# Patient Record
Sex: FEMALE | Race: WHITE | NOT HISPANIC OR LATINO | Employment: OTHER | URBAN - METROPOLITAN AREA
[De-identification: names, ages, dates, MRNs, and addresses within clinical notes are randomized per-mention and may not be internally consistent; named-entity substitution may affect disease eponyms.]

---

## 2018-01-03 ENCOUNTER — HOSPITAL ENCOUNTER (OUTPATIENT)
Facility: HOSPITAL | Age: 63
Setting detail: OBSERVATION
Discharge: HOME | End: 2018-01-05
Attending: EMERGENCY MEDICINE | Admitting: INTERNAL MEDICINE
Payer: COMMERCIAL

## 2018-01-03 ENCOUNTER — APPOINTMENT (EMERGENCY)
Dept: RADIOLOGY | Facility: HOSPITAL | Age: 63
End: 2018-01-03
Payer: COMMERCIAL

## 2018-01-03 DIAGNOSIS — R07.9 CHEST PAIN: Primary | ICD-10-CM

## 2018-01-03 LAB
ALBUMIN SERPL BCP-MCNC: 3.2 G/DL (ref 3.5–5)
ALP SERPL-CCNC: 139 U/L (ref 46–116)
ALT SERPL W P-5'-P-CCNC: 21 U/L (ref 12–78)
ANION GAP SERPL CALCULATED.3IONS-SCNC: 6 MMOL/L (ref 4–13)
AST SERPL W P-5'-P-CCNC: 18 U/L (ref 5–45)
BASOPHILS # BLD AUTO: 0.01 THOUSANDS/ΜL (ref 0–0.1)
BASOPHILS NFR BLD AUTO: 0 % (ref 0–1)
BILIRUB SERPL-MCNC: 0.28 MG/DL (ref 0.2–1)
BUN SERPL-MCNC: 13 MG/DL (ref 5–25)
CALCIUM SERPL-MCNC: 8.6 MG/DL (ref 8.3–10.1)
CHLORIDE SERPL-SCNC: 98 MMOL/L (ref 100–108)
CO2 SERPL-SCNC: 31 MMOL/L (ref 21–32)
CREAT SERPL-MCNC: 0.94 MG/DL (ref 0.6–1.3)
DEPRECATED D DIMER PPP: 248 NG/ML (FEU) (ref 0–424)
EOSINOPHIL # BLD AUTO: 0.1 THOUSAND/ΜL (ref 0–0.61)
EOSINOPHIL NFR BLD AUTO: 2 % (ref 0–6)
ERYTHROCYTE [DISTWIDTH] IN BLOOD BY AUTOMATED COUNT: 12.8 % (ref 11.6–15.1)
GFR SERPL CREATININE-BSD FRML MDRD: 65 ML/MIN/1.73SQ M
GLUCOSE SERPL-MCNC: 418 MG/DL (ref 65–140)
HCT VFR BLD AUTO: 41.4 % (ref 34.8–46.1)
HGB BLD-MCNC: 14 G/DL (ref 11.5–15.4)
LYMPHOCYTES # BLD AUTO: 1.42 THOUSANDS/ΜL (ref 0.6–4.47)
LYMPHOCYTES NFR BLD AUTO: 22 % (ref 14–44)
MCH RBC QN AUTO: 29.9 PG (ref 26.8–34.3)
MCHC RBC AUTO-ENTMCNC: 33.8 G/DL (ref 31.4–37.4)
MCV RBC AUTO: 88 FL (ref 82–98)
MONOCYTES # BLD AUTO: 0.37 THOUSAND/ΜL (ref 0.17–1.22)
MONOCYTES NFR BLD AUTO: 6 % (ref 4–12)
NEUTROPHILS # BLD AUTO: 4.63 THOUSANDS/ΜL (ref 1.85–7.62)
NEUTS SEG NFR BLD AUTO: 70 % (ref 43–75)
NRBC BLD AUTO-RTO: 0 /100 WBCS
PLATELET # BLD AUTO: 194 THOUSANDS/UL (ref 149–390)
PMV BLD AUTO: 11.4 FL (ref 8.9–12.7)
POTASSIUM SERPL-SCNC: 3.8 MMOL/L (ref 3.5–5.3)
PROT SERPL-MCNC: 7.4 G/DL (ref 6.4–8.2)
RBC # BLD AUTO: 4.69 MILLION/UL (ref 3.81–5.12)
SODIUM SERPL-SCNC: 135 MMOL/L (ref 136–145)
SPECIMEN SOURCE: NORMAL
SPECIMEN SOURCE: NORMAL
TROPONIN I BLD-MCNC: 0 NG/ML (ref 0–0.08)
TROPONIN I BLD-MCNC: 0 NG/ML (ref 0–0.08)
WBC # BLD AUTO: 6.56 THOUSAND/UL (ref 4.31–10.16)

## 2018-01-03 PROCEDURE — 80053 COMPREHEN METABOLIC PANEL: CPT | Performed by: EMERGENCY MEDICINE

## 2018-01-03 PROCEDURE — 36415 COLL VENOUS BLD VENIPUNCTURE: CPT | Performed by: EMERGENCY MEDICINE

## 2018-01-03 PROCEDURE — 93005 ELECTROCARDIOGRAM TRACING: CPT | Performed by: EMERGENCY MEDICINE

## 2018-01-03 PROCEDURE — 84484 ASSAY OF TROPONIN QUANT: CPT

## 2018-01-03 PROCEDURE — 85379 FIBRIN DEGRADATION QUANT: CPT | Performed by: EMERGENCY MEDICINE

## 2018-01-03 PROCEDURE — 93005 ELECTROCARDIOGRAM TRACING: CPT

## 2018-01-03 PROCEDURE — 71046 X-RAY EXAM CHEST 2 VIEWS: CPT

## 2018-01-03 PROCEDURE — 36415 COLL VENOUS BLD VENIPUNCTURE: CPT

## 2018-01-03 PROCEDURE — 85025 COMPLETE CBC W/AUTO DIFF WBC: CPT | Performed by: EMERGENCY MEDICINE

## 2018-01-03 RX ORDER — INSULIN ASPART 100 [IU]/ML
60 INJECTION, SUSPENSION SUBCUTANEOUS
COMMUNITY
End: 2020-12-04

## 2018-01-03 RX ORDER — ASPIRIN 81 MG/1
324 TABLET, CHEWABLE ORAL ONCE
Status: COMPLETED | OUTPATIENT
Start: 2018-01-03 | End: 2018-01-03

## 2018-01-03 RX ADMIN — ASPIRIN 81 MG 324 MG: 81 TABLET ORAL at 20:44

## 2018-01-03 RX ADMIN — NITROGLYCERIN 1 INCH: 20 OINTMENT TOPICAL at 20:45

## 2018-01-04 LAB
ANION GAP SERPL CALCULATED.3IONS-SCNC: 6 MMOL/L (ref 4–13)
ATRIAL RATE: 73 BPM
ATRIAL RATE: 82 BPM
ATRIAL RATE: 84 BPM
ATRIAL RATE: 87 BPM
ATRIAL RATE: 94 BPM
BUN SERPL-MCNC: 12 MG/DL (ref 5–25)
CALCIUM SERPL-MCNC: 8.4 MG/DL (ref 8.3–10.1)
CHLORIDE SERPL-SCNC: 104 MMOL/L (ref 100–108)
CO2 SERPL-SCNC: 29 MMOL/L (ref 21–32)
CREAT SERPL-MCNC: 0.56 MG/DL (ref 0.6–1.3)
GFR SERPL CREATININE-BSD FRML MDRD: 100 ML/MIN/1.73SQ M
GLUCOSE SERPL-MCNC: 109 MG/DL (ref 65–140)
GLUCOSE SERPL-MCNC: 141 MG/DL (ref 65–140)
GLUCOSE SERPL-MCNC: 189 MG/DL (ref 65–140)
GLUCOSE SERPL-MCNC: 231 MG/DL (ref 65–140)
GLUCOSE SERPL-MCNC: 256 MG/DL (ref 65–140)
GLUCOSE SERPL-MCNC: 263 MG/DL (ref 65–140)
GLUCOSE SERPL-MCNC: 297 MG/DL (ref 65–140)
GLUCOSE SERPL-MCNC: 92 MG/DL (ref 65–140)
P AXIS: 53 DEGREES
P AXIS: 56 DEGREES
P AXIS: 62 DEGREES
P AXIS: 62 DEGREES
P AXIS: 63 DEGREES
PLATELET # BLD AUTO: 155 THOUSANDS/UL (ref 149–390)
PMV BLD AUTO: 10.6 FL (ref 8.9–12.7)
POTASSIUM SERPL-SCNC: 3.5 MMOL/L (ref 3.5–5.3)
PR INTERVAL: 120 MS
PR INTERVAL: 120 MS
PR INTERVAL: 122 MS
PR INTERVAL: 130 MS
PR INTERVAL: 138 MS
QRS AXIS: -32 DEGREES
QRS AXIS: -34 DEGREES
QRS AXIS: -36 DEGREES
QRS AXIS: -38 DEGREES
QRS AXIS: -38 DEGREES
QRSD INTERVAL: 118 MS
QRSD INTERVAL: 120 MS
QRSD INTERVAL: 128 MS
QRSD INTERVAL: 132 MS
QRSD INTERVAL: 132 MS
QT INTERVAL: 364 MS
QT INTERVAL: 366 MS
QT INTERVAL: 382 MS
QT INTERVAL: 382 MS
QT INTERVAL: 392 MS
QTC INTERVAL: 420 MS
QTC INTERVAL: 440 MS
QTC INTERVAL: 451 MS
QTC INTERVAL: 455 MS
QTC INTERVAL: 457 MS
SODIUM SERPL-SCNC: 139 MMOL/L (ref 136–145)
T WAVE AXIS: 40 DEGREES
T WAVE AXIS: 44 DEGREES
T WAVE AXIS: 51 DEGREES
T WAVE AXIS: 55 DEGREES
T WAVE AXIS: 56 DEGREES
TROPONIN I SERPL-MCNC: <0.02 NG/ML
TROPONIN I SERPL-MCNC: <0.02 NG/ML
VENTRICULAR RATE: 73 BPM
VENTRICULAR RATE: 82 BPM
VENTRICULAR RATE: 84 BPM
VENTRICULAR RATE: 87 BPM
VENTRICULAR RATE: 94 BPM

## 2018-01-04 PROCEDURE — 93005 ELECTROCARDIOGRAM TRACING: CPT | Performed by: PHYSICIAN ASSISTANT

## 2018-01-04 PROCEDURE — 84484 ASSAY OF TROPONIN QUANT: CPT | Performed by: PHYSICIAN ASSISTANT

## 2018-01-04 PROCEDURE — 80048 BASIC METABOLIC PNL TOTAL CA: CPT | Performed by: PHYSICIAN ASSISTANT

## 2018-01-04 PROCEDURE — 99285 EMERGENCY DEPT VISIT HI MDM: CPT

## 2018-01-04 PROCEDURE — 82948 REAGENT STRIP/BLOOD GLUCOSE: CPT

## 2018-01-04 PROCEDURE — 93005 ELECTROCARDIOGRAM TRACING: CPT

## 2018-01-04 PROCEDURE — 36415 COLL VENOUS BLD VENIPUNCTURE: CPT | Performed by: PHYSICIAN ASSISTANT

## 2018-01-04 PROCEDURE — 85049 AUTOMATED PLATELET COUNT: CPT | Performed by: PHYSICIAN ASSISTANT

## 2018-01-04 RX ORDER — CALCIUM CARBONATE 200(500)MG
1000 TABLET,CHEWABLE ORAL DAILY PRN
Status: DISCONTINUED | OUTPATIENT
Start: 2018-01-04 | End: 2018-01-05 | Stop reason: HOSPADM

## 2018-01-04 RX ORDER — PANTOPRAZOLE SODIUM 40 MG/1
40 TABLET, DELAYED RELEASE ORAL
Status: DISCONTINUED | OUTPATIENT
Start: 2018-01-04 | End: 2018-01-05 | Stop reason: HOSPADM

## 2018-01-04 RX ORDER — ROPINIROLE 1 MG/1
1 TABLET, FILM COATED ORAL 3 TIMES DAILY
Status: DISCONTINUED | OUTPATIENT
Start: 2018-01-04 | End: 2018-01-05 | Stop reason: HOSPADM

## 2018-01-04 RX ORDER — TOPIRAMATE 100 MG/1
200 TABLET, FILM COATED ORAL
Status: DISCONTINUED | OUTPATIENT
Start: 2018-01-04 | End: 2018-01-05 | Stop reason: HOSPADM

## 2018-01-04 RX ORDER — PROMETHAZINE HYDROCHLORIDE 25 MG/ML
12.5 INJECTION, SOLUTION INTRAMUSCULAR; INTRAVENOUS EVERY 6 HOURS PRN
Status: DISCONTINUED | OUTPATIENT
Start: 2018-01-04 | End: 2018-01-05 | Stop reason: HOSPADM

## 2018-01-04 RX ORDER — ACETAMINOPHEN 325 MG/1
650 TABLET ORAL EVERY 6 HOURS PRN
Status: DISCONTINUED | OUTPATIENT
Start: 2018-01-04 | End: 2018-01-05 | Stop reason: HOSPADM

## 2018-01-04 RX ORDER — INSULIN ASPART 100 [IU]/ML
30 INJECTION, SUSPENSION SUBCUTANEOUS
Status: DISCONTINUED | OUTPATIENT
Start: 2018-01-04 | End: 2018-01-05 | Stop reason: HOSPADM

## 2018-01-04 RX ORDER — FLUOXETINE HYDROCHLORIDE 20 MG/1
20 CAPSULE ORAL DAILY
Status: DISCONTINUED | OUTPATIENT
Start: 2018-01-04 | End: 2018-01-05 | Stop reason: HOSPADM

## 2018-01-04 RX ORDER — INSULIN ASPART 100 [IU]/ML
60 INJECTION, SUSPENSION SUBCUTANEOUS
Status: DISCONTINUED | OUTPATIENT
Start: 2018-01-04 | End: 2018-01-04

## 2018-01-04 RX ORDER — ROPINIROLE 1 MG/1
1 TABLET, FILM COATED ORAL 3 TIMES DAILY
Status: DISCONTINUED | OUTPATIENT
Start: 2018-01-04 | End: 2018-01-04

## 2018-01-04 RX ORDER — TOPIRAMATE 100 MG/1
200 TABLET, FILM COATED ORAL EVERY MORNING
Status: DISCONTINUED | OUTPATIENT
Start: 2018-01-04 | End: 2018-01-05 | Stop reason: HOSPADM

## 2018-01-04 RX ORDER — MORPHINE SULFATE 2 MG/ML
2 INJECTION, SOLUTION INTRAMUSCULAR; INTRAVENOUS EVERY 4 HOURS PRN
Status: DISCONTINUED | OUTPATIENT
Start: 2018-01-04 | End: 2018-01-05 | Stop reason: HOSPADM

## 2018-01-04 RX ORDER — LEVOTHYROXINE SODIUM 0.05 MG/1
50 TABLET ORAL
Status: DISCONTINUED | OUTPATIENT
Start: 2018-01-04 | End: 2018-01-05 | Stop reason: HOSPADM

## 2018-01-04 RX ADMIN — TOPIRAMATE 200 MG: 100 TABLET, FILM COATED ORAL at 01:57

## 2018-01-04 RX ADMIN — INSULIN LISPRO 2 UNITS: 100 INJECTION, SOLUTION INTRAVENOUS; SUBCUTANEOUS at 01:57

## 2018-01-04 RX ADMIN — SODIUM CHLORIDE 500 ML: 0.9 INJECTION, SOLUTION INTRAVENOUS at 09:16

## 2018-01-04 RX ADMIN — ENOXAPARIN SODIUM 40 MG: 40 INJECTION SUBCUTANEOUS at 09:15

## 2018-01-04 RX ADMIN — PANTOPRAZOLE SODIUM 40 MG: 40 TABLET, DELAYED RELEASE ORAL at 01:56

## 2018-01-04 RX ADMIN — TOPIRAMATE 200 MG: 100 TABLET, FILM COATED ORAL at 21:06

## 2018-01-04 RX ADMIN — ROPINIROLE 1 MG: 1 TABLET, FILM COATED ORAL at 02:35

## 2018-01-04 RX ADMIN — LEVOTHYROXINE SODIUM 50 MCG: 50 TABLET ORAL at 05:33

## 2018-01-04 RX ADMIN — ROPINIROLE 1 MG: 1 TABLET, FILM COATED ORAL at 16:28

## 2018-01-04 RX ADMIN — INSULIN ASPART 30 UNITS: 100 INJECTION, SUSPENSION SUBCUTANEOUS at 16:31

## 2018-01-04 RX ADMIN — INSULIN LISPRO 6 UNITS: 100 INJECTION, SOLUTION INTRAVENOUS; SUBCUTANEOUS at 13:29

## 2018-01-04 RX ADMIN — INSULIN ASPART 30 UNITS: 100 INJECTION, SUSPENSION SUBCUTANEOUS at 07:17

## 2018-01-04 RX ADMIN — ROPINIROLE 1 MG: 1 TABLET, FILM COATED ORAL at 21:06

## 2018-01-04 RX ADMIN — INSULIN LISPRO 4 UNITS: 100 INJECTION, SOLUTION INTRAVENOUS; SUBCUTANEOUS at 07:18

## 2018-01-04 RX ADMIN — PROMETHAZINE HYDROCHLORIDE 12.5 MG: 25 INJECTION INTRAMUSCULAR; INTRAVENOUS at 08:32

## 2018-01-04 NOTE — H&P
H&P- Rakesh Ervin 1955, 60 y o  female MRN: [id number]    Unit/Bed#: ED 23 Encounter: [id number]    Primary Care Provider: Paul Candelario MD   Date and time admitted to hospital: 1/3/2018  7:57 PM    * Chest pain   Assessment & Plan    1  Pt with no cardiac hx, similar episodes of CP with negative cardiac quintero  First trop negative  EKG without ST changes, remarkable only for new RBBB  Will admit for obs, keep on tele, trend troponins  Pain control  2  No SOB, VSS  3  Suspect GI cause possibly GERD exacerbation as pt is noncompliant with PPI regimen and consumed large amounts of acidic foods as well as alcohol today  Initiate PPI therapy  GERD (gastroesophageal reflux disease)   Assessment & Plan    1  Epigastric ttp on exam, Pt prescribed PPI by PCP, she states she does no takes this  Only takes TUMS in response to GERD  Reports GERD flares 2x/month on average  2   Will treat pt with PPI and advise f/u with PCP to re-consider PPI therapy again as an outpatient  Diabetes mellitus ([facility] [address] )   Assessment & Plan    1  Pt on 60 U Novolog 70/30 BID AC, reports poor compliance and skipped her dinnertime dose  Continue this regimen and add SSI ac and hs  Monitor BS's  Hyperlipidemia   Assessment & Plan    1  Pt trialed multiple statins all of which had to be dc'ed 2/2 myalgias  Hypertension   Assessment & Plan    1  Appears that pt does not take any anti-hypertensives outpatient  Continue to monitor BP, encourage f/u with PCP  Restless leg syndrome   Assessment & Plan    1  Controlled on Requip three times daily  Seizures ([facility] [address] )   Assessment & Plan    1  Controlled on Topamax  VTE Prophylaxis: Enoxaparin (Lovenox)  / reason for no mechanical VTE prophylaxis not required   Code Status: DNR/DNI  POLST: POLST form is not discussed and not completed at this time    Discussion with family: nobody    Anticipated Length of Stay:  Patient will be admitted on an Observation basis with an anticipated length of stay of  < 2 midnights  Justification for Hospital Stay: ACS r/o    Total Time for Visit, including Counseling / Coordination of Care: 30 minutes  Greater than 50% of this total time spent on direct patient counseling and coordination of care  Chief Complaint:   Chest pain    History of Present Illness:    Angie Mack is a 60 y o  female who presents with a PMHx of HTN, HLD, DM, and GERD presenting to the ED with substernal chest pain for a duration of about 5 hours  She states that she [place] the [place] today and had three mudslides and  Then went to the buffet where she ate pork and sauerkraut, meatloaf, and multiple other foods  About an hour later she began to develop progressive "chest squeezing" that after ten minutes of pain she called EMS  She states that it radiated into her left arm and up into her left chin  She denies however any CAST, diaphoresis, paraesthesias, N/V although she does feel somewhat "queasy"  She states that she has a hx of GERD and that her doctor prescribed omeprazole but that she never took it  She admits to GERD episodes about twice a month  She states this feels different than usual GERD  She also admits to missing some doses of insulin every once in a while  She admits to a cough for three days that she got from 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, described as dry  Denies f/c  She states that her lower abdominal pain has been relieved since her most recent solid BM had in the hospital       Review of Systems:    Review of Systems   Constitutional: Negative  HENT: Negative  Eyes: Negative  Respiratory: Positive for cough, chest tightness and shortness of breath  Negative for apnea, choking, wheezing and stridor  Cardiovascular: Positive for chest pain  Negative for palpitations and leg swelling  Gastrointestinal: Positive for abdominal pain, constipation, diarrhea and nausea   Negative for abdominal distention, anal bleeding, blood in stool, rectal pain and vomiting  Endocrine: Negative  Genitourinary: Negative  Neurological: Positive for headaches (following administration of nitro)  Negative for dizziness, tremors, seizures, syncope, facial asymmetry, speech difficulty, light-headedness and numbness  Hematological: Negative  Psychiatric/Behavioral: Negative  Past Medical and Surgical History:     Past Medical History:   Diagnosis Date    Diabetes mellitus ([facility] [address] )     Disease of thyroid gland     Hyperlipidemia     Hypertension     Hypothyroidism (acquired)     Restless leg syndrome     Seizures (HCC)        Past Surgical History:   Procedure Laterality Date    APPENDECTOMY      CHOLECYSTECTOMY      HIP FRACTURE SURGERY Right     TONSILLECTOMY      TUBAL LIGATION         Meds/Allergies:    Prior to Admission medications    Medication Sig Start Date End Date Taking? Authorizing Provider   FLUoxetine (PROzac) 20 mg capsule Take 20 mg by mouth daily  Yes Historical Provider, MD   insulin aspart protamine-insulin aspart (NovoLOG 70/30) 100 units/mL injection Inject 60 Units under the skin 2 (two) times a day before meals   Yes Historical Provider, MD   levothyroxine 50 mcg tablet Take 50 mcg by mouth daily Indications: Pt unsure of the amount  Yes Historical Provider, MD   loperamide (IMODIUM) 1 mg/5 mL oral liquid Take by mouth 4 (four) times a day as needed for diarrhea  Yes Historical Provider, MD   rOPINIRole (REQUIP) 1 mg tablet Take 1 mg by mouth 3 (three) times a day  Yes Historical Provider, MD   topiramate (TOPAMAX) 100 mg tablet Take 200 mg by mouth every morning     Yes Historical Provider, MD   topiramate (TOPAMAX) 100 mg tablet Take 200 mg by mouth daily at bedtime  Yes Historical Provider, MD     I have reviewed home medications with patient personally  Allergies:    Allergies   Allergen Reactions    Pineapple Anaphylaxis    Marijuana [Dronabinol] GI Intolerance    Metformin Diarrhea     Other reaction(s): Unknown    Ondansetron      Other reaction(s): Unknown    Coconut Oil Rash    Liraglutide Rash and Other (See Comments)    Penicillins Rash       Social History:     Marital Status: Single   Occupation: not discussed  Patient Pre-hospital Living Situation: not discussed  Patient Pre-hospital Level of Mobility: not discussed  Patient Pre-hospital Diet Restrictions: not discussed  Substance Use History:   History   Alcohol Use    Yes     Comment: occasional     History   Smoking Status    Never Smoker   Smokeless Tobacco    Never Used     Comment: not a smoker     History   Drug Use No       Family History:    non-contributory    Physical Exam:     Vitals:   Blood Pressure: (!) 174/111 (01/03/18 2231)  Pulse: 85 (01/03/18 2231)  Temperature: 97 8 °F (36 6 °C) (01/03/18 2001)  Respirations: 18 (01/03/18 2231)  Weight - Scale: 127 kg (280 lb) (01/03/18 2001)  SpO2: 98 % (01/03/18 2231)    Physical Exam   Constitutional: She is oriented to person, place, and time  She appears well-developed and well-nourished  HENT:   Head: Normocephalic and atraumatic  Cardiovascular: Normal rate, regular rhythm, normal heart sounds and intact distal pulses  Exam reveals no gallop and no friction rub  No murmur heard  Pulmonary/Chest: Effort normal and breath sounds normal  No respiratory distress  She has no wheezes  She has no rales  She exhibits no tenderness  Abdominal: Soft  Bowel sounds are normal  She exhibits distension  She exhibits no mass  There is tenderness (epigastric)  There is guarding (with epigastric palpation)  There is no rebound  Neurological: She is alert and oriented to person, place, and time  Skin: Skin is warm and dry  Mild bilateral LE cellulitis, chronic    Psychiatric: She has a normal mood and affect  Her behavior is normal            Additional Data:     Lab Results: I have personally reviewed pertinent reports          Results from last 7 days  Lab Units 01/03/18 2004   WBC Thousand/uL 6 56   HEMOGLOBIN g/dL 14 0   HEMATOCRIT % 41 4   PLATELETS Thousands/uL 194   NEUTROS PCT % 70   LYMPHS PCT % 22   MONOS PCT % 6   EOS PCT % 2       Results from last 7 days  Lab Units 01/03/18 2004   SODIUM mmol/L 135*   POTASSIUM mmol/L 3 8   CHLORIDE mmol/L 98*   CO2 mmol/L 31   BUN mg/dL 13   CREATININE mg/dL 0 94   CALCIUM mg/dL 8 6   TOTAL PROTEIN g/dL 7 4   BILIRUBIN TOTAL mg/dL 0 28   ALK PHOS U/L 139*   ALT U/L 21   AST U/L 18   GLUCOSE RANDOM mg/dL 418*           Imaging: I have personally reviewed pertinent reports  X-ray chest 2 views   Final Result by Carmen Richey MD (01/03 2204)      No acute consolidation congestion         Workstation performed: [id number]             EKG, Pathology, and Other Studies Reviewed on Admission:   · EKG: NSR with RBBB    Allscripts / Epic Records Reviewed: No     ** Please Note: This note has been constructed using a voice recognition system   **

## 2018-01-04 NOTE — ASSESSMENT & PLAN NOTE
1   Epigastric ttp on exam, Pt prescribed PPI by PCP, she states she does no takes this  Only takes TUMS in response to GERD  Reports GERD flares 2x/month on average  2   Will treat pt with PPI and advise f/u with PCP to re-consider PPI therapy again as an outpatient  3   Can consider CT abdomen if worsening abdominal pain, GYN following for thickened endometrial lining per pt and pt is to have endometrial biopsy  Monitor abdominal pain

## 2018-01-04 NOTE — ASSESSMENT & PLAN NOTE
1  Pt on 60 U Novolog 70/30 BID AC, reports poor compliance and skipped her dinnertime dose  Continue this regimen and add SSI ac and hs  Monitor BS's

## 2018-01-04 NOTE — CASE MANAGEMENT
Thank you,  [address] [facility] in the [facility] by Mj Monae for 2017  Network Utilization Review Department  Phone: [phone]; Fax [phone]  ATTENTION: The Network Utilization Review Department is now centralized for our 7 Facilities  Make a note that we have a new phone and fax numbers for our Department  Please call with any questions or concerns to [phone] and carefully follow the prompts so that you are directed to the right person  All voicemails are confidential  Fax any determinations, approvals, denials, and requests for initial or continue stay review clinical to [phone]  Due to HIGH CALL volume, it would be easier if you could please send faxed requests to expedite your requests and in part, help us provide discharge notifications faster      ------------------------------------------------------------------------------------------------------------------------------------------------------------    Continued Stay Review    Date: 01/04/2018    Vital Signs: /60   Pulse 84   Temp 97 8 °F (36 6 °C)   Resp 18   Wt 127 kg (280 lb)   SpO2 93%   BMI 51 21 kg/m²     Medications:   Scheduled Meds:   enoxaparin 40 mg Subcutaneous Daily   FLUoxetine 20 mg Oral Daily   insulin aspart protamine-insulin aspart 30 Units Subcutaneous BID AC   insulin lispro 1-5 Units Subcutaneous HS   insulin lispro 2-12 Units Subcutaneous TID With Meals   levothyroxine 50 mcg Oral Early Morning   pantoprazole 40 mg Oral Early Morning   rOPINIRole 1 mg Oral TID   sodium chloride 500 mL Intravenous Once   topiramate 200 mg Oral QAM   topiramate 200 mg Oral HS     Continuous Infusions:    PRN Meds:   acetaminophen    calcium carbonate    loperamide    morphine injection    promethazine    Abnormal Labs/Diagnostic Results:     Age/Sex: 60 y o  female     Assessment/Plan:   Pt then had bm on arrival to the hospital with a relief in symptoms     bs mildly elevated 263 fasting this am   Troponins negative x 3   ekg with normal sinus with right bundle branch block   Vital remain stable   Now pt with some nausea received phenergan 12 5mg with allergy to zofran   h- pylori antigen     Pt continues, to feel nauseated and was dry heaving now      Phenergan, ordered  Will give 500 nss over next four hours     Discharge Plan: TBD

## 2018-01-04 NOTE — ED PROVIDER NOTES
History  Chief Complaint   Patient presents with    Chest Pain     Pt complains of cough for the last week today [place] she complins of a squeezing chest pain in the center to her chest       50-year-old female with history of hypertension, hyperlipidemia, diabetes who presents for evaluation of chest pain  [place] 3 alcoholic beverages but [place] 1 hour prior to evaluation she had gradually worsening left-sided squeezing chest discomfort that radiates towards her left arm and towards her jaw  She has had similar symptoms in the past   Symptoms are not worse with exertion or better with rest   She reports a intermittent cough for the past 3 days but no fevers chills or rigors  No nausea vomiting abdominal pain diaphoresis or diarrhea  She states she did not take her recommended aspirin today  She was given 2 sublingual nitro prior to arrival with no relief of symptoms  She denies any recent travel, history of DVT, PE, hemoptysis, unilateral leg swelling or tenderness or use of OCPs  She states she continues to have the discomfort and it is 10/10 in severity  Prior to Admission Medications   Prescriptions Last Dose Informant Patient Reported? Taking? FLUoxetine (PROzac) 20 mg capsule 1/3/2018 at Unknown time  Yes Yes   Sig: Take 20 mg by mouth daily  insulin aspart protamine-insulin aspart (NovoLOG 70/30) 100 units/mL injection 1/3/2018 at morning  Yes Yes   Sig: Inject 60 Units under the skin 2 (two) times a day before meals   levothyroxine 50 mcg tablet 1/3/2018 at Unknown time  Yes Yes   Sig: Take 50 mcg by mouth daily Indications: Pt unsure of the amount  loperamide (IMODIUM) 1 mg/5 mL oral liquid 1/3/2018 at morning  Yes Yes   Sig: Take by mouth 4 (four) times a day as needed for diarrhea     rOPINIRole (REQUIP) 1 mg tablet 1/3/2018 at 1200  Yes Yes   Sig: Take 1 mg by mouth 3 (three) times a day    topiramate (TOPAMAX) 100 mg tablet 1/3/2018 at morning  Yes Yes   Sig: Take 200 mg by mouth every morning     topiramate (TOPAMAX) 100 mg tablet 1/2/2018 at evening  Yes Yes   Sig: Take 200 mg by mouth daily at bedtime  Facility-Administered Medications: None       Past Medical History:   Diagnosis Date    Diabetes mellitus ([address] )     Disease of thyroid gland     Hyperlipidemia     Hypertension     Hypothyroidism (acquired)     Restless leg syndrome     Seizures (HCC)        Past Surgical History:   Procedure Laterality Date    APPENDECTOMY      CHOLECYSTECTOMY      HIP FRACTURE SURGERY Right     TONSILLECTOMY      TUBAL LIGATION         History reviewed  No pertinent family history  I have reviewed and agree with the history as documented  Social History   Substance Use Topics    Smoking status: Never Smoker    Smokeless tobacco: Never Used      Comment: not a smoker    Alcohol use Yes      Comment: occasional        Review of Systems   Constitutional: Negative for chills, fatigue and fever  HENT: Negative for congestion and sinus pressure  Eyes: Negative for visual disturbance  Respiratory: Positive for chest tightness  Negative for cough and shortness of breath  Cardiovascular: Positive for chest pain  Negative for palpitations and leg swelling  Gastrointestinal: Negative for abdominal pain, diarrhea, nausea and vomiting  Genitourinary: Negative for dysuria and frequency  Musculoskeletal: Negative for back pain and neck pain  Skin: Negative for rash  Neurological: Negative for dizziness, weakness, light-headedness, numbness and headaches  Hematological: Negative for adenopathy         Physical Exam  ED Triage Vitals   Temperature Pulse Respirations Blood Pressure SpO2   01/03/18 2001 01/03/18 2001 01/03/18 2001 01/03/18 2042 01/03/18 2001   97 8 °F (36 6 °C) 105 20 128/76 98 %      Temp src Heart Rate Source Patient Position - Orthostatic VS BP Location FiO2 (%)   -- 01/03/18 2042 01/03/18 2042 -- --    Monitor Lying        Pain Score       01/03/18 2001       Worst Possible Pain           Orthostatic Vital Signs  Vitals:    01/03/18 2001 01/03/18 2042 01/03/18 2130 01/03/18 2231   BP:  128/76 161/98 (!) 174/111   Pulse: 105 91 80 85   Patient Position - Orthostatic VS:  Lying Lying        Physical Exam   Constitutional: She is oriented to person, place, and time  Vital signs are normal  She appears well-developed and well-nourished  She does not appear ill  No distress  Morbid obesity  HENT:   Head: Normocephalic and atraumatic  Head is without abrasion and without contusion  Right Ear: Tympanic membrane normal    Left Ear: Tympanic membrane normal    Nose: Nose normal    Mouth/Throat: Uvula is midline, oropharynx is clear and moist and mucous membranes are normal    Eyes: Conjunctivae and EOM are normal  Pupils are equal, round, and reactive to light  Neck: Trachea normal, normal range of motion, full passive range of motion without pain and phonation normal  Neck supple  No JVD present  No spinous process tenderness and no muscular tenderness present  Carotid bruit is not present  Normal range of motion present  No thyromegaly present  Cardiovascular: Normal rate, regular rhythm and intact distal pulses  Exam reveals no friction rub  No murmur heard  Pulmonary/Chest: Effort normal and breath sounds normal  No accessory muscle usage or stridor  No tachypnea  No respiratory distress  She has no decreased breath sounds  She has no wheezes  She has no rhonchi  She has no rales  She exhibits no tenderness, no crepitus, no edema and no retraction  Abdominal: Soft  Normal appearance and bowel sounds are normal  She exhibits no distension  There is no tenderness  There is no rigidity, no rebound, no guarding and no CVA tenderness  Musculoskeletal: Normal range of motion  No unilateral calf swelling or tenderness     Lymphadenopathy:     She has no cervical adenopathy  Neurological: She is alert and oriented to person, place, and time  She has normal strength  No sensory deficit  GCS eye subscore is 4  GCS verbal subscore is 5  GCS motor subscore is 6  Skin: Skin is warm, dry and intact  No rash noted  She is not diaphoretic  Psychiatric: She has a normal mood and affect  Nursing note and vitals reviewed        ED Medications  Medications   FLUoxetine (PROzac) capsule 20 mg (not administered)   insulin aspart protamine-insulin aspart (NovoLOG 70/30) 100 units/mL subcutaneous injection 60 Units (not administered)   levothyroxine tablet 50 mcg (not administered)   loperamide (IMODIUM) oral liquid 2 mg (not administered)   rOPINIRole (REQUIP) tablet 1 mg (not administered)   topiramate (TOPAMAX) tablet 200 mg (not administered)   topiramate (TOPAMAX) tablet 200 mg (not administered)   insulin lispro (HumaLOG) 100 units/mL subcutaneous injection 2-12 Units (not administered)   insulin lispro (HumaLOG) 100 units/mL subcutaneous injection 1-5 Units (not administered)   calcium carbonate (TUMS) chewable tablet 1,000 mg (not administered)   enoxaparin (LOVENOX) subcutaneous injection 40 mg (not administered)   pantoprazole (PROTONIX) EC tablet 40 mg (not administered)   morphine injection 2 mg (not administered)   acetaminophen (TYLENOL) tablet 650 mg (not administered)   aspirin chewable tablet 324 mg (324 mg Oral Given 1/3/18 2044)   nitroglycerin (NITRO-BID) 2 % TD ointment 1 inch (1 inch Topical Given 1/3/18 2045)       Diagnostic Studies  Results Reviewed     Procedure Component Value Units Date/Time    Troponin I [[id number]]     Lab Status:  No result Specimen:  Blood     Platelet count [[id number]]     Lab Status:  No result Specimen:  Blood     POCT troponin [[id number]]  (Normal) Collected:  01/03/18 2311    Lab Status:  Final result Updated:  01/03/18 2324     POC Troponin I 0 00 ng/ml      Specimen Type VENOUS    Narrative:         [employer] i-Stat handheld analyzer 99% cutoff is > 0 08ng/mL in network Emergency Departments    o cTnI 99% cutoff is useful only when applied to patients in the clinical setting of myocardial ischemia  o cTnI 99% cutoff should be interpreted in the context of clinical history, ECG findings and possibly cardiac imaging to establish correct diagnosis  o cTnI 99% cutoff may be suggestive but clearly not indicative of a coronary event without the clinical setting of myocardial ischemia  D-dimer, quantitative [[id number]]  (Normal) Collected:  01/03/18 2158    Lab Status:  Final result Specimen:  Blood from Arm, Left Updated:  01/03/18 2218     D-Dimer, Quant 248 ng/ml (FEU)     Comprehensive metabolic panel [[id number]]  (Abnormal) Collected:  01/03/18 2004    Lab Status:  Final result Specimen:  Blood from Arm, Left Updated:  01/03/18 2052     Sodium 135 (L) mmol/L      Potassium 3 8 mmol/L      Chloride 98 (L) mmol/L      CO2 31 mmol/L      Anion Gap 6 mmol/L      BUN 13 mg/dL      Creatinine 0 94 mg/dL      Glucose 418 (H) mg/dL      Calcium 8 6 mg/dL      AST 18 U/L      ALT 21 U/L      Alkaline Phosphatase 139 (H) U/L      Total Protein 7 4 g/dL      Albumin 3 2 (L) g/dL      Total Bilirubin 0 28 mg/dL      eGFR 65 ml/min/1 73sq m     Narrative:         National Kidney Disease Education Program recommendations are as follows:  GFR calculation is accurate only with a steady state creatinine  Chronic Kidney disease less than 60 ml/min/1 73 sq  meters  Kidney failure less than 15 ml/min/1 73 sq  meters      POCT troponin [[id number]]  (Normal) Collected:  01/03/18 2002    Lab Status:  Final result Updated:  01/03/18 2015     POC Troponin I 0 00 ng/ml      Specimen Type VENOUS    Narrative:         [employer] i-Stat handheld analyzer 99% cutoff is > 0 08ng/mL in [facility] Emergency Departments    o cTnI 99% cutoff is useful only when applied to patients in the clinical setting of myocardial ischemia  o cTnI 99% cutoff should be interpreted in the context of clinical history, ECG findings and possibly cardiac imaging to establish correct diagnosis  o cTnI 99% cutoff may be suggestive but clearly not indicative of a coronary event without the clinical setting of myocardial ischemia  CBC and differential [[id number]]  (Normal) Collected:  01/03/18 2004    Lab Status:  Final result Specimen:  Blood from Arm, Left Updated:  01/03/18 2014     WBC 6 56 Thousand/uL      RBC 4 69 Million/uL      Hemoglobin 14 0 g/dL      Hematocrit 41 4 %      MCV 88 fL      MCH 29 9 pg      MCHC 33 8 g/dL      RDW 12 8 %      MPV 11 4 fL      Platelets 551 Thousands/uL      nRBC 0 /100 WBCs      Neutrophils Relative 70 %      Lymphocytes Relative 22 %      Monocytes Relative 6 %      Eosinophils Relative 2 %      Basophils Relative 0 %      Neutrophils Absolute 4 63 Thousands/µL      Lymphocytes Absolute 1 42 Thousands/µL      Monocytes Absolute 0 37 Thousand/µL      Eosinophils Absolute 0 10 Thousand/µL      Basophils Absolute 0 01 Thousands/µL                  X-ray chest 2 views   Final Result by Delroy Kevin MD (01/03 2204)      No acute consolidation congestion         Workstation performed: [id number]               Procedures  Procedures      Phone Consults  ED Phone Contact    ED Course  ED Course as of Jan 04 0122   Wed Jan 03, 2018 2019 POC Troponin I: 0 00   2058 Anion Gap: 6   2102 EKG:  Right bundle branch block  Sinus rhythm, rate 87  No ST or T-wave abnormalities  Right bundle is new from 7/17/16 2141 Reports 7/10 chest pain  Repeat EKG continues to show right bundle morphology  Sinus rhythm rate 73  Nonspecific ST-T changes      2230 D-DIMER QUANTITATIVE: 248         HEART Risk Score    Flowsheet Row Most Recent Value   History  1 Filed at: 01/03/2018 2233   ECG  1 Filed at: 01/03/2018 2233   Age  1 Filed at: 01/03/2018 2233   Risk Factors  2 Filed at: 01/03/2018 2233   Troponin  0 Filed at: 01/03/2018 2233   Heart Score Risk Calculator   History  1 Filed at: 01/03/2018 2233   ECG  1 Filed at: 01/03/2018 2233   Age  1 Filed at: 01/03/2018 2233   Risk Factors  2 Filed at: 01/03/2018 2233   Troponin  0 Filed at: 01/03/2018 2233   HEART Score  5 Filed at: 01/03/2018 2233   HEART Score  5 Filed at: 01/03/2018 2233                            MDM  CritCare Time    Disposition  Final diagnoses:   Chest pain     Time reflects when diagnosis was documented in both MDM as applicable and the Disposition within this note     Time User Action Codes Description Comment    1/3/2018 10:37 PM Meagan Lira Add [R07 9] Chest pain       ED Disposition     ED Disposition Condition Comment    Admit  Case was discussed with JOSH and the patient's admission status was agreed to be Admission Status: observation status to the service of Dr Regina Joshi   Follow-up Information    None       Patient's Medications   Discharge Prescriptions    No medications on file     No discharge procedures on file  ED Provider  Attending physically available and evaluated Yannick Muñoz I managed the patient along with the ED Attending      Electronically Signed by         Janina Cantor DO  Resident  01/04/18 2855

## 2018-01-04 NOTE — ASSESSMENT & PLAN NOTE
1   Pt with no cardiac hx, similar episodes of CP with negative cardiac quintero  First trop negative  EKG without ST changes, remarkable only for new RBBB  Will admit for obs, keep on tele, trend troponins  Pain control  2  No SOB, VSS  3  Suspect GI cause possibly GERD exacerbation as pt is noncompliant with PPI regimen and consumed large amounts of acidic foods as well as alcohol today  Initiate PPI therapy  4  Consider outpatient stress echo

## 2018-01-04 NOTE — PROGRESS NOTES
After midnight, follow up note:   Pt admitted with substernal chest pain for approximately 5 hours  [place] and then went to the buffet which she ate pork and sourkraut meat loaf and multiple other foods  Pt was then admitted with progressive"chest squeezing" , unrelieved  Pt does have a history of GERD, for which she has been prescribed omeprazole but has not been taking it  Pt also reports some noncompliance with insulin administration  Positive cough no fevers or chills  Pt then had bm on arrival to the hospital with a relief in symptoms    bs mildly elevated 263 fasting this am   Troponins negative x 3   ekg with normal sinus with right bundle branch block   Vital remain stable   Now pt with some nausea received phenergan 12 5mg with allergy to zofran   h- pylori antigen  Pt continues, to feel nauseated and was dry heaving now      Phenergan, ordered  Will give 500 nss over next four hours

## 2018-01-04 NOTE — ED ATTENDING ATTESTATION
I, Hung Wyatt DO, saw and evaluated the patient  I have discussed the patient with the resident/non-physician practitioner and agree with the resident's/non-physician practitioner's findings, Plan of Care, and MDM as documented in the resident's/non-physician practitioner's note, except where noted  All available labs and Radiology studies were reviewed  At this point I agree with the current assessment done in the Emergency Department  I have conducted an independent evaluation of this patient a history and physical is as follows:      Critical Care Time  CritCare Time    Procedures     60 yr old fem to the ED with SS chest pain while waiting for a bus at the [place]  Pain with rad to left arm and jaw with nausea and sob  Hx of similar in the past with neg eval  She did have three drinks at the [employer]  Hx of reflux but this was different  Occ exhertional chest pain with improvement with rest  No back or leg pain  Pain is better than when it started  Neg clot hx  Exm: uncomfortable wit cuff up and better after readj  Lung: cta   Heart: rrr wo tachy  EKG: new RBBB  Abd: soft and nontender  Pln:  ACS quintero and GI with trended EKG

## 2018-01-04 NOTE — ASSESSMENT & PLAN NOTE
1   Appears that pt does not take any anti-hypertensives outpatient  Continue to monitor BP, encourage f/u with PCP

## 2018-01-05 VITALS
RESPIRATION RATE: 18 BRPM | BODY MASS INDEX: 53.22 KG/M2 | OXYGEN SATURATION: 97 % | HEART RATE: 73 BPM | TEMPERATURE: 97.7 F | DIASTOLIC BLOOD PRESSURE: 63 MMHG | HEIGHT: 62 IN | SYSTOLIC BLOOD PRESSURE: 133 MMHG | WEIGHT: 289.2 LBS

## 2018-01-05 LAB
ANION GAP SERPL CALCULATED.3IONS-SCNC: 5 MMOL/L (ref 4–13)
BASOPHILS # BLD AUTO: 0.02 THOUSANDS/ΜL (ref 0–0.1)
BASOPHILS NFR BLD AUTO: 0 % (ref 0–1)
BUN SERPL-MCNC: 10 MG/DL (ref 5–25)
CALCIUM SERPL-MCNC: 8.7 MG/DL (ref 8.3–10.1)
CHLORIDE SERPL-SCNC: 108 MMOL/L (ref 100–108)
CO2 SERPL-SCNC: 28 MMOL/L (ref 21–32)
CREAT SERPL-MCNC: 0.59 MG/DL (ref 0.6–1.3)
EOSINOPHIL # BLD AUTO: 0.16 THOUSAND/ΜL (ref 0–0.61)
EOSINOPHIL NFR BLD AUTO: 3 % (ref 0–6)
ERYTHROCYTE [DISTWIDTH] IN BLOOD BY AUTOMATED COUNT: 12.9 % (ref 11.6–15.1)
GFR SERPL CREATININE-BSD FRML MDRD: 98 ML/MIN/1.73SQ M
GLUCOSE SERPL-MCNC: 100 MG/DL (ref 65–140)
GLUCOSE SERPL-MCNC: 100 MG/DL (ref 65–140)
GLUCOSE SERPL-MCNC: 114 MG/DL (ref 65–140)
GLUCOSE SERPL-MCNC: 115 MG/DL (ref 65–140)
GLUCOSE SERPL-MCNC: 158 MG/DL (ref 65–140)
HCT VFR BLD AUTO: 40.4 % (ref 34.8–46.1)
HGB BLD-MCNC: 13.2 G/DL (ref 11.5–15.4)
LYMPHOCYTES # BLD AUTO: 1.91 THOUSANDS/ΜL (ref 0.6–4.47)
LYMPHOCYTES NFR BLD AUTO: 30 % (ref 14–44)
MCH RBC QN AUTO: 29.4 PG (ref 26.8–34.3)
MCHC RBC AUTO-ENTMCNC: 32.7 G/DL (ref 31.4–37.4)
MCV RBC AUTO: 90 FL (ref 82–98)
MONOCYTES # BLD AUTO: 0.31 THOUSAND/ΜL (ref 0.17–1.22)
MONOCYTES NFR BLD AUTO: 5 % (ref 4–12)
NEUTROPHILS # BLD AUTO: 4.01 THOUSANDS/ΜL (ref 1.85–7.62)
NEUTS SEG NFR BLD AUTO: 62 % (ref 43–75)
NRBC BLD AUTO-RTO: 0 /100 WBCS
PLATELET # BLD AUTO: 180 THOUSANDS/UL (ref 149–390)
PMV BLD AUTO: 10.4 FL (ref 8.9–12.7)
POTASSIUM SERPL-SCNC: 4.2 MMOL/L (ref 3.5–5.3)
RBC # BLD AUTO: 4.49 MILLION/UL (ref 3.81–5.12)
SODIUM SERPL-SCNC: 141 MMOL/L (ref 136–145)
WBC # BLD AUTO: 6.45 THOUSAND/UL (ref 4.31–10.16)

## 2018-01-05 PROCEDURE — 85025 COMPLETE CBC W/AUTO DIFF WBC: CPT | Performed by: NURSE PRACTITIONER

## 2018-01-05 PROCEDURE — 80048 BASIC METABOLIC PNL TOTAL CA: CPT | Performed by: NURSE PRACTITIONER

## 2018-01-05 PROCEDURE — 82948 REAGENT STRIP/BLOOD GLUCOSE: CPT

## 2018-01-05 RX ORDER — PANTOPRAZOLE SODIUM 40 MG/1
40 TABLET, DELAYED RELEASE ORAL
Qty: 30 TABLET | Refills: 0 | Status: SHIPPED | OUTPATIENT
Start: 2018-01-06 | End: 2020-12-04

## 2018-01-05 RX ADMIN — PANTOPRAZOLE SODIUM 40 MG: 40 TABLET, DELAYED RELEASE ORAL at 06:10

## 2018-01-05 RX ADMIN — ROPINIROLE 1 MG: 1 TABLET, FILM COATED ORAL at 08:05

## 2018-01-05 RX ADMIN — LEVOTHYROXINE SODIUM 50 MCG: 50 TABLET ORAL at 06:10

## 2018-01-05 RX ADMIN — TOPIRAMATE 200 MG: 100 TABLET, FILM COATED ORAL at 08:07

## 2018-01-05 RX ADMIN — FLUOXETINE 20 MG: 20 CAPSULE ORAL at 08:07

## 2018-01-05 RX ADMIN — INSULIN ASPART 30 UNITS: 100 INJECTION, SUSPENSION SUBCUTANEOUS at 08:03

## 2018-01-05 RX ADMIN — ENOXAPARIN SODIUM 40 MG: 40 INJECTION SUBCUTANEOUS at 08:04

## 2018-01-05 NOTE — ASSESSMENT & PLAN NOTE
1   Epigastric ttp on exam, Pt prescribed PPI by PCP, she states she does no takes this  Only takes TUMS in response to GERD  Reports GERD flares 2x/month on average  2   Will treat pt with PPI and advise f/u with PCP / possible gi o/p appointment  3  Can consider CT abdomen if worsening abdominal pain, GYN following for thickened endometrial lining per pt and pt is to have endometrial biopsy     Monitor abdominal pain, which has resolved during this admission

## 2018-01-05 NOTE — ASSESSMENT & PLAN NOTE
1   Pt with no cardiac hx, similar episodes of CP with negative cardiac quintero    trop negative  EKG without ST changes, remarkable only for new RBBB  2  No SOB, VSS  3  Suspect GI cause probably  GERD exacerbation as pt is noncompliant with PPI regimen and consumed large amounts of acidic foods [place]  Initiate PPI therapy  Which pt states she was on but never took   4   Consider outpatient stress echo, if ongoing symptoms

## 2018-01-05 NOTE — ASSESSMENT & PLAN NOTE
Pt on 60 U Novolog 70/30 BID AC, reports poor compliance  Continue this regimen and add SSI ac and hs     Pt states that she sometimes misses her insulin doses  Extensive discussion had with pt in regards to compliance and follow up with her medications and pcp follow up
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
## 2018-01-05 NOTE — DISCHARGE SUMMARY
Discharge- Lucinda [facility] 1955, 60 y o  female MRN: [id number]    Unit/Bed#: [address] 218-01 Encounter: [id number]    Primary Care Provider: Mariel Otoole MD   Date and time admitted to hospital: 1/3/2018  7:57 PM        GERD (gastroesophageal reflux disease)   Assessment & Plan    1  Epigastric ttp on exam, Pt prescribed PPI by PCP, she states she does no takes this  Only takes TUMS in response to GERD  Reports GERD flares 2x/month on average  2   Will treat pt with PPI and advise f/u with PCP / possible gi o/p appointment  3  Can consider CT abdomen if worsening abdominal pain, GYN following for thickened endometrial lining per pt and pt is to have endometrial biopsy  Monitor abdominal pain, which has resolved during this admission         Diabetes mellitus ([address] )   Assessment & Plan    Pt on 60 U Novolog 70/30 BID AC, reports poor compliance  Continue this regimen and add SSI ac and hs  Pt states that she sometimes misses her insulin doses  Extensive discussion had with pt in regards to compliance and follow up with her medications and pcp follow up             Hyperlipidemia   Assessment & Plan    1  Pt trialed multiple statins all of which had to be dc'ed 2/2 myalgias  Hypertension   Assessment & Plan    1  Appears that pt does not take any anti-hypertensives outpatient  Continue to monitor BP, encourage f/u with PCP  Restless leg syndrome   Assessment & Plan    1  Controlled on Requip three times daily  Seizures ([address] )   Assessment & Plan    1  Controlled on Topamax  * Chest pain   Assessment & Plan    1  Pt with no cardiac hx, similar episodes of CP with negative cardiac quintero    trop negative  EKG without ST changes, remarkable only for new RBBB  2  No SOB, VSS  3  Suspect GI cause probably  GERD exacerbation as pt is noncompliant with PPI regimen and consumed large amounts of acidic foods [place] well [place] alcohol [place]  Initiate PPI therapy   Which pt states she was on but never took   4  Consider outpatient stress echo, if ongoing symptoms                 Consultations During Hospital Stay:  · None     Procedures Performed:     · Chest xray: with no acute congestion  · troponins normal   · ddimer  248   · Cbc/bmp normal= stable     Significant Findings / Test Results:     · See above     Incidental Findings:   · None      Test Results Pending at Discharge (will require follow up): · None      Outpatient Tests Requested:  · Follow up with pcp in one week     Complications:  None     Reason for Admission:     Hospital Course:     Rakesh Ervin is a 60 y o  female patient who originally presented to the hospital on 1/3/2018 due to substernal chest pain for a duration of about 5 hours  Pt stated per ed documentation that [place] 1[place]2018  and had three mudslide etoh beverages and  Then went to the buffet where she ate pork and sauerkraut, meatloaf, and multiple other foods  About an hour later she began to develop progressive "chest squeezing" that after ten minutes of pain she called EMS  She stated that it radiated into her left arm and up into her left chin  She denied however any CAST, diaphoresis, paraesthesias, N/V although she does feel somewhat "queasy"  She stated that she has a hx of GERD and that her doctor prescribed omeprazole but that she never took it  She admits to GERD episodes about twice a month  She stated this felt different than usual GERD  She also admits to missing some doses of insulin every once in a while  She admits to a cough for three days that she got from 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, described as dry  Denies f/c  She stated that her lower abdominal pain had been relieved since her most recent solid BM had in the hospital     Pt was admitted and cardiac workup was negative  Pts symptoms resolved although she has had on and off nausea   Pt is not very motivated to mobilize and needs encouragement to make some lifestyle changes along with diet changes in turn comply with her medication regimen  Pt seems to understand this  She is medically safe for discharge, to follow up with her pcp in one week         Please see above list of diagnoses and related plan for additional information  Condition at Discharge: good     Discharge Day Visit / Exam:     Subjective:  Pt is feeling a lot better no nausea ate and tolerated breakfast when oob she felt  A little nauseated but also was not very motivated to get oob  Pt has some discomfort in left ankle but denies any lightheadedness or dizziness  Vitals: Blood Pressure: 133/63 (01/05/18 1102)  Pulse: 73 (01/05/18 1102)  Temperature: 97 7 °F (36 5 °C) (01/05/18 1102)  Temp Source: Oral (01/05/18 1102)  Respirations: 18 (01/05/18 1102)  Height: 5' 2" (157 5 cm) (01/04/18 1257)  Weight - Scale: 131 kg (289 lb 3 2 oz) (01/04/18 1257)  SpO2: 97 % (01/05/18 1102)  Exam:   Physical Exam   Constitutional: She is oriented to person, place, and time  She appears well-developed  No distress  Obese    HENT:   Head: Normocephalic and atraumatic  Mouth/Throat: No oropharyngeal exudate  Eyes: EOM are normal  Pupils are equal, round, and reactive to light  Right eye exhibits no discharge  Left eye exhibits no discharge  No scleral icterus  Neck: No tracheal deviation present  No thyromegaly present  Cardiovascular: Normal rate  Exam reveals no gallop and no friction rub  No murmur heard  Pulmonary/Chest: No stridor  No respiratory distress  She has no wheezes  She has no rales  She exhibits no tenderness  Abdominal: She exhibits no distension and no mass  There is no tenderness  There is no rebound and no guarding  Musculoskeletal: She exhibits no edema or tenderness  Lymphadenopathy:     She has no cervical adenopathy  Neurological: She is oriented to person, place, and time  Skin: No rash noted  She is not diaphoretic  No erythema  No pallor     Psychiatric: She has a normal mood and affect  Discussion with Family: none at the bedside     Discharge instructions/Information to patient and family:   See after visit summary for information provided to patient and family  Provisions for Follow-Up Care:  See after visit summary for information related to follow-up care and any pertinent home health orders  Disposition:     Home    For Discharges to [facility] SNF:   · Not Applicable to this Patient - Not Applicable to this Patient    Planned Readmission: no plan for readmission      Discharge Statement:  I spent 45 minutes discharging the patient  This time was spent on the day of discharge  I had direct contact with the patient on the day of discharge  Greater than 50% of the total time was spent examining patient, answering all patient questions, arranging and discussing plan of care with patient as well as directly providing post-discharge instructions  Additional time then spent on discharge activities  Discharge Medications:  See after visit summary for reconciled discharge medications provided to patient and family        ** Please Note: This note has been constructed using a voice recognition system **

## 2018-01-05 NOTE — PLAN OF CARE
CARDIOVASCULAR - ADULT     Maintains optimal cardiac output and hemodynamic stability Progressing     Absence of cardiac dysrhythmias or at baseline rhythm Progressing        DISCHARGE PLANNING     Discharge to home or other facility with appropriate resources Progressing        Knowledge Deficit     Patient/family/caregiver demonstrates understanding of disease process, treatment plan, medications, and discharge instructions Progressing        METABOLIC, FLUID AND ELECTROLYTES - ADULT     Electrolytes maintained within normal limits Progressing     Fluid balance maintained Progressing     Glucose maintained within target range Progressing        PAIN - ADULT     Verbalizes/displays adequate comfort level or baseline comfort level Progressing        Potential for Falls     Patient will remain free of falls Progressing        Prexisting or High Potential for Compromised Skin Integrity     Skin integrity is maintained or improved Progressing
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
## 2018-01-05 NOTE — PLAN OF CARE
Problem: DISCHARGE PLANNING - CARE MANAGEMENT  Goal: Discharge to post-acute care or home with appropriate resources  INTERVENTIONS:  - Conduct assessment to determine patient/family and health care team treatment goals, and need for post-acute services based on payer coverage, community resources, and patient preferences, and barriers to discharge  - Address psychosocial, clinical, and financial barriers to discharge as identified in assessment in conjunction with the patient/family and health care team  - Arrange appropriate level of post-acute services according to patient's   needs and preference and payer coverage in collaboration with the physician and health care team  - Communicate with and update the patient/family, physician, and health care team regarding progress on the discharge plan  - Arrange appropriate transportation to post-acute venues  -Assist patient with arranging transportation, checking on cost of meds, and get financial assisttance    Outcome: Adequate for Discharge

## 2018-01-05 NOTE — DISCHARGE INSTRUCTIONS
Please call to schedule follow up appointment within the next week   Take your medications and do not miss your insulin   Acute Abdominal Pain   WHAT YOU NEED TO KNOW:   The cause of your abdominal pain may not be found  If a cause is found, treatment will depend on what the cause is  DISCHARGE INSTRUCTIONS:   Seek care immediately if:   · You vomit blood or cannot stop vomiting  · You have blood in your bowel movement or it looks like tar  · You have bleeding from your rectum  · Your abdomen is larger than usual, more painful, and hard  · You have severe pain in your abdomen  · You stop passing gas and having bowel movements  · You feel weak, dizzy, or faint  Contact your healthcare provider if:   · You have a fever  · You have new signs and symptoms  · Your symptoms do not get better with treatment  · You have questions or concerns about your condition or care  Medicines  may be given to decrease pain, treat an infection, and manage your symptoms  Take your medicine as directed  Call your healthcare provider if you think your medicine is not helping or if you have side effects  Tell him if you are allergic to any medicine  Keep a list of the medicines, vitamins, and herbs you take  Include the amounts, and when and why you take them  Bring the list or the pill bottles to follow-up visits  Carry your medicine list with you in case of an emergency  Manage your symptoms:   · Apply heat  on your abdomen for 20 to 30 minutes every 2 hours for as many days as directed  Heat helps decrease pain and muscle spasms  · Manage your stress  Stress may cause abdominal pain  Your healthcare provider may recommend relaxation techniques and deep breathing exercises to help decrease your stress  Your healthcare provider may recommend you talk to someone about your stress or anxiety, such as a counselor or a trusted friend  Get plenty of sleep and exercise regularly       · Limit or do not drink alcohol  Alcohol can make your abdominal pain worse  Ask your healthcare provider if it is safe for you to drink alcohol  Also ask how much is safe for you to drink  · Do not smoke  Nicotine and other chemicals in cigarettes can damage your esophagus and stomach  Ask your healthcare provider for information if you currently smoke and need help to quit  E-cigarettes or smokeless tobacco still contain nicotine  Talk to your healthcare provider before you use these products  Make changes to the food you eat as directed:  Do not eat foods that cause abdominal pain or other symptoms  Eat small meals more often  · Eat more high-fiber foods if you are constipated  High-fiber foods include fruits, vegetables, whole-grain foods, and legumes  · Do not eat foods that cause gas if you have bloating  Examples include broccoli, cabbage, and cauliflower  Do not drink soda or carbonated drinks, because these may also cause gas  · Do not eat foods or drinks that contain sorbitol or fructose if you have diarrhea and bloating  Some examples are fruit juices, candy, jelly, and sugar-free gum  · Do not eat high-fat foods, such as fried foods, cheeseburgers, hot dogs, and desserts  · Limit or do not drink caffeine  Caffeine may make symptoms, such as heart burn or nausea, worse  · Drink plenty of liquids to prevent dehydration from diarrhea or vomiting  Ask your healthcare provider how much liquid to drink each day and which liquids are best for you  Follow up with your healthcare provider as directed:  Write down your questions so you remember to ask them during your visits  © 2017 [address]  Information is for End User's use only and may not be sold, redistributed or otherwise used for commercial purposes  All illustrations and images included in CareNotes® are the copyrighted property of [employer]  or [employer]  The above information is an  only   It is not intended as medical advice for individual conditions or treatments  Talk to your doctor, nurse or pharmacist before following any medical regimen to see if it is safe and effective for you  Gastroesophageal Reflux Disease   WHAT YOU NEED TO KNOW:   Gastroesophageal reflux occurs when acid and food in the stomach back up into the esophagus  Gastroesophageal reflux disease (GERD) is reflux that occurs more than twice a week for a few weeks  It usually causes heartburn and other symptoms  GERD can cause other health problems over time if it is not treated  DISCHARGE INSTRUCTIONS:   Seek care immediately if:   · You feel full and cannot burp or vomit  · You have severe chest pain and sudden trouble breathing  · Your bowel movements are black, bloody, or tarry-looking  · Your vomit looks like coffee grounds or has blood in it  Contact your healthcare provider if:   · You vomit large amounts, or you vomit often  · You have trouble breathing after you vomit  · You have trouble swallowing, or pain with swallowing  · You are losing weight without trying  · Your symptoms get worse or do not improve with treatment  · You have questions or concerns about your condition or care  Medicines:   · Medicines  are used to decrease stomach acid  Medicine may also be used to help your lower esophageal sphincter and stomach contract (tighten) more  · Take your medicine as directed  Contact your healthcare provider if you think your medicine is not helping or if you have side effects  Tell him or her if you are allergic to any medicine  Keep a list of the medicines, vitamins, and herbs you take  Include the amounts, and when and why you take them  Bring the list or the pill bottles to follow-up visits  Carry your medicine list with you in case of an emergency  Manage GERD:   · Do not have foods or drinks that may increase heartburn    These include chocolate, peppermint, fried or fatty foods, drinks that contain caffeine, or carbonated drinks (soda)  Other foods include spicy foods, onions, tomatoes, and tomato-based foods  Do not have foods or drinks that can irritate your esophagus, such as citrus fruits, juices, and alcohol  · Do not eat large meals  When you eat a lot of food at one time, your stomach needs more acid to digest it  Eat 6 small meals each day instead of 3 large ones, and eat slowly  Do not eat meals 2 to 3 hours before bedtime  · Elevate the head of your bed  Place 6-inch blocks under the head of your bed frame  You may also use more than one pillow under your head and shoulders while you sleep  · Maintain a healthy weight  If you are overweight, weight loss may help relieve symptoms of GERD  · Do not smoke  Smoking weakens the lower esophageal sphincter and increases the risk of GERD  Ask your healthcare provider for information if you currently smoke and need help to quit  E-cigarettes or smokeless tobacco still contain nicotine  Talk to your healthcare provider before you use these products  · Do not wear clothing that is tight around your waist   Tight clothing can put pressure on your stomach and cause or worsen GERD symptoms  Follow up with your healthcare provider as directed:  Write down your questions so you remember to ask them during your visits  © 2017 [address] Information is for End User's use only and may not be sold, redistributed or otherwise used for commercial purposes  All illustrations and images included in CareNotes® are the copyrighted property of A D A M [employer] Inc  or [employer]  The above information is an  only  It is not intended as medical advice for individual conditions or treatments  Talk to your doctor, nurse or pharmacist before following any medical regimen to see if it is safe and effective for you

## 2018-07-03 ENCOUNTER — HOSPITAL ENCOUNTER (INPATIENT)
Facility: HOSPITAL | Age: 63
LOS: 7 days | Discharge: HOME | DRG: 885 | End: 2018-07-10
Attending: EMERGENCY MEDICINE | Admitting: PSYCHIATRY & NEUROLOGY
Payer: COMMERCIAL

## 2018-07-03 DIAGNOSIS — G25.81 RESTLESS LEG SYNDROME: ICD-10-CM

## 2018-07-03 DIAGNOSIS — F33.2 SEVERE EPISODE OF RECURRENT MAJOR DEPRESSIVE DISORDER, WITHOUT PSYCHOTIC FEATURES (HCC): Chronic | ICD-10-CM

## 2018-07-03 DIAGNOSIS — I10 ESSENTIAL HYPERTENSION: ICD-10-CM

## 2018-07-03 DIAGNOSIS — R56.9 SEIZURES (HCC): Primary | ICD-10-CM

## 2018-07-03 LAB
ALBUMIN SERPL BCP-MCNC: 3.1 G/DL (ref 3.5–5)
ALP SERPL-CCNC: 112 U/L (ref 46–116)
ALT SERPL W P-5'-P-CCNC: 23 U/L (ref 12–78)
AMPHETAMINES SERPL QL SCN: NEGATIVE
ANION GAP SERPL CALCULATED.3IONS-SCNC: 9 MMOL/L (ref 4–13)
AST SERPL W P-5'-P-CCNC: 15 U/L (ref 5–45)
BARBITURATES UR QL: NEGATIVE
BASOPHILS # BLD AUTO: 0.03 THOUSANDS/ΜL (ref 0–0.1)
BASOPHILS NFR BLD AUTO: 0 % (ref 0–1)
BENZODIAZ UR QL: NEGATIVE
BILIRUB SERPL-MCNC: 0.35 MG/DL (ref 0.2–1)
BUN SERPL-MCNC: 17 MG/DL (ref 5–25)
CALCIUM SERPL-MCNC: 8.9 MG/DL (ref 8.3–10.1)
CHLORIDE SERPL-SCNC: 99 MMOL/L (ref 100–108)
CO2 SERPL-SCNC: 26 MMOL/L (ref 21–32)
COCAINE UR QL: NEGATIVE
CREAT SERPL-MCNC: 0.72 MG/DL (ref 0.6–1.3)
EOSINOPHIL # BLD AUTO: 0.14 THOUSAND/ΜL (ref 0–0.61)
EOSINOPHIL NFR BLD AUTO: 2 % (ref 0–6)
ERYTHROCYTE [DISTWIDTH] IN BLOOD BY AUTOMATED COUNT: 12.2 % (ref 11.6–15.1)
ETHANOL EXG-MCNC: 0 MG/DL
GFR SERPL CREATININE-BSD FRML MDRD: 89 ML/MIN/1.73SQ M
GLUCOSE SERPL-MCNC: 330 MG/DL (ref 65–140)
GLUCOSE SERPL-MCNC: 348 MG/DL (ref 65–140)
HCT VFR BLD AUTO: 42 % (ref 34.8–46.1)
HGB BLD-MCNC: 13.7 G/DL (ref 11.5–15.4)
IMM GRANULOCYTES # BLD AUTO: 0.03 THOUSAND/UL (ref 0–0.2)
IMM GRANULOCYTES NFR BLD AUTO: 0 % (ref 0–2)
LYMPHOCYTES # BLD AUTO: 1.49 THOUSANDS/ΜL (ref 0.6–4.47)
LYMPHOCYTES NFR BLD AUTO: 21 % (ref 14–44)
MCH RBC QN AUTO: 28.7 PG (ref 26.8–34.3)
MCHC RBC AUTO-ENTMCNC: 32.6 G/DL (ref 31.4–37.4)
MCV RBC AUTO: 88 FL (ref 82–98)
METHADONE UR QL: NEGATIVE
MONOCYTES # BLD AUTO: 0.5 THOUSAND/ΜL (ref 0.17–1.22)
MONOCYTES NFR BLD AUTO: 7 % (ref 4–12)
NEUTROPHILS # BLD AUTO: 4.87 THOUSANDS/ΜL (ref 1.85–7.62)
NEUTS SEG NFR BLD AUTO: 70 % (ref 43–75)
NRBC BLD AUTO-RTO: 0 /100 WBCS
OPIATES UR QL SCN: NEGATIVE
PCP UR QL: NEGATIVE
PLATELET # BLD AUTO: 178 THOUSANDS/UL (ref 149–390)
PMV BLD AUTO: 11.1 FL (ref 8.9–12.7)
POTASSIUM SERPL-SCNC: 3.9 MMOL/L (ref 3.5–5.3)
PROT SERPL-MCNC: 7.1 G/DL (ref 6.4–8.2)
RBC # BLD AUTO: 4.77 MILLION/UL (ref 3.81–5.12)
SODIUM SERPL-SCNC: 134 MMOL/L (ref 136–145)
THC UR QL: NEGATIVE
TSH SERPL DL<=0.05 MIU/L-ACNC: 2.82 UIU/ML (ref 0.36–3.74)
WBC # BLD AUTO: 7.06 THOUSAND/UL (ref 4.31–10.16)

## 2018-07-03 PROCEDURE — 82075 ASSAY OF BREATH ETHANOL: CPT | Performed by: EMERGENCY MEDICINE

## 2018-07-03 PROCEDURE — 80307 DRUG TEST PRSMV CHEM ANLYZR: CPT | Performed by: EMERGENCY MEDICINE

## 2018-07-03 PROCEDURE — 84443 ASSAY THYROID STIM HORMONE: CPT | Performed by: EMERGENCY MEDICINE

## 2018-07-03 PROCEDURE — 82948 REAGENT STRIP/BLOOD GLUCOSE: CPT

## 2018-07-03 PROCEDURE — 80053 COMPREHEN METABOLIC PANEL: CPT | Performed by: EMERGENCY MEDICINE

## 2018-07-03 PROCEDURE — 99285 EMERGENCY DEPT VISIT HI MDM: CPT

## 2018-07-03 PROCEDURE — 36415 COLL VENOUS BLD VENIPUNCTURE: CPT | Performed by: EMERGENCY MEDICINE

## 2018-07-03 PROCEDURE — 85025 COMPLETE CBC W/AUTO DIFF WBC: CPT | Performed by: EMERGENCY MEDICINE

## 2018-07-03 PROCEDURE — 99252 IP/OBS CONSLTJ NEW/EST SF 35: CPT | Performed by: PHYSICIAN ASSISTANT

## 2018-07-03 PROCEDURE — 93005 ELECTROCARDIOGRAM TRACING: CPT

## 2018-07-03 PROCEDURE — 83540 ASSAY OF IRON: CPT | Performed by: PHYSICIAN ASSISTANT

## 2018-07-03 RX ORDER — HALOPERIDOL 5 MG
5 TABLET ORAL EVERY 8 HOURS PRN
Status: DISCONTINUED | OUTPATIENT
Start: 2018-07-03 | End: 2018-07-10 | Stop reason: HOSPADM

## 2018-07-03 RX ORDER — METOPROLOL SUCCINATE 25 MG/1
25 TABLET, EXTENDED RELEASE ORAL DAILY
Status: ON HOLD | COMMUNITY
End: 2020-12-04

## 2018-07-03 RX ORDER — PANTOPRAZOLE SODIUM 40 MG/1
40 TABLET, DELAYED RELEASE ORAL
Status: DISCONTINUED | OUTPATIENT
Start: 2018-07-04 | End: 2018-07-10 | Stop reason: HOSPADM

## 2018-07-03 RX ORDER — ROPINIROLE 1 MG/1
1 TABLET, FILM COATED ORAL 3 TIMES DAILY
Status: DISCONTINUED | OUTPATIENT
Start: 2018-07-03 | End: 2018-07-04

## 2018-07-03 RX ORDER — FLUOXETINE HYDROCHLORIDE 20 MG/1
20 CAPSULE ORAL DAILY
Status: DISCONTINUED | OUTPATIENT
Start: 2018-07-04 | End: 2018-07-05

## 2018-07-03 RX ORDER — RISPERIDONE 1 MG/1
1 TABLET, ORALLY DISINTEGRATING ORAL EVERY 8 HOURS PRN
Status: DISCONTINUED | OUTPATIENT
Start: 2018-07-03 | End: 2018-07-10 | Stop reason: HOSPADM

## 2018-07-03 RX ORDER — TOPIRAMATE 100 MG/1
200 TABLET, FILM COATED ORAL
Status: DISCONTINUED | OUTPATIENT
Start: 2018-07-03 | End: 2018-07-10 | Stop reason: HOSPADM

## 2018-07-03 RX ORDER — LISINOPRIL 10 MG/1
10 TABLET ORAL DAILY
Status: DISCONTINUED | OUTPATIENT
Start: 2018-07-04 | End: 2018-07-10 | Stop reason: HOSPADM

## 2018-07-03 RX ORDER — MAGNESIUM HYDROXIDE/ALUMINUM HYDROXICE/SIMETHICONE 120; 1200; 1200 MG/30ML; MG/30ML; MG/30ML
30 SUSPENSION ORAL EVERY 4 HOURS PRN
Status: DISCONTINUED | OUTPATIENT
Start: 2018-07-03 | End: 2018-07-10 | Stop reason: HOSPADM

## 2018-07-03 RX ORDER — METOPROLOL SUCCINATE 25 MG/1
25 TABLET, EXTENDED RELEASE ORAL DAILY
Status: DISCONTINUED | OUTPATIENT
Start: 2018-07-04 | End: 2018-07-10 | Stop reason: HOSPADM

## 2018-07-03 RX ORDER — ACETAMINOPHEN 325 MG/1
650 TABLET ORAL EVERY 4 HOURS PRN
Status: DISCONTINUED | OUTPATIENT
Start: 2018-07-03 | End: 2018-07-10 | Stop reason: HOSPADM

## 2018-07-03 RX ORDER — TRAZODONE HYDROCHLORIDE 50 MG/1
25 TABLET ORAL
Status: DISCONTINUED | OUTPATIENT
Start: 2018-07-03 | End: 2018-07-10 | Stop reason: HOSPADM

## 2018-07-03 RX ORDER — INSULIN ASPART 100 [IU]/ML
60 INJECTION, SUSPENSION SUBCUTANEOUS
Status: DISCONTINUED | OUTPATIENT
Start: 2018-07-04 | End: 2018-07-10 | Stop reason: HOSPADM

## 2018-07-03 RX ORDER — LORAZEPAM 1 MG/1
1 TABLET ORAL EVERY 4 HOURS PRN
Status: DISCONTINUED | OUTPATIENT
Start: 2018-07-03 | End: 2018-07-10 | Stop reason: HOSPADM

## 2018-07-03 RX ORDER — LISINOPRIL 10 MG/1
10 TABLET ORAL DAILY
Status: ON HOLD | COMMUNITY
End: 2020-12-04

## 2018-07-03 RX ORDER — LEVOTHYROXINE SODIUM 0.05 MG/1
50 TABLET ORAL DAILY
Status: DISCONTINUED | OUTPATIENT
Start: 2018-07-04 | End: 2018-07-10 | Stop reason: HOSPADM

## 2018-07-03 RX ORDER — BENZTROPINE MESYLATE 1 MG/1
1 TABLET ORAL EVERY 6 HOURS PRN
Status: DISCONTINUED | OUTPATIENT
Start: 2018-07-03 | End: 2018-07-10 | Stop reason: HOSPADM

## 2018-07-03 RX ORDER — BENZTROPINE MESYLATE 1 MG/ML
1 INJECTION INTRAMUSCULAR; INTRAVENOUS EVERY 6 HOURS PRN
Status: DISCONTINUED | OUTPATIENT
Start: 2018-07-03 | End: 2018-07-10 | Stop reason: HOSPADM

## 2018-07-03 RX ORDER — INSULIN ASPART 100 [IU]/ML
60 INJECTION, SUSPENSION SUBCUTANEOUS ONCE
Status: COMPLETED | OUTPATIENT
Start: 2018-07-03 | End: 2018-07-03

## 2018-07-03 RX ORDER — OLANZAPINE 10 MG/1
10 INJECTION, POWDER, LYOPHILIZED, FOR SOLUTION INTRAMUSCULAR 2 TIMES DAILY PRN
Status: DISCONTINUED | OUTPATIENT
Start: 2018-07-03 | End: 2018-07-10 | Stop reason: HOSPADM

## 2018-07-03 RX ORDER — TOPIRAMATE 100 MG/1
200 TABLET, FILM COATED ORAL EVERY MORNING
Status: DISCONTINUED | OUTPATIENT
Start: 2018-07-04 | End: 2018-07-10 | Stop reason: HOSPADM

## 2018-07-03 RX ADMIN — TOPIRAMATE 200 MG: 100 TABLET, FILM COATED ORAL at 21:22

## 2018-07-03 RX ADMIN — INSULIN ASPART 60 UNITS: 100 INJECTION, SUSPENSION SUBCUTANEOUS at 22:10

## 2018-07-03 RX ADMIN — ROPINIROLE 1 MG: 1 TABLET, FILM COATED ORAL at 21:22

## 2018-07-03 NOTE — ED NOTES
Insurance Authorization:   Phone call placed to [facility]  Phone number: [phone]    Spoke to [place]     3 days approved    Level of care: [facility]  Review on 7/5/2018 [facility] care manager Brianne Kelly  [phone] Ext: [phone]  Authorization # [id number]

## 2018-07-03 NOTE — ED NOTES
Patient is accepted at [facility] by Dr Fatmata Oliveira   Patient may go to the floor at 19:00      Nurse report is to be called to [phone] prior to patient transfer

## 2018-07-03 NOTE — ED PROVIDER NOTES
History  Chief Complaint   Patient presents with    Psychiatric Evaluation     Patient presents to the E R  being tearful and having S I  Patient states:  "I went to the casino and spent all of my money  I now don't have money for rent, food or anything "  Patient said that she was "going home to take all of my pills "     HPI  76-year-old woman woman comes in for evaluation of suicidal ideation  Patient was at the a female and she thought she also had 20 dollars on her, she actually had 400 dollars which was her only money for the month  She spent all her money on the slot some when she realized that she was broke, she became very distraught and thought about going home and taking all her pills harm herself  Patient states that she does not feel safe right now that if she was to go home she would take all of her pills  She wants to be signed into the hospital for suicidal ideation  Patient does have a history of suicidal ideation most recent was back in the early 2000s  Patient does follow with Psychiatry through the [place]  He at this time, patient feels very distraught but denies any headache neck pain chest pain shortness of breath abdominal pain nausea vomiting or diarrhea  Prior to Admission Medications   Prescriptions Last Dose Informant Patient Reported? Taking? FLUoxetine (PROzac) 20 mg capsule 7/2/2018 at Unknown time  Yes Yes   Sig: Take 20 mg by mouth daily  insulin aspart protamine-insulin aspart (NovoLOG 70/30) 100 units/mL injection 7/3/2018 at Unknown time  Yes Yes   Sig: Inject 60 Units under the skin 2 (two) times a day before meals   levothyroxine 50 mcg tablet 7/2/2018 at Unknown time  Yes Yes   Sig: Take 50 mcg by mouth daily Indications: Pt unsure of the amount     lisinopril (ZESTRIL) 10 mg tablet   Yes Yes   Sig: Take 10 mg by mouth daily   metoprolol succinate (TOPROL-XL) 25 mg 24 hr tablet   Yes Yes   Sig: Take 25 mg by mouth daily   pantoprazole (PROTONIX) 40 mg tablet   No Yes Sig: Take 1 tablet by mouth daily in the early morning   rOPINIRole (REQUIP) 1 mg tablet   Yes Yes   Sig: Take 1 mg by mouth 3 (three) times a day  topiramate (TOPAMAX) 100 mg tablet   Yes Yes   Sig: Take 200 mg by mouth every morning     topiramate (TOPAMAX) 100 mg tablet   Yes Yes   Sig: Take 200 mg by mouth daily at bedtime  Facility-Administered Medications: None       Past Medical History:   Diagnosis Date    Diabetes mellitus ([facility] [address] )     Disease of thyroid gland     Hyperlipidemia     Hypertension     Hypothyroidism (acquired)     Restless leg syndrome     Seizures (HCC)        Past Surgical History:   Procedure Laterality Date    APPENDECTOMY      CHOLECYSTECTOMY      HIP FRACTURE SURGERY Right     TONSILLECTOMY      TUBAL LIGATION         History reviewed  No pertinent family history  I have reviewed and agree with the history as documented  Social History   Substance Use Topics    Smoking status: Never Smoker    Smokeless tobacco: Never Used      Comment: not a smoker    Alcohol use Yes      Comment: occasional        Review of Systems   Constitutional: Negative  HENT: Negative  Eyes: Negative  Respiratory: Negative  Cardiovascular: Negative  Gastrointestinal: Negative  Endocrine: Negative  Genitourinary: Negative  Musculoskeletal: Negative  Skin: Negative  Allergic/Immunologic: Negative  Neurological: Negative  Hematological: Negative  Psychiatric/Behavioral: Positive for suicidal ideas  The patient is nervous/anxious          Physical Exam  ED Triage Vitals [07/03/18 1407]   Temperature Pulse Respirations Blood Pressure SpO2   99 °F (37 2 °C) 94 18 138/66 97 %      Temp Source Heart Rate Source Patient Position - Orthostatic VS BP Location FiO2 (%)   Oral Monitor Lying Left arm --      Pain Score       8           Orthostatic Vital Signs  Vitals:    07/03/18 1407 07/03/18 1738 07/03/18 2007   BP: 138/66 114/57 135/62   Pulse: 94 81 86 Patient Position - Orthostatic VS: Lying Lying Sitting       Physical Exam   Constitutional: She is oriented to person, place, and time  She appears well-developed and well-nourished  No distress  HENT:   Head: Normocephalic and atraumatic  Right Ear: External ear normal    Left Ear: External ear normal    Mouth/Throat: Oropharynx is clear and moist    Eyes: Conjunctivae and EOM are normal  Pupils are equal, round, and reactive to light  Right eye exhibits no discharge  Left eye exhibits no discharge  No scleral icterus  Neck: Normal range of motion  Neck supple  No tracheal deviation present  No thyromegaly present  Cardiovascular: Normal rate, regular rhythm and intact distal pulses  Exam reveals no gallop and no friction rub  No murmur heard  Pulmonary/Chest: Effort normal and breath sounds normal  No stridor  No respiratory distress  She has no wheezes  She has no rales  Abdominal: Soft  Bowel sounds are normal  She exhibits no distension  There is no tenderness  There is no rebound and no guarding  Musculoskeletal: Normal range of motion  She exhibits no edema or deformity  Neurological: She is alert and oriented to person, place, and time  No cranial nerve deficit  Skin: Skin is warm and dry  No rash noted  She is not diaphoretic  No erythema  Psychiatric:   Patient is suicidal, tearful on exam    Nursing note and vitals reviewed        ED Medications  Medications   FLUoxetine (PROzac) capsule 20 mg (not administered)   insulin aspart protamine-insulin aspart (NovoLOG 70/30) 100 units/mL subcutaneous injection 60 Units (not administered)   levothyroxine tablet 50 mcg (not administered)   lisinopril (ZESTRIL) tablet 10 mg (not administered)   metoprolol succinate (TOPROL-XL) 24 hr tablet 25 mg (not administered)   pantoprazole (PROTONIX) EC tablet 40 mg (not administered)   topiramate (TOPAMAX) tablet 200 mg (not administered)   topiramate (TOPAMAX) tablet 200 mg (not administered) LORazepam (ATIVAN) tablet 1 mg (not administered)   traZODone (DESYREL) tablet 25 mg (not administered)   nicotine polacrilex (NICORETTE) gum 2 mg (not administered)   risperiDONE (RisperDAL M-TABS) dispersible tablet 1 mg (not administered)   haloperidol (HALDOL) tablet 5 mg (not administered)   OLANZapine (ZyPREXA) IM injection 10 mg (not administered)   magnesium hydroxide (MILK OF MAGNESIA) 400 mg/5 mL oral suspension 30 mL (not administered)   aluminum-magnesium hydroxide-simethicone (MYLANTA) 200-200-20 mg/5 mL oral suspension 30 mL (not administered)   acetaminophen (TYLENOL) tablet 650 mg (not administered)   benztropine (COGENTIN) tablet 1 mg (not administered)   benztropine (COGENTIN) injection 1 mg (not administered)   rOPINIRole (REQUIP) tablet 1 mg (not administered)       Diagnostic Studies  Results Reviewed     Procedure Component Value Units Date/Time    Comprehensive metabolic panel [[id number]]  (Abnormal) Collected:  07/03/18 1656    Lab Status:  Final result Specimen:  Blood from Arm, Left Updated:  07/03/18 1736     Sodium 134 (L) mmol/L      Potassium 3 9 mmol/L      Chloride 99 (L) mmol/L      CO2 26 mmol/L      Anion Gap 9 mmol/L      BUN 17 mg/dL      Creatinine 0 72 mg/dL      Glucose 330 (H) mg/dL      Calcium 8 9 mg/dL      AST 15 U/L      ALT 23 U/L      Alkaline Phosphatase 112 U/L      Total Protein 7 1 g/dL      Albumin 3 1 (L) g/dL      Total Bilirubin 0 35 mg/dL      eGFR 89 ml/min/1 73sq m     Narrative:         National Kidney Disease Education Program recommendations are as follows:  GFR calculation is accurate only with a steady state creatinine  Chronic Kidney disease less than 60 ml/min/1 73 sq  meters  Kidney failure less than 15 ml/min/1 73 sq  meters      TSH [[id number]]  (Normal) Collected:  07/03/18 1656    Lab Status:  Final result Specimen:  Blood from Arm, Left Updated:  07/03/18 1736     TSH 3RD GENERATON 2 820 uIU/mL     Narrative:         Patients undergoing fluorescein dye angiography may retain small amounts of fluorescein in the body for 48-72 hours post procedure  Samples containing fluorescein can produce falsely depressed TSH values  If the patient had this procedure,a specimen should be resubmitted post fluorescein clearance            The recommended reference ranges for TSH during pregnancy are as follows:  First trimester 0 1 to 2 5 uIU/mL  Second trimester  0 2 to 3 0 uIU/mL  Third trimester 0 3 to 3 0 uIU/m      CBC and differential [[id number]] Collected:  07/03/18 1656    Lab Status:  Final result Specimen:  Blood from Arm, Left Updated:  07/03/18 1704     WBC 7 06 Thousand/uL      RBC 4 77 Million/uL      Hemoglobin 13 7 g/dL      Hematocrit 42 0 %      MCV 88 fL      MCH 28 7 pg      MCHC 32 6 g/dL      RDW 12 2 %      MPV 11 1 fL      Platelets 946 Thousands/uL      nRBC 0 /100 WBCs      Neutrophils Relative 70 %      Immat GRANS % 0 %      Lymphocytes Relative 21 %      Monocytes Relative 7 %      Eosinophils Relative 2 %      Basophils Relative 0 %      Neutrophils Absolute 4 87 Thousands/µL      Immature Grans Absolute 0 03 Thousand/uL      Lymphocytes Absolute 1 49 Thousands/µL      Monocytes Absolute 0 50 Thousand/µL      Eosinophils Absolute 0 14 Thousand/µL      Basophils Absolute 0 03 Thousands/µL     POCT urinalysis dipstick [[id number]]     Lab Status:  No result Specimen:  Urine     POCT alcohol breath test [[id number]]  (Normal) Resulted:  07/03/18 1526    Lab Status:  Final result Updated:  07/03/18 1526     EXTBreath Alcohol 0 000    Rapid drug screen, urine [[id number]]  (Normal) Collected:  07/03/18 1442    Lab Status:  Final result Specimen:  Urine from Urine, Clean Catch Updated:  07/03/18 1504     Amph/Meth UR Negative     Barbiturate Ur Negative     Benzodiazepine Urine Negative     Cocaine Urine Negative     Methadone Urine Negative     Opiate Urine Negative     PCP Ur Negative     THC Urine Negative    Narrative:         FOR MEDICAL PURPOSES ONLY  IF CONFIRMATION NEEDED PLEASE CONTACT THE LAB WITHIN 5 DAYS  Drug Screen Cutoff Levels:  AMPHETAMINE/METHAMPHETAMINES  1000 ng/mL  BARBITURATES     200 ng/mL  BENZODIAZEPINES     200 ng/mL  COCAINE      300 ng/mL  METHADONE      300 ng/mL  OPIATES      300 ng/mL  PHENCYCLIDINE     25 ng/mL  THC       50 ng/mL                 No orders to display         Procedures  Procedures      Phone Consults  ED Phone Contact    ED Course  ED Course as of Jul 03 2052   Tue Jul 03, 2018   151 40-year-old woman woman comes in for evaluation of suicidal ideation  Patient was at the a female and she thought she also had 20 dollars on her, she actually had 400 dollars which was her only money for the month  She spent all her money on the slot some when she realized that she was broke, she became very distraught and thought about going home and taking all her pills harm herself  Patient states that she does not feel safe right now that if she was to go home she would take all of her pills  She wants to be signed into the hospital for suicidal ideation  Patient does have a history of suicidal ideation most recent was back in the early 2000s  Patient does follow with Psychiatry through the [facility] at this time, patient feels very distraught but denies any headache neck pain chest pain shortness of breath abdominal pain nausea vomiting or diarrhea  MDM  Number of Diagnoses or Management Options  Diagnosis management comments:  40-year-old woman the with history of depression,  Remote history of suicide attempt who now comes in acutely depressed and  Suicidal with a plan to take pills    Will do a full Geriatric psych workup, have her be evaluated by crisis and likely admission to the hospital     CritCare Time    Disposition  Final diagnoses:   Seizures ([address] )   Restless leg syndrome   Essential hypertension     Time reflects when diagnosis was documented in both MDM as applicable and the Disposition within this note     Time User Action Codes Description Comment    7/3/2018  5:59 PM [place] Add [R56 9] Seizures ([address] )     7/3/2018  5:59 PM [place] Modify [R56 9] Seizures ([address] )     7/3/2018  5:59 PM [place] Modify [R56 9] Seizures ([address] )     7/3/2018  5:59 PM Ritesh Alyssa Denisse Freeman Remove [R56 9] Seizures ([address] )     7/3/2018  5:59 PM [place] Add [R56 9] Seizures ([address] )     7/3/2018  5:59 PM [place] Modify [R56 9] Seizures ([address] )     7/3/2018  5:59 PM [place] Modify [R56 9] Seizures ([address] )     7/3/2018  5:59 PM [place] Add [G25 81] Restless leg syndrome     7/3/2018  5:59 PM [place] Modify [G25 81] Restless leg syndrome     7/3/2018  5:59 PM [place] Add [I10] Essential hypertension     7/3/2018  5:59 PM [place] Modify [I10] Essential hypertension       ED Disposition     None      MD Documentation      Most Recent Value   Sending MD Dr Kendall Nieto    None         Current Discharge Medication List      CONTINUE these medications which have NOT CHANGED    Details   FLUoxetine (PROzac) 20 mg capsule Take 20 mg by mouth daily  insulin aspart protamine-insulin aspart (NovoLOG 70/30) 100 units/mL injection Inject 60 Units under the skin 2 (two) times a day before meals      levothyroxine 50 mcg tablet Take 50 mcg by mouth daily Indications: Pt unsure of the amount  lisinopril (ZESTRIL) 10 mg tablet Take 10 mg by mouth daily      metoprolol succinate (TOPROL-XL) 25 mg 24 hr tablet Take 25 mg by mouth daily      pantoprazole (PROTONIX) 40 mg tablet Take 1 tablet by mouth daily in the early morning  Qty: 30 tablet, Refills: 0      rOPINIRole (REQUIP) 1 mg tablet Take 1 mg by mouth 3 (three) times a day        !! topiramate (TOPAMAX) 100 mg tablet Take 200 mg by mouth every morning        !! topiramate (TOPAMAX) 100 mg tablet Take 200 mg by mouth daily at bedtime  !! - Potential duplicate medications found  Please discuss with provider  No discharge procedures on file  ED Provider  Attending physically available and evaluated Marleyus Ashforddaina JUNIOR managed the patient along with the ED Attending      Electronically Signed by         Nirali Trinidad MD  07/03/18 2052

## 2018-07-03 NOTE — ED NOTES
CW called [employer] [facility] and spoke to Govenlock Green and gave info for ALEJO Bowman, CM can be contacted at [phone]

## 2018-07-03 NOTE — ED NOTES
Pt requested that her son take her purse home with him while he was at bedside        Kelly Rich RN  07/03/18 1931

## 2018-07-03 NOTE — ED NOTES
Chief Complaint   Patient presents with    Psychiatric Evaluation     Patient presents to the E R  being tearful and having S I  Patient states:  "I went to the casino and spent all of my money  I now don't have money for rent, food or anything "  Patient said that she was "going home to take all of my pills "     CW completed  eval and RA for 61 y/o female who presents to ED with c/o depression and suicidal ideations with plan to OD on medication  Patient gambled all monthly funds earlier this afternoon at the [employer] and reports she is not able to pay her bills  She feels helpless and is afraid she will end up homeless  Patient resides by herself, reports dx of depression and six past suicide attempts and inpatient hospitalizations  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

## 2018-07-04 PROBLEM — F33.2 SEVERE EPISODE OF RECURRENT MAJOR DEPRESSIVE DISORDER, WITHOUT PSYCHOTIC FEATURES (HCC): Chronic | Status: ACTIVE | Noted: 2018-07-04

## 2018-07-04 LAB
GLUCOSE SERPL-MCNC: 137 MG/DL (ref 65–140)
GLUCOSE SERPL-MCNC: 158 MG/DL (ref 65–140)
GLUCOSE SERPL-MCNC: 231 MG/DL (ref 65–140)
GLUCOSE SERPL-MCNC: 76 MG/DL (ref 65–140)

## 2018-07-04 PROCEDURE — 82948 REAGENT STRIP/BLOOD GLUCOSE: CPT

## 2018-07-04 PROCEDURE — 99222 1ST HOSP IP/OBS MODERATE 55: CPT | Performed by: NURSE PRACTITIONER

## 2018-07-04 PROCEDURE — 99254 IP/OBS CNSLTJ NEW/EST MOD 60: CPT | Performed by: PSYCHIATRY & NEUROLOGY

## 2018-07-04 RX ORDER — GABAPENTIN 300 MG/1
300 CAPSULE ORAL 2 TIMES DAILY
Status: DISCONTINUED | OUTPATIENT
Start: 2018-07-04 | End: 2018-07-05

## 2018-07-04 RX ORDER — ROPINIROLE 1 MG/1
1 TABLET, FILM COATED ORAL DAILY
Status: COMPLETED | OUTPATIENT
Start: 2018-07-05 | End: 2018-07-05

## 2018-07-04 RX ADMIN — TOPIRAMATE 200 MG: 100 TABLET, FILM COATED ORAL at 22:01

## 2018-07-04 RX ADMIN — ROPINIROLE 1 MG: 1 TABLET, FILM COATED ORAL at 09:18

## 2018-07-04 RX ADMIN — TOPIRAMATE 200 MG: 100 TABLET, FILM COATED ORAL at 09:18

## 2018-07-04 RX ADMIN — PANTOPRAZOLE SODIUM 40 MG: 40 TABLET, DELAYED RELEASE ORAL at 06:08

## 2018-07-04 RX ADMIN — INSULIN ASPART 60 UNITS: 100 INJECTION, SUSPENSION SUBCUTANEOUS at 08:00

## 2018-07-04 RX ADMIN — METOPROLOL SUCCINATE 25 MG: 25 TABLET, EXTENDED RELEASE ORAL at 09:18

## 2018-07-04 RX ADMIN — FLUOXETINE 20 MG: 20 CAPSULE ORAL at 09:18

## 2018-07-04 RX ADMIN — LEVOTHYROXINE SODIUM 50 MCG: 50 TABLET ORAL at 06:08

## 2018-07-04 RX ADMIN — GABAPENTIN 300 MG: 300 CAPSULE ORAL at 17:17

## 2018-07-04 RX ADMIN — INSULIN ASPART 60 UNITS: 100 INJECTION, SUSPENSION SUBCUTANEOUS at 17:25

## 2018-07-04 RX ADMIN — Medication 400 MG: at 17:18

## 2018-07-04 RX ADMIN — LISINOPRIL 10 MG: 10 TABLET ORAL at 09:18

## 2018-07-04 NOTE — CONSULTS
Consultation - Medical Clearance     Kirby Hayes 60 y o  female MRN: [id number]  Unit/Bed#: MU8 576-01 Encounter: [id number]    History of Present Illness     HPI:  Kirby Hayes is a 60 y o  female who presents with depression and suicidal ideations  Patient gambled away her month's savings and fears she may end up homeless  She denies any new medical c/o  Soto Boyle Consults    Review of Systems   Constitutional: Negative  HENT: Negative  Eyes: Negative  Respiratory: Negative  Cardiovascular: Negative  Gastrointestinal: Negative  Endocrine: Negative  Genitourinary: Negative  Musculoskeletal: Negative  Skin: Negative  Allergic/Immunologic: Negative  Neurological: Negative  Hematological: Negative  Psychiatric/Behavioral: Positive for behavioral problems, confusion, decreased concentration, dysphoric mood and suicidal ideas  The patient is nervous/anxious  Historical Information   Past Medical History:   Diagnosis Date    Diabetes mellitus ([address] )     Disease of thyroid gland     Hyperlipidemia     Hypertension     Hypothyroidism (acquired)     Restless leg syndrome     Seizures (HCC)      Past Surgical History:   Procedure Laterality Date    APPENDECTOMY      CHOLECYSTECTOMY      HIP FRACTURE SURGERY Right     TONSILLECTOMY      TUBAL LIGATION       Social History   History   Alcohol Use    Yes     Comment: occasional     History   Drug Use No     History   Smoking Status    Never Smoker   Smokeless Tobacco    Never Used     Comment: not a smoker     Family History: History reviewed  No pertinent family history      Meds/Allergies   Current Facility-Administered Medications   Medication Dose Route Frequency    acetaminophen (TYLENOL) tablet 650 mg  650 mg Oral Q4H PRN    aluminum-magnesium hydroxide-simethicone (MYLANTA) 200-200-20 mg/5 mL oral suspension 30 mL  30 mL Oral Q4H PRN    benztropine (COGENTIN) injection 1 mg  1 mg Intramuscular Q6H PRN    benztropine (COGENTIN) tablet 1 mg  1 mg Oral Q6H PRN    [START ON 7/4/2018] FLUoxetine (PROzac) capsule 20 mg  20 mg Oral Daily    haloperidol (HALDOL) tablet 5 mg  5 mg Oral Q8H PRN    [START ON 7/4/2018] insulin aspart protamine-insulin aspart (NovoLOG 70/30) 100 units/mL subcutaneous injection 60 Units  60 Units Subcutaneous BID AC    [START ON 7/4/2018] levothyroxine tablet 50 mcg  50 mcg Oral Daily    [START ON 7/4/2018] lisinopril (ZESTRIL) tablet 10 mg  10 mg Oral Daily    LORazepam (ATIVAN) tablet 1 mg  1 mg Oral Q4H PRN    magnesium hydroxide (MILK OF MAGNESIA) 400 mg/5 mL oral suspension 30 mL  30 mL Oral Daily PRN    [START ON 7/4/2018] metoprolol succinate (TOPROL-XL) 24 hr tablet 25 mg  25 mg Oral Daily    nicotine polacrilex (NICORETTE) gum 2 mg  2 mg Oral Q2H PRN    OLANZapine (ZyPREXA) IM injection 10 mg  10 mg Intramuscular BID PRN    [START ON 7/4/2018] pantoprazole (PROTONIX) EC tablet 40 mg  40 mg Oral Early Morning    risperiDONE (RisperDAL M-TABS) dispersible tablet 1 mg  1 mg Oral Q8H PRN    rOPINIRole (REQUIP) tablet 1 mg  1 mg Oral TID    [START ON 7/4/2018] topiramate (TOPAMAX) tablet 200 mg  200 mg Oral QAM    topiramate (TOPAMAX) tablet 200 mg  200 mg Oral HS    traZODone (DESYREL) tablet 25 mg  25 mg Oral HS PRN     Prescriptions Prior to Admission   Medication    FLUoxetine (PROzac) 20 mg capsule    insulin aspart protamine-insulin aspart (NovoLOG 70/30) 100 units/mL injection    levothyroxine 50 mcg tablet    lisinopril (ZESTRIL) 10 mg tablet    metoprolol succinate (TOPROL-XL) 25 mg 24 hr tablet    pantoprazole (PROTONIX) 40 mg tablet    rOPINIRole (REQUIP) 1 mg tablet    topiramate (TOPAMAX) 100 mg tablet    topiramate (TOPAMAX) 100 mg tablet     Allergies   Allergen Reactions    Pineapple Anaphylaxis    Byetta 10 Mcg Pen [Exenatide]     Marijuana [Dronabinol] GI Intolerance    Metformin Diarrhea     Other reaction(s): Unknown    Ondansetron      Other reaction(s): Unknown    Coconut Oil Rash    Liraglutide Rash and Other (See Comments)    Penicillins Rash       Objective     /62 (BP Location: Right arm)   Pulse 86   Temp 98 2 °F (36 8 °C) (Oral)   Resp 18   Ht 5' 2" (1 575 m)   Wt 128 kg (282 lb 3 oz)   SpO2 96%   BMI 51 61 kg/m²     No intake or output data in the 24 hours ending 07/03/18 2026    Invasive Devices          No matching active lines, drains, or airways          Physical Exam   Constitutional: She is oriented to person, place, and time  She appears well-developed and well-nourished  No distress  HENT:   Head: Normocephalic and atraumatic  Right Ear: External ear normal    Left Ear: External ear normal    Nose: Nose normal    Mouth/Throat: Oropharynx is clear and moist    Eyes: Conjunctivae and EOM are normal  Pupils are equal, round, and reactive to light  Neck: Normal range of motion  Neck supple  No JVD present  No tracheal deviation present  No thyromegaly present  Cardiovascular: Normal rate, regular rhythm, normal heart sounds and intact distal pulses  Exam reveals no gallop and no friction rub  No murmur heard  Pulmonary/Chest: Effort normal and breath sounds normal  No stridor  No respiratory distress  She has no wheezes  She has no rales  She exhibits no tenderness  Abdominal: Soft  Bowel sounds are normal  She exhibits no distension and no mass  There is no tenderness  There is no rebound and no guarding  Genitourinary:   Genitourinary Comments: Deferred    Musculoskeletal: Normal range of motion  She exhibits no edema, tenderness or deformity  Lymphadenopathy:     She has no cervical adenopathy  Neurological: She is alert and oriented to person, place, and time  She has normal reflexes  She displays normal reflexes  No cranial nerve deficit  She exhibits normal muscle tone  Coordination normal    Skin: Skin is warm and dry  No rash noted  She is not diaphoretic  No erythema  No pallor     Psychiatric: Her mood appears anxious  Her affect is blunt  Her speech is delayed and slurred  She is slowed and withdrawn  Cognition and memory are impaired  She expresses impulsivity  She exhibits a depressed mood  She expresses suicidal ideation  She expresses suicidal plans  She exhibits abnormal recent memory           Lab Results:   Admission on 07/03/2018   Component Date Value    Amph/Meth UR 07/03/2018 Negative     Barbiturate Ur 07/03/2018 Negative     Benzodiazepine Urine 07/03/2018 Negative     Cocaine Urine 07/03/2018 Negative     Methadone Urine 07/03/2018 Negative     Opiate Urine 07/03/2018 Negative     PCP Ur 07/03/2018 Negative     THC Urine 07/03/2018 Negative     EXTBreath Alcohol 07/03/2018 0 000     WBC 07/03/2018 7 06     RBC 07/03/2018 4 77     Hemoglobin 07/03/2018 13 7     Hematocrit 07/03/2018 42 0     MCV 07/03/2018 88     MCH 07/03/2018 28 7     MCHC 07/03/2018 32 6     RDW 07/03/2018 12 2     MPV 07/03/2018 11 1     Platelets 22/42/2735 178     nRBC 07/03/2018 0     Neutrophils Relative 07/03/2018 70     Immat GRANS % 07/03/2018 0     Lymphocytes Relative 07/03/2018 21     Monocytes Relative 07/03/2018 7     Eosinophils Relative 07/03/2018 2     Basophils Relative 07/03/2018 0     Neutrophils Absolute 07/03/2018 4 87     Immature Grans Absolute 07/03/2018 0 03     Lymphocytes Absolute 07/03/2018 1 49     Monocytes Absolute 07/03/2018 0 50     Eosinophils Absolute 07/03/2018 0 14     Basophils Absolute 07/03/2018 0 03     Sodium 07/03/2018 134*    Potassium 07/03/2018 3 9     Chloride 07/03/2018 99*    CO2 07/03/2018 26     Anion Gap 07/03/2018 9     BUN 07/03/2018 17     Creatinine 07/03/2018 0 72     Glucose 07/03/2018 330*    Calcium 07/03/2018 8 9     AST 07/03/2018 15     ALT 07/03/2018 23     Alkaline Phosphatase 07/03/2018 112     Total Protein 07/03/2018 7 1     Albumin 07/03/2018 3 1*    Total Bilirubin 07/03/2018 0 35     eGFR 07/03/2018 89  TSH 3RD GENERATON 07/03/2018 2 820        EKG, Pathology, and Other Studies: I have personally reviewed pertinent reports  Assessment/Plan     Assessment:  Depression with suicidal ideations  DM-2  HTN  Hypothyroidism  Hyperlipidemia  Restless leg syndrome    Plan:  Admit for Psych eval/treatment  Cont insulin  Cont BP meds  Cont   Synthroid  Cont cholesterol meds  Requip for RLS      Counseling / Coordination of Care  Total floor / unit time spent today 1 hour  Greater than 50% of total time was spent with the patient and / or family counseling and / or coordination of care        Deon Hoyt PA-C  7/3/2018

## 2018-07-04 NOTE — PROGRESS NOTES
Pt was tearful and anxious this am  States wishes she was dead because life is so hard and she cant care for herself anymore and her family hates her  States has thoughts of SI and agrees to speak to staff  States if she was home she would take all her pills  Visible in milieu late afternoon  Seclusive to self  Med compliant  Will continue to monitor
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
## 2018-07-05 PROBLEM — F63.0: Status: ACTIVE | Noted: 2018-07-05

## 2018-07-05 LAB
GLUCOSE SERPL-MCNC: 146 MG/DL (ref 65–140)
GLUCOSE SERPL-MCNC: 72 MG/DL (ref 65–140)
GLUCOSE SERPL-MCNC: 74 MG/DL (ref 65–140)
GLUCOSE SERPL-MCNC: 91 MG/DL (ref 65–140)
IRON SERPL-MCNC: 65 UG/DL (ref 50–170)

## 2018-07-05 PROCEDURE — 82948 REAGENT STRIP/BLOOD GLUCOSE: CPT

## 2018-07-05 PROCEDURE — 99232 SBSQ HOSP IP/OBS MODERATE 35: CPT | Performed by: PHYSICIAN ASSISTANT

## 2018-07-05 RX ORDER — GABAPENTIN 100 MG/1
100 CAPSULE ORAL 3 TIMES DAILY
Status: DISCONTINUED | OUTPATIENT
Start: 2018-07-05 | End: 2018-07-06

## 2018-07-05 RX ADMIN — INSULIN ASPART 60 UNITS: 100 INJECTION, SUSPENSION SUBCUTANEOUS at 08:26

## 2018-07-05 RX ADMIN — Medication 400 MG: at 17:15

## 2018-07-05 RX ADMIN — INSULIN ASPART 60 UNITS: 100 INJECTION, SUSPENSION SUBCUTANEOUS at 18:17

## 2018-07-05 RX ADMIN — PANTOPRAZOLE SODIUM 40 MG: 40 TABLET, DELAYED RELEASE ORAL at 06:38

## 2018-07-05 RX ADMIN — TOPIRAMATE 200 MG: 100 TABLET, FILM COATED ORAL at 21:05

## 2018-07-05 RX ADMIN — GABAPENTIN 100 MG: 100 CAPSULE ORAL at 21:05

## 2018-07-05 RX ADMIN — LEVOTHYROXINE SODIUM 50 MCG: 50 TABLET ORAL at 06:38

## 2018-07-05 RX ADMIN — GABAPENTIN 300 MG: 300 CAPSULE ORAL at 08:26

## 2018-07-05 RX ADMIN — ROPINIROLE 1 MG: 1 TABLET, FILM COATED ORAL at 08:27

## 2018-07-05 RX ADMIN — FLUOXETINE 20 MG: 20 CAPSULE ORAL at 08:26

## 2018-07-05 RX ADMIN — TOPIRAMATE 200 MG: 100 TABLET, FILM COATED ORAL at 08:26

## 2018-07-05 RX ADMIN — Medication 400 MG: at 08:27

## 2018-07-05 RX ADMIN — GABAPENTIN 100 MG: 100 CAPSULE ORAL at 17:15

## 2018-07-05 NOTE — PLAN OF CARE
Problem: Ineffective Coping  Goal: Participates in unit activities  Interventions:  - Provide therapeutic environment   - Provide required programming   - Redirect inappropriate behaviors    Outcome: Progressing  Pt attended groups cooperatively yet expressed no current desire to live  She is blaming her past and current gambling addiction on the use of medications  Pt reports gladness that her son will be doing her finances from here on out but reports that she feels like a burden to her family

## 2018-07-05 NOTE — PROGRESS NOTES
Progress Note - Behavioral Health     Maxine Bailey 60 y o  female MRN: [id number]  Unit/Bed#: QO6 576-01 Encounter: [id number]    Maxine Bailey was seen for continuing care and reviewed with treatment team   Pt reported feeling depressed with SI and plan to OD on medications  She feels like she "Hit rock bottom yesterday "  She is anxious, and states she feels safe on the unit but would OD if she was at home  She contracts for safety on the unit  She reports being a  of the [employer], but no active combat  She has a psychiatrist through the [place] who most recently treated her with Fluoxetine 10mg qd (was reduced because she was doing better per Pt), but Pt admits she could use a higher dose again  Per Rx log, she was most recently on 20mg  She states she had been on 40mg at one point in the past   She has a gambling addiction which was most recently under control until she had her full paycheck in her hand and spent her rent money at the [employer] just before this admission  She knows she has a problem and is taking steps to improve her situation  She plans to sign voluntary exclusion papers so [email or web address] will not allow her to goyal and she is making her son her representative payee  Pt presently denies immediate suicidal intent, HI, or manic or psychotic Sxs  Floor team relayed she is hope, cooperative and medication compliant  She is not very social in the milieu  Neurologist has seen Pt for RLS and Requip was weaned off she ws started on Gabapentin instead but reported to unit nurses this morning that she  felt shaky since being given Gabapentin  Topamax is being continued for seizure d/o  Last seizure was 2 weeks ago        Sleep: Good  Appetite: Good  Medication side effects: as per HPI  ROS: as per HPI    Labs/EKG:  Reviewed    Mental Status Evaluation:  Appearance:  Dressed, good eye contact and adequate hygiene   Behavior:  Pleasant, Cooperative, but a bit withdrawn   Speech:  Clear, normal rate and volume Mood:  Anxious, Depressed   Affect:  Constricted   Thought Process:  Organized, Goal directed   Associations: Intact associations   Thought Content:  Self-defeating thoughts, obsession over gambling   Perceptual Disturbances: Pt denies any hallucinations or paranoia and does not appear to be responding to internal stimuli   Risk Potential: Pt having SI with plan but no intent and denies HI at present    She denies intent on the unit   Sensorium:  Self, birthday, Place, Day of the week, Month, Year   Memory:  short term memory grossly intact   Consciousness:  alert, awake   Attention: Good   Insight:  Fair   Judgment: Fair   Gait/Station: Normal gait/station   Motor Activity: No abnormal movements     Vitals:    07/04/18 0705 07/04/18 1543 07/04/18 2100 07/05/18 0652   BP: 127/60 96/53 147/71 91/51   BP Location: Right arm Right arm Right arm Right arm   Pulse: 90 71 69 69   Resp: 18 18 18 18   Temp: 98 1 °F (36 7 °C) 97 8 °F (36 6 °C) 98 °F (36 7 °C) 98 1 °F (36 7 °C)   TempSrc: Tympanic Tympanic Tympanic Tympanic   SpO2: 95% 97%  99%   Weight:       Height:           Admission on 07/03/2018   Component Date Value    Amph/Meth UR 07/03/2018 Negative     Barbiturate Ur 07/03/2018 Negative     Benzodiazepine Urine 07/03/2018 Negative     Cocaine Urine 07/03/2018 Negative     Methadone Urine 07/03/2018 Negative     Opiate Urine 07/03/2018 Negative     PCP Ur 07/03/2018 Negative     THC Urine 07/03/2018 Negative     EXTBreath Alcohol 07/03/2018 0 000     WBC 07/03/2018 7 06     RBC 07/03/2018 4 77     Hemoglobin 07/03/2018 13 7     Hematocrit 07/03/2018 42 0     MCV 07/03/2018 88     MCH 07/03/2018 28 7     MCHC 07/03/2018 32 6     RDW 07/03/2018 12 2     MPV 07/03/2018 11 1     Platelets 33/90/4338 178     nRBC 07/03/2018 0     Neutrophils Relative 07/03/2018 70     Immat GRANS % 07/03/2018 0     Lymphocytes Relative 07/03/2018 21     Monocytes Relative 07/03/2018 7     Eosinophils Relative 07/03/2018 2     Basophils Relative 07/03/2018 0     Neutrophils Absolute 07/03/2018 4 87     Immature Grans Absolute 07/03/2018 0 03     Lymphocytes Absolute 07/03/2018 1 49     Monocytes Absolute 07/03/2018 0 50     Eosinophils Absolute 07/03/2018 0 14     Basophils Absolute 07/03/2018 0 03     Sodium 07/03/2018 134*    Potassium 07/03/2018 3 9     Chloride 07/03/2018 99*    CO2 07/03/2018 26     Anion Gap 07/03/2018 9     BUN 07/03/2018 17     Creatinine 07/03/2018 0 72     Glucose 07/03/2018 330*    Calcium 07/03/2018 8 9     AST 07/03/2018 15     ALT 07/03/2018 23     Alkaline Phosphatase 07/03/2018 112     Total Protein 07/03/2018 7 1     Albumin 07/03/2018 3 1*    Total Bilirubin 07/03/2018 0 35     eGFR 07/03/2018 89     TSH 3RD GENERATON 07/03/2018 2 820     POC Glucose 07/03/2018 348*    POC Glucose 07/04/2018 231*    Iron 07/03/2018 65     POC Glucose 07/04/2018 158*    POC Glucose 07/04/2018 76     POC Glucose 07/04/2018 137     POC Glucose 07/05/2018 91     POC Glucose 07/05/2018 146*       Progress Toward Goals:  No significant change, but will increase Fluoxetine to 30mg qd for depression, anxiety and Obsessive gambling  Reduce Gabapentin to 100mg tid and titrate a bit more slowly  Advised Gamblers Anonymous at point of future discharge  Repeat BMP for Na check tomorrow    Assessment/Plan   Principal Problem:    Severe episode of recurrent major depressive disorder, without psychotic features ([address] )  Active Problems:    Gambling disorder, episodic, moderate      Recommended Treatment: Continue with pharmacotherapy, group therapy, milieu therapy and occupational therapy    The patient will be maintained on the following medications:    Current Facility-Administered Medications:  acetaminophen 650 mg Oral Q4H PRN Shantanu Gaytan MD   aluminum-magnesium hydroxide-simethicone 30 mL Oral Q4H PRN Shantanu Gaytan MD   benztropine 1 mg Intramuscular Q6H PRN Brian Rivas MD Willow   benztropine 1 mg Oral Q6H PRN Juan Pablo Thrasher MD   [START ON 7/6/2018] FLUoxetine 30 mg Oral Daily Yi Calhoun PA-C   gabapentin 100 mg Oral TID Yi Calhoun PA-C   haloperidol 5 mg Oral Q8H PRN Juan Pablo Thrasher MD   insulin aspart protamine-insulin aspart 60 Units Subcutaneous BID AC Juan Pablo Thrasher MD   levothyroxine 50 mcg Oral Daily Juan Pablo Thrasher MD   lisinopril 10 mg Oral Daily Juan Pablo Thrasher MD   LORazepam 1 mg Oral Q4H PRN Juan Pablo Thrasher MD   magnesium hydroxide 30 mL Oral Daily PRN Juan Pablo Thrasher MD   magnesium oxide 400 mg Oral BID Jennifer Altamirano DO   metoprolol succinate 25 mg Oral Daily Juan Pablo Thrasher MD   nicotine polacrilex 2 mg Oral Q2H PRN Juan Pablo Thrasher MD   OLANZapine 10 mg Intramuscular BID PRN Juan Pablo Thrasher MD   pantoprazole 40 mg Oral Early Morning Juan Pablo Thrasher MD   risperiDONE 1 mg Oral Q8H PRN Juan Pablo Thrasher MD   topiramate 200 mg Oral QAM Juan Pablo Thrasher MD   topiramate 200 mg Oral HS Juan Pablo Thrasher MD   traZODone 25 mg Oral HS PRN Juan Pablo Thrasher MD       Risks, benefits and possible side effects of Medications:   Risks, benefits, and possible side effects of medications explained to patient and patient verbalizes understanding

## 2018-07-05 NOTE — PROGRESS NOTES
Pt calm, pleasant on approach this morning, denies SI, HI, A/VH, reports "some" anxiety with depression, sadness and guilt  Pt tearful with this writer this morning when talking about her gambling addiction  Pt also verbalize to this writer she is happy her son is willing to be her rep payee and paid all her bills  She feels guilty about having to have her son help her, she feels "worthless"  She has been visible in Milieu, limited interaction with peers, attending and participating in all groups, medication compliant, will continue to monitor

## 2018-07-05 NOTE — PROGRESS NOTES
Pt cooperative with care and compliant with medications  Visible in dayroom on occasion with no interaction, and out for meal  Did not attend evening group  Pt has occasional thoughts of SI and is able to contract verbally for safety  Brightens upon approach  Will continue to monitor

## 2018-07-05 NOTE — PROGRESS NOTES
Pt reports feeling "shaky" since administration of gabapentin  VSS  Blood sugars WNL  Pt felt better after discussing medication with nurse and med education sheet provided at bedside  Will continue to monitor

## 2018-07-05 NOTE — CASE MANAGEMENT
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, who has stage 3 cancer, but then states he is getting ready to return to work next week  When I questioned how she is a caregiver, she states she reminds him to take his meds,a dn keep his doctor appointments  Pt lives alone in a [facility] apartment  She uses the public bus to navigate the community  She is , and has one son whom is planning to become her rep-payee since she has her own Gambling addiction and recently gambled away all of her monthly income  She also plays bingo with a friend and spends large amount of cash  She and her son plan a [facility] to sign an exclusion agreement so she will no longer be able to goyal at that site  Pt is an [employer] , from the [place] era, but did not see pagan  She denies legal issues, has no access to firearms, and does drink ETOH socially, but denies drug use

## 2018-07-05 NOTE — PLAN OF CARE
Anxiety     Anxiety is at manageable level Progressing        DEPRESSION     Will be euthymic at discharge Progressing        Depression     Treatment Goal: Demonstrate behavioral control of depressive symptoms, verbalize feelings of improved mood/affect, and adopt new coping skills prior to discharge Progressing        Risk for Self Injury/Neglect     Treatment Goal: Remain safe during length of stay, learn and adopt new coping skills, and be free of self-injurious ideation, impulses and acts at the time of discharge Progressing        SELF HARM/SUICIDALITY     Will have no self-injury during hospital stay Progressing

## 2018-07-06 LAB
ANION GAP SERPL CALCULATED.3IONS-SCNC: 6 MMOL/L (ref 4–13)
BUN SERPL-MCNC: 24 MG/DL (ref 5–25)
CALCIUM SERPL-MCNC: 8.6 MG/DL (ref 8.3–10.1)
CHLORIDE SERPL-SCNC: 108 MMOL/L (ref 100–108)
CO2 SERPL-SCNC: 25 MMOL/L (ref 21–32)
CREAT SERPL-MCNC: 0.75 MG/DL (ref 0.6–1.3)
GFR SERPL CREATININE-BSD FRML MDRD: 85 ML/MIN/1.73SQ M
GLUCOSE SERPL-MCNC: 102 MG/DL (ref 65–140)
GLUCOSE SERPL-MCNC: 128 MG/DL (ref 65–140)
GLUCOSE SERPL-MCNC: 183 MG/DL (ref 65–140)
GLUCOSE SERPL-MCNC: 82 MG/DL (ref 65–140)
GLUCOSE SERPL-MCNC: 84 MG/DL (ref 65–140)
POTASSIUM SERPL-SCNC: 4 MMOL/L (ref 3.5–5.3)
SODIUM SERPL-SCNC: 139 MMOL/L (ref 136–145)

## 2018-07-06 PROCEDURE — 82948 REAGENT STRIP/BLOOD GLUCOSE: CPT

## 2018-07-06 PROCEDURE — 99232 SBSQ HOSP IP/OBS MODERATE 35: CPT | Performed by: PHYSICIAN ASSISTANT

## 2018-07-06 PROCEDURE — 80048 BASIC METABOLIC PNL TOTAL CA: CPT | Performed by: PHYSICIAN ASSISTANT

## 2018-07-06 RX ORDER — GABAPENTIN 100 MG/1
100 CAPSULE ORAL EVERY MORNING
Status: DISCONTINUED | OUTPATIENT
Start: 2018-07-07 | End: 2018-07-09

## 2018-07-06 RX ORDER — GABAPENTIN 300 MG/1
300 CAPSULE ORAL
Status: DISCONTINUED | OUTPATIENT
Start: 2018-07-06 | End: 2018-07-10 | Stop reason: HOSPADM

## 2018-07-06 RX ADMIN — TOPIRAMATE 200 MG: 100 TABLET, FILM COATED ORAL at 08:20

## 2018-07-06 RX ADMIN — GABAPENTIN 300 MG: 300 CAPSULE ORAL at 21:35

## 2018-07-06 RX ADMIN — TOPIRAMATE 200 MG: 100 TABLET, FILM COATED ORAL at 21:35

## 2018-07-06 RX ADMIN — BENZTROPINE MESYLATE 1 MG: 1 TABLET ORAL at 12:26

## 2018-07-06 RX ADMIN — INSULIN ASPART 60 UNITS: 100 INJECTION, SUSPENSION SUBCUTANEOUS at 08:19

## 2018-07-06 RX ADMIN — Medication 400 MG: at 08:19

## 2018-07-06 RX ADMIN — METOPROLOL SUCCINATE 25 MG: 25 TABLET, EXTENDED RELEASE ORAL at 08:19

## 2018-07-06 RX ADMIN — FLUOXETINE 30 MG: 20 CAPSULE ORAL at 08:20

## 2018-07-06 RX ADMIN — INSULIN ASPART 60 UNITS: 100 INJECTION, SUSPENSION SUBCUTANEOUS at 18:06

## 2018-07-06 RX ADMIN — GABAPENTIN 100 MG: 100 CAPSULE ORAL at 08:19

## 2018-07-06 RX ADMIN — Medication 400 MG: at 17:40

## 2018-07-06 RX ADMIN — LEVOTHYROXINE SODIUM 50 MCG: 50 TABLET ORAL at 06:18

## 2018-07-06 RX ADMIN — LISINOPRIL 10 MG: 10 TABLET ORAL at 08:19

## 2018-07-06 RX ADMIN — PANTOPRAZOLE SODIUM 40 MG: 40 TABLET, DELAYED RELEASE ORAL at 06:19

## 2018-07-06 NOTE — PROGRESS NOTES
Progress Note - Behavioral Health     Dayaanra Stinson 60 y o  female MRN: [id number]  Unit/Bed#: DB3 576-01 Encounter: [id number]    Dayanara Stinson was seen for continuing care and reviewed with treatment team   Pt was tearful as she reported "Missing my son and my family "  "Nobody has come to visit her and she feels worthless, lonely and that nobody cares about her  However, she also verbalized recognition that her son does have work  She admits to comfort eating and still has SI with plan to OD if out of the hospital, but contracts for safety on the unit  She is anxious but denies active suicidal intent on the unit, HI or psychotic Sxs  Floor team relays Pt has been cooperative and medication compliant  She is attending all therapeutic groups but MARTEL noticed Pt appeared very sedated in groups  Per nursing, Pt refused a walker because she would rather have nothing if not her cane  Sleep: Impaired  Appetite: Good  Medication side effects: None per Pt  ROS: No complaints per Pt    Labs/Tests:  Reviewed, sodium normalized    Mental Status Evaluation:  Appearance:  Dressed, Fair eye contact, adequate hygiene   Behavior:  Calm, cooperative, still a bit withdrawn   Speech:  Clear, normal rate and volume   Mood:  Anxious, Depressed   Affect:  Constricted   Thought Process:  Organized, Goal directed, but also self deprecating   Associations: Intact associations   Thought Content:  Negative    No delusions   Perceptual Disturbances: Pt denies any hallucinations or paranoia and does not appear to be responding to internal stimuli   Risk Potential: Pt presently denies SI or HI    Sensorium:  Self, birthday, Place, Day of the week, Month, Year   Memory:  short term memory grossly intact   Consciousness:  alert, awake   Attention: Good   Insight:  Fair   Judgment: Fair   Gait/Station: Slow, fairly steady   Motor Activity: No abnormal movements     Vitals:    07/04/18 2100 07/05/18 0652 07/05/18 1450 07/06/18 0704   BP: 147/71 91/51 (!) 88/57 94/54   BP Location: Right arm Right arm Right arm Right arm   Pulse: 69 69 70 65   Resp: 18 18 18 18   Temp: 98 °F (36 7 °C) 98 1 °F (36 7 °C) 98 1 °F (36 7 °C) 98 2 °F (36 8 °C)   TempSrc: Tympanic Tympanic Tympanic Tympanic   SpO2:  99% 97% 95%   Weight:       Height:           Admission on 07/03/2018   Component Date Value    Amph/Meth UR 07/03/2018 Negative     Barbiturate Ur 07/03/2018 Negative     Benzodiazepine Urine 07/03/2018 Negative     Cocaine Urine 07/03/2018 Negative     Methadone Urine 07/03/2018 Negative     Opiate Urine 07/03/2018 Negative     PCP Ur 07/03/2018 Negative     THC Urine 07/03/2018 Negative     EXTBreath Alcohol 07/03/2018 0 000     WBC 07/03/2018 7 06     RBC 07/03/2018 4 77     Hemoglobin 07/03/2018 13 7     Hematocrit 07/03/2018 42 0     MCV 07/03/2018 88     MCH 07/03/2018 28 7     MCHC 07/03/2018 32 6     RDW 07/03/2018 12 2     MPV 07/03/2018 11 1     Platelets 31/27/6334 178     nRBC 07/03/2018 0     Neutrophils Relative 07/03/2018 70     Immat GRANS % 07/03/2018 0     Lymphocytes Relative 07/03/2018 21     Monocytes Relative 07/03/2018 7     Eosinophils Relative 07/03/2018 2     Basophils Relative 07/03/2018 0     Neutrophils Absolute 07/03/2018 4 87     Immature Grans Absolute 07/03/2018 0 03     Lymphocytes Absolute 07/03/2018 1 49     Monocytes Absolute 07/03/2018 0 50     Eosinophils Absolute 07/03/2018 0 14     Basophils Absolute 07/03/2018 0 03     Sodium 07/03/2018 134*    Potassium 07/03/2018 3 9     Chloride 07/03/2018 99*    CO2 07/03/2018 26     Anion Gap 07/03/2018 9     BUN 07/03/2018 17     Creatinine 07/03/2018 0 72     Glucose 07/03/2018 330*    Calcium 07/03/2018 8 9     AST 07/03/2018 15     ALT 07/03/2018 23     Alkaline Phosphatase 07/03/2018 112     Total Protein 07/03/2018 7 1     Albumin 07/03/2018 3 1*    Total Bilirubin 07/03/2018 0 35     eGFR 07/03/2018 89     TSH 3RD [place]ren [place] 07/03/2018 2 820     POC Glucose 07/03/2018 348*    POC Glucose 07/04/2018 231*    Iron 07/03/2018 65     POC Glucose 07/04/2018 158*    POC Glucose 07/04/2018 76     POC Glucose 07/04/2018 137     POC Glucose 07/05/2018 91     POC Glucose 07/05/2018 146*    POC Glucose 07/05/2018 74     POC Glucose 07/05/2018 72     Sodium 07/06/2018 139     Potassium 07/06/2018 4 0     Chloride 07/06/2018 108     CO2 07/06/2018 25     Anion Gap 07/06/2018 6     BUN 07/06/2018 24     Creatinine 07/06/2018 0 75     Glucose 07/06/2018 84     Calcium 07/06/2018 8 6     eGFR 07/06/2018 85     POC Glucose 07/06/2018 82        Progress Toward Goals:  No significant change, Fluoxetine was increased --reflected this morning and will observe and titrate further as warranted  Adjust timing of and increase Gabapentin to 100mg qAM and 300mg qHS for anxiety and to hopefully reduce daytime sedation  Recheck BP  Assessment/Plan   Principal Problem:    Severe episode of recurrent major depressive disorder, without psychotic features ([address] )  Active Problems:    Gambling disorder, episodic, moderate      Recommended Treatment: Continue with pharmacotherapy, group therapy, milieu therapy and occupational therapy    The patient will be maintained on the following medications:    Current Facility-Administered Medications:  acetaminophen 650 mg Oral Q4H PRN Jovanna Ayala MD   aluminum-magnesium hydroxide-simethicone 30 mL Oral Q4H PRN Jovanna Ayala MD   benztropine 1 mg Intramuscular Q6H PRN Jvoanna Ayala MD   benztropine 1 mg Oral Q6H PRN Jovanna Ayala MD   FLUoxetine 30 mg Oral Daily Yi Calhoun PA-C   [START ON 7/7/2018] gabapentin 100 mg Oral QAM Yi Calhoun PA-C   gabapentin 300 mg Oral HS Yi Calhoun PA-C   haloperidol 5 mg Oral Q8H PRN Jovanna Ayala MD   insulin aspart protamine-insulin aspart 60 Units Subcutaneous BID AC Jovanna Ayala MD   levothyroxine 50 mcg Oral Daily Alaina Gordon MD   lisinopril 10 mg Oral Daily Alaina Gordon MD   LORazepam 1 mg Oral Q4H PRN Alaina Gordon MD   magnesium hydroxide 30 mL Oral Daily PRN Alaina Gordon MD   magnesium oxide 400 mg Oral BID Jennifer Altamirano DO   metoprolol succinate 25 mg Oral Daily Alaina Gordon MD   nicotine polacrilex 2 mg Oral Q2H PRN Alaina Gordon MD   OLANZapine 10 mg Intramuscular BID PRN Alaina Gordon MD   pantoprazole 40 mg Oral Early Morning Alaina Gordon MD   risperiDONE 1 mg Oral Q8H PRN Alaina Gordon MD   topiramate 200 mg Oral QAM Alaina Gordon MD   topiramate 200 mg Oral HS Alaina Gordon MD   traZODone 25 mg Oral HS PRN Alaina Gordon MD       Risks, benefits and possible side effects of Medications:   Risks, benefits, and possible side effects of medications explained to patient and patient verbalizes understanding

## 2018-07-06 NOTE — PLAN OF CARE
Problem: Ineffective Coping  Goal: Participates in unit activities  Interventions:  - Provide therapeutic environment   - Provide required programming   - Redirect inappropriate behaviors    Outcome: Not Progressing  Pt with fluctuating attendance in groups  After lunch she became desperately tearful,stating that the meds  were not helping her restless leg issues  Pt needed much staff encouragement to engage in the diversional activity of [employer] game with another peer  Pt  appropriately interactive as she instructed that peer in the rules of the game  Tearfulness subsided

## 2018-07-06 NOTE — CASE MANAGEMENT
Spoke to Airam Webster, the RN Case manager  Dr Nikky Draper office at the [place] [id number]  She reports pt often is a no show for her scheduled appointments with Dr Susan Izquierdo   She is asking that we contact her prior to dc so we can get an appointment and direction regarding discharge planning

## 2018-07-06 NOTE — PROGRESS NOTES
Pt was tearful this am  States her restless legs are bothering her  C/o depression and anxiety  Pt visible in milieu  Went to groups  Social with select peers  Med compliant   Denies SI

## 2018-07-06 NOTE — PROGRESS NOTES
Pt has been calm and cooperative  Spends most of her time in the dayroom watching TV and attending group  Does interact with peers and staff  No tearful episodes  Denies SI/HI/AH/VH  Admits to mild anxiety and depression related to feeling worthless  Medication compliant

## 2018-07-06 NOTE — CASE MANAGEMENT
Returned a call to Georges Orozco at the [facility] [phone], but she was not able to talk and tell me her role in [address]  She will return my call later  I have also contacted her son, Franchesca Elliott, and he heydi;christopher the information that Mc Bolden has provided  He is actively pursuing Rep-payee and either he or a friend will be escorting Mc Bolden to the [employer] to sign the exclusionary forms  He is also working with his mother top have a POA drawn up  He had no questions of his own but kept my number in case anything comes up

## 2018-07-07 LAB
GLUCOSE SERPL-MCNC: 161 MG/DL (ref 65–140)
GLUCOSE SERPL-MCNC: 191 MG/DL (ref 65–140)
GLUCOSE SERPL-MCNC: 213 MG/DL (ref 65–140)
GLUCOSE SERPL-MCNC: 51 MG/DL (ref 65–140)
GLUCOSE SERPL-MCNC: 90 MG/DL (ref 65–140)
GLUCOSE SERPL-MCNC: 97 MG/DL (ref 65–140)

## 2018-07-07 PROCEDURE — 99231 SBSQ HOSP IP/OBS SF/LOW 25: CPT | Performed by: PHYSICIAN ASSISTANT

## 2018-07-07 PROCEDURE — 82948 REAGENT STRIP/BLOOD GLUCOSE: CPT

## 2018-07-07 RX ADMIN — GABAPENTIN 300 MG: 300 CAPSULE ORAL at 21:46

## 2018-07-07 RX ADMIN — METOPROLOL SUCCINATE 25 MG: 25 TABLET, EXTENDED RELEASE ORAL at 08:38

## 2018-07-07 RX ADMIN — TOPIRAMATE 200 MG: 100 TABLET, FILM COATED ORAL at 08:39

## 2018-07-07 RX ADMIN — LISINOPRIL 10 MG: 10 TABLET ORAL at 08:38

## 2018-07-07 RX ADMIN — INSULIN ASPART 60 UNITS: 100 INJECTION, SUSPENSION SUBCUTANEOUS at 08:39

## 2018-07-07 RX ADMIN — GABAPENTIN 100 MG: 100 CAPSULE ORAL at 08:39

## 2018-07-07 RX ADMIN — TOPIRAMATE 200 MG: 100 TABLET, FILM COATED ORAL at 21:46

## 2018-07-07 RX ADMIN — FLUOXETINE 30 MG: 20 CAPSULE ORAL at 08:39

## 2018-07-07 RX ADMIN — LEVOTHYROXINE SODIUM 50 MCG: 50 TABLET ORAL at 06:03

## 2018-07-07 RX ADMIN — Medication 400 MG: at 17:07

## 2018-07-07 RX ADMIN — INSULIN ASPART 60 UNITS: 100 INJECTION, SUSPENSION SUBCUTANEOUS at 17:49

## 2018-07-07 RX ADMIN — PANTOPRAZOLE SODIUM 40 MG: 40 TABLET, DELAYED RELEASE ORAL at 06:03

## 2018-07-07 RX ADMIN — Medication 400 MG: at 08:39

## 2018-07-07 NOTE — PROGRESS NOTES
Pt rang call bell and reported not feeling well  She stated "I think my blood sugar is low, I feel weird"  BS was 51  Orange juice and cookies given per pt's request   Re-checked and it increased to 90  Pt stated "I feel better except for my restless legs"  Encouraged pt to call again with any sx  Nursing will continue to monitor

## 2018-07-07 NOTE — PROGRESS NOTES
Pt OOB, visible on unit for meals and group, interacting with staff and peers  Compliant with medications  Pleasant and calm on approach  Pt reports anxiety and depression that is "better than yesterday" and denies HI and SI, stating that today is "the first day I don't feel like I want to go home and take my pills " Pt stated she spoke with several members of her family last night, which helped her  She stated that she feels safe here and feels like the medications are helping her  Will continue to monitor

## 2018-07-07 NOTE — PROGRESS NOTES
Progress Note - Behavioral Health   Alvino Dorsey 60 y o  female MRN: [id number]  Unit/Bed#: HD8 576-01 Encounter: [id number]    Assessment/Plan   Principal Problem:    Severe episode of recurrent major depressive disorder, without psychotic features ([facility] [address] )  Active Problems:    Gambling disorder, episodic, moderate      Subjective:  Patient not interested in starting Naltrexone for gambling addiction  Reports a family member will be rep-payee and she will not allowed to spend money like that anymore  Reports depression is less with reduction of passive death wishes  Contracts for safety  Seen coloring in dayroom keeping self occupied  Anxiety rated as decreased  Denied psychosis and shows no signs of edi  Cooperative and pleasant  Medication compliant  Appears to be tolerating medications well without serious side effects         Current Medications:  Current Facility-Administered Medications   Medication Dose Route Frequency    acetaminophen (TYLENOL) tablet 650 mg  650 mg Oral Q4H PRN    aluminum-magnesium hydroxide-simethicone (MYLANTA) 200-200-20 mg/5 mL oral suspension 30 mL  30 mL Oral Q4H PRN    benztropine (COGENTIN) injection 1 mg  1 mg Intramuscular Q6H PRN    benztropine (COGENTIN) tablet 1 mg  1 mg Oral Q6H PRN    FLUoxetine (PROzac) capsule 30 mg  30 mg Oral Daily    gabapentin (NEURONTIN) capsule 100 mg  100 mg Oral QAM    gabapentin (NEURONTIN) capsule 300 mg  300 mg Oral HS    haloperidol (HALDOL) tablet 5 mg  5 mg Oral Q8H PRN    insulin aspart protamine-insulin aspart (NovoLOG 70/30) 100 units/mL subcutaneous injection 60 Units  60 Units Subcutaneous BID AC    levothyroxine tablet 50 mcg  50 mcg Oral Daily    lisinopril (ZESTRIL) tablet 10 mg  10 mg Oral Daily    LORazepam (ATIVAN) tablet 1 mg  1 mg Oral Q4H PRN    magnesium hydroxide (MILK OF MAGNESIA) 400 mg/5 mL oral suspension 30 mL  30 mL Oral Daily PRN    magnesium oxide (MAG-OX) tablet 400 mg  400 mg Oral BID    metoprolol succinate (TOPROL-XL) 24 hr tablet 25 mg  25 mg Oral Daily    nicotine polacrilex (NICORETTE) gum 2 mg  2 mg Oral Q2H PRN    OLANZapine (ZyPREXA) IM injection 10 mg  10 mg Intramuscular BID PRN    pantoprazole (PROTONIX) EC tablet 40 mg  40 mg Oral Early Morning    risperiDONE (RisperDAL M-TABS) dispersible tablet 1 mg  1 mg Oral Q8H PRN    topiramate (TOPAMAX) tablet 200 mg  200 mg Oral QAM    topiramate (TOPAMAX) tablet 200 mg  200 mg Oral HS    traZODone (DESYREL) tablet 25 mg  25 mg Oral HS PRN       Behavioral Health Medications: all current active meds have been reviewed and continue current psychiatric medications  Vitals:  Vitals:    07/07/18 0750   BP: 115/56   Pulse: 56   Resp: 16   Temp: (!) 97 3 °F (36 3 °C)   SpO2: 98%       Laboratory results:    I have personally reviewed all pertinent laboratory/tests results    Most Recent Labs:   Lab Results   Component Value Date    WBC 7 06 07/03/2018    RBC 4 77 07/03/2018    HGB 13 7 07/03/2018    HCT 42 0 07/03/2018     07/03/2018    RDW 12 2 07/03/2018    NEUTROABS 4 87 07/03/2018     07/06/2018    K 4 0 07/06/2018     07/06/2018    CO2 25 07/06/2018    BUN 24 07/06/2018    CREATININE 0 75 07/06/2018    GLUCOSE 84 07/06/2018    CALCIUM 8 6 07/06/2018    AST 15 07/03/2018    ALT 23 07/03/2018    ALKPHOS 112 07/03/2018    PROT 7 1 07/03/2018    BILITOT 0 35 07/03/2018    CHOL 212 (H) 07/07/2016    HDL 47 07/07/2016    TRIG 173 (H) 07/07/2016    LDLCALC 130 (H) 07/07/2016    [id number] 2 820 07/03/2018    FREET4 0 85 07/08/2016       Psychiatric Review of Systems:  Behavior over the last 24 hours:  improved  Sleep: normal  Appetite: normal  Medication side effects: No  ROS: no complaints    Mental Status Evaluation:  Appearance:  casually dressed   Behavior:  guarded   Speech:  soft   Mood:  less depressed and anxious   Affect:  constricted   Language appropriate   Thought Process:  normal   Thought Content:  obsessions Perceptual Disturbances: None   Risk Potential: Reduction of passive death wishes  Denied HI  Potential for aggression: No   Sensorium:  person, place and time/date   Cognition:  grossly intact   Consciousness:  alert and awake    Recent and Remote Memory intact   Attention: attention span and concentration were age appropriate   Insight:  fair   Judgment: fair   Gait/Station: slow   Motor Activity: no abnormal movements     Progress Toward Goals: slight progression    Recommended Treatment: Continue with group therapy, milieu therapy and occupational therapy  1   Continue current medications  2  Disposition planning     Risks, benefits and possible side effects of Medications:   Risks, benefits, and possible side effects of medications explained to patient and patient verbalizes understanding        Jenae Cazares PA-C

## 2018-07-07 NOTE — PROGRESS NOTES
Pt was calm and cooperative with care this evening  Pt was medication compliant  Pt reported depression and anxiety, but denied remaining symptoms  Pt was observed out in the milieu, interacting with her peers  Pt was attended and participated in group  Pt complained of continued discomfort due to restless leg syndrome  Will continue to monitor

## 2018-07-08 LAB
GLUCOSE SERPL-MCNC: 109 MG/DL (ref 65–140)
GLUCOSE SERPL-MCNC: 184 MG/DL (ref 65–140)
GLUCOSE SERPL-MCNC: 234 MG/DL (ref 65–140)
GLUCOSE SERPL-MCNC: 82 MG/DL (ref 65–140)

## 2018-07-08 PROCEDURE — 82948 REAGENT STRIP/BLOOD GLUCOSE: CPT

## 2018-07-08 PROCEDURE — 99231 SBSQ HOSP IP/OBS SF/LOW 25: CPT | Performed by: PHYSICIAN ASSISTANT

## 2018-07-08 RX ADMIN — PANTOPRAZOLE SODIUM 40 MG: 40 TABLET, DELAYED RELEASE ORAL at 06:03

## 2018-07-08 RX ADMIN — TOPIRAMATE 200 MG: 100 TABLET, FILM COATED ORAL at 08:38

## 2018-07-08 RX ADMIN — INSULIN ASPART 60 UNITS: 100 INJECTION, SUSPENSION SUBCUTANEOUS at 08:40

## 2018-07-08 RX ADMIN — Medication 400 MG: at 08:39

## 2018-07-08 RX ADMIN — GABAPENTIN 100 MG: 100 CAPSULE ORAL at 08:39

## 2018-07-08 RX ADMIN — LEVOTHYROXINE SODIUM 50 MCG: 50 TABLET ORAL at 06:03

## 2018-07-08 RX ADMIN — Medication 400 MG: at 17:33

## 2018-07-08 RX ADMIN — TOPIRAMATE 200 MG: 100 TABLET, FILM COATED ORAL at 21:15

## 2018-07-08 RX ADMIN — INSULIN ASPART 60 UNITS: 100 INJECTION, SUSPENSION SUBCUTANEOUS at 17:34

## 2018-07-08 RX ADMIN — FLUOXETINE 30 MG: 20 CAPSULE ORAL at 08:39

## 2018-07-08 RX ADMIN — GABAPENTIN 300 MG: 300 CAPSULE ORAL at 21:18

## 2018-07-08 NOTE — PROGRESS NOTES
Progress Note - Behavioral Health   Anselmo Garcia 60 y o  female MRN: [id number]  Unit/Bed#: RW9 576-01 Encounter: [id number]    Assessment/Plan   Principal Problem:    Severe episode of recurrent major depressive disorder, without psychotic features ([address] )  Active Problems:    Gambling disorder, episodic, moderate      Subjective:  Patient reports feeling better psychiatrically  Reports depression is less and denied SI  Sleep, appetite, energy, and concentration reported as improved  Anxiety reported as minimal   Denied psychosis and has no signs of edi  Is future oriented and talking about discharge  Does report restlessness during the evening time  Otherwise no somatic complaints  Medication compliant        Current Medications:  Current Facility-Administered Medications   Medication Dose Route Frequency    acetaminophen (TYLENOL) tablet 650 mg  650 mg Oral Q4H PRN    aluminum-magnesium hydroxide-simethicone (MYLANTA) 200-200-20 mg/5 mL oral suspension 30 mL  30 mL Oral Q4H PRN    benztropine (COGENTIN) injection 1 mg  1 mg Intramuscular Q6H PRN    benztropine (COGENTIN) tablet 1 mg  1 mg Oral Q6H PRN    FLUoxetine (PROzac) capsule 30 mg  30 mg Oral Daily    gabapentin (NEURONTIN) capsule 100 mg  100 mg Oral QAM    gabapentin (NEURONTIN) capsule 300 mg  300 mg Oral HS    haloperidol (HALDOL) tablet 5 mg  5 mg Oral Q8H PRN    insulin aspart protamine-insulin aspart (NovoLOG 70/30) 100 units/mL subcutaneous injection 60 Units  60 Units Subcutaneous BID AC    levothyroxine tablet 50 mcg  50 mcg Oral Daily    lisinopril (ZESTRIL) tablet 10 mg  10 mg Oral Daily    LORazepam (ATIVAN) tablet 1 mg  1 mg Oral Q4H PRN    magnesium hydroxide (MILK OF MAGNESIA) 400 mg/5 mL oral suspension 30 mL  30 mL Oral Daily PRN    magnesium oxide (MAG-OX) tablet 400 mg  400 mg Oral BID    metoprolol succinate (TOPROL-XL) 24 hr tablet 25 mg  25 mg Oral Daily    nicotine polacrilex (NICORETTE) gum 2 mg  2 mg Oral Q2H PRN    OLANZapine (ZyPREXA) IM injection 10 mg  10 mg Intramuscular BID PRN    pantoprazole (PROTONIX) EC tablet 40 mg  40 mg Oral Early Morning    risperiDONE (RisperDAL M-TABS) dispersible tablet 1 mg  1 mg Oral Q8H PRN    topiramate (TOPAMAX) tablet 200 mg  200 mg Oral QAM    topiramate (TOPAMAX) tablet 200 mg  200 mg Oral HS    traZODone (DESYREL) tablet 25 mg  25 mg Oral HS PRN       Behavioral Health Medications: all current active meds have been reviewed and continue current psychiatric medications  Vitals:  Vitals:    07/08/18 0746   BP: 99/57   Pulse: 55   Resp: 18   Temp: 98 °F (36 7 °C)   SpO2: 95%       Laboratory results:    I have personally reviewed all pertinent laboratory/tests results  Most Recent Labs:   Lab Results   Component Value Date    WBC 7 06 07/03/2018    RBC 4 77 07/03/2018    HGB 13 7 07/03/2018    HCT 42 0 07/03/2018     07/03/2018    RDW 12 2 07/03/2018    NEUTROABS 4 87 07/03/2018     07/06/2018    K 4 0 07/06/2018     07/06/2018    CO2 25 07/06/2018    BUN 24 07/06/2018    CREATININE 0 75 07/06/2018    GLUCOSE 84 07/06/2018    CALCIUM 8 6 07/06/2018    AST 15 07/03/2018    ALT 23 07/03/2018    ALKPHOS 112 07/03/2018    PROT 7 1 07/03/2018    BILITOT 0 35 07/03/2018    CHOL 212 (H) 07/07/2016    HDL 47 07/07/2016    TRIG 173 (H) 07/07/2016    LDLCALC 130 (H) 07/07/2016    [id number] 2 820 07/03/2018    FREET4 0 85 07/08/2016       Psychiatric Review of Systems:  Behavior over the last 24 hours:  improved  Sleep: normal  Appetite: normal  Medication side effects: No  ROS: restless legs    Mental Status Evaluation:  Appearance:  casually dressed   Behavior:  cooperative   Speech:  normal pitch and normal volume   Mood:  euthymic   Affect:  mood-congruent   Language appropriate   Thought Process:  normal   Thought Content:  obsessions   Perceptual Disturbances: None   Risk Potential: Denied SI/HI   Potential for aggression: No   Sensorium:  person, place and time/date   Cognition:  grossly intact   Consciousness:  alert and awake    Recent and Remote Memory intact   Attention: attention span and concentration were age appropriate   Insight:  fair   Judgment: fair   Gait/Station: normal gait/station and normal balance   Motor Activity: no abnormal movements     Progress Toward Goals: progressing    Recommended Treatment: Continue with group therapy, milieu therapy and occupational therapy  1   Increase Gabapentin to 100mg BID + 300mg HS for restlessness  2  Continue other medications  3  Disposition planning     Risks, benefits and possible side effects of Medications:   Risks, benefits, and possible side effects of medications explained to patient and patient verbalizes understanding        Luisana Jurado PA-C

## 2018-07-08 NOTE — PROGRESS NOTES
Pt calm, pleasant and brightens on approach, remains medication complaint and denied all symptoms at this time  Pt reports feeling better and believes her current medication regimen is helping her  Visible in the milieu, social with peers/staff and attends group without prompting  Will continue to monitor

## 2018-07-08 NOTE — PROGRESS NOTES
Pt OOB this evening watching tv in dayroom area  Pleasant and cooperative overall  She was medication compliant  Offers no complaints or concerns at this time  She denies SI  Will monitor

## 2018-07-08 NOTE — PLAN OF CARE
Anxiety     Anxiety is at manageable level Progressing        DEPRESSION     Will be euthymic at discharge Progressing        Depression     Treatment Goal: Demonstrate behavioral control of depressive symptoms, verbalize feelings of improved mood/affect, and adopt new coping skills prior to discharge Adam Duque Discharge to home or other facility with appropriate resources Progressing        Ineffective Coping     Participates in unit activities Progressing        Risk for Self Injury/Neglect     Treatment Goal: Remain safe during length of stay, learn and adopt new coping skills, and be free of self-injurious ideation, impulses and acts at the time of discharge Progressing        SELF HARM/SUICIDALITY     Will have no self-injury during hospital stay Progressing

## 2018-07-09 LAB
GLUCOSE SERPL-MCNC: 150 MG/DL (ref 65–140)
GLUCOSE SERPL-MCNC: 80 MG/DL (ref 65–140)
GLUCOSE SERPL-MCNC: 86 MG/DL (ref 65–140)
GLUCOSE SERPL-MCNC: 89 MG/DL (ref 65–140)

## 2018-07-09 PROCEDURE — 99232 SBSQ HOSP IP/OBS MODERATE 35: CPT | Performed by: NURSE PRACTITIONER

## 2018-07-09 PROCEDURE — 82948 REAGENT STRIP/BLOOD GLUCOSE: CPT

## 2018-07-09 RX ORDER — GABAPENTIN 100 MG/1
100 CAPSULE ORAL 2 TIMES DAILY
Status: DISCONTINUED | OUTPATIENT
Start: 2018-07-09 | End: 2018-07-10 | Stop reason: HOSPADM

## 2018-07-09 RX ADMIN — PANTOPRAZOLE SODIUM 40 MG: 40 TABLET, DELAYED RELEASE ORAL at 06:08

## 2018-07-09 RX ADMIN — LISINOPRIL 10 MG: 10 TABLET ORAL at 08:21

## 2018-07-09 RX ADMIN — TOPIRAMATE 200 MG: 100 TABLET, FILM COATED ORAL at 21:33

## 2018-07-09 RX ADMIN — Medication 400 MG: at 17:34

## 2018-07-09 RX ADMIN — GABAPENTIN 100 MG: 100 CAPSULE ORAL at 14:18

## 2018-07-09 RX ADMIN — TOPIRAMATE 200 MG: 100 TABLET, FILM COATED ORAL at 08:21

## 2018-07-09 RX ADMIN — INSULIN ASPART 60 UNITS: 100 INJECTION, SUSPENSION SUBCUTANEOUS at 08:22

## 2018-07-09 RX ADMIN — METOPROLOL SUCCINATE 25 MG: 25 TABLET, EXTENDED RELEASE ORAL at 08:21

## 2018-07-09 RX ADMIN — LEVOTHYROXINE SODIUM 50 MCG: 50 TABLET ORAL at 06:08

## 2018-07-09 RX ADMIN — FLUOXETINE 30 MG: 20 CAPSULE ORAL at 08:20

## 2018-07-09 RX ADMIN — GABAPENTIN 100 MG: 100 CAPSULE ORAL at 08:20

## 2018-07-09 RX ADMIN — GABAPENTIN 300 MG: 300 CAPSULE ORAL at 21:34

## 2018-07-09 RX ADMIN — INSULIN ASPART 60 UNITS: 100 INJECTION, SUSPENSION SUBCUTANEOUS at 17:33

## 2018-07-09 RX ADMIN — Medication 400 MG: at 08:20

## 2018-07-09 NOTE — CASE MANAGEMENT
I have notified pt's son, Petros Borja of her pending discharge for 7/10 and he will pick her up at 11:00  He is aware of her upcoming appointment on 7/12  He is supportive of discharge  I [place] [facility] and [place] [facility]  Since Gambling is an issue they will see her weekly for the next month and the appointments are listed in the Follow-up provider section of EP[facility]

## 2018-07-09 NOTE — PROGRESS NOTES
Pt remains pleasant and cooperative  Feels her symptoms of depression continue to resolve and improve  She does however c/o her restless leg syndrome is more problematic now with a recent medication change from her Requip  She is otherwise medication compliant  Denies SI  Will monitor

## 2018-07-09 NOTE — PROGRESS NOTES
Progress Note - [address] Le[address] 60 y o  female MRN: [id number]  Unit/Bed#: UJ6 576-01 Encounter: [id number]    The patient was seen for continuing care and reviewed with treatment team   Staff reports that the patient has been reporting that her mood is better each day  She is denying SI  She remains pleasant and cooperative  She has been complaining of her restless leg syndrome but no other complaints  The patient tells me that her mood is much better and she is not depressed or anxious  She said she has had no SI since fleeting SI Saturday  She says the meds are working quite [address] [facility]  She says she had a boring weekend and there was not much to do and she is just ready to go home  She said she misses her electronics and her social [place]  She said she would like to go home tomorrow because her son has off of work and can take her to sign the [employer] paperwork at the [facility]  She says she is feeling really good about him taking over as her rep payee  She tells me I am safe to go home, I really miss Facebook and my games and playing cards with my friends  She said she has been sleeping well and that her appetite is a little too good  Her only complaint is that she says her legs start jumping around mid day, she feels the gabapentin is working but it wears off too soon      Mental Status Evaluation:  Appearance:  Adequate hygiene and grooming and Good eye contact   Behavior:  calm and cooperative   Fund of knowledge  Not assessed   Speech:   Language: Normal rate and Normal volume  No overt abnormality   Mood:  euthymic   Affect:   Associations: appropriate and mood-congruent  Intact   Thought Process:  Goal directed and coherent   Thought Content:  Does not verbalize delusional material   Perceptual Disturbances: Denies hallucinations and does not appear to be responding to internal stimuli   Risk Potential: No suicidal or homicidal ideation   Orientation  Oriented x 3 Memory grossly intact   Attention/Concentration Age appropriate   Insight:  Good insight   Judgment: Good judgment   Gait/Station: Not observed   Motor Activity: No abnormal movement noted     Progress Toward Goals:  Approaching baseline    Assessment/Plan    Principal Problem:    Severe episode of recurrent major depressive disorder, without psychotic features ([address] )  Active Problems:    Gambling disorder, episodic, moderate      Recommended Treatment: We will increase gabapentin to 100 mg b i d  instead of 100 mg q a m  with the 2nd dose being in the mid afternoon for restless leg syndrome  Continue gabapentin 300 mg HS, Continue Prozac 30 mg daily  The patient is stable to be discharged this week  Continue with pharmacotherapy, group therapy, milieu therapy and occupational therapy    The patient will be maintained on the following medications:    Current Facility-Administered Medications:  acetaminophen 650 mg Oral Q4H PRN Jovanna Ayala MD   aluminum-magnesium hydroxide-simethicone 30 mL Oral Q4H PRN Jovanna Ayala MD   benztropine 1 mg Intramuscular Q6H PRN Jovanna Ayala MD   benztropine 1 mg Oral Q6H PRN Jovanna Ayala MD   FLUoxetine 30 mg Oral Daily Yi Calhoun PA-C   gabapentin 100 mg Oral QAM Yi Calhoun PA-C   gabapentin 300 mg Oral HS Yi Calhoun PA-C   haloperidol 5 mg Oral Q8H PRN Jovanna Ayala MD   insulin aspart protamine-insulin aspart 60 Units Subcutaneous BID AC Jovanna Ayala MD   levothyroxine 50 mcg Oral Daily Jovanna Ayala MD   lisinopril 10 mg Oral Daily Jovanna Ayala MD   LORazepam 1 mg Oral Q4H PRN Jovanna Ayala MD   magnesium hydroxide 30 mL Oral Daily PRN Jovanna Ayala MD   magnesium oxide 400 mg Oral BID Jennifer Altamirano DO   metoprolol succinate 25 mg Oral Daily Jovanna Ayala MD   nicotine polacrilex 2 mg Oral Q2H PRN Jovanna Ayala MD   OLANZapine 10 mg Intramuscular BID PRN Chitra Hamilton Roland Singh MD   pantoprazole 40 mg Oral Early Morning Juan Pablo Thrasher MD   risperiDONE 1 mg Oral Q8H PRN Juan Pablo Thrasher MD   topiramate 200 mg Oral QAM Juan Pablo Thrasher MD   topiramate 200 mg Oral HS Juan Pablo Thrasher MD   traZODone 25 mg Oral HS PRN Juan Pablo Thrasher MD       Risks, benefits and possible side effects of Medications:   Risks, benefits, and possible side effects of medications explained to patient and patient verbalizes understanding

## 2018-07-09 NOTE — PROGRESS NOTES
Pt calm, pleasant and brightens on approach; remains medication compliant and visible in the milieu  Pt denies all symptoms at this time, states she feels better and ready for discharge  Will continue to monitor

## 2018-07-10 VITALS
DIASTOLIC BLOOD PRESSURE: 53 MMHG | TEMPERATURE: 97.7 F | OXYGEN SATURATION: 97 % | WEIGHT: 282.19 LBS | RESPIRATION RATE: 18 BRPM | HEART RATE: 62 BPM | SYSTOLIC BLOOD PRESSURE: 90 MMHG | HEIGHT: 62 IN | BODY MASS INDEX: 51.93 KG/M2

## 2018-07-10 LAB — GLUCOSE SERPL-MCNC: 67 MG/DL (ref 65–140)

## 2018-07-10 PROCEDURE — 99239 HOSP IP/OBS DSCHRG MGMT >30: CPT | Performed by: NURSE PRACTITIONER

## 2018-07-10 PROCEDURE — 82948 REAGENT STRIP/BLOOD GLUCOSE: CPT

## 2018-07-10 RX ORDER — GABAPENTIN 100 MG/1
100 CAPSULE ORAL 2 TIMES DAILY
Qty: 42 CAPSULE | Refills: 0 | Status: ON HOLD | OUTPATIENT
Start: 2018-07-10 | End: 2021-03-30 | Stop reason: SDUPTHER

## 2018-07-10 RX ORDER — FLUOXETINE 10 MG/1
30 CAPSULE ORAL DAILY
Qty: 42 CAPSULE | Refills: 0 | Status: ON HOLD | OUTPATIENT
Start: 2018-07-11 | End: 2020-12-04

## 2018-07-10 RX ORDER — GABAPENTIN 300 MG/1
300 CAPSULE ORAL
Qty: 21 CAPSULE | Refills: 0 | Status: SHIPPED | OUTPATIENT
Start: 2018-07-10

## 2018-07-10 RX ADMIN — Medication 400 MG: at 08:16

## 2018-07-10 RX ADMIN — FLUOXETINE 30 MG: 20 CAPSULE ORAL at 08:16

## 2018-07-10 RX ADMIN — PANTOPRAZOLE SODIUM 40 MG: 40 TABLET, DELAYED RELEASE ORAL at 06:45

## 2018-07-10 RX ADMIN — LEVOTHYROXINE SODIUM 50 MCG: 50 TABLET ORAL at 06:30

## 2018-07-10 RX ADMIN — INSULIN ASPART 60 UNITS: 100 INJECTION, SUSPENSION SUBCUTANEOUS at 08:15

## 2018-07-10 RX ADMIN — GABAPENTIN 100 MG: 100 CAPSULE ORAL at 08:16

## 2018-07-10 RX ADMIN — TOPIRAMATE 200 MG: 100 TABLET, FILM COATED ORAL at 08:16

## 2018-07-10 NOTE — PROGRESS NOTES
Pt cooperative with care and compliant with medications  Visible in dayroom socializing with select peers  Brightens upon approach  Attended evening group  Denies sx   Monitoring,

## 2018-07-10 NOTE — PROGRESS NOTES
Pt calm, pleasant and bright on approach, denies SI, HI, A/VH, pt reports "some" anxiety with depression, pt visible, social and interacting with peers, medication compliant, pt is optimistic about her discharge this morning, even though she is verbalizing readiness for discharge, eating and sleeping well, will continue to monitor

## 2018-07-10 NOTE — DISCHARGE SUMMARY
Discharge Summary - Pauly Pablo Leena 60 y o  female MRN: [id number]  Unit/Bed#: FJ0 576-01 Encounter: [id number]     Admission Date: 7/3/2018         Discharge Date: 07/10/2018    Attending Psychiatrist: Dunia Dobson MD    Reason for Admission/HPI:  The patient is a 76-year-old  female who was admitted to the Older Adult [address] [place] [address] unit on a voluntary 201 commitment basis after gambling away her bill money and presenting to the emergency room with thoughts to take an overdose of medications and kill herself  Since May the patient had been suffering from increased depression, anxiety, insomnia, overeating and weight gain, feelings of being overwhelmed, anhedonia, poor self care and spending a lot of time in her bed  She had stressors of caring for her boyfriend who is undergoing radiation and chemo for cancer treatment and is also verbally abusive, her special needs son, financial worries, and confrontations with neighbors        Meds/Allergies     current meds:   Current Facility-Administered Medications   Medication Dose Route Frequency    acetaminophen (TYLENOL) tablet 650 mg  650 mg Oral Q4H PRN    aluminum-magnesium hydroxide-simethicone (MYLANTA) 200-200-20 mg/5 mL oral suspension 30 mL  30 mL Oral Q4H PRN    benztropine (COGENTIN) injection 1 mg  1 mg Intramuscular Q6H PRN    benztropine (COGENTIN) tablet 1 mg  1 mg Oral Q6H PRN    FLUoxetine (PROzac) capsule 30 mg  30 mg Oral Daily    gabapentin (NEURONTIN) capsule 100 mg  100 mg Oral BID    gabapentin (NEURONTIN) capsule 300 mg  300 mg Oral HS    haloperidol (HALDOL) tablet 5 mg  5 mg Oral Q8H PRN    insulin aspart protamine-insulin aspart (NovoLOG 70/30) 100 units/mL subcutaneous injection 60 Units  60 Units Subcutaneous BID AC    levothyroxine tablet 50 mcg  50 mcg Oral Daily    lisinopril (ZESTRIL) tablet 10 mg  10 mg Oral Daily    LORazepam (ATIVAN) tablet 1 mg  1 mg Oral Q4H PRN    magnesium hydroxide (MILK OF MAGNESIA) 400 mg/5 mL oral suspension 30 mL  30 mL Oral Daily PRN    magnesium oxide (MAG-OX) tablet 400 mg  400 mg Oral BID    metoprolol succinate (TOPROL-XL) 24 hr tablet 25 mg  25 mg Oral Daily    nicotine polacrilex (NICORETTE) gum 2 mg  2 mg Oral Q2H PRN    OLANZapine (ZyPREXA) IM injection 10 mg  10 mg Intramuscular BID PRN    pantoprazole (PROTONIX) EC tablet 40 mg  40 mg Oral Early Morning    risperiDONE (RisperDAL M-TABS) dispersible tablet 1 mg  1 mg Oral Q8H PRN    topiramate (TOPAMAX) tablet 200 mg  200 mg Oral QAM    topiramate (TOPAMAX) tablet 200 mg  200 mg Oral HS    traZODone (DESYREL) tablet 25 mg  25 mg Oral HS PRN       Allergies   Allergen Reactions    Pineapple Anaphylaxis    Byetta 10 Mcg Pen [Exenatide]     Marijuana [Dronabinol] GI Intolerance    Metformin Diarrhea     Other reaction(s): Unknown    Ondansetron      Other reaction(s): Unknown    Coconut Oil Rash    Liraglutide Rash and Other (See Comments)    Penicillins Rash       Objective     Vital signs in last 24 hours:  Temp:  [97 7 °F (36 5 °C)-98 6 °F (37 °C)] 97 7 °F (36 5 °C)  HR:  [61-62] 62  Resp:  [16-18] 18  BP: (90-95)/(53-54) 90/53    No intake or output data in the 24 hours ending 07/10/18 0929    Hospital Course: The patient was admitted to the inpatient psychiatric unit and started on every 15 minutes precautions  During the hospitalization the patient was attending individual therapy, group therapy, milieu therapy and occupational therapy  The patient's home medication of Prozac was resumed and dose was titrated  We also discontinued her Requip for restless leg syndrome due to her history of gambling and impulsivity    Instead we trialed the patient on gabapentin 3 times per day which was effective for her restless leg syndrome  Prior to beginning of treatment medications risks and benefits and possible side effects including risk of suicidality related to treatment with antidepressants were reviewed with the patient  The patient verbalized understanding and agreement for treatment  Over the course of hospitalization the patient's mood improved  She had reduced anxiety and no depression  She denied SI  She said she was safe to go home  She said that she has coordinated with her son to sign exclusionairy paperwork at the [employer] and to take over as her rep payee and she is feeling good about these decisions  Mood was stable at the time of discharge  The patient denied suicidal ideation, intent or plan at the time of discharge and denied homicidal ideation, intent or plan at the time of discharge  There was no overt psychosis at the time of discharge  Sleep and appetite were normalized  The patient was tolerating medications and was not reporting any significant side effects at the time of discharge  Since the patient was doing well at the end of the hospitalization, treatment team felt that the patient could be safely discharged to outpatient care  [facility]  upon [facility]      Mental Status at Time of Discharge:   Appearance:  Adequate hygiene and grooming and Good eye contact   Behavior:  calm, cooperative and friendly   Speech:   Language: Normal rate and Normal volume  No overt abnormality   Mood:  euthymic   Affect:   Associations: appropriate and broad  Tightly connected   Thought Process:  Goal directed and coherent   Thought Content:  Does not verbalize delusional material   Perceptual Disturbances: Denies hallucinations and does not appear to be responding to internal stimuli     Risk Potential: No suicidal or homicidal ideation   Orientation   Language Oriented x 3  No overt abnormality   Memory  Fund of knowledge grossly intact  Not assessed   Attention/Concentration Age appropriate   Insight:  Good insight   Judgment: Good judgment   Gait/Station: slow and  needs assistive device   Motor Activity: No abnormal movement noted       Admission Diagnosis:  Principal Problem:    Severe episode of recurrent major depressive disorder, without psychotic features ([facility] )  Active Problems:    Gambling disorder, episodic, moderate      Discharge Diagnosis:     Principal Problem:    Severe episode of recurrent major depressive disorder, without psychotic features ([facility] )  Active Problems:    Gambling disorder, episodic, moderate  Resolved Problems:    * No resolved hospital problems   *      Lab results:    Admission on 07/03/2018   Component Date Value    Amph/Meth UR 07/03/2018 Negative     Barbiturate Ur 07/03/2018 Negative     Benzodiazepine Urine 07/03/2018 Negative     Cocaine Urine 07/03/2018 Negative     Methadone Urine 07/03/2018 Negative     Opiate Urine 07/03/2018 Negative     PCP Ur 07/03/2018 Negative     THC Urine 07/03/2018 Negative     EXTBreath Alcohol 07/03/2018 0 000     WBC 07/03/2018 7 06     RBC 07/03/2018 4 77     Hemoglobin 07/03/2018 13 7     Hematocrit 07/03/2018 42 0     MCV 07/03/2018 88     MCH 07/03/2018 28 7     MCHC 07/03/2018 32 6     RDW 07/03/2018 12 2     MPV 07/03/2018 11 1     Platelets 92/79/9337 178     nRBC 07/03/2018 0     Neutrophils Relative 07/03/2018 70     Immat GRANS % 07/03/2018 0     Lymphocytes Relative 07/03/2018 21     Monocytes Relative 07/03/2018 7     Eosinophils Relative 07/03/2018 2     Basophils Relative 07/03/2018 0     Neutrophils Absolute 07/03/2018 4 87     Immature Grans Absolute 07/03/2018 0 03     Lymphocytes Absolute 07/03/2018 1 49     Monocytes Absolute 07/03/2018 0 50     Eosinophils Absolute 07/03/2018 0 14     Basophils Absolute 07/03/2018 0 03     Sodium 07/03/2018 134*    Potassium 07/03/2018 3 9     Chloride 07/03/2018 99*    CO2 07/03/2018 26     Anion Gap 07/03/2018 9     BUN 07/03/2018 17     Creatinine 07/03/2018 0 72     Glucose 07/03/2018 330*    Calcium 07/03/2018 8 9     AST 07/03/2018 15     ALT 07/03/2018 23     Alkaline Phosphatase 07/03/2018 112     Total Protein 07/03/2018 7 1     Albumin 07/03/2018 3 1*    Total Bilirubin 07/03/2018 0 35     eGFR 07/03/2018 89     TSH 3RD GENERATON 07/03/2018 2 820     POC Glucose 07/03/2018 348*    POC Glucose 07/04/2018 231*    Iron 07/03/2018 65     POC Glucose 07/04/2018 158*    POC Glucose 07/04/2018 76     POC Glucose 07/04/2018 137     POC Glucose 07/05/2018 91     POC Glucose 07/05/2018 146*    POC Glucose 07/05/2018 74     POC Glucose 07/05/2018 72     Sodium 07/06/2018 139     Potassium 07/06/2018 4 0     Chloride 07/06/2018 108     CO2 07/06/2018 25     Anion Gap 07/06/2018 6     BUN 07/06/2018 24     Creatinine 07/06/2018 0 75     Glucose 07/06/2018 84     Calcium 07/06/2018 8 6     eGFR 07/06/2018 85     POC Glucose 07/06/2018 82     POC Glucose 07/06/2018 102     POC Glucose 07/06/2018 183*    POC Glucose 07/06/2018 128     POC Glucose 07/07/2018 51*    POC Glucose 07/07/2018 90     POC Glucose 07/07/2018 97     POC Glucose 07/07/2018 161*    POC Glucose 07/07/2018 213*    POC Glucose 07/07/2018 191*    POC Glucose 07/08/2018 82     POC Glucose 07/08/2018 184*    POC Glucose 07/08/2018 234*    POC Glucose 07/08/2018 109     POC Glucose 07/09/2018 89     POC Glucose 07/09/2018 86     POC Glucose 07/09/2018 150*    POC Glucose 07/09/2018 80     POC Glucose 07/10/2018 67        Discharge Medications:    See after visit summary for reconciled discharge medications provided to patient and family  Discharge instructions/Information to patient and family:     See after visit summary for information provided to patient and family  Provisions for Follow-Up Care:    See after visit summary for information related to follow-up care and any pertinent home health orders  Discharge Statement     I spent 20 minutes discharging the patient  This time was spent on the day of discharge   I had direct contact with the patient on the day of discharge

## 2018-07-10 NOTE — PLAN OF CARE
Anxiety     Anxiety is at manageable level Adequate for Discharge        DEPRESSION     Will be euthymic at discharge Adequate for Discharge        Depression     Treatment Goal: Demonstrate behavioral control of depressive symptoms, verbalize feelings of improved mood/affect, and adopt new coping skills prior to discharge Adequate for Discharge        DISCHARGE PLANNING     Discharge to home or other facility with appropriate resources Adequate for Discharge        Ineffective Coping     Participates in unit activities Adequate for Discharge        Risk for Self Injury/Neglect     Treatment Goal: Remain safe during length of stay, learn and adopt new coping skills, and be free of self-injurious ideation, impulses and acts at the time of discharge Adequate for Discharge        SELF HARM/SUICIDALITY     Will have no self-injury during hospital stay Adequate for Discharge

## 2018-07-10 NOTE — NURSING NOTE
Pt discharge instructions reviewed with patient and son, both verbalizes understanding, all questions answered, all belongings and prescriptions given to patient and son

## 2018-07-10 NOTE — DISCHARGE INSTR - LAB
Contact Information: If you have any questions, concerns, pended studies, tests and/or procedures, or emergencies regarding your inpatient behavioral health visit  Please contact [facility] [phone] and ask to speak to a , nurse or physician  A contact is available 24 hours/ 7 days a week at this number  Summary of Procedures Performed During your Stay:  Below is a list of major procedures performed during your hospital stay and a summary of results:  - No major procedures performed  Pending Studies     None        If studies are pending at discharge, follow up with your PCP and/or referring provider

## 2018-07-11 LAB
ATRIAL RATE: 81 BPM
P AXIS: 65 DEGREES
PR INTERVAL: 128 MS
QRS AXIS: -41 DEGREES
QRSD INTERVAL: 128 MS
QT INTERVAL: 400 MS
QTC INTERVAL: 464 MS
T WAVE AXIS: 55 DEGREES
VENTRICULAR RATE: 81 BPM

## 2018-07-11 PROCEDURE — 93010 ELECTROCARDIOGRAM REPORT: CPT | Performed by: INTERNAL MEDICINE

## 2018-07-25 NOTE — ED ATTENDING ATTESTATION
Terry Guzman MD, saw and evaluated the patient  I have discussed the patient with the resident/non-physician practitioner and agree with the resident's/non-physician practitioner's findings, Plan of Care, and MDM as documented in the resident's/non-physician practitioner's note, except where noted  All available labs and Radiology studies were reviewed  At this point I agree with the current assessment done in the Emergency Department  I have conducted an independent evaluation of this patient a history and physical is as follows:    Patient presents for evaluation of depression  Patient states that she was at the [employer] in used all of her money for the entire month on gambling  She states that she wants to go home and take all of her pills  No additional complaints  Exam: AAOx3, NAD, flat affect, poor eye contact  A/P: Depression  Will have crisis evaluate      Critical Care Time  CritCare Time    Procedures

## 2020-08-01 ENCOUNTER — HOSPITAL ENCOUNTER (EMERGENCY)
Facility: HOSPITAL | Age: 65
Discharge: HOME | End: 2020-08-01
Attending: EMERGENCY MEDICINE
Payer: COMMERCIAL

## 2020-08-01 ENCOUNTER — APPOINTMENT (EMERGENCY)
Dept: CT IMAGING | Facility: HOSPITAL | Age: 65
End: 2020-08-01
Payer: COMMERCIAL

## 2020-08-01 VITALS
BODY MASS INDEX: 53.92 KG/M2 | RESPIRATION RATE: 18 BRPM | WEIGHT: 293 LBS | SYSTOLIC BLOOD PRESSURE: 150 MMHG | HEART RATE: 80 BPM | DIASTOLIC BLOOD PRESSURE: 68 MMHG | OXYGEN SATURATION: 98 % | TEMPERATURE: 98.5 F

## 2020-08-01 DIAGNOSIS — K52.9 GASTROENTERITIS: ICD-10-CM

## 2020-08-01 DIAGNOSIS — G43.909 MIGRAINE: Primary | ICD-10-CM

## 2020-08-01 LAB
ALBUMIN SERPL BCP-MCNC: 3.6 G/DL (ref 3.4–4.8)
ALP SERPL-CCNC: 89.2 U/L (ref 35–140)
ALT SERPL W P-5'-P-CCNC: 10 U/L (ref 5–54)
ANION GAP SERPL CALCULATED.3IONS-SCNC: 8 MMOL/L (ref 4–13)
AST SERPL W P-5'-P-CCNC: 11 U/L (ref 15–41)
BASOPHILS # BLD AUTO: 0.03 THOUSANDS/ΜL (ref 0–0.1)
BASOPHILS NFR BLD AUTO: 0 % (ref 0–1)
BILIRUB SERPL-MCNC: 0.56 MG/DL (ref 0.3–1.2)
BUN SERPL-MCNC: 15 MG/DL (ref 6–20)
CALCIUM SERPL-MCNC: 9 MG/DL (ref 8.4–10.2)
CHLORIDE SERPL-SCNC: 99 MMOL/L (ref 96–108)
CO2 SERPL-SCNC: 29 MMOL/L (ref 22–33)
CREAT SERPL-MCNC: 0.55 MG/DL (ref 0.4–1.1)
EOSINOPHIL # BLD AUTO: 0.12 THOUSAND/ΜL (ref 0–0.61)
EOSINOPHIL NFR BLD AUTO: 2 % (ref 0–6)
ERYTHROCYTE [DISTWIDTH] IN BLOOD BY AUTOMATED COUNT: 12.6 % (ref 11.6–15.1)
GFR SERPL CREATININE-BSD FRML MDRD: 99 ML/MIN/1.73SQ M
GLUCOSE SERPL-MCNC: 261 MG/DL (ref 65–140)
HCT VFR BLD AUTO: 43.3 % (ref 34.8–46.1)
HGB BLD-MCNC: 14.5 G/DL (ref 11.5–15.4)
IMM GRANULOCYTES # BLD AUTO: 0.05 THOUSAND/UL (ref 0–0.2)
IMM GRANULOCYTES NFR BLD AUTO: 1 % (ref 0–2)
LACTATE SERPL-SCNC: 1.4 MMOL/L (ref 0–2)
LIPASE SERPL-CCNC: 10 U/L (ref 13–60)
LYMPHOCYTES # BLD AUTO: 1.22 THOUSANDS/ΜL (ref 0.6–4.47)
LYMPHOCYTES NFR BLD AUTO: 16 % (ref 14–44)
MAGNESIUM SERPL-MCNC: 1.6 MG/DL (ref 1.6–2.6)
MCH RBC QN AUTO: 28.8 PG (ref 26.8–34.3)
MCHC RBC AUTO-ENTMCNC: 33.5 G/DL (ref 31.4–37.4)
MCV RBC AUTO: 86 FL (ref 82–98)
MONOCYTES # BLD AUTO: 0.64 THOUSAND/ΜL (ref 0.17–1.22)
MONOCYTES NFR BLD AUTO: 8 % (ref 4–12)
NEUTROPHILS # BLD AUTO: 5.71 THOUSANDS/ΜL (ref 1.85–7.62)
NEUTS SEG NFR BLD AUTO: 73 % (ref 43–75)
PLATELET # BLD AUTO: 206 THOUSANDS/UL (ref 149–390)
PMV BLD AUTO: 10.9 FL (ref 8.9–12.7)
POTASSIUM SERPL-SCNC: 3.9 MMOL/L (ref 3.5–5)
PROT SERPL-MCNC: 6.9 G/DL (ref 6.4–8.3)
RBC # BLD AUTO: 5.04 MILLION/UL (ref 3.81–5.12)
SARS-COV-2 RNA RESP QL NAA+PROBE: NEGATIVE
SODIUM SERPL-SCNC: 136 MMOL/L (ref 133–145)
TROPONIN I SERPL-MCNC: <0.03 NG/ML (ref 0–0.07)
WBC # BLD AUTO: 7.77 THOUSAND/UL (ref 4.31–10.16)

## 2020-08-01 PROCEDURE — 83735 ASSAY OF MAGNESIUM: CPT | Performed by: PHYSICIAN ASSISTANT

## 2020-08-01 PROCEDURE — 84484 ASSAY OF TROPONIN QUANT: CPT | Performed by: PHYSICIAN ASSISTANT

## 2020-08-01 PROCEDURE — 83690 ASSAY OF LIPASE: CPT | Performed by: PHYSICIAN ASSISTANT

## 2020-08-01 PROCEDURE — 87635 SARS-COV-2 COVID-19 AMP PRB: CPT | Performed by: PHYSICIAN ASSISTANT

## 2020-08-01 PROCEDURE — 71260 CT THORAX DX C+: CPT

## 2020-08-01 PROCEDURE — 74177 CT ABD & PELVIS W/CONTRAST: CPT

## 2020-08-01 PROCEDURE — 70450 CT HEAD/BRAIN W/O DYE: CPT

## 2020-08-01 PROCEDURE — 96375 TX/PRO/DX INJ NEW DRUG ADDON: CPT

## 2020-08-01 PROCEDURE — 96374 THER/PROPH/DIAG INJ IV PUSH: CPT

## 2020-08-01 PROCEDURE — 80053 COMPREHEN METABOLIC PANEL: CPT | Performed by: PHYSICIAN ASSISTANT

## 2020-08-01 PROCEDURE — 85025 COMPLETE CBC W/AUTO DIFF WBC: CPT | Performed by: PHYSICIAN ASSISTANT

## 2020-08-01 PROCEDURE — 99284 EMERGENCY DEPT VISIT MOD MDM: CPT | Performed by: PHYSICIAN ASSISTANT

## 2020-08-01 PROCEDURE — 36415 COLL VENOUS BLD VENIPUNCTURE: CPT | Performed by: PHYSICIAN ASSISTANT

## 2020-08-01 PROCEDURE — 83605 ASSAY OF LACTIC ACID: CPT | Performed by: PHYSICIAN ASSISTANT

## 2020-08-01 PROCEDURE — 99284 EMERGENCY DEPT VISIT MOD MDM: CPT

## 2020-08-01 PROCEDURE — 96361 HYDRATE IV INFUSION ADD-ON: CPT

## 2020-08-01 RX ORDER — BUTALBITAL, ACETAMINOPHEN AND CAFFEINE 50; 325; 40 MG/1; MG/1; MG/1
1 TABLET ORAL ONCE
Status: COMPLETED | OUTPATIENT
Start: 2020-08-01 | End: 2020-08-01

## 2020-08-01 RX ORDER — DIPHENHYDRAMINE HYDROCHLORIDE 50 MG/ML
25 INJECTION INTRAMUSCULAR; INTRAVENOUS ONCE
Status: COMPLETED | OUTPATIENT
Start: 2020-08-01 | End: 2020-08-01

## 2020-08-01 RX ORDER — KETOROLAC TROMETHAMINE 30 MG/ML
15 INJECTION, SOLUTION INTRAMUSCULAR; INTRAVENOUS ONCE
Status: COMPLETED | OUTPATIENT
Start: 2020-08-01 | End: 2020-08-01

## 2020-08-01 RX ORDER — BUTALBITAL, ACETAMINOPHEN AND CAFFEINE 50; 325; 40 MG/1; MG/1; MG/1
1 TABLET ORAL EVERY 4 HOURS PRN
Qty: 30 TABLET | Refills: 0 | Status: SHIPPED | OUTPATIENT
Start: 2020-08-01 | End: 2020-12-04

## 2020-08-01 RX ORDER — MECLIZINE HCL 12.5 MG/1
25 TABLET ORAL ONCE
Status: COMPLETED | OUTPATIENT
Start: 2020-08-01 | End: 2020-08-01

## 2020-08-01 RX ADMIN — SODIUM CHLORIDE 1000 ML: 0.9 INJECTION, SOLUTION INTRAVENOUS at 17:11

## 2020-08-01 RX ADMIN — KETOROLAC TROMETHAMINE 15 MG: 30 INJECTION, SOLUTION INTRAMUSCULAR at 19:08

## 2020-08-01 RX ADMIN — DIPHENHYDRAMINE HYDROCHLORIDE 25 MG: 50 INJECTION, SOLUTION INTRAMUSCULAR; INTRAVENOUS at 20:15

## 2020-08-01 RX ADMIN — MECLIZINE HYDROCHLORIDE 25 MG: 12.5 TABLET, FILM COATED ORAL at 19:08

## 2020-08-01 RX ADMIN — IOHEXOL 100 ML: 350 INJECTION, SOLUTION INTRAVENOUS at 18:40

## 2020-08-01 RX ADMIN — BUTALBITAL, ACETAMINOPHEN, AND CAFFEINE 1 TABLET: 50; 325; 40 TABLET ORAL at 20:14

## 2020-08-01 NOTE — ED NOTES
Patient transported to [address] [place] [address], [address] [place] [address]  08/01/20 1480

## 2020-08-02 NOTE — DISCHARGE INSTRUCTIONS
Please make a follow-up appointment with a neurologist as soon as possible to discuss your headache and dizziness    Return to the emergency department for any new worsening or concerning symptoms

## 2020-08-02 NOTE — ED NOTES
Spoke with Gabi Osullivan, pts son [phone]- to be updated with pts status  Pt gave permission to speak with him       Kev Wei RN  08/01/20 2012

## 2020-08-02 NOTE — ED ATTENDING ATTESTATION
8/1/2020  Kayode Jean Baptiste DO, saw and evaluated the patient  I have discussed the patient with the resident/non-physician practitioner and agree with the resident's/non-physician practitioner's findings, Plan of Care, and MDM as documented in the resident's/non-physician practitioner's note, except where noted  All available labs and Radiology studies were reviewed  I was present for key portions of any procedure(s) performed by the resident/non-physician practitioner and I was immediately available to provide assistance  At this point I agree with the current assessment done in the Emergency Department  I have conducted an independent evaluation of this patient a history and physical is as follows: This is a 26-year-old female presented to the emergency department complaining of nausea, vomiting, diarrhea  She states after she went to the bathroom she then began having a headache  She states that it led to dizziness  The patient is well-appearing on exam   She has no fever or tachycardia  The patient will have lab work and symptomatic treatment along with CT scans    ED Course         Critical Care Time  Procedures

## 2020-12-04 ENCOUNTER — APPOINTMENT (EMERGENCY)
Dept: RADIOLOGY | Facility: HOSPITAL | Age: 65
End: 2020-12-04
Payer: COMMERCIAL

## 2020-12-04 ENCOUNTER — APPOINTMENT (EMERGENCY)
Dept: CT IMAGING | Facility: HOSPITAL | Age: 65
End: 2020-12-04
Payer: COMMERCIAL

## 2020-12-04 ENCOUNTER — HOSPITAL ENCOUNTER (OUTPATIENT)
Facility: HOSPITAL | Age: 65
Setting detail: OBSERVATION
Discharge: HOME | End: 2020-12-05
Attending: EMERGENCY MEDICINE | Admitting: EMERGENCY MEDICINE
Payer: COMMERCIAL

## 2020-12-04 VITALS
HEART RATE: 67 BPM | OXYGEN SATURATION: 98 % | TEMPERATURE: 97 F | HEIGHT: 62 IN | DIASTOLIC BLOOD PRESSURE: 84 MMHG | SYSTOLIC BLOOD PRESSURE: 156 MMHG | RESPIRATION RATE: 20 BRPM | BODY MASS INDEX: 53.92 KG/M2 | WEIGHT: 293 LBS

## 2020-12-04 DIAGNOSIS — J98.11 ATELECTASIS: ICD-10-CM

## 2020-12-04 DIAGNOSIS — R07.9 CHEST PAIN: Primary | ICD-10-CM

## 2020-12-04 DIAGNOSIS — R06.00 DYSPNEA: ICD-10-CM

## 2020-12-04 PROBLEM — F32.9 MAJOR DEPRESSIVE DISORDER: Status: ACTIVE | Noted: 2020-12-04

## 2020-12-04 PROBLEM — Z20.822 SUSPECTED COVID-19 VIRUS INFECTION: Status: ACTIVE | Noted: 2020-12-04

## 2020-12-04 LAB
ALBUMIN SERPL BCP-MCNC: 4 G/DL (ref 3.4–4.8)
ALP SERPL-CCNC: 106.6 U/L (ref 35–140)
ALT SERPL W P-5'-P-CCNC: 8 U/L (ref 5–54)
ANION GAP SERPL CALCULATED.3IONS-SCNC: 7 MMOL/L (ref 4–13)
AST SERPL W P-5'-P-CCNC: 10 U/L (ref 15–41)
BASE EXCESS BLDA CALC-SCNC: 3 MMOL/L (ref -2–3)
BASOPHILS # BLD AUTO: 0.02 THOUSANDS/ΜL (ref 0–0.1)
BASOPHILS NFR BLD AUTO: 0 % (ref 0–1)
BILIRUB SERPL-MCNC: 0.43 MG/DL (ref 0.3–1.2)
BNP SERPL-MCNC: 76.9 PG/ML (ref 1–100)
BUN SERPL-MCNC: 16 MG/DL (ref 6–20)
CA-I BLD-SCNC: 1.18 MMOL/L (ref 1.12–1.32)
CALCIUM SERPL-MCNC: 9.3 MG/DL (ref 8.4–10.2)
CHLORIDE SERPL-SCNC: 97 MMOL/L (ref 96–108)
CO2 SERPL-SCNC: 31 MMOL/L (ref 22–33)
CREAT SERPL-MCNC: 0.62 MG/DL (ref 0.4–1.1)
D DIMER PPP FEU-MCNC: 0.5 MG/L FEU (ref 0.19–0.49)
EOSINOPHIL # BLD AUTO: 0.11 THOUSAND/ΜL (ref 0–0.61)
EOSINOPHIL NFR BLD AUTO: 2 % (ref 0–6)
ERYTHROCYTE [DISTWIDTH] IN BLOOD BY AUTOMATED COUNT: 13 % (ref 11.6–15.1)
FLUAV RNA RESP QL NAA+PROBE: NEGATIVE
FLUBV RNA RESP QL NAA+PROBE: NEGATIVE
GFR SERPL CREATININE-BSD FRML MDRD: 94 ML/MIN/1.73SQ M
GLUCOSE SERPL-MCNC: 132 MG/DL (ref 65–140)
GLUCOSE SERPL-MCNC: 212 MG/DL (ref 65–140)
GLUCOSE SERPL-MCNC: 215 MG/DL (ref 65–140)
GLUCOSE SERPL-MCNC: 233 MG/DL (ref 65–140)
HCO3 BLDA-SCNC: 30.4 MMOL/L (ref 24–30)
HCT VFR BLD AUTO: 48.2 % (ref 34.8–46.1)
HCT VFR BLD CALC: 50 % (ref 34.8–46.1)
HGB BLD-MCNC: 16 G/DL (ref 11.5–15.4)
HGB BLDA-MCNC: 17 G/DL (ref 11.5–15.4)
IMM GRANULOCYTES # BLD AUTO: 0.02 THOUSAND/UL (ref 0–0.2)
IMM GRANULOCYTES NFR BLD AUTO: 0 % (ref 0–2)
LYMPHOCYTES # BLD AUTO: 1.15 THOUSANDS/ΜL (ref 0.6–4.47)
LYMPHOCYTES NFR BLD AUTO: 18 % (ref 14–44)
MCH RBC QN AUTO: 27.9 PG (ref 26.8–34.3)
MCHC RBC AUTO-ENTMCNC: 33.2 G/DL (ref 31.4–37.4)
MCV RBC AUTO: 84 FL (ref 82–98)
MONOCYTES # BLD AUTO: 0.38 THOUSAND/ΜL (ref 0.17–1.22)
MONOCYTES NFR BLD AUTO: 6 % (ref 4–12)
NEUTROPHILS # BLD AUTO: 4.57 THOUSANDS/ΜL (ref 1.85–7.62)
NEUTS SEG NFR BLD AUTO: 74 % (ref 43–75)
PCO2 BLD: 32 MMOL/L (ref 21–32)
PCO2 BLD: 54.3 MM HG (ref 42–50)
PH BLD: 7.36 [PH] (ref 7.3–7.4)
PLATELET # BLD AUTO: 191 THOUSANDS/UL (ref 149–390)
PMV BLD AUTO: 10.4 FL (ref 8.9–12.7)
PO2 BLD: 27 MM HG (ref 35–45)
POTASSIUM BLD-SCNC: 3.9 MMOL/L (ref 3.5–5.3)
POTASSIUM SERPL-SCNC: 3.9 MMOL/L (ref 3.5–5)
PROT SERPL-MCNC: 7.9 G/DL (ref 6.4–8.3)
RBC # BLD AUTO: 5.73 MILLION/UL (ref 3.81–5.12)
RSV RNA RESP QL NAA+PROBE: NEGATIVE
SAO2 % BLD FROM PO2: 46 % (ref 60–85)
SARS-COV-2 RNA RESP QL NAA+PROBE: NEGATIVE
SODIUM BLD-SCNC: 137 MMOL/L (ref 136–145)
SODIUM SERPL-SCNC: 135 MMOL/L (ref 133–145)
SPECIMEN SOURCE: ABNORMAL
TROPONIN I SERPL-MCNC: <0.03 NG/ML (ref 0–0.07)
TROPONIN I SERPL-MCNC: <0.03 NG/ML (ref 0–0.07)
WBC # BLD AUTO: 6.25 THOUSAND/UL (ref 4.31–10.16)

## 2020-12-04 PROCEDURE — 36415 COLL VENOUS BLD VENIPUNCTURE: CPT | Performed by: PHYSICIAN ASSISTANT

## 2020-12-04 PROCEDURE — 84295 ASSAY OF SERUM SODIUM: CPT

## 2020-12-04 PROCEDURE — 82947 ASSAY GLUCOSE BLOOD QUANT: CPT

## 2020-12-04 PROCEDURE — 85025 COMPLETE CBC W/AUTO DIFF WBC: CPT | Performed by: PHYSICIAN ASSISTANT

## 2020-12-04 PROCEDURE — 85014 HEMATOCRIT: CPT

## 2020-12-04 PROCEDURE — 82948 REAGENT STRIP/BLOOD GLUCOSE: CPT

## 2020-12-04 PROCEDURE — 83880 ASSAY OF NATRIURETIC PEPTIDE: CPT | Performed by: PHYSICIAN ASSISTANT

## 2020-12-04 PROCEDURE — 85379 FIBRIN DEGRADATION QUANT: CPT | Performed by: PHYSICIAN ASSISTANT

## 2020-12-04 PROCEDURE — 71045 X-RAY EXAM CHEST 1 VIEW: CPT

## 2020-12-04 PROCEDURE — 82330 ASSAY OF CALCIUM: CPT

## 2020-12-04 PROCEDURE — 80053 COMPREHEN METABOLIC PANEL: CPT | Performed by: PHYSICIAN ASSISTANT

## 2020-12-04 PROCEDURE — 0241U HB NFCT DS VIR RESP RNA 4 TRGT: CPT | Performed by: PHYSICIAN ASSISTANT

## 2020-12-04 PROCEDURE — 82803 BLOOD GASES ANY COMBINATION: CPT

## 2020-12-04 PROCEDURE — 96361 HYDRATE IV INFUSION ADD-ON: CPT

## 2020-12-04 PROCEDURE — 84132 ASSAY OF SERUM POTASSIUM: CPT

## 2020-12-04 PROCEDURE — 99285 EMERGENCY DEPT VISIT HI MDM: CPT

## 2020-12-04 PROCEDURE — 96374 THER/PROPH/DIAG INJ IV PUSH: CPT

## 2020-12-04 PROCEDURE — 71275 CT ANGIOGRAPHY CHEST: CPT

## 2020-12-04 PROCEDURE — 99285 EMERGENCY DEPT VISIT HI MDM: CPT | Performed by: PHYSICIAN ASSISTANT

## 2020-12-04 PROCEDURE — 84484 ASSAY OF TROPONIN QUANT: CPT | Performed by: PHYSICIAN ASSISTANT

## 2020-12-04 PROCEDURE — G1004 CDSM NDSC: HCPCS

## 2020-12-04 PROCEDURE — 93005 ELECTROCARDIOGRAM TRACING: CPT

## 2020-12-04 PROCEDURE — 83036 HEMOGLOBIN GLYCOSYLATED A1C: CPT | Performed by: INTERNAL MEDICINE

## 2020-12-04 RX ORDER — INSULIN GLARGINE 100 [IU]/ML
30 INJECTION, SOLUTION SUBCUTANEOUS
Status: DISCONTINUED | OUTPATIENT
Start: 2020-12-04 | End: 2020-12-05 | Stop reason: HOSPADM

## 2020-12-04 RX ORDER — ALOGLIPTIN 25 MG/1
25 TABLET, FILM COATED ORAL DAILY
COMMUNITY
End: 2022-02-19 | Stop reason: HOSPADM

## 2020-12-04 RX ORDER — MELATONIN
2000 DAILY
Status: DISCONTINUED | OUTPATIENT
Start: 2020-12-05 | End: 2020-12-05 | Stop reason: HOSPADM

## 2020-12-04 RX ORDER — ASCORBIC ACID 500 MG
1000 TABLET ORAL EVERY 12 HOURS SCHEDULED
Status: DISCONTINUED | OUTPATIENT
Start: 2020-12-04 | End: 2020-12-05 | Stop reason: HOSPADM

## 2020-12-04 RX ORDER — INSULIN GLARGINE 100 [IU]/ML
30 INJECTION, SOLUTION SUBCUTANEOUS
Status: ON HOLD | COMMUNITY
End: 2021-03-09 | Stop reason: SDUPTHER

## 2020-12-04 RX ORDER — TOPIRAMATE 100 MG/1
200 TABLET, FILM COATED ORAL 2 TIMES DAILY
Status: DISCONTINUED | OUTPATIENT
Start: 2020-12-04 | End: 2020-12-05 | Stop reason: HOSPADM

## 2020-12-04 RX ORDER — GABAPENTIN 300 MG/1
300 CAPSULE ORAL
Status: DISCONTINUED | OUTPATIENT
Start: 2020-12-04 | End: 2020-12-05 | Stop reason: HOSPADM

## 2020-12-04 RX ORDER — MAGNESIUM HYDROXIDE/ALUMINUM HYDROXICE/SIMETHICONE 120; 1200; 1200 MG/30ML; MG/30ML; MG/30ML
30 SUSPENSION ORAL EVERY 6 HOURS PRN
Status: DISCONTINUED | OUTPATIENT
Start: 2020-12-04 | End: 2020-12-05 | Stop reason: HOSPADM

## 2020-12-04 RX ORDER — CEFTRIAXONE 1 G/50ML
1000 INJECTION, SOLUTION INTRAVENOUS EVERY 24 HOURS
Status: DISCONTINUED | OUTPATIENT
Start: 2020-12-04 | End: 2020-12-05 | Stop reason: HOSPADM

## 2020-12-04 RX ORDER — ZINC SULFATE 50(220)MG
220 CAPSULE ORAL DAILY
Status: DISCONTINUED | OUTPATIENT
Start: 2020-12-05 | End: 2020-12-05 | Stop reason: HOSPADM

## 2020-12-04 RX ORDER — SIMETHICONE 80 MG
80 TABLET,CHEWABLE ORAL 4 TIMES DAILY PRN
Status: DISCONTINUED | OUTPATIENT
Start: 2020-12-04 | End: 2020-12-05 | Stop reason: HOSPADM

## 2020-12-04 RX ORDER — METOPROLOL TARTRATE 5 MG/5ML
5 INJECTION INTRAVENOUS ONCE
Status: COMPLETED | OUTPATIENT
Start: 2020-12-04 | End: 2020-12-04

## 2020-12-04 RX ORDER — DOXYCYCLINE HYCLATE 100 MG/1
100 CAPSULE ORAL EVERY 12 HOURS SCHEDULED
Status: DISCONTINUED | OUTPATIENT
Start: 2020-12-04 | End: 2020-12-05 | Stop reason: HOSPADM

## 2020-12-04 RX ORDER — LEVOTHYROXINE SODIUM 0.05 MG/1
50 TABLET ORAL
Status: DISCONTINUED | OUTPATIENT
Start: 2020-12-05 | End: 2020-12-05 | Stop reason: HOSPADM

## 2020-12-04 RX ORDER — DEXAMETHASONE SODIUM PHOSPHATE 4 MG/ML
6 INJECTION, SOLUTION INTRA-ARTICULAR; INTRALESIONAL; INTRAMUSCULAR; INTRAVENOUS; SOFT TISSUE EVERY 24 HOURS
Status: DISCONTINUED | OUTPATIENT
Start: 2020-12-04 | End: 2020-12-05 | Stop reason: HOSPADM

## 2020-12-04 RX ORDER — FAMOTIDINE 20 MG/1
20 TABLET, FILM COATED ORAL 2 TIMES DAILY
Status: DISCONTINUED | OUTPATIENT
Start: 2020-12-04 | End: 2020-12-05 | Stop reason: HOSPADM

## 2020-12-04 RX ORDER — SENNOSIDES 8.6 MG
1 TABLET ORAL DAILY
Status: DISCONTINUED | OUTPATIENT
Start: 2020-12-05 | End: 2020-12-05 | Stop reason: HOSPADM

## 2020-12-04 RX ORDER — METOPROLOL SUCCINATE 25 MG/1
25 TABLET, EXTENDED RELEASE ORAL DAILY
Status: DISCONTINUED | OUTPATIENT
Start: 2020-12-05 | End: 2020-12-05 | Stop reason: HOSPADM

## 2020-12-04 RX ORDER — LISINOPRIL 10 MG/1
10 TABLET ORAL DAILY
Status: DISCONTINUED | OUTPATIENT
Start: 2020-12-05 | End: 2020-12-05 | Stop reason: HOSPADM

## 2020-12-04 RX ORDER — ASPIRIN 81 MG/1
TABLET ORAL
COMMUNITY
End: 2022-02-01 | Stop reason: HOSPADM

## 2020-12-04 RX ORDER — GABAPENTIN 100 MG/1
100 CAPSULE ORAL 2 TIMES DAILY
Status: DISCONTINUED | OUTPATIENT
Start: 2020-12-04 | End: 2020-12-05 | Stop reason: HOSPADM

## 2020-12-04 RX ORDER — FLUOXETINE HYDROCHLORIDE 40 MG/1
CAPSULE ORAL
COMMUNITY
End: 2021-03-09 | Stop reason: HOSPADM

## 2020-12-04 RX ORDER — ACETAMINOPHEN 325 MG/1
650 TABLET ORAL EVERY 6 HOURS PRN
Status: DISCONTINUED | OUTPATIENT
Start: 2020-12-04 | End: 2020-12-05 | Stop reason: HOSPADM

## 2020-12-04 RX ORDER — MULTIVITAMIN/IRON/FOLIC ACID 18MG-0.4MG
1 TABLET ORAL DAILY
Status: DISCONTINUED | OUTPATIENT
Start: 2020-12-12 | End: 2020-12-05 | Stop reason: HOSPADM

## 2020-12-04 RX ORDER — DOCUSATE SODIUM 100 MG/1
100 CAPSULE, LIQUID FILLED ORAL 2 TIMES DAILY
Status: DISCONTINUED | OUTPATIENT
Start: 2020-12-04 | End: 2020-12-05 | Stop reason: HOSPADM

## 2020-12-04 RX ADMIN — IOHEXOL 86 ML: 350 INJECTION, SOLUTION INTRAVENOUS at 19:26

## 2020-12-04 RX ADMIN — SODIUM CHLORIDE 1000 ML: 0.9 INJECTION, SOLUTION INTRAVENOUS at 18:46

## 2020-12-04 RX ADMIN — METOROPROLOL TARTRATE 5 MG: 5 INJECTION, SOLUTION INTRAVENOUS at 18:51

## 2020-12-05 LAB
ALBUMIN SERPL BCP-MCNC: 3.8 G/DL (ref 3.4–4.8)
ALP SERPL-CCNC: 96.6 U/L (ref 35–140)
ALT SERPL W P-5'-P-CCNC: 10 U/L (ref 5–54)
ANION GAP SERPL CALCULATED.3IONS-SCNC: 9 MMOL/L (ref 4–13)
AST SERPL W P-5'-P-CCNC: 11 U/L (ref 15–41)
ATRIAL RATE: 63 BPM
ATRIAL RATE: 67 BPM
ATRIAL RATE: 77 BPM
BASOPHILS # BLD AUTO: 0.01 THOUSANDS/ΜL (ref 0–0.1)
BASOPHILS NFR BLD AUTO: 0 % (ref 0–1)
BILIRUB SERPL-MCNC: 0.28 MG/DL (ref 0.3–1.2)
BUN SERPL-MCNC: 13 MG/DL (ref 6–20)
CALCIUM SERPL-MCNC: 9 MG/DL (ref 8.4–10.2)
CHLORIDE SERPL-SCNC: 99 MMOL/L (ref 96–108)
CO2 SERPL-SCNC: 26 MMOL/L (ref 22–33)
CREAT SERPL-MCNC: 0.58 MG/DL (ref 0.4–1.1)
EOSINOPHIL # BLD AUTO: 0.01 THOUSAND/ΜL (ref 0–0.61)
EOSINOPHIL NFR BLD AUTO: 0 % (ref 0–6)
ERYTHROCYTE [DISTWIDTH] IN BLOOD BY AUTOMATED COUNT: 13.2 % (ref 11.6–15.1)
EST. AVERAGE GLUCOSE BLD GHB EST-MCNC: 255 MG/DL
GFR SERPL CREATININE-BSD FRML MDRD: 96 ML/MIN/1.73SQ M
GLUCOSE SERPL-MCNC: 314 MG/DL (ref 65–140)
GLUCOSE SERPL-MCNC: 320 MG/DL (ref 65–140)
GLUCOSE SERPL-MCNC: 335 MG/DL (ref 65–140)
HBA1C MFR BLD: 10.5 %
HCT VFR BLD AUTO: 47.6 % (ref 34.8–46.1)
HCV AB SER QL: NORMAL
HGB BLD-MCNC: 15.8 G/DL (ref 11.5–15.4)
IMM GRANULOCYTES # BLD AUTO: 0.01 THOUSAND/UL (ref 0–0.2)
IMM GRANULOCYTES NFR BLD AUTO: 0 % (ref 0–2)
LYMPHOCYTES # BLD AUTO: 0.51 THOUSANDS/ΜL (ref 0.6–4.47)
LYMPHOCYTES NFR BLD AUTO: 9 % (ref 14–44)
MCH RBC QN AUTO: 27.8 PG (ref 26.8–34.3)
MCHC RBC AUTO-ENTMCNC: 33.2 G/DL (ref 31.4–37.4)
MCV RBC AUTO: 84 FL (ref 82–98)
MONOCYTES # BLD AUTO: 0.08 THOUSAND/ΜL (ref 0.17–1.22)
MONOCYTES NFR BLD AUTO: 1 % (ref 4–12)
NEUTROPHILS # BLD AUTO: 5.05 THOUSANDS/ΜL (ref 1.85–7.62)
NEUTS SEG NFR BLD AUTO: 90 % (ref 43–75)
P AXIS: 51 DEGREES
P AXIS: 52 DEGREES
P AXIS: 55 DEGREES
PLATELET # BLD AUTO: 201 THOUSANDS/UL (ref 149–390)
PMV BLD AUTO: 11.1 FL (ref 8.9–12.7)
POTASSIUM SERPL-SCNC: 4.3 MMOL/L (ref 3.5–5)
PR INTERVAL: 135 MS
PR INTERVAL: 136 MS
PR INTERVAL: 143 MS
PROT SERPL-MCNC: 7.6 G/DL (ref 6.4–8.3)
QRS AXIS: -54 DEGREES
QRS AXIS: -54 DEGREES
QRS AXIS: -56 DEGREES
QRSD INTERVAL: 135 MS
QRSD INTERVAL: 135 MS
QRSD INTERVAL: 137 MS
QT INTERVAL: 405 MS
QT INTERVAL: 432 MS
QT INTERVAL: 435 MS
QTC INTERVAL: 446 MS
QTC INTERVAL: 456 MS
QTC INTERVAL: 459 MS
RBC # BLD AUTO: 5.68 MILLION/UL (ref 3.81–5.12)
SODIUM SERPL-SCNC: 134 MMOL/L (ref 133–145)
T WAVE AXIS: 16 DEGREES
T WAVE AXIS: 43 DEGREES
T WAVE AXIS: 43 DEGREES
TROPONIN I SERPL-MCNC: <0.03 NG/ML (ref 0–0.07)
VENTRICULAR RATE: 63 BPM
VENTRICULAR RATE: 67 BPM
VENTRICULAR RATE: 77 BPM
WBC # BLD AUTO: 5.67 THOUSAND/UL (ref 4.31–10.16)

## 2020-12-05 PROCEDURE — 85025 COMPLETE CBC W/AUTO DIFF WBC: CPT | Performed by: INTERNAL MEDICINE

## 2020-12-05 PROCEDURE — 93005 ELECTROCARDIOGRAM TRACING: CPT

## 2020-12-05 PROCEDURE — 90662 IIV NO PRSV INCREASED AG IM: CPT | Performed by: INTERNAL MEDICINE

## 2020-12-05 PROCEDURE — 84484 ASSAY OF TROPONIN QUANT: CPT | Performed by: INTERNAL MEDICINE

## 2020-12-05 PROCEDURE — 93010 ELECTROCARDIOGRAM REPORT: CPT | Performed by: INTERNAL MEDICINE

## 2020-12-05 PROCEDURE — 86803 HEPATITIS C AB TEST: CPT | Performed by: INTERNAL MEDICINE

## 2020-12-05 PROCEDURE — 82948 REAGENT STRIP/BLOOD GLUCOSE: CPT

## 2020-12-05 PROCEDURE — 80053 COMPREHEN METABOLIC PANEL: CPT | Performed by: INTERNAL MEDICINE

## 2020-12-05 PROCEDURE — G0008 ADMIN INFLUENZA VIRUS VAC: HCPCS | Performed by: INTERNAL MEDICINE

## 2020-12-05 PROCEDURE — 87522 HEPATITIS C REVRS TRNSCRPJ: CPT | Performed by: INTERNAL MEDICINE

## 2020-12-05 RX ORDER — OXYCODONE HYDROCHLORIDE 5 MG/1
5 TABLET ORAL ONCE
Status: COMPLETED | OUTPATIENT
Start: 2020-12-05 | End: 2020-12-05

## 2020-12-05 RX ADMIN — Medication 2000 UNITS: at 09:03

## 2020-12-05 RX ADMIN — OXYCODONE HYDROCHLORIDE AND ACETAMINOPHEN 1000 MG: 500 TABLET ORAL at 09:03

## 2020-12-05 RX ADMIN — SENNOSIDES 8.6 MG: 8.6 TABLET, FILM COATED ORAL at 09:03

## 2020-12-05 RX ADMIN — DOCUSATE SODIUM 100 MG: 100 CAPSULE ORAL at 09:03

## 2020-12-05 RX ADMIN — TOPIRAMATE 200 MG: 100 TABLET ORAL at 09:03

## 2020-12-05 RX ADMIN — LEVOTHYROXINE SODIUM 50 MCG: 50 TABLET ORAL at 05:56

## 2020-12-05 RX ADMIN — TOPIRAMATE 200 MG: 100 TABLET ORAL at 00:14

## 2020-12-05 RX ADMIN — OXYCODONE HYDROCHLORIDE 5 MG: 5 TABLET ORAL at 03:17

## 2020-12-05 RX ADMIN — DEXAMETHASONE SODIUM PHOSPHATE 6 MG: 4 INJECTION, SOLUTION INTRAMUSCULAR; INTRAVENOUS at 00:15

## 2020-12-05 RX ADMIN — INFLUENZA A VIRUS A/MICHIGAN/45/2015 X-275 (H1N1) ANTIGEN (FORMALDEHYDE INACTIVATED), INFLUENZA A VIRUS A/SINGAPORE/INFIMH-16-0019/2016 IVR-186 (H3N2) ANTIGEN (FORMALDEHYDE INACTIVATED), INFLUENZA B VIRUS B/PHUKET/3073/2013 ANTIGEN (FORMALDEHYDE INACTIVATED), AND INFLUENZA B VIRUS B/MARYLAND/15/2016 BX-69A ANTIGEN (FORMALDEHYDE INACTIVATED) 0.7 ML: 60; 60; 60; 60 INJECTION, SUSPENSION INTRAMUSCULAR at 00:25

## 2020-12-05 RX ADMIN — DOXYCYCLINE 100 MG: 100 CAPSULE ORAL at 00:22

## 2020-12-05 RX ADMIN — FAMOTIDINE 20 MG: 20 TABLET ORAL at 00:13

## 2020-12-05 RX ADMIN — GABAPENTIN 300 MG: 300 CAPSULE ORAL at 00:13

## 2020-12-05 RX ADMIN — OXYCODONE HYDROCHLORIDE AND ACETAMINOPHEN 1000 MG: 500 TABLET ORAL at 00:12

## 2020-12-05 RX ADMIN — ENOXAPARIN SODIUM 60 MG: 60 INJECTION SUBCUTANEOUS at 09:03

## 2020-12-05 RX ADMIN — INSULIN LISPRO 3 UNITS: 100 INJECTION, SOLUTION INTRAVENOUS; SUBCUTANEOUS at 09:05

## 2020-12-05 RX ADMIN — REMDESIVIR 200 MG: 5 INJECTION INTRAVENOUS at 02:00

## 2020-12-05 RX ADMIN — INSULIN GLARGINE 30 UNITS: 100 INJECTION, SOLUTION SUBCUTANEOUS at 00:23

## 2020-12-05 RX ADMIN — ZINC SULFATE 220 MG (50 MG) CAPSULE 220 MG: CAPSULE at 09:04

## 2020-12-05 RX ADMIN — LISINOPRIL 10 MG: 10 TABLET ORAL at 09:04

## 2020-12-05 RX ADMIN — INSULIN LISPRO 3 UNITS: 100 INJECTION, SOLUTION INTRAVENOUS; SUBCUTANEOUS at 13:14

## 2020-12-05 RX ADMIN — GABAPENTIN 100 MG: 100 CAPSULE ORAL at 09:03

## 2020-12-05 RX ADMIN — ENOXAPARIN SODIUM 60 MG: 60 INJECTION SUBCUTANEOUS at 00:14

## 2020-12-05 RX ADMIN — FAMOTIDINE 20 MG: 20 TABLET ORAL at 09:04

## 2020-12-05 RX ADMIN — METOPROLOL SUCCINATE 25 MG: 25 TABLET, EXTENDED RELEASE ORAL at 09:04

## 2020-12-05 RX ADMIN — CEFTRIAXONE 1000 MG: 1 INJECTION, SOLUTION INTRAVENOUS at 01:04

## 2020-12-05 RX ADMIN — DOXYCYCLINE 100 MG: 100 CAPSULE ORAL at 09:03

## 2020-12-08 LAB
HCV RNA SERPL NAA+PROBE-ACNC: NORMAL IU/ML
TEST INFORMATION: NORMAL

## 2021-02-26 ENCOUNTER — IMMUNIZATIONS (OUTPATIENT)
Dept: FAMILY MEDICINE CLINIC | Facility: HOSPITAL | Age: 66
End: 2021-02-26

## 2021-02-26 DIAGNOSIS — Z23 ENCOUNTER FOR IMMUNIZATION: Primary | ICD-10-CM

## 2021-02-26 PROCEDURE — 91300 SARS-COV-2 / COVID-19 MRNA VACCINE (PFIZER-BIONTECH) 30 MCG: CPT

## 2021-02-26 PROCEDURE — 0001A SARS-COV-2 / COVID-19 MRNA VACCINE (PFIZER-BIONTECH) 30 MCG: CPT

## 2021-03-03 ENCOUNTER — HOSPITAL ENCOUNTER (INPATIENT)
Facility: HOSPITAL | Age: 66
LOS: 6 days | Discharge: HOME HEALTH | DRG: 287 | End: 2021-03-09
Attending: INTERNAL MEDICINE | Admitting: INTERNAL MEDICINE
Payer: COMMERCIAL

## 2021-03-03 ENCOUNTER — APPOINTMENT (EMERGENCY)
Dept: RADIOLOGY | Facility: HOSPITAL | Age: 66
End: 2021-03-03
Payer: COMMERCIAL

## 2021-03-03 ENCOUNTER — HOSPITAL ENCOUNTER (OUTPATIENT)
Facility: HOSPITAL | Age: 66
Setting detail: OBSERVATION
End: 2021-03-03
Admitting: INTERNAL MEDICINE
Payer: COMMERCIAL

## 2021-03-03 VITALS
OXYGEN SATURATION: 96 % | DIASTOLIC BLOOD PRESSURE: 73 MMHG | WEIGHT: 293 LBS | SYSTOLIC BLOOD PRESSURE: 123 MMHG | HEIGHT: 62 IN | HEART RATE: 88 BPM | BODY MASS INDEX: 53.92 KG/M2 | RESPIRATION RATE: 20 BRPM | TEMPERATURE: 97.7 F

## 2021-03-03 DIAGNOSIS — R07.9 CHEST PAIN: ICD-10-CM

## 2021-03-03 DIAGNOSIS — I47.2 NSVT (NONSUSTAINED VENTRICULAR TACHYCARDIA) (HCC): ICD-10-CM

## 2021-03-03 DIAGNOSIS — F32.9 MAJOR DEPRESSIVE DISORDER: ICD-10-CM

## 2021-03-03 DIAGNOSIS — I10 ESSENTIAL HYPERTENSION: ICD-10-CM

## 2021-03-03 DIAGNOSIS — E78.5 HYPERLIPIDEMIA: ICD-10-CM

## 2021-03-03 DIAGNOSIS — E11.9 DIABETES MELLITUS (HCC): ICD-10-CM

## 2021-03-03 DIAGNOSIS — I10 HYPERTENSION: ICD-10-CM

## 2021-03-03 DIAGNOSIS — R56.9 SEIZURES (HCC): Primary | ICD-10-CM

## 2021-03-03 DIAGNOSIS — R07.9 CHEST PAIN, UNSPECIFIED TYPE: ICD-10-CM

## 2021-03-03 DIAGNOSIS — F32.5 MAJOR DEPRESSIVE DISORDER IN FULL REMISSION, UNSPECIFIED WHETHER RECURRENT (HCC): ICD-10-CM

## 2021-03-03 DIAGNOSIS — R07.9 CHEST PAIN, UNSPECIFIED TYPE: Primary | ICD-10-CM

## 2021-03-03 DIAGNOSIS — K21.9 GERD (GASTROESOPHAGEAL REFLUX DISEASE): ICD-10-CM

## 2021-03-03 PROBLEM — E03.9 ACQUIRED HYPOTHYROIDISM: Status: ACTIVE | Noted: 2021-03-03

## 2021-03-03 LAB
ALBUMIN SERPL BCP-MCNC: 3.5 G/DL (ref 3.4–4.8)
ALBUMIN SERPL BCP-MCNC: 3.5 G/DL (ref 3.4–4.8)
ALP SERPL-CCNC: 104.1 U/L (ref 35–140)
ALP SERPL-CCNC: 106 U/L (ref 35–140)
ALT SERPL W P-5'-P-CCNC: 10 U/L (ref 5–54)
ALT SERPL W P-5'-P-CCNC: 12 U/L (ref 5–54)
ANION GAP SERPL CALCULATED.3IONS-SCNC: 5 MMOL/L (ref 4–13)
ANION GAP SERPL CALCULATED.3IONS-SCNC: 7 MMOL/L (ref 4–13)
APTT PPP: 24 SECONDS (ref 23–31)
AST SERPL W P-5'-P-CCNC: 12 U/L (ref 15–41)
AST SERPL W P-5'-P-CCNC: 12 U/L (ref 15–41)
ATRIAL RATE: 83 BPM
BASOPHILS # BLD AUTO: 0.01 THOUSANDS/ΜL (ref 0–0.1)
BASOPHILS NFR BLD AUTO: 0 % (ref 0–1)
BILIRUB SERPL-MCNC: 0.36 MG/DL (ref 0.3–1.2)
BILIRUB SERPL-MCNC: 0.42 MG/DL (ref 0.3–1.2)
BNP SERPL-MCNC: 27.8 PG/ML (ref 1–100)
BUN SERPL-MCNC: 19 MG/DL (ref 6–20)
BUN SERPL-MCNC: 22 MG/DL (ref 6–20)
CALCIUM SERPL-MCNC: 8.7 MG/DL (ref 8.4–10.2)
CALCIUM SERPL-MCNC: 8.8 MG/DL (ref 8.4–10.2)
CHLORIDE SERPL-SCNC: 100 MMOL/L (ref 96–108)
CHLORIDE SERPL-SCNC: 98 MMOL/L (ref 96–108)
CHOLEST SERPL-MCNC: 202 MG/DL
CO2 SERPL-SCNC: 29 MMOL/L (ref 22–33)
CO2 SERPL-SCNC: 31 MMOL/L (ref 22–33)
CREAT SERPL-MCNC: 0.58 MG/DL (ref 0.4–1.1)
CREAT SERPL-MCNC: 0.67 MG/DL (ref 0.4–1.1)
D DIMER PPP FEU-MCNC: 0.49 MG/L FEU (ref 0.19–0.49)
EOSINOPHIL # BLD AUTO: 0.24 THOUSAND/ΜL (ref 0–0.61)
EOSINOPHIL NFR BLD AUTO: 4 % (ref 0–6)
ERYTHROCYTE [DISTWIDTH] IN BLOOD BY AUTOMATED COUNT: 13.3 % (ref 11.6–15.1)
EST. AVERAGE GLUCOSE BLD GHB EST-MCNC: 283 MG/DL
FLUAV RNA RESP QL NAA+PROBE: NEGATIVE
FLUBV RNA RESP QL NAA+PROBE: NEGATIVE
GFR SERPL CREATININE-BSD FRML MDRD: 92 ML/MIN/1.73SQ M
GFR SERPL CREATININE-BSD FRML MDRD: 96 ML/MIN/1.73SQ M
GLUCOSE SERPL-MCNC: 168 MG/DL (ref 65–140)
GLUCOSE SERPL-MCNC: 195 MG/DL (ref 65–140)
GLUCOSE SERPL-MCNC: 234 MG/DL (ref 65–140)
GLUCOSE SERPL-MCNC: 238 MG/DL (ref 65–140)
GLUCOSE SERPL-MCNC: 256 MG/DL (ref 65–140)
GLUCOSE SERPL-MCNC: 338 MG/DL (ref 65–140)
HBA1C MFR BLD: 11.5 %
HCT VFR BLD AUTO: 43.6 % (ref 34.8–46.1)
HDLC SERPL-MCNC: 43 MG/DL
HGB BLD-MCNC: 14.2 G/DL (ref 11.5–15.4)
IMM GRANULOCYTES # BLD AUTO: 0.01 THOUSAND/UL (ref 0–0.2)
IMM GRANULOCYTES NFR BLD AUTO: 0 % (ref 0–2)
INR PPP: 0.96 (ref 0.9–1.1)
LACTATE SERPL-SCNC: 1.5 MMOL/L (ref 0–2)
LDLC SERPL CALC-MCNC: 135 MG/DL (ref 0–100)
LYMPHOCYTES # BLD AUTO: 1.12 THOUSANDS/ΜL (ref 0.6–4.47)
LYMPHOCYTES NFR BLD AUTO: 18 % (ref 14–44)
MAGNESIUM SERPL-MCNC: 1.5 MG/DL (ref 1.6–2.6)
MAGNESIUM SERPL-MCNC: 1.7 MG/DL (ref 1.6–2.6)
MCH RBC QN AUTO: 28 PG (ref 26.8–34.3)
MCHC RBC AUTO-ENTMCNC: 32.6 G/DL (ref 31.4–37.4)
MCV RBC AUTO: 86 FL (ref 82–98)
MONOCYTES # BLD AUTO: 0.54 THOUSAND/ΜL (ref 0.17–1.22)
MONOCYTES NFR BLD AUTO: 8 % (ref 4–12)
NEUTROPHILS # BLD AUTO: 4.48 THOUSANDS/ΜL (ref 1.85–7.62)
NEUTS SEG NFR BLD AUTO: 70 % (ref 43–75)
P AXIS: 52 DEGREES
PLATELET # BLD AUTO: 188 THOUSANDS/UL (ref 149–390)
PMV BLD AUTO: 10.5 FL (ref 8.9–12.7)
POTASSIUM SERPL-SCNC: 4.1 MMOL/L (ref 3.5–5)
POTASSIUM SERPL-SCNC: 4.2 MMOL/L (ref 3.5–5)
PR INTERVAL: 132 MS
PROT SERPL-MCNC: 6.6 G/DL (ref 6.4–8.3)
PROT SERPL-MCNC: 6.8 G/DL (ref 6.4–8.3)
PROTHROMBIN TIME: 10.9 SECONDS (ref 9.5–12.1)
QRS AXIS: -46 DEGREES
QRSD INTERVAL: 135 MS
QT INTERVAL: 391 MS
QTC INTERVAL: 460 MS
RBC # BLD AUTO: 5.07 MILLION/UL (ref 3.81–5.12)
RSV RNA RESP QL NAA+PROBE: NEGATIVE
SARS-COV-2 RNA RESP QL NAA+PROBE: NEGATIVE
SODIUM SERPL-SCNC: 134 MMOL/L (ref 133–145)
SODIUM SERPL-SCNC: 136 MMOL/L (ref 133–145)
T WAVE AXIS: 63 DEGREES
TRIGL SERPL-MCNC: 121 MG/DL
TROPONIN I SERPL-MCNC: <0.02 NG/ML
TROPONIN I SERPL-MCNC: <0.03 NG/ML (ref 0–0.07)
TSH SERPL DL<=0.05 MIU/L-ACNC: 4.12 UIU/ML (ref 0.34–5.6)
VENTRICULAR RATE: 83 BPM
WBC # BLD AUTO: 6.4 THOUSAND/UL (ref 4.31–10.16)

## 2021-03-03 PROCEDURE — 85610 PROTHROMBIN TIME: CPT

## 2021-03-03 PROCEDURE — 93005 ELECTROCARDIOGRAM TRACING: CPT

## 2021-03-03 PROCEDURE — 83605 ASSAY OF LACTIC ACID: CPT | Performed by: INTERNAL MEDICINE

## 2021-03-03 PROCEDURE — 99219 PR INITIAL OBSERVATION CARE/DAY 50 MINUTES: CPT | Performed by: PHYSICIAN ASSISTANT

## 2021-03-03 PROCEDURE — 83036 HEMOGLOBIN GLYCOSYLATED A1C: CPT | Performed by: INTERNAL MEDICINE

## 2021-03-03 PROCEDURE — 93010 ELECTROCARDIOGRAM REPORT: CPT | Performed by: INTERNAL MEDICINE

## 2021-03-03 PROCEDURE — 36415 COLL VENOUS BLD VENIPUNCTURE: CPT

## 2021-03-03 PROCEDURE — 0241U HB NFCT DS VIR RESP RNA 4 TRGT: CPT

## 2021-03-03 PROCEDURE — 80053 COMPREHEN METABOLIC PANEL: CPT

## 2021-03-03 PROCEDURE — 84443 ASSAY THYROID STIM HORMONE: CPT | Performed by: INTERNAL MEDICINE

## 2021-03-03 PROCEDURE — 99220 PR INITIAL OBSERVATION CARE/DAY 70 MINUTES: CPT | Performed by: INTERNAL MEDICINE

## 2021-03-03 PROCEDURE — 84484 ASSAY OF TROPONIN QUANT: CPT | Performed by: INTERNAL MEDICINE

## 2021-03-03 PROCEDURE — 71045 X-RAY EXAM CHEST 1 VIEW: CPT

## 2021-03-03 PROCEDURE — 82948 REAGENT STRIP/BLOOD GLUCOSE: CPT

## 2021-03-03 PROCEDURE — 85025 COMPLETE CBC W/AUTO DIFF WBC: CPT

## 2021-03-03 PROCEDURE — 80053 COMPREHEN METABOLIC PANEL: CPT | Performed by: INTERNAL MEDICINE

## 2021-03-03 PROCEDURE — 84484 ASSAY OF TROPONIN QUANT: CPT

## 2021-03-03 PROCEDURE — 85730 THROMBOPLASTIN TIME PARTIAL: CPT

## 2021-03-03 PROCEDURE — 99285 EMERGENCY DEPT VISIT HI MDM: CPT

## 2021-03-03 PROCEDURE — 83735 ASSAY OF MAGNESIUM: CPT | Performed by: INTERNAL MEDICINE

## 2021-03-03 PROCEDURE — 80061 LIPID PANEL: CPT | Performed by: INTERNAL MEDICINE

## 2021-03-03 PROCEDURE — G0379 DIRECT REFER HOSPITAL OBSERV: HCPCS

## 2021-03-03 PROCEDURE — 83880 ASSAY OF NATRIURETIC PEPTIDE: CPT

## 2021-03-03 PROCEDURE — 85379 FIBRIN DEGRADATION QUANT: CPT

## 2021-03-03 RX ORDER — TOPIRAMATE 100 MG/1
200 TABLET, FILM COATED ORAL
Status: CANCELLED | OUTPATIENT
Start: 2021-03-03

## 2021-03-03 RX ORDER — GABAPENTIN 100 MG/1
100 CAPSULE ORAL 2 TIMES DAILY
Status: CANCELLED | OUTPATIENT
Start: 2021-03-03

## 2021-03-03 RX ORDER — ASPIRIN 81 MG/1
81 TABLET ORAL DAILY
Status: DISCONTINUED | OUTPATIENT
Start: 2021-03-03 | End: 2021-03-03 | Stop reason: HOSPADM

## 2021-03-03 RX ORDER — ATORVASTATIN CALCIUM 40 MG/1
40 TABLET, FILM COATED ORAL
Status: DISCONTINUED | OUTPATIENT
Start: 2021-03-04 | End: 2021-03-09 | Stop reason: HOSPADM

## 2021-03-03 RX ORDER — TOPIRAMATE 100 MG/1
200 TABLET, FILM COATED ORAL EVERY MORNING
Status: DISCONTINUED | OUTPATIENT
Start: 2021-03-04 | End: 2021-03-03

## 2021-03-03 RX ORDER — ACETAMINOPHEN 325 MG/1
650 TABLET ORAL EVERY 6 HOURS PRN
Status: CANCELLED | OUTPATIENT
Start: 2021-03-03

## 2021-03-03 RX ORDER — TOPIRAMATE 100 MG/1
200 TABLET, FILM COATED ORAL EVERY MORNING
Status: CANCELLED | OUTPATIENT
Start: 2021-03-04

## 2021-03-03 RX ORDER — ATORVASTATIN CALCIUM 40 MG/1
40 TABLET, FILM COATED ORAL
Status: CANCELLED | OUTPATIENT
Start: 2021-03-03

## 2021-03-03 RX ORDER — HEPARIN SODIUM 5000 [USP'U]/ML
5000 INJECTION, SOLUTION INTRAVENOUS; SUBCUTANEOUS EVERY 8 HOURS SCHEDULED
Status: DISCONTINUED | OUTPATIENT
Start: 2021-03-03 | End: 2021-03-03

## 2021-03-03 RX ORDER — HEPARIN SODIUM 5000 [USP'U]/ML
5000 INJECTION, SOLUTION INTRAVENOUS; SUBCUTANEOUS EVERY 8 HOURS SCHEDULED
Status: CANCELLED | OUTPATIENT
Start: 2021-03-03

## 2021-03-03 RX ORDER — GABAPENTIN 100 MG/1
100 CAPSULE ORAL 2 TIMES DAILY
Status: DISCONTINUED | OUTPATIENT
Start: 2021-03-03 | End: 2021-03-03 | Stop reason: HOSPADM

## 2021-03-03 RX ORDER — FAMOTIDINE 20 MG/1
20 TABLET, FILM COATED ORAL 2 TIMES DAILY
Status: DISCONTINUED | OUTPATIENT
Start: 2021-03-03 | End: 2021-03-03 | Stop reason: HOSPADM

## 2021-03-03 RX ORDER — ACETAMINOPHEN 325 MG/1
650 TABLET ORAL EVERY 6 HOURS PRN
Status: DISCONTINUED | OUTPATIENT
Start: 2021-03-03 | End: 2021-03-09 | Stop reason: HOSPADM

## 2021-03-03 RX ORDER — ASPIRIN 81 MG/1
81 TABLET ORAL DAILY
Status: DISCONTINUED | OUTPATIENT
Start: 2021-03-04 | End: 2021-03-09 | Stop reason: HOSPADM

## 2021-03-03 RX ORDER — TOPIRAMATE 100 MG/1
200 TABLET, FILM COATED ORAL EVERY MORNING
Status: DISCONTINUED | OUTPATIENT
Start: 2021-03-04 | End: 2021-03-09 | Stop reason: HOSPADM

## 2021-03-03 RX ORDER — LEVOTHYROXINE SODIUM 0.05 MG/1
50 TABLET ORAL
Status: DISCONTINUED | OUTPATIENT
Start: 2021-03-04 | End: 2021-03-03

## 2021-03-03 RX ORDER — GABAPENTIN 100 MG/1
100 CAPSULE ORAL 2 TIMES DAILY
Status: DISCONTINUED | OUTPATIENT
Start: 2021-03-03 | End: 2021-03-09 | Stop reason: HOSPADM

## 2021-03-03 RX ORDER — ACETAMINOPHEN 325 MG/1
650 TABLET ORAL EVERY 6 HOURS PRN
Status: DISCONTINUED | OUTPATIENT
Start: 2021-03-03 | End: 2021-03-03 | Stop reason: HOSPADM

## 2021-03-03 RX ORDER — MAGNESIUM SULFATE HEPTAHYDRATE 40 MG/ML
2 INJECTION, SOLUTION INTRAVENOUS ONCE
Status: DISCONTINUED | OUTPATIENT
Start: 2021-03-03 | End: 2021-03-03

## 2021-03-03 RX ORDER — FAMOTIDINE 20 MG/1
20 TABLET, FILM COATED ORAL 2 TIMES DAILY
Status: DISCONTINUED | OUTPATIENT
Start: 2021-03-03 | End: 2021-03-09 | Stop reason: HOSPADM

## 2021-03-03 RX ORDER — GABAPENTIN 100 MG/1
100 CAPSULE ORAL ONCE
Status: COMPLETED | OUTPATIENT
Start: 2021-03-03 | End: 2021-03-03

## 2021-03-03 RX ORDER — FLUOXETINE HYDROCHLORIDE 20 MG/1
20 CAPSULE ORAL DAILY
Status: CANCELLED | OUTPATIENT
Start: 2021-03-04

## 2021-03-03 RX ORDER — ASPIRIN 81 MG/1
81 TABLET ORAL DAILY
Status: CANCELLED | OUTPATIENT
Start: 2021-03-04

## 2021-03-03 RX ORDER — INSULIN GLARGINE 100 [IU]/ML
20 INJECTION, SOLUTION SUBCUTANEOUS
Status: DISCONTINUED | OUTPATIENT
Start: 2021-03-03 | End: 2021-03-09 | Stop reason: HOSPADM

## 2021-03-03 RX ORDER — MAGNESIUM SULFATE HEPTAHYDRATE 40 MG/ML
4 INJECTION, SOLUTION INTRAVENOUS ONCE
Status: DISCONTINUED | OUTPATIENT
Start: 2021-03-03 | End: 2021-03-03 | Stop reason: HOSPADM

## 2021-03-03 RX ORDER — LEVOTHYROXINE SODIUM 0.05 MG/1
50 TABLET ORAL
Status: CANCELLED | OUTPATIENT
Start: 2021-03-04

## 2021-03-03 RX ORDER — TOPIRAMATE 100 MG/1
200 TABLET, FILM COATED ORAL EVERY MORNING
Status: DISCONTINUED | OUTPATIENT
Start: 2021-03-03 | End: 2021-03-03 | Stop reason: HOSPADM

## 2021-03-03 RX ORDER — HEPARIN SODIUM 5000 [USP'U]/ML
5000 INJECTION, SOLUTION INTRAVENOUS; SUBCUTANEOUS EVERY 8 HOURS SCHEDULED
Status: DISCONTINUED | OUTPATIENT
Start: 2021-03-03 | End: 2021-03-03 | Stop reason: HOSPADM

## 2021-03-03 RX ORDER — SENNOSIDES 8.6 MG
1 TABLET ORAL DAILY
Status: DISCONTINUED | OUTPATIENT
Start: 2021-03-03 | End: 2021-03-03 | Stop reason: HOSPADM

## 2021-03-03 RX ORDER — LORAZEPAM 2 MG/ML
0.5 INJECTION INTRAMUSCULAR ONCE
Status: COMPLETED | OUTPATIENT
Start: 2021-03-03 | End: 2021-03-03

## 2021-03-03 RX ORDER — FLUOXETINE HYDROCHLORIDE 20 MG/1
20 CAPSULE ORAL DAILY
Status: DISCONTINUED | OUTPATIENT
Start: 2021-03-03 | End: 2021-03-03 | Stop reason: HOSPADM

## 2021-03-03 RX ORDER — INSULIN GLARGINE 100 [IU]/ML
20 INJECTION, SOLUTION SUBCUTANEOUS
Status: CANCELLED | OUTPATIENT
Start: 2021-03-03

## 2021-03-03 RX ORDER — FLUOXETINE HYDROCHLORIDE 20 MG/1
40 CAPSULE ORAL DAILY
Status: DISCONTINUED | OUTPATIENT
Start: 2021-03-04 | End: 2021-03-04

## 2021-03-03 RX ORDER — TOPIRAMATE 100 MG/1
200 TABLET, FILM COATED ORAL
Status: DISCONTINUED | OUTPATIENT
Start: 2021-03-03 | End: 2021-03-03 | Stop reason: HOSPADM

## 2021-03-03 RX ORDER — LEVOTHYROXINE SODIUM 0.05 MG/1
50 TABLET ORAL
Status: DISCONTINUED | OUTPATIENT
Start: 2021-03-03 | End: 2021-03-03 | Stop reason: HOSPADM

## 2021-03-03 RX ORDER — SENNOSIDES 8.6 MG
1 TABLET ORAL DAILY
Status: CANCELLED | OUTPATIENT
Start: 2021-03-04

## 2021-03-03 RX ORDER — GABAPENTIN 300 MG/1
300 CAPSULE ORAL
Status: CANCELLED | OUTPATIENT
Start: 2021-03-03

## 2021-03-03 RX ORDER — FAMOTIDINE 20 MG/1
20 TABLET, FILM COATED ORAL 2 TIMES DAILY
Status: CANCELLED | OUTPATIENT
Start: 2021-03-03

## 2021-03-03 RX ORDER — LEVOTHYROXINE SODIUM 0.05 MG/1
50 TABLET ORAL
Status: DISCONTINUED | OUTPATIENT
Start: 2021-03-04 | End: 2021-03-09 | Stop reason: HOSPADM

## 2021-03-03 RX ORDER — GABAPENTIN 300 MG/1
300 CAPSULE ORAL
Status: DISCONTINUED | OUTPATIENT
Start: 2021-03-03 | End: 2021-03-03 | Stop reason: HOSPADM

## 2021-03-03 RX ORDER — TOPIRAMATE 100 MG/1
200 TABLET, FILM COATED ORAL
Status: DISCONTINUED | OUTPATIENT
Start: 2021-03-03 | End: 2021-03-09 | Stop reason: HOSPADM

## 2021-03-03 RX ORDER — GABAPENTIN 100 MG/1
100 CAPSULE ORAL 2 TIMES DAILY
Status: DISCONTINUED | OUTPATIENT
Start: 2021-03-03 | End: 2021-03-03

## 2021-03-03 RX ORDER — GABAPENTIN 300 MG/1
300 CAPSULE ORAL
Status: DISCONTINUED | OUTPATIENT
Start: 2021-03-03 | End: 2021-03-09 | Stop reason: HOSPADM

## 2021-03-03 RX ORDER — TOPIRAMATE 100 MG/1
200 TABLET, FILM COATED ORAL
Status: DISCONTINUED | OUTPATIENT
Start: 2021-03-03 | End: 2021-03-03

## 2021-03-03 RX ORDER — ASPIRIN 81 MG/1
81 TABLET ORAL DAILY
Status: DISCONTINUED | OUTPATIENT
Start: 2021-03-04 | End: 2021-03-03

## 2021-03-03 RX ORDER — SENNOSIDES 8.6 MG
1 TABLET ORAL DAILY
Status: DISCONTINUED | OUTPATIENT
Start: 2021-03-04 | End: 2021-03-08

## 2021-03-03 RX ORDER — INSULIN GLARGINE 100 [IU]/ML
20 INJECTION, SOLUTION SUBCUTANEOUS
Status: DISCONTINUED | OUTPATIENT
Start: 2021-03-03 | End: 2021-03-03 | Stop reason: HOSPADM

## 2021-03-03 RX ORDER — GABAPENTIN 300 MG/1
300 CAPSULE ORAL
Status: DISCONTINUED | OUTPATIENT
Start: 2021-03-03 | End: 2021-03-03

## 2021-03-03 RX ORDER — INSULIN GLARGINE 100 [IU]/ML
15 INJECTION, SOLUTION SUBCUTANEOUS
Status: DISCONTINUED | OUTPATIENT
Start: 2021-03-03 | End: 2021-03-03

## 2021-03-03 RX ORDER — FLUOXETINE HYDROCHLORIDE 20 MG/1
20 CAPSULE ORAL DAILY
Status: DISCONTINUED | OUTPATIENT
Start: 2021-03-04 | End: 2021-03-03

## 2021-03-03 RX ORDER — ATORVASTATIN CALCIUM 40 MG/1
40 TABLET, FILM COATED ORAL
Status: DISCONTINUED | OUTPATIENT
Start: 2021-03-03 | End: 2021-03-03 | Stop reason: HOSPADM

## 2021-03-03 RX ORDER — INSULIN GLARGINE 100 [IU]/ML
30 INJECTION, SOLUTION SUBCUTANEOUS
Status: DISCONTINUED | OUTPATIENT
Start: 2021-03-03 | End: 2021-03-03

## 2021-03-03 RX ADMIN — TOPIRAMATE 200 MG: 100 TABLET, FILM COATED ORAL at 14:05

## 2021-03-03 RX ADMIN — ASPIRIN 81 MG: 81 TABLET, COATED ORAL at 14:04

## 2021-03-03 RX ADMIN — FAMOTIDINE 20 MG: 20 TABLET ORAL at 20:33

## 2021-03-03 RX ADMIN — LEVOTHYROXINE SODIUM 50 MCG: 50 TABLET ORAL at 14:04

## 2021-03-03 RX ADMIN — INSULIN LISPRO 1 UNITS: 100 INJECTION, SOLUTION INTRAVENOUS; SUBCUTANEOUS at 23:14

## 2021-03-03 RX ADMIN — INSULIN GLARGINE 20 UNITS: 100 INJECTION, SOLUTION SUBCUTANEOUS at 22:44

## 2021-03-03 RX ADMIN — SENNOSIDES 8.6 MG: 8.6 TABLET, FILM COATED ORAL at 16:25

## 2021-03-03 RX ADMIN — FLUOXETINE 20 MG: 20 CAPSULE ORAL at 14:04

## 2021-03-03 RX ADMIN — LORAZEPAM 0.5 MG: 2 INJECTION INTRAMUSCULAR; INTRAVENOUS at 11:02

## 2021-03-03 RX ADMIN — GABAPENTIN 100 MG: 100 CAPSULE ORAL at 11:42

## 2021-03-03 RX ADMIN — ATORVASTATIN CALCIUM 40 MG: 40 TABLET, FILM COATED ORAL at 16:25

## 2021-03-03 RX ADMIN — TOPIRAMATE 200 MG: 100 TABLET, FILM COATED ORAL at 22:44

## 2021-03-03 RX ADMIN — INSULIN LISPRO 2 UNITS: 100 INJECTION, SOLUTION INTRAVENOUS; SUBCUTANEOUS at 14:05

## 2021-03-03 RX ADMIN — HEPARIN SODIUM 5000 UNITS: 5000 INJECTION INTRAVENOUS; SUBCUTANEOUS at 16:25

## 2021-03-03 RX ADMIN — GABAPENTIN 100 MG: 100 CAPSULE ORAL at 14:03

## 2021-03-03 RX ADMIN — GABAPENTIN 300 MG: 300 CAPSULE ORAL at 22:44

## 2021-03-03 NOTE — ASSESSMENT & PLAN NOTE
H/o HTN according to the chart, BP is WNL now, not on any antihypertensives at home   Monitor BMP, patient will benefit with ACEi

## 2021-03-03 NOTE — ED PROVIDER NOTES
History  Chief Complaint   Patient presents with    Chest Pain     Patient complains of chest pain in the center of her chest, while mopping the floor at 67      79-year-old female history multiple medical problems including hypertension insulin controlled diabetes hypothyroidism seizure disorder presents secondary to acute onset of chest pain shortness of breath today while mopping at home  Patient states that she was mopping and started to feel she could not catch her breath and having tightness in her chest   Patient denies any nausea or vomiting patient to palpitation she is awake alert in mild distress  Patient is brought in by EMS received aspirin 81 mg for p o  Patient states she has been having swelling in her legs bilaterally is been ongoing  Patient denies any fever chills she says she has had a cough on and off for about a week  Prior to Admission Medications   Prescriptions Last Dose Informant Patient Reported? Taking? Alogliptin Benzoate 25 MG TABS   Yes No   Sig: Take 25 mg by mouth daily   Empagliflozin 25 MG TABS   Yes No   Sig: Take 25 mg by mouth every morning   FLUoxetine (PROzac) 40 MG capsule   Yes No   Sig: Take 20 mg by mouth daily Indications: give 2 capsules by mouth one time a day  aspirin (Aspirin 81) 81 mg EC tablet   Yes No   Sig: Take 81 mg by mouth daily  gabapentin (NEURONTIN) 100 mg capsule   No No   Sig: Take 1 capsule (100 mg total) by mouth 2 (two) times a day Take 100 mg in the morning and 100 mg mid afternoon   gabapentin (NEURONTIN) 300 mg capsule   No No   Sig: Take 1 capsule (300 mg total) by mouth daily at bedtime   insulin glargine (LANTUS) 100 units/mL subcutaneous injection   Yes No   Sig: Inject 30 Units under the skin daily at bedtime   levothyroxine 50 mcg tablet   Yes No   Sig: Take 50 mcg by mouth daily Indications: Pt unsure of the amount     topiramate (TOPAMAX) 100 mg tablet   Yes No   Sig: Take 200 mg by mouth every morning     topiramate (TOPAMAX) 100 mg tablet   Yes No   Sig: Take 200 mg by mouth daily at bedtime  Facility-Administered Medications: None       Past Medical History:   Diagnosis Date    Diabetes mellitus ([address] )     Disease of thyroid gland     Hyperlipidemia     Hypertension     Hypothyroidism (acquired)     Restless leg syndrome     Seizures (HCC)        Past Surgical History:   Procedure Laterality Date    APPENDECTOMY      CHOLECYSTECTOMY      HIP FRACTURE SURGERY Right     TONSILLECTOMY      TUBAL LIGATION         Family History   Problem Relation Age of Onset    Seizures Father      I have reviewed and agree with the history as documented  E-Cigarette/Vaping    E-Cigarette Use Never User      E-Cigarette/Vaping Substances     Social History     Tobacco Use    Smoking status: Never Smoker    Smokeless tobacco: Never Used    Tobacco comment: not a smoker   Substance Use Topics    Alcohol use: Yes     Frequency: Monthly or less     Comment: occasional    Drug use: No       Review of Systems   Constitutional: Negative for chills and fever  HENT: Negative for congestion  Eyes: Negative for visual disturbance  Respiratory: Positive for chest tightness and shortness of breath  Cardiovascular: Positive for chest pain  Gastrointestinal: Negative for abdominal pain  Endocrine: Negative for cold intolerance  Genitourinary: Negative for frequency  Musculoskeletal: Negative for gait problem  Skin: Negative for rash  Neurological: Negative for dizziness  Psychiatric/Behavioral: Negative for behavioral problems and confusion  Physical Exam  Physical Exam  Vitals signs and nursing note reviewed  Constitutional:       Appearance: She is well-developed  HENT:      Head: Normocephalic and atraumatic  Eyes:      Conjunctiva/sclera: Conjunctivae normal       Pupils: Pupils are equal, round, and reactive to light  Neck:      Musculoskeletal: Normal range of motion and neck supple  Cardiovascular:      Rate and Rhythm: Normal rate and regular rhythm  Heart sounds: Normal heart sounds  Pulmonary:      Effort: Tachypnea present  Breath sounds: Normal breath sounds  Abdominal:      General: Bowel sounds are normal       Palpations: Abdomen is soft  Musculoskeletal: Normal range of motion  Right lower leg: Edema (Patient with bilateral leg edema 2+) present  Skin:     General: Skin is warm and dry  Capillary Refill: Capillary refill takes less than 2 seconds  Neurological:      Mental Status: She is alert and oriented to person, place, and time  Psychiatric:         Behavior: Behavior normal          Vital Signs  ED Triage Vitals [03/03/21 0856]   Temperature Pulse Respirations Blood Pressure SpO2   98 °F (36 7 °C) 80 22 139/66 96 %      Temp Source Heart Rate Source Patient Position - Orthostatic VS BP Location FiO2 (%)   Oral Monitor Sitting Right arm --      Pain Score       --           Vitals:    03/03/21 0856   BP: 139/66   Pulse: 80   Patient Position - Orthostatic VS: Sitting         Visual Acuity      ED Medications  Medications - No data to display    Diagnostic Studies  Results Reviewed     Procedure Component Value Units Date/Time    COVID19, Influenza A/B, RSV PCR, [facility] [[id number]]  (Normal) Collected: 03/03/21 0915    Lab Status: Final result Specimen: Nares from Nasopharyngeal Swab Updated: 03/03/21 1002     SARS-CoV-2 Negative     INFLUENZA A PCR Negative     INFLUENZA B PCR Negative     RSV PCR Negative    Narrative: This test has been authorized by FDA under an EUA (Emergency Use Assay) for use by authorized laboratories  Clinical caution and judgement should be used with the interpretation of these results with consideration of the clinical impression and other laboratory testing  Testing reported as "Positive" or "Negative" has been proven to be accurate according to standard laboratory validation requirements    All testing is performed with control materials showing appropriate reactivity at standard intervals      B-Type Natriuretic Peptide ([address]) [[id number]]  (Normal) Collected: 03/03/21 0901    Lab Status: Final result Specimen: Blood from Arm, Left Updated: 03/03/21 0942     BNP 27 8 pg/mL     D-dimer, quantitative [[id number]]  (Normal) Collected: 03/03/21 0911    Lab Status: Final result Specimen: Blood from Arm, Left Updated: 03/03/21 0926     D-Dimer, Quant  0 49 mg/L FEU     APTT [[id number]]  (Normal) Collected: 03/03/21 0911    Lab Status: Final result Specimen: Blood from Arm, Left Updated: 03/03/21 0926     PTT 24 seconds     Protime-INR [[id number]]  (Normal) Collected: 03/03/21 0911    Lab Status: Final result Specimen: Blood from Arm, Left Updated: 03/03/21 0926     Protime 10 9 seconds      INR 0 96    Narrative:      INR Reference Ranges:  No Anticoagulant, Normal:           0 9-1 1  Standard Dose, Oral Anticoagulant:  2 0-3 0  High Dose, Oral Anticoagulant:      2 5-3 5    Troponin I [[id number]]  (Normal) Collected: 03/03/21 0901    Lab Status: Final result Specimen: Blood from Arm, Left Updated: 03/03/21 0925     Troponin I <0 03 ng/mL     Comprehensive metabolic panel [[id number]]  (Abnormal) Collected: 03/03/21 0901    Lab Status: Final result Specimen: Blood from Arm, Left Updated: 03/03/21 0924     Sodium 134 mmol/L      Potassium 4 2 mmol/L      Chloride 98 mmol/L      CO2 31 mmol/L      ANION GAP 5 mmol/L      BUN 22 mg/dL      Creatinine 0 67 mg/dL      Glucose 338 mg/dL      Calcium 8 7 mg/dL      AST 12 U/L      ALT 12 U/L      Alkaline Phosphatase 106 0 U/L      Total Protein 6 8 g/dL      Albumin 3 5 g/dL      Total Bilirubin 0 36 mg/dL      eGFR 92 ml/min/1 73sq m     Narrative:      [facility] guidelines for Chronic Kidney Disease (CKD):     Stage 1 with normal or high GFR (GFR > 90 mL/min/1 73 square meters)    Stage 2 Mild CKD (GFR = 60-89 mL/min/1 73 square meters)    Stage 3A Moderate CKD (GFR = 45-59 mL/min/1 73 square meters)    Stage 3B Moderate CKD (GFR = 30-44 mL/min/1 73 square meters)    Stage 4 Severe CKD (GFR = 15-29 mL/min/1 73 square meters)    Stage 5 End Stage CKD (GFR <15 mL/min/1 73 square meters)  Note: GFR calculation is accurate only with a steady state creatinine    CBC and differential [[id number]]  (Normal) Collected: 03/03/21 0901    Lab Status: Final result Specimen: Blood from Arm, Left Updated: 03/03/21 0910     WBC 6 40 Thousand/uL      RBC 5 07 Million/uL      Hemoglobin 14 2 g/dL      Hematocrit 43 6 %      MCV 86 fL      MCH 28 0 pg      MCHC 32 6 g/dL      RDW 13 3 %      MPV 10 5 fL      Platelets 911 Thousands/uL      Neutrophils Relative 70 %      Immat GRANS % 0 %      Lymphocytes Relative 18 %      Monocytes Relative 8 %      Eosinophils Relative 4 %      Basophils Relative 0 %      Neutrophils Absolute 4 48 Thousands/µL      Immature Grans Absolute 0 01 Thousand/uL      Lymphocytes Absolute 1 12 Thousands/µL      Monocytes Absolute 0 54 Thousand/µL      Eosinophils Absolute 0 24 Thousand/µL      Basophils Absolute 0 01 Thousands/µL                  XR chest 1 view portable   ED Interpretation by Reilly Zarate MD (03/03 0935)   CXR: Interstial edema changes bilaterally  Final Result by Jacinda So MD (03/03 1009)      Enlargement of cardiac silhouette with mild pulmonary vascular congestion suspected  Findings concur with the referring clinician's preliminary interpretation already in the patient's electronic health record  Workstation performed: [id number]                    Procedures  Procedures         ED Course                             SBIRT 20yo+      Most Recent Value   SBIRT (23 yo +)   In order to provide better care to our patients, we are screening all of our patients for alcohol and drug use  Would it be okay to ask you these screening questions?   Unable to answer at this time Filed at: 03/03/2021 0906                    [facility]  Number of Diagnoses or Management Options  Diagnosis management comments: Patient is monitored in the emergency department chest x-ray demonstrated mild interstitial changes cardiomegaly EKG demonstrated right bundle branch block normal sinus rhythm no acute ST T wave changes no change from prior troponin not elevated D-dimer not elevated  Patient will be admitted to the hospital cardiac workup secondary to multiple cardiac risk factors defect due to having intermittent chest tightness on and off here in the emergency department and she has not had a significant cardiac workup in some time  Disposition  Final diagnoses:   Chest pain, unspecified type     Time reflects when diagnosis was documented in both MDM as applicable and the Disposition within this note     Time User Action Codes Description Comment    3/3/2021 10:46 AM Sury Ibarra Add [R07 9] Chest pain, unspecified type       ED Disposition     ED Disposition Condition Date/Time Comment    Admit Stable Wed Mar 3, 2021 10:46 AM Case was discussed with Hospitalist and the patient's admission status was agreed to be Admission Status: observation status to the service of Dr Gus Mcdonald    None         Patient's Medications   Discharge Prescriptions    No medications on file     No discharge procedures on file      PDMP Review     None          ED Provider  Electronically Signed by           Adonis Chavez MD  03/03/21 3036

## 2021-03-03 NOTE — PLAN OF CARE
Problem: Potential for Falls  Goal: Patient will remain free of falls  Description: INTERVENTIONS:  - Assess patient frequently for physical needs  -  Identify cognitive and physical deficits and behaviors that affect risk of falls    -  [place] fall precautions as indicated by assessment   - Educate patient/family on patient safety including physical limitations  - Instruct patient to call for assistance with activity based on assessment  - Modify environment to reduce risk of injury  - Consider OT/PT consult to assist with strengthening/mobility  Outcome: Progressing     Problem: CARDIOVASCULAR - ADULT  Goal: Maintains optimal cardiac output and hemodynamic stability  Description: INTERVENTIONS:  - Monitor I/O, vital signs and rhythm  - Monitor for S/S and trends of decreased cardiac output  - Administer and titrate ordered vasoactive medications to optimize hemodynamic stability  - Assess quality of pulses, skin color and temperature  - Assess for signs of decreased coronary artery perfusion  - Instruct patient to report change in severity of symptoms  Outcome: Progressing  Goal: Absence of cardiac dysrhythmias or at baseline rhythm  Description: INTERVENTIONS:  - Continuous cardiac monitoring, vital signs, obtain 12 lead EKG if ordered  - Administer antiarrhythmic and heart rate control medications as ordered  - Monitor electrolytes and administer replacement therapy as ordered  Outcome: Progressing   Tele monitor, ekg, trop monitoring, transfer for stress test

## 2021-03-03 NOTE — ASSESSMENT & PLAN NOTE
Patient complaining of pain across her chest since the morning, she was mopping the floors when this happened  She is being under stress in the last few days  Patient has multiple risk factors ( diabetes, obesity, HTN, HLD)   KENDALL score is 4 with 20% all-cause mortality risk  No changes in initial EKG ( Sinus rhythm, Probable left atrial enlargement, RBBB and LAFB)  Patient had what looks like NSVT vs SVT with aberration and was symptomatic at the time ( dizziness)   Initial troponin negative, continue to trend  Cardiology consulted, appreciated recommendations  Patient will likely need ischemic workup as inpatient

## 2021-03-03 NOTE — TRANSPORTATION MEDICAL NECESSITY
Section I - General Information    Name of Patient: Genna Jiménez                 : 1955    Medicare #: [id number]  Transport Date: 21 (PCS is valid for round trips on this date and for all repetitive trips in the 60-day range as noted below )  Origin: [address]: [address]  Is the pt's stay covered under Medicare Part A (PPS/DRG)   []     Closest appropriate facility? If no, why is transport to more distant facility required? Yes  If hospice pt, is this transport related to pt's terminal illness? NA       Section II - Medical Necessity Questionnaire  Ambulance transportation is medically necessary only if other means of transport are contraindicated or would be potentially harmful to the patient  To meet this requirement, the patient must either be "bed confined" or suffer from a condition such that transport by means other than ambulance is contraindicated by the patient's condition  The following questions must be answered by the medical professional signing below for this form to be valid:    1)  Describe the MEDICAL CONDITION (physical and/or mental) of this patient AT [address] [place] [address] that requires the patient to be transported in an ambulance and why transport by other means is contraindicated by the patient's condition:     Chest Pain, Cardiac Arrhthymia    2) Is the patient "bed confined" as defined below? No  To be "be confined" the patient must satisfy all three of the following conditions: (1) unable to get up from bed without Assistance; AND (2) unable to ambulate; AND (3) unable to sit in a chair or wheelchair  3) Can this patient safely be transported by car or wheelchair van (i e , seated during transport without a medical attendant or monitoring)?    No    4) In addition to completing questions 1-3 above, please check any of the following conditions that apply*:   *Note: supporting documentation for any boxes checked must be maintained in the patient's medical records  If hosp-hosp transfer, describe services needed at 2nd facility not available at 1st facility? Cardiac monitoring required en route       Section III - Signature of Physician or Healthcare Professional  I certify that the above information is true and correct based on my evaluation of this patient, and represent that the patient requires transport by ambulance and that other forms of transport are contraindicated  I understand that this information will be used by the Centers for Medicare and Medicaid Services (CMS) to support the determination of medical necessity for ambulance services, and I represent that I have personal knowledge of the patient's condition at time of transport  []  If this box is checked, I also certify that the patient is physically or mentally incapable of signing the ambulance service's claim and that the institution with which I am affiliated has furnished care, services, or assistance to the patient  My signature below is made on behalf of the patient pursuant to 42 CFR §424 36(b)(4)  In accordance with 42 CFR §424 37, the specific reason(s) that the patient is physically or mentally incapable of signing the claim form is as follows: Jamir Boning of Physician* or Healthcare Professional______________________________________________________________  Signature Date 03/03/21 (For scheduled repetitive transports, this form is not valid for transports performed more than 60 days after this date)    Printed Name & Credentials of Physician or Healthcare Professional (MD, DO, RN, etc )____CAMERON Leal____________________________  *Form must be signed by patient's attending physician for scheduled, repetitive transports   For non-repetitive, unscheduled ambulance transports, if unable to obtain the signature of the attending physician, any of the following may sign (choose appropriate option below)  [] Physician Assistant []  Clinical Nurse Specialist []  Registered Nurse  []  Nurse Practitioner  [x] Discharge Planner

## 2021-03-03 NOTE — ASSESSMENT & PLAN NOTE
Lab Results   Component Value Date    HGBA1C 10 5 (H) 12/04/2020       No results for input(s): POCGLU in the last 72 hours      Blood Sugar Average: Last 72 hrs:  HGBA1C not on goal on December, repeat HGBA1C   Hyperglycemia on admission 338, Alogliptin and empaglifozin on hold  Continue Lantus , decrease dose to 20 U QHS  SSI, hypoglycemia protocol

## 2021-03-03 NOTE — ASSESSMENT & PLAN NOTE
H/o HLD, DM, obesity not on any statins at home   Started on atorvastatin 40 mg daily   Lipid panel ordered, follow results

## 2021-03-03 NOTE — DISCHARGE SUMMARY
Discharge- Bob Diop 1955, 76 y o  female MRN: [id number]    Unit/Bed#: -01 Encounter: [id number]    Primary Care Provider: Haleigh Lombardo MD   Date and time admitted to hospital: 3/3/2021  8:52 AM        Acquired hypothyroidism  Assessment & Plan  Continue home dose levothyroxine  Check TSH     Major depressive disorder  Assessment & Plan  Continue Prozac 20 mg daily     GERD (gastroesophageal reflux disease)  Assessment & Plan  H/o GERD, will give famotidine 20 mg BID     Diabetes mellitus ([facility] [address] )  Assessment & Plan  Lab Results   Component Value Date    HGBA1C 10 5 (H) 12/04/2020       No results for input(s): POCGLU in the last 72 hours  Blood Sugar Average: Last 72 hrs:  HGBA1C not on goal on December, repeat HGBA1C   Hyperglycemia on admission 338, Alogliptin and empaglifozin on hold  Continue Lantus , decrease dose to 20 U QHS  SSI, hypoglycemia protocol     Hyperlipidemia  Assessment & Plan  H/o HLD, DM, obesity not on any statins at home   Started on atorvastatin 40 mg daily   Lipid panel ordered, follow results     Hypertension  Assessment & Plan  H/o HTN according to the chart, BP is WNL now, not on any antihypertensives at home   Monitor BMP, patient will benefit with ACEi     Restless leg syndrome  Assessment & Plan  Continue gabapentin home dose  Seizures ([facility] [address] )  Assessment & Plan  Continue topiramate home dose     * Chest pain  Assessment & Plan  Patient complaining of pain across her chest since the morning, she was mopping the floors when this happened  She is being under stress in the last few days  Patient has multiple risk factors ( diabetes, obesity, HTN, HLD)   KENDALL score is 4 with 20% all-cause mortality risk  No changes in initial EKG ( Sinus rhythm, Probable left atrial enlargement, RBBB and LAFB)  Patient had what looks like NSVT vs SVT with aberration and was symptomatic at the time ( dizziness)   Initial troponin negative, continue to trend     Cardiology consulted, appreciated recommendations  Patient will likely need ischemic workup as inpatient  Discharging Physician / Practitioner: Ida Arango MD  PCP: Haleigh Lombardo MD  Admission Date:   Admission Orders (From admission, onward)     Ordered        03/03/21 1047  Place in Observation  Once                   Discharge Date: 03/03/21    Resolved Problems  Date Reviewed: 3/3/2021    None          Consultations During Hospital Stay:  · Cardiology     Procedures Performed:   · none    Significant Findings / Test Results: There is mild enlargement of the cardiac silhouette     Mild pulmonary vascular congestion suspected  No overt alveolar edema  No pneumothorax, no pleural effusion      Incidental Findings:   · None     Reason for Admission: chest pain     Hospital Course:     Bob Diop is a 76 y o  female patient who originally presented to the hospital on 3/3/2021 due to  chest pain since the morning of admission, she describe it as constant, moderate intensity across her chest  She first noted the pain while she was moping the floors at home  She has a history of type 2 DM, HTN, MDD, hypothyroidism, HLD  Previous ischemic workup in 2016 was normal  Patient has multiple visits to Ed due to similar complaints  Today she is complaining of chest pain, headache, and dizziness  She had what looks like a NSVT vs SVT with aberration on the monitor and was feeling lightheaded at that time, no LOC, she was alert and Ox3  She has multiple risk factors, her KENDALL score was 4  Her initial labs remarkable for hyperglycemia, initial trop was WNL  Patient will be transfer to [facility]  for further cardiac work up         Please see above list of diagnoses and related plan for additional information  Condition at Discharge: stable     Discharge Day Visit / Exam:     Subjective:    Patient was seen by me, she is hemodynamically stable, she complains of frontal headache and moderate chest pain  Vitals: Blood Pressure: 116/58 (03/03/21 1242)  Pulse: 79 (03/03/21 1242)  Temperature: (!) 95 7 °F (35 4 °C) (03/03/21 1242)  Temp Source: Tympanic (03/03/21 1242)  Respirations: 20 (03/03/21 1242)  Height: 5' 2" (157 5 cm) (03/03/21 1242)  Weight - Scale: 135 kg (296 lb 8 3 oz) (03/03/21 1242)  SpO2: 94 % (03/03/21 1242)  Exam:   Physical Exam  Vitals signs reviewed  Constitutional:       General: She is not in acute distress  Appearance: She is obese  Eyes:      General:         Right eye: No discharge  Left eye: No discharge  Conjunctiva/sclera: Conjunctivae normal    Cardiovascular:      Rate and Rhythm: Normal rate and regular rhythm  Heart sounds: No murmur  Pulmonary:      Effort: Pulmonary effort is normal  No respiratory distress  Breath sounds: Normal breath sounds  No stridor  No wheezing, rhonchi or rales  Abdominal:      General: Bowel sounds are normal  There is no distension  Palpations: Abdomen is soft  Tenderness: There is no abdominal tenderness  There is no guarding  Musculoskeletal:      Right lower leg: Edema present  Left lower leg: Edema present  Skin:     General: Skin is warm  Findings: Erythema and rash present  Neurological:      General: No focal deficit present  Mental Status: She is alert and oriented to person, place, and time  Psychiatric:         Mood and Affect: Mood is anxious  Discussion with Family: called son for updates [facility]     Discharge instructions/Information to patient and family:   See after visit summary for information provided to patient and family  Provisions for Follow-Up Care:  See after visit summary for information related to follow-up care and any pertinent home health orders  Disposition:     [address] Transfer to [facility]      Planned Readmission: yes      Discharge Statement:  I spent 45 minutes discharging the patient  This time was spent on the day of discharge   I had direct contact with the patient on the day of discharge  Greater than 50% of the total time was spent examining patient, answering all patient questions, arranging and discussing plan of care with patient as well as directly providing post-discharge instructions  Additional time then spent on discharge activities  Discharge Medications:  See after visit summary for reconciled discharge medications provided to patient and family        ** Please Note: This note has been constructed using a voice recognition system **

## 2021-03-03 NOTE — H&P
H&P- Armando Fanning 1955, 76 y o  female MRN: [id number]    Unit/Bed#: -01 Encounter: [id number]    Primary Care Provider: Dheeraj Mills MD   Date and time admitted to hospital: 3/3/2021  8:52 AM        Acquired hypothyroidism  Assessment & Plan  Continue home dose levothyroxine  Check TSH     Major depressive disorder  Assessment & Plan  Continue Prozac 20 mg daily     GERD (gastroesophageal reflux disease)  Assessment & Plan  H/o GERD, will give famotidine 20 mg BID     Diabetes mellitus ([facility] [address] )  Assessment & Plan  Lab Results   Component Value Date    HGBA1C 10 5 (H) 12/04/2020       No results for input(s): POCGLU in the last 72 hours  Blood Sugar Average: Last 72 hrs:  HGBA1C not on goal on December, repeat HGBA1C   Hyperglycemia on admission 338, Alogliptin and empaglifozin on hold  Continue Lantus , decrease dose to 20 U QHS  SSI, hypoglycemia protocol     Hyperlipidemia  Assessment & Plan  H/o HLD, DM, obesity not on any statins at home   Started on atorvastatin 40 mg daily   Lipid panel ordered, follow results     Hypertension  Assessment & Plan  H/o HTN according to the chart, BP is WNL now, not on any antihypertensives at home   Monitor BMP, patient will benefit with ACEi     Restless leg syndrome  Assessment & Plan  Continue gabapentin home dose  Seizures ([facility] [address] )  Assessment & Plan  Continue topiramate home dose     * Chest pain  Assessment & Plan  Patient complaining of pain across her chest since the morning, she was mopping the floors when this happened  She is being under stress in the last few days  Patient has multiple risk factors ( diabetes, obesity, HTN, HLD)   KENDALL score is 4 with 20% all-cause mortality risk  No changes in initial EKG ( Sinus rhythm, Probable left atrial enlargement, RBBB and LAFB)  Patient had what looks like NSVT vs SVT with aberration and was symptomatic at the time ( dizziness)   Initial troponin negative, continue to trend     Cardiology consulted, appreciated recommendations  Patient will likely need ischemic workup as inpatient  VTE Prophylaxis: Heparin  / sequential compression device   Code Status: Full code  POLST: There is no POLST form on file for this patient (pre-hospital)  Discussion with family: yes    Anticipated Length of Stay:  Patient will be admitted on an Observation basis with an anticipated length of stay of  Less than 2 midnights  Justification for Hospital Stay: chest pain     Total Time for Visit, including Counseling / Coordination of Care: 45 minutes  Greater than 50% of this total time spent on direct patient counseling and coordination of care  Chief Complaint:   Chest pain    History of Present Illness:    Walter Lerma is a 76 y o  female who presents with chest pain since the morning of admission, she describe it as constant, moderate intensity across her chest  She first noted the pain while she was moping the floors at home  She has a history of type 2 DM, HTN, MDD, hypothyroidism, HLD  Previous ischemic workup in 2016 was normal  Patient has multiple visits to Ed due to similar complaints  Today she is complaining of chest pain, headache, and dizziness  She had what looks like a NSVT vs SVT with aberration on the monitor and was feeling lightheaded at that time, no LOC, she was alert and Ox3  She has multiple risk factors, her KENDALL score was 4  Her initial labs remarkable for hyperglycemia, initial trop was WNL  Review of Systems:    Review of Systems   Constitutional: Positive for fatigue  Negative for fever  Respiratory: Positive for chest tightness  Negative for cough and shortness of breath  Cardiovascular: Positive for chest pain, palpitations and leg swelling  Gastrointestinal: Negative for abdominal pain  Genitourinary: Negative for difficulty urinating  Musculoskeletal: Positive for arthralgias  Neurological: Positive for dizziness  Negative for tremors and syncope  Psychiatric/Behavioral: Negative for agitation  The patient is nervous/anxious  Past Medical and Surgical History:     Past Medical History:   Diagnosis Date    Diabetes mellitus ([address] )     Disease of thyroid gland     Hyperlipidemia     Hypertension     Hypothyroidism (acquired)     Restless leg syndrome     Seizures (HCC)        Past Surgical History:   Procedure Laterality Date    APPENDECTOMY      CHOLECYSTECTOMY      HIP FRACTURE SURGERY Right     TONSILLECTOMY      TUBAL LIGATION         Meds/Allergies:    Prior to Admission medications    Medication Sig Start Date End Date Taking? Authorizing Provider   Alogliptin Benzoate 25 MG TABS Take 25 mg by mouth daily    Historical Provider, MD   aspirin (Aspirin 81) 81 mg EC tablet Take 81 mg by mouth daily  Historical Provider, MD   Empagliflozin 25 MG TABS Take 25 mg by mouth every morning    Historical Provider, MD   FLUoxetine (PROzac) 40 MG capsule Take 20 mg by mouth daily Indications: give 2 capsules by mouth one time a day  Historical Provider, MD   gabapentin (NEURONTIN) 100 mg capsule Take 1 capsule (100 mg total) by mouth 2 (two) times a day Take 100 mg in the morning and 100 mg mid afternoon 7/10/18   SAMIA Barrientos   gabapentin (NEURONTIN) 300 mg capsule Take 1 capsule (300 mg total) by mouth daily at bedtime 7/10/18   SAMIA Barrientos   insulin glargine (LANTUS) 100 units/mL subcutaneous injection Inject 30 Units under the skin daily at bedtime    Historical Provider, MD   levothyroxine 50 mcg tablet Take 50 mcg by mouth daily Indications: Pt unsure of the amount  Historical Provider, MD   topiramate (TOPAMAX) 100 mg tablet Take 200 mg by mouth every morning      Historical Provider, MD   topiramate (TOPAMAX) 100 mg tablet Take 200 mg by mouth daily at bedtime  Historical Provider, MD   Cholecalciferol 25 MCG (1000 UT) capsule Take 1,000 Units by mouth daily    3/3/21  Historical Provider, MD     I have reviewed home medications with a medical source (PCP, Pharmacy, other)  Allergies: Allergies   Allergen Reactions    Pineapple Anaphylaxis    Byetta 10 Mcg Pen [Exenatide]     Marijuana [Dronabinol] GI Intolerance    Metformin Diarrhea     Other reaction(s): Unknown    Omeprazole Hives    Ondansetron      Other reaction(s): Unknown    Coconut Oil (Food Allergy) Rash    Liraglutide Rash and Other (See Comments)    Penicillins Rash       Social History:     Marital Status:    Occupation: retired  Patient Pre-hospital Living Situation: senior living complex   Patient Pre-hospital Level of Mobility: independent   Patient Pre-hospital Diet Restrictions: diabetic   Substance Use History:   Social History     Substance and Sexual Activity   Alcohol Use Yes    Frequency: Monthly or less    Comment: occasional     Social History     Tobacco Use   Smoking Status Never Smoker   Smokeless Tobacco Never Used   Tobacco Comment    not a smoker     Social History     Substance and Sexual Activity   Drug Use No       Family History:    Family History   Problem Relation Age of Onset    Seizures Father        Physical Exam:     Vitals:   Blood Pressure: 116/58 (03/03/21 1242)  Pulse: 79 (03/03/21 1242)  Temperature: (!) 95 7 °F (35 4 °C) (03/03/21 1242)  Temp Source: Tympanic (03/03/21 1242)  Respirations: 20 (03/03/21 1242)  Height: 5' 2" (157 5 cm) (03/03/21 1242)  Weight - Scale: 135 kg (296 lb 8 3 oz) (03/03/21 1242)  SpO2: 94 % (03/03/21 1242)    Physical Exam  Constitutional:       Appearance: She is obese  Eyes:      General: No scleral icterus  Right eye: No discharge  Left eye: No discharge  Conjunctiva/sclera: Conjunctivae normal    Cardiovascular:      Rate and Rhythm: Normal rate and regular rhythm  Pulses: Normal pulses  Pulmonary:      Effort: No respiratory distress  Breath sounds: No stridor  No wheezing, rhonchi or rales     Abdominal:      General: Bowel sounds are normal  There is no distension  Tenderness: There is no abdominal tenderness  There is no guarding  Musculoskeletal:      Right lower leg: Edema present  Left lower leg: Edema present  Skin:     General: Skin is warm  Neurological:      General: No focal deficit present  Mental Status: She is alert and oriented to person, place, and time  Psychiatric:         Mood and Affect: Mood is anxious  Additional Data:     Lab Results: I have personally reviewed pertinent reports  Results from last 7 days   Lab Units 03/03/21  0901   WBC Thousand/uL 6 40   HEMOGLOBIN g/dL 14 2   HEMATOCRIT % 43 6   PLATELETS Thousands/uL 188   NEUTROS PCT % 70   LYMPHS PCT % 18   MONOS PCT % 8   EOS PCT % 4     Results from last 7 days   Lab Units 03/03/21  0901   SODIUM mmol/L 134   POTASSIUM mmol/L 4 2   CHLORIDE mmol/L 98   CO2 mmol/L 31   BUN mg/dL 22*   CREATININE mg/dL 0 67   ANION GAP mmol/L 5   CALCIUM mg/dL 8 7   ALBUMIN g/dL 3 5   TOTAL BILIRUBIN mg/dL 0 36   ALK PHOS U/L 106 0   ALT U/L 12   AST U/L 12*   GLUCOSE RANDOM mg/dL 338*     Results from last 7 days   Lab Units 03/03/21  0911   INR  0 96                   Imaging: I have personally reviewed pertinent reports  XR chest 1 view portable   ED Interpretation by Chance Crawley MD (03/03 0918)   CXR: Interstial edema changes bilaterally  Final Result by Dedra Forbes MD (03/03 1009)      Enlargement of cardiac silhouette with mild pulmonary vascular congestion suspected  Findings concur with the referring clinician's preliminary interpretation already in the patient's electronic health record  Workstation performed: [id number]             EKG, Pathology, and Other Studies Reviewed on Admission:   EKG: Sinus rhythm  Probable left atrial enlargement  · RBBB and LAFB    Allscripts / Epic Records Reviewed: Yes     ** Please Note: This note has been constructed using a voice recognition system  **

## 2021-03-04 ENCOUNTER — APPOINTMENT (OUTPATIENT)
Dept: NON INVASIVE DIAGNOSTICS | Facility: HOSPITAL | Age: 66
DRG: 287 | End: 2021-03-04
Payer: COMMERCIAL

## 2021-03-04 ENCOUNTER — APPOINTMENT (OUTPATIENT)
Dept: RADIOLOGY | Facility: HOSPITAL | Age: 66
DRG: 287 | End: 2021-03-04
Payer: COMMERCIAL

## 2021-03-04 LAB
ALBUMIN SERPL BCP-MCNC: 3 G/DL (ref 3.5–5)
ALP SERPL-CCNC: 110 U/L (ref 46–116)
ALT SERPL W P-5'-P-CCNC: 23 U/L (ref 12–78)
ANION GAP SERPL CALCULATED.3IONS-SCNC: 4 MMOL/L (ref 4–13)
AST SERPL W P-5'-P-CCNC: 24 U/L (ref 5–45)
ATRIAL RATE: 75 BPM
BASOPHILS # BLD AUTO: 0.03 THOUSANDS/ΜL (ref 0–0.1)
BASOPHILS NFR BLD AUTO: 1 % (ref 0–1)
BILIRUB SERPL-MCNC: 0.5 MG/DL (ref 0.2–1)
BUN SERPL-MCNC: 13 MG/DL (ref 5–25)
CALCIUM ALBUM COR SERPL-MCNC: 9.4 MG/DL (ref 8.3–10.1)
CALCIUM SERPL-MCNC: 8.6 MG/DL (ref 8.3–10.1)
CHLORIDE SERPL-SCNC: 103 MMOL/L (ref 100–108)
CO2 SERPL-SCNC: 28 MMOL/L (ref 21–32)
CREAT SERPL-MCNC: 0.69 MG/DL (ref 0.6–1.3)
EOSINOPHIL # BLD AUTO: 0.17 THOUSAND/ΜL (ref 0–0.61)
EOSINOPHIL NFR BLD AUTO: 3 % (ref 0–6)
ERYTHROCYTE [DISTWIDTH] IN BLOOD BY AUTOMATED COUNT: 13 % (ref 11.6–15.1)
GFR SERPL CREATININE-BSD FRML MDRD: 91 ML/MIN/1.73SQ M
GLUCOSE SERPL-MCNC: 142 MG/DL (ref 65–140)
GLUCOSE SERPL-MCNC: 162 MG/DL (ref 65–140)
GLUCOSE SERPL-MCNC: 162 MG/DL (ref 65–140)
GLUCOSE SERPL-MCNC: 168 MG/DL (ref 65–140)
GLUCOSE SERPL-MCNC: 176 MG/DL (ref 65–140)
HCT VFR BLD AUTO: 47.8 % (ref 34.8–46.1)
HGB BLD-MCNC: 15.1 G/DL (ref 11.5–15.4)
IMM GRANULOCYTES # BLD AUTO: 0.02 THOUSAND/UL (ref 0–0.2)
IMM GRANULOCYTES NFR BLD AUTO: 0 % (ref 0–2)
LYMPHOCYTES # BLD AUTO: 1.32 THOUSANDS/ΜL (ref 0.6–4.47)
LYMPHOCYTES NFR BLD AUTO: 24 % (ref 14–44)
MAGNESIUM SERPL-MCNC: 1.8 MG/DL (ref 1.6–2.6)
MCH RBC QN AUTO: 28.7 PG (ref 26.8–34.3)
MCHC RBC AUTO-ENTMCNC: 31.6 G/DL (ref 31.4–37.4)
MCV RBC AUTO: 91 FL (ref 82–98)
MONOCYTES # BLD AUTO: 0.35 THOUSAND/ΜL (ref 0.17–1.22)
MONOCYTES NFR BLD AUTO: 6 % (ref 4–12)
NEUTROPHILS # BLD AUTO: 3.58 THOUSANDS/ΜL (ref 1.85–7.62)
NEUTS SEG NFR BLD AUTO: 66 % (ref 43–75)
NRBC BLD AUTO-RTO: 0 /100 WBCS
P AXIS: 57 DEGREES
PLATELET # BLD AUTO: 199 THOUSANDS/UL (ref 149–390)
PMV BLD AUTO: 11 FL (ref 8.9–12.7)
POTASSIUM SERPL-SCNC: 4.2 MMOL/L (ref 3.5–5.3)
PR INTERVAL: 134 MS
PROT SERPL-MCNC: 7.4 G/DL (ref 6.4–8.2)
QRS AXIS: -55 DEGREES
QRSD INTERVAL: 137 MS
QT INTERVAL: 421 MS
QTC INTERVAL: 471 MS
RBC # BLD AUTO: 5.26 MILLION/UL (ref 3.81–5.12)
SODIUM SERPL-SCNC: 135 MMOL/L (ref 136–145)
T WAVE AXIS: 67 DEGREES
VENTRICULAR RATE: 75 BPM
WBC # BLD AUTO: 5.47 THOUSAND/UL (ref 4.31–10.16)

## 2021-03-04 PROCEDURE — 93306 TTE W/DOPPLER COMPLETE: CPT

## 2021-03-04 PROCEDURE — G1004 CDSM NDSC: HCPCS

## 2021-03-04 PROCEDURE — 99222 1ST HOSP IP/OBS MODERATE 55: CPT | Performed by: INTERNAL MEDICINE

## 2021-03-04 PROCEDURE — 93010 ELECTROCARDIOGRAM REPORT: CPT | Performed by: INTERNAL MEDICINE

## 2021-03-04 PROCEDURE — A9502 TC99M TETROFOSMIN: HCPCS

## 2021-03-04 PROCEDURE — 82948 REAGENT STRIP/BLOOD GLUCOSE: CPT

## 2021-03-04 PROCEDURE — 80053 COMPREHEN METABOLIC PANEL: CPT | Performed by: INTERNAL MEDICINE

## 2021-03-04 PROCEDURE — 99232 SBSQ HOSP IP/OBS MODERATE 35: CPT | Performed by: INTERNAL MEDICINE

## 2021-03-04 PROCEDURE — 85025 COMPLETE CBC W/AUTO DIFF WBC: CPT | Performed by: INTERNAL MEDICINE

## 2021-03-04 PROCEDURE — 93017 CV STRESS TEST TRACING ONLY: CPT

## 2021-03-04 PROCEDURE — 93005 ELECTROCARDIOGRAM TRACING: CPT

## 2021-03-04 PROCEDURE — 93306 TTE W/DOPPLER COMPLETE: CPT | Performed by: INTERNAL MEDICINE

## 2021-03-04 PROCEDURE — 78452 HT MUSCLE IMAGE SPECT MULT: CPT

## 2021-03-04 PROCEDURE — 83735 ASSAY OF MAGNESIUM: CPT | Performed by: INTERNAL MEDICINE

## 2021-03-04 PROCEDURE — 97163 PT EVAL HIGH COMPLEX 45 MIN: CPT

## 2021-03-04 RX ORDER — LORAZEPAM 0.5 MG/1
0.5 TABLET ORAL 2 TIMES DAILY
Status: DISCONTINUED | OUTPATIENT
Start: 2021-03-04 | End: 2021-03-09 | Stop reason: HOSPADM

## 2021-03-04 RX ORDER — FLUOXETINE HYDROCHLORIDE 20 MG/1
60 CAPSULE ORAL DAILY
Status: DISCONTINUED | OUTPATIENT
Start: 2021-03-05 | End: 2021-03-09 | Stop reason: HOSPADM

## 2021-03-04 RX ADMIN — ASPIRIN 81 MG: 81 TABLET, COATED ORAL at 10:15

## 2021-03-04 RX ADMIN — GABAPENTIN 100 MG: 100 CAPSULE ORAL at 10:15

## 2021-03-04 RX ADMIN — FLUOXETINE 40 MG: 20 CAPSULE ORAL at 10:15

## 2021-03-04 RX ADMIN — INSULIN LISPRO 1 UNITS: 100 INJECTION, SOLUTION INTRAVENOUS; SUBCUTANEOUS at 17:20

## 2021-03-04 RX ADMIN — GABAPENTIN 100 MG: 100 CAPSULE ORAL at 17:20

## 2021-03-04 RX ADMIN — ACETAMINOPHEN 650 MG: 325 TABLET, FILM COATED ORAL at 10:03

## 2021-03-04 RX ADMIN — LORAZEPAM 0.5 MG: 0.5 TABLET ORAL at 13:50

## 2021-03-04 RX ADMIN — TOPIRAMATE 200 MG: 100 TABLET, FILM COATED ORAL at 10:14

## 2021-03-04 RX ADMIN — INSULIN LISPRO 1 UNITS: 100 INJECTION, SOLUTION INTRAVENOUS; SUBCUTANEOUS at 22:03

## 2021-03-04 RX ADMIN — ENOXAPARIN SODIUM 40 MG: 40 INJECTION SUBCUTANEOUS at 10:16

## 2021-03-04 RX ADMIN — STANDARDIZED SENNA CONCENTRATE 8.6 MG: 8.6 TABLET ORAL at 10:15

## 2021-03-04 RX ADMIN — LEVOTHYROXINE SODIUM 50 MCG: 50 TABLET ORAL at 05:25

## 2021-03-04 RX ADMIN — LORAZEPAM 0.5 MG: 0.5 TABLET ORAL at 17:20

## 2021-03-04 RX ADMIN — REGADENOSON 0.4 MG: 0.08 INJECTION, SOLUTION INTRAVENOUS at 13:03

## 2021-03-04 RX ADMIN — FAMOTIDINE 20 MG: 20 TABLET ORAL at 10:14

## 2021-03-04 RX ADMIN — GABAPENTIN 300 MG: 300 CAPSULE ORAL at 22:00

## 2021-03-04 RX ADMIN — INSULIN GLARGINE 20 UNITS: 100 INJECTION, SOLUTION SUBCUTANEOUS at 22:01

## 2021-03-04 RX ADMIN — FAMOTIDINE 20 MG: 20 TABLET ORAL at 17:21

## 2021-03-04 RX ADMIN — TOPIRAMATE 200 MG: 100 TABLET, FILM COATED ORAL at 22:00

## 2021-03-04 RX ADMIN — INSULIN LISPRO 1 UNITS: 100 INJECTION, SOLUTION INTRAVENOUS; SUBCUTANEOUS at 13:52

## 2021-03-04 NOTE — PHYSICAL THERAPY NOTE
PT EVALUATION       03/04/21 1440   PT Last Visit   PT Visit Date 03/04/21   Note Type   Note type Evaluation   Pain Assessment   Pain Assessment Tool Pain Assessment not indicated - pt denies pain   Home Living   Type of Home Apartment  (senior building)   Home Layout One level;Elevator  (0 JOSE)   Bathroom Shower/Tub Tub/shower unit   Home Equipment Walker;Cane   Additional Comments pt reports that she takes a sponge bath : unable to get into tub; does not drive but takes the bus; has a walker and a cane and uses depending on where she is going in the community  does not use around the house  Prior Function   Level of [place] Independent with ADLs and functional mobility   Lives With Alone   Receives Help From Family   ADL Assistance Independent   IADLs Independent   Restrictions/Precautions   Weight Bearing Precautions Per Order No   Other Precautions Chair Alarm; Bed Alarm;O2;Fall Risk   General   Additional Pertinent History Pt admitted with chest pain   Family/Caregiver Present Yes  (son :present in AM when pt going to neuclear med )   Cognition   Overall Cognitive Status WFL   Arousal/Participation Cooperative   Orientation Level Oriented X4   Following Commands Follows multistep commands with increased time or repetition   RLE Assessment   RLE Assessment   (grossly 3+/5 ROM limited by body habitus)   LLE Assessment   LLE Assessment   (Strength grossly 3+/5, ROM limited by body habitus)   Bed Mobility   Supine to Sit 4  Minimal assistance   Sit to Supine 5  Supervision   Additional Comments Able to boost herself up in bed using bedrails once in supine   Transfers   Sit to Stand 4  Minimal assistance   Additional items Assist x 1;Verbal cues   Stand to Sit 4  Minimal assistance   Additional items Assist x 1;Verbal cues   Stand pivot 4  Minimal assistance   Additional items Assist x 1;Verbal cues   Additional Comments Min A to transfer from bed > wheelchair when going to neuclear med  Ambulation/Elevation   Gait Assistance 4  Minimal assist   Additional items Assist x 1;Verbal cues; Tactile cues   Assistive Device Rolling walker   Distance 3 feet    Balance   Static Sitting Good   Dynamic Sitting Fair +   Static Standing Fair   Dynamic Standing Fair -   Ambulatory Fair -   Activity Tolerance   Activity Tolerance Patient tolerated treatment well   Nurse Made Aware Yes: Leopold Medina: pt felt dizzy and nauseous and returned to supine ; RN to check on pt  Assessment   Prognosis Good   Problem List Decreased strength;Decreased endurance; Impaired balance;Decreased mobility;Obesity   Assessment Patient seen for Physical Therapy evaluation  Patient admitted with Chest pain  Comorbidities affecting patient's physical performance include:DM, HTN, seizures  Personal factors affecting patient at time of initial evaluation include: lives in senior building apartment , ambulating with assistive device, inability to ambulate household distances, inability to navigate community distances, inability to navigate level surfaces without external assistance, limited home support, inability to perform ADLS and inability to perform IADLS   Prior to admission, patient was independent with functional mobility without assistive device, independent with ADLS, independent with IADLS, living alone in senior apartment building with 0 steps to enter (has elevator inside) , ambulating household distance and ambulating community distances  Please find objective findings from Physical Therapy assessment regarding body systems outlined above with impairments and limitations including weakness, impaired balance, decreased endurance, decreased activity tolerance, decreased functional mobility tolerance and fall risk  The Barthel Index was used as a functional outcome tool presenting with a score of 50 today indicating marked limitations of functional mobility and ADLS    Patient's clinical presentation is currently unstable/unpredictable as seen in patient's presentation of increased fall risk, new onset of impairment of functional mobility, decreased endurance and new onset of weakness  Pt would benefit from continued Physical Therapy treatment to address deficits as defined above and maximize level of functional mobility  As demonstrated by objective findings, the assigned level of complexity for this evaluation is high  The patient's AM-PAC Basic Mobility Inpatient Short Form Raw Score is 18, Standardized Score is 41 05  A standardized score less than 42 9 suggests the patient may benefit from discharge to post-acute rehabilitation services  Please also refer to the recommendation of the Physical Therapist for safe discharge planning  Goals   Patient Goals to feel better   STG Expiration Date 03/11/21   Short Term Goal #1 Indep with all transfers sit <> stand with good balance  Short Term Goal #2 Indep amb  with RW or least restrictive AD for 50 feet with fair + balance   LTG Expiration Date 03/18/21   Long Term Goal #1 Indep amb  with RW or least restrictive  for 80 feet with good balance   Plan   Treatment/Interventions ADL retraining;Functional transfer training;LE strengthening/ROM; Therapeutic exercise; Endurance training;Patient/family training;Equipment eval/education; Bed mobility;Gait training;Spoke to nursing;Spoke to case management;OT   PT Frequency 5x/wk   Recommendation   PT Discharge Recommendation Post-Acute Rehabilitation Services   Additional Comments Pt seen in AM to assist with transfer to w/c to go to [facility]  In PM seen to complete PT evaluation, pt presented eating lunch sitting at side  of bed  After eating, pt stated that she did not feel good and requested to lay down  Pt assisted to supine with supervision   O2 and HR WFLs via judith  RN made aware of same and RN states she will be in to check pt  Pt made aware of same  Blankets obtained and placed on pt who was cold    Will continue PT AM-PAC Basic Mobility Inpatient   Turning in Bed Without Bedrails 4   Lying on Back to Sitting on Edge of Flat Bed 3   Moving Bed to Chair 3   Standing Up From Chair 3   Walk in Room 3   Climb 3-5 Stairs 2   Basic Mobility Inpatient Raw Score 18   Basic Mobility Standardized Score 41 05   Barthel Index   Feeding 10   Bathing 0   Grooming Score 0   Dressing Score 5   Bladder Score 10   Bowels Score 10   Toilet Use Score 5   Transfers (Bed/Chair) Score 10   Mobility (Level Surface) Score 0   Stairs Score 0   Barthel Index Score 50   Licensure   [place] License Number  Leobardo  Alejandrarula Hutchins PT  [id number]   Pt in bed with all needs in reach; RN aware and will come to check on pt in a few minutes  No acute distress, just feeling nauseous

## 2021-03-04 NOTE — PHYSICAL THERAPY NOTE
PT Cancellation      03/04/21 1010   PT Last Visit   PT Visit Date 03/04/21   Note Type   Note type Evaluation   Cancel Reasons   (Deferred by RN due to pain right now, will follow )   Licensure   [place] License Number  Jenaro Duff PT  [id number]

## 2021-03-04 NOTE — CONSULTS
Consultation - Rip Ibanez 76 y o  female MRN: [id number]    Unit/Bed#: [address] [place] [address] Encounter: [id number]      Identifying Data:  76years old white female is admitted at [facility] on March 3, 2021 with complaining of chest pain  Psychiatric consultation is asked for the depression and anxiety  Patient initially went to the [facility] room and somehow she is being transferred to this hospital she does not know why but she is complaining of feeling more depressed lately because of the several reasons recently her favorite aunt  her younger son moved away to [place] and older son she does not have much chance to see him too much she cannot get out of the house due to the weather and too much now she had a hard time to keep the appointment with her doctors at [facility] she feels that she is locked up in her own apartment she reports feeling more depressed she is concerned about her health issue and she wants to increase her Prozac she used to take higher dose but currently she is on Prozac 40 mg daily  She is able to give out the basic background information  She has a long psych history with multiple psychiatric admissions and multiple suicide attempts in the past she has been under psychiatric care with the psychiatrist at [facility] she used to see a therapist but recently she has not seen a therapist she does not drive and she takes [place] to see her doctor at the clinic she is happy to see me and thanking me for coming and talking to her she wants me to adjust her medications to help her with the depression currently she has trouble in sleeping she feels uptight isolated and depressed but she clearly denies feeling suicidal she wants to get better she does not want to end up in a psychiatric hospital   Discussed with the nurse  Social history patient lives alone she has 2 grownup sons they get along with her    Patient denies smoking denies abusing alcohol or drugs patient denies history of drug and alcohol abuse she has 12th grade education and she was in [employer] for 6 years she did work in [employer] she is retired  Diagnosis depression anxiety insomnia diabetes hypothyroidism high cholesterol hypertension seizures restless legs syndrome history of appendectomy tonsillectomy cholecystectomy tubal ligation hip fracture surgery  Home medications patient is on Prozac 40 mg daily and seizure medications diabetic medications cholesterol medications as listed in the chart  Allergy  Pineapple byetta marijuana metformin omeprazole Zofran coconut oil penicillin liraglutide    Chief Complaints:  Increased depression anxiety insomnia    Family History:  Patient denies  Family History   Problem Relation Age of Onset    Seizures Father        Legal History:  Patient denies    Mental Status Exam:  76years old white female affect flat withdrawn psychomotor retardation speech slow and low in tone she looks depressed and she reports feeling depressed patient is pessimistic negative unmotivated ongoing chronic medical problems and she has trouble in taking care of herself  Memory intact  Patient denies auditory or visual hallucinations  Patient denies suicidal or homicidal ideation  No paranoia no delusion elucidated  Patient gets anxious and she has trouble in sleeping  Poor concentration  Insight and judgments are adequate  History of Present Illness     HPI: Katina Hough is a 76y o  year old female who presents with chest pain    Consults      Historical Information   Past Psychiatric History:  Patient gives an extensive psych history of depression she has been under psychiatric care at [facility] clinic since many years with at least 5-6 psychiatric admissions in the past and 3-4 suicide attempts in the past by taking an overdose of pills  Currently patient is under psychiatric care and taking Prozac 40 mg daily from her psychiatrist at [facility]    Patient denies history of drug and alcohol abuse no legal problems in the past   Patient used to see therapist at the clinic but currently she has not seen a therapist she is seeing only psychiatrist and getting medication  Patient reports feeling more depressed and asking to increase her Prozac to help her with the depression  Patient will [facility]    Past Medical History:   Diagnosis Date    Diabetes mellitus ([address] )     Disease of thyroid gland     Hyperlipidemia     Hypertension     Hypothyroidism (acquired)     Restless leg syndrome     Seizures (HCC)      Past Surgical History:   Procedure Laterality Date    APPENDECTOMY      CHOLECYSTECTOMY      HIP FRACTURE SURGERY Right     TONSILLECTOMY      TUBAL LIGATION       Social History   Social History     Substance and Sexual Activity   Alcohol Use Yes    Frequency: Monthly or less    Drinks per session: 1 or 2    Binge frequency: Never    Comment: occasional     Social History     Substance and Sexual Activity   Drug Use Never     Social History     Tobacco Use   Smoking Status Never Smoker   Smokeless Tobacco Never Used   Tobacco Comment    not a smoker       Meds/Allergies   current meds:   Current Facility-Administered Medications   Medication Dose Route Frequency    acetaminophen (TYLENOL) tablet 650 mg  650 mg Oral Q6H PRN    aspirin (ECOTRIN LOW STRENGTH) EC tablet 81 mg  81 mg Oral Daily    atorvastatin (LIPITOR) tablet 40 mg  40 mg Oral Daily With Dinner    enoxaparin (LOVENOX) subcutaneous injection 40 mg  40 mg Subcutaneous Daily    famotidine (PEPCID) tablet 20 mg  20 mg Oral BID    FLUoxetine (PROzac) capsule 40 mg  40 mg Oral Daily    gabapentin (NEURONTIN) capsule 100 mg  100 mg Oral BID    gabapentin (NEURONTIN) capsule 300 mg  300 mg Oral HS    insulin glargine (LANTUS) subcutaneous injection 20 Units 0 2 mL  20 Units Subcutaneous HS    insulin lispro (HumaLOG) 100 units/mL subcutaneous injection 1-5 Units  1-5 Units Subcutaneous TID AC    insulin lispro (HumaLOG) 100 units/mL subcutaneous injection 1-5 Units  1-5 Units Subcutaneous HS    levothyroxine tablet 50 mcg  50 mcg Oral Early Morning    senna (SENOKOT) tablet 8 6 mg  1 tablet Oral Daily    topiramate (TOPAMAX) tablet 200 mg  200 mg Oral QAM    topiramate (TOPAMAX) tablet 200 mg  200 mg Oral HS    and PTA meds:    Medications Prior to Admission   Medication    Alogliptin Benzoate 25 MG TABS    aspirin (Aspirin 81) 81 mg EC tablet    Empagliflozin 25 MG TABS    FLUoxetine (PROzac) 40 MG capsule    gabapentin (NEURONTIN) 100 mg capsule    gabapentin (NEURONTIN) 300 mg capsule    insulin glargine (LANTUS) 100 units/mL subcutaneous injection    levothyroxine 50 mcg tablet    topiramate (TOPAMAX) 100 mg tablet     Allergies   Allergen Reactions    Pineapple Anaphylaxis    Byetta 10 Mcg Pen [Exenatide]     Marijuana [Dronabinol] GI Intolerance    Metformin Diarrhea     Other reaction(s): Unknown    Omeprazole Hives    Ondansetron      Other reaction(s): Unknown    Coconut Oil (Food Allergy) Rash    Liraglutide Rash and Other (See Comments)    Penicillins Rash       Objective   Vitals: Blood pressure 129/62, pulse 73, temperature (!) 97 3 °F (36 3 °C), temperature source Oral, resp  rate 18, height 5' 2" (1 575 m), weight 129 kg (284 lb 9 6 oz), SpO2 97 %  Routine Lab Results:   Admission on 03/03/2021   Component Date Value Ref Range Status    Magnesium 03/03/2021 1 7  1 6 - 2 6 mg/dL Final    Troponin I 03/03/2021 <0 02  <=0 04 ng/mL Final    [employer] Chemistry analyzer 99% cutoff is > 0 04 ng/mL in network labs     o cTnI 99% cutoff is useful only when applied to patients in the clinical setting of myocardial ischemia   o cTnI 99% cutoff should be interpreted in the context of clinical history, ECG findings and possibly cardiac imaging to establish correct diagnosis     o cTnI 99% cutoff may be suggestive but clearly not indicative of a coronary event without the clinical setting of myocardial ischemia   Hemoglobin A1C 03/03/2021 11 5* Normal 3 8-5 6%; PreDiabetic 5 7-6 4%; Diabetic >=6 5%; Glycemic control for adults with diabetes <7 0% % Final    EAG 03/03/2021 283  mg/dl Final    POC Glucose 03/03/2021 168* 65 - 140 mg/dl Final    Sodium 03/04/2021 135* 136 - 145 mmol/L Final    Potassium 03/04/2021 4 2  3 5 - 5 3 mmol/L Final    Chloride 03/04/2021 103  100 - 108 mmol/L Final    CO2 03/04/2021 28  21 - 32 mmol/L Final    ANION GAP 03/04/2021 4  4 - 13 mmol/L Final    BUN 03/04/2021 13  5 - 25 mg/dL Final    Creatinine 03/04/2021 0 69  0 60 - 1 30 mg/dL Final    Standardized to IDMS reference method    Glucose 03/04/2021 168* 65 - 140 mg/dL Final    If the patient is fasting, the ADA then defines impaired fasting glucose as > 100 mg/dL and diabetes as > or equal to 123 mg/dL  Specimen collection should occur prior to Sulfasalazine administration due to the potential for falsely depressed results  Specimen collection should occur prior to Sulfapyridine administration due to the potential for falsely elevated results   Calcium 03/04/2021 8 6  8 3 - 10 1 mg/dL Final    Corrected Calcium 03/04/2021 9 4  8 3 - 10 1 mg/dL Final    AST 03/04/2021 24  5 - 45 U/L Final    Specimen collection should occur prior to Sulfasalazine administration due to the potential for falsely depressed results   ALT 03/04/2021 23  12 - 78 U/L Final    Specimen collection should occur prior to Sulfasalazine administration due to the potential for falsely depressed results   Alkaline Phosphatase 03/04/2021 110  46 - 116 U/L Final    Total Protein 03/04/2021 7 4  6 4 - 8 2 g/dL Final    Albumin 03/04/2021 3 0* 3 5 - 5 0 g/dL Final    Total Bilirubin 03/04/2021 0 50  0 20 - 1 00 mg/dL Final    Use of this assay is not recommended for patients undergoing treatment with eltrombopag due to the potential for falsely elevated results   eGFR 03/04/2021 91  ml/min/1 73sq m Final    Magnesium 03/04/2021 1 8  1 6 - 2 6 mg/dL Final    WBC 03/04/2021 5 47  4 31 - 10 16 Thousand/uL Final    RBC 03/04/2021 5 26* 3 81 - 5 12 Million/uL Final    Hemoglobin 03/04/2021 15 1  11 5 - 15 4 g/dL Final    Hematocrit 03/04/2021 47 8* 34 8 - 46 1 % Final    MCV 03/04/2021 91  82 - 98 fL Final    MCH 03/04/2021 28 7  26 8 - 34 3 pg Final    MCHC 03/04/2021 31 6  31 4 - 37 4 g/dL Final    RDW 03/04/2021 13 0  11 6 - 15 1 % Final    MPV 03/04/2021 11 0  8 9 - 12 7 fL Final    Platelets 40/92/9037 199  149 - 390 Thousands/uL Final    nRBC 03/04/2021 0  /100 WBCs Final    Neutrophils Relative 03/04/2021 66  43 - 75 % Final    Immat GRANS % 03/04/2021 0  0 - 2 % Final    Lymphocytes Relative 03/04/2021 24  14 - 44 % Final    Monocytes Relative 03/04/2021 6  4 - 12 % Final    Eosinophils Relative 03/04/2021 3  0 - 6 % Final    Basophils Relative 03/04/2021 1  0 - 1 % Final    Neutrophils Absolute 03/04/2021 3 58  1 85 - 7 62 Thousands/µL Final    Immature Grans Absolute 03/04/2021 0 02  0 00 - 0 20 Thousand/uL Final    Lymphocytes Absolute 03/04/2021 1 32  0 60 - 4 47 Thousands/µL Final    Monocytes Absolute 03/04/2021 0 35  0 17 - 1 22 Thousand/µL Final    Eosinophils Absolute 03/04/2021 0 17  0 00 - 0 61 Thousand/µL Final    Basophils Absolute 03/04/2021 0 03  0 00 - 0 10 Thousands/µL Final    POC Glucose 03/04/2021 162* 65 - 140 mg/dl Final    POC Glucose 03/04/2021 142* 65 - 140 mg/dl Final   Admission on 03/03/2021, Discharged on 03/03/2021   Component Date Value Ref Range Status    WBC 03/03/2021 6 40  4 31 - 10 16 Thousand/uL Final    RBC 03/03/2021 5 07  3 81 - 5 12 Million/uL Final    Hemoglobin 03/03/2021 14 2  11 5 - 15 4 g/dL Final    Hematocrit 03/03/2021 43 6  34 8 - 46 1 % Final    MCV 03/03/2021 86  82 - 98 fL Final    MCH 03/03/2021 28 0  26 8 - 34 3 pg Final    MCHC 03/03/2021 32 6  31 4 - 37 4 g/dL Final    RDW 03/03/2021 13 3  11 6 - 15 1 % Final    MPV 03/03/2021 10 5  8 9 - 12 7 fL Final    Platelets 60/28/8206 188  149 - 390 Thousands/uL Final    Neutrophils Relative 03/03/2021 70  43 - 75 % Final    Immat GRANS % 03/03/2021 0  0 - 2 % Final    Lymphocytes Relative 03/03/2021 18  14 - 44 % Final    Monocytes Relative 03/03/2021 8  4 - 12 % Final    Eosinophils Relative 03/03/2021 4  0 - 6 % Final    Basophils Relative 03/03/2021 0  0 - 1 % Final    Neutrophils Absolute 03/03/2021 4 48  1 85 - 7 62 Thousands/µL Final    Immature Grans Absolute 03/03/2021 0 01  0 00 - 0 20 Thousand/uL Final    Lymphocytes Absolute 03/03/2021 1 12  0 60 - 4 47 Thousands/µL Final    Monocytes Absolute 03/03/2021 0 54  0 17 - 1 22 Thousand/µL Final    Eosinophils Absolute 03/03/2021 0 24  0 00 - 0 61 Thousand/µL Final    Basophils Absolute 03/03/2021 0 01  0 00 - 0 10 Thousands/µL Final    Sodium 03/03/2021 134  133 - 145 mmol/L Final    Potassium 03/03/2021 4 2  3 5 - 5 0 mmol/L Final    Chloride 03/03/2021 98  96 - 108 mmol/L Final    CO2 03/03/2021 31  22 - 33 mmol/L Final    ANION GAP 03/03/2021 5  4 - 13 mmol/L Final    BUN 03/03/2021 22* 6 - 20 mg/dL Final    Creatinine 03/03/2021 0 67  0 40 - 1 10 mg/dL Final    Standardized to IDMS reference method    Glucose 03/03/2021 338* 65 - 140 mg/dL Final    If the patient is fasting, the ADA then defines impaired fasting glucose as > 100 mg/dL and diabetes as > or equal to 123 mg/dL  Specimen collection should occur prior to Sulfasalazine administration due to the potential for falsely depressed results  Specimen collection should occur prior to Sulfapyridine administration due to the potential for falsely elevated results   Calcium 03/03/2021 8 7  8 4 - 10 2 mg/dL Final    AST 03/03/2021 12* 15 - 41 U/L Final    Specimen collection should occur prior to Sulfasalazine administration due to the potential for falsely depressed results       ALT 03/03/2021 12  5 - 54 U/L Final    Specimen collection should occur prior to Sulfasalazine administration due to the potential for falsely depressed results   Alkaline Phosphatase 03/03/2021 106 0  35 - 140 U/L Final    Total Protein 03/03/2021 6 8  6 4 - 8 3 g/dL Final    Albumin 03/03/2021 3 5  3 4 - 4 8 g/dL Final    Total Bilirubin 03/03/2021 0 36  0 30 - 1 20 mg/dL Final    eGFR 03/03/2021 92  ml/min/1 73sq m Final    Troponin I 03/03/2021 <0 03  0 00 - 0 07 ng/mL Final    [employer]'s Chemistry analyzer 99% cutoff is > 0 03ng/mL    o cTnl 99% cutoff is useful only when applied to patients in the clinical setting of myocardial ischemia  o cTnl 99% cutoff should be interpreted in the context of clinical history, ECG findings and possibly cardiac imaging to establish correct diagnosis  o cTnl 99% cutoff may be suggestive but clearly not indicative of a coronary event without the clinical setting of myocardial ischemia   BNP 03/03/2021 27 8  1 - 100 pg/mL Final    Protime 03/03/2021 10 9  9 5 - 12 1 seconds Final    INR 03/03/2021 0 96  0 90 - 1 10 Final    PTT 03/03/2021 24  23 - 31 seconds Final    D-Dimer, Quant  03/03/2021 0 49  0 19 - 0 49 mg/L FEU Final    Reference and upper limits to exclude DVT and PE are the same  Do not use to exclude if clinical symptoms are present      SARS-CoV-2 03/03/2021 Negative  Negative Final    INFLUENZA A PCR 03/03/2021 Negative  Negative Final    INFLUENZA B PCR 03/03/2021 Negative  Negative Final    RSV PCR 03/03/2021 Negative  Negative Final    Ventricular Rate 03/03/2021 83  BPM Final    Atrial Rate 03/03/2021 83  BPM Final    MN Interval 03/03/2021 132  ms Final    QRSD Interval 03/03/2021 135  ms Final    QT Interval 03/03/2021 391  ms Final    QTC Interval 03/03/2021 460  ms Final    P Axis 03/03/2021 52  degrees Final    QRS Axis 03/03/2021 -46  degrees Final    T Wave [place] 03/03/2021 63  degrees Final    Troponin I 03/03/2021 <0 03  0 00 - 0 07 ng/mL Final [employer]'s Chemistry analyzer 99% cutoff is > 0 03ng/mL    o cTnl 99% cutoff is useful only when applied to patients in the clinical setting of myocardial ischemia  o cTnl 99% cutoff should be interpreted in the context of clinical history, ECG findings and possibly cardiac imaging to establish correct diagnosis  o cTnl 99% cutoff may be suggestive but clearly not indicative of a coronary event without the clinical setting of myocardial ischemia   Magnesium 03/03/2021 1 5* 1 6 - 2 6 mg/dL Final    Sodium 03/03/2021 136  133 - 145 mmol/L Final    Potassium 03/03/2021 4 1  3 5 - 5 0 mmol/L Final    Chloride 03/03/2021 100  96 - 108 mmol/L Final    CO2 03/03/2021 29  22 - 33 mmol/L Final    ANION GAP 03/03/2021 7  4 - 13 mmol/L Final    BUN 03/03/2021 19  6 - 20 mg/dL Final    Creatinine 03/03/2021 0 58  0 40 - 1 10 mg/dL Final    Standardized to IDMS reference method    Glucose 03/03/2021 256* 65 - 140 mg/dL Final    If the patient is fasting, the ADA then defines impaired fasting glucose as > 100 mg/dL and diabetes as > or equal to 123 mg/dL  Specimen collection should occur prior to Sulfasalazine administration due to the potential for falsely depressed results  Specimen collection should occur prior to Sulfapyridine administration due to the potential for falsely elevated results   Calcium 03/03/2021 8 8  8 4 - 10 2 mg/dL Final    AST 03/03/2021 12* 15 - 41 U/L Final    Specimen collection should occur prior to Sulfasalazine administration due to the potential for falsely depressed results   ALT 03/03/2021 10  5 - 54 U/L Final    Specimen collection should occur prior to Sulfasalazine administration due to the potential for falsely depressed results       Alkaline Phosphatase 03/03/2021 104 1  35 - 140 U/L Final    Total Protein 03/03/2021 6 6  6 4 - 8 3 g/dL Final    Albumin 03/03/2021 3 5  3 4 - 4 8 g/dL Final    Total Bilirubin 03/03/2021 0 42  0 30 - 1 20 mg/dL Final    eGFR 03/03/2021 96  ml/min/1 73sq m Final    LACTIC ACID 03/03/2021 1 5  0 0 - 2 0 mmol/L Final    Cholesterol 03/03/2021 202* <=200 mg/dL Final    Cholesterol:       Desirable         <200 mg/dl       Borderline         200-239 mg/dl       High              >239           Triglycerides 03/03/2021 121 0  <=150 mg/dL Final    Triglyceride:     Normal          <150 mg/dl     Borderline High 150-199 mg/dl     High            200-499 mg/dl        Very High       >499 mg/dl    Specimen collection should occur prior to N-Acetylcysteine or Metamizole administration due to the potential for falsely depressed results   HDL, Direct 03/03/2021 43* >=50 mg/dL Final    HDL Cholesterol:       Low     <41 mg/dL    LDL Calculated 03/03/2021 135* 0 - 100 mg/dL Final    LDL Cholesterol:     Optimal           <100 mg/dl     Near Optimal      100-129 mg/dl     Above Optimal       Borderline High 130-159 mg/dl       High            160-189 mg/dl       Very High       >189 mg/dl         This screening LDL is a calculated result  It does not have the accuracy of the Direct Measured LDL in the monitoring of patients with hyperlipidemia and/or statin therapy  Direct Measure LDL (LRT554) must be ordered separately in these patients   TSH 3RD GENERATON 03/03/2021 4  123  0 340 - 5 600 uIU/mL Final    The recommended reference ranges for TSH during pregnancy are as follows:   First trimester   0 05-3 7   Second trimester  0 31-4 35   Third trimester   0 41-5 18    Note: Normal ranges may not apply to patients who are transgender, non-binary, or whose legal sex, sex at birth, and gender identity differ      Troponin I 03/03/2021 <0 03  0 00 - 0 07 ng/mL Final    [employer]'s Chemistry analyzer 99% cutoff is > 0 03ng/mL    o cTnl 99% cutoff is useful only when applied to patients in the clinical setting of myocardial ischemia  o cTnl 99% cutoff should be interpreted in the context of clinical history, ECG findings and possibly cardiac imaging to establish correct diagnosis  o cTnl 99% cutoff may be suggestive but clearly not indicative of a coronary event without the clinical setting of myocardial ischemia   POC Glucose 2021 234* 65 - 140 mg/dl Final    POC Glucose 2021 238* 65 - 140 mg/dl Final    POC Glucose 2021 195* 65 - 140 mg/dl Final         Diagnosis:  Major depression recurrent  Anxiety  Insomnia  Bereavement recently her favorite aunt   Plan:  Increase Prozac to 60 mg daily  Ativan 0 5 mg p o  B i d  Psychotherapy  Psychiatric follow-up with her psychiatrist at  a clinic after the discharge  Discussed with the nurse  I will follow up during the hospital stay      Luzma Juarez MD

## 2021-03-04 NOTE — ASSESSMENT & PLAN NOTE
Lab Results   Component Value Date    HGBA1C 10 5 (H) 12/04/2020       No results for input(s): POCGLU in the last 72 hours      Blood Sugar Average: Last 72 hrs:     Repeat HgA1C pending   Hold home PO medications  Continue Lantus, add SSI coverage with meals, QHS

## 2021-03-04 NOTE — PROGRESS NOTES
Dr Flora Acevedo and Dr Janet Farah made aware of patient's c/o chest pain  EKG ordered  No nitro ordered  Tylenol given for pain

## 2021-03-04 NOTE — ASSESSMENT & PLAN NOTE
Started on atorvastatin 40 mg during [facility] admission, continue     Results from 3/3/2021   Ref   Range 3/3/2021 13:54   Cholesterol Latest Ref Range: <=200 mg/dL 202 (H)   Triglycerides Latest Ref Range: <=150 mg/dL 121 0   HDL Latest Ref Range: >=50 mg/dL 43 (L)   LDL Calculated Latest Ref Range: 0 - 100 mg/dL 135 (H)

## 2021-03-04 NOTE — UTILIZATION REVIEW
Initial Clinical Review  Observation 3/3/21 @ 1841, converted to inpatient admission 3/4/21 @ 1959 for continued care & tx for chest pain  Per MD 3/4:  Abnormal nuclear stress test shows apical ischemia  2D echo showed EF of 50 for 60% with mild concentric hypertrophy and grade 2 diastolic dysfunction  Scheduled for cardiac catheterization  Admission: Date/Time/Statement:   Admission Orders (From admission, onward)     Ordered        03/04/21 1959  Inpatient Admission  Once                Orders Placed This Encounter   Procedures    Inpatient Admission     Standing Status:   Standing     Number of Occurrences:   1     Order Specific Question:   Level of Care     Answer:   Med Surg [16]     Order Specific Question:   Estimated length of stay     Answer:   More than 2 Midnights     Order Specific Question:   Certification     Answer:   I certify that inpatient services are medically necessary for this patient for a duration of greater than two midnights  See H&P and MD Progress Notes for additional information about the patient's course of treatment  Assessment/Plan: transferred from [address] [place] [address],[address]  76 y o  female who presents with PMH of HTN, HLD, hypothyroidism, DM2, restless leg syndrome, seizures, depression  She presented to [facility] initially for evaluation of chest pain  The chest pain started suddenly with associated SOB while she was mopping the floors at home  Workup in the ED showed mild interstitial edema on CXR and cardiomegaly, otherwise workup was negative including initial negative troponin and EKG negative for acute changes  Due to risk factors patient was admitted to the hospital for further monitoring  Patient was noted to have possible NSVT at [facility] with associated dizziness  Case was discussed with Cardiology who recommended inpatient workup and patient was transferred to [facility] for inpatient nuclear stress test and further monitoring    Patient currently reports that she has mild chest discomfort, described as a pressure but denies any SOB, palpitations, lightheadedness, dizziness  She states her legs are making her more uncomfortable this time, states she normally takes medication at night for restless leg syndrome  She denies pain at this time and denies LE edema    ED Triage Vitals   Temperature Pulse Respirations Blood Pressure SpO2   03/03/21 1807 03/03/21 1807 03/03/21 1807 03/03/21 1807 03/03/21 1807   (!) 97 2 °F (36 2 °C) 73 16 151/63 91 %      Temp Source Heart Rate Source Patient Position - Orthostatic VS BP Location FiO2 (%)   03/04/21 0745 03/04/21 0745 03/04/21 0745 03/04/21 0745 --   Oral Monitor Lying Right arm       Pain Score       03/03/21 2021       3          Wt Readings from Last 1 Encounters:   03/04/21 129 kg (284 lb 9 6 oz)     Additional Vital Signs:   Date/Time  Temp  Pulse  Resp  BP  MAP (mmHg)  SpO2   03/04/21 07:40:32  --  74  --  119/52  74  96 %   03/04/21 03:22:49  97 9 °F (36 6 °C)  73  20  138/75  96  97 %   03/04/21 00:22:38  96 8 °F (36 °C)Abnormal   81  20  134/62  86  95 %   03/1955  97 9 °F (36 6 °C)  78  --  145/56  86  94 %   03/03/21 19:53:25  --  81  20  145/56  86  95 %   03/03/21 18:07:25  97 2 °F (36 2 °C)Abnormal   73  16  151/63  92  91 %     Pertinent Labs/Diagnostic Test Results:   Results from last 7 days   Lab Units 03/03/21  0915   SARS-COV-2  Negative     Results from last 7 days   Lab Units 03/04/21  0831 03/03/21  0901   WBC Thousand/uL 5 47 6 40   HEMOGLOBIN g/dL 15 1 14 2   HEMATOCRIT % 47 8* 43 6   PLATELETS Thousands/uL 199 188   NEUTROS ABS Thousands/µL 3 58 4 48     Results from last 7 days   Lab Units 03/04/21  0831 03/03/21  2018 03/03/21  1354 03/03/21  0901   SODIUM mmol/L 135*  --  136 134   POTASSIUM mmol/L 4 2  --  4 1 4 2   CHLORIDE mmol/L 103  --  100 98   CO2 mmol/L 28  --  29 31   ANION GAP mmol/L 4  --  7 5   BUN mg/dL 13  --  19 22*   CREATININE mg/dL 0 69  --  0 58 0 67 EGFR ml/min/1 73sq m 91  --  96 92   CALCIUM mg/dL 8 6  --  8 8 8 7   MAGNESIUM mg/dL 1 8 1 7  --  1 5*     Results from last 7 days   Lab Units 03/04/21  0831 03/03/21  1354 03/03/21  0901   AST U/L 24 12* 12*   ALT U/L 23 10 12   ALK PHOS U/L 110 104 1 106 0   TOTAL PROTEIN g/dL 7 4 6 6 6 8   ALBUMIN g/dL 3 0* 3 5 3 5   TOTAL BILIRUBIN mg/dL 0 50 0 42 0 36     Results from last 7 days   Lab Units 03/04/21  1558 03/04/21  1213 03/04/21  0741 03/04/21  0739 03/03/21  2028 03/03/21  1640 03/03/21  1312 03/03/21  1252   POC GLUCOSE mg/dl 176* 162* 142* 162* 168* 195* 234* 238*     Results from last 7 days   Lab Units 03/04/21  0831 03/03/21  1354 03/03/21  0901   GLUCOSE RANDOM mg/dL 168* 256* 338*     Results from last 7 days   Lab Units 03/03/21  2020   HEMOGLOBIN A1C % 11 5*   EAG mg/dl 283     Results from last 7 days   Lab Units 03/03/21  2018 03/03/21  1633 03/03/21  1354 03/03/21  0901   TROPONIN I ng/mL <0 02 <0 03 <0 03 <0 03     Results from last 7 days   Lab Units 03/03/21  0911   D-DIMER QUANTITATIVE mg/L FEU 0 49     Results from last 7 days   Lab Units 03/03/21  0911   PROTIME seconds 10 9   INR  0 96   PTT seconds 24     Results from last 7 days   Lab Units 03/03/21  1354   TSH 3RD GENERATON uIU/mL 4  123     Results from last 7 days   Lab Units 03/03/21  1353   LACTIC ACID mmol/L 1 5     Results from last 7 days   Lab Units 03/03/21  0901   BNP pg/mL 27 8     Results from last 7 days   Lab Units 03/03/21  0915   INFLUENZA A PCR  Negative   INFLUENZA B PCR  Negative   RSV PCR  Negative     3/3  Cxr=  Enlargement of cardiac silhouette with mild pulmonary vascular congestion suspected     Ekg=   Sinus rhythm  Probable left atrial enlargement  RBBB and LAFB    Past Medical History:   Diagnosis Date    Diabetes mellitus ([address] )     Disease of thyroid gland     Hyperlipidemia     Hypertension     Hypothyroidism (acquired)     Restless leg syndrome     Seizures ([address] )      Present on Admission:   Diabetes mellitus [facility] )   Hypertension   Hyperlipidemia   GERD (gastroesophageal reflux disease)   Chest pain   Acquired hypothyroidism   Major depressive disorder   Seizures (HCC)   Restless leg syndrome    Admitting Diagnosis: Chest pain [R07 9]  Age/Sex: 76 y o  female  Admission Orders:  accuchecks  Telemetry  Consult cardio  Consult psyche  Pt/ot eval & tx  Scd  O2 to keep sats>92%    Scheduled Medications:  aspirin, 81 mg, Oral, Daily  atorvastatin, 40 mg, Oral, Daily With Dinner  enoxaparin, 40 mg, Subcutaneous, Daily  famotidine, 20 mg, Oral, BID  FLUoxetine, 60 mg, Oral, Daily  gabapentin, 100 mg, Oral, BID  gabapentin, 300 mg, Oral, HS  insulin glargine, 20 Units, Subcutaneous, HS  insulin lispro, 1-5 Units, Subcutaneous, TID AC  insulin lispro, 1-5 Units, Subcutaneous, HS  levothyroxine, 50 mcg, Oral, Early Morning  senna, 1 tablet, Oral, Daily  topiramate, 200 mg, Oral, QAM  topiramate, 200 mg, Oral, HS    PRN Meds:  acetaminophen, 650 mg, Oral, Q6H PRN    Network Utilization Review Department  ATTENTION: Please call with any questions or concerns to [phone] and carefully listen to the prompts so that you are directed to the right person  All voicemails are confidential   Kavya Pelayo all requests for admission clinical reviews, approved or denied determinations and any other requests to dedicated fax number below belonging to the campus where the patient is receiving treatment   List of dedicated fax numbers for the Facilities:  [address] [place] [address] DENIALS (Administrative/Medical Necessity) [phone]   [address] [place] [address] (Maternity/NICU/Pediatrics) 261 [facility],[address] Floor [facility] [address] [place] [address] Dr Jovi Esparza [phone] (  [address] "[address] 103) [address] [facility] Darvin Russell 28 Leslie Villegas 1481 [address] [place]) [address] [place], [place] [address] [phone]

## 2021-03-04 NOTE — H&P
[address] Internal Medicine  H&P- Kimberley Herrmann 1955, 76 y o  female MRN: [id number]  Unit/Bed#: [address] [place] [address] Encounter: [id number]  Primary Care Provider: Fara Cha MD   Date and time admitted to hospital: 3/3/2021  6:01 PM    * Chest pain  Assessment & Plan  Patient admitted to [facility] for chest pain after she was mopping the floors at home, with associated SOB  CXR with mild pulmonary vascular congestion   Troponin (-) x 3  KENDALL score 4  She was noted to have NSVT vs  SVT with aberration with associated dizziness while at [facility]  Cardiology recommended inpatient workup including stress test and patient was transferred to [address] [place] [address] test ordered for tomorrow  Cardiology recommendations appreciated     Acquired hypothyroidism  Assessment & Plan  Continue home dose of levothyroxine    Major depressive disorder  Assessment & Plan  Continue Prozac 40 mg daily     GERD (gastroesophageal reflux disease)  Assessment & Plan  Continue Pepcid 20 mg BID    Diabetes mellitus ([address] )  Assessment & Plan  Lab Results   Component Value Date    HGBA1C 10 5 (H) 12/04/2020       No results for input(s): POCGLU in the last 72 hours  Blood Sugar Average: Last 72 hrs:     Repeat HgA1C pending   Hold home PO medications  Continue Lantus, add SSI coverage with meals, QHS    Hyperlipidemia  Assessment & Plan  Started on atorvastatin 40 mg during [facility] admission, continue     Results from 3/3/2021   Ref   Range 3/3/2021 13:54   Cholesterol Latest Ref Range: <=200 mg/dL 202 (H)   Triglycerides Latest Ref Range: <=150 mg/dL 121 0   HDL Latest Ref Range: >=50 mg/dL 43 (L)   LDL Calculated Latest Ref Range: 0 - 100 mg/dL 135 (H)         Hypertension  Assessment & Plan  Patient does not take any medications at home   Monitor BP during admission    Restless leg syndrome  Assessment & Plan  Continue home dose of gabapentin, 100 mg early AM and midday, 300 mg QHS    Seizures (HCC)  Assessment & Plan  Continue home dose of topiramate    VTE Prophylaxis: Enoxaparin (Lovenox)  / sequential compression device   Code Status: Level 1  POLST: POLST is not applicable to this patient  Discussion with family:  No    Anticipated Length of Stay:  Patient will be admitted on an Observation basis with an anticipated length of stay of  < 2 midnights  Justification for Hospital Stay: chest pain r/o ACS, cardiac stress test     Total Time for Visit, including Counseling / Coordination of Care: 30 minutes  Greater than 50% of this total time spent on direct patient counseling and coordination of care  Chief Complaint:   Chest pain    History of Present Illness:    Wes Zacarias is a 76 y o  female who presents with PMH of HTN, HLD, hypothyroidism, DM2, restless leg syndrome, seizures, depression  She presented to [facility] initially for evaluation of chest pain  The chest pain started suddenly with associated SOB while she was mopping the floors at home  Workup in the ED showed mild interstitial edema on CXR and cardiomegaly, otherwise workup was negative including initial negative troponin and EKG negative for acute changes  Due to risk factors patient was admitted to the hospital for further monitoring  Patient was noted to have possible NSVT at [facility] with associated dizziness  Case was discussed with Cardiology who recommended inpatient workup and patient was transferred to [facility] for inpatient nuclear stress test and further monitoring  Patient currently reports that she has mild chest discomfort, described as a pressure but denies any SOB, palpitations, lightheadedness, dizziness  She states her legs are making her more uncomfortable this time, states she normally takes medication at night for restless leg syndrome  She denies pain at this time and denies LE edema  Review of Systems:    Review of Systems   Constitutional: Negative for chills, fatigue and fever     HENT: Negative for congestion, rhinorrhea and sore throat  Eyes: Negative for visual disturbance  Respiratory: Positive for chest tightness  Negative for cough, shortness of breath and wheezing  Cardiovascular: Negative for chest pain, palpitations and leg swelling  Gastrointestinal: Negative for abdominal distention, abdominal pain, diarrhea, nausea and vomiting  Genitourinary: Negative for dysuria, frequency and urgency  Musculoskeletal: Negative for gait problem  Skin: Negative for color change  Neurological: Negative for dizziness, weakness, light-headedness and headaches  Past Medical and Surgical History:     Past Medical History:   Diagnosis Date    Diabetes mellitus ([address] )     Disease of thyroid gland     Hyperlipidemia     Hypertension     Hypothyroidism (acquired)     Restless leg syndrome     Seizures (HCC)        Past Surgical History:   Procedure Laterality Date    APPENDECTOMY      CHOLECYSTECTOMY      HIP FRACTURE SURGERY Right     TONSILLECTOMY      TUBAL LIGATION         Meds/Allergies:    Prior to Admission medications    Medication Sig Start Date End Date Taking? Authorizing Provider   Alogliptin Benzoate 25 MG TABS Take 25 mg by mouth daily    Historical Provider, MD   aspirin (Aspirin 81) 81 mg EC tablet Take 81 mg by mouth daily  Historical Provider, MD   Empagliflozin 25 MG TABS Take 25 mg by mouth every morning    Historical Provider, MD   FLUoxetine (PROzac) 40 MG capsule Take 20 mg by mouth daily Indications: give 2 capsules by mouth one time a day      Historical Provider, MD   gabapentin (NEURONTIN) 100 mg capsule Take 1 capsule (100 mg total) by mouth 2 (two) times a day Take 100 mg in the morning and 100 mg mid afternoon 7/10/18   Edythe Range, CRNP   gabapentin (NEURONTIN) 300 mg capsule Take 1 capsule (300 mg total) by mouth daily at bedtime 7/10/18   Edythe Range, CRNP   insulin glargine (LANTUS) 100 units/mL subcutaneous injection Inject 30 Units under the skin daily at bedtime    Historical Provider, MD   levothyroxine 50 mcg tablet Take 50 mcg by mouth daily Indications: Pt unsure of the amount  Historical Provider, MD   topiramate (TOPAMAX) 100 mg tablet Take 200 mg by mouth every morning      Historical Provider, MD   topiramate (TOPAMAX) 100 mg tablet Take 200 mg by mouth daily at bedtime  Historical Provider, MD   Cholecalciferol 25 MCG (1000 UT) capsule Take 1,000 Units by mouth daily  3/3/21  Historical Provider, MD       Allergies: Allergies   Allergen Reactions    Pineapple Anaphylaxis    Byetta 10 Mcg Pen [Exenatide]     Marijuana [Dronabinol] GI Intolerance    Metformin Diarrhea     Other reaction(s): Unknown    Omeprazole Hives    Ondansetron      Other reaction(s): Unknown    Coconut Oil (Food Allergy) Rash    Liraglutide Rash and Other (See Comments)    Penicillins Rash       Social History:    Social History     Substance and Sexual Activity   Alcohol Use Yes    Frequency: Monthly or less    Drinks per session: 1 or 2    Binge frequency: Never    Comment: occasional     Social History     Tobacco Use   Smoking Status Never Smoker   Smokeless Tobacco Never Used   Tobacco Comment    not a smoker     Social History     Substance and Sexual Activity   Drug Use Never       Family History:    Family History   Problem Relation Age of Onset    Seizures Father        Physical Exam:     Vitals:   Blood Pressure: 145/56 (03/1955)  Pulse: 78 (03/1955)  Temperature: 97 9 °F (36 6 °C) (03/1955)  Respirations: 20 (03/03/21 1953)  SpO2: 94 % (03/1955)    Physical Exam  Vitals signs reviewed  Constitutional:       General: She is not in acute distress  Appearance: She is well-developed  She is obese  She is not ill-appearing  HENT:      Head: Normocephalic and atraumatic  Cardiovascular:      Rate and Rhythm: Normal rate and regular rhythm  Heart sounds: Normal heart sounds  No murmur     Pulmonary:      Effort: Pulmonary effort is normal  No respiratory distress  Breath sounds: Normal breath sounds  No wheezing, rhonchi or rales  Abdominal:      General: Bowel sounds are normal  There is no distension  Palpations: Abdomen is soft  Tenderness: There is no abdominal tenderness  There is no guarding  Musculoskeletal:         General: Swelling (LE edema, non-pitting) present  No tenderness  Skin:     General: Skin is warm and dry  Findings: No erythema or rash  Neurological:      Mental Status: She is alert and oriented to person, place, and time  Psychiatric:         Mood and Affect: Mood normal          Behavior: Behavior normal          Additional Data:     Lab Results: I have personally reviewed pertinent reports  Results from last 7 days   Lab Units 03/03/21  0901   WBC Thousand/uL 6 40   HEMOGLOBIN g/dL 14 2   HEMATOCRIT % 43 6   PLATELETS Thousands/uL 188   NEUTROS PCT % 70   LYMPHS PCT % 18   MONOS PCT % 8   EOS PCT % 4     Results from last 7 days   Lab Units 03/03/21  1354   SODIUM mmol/L 136   POTASSIUM mmol/L 4 1   CHLORIDE mmol/L 100   CO2 mmol/L 29   BUN mg/dL 19   CREATININE mg/dL 0 58   ANION GAP mmol/L 7   CALCIUM mg/dL 8 8   ALBUMIN g/dL 3 5   TOTAL BILIRUBIN mg/dL 0 42   ALK PHOS U/L 104 1   ALT U/L 10   AST U/L 12*   GLUCOSE RANDOM mg/dL 256*     Results from last 7 days   Lab Units 03/03/21  0911   INR  0 96             Results from last 7 days   Lab Units 03/03/21  1353   LACTIC ACID mmol/L 1 5       Imaging: I have personally reviewed pertinent reports  No orders to display       EKG, Pathology, and Other Studies Reviewed on Admission:   · EKG: prior reviewed, 3/03/21    [facility]: Yes     ** Please Note: This note has been constructed using a voice recognition system   **

## 2021-03-04 NOTE — ASSESSMENT & PLAN NOTE
Patient admitted to [facility] for chest pain after she was mopping the floors at home, with associated SOB  CXR with mild pulmonary vascular congestion   Troponin (-) x 3  KENDALL score 4  She was noted to have NSVT vs  SVT with aberration with associated dizziness while at [facility]  Cardiology recommended inpatient workup including stress test and patient was transferred to [address] [place] [address] test ordered for tomorrow  Cardiology recommendations appreciated

## 2021-03-04 NOTE — CONSULTS
Consultation - Cardiology   Cheryle Starch 76 y o  female MRN: [id number]  Unit/Bed#: [address] [place] [address] Encounter: [id number]    Assessment/Plan     Assessment:  1  Chest pain  2  Depression  3  Hypertension  4  Diabetes with hemoglobin A1c of 10 5 in December 5  Dyslipidemia      Plan:  Patient has been admitted to the hospitalist service  1  Will obtain 2D echocardiogram to evaluate cardiac function, structure wall motion    2  Patient is scheduled for Lexiscan nuclear stress test to rule out ischemia  3  Continue Lipitor and aspirin  4  Diabetes managed for the primary team    5  Further orders as patient's testing condition warrants  History of Present Illness   Physician Requesting Consult: Yoni Palacios MD  Reason for Consult / Principal Problem:  Chest pain      HPI: Cheryle Starch is a 76y o  year old female who initially presented to the [facility] with complaints of midsternal chest pain while she was mopping the floor at home  Due to the need for cardiac workup patient was transferred [place] the [facility] [place]ogram and nuclear stress testing  Patient denies any further chest pain since admission to the hospital     Patient has a history of diabetes, hypertension, hypothyroidism, dyslipidemia and depression  She obtains her healthcare from the [facility]  She is independent at home, but does note due to bad knees and the ankles with 19th and admits she has not been as active as she has in the past   Nonsmoker and nondrinker  Inpatient consult to Cardiology  Consult performed by: SAMIA Sullivan  Consult ordered by: Sanjay Cesar MD          Review of Systems   Constitutional: Positive for activity change and fatigue  Negative for appetite change and fever  HENT: Negative for congestion, ear discharge, mouth sores, rhinorrhea, sneezing and tinnitus  Eyes: Negative  Negative for photophobia and visual disturbance  Respiratory: Positive for chest tightness and shortness of breath  Cardiovascular: Positive for chest pain and leg swelling  Gastrointestinal: Negative for abdominal distention, diarrhea and vomiting  Endocrine: Negative  Negative for polydipsia, polyphagia and polyuria  Genitourinary: Negative  Negative for difficulty urinating  Musculoskeletal: Negative  Skin: Negative  Neurological: Negative  Negative for dizziness, syncope, speech difficulty, weakness and light-headedness  Hematological: Negative  Psychiatric/Behavioral: Positive for agitation  The patient is nervous/anxious          Historical Information   Past Medical History:   Diagnosis Date    Diabetes mellitus ([facility] [address] )     Disease of thyroid gland     Hyperlipidemia     Hypertension     Hypothyroidism (acquired)     Restless leg syndrome     Seizures (HCC)      Past Surgical History:   Procedure Laterality Date    APPENDECTOMY      CHOLECYSTECTOMY      HIP FRACTURE SURGERY Right     TONSILLECTOMY      TUBAL LIGATION       Social History     Substance and Sexual Activity   Alcohol Use Yes    Frequency: Monthly or less    Drinks per session: 1 or 2    Binge frequency: Never    Comment: occasional     Social History     Substance and Sexual Activity   Drug Use Never     E-Cigarette/Vaping    E-Cigarette Use Never User      E-Cigarette/Vaping Substances    Nicotine No     THC No     CBD No     Flavoring No     Other No     Unknown No      Social History     Tobacco Use   Smoking Status Never Smoker   Smokeless Tobacco Never Used   Tobacco Comment    not a smoker     Family History:   Family History   Problem Relation Age of Onset    Seizures Father        Meds/Allergies   all current active meds have been reviewed, current meds:   Current Facility-Administered Medications   Medication Dose Route Frequency    acetaminophen (TYLENOL) tablet 650 mg  650 mg Oral Q6H PRN    aspirin (ECOTRIN LOW STRENGTH) EC tablet 81 mg  81 mg Oral Daily    atorvastatin (LIPITOR) tablet 40 mg  40 mg Oral Daily With Dinner    enoxaparin (LOVENOX) subcutaneous injection 40 mg  40 mg Subcutaneous Daily    famotidine (PEPCID) tablet 20 mg  20 mg Oral BID    [START ON 3/5/2021] FLUoxetine (PROzac) capsule 60 mg  60 mg Oral Daily    gabapentin (NEURONTIN) capsule 100 mg  100 mg Oral BID    gabapentin (NEURONTIN) capsule 300 mg  300 mg Oral HS    insulin glargine (LANTUS) subcutaneous injection 20 Units 0 2 mL  20 Units Subcutaneous HS    insulin lispro (HumaLOG) 100 units/mL subcutaneous injection 1-5 Units  1-5 Units Subcutaneous TID AC    insulin lispro (HumaLOG) 100 units/mL subcutaneous injection 1-5 Units  1-5 Units Subcutaneous HS    levothyroxine tablet 50 mcg  50 mcg Oral Early Morning    LORazepam (ATIVAN) tablet 0 5 mg  0 5 mg Oral BID    senna (SENOKOT) tablet 8 6 mg  1 tablet Oral Daily    topiramate (TOPAMAX) tablet 200 mg  200 mg Oral QAM    topiramate (TOPAMAX) tablet 200 mg  200 mg Oral HS    and PTA meds:   Prior to Admission Medications   Prescriptions Last Dose Informant Patient Reported? Taking? Alogliptin Benzoate 25 MG TABS 3/3/2021 at Unknown time  Yes Yes   Sig: Take 25 mg by mouth daily   Empagliflozin 25 MG TABS 3/3/2021 at Unknown time  Yes Yes   Sig: Take 25 mg by mouth every morning   FLUoxetine (PROzac) 40 MG capsule 3/3/2021 at Unknown time  Yes Yes   Sig: Take 20 mg by mouth daily Indications: give 2 capsules by mouth one time a day  aspirin (Aspirin 81) 81 mg EC tablet 3/3/2021 at Unknown time  Yes Yes   Sig: Take 81 mg by mouth daily     gabapentin (NEURONTIN) 100 mg capsule 3/3/2021 at 1300  No Yes   Sig: Take 1 capsule (100 mg total) by mouth 2 (two) times a day Take 100 mg in the morning and 100 mg mid afternoon   gabapentin (NEURONTIN) 300 mg capsule 3/2/2021 at Unknown time  No Yes   Sig: Take 1 capsule (300 mg total) by mouth daily at bedtime   insulin glargine (LANTUS) 100 units/mL subcutaneous injection 3/2/2021 at Unknown time  Yes Yes   Sig: Inject 30 Units under the skin daily at bedtime   levothyroxine 50 mcg tablet Past Week at Unknown time  Yes Yes   Sig: Take 50 mcg by mouth daily Indications: Pt unsure of the amount  topiramate (TOPAMAX) 100 mg tablet 3/3/2021 at Unknown time  Yes Yes   Sig: Take 200 mg by mouth 2 (two) times a day       Facility-Administered Medications: None     Allergies   Allergen Reactions    Pineapple Anaphylaxis    Byetta 10 Mcg Pen [Exenatide]     Marijuana [Dronabinol] GI Intolerance    Metformin Diarrhea     Other reaction(s): Unknown    Omeprazole Hives    Ondansetron      Other reaction(s): Unknown    Coconut Oil Rash    Liraglutide Rash and Other (See Comments)    Penicillins Rash       Objective   Vitals: Blood pressure 129/62, pulse 73, temperature (!) 97 3 °F (36 3 °C), temperature source Oral, resp  rate 18, height 5' 2" (1 575 m), weight 129 kg (284 lb 9 6 oz), SpO2 97 %  Orthostatic Blood Pressures      Most Recent Value   Blood Pressure  129/62 filed at 03/04/2021 1013   Patient Position - Orthostatic VS  Lying filed at 03/04/2021 0745            Intake/Output Summary (Last 24 hours) at 3/4/2021 1430  Last data filed at 3/4/2021 1301  Gross per 24 hour   Intake 0 ml   Output 1550 ml   Net -1550 ml       Invasive Devices     Peripheral Intravenous Line            Peripheral IV 03/03/21 Left Antecubital 1 day                Physical Exam  Vitals signs and nursing note reviewed  Constitutional:       Appearance: Normal appearance  She is obese  HENT:      Head: Normocephalic and atraumatic  Right Ear: External ear normal       Left Ear: External ear normal       Nose: Nose normal    Eyes:      General: No scleral icterus  Right eye: No discharge  Left eye: No discharge  Conjunctiva/sclera: Conjunctivae normal       Pupils: Pupils are equal, round, and reactive to light     Neck:      Musculoskeletal: Normal range of motion and neck supple  Cardiovascular:      Rate and Rhythm: Normal rate and regular rhythm  Pulses: Normal pulses  Heart sounds: Normal heart sounds  No murmur  Pulmonary:      Effort: Pulmonary effort is normal  No respiratory distress  Breath sounds: Normal breath sounds  No wheezing, rhonchi or rales  Abdominal:      General: Bowel sounds are normal  There is no distension  Palpations: Abdomen is soft  Musculoskeletal:      Right lower leg: Edema present  Left lower leg: Edema present  Comments: Component of venous stasis and lymphedema   Skin:     Capillary Refill: Capillary refill takes less than 2 seconds  Neurological:      General: No focal deficit present  Mental Status: She is alert and oriented to person, place, and time  Mental status is at baseline  Psychiatric:         Attention and Perception: Attention normal          Mood and Affect: Mood is anxious  Speech: Speech normal          Behavior: Behavior normal  Behavior is cooperative  Lab Results:   I have personally reviewed pertinent lab results      CBC with diff:   Results from last 7 days   Lab Units 03/04/21  0831   WBC Thousand/uL 5 47   RBC Million/uL 5 26*   HEMOGLOBIN g/dL 15 1   HEMATOCRIT % 47 8*   MCV fL 91   MCH pg 28 7   MCHC g/dL 31 6   RDW % 13 0   MPV fL 11 0   PLATELETS Thousands/uL 199     CMP:   Results from last 7 days   Lab Units 03/04/21  0831   SODIUM mmol/L 135*   POTASSIUM mmol/L 4 2   CHLORIDE mmol/L 103   CO2 mmol/L 28   BUN mg/dL 13   CREATININE mg/dL 0 69   CALCIUM mg/dL 8 6   AST U/L 24   ALT U/L 23   ALK PHOS U/L 110   EGFR ml/min/1 73sq m 91     Troponin:   0   Lab Value Date/Time    TROPONINI <0 02 03/03/2021 2018    TROPONINI <0 03 03/03/2021 1633    TROPONINI <0 03 03/03/2021 1354    TROPONINI <0 03 03/03/2021 0901    TROPONINI <0 03 12/05/2020 0609    TROPONINI <0 03 12/04/2020 2104    TROPONINI <0 03 12/04/2020 1808    TROPONINI <0 03 08/01/2020 1707 TROPONINI <0 02 01/04/2018 0424    TROPONINI <0 02 01/04/2018 0139    TROPONINI <0 02 07/17/2016 0857    TROPONINI <0 02 07/07/2016 0524    TROPONINI <0 02 07/06/2016 2348    TROPONINI <0 02 07/06/2016 2123    TROPONINI <0 02 07/06/2016 1524    TROPONINI <0 02 01/02/2016 0830    TROPONINI <0 02 01/01/2016 1910    TROPONINI <0 04 03/05/2014 0940    TROPONINI <0 04 03/05/2014 0200     BNP:   Results from last 7 days   Lab Units 03/04/21  0831   POTASSIUM mmol/L 4 2   CHLORIDE mmol/L 103   CO2 mmol/L 28   BUN mg/dL 13   CREATININE mg/dL 0 69   CALCIUM mg/dL 8 6   EGFR ml/min/1 73sq m 91     Coags:   Results from last 7 days   Lab Units 03/03/21  0911   PTT seconds 24   INR  0 96     TSH:   Results from last 7 days   Lab Units 03/03/21  1354   TSH 3RD GENERATON uIU/mL 4  123     Magnesium:   Results from last 7 days   Lab Units 03/04/21  0831   MAGNESIUM mg/dL 1 8     Lipid Profile:   Results from last 7 days   Lab Units 03/03/21  1354   HDL mg/dL 43*   LDL CALC mg/dL 135*   TRIGLYCERIDES mg/dL 121 0     Imaging: I have personally reviewed pertinent reports      EKG:  Sinus rhythm with right bundle branch block  VTE Prophylaxis: Sequential compression device [employer] Offer)     Code Status: Level 1 - Full Code  Advance Directive and Living Will:      Power of :    POLST:      Dheeraj Mabry, [address]   Cardiology

## 2021-03-04 NOTE — PLAN OF CARE
Problem: Potential for Falls  Goal: Patient will remain free of falls  Description: INTERVENTIONS:  - Assess patient frequently for physical needs  -  Identify cognitive and physical deficits and behaviors that affect risk of falls    -  [place] fall precautions as indicated by assessment   - Educate patient/family on patient safety including physical limitations  - Instruct patient to call for assistance with activity based on assessment  - Modify environment to reduce risk of injury  - Consider OT/PT consult to assist with strengthening/mobility  Outcome: Progressing     Problem: PAIN - ADULT  Goal: Verbalizes/displays adequate comfort level or baseline comfort level  Description: Interventions:  - Encourage patient to monitor pain and request assistance  - Assess pain using appropriate pain scale  - Administer analgesics based on type and severity of pain and evaluate response  - Implement non-pharmacological measures as appropriate and evaluate response  - Consider cultural and social influences on pain and pain management  - Notify physician/advanced practitioner if interventions unsuccessful or patient reports new pain  Outcome: Progressing     Problem: INFECTION - ADULT  Goal: Absence or prevention of progression during hospitalization  Description: INTERVENTIONS:  - Assess and monitor for signs and symptoms of infection  - Monitor lab/diagnostic results  - Monitor all insertion sites, i e  indwelling lines, tubes, and drains  - [place] appropriate cooling/warming therapies per order  - Administer medications as ordered  - Instruct and encourage patient and family to use good hand hygiene technique  - Identify and instruct in appropriate isolation precautions for identified infection/condition  Outcome: Progressing     Problem: SAFETY ADULT  Goal: Patient will remain free of falls  Description: INTERVENTIONS:  - Assess patient frequently for physical needs  -  Identify cognitive and physical deficits and behaviors that affect risk of falls    -  [place] fall precautions as indicated by assessment   - Educate patient/family on patient safety including physical limitations  - Instruct patient to call for assistance with activity based on assessment  - Modify environment to reduce risk of injury  - Consider OT/PT consult to assist with strengthening/mobility  Outcome: Progressing  Goal: Maintain or return to baseline ADL function  Description: INTERVENTIONS:  -  Assess patient's ability to carry out ADLs; assess patient's baseline for ADL function and identify physical deficits which impact ability to perform ADLs (bathing, care of mouth/teeth, toileting, grooming, dressing, etc )  - Assess/evaluate cause of self-care deficits   - Assess range of motion  - Assess patient's mobility; develop plan if impaired  - Assess patient's need for assistive devices and provide as appropriate  - Encourage maximum independence but intervene and supervise when necessary  - Involve family in performance of ADLs  - Assess for home care needs following discharge   - Consider OT consult to assist with ADL evaluation and planning for discharge  - Provide patient education as appropriate  Outcome: Progressing  Goal: Maintain or return mobility status to optimal level  Description: INTERVENTIONS:  - Assess patient's baseline mobility status (ambulation, transfers, stairs, etc )    - Identify cognitive and physical deficits and behaviors that affect mobility  - Identify mobility aids required to assist with transfers and/or ambulation (gait belt, sit-to-stand, lift, walker, cane, etc )  - [place] fall precautions as indicated by assessment  - Record patient progress and toleration of activity level on Mobility SBAR; progress patient to next Phase/Stage  - Instruct patient to call for assistance with activity based on assessment  - Consider rehabilitation consult to assist with strengthening/weightbearing, etc   Outcome: Progressing Problem: DISCHARGE PLANNING  Goal: Discharge to home or other facility with appropriate resources  Description: INTERVENTIONS:  - Identify barriers to discharge w/patient and caregiver  - Arrange for needed discharge resources and transportation as appropriate  - Identify discharge learning needs (meds, wound care, etc )  - Arrange for interpretive services to assist at discharge as needed  - Refer to Case Management Department for coordinating discharge planning if the patient needs post-hospital services based on physician/advanced practitioner order or complex needs related to functional status, cognitive ability, or social support system  Outcome: Progressing     Problem: Knowledge Deficit  Goal: Patient/family/caregiver demonstrates understanding of disease process, treatment plan, medications, and discharge instructions  Description: Complete learning assessment and assess knowledge base    Interventions:  - Provide teaching at level of understanding  - Provide teaching via preferred learning methods  Outcome: Progressing     Problem: CARDIOVASCULAR - ADULT  Goal: Maintains optimal cardiac output and hemodynamic stability  Description: INTERVENTIONS:  - Monitor I/O, vital signs and rhythm  - Monitor for S/S and trends of decreased cardiac output  - Administer and titrate ordered vasoactive medications to optimize hemodynamic stability  - Assess quality of pulses, skin color and temperature  - Assess for signs of decreased coronary artery perfusion  - Instruct patient to report change in severity of symptoms  Outcome: Progressing  Goal: Absence of cardiac dysrhythmias or at baseline rhythm  Description: INTERVENTIONS:  - Continuous cardiac monitoring, vital signs, obtain 12 lead EKG if ordered  - Administer antiarrhythmic and heart rate control medications as ordered  - Monitor electrolytes and administer replacement therapy as ordered  Outcome: Progressing     Problem: Depression - IP adult  Goal: Effects of depression will be minimized  Description: INTERVENTIONS:  - Assess impact of patient's symptoms on level of functioning, self-care needs and offer support as indicated  - Assess patient/family knowledge of depression, impact on illness and need for teaching  - Provide emotional support, presence and reassurance  - Assess for possible suicidal thoughts, ideation or self-harm   If patient expresses suicidal thoughts or statements do not leave alone, notify physician/AP immediately, initiate Suicide Precautions, and determine need for continual observation   - Initiate consults and referrals as appropriate (a mental health professional, Spiritual Care)  - Administer medication as ordered  Outcome: Progressing

## 2021-03-04 NOTE — MALNUTRITION/BMI
This medical record reflects one or more clinical indicators suggestive of morbid obesity  BMI Findings:  Adult BMI Classifications: Morbid Obesity 50-59 9     Body mass index is 52 05 kg/m²  See Nutrition note dated 3/4/2021 for additional details  Completed nutrition assessment is viewable in the nutrition documentation 
Knowledge and Beliefs

## 2021-03-04 NOTE — OCCUPATIONAL THERAPY NOTE
Occupational Therapy Cancel Note       03/04/21 1426   Note Type   Note type Evaluation   Cancel Reasons Patient off floor/test   Licensure   [place] License Number  [place], [place] [id number]

## 2021-03-05 ENCOUNTER — APPOINTMENT (INPATIENT)
Dept: NON INVASIVE DIAGNOSTICS | Facility: HOSPITAL | Age: 66
DRG: 287 | End: 2021-03-05
Payer: COMMERCIAL

## 2021-03-05 LAB
CHEST PAIN STATEMENT: NORMAL
GLUCOSE SERPL-MCNC: 124 MG/DL (ref 65–140)
GLUCOSE SERPL-MCNC: 158 MG/DL (ref 65–140)
GLUCOSE SERPL-MCNC: 163 MG/DL (ref 65–140)
GLUCOSE SERPL-MCNC: 174 MG/DL (ref 65–140)
GLUCOSE SERPL-MCNC: 197 MG/DL (ref 65–140)
MAX DIASTOLIC BP: 69 MMHG
MAX HEART RATE: 98 BPM
MAX PREDICTED HEART RATE: 154 BPM
MAX. SYSTOLIC BP: 139 MMHG
PROTOCOL NAME: NORMAL
REASON FOR TERMINATION: NORMAL
TARGET HR FORMULA: NORMAL
TIME IN EXERCISE PHASE: NORMAL

## 2021-03-05 PROCEDURE — C1894 INTRO/SHEATH, NON-LASER: HCPCS

## 2021-03-05 PROCEDURE — 99152 MOD SED SAME PHYS/QHP 5/>YRS: CPT | Performed by: INTERNAL MEDICINE

## 2021-03-05 PROCEDURE — 93458 L HRT ARTERY/VENTRICLE ANGIO: CPT

## 2021-03-05 PROCEDURE — C1769 GUIDE WIRE: HCPCS

## 2021-03-05 PROCEDURE — 78452 HT MUSCLE IMAGE SPECT MULT: CPT | Performed by: INTERNAL MEDICINE

## 2021-03-05 PROCEDURE — B211YZZ FLUOROSCOPY OF MULTIPLE CORONARY ARTERIES USING OTHER CONTRAST: ICD-10-PCS | Performed by: FAMILY MEDICINE

## 2021-03-05 PROCEDURE — 4A023N8 MEASUREMENT OF CARDIAC SAMPLING AND PRESSURE, BILATERAL, PERCUTANEOUS APPROACH: ICD-10-PCS | Performed by: FAMILY MEDICINE

## 2021-03-05 PROCEDURE — 99232 SBSQ HOSP IP/OBS MODERATE 35: CPT | Performed by: INTERNAL MEDICINE

## 2021-03-05 PROCEDURE — B215YZZ FLUOROSCOPY OF LEFT HEART USING OTHER CONTRAST: ICD-10-PCS | Performed by: FAMILY MEDICINE

## 2021-03-05 PROCEDURE — 93018 CV STRESS TEST I&R ONLY: CPT | Performed by: INTERNAL MEDICINE

## 2021-03-05 PROCEDURE — 82948 REAGENT STRIP/BLOOD GLUCOSE: CPT

## 2021-03-05 PROCEDURE — 94760 N-INVAS EAR/PLS OXIMETRY 1: CPT

## 2021-03-05 PROCEDURE — 93016 CV STRESS TEST SUPVJ ONLY: CPT | Performed by: INTERNAL MEDICINE

## 2021-03-05 PROCEDURE — 93458 L HRT ARTERY/VENTRICLE ANGIO: CPT | Performed by: INTERNAL MEDICINE

## 2021-03-05 RX ORDER — MIDAZOLAM HYDROCHLORIDE 2 MG/2ML
INJECTION, SOLUTION INTRAMUSCULAR; INTRAVENOUS CODE/TRAUMA/SEDATION MEDICATION
Status: COMPLETED | OUTPATIENT
Start: 2021-03-05 | End: 2021-03-05

## 2021-03-05 RX ORDER — LIDOCAINE HYDROCHLORIDE 10 MG/ML
INJECTION, SOLUTION EPIDURAL; INFILTRATION; INTRACAUDAL; PERINEURAL CODE/TRAUMA/SEDATION MEDICATION
Status: COMPLETED | OUTPATIENT
Start: 2021-03-05 | End: 2021-03-05

## 2021-03-05 RX ORDER — SODIUM CHLORIDE 9 MG/ML
50 INJECTION, SOLUTION INTRAVENOUS CONTINUOUS
Status: DISPENSED | OUTPATIENT
Start: 2021-03-05 | End: 2021-03-06

## 2021-03-05 RX ORDER — FENTANYL CITRATE 50 UG/ML
INJECTION, SOLUTION INTRAMUSCULAR; INTRAVENOUS CODE/TRAUMA/SEDATION MEDICATION
Status: COMPLETED | OUTPATIENT
Start: 2021-03-05 | End: 2021-03-05

## 2021-03-05 RX ADMIN — FAMOTIDINE 20 MG: 20 TABLET ORAL at 09:25

## 2021-03-05 RX ADMIN — TOPIRAMATE 200 MG: 100 TABLET, FILM COATED ORAL at 09:25

## 2021-03-05 RX ADMIN — FENTANYL CITRATE 100 MCG: 50 INJECTION, SOLUTION INTRAMUSCULAR; INTRAVENOUS at 15:21

## 2021-03-05 RX ADMIN — FLUOXETINE 60 MG: 20 CAPSULE ORAL at 09:31

## 2021-03-05 RX ADMIN — GABAPENTIN 300 MG: 300 CAPSULE ORAL at 21:27

## 2021-03-05 RX ADMIN — TOPIRAMATE 200 MG: 100 TABLET, FILM COATED ORAL at 21:27

## 2021-03-05 RX ADMIN — LIDOCAINE HYDROCHLORIDE 5 ML: 10 INJECTION, SOLUTION EPIDURAL; INFILTRATION; INTRACAUDAL; PERINEURAL at 15:26

## 2021-03-05 RX ADMIN — ASPIRIN 81 MG: 81 TABLET, COATED ORAL at 09:25

## 2021-03-05 RX ADMIN — ATORVASTATIN CALCIUM 40 MG: 40 TABLET, FILM COATED ORAL at 17:14

## 2021-03-05 RX ADMIN — INSULIN GLARGINE 20 UNITS: 100 INJECTION, SOLUTION SUBCUTANEOUS at 21:29

## 2021-03-05 RX ADMIN — LIDOCAINE HYDROCHLORIDE 1 ML: 10 INJECTION, SOLUTION EPIDURAL; INFILTRATION; INTRACAUDAL; PERINEURAL at 15:24

## 2021-03-05 RX ADMIN — GABAPENTIN 100 MG: 100 CAPSULE ORAL at 17:14

## 2021-03-05 RX ADMIN — INSULIN LISPRO 1 UNITS: 100 INJECTION, SOLUTION INTRAVENOUS; SUBCUTANEOUS at 11:54

## 2021-03-05 RX ADMIN — INSULIN LISPRO 1 UNITS: 100 INJECTION, SOLUTION INTRAVENOUS; SUBCUTANEOUS at 17:21

## 2021-03-05 RX ADMIN — FAMOTIDINE 20 MG: 20 TABLET ORAL at 17:14

## 2021-03-05 RX ADMIN — STANDARDIZED SENNA CONCENTRATE 8.6 MG: 8.6 TABLET ORAL at 09:25

## 2021-03-05 RX ADMIN — IOHEXOL 70 ML: 350 INJECTION, SOLUTION INTRAVENOUS at 15:37

## 2021-03-05 RX ADMIN — SODIUM CHLORIDE 50 ML/HR: 0.9 INJECTION, SOLUTION INTRAVENOUS at 17:14

## 2021-03-05 RX ADMIN — MIDAZOLAM 2 MG: 1 INJECTION INTRAMUSCULAR; INTRAVENOUS at 15:21

## 2021-03-05 RX ADMIN — INSULIN LISPRO 1 UNITS: 100 INJECTION, SOLUTION INTRAVENOUS; SUBCUTANEOUS at 09:26

## 2021-03-05 RX ADMIN — LEVOTHYROXINE SODIUM 50 MCG: 50 TABLET ORAL at 06:40

## 2021-03-05 RX ADMIN — ENOXAPARIN SODIUM 40 MG: 40 INJECTION SUBCUTANEOUS at 09:25

## 2021-03-05 RX ADMIN — GABAPENTIN 100 MG: 100 CAPSULE ORAL at 09:25

## 2021-03-05 NOTE — ASSESSMENT & PLAN NOTE
Lab Results   Component Value Date    HGBA1C 11 5 (H) 03/03/2021       Recent Labs     03/04/21  1213 03/04/21  1558 03/04/21 2044 03/05/21  0711   POCGLU 162* 176* 197* 174*       Blood Sugar Average: Last 72 hrs:  (P) 168 [id number]   Hemoglobin A1c is significantly elevated  Hold home PO medications  Continue Lantus and Humalog sliding scale with Accu-Cheks q a c   And hs  Patient needs better blood sugar control

## 2021-03-05 NOTE — CASE MANAGEMENT
LOS 2 days  Patient is not a bundle or a 30 day readmission  SW met with patient to discuss discharge planning and complete CM open  Patient lives alone in a one level apartment, no steps, elevator  Patient is independent with ADL's, family assists as needed  Patient sponge bathes and does not drive  Patient reports that she takes the bus  Patient uses a rolling walker and a cane in the community  Patient has a hx of inpatient rehab at [facility] multiple times in the past  Patient has a hx of home care through [facility]  Patient reports multiple inpatient psych stays for suicide attempts many years ago and denies D/A history  Patient follows a Psychiatrist through the [facility]  PCP is Dr Christopher Matias MD who is also through the [facility]  No POA or LW  Declined information at this time  Patient son Gil Camargo is primary contact  SW discussed recommendation for inpatient rehab  Patient states that she is open to inpatient rehab however prefers to discuss this with her son before making a decision  SW will follow up with patient

## 2021-03-05 NOTE — UTILIZATION REVIEW
Continued Stay Review    Date: 3/5/21                          Current Patient Class: inpatient    Current Level of Care: acute      Assessment/Plan:   Per Cardiology , 1  Chest pain:  Peak troponin was less than 0 03  Lexiscan nuclear stress test performed on 03/04/2021 was concerning for inferior wall ischemia  Patient for cardiac catheterization today  2  Depression:  Followed by Dr Marleni schulerzac  3  Hypertension:  Blood pressure is currently controlled  4  Diabetes:  Hemoglobin A1c was 11 5  Patient needs tighter blood pressure control  Managed per the primary team  5  Dyslipidemia:  Continue statin therapy  6  Morbid obesity:  BMI 52 05  Encourage weight loss  Cardiac Catheterization Operative Report  Impression: Cardiac catheterization shows patient has mild nonobstructive coronary artery disease  Anterior apical defect most likely due to body attenuation artifact  Consider workup for noncardiac chest pain  She has normal LV systolic function  Recommendation: Continue medical therapy  Continue aggressive risk factor modification  Continue statin therapy    Consider workup for noncardiac chest pain        Pertinent Labs/Diagnostic Results:   Results from last 7 days   Lab Units 03/03/21  0915   SARS-COV-2  Negative     Results from last 7 days   Lab Units 03/04/21  0831 03/03/21  0901   WBC Thousand/uL 5 47 6 40   HEMOGLOBIN g/dL 15 1 14 2   HEMATOCRIT % 47 8* 43 6   PLATELETS Thousands/uL 199 188   NEUTROS ABS Thousands/µL 3 58 4 48     Results from last 7 days   Lab Units 03/04/21  0831 03/03/21  2018 03/03/21  1354 03/03/21  0901   SODIUM mmol/L 135*  --  136 134   POTASSIUM mmol/L 4 2  --  4 1 4 2   CHLORIDE mmol/L 103  --  100 98   CO2 mmol/L 28  --  29 31   ANION GAP mmol/L 4  --  7 5   BUN mg/dL 13  --  19 22*   CREATININE mg/dL 0 69  --  0 58 0 67   EGFR ml/min/1 73sq m 91  --  96 92   CALCIUM mg/dL 8 6  --  8 8 8 7   MAGNESIUM mg/dL 1 8 1 7  --  1 5*     Results from last 7 days Lab Units 03/04/21  0831 03/03/21  1354 03/03/21  0901   AST U/L 24 12* 12*   ALT U/L 23 10 12   ALK PHOS U/L 110 104 1 106 0   TOTAL PROTEIN g/dL 7 4 6 6 6 8   ALBUMIN g/dL 3 0* 3 5 3 5   TOTAL BILIRUBIN mg/dL 0 50 0 42 0 36     Results from last 7 days   Lab Units 03/04/21  1558 03/04/21  1213 03/04/21  0741 03/04/21  0739 03/03/21  2028 03/03/21  1640 03/03/21  1312 03/03/21  1252   POC GLUCOSE mg/dl 176* 162* 142* 162* 168* 195* 234* 238*     Results from last 7 days   Lab Units 03/04/21  0831 03/03/21  1354 03/03/21  0901   GLUCOSE RANDOM mg/dL 168* 256* 338*     Vital Signs:   03/05/21 07:06:52  97 3 °F (36 3 °C)Abnormal   66  17  117/51  73  92 %  --  --  None (Room air)  Lying   03/04/21 23:52:06  96 7 °F (35 9 °C)Abnormal   67  20  151/79  103  95 %  --  --  --  --   03/04/21 2100  --  --  --  --  --  --  24  1 L/min  Nasal cannula         Tele mon  Npo  Cardiac catheterization  Medications:   Scheduled Medications:  aspirin, 81 mg, Oral, Daily  atorvastatin, 40 mg, Oral, Daily With Dinner  enoxaparin, 40 mg, Subcutaneous, Daily  famotidine, 20 mg, Oral, BID  FLUoxetine, 60 mg, Oral, Daily  gabapentin, 100 mg, Oral, BID  gabapentin, 300 mg, Oral, HS  insulin glargine, 20 Units, Subcutaneous, HS  insulin lispro, 1-5 Units, Subcutaneous, TID AC  insulin lispro, 1-5 Units, Subcutaneous, HS  levothyroxine, 50 mcg, Oral, Early Morning  LORazepam, 0 5 mg, Oral, BID  senna, 1 tablet, Oral, Daily  topiramate, 200 mg, Oral, QAM  topiramate, 200 mg, Oral, HS      Continuous IV Infusions:     PRN Meds:  acetaminophen, 650 mg, Oral, Q6H PRN        Discharge Plan: tbd    Network Utilization Review Department  ATTENTION: Please call with any questions or concerns to [phone] and carefully listen to the prompts so that you are directed to the right person   All voicemails are confidential   Mala Garza all requests for admission clinical reviews, approved or denied determinations and any other requests to dedicated fax number below belonging to the campus where the patient is receiving treatment   List of dedicated fax numbers for the Facilities:  [address] [place] [address] DENIALS (Administrative/Medical Necessity) [phone]   [address]  (Maternity/NICU/Pediatrics) [phone] [address] Jovi Esparza 6239 (  Dev Tan "Beverly" 103) [address] [address] Stefan Cali 1481 [address] [place]) [address] [place] [address] [phone]

## 2021-03-05 NOTE — ASSESSMENT & PLAN NOTE
Started on atorvastatin 40 mg during [place] admission, continue     Results from 3/3/2021   Ref   Range 3/3/2021 13:54   Cholesterol Latest Ref Range: <=200 mg/dL 202 (H)   Triglycerides Latest Ref Range: <=150 mg/dL 121 0   HDL Latest Ref Range: >=50 mg/dL 43 (L)   LDL Calculated Latest Ref Range: 0 - 100 mg/dL 135 (H)

## 2021-03-05 NOTE — ASSESSMENT & PLAN NOTE
Patient admitted to [facility] for chest pain after she was mopping the floors at home, with associated SOB  CXR with mild pulmonary vascular congestion   Troponin (-) x 3  KENDALL score 4  She was noted to have NSVT vs  SVT with aberration with associated dizziness while at [facility]  Patient underwent nuclear stress test which was reported abnormal   Cardiology recommendations appreciated   Patient is scheduled for cardiac catheterization in a m   2D echo showed EF of 50 for 60% with mild concentric hypertrophy and grade 2 diastolic dysfunction

## 2021-03-05 NOTE — PROGRESS NOTES
Patient received from cath lab  Patient lethargic  Denies pain  Patient instructed to remain on strict bedrest for the next 6 hours  Femostop to right groin maintained at 40 mmhg  Right groin dressing without bleeding or hematoma  Distal pulses palpable  Will continue to monitor

## 2021-03-05 NOTE — PROGRESS NOTES
Progress Note - Walter Lerma 1955, 76 y o  female MRN: [id number]    Unit/Bed#: [address] [place] [address] Encounter: [id number]    Primary Care Provider: Josue Corrigan MD   Date and time admitted to hospital: 3/3/2021  6:01 PM        * Chest pain  Assessment & Plan  Patient admitted to [address] for chest pain after she was mopping the floors at home, with associated SOB  CXR with mild pulmonary vascular congestion   Troponin (-) x 3  KENDALL score 4  She was noted to have NSVT vs  SVT with aberration with associated dizziness while at [facility]  Patient underwent nuclear stress test which was reported abnormal   Cardiology recommendations appreciated   Patient is scheduled for cardiac catheterization in a m   2D echo showed EF of 50 for 60% with mild concentric hypertrophy and grade 2 diastolic dysfunction    Diabetes mellitus [facility])  Assessment & Plan  Lab Results   Component Value Date    HGBA1C 11 5 (H) 03/03/2021       Recent Labs     03/04/21  0739 03/04/21  0741 03/04/21  1213 03/04/21  1558   POCGLU 162* 142* 162* 176*       Blood Sugar Average: Last 72 hrs:  (P) 162   Hemoglobin A1c is significantly elevated  Hold home PO medications  Continue Lantus and Humalog sliding scale with Accu-Cheks q a c  And hs  Patient needs better blood sugar control    Acquired hypothyroidism  Assessment & Plan  Continue home dose of levothyroxine    Major depressive disorder  Assessment & Plan  Continue Prozac 40 mg daily which was increased to 60 milligrams by Psychiatry  Patient added on Ativan 0 5 milligram p o  B i d     GERD (gastroesophageal reflux disease)  Assessment & Plan  Continue Pepcid 20 mg BID    Hyperlipidemia  Assessment & Plan  Started on atorvastatin 40 mg during [facility] admission, continue     Results from 3/3/2021   Ref   Range 3/3/2021 13:54   Cholesterol Latest Ref Range: <=200 mg/dL 202 (H)   Triglycerides Latest Ref Range: <=150 mg/dL 121 0   HDL Latest Ref Range: >=50 mg/dL 43 (L)   LDL Calculated Latest Ref Range: 0 - 100 mg/dL 135 (H)         Hypertension  Assessment & Plan  Patient does not take any medications at home   Blood pressure is acceptable    Restless leg syndrome  Assessment & Plan  Continue home dose of gabapentin, 100 mg early AM and midday, 300 mg QHS    Seizures (HCC)  Assessment & Plan  Continue home dose of topiramate    VTE Pharmacologic Prophylaxis:   Pharmacologic: Enoxaparin (Lovenox)  Mechanical VTE Prophylaxis in Place: Yes    Patient Centered Rounds: I have performed bedside rounds with nursing staff today  Discussions with Specialists or Other Care Team Provider: Dr Davion Jose    Education and Discussions with Family / Patient: yes    Time Spent for Care: 30 minutes  More than 50% of total time spent on counseling and coordination of care as described above  Current Length of Stay: 1 day(s)    Current Patient Status: Inpatient   Certification Statement: The patient will continue to require additional inpatient hospital stay due to Chest pain    Discharge Plan:  Home    Code Status: Level 1 - Full Code      Subjective:   Patient is still complaining of chest pain especially after getting an echo done  Patient reports the pain is worse when she takes a deep breath  Objective:     Vitals:   Temp (24hrs), Av 3 °F (36 3 °C), Min:96 8 °F (36 °C), Max:97 9 °F (36 6 °C)    Temp:  [96 8 °F (36 °C)-97 9 °F (36 6 °C)] 97 3 °F (36 3 °C)  HR:  [67-81] 77  Resp:  [18-20] 19  BP: (116-148)/(49-96) 148/96  SpO2:  [95 %-97 %] 95 %  Body mass index is 52 05 kg/m²  Input and Output Summary (last 24 hours): Intake/Output Summary (Last 24 hours) at 3/4/2021 2002  Last data filed at 3/4/2021 1400  Gross per 24 hour   Intake 240 ml   Output 1550 ml   Net -1310 ml       Physical Exam:     Physical Exam  Constitutional:       General: She is not in acute distress  HENT:      Head: Normocephalic and atraumatic        Nose: Nose normal    Eyes:      Conjunctiva/sclera: Conjunctivae normal       Pupils: Pupils are equal, round, and reactive to light  Neck:      Musculoskeletal: Normal range of motion and neck supple  Cardiovascular:      Rate and Rhythm: Normal rate and regular rhythm  Heart sounds: Normal heart sounds  Pulmonary:      Effort: Pulmonary effort is normal  No respiratory distress  Breath sounds: Normal breath sounds  No wheezing or rales  Abdominal:      General: Bowel sounds are normal  There is no distension  Palpations: Abdomen is soft  Tenderness: There is no abdominal tenderness  There is no guarding or rebound  Skin:     General: Skin is warm and dry  Findings: No rash  Neurological:      Mental Status: She is alert  Cranial Nerves: No cranial nerve deficit  Additional Data:     Labs:    Results from last 7 days   Lab Units 03/04/21  0831   WBC Thousand/uL 5 47   HEMOGLOBIN g/dL 15 1   HEMATOCRIT % 47 8*   PLATELETS Thousands/uL 199   NEUTROS PCT % 66   LYMPHS PCT % 24   MONOS PCT % 6   EOS PCT % 3     Results from last 7 days   Lab Units 03/04/21  0831   POTASSIUM mmol/L 4 2   CHLORIDE mmol/L 103   CO2 mmol/L 28   BUN mg/dL 13   CREATININE mg/dL 0 69   CALCIUM mg/dL 8 6   ALK PHOS U/L 110   ALT U/L 23   AST U/L 24     Results from last 7 days   Lab Units 03/03/21  0911   INR  0 96       * I Have Reviewed All Lab Data Listed Above  * Additional Pertinent Lab Tests Reviewed:  [facility]        Recent Cultures (last 7 days):           Last 24 Hours Medication List:   Current Facility-Administered Medications   Medication Dose Route Frequency Provider Last Rate    acetaminophen  650 mg Oral Q6H PRN Percy Padilla MD      aspirin  81 mg Oral Daily Percy Padilla MD      atorvastatin  40 mg Oral Daily With Eliza Rondon MD      enoxaparin  40 mg Subcutaneous Daily Percy Padilla MD      famotidine  20 mg Oral BID Percy Padilla MD      [START ON 3/5/2021] FLUoxetine  60 mg Oral Daily Tori Lefort, MD      gabapentin  100 mg Oral BID Jerome Romano MD      gabapentin  300 mg Oral HS Jerome Romano MD      insulin glargine  20 Units Subcutaneous HS Jerome Romano MD      insulin lispro  1-5 Units Subcutaneous TID AC Jerome Romano MD      insulin lispro  1-5 Units Subcutaneous HS Jerome Romano MD      levothyroxine  50 mcg Oral Early Morning Jerome Romano MD      LORazepam  0 5 mg Oral BID Tori Lefort, MD      senna  1 tablet Oral Daily Jerome Romano MD      topiramate  200 mg Oral QAM Jerome Romano MD      topiramate  200 mg Oral HS Jerome Romano MD          Today, Patient Was Seen By: Joe Warren MD    ** Please Note: Dictation voice to text software may have been used in the creation of this document   **

## 2021-03-05 NOTE — PROCEDURES
Cardiac cath successfully completed  Right groin sheath removed and hemostasis achieved with 25 mins of digital pressure then Femostop applied with 40mmHg x 2 hrs and 20mmHg x 2 hrs  Patient stable for transfer back to room via bed  Report and care assumed by primary RN

## 2021-03-05 NOTE — PROCEDURES
Cardiac Catheterization Operative Report    Cheryle Starch  [id number]  3/5/2021  Liz Wan MD    Right coronary angiography  Left coronary angiography  LV gram  Fluoroscopy    Cardiologist: Dr Lena Glover MD [facility]    Brief history: Patient is 62-year-old woman who was transferred from [facility] for episodes of chest pain  Had a mildly abnormal stress test underwent cardiac catheterization  Procedure Details: The risks, benefits, complications, including risk of stroke, heart attack, bleeding and even death but not limited to it, along with alternative  treatment options, and expected outcomes were discussed with the patient in detail  The patient and/or family concurred with the proposed plan, giving informed consent  Patient was brought to the cath lab after IV hydration was begun and oral premedication was given  He was further sedated with midazolam and fentanyl  He was prepped and draped in the usual manner  Using the modified Seldinger access technique, a 5 [place] sheath was placed in the right common femoral artery without any complications    A left heart catheterization was done  Right and left coronary angiograms were performed  End of the procedure patient was transferred to [facility] area in stable condition without any complications  He tolerated the procedure well  After the procedure was completed, sedation was stopped and the sheaths and catheters were all removed  Hemostasis was achieved with manual pressure  FemoStop was applied    Equipment used:   1  JR4 for right coronary angiography  2  JL 4 0 for left coronary angiography  3  LV gram with JR4    Findings:    1  Dominance: Right dominant coronary system    2  Left main Coronary artery: Normal size vessels  It bifurcates into large LAD and a nondominant circumflex system  Mild luminal regularities of left main noted no focal stenosis      3  Left anterior descending artery: LAD is a large-size vessel and it has mild luminal irregularities causing 25- 30% stenosis proximal area  It gives a diagonal which has mild luminal irregularities  No focal stenosis seen  4  Circumflex Coronary artery: Circumflex is a nondominant medium size vessel  It has mild luminal regularities  No focal stenosis seen  5  Right coronary artery: RCA is normal size vessel and it has mild luminal irregularities causing no focal stenosis  Muscular stenosis maybe 20%  6  Left ventriculogram: LV gram done in KEYES view shows normal LV systolic function EF around 65%  There was no gradient across aortic valve  LVEDP was mildly elevated  Estimated Blood Loss: Minimal    Complications:  None,  patient tolerated the procedure well  Impression: Cardiac catheterization shows patient has mild nonobstructive coronary artery disease  Her nuclear stress test is falsely positive  Anterior apical defect most likely due to body attenuation artifact  Consider workup for noncardiac chest pain  She has normal LV systolic function    Recommendation: Continue medical therapy  Continue aggressive risk factor modification  Continue statin therapy  Consider workup for noncardiac chest pain  Disposition: Patient transferred to holding area/ monitoring room in stable condition  Condition: Stable    "This note has been constructed using a voice recognition system  Therefore there may be syntax, spelling, and/or grammatical errors   Please call if you have any questions  "    Dr Quynh Rosales MD [facility]

## 2021-03-05 NOTE — PROGRESS NOTES
Patient instructed to remain on strict bedrest  Femostop to right groin changed to 20 mmhg  Right groin dressing without bleeding or hematoma  Distal pulses palpable  Will continue to monitor

## 2021-03-05 NOTE — ASSESSMENT & PLAN NOTE
Patient admitted to [facility] for chest pain after she was mopping the floors at home, with associated SOB  CXR with mild pulmonary vascular congestion   Troponin (-) x 3  KENDALL score 4  She was noted to have NSVT vs  SVT with aberration with associated dizziness while at [facility]  Patient underwent nuclear stress test which was reported abnormal   Patient underwent cardiac catheterization which showed mild nonobstructive CAD  Cardiology recommendations appreciated   2D echo showed EF of 50 for 60% with mild concentric hypertrophy and grade 2 diastolic dysfunction

## 2021-03-05 NOTE — ASSESSMENT & PLAN NOTE
Continue Prozac 40 mg daily which was increased to 60 milligrams by Psychiatry  Patient added on Ativan 0 5 milligram p o  B i d

## 2021-03-05 NOTE — ASSESSMENT & PLAN NOTE
Started on atorvastatin 40 mg during [place] admission, continue     Results from 3/3/2021   Ref   Range 3/3/2021 13:54   Cholesterol Latest Ref Range: <=200 mg/dL 202 (H)   Triglycerides Latest Ref Range: <=150 mg/dL 121 0   HDL Latest Ref Range: >=50 mg/dL 43 (L)   LDL Calculated Latest Ref Range: 0 - 100 mg/dL 135 (H)

## 2021-03-05 NOTE — PROGRESS NOTES
Patient examined spoke with the nurse nurse reports that patient is calmer and she is sleeping taking a nap in the morning  Patient is happy to talk to me she is thanking me for adjusting the Prozac  Patient reports that she always followed up with the psychiatrist and medical doctor at the [facility] and she will follow-up with her psychiatrist after the discharge  Patient is thanking me for adjusting her medications Prozac  She reports that she lives alone on the 4th floor apartment she has a friend on the 3rd floor so they visit each other and tried to socialize but in this heavy winter and too much no she could not get out of the apartment and she felt isolated and lonely and very depressed  Patient is happy to talk to me and thanking me for talking to her and helping her with the depression  Patient is also agreed that she will go back to see a therapist and psychiatrist to keep up with her medicine after the discharge  Nurse reported no behavior problem  Medical treatment is in progress  Patient denies auditory or visual hallucinations  Patient denies suicidal or homicidal ideation  No side effects of Prozac 60 mg daily and Ativan 0 5 mg p o  B i d  At this time  Patient is able to express her feelings well and she offers no new complaints  Therapy done with good response  Continue her medications and therapy  Patient will follow-up with her psychiatrist at [facility] clinic after the discharge  Discussed with the nurse

## 2021-03-05 NOTE — PROGRESS NOTES
Progress Note - Marion Bear 1955, 76 y o  female MRN: [id number]    Unit/Bed#: [address] [place] [address] Encounter: [id number]    Primary Care Provider: Meghan Guillaume MD   Date and time admitted to hospital: 3/3/2021  6:01 PM        * Chest pain  Assessment & Plan  Patient admitted to [facility] for chest pain after she was mopping the floors at home, with associated SOB  CXR with mild pulmonary vascular congestion   Troponin (-) x 3  KENDALL score 4  She was noted to have NSVT vs  SVT with aberration with associated dizziness while at [facility]  Patient underwent nuclear stress test which was reported abnormal   Patient underwent cardiac catheterization which showed mild nonobstructive CAD  Cardiology recommendations appreciated   2D echo showed EF of 50 for 60% with mild concentric hypertrophy and grade 2 diastolic dysfunction    Diabetes mellitus [facility])  Assessment & Plan  Lab Results   Component Value Date    HGBA1C 11 5 (H) 03/03/2021       Recent Labs     03/04/21  1213 03/04/21  1558 03/04/21  2044 03/05/21  0711   POCGLU 162* 176* 197* 174*       Blood Sugar Average: Last 72 hrs:  (P) 168 [id number]   Hemoglobin A1c is significantly elevated  Hold home PO medications  Continue Lantus and Humalog sliding scale with Accu-Cheks q a c  And hs  Patient needs better blood sugar control    Acquired hypothyroidism  Assessment & Plan  Continue home dose of levothyroxine    Major depressive disorder  Assessment & Plan  Continue Prozac 40 mg daily which was increased to 60 milligrams by Psychiatry  Patient added on Ativan 0 5 milligram p o  B i d     GERD (gastroesophageal reflux disease)  Assessment & Plan  Continue Pepcid 20 mg BID    Hyperlipidemia  Assessment & Plan  Started on atorvastatin 40 mg during [facility] admission, continue     Results from 3/3/2021   Ref   Range 3/3/2021 13:54   Cholesterol Latest Ref Range: <=200 mg/dL 202 (H)   Triglycerides Latest Ref Range: <=150 mg/dL 121 0   HDL Latest Ref Range: >=50 mg/dL 43 (L)   LDL Calculated Latest Ref Range: 0 - 100 mg/dL 135 (H)         Hypertension  Assessment & Plan  Patient does not take any medications at home   Blood pressure is acceptable    Restless leg syndrome  Assessment & Plan  Continue home dose of gabapentin, 100 mg early AM and midday, 300 mg QHS    Seizures (HCC)  Assessment & Plan  Continue home dose of topiramate        VTE Pharmacologic Prophylaxis:   Pharmacologic: Enoxaparin (Lovenox)  Mechanical VTE Prophylaxis in Place: Yes    Patient Centered Rounds: I have performed bedside rounds with nursing staff today  Discussions with Specialists or Other Care Team Provider: yes Dr Praveena Reed    Education and Discussions with Family / Patient: yes    Time Spent for Care: 45 minutes  More than 50% of total time spent on counseling and coordination of care as described above  Current Length of Stay: 2 day(s)    Current Patient Status: Inpatient   Certification Statement: The patient will continue to require additional inpatient hospital stay due to Post cardiac catheterization pending discharge    Discharge Plan:  Pending course    Code Status: Level 1 - Full Code      Subjective:   Patient has improved chest pain  Denies any shortness of breath, palpitations, headache or dizziness  Just complains of fatigue and tiredness    Objective:     Vitals:   Temp (24hrs), Av 1 °F (36 2 °C), Min:96 7 °F (35 9 °C), Max:97 3 °F (36 3 °C)    Temp:  [96 7 °F (35 9 °C)-97 3 °F (36 3 °C)] 97 2 °F (36 2 °C)  HR:  [66-71] 69  Resp:  [16-20] 17  BP: (100-151)/(51-79) 121/60  SpO2:  [92 %-100 %] 93 %  Body mass index is 52 05 kg/m²  Input and Output Summary (last 24 hours): Intake/Output Summary (Last 24 hours) at 3/5/2021 1731  Last data filed at 3/5/2021 0900  Gross per 24 hour   Intake 0 ml   Output 850 ml   Net -850 ml       Physical Exam:     Physical Exam  Constitutional:       General: She is not in acute distress    HENT:      Head: Normocephalic and atraumatic  Nose: Nose normal    Eyes:      Conjunctiva/sclera: Conjunctivae normal       Pupils: Pupils are equal, round, and reactive to light  Neck:      Musculoskeletal: Normal range of motion and neck supple  Cardiovascular:      Rate and Rhythm: Normal rate and regular rhythm  Heart sounds: Normal heart sounds  Pulmonary:      Effort: Pulmonary effort is normal  No respiratory distress  Breath sounds: Normal breath sounds  No wheezing or rales  Abdominal:      General: Bowel sounds are normal  There is no distension  Palpations: Abdomen is soft  Tenderness: There is no abdominal tenderness  There is no guarding or rebound  Skin:     General: Skin is warm and dry  Findings: No rash  Neurological:      Mental Status: She is alert  Cranial Nerves: No cranial nerve deficit  Additional Data:     Labs:    Results from last 7 days   Lab Units 03/04/21  0831   WBC Thousand/uL 5 47   HEMOGLOBIN g/dL 15 1   HEMATOCRIT % 47 8*   PLATELETS Thousands/uL 199   NEUTROS PCT % 66   LYMPHS PCT % 24   MONOS PCT % 6   EOS PCT % 3     Results from last 7 days   Lab Units 03/04/21  0831   POTASSIUM mmol/L 4 2   CHLORIDE mmol/L 103   CO2 mmol/L 28   BUN mg/dL 13   CREATININE mg/dL 0 69   CALCIUM mg/dL 8 6   ALK PHOS U/L 110   ALT U/L 23   AST U/L 24     Results from last 7 days   Lab Units 03/03/21  0911   INR  0 96       * I Have Reviewed All Lab Data Listed Above  * Additional Pertinent Lab Tests Reviewed:  [facility]    Recent Cultures (last 7 days):           Last 24 Hours Medication List:   Current Facility-Administered Medications   Medication Dose Route Frequency Provider Last Rate    acetaminophen  650 mg Oral Q6H PRN Cathi Rdz MD      aspirin  81 mg Oral Daily Cathi Rdz MD      atorvastatin  40 mg Oral Daily With Dannielle Izquierdo MD      enoxaparin  40 mg Subcutaneous Daily Cathi Rdz MD      famotidine  20 mg Oral BID Max Mccray MD      FLUoxetine  60 mg Oral Daily Nick Collins MD      gabapentin  100 mg Oral BID Max Mccray MD      gabapentin  300 mg Oral HS Max Mccray MD      insulin glargine  20 Units Subcutaneous HS Max Mccray MD      insulin lispro  1-5 Units Subcutaneous TID AC Max Mccray MD      insulin lispro  1-5 Units Subcutaneous HS Max Mccray MD      levothyroxine  50 mcg Oral Early Morning Max Mccray MD      LORazepam  0 5 mg Oral BID Nick Collins MD      senna  1 tablet Oral Daily Max Mccray MD      sodium chloride  50 mL/hr Intravenous Continuous Nighat Wooten MD 50 mL/hr (03/05/21 1714)    topiramate  200 mg Oral QAM Max Mccray MD      topiramate  200 mg Oral HS Max Mccray MD          Today, Patient Was Seen By: Kate Michaud MD    ** Please Note: Dictation voice to text software may have been used in the creation of this document   **

## 2021-03-05 NOTE — ASSESSMENT & PLAN NOTE
Lab Results   Component Value Date    HGBA1C 11 5 (H) 03/03/2021       Recent Labs     03/04/21  0739 03/04/21  0741 03/04/21  1213 03/04/21  1558   POCGLU 162* 142* 162* 176*       Blood Sugar Average: Last 72 hrs:  (P) 162   Hemoglobin A1c is significantly elevated  Hold home PO medications  Continue Lantus and Humalog sliding scale with Accu-Cheks q a c   And hs  Patient needs better blood sugar control

## 2021-03-05 NOTE — PROGRESS NOTES
Progress Note - Cardiology   [facility]     Latrell Starr 76 y o  female MRN: [id number]  : 1955  Unit/Bed#: [address] [place] [address] Encounter: [id number]    Assessment and Plan:   1  Chest pain:  Peak troponin was less than 0 03  Eileen Murdock nuclear stress test performed on 2021 was concerning for inferior wall ischemia       -  No further chest pain overnight  Patient for cardiac catheterization today    2  Depression:  Followed by Dr Arvind Ryder    3  Hypertension:  Blood pressure is currently controlled    4  Diabetes:  Hemoglobin A1c was 11 5  Patient needs tighter blood pressure control  Managed per the primary team    5  Dyslipidemia:  Continue statin therapy    6  Morbid obesity:  BMI 52 05  Encourage weight loss  Subjective / Objective:   Patient seen and examined  States that she slept well overnight and did not have any recurrence of chest pain  She is currently NPO for diagnostic cardiac catheterization today  Vitals: Blood pressure 117/51, pulse 66, temperature (!) 97 3 °F (36 3 °C), temperature source Oral, resp  rate 17, height 5' 2" (1 575 m), weight 129 kg (284 lb 9 6 oz), SpO2 92 %  Vitals:    21 1807 21 0600   Weight: 128 kg (281 lb 4 9 oz) 129 kg (284 lb 9 6 oz)     Body mass index is 52 05 kg/m²  BP Readings from Last 3 Encounters:   21 117/51   21 123/73   20 156/84     Orthostatic Blood Pressures      Most Recent Value   Blood Pressure  117/51 filed at 2021 0706   Patient Position - Orthostatic VS  Lying filed at 2021 0706        I/O       / 0701 - 03/04 0700 03/04 0701 - 03/05 0700 03/05 0701 - 0306 0700    P  O   240     Total Intake(mL/kg)  240 (1 9)     Urine (mL/kg/hr) 750 1200 (0 4)     Stool  0     Total Output 750 1200     Net -750 -960            Unmeasured Stool Occurrence  1 x         Invasive Devices     Peripheral Intravenous Line            Peripheral IV 21 Left Antecubital 1 day Intake/Output Summary (Last 24 hours) at 3/5/2021 0804  Last data filed at 3/4/2021 1400  Gross per 24 hour   Intake 240 ml   Output 1200 ml   Net -960 ml         Physical Exam:   Physical Exam  Vitals signs and nursing note reviewed  Constitutional:       General: She is not in acute distress  Appearance: Normal appearance  She is well-developed  She is obese  HENT:      Head: Normocephalic  Right Ear: External ear normal       Left Ear: External ear normal       Nose: Nose normal    Eyes:      General:         Right eye: No discharge  Left eye: No discharge  Conjunctiva/sclera: Conjunctivae normal       Pupils: Pupils are equal, round, and reactive to light  Neck:      Musculoskeletal: Normal range of motion and neck supple  Thyroid: No thyromegaly  Cardiovascular:      Rate and Rhythm: Normal rate and regular rhythm  Pulses: Normal pulses  Heart sounds: Normal heart sounds  No murmur  Pulmonary:      Effort: Pulmonary effort is normal  No respiratory distress  Breath sounds: Normal breath sounds  No wheezing, rhonchi or rales  Abdominal:      General: Bowel sounds are normal  There is no distension  Palpations: Abdomen is soft  Musculoskeletal:      Right lower leg: Edema present  Left lower leg: Edema present  Comments: Component of lymphedema   Skin:     General: Skin is warm and dry  Capillary Refill: Capillary refill takes less than 2 seconds  Neurological:      General: No focal deficit present  Mental Status: She is alert and oriented to person, place, and time  Mental status is at baseline  Psychiatric:         Mood and Affect: Mood normal          Behavior: Behavior normal          Thought Content:  Thought content normal          Judgment: Judgment normal                    Medications/ Allergies:     Current Facility-Administered Medications   Medication Dose Route Frequency Provider Last Rate    acetaminophen 650 mg Oral Q6H PRN Renny Escobar MD      aspirin  81 mg Oral Daily Renny Escobar MD      atorvastatin  40 mg Oral Daily With Leyla Velez MD      enoxaparin  40 mg Subcutaneous Daily Renny Escobar MD      famotidine  20 mg Oral BID Renny Escobar MD      FLUoxetine  60 mg Oral Daily Bhupendra Hallman MD      gabapentin  100 mg Oral BID Renny Escobar MD      gabapentin  300 mg Oral HS Renny Escobar MD      insulin glargine  20 Units Subcutaneous HS Renny Escobar MD      insulin lispro  1-5 Units Subcutaneous TID AC Renny Escobar MD      insulin lispro  1-5 Units Subcutaneous HS Renny Escobar MD      levothyroxine  50 mcg Oral Early Morning Renny Escobar MD      LORazepam  0 5 mg Oral BID Bhupendra Hallman MD      senna  1 tablet Oral Daily Renny Escobar MD      topiramate  200 mg Oral QAM Renny Escobar MD      topiramate  200 mg Oral HS Renny Escobar MD       acetaminophen, 650 mg, Q6H PRN      Allergies   Allergen Reactions    Pineapple Anaphylaxis    Byetta 10 Mcg Pen [Exenatide]     Marijuana [Dronabinol] GI Intolerance    Metformin Diarrhea     Other reaction(s): Unknown    Omeprazole Hives    Ondansetron      Other reaction(s): Unknown    Coconut Oil Rash    Liraglutide Rash and Other (See Comments)    Penicillins Rash       VTE Pharmacologic Prophylaxis:   Sequential compression device (Venodyne)     Labs:   Troponins:  Results from last 7 days   Lab Units 03/03/21  2018 03/03/21  1633 03/03/21  1354   TROPONIN I ng/mL <0 02 <0 03 <0 03       CBC with diff:  Results from last 7 days   Lab Units 03/04/21  0831 03/03/21  0901   WBC Thousand/uL 5 47 6 40   HEMOGLOBIN g/dL 15 1 14 2   HEMATOCRIT % 47 8* 43 6   MCV fL 91 86   PLATELETS Thousands/uL 199 188   MCH pg 28 7 28 0   MCHC g/dL 31 6 32 6   RDW % 13 0 13 3   MPV fL 11 0 10 5   NRBC AUTO /100 WBCs 0  --      CMP:  Results from last 7 days   Lab Units 03/04/21  0831 03/03/21  1354 03/03/21  0901   SODIUM mmol/L 135* 136 134   POTASSIUM mmol/L 4 2 4 1 4 2   CHLORIDE mmol/L 103 100 98   CO2 mmol/L 28 29 31   ANION GAP mmol/L 4 7 5   BUN mg/dL 13 19 22*   CREATININE mg/dL 0 69 0 58 0 67   CALCIUM mg/dL 8 6 8 8 8 7   AST U/L 24 12* 12*   ALT U/L 23 10 12   ALK PHOS U/L 110 104 1 106 0   TOTAL PROTEIN g/dL 7 4 6 6 6 8   ALBUMIN g/dL 3 0* 3 5 3 5   TOTAL BILIRUBIN mg/dL 0 50 0 42 0 36   EGFR ml/min/1 73sq m 91 96 92     Magnesium:  Results from last 7 days   Lab Units 03/04/21  0831 03/03/21 2018 03/03/21  0901   MAGNESIUM mg/dL 1 8 1 7 1 5*     Coags:  Results from last 7 days   Lab Units 03/03/21  0911   PTT seconds 24   INR  0 96     TSH:  Results from last 7 days   Lab Units 03/03/21  1354   TSH 3RD GENERATON uIU/mL 4 123     No components found for: TSH3  Lipid Profile:  Results from last 7 days   Lab Units 03/03/21  1354   CHOLESTEROL mg/dL 202*   TRIGLYCERIDES mg/dL 121 0   HDL mg/dL 43*   LDL CALC mg/dL 135*     Hgb A1c:  Results from last 7 days   Lab Units 03/03/21 2020   HEMOGLOBIN A1C % 11 5*       Imaging & Testing   I have personally reviewed pertinent reports  Xr Chest 1 View Portable    Result Date: 3/3/2021  Narrative: CHEST INDICATION:   Shortness of breath  COMPARISON:  12/4/2020 EXAM PERFORMED/VIEWS:  XR CHEST PORTABLE FINDINGS: There is mild enlargement of the cardiac silhouette Mild pulmonary vascular congestion suspected  No overt alveolar edema  No pneumothorax, no pleural effusion  Osseous structures appear within normal limits for patient age  Impression: Enlargement of cardiac silhouette with mild pulmonary vascular congestion suspected  Findings concur with the referring clinician's preliminary interpretation already in the patient's electronic health record  Workstation performed: [id number]        EKG / Monitor: Personally reviewed      Sinus rhythm    Cardiac testing:   Results for orders placed during the hospital encounter of 03/03/21   Echo complete with contrast if indicated    Narrative [address]  [address] [facility]  Nickie Masterson [address]  [phone]    Transthoracic Echocardiogram  2D, M-mode, Doppler, and Color Doppler    Study date:  04-Mar-2021    Patient: Chanelle TREVIZO  MR number: [id number]  Account number: [de-identified]  : 1955  Age: 76 years  Gender: Female  Status: Outpatient  Location: Bedside  Height: 62 in  Weight: 283 4 lb  BP: 129/ 62 mmHg    Indications: chest pain    Diagnoses: I43  - Cardiomyopathy in diseases classified elsewhere    Sonographer:  CHRIST Ashraf  Primary Physician:  Edmar Silva MD  Referring Physician:  Hawa Pringle:  [facility]  Interpreting Physician:  Mely Castillo MD    SUMMARY    LEFT VENTRICLE:  Systolic function was normal  Ejection fraction was estimated in the range of 55 % to 60 % to be 60 %  Although no diagnostic regional wall motion abnormality was identified, this possibility cannot be completely excluded on the basis of this study  Wall thickness was mildly increased  There was mild concentric hypertrophy  Features were consistent with a pseudonormal left ventricular filling pattern, with concomitant abnormal relaxation and increased filling pressure (grade 2 diastolic dysfunction)  LEFT ATRIUM:  The atrium was mildly to moderately dilated  RIGHT ATRIUM:  The atrium was mildly dilated  MITRAL VALVE:  There was mild to moderate annular calcification  There was mild regurgitation  AORTIC VALVE:  The valve was probably trileaflet, but function as bicuspid  Leaflets exhibited normal thickness and normal cuspal separation  Transaortic velocity was increased due to valvular stenosis  There was mild stenosis  Peak gradient around 12 mm of mercury valve area is 1 5-1 6 cm2    TRICUSPID VALVE:  There was mild to moderate regurgitation  Estimated peak PA pressure was 50 mmHg      HISTORY: PRIOR HISTORY: HTN,Hyperlipidemia,gastroesophageal reflux disease,seizures  PROCEDURE: The procedure was performed at the bedside  This was a routine study  The transthoracic approach was used  The study included complete 2D imaging, M-mode, complete spectral Doppler, and color Doppler  The heart rate was 69 bpm,  at the start of the study  Images were obtained from the parasternal, apical, subcostal, and suprasternal notch acoustic windows  Image quality was adequate  LEFT VENTRICLE: Size was normal  Systolic function was normal  Ejection fraction was estimated in the range of 55 % to 60 % to be 60 %  Although no diagnostic regional wall motion abnormality was identified, this possibility cannot be  completely excluded on the basis of this study  Wall thickness was mildly increased  There was mild concentric hypertrophy  DOPPLER: Features were consistent with a pseudonormal left ventricular filling pattern, with concomitant abnormal  relaxation and increased filling pressure (grade 2 diastolic dysfunction)  RIGHT VENTRICLE: The size was normal  Systolic function was normal     LEFT ATRIUM: The atrium was mildly to moderately dilated  RIGHT ATRIUM: The atrium was mildly dilated  MITRAL VALVE: There was mild to moderate annular calcification  There was normal leaflet separation  DOPPLER: The transmitral velocity was within the normal range  There was no evidence for stenosis  There was mild regurgitation  AORTIC VALVE: The valve was probably trileaflet, but function as bicuspid  Leaflets exhibited normal thickness and normal cuspal separation  DOPPLER: Transaortic velocity was increased due to valvular stenosis  There was mild stenosis  Peak gradient around 12 mm of mercury valve area is 1 5-1 6 cm2 There was no regurgitation  TRICUSPID VALVE: DOPPLER: There was mild to moderate regurgitation  Estimated peak PA pressure was 50 mmHg  The findings suggest mild to moderate pulmonary hypertension      PULMONIC VALVE: DOPPLER: There was trace regurgitation  PERICARDIUM: There was no thickening or calcification  There was no pericardial effusion  AORTA: The root exhibited normal size  SYSTEMIC VEINS: IVC: The inferior vena cava was normal in size  Respirophasic changes were normal     SYSTEM MEASUREMENT TABLES    2D  EF (Teich): 59 91 %  %FS: 31 7 %  Ao Diam: 3 19 cm  EDV(Teich): 88 47 ml  ESV(Teich): 35 47 ml  IVSd: 1 27 cm  LA Area: 22 54 cm2  LA Diam: 3 91 cm  LVIDd: 4 42 cm  LVIDs: 3 02 cm  LVOT Diam: 1 86 cm  LVPWd: 1 28 cm  RA Area: 16 49 cm2  RVIDd: 4 1 cm  SV (Teich): 53 ml    PW  MV E/A Ratio: 1 08    [employer] Accredited Echocardiography Laboratory    Prepared and electronically signed by    Angelito Shrestha MD  Signed 04-Mar-2021 15:24:34             SAMIA Dunaway        "This note has been constructed using a voice recognition system  Therefore there may be syntax, spelling, and/or grammatical errors   Please call if you have any questions  "

## 2021-03-05 NOTE — UTILIZATION REVIEW
Notification of Inpatient Admission/Inpatient Authorization Request   This is a Notification of Inpatient Admission for [address]  Be advised that this patient was admitted to our facility under Inpatient Status  Contact Edyta Wheeler at [phone] for additional admission information  [address] [facility] DEPT  DEDICATED -[phone]  Patient Name:   Lashay Grove   YOB: 1955       [address]   [address]:   [address]  Tax ID: [id number]  NPI: [id number] Attending Provider/NPI:  Phone:  Address: James Roach, Rc Quinn [[id number]]  [phone]  Same as EDWAR/Perry Vasquez 1106 of Service Code: 24 Place of Service Name:  [address] [place] [address]   Start Date: 3/3/21 1801 Discharge Date & Time: No discharge date for patient encounter  Type of Admission: Inpatient Status Discharge Disposition (if discharged): [address] [place] [address]   Patient Diagnoses: Chest pain [R07 9]     Orders: Admission Orders (From admission, onward)     Ordered        03/04/21 1959  Inpatient Admission  Once         03/03/21 1841  Place in Observation  Once                    Assigned Utilization Review Contact: [address]  [employer]  Utilization   Network Utilization Review Department  Phone: [phone]; Fax [phone]  Email: [email or web address]   ATTENTION PAYERS: Please call the assigned Utilization  directly with any questions or concerns ALL voicemails in the department are confidential  Send all requests for admission clinical reviews, approved or denied determinations and any other requests to dedicated fax number belonging to the campus where the patient is receiving treatment

## 2021-03-06 LAB
ATRIAL RATE: 70 BPM
ATRIAL RATE: 73 BPM
ATRIAL RATE: 86 BPM
GLUCOSE SERPL-MCNC: 116 MG/DL (ref 65–140)
GLUCOSE SERPL-MCNC: 152 MG/DL (ref 65–140)
GLUCOSE SERPL-MCNC: 161 MG/DL (ref 65–140)
GLUCOSE SERPL-MCNC: 171 MG/DL (ref 65–140)
P AXIS: 54 DEGREES
P AXIS: 55 DEGREES
P AXIS: 57 DEGREES
PR INTERVAL: 130 MS
PR INTERVAL: 140 MS
PR INTERVAL: 144 MS
QRS AXIS: -41 DEGREES
QRS AXIS: -44 DEGREES
QRS AXIS: -51 DEGREES
QRSD INTERVAL: 128 MS
QT INTERVAL: 386 MS
QT INTERVAL: 416 MS
QT INTERVAL: 422 MS
QTC INTERVAL: 449 MS
QTC INTERVAL: 461 MS
QTC INTERVAL: 464 MS
T WAVE AXIS: 37 DEGREES
T WAVE AXIS: 51 DEGREES
T WAVE AXIS: 57 DEGREES
VENTRICULAR RATE: 70 BPM
VENTRICULAR RATE: 73 BPM
VENTRICULAR RATE: 86 BPM

## 2021-03-06 PROCEDURE — 99232 SBSQ HOSP IP/OBS MODERATE 35: CPT | Performed by: INTERNAL MEDICINE

## 2021-03-06 PROCEDURE — 93010 ELECTROCARDIOGRAM REPORT: CPT | Performed by: INTERNAL MEDICINE

## 2021-03-06 PROCEDURE — 97167 OT EVAL HIGH COMPLEX 60 MIN: CPT

## 2021-03-06 PROCEDURE — 82948 REAGENT STRIP/BLOOD GLUCOSE: CPT

## 2021-03-06 RX ADMIN — TOPIRAMATE 200 MG: 100 TABLET, FILM COATED ORAL at 23:00

## 2021-03-06 RX ADMIN — INSULIN GLARGINE 20 UNITS: 100 INJECTION, SOLUTION SUBCUTANEOUS at 23:00

## 2021-03-06 RX ADMIN — LEVOTHYROXINE SODIUM 50 MCG: 50 TABLET ORAL at 05:45

## 2021-03-06 RX ADMIN — INSULIN LISPRO 1 UNITS: 100 INJECTION, SOLUTION INTRAVENOUS; SUBCUTANEOUS at 17:16

## 2021-03-06 RX ADMIN — LORAZEPAM 0.5 MG: 0.5 TABLET ORAL at 17:15

## 2021-03-06 RX ADMIN — STANDARDIZED SENNA CONCENTRATE 8.6 MG: 8.6 TABLET ORAL at 08:05

## 2021-03-06 RX ADMIN — INSULIN LISPRO 1 UNITS: 100 INJECTION, SOLUTION INTRAVENOUS; SUBCUTANEOUS at 13:13

## 2021-03-06 RX ADMIN — ASPIRIN 81 MG: 81 TABLET, COATED ORAL at 08:05

## 2021-03-06 RX ADMIN — FAMOTIDINE 20 MG: 20 TABLET ORAL at 08:04

## 2021-03-06 RX ADMIN — FLUOXETINE 60 MG: 20 CAPSULE ORAL at 08:04

## 2021-03-06 RX ADMIN — GABAPENTIN 300 MG: 300 CAPSULE ORAL at 23:00

## 2021-03-06 RX ADMIN — LORAZEPAM 0.5 MG: 0.5 TABLET ORAL at 08:03

## 2021-03-06 RX ADMIN — TOPIRAMATE 200 MG: 100 TABLET, FILM COATED ORAL at 08:04

## 2021-03-06 RX ADMIN — GABAPENTIN 100 MG: 100 CAPSULE ORAL at 17:15

## 2021-03-06 RX ADMIN — GABAPENTIN 100 MG: 100 CAPSULE ORAL at 08:05

## 2021-03-06 RX ADMIN — ACETAMINOPHEN 650 MG: 325 TABLET, FILM COATED ORAL at 15:19

## 2021-03-06 RX ADMIN — FAMOTIDINE 20 MG: 20 TABLET ORAL at 17:16

## 2021-03-06 RX ADMIN — INSULIN LISPRO 1 UNITS: 100 INJECTION, SOLUTION INTRAVENOUS; SUBCUTANEOUS at 23:00

## 2021-03-06 RX ADMIN — ENOXAPARIN SODIUM 40 MG: 40 INJECTION SUBCUTANEOUS at 08:03

## 2021-03-06 NOTE — CASE MANAGEMENT
LOS - 3 days    SW following to assist with DCP  STR placement is being recommended  Case discussed with Dr Marnie Aviles  Discharge is being considered  SW placed follow up phone call to pt's son, Ye Welch  SW discussed recommendations and facility options with son  Son requested referral be made to [facility]  Pt has been to [facility] in the past, it is convenient for him to get to and she is familiar with facility  SW offered to email list of additional facilities for son to consider in the event there is no availability at [facility]  Son provided email address and list was sent  Referral has been made to [facility]  SW will continue to follow to monitor progress and assist with planning as needed

## 2021-03-06 NOTE — PROGRESS NOTES
Progress Note - Latrell Dial 1955, 76 y o  female MRN: [id number]    Unit/Bed#: [address] [place] [address] Encounter: [id number]    Primary Care Provider: Jose Humphreys MD   Date and time admitted to hospital: 3/3/2021  6:01 PM        * Chest pain  Assessment & Plan  Patient admitted to [address] [place] [address] for chest pain after she was mopping the floors at home, with associated SOB  CXR with mild pulmonary vascular congestion   Troponin (-) x 3  KENDALL score 4  She was noted to have NSVT vs  SVT with aberration with associated dizziness while at [facility]  Patient underwent nuclear stress test which was reported abnormal   Patient underwent cardiac catheterization which showed mild nonobstructive CAD  Cardiology recommendations appreciated   2D echo showed EF of 50 for 60% with mild concentric hypertrophy and grade 2 diastolic dysfunction  Patient was having some weakness and was seen by physical therapy and is recommending rehab    Diabetes mellitus [facility])  Assessment & Plan  Lab Results   Component Value Date    HGBA1C 11 5 (H) 03/03/2021       Recent Labs     03/05/21 2005 03/06/21  0735 03/06/21  1138 03/06/21  1609   POCGLU 124 116 171* 161*       Blood Sugar Average: Last 72 hrs:  (P) 159 [id number]   Hemoglobin A1c is significantly elevated  Hold home PO medications  Continue Lantus and Humalog sliding scale with Accu-Cheks q a c  And hs  Patient needs better blood sugar control  Blood sugars have been stable  Acquired hypothyroidism  Assessment & Plan  Continue home dose of levothyroxine    Major depressive disorder  Assessment & Plan  Continue Prozac 40 mg daily which was increased to 60 milligrams by Psychiatry  Patient added on Ativan 0 5 milligram p o  B i d     GERD (gastroesophageal reflux disease)  Assessment & Plan  Continue Pepcid 20 mg BID    Hyperlipidemia  Assessment & Plan  Started on atorvastatin 40 mg during [facility] admission, continue     Results from 3/3/2021   Ref   Range 3/3/2021 13:54 Cholesterol Latest Ref Range: <=200 mg/dL 202 (H)   Triglycerides Latest Ref Range: <=150 mg/dL 121 0   HDL Latest Ref Range: >=50 mg/dL 43 (L)   LDL Calculated Latest Ref Range: 0 - 100 mg/dL 135 (H)         Hypertension  Assessment & Plan  Patient does not take any medications at home   Blood pressure is acceptable    Restless leg syndrome  Assessment & Plan  Continue home dose of gabapentin, 100 mg early AM and midday, 300 mg QHS    Seizures (HCC)  Assessment & Plan  Continue home dose of topiramate      VTE Pharmacologic Prophylaxis:   Pharmacologic: Enoxaparin (Lovenox)  Mechanical VTE Prophylaxis in Place: Yes    Patient Centered Rounds: I have performed bedside rounds with nursing staff today  Discussions with Specialists or Other Care Team Provider: Leonidas Duane    Education and Discussions with Family / Patient: Tish Palacios    Time Spent for Care: 30 minutes  More than 50% of total time spent on counseling and coordination of care as described above  Current Length of Stay: 3 day(s)    Current Patient Status: Inpatient   Certification Statement: The patient will continue to require additional inpatient hospital stay due to Awaiting placement    Discharge Plan:  Short-term rehab    Code Status: Level 1 - Full Code      Subjective: The patient is sitting comfortably in the chair  Patient denies any chest pain, shortness of breath, abdominal pain, nausea or vomiting  Anxious to get out of the hospital     Objective:     Vitals:   Temp (24hrs), Av 6 °F (36 4 °C), Min:97 1 °F (36 2 °C), Max:97 9 °F (36 6 °C)    Temp:  [97 1 °F (36 2 °C)-97 9 °F (36 6 °C)] 97 9 °F (36 6 °C)  HR:  [63-71] 70  Resp:  [18-20] 18  BP: (100-134)/(55-71) 117/61  SpO2:  [88 %-97 %] 88 %  Body mass index is 52 02 kg/m²  Input and Output Summary (last 24 hours):        Intake/Output Summary (Last 24 hours) at 3/6/2021 1721  Last data filed at 3/5/2021 1834  Gross per 24 hour   Intake 186 67 ml   Output 300 ml Net -113 33 ml       Physical Exam:     Physical Exam  Constitutional:       General: She is not in acute distress  HENT:      Head: Normocephalic and atraumatic  Nose: Nose normal    Eyes:      Conjunctiva/sclera: Conjunctivae normal       Pupils: Pupils are equal, round, and reactive to light  Neck:      Musculoskeletal: Normal range of motion and neck supple  Cardiovascular:      Rate and Rhythm: Normal rate and regular rhythm  Heart sounds: Normal heart sounds  Pulmonary:      Effort: Pulmonary effort is normal  No respiratory distress  Breath sounds: Normal breath sounds  No wheezing or rales  Abdominal:      General: Bowel sounds are normal  There is no distension  Palpations: Abdomen is soft  Tenderness: There is no abdominal tenderness  There is no guarding or rebound  Skin:     General: Skin is warm and dry  Findings: No rash  Neurological:      Mental Status: She is alert  Cranial Nerves: No cranial nerve deficit  Additional Data:     Labs:    Results from last 7 days   Lab Units 03/04/21  0831   WBC Thousand/uL 5 47   HEMOGLOBIN g/dL 15 1   HEMATOCRIT % 47 8*   PLATELETS Thousands/uL 199   NEUTROS PCT % 66   LYMPHS PCT % 24   MONOS PCT % 6   EOS PCT % 3     Results from last 7 days   Lab Units 03/04/21  0831   POTASSIUM mmol/L 4 2   CHLORIDE mmol/L 103   CO2 mmol/L 28   BUN mg/dL 13   CREATININE mg/dL 0 69   CALCIUM mg/dL 8 6   ALK PHOS U/L 110   ALT U/L 23   AST U/L 24     Results from last 7 days   Lab Units 03/03/21  0911   INR  0 96       * I Have Reviewed All Lab Data Listed Above  * Additional Pertinent Lab Tests Reviewed:  [facility]        Recent Cultures (last 7 days):           Last 24 Hours Medication List:   Current Facility-Administered Medications   Medication Dose Route Frequency Provider Last Rate    acetaminophen  650 mg Oral Q6H PRN Soni Reynolds MD      aspirin  81 mg Oral Daily Malika KATZ Martha Harris MD      atorvastatin  40 mg Oral Daily With Kenia Rendon MD      enoxaparin  40 mg Subcutaneous Daily Hailey Pastor MD      famotidine  20 mg Oral BID Hailey Pastor MD      FLUoxetine  60 mg Oral Daily Paola Segovia MD      gabapentin  100 mg Oral BID Hailey Pastor MD      gabapentin  300 mg Oral HS Hailey Pastor MD      insulin glargine  20 Units Subcutaneous HS Hailey Pastor MD      insulin lispro  1-5 Units Subcutaneous TID AC Hailey Pastor MD      insulin lispro  1-5 Units Subcutaneous HS Hailey Pastor MD      levothyroxine  50 mcg Oral Early Morning Hailey Pastor MD      LORazepam  0 5 mg Oral BID Paola Segovia MD      senna  1 tablet Oral Daily Hailey Pastor MD      topiramate  200 mg Oral QAM Hailey Pastor MD      topiramate  200 mg Oral HS Hailey Pastor MD          Today, Patient Was Seen By: Alin Encarnacion MD    ** Please Note: Dictation voice to text software may have been used in the creation of this document   **

## 2021-03-06 NOTE — CASE MANAGEMENT
PRAVIN following to assist with DCP  STR placement is planned  Response received from [facility]  No COVID negative beds available at this time  SW contacted son  Son said he had chosen additional facilities through [email or web address] that was sent to him  SW will make additional referrals to facilities chosen  IMM reviewed with son while on phone  SW will continue to follow to assist with planning as needed

## 2021-03-06 NOTE — UTILIZATION REVIEW
Continued Stay Review    Date:  3/6/21                         Current Patient Class: inpatient    Current Level of Care: acute  Assessment/Plan:   Chest pain  Assessment & Plan  Patient admitted to [facility] for chest pain after she was mopping the floors at home, with associated SOB  CXR with mild pulmonary vascular congestion   Troponin (-) x 3  KENDALL score 4  She was noted to have NSVT vs  SVT with aberration with associated dizziness while at [facility]  Patient underwent nuclear stress test which was reported abnormal   Patient underwent cardiac catheterization which showed mild nonobstructive CAD  Cardiology recommendations appreciated   2D echo showed EF of 50 for 60% with mild concentric hypertrophy and grade 2 diastolic dysfunction  Patient was having some weakness and was seen by physical therapy and is recommending rehab    Current Patient Status: Inpatient   Certification Statement: The patient will continue to require additional inpatient hospital stay due to Awaiting placement    CMSW following to assist with DCP  STR placement is planned  Response received from [place]  No COVID negative beds available at this time  SW contacted son  Son said he had chosen additional facilities through [employer] that was sent to him  SW will make additional referrals to facilities chosen  IMM reviewed with son while on phone  SW will continue to follow to assist with planning as needed     Pertinent Labs/Diagnostic Results:   Results from last 7 days   Lab Units 03/06/21  1138 03/06/21  0735 03/05/21  2005 03/05/21  1617 03/05/21  1144 03/05/21  0711 03/04/21  2044 03/04/21  1558 03/04/21  1213 03/04/21  0741 03/04/21  0739 03/03/21 2028   POC GLUCOSE mg/dl 171* 116 124 158* 163* 174* 197* 176* 162* 142* 162* 168*     Vital Signs:   03/06/21 16:01:05  --  70  18  117/61  80  88 %Abnormal   --  --  --  --   03/06/21 08:10:55  97 9 °F (36 6 °C)  68  20  122/71  88  96 %  28  2 L/min bmp  Medications:   Scheduled Medications:  aspirin, 81 mg, Oral, Daily  atorvastatin, 40 mg, Oral, Daily With Dinner  enoxaparin, 40 mg, Subcutaneous, Daily  famotidine, 20 mg, Oral, BID  FLUoxetine, 60 mg, Oral, Daily  gabapentin, 100 mg, Oral, BID  gabapentin, 300 mg, Oral, HS  insulin glargine, 20 Units, Subcutaneous, HS  insulin lispro, 1-5 Units, Subcutaneous, TID AC  insulin lispro, 1-5 Units, Subcutaneous, HS  levothyroxine, 50 mcg, Oral, Early Morning  LORazepam, 0 5 mg, Oral, BID  senna, 1 tablet, Oral, Daily  topiramate, 200 mg, Oral, QAM  topiramate, 200 mg, Oral, HS      Continuous IV Infusions:     PRN Meds:  acetaminophen, 650 mg, Oral, Q6H PRN        Discharge Plan: [facility]    Network Utilization Review Department  ATTENTION: Please call with any questions or concerns to [phone] and carefully listen to the prompts so that you are directed to the right person  All voicemails are confidential   Mannie Alcala all requests for admission clinical reviews, approved or denied determinations and any other requests to dedicated fax number below belonging to the campus where the patient is receiving treatment   List of dedicated fax numbers for the Facilities:  [address] DENIALS (Administrative/Medical Necessity) [phone]   [address] (Maternity/NICU/Pediatrics) [phone]   [address] Jovi Esparza 6204 (  [address] [facility] 103) [address] [facility] [address] Villegas 1481 [address] Post) 1014 [facility] [phone]

## 2021-03-06 NOTE — ASSESSMENT & PLAN NOTE
Started on atorvastatin 40 mg during [facility] admission, continue     Results from 3/3/2021   Ref   Range 3/3/2021 13:54   Cholesterol Latest Ref Range: <=200 mg/dL 202 (H)   Triglycerides Latest Ref Range: <=150 mg/dL 121 0   HDL Latest Ref Range: >=50 mg/dL 43 (L)   LDL Calculated Latest Ref Range: 0 - 100 mg/dL 135 (H)

## 2021-03-06 NOTE — OCCUPATIONAL THERAPY NOTE
OT EVALUATION       03/06/21 0915   Note Type   Note type Evaluation   Restrictions/Precautions   Other Precautions Chair Alarm; Bed Alarm; Fall Risk;O2   Pain Assessment   Pain Assessment Tool Pain Assessment not indicated - pt denies pain   Home Living   Type of Home Apartment   Home Layout One level;Elevator  (0 JOSE)   Bathroom Shower/Tub Tub/shower unit   Home Equipment Walker;Cane   Additional Comments pt sponge bathes as she cannot get into the tub  Uses the bus for transportation for groceries and she has a cart    Prior Function   Level of [place] Independent with ADLs and functional mobility   Lives With Alone   Receives Help From Family   ADL Assistance Independent   IADLs Independent   Subjective   Subjective "I don't want to go to rehab but I realize I may have to"   ADL   Eating Assistance 5  Supervision/Setup   Grooming Assistance 5  Supervision/Setup   UB Bathing Assistance 4  Minimal Assistance   LB Bathing Assistance 3  Moderate Assistance   [address] 2  Maximal Assistance   LB Dressing Deficit Don/doff R sock; Don/doff L sock   Toileting Assistance  2  Maximal Assistance   Toileting Deficit Bedside commode;Perineal hygiene  (urination)   Bed Mobility   Supine to Sit 3  Moderate assistance   Transfers   Sit to Stand 4  Minimal assistance   Stand to Sit 4  Minimal assistance   Stand pivot 4  Minimal assistance   Toilet transfer 4  Minimal assistance   Additional items Commode   Functional Mobility   Functional Mobility 4  Minimal assistance   Additional Comments 5 feet x 2, bed to commode and commode to chair    Additional items Rolling walker   Balance   Static Sitting Fair +   Dynamic Sitting Fair   Static Standing Fair   Dynamic Standing Fair -   Activity Tolerance   Activity Tolerance Patient limited by fatigue   Nurse Made Aware yes, June   RUE Assessment   RUE Assessment WFL   LUE Assessment   LUE Assessment WFL   Cognition Overall Cognitive Status WFL   Arousal/Participation Cooperative   Attention Within functional limits   Orientation Level Oriented X4   Following Commands Follows multistep commands with increased time or repetition   Assessment   Limitation Decreased ADL status; Decreased UE strength;Decreased Safe judgement during ADL;Decreased endurance;Decreased self-care trans;Decreased high-level ADLs  (decreased balance and mobility )   Prognosis Good   Assessment Patient evaluated by Occupational Therapy  Patient admitted with Chest pain  The patients occupational profile, medical and therapy history includes a extensive additional review of physical, cognitive, or psychosocial history related to current functional performance  Comorbidities affecting functional mobility and ADLS include:R hip fracture,  diabetes, hypertension and seizures  Prior to admission, patient was independent with functional mobility without assistive device, independent with ADLS and independent with IADLS  The evaluation identifies the following performance deficits: weakness, impaired balance, decreased endurance, increased fall risk, new onset of impairment of functional mobility, decreased ADLS, decreased IADLS, decreased activity tolerance, decreased safety awareness, impaired judgement, SOB upon exertion and decreased strength, that result in activity limitations and/or participation restrictions  This evaluation requires clinical decision making of high complexity, because the patient presents with comorbidites that affect occupational performance and required significant modification of tasks or assistance with consideration of multiple treatment options  The Barthel Index was used as a functional outcome tool presenting with a score of 50, indicating marked limitations of functional mobility and ADLS  The patient's raw score on the -PAC Daily Activity inpatient short form is 14, standardized score is 33 39, less than 39 4  Patients at this level are likely to benefit from DC to post-acute rehabilitation services  Please refer to the recommendation of the Occupational Therapist for safe DC planning  Patient will benefit from skilled Occupational Therapy services to address above deficits and facilitate a safe return to prior level of function  Goals   Patient Goals to get stronger and go home   STG Time Frame   (1-7 days)   Short Term Goal  Goals established to promote patient goal of getting stronger and going home:  Patient will increase standing tolerance to 3 minutes during ADL task to decrease assistance level and decrease fall risk; Patient will increase bed mobility to min assist in preparation for ADLS and transfers; Patient will increase functional mobility to and from bathroom with rolling walker with min assist to increase performance with ADLS and to use a toilet; Patient will tolerate 10 minutes of UE ROM/strengthening to increase general activity tolerance and performance in ADLS/IADLS; Patient will improve functional activity tolerance to 10 minutes of sustained functional tasks to increase participation in basic self-care and decrease assistance level;  Patient will be able to to verbalize understanding and perform energy conservation/proper body mechanics during ADLS and functional mobility at least 75% of the time with minimal cueing to decrease signs of fatigue and increase stamina to return to prior level of function; Patient will increase dynamic sitting balance to fair+ to improve the ability to sit at edge of bed or on a chair for ADLS;  Patient will increase dynamic standing balance to fair to improve postural stability and decrease fall risk during standing ADLS and transfers       LTG Time Frame   (8-14 days)   Long Term Goal Goals established to promote patient goal of getting stronger and going home:  Patient will increase standing tolerance to 6 minutes during ADL task to decrease assistance level and decrease fall risk; Patient will increase bed mobility to supervision in preparation for ADLS and transfers; Patient will increase functional mobility to and from bathroom with rolling walker with supervision to increase performance with ADLS and to use a toilet; Patient will tolerate 20 minutes of UE ROM/strengthening to increase general activity tolerance and performance in ADLS/IADLS; Patient will improve functional activity tolerance to 20 minutes of sustained functional tasks to increase participation in basic self-care and decrease assistance level;  Patient will be able to to verbalize understanding and perform energy conservation/proper body mechanics during ADLS and functional mobility at least 90% of the time without cueing to decrease signs of fatigue and increase stamina to return to prior level of function; Patient will increase dynamic sitting balance to good to improve the ability to sit at edge of bed or on a chair for ADLS;  Patient will increase dynamic standing balance to fair+ to improve postural stability and decrease fall risk during standing ADLS and transfers  Functional Transfer Goals   Pt Will Perform All Functional Transfers   (STG supervision LTG Independent )   ADL Goals   Pt Will Perform Grooming Standing at sink  (STG supervision LTG Independent )   Pt Will Perform Bathing   (STG min assist LTG supervision )   Pt Will Perform UE Dressing   (STG supervision LTG Independent )   Pt Will Perform LE Dressing   (STG Mod assist LTG Min assist )   Pt Will Perform Toileting   (STG Mod assist LTG Min assist )   Plan   Treatment Interventions ADL retraining;Functional transfer training;UE strengthening/ROM; Endurance training;Patient/family training;Equipment evaluation/education; Activityengagement; Compensatory technique education   Goal Expiration Date 03/20/21   OT Frequency 3-5x/wk   Recommendation   OT Discharge Recommendation Post-Acute Rehabilitation Services   AM-PAC Daily Activity Inpatient   Lower Body Dressing 1   Bathing 2   Toileting 1   Upper Body Dressing 3   Grooming 3   Eating 4   Daily Activity Raw Score 14   Daily Activity Standardized Score (Calc for Raw Score >=11) 33 39   AM-PAC Applied Cognition Inpatient   Following a Speech/Presentation 4   Understanding Ordinary Conversation 4   Taking Medications 4   Remembering Where Things Are Placed or Put Away 4   Remembering List of 4-5 Errands 4   Taking Care of Complicated Tasks 4   Applied Cognition Raw Score 24   Applied Cognition Standardized Score 62 21   Barthel Index   Feeding 10   Bathing 0   Grooming Score 0   Dressing Score 5   Bladder Score 10   Bowels Score 10   Toilet Use Score 5   Transfers (Bed/Chair) Score 10   Mobility (Level Surface) Score 0   Stairs Score 0   Barthel Index Score 50   Licensure   [place] License Number  Ezequiel Weston Negro 86 OTR/L [id number]

## 2021-03-06 NOTE — ASSESSMENT & PLAN NOTE
Patient admitted to [facility] for chest pain after she was mopping the floors at home, with associated SOB  CXR with mild pulmonary vascular congestion   Troponin (-) x 3  KENDALL score 4  She was noted to have NSVT vs  SVT with aberration with associated dizziness while at [facility]  Patient underwent nuclear stress test which was reported abnormal   Patient underwent cardiac catheterization which showed mild nonobstructive CAD  Cardiology recommendations appreciated   2D echo showed EF of 50 for 60% with mild concentric hypertrophy and grade 2 diastolic dysfunction  Patient was having some weakness and was seen by physical therapy and is recommending rehab

## 2021-03-06 NOTE — PROGRESS NOTES
Patient examined spoke with the nurse 's notes reviewed and noted  Patient is alert awake cooperative very happy to see me and thanking me for seeing her  Patient reports that she wants to continue 60 mg of Prozac at this time she took Prozac 60 mg anyone 80 mg a day in the past according to her depression status  Currently she is okay with the 60 mg daily and she will follow-up with her psychiatrist at [facility] to let him know that her Prozac was adjusted to 60 mg for increased depression  Patient is cooperative communicate her feelings well she admits being depressed because of all the circumstances described in my consultation  Patient has a long history of depression and she has been under psychiatric care at [facility] since long time  Patient offers no new complaints  Patient also reports that she will be going to the short-term rehab later today  I reviewed the 's notes  [facility] not have a known COVID bed at this time and she will be referred to other area nursing homes but patient is not sure where she going to go  Nurse reported the same  No side effects of Prozac noted or reported  Patient denies auditory or visual hallucinations  Patient denies suicidal or homicidal ideation  Therapy done with good response  Continue medications the same at this time  Patient will follow-up with her psychiatrist at scheduled appointment after the discharge  Thank you very much

## 2021-03-06 NOTE — ASSESSMENT & PLAN NOTE
Lab Results   Component Value Date    HGBA1C 11 5 (H) 03/03/2021       Recent Labs     03/05/21  2005 03/06/21  0735 03/06/21  1138 03/06/21  1609   POCGLU 124 116 171* 161*       Blood Sugar Average: Last 72 hrs:  (P) 159 [id number]   Hemoglobin A1c is significantly elevated  Hold home PO medications  Continue Lantus and Humalog sliding scale with Accu-Cheks q a c  And hs  Patient needs better blood sugar control  Blood sugars have been stable

## 2021-03-06 NOTE — PROGRESS NOTES
Progress Note - Cardiology   75 [facility]     Walter Lerma 76 y o  female MRN: [id number]  : 1955  Unit/Bed#: [address] [place] [address] Encounter: [id number]    Assessment and Plan:   1  Noncardiac Chest pain:  Peak troponin was less than 0 03  Milderd Newness nuclear stress test performed on 2021 was concerning for inferior wall ischemia       -  No further chest pain overnight  -  cardiac catheterization demonstrated minimal luminal irregularities    -  continue aspirin, and statin therapy     2  Depression:  Followed by Dr Sabrina Duran     3  Hypertension:  Blood pressure is currently controlled     4  Diabetes:  Hemoglobin A1c was 11 5  Patient needs tighter blood pressure control  Managed per the primary team     5  Dyslipidemia:  Continue statin therapy     6  Morbid obesity:  BMI 52 05  Encourage weight loss  Patient may be discharged from a cardiac standpoint  Subjective / Objective:   Patient seen and examined  She is resting comfortably in bed  Underwent cardiac catheterization via right groin which noted minimal luminal irregularities in all 3 vessels less than 30%  Recommendation for optimal medical therapy, good diabetic management and low-fat diet  Vitals: Blood pressure 122/71, pulse 68, temperature 97 9 °F (36 6 °C), temperature source Oral, resp  rate 20, height 5' 2" (1 575 m), weight 129 kg (284 lb 6 3 oz), SpO2 96 %  Vitals:    21 0600 21 0600   Weight: 129 kg (284 lb 9 6 oz) 129 kg (284 lb 6 3 oz)     Body mass index is 52 02 kg/m²  BP Readings from Last 3 Encounters:   21 122/71   21 123/73   20 156/84     Orthostatic Blood Pressures      Most Recent Value   Blood Pressure  122/71 filed at 2021 0810   Patient Position - Orthostatic VS  Lying filed at 2021 0706        I/O       / 07 -  07 07 -  07/ 07 -  0700    P  O  240 120     I V  (mL/kg)  66 7 (0 5)     Total Intake(mL/kg) 240 (1 9) 186 7 (1 4)     Urine (mL/kg/hr) 1200 (0 4) 1150 (0 4)     Stool 0      Total Output 1200 1150     Net -960 -963  3            Unmeasured Stool Occurrence 1 x          Invasive Devices     Peripheral Intravenous Line            Peripheral IV 03/03/21 Left Antecubital 2 days                  Intake/Output Summary (Last 24 hours) at 3/6/2021 0820  Last data filed at 3/5/2021 1834  Gross per 24 hour   Intake 186 67 ml   Output 1150 ml   Net -963 33 ml         Physical Exam:   Physical Exam  Vitals signs and nursing note reviewed  Constitutional:       General: She is not in acute distress  Appearance: Normal appearance  She is well-developed  She is obese  HENT:      Head: Normocephalic  Right Ear: External ear normal       Left Ear: External ear normal       Nose: Nose normal    Eyes:      General: No scleral icterus  Right eye: No discharge  Left eye: No discharge  Pupils: Pupils are equal, round, and reactive to light  Neck:      Musculoskeletal: Normal range of motion and neck supple  Thyroid: No thyromegaly  Cardiovascular:      Rate and Rhythm: Normal rate and regular rhythm  Pulses: Normal pulses  Heart sounds: Normal heart sounds  No murmur  Pulmonary:      Effort: Pulmonary effort is normal       Breath sounds: Normal breath sounds  Abdominal:      General: Bowel sounds are normal  There is no distension  Palpations: Abdomen is soft  Musculoskeletal:      Right lower leg: No edema  Left lower leg: No edema  Comments: Right groin stable without hematoma   Skin:     General: Skin is warm and dry  Capillary Refill: Capillary refill takes less than 2 seconds  Neurological:      General: No focal deficit present  Mental Status: She is alert and oriented to person, place, and time  Mental status is at baseline  Psychiatric:         Mood and Affect: Mood normal          Thought Content:  Thought content normal  Medications/ Allergies:     Current Facility-Administered Medications   Medication Dose Route Frequency Provider Last Rate    acetaminophen  650 mg Oral Q6H PRN Charleen Arroyo MD      aspirin  81 mg Oral Daily Charleen Arroyo MD      atorvastatin  40 mg Oral Daily With Carina Boyd MD      enoxaparin  40 mg Subcutaneous Daily Charleen Arroyo MD      famotidine  20 mg Oral BID Charleen Arroyo MD      FLUoxetine  60 mg Oral Daily Willem Rose MD      gabapentin  100 mg Oral BID Charleen Arroyo MD      gabapentin  300 mg Oral HS Charleen Arroyo MD      insulin glargine  20 Units Subcutaneous HS Charleen Arroyo MD      insulin lispro  1-5 Units Subcutaneous TID AC Charleen Arroyo MD      insulin lispro  1-5 Units Subcutaneous HS Charleen Arroyo MD      levothyroxine  50 mcg Oral Early Morning Charleen Arroyo MD      LORazepam  0 5 mg Oral BID Willem Rose MD      senna  1 tablet Oral Daily Charleen Arroyo MD      topiramate  200 mg Oral QAM Charleen Arroyo MD      topiramate  200 mg Oral HS Charleen Arroyo MD       acetaminophen, 650 mg, Q6H PRN      Allergies   Allergen Reactions    Pineapple Anaphylaxis    Byetta 10 Mcg Pen [Exenatide]     Marijuana [Dronabinol] GI Intolerance    Metformin Diarrhea     Other reaction(s): Unknown    Omeprazole Hives    Ondansetron      Other reaction(s): Unknown    Coconut Oil Rash    Liraglutide Rash and Other (See Comments)    Penicillins Rash       VTE Pharmacologic Prophylaxis:   Sequential compression device (Venodyne)     Labs:   Troponins:  Results from last 7 days   Lab Units 03/03/21  2018 03/03/21  1633 03/03/21  1354   TROPONIN I ng/mL <0 02 <0 03 <0 03     CBC with diff:  Results from last 7 days   Lab Units 03/04/21  0831 03/03/21  0901   WBC Thousand/uL 5 47 6 40   HEMOGLOBIN g/dL 15 1 14 2   HEMATOCRIT % 47 8* 43 6   MCV fL 91 86   PLATELETS Thousands/uL 199 188   MCH pg 28 7 28 0   MCHC g/dL 31 6 32 6   RDW % 13 0 13 3   MPV fL 11 0 10 5   NRBC AUTO /100 WBCs 0  --      CMP:  Results from last 7 days   Lab Units 03/04/21  0831 03/03/21  1354 03/03/21  0901   SODIUM mmol/L 135* 136 134   POTASSIUM mmol/L 4 2 4 1 4 2   CHLORIDE mmol/L 103 100 98   CO2 mmol/L 28 29 31   ANION GAP mmol/L 4 7 5   BUN mg/dL 13 19 22*   CREATININE mg/dL 0 69 0 58 0 67   CALCIUM mg/dL 8 6 8 8 8 7   AST U/L 24 12* 12*   ALT U/L 23 10 12   ALK PHOS U/L 110 104 1 106 0   TOTAL PROTEIN g/dL 7 4 6 6 6 8   ALBUMIN g/dL 3 0* 3 5 3 5   TOTAL BILIRUBIN mg/dL 0 50 0 42 0 36   EGFR ml/min/1 73sq m 91 96 92     Magnesium:  Results from last 7 days   Lab Units 03/04/21  0831 03/03/21  2018 03/03/21  0901   MAGNESIUM mg/dL 1 8 1 7 1 5*     Coags:  Results from last 7 days   Lab Units 03/03/21  0911   PTT seconds 24   INR  0 96     TSH:  Results from last 7 days   Lab Units 03/03/21  1354   TSH 3RD GENERATON uIU/mL 4 123     No components found for: TSH3  Lipid Profile:  Results from last 7 days   Lab Units 03/03/21  1354   CHOLESTEROL mg/dL 202*   TRIGLYCERIDES mg/dL 121 0   HDL mg/dL 43*   LDL CALC mg/dL 135*     Hgb A1c:  Results from last 7 days   Lab Units 03/03/21 2020   HEMOGLOBIN A1C % 11 5*       Imaging & Testing   I have personally reviewed pertinent reports  Xr Chest 1 View Portable    Result Date: 3/3/2021  Narrative: CHEST INDICATION:   Shortness of breath  COMPARISON:  12/4/2020 EXAM PERFORMED/VIEWS:  XR CHEST PORTABLE FINDINGS: There is mild enlargement of the cardiac silhouette Mild pulmonary vascular congestion suspected  No overt alveolar edema  No pneumothorax, no pleural effusion  Osseous structures appear within normal limits for patient age  Impression: Enlargement of cardiac silhouette with mild pulmonary vascular congestion suspected  Findings concur with the referring clinician's preliminary interpretation already in the patient's electronic health record  Workstation performed: [id number]        EKG / Monitor: Personally reviewed  Sinus rhythm    Cardiac testing:   Results for orders placed during the hospital encounter of 21   Echo complete with contrast if indicated    Narrative [address]  [address] [facility]  Nickie Masterson 6 [phone]    Transthoracic Echocardiogram  2D, M-mode, Doppler, and Color Doppler    Study date:  04-Mar-2021    Patient: Selam TREVIZO  MR number: [id number]  Account number: [de-identified]  : 1955  Age: 76 years  Gender: Female  Status: Outpatient  Location: Bedside  Height: 62 in  Weight: 283 4 lb  BP: 129/ 62 mmHg    Indications: chest pain    Diagnoses: I43  - Cardiomyopathy in diseases classified elsewhere    Sonographer:  CHRIST Carrasco  Primary Physician:  Aniya Cosme MD  Referring Physician:  Lei Coins:  [facility]  Interpreting Physician:  Sofia Cox MD    SUMMARY    LEFT VENTRICLE:  Systolic function was normal  Ejection fraction was estimated in the range of 55 % to 60 % to be 60 %  Although no diagnostic regional wall motion abnormality was identified, this possibility cannot be completely excluded on the basis of this study  Wall thickness was mildly increased  There was mild concentric hypertrophy  Features were consistent with a pseudonormal left ventricular filling pattern, with concomitant abnormal relaxation and increased filling pressure (grade 2 diastolic dysfunction)  LEFT ATRIUM:  The atrium was mildly to moderately dilated  RIGHT ATRIUM:  The atrium was mildly dilated  MITRAL VALVE:  There was mild to moderate annular calcification  There was mild regurgitation  AORTIC VALVE:  The valve was probably trileaflet, but function as bicuspid  Leaflets exhibited normal thickness and normal cuspal separation  Transaortic velocity was increased due to valvular stenosis  There was mild stenosis   Peak gradient around 12 mm of mercury valve area is 1 5-1 6 cm2    TRICUSPID VALVE:  There was mild to moderate regurgitation  Estimated peak PA pressure was 50 mmHg  HISTORY: PRIOR HISTORY: HTN,Hyperlipidemia,gastroesophageal reflux disease,seizures  PROCEDURE: The procedure was performed at the bedside  This was a routine study  The transthoracic approach was used  The study included complete 2D imaging, M-mode, complete spectral Doppler, and color Doppler  The heart rate was 69 bpm,  at the start of the study  Images were obtained from the parasternal, apical, subcostal, and suprasternal notch acoustic windows  Image quality was adequate  LEFT VENTRICLE: Size was normal  Systolic function was normal  Ejection fraction was estimated in the range of 55 % to 60 % to be 60 %  Although no diagnostic regional wall motion abnormality was identified, this possibility cannot be  completely excluded on the basis of this study  Wall thickness was mildly increased  There was mild concentric hypertrophy  DOPPLER: Features were consistent with a pseudonormal left ventricular filling pattern, with concomitant abnormal  relaxation and increased filling pressure (grade 2 diastolic dysfunction)  RIGHT VENTRICLE: The size was normal  Systolic function was normal     LEFT ATRIUM: The atrium was mildly to moderately dilated  RIGHT ATRIUM: The atrium was mildly dilated  MITRAL VALVE: There was mild to moderate annular calcification  There was normal leaflet separation  DOPPLER: The transmitral velocity was within the normal range  There was no evidence for stenosis  There was mild regurgitation  AORTIC VALVE: The valve was probably trileaflet, but function as bicuspid  Leaflets exhibited normal thickness and normal cuspal separation  DOPPLER: Transaortic velocity was increased due to valvular stenosis  There was mild stenosis  Peak gradient around 12 mm of mercury valve area is 1 5-1 6 cm2 There was no regurgitation  TRICUSPID VALVE: DOPPLER: There was mild to moderate regurgitation   Estimated peak PA pressure was 50 mmHg  The findings suggest mild to moderate pulmonary hypertension  PULMONIC VALVE: DOPPLER: There was trace regurgitation  PERICARDIUM: There was no thickening or calcification  There was no pericardial effusion  AORTA: The root exhibited normal size  SYSTEMIC VEINS: IVC: The inferior vena cava was normal in size  Respirophasic changes were normal     SYSTEM MEASUREMENT TABLES    2D  EF (Teich): 59 91 %  %FS: 31 7 %  Ao Diam: 3 19 cm  EDV(Teich): 88 47 ml  ESV(Teich): 35 47 ml  IVSd: 1 27 cm  LA Area: 22 54 cm2  LA Diam: 3 91 cm  LVIDd: 4 42 cm  LVIDs: 3 02 cm  LVOT Diam: 1 86 cm  LVPWd: 1 28 cm  RA Area: 16 49 cm2  RVIDd: 4 1 cm  SV (Teich): 53 ml    PW  MV E/A Ratio: 1 08    [employer] Accredited Echocardiography Laboratory    Prepared and electronically signed by    Keaton Snowden MD  Signed 04-Mar-2021 15:24:34         SAMIA Martínez      "This note has been constructed using a voice recognition system  Therefore there may be syntax, spelling, and/or grammatical errors   Please call if you have any questions  "

## 2021-03-06 NOTE — PLAN OF CARE
Problem: Potential for Falls  Goal: Patient will remain free of falls  Description: INTERVENTIONS:  - Assess patient frequently for physical needs  -  Identify cognitive and physical deficits and behaviors that affect risk of falls    -  [place] fall precautions as indicated by assessment   - Educate patient/family on patient safety including physical limitations  - Instruct patient to call for assistance with activity based on assessment  - Modify environment to reduce risk of injury  - Consider OT/PT consult to assist with strengthening/mobility  Outcome: Progressing     Problem: PAIN - ADULT  Goal: Verbalizes/displays adequate comfort level or baseline comfort level  Description: Interventions:  - Encourage patient to monitor pain and request assistance  - Assess pain using appropriate pain scale  - Administer analgesics based on type and severity of pain and evaluate response  - Implement non-pharmacological measures as appropriate and evaluate response  - Consider cultural and social influences on pain and pain management  - Notify physician/advanced practitioner if interventions unsuccessful or patient reports new pain  Outcome: Progressing     Problem: INFECTION - ADULT  Goal: Absence or prevention of progression during hospitalization  Description: INTERVENTIONS:  - Assess and monitor for signs and symptoms of infection  - Monitor lab/diagnostic results  - Monitor all insertion sites, i e  indwelling lines, tubes, and drains  - [place] appropriate cooling/warming therapies per order  - Administer medications as ordered  - Instruct and encourage patient and family to use good hand hygiene technique  - Identify and instruct in appropriate isolation precautions for identified infection/condition  Outcome: Progressing     Problem: SAFETY ADULT  Goal: Patient will remain free of falls  Description: INTERVENTIONS:  - Assess patient frequently for physical needs  -  Identify cognitive and physical deficits and behaviors that affect risk of falls    -  [place] fall precautions as indicated by assessment   - Educate patient/family on patient safety including physical limitations  - Instruct patient to call for assistance with activity based on assessment  - Modify environment to reduce risk of injury  - Consider OT/PT consult to assist with strengthening/mobility  Outcome: Progressing  Goal: Maintain or return to baseline ADL function  Description: INTERVENTIONS:  -  Assess patient's ability to carry out ADLs; assess patient's baseline for ADL function and identify physical deficits which impact ability to perform ADLs (bathing, care of mouth/teeth, toileting, grooming, dressing, etc )  - Assess/evaluate cause of self-care deficits   - Assess range of motion  - Assess patient's mobility; develop plan if impaired  - Assess patient's need for assistive devices and provide as appropriate  - Encourage maximum independence but intervene and supervise when necessary  - Involve family in performance of ADLs  - Assess for home care needs following discharge   - Consider OT consult to assist with ADL evaluation and planning for discharge  - Provide patient education as appropriate  Outcome: Progressing  Goal: Maintain or return mobility status to optimal level  Description: INTERVENTIONS:  - Assess patient's baseline mobility status (ambulation, transfers, stairs, etc )    - Identify cognitive and physical deficits and behaviors that affect mobility  - Identify mobility aids required to assist with transfers and/or ambulation (gait belt, sit-to-stand, lift, walker, cane, etc )  - [place] fall precautions as indicated by assessment  - Record patient progress and toleration of activity level on Mobility SBAR; progress patient to next Phase/Stage  - Instruct patient to call for assistance with activity based on assessment  - Consider rehabilitation consult to assist with strengthening/weightbearing, etc   Outcome: Progressing Problem: DISCHARGE PLANNING  Goal: Discharge to home or other facility with appropriate resources  Description: INTERVENTIONS:  - Identify barriers to discharge w/patient and caregiver  - Arrange for needed discharge resources and transportation as appropriate  - Identify discharge learning needs (meds, wound care, etc )  - Arrange for interpretive services to assist at discharge as needed  - Refer to Case Management Department for coordinating discharge planning if the patient needs post-hospital services based on physician/advanced practitioner order or complex needs related to functional status, cognitive ability, or social support system  Outcome: Progressing     Problem: Knowledge Deficit  Goal: Patient/family/caregiver demonstrates understanding of disease process, treatment plan, medications, and discharge instructions  Description: Complete learning assessment and assess knowledge base    Interventions:  - Provide teaching at level of understanding  - Provide teaching via preferred learning methods  Outcome: Progressing     Problem: CARDIOVASCULAR - ADULT  Goal: Maintains optimal cardiac output and hemodynamic stability  Description: INTERVENTIONS:  - Monitor I/O, vital signs and rhythm  - Monitor for S/S and trends of decreased cardiac output  - Administer and titrate ordered vasoactive medications to optimize hemodynamic stability  - Assess quality of pulses, skin color and temperature  - Assess for signs of decreased coronary artery perfusion  - Instruct patient to report change in severity of symptoms  Outcome: Progressing  Goal: Absence of cardiac dysrhythmias or at baseline rhythm  Description: INTERVENTIONS:  - Continuous cardiac monitoring, vital signs, obtain 12 lead EKG if ordered  - Administer antiarrhythmic and heart rate control medications as ordered  - Monitor electrolytes and administer replacement therapy as ordered  Outcome: Progressing     Problem: Depression - IP adult  Goal: Effects of depression will be minimized  Description: INTERVENTIONS:  - Assess impact of patient's symptoms on level of functioning, self-care needs and offer support as indicated  - Assess patient/family knowledge of depression, impact on illness and need for teaching  - Provide emotional support, presence and reassurance  - Assess for possible suicidal thoughts, ideation or self-harm   If patient expresses suicidal thoughts or statements do not leave alone, notify physician/AP immediately, initiate Suicide Precautions, and determine need for continual observation   - Initiate consults and referrals as appropriate (a mental health professional, Spiritual Care)  - Administer medication as ordered  Outcome: Progressing

## 2021-03-07 LAB
FLUAV RNA RESP QL NAA+PROBE: NEGATIVE
FLUBV RNA RESP QL NAA+PROBE: NEGATIVE
GLUCOSE SERPL-MCNC: 131 MG/DL (ref 65–140)
GLUCOSE SERPL-MCNC: 140 MG/DL (ref 65–140)
GLUCOSE SERPL-MCNC: 143 MG/DL (ref 65–140)
GLUCOSE SERPL-MCNC: 156 MG/DL (ref 65–140)
RSV RNA RESP QL NAA+PROBE: NEGATIVE
SARS-COV-2 RNA RESP QL NAA+PROBE: NEGATIVE

## 2021-03-07 PROCEDURE — 97530 THERAPEUTIC ACTIVITIES: CPT

## 2021-03-07 PROCEDURE — 99232 SBSQ HOSP IP/OBS MODERATE 35: CPT | Performed by: INTERNAL MEDICINE

## 2021-03-07 PROCEDURE — 0241U HB NFCT DS VIR RESP RNA 4 TRGT: CPT | Performed by: INTERNAL MEDICINE

## 2021-03-07 PROCEDURE — 97110 THERAPEUTIC EXERCISES: CPT

## 2021-03-07 PROCEDURE — 82948 REAGENT STRIP/BLOOD GLUCOSE: CPT

## 2021-03-07 RX ADMIN — TOPIRAMATE 200 MG: 100 TABLET, FILM COATED ORAL at 09:12

## 2021-03-07 RX ADMIN — FLUOXETINE 60 MG: 20 CAPSULE ORAL at 09:12

## 2021-03-07 RX ADMIN — ENOXAPARIN SODIUM 40 MG: 40 INJECTION SUBCUTANEOUS at 09:13

## 2021-03-07 RX ADMIN — INSULIN GLARGINE 20 UNITS: 100 INJECTION, SOLUTION SUBCUTANEOUS at 21:40

## 2021-03-07 RX ADMIN — ASPIRIN 81 MG: 81 TABLET, COATED ORAL at 09:12

## 2021-03-07 RX ADMIN — LEVOTHYROXINE SODIUM 50 MCG: 50 TABLET ORAL at 06:03

## 2021-03-07 RX ADMIN — GABAPENTIN 300 MG: 300 CAPSULE ORAL at 21:34

## 2021-03-07 RX ADMIN — GABAPENTIN 100 MG: 100 CAPSULE ORAL at 14:13

## 2021-03-07 RX ADMIN — LORAZEPAM 0.5 MG: 0.5 TABLET ORAL at 09:12

## 2021-03-07 RX ADMIN — FAMOTIDINE 20 MG: 20 TABLET ORAL at 17:18

## 2021-03-07 RX ADMIN — INSULIN LISPRO 1 UNITS: 100 INJECTION, SOLUTION INTRAVENOUS; SUBCUTANEOUS at 21:44

## 2021-03-07 RX ADMIN — STANDARDIZED SENNA CONCENTRATE 8.6 MG: 8.6 TABLET ORAL at 09:12

## 2021-03-07 RX ADMIN — TOPIRAMATE 200 MG: 100 TABLET, FILM COATED ORAL at 21:34

## 2021-03-07 RX ADMIN — LORAZEPAM 0.5 MG: 0.5 TABLET ORAL at 17:18

## 2021-03-07 RX ADMIN — GABAPENTIN 100 MG: 100 CAPSULE ORAL at 09:11

## 2021-03-07 RX ADMIN — FAMOTIDINE 20 MG: 20 TABLET ORAL at 09:12

## 2021-03-07 NOTE — ASSESSMENT & PLAN NOTE
Lab Results   Component Value Date    HGBA1C 11 5 (H) 03/03/2021       Recent Labs     03/06/21  2202 03/07/21  0727 03/07/21  1130 03/07/21  1630   POCGLU 152* 143* 131 140       Blood Sugar Average: Last 72 hrs:  (P) 154 5   Hemoglobin A1c is significantly elevated  Hold home PO medications  Continue Lantus 20 units daily and Humalog sliding scale with Accu-Cheks q a c  And hs  Patient needs better blood sugar control  Blood sugars have been stable

## 2021-03-07 NOTE — PROGRESS NOTES
Patient examined spoke with the nurse patient is alert awake cooperative she reports that she is frustrated and she wants to get out of here she wants to go home she is waiting for a bed for the short-term rehab in the nursing home she did not have a bed at [facility]  Nurse reports that patient is getting anxious to get out of here  When I asked the patient that she have a help at home she reports that her son will help her she is advised to check with the medical doctor and case management maybe she will be referred to other local [facility] for the short-term rehab before she returned home  Patient is taking Prozac 60 mg daily and she is happy with that she is also much calmer with the anxiety  Patient wants to continue Prozac 60 mg which is helping her with the depression and she will follow-up with her psychiatrist at  a clinic after the discharge  Nurse reported no behavior problem  Patient offers no new complaints  Patient is medically stable  Patient is depressed but patient clearly denies feeling suicidal no psychosis no hallucinations no agitation patient is not lethargic and she is not confused she response to verbal command properly and she is not in distress she offers no new complaints  Therapy done with good response  Discussed with the nurse  Continue her medications the same at this time

## 2021-03-07 NOTE — ASSESSMENT & PLAN NOTE
Patient started on Lipitor 40 milligram p o  Daily    Results from 3/3/2021   Ref   Range 3/3/2021 13:54   Cholesterol Latest Ref Range: <=200 mg/dL 202 (H)   Triglycerides Latest Ref Range: <=150 mg/dL 121 0   HDL Latest Ref Range: >=50 mg/dL 43 (L)   LDL Calculated Latest Ref Range: 0 - 100 mg/dL 135 (H)

## 2021-03-07 NOTE — PROGRESS NOTES
Progress Note - Rah Wallis 1955, 76 y o  female MRN: [id number]    Unit/Bed#: [address] [place] [address] Encounter: [id number]    Primary Care Provider: Bony Brunner MD   Date and time admitted to hospital: 3/3/2021  6:01 PM        * Chest pain  Assessment & Plan  Patient admitted to [address] [place] [address] for chest pain after she was mopping the floors at home, with associated SOB  CXR with mild pulmonary vascular congestion   Troponin (-) x 3  KENDALL score 4  She was noted to have NSVT vs  SVT with aberration with associated dizziness while at [facility]  Patient underwent nuclear stress test which was reported abnormal   Patient underwent cardiac catheterization which showed mild nonobstructive CAD  Cardiology recommendations appreciated   2D echo showed EF of 50 for 60% with mild concentric hypertrophy and grade 2 diastolic dysfunction  Patient was having some weakness and was seen by physical therapy and is recommending rehabilitation    Diabetes mellitus [facility])  Assessment & Plan  Lab Results   Component Value Date    HGBA1C 11 5 (H) 03/03/2021       Recent Labs     03/06/21  2202 03/07/21  0727 03/07/21  1130 03/07/21  1630   POCGLU 152* 143* 131 140       Blood Sugar Average: Last 72 hrs:  (P) 154 5   Hemoglobin A1c is significantly elevated  Hold home PO medications  Continue Lantus 20 units daily and Humalog sliding scale with Accu-Cheks q a c  And hs  Patient needs better blood sugar control  Blood sugars have been stable  Acquired hypothyroidism  Assessment & Plan  Continue home dose of levothyroxine    Major depressive disorder  Assessment & Plan  Continue Prozac 40 mg daily which was increased to 60 milligrams by Psychiatry  Patient added on Ativan 0 5 milligram p o  B i d     GERD (gastroesophageal reflux disease)  Assessment & Plan  Continue Pepcid 20 mg BID    Hyperlipidemia  Assessment & Plan  Patient started on Lipitor 40 milligram p o  Daily    Results from 3/3/2021   Ref   Range 3/3/2021 13:54 Cholesterol Latest Ref Range: <=200 mg/dL 202 (H)   Triglycerides Latest Ref Range: <=150 mg/dL 121 0   HDL Latest Ref Range: >=50 mg/dL 43 (L)   LDL Calculated Latest Ref Range: 0 - 100 mg/dL 135 (H)         Hypertension  Assessment & Plan  Patient does not take any medications at home   Blood pressure is acceptable    Restless leg syndrome  Assessment & Plan  Continue home dose of gabapentin, 100 mg early AM and midday, 300 mg QHS    Seizures (HCC)  Assessment & Plan  Continue home dose of topiramate        VTE Pharmacologic Prophylaxis:   Pharmacologic: Enoxaparin (Lovenox)  Mechanical VTE Prophylaxis in Place: Yes    Patient Centered Rounds: I have performed bedside rounds with nursing staff today  Discussions with Specialists or Other Care Team Provider: No    Education and Discussions with Family / Patient: yes    Time Spent for Care: 30 minutes  More than 50% of total time spent on counseling and coordination of care as described above  Current Length of Stay: 4 day(s)    Current Patient Status: Inpatient   Certification Statement: The patient will continue to require additional inpatient hospital stay due to Pending placement    Discharge Plan:  Pending course    Code Status: Level 1 - Full Code      Subjective:   Patient denies any chest pain, shortness of breath, palpitations  Patient is anxious to get discharged  Objective:     Vitals:   Temp (24hrs), Av 9 °F (36 6 °C), Min:97 7 °F (36 5 °C), Max:98 °F (36 7 °C)    Temp:  [97 7 °F (36 5 °C)-98 °F (36 7 °C)] 97 7 °F (36 5 °C)  HR:  [67-83] 83  Resp:  [18-20] 18  BP: (111-132)/(61-73) 130/73  SpO2:  [91 %-95 %] 93 %  Body mass index is 52 02 kg/m²  Input and Output Summary (last 24 hours):        Intake/Output Summary (Last 24 hours) at 3/7/2021 9016  Last data filed at 3/6/2021 1854  Gross per 24 hour   Intake --   Output 350 ml   Net -350 ml       Physical Exam:     Physical Exam  Constitutional:       General: She is not in acute distress  HENT:      Head: Normocephalic and atraumatic  Nose: Nose normal    Eyes:      Conjunctiva/sclera: Conjunctivae normal       Pupils: Pupils are equal, round, and reactive to light  Neck:      Musculoskeletal: Normal range of motion and neck supple  Cardiovascular:      Rate and Rhythm: Normal rate and regular rhythm  Heart sounds: Normal heart sounds  Pulmonary:      Effort: Pulmonary effort is normal  No respiratory distress  Breath sounds: Normal breath sounds  No wheezing or rales  Abdominal:      General: Bowel sounds are normal  There is no distension  Palpations: Abdomen is soft  Tenderness: There is no abdominal tenderness  There is no guarding or rebound  Skin:     General: Skin is warm and dry  Findings: No rash  Neurological:      Mental Status: She is alert  Cranial Nerves: No cranial nerve deficit  Additional Data:     Labs:    Results from last 7 days   Lab Units 03/04/21  0831   WBC Thousand/uL 5 47   HEMOGLOBIN g/dL 15 1   HEMATOCRIT % 47 8*   PLATELETS Thousands/uL 199   NEUTROS PCT % 66   LYMPHS PCT % 24   MONOS PCT % 6   EOS PCT % 3     Results from last 7 days   Lab Units 03/04/21  0831   POTASSIUM mmol/L 4 2   CHLORIDE mmol/L 103   CO2 mmol/L 28   BUN mg/dL 13   CREATININE mg/dL 0 69   CALCIUM mg/dL 8 6   ALK PHOS U/L 110   ALT U/L 23   AST U/L 24     Results from last 7 days   Lab Units 03/03/21  0911   INR  0 96       * I Have Reviewed All Lab Data Listed Above  * Additional Pertinent Lab Tests Reviewed:  [facility]        Recent Cultures (last 7 days):           Last 24 Hours Medication List:   Current Facility-Administered Medications   Medication Dose Route Frequency Provider Last Rate    acetaminophen  650 mg Oral Q6H PRN Sheryle Minors, MD      aspirin  81 mg Oral Daily Sheryle Minors, MD      atorvastatin  40 mg Oral Daily With Molly Starr MD      enoxaparin  40 mg Subcutaneous Daily Ethan Boland MD      famotidine  20 mg Oral BID Ethan Boland MD      FLUoxetine  60 mg Oral Daily Viki Coppola MD      gabapentin  100 mg Oral BID Ethan Boland MD      gabapentin  300 mg Oral HS Ethan Boland MD      insulin glargine  20 Units Subcutaneous HS Ethan Boland MD      insulin lispro  1-5 Units Subcutaneous TID AC Ethan Boland MD      insulin lispro  1-5 Units Subcutaneous HS Ethan Boland MD      levothyroxine  50 mcg Oral Early Morning Ethan Boland MD      LORazepam  0 5 mg Oral BID Viki Coppola MD      senna  1 tablet Oral Daily Ethan Boland MD      topiramate  200 mg Oral QAM Ethan Boland MD      topiramate  200 mg Oral HS Ethan Boland MD          Today, Patient Was Seen By: Pete Prakash MD    ** Please Note: Dictation voice to text software may have been used in the creation of this document   **

## 2021-03-07 NOTE — ASSESSMENT & PLAN NOTE
Patient admitted to [facility] for chest pain after she was mopping the floors at home, with associated SOB  CXR with mild pulmonary vascular congestion   Troponin (-) x 3  KENDALL score 4  She was noted to have NSVT vs  SVT with aberration with associated dizziness while at [facility]  Patient underwent nuclear stress test which was reported abnormal   Patient underwent cardiac catheterization which showed mild nonobstructive CAD  Cardiology recommendations appreciated   2D echo showed EF of 50 for 60% with mild concentric hypertrophy and grade 2 diastolic dysfunction  Patient was having some weakness and was seen by physical therapy and is recommending rehabilitation

## 2021-03-07 NOTE — PHYSICAL THERAPY NOTE
PT TREATMENT     03/07/21 1545   PT Last Visit   PT Visit Date 03/07/21   Note Type   Note Type Treatment   Pain Assessment   Pain Assessment Tool Pain Assessment not indicated - pt denies pain   Restrictions/Precautions   Weight Bearing Precautions Per Order No   Other Precautions Chair Alarm; Bed Alarm; Fall Risk   General   Chart Reviewed Yes   Family/Caregiver Present No   Cognition   Overall Cognitive Status WFL   Arousal/Participation Cooperative   Attention Within functional limits   Orientation Level Oriented X4   Following Commands Follows all commands and directions without difficulty   Subjective   Subjective "I just wanna go home"   Bed Mobility   Supine to Sit 3  Moderate assistance   Additional items Assist x 1;Verbal cues   Additional Comments to chair   Transfers   Sit to Stand 4  Minimal assistance   Additional items Assist x 1;Verbal cues   Stand to Sit 4  Minimal assistance   Additional items Assist x 1;Verbal cues   Ambulation/Elevation   Gait pattern   (slow lakisha, wide CLAUDIO)   Gait Assistance 4  Minimal assist   Additional items Assist x 1;Verbal cues   Assistive Device Rolling walker   Distance 30 feet with changes in direction   Balance   Ambulatory Fair  (with RW)   Activity Tolerance   Activity Tolerance Patient limited by fatigue   Nurse Made Aware yes: Judith Gravely: pt in chair   Exercises   Hip Adduction Sitting;20 reps;Bilateral  (via pillow squeeze)   Knee AROM Long Arc Quad Sitting;20 reps;Bilateral   Ankle Pumps Sitting;20 reps;Bilateral   Assessment   Prognosis Good   Problem List Decreased strength;Decreased endurance; Impaired balance;Decreased mobility   Assessment Pt presents in bed, agreeable to PT  Once standing at chair, PT encouraged pt to walk   Able to walk around room with Min A   Positioned in chair with all needs in reach, tray table in front    Pt will beneift from continued PT    The patient's AM-PAC Basic Mobility Inpatient Short Form Raw Score is 18, Standardized Score is 41 05  A standardized score less than 42 9 suggests the patient may benefit from discharge to post-acute rehabilitation services  Please also refer to the recommendation of the Physical Therapist for safe discharge planning  Goals   Patient Goals to go home   Plan   Treatment/Interventions ADL retraining;Functional transfer training;LE strengthening/ROM; Therapeutic exercise; Endurance training;Patient/family training;Equipment eval/education; Bed mobility;Gait training;Spoke to nursing;Spoke to case management;OT   PT Frequency 5x/wk   Recommendation   PT Discharge Recommendation Post-Acute Rehabilitation Services   AM-PAC Basic Mobility Inpatient   Turning in Bed Without Bedrails 4   Lying on Back to Sitting on Edge of Flat Bed 3   Moving Bed to Chair 3   Standing Up From Chair 3   Walk in Room 3   Climb 3-5 Stairs 2   Basic Mobility Inpatient Raw Score 18   Basic Mobility Standardized Score 95 88   Licensure   [place] License Number  Rina Blankenship PT  [id number]

## 2021-03-07 NOTE — UTILIZATION REVIEW
Continued Stay Review    Date: 3/7/21                          Current Patient Class: inptient    Current Level of Care: acute    Assessment/Plan:   CM SW following to assist with DCP  STR placement is planned  Updated all referral sources in attempt to secure rehab bed  Paulino Rose considering however authorization is still pending and admissions wants to explore PASRR outcome  Samantha Lorenzana is also considering pt but need to determine bed availability Monday morning  SW will continue to follow to monitor progress and assist with planning/transfer when ready  Attending  * Chest pain  Assessment & Plan  Patient admitted to [facility] for chest pain after she was mopping the floors at home, with associated SOB  CXR with mild pulmonary vascular congestion   Troponin (-) x 3  KENDALL score 4  She was noted to have NSVT vs  SVT with aberration with associated dizziness while at [facility]  Patient underwent nuclear stress test which was reported abnormal   Patient underwent cardiac catheterization which showed mild nonobstructive CAD  Cardiology recommendations appreciated   2D echo showed EF of 50 for 60% with mild concentric hypertrophy and grade 2 diastolic dysfunction  Patient was having some weakness and was seen by physical therapy and is recommending rehabilitation  Diabetes mellitus [facility])  Assessment & Plan        Lab Results   Component Value Date     HGBA1C 11 5 (H) 03/03/2021             Recent Labs     03/06/21  2202 03/07/21  0727 03/07/21  1130 03/07/21  1630   POCGLU 152* 143* 131 140     Blood Sugar Average: Last 72 hrs:  (P) 154 5   Hemoglobin A1c is significantly elevated  Hold home PO medications  Continue Lantus 20 units daily and Humalog sliding scale with Accu-Cheks q a c  And hs  Patient needs better blood sugar control  Blood sugars have been stable    Acquired hypothyroidism  Assessment & Plan  Continue home dose of levothyroxin   Major depressive disorder  Assessment & Plan  Continue Prozac 40 mg daily which was increased to 60 milligrams by Psychiatry  Patient added on Ativan 0 5 milligram p o  B i d   GERD (gastroesophageal reflux disease)  Assessment & Plan  Continue Pepcid 20 mg BID   Hyperlipidemia  Assessment & Plan  Patient started on Lipitor 40 milligram p o   Daily    Current Patient Status: Inpatient   Certification Statement: The patient will continue to require additional inpatient hospital stay due to Pending placement     Pertinent Labs/Diagnostic Results:   Results from last 7 days   Lab Units 03/07/21  1307 03/03/21  0915   SARS-COV-2  Negative Negative     Results from last 7 days   Lab Units 03/04/21  0831 03/03/21  0901   WBC Thousand/uL 5 47 6 40   HEMOGLOBIN g/dL 15 1 14 2   HEMATOCRIT % 47 8* 43 6   PLATELETS Thousands/uL 199 188   NEUTROS ABS Thousands/µL 3 58 4 48     Results from last 7 days   Lab Units 03/04/21  0831 03/03/21  2018 03/03/21  1354 03/03/21  0901   SODIUM mmol/L 135*  --  136 134   POTASSIUM mmol/L 4 2  --  4 1 4 2   CHLORIDE mmol/L 103  --  100 98   CO2 mmol/L 28  --  29 31   ANION GAP mmol/L 4  --  7 5   BUN mg/dL 13  --  19 22*   CREATININE mg/dL 0 69  --  0 58 0 67   EGFR ml/min/1 73sq m 91  --  96 92   CALCIUM mg/dL 8 6  --  8 8 8 7   MAGNESIUM mg/dL 1 8 1 7  --  1 5*     Results from last 7 days   Lab Units 03/04/21  0831 03/03/21  1354 03/03/21  0901   AST U/L 24 12* 12*   ALT U/L 23 10 12   ALK PHOS U/L 110 104 1 106 0   TOTAL PROTEIN g/dL 7 4 6 6 6 8   ALBUMIN g/dL 3 0* 3 5 3 5   TOTAL BILIRUBIN mg/dL 0 50 0 42 0 36     Results from last 7 days   Lab Units 03/07/21  1130 03/07/21  0727 03/06/21  2202 03/06/21  1609 03/06/21  1138 03/06/21  0735 03/05/21  2005 03/05/21  1617 03/05/21  1144 03/05/21  0711 03/04/21  2044 03/04/21  1558   POC GLUCOSE mg/dl 131 143* 152* 161* 171* 116 124 158* 163* 174* 197* 176*       Results from last 7 days   Lab Units 03/07/21  1307 03/03/21  0915   INFLUENZA A PCR  Negative Negative   INFLUENZA B PCR  Negative Negative RSV PCR  Negative Negative     Vital Signs:     Medications:   Scheduled Medications:  aspirin, 81 mg, Oral, Daily  atorvastatin, 40 mg, Oral, Daily With Dinner  enoxaparin, 40 mg, Subcutaneous, Daily  famotidine, 20 mg, Oral, BID  FLUoxetine, 60 mg, Oral, Daily  gabapentin, 100 mg, Oral, BID  gabapentin, 300 mg, Oral, HS  insulin glargine, 20 Units, Subcutaneous, HS  insulin lispro, 1-5 Units, Subcutaneous, TID AC  insulin lispro, 1-5 Units, Subcutaneous, HS  levothyroxine, 50 mcg, Oral, Early Morning  LORazepam, 0 5 mg, Oral, BID  senna, 1 tablet, Oral, Daily  topiramate, 200 mg, Oral, QAM  topiramate, 200 mg, Oral, HS      Continuous IV Infusions:     PRN Meds:  acetaminophen, 650 mg, Oral, Q6H PRN        Discharge Plan: tbd    Network Utilization Review Department  ATTENTION: Please call with any questions or concerns to [phone] and carefully listen to the prompts so that you are directed to the right person  All voicemails are confidential   Lei Marques all requests for admission clinical reviews, approved or denied determinations and any other requests to dedicated fax number below belonging to the campus where the patient is receiving treatment   List of dedicated fax numbers for the Facilities:  [address] DENIALS (Administrative/Medical Necessity) [phone]   [address] (Maternity/NICU/Pediatrics) [phone] 401 [address] Jovi Esparza 3114 (  Dev [facility]) [address] [place] [address] Leslie Villegas 1481 [address] [place]) [address] [place] [address] [phone]

## 2021-03-07 NOTE — CASE MANAGEMENT
LOS - 4 days    SW following to assist with DCP  STR placement is planned  Updated all referral sources in attempt to secure rehab bed  Paulino Rose considering however authorization is still pending and admissions wants to explore PASRR outcome  Suman Em is also considering pt but need to determine bed availability Monday morning  SW will continue to follow to monitor progress and assist with planning/transfer when ready

## 2021-03-08 PROBLEM — R19.7 DIARRHEA: Status: ACTIVE | Noted: 2021-03-08

## 2021-03-08 LAB
GLUCOSE SERPL-MCNC: 138 MG/DL (ref 65–140)
GLUCOSE SERPL-MCNC: 146 MG/DL (ref 65–140)
GLUCOSE SERPL-MCNC: 154 MG/DL (ref 65–140)
GLUCOSE SERPL-MCNC: 170 MG/DL (ref 65–140)

## 2021-03-08 PROCEDURE — 99232 SBSQ HOSP IP/OBS MODERATE 35: CPT | Performed by: INTERNAL MEDICINE

## 2021-03-08 PROCEDURE — 82948 REAGENT STRIP/BLOOD GLUCOSE: CPT

## 2021-03-08 RX ORDER — LOPERAMIDE HYDROCHLORIDE 2 MG/1
2 CAPSULE ORAL 3 TIMES DAILY PRN
Status: DISCONTINUED | OUTPATIENT
Start: 2021-03-08 | End: 2021-03-09 | Stop reason: HOSPADM

## 2021-03-08 RX ADMIN — LORAZEPAM 0.5 MG: 0.5 TABLET ORAL at 17:33

## 2021-03-08 RX ADMIN — FAMOTIDINE 20 MG: 20 TABLET ORAL at 17:32

## 2021-03-08 RX ADMIN — INSULIN GLARGINE 20 UNITS: 100 INJECTION, SOLUTION SUBCUTANEOUS at 21:51

## 2021-03-08 RX ADMIN — FAMOTIDINE 20 MG: 20 TABLET ORAL at 10:05

## 2021-03-08 RX ADMIN — TOPIRAMATE 200 MG: 100 TABLET, FILM COATED ORAL at 10:06

## 2021-03-08 RX ADMIN — INSULIN LISPRO 1 UNITS: 100 INJECTION, SOLUTION INTRAVENOUS; SUBCUTANEOUS at 17:29

## 2021-03-08 RX ADMIN — INSULIN LISPRO 1 UNITS: 100 INJECTION, SOLUTION INTRAVENOUS; SUBCUTANEOUS at 21:49

## 2021-03-08 RX ADMIN — FLUOXETINE 60 MG: 20 CAPSULE ORAL at 10:04

## 2021-03-08 RX ADMIN — ASPIRIN 81 MG: 81 TABLET, COATED ORAL at 10:05

## 2021-03-08 RX ADMIN — LOPERAMIDE HYDROCHLORIDE 2 MG: 2 CAPSULE ORAL at 11:58

## 2021-03-08 RX ADMIN — LORAZEPAM 0.5 MG: 0.5 TABLET ORAL at 10:05

## 2021-03-08 RX ADMIN — TOPIRAMATE 200 MG: 100 TABLET, FILM COATED ORAL at 21:48

## 2021-03-08 RX ADMIN — ENOXAPARIN SODIUM 40 MG: 40 INJECTION SUBCUTANEOUS at 10:06

## 2021-03-08 RX ADMIN — GABAPENTIN 300 MG: 300 CAPSULE ORAL at 21:45

## 2021-03-08 RX ADMIN — LEVOTHYROXINE SODIUM 50 MCG: 50 TABLET ORAL at 05:50

## 2021-03-08 RX ADMIN — GABAPENTIN 100 MG: 100 CAPSULE ORAL at 14:29

## 2021-03-08 RX ADMIN — GABAPENTIN 100 MG: 100 CAPSULE ORAL at 10:05

## 2021-03-08 NOTE — UTILIZATION REVIEW
Continued Stay Review    Date:  3/8/21                         Current Patient Class: inpatient    Current Level of Care: acute    Assessment/Plan:   Per psychiatry: Medicine is helping her to take the edge off and she is feeling better with the depression  Patient is happy to see me and communicating her feelings well nurse reports no behavior problem  Patient will be transferred to nursing home for short-term rehab after medical stabilization     Attending   score 4  She was noted to have NSVT vs  SVT with aberration with associated dizziness while at [facility]  Patient underwent nuclear stress test which was reported abnormal   Patient underwent cardiac catheterization which showed mild nonobstructive CAD  Cardiology recommendations appreciated   2D echo showed EF of 50 for 60% with mild concentric hypertrophy and grade 2 diastolic dysfunction  Patient was having some weakness and was seen by physical therapy and is recommending rehabilitation  Pertinent Labs/Diagnostic Results:   Results from last 7 days   Lab Units 03/07/21  1307 03/03/21  0915   SARS-COV-2  Negative Negative     Results from last 7 days   Lab Units 03/04/21  0831 03/03/21  0901   WBC Thousand/uL 5 47 6 40   HEMOGLOBIN g/dL 15 1 14 2   HEMATOCRIT % 47 8* 43 6   PLATELETS Thousands/uL 199 188   NEUTROS ABS Thousands/µL 3 58 4 48     Results from last 7 days   Lab Units 03/04/21  0831 03/03/21  2018 03/03/21  1354 03/03/21  0901   SODIUM mmol/L 135*  --  136 134   POTASSIUM mmol/L 4 2  --  4 1 4 2   CHLORIDE mmol/L 103  --  100 98   CO2 mmol/L 28  --  29 31   ANION GAP mmol/L 4  --  7 5   BUN mg/dL 13  --  19 22*   CREATININE mg/dL 0 69  --  0 58 0 67   EGFR ml/min/1 73sq m 91  --  96 92   CALCIUM mg/dL 8 6  --  8 8 8 7   MAGNESIUM mg/dL 1 8 1 7  --  1 5*     Results from last 7 days   Lab Units 03/04/21  0831 03/03/21  1354 03/03/21  0901   AST U/L 24 12* 12*   ALT U/L 23 10 12   ALK PHOS U/L 110 104 1 106 0   TOTAL PROTEIN g/dL 7 4 6 6 6 8 ALBUMIN g/dL 3 0* 3 5 3 5   TOTAL BILIRUBIN mg/dL 0 50 0 42 0 36     Results from last 7 days   Lab Units 03/08/21  1117 03/08/21  0724 03/07/21  2139 03/07/21  1630 03/07/21  1130 03/07/21  0727 03/06/21  2202 03/06/21  1609 03/06/21  1138 03/06/21  0735 03/05/21 2005 03/05/21  1617   POC GLUCOSE mg/dl 146* 138 156* 140 131 143* 152* 161* 171* 116 124 158*     Results from last 7 days   Lab Units 03/07/21  1307 03/03/21  0915   INFLUENZA A PCR  Negative Negative   INFLUENZA B PCR  Negative Negative   RSV PCR  Negative Negative     Vital Signs:   03/08/21 15:48:41  97 9 °F (36 6 °C)  68  19  122/72  89  92 %  --  --  --  --   03/08/21 0832  98 2 °F (36 8 °C)  67  18  119/71  87  94 %  --  --  None (Room air)  Lying   03/08/21 08:31:03  --  67  18  119/71  87  96 %  --  --  --  --   03/08/21 0300  --  68  --  --  --  89 %Abnormal   --               Medications:   Scheduled Medications:  aspirin, 81 mg, Oral, Daily  atorvastatin, 40 mg, Oral, Daily With Dinner  enoxaparin, 40 mg, Subcutaneous, Daily  famotidine, 20 mg, Oral, BID  FLUoxetine, 60 mg, Oral, Daily  gabapentin, 100 mg, Oral, BID  gabapentin, 300 mg, Oral, HS  insulin glargine, 20 Units, Subcutaneous, HS  insulin lispro, 1-5 Units, Subcutaneous, TID AC  insulin lispro, 1-5 Units, Subcutaneous, HS  levothyroxine, 50 mcg, Oral, Early Morning  LORazepam, 0 5 mg, Oral, BID  topiramate, 200 mg, Oral, QAM  topiramate, 200 mg, Oral, HS      Continuous IV Infusions:     PRN Meds:  acetaminophen, 650 mg, Oral, Q6H PRN  loperamide, 2 mg, Oral, TID PRN        Discharge Plan: REBECCA working on [facility] Utilization Review Department  ATTENTION: Please call with any questions or concerns to [phone] and carefully listen to the prompts so that you are directed to the right person   All voicemails are confidential   Rosa Pride all requests for admission clinical reviews, approved or denied determinations and any other requests to dedicated fax number below belonging to the campus where the patient is receiving treatment   List of dedicated fax numbers for the Facilities:  [address] [place] [address] DENIALS (Administrative/Medical Necessity) [phone]   1000  [facility] (Maternity/NICU/Pediatrics) [phone] 401 [address] Jovi Esparza 5897 (  Dev Tan "Beverly" 103) [address] [address] Stefan Villegas 1481 [address] [place] [address] [phone]

## 2021-03-08 NOTE — PROGRESS NOTES
Patient examined spoke with the nurse discussed with the   Patient is alert awake cooperative she reports that she is going to [address] [place] [address] for short-term rehab  Patient is stable with the depression and happy with the medications Prozac 60 mg daily and Ativan 0 5 mg p o  B i d  She reports that she is much calmer and it helps her with anxiety  Medicine is helping her to take the edge off and she is feeling better with the depression  Patient is happy to see me and communicating her feelings well nurse reports no behavior problem  Patient will be transferred to nursing home for short-term rehab after medical stabilization  Patient denies auditory or visual hallucinations  Patient denies suicidal or homicidal ideation  Therapy done with good response  Patient will have psychiatric follow-up with her psychiatrist at [facility] once she is home  Patient will benefit from psychiatric follow-up at the nursing home after the discharge  Continue her medications the same at this time

## 2021-03-08 NOTE — CASE MANAGEMENT
LOS - 5 days    SW following to assist with DCP  STR placement is planned  Referrals have been made to facilities chosen by son including [facility], [facility] , [facility], [facility] and [facility]  Family's preference is [facility] however there is not availability at this time  Pt was accepted by [facility], [facility]  but due to insurance issues pt may incur a copay so that is not an option  Pt is being considered by [facility] and [facility] facilities  Once accepting facility is determined authorization will need to be obtained from [employer]  SW will continue to follow to monitor progress and assist with planning as needed

## 2021-03-08 NOTE — ASSESSMENT & PLAN NOTE
Patient admitted to [facility] for chest pain after she was mopping the floors at home, with associated SOB  CXR with mild pulmonary vascular congestion   Troponin (-) x 3  KENDALL score 4  She was noted to have NSVT vs  SVT with aberration with associated dizziness while at [facility]  Patient underwent nuclear stress test which was reported abnormal   Patient underwent cardiac catheterization which showed mild nonobstructive CAD  Cardiology recommendations appreciated   2D echo showed EF of 50 for 60% with mild concentric hypertrophy and grade 2 diastolic dysfunction  Patient was having some weakness and was seen by physical therapy and is recommending rehabilitation

## 2021-03-08 NOTE — PROGRESS NOTES
Progress Note - Holly Puentes 1955, 76 y o  female MRN: [id number]    Unit/Bed#: [address] [place] [address] Encounter: [id number]    Primary Care Provider: Tevin Lanza MD   Date and time admitted to hospital: 3/3/2021  6:01 PM        * Chest pain  Assessment & Plan  Patient admitted to [facility] for chest pain after she was mopping the floors at home, with associated SOB  CXR with mild pulmonary vascular congestion   Troponin (-) x 3  KENDALL score 4  She was noted to have NSVT vs  SVT with aberration with associated dizziness while at [facility]  Patient underwent nuclear stress test which was reported abnormal   Patient underwent cardiac catheterization which showed mild nonobstructive CAD  Cardiology recommendations appreciated   2D echo showed EF of 50 for 60% with mild concentric hypertrophy and grade 2 diastolic dysfunction  Patient was having some weakness and was seen by physical therapy and is recommending rehabilitation    Diarrhea  Assessment & Plan  Patient reports diarrhea with time she eats food  Nursing reported no diarrhea  Senokot was stopped  Monitor stool output    Diabetes mellitus [facility])  Assessment & Plan  Lab Results   Component Value Date    HGBA1C 11 5 (H) 03/03/2021       Recent Labs     03/07/21  2139 03/08/21  0724 03/08/21  1117 03/08/21  1721   POCGLU 156* 138 146* 154*       Blood Sugar Average: Last 72 hrs:  (P) 150   Hemoglobin A1c is significantly elevated  Hold home PO medications  Continue Lantus 20 units daily and Humalog sliding scale with Accu-Cheks q a c   And hs  Patient needs better blood sugar control  Blood sugars have been stable in the hospital     Acquired hypothyroidism  Assessment & Plan  Continue home dose of levothyroxine    Major depressive disorder  Assessment & Plan  Continue Prozac 40 mg daily which was increased to 60 milligrams by Psychiatry  Patient added on Ativan 0 5 milligram p o  B i d     GERD (gastroesophageal reflux disease)  Assessment & Plan  Continue Pepcid 20 mg BID    Hyperlipidemia  Assessment & Plan  Patient started on Lipitor 40 milligram p o  Daily    Results from 3/3/2021   Ref  Range 3/3/2021 13:54   Cholesterol Latest Ref Range: <=200 mg/dL 202 (H)   Triglycerides Latest Ref Range: <=150 mg/dL 121 0   HDL Latest Ref Range: >=50 mg/dL 43 (L)   LDL Calculated Latest Ref Range: 0 - 100 mg/dL 135 (H)         Restless leg syndrome  Assessment & Plan  Continue home dose of gabapentin, 100 mg early AM and midday, 300 mg QHS    Seizures (HCC)  Assessment & Plan  Continue home dose of topiramate        VTE Pharmacologic Prophylaxis:   Pharmacologic: Enoxaparin (Lovenox)  Mechanical VTE Prophylaxis in Place: Yes    Patient Centered Rounds: I have performed bedside rounds with nursing staff today  Discussions with Specialists or Other Care Team Provider: No    Education and Discussions with Family / Patient: yes    Time Spent for Care: 30 minutes  More than 50% of total time spent on counseling and coordination of care as described above  Current Length of Stay: 5 day(s)    Current Patient Status: Inpatient   Certification Statement: The patient will continue to require additional inpatient hospital stay due to Pending placement    Discharge Plan:  Short-term rehabilitation    Code Status: Level 1 - Full Code      Subjective:   Patient reports loose watery bowel movements every time she eats  As per nursing there was no documented diarrhea  Patient denies any abdominal pain, nausea or vomiting  Objective:     Vitals:   Temp (24hrs), Av °F (36 7 °C), Min:97 9 °F (36 6 °C), Max:98 2 °F (36 8 °C)    Temp:  [97 9 °F (36 6 °C)-98 2 °F (36 8 °C)] 97 9 °F (36 6 °C)  HR:  [67-75] 68  Resp:  [18-19] 19  BP: (119-132)/(61-72) 122/72  SpO2:  [89 %-96 %] 92 %  Body mass index is 52 02 kg/m²       Input and Output Summary (last 24 hours):     No intake or output data in the 24 hours ending 21 1955    Physical Exam:     Physical Exam  Constitutional: General: She is not in acute distress  HENT:      Head: Normocephalic and atraumatic  Nose: Nose normal    Eyes:      Conjunctiva/sclera: Conjunctivae normal       Pupils: Pupils are equal, round, and reactive to light  Neck:      Musculoskeletal: Normal range of motion and neck supple  Cardiovascular:      Rate and Rhythm: Normal rate and regular rhythm  Heart sounds: Normal heart sounds  Pulmonary:      Effort: Pulmonary effort is normal  No respiratory distress  Breath sounds: Normal breath sounds  No wheezing or rales  Abdominal:      General: Bowel sounds are normal  There is no distension  Palpations: Abdomen is soft  Tenderness: There is no abdominal tenderness  There is no guarding or rebound  Skin:     General: Skin is warm and dry  Findings: No rash  Neurological:      Mental Status: She is alert  Cranial Nerves: No cranial nerve deficit  Additional Data:     Labs:    Results from last 7 days   Lab Units 03/04/21  0831   WBC Thousand/uL 5 47   HEMOGLOBIN g/dL 15 1   HEMATOCRIT % 47 8*   PLATELETS Thousands/uL 199   NEUTROS PCT % 66   LYMPHS PCT % 24   MONOS PCT % 6   EOS PCT % 3     Results from last 7 days   Lab Units 03/04/21  0831   POTASSIUM mmol/L 4 2   CHLORIDE mmol/L 103   CO2 mmol/L 28   BUN mg/dL 13   CREATININE mg/dL 0 69   CALCIUM mg/dL 8 6   ALK PHOS U/L 110   ALT U/L 23   AST U/L 24     Results from last 7 days   Lab Units 03/03/21  0911   INR  0 96       * I Have Reviewed All Lab Data Listed Above  * Additional Pertinent Lab Tests Reviewed:  [facility]        Recent Cultures (last 7 days):           Last 24 Hours Medication List:   Current Facility-Administered Medications   Medication Dose Route Frequency Provider Last Rate    acetaminophen  650 mg Oral Q6H PRN Sridevi Calderon MD      aspirin  81 mg Oral Daily Sridevi Calderon MD      atorvastatin  40 mg Oral Daily With Margaret Ruiz MD  enoxaparin  40 mg Subcutaneous Daily Jody Landon MD      famotidine  20 mg Oral BID Jody Landon MD      FLUoxetine  60 mg Oral Daily Jigar Martinez MD      gabapentin  100 mg Oral BID Jody Landon MD      gabapentin  300 mg Oral HS Jody Landon MD      insulin glargine  20 Units Subcutaneous HS Jody Landon MD      insulin lispro  1-5 Units Subcutaneous TID AC Jody Landon MD      insulin lispro  1-5 Units Subcutaneous HS Jody Landon MD      levothyroxine  50 mcg Oral Early Morning Jody Landon MD      loperamide  2 mg Oral TID PRN Nicole Garnett MD      LORazepam  0 5 mg Oral BID Jigar Martinez MD      topiramate  200 mg Oral QAM Jody Landon MD      topiramate  200 mg Oral HS Jody Landon MD          Today, Patient Was Seen By: Nicole Garnett MD    ** Please Note: Dictation voice to text software may have been used in the creation of this document   **

## 2021-03-08 NOTE — ASSESSMENT & PLAN NOTE
Patient reports diarrhea with time she eats food  Nursing reported no diarrhea  Senokot was stopped  Monitor stool output

## 2021-03-08 NOTE — ASSESSMENT & PLAN NOTE
Lab Results   Component Value Date    HGBA1C 11 5 (H) 03/03/2021       Recent Labs     03/07/21  2139 03/08/21  0724 03/08/21  1117 03/08/21  1721   POCGLU 156* 138 146* 154*       Blood Sugar Average: Last 72 hrs:  (P) 150   Hemoglobin A1c is significantly elevated  Hold home PO medications  Continue Lantus 20 units daily and Humalog sliding scale with Accu-Cheks q a c   And hs  Patient needs better blood sugar control  Blood sugars have been stable in the hospital

## 2021-03-09 VITALS
RESPIRATION RATE: 19 BRPM | HEART RATE: 71 BPM | OXYGEN SATURATION: 91 % | WEIGHT: 283.51 LBS | SYSTOLIC BLOOD PRESSURE: 144 MMHG | TEMPERATURE: 97.4 F | BODY MASS INDEX: 52.17 KG/M2 | DIASTOLIC BLOOD PRESSURE: 84 MMHG | HEIGHT: 62 IN

## 2021-03-09 PROBLEM — R19.7 DIARRHEA: Status: RESOLVED | Noted: 2021-03-08 | Resolved: 2021-03-09

## 2021-03-09 LAB
GLUCOSE SERPL-MCNC: 142 MG/DL (ref 65–140)
GLUCOSE SERPL-MCNC: 190 MG/DL (ref 65–140)
GLUCOSE SERPL-MCNC: 205 MG/DL (ref 65–140)

## 2021-03-09 PROCEDURE — 99239 HOSP IP/OBS DSCHRG MGMT >30: CPT | Performed by: FAMILY MEDICINE

## 2021-03-09 PROCEDURE — 97110 THERAPEUTIC EXERCISES: CPT

## 2021-03-09 PROCEDURE — 82948 REAGENT STRIP/BLOOD GLUCOSE: CPT

## 2021-03-09 PROCEDURE — 97535 SELF CARE MNGMENT TRAINING: CPT

## 2021-03-09 RX ORDER — FLUOXETINE HYDROCHLORIDE 20 MG/1
60 CAPSULE ORAL DAILY
Qty: 90 CAPSULE | Refills: 0 | Status: SHIPPED | OUTPATIENT
Start: 2021-03-10

## 2021-03-09 RX ORDER — ATORVASTATIN CALCIUM 40 MG/1
40 TABLET, FILM COATED ORAL
Qty: 30 TABLET | Refills: 0 | Status: ON HOLD | OUTPATIENT
Start: 2021-03-09 | End: 2021-03-22

## 2021-03-09 RX ORDER — INSULIN GLARGINE 100 [IU]/ML
20 INJECTION, SOLUTION SUBCUTANEOUS
Qty: 10 ML | Refills: 0 | Status: ON HOLD
Start: 2021-03-09 | End: 2021-10-25 | Stop reason: SDUPTHER

## 2021-03-09 RX ORDER — FAMOTIDINE 20 MG/1
20 TABLET, FILM COATED ORAL 2 TIMES DAILY
Qty: 60 TABLET | Refills: 0 | Status: SHIPPED | OUTPATIENT
Start: 2021-03-09 | End: 2022-02-01 | Stop reason: HOSPADM

## 2021-03-09 RX ORDER — LORAZEPAM 0.5 MG/1
0.5 TABLET ORAL 2 TIMES DAILY
Qty: 10 TABLET | Refills: 0 | Status: SHIPPED | OUTPATIENT
Start: 2021-03-09 | End: 2021-03-30 | Stop reason: HOSPADM

## 2021-03-09 RX ADMIN — TOPIRAMATE 200 MG: 100 TABLET, FILM COATED ORAL at 08:34

## 2021-03-09 RX ADMIN — LORAZEPAM 0.5 MG: 0.5 TABLET ORAL at 18:41

## 2021-03-09 RX ADMIN — FAMOTIDINE 20 MG: 20 TABLET ORAL at 18:42

## 2021-03-09 RX ADMIN — GABAPENTIN 100 MG: 100 CAPSULE ORAL at 16:15

## 2021-03-09 RX ADMIN — ENOXAPARIN SODIUM 40 MG: 40 INJECTION SUBCUTANEOUS at 08:30

## 2021-03-09 RX ADMIN — INSULIN LISPRO 1 UNITS: 100 INJECTION, SOLUTION INTRAVENOUS; SUBCUTANEOUS at 13:43

## 2021-03-09 RX ADMIN — LEVOTHYROXINE SODIUM 50 MCG: 50 TABLET ORAL at 06:21

## 2021-03-09 RX ADMIN — GABAPENTIN 100 MG: 100 CAPSULE ORAL at 11:23

## 2021-03-09 RX ADMIN — FAMOTIDINE 20 MG: 20 TABLET ORAL at 08:35

## 2021-03-09 RX ADMIN — ASPIRIN 81 MG: 81 TABLET, COATED ORAL at 08:31

## 2021-03-09 RX ADMIN — FLUOXETINE 60 MG: 20 CAPSULE ORAL at 08:31

## 2021-03-09 RX ADMIN — INSULIN LISPRO 1 UNITS: 100 INJECTION, SOLUTION INTRAVENOUS; SUBCUTANEOUS at 18:43

## 2021-03-09 RX ADMIN — LORAZEPAM 0.5 MG: 0.5 TABLET ORAL at 08:31

## 2021-03-09 NOTE — DISCHARGE SUMMARY
Discharge Summary - [facility]    Patient Information: Kiran Rod 76 y o  female MRN: [id number]  Unit/Bed#: [address] [place] [address] Encounter: [id number]    Discharging Physician / Practitioner: Eugena Mcburney, DO  PCP: Charito Aguilar MD  Admission Date: 3/3/2021  Discharge Date: 03/09/21    Reason for Admission: No chief complaint on file  Discharge Diagnoses:     Principal Problem:    Chest pain  Active Problems:    Seizures ([address] )    Restless leg syndrome    Hypertension    Hyperlipidemia    Diabetes mellitus (HCC)    GERD (gastroesophageal reflux disease)    Major depressive disorder    Acquired hypothyroidism  Resolved Problems:    Diarrhea        * Chest pain  Assessment & Plan  Patient admitted to [address] [place] [address] for chest pain after she was mopping the floors at home, with associated SOB  CXR with mild pulmonary vascular congestion   Troponin (-) x 3  KENDALL score 4  She was noted to have NSVT vs  SVT with aberration with associated dizziness while at [facility]  Patient underwent nuclear stress test which was reported abnormal   Patient underwent cardiac catheterization which showed mild nonobstructive CAD  Follow-up with Cardiology after discharge  2D echo showed EF of 50 for 60% with mild concentric hypertrophy and grade 2 diastolic dysfunction    Acquired hypothyroidism  Assessment & Plan  Continue home dose of levothyroxine  Major depressive disorder  Assessment & Plan  Prozac was increased to 60 milligrams by Psychiatry  Patient added on Ativan 0 5 milligram p o  B i d     GERD (gastroesophageal reflux disease)  Assessment & Plan  Continue Pepcid 20 mg BID      Diabetes mellitus [facility])  Assessment & Plan  Lab Results   Component Value Date    HGBA1C 11 5 (H) 03/03/2021       Recent Labs     03/08/21  2008 03/09/21  0746 03/09/21  1156 03/09/21  1632   POCGLU 170* 142* 205* 190*     Held home PO medications while here but okay to restart on discharge  Continue Lantus 20 units daily as morning sugars are acceptable on it  Discussed with patient that depending on her diet at home, if morning blood sugars start to run high, she can increase her Lantus to regular home dosing of 30 units  Patient needs better blood sugar control  Blood sugars have been stable in the hospital     Hyperlipidemia  Assessment & Plan  Patient started on Lipitor 40 milligram p o  Daily  Results from 3/3/2021   Ref  Range 3/3/2021 13:54   Cholesterol Latest Ref Range: <=200 mg/dL 202 (H)   Triglycerides Latest Ref Range: <=150 mg/dL 121 0   HDL Latest Ref Range: >=50 mg/dL 43 (L)   LDL Calculated Latest Ref Range: 0 - 100 mg/dL 135 (H)         Hypertension  Assessment & Plan  Patient does not take any medications at home   Blood pressures acceptable    Restless leg syndrome  Assessment & Plan  Continue home dose of gabapentin, 100 mg early AM and midday, 300 mg QHS  Seizures ([address] )  Assessment & Plan  Continue home dose of topiramate b i d  Diarrhea-resolved as of 3/9/2021  Assessment & Plan  Patient reported diarrhea with time she eats food  Nursing reported no diarrhea  Senokot was stopped      Consultations During Hospital Stay:  [facility] TO CARDIOLOGY    Procedures Performed:     · Stress test  · Cardiac catheterization  · Echo    Significant Findings:     · Refer to hospital course and above listed diagnosis related plan for details    Imaging while in hospital:    Xr Chest 1 View Portable    Result Date: 3/3/2021  Narrative: CHEST INDICATION:   Shortness of breath  COMPARISON:  12/4/2020 EXAM PERFORMED/VIEWS:  XR CHEST PORTABLE FINDINGS: There is mild enlargement of the cardiac silhouette Mild pulmonary vascular congestion suspected  No overt alveolar edema  No pneumothorax, no pleural effusion  Osseous structures appear within normal limits for patient age  Impression: Enlargement of cardiac silhouette with mild pulmonary vascular congestion suspected   Findings concur with the referring clinician's preliminary interpretation already in the patient's electronic health record  Workstation performed: [id number]       Incidental Findings:   · none    Test Results Pending at Discharge (will require follow up):   · As per After Visit Summary     Outpatient Tests Requested:  · none    Complications:  Refer to hospital course and above listed diagnosis related plan, if any    Hospital Course:     Alber Hawkins is a 76 y o  female patient who originally presented to the hospital on 3/3/2021 due to chest pain  She was initially seen at [facility]  Patient also reported shortness of breath with it  Patient was admitted and noted to have possible an SVT with associated dizziness  Case was discussed with Cardiology who was transferred here for further evaluation  While here patient was seen by Cardiology and underwent further testing cleared by Cardiology prior to discharge  Discharge plan discussed with patient  Discharge medications were reconciled with the patient and medications that she is normally not on home was discontinued    Please see above list of diagnoses and related plan for additional information  Condition at Discharge: stable     Discharge Day Visit / Exam:     Subjective:  Denies any chest pain, shortness of breath  Looking forward to going home    Vitals: Blood Pressure: 131/70 (03/09/21 0839)  Pulse: 78 (03/09/21 0839)  Temperature: 98 1 °F (36 7 °C) (03/09/21 0839)  Temp Source: Oral (03/08/21 0832)  Respirations: 18 (03/09/21 0839)  Height: 5' 2" (157 5 cm) (03/03/21 1807)  Weight - Scale: 129 kg (283 lb 8 2 oz) (03/09/21 0600)  SpO2: 96 % (03/09/21 0839)  Exam:   Physical Exam  Constitutional:       General: She is not in acute distress  Appearance: She is not diaphoretic  HENT:      Head: Normocephalic and atraumatic  Eyes:      General:         Right eye: No discharge  Left eye: No discharge     Cardiovascular:      Rate and Rhythm: Normal rate and regular rhythm  Pulmonary:      Effort: Pulmonary effort is normal  No respiratory distress  Breath sounds: Normal breath sounds  No wheezing or rales  Abdominal:      General: Bowel sounds are normal  There is no distension  Palpations: Abdomen is soft  Tenderness: There is no abdominal tenderness  Musculoskeletal:      Right lower leg: No edema  Left lower leg: No edema  Neurological:      Mental Status: She is alert and oriented to person, place, and time  Discharge instructions/Information to patient and family:(Discharge Medications and Follow up):   See after visit summary for information provided to patient and family  Provisions for Follow-Up Care:  See after visit summary for information related to follow-up care and any pertinent home health orders  Disposition: Home with [facility]    Planned Readmission:  No     Discharge Statement:  I spent > 30 minutes discharging the patient  This time was spent on the day of discharge  I had direct contact with the patient on the day of discharge  Greater than 50% of the total time was spent examining patient, answering all patient questions, arranging and discussing plan of care with patient as well as directly providing post-discharge instructions  Additional time then spent on discharge activities  Discharge Medications:  See after visit summary for reconciled discharge medications provided to patient and family  ** Please Note: "This note has been constructed using a voice recognition system  Therefore there may be syntax, spelling, and/or grammatical errors   Please call if you have any questions  "**

## 2021-03-09 NOTE — CASE MANAGEMENT
Discharge ordered  Pt will be returning home with home care services  [address] has accepted case and are aware of discharge today  SOC will be within 48 hours  [employer] contacted to arrange wheelchair [place] transport  Provider and  time pending  RN (June) and pt/son aware of plan

## 2021-03-09 NOTE — PHYSICAL THERAPY NOTE
PT TREATMENT     03/09/21 1041   Note Type   Note Type Treatment   Pain Assessment   Pain Assessment Tool Pain Assessment not indicated - pt denies pain   General   Chart Reviewed Yes   Family/Caregiver Present No   Cognition   Arousal/Participation Cooperative   Subjective   Subjective patient states frustration with being in the hospital "so long"   Transfers   Sit to Stand 7  Independent   Stand to Sit 7  Independent   Ambulation/Elevation   Gait Assistance 7  Independent   Additional items Verbal cues   Assistive Device Rolling walker   Distance 30 feet with change in direction and cuing to stay "closer to walker", patient also ambulated 60 feet in hallway without balance loss with slow gait speed and standing rest period with change in direction  Education completed in importance of use of walker at all times for endurance activity/gait   Exercises   Hip Flexion Sitting;10 reps;Bilateral   Knee AROM Long Arc Quad Sitting;10 reps;Bilateral   Ankle Pumps Sitting;10 reps;Bilateral   Balance training  sidestepping and backward walking completed for balance and strength training   Assessment   Assessment patient cooperative and demonstrating improved gait balance and endurance  Patient independent with roller walker for functional household distances at this time  Patient able to return home with home PT services and use of walker  The patient's AM-PAC Basic Mobility Inpatient Short Form Raw Score is 22, Standardized Score is 47 4  A standardized score greater than 42 9 suggests the patient may benefit from discharge to home  Please also refer to the recommendation of the Physical Therapist for safe discharge planning  Plan   Treatment/Interventions ADL retraining;Functional transfer training;LE strengthening/ROM; Therapeutic exercise; Endurance training;Patient/family training;Equipment eval/education;Gait training; Compensatory technique education   PT Frequency 5x/wk   Recommendation   PT Discharge Recommendation Home with skilled therapy   AM-PAC Basic Mobility Inpatient   Turning in Bed Without Bedrails 4   Lying on Back to Sitting on Edge of Flat Bed 4   Moving Bed to Chair 4   Standing Up From Chair 4   Walk in Room 4   Climb 3-5 Stairs 2   Basic Mobility Inpatient Raw Score 22   Basic Mobility Standardized Score 36 5   Licensure   [place] License Number  Shruthi Medeiros PT [id number]

## 2021-03-09 NOTE — OCCUPATIONAL THERAPY NOTE
Occupational Therapy Treatment Note       03/09/21 1015   Note Type   Note Type Treatment   Restrictions/Precautions   Other Precautions Fall Risk   Pain Assessment   Pain Assessment Tool 0-10   Pain Score No Pain   ADL   Eating Assistance 7  Independent   Grooming Assistance 7  Independent   Grooming Deficit Wash/dry hands   Grooming Comments Standing unsupported to sink   UB Bathing Assistance 5  Supervision/Setup   LB Bathing Assistance 5  Supervision/Setup   UB Dressing Assistance 7  Independent   LB Dressing Assistance 6  Modified independent   LB Dressing Comments Doff bilateral socks while seated in chair, don bilateral slippers while seated in chair; increased time required as patient reports increased pain due to excessively long toenails, reports this is baseeline problem and she has no difficulties at home "as long as I take my time"   Toileting Assistance  7  Independent   Toileting Deficit Clothing management up;Clothing management down;Perineal hygiene   Toileting Comments Hygiene completed after urination at toilet in bathroom   Bed Mobility   Additional Comments Not assessed, patient seated in bedside chair at start of session   Transfers   Sit to Stand 5  Supervision   Stand to Sit 5  Supervision   Additional Comments STS x several trials throughout session, supervision each trial   Functional Mobility   Functional Mobility 5  Supervision   Additional Comments Patient ambulated short household distance to bathroom with RW and supervision, when leaving bathroom able to ambulate several feet back to chair without AD with [facility]   Toilet Transfer Type To and from   Toilet Transfer to Standard toilet   Toilet Transfer Technique Ambulating   Toilet Transfers Supervision   Toilet Transfers Comments Ambulatory transfer without AD, use of    Tub Transfers   Tub Transfers Comments Tub transfer educatino completed   Patient reports at home has a tub/shower and uses a transfer tub bench but is nervous about tub transfers  Patient educated on performing sit pivot method (demonstrated by OT via simulation) to safely get in/out of tub without having to step over tub ledge  Patient verbalized understanding, also educated patient on requesting to practice with home OT once receiving home therapy services   Cognition   Overall Cognitive Status [facility]   Arousal/Participation Alert; Cooperative   Attention Within functional limits   Orientation Level Oriented X4   Memory Within functional limits   Following Commands Follows all commands and directions without difficulty   Activity Tolerance   Activity Tolerance Patient tolerated treatment well   Assessment   Assessment Patient seen for OT treatment this AM  Patient is progressing well towards OT goals of care  Patient currently able to perform most ADLs at a supervision to independent level  Patient able to complete functional mobility and transfers with supervision  The patient's raw score on the AM-PAC Daily Activity inpatient short form is 22, standardized score is 47 1, greater than 39 4  Patients at this level are likely to benefit from discharge to home  Please refer to the recommendation of the Occupational Therapist for safe discharge planning  Patient has progressed to a higher level of function in regards to ADLs and functional mobility throughout her hospital stay  Recommend patient return home with addition of Home OT services  Patient reports she has family support from son as well as support from neighbors for iADLs if needed  At end of session patient seated in bedside chair with all needs met  Plan   Treatment Interventions ADL retraining;Functional transfer training;UE strengthening/ROM; Endurance training;Patient/family training;Equipment evaluation/education; Compensatory technique education;Continued evaluation; Energy conservation; Activityengagement   OT Frequency 3-5x/wk   Recommendation   OT Discharge Recommendation Home with skilled therapy  (Home OT)   AM-PAC Daily Activity Inpatient   Lower Body Dressing 4   Bathing 3   Toileting 3   Upper Body Dressing 4   Grooming 4   Eating 4   Daily Activity Raw Score 22   Daily Activity Standardized Score (Calc for Raw Score >=11) 47  [address] [facility]   Following a Speech/Presentation 4   Understanding Ordinary Conversation 4   Taking Medications 4   Remembering Where Things Are Placed or Put Away 4   Remembering List of 4-5 Errands 4   Taking Care of Complicated Tasks 4   Applied Cognition Raw Score 24   Applied Cognition Standardized Score 59 34   Licensure   [place] License Number  Raz Griffin, OTR/L [id number]

## 2021-03-09 NOTE — PLAN OF CARE
Problem: OCCUPATIONAL THERAPY ADULT  Goal: Performs self-care activities at highest level of function for planned discharge setting  See evaluation for individualized goals  Outcome: Progressing  Note: Limitation: Decreased ADL status, Decreased UE strength, Decreased Safe judgement during ADL, Decreased endurance, Decreased self-care trans, Decreased high-level ADLs(decreased balance and mobility )  Prognosis: Good  Assessment: Patient seen for OT treatment this AM  Patient is progressing well towards OT goals of care  Patient currently able to perform most ADLs at a supervision to independent level  Patient able to complete functional mobility and transfers with supervision  The patient's raw score on the AM-PAC Daily Activity inpatient short form is 22, standardized score is 47 1, greater than 39 4  Patients at this level are likely to benefit from discharge to home  Please refer to the recommendation of the Occupational Therapist for safe discharge planning  Patient has progressed to a higher level of function in regards to ADLs and functional mobility throughout her hospital stay  Recommend patient return home with addition of Home OT services  Patient reports she has family support from son as well as support from neighbors for iADLs if needed  At end of session patient seated in bedside chair with all needs met        OT Discharge Recommendation: Home with skilled therapy(Home OT)

## 2021-03-09 NOTE — CASE MANAGEMENT
Update received from PT/OT  After pt's session today it seems pt's function has improved and STR is no longer indicated  Recommendation is now home with home care  SW placed call to pt's son, Romana Graver, to discuss change  Message left on voice mail, waiting for call back from son  SW will follow

## 2021-03-09 NOTE — UTILIZATION REVIEW
Continued Stay Review    Date: 3-9-21                           Current Patient Class: inpatient Current Level of Care: med surg    HPI:65 y o  female initially admitted on3-3-21    Assessment/Plan:       PT and OT assessed patient today and recommends home with home PT patient reports feeling much better and wants to be discharged to home with support of son and vna services  She will follow up with behavioral health on psychiatry      Pertinent Labs/Diagnostic Results:   Results from last 7 days   Lab Units 03/07/21  1307 03/03/21  0915   SARS-COV-2  Negative Negative     Results from last 7 days   Lab Units 03/04/21  0831 03/03/21  0901   WBC Thousand/uL 5 47 6 40   HEMOGLOBIN g/dL 15 1 14 2   HEMATOCRIT % 47 8* 43 6   PLATELETS Thousands/uL 199 188   NEUTROS ABS Thousands/µL 3 58 4 48         Results from last 7 days   Lab Units 03/04/21  0831 03/03/21  2018 03/03/21  1354 03/03/21  0901   SODIUM mmol/L 135*  --  136 134   POTASSIUM mmol/L 4 2  --  4 1 4 2   CHLORIDE mmol/L 103  --  100 98   CO2 mmol/L 28  --  29 31   ANION GAP mmol/L 4  --  7 5   BUN mg/dL 13  --  19 22*   CREATININE mg/dL 0 69  --  0 58 0 67   EGFR ml/min/1 73sq m 91  --  96 92   CALCIUM mg/dL 8 6  --  8 8 8 7   MAGNESIUM mg/dL 1 8 1 7  --  1 5*     Results from last 7 days   Lab Units 03/04/21  0831 03/03/21  1354 03/03/21  0901   AST U/L 24 12* 12*   ALT U/L 23 10 12   ALK PHOS U/L 110 104 1 106 0   TOTAL PROTEIN g/dL 7 4 6 6 6 8   ALBUMIN g/dL 3 0* 3 5 3 5   TOTAL BILIRUBIN mg/dL 0 50 0 42 0 36     Results from last 7 days   Lab Units 03/09/21  1156 03/09/21  0746 03/08/21 2008 03/08/21  1721 03/08/21  1117 03/08/21  0724 03/07/21  2139 03/07/21  1630 03/07/21  1130 03/07/21  0727 03/06/21  2202 03/06/21  1609   POC GLUCOSE mg/dl 205* 142* 170* 154* 146* 138 156* 140 131 143* 152* 161*     Results from last 7 days   Lab Units 03/04/21  0831 03/03/21  1354 03/03/21  0901   GLUCOSE RANDOM mg/dL 168* 256* 338*         Results from last 7 days Lab Units 03/03/21 2020   HEMOGLOBIN A1C % 11 5*   EAG mg/dl 283     Results from last 7 days   Lab Units 03/03/21  2018 03/03/21  1633 03/03/21  1354 03/03/21  0901   TROPONIN I ng/mL <0 02 <0 03 <0 03 <0 03     Results from last 7 days   Lab Units 03/03/21  0911   D-DIMER QUANTITATIVE mg/L FEU 0 49     Results from last 7 days   Lab Units 03/03/21  0911   PROTIME seconds 10 9   INR  0 96   PTT seconds 24     Results from last 7 days   Lab Units 03/03/21  1354   TSH 3RD GENERATON uIU/mL 4  123         Results from last 7 days   Lab Units 03/03/21  1353   LACTIC ACID mmol/L 1 5       Results from last 7 days   Lab Units 03/03/21  0901   BNP pg/mL 27 8       Results from last 7 days   Lab Units 03/07/21  1307 03/03/21  0915   INFLUENZA A PCR  Negative Negative   INFLUENZA B PCR  Negative Negative   RSV PCR  Negative Negative       Vital Signs:     Date/Time  Temp  Pulse  Resp  BP  MAP (mmHg)  SpO2  O2 Device  Patient Position - Orthostatic VS   03/09/21 08:39:09  98 1 °F (36 7 °C)  78  18  131/70  90  96 %  --  --   03/08/21 23:44:19  98 4 °F (36 9 °C)  --  20  127/74  92  --  --  --   03/08/21 2100  --  --  --  --  --  --  None (Room air)  --   03/08/21 1925  98 1 °F (36 7 °C)  74  19  124/62  --  90 %  --  --   03/08/21 15:48:41  97 9 °F (36 6 °C)  68  19  122/72  89  92 %  --  --   03/08/21 0832  98 2 °F (36 8 °C)  67  18  119/71  87  94 %  None (Room air)  Lying   03/08/21 08:31:03  --  67  18  119/71  87  96 %  --  --   03/08/21 0300  --  68  --  --  --  89 %Abnormal   --  --               Medications:   Scheduled Medications:  aspirin, 81 mg, Oral, Daily  atorvastatin, 40 mg, Oral, Daily With Dinner  enoxaparin, 40 mg, Subcutaneous, Daily  famotidine, 20 mg, Oral, BID  FLUoxetine, 60 mg, Oral, Daily  gabapentin, 100 mg, Oral, BID  gabapentin, 300 mg, Oral, HS  insulin glargine, 20 Units, Subcutaneous, HS  insulin lispro, 1-5 Units, Subcutaneous, TID AC  insulin lispro, 1-5 Units, Subcutaneous, HS  levothyroxine, 50 mcg, Oral, Early Morning  LORazepam, 0 5 mg, Oral, BID  topiramate, 200 mg, Oral, QAM  topiramate, 200 mg, Oral, HS      Continuous IV Infusions:     PRN Meds:  acetaminophen, 650 mg, Oral, Q6H PRN  loperamide, 2 mg, Oral, TID PRN        Discharge Plan: to be determined         Network Utilization Review Department  ATTENTION: Please call with any questions or concerns to [phone] and carefully listen to the prompts so that you are directed to the right person  All voicemails are confidential   León Mejia all requests for admission clinical reviews, approved or denied determinations and any other requests to dedicated fax number below belonging to the campus where the patient is receiving treatment   List of dedicated fax numbers for the Facilities:  [address] DENIALS (Administrative/Medical Necessity) [phone]   [address] (Maternity/NICU/Pediatrics) [phone]   [address] Jovi Esparza 0524 (  [facility] [address] [address] [address] Leslie Villegas [address] [place], [place] [address] [phone]

## 2021-03-09 NOTE — CASE MANAGEMENT
LOS - 6 days    SW following to assist with DCP  STR placement is planned  Bed now available at [facility] will initiate authorization request and will complete it as soon as updated therapy notes are available  SW contacted therapy to request session  SW placed call to pt's son, Douglas Aguilar, to discuss plan  Son is happy bed became available and knows pt will be too  Son requesting transfer to [facility] when discharged  IMM reviewed with son  Son agrees with discharge determination  SW will continue to follow to monitor progress and assist with transfer when insurance authorization is in place

## 2021-03-09 NOTE — PROGRESS NOTES
Patient examined spoke with the nurse 's notes reviewed and noted  Patient reports that she is frustrated about waiting too long here and she is doing better she is ambulating better and she wants to go home she also reports that she has a son who can help her and she can take care of herself at home  Patient agreed to continue Prozac 60 mg daily and she will follow-up with her psychiatrist at [facility]  Patient is also taking Ativan 0 5 mg p o  B i d  Which is helping her with anxiety patient looks much calmer she is brighter and thanking me for coming and talking to her  No side effects of medications noted  Nurse reported no behavior problem  Patient is stable with the depression at her baseline patient denies auditory or visual hallucinations  Patient denies suicidal or homicidal ideation  Patient will be discharged home later today  And she will follow-up with her psychiatrist at scheduled appointment  Therapy done with good response

## 2021-03-09 NOTE — CASE MANAGEMENT
SW following to assist with DCP  Home with home care services is now planned  Call back received from pt's son and plans were discussed  Son glad his mother is improved and was open to home care services  Reviewed agency options and son expressed no specific preference  SW offered to make referrals to local agencies to determine which accepts pt's insurance  Home care referrals were made to [address] [place] [address], [facility] and [facility]  [address] [place] [address] accepted case  Other two agencies were unable due to insurance issues  Notified Dr Yash Granados of plan  Discussed discharge options with son  Son requested transportation be arranged for pt because he was going to sleep after just getting off work  [facility] will be contacted to arrange transport  Will follow

## 2021-03-09 NOTE — DISCHARGE INSTRUCTIONS
Cardiac Catheterization     WHAT YOU NEED TO KNOW:   A cardiac catheterization is a procedure to look at your heart and its blood vessels  Healthcare providers can measure oxygen levels and pressures in your heart  They can also fix problems with your valves, blood vessels, or the walls of your heart  You may need this procedure if you have chest pain, heart disease, or your heart is not working properly  DISCHARGE INSTRUCTIONS:     No driving for 24 hours, because you were sedated  Sedation: Avoid making any legal/major decisions today, as the sedation may inhibit your decision making  Also avoid alcohol today, as the sedation may heighten the effects of the alcohol  Apply firm, steady pressure directly over the wound if bleeding occurs  A small amount of bleeding from your wound is possible  Apply pressure with a clean gauze or towel for 5 to 10 minutes  Call 911 if bleeding becomes heavy or does not stop  Do not attempt to drive yourself to the hospital     Bathing: You will be able to shower the day after your procedure  Remove your bandage before you shower  Carefully wash the wound with soap and water  Pat the area dry and apply a clean band-aid  Do not take baths or go in hot tubs or pools for about one week  Care for your wound as directed: Keep your dressing clean and dry  Monitor your wound everyday for signs of infection such as redness, swelling, or pus  Mild bruising is normal and expected  Do not put powders, lotions, or creams on your wound for about one week  Do not lift anything heavier than 5 pounds for about 5 days  Heavy lifting can put stress on your wound and cause bleeding  If an artery in your wrist was used, do not push or pull with the arm that was used for the procedure - pretend like your wrist is broken  Do not do vigorous activity for at least 48 hours  Vigorous activity may cause bleeding from your wound  Rest and do quiet activities   Short walks to the bathroom and around the house are okay  Ask your healthcare provider when you can return to your normal activities  Drink liquids to flush the contrast liquid from your body and prevent blood clots  Ask how much liquid to drink each day and which liquids are best for you  Call 911 for any of the following:   · You have any of the following signs of a heart attack:    ? Squeezing, pressure, or pain in your chest that lasts longer than 5 minutes or returns   ? Discomfort or pain in your back, neck, jaw, stomach, or arm    ? Trouble breathing   ? Nausea or vomiting   ? Lightheadedness or a sudden cold sweat, especially with chest pain or trouble breathing     · You have any of the following signs of a stroke:    ? Numbness or drooping on one side of your face    ? Weakness in an arm or leg   ? Confusion or difficulty speaking   ? Dizziness, a severe headache, or vision loss     · You feel lightheaded, short of breath, and have chest pain  · You cough up blood  · You have trouble breathing  · You cannot stop the bleeding from your wound even after you hold firm pressure for 10 minutes  ·   Seek care immediately if:   · Blood soaks through your bandage  · Your stitches come apart  · Your arm or leg feels numb, cool, or looks pale  · Your wound gets swollen quickly  Contact your healthcare provider if:   · You have a fever or chills  · Your wound is red, swollen, or draining pus  · Your wound looks more bruised or there is new bruising on the side of your leg or arm  · You have nausea or are vomiting  · Your skin is itchy, swollen, or you have a rash  · You have questions or concerns about your condition or care      If your morning blood sugars start to run high at home, you can increase the Lantus to 30 units that you were taking at home

## 2021-03-10 NOTE — NURSING NOTE
Discharge and medication reviewed with patient  She understood the information  Pt discharged to home via wheel chair with [facility],[employer] ambulance service providing transportation to home via wheelchair [facility]

## 2021-03-10 NOTE — ASSESSMENT & PLAN NOTE
Patient admitted to [facility] for chest pain after she was mopping the floors at home, with associated SOB  CXR with mild pulmonary vascular congestion   Troponin (-) x 3   KENDALL score 4  She was noted to have NSVT vs  SVT with aberration with associated dizziness while at [facility]  Patient underwent nuclear stress test which was reported abnormal   Patient underwent cardiac catheterization which showed mild nonobstructive CAD  Follow-up with Cardiology after discharge  2D echo showed EF of 50 for 60% with mild concentric hypertrophy and grade 2 diastolic dysfunction

## 2021-03-10 NOTE — ASSESSMENT & PLAN NOTE
Prozac was increased to 60 milligrams by Psychiatry  Patient added on Ativan 0 5 milligram p o  B i d

## 2021-03-10 NOTE — ASSESSMENT & PLAN NOTE
Lab Results   Component Value Date    HGBA1C 11 5 (H) 03/03/2021       Recent Labs     03/08/21 2008 03/09/21  0746 03/09/21  1156 03/09/21  1632   POCGLU 170* 142* 205* 190*     Held home PO medications while here but okay to restart on discharge  Continue Lantus 20 units daily as morning sugars are acceptable on it    Discussed with patient that depending on her diet at home, if morning blood sugars start to run high, she can increase her Lantus to regular home dosing of 30 units  Patient needs better blood sugar control  Blood sugars have been stable in the hospital

## 2021-03-10 NOTE — ASSESSMENT & PLAN NOTE
Patient reported diarrhea with time she eats food  Nursing reported no diarrhea  Senokot was stopped

## 2021-03-12 ENCOUNTER — HOSPITAL ENCOUNTER (INPATIENT)
Facility: HOSPITAL | Age: 66
LOS: 6 days | Discharge: NURSING, REHAB OR LONG-TERM CARE | DRG: 948 | End: 2021-03-18
Attending: EMERGENCY MEDICINE | Admitting: INTERNAL MEDICINE
Payer: COMMERCIAL

## 2021-03-12 ENCOUNTER — APPOINTMENT (EMERGENCY)
Dept: RADIOLOGY | Facility: HOSPITAL | Age: 66
DRG: 948 | End: 2021-03-12
Payer: COMMERCIAL

## 2021-03-12 DIAGNOSIS — G47.00 INSOMNIA: ICD-10-CM

## 2021-03-12 DIAGNOSIS — B35.1 ONYCHOMYCOSIS OF GREAT TOE: ICD-10-CM

## 2021-03-12 DIAGNOSIS — N39.0 UTI (URINARY TRACT INFECTION): Primary | ICD-10-CM

## 2021-03-12 DIAGNOSIS — F32.A DEPRESSION: ICD-10-CM

## 2021-03-12 DIAGNOSIS — R53.1 WEAKNESS: ICD-10-CM

## 2021-03-12 PROBLEM — R82.71 ASYMPTOMATIC BACTERIURIA: Status: ACTIVE | Noted: 2021-03-12

## 2021-03-12 PROBLEM — R62.51 FAILURE TO THRIVE (0-17): Status: ACTIVE | Noted: 2021-03-12

## 2021-03-12 LAB
ALBUMIN SERPL BCP-MCNC: 3.9 G/DL (ref 3.4–4.8)
ALP SERPL-CCNC: 95.2 U/L (ref 35–140)
ALT SERPL W P-5'-P-CCNC: 19 U/L (ref 5–54)
AMPHETAMINES SERPL QL SCN: NEGATIVE
ANION GAP SERPL CALCULATED.3IONS-SCNC: 8 MMOL/L (ref 4–13)
AST SERPL W P-5'-P-CCNC: 21 U/L (ref 15–41)
ATRIAL RATE: 67 BPM
BACTERIA UR QL AUTO: ABNORMAL /HPF
BARBITURATES UR QL: NEGATIVE
BASOPHILS # BLD AUTO: 0.02 THOUSANDS/ΜL (ref 0–0.1)
BASOPHILS NFR BLD AUTO: 0 % (ref 0–1)
BENZODIAZ UR QL: NEGATIVE
BILIRUB SERPL-MCNC: 0.41 MG/DL (ref 0.3–1.2)
BILIRUB UR QL STRIP: ABNORMAL
BNP SERPL-MCNC: 29.5 PG/ML (ref 1–100)
BUN SERPL-MCNC: 30 MG/DL (ref 6–20)
CALCIUM SERPL-MCNC: 9.2 MG/DL (ref 8.4–10.2)
CHLORIDE SERPL-SCNC: 104 MMOL/L (ref 96–108)
CK SERPL-CCNC: 36.1 U/L (ref 38–234)
CLARITY UR: ABNORMAL
CO2 SERPL-SCNC: 26 MMOL/L (ref 22–33)
COCAINE UR QL: NEGATIVE
COLOR UR: YELLOW
CREAT SERPL-MCNC: 0.85 MG/DL (ref 0.4–1.1)
EOSINOPHIL # BLD AUTO: 0.13 THOUSAND/ΜL (ref 0–0.61)
EOSINOPHIL NFR BLD AUTO: 2 % (ref 0–6)
ERYTHROCYTE [DISTWIDTH] IN BLOOD BY AUTOMATED COUNT: 13.7 % (ref 11.6–15.1)
ETHANOL SERPL-MCNC: <10 MG/DL
FLUAV RNA RESP QL NAA+PROBE: NEGATIVE
FLUBV RNA RESP QL NAA+PROBE: NEGATIVE
GFR SERPL CREATININE-BSD FRML MDRD: 72 ML/MIN/1.73SQ M
GLUCOSE SERPL-MCNC: 207 MG/DL (ref 65–140)
GLUCOSE SERPL-MCNC: 236 MG/DL (ref 65–140)
GLUCOSE UR STRIP-MCNC: ABNORMAL MG/DL
HCT VFR BLD AUTO: 49.8 % (ref 34.8–46.1)
HGB BLD-MCNC: 16.3 G/DL (ref 11.5–15.4)
HGB UR QL STRIP.AUTO: ABNORMAL
IMM GRANULOCYTES # BLD AUTO: 0.02 THOUSAND/UL (ref 0–0.2)
IMM GRANULOCYTES NFR BLD AUTO: 0 % (ref 0–2)
KETONES UR STRIP-MCNC: NEGATIVE MG/DL
LEUKOCYTE ESTERASE UR QL STRIP: NEGATIVE
LYMPHOCYTES # BLD AUTO: 1.32 THOUSANDS/ΜL (ref 0.6–4.47)
LYMPHOCYTES NFR BLD AUTO: 23 % (ref 14–44)
MAGNESIUM SERPL-MCNC: 2 MG/DL (ref 1.6–2.6)
MCH RBC QN AUTO: 28.4 PG (ref 26.8–34.3)
MCHC RBC AUTO-ENTMCNC: 32.7 G/DL (ref 31.4–37.4)
MCV RBC AUTO: 87 FL (ref 82–98)
METHADONE UR QL: NEGATIVE
MONOCYTES # BLD AUTO: 0.49 THOUSAND/ΜL (ref 0.17–1.22)
MONOCYTES NFR BLD AUTO: 9 % (ref 4–12)
MUCOUS THREADS UR QL AUTO: ABNORMAL
NEUTROPHILS # BLD AUTO: 3.78 THOUSANDS/ΜL (ref 1.85–7.62)
NEUTS SEG NFR BLD AUTO: 66 % (ref 43–75)
NITRITE UR QL STRIP: NEGATIVE
NON-SQ EPI CELLS URNS QL MICRO: ABNORMAL /HPF
OPIATES UR QL SCN: NEGATIVE
OXYCODONE+OXYMORPHONE UR QL SCN: NEGATIVE
P AXIS: 59 DEGREES
PCP UR QL: NEGATIVE
PH UR STRIP.AUTO: 5 [PH]
PLATELET # BLD AUTO: 209 THOUSANDS/UL (ref 149–390)
PMV BLD AUTO: 11.2 FL (ref 8.9–12.7)
POTASSIUM SERPL-SCNC: 3.8 MMOL/L (ref 3.5–5)
PR INTERVAL: 136 MS
PROT SERPL-MCNC: 7.7 G/DL (ref 6.4–8.3)
PROT UR STRIP-MCNC: NEGATIVE MG/DL
QRS AXIS: -44 DEGREES
QRSD INTERVAL: 145 MS
QT INTERVAL: 431 MS
QTC INTERVAL: 455 MS
RBC # BLD AUTO: 5.73 MILLION/UL (ref 3.81–5.12)
RBC #/AREA URNS AUTO: ABNORMAL /HPF
RSV RNA RESP QL NAA+PROBE: NEGATIVE
SARS-COV-2 RNA RESP QL NAA+PROBE: NEGATIVE
SODIUM SERPL-SCNC: 138 MMOL/L (ref 133–145)
SP GR UR STRIP.AUTO: >=1.03 (ref 1–1.03)
T WAVE AXIS: 44 DEGREES
THC UR QL: NEGATIVE
TROPONIN I SERPL-MCNC: <0.03 NG/ML (ref 0–0.07)
UROBILINOGEN UR QL STRIP.AUTO: 0.2 E.U./DL
VENTRICULAR RATE: 67 BPM
WBC # BLD AUTO: 5.76 THOUSAND/UL (ref 4.31–10.16)
WBC #/AREA URNS AUTO: ABNORMAL /HPF

## 2021-03-12 PROCEDURE — 96374 THER/PROPH/DIAG INJ IV PUSH: CPT

## 2021-03-12 PROCEDURE — 0241U HB NFCT DS VIR RESP RNA 4 TRGT: CPT | Performed by: EMERGENCY MEDICINE

## 2021-03-12 PROCEDURE — 85025 COMPLETE CBC W/AUTO DIFF WBC: CPT | Performed by: EMERGENCY MEDICINE

## 2021-03-12 PROCEDURE — 80053 COMPREHEN METABOLIC PANEL: CPT | Performed by: EMERGENCY MEDICINE

## 2021-03-12 PROCEDURE — 99285 EMERGENCY DEPT VISIT HI MDM: CPT | Performed by: EMERGENCY MEDICINE

## 2021-03-12 PROCEDURE — 81003 URINALYSIS AUTO W/O SCOPE: CPT | Performed by: EMERGENCY MEDICINE

## 2021-03-12 PROCEDURE — 82550 ASSAY OF CK (CPK): CPT | Performed by: EMERGENCY MEDICINE

## 2021-03-12 PROCEDURE — 82077 ASSAY SPEC XCP UR&BREATH IA: CPT | Performed by: EMERGENCY MEDICINE

## 2021-03-12 PROCEDURE — 83880 ASSAY OF NATRIURETIC PEPTIDE: CPT | Performed by: EMERGENCY MEDICINE

## 2021-03-12 PROCEDURE — 93010 ELECTROCARDIOGRAM REPORT: CPT | Performed by: INTERNAL MEDICINE

## 2021-03-12 PROCEDURE — 71045 X-RAY EXAM CHEST 1 VIEW: CPT

## 2021-03-12 PROCEDURE — 36415 COLL VENOUS BLD VENIPUNCTURE: CPT | Performed by: EMERGENCY MEDICINE

## 2021-03-12 PROCEDURE — 82948 REAGENT STRIP/BLOOD GLUCOSE: CPT

## 2021-03-12 PROCEDURE — 83735 ASSAY OF MAGNESIUM: CPT | Performed by: EMERGENCY MEDICINE

## 2021-03-12 PROCEDURE — 84484 ASSAY OF TROPONIN QUANT: CPT | Performed by: EMERGENCY MEDICINE

## 2021-03-12 PROCEDURE — 87086 URINE CULTURE/COLONY COUNT: CPT | Performed by: EMERGENCY MEDICINE

## 2021-03-12 PROCEDURE — 81001 URINALYSIS AUTO W/SCOPE: CPT | Performed by: EMERGENCY MEDICINE

## 2021-03-12 PROCEDURE — 93005 ELECTROCARDIOGRAM TRACING: CPT

## 2021-03-12 PROCEDURE — 99285 EMERGENCY DEPT VISIT HI MDM: CPT

## 2021-03-12 PROCEDURE — 80307 DRUG TEST PRSMV CHEM ANLYZR: CPT | Performed by: EMERGENCY MEDICINE

## 2021-03-12 PROCEDURE — 96361 HYDRATE IV INFUSION ADD-ON: CPT

## 2021-03-12 RX ORDER — TOPIRAMATE 100 MG/1
200 TABLET, FILM COATED ORAL 2 TIMES DAILY
Status: DISCONTINUED | OUTPATIENT
Start: 2021-03-12 | End: 2021-03-18 | Stop reason: HOSPADM

## 2021-03-12 RX ORDER — ASPIRIN 81 MG/1
81 TABLET ORAL DAILY
Status: DISCONTINUED | OUTPATIENT
Start: 2021-03-13 | End: 2021-03-18 | Stop reason: HOSPADM

## 2021-03-12 RX ORDER — CEFTRIAXONE 1 G/50ML
1000 INJECTION, SOLUTION INTRAVENOUS ONCE
Status: COMPLETED | OUTPATIENT
Start: 2021-03-12 | End: 2021-03-12

## 2021-03-12 RX ORDER — MAGNESIUM HYDROXIDE/ALUMINUM HYDROXICE/SIMETHICONE 120; 1200; 1200 MG/30ML; MG/30ML; MG/30ML
30 SUSPENSION ORAL EVERY 6 HOURS PRN
Status: DISCONTINUED | OUTPATIENT
Start: 2021-03-12 | End: 2021-03-18 | Stop reason: HOSPADM

## 2021-03-12 RX ORDER — FAMOTIDINE 20 MG/1
20 TABLET, FILM COATED ORAL 2 TIMES DAILY
Status: DISCONTINUED | OUTPATIENT
Start: 2021-03-12 | End: 2021-03-18 | Stop reason: HOSPADM

## 2021-03-12 RX ORDER — GABAPENTIN 300 MG/1
300 CAPSULE ORAL
Status: DISCONTINUED | OUTPATIENT
Start: 2021-03-12 | End: 2021-03-18 | Stop reason: HOSPADM

## 2021-03-12 RX ORDER — POLYETHYLENE GLYCOL 3350 17 G/17G
17 POWDER, FOR SOLUTION ORAL DAILY PRN
Status: DISCONTINUED | OUTPATIENT
Start: 2021-03-12 | End: 2021-03-18 | Stop reason: HOSPADM

## 2021-03-12 RX ORDER — FLUOXETINE HYDROCHLORIDE 20 MG/1
60 CAPSULE ORAL DAILY
Status: DISCONTINUED | OUTPATIENT
Start: 2021-03-13 | End: 2021-03-18 | Stop reason: HOSPADM

## 2021-03-12 RX ORDER — GABAPENTIN 100 MG/1
100 CAPSULE ORAL 2 TIMES DAILY
Status: DISCONTINUED | OUTPATIENT
Start: 2021-03-12 | End: 2021-03-13

## 2021-03-12 RX ORDER — INSULIN GLARGINE 100 [IU]/ML
20 INJECTION, SOLUTION SUBCUTANEOUS
Status: DISCONTINUED | OUTPATIENT
Start: 2021-03-12 | End: 2021-03-18 | Stop reason: HOSPADM

## 2021-03-12 RX ORDER — LEVOTHYROXINE SODIUM 0.05 MG/1
50 TABLET ORAL DAILY
Status: DISCONTINUED | OUTPATIENT
Start: 2021-03-13 | End: 2021-03-18 | Stop reason: HOSPADM

## 2021-03-12 RX ORDER — ATORVASTATIN CALCIUM 40 MG/1
40 TABLET, FILM COATED ORAL
Status: DISCONTINUED | OUTPATIENT
Start: 2021-03-13 | End: 2021-03-18 | Stop reason: HOSPADM

## 2021-03-12 RX ORDER — ACETAMINOPHEN 325 MG/1
650 TABLET ORAL EVERY 6 HOURS PRN
Status: DISCONTINUED | OUTPATIENT
Start: 2021-03-12 | End: 2021-03-18 | Stop reason: HOSPADM

## 2021-03-12 RX ADMIN — GABAPENTIN 300 MG: 300 CAPSULE ORAL at 22:55

## 2021-03-12 RX ADMIN — GABAPENTIN 100 MG: 100 CAPSULE ORAL at 22:54

## 2021-03-12 RX ADMIN — SODIUM CHLORIDE: 0.9 INJECTION, SOLUTION INTRAVENOUS at 13:01

## 2021-03-12 RX ADMIN — ENOXAPARIN SODIUM 60 MG: 60 INJECTION SUBCUTANEOUS at 22:54

## 2021-03-12 RX ADMIN — INSULIN GLARGINE 20 UNITS: 100 INJECTION, SOLUTION SUBCUTANEOUS at 22:53

## 2021-03-12 RX ADMIN — CEFTRIAXONE 1000 MG: 1 INJECTION, SOLUTION INTRAVENOUS at 18:19

## 2021-03-12 RX ADMIN — FAMOTIDINE 20 MG: 20 TABLET ORAL at 22:53

## 2021-03-12 RX ADMIN — TOPIRAMATE 200 MG: 100 TABLET, FILM COATED ORAL at 22:54

## 2021-03-12 NOTE — ED PROVIDER NOTES
History  Chief Complaint   Patient presents with    Suicidal     pt reports from after recent d/c from [facility] for [facility], with reports of being suicidal  Pt has not been eating or taking medications     75 yo F presents from home by EMS for eval of generalized weakness, depression, not eating or drinking or taking her meds since discharge from the hospital  She states it's been overwhelming and she's tired of everything and can't take care of herself  C/o depression and no will to live, but no plan to kill herself or others  History provided by:  Patient   used: No    Fatigue  Severity:  Moderate  Onset quality:  Gradual  Timing:  Constant  Progression:  Worsening  Chronicity:  New  Relieved by:  Nothing  Worsened by:  Nothing  Ineffective treatments:  None tried      Prior to Admission Medications   Prescriptions Last Dose Informant Patient Reported? Taking? Alogliptin Benzoate 25 MG TABS   Yes No   Sig: Take 25 mg by mouth daily   Empagliflozin 25 MG TABS   Yes No   Sig: Take 25 mg by mouth every morning   FLUoxetine (PROzac) 20 mg capsule   No No   Sig: Take 3 capsules (60 mg total) by mouth daily   LORazepam (ATIVAN) 0 5 mg tablet   No No   Sig: Take 1 tablet (0 5 mg total) by mouth 2 (two) times a day for 10 doses   aspirin (Aspirin 81) 81 mg EC tablet   Yes No   Sig: Take 81 mg by mouth daily     atorvastatin (LIPITOR) 40 mg tablet   No No   Sig: Take 1 tablet (40 mg total) by mouth daily with dinner   famotidine (PEPCID) 20 mg tablet   No No   Sig: Take 1 tablet (20 mg total) by mouth 2 (two) times a day   gabapentin (NEURONTIN) 100 mg capsule   No No   Sig: Take 1 capsule (100 mg total) by mouth 2 (two) times a day Take 100 mg in the morning and 100 mg mid afternoon   gabapentin (NEURONTIN) 300 mg capsule   No No   Sig: Take 1 capsule (300 mg total) by mouth daily at bedtime   insulin glargine (LANTUS) 100 units/mL subcutaneous injection   No No   Sig: Inject 20 Units under the skin daily at bedtime   levothyroxine 50 mcg tablet   Yes No   Sig: Take 50 mcg by mouth daily Indications: Pt unsure of the amount  topiramate (TOPAMAX) 100 mg tablet   Yes No   Sig: Take 200 mg by mouth 2 (two) times a day       Facility-Administered Medications: None       Past Medical History:   Diagnosis Date    Diabetes mellitus ([facility] [address] )     Disease of thyroid gland     Hyperlipidemia     Hypertension     Hypothyroidism (acquired)     Restless leg syndrome     Seizures (HCC)        Past Surgical History:   Procedure Laterality Date    APPENDECTOMY      CHOLECYSTECTOMY      HIP FRACTURE SURGERY Right     TONSILLECTOMY      TUBAL LIGATION         Family History   Problem Relation Age of Onset    Seizures Father      I have reviewed and agree with the history as documented  E-Cigarette/Vaping    E-Cigarette Use Never User      E-Cigarette/Vaping Substances    Nicotine No     THC No     CBD No     Flavoring No     Other No     Unknown No      Social History     Tobacco Use    Smoking status: Never Smoker    Smokeless tobacco: Never Used    Tobacco comment: not a smoker   Substance Use Topics    Alcohol use: Yes     Frequency: Monthly or less     Drinks per session: 1 or 2     Binge frequency: Never     Comment: occasional    Drug use: Never       Review of Systems   Constitutional: Positive for fatigue  Psychiatric/Behavioral: Positive for dysphoric mood  All other systems reviewed and are negative  Physical Exam  Physical Exam  Vitals signs and nursing note reviewed  Constitutional:       General: She is not in acute distress  Appearance: Normal appearance  She is well-developed  She is obese  HENT:      Head: Normocephalic and atraumatic  Right Ear: External ear normal       Left Ear: External ear normal       Nose: Nose normal       Mouth/Throat:      Mouth: Mucous membranes are moist       Pharynx: Oropharynx is clear     Eyes:      Conjunctiva/sclera: Conjunctivae normal    Neck:      Musculoskeletal: Neck supple  Cardiovascular:      Rate and Rhythm: Normal rate and regular rhythm  Heart sounds: No murmur  Pulmonary:      Effort: Pulmonary effort is normal  No respiratory distress  Breath sounds: Normal breath sounds  Abdominal:      General: Abdomen is flat  Palpations: Abdomen is soft  Tenderness: There is no abdominal tenderness  Musculoskeletal: Normal range of motion  Skin:     General: Skin is warm and dry  Capillary Refill: Capillary refill takes less than 2 seconds  Neurological:      General: No focal deficit present  Mental Status: She is alert and oriented to person, place, and time  Mental status is at baseline  Psychiatric:      Comments: Flat affect, depressed           Vital Signs  ED Triage Vitals   Temperature Pulse Respirations Blood Pressure SpO2   03/12/21 1112 03/12/21 1111 03/12/21 1111 03/12/21 1111 03/12/21 1111   97 8 °F (36 6 °C) 73 14 134/88 98 %      Temp Source Heart Rate Source Patient Position - Orthostatic VS BP Location FiO2 (%)   03/12/21 1112 03/12/21 1111 03/12/21 1111 03/12/21 1111 --   Oral Monitor Lying Left arm       Pain Score       --                  Vitals:    03/12/21 1111 03/12/21 1619 03/12/21 1819   BP: 134/88 130/84 128/82   Pulse: 73 67 62   Patient Position - Orthostatic VS: Lying Lying Lying         Visual Acuity      ED Medications  Medications   acetaminophen (TYLENOL) tablet 650 mg (has no administration in time range)   polyethylene glycol (MIRALAX) packet 17 g (has no administration in time range)   aluminum-magnesium hydroxide-simethicone (MYLANTA) oral suspension 30 mL (has no administration in time range)   aspirin (ECOTRIN LOW STRENGTH) EC tablet 81 mg (has no administration in time range)   atorvastatin (LIPITOR) tablet 40 mg (has no administration in time range)   famotidine (PEPCID) tablet 20 mg (has no administration in time range)   FLUoxetine (PROzac) capsule 60 mg (has no administration in time range)   gabapentin (NEURONTIN) capsule 100 mg (has no administration in time range)   gabapentin (NEURONTIN) capsule 300 mg (has no administration in time range)   insulin glargine (LANTUS) subcutaneous injection 20 Units 0 2 mL (has no administration in time range)   levothyroxine tablet 50 mcg (has no administration in time range)   topiramate (TOPAMAX) tablet 200 mg (has no administration in time range)   insulin lispro (HumaLOG) 100 units/mL subcutaneous injection 1-5 Units (has no administration in time range)   enoxaparin (LOVENOX) subcutaneous injection 60 mg (has no administration in time range)   sodium chloride 0 9 % bolus 500 mL (0 mL Intravenous Stopped 3/12/21 1619)   cefTRIAXone (ROCEPHIN) IVPB (premix in dextrose) 1,000 mg 50 mL (1,000 mg Intravenous New Bag 3/12/21 1819)       Diagnostic Studies  Results Reviewed     Procedure Component Value Units Date/Time    Platelet count [[id number]]     Lab Status: No result Specimen: Blood     Urine Microscopic [[id number]]  (Abnormal) Collected: 03/12/21 1729    Lab Status: Final result Specimen: Urine, Clean Catch Updated: 03/12/21 1754     RBC, UA 2-4 /hpf      WBC, UA 10-20 /hpf      Epithelial Cells Moderate /hpf      Bacteria, UA Moderate /hpf      MUCUS THREADS Occasional    Rapid drug screen, urine [[id number]]  (Normal) Collected: 03/12/21 1729    Lab Status: Final result Specimen: Urine, Clean Catch Updated: 03/12/21 1754     Amph/Meth UR Negative     Barbiturate Ur Negative     Benzodiazepine Urine Negative     Cocaine Urine Negative     Methadone Urine Negative     Opiate Urine Negative     PCP Ur Negative     THC Urine Negative     Oxycodone Urine Negative    Narrative:      FOR MEDICAL PURPOSES ONLY  IF CONFIRMATION NEEDED PLEASE CONTACT THE LAB WITHIN 5 DAYS      Drug Screen Cutoff Levels:  AMPHETAMINE/METHAMPHETAMINES  1000 ng/mL  BARBITURATES     200 ng/mL  BENZODIAZEPINES     200 ng/mL  COCAINE      300 ng/mL  METHADONE      300 ng/mL  OPIATES      300 ng/mL  PHENCYCLIDINE     25 ng/mL  THC       50 ng/mL  OXYCODONE      100 ng/mL    Urine culture [[id number]] Collected: 03/12/21 1729    Lab Status: In process Specimen: Urine, Clean Catch Updated: 03/12/21 1754    UA w Reflex to Microscopic w Reflex to Culture [[id number]]  (Abnormal) Collected: 03/12/21 1729    Lab Status: Final result Specimen: Urine, Clean Catch Updated: 03/12/21 1738     Color, UA Yellow     Clarity, UA Slightly Cloudy     Specific Gravity, UA >=1 030     pH, UA 5 0     Leukocytes, UA Negative     Nitrite, UA Negative     Protein, UA Negative mg/dl      Glucose, UA 1+ mg/dl      Ketones, UA Negative mg/dl      Urobilinogen, UA 0 2 E U /dl      Bilirubin, UA 1+     Blood, UA 2+    COVID19, Influenza A/B, RSV PCR, [facility] [[id number]]  (Normal) Collected: 03/12/21 1138    Lab Status: Final result Specimen: Nares from Nose Updated: 03/12/21 1230     SARS-CoV-2 Negative     INFLUENZA A PCR Negative     INFLUENZA B PCR Negative     RSV PCR Negative    Narrative: This test has been authorized by FDA under an EUA (Emergency Use Assay) for use by authorized laboratories  Clinical caution and judgement should be used with the interpretation of these results with consideration of the clinical impression and other laboratory testing  Testing reported as "Positive" or "Negative" has been proven to be accurate according to standard laboratory validation requirements  All testing is performed with control materials showing appropriate reactivity at standard intervals      B-Type Natriuretic Peptide ([address]) [[id number]]  (Normal) Collected: 03/12/21 1132    Lab Status: Final result Specimen: Blood from Arm, Left Updated: 03/12/21 1211     BNP 29 5 pg/mL     Ethanol [[id number]]  (Normal) Collected: 03/12/21 1137    Lab Status: Final result Specimen: Blood from Arm, Left Updated: 03/12/21 1204     Ethanol Lvl <10 mg/dL     Troponin I [[id number]]  (Normal) Collected: 03/12/21 1132    Lab Status: Final result Specimen: Blood from Arm, Left Updated: 03/12/21 1201     Troponin I <0 03 ng/mL     Comprehensive metabolic panel [[id number]]  (Abnormal) Collected: 03/12/21 1132    Lab Status: Final result Specimen: Blood from Arm, Left Updated: 03/12/21 1158     Sodium 138 mmol/L      Potassium 3 8 mmol/L      Chloride 104 mmol/L      CO2 26 mmol/L      ANION GAP 8 mmol/L      BUN 30 mg/dL      Creatinine 0 85 mg/dL      Glucose 236 mg/dL      Calcium 9 2 mg/dL      AST 21 U/L      ALT 19 U/L      Alkaline Phosphatase 95 2 U/L      Total Protein 7 7 g/dL      Albumin 3 9 g/dL      Total Bilirubin 0 41 mg/dL      eGFR 72 ml/min/1 73sq m     Narrative:      [place] guidelines for Chronic Kidney Disease (CKD):     Stage 1 with normal or high GFR (GFR > 90 mL/min/1 73 square meters)    Stage 2 Mild CKD (GFR = 60-89 mL/min/1 73 square meters)    Stage 3A Moderate CKD (GFR = 45-59 mL/min/1 73 square meters)    Stage 3B Moderate CKD (GFR = 30-44 mL/min/1 73 square meters)    Stage 4 Severe CKD (GFR = 15-29 mL/min/1 73 square meters)    Stage 5 End Stage CKD (GFR <15 mL/min/1 73 square meters)  Note: GFR calculation is accurate only with a steady state creatinine    Magnesium [[id number]]  (Normal) Collected: 03/12/21 1132    Lab Status: Final result Specimen: Blood from Arm, Left Updated: 03/12/21 1158     Magnesium 2 0 mg/dL     CK (with reflex to MB) [[id number]]  (Abnormal) Collected: 03/12/21 1132    Lab Status: Final result Specimen: Blood from Arm, Left Updated: 03/12/21 1158     Total CK 36 1 U/L     CBC and differential [[id number]]  (Abnormal) Collected: 03/12/21 1132    Lab Status: Final result Specimen: Blood from Arm, Left Updated: 03/12/21 1138     WBC 5 76 Thousand/uL      RBC 5 73 Million/uL      Hemoglobin 16 3 g/dL      Hematocrit 49 8 %      MCV 87 fL      MCH 28 4 pg      MCHC 32 7 g/dL      RDW 13 7 %      MPV 11 2 fL      Platelets 388 Thousands/uL      Neutrophils Relative 66 %      Immat GRANS % 0 %      Lymphocytes Relative 23 %      Monocytes Relative 9 %      Eosinophils Relative 2 %      Basophils Relative 0 %      Neutrophils Absolute 3 78 Thousands/µL      Immature Grans Absolute 0 02 Thousand/uL      Lymphocytes Absolute 1 32 Thousands/µL      Monocytes Absolute 0 49 Thousand/µL      Eosinophils Absolute 0 13 Thousand/µL      Basophils Absolute 0 02 Thousands/µL                  XR chest 1 view portable   Final Result by Devonte Bueno MD (03/12 1433)   No acute cardiopulmonary disease, improved  Workstation performed: [id number]      VAS lower limb venous duplex study, complete bilateral    (Results Pending)              Procedures  Procedures         ED Course  ED Course as of Mar 12 2037   Fri Mar 12, 2021   1814 D/w resident Taylor Cowan, accepts to service under Dr Manuel Franco  Failure to thrive, weakness, UTI, depression  SBIRT 20yo+      Most Recent Value   SBIRT (24 yo +)   In order to provide better care to our patients, we are screening all of our patients for alcohol and drug use  Would it be okay to ask you these screening questions? Yes Filed at: 03/12/2021 1115   Initial Alcohol Screen: US AUDIT-C    1  How often do you have a drink containing alcohol?  0 Filed at: 03/12/2021 1115   2  How many drinks containing alcohol do you have on a typical day you are drinking? 0 Filed at: 03/12/2021 1115   3a  Male UNDER 65: How often do you have five or more drinks on one occasion? 0 Filed at: 03/12/2021 1115   3b  FEMALE Any Age, or MALE 65+: How often do you have 4 or more drinks on one occassion? 0 Filed at: 03/12/2021 1115   Audit-C Score  0 Filed at: 03/12/2021 1115   CANDY: How many times in the past year have you    Used an illegal drug or used a prescription medication for non-medical reasons?   Never Filed at: 03/12/2021 1115                    MDM  Number of Diagnoses or Management Options  Depression: established and worsening  UTI (urinary tract infection): new and requires workup  Weakness: new and requires workup     Amount and/or Complexity of Data Reviewed  Clinical lab tests: ordered and reviewed  Tests in the radiology section of CPT®: ordered and reviewed  Review and summarize past medical records: yes  Discuss the patient with other providers: yes    Risk of Complications, Morbidity, and/or Mortality  Presenting problems: moderate  Diagnostic procedures: moderate  Management options: moderate    Patient Progress  Patient progress: stable      Disposition  Final diagnoses:   UTI (urinary tract infection)   Depression   Weakness     Time reflects when diagnosis was documented in both MDM as applicable and the Disposition within this note     Time User Action Codes Description Comment    3/12/2021  6:19 PM Charmaine Phan Add [N39 0] UTI (urinary tract infection)     3/12/2021  6:20 PM Tiarra Villalpando Add [F32 9] Depression     3/12/2021  6:20 PM Charmaine Shepherd Add [R53 1] Weakness       ED Disposition     ED Disposition Condition Date/Time Comment    Admit Stable Fri Mar 12, 2021  6:19 PM Case was discussed with Dr Grzegorz Larson and the patient's admission status was agreed to be Admission Status: inpatient status to the service of Dr Grzegorz Larson   Follow-up Information    None         Current Discharge Medication List      CONTINUE these medications which have NOT CHANGED    Details   Alogliptin Benzoate 25 MG TABS Take 25 mg by mouth daily      aspirin (Aspirin 81) 81 mg EC tablet Take 81 mg by mouth daily  atorvastatin (LIPITOR) 40 mg tablet Take 1 tablet (40 mg total) by mouth daily with dinner  Qty: 30 tablet, Refills: 0    Associated Diagnoses: Hyperlipidemia;  Chest pain      Empagliflozin 25 MG TABS Take 25 mg by mouth every morning      famotidine (PEPCID) 20 mg tablet Take 1 tablet (20 mg total) by mouth 2 (two) times a day  Qty: 60 tablet, Refills: 0 Associated Diagnoses: GERD (gastroesophageal reflux disease)      FLUoxetine (PROzac) 20 mg capsule Take 3 capsules (60 mg total) by mouth daily  Qty: 90 capsule, Refills: 0    Associated Diagnoses: Major depressive disorder      !! gabapentin (NEURONTIN) 100 mg capsule Take 1 capsule (100 mg total) by mouth 2 (two) times a day Take 100 mg in the morning and 100 mg mid afternoon  Qty: 42 capsule, Refills: 0    Associated Diagnoses: Restless leg syndrome      !! gabapentin (NEURONTIN) 300 mg capsule Take 1 capsule (300 mg total) by mouth daily at bedtime  Qty: 21 capsule, Refills: 0    Associated Diagnoses: Restless leg syndrome      insulin glargine (LANTUS) 100 units/mL subcutaneous injection Inject 20 Units under the skin daily at bedtime  Qty: 10 mL, Refills: 0    Associated Diagnoses: Diabetes mellitus (HCC)      levothyroxine 50 mcg tablet Take 50 mcg by mouth daily Indications: Pt unsure of the amount  LORazepam (ATIVAN) 0 5 mg tablet Take 1 tablet (0 5 mg total) by mouth 2 (two) times a day for 10 doses  Qty: 10 tablet, Refills: 0    Associated Diagnoses: Major depressive disorder      topiramate (TOPAMAX) 100 mg tablet Take 200 mg by mouth 2 (two) times a day        !! - Potential duplicate medications found  Please discuss with provider  No discharge procedures on file      PDMP Review       Value Time User    PDMP Reviewed  Yes 3/9/2021  2:16 PM Gilford Isaac, DO          ED Provider  Electronically Signed by           Jj Elliott DO  03/12/21 2038

## 2021-03-13 PROBLEM — M79.669 CALF TENDERNESS: Status: ACTIVE | Noted: 2021-03-13

## 2021-03-13 PROBLEM — R53.81 CHRONIC FATIGUE AND MALAISE: Status: ACTIVE | Noted: 2021-03-12

## 2021-03-13 PROBLEM — R53.82 CHRONIC FATIGUE AND MALAISE: Status: ACTIVE | Noted: 2021-03-12

## 2021-03-13 LAB
ANION GAP SERPL CALCULATED.3IONS-SCNC: 9 MMOL/L (ref 4–13)
BASOPHILS # BLD AUTO: 0.02 THOUSANDS/ΜL (ref 0–0.1)
BASOPHILS NFR BLD AUTO: 0 % (ref 0–1)
BUN SERPL-MCNC: 26 MG/DL (ref 6–20)
CALCIUM SERPL-MCNC: 9.1 MG/DL (ref 8.4–10.2)
CHLORIDE SERPL-SCNC: 107 MMOL/L (ref 96–108)
CO2 SERPL-SCNC: 25 MMOL/L (ref 22–33)
CREAT SERPL-MCNC: 0.66 MG/DL (ref 0.4–1.1)
EOSINOPHIL # BLD AUTO: 0.2 THOUSAND/ΜL (ref 0–0.61)
EOSINOPHIL NFR BLD AUTO: 3 % (ref 0–6)
ERYTHROCYTE [DISTWIDTH] IN BLOOD BY AUTOMATED COUNT: 13.6 % (ref 11.6–15.1)
GFR SERPL CREATININE-BSD FRML MDRD: 92 ML/MIN/1.73SQ M
GLUCOSE SERPL-MCNC: 167 MG/DL (ref 65–140)
GLUCOSE SERPL-MCNC: 180 MG/DL (ref 65–140)
GLUCOSE SERPL-MCNC: 180 MG/DL (ref 65–140)
GLUCOSE SERPL-MCNC: 214 MG/DL (ref 65–140)
HCT VFR BLD AUTO: 46.8 % (ref 34.8–46.1)
HGB BLD-MCNC: 15.1 G/DL (ref 11.5–15.4)
IMM GRANULOCYTES # BLD AUTO: 0.01 THOUSAND/UL (ref 0–0.2)
IMM GRANULOCYTES NFR BLD AUTO: 0 % (ref 0–2)
LYMPHOCYTES # BLD AUTO: 1.67 THOUSANDS/ΜL (ref 0.6–4.47)
LYMPHOCYTES NFR BLD AUTO: 28 % (ref 14–44)
MCH RBC QN AUTO: 28.4 PG (ref 26.8–34.3)
MCHC RBC AUTO-ENTMCNC: 32.3 G/DL (ref 31.4–37.4)
MCV RBC AUTO: 88 FL (ref 82–98)
MONOCYTES # BLD AUTO: 0.55 THOUSAND/ΜL (ref 0.17–1.22)
MONOCYTES NFR BLD AUTO: 9 % (ref 4–12)
NEUTROPHILS # BLD AUTO: 3.53 THOUSANDS/ΜL (ref 1.85–7.62)
NEUTS SEG NFR BLD AUTO: 60 % (ref 43–75)
PLATELET # BLD AUTO: 199 THOUSANDS/UL (ref 149–390)
PLATELET # BLD AUTO: 207 THOUSANDS/UL (ref 149–390)
PMV BLD AUTO: 11.5 FL (ref 8.9–12.7)
PMV BLD AUTO: 11.8 FL (ref 8.9–12.7)
POTASSIUM SERPL-SCNC: 3.7 MMOL/L (ref 3.5–5)
RBC # BLD AUTO: 5.32 MILLION/UL (ref 3.81–5.12)
SODIUM SERPL-SCNC: 141 MMOL/L (ref 133–145)
WBC # BLD AUTO: 5.98 THOUSAND/UL (ref 4.31–10.16)

## 2021-03-13 PROCEDURE — 80048 BASIC METABOLIC PNL TOTAL CA: CPT | Performed by: INTERNAL MEDICINE

## 2021-03-13 PROCEDURE — 82948 REAGENT STRIP/BLOOD GLUCOSE: CPT

## 2021-03-13 PROCEDURE — 85025 COMPLETE CBC W/AUTO DIFF WBC: CPT | Performed by: INTERNAL MEDICINE

## 2021-03-13 PROCEDURE — 85049 AUTOMATED PLATELET COUNT: CPT | Performed by: INTERNAL MEDICINE

## 2021-03-13 PROCEDURE — 99232 SBSQ HOSP IP/OBS MODERATE 35: CPT | Performed by: INTERNAL MEDICINE

## 2021-03-13 RX ORDER — GABAPENTIN 100 MG/1
100 CAPSULE ORAL ONCE
Status: COMPLETED | OUTPATIENT
Start: 2021-03-13 | End: 2021-03-13

## 2021-03-13 RX ORDER — GABAPENTIN 100 MG/1
100 CAPSULE ORAL 2 TIMES DAILY
Status: DISCONTINUED | OUTPATIENT
Start: 2021-03-14 | End: 2021-03-18 | Stop reason: HOSPADM

## 2021-03-13 RX ORDER — LORAZEPAM 0.5 MG/1
0.5 TABLET ORAL 2 TIMES DAILY
Status: DISCONTINUED | OUTPATIENT
Start: 2021-03-13 | End: 2021-03-18 | Stop reason: HOSPADM

## 2021-03-13 RX ADMIN — GABAPENTIN 100 MG: 100 CAPSULE ORAL at 15:50

## 2021-03-13 RX ADMIN — GABAPENTIN 100 MG: 100 CAPSULE ORAL at 09:30

## 2021-03-13 RX ADMIN — FAMOTIDINE 20 MG: 20 TABLET ORAL at 09:30

## 2021-03-13 RX ADMIN — ENOXAPARIN SODIUM 60 MG: 60 INJECTION SUBCUTANEOUS at 09:29

## 2021-03-13 RX ADMIN — LEVOTHYROXINE SODIUM 50 MCG: 50 TABLET ORAL at 09:30

## 2021-03-13 RX ADMIN — LORAZEPAM 0.5 MG: 0.5 TABLET ORAL at 11:33

## 2021-03-13 RX ADMIN — FAMOTIDINE 20 MG: 20 TABLET ORAL at 17:10

## 2021-03-13 RX ADMIN — GABAPENTIN 300 MG: 300 CAPSULE ORAL at 21:02

## 2021-03-13 RX ADMIN — INSULIN GLARGINE 20 UNITS: 100 INJECTION, SOLUTION SUBCUTANEOUS at 21:03

## 2021-03-13 RX ADMIN — INSULIN LISPRO 1 UNITS: 100 INJECTION, SOLUTION INTRAVENOUS; SUBCUTANEOUS at 15:53

## 2021-03-13 RX ADMIN — ENOXAPARIN SODIUM 60 MG: 60 INJECTION SUBCUTANEOUS at 21:03

## 2021-03-13 RX ADMIN — INSULIN LISPRO 1 UNITS: 100 INJECTION, SOLUTION INTRAVENOUS; SUBCUTANEOUS at 09:28

## 2021-03-13 RX ADMIN — LORAZEPAM 0.5 MG: 0.5 TABLET ORAL at 17:10

## 2021-03-13 RX ADMIN — TOPIRAMATE 200 MG: 100 TABLET, FILM COATED ORAL at 09:30

## 2021-03-13 RX ADMIN — TOPIRAMATE 200 MG: 100 TABLET, FILM COATED ORAL at 17:10

## 2021-03-13 RX ADMIN — FLUOXETINE 60 MG: 20 CAPSULE ORAL at 09:30

## 2021-03-13 RX ADMIN — INSULIN LISPRO 1 UNITS: 100 INJECTION, SOLUTION INTRAVENOUS; SUBCUTANEOUS at 11:33

## 2021-03-13 RX ADMIN — ASPIRIN 81 MG: 81 TABLET, DELAYED RELEASE ORAL at 09:30

## 2021-03-13 NOTE — ASSESSMENT & PLAN NOTE
-presented with failure to thrive, generalized weakness secondary to major depression  -have patient feel overwhelmed due to recent stress and comorbidities  -no compliant with medications  -denies SI/HI  -consulted PT/OT - input appreciated

## 2021-03-13 NOTE — ASSESSMENT & PLAN NOTE
-presented with generalized weakness secondary to major depression  -patient states she feels overwhelmed due to recent stress and comorbidities  -consult placed to Psychiatry:  Recommending to continue current medication  -patient currently denying any suicidal intent or homicidal intent, patient requesting placement to short-term rehab    -consulted PT/OT - input appreciated  -outpatient follow-up with her regular psychiatrist at [facility] at time of discharge

## 2021-03-13 NOTE — PROGRESS NOTES
Terrie U  65   Progress Note - Judi Buitrago 1955, 76 y o  female MRN: [id number]  Unit/Bed#: -Lynne Encounter: [id number]  Primary Care Provider: Azam Mireles MD   Date and time admitted to hospital: 3/12/2021 11:13 AM    * Chronic fatigue and malaise  Assessment & Plan  -presented with generalized weakness secondary to major depression  -patient states she feels overwhelmed due to recent stress and comorbidities  -consult placed to Psychiatry:  Recommending to continue current medication  -patient currently denying any suicidal intent or homicidal intent, patient requesting placement to short-term rehab    -consulted PT/OT - input appreciated  -outpatient follow-up with her regular psychiatrist at [facility] at time of discharge    Calf tenderness  Assessment & Plan  -patient complaining of bilateral calf tenderness; left greater than right  -denying any chest pain, shortness of breath, prior history of DVT    -ordered bilateral duplex to assess for DVT; but currently low probability    Asymptomatic bacteriuria  Assessment & Plan  -UA showed bacteriuria, patient denies any urinary symptoms  -afebrile, no leukocytosis  -status post 1 dose of IV ceftriaxone in ED  -Will hold off further doses of antibiotic for now    Acquired hypothyroidism  Assessment & Plan  -continue home Synthroid    Severe episode of recurrent major depressive disorder, without psychotic features ([facility] [address] )  Assessment & Plan  -presented with severe depression, not taking medications  -denies any SI/HI or plan - does not need one-to-one observation at this point  -has not seen by psychiatrist/PCP for a while due to recent COVID situation  -recently seen by Dr Pily Cruz in [place] during recent hospitalization - recently increased Prozac to 60 mg  -consulted psychiatric - recommending continuation of Prozac 60 mg OD, Ativan 0 5 mg b i d     -continue to monitor  -outpatient follow-up with her regular psychiatrist at [facility] time of discharge    GERD (gastroesophageal reflux disease)  Assessment & Plan  -continue famotidine    Diabetes mellitus [facility])  Assessment & Plan  Lab Results   Component Value Date    HGBA1C 11 5 (H) 2021       Recent Labs     21  2123 21  0832 21  1132   POCGLU 207* 180* 214*       Blood Sugar Average: Last 72 hrs:    -BBG uncontrolled, patient is noncompliant  -patient is supposed to be on insulin, empagliflozin, alogliptin  -continue Lantus 20 units q h s  + sliding scale insulin  -hold home oral medication for now  (P) [phone]    Hyperlipidemia  Assessment & Plan  -stable, continue Lipitor    Restless leg syndrome  Assessment & Plan  -stable, continue home dose of gabapentin    Seizures (HCC)  Assessment & Plan  -stable, continue home Topamax      VTE Pharmacologic Prophylaxis:   Pharmacologic: Enoxaparin (Lovenox)  Mechanical VTE Prophylaxis in Place: Yes    Discussions with Specialists or Other Care Team Provider:  Psychiatry    Education and Discussions with Family / Patient:  Discussed with patient    Current Length of Stay: 1 day(s)    Current Patient Status: Inpatient     Discharge Plan / Estimated Discharge Date:  Pending    Code Status: Level 1 - Full Code      Subjective:   Patient was seen and examined by me at bedside  Communicated clearly  No particular overnight event reported  Hemodynamically stable and afebrile  Patient denying any current suicidal intent, does states she feels depressed  Also states she has been having bilateral calf tenderness, which has improved since her presentation  But still states tenderness is present  Objective:     Vitals:   Temp (24hrs), Av 4 °F (36 3 °C), Min:97 °F (36 1 °C), Max:97 7 °F (36 5 °C)    Temp:  [97 °F (36 1 °C)-97 7 °F (36 5 °C)] 97 °F (36 1 °C)  HR:  [62-67] 67  Resp:  [15-17] 16  BP: (103-130)/(53-84) 103/53  SpO2:  [97 %-98 %] 98 %  Body mass index is 57 66 kg/m²       Input and Output Summary (last 24 hours): Intake/Output Summary (Last 24 hours) at 3/13/2021 1308  Last data filed at 3/13/2021 0701  Gross per 24 hour   Intake 700 ml   Output 250 ml   Net 450 ml       Physical Exam:     Physical Exam  Constitutional:       Appearance: Normal appearance  She is obese  HENT:      Head: Normocephalic and atraumatic  Eyes:      General: No scleral icterus  Right eye: No discharge  Left eye: No discharge  Extraocular Movements: Extraocular movements intact  Neck:      Musculoskeletal: Normal range of motion  Cardiovascular:      Rate and Rhythm: Normal rate and regular rhythm  Pulses: Normal pulses  Heart sounds: Normal heart sounds  Pulmonary:      Effort: Pulmonary effort is normal       Breath sounds: Normal breath sounds  Abdominal:      General: Bowel sounds are normal  There is no distension  Palpations: Abdomen is soft  Tenderness: There is no abdominal tenderness  Musculoskeletal: Normal range of motion  Comments: Mild tenderness present in bilateral lower extremities   Skin:     General: Skin is warm and dry  Neurological:      General: No focal deficit present  Mental Status: She is alert and oriented to person, place, and time  Psychiatric:         Mood and Affect: Mood normal          Thought Content: Thought content normal              Additional Data:     Labs:    Results from last 7 days   Lab Units 03/13/21  0503   WBC Thousand/uL 5 98   HEMOGLOBIN g/dL 15 1   HEMATOCRIT % 46 8*   PLATELETS Thousands/uL 199  207   NEUTROS PCT % 60   LYMPHS PCT % 28   MONOS PCT % 9   EOS PCT % 3     Results from last 7 days   Lab Units 03/13/21  0503 03/12/21  1132   POTASSIUM mmol/L 3 7 3 8   CHLORIDE mmol/L 107 104   CO2 mmol/L 25 26   BUN mg/dL 26* 30*   CREATININE mg/dL 0 66 0 85   CALCIUM mg/dL 9 1 9 2   ALK PHOS U/L  --  95 2   ALT U/L  --  19   AST U/L  --  21           * I Have Reviewed All Lab Data Listed Above    * Additional Pertinent Lab Tests Reviewed: [facility]    Imaging:    Imaging Reports Reviewed Today Include:  X-ray chest  Imaging Personally Reviewed by Myself Includes:  X-ray chest    Recent Cultures (last 7 days):           Last 24 Hours Medication List:   Current Facility-Administered Medications   Medication Dose Route Frequency Provider Last Rate    acetaminophen  650 mg Oral Q6H PRN Pieter Gutierrez MD      aluminum-magnesium hydroxide-simethicone  30 mL Oral Q6H PRN Pieter Gutierrez MD      aspirin  81 mg Oral Daily Pieter Gutierrez MD      atorvastatin  40 mg Oral Daily With Bret Paris MD      enoxaparin  60 mg Subcutaneous Q12H [address] Pieter Gutierrez MD      famotidine  20 mg Oral BID Pieter Gutierrez MD      FLUoxetine  60 mg Oral Daily Pieter Gutierrez MD      gabapentin  100 mg Oral BID Pieter Gutierrez MD      gabapentin  300 mg Oral HS Pieter Gutierrez MD      insulin glargine  20 Units Subcutaneous HS Pieter Gutierrez MD      insulin lispro  1-5 Units Subcutaneous TID Ricky Swartz MD      levothyroxine  50 mcg Oral Daily Pieter Gutierrez MD      LORazepam  0 5 mg Oral BID zE Wheeler MD      polyethylene glycol  17 g Oral Daily PRN Pieter Gutierrez MD      topiramate  200 mg Oral BID Pieter Gutierrez MD          Today, Patient Was Seen By: Danny Mckee MD    ** Please Note: This note has been constructed using a voice recognition system   **

## 2021-03-13 NOTE — UTILIZATION REVIEW
Initial Clinical Review    Admission: Date/Time/Statement:   Admission Orders (From admission, onward)     Ordered        03/12/21 1821  Inpatient Admission  Once                   Orders Placed This Encounter   Procedures    Inpatient Admission     Standing Status:   Standing     Number of Occurrences:   1     Order Specific Question:   Level of Care     Answer:   Med Surg [16]     Order Specific Question:   Estimated length of stay     Answer:   More than 2 Midnights     Order Specific Question:   Certification     Answer:   I certify that inpatient services are medically necessary for this patient for a duration of greater than two midnights  See H&P and MD Progress Notes for additional information about the patient's course of treatment  ED Arrival Information     Expected Arrival Acuity Means of Arrival Escorted By Service Admission Type    - 3/12/2021 11:06 Emergent Ambulance [facility]    Arrival Complaint    failure to thrive; SI        Chief Complaint   Patient presents with    Suicidal     pt reports from after recent d/c from [place] for CP, with reports of being suicidal  Pt has not been eating or taking medications     Assessment/Plan:    76year old female presents to ed via ems from home for evaluation and treatment of suicidal ideation  She was recently discharged from [facility] where she was evaluated for chest pain  Cardiac cath negative  She has not taken her meds or been eating  Clinical assessment significant for UA moderate bacteria  Treated in ed with iv ceftriaxone and iv  9% ns bolus  Admit to inpatient medical surgical for failure to thrive, asymptomatic bacteriuria, major depression    Plan includes: consult  Psychiatry, PT/OT evaluations, bilateral lower extremity venous duplex,    Consult  behavioral health   3-16  76y o  year old female who presents with weakness and trouble in ambulation severely depressed      Social history patient lives alone she has 2 grownup sons they are good to her patient denies smoking denies abusing alcohol or drugs patient has no history of drug and alcohol abuse patient has 12th grade education and she was in [employer] for 6 years she did work in [employer]  currently she is retired      Diagnosis depression anxiety insomnia hypothyroidism diabetes high cholesterol hypertension seizures restless legs syndrome patient has history of appendectomy tonsillectomy cholecystectomy tubal ligation hip fracture surgery  Home medications patient is on Prozac 60 mg daily now and she is also other medications for other medical conditions as listed in the chart  Mental Status Exam:  76years old white female is alert awake oriented x3 affect flat withdrawn psychomotor retardation speech slow and low in tone weak tired pessimistic negative and profoundly depressed patient has trouble in ADL care because of the physical weakness  Poor sleep poor appetite  Patient denies auditory or visual hallucinations  Patient denies suicidal or homicidal ideation  No paranoia no delusion elucidated  No mood swings no agitation  Insight and judgments are adequate  Anxiety  Insomnia  Due to meant due to recently her favorite aunt       Plan:  Continue Prozac 60 mg daily  Ativan 0 5 mg p o  B i d  Psychotherapy  Psychiatric follow-up with her psychiatrist at [facility] after the discharge from the hospital     3-13   Patient reports depression and complains of calf tenderness  PT/OT evaluations an bilateral venous duplex ordered  Gabapentin ordered x 1  Continue lovenox      ED Triage Vitals   21 1112 21 1111 21 1111 21 1111 21 1111   97 8 °F (36 6 °C) 73 14 134/88 98 %      Oral Monitor           No Pain          21 (!) 143 kg (315 lb 4 1 oz)     Additional Vital Signs:    Date/Time  Temp  Pulse  Resp  BP  MAP   SpO2  O2 Device    21 0702  97 °F (36 1 °C)  Abnormal   67  16  103/53  -- 98 %  None (Room air)    03/12/21 2106  97 7 °F (36 5 °C)  67  17  116/54  80  97 %  None (Room air)    03/12/21 1819  --  62  15  128/82  --  97 %  None (Room air)    03/12/21 1619  --  67  15  130/84  --  98 %  None (Room air)          Pertinent Labs/Diagnostic Test Results:       Collection Time Result Time Vent R Atrial R IN Int  QRSD Int  QT Int  QTC Int  P Axis QRS Axis T Wave Ax    03/12/21 11:17:42 03/12/21 15:20:49 67 67 136 145 431 455 59 -44 44       Final result                Narrative:    Sinus rhythmRBBB and LAFBLeft ventricular hypertrophyAbnormal ECGWhen compared with ECG of 3/2/21No significant change was found                       XR chest 1 view portable   (03/12 1433)   No acute cardiopulmonary disease, improved          Results from last 7 days   Lab Units 03/12/21  1138 03/07/21  1307   SARS-COV-2  Negative Negative     Results from last 7 days   Lab Units 03/13/21  0503 03/12/21  1132   WBC Thousand/uL 5 98 5 76   HEMOGLOBIN g/dL 15 1 16 3*   HEMATOCRIT % 46 8* 49 8*   PLATELETS Thousands/uL 199  207 209   NEUTROS ABS Thousands/µL 3 53 3 78         Results from last 7 days   Lab Units 03/13/21  0503 03/12/21  1132   SODIUM mmol/L 141 138   POTASSIUM mmol/L 3 7 3 8   CHLORIDE mmol/L 107 104   CO2 mmol/L 25 26   ANION GAP mmol/L 9 8   BUN mg/dL 26* 30*   CREATININE mg/dL 0 66 0 85   EGFR ml/min/1 73sq m 92 72   CALCIUM mg/dL 9 1 9 2   MAGNESIUM mg/dL  --  2 0     Results from last 7 days   Lab Units 03/12/21  1132   AST U/L 21   ALT U/L 19   ALK PHOS U/L 95 2   TOTAL PROTEIN g/dL 7 7   ALBUMIN g/dL 3 9   TOTAL BILIRUBIN mg/dL 0 41     Results from last 7 days   Lab Units 03/13/21  0832 03/12/21  2123 03/09/21  1632 03/09/21  1156 03/09/21  0746 03/08/21  2008 03/08/21  1721 03/08/21  1117 03/08/21  0724 03/07/21  2139 03/07/21  1630 03/07/21  1130   POC GLUCOSE mg/dl 180* 207* 190* 205* 142* 170* 154* 146* 138 156* 140 131     Results from last 7 days   Lab Units 03/13/21  0503 03/12/21  1132 GLUCOSE RANDOM mg/dL 167* 236*       Results from last 7 days   Lab Units 03/12/21  1132   CK TOTAL U/L 36 1*     Results from last 7 days   Lab Units 03/12/21  1132   TROPONIN I ng/mL <0 03     Results from last 7 days   Lab Units 03/12/21  1132   BNP pg/mL 29 5         Results from last 7 days   Lab Units 03/12/21  1729   CLARITY UA  Slightly Cloudy*   COLOR UA  Yellow   SPEC GRAV UA  >=1 030   PH UA  5 0   GLUCOSE UA mg/dl 1+*   KETONES UA mg/dl Negative   BLOOD UA  2+*   PROTEIN UA mg/dl Negative   NITRITE UA  Negative   BILIRUBIN UA  1+*   UROBILINOGEN UA E U /dl 0 2   LEUKOCYTES UA  Negative   WBC UA /hpf 10-20*   RBC UA /hpf 2-4   BACTERIA UA /hpf Moderate*   EPITHELIAL CELLS WET PREP /hpf Moderate*   MUCUS THREADS  Occasional*     Results from last 7 days   Lab Units 03/12/21  1138 03/07/21  1307   INFLUENZA A PCR  Negative Negative   INFLUENZA B PCR  Negative Negative   RSV PCR  Negative Negative         Results from last 7 days   Lab Units 03/12/21  1729   AMPH/METH  Negative   BARBITURATE UR  Negative   BENZODIAZEPINE UR  Negative   COCAINE UR  Negative   METHADONE URINE  Negative   OPIATE UR  Negative   PCP UR  Negative   THC UR  Negative     Results from last 7 days   Lab Units 03/12/21  1137   ETHANOL LVL mg/dL <10       ED Treatment:   Medication Administration from 03/12/2021 1106 to 03/12/2021 2023       Date/Time Order Dose Route Action     03/12/2021 1301 sodium chloride 0 9 % bolus 500 mL   Intravenous New Bag     03/12/2021 1819 cefTRIAXone (ROCEPHIN) IVPB (premix in dextrose) 1,000 mg 50 mL 1,000 mg Intravenous New Bag        Past Medical History:   Diagnosis Date    Diabetes mellitus ([facility] [address] )     Disease of thyroid gland     Hyperlipidemia     Hypertension     Hypothyroidism (acquired)     Restless leg syndrome     Seizures (HCC)      Present on Admission:   Severe episode of recurrent major depressive disorder, without psychotic features (HCC)   Seizures (HCC)   Restless leg syndrome   Hyperlipidemia   Diabetes mellitus ([facility] [address] )   Acquired hypothyroidism   GERD (gastroesophageal reflux disease)      Admitting Diagnosis:     Suicidal behavior [R45 89]  UTI (urinary tract infection) [N39 0]  Depression [F32 9]  Weakness [R53 1]      Age/Sex: 76 y o  female       Admission Orders:    aspirin, 81 mg, Oral, Daily  atorvastatin, 40 mg, Oral, Daily With Dinner  enoxaparin, 60 mg, Subcutaneous, Q12H [address]  famotidine, 20 mg, Oral, BID  FLUoxetine, 60 mg, Oral, Daily  gabapentin, 100 mg, Oral, BID  gabapentin, 300 mg, Oral, HS  insulin glargine, 20 Units, Subcutaneous, HS  insulin lispro, 1-5 Units, Subcutaneous, TID AC  levothyroxine, 50 mcg, Oral, Daily  LORazepam, 0 5 mg, Oral, BID  topiramate, 200 mg, Oral, BID      Continuous IV Infusions:     PRN Meds:  acetaminophen, 650 mg, Oral, Q6H PRN  aluminum-magnesium hydroxide-simethicone, 30 mL, Oral, Q6H PRN  polyethylene glycol, 17 g, Oral, Daily PRN        IP CONSULT TO PSYCHIATRY  IP CONSULT TO CASE MANAGEMENT    Network Utilization Review Department  ATTENTION: Please call with any questions or concerns to [phone] and carefully listen to the prompts so that you are directed to the right person  All voicemails are confidential   Alanna oLngoria all requests for admission clinical reviews, approved or denied determinations and any other requests to dedicated fax number below belonging to the campus where the patient is receiving treatment   List of dedicated fax numbers for the Facilities:  [address] DENIALS (Administrative/Medical Necessity) [phone]   [address] (Maternity/NICU/Pediatrics) 261 [facility] [address] Jovi Esparza 1277 (Patel Irizarry) [id number] [place] [address] - [place] [address] Leslie Villegas [address] [phone]   [place] [address] [phone]

## 2021-03-13 NOTE — ASSESSMENT & PLAN NOTE
-presented with severe depression, not taking medications  -denies any SI/HI or plan - does not need one-to-one observation at this point  -has not seen by psychiatrist/PCP for a while due to recent COVID situation  -recently seen by Dr Angle Alberts in [facility] during recent hospitalization - recently increased Prozac to 60 mg  -consulted psychiatric - recommending continuation of Prozac 60 mg OD, Ativan 0 5 mg b i d     -continue to monitor  -outpatient follow-up with her regular psychiatrist at [place] time of discharge

## 2021-03-13 NOTE — ASSESSMENT & PLAN NOTE
Lab Results   Component Value Date    HGBA1C 11 5 (H) 03/03/2021       No results for input(s): POCGLU in the last 72 hours      Blood Sugar Average: Last 72 hrs:    -BBG uncontrolled, patient is noncompliant  -patient is supposed to be on insulin, empagliflozin, alogliptin  -continue Lantus 20 units q h s  + sliding scale insulin  -hold home oral medication for now

## 2021-03-13 NOTE — PLAN OF CARE
Problem: SAFETY ADULT  Goal: Patient will remain free of falls  Description: INTERVENTIONS:  - Assess patient frequently for physical needs  -  Identify cognitive and physical deficits and behaviors that affect risk of falls    -  [place] fall precautions as indicated by assessment   - Educate patient/family on patient safety including physical limitations  - Instruct patient to call for assistance with activity based on assessment  - Modify environment to reduce risk of injury  - Consider OT/PT consult to assist with strengthening/mobility  Outcome: Progressing  Goal: Maintain or return to baseline ADL function  Description: INTERVENTIONS:  -  Assess patient's ability to carry out ADLs; assess patient's baseline for ADL function and identify physical deficits which impact ability to perform ADLs (bathing, care of mouth/teeth, toileting, grooming, dressing, etc )  - Assess/evaluate cause of self-care deficits   - Assess range of motion  - Assess patient's mobility; develop plan if impaired  - Assess patient's need for assistive devices and provide as appropriate  - Encourage maximum independence but intervene and supervise when necessary  - Involve family in performance of ADLs  - Assess for home care needs following discharge   - Consider OT consult to assist with ADL evaluation and planning for discharge  - Provide patient education as appropriate  Outcome: Progressing  Goal: Maintain or return mobility status to optimal level  Description: INTERVENTIONS:  - Assess patient's baseline mobility status (ambulation, transfers, stairs, etc )    - Identify cognitive and physical deficits and behaviors that affect mobility  - Identify mobility aids required to assist with transfers and/or ambulation (gait belt, sit-to-stand, lift, walker, cane, etc )  - [place] fall precautions as indicated by assessment  - Record patient progress and toleration of activity level on Mobility SBAR; progress patient to next Phase/Stage  - Instruct patient to call for assistance with activity based on assessment  - Consider rehabilitation consult to assist with strengthening/weightbearing, etc   Outcome: Progressing     Problem: DISCHARGE PLANNING  Goal: Discharge to home or other facility with appropriate resources  Description: INTERVENTIONS:  - Identify barriers to discharge w/patient and caregiver  - Arrange for needed discharge resources and transportation as appropriate  - Identify discharge learning needs (meds, wound care, etc )  - Arrange for interpretive services to assist at discharge as needed  - Refer to Case Management Department for coordinating discharge planning if the patient needs post-hospital services based on physician/advanced practitioner order or complex needs related to functional status, cognitive ability, or social support system  Outcome: Progressing

## 2021-03-13 NOTE — ASSESSMENT & PLAN NOTE
Lab Results   Component Value Date    HGBA1C 11 5 (H) 03/03/2021       Recent Labs     03/12/21  2123 03/13/21  0832 03/13/21  1132   POCGLU 207* 180* 214*       Blood Sugar Average: Last 72 hrs:    -BBG uncontrolled, patient is noncompliant  -patient is supposed to be on insulin, empagliflozin, alogliptin  -continue Lantus 20 units q h s  + sliding scale insulin  -hold home oral medication for now  (P) [phone]

## 2021-03-13 NOTE — ASSESSMENT & PLAN NOTE
-presented with severe depression, not taking medications  -denies any SI/HI or plan - does not need one-to-one observation at this point  -has not seen by psychiatrist/PCP for a while due to recent COVID situation  -recently seen by Dr Edwige Stevens in [facility] during recent hospitalization - recently increased Prozac to 60 mg  -consulted psychiatric - input appreciated  -continue to monitor

## 2021-03-13 NOTE — ASSESSMENT & PLAN NOTE
-patient complaining of bilateral calf tenderness; left greater than right  -denying any chest pain, shortness of breath, prior history of DVT    -ordered bilateral duplex to assess for DVT; but currently low probability

## 2021-03-13 NOTE — H&P
Andres 1955, 76 y o  female MRN: [id number]  Unit/Bed#: -01 Encounter: [id number]  Primary Care Provider: Meghan Guillaume MD   Date and time admitted to hospital: 3/12/2021 11:13 AM    * Failure to thrive (0-17)  Assessment & Plan  -presented with failure to thrive, generalized weakness secondary to major depression  -have patient feel overwhelmed due to recent stress and comorbidities  -no compliant with medications  -denies SI/HI  -consulted PT/OT - input appreciated    Severe episode of recurrent major depressive disorder, without psychotic features ([facility] )  Assessment & Plan  -presented with severe depression, not taking medications  -denies any SI/HI or plan - does not need one-to-one observation at this point  -has not seen by psychiatrist/PCP for a while due to recent COVID situation  -recently seen by Dr Jean Marie De Jesus in [facility] during recent hospitalization - recently increased Prozac to 60 mg  -consulted psychiatric - input appreciated  -continue to monitor    Asymptomatic bacteriuria  Assessment & Plan  -UA showed bacteriuria, patient denies any urinary symptoms  -afebrile, no leukocytosis  -status post 1 dose of IV ceftriaxone in ED  -Will hold off further doses of antibiotic for now    Acquired hypothyroidism  Assessment & Plan  -continue home Synthroid    GERD (gastroesophageal reflux disease)  Assessment & Plan  -continue famotidine    Diabetes mellitus ([facility] )  Assessment & Plan  Lab Results   Component Value Date    HGBA1C 11 5 (H) 03/03/2021       No results for input(s): POCGLU in the last 72 hours      Blood Sugar Average: Last 72 hrs:    -BBG uncontrolled, patient is noncompliant  -patient is supposed to be on insulin, empagliflozin, alogliptin  -continue Lantus 20 units q h s  + sliding scale insulin  -hold home oral medication for now      Hyperlipidemia  Assessment & Plan  -stable, continue Lipitor    Restless leg syndrome  Assessment & Plan  -stable, continue home dose of gabapentin    Seizures ([facility] )  Assessment & Plan  -stable, continue home Topamax      VTE Prophylaxis: Enoxaparin (Lovenox)  / sequential compression device   Code Status: Full Code  POLST: There is no POLST form on file for this patient (pre-hospital)  Discussion with family: No    Anticipated Length of Stay:  Patient will be admitted on an Inpatient basis with an anticipated length of stay of  over 2 midnights  Justification for Hospital Stay:  Failure to thrive, major depression    Total Time for Visit, including Counseling / Coordination of Care: 45 minutes  Greater than 50% of this total time spent on direct patient counseling and coordination of care  Chief Complaint:   Failure to thrive    History of Present Illness:    Suha Carlin is a 76 y o  female with PMH of HTN, IDDM, depression, hypothyroidism, seizure, diabetic neuropathy, restless legs syndrome who presents with failure to thrive, generalized weakness  Patient was recently discharged from [facility] due to chest pain for which she underwent cardiac catheterization and it was negative  Patient stated she felt overwhelmed due to recent stress and multiple comorbidities  Patient wants to give up and not taking medications  She has not seen by PCP or psychiatrist for quite some time due to recent COVID situation  Patient denies any SI/HI or plan  Patient stated she lives alone  The family requested the rehab placement after the discharge  On admission vital signs stable, electrolytes within normal limit  Elevated glucose at 2:36 a m  CBC unremarkable except Hemoglobin 16 3   UA showed moderate bacteriuria with wbc's, however patient denies any urinary symptoms  Review of Systems:    Review of Systems Patient was seen and examined at bedside   The patient denies any fever, chills, cough, chest pain, palpitation, shortness of breath, N/V, abd pain, dysuria, urinary urgency or any change in urinary habits  Patient has urinary frequency due to diabetic however no increase in frequency recently  Past Medical and Surgical History:     Past Medical History:   Diagnosis Date    Diabetes mellitus ([address] )     Disease of thyroid gland     Hyperlipidemia     Hypertension     Hypothyroidism (acquired)     Restless leg syndrome     Seizures (HCC)        Past Surgical History:   Procedure Laterality Date    APPENDECTOMY      CHOLECYSTECTOMY      HIP FRACTURE SURGERY Right     TONSILLECTOMY      TUBAL LIGATION         Meds/Allergies:    Prior to Admission medications    Medication Sig Start Date End Date Taking? Authorizing Provider   Alogliptin Benzoate 25 MG TABS Take 25 mg by mouth daily    Historical Provider, MD   aspirin (Aspirin 81) 81 mg EC tablet Take 81 mg by mouth daily  Historical Provider, MD   atorvastatin (LIPITOR) 40 mg tablet Take 1 tablet (40 mg total) by mouth daily with dinner 3/9/21   Tenisha Claudio DO   Empagliflozin 25 MG TABS Take 25 mg by mouth every morning    Historical Provider, MD   famotidine (PEPCID) 20 mg tablet Take 1 tablet (20 mg total) by mouth 2 (two) times a day 3/9/21   Tenisha Claudio DO   FLUoxetine (PROzac) 20 mg capsule Take 3 capsules (60 mg total) by mouth daily 3/10/21   Tenisha Claudio,    gabapentin (NEURONTIN) 100 mg capsule Take 1 capsule (100 mg total) by mouth 2 (two) times a day Take 100 mg in the morning and 100 mg mid afternoon 7/10/18   SAMIA Navas   gabapentin (NEURONTIN) 300 mg capsule Take 1 capsule (300 mg total) by mouth daily at bedtime 7/10/18   SAMIA Navas   insulin glargine (LANTUS) 100 units/mL subcutaneous injection Inject 20 Units under the skin daily at bedtime 3/9/21   Tenisha Claudio DO   levothyroxine 50 mcg tablet Take 50 mcg by mouth daily Indications: Pt unsure of the amount      Historical Provider, MD   LORazepam (ATIVAN) 0 5 mg tablet Take 1 tablet (0 5 mg total) by mouth 2 (two) times a day for 10 doses 3/9/21 3/14/21  Tenisha Claudio,    topiramate (TOPAMAX) 100 mg tablet Take 200 mg by mouth 2 (two) times a day     Historical Provider, MD     I have reviewed home medications with patient personally  Allergies:    Allergies   Allergen Reactions    Pineapple Anaphylaxis    Byetta 10 Mcg Pen [Exenatide]     Marijuana [Dronabinol] GI Intolerance    Metformin Diarrhea     Other reaction(s): Unknown    Omeprazole Hives    Ondansetron      Other reaction(s): Unknown    Coconut Oil Rash    Liraglutide Rash and Other (See Comments)    Penicillins Rash       Social History:     Marital Status: Single   Occupation:  Retired  Patient Pre-hospital Living Situation:  Alone  Patient Pre-hospital Level of Mobility:  Sometimes use walker or cane  Patient Pre-hospital Diet Restrictions:  Diabetic diet  Substance Use History:   Social History     Substance and Sexual Activity   Alcohol Use Yes    Frequency: Monthly or less    Drinks per session: 1 or 2    Binge frequency: Never    Comment: occasional     Social History     Tobacco Use   Smoking Status Never Smoker   Smokeless Tobacco Never Used   Tobacco Comment    not a smoker     Social History     Substance and Sexual Activity   Drug Use Never       Family History:    non-contributory    Physical Exam:     Vitals:   Blood Pressure: 128/82 (03/12/21 1819)  Pulse: 62 (03/12/21 1819)  Temperature: 97 8 °F (36 6 °C) (03/12/21 1112)  Temp Source: Oral (03/12/21 1112)  Respirations: 15 (03/12/21 1819)  SpO2: 97 % (03/12/21 1819)    Physical Exam    General: Elderly female patient lying in bed, breathing well on room air, no acute distress  HEENT: NC/AT, PERRL, EOM - normal  Neck: Supple  Pulm/Chest: Normal chest wall expansion, clear breath sounds on both side, no wheezing/rhonchi or crackles appreciated  CVS: RRR, normal S1&S2, no murmur appreciated, capillary refill <2s  Abd: soft, non tender, non distended, bowel sounds +  MSK: move all 4 extremities spontaneously  Skin: warm  CNS: AAOx3, no acute focal neuro deficit, Motor 5/5 strength, no sensory deficit      Additional Data:     Lab Results: I have personally reviewed pertinent reports  Results from last 7 days   Lab Units 03/12/21  1132   WBC Thousand/uL 5 76   HEMOGLOBIN g/dL 16 3*   HEMATOCRIT % 49 8*   PLATELETS Thousands/uL 209   NEUTROS PCT % 66   LYMPHS PCT % 23   MONOS PCT % 9   EOS PCT % 2     Results from last 7 days   Lab Units 03/12/21  1132   SODIUM mmol/L 138   POTASSIUM mmol/L 3 8   CHLORIDE mmol/L 104   CO2 mmol/L 26   BUN mg/dL 30*   CREATININE mg/dL 0 85   ANION GAP mmol/L 8   CALCIUM mg/dL 9 2   ALBUMIN g/dL 3 9   TOTAL BILIRUBIN mg/dL 0 41   ALK PHOS U/L 95 2   ALT U/L 19   AST U/L 21   GLUCOSE RANDOM mg/dL 236*         Results from last 7 days   Lab Units 03/09/21  1632 03/09/21  1156 03/09/21  0746 03/08/21  2008 03/08/21  1721 03/08/21  1117 03/08/21  0724 03/07/21  2139 03/07/21  1630 03/07/21  1130 03/07/21  0727 03/06/21  2202   POC GLUCOSE mg/dl 190* 205* 142* 170* 154* 146* 138 156* 140 131 143* 152*               Imaging: I have personally reviewed pertinent reports  XR chest 1 view portable   Final Result by Aleksander Martin MD (03/12 1433)   No acute cardiopulmonary disease, improved  Workstation performed: [id number]      VAS lower limb venous duplex study, complete bilateral    (Results Pending)       EKG, Pathology, and Other Studies Reviewed on Admission:   · EKG:  NSR, no acute ST-T changes    Allscripts / Epic Records Reviewed: Yes     ** Please Note: This note has been constructed using a voice recognition system   **

## 2021-03-13 NOTE — PLAN OF CARE
Problem: INFECTION - ADULT  Goal: Absence or prevention of progression during hospitalization  Description: INTERVENTIONS:  - Assess and monitor for signs and symptoms of infection  - Monitor lab/diagnostic results  - Monitor all insertion sites, i e  indwelling lines, tubes, and drains  - Monitor endotracheal if appropriate and nasal secretions for changes in amount and color  - [place] appropriate cooling/warming therapies per order  - Administer medications as ordered  - Instruct and encourage patient and family to use good hand hygiene technique  - Identify and instruct in appropriate isolation precautions for identified infection/condition  Outcome: Progressing  Goal: Absence of fever/infection during neutropenic period  Description: INTERVENTIONS:  - Monitor WBC    Outcome: Progressing     Problem: SAFETY ADULT  Goal: Patient will remain free of falls  Description: INTERVENTIONS:  - Assess patient frequently for physical needs  -  Identify cognitive and physical deficits and behaviors that affect risk of falls    -  [place] fall precautions as indicated by assessment   - Educate patient/family on patient safety including physical limitations  - Instruct patient to call for assistance with activity based on assessment  - Modify environment to reduce risk of injury  - Consider OT/PT consult to assist with strengthening/mobility  Outcome: Progressing  Goal: Maintain or return to baseline ADL function  Description: INTERVENTIONS:  -  Assess patient's ability to carry out ADLs; assess patient's baseline for ADL function and identify physical deficits which impact ability to perform ADLs (bathing, care of mouth/teeth, toileting, grooming, dressing, etc )  - Assess/evaluate cause of self-care deficits   - Assess range of motion  - Assess patient's mobility; develop plan if impaired  - Assess patient's need for assistive devices and provide as appropriate  - Encourage maximum independence but intervene and supervise when necessary  - Involve family in performance of ADLs  - Assess for home care needs following discharge   - Consider OT consult to assist with ADL evaluation and planning for discharge  - Provide patient education as appropriate  Outcome: Progressing  Goal: Maintain or return mobility status to optimal level  Description: INTERVENTIONS:  - Assess patient's baseline mobility status (ambulation, transfers, stairs, etc )    - Identify cognitive and physical deficits and behaviors that affect mobility  - Identify mobility aids required to assist with transfers and/or ambulation (gait belt, sit-to-stand, lift, walker, cane, etc )  - [place] fall precautions as indicated by assessment  - Record patient progress and toleration of activity level on Mobility SBAR; progress patient to next Phase/Stage  - Instruct patient to call for assistance with activity based on assessment  - Consider rehabilitation consult to assist with strengthening/weightbearing, etc   Outcome: Progressing     Problem: Knowledge Deficit  Goal: Patient/family/caregiver demonstrates understanding of disease process, treatment plan, medications, and discharge instructions  Description: Complete learning assessment and assess knowledge base  Interventions:  - Provide teaching at level of understanding  - Provide teaching via preferred learning methods  Outcome: Progressing     Problem: Nutrition/Hydration-ADULT  Goal: Nutrient/Hydration intake appropriate for improving, restoring or maintaining nutritional needs  Description: Monitor and assess patient's nutrition/hydration status for malnutrition  Collaborate with interdisciplinary team and initiate plan and interventions as ordered  Monitor patient's weight and dietary intake as ordered or per policy  Utilize nutrition screening tool and intervene as necessary  Determine patient's food preferences and provide high-protein, high-caloric foods as appropriate       INTERVENTIONS:  - Monitor oral intake, urinary output, labs, and treatment plans  - Assess nutrition and hydration status and recommend course of action  - Evaluate amount of meals eaten  - Assist patient with eating if necessary   - Allow adequate time for meals  - Recommend/ encourage appropriate diets, oral nutritional supplements, and vitamin/mineral supplements  - Order, calculate, and assess calorie counts as needed  - Recommend, monitor, and adjust tube feedings and TPN/PPN based on assessed needs  - Assess need for intravenous fluids  - Provide specific nutrition/hydration education as appropriate  - Include patient/family/caregiver in decisions related to nutrition  Outcome: Progressing

## 2021-03-13 NOTE — ASSESSMENT & PLAN NOTE
-UA showed bacteriuria, patient denies any urinary symptoms  -afebrile, no leukocytosis  -status post 1 dose of IV ceftriaxone in ED  -Will hold off further doses of antibiotic for now

## 2021-03-13 NOTE — CONSULTS
Consultation - Kelsi Victor 76 y o  female MRN: [id number]    Unit/Bed#: -01 Encounter: [id number]      Identifying Data:  76years old white female is admitted at [facility] on March 12, 2021 with complaining of weakness and trouble in ambulation and increased her depression  Psychiatric consultation is asked for depression  This patient was seen at [facility] several days ago for depression please refer to my consult for the detail  Patient is happy to see me and thanking me for coming and seeing her  Patient's Prozac was adjusted from 40 mg to 60 mg because she was reporting more depressed she feels like locked up in her own apartment she cannot get out there was too much now she cannot socialize she has no boyfriend and 1 son lives far away and other son lives nearby and he is able to help her but patient is more depressed because of her ongoing physical problems and feeling isolated and depressed her Prozac was adjusted to 60 mg  Patient is able to give out her background history and after the discussion she agreed that last time it did not work out to go for short-term physical rehab but she is willing to go for short-term physical rehab to get stronger on her feet so she can take care of herself when she goes home  Patient has a long psych history with multiple psychiatric admissions and suicide attempts in the past she is under psychiatric care at  a clinic to see a psychiatrist   She used to see a therapist but recently she has not seen a therapist   She is tolerating Prozac 60 mg daily since I adjusted during the last hospitalization and she is willing to continue 60 mg daily  Patient reports feeling severely depressed uptight cannot relax worries about health she feels uptight trouble in sleeping lost appetite and she is becoming weaker and weaker    Patient clearly denies feeling suicidal and there is no need for inpatient psychiatric care she is willing to work with me while she is here  She also [facility] but recently because of the snow she even could not get to the when to get to the clinic to see her doctor francois  Currently patient is on Prozac 60 mg daily  Social history patient lives alone she has 2 grownup sons they are good to her patient denies smoking denies abusing alcohol or drugs patient has no history of drug and alcohol abuse patient has 12th grade education and she was in [employer] for 6 years she did work in [employer] currently she is retired  Diagnosis depression anxiety insomnia hypothyroidism diabetes high cholesterol hypertension seizures restless legs syndrome patient has history of appendectomy tonsillectomy cholecystectomy tubal ligation hip fracture surgery  Home medications patient is on Prozac 60 mg daily now and she is also other medications for other medical conditions as listed in the chart  Allergy pineapple Byetta marijuana metformin omeprazole Zofran coconut oil penicillin andliraglutide    Chief Complaints:  Severe depression    Family History:  Patient denies  Family History   Problem Relation Age of Onset    Seizures Father        Legal History:  Patient denies    Mental Status Exam:  76years old white female is alert awake oriented x3 affect flat withdrawn psychomotor retardation speech slow and low in tone weak tired pessimistic negative and profoundly depressed patient has trouble in ADL care because of the physical weakness  Poor sleep poor appetite  Patient denies auditory or visual hallucinations  Patient denies suicidal or homicidal ideation  No paranoia no delusion elucidated  No mood swings no agitation  Insight and judgments are adequate      History of Present Illness     HPI: Judi Buitrago is a 76y o  year old female who presents with weakness and trouble in ambulation severely depressed    Consults      Historical Information   Past Psychiatric History: Patient gives an extensive history of depression since many years with at least 5-6 psychiatric admissions and 3-4 suicide attempts in the past patient always been under psychiatric care with the psychiatrist and taking her medications  Patient used to see a therapist in the past but recently she only goes to see a psychiatrist and get her medication  Patient denies history of drug and alcohol abuse no IV drug abuse no legal problems in the past currently patient is under psychiatric care with her psychiatrist at [facility] and taking the medications Prozac she was on 40 mg daily but during the last admission because of increased her depression and she requested to increase the Prozac to 60 mg currently she is taking 60 mg daily    Past Medical History:   Diagnosis Date    Diabetes mellitus ([address] )     Disease of thyroid gland     Hyperlipidemia     Hypertension     Hypothyroidism (acquired)     Restless leg syndrome     Seizures (HCC)      Past Surgical History:   Procedure Laterality Date    APPENDECTOMY      CHOLECYSTECTOMY      HIP FRACTURE SURGERY Right     TONSILLECTOMY      TUBAL LIGATION       Social History   Social History     Substance and Sexual Activity   Alcohol Use Yes    Frequency: Monthly or less    Drinks per session: 1 or 2    Binge frequency: Never    Comment: occasional     Social History     Substance and Sexual Activity   Drug Use Never     Social History     Tobacco Use   Smoking Status Never Smoker   Smokeless Tobacco Never Used   Tobacco Comment    not a smoker       Meds/Allergies   current meds:   Current Facility-Administered Medications   Medication Dose Route Frequency    acetaminophen (TYLENOL) tablet 650 mg  650 mg Oral Q6H PRN    aluminum-magnesium hydroxide-simethicone (MYLANTA) oral suspension 30 mL  30 mL Oral Q6H PRN    aspirin (ECOTRIN LOW STRENGTH) EC tablet 81 mg  81 mg Oral Daily    atorvastatin (LIPITOR) tablet 40 mg  40 mg Oral Daily With [employer] enoxaparin (LOVENOX) subcutaneous injection 60 mg  60 mg Subcutaneous Q12H [facility]    famotidine (PEPCID) tablet 20 mg  20 mg Oral BID    FLUoxetine (PROzac) capsule 60 mg  60 mg Oral Daily    gabapentin (NEURONTIN) capsule 100 mg  100 mg Oral BID    gabapentin (NEURONTIN) capsule 300 mg  300 mg Oral HS    insulin glargine (LANTUS) subcutaneous injection 20 Units 0 2 mL  20 Units Subcutaneous HS    insulin lispro (HumaLOG) 100 units/mL subcutaneous injection 1-5 Units  1-5 Units Subcutaneous TID AC    levothyroxine tablet 50 mcg  50 mcg Oral Daily    polyethylene glycol (MIRALAX) packet 17 g  17 g Oral Daily PRN    topiramate (TOPAMAX) tablet 200 mg  200 mg Oral BID    and PTA meds:    Medications Prior to Admission   Medication    Alogliptin Benzoate 25 MG TABS    aspirin (Aspirin 81) 81 mg EC tablet    Empagliflozin 25 MG TABS    famotidine (PEPCID) 20 mg tablet    FLUoxetine (PROzac) 20 mg capsule    gabapentin (NEURONTIN) 100 mg capsule    gabapentin (NEURONTIN) 300 mg capsule    insulin glargine (LANTUS) 100 units/mL subcutaneous injection    levothyroxine 50 mcg tablet    LORazepam (ATIVAN) 0 5 mg tablet    topiramate (TOPAMAX) 100 mg tablet    atorvastatin (LIPITOR) 40 mg tablet     Allergies   Allergen Reactions    Pineapple Anaphylaxis    Byetta 10 Mcg Pen [Exenatide]     Marijuana [Dronabinol] GI Intolerance    Metformin Diarrhea     Other reaction(s): Unknown    Omeprazole Hives    Ondansetron      Other reaction(s): Unknown    Coconut Oil Rash    Liraglutide Rash and Other (See Comments)    Penicillins Rash       Objective   Vitals: Blood pressure 103/53, pulse 67, temperature (!) 97 °F (36 1 °C), temperature source Tympanic, resp  rate 16, height 5' 2" (1 575 m), weight (!) 143 kg (315 lb 4 1 oz), SpO2 98 %        Routine Lab Results:   Admission on 03/12/2021   Component Date Value Ref Range Status    WBC 03/12/2021 5 76  4 31 - 10 16 Thousand/uL Final    RBC 03/12/2021 5 73* 3 81 - 5 12 Million/uL Final    Hemoglobin 03/12/2021 16 3* 11 5 - 15 4 g/dL Final    Hematocrit 03/12/2021 49 8* 34 8 - 46 1 % Final    MCV 03/12/2021 87  82 - 98 fL Final    MCH 03/12/2021 28 4  26 8 - 34 3 pg Final    MCHC 03/12/2021 32 7  31 4 - 37 4 g/dL Final    RDW 03/12/2021 13 7  11 6 - 15 1 % Final    MPV 03/12/2021 11 2  8 9 - 12 7 fL Final    Platelets 38/81/6109 209  149 - 390 Thousands/uL Final    Neutrophils Relative 03/12/2021 66  43 - 75 % Final    Immat GRANS % 03/12/2021 0  0 - 2 % Final    Lymphocytes Relative 03/12/2021 23  14 - 44 % Final    Monocytes Relative 03/12/2021 9  4 - 12 % Final    Eosinophils Relative 03/12/2021 2  0 - 6 % Final    Basophils Relative 03/12/2021 0  0 - 1 % Final    Neutrophils Absolute 03/12/2021 3 78  1 85 - 7 62 Thousands/µL Final    Immature Grans Absolute 03/12/2021 0 02  0 00 - 0 20 Thousand/uL Final    Lymphocytes Absolute 03/12/2021 1 32  0 60 - 4 47 Thousands/µL Final    Monocytes Absolute 03/12/2021 0 49  0 17 - 1 22 Thousand/µL Final    Eosinophils Absolute 03/12/2021 0 13  0 00 - 0 61 Thousand/µL Final    Basophils Absolute 03/12/2021 0 02  0 00 - 0 10 Thousands/µL Final    Sodium 03/12/2021 138  133 - 145 mmol/L Final    Potassium 03/12/2021 3 8  3 5 - 5 0 mmol/L Final    Chloride 03/12/2021 104  96 - 108 mmol/L Final    CO2 03/12/2021 26  22 - 33 mmol/L Final    ANION GAP 03/12/2021 8  4 - 13 mmol/L Final    BUN 03/12/2021 30* 6 - 20 mg/dL Final    Creatinine 03/12/2021 0 85  0 40 - 1 10 mg/dL Final    Standardized to IDMS reference method    Glucose 03/12/2021 236* 65 - 140 mg/dL Final    If the patient is fasting, the ADA then defines impaired fasting glucose as > 100 mg/dL and diabetes as > or equal to 123 mg/dL  Specimen collection should occur prior to Sulfasalazine administration due to the potential for falsely depressed results   Specimen collection should occur prior to Sulfapyridine administration due to the potential for falsely elevated results   Calcium 03/12/2021 9 2  8 4 - 10 2 mg/dL Final    AST 03/12/2021 21  15 - 41 U/L Final    Specimen collection should occur prior to Sulfasalazine administration due to the potential for falsely depressed results   ALT 03/12/2021 19  5 - 54 U/L Final    Specimen collection should occur prior to Sulfasalazine administration due to the potential for falsely depressed results   Alkaline Phosphatase 03/12/2021 95 2  35 - 140 U/L Final    Total Protein 03/12/2021 7 7  6 4 - 8 3 g/dL Final    Albumin 03/12/2021 3 9  3 4 - 4 8 g/dL Final    Total Bilirubin 03/12/2021 0 41  0 30 - 1 20 mg/dL Final    eGFR 03/12/2021 72  ml/min/1 73sq m Final    Color, UA 03/12/2021 Yellow  Yellow Final    Clarity, UA 03/12/2021 Slightly Cloudy* Clear Final    Specific Gravity, UA 03/12/2021 >=1 030  1 001 - 1 030 Final    pH, UA 03/12/2021 5 0  5 0, 5 5, 6 0, 6 5, 7 0, 7 5, 8 0 Final    Leukocytes, UA 03/12/2021 Negative  Negative Final    Nitrite, UA 03/12/2021 Negative  Negative Final    Protein, UA 03/12/2021 Negative  Negative, Interference- unable to analyze mg/dl Final    Glucose, UA 03/12/2021 1+* Negative mg/dl Final    Ketones, UA 03/12/2021 Negative  Negative mg/dl Final    Urobilinogen, UA 03/12/2021 0 2  0 2, 1 0 E U /dl E U /dl Final    Bilirubin, UA 03/12/2021 1+* Negative Final    Blood, UA 03/12/2021 2+* Negative Final    Magnesium 03/12/2021 2 0  1 6 - 2 6 mg/dL Final    Troponin I 03/12/2021 <0 03  0 00 - 0 07 ng/mL Final    [employer]'s Chemistry analyzer 99% cutoff is > 0 03ng/mL    o cTnl 99% cutoff is useful only when applied to patients in the clinical setting of myocardial ischemia  o cTnl 99% cutoff should be interpreted in the context of clinical history, ECG findings and possibly cardiac imaging to establish correct diagnosis    o cTnl 99% cutoff may be suggestive but clearly not indicative of a coronary event without the clinical setting of myocardial ischemia      BNP 03/12/2021 29 5  1 - 100 pg/mL Final    Amph/Meth UR 03/12/2021 Negative  Negative Final    Barbiturate Ur 03/12/2021 Negative  Negative Final    Benzodiazepine Urine 03/12/2021 Negative  Negative Final    Cocaine Urine 03/12/2021 Negative  Negative Final    Methadone Urine 03/12/2021 Negative  Negative Final    Opiate Urine 03/12/2021 Negative  Negative Final    PCP Ur 03/12/2021 Negative  Negative Final    THC Urine 03/12/2021 Negative  Negative Final    Oxycodone Urine 03/12/2021 Negative  Negative Final    Total CK 03/12/2021 36 1* 38 - 234 U/L Final    Ethanol Lvl 03/12/2021 <10  <10 mg/dL Final    SARS-CoV-2 03/12/2021 Negative  Negative Final    INFLUENZA A PCR 03/12/2021 Negative  Negative Final    INFLUENZA B PCR 03/12/2021 Negative  Negative Final    RSV PCR 03/12/2021 Negative  Negative Final    Ventricular Rate 03/12/2021 67  BPM Final    Atrial Rate 03/12/2021 67  BPM Final    LA Interval 03/12/2021 136  ms Final    QRSD Interval 03/12/2021 145  ms Final    QT Interval 03/12/2021 431  ms Final    QTC Interval 03/12/2021 455  ms Final    P Axis 03/12/2021 59  degrees Final    QRS Axis 03/12/2021 -44  degrees Final    T Wave Axis 03/12/2021 44  degrees Final    RBC, UA 03/12/2021 2-4  None Seen, 0-1, 1-2, 2-4, 0-5 /hpf Final    WBC, UA 03/12/2021 10-20* None Seen, 0-1, 1-2, 0-5, 2-4 /hpf Final    Epithelial Cells 03/12/2021 Moderate* None Seen, Occasional /hpf Final    Bacteria, UA 03/12/2021 Moderate* None Seen, Occasional /hpf Final    MUCUS THREADS 03/12/2021 Occasional* None Seen Final    Platelets 40/30/3817 199  149 - 390 Thousands/uL Final    MPV 03/13/2021 11 8  8 9 - 12 7 fL Final    POC Glucose 03/12/2021 207* 65 - 140 mg/dl Final    Sodium 03/13/2021 141  133 - 145 mmol/L Final    Potassium 03/13/2021 3 7  3 5 - 5 0 mmol/L Final    Chloride 03/13/2021 107  96 - 108 mmol/L Final    CO2 03/13/2021 25  22 - 33 mmol/L Final    ANION GAP 03/13/2021 9  4 - 13 mmol/L Final    BUN 03/13/2021 26* 6 - 20 mg/dL Final    Creatinine 03/13/2021 0 66  0 40 - 1 10 mg/dL Final    Standardized to IDMS reference method    Glucose 03/13/2021 167* 65 - 140 mg/dL Final    If the patient is fasting, the ADA then defines impaired fasting glucose as > 100 mg/dL and diabetes as > or equal to 123 mg/dL  Specimen collection should occur prior to Sulfasalazine administration due to the potential for falsely depressed results  Specimen collection should occur prior to Sulfapyridine administration due to the potential for falsely elevated results      Calcium 03/13/2021 9 1  8 4 - 10 2 mg/dL Final    eGFR 03/13/2021 92  ml/min/1 73sq m Final    WBC 03/13/2021 5 98  4 31 - 10 16 Thousand/uL Final    RBC 03/13/2021 5 32* 3 81 - 5 12 Million/uL Final    Hemoglobin 03/13/2021 15 1  11 5 - 15 4 g/dL Final    Hematocrit 03/13/2021 46 8* 34 8 - 46 1 % Final    MCV 03/13/2021 88  82 - 98 fL Final    MCH 03/13/2021 28 4  26 8 - 34 3 pg Final    MCHC 03/13/2021 32 3  31 4 - 37 4 g/dL Final    RDW 03/13/2021 13 6  11 6 - 15 1 % Final    MPV 03/13/2021 11 5  8 9 - 12 7 fL Final    Platelets 73/72/0303 207  149 - 390 Thousands/uL Final    Neutrophils Relative 03/13/2021 60  43 - 75 % Final    Immat GRANS % 03/13/2021 0  0 - 2 % Final    Lymphocytes Relative 03/13/2021 28  14 - 44 % Final    Monocytes Relative 03/13/2021 9  4 - 12 % Final    Eosinophils Relative 03/13/2021 3  0 - 6 % Final    Basophils Relative 03/13/2021 0  0 - 1 % Final    Neutrophils Absolute 03/13/2021 3 53  1 85 - 7 62 Thousands/µL Final    Immature Grans Absolute 03/13/2021 0 01  0 00 - 0 20 Thousand/uL Final    Lymphocytes Absolute 03/13/2021 1 67  0 60 - 4 47 Thousands/µL Final    Monocytes Absolute 03/13/2021 0 55  0 17 - 1 22 Thousand/µL Final    Eosinophils Absolute 03/13/2021 0 20  0 00 - 0 61 Thousand/µL Final    Basophils Absolute 03/13/2021 0 02  0 00 - 0 10 Thousands/µL Final    POC Glucose 2021 180* 65 - 140 mg/dl Final         Diagnosis:  Major depression recurrent  Anxiety  Insomnia  Due to meant due to recently her favorite aunt   Plan:  Continue Prozac 60 mg daily  Ativan 0 5 mg p o  B i d  Psychotherapy  Psychiatric follow-up with her psychiatrist at [facility] after the discharge from the hospital   Discussed with the medical attending and the resident physician  I will follow up during the hospital stay      Nick Collins MD

## 2021-03-14 LAB
BACTERIA UR CULT: NORMAL
GLUCOSE SERPL-MCNC: 130 MG/DL (ref 65–140)
GLUCOSE SERPL-MCNC: 155 MG/DL (ref 65–140)
GLUCOSE SERPL-MCNC: 163 MG/DL (ref 65–140)

## 2021-03-14 PROCEDURE — 82948 REAGENT STRIP/BLOOD GLUCOSE: CPT

## 2021-03-14 PROCEDURE — 99232 SBSQ HOSP IP/OBS MODERATE 35: CPT | Performed by: INTERNAL MEDICINE

## 2021-03-14 RX ORDER — LOPERAMIDE HYDROCHLORIDE 2 MG/1
2 CAPSULE ORAL 3 TIMES DAILY PRN
Status: DISCONTINUED | OUTPATIENT
Start: 2021-03-14 | End: 2021-03-18 | Stop reason: HOSPADM

## 2021-03-14 RX ORDER — LANOLIN ALCOHOL/MO/W.PET/CERES
6 CREAM (GRAM) TOPICAL
Status: DISCONTINUED | OUTPATIENT
Start: 2021-03-14 | End: 2021-03-18 | Stop reason: HOSPADM

## 2021-03-14 RX ORDER — SACCHAROMYCES BOULARDII 250 MG
250 CAPSULE ORAL 2 TIMES DAILY
Status: DISCONTINUED | OUTPATIENT
Start: 2021-03-14 | End: 2021-03-18 | Stop reason: HOSPADM

## 2021-03-14 RX ADMIN — LORAZEPAM 0.5 MG: 0.5 TABLET ORAL at 17:06

## 2021-03-14 RX ADMIN — Medication 250 MG: at 18:29

## 2021-03-14 RX ADMIN — FAMOTIDINE 20 MG: 20 TABLET ORAL at 17:06

## 2021-03-14 RX ADMIN — LOPERAMIDE HYDROCHLORIDE 2 MG: 2 CAPSULE ORAL at 18:29

## 2021-03-14 RX ADMIN — LORAZEPAM 0.5 MG: 0.5 TABLET ORAL at 08:37

## 2021-03-14 RX ADMIN — FAMOTIDINE 20 MG: 20 TABLET ORAL at 08:38

## 2021-03-14 RX ADMIN — MELATONIN 6 MG: 3 TAB ORAL at 21:54

## 2021-03-14 RX ADMIN — INSULIN LISPRO 1 UNITS: 100 INJECTION, SOLUTION INTRAVENOUS; SUBCUTANEOUS at 12:07

## 2021-03-14 RX ADMIN — ASPIRIN 81 MG: 81 TABLET, DELAYED RELEASE ORAL at 08:37

## 2021-03-14 RX ADMIN — INSULIN LISPRO 1 UNITS: 100 INJECTION, SOLUTION INTRAVENOUS; SUBCUTANEOUS at 17:08

## 2021-03-14 RX ADMIN — GABAPENTIN 100 MG: 100 CAPSULE ORAL at 08:38

## 2021-03-14 RX ADMIN — TOPIRAMATE 200 MG: 100 TABLET, FILM COATED ORAL at 17:06

## 2021-03-14 RX ADMIN — GABAPENTIN 100 MG: 100 CAPSULE ORAL at 14:22

## 2021-03-14 RX ADMIN — ENOXAPARIN SODIUM 60 MG: 60 INJECTION SUBCUTANEOUS at 20:28

## 2021-03-14 RX ADMIN — LEVOTHYROXINE SODIUM 50 MCG: 50 TABLET ORAL at 08:38

## 2021-03-14 RX ADMIN — FLUOXETINE 60 MG: 20 CAPSULE ORAL at 08:37

## 2021-03-14 RX ADMIN — ENOXAPARIN SODIUM 60 MG: 60 INJECTION SUBCUTANEOUS at 08:38

## 2021-03-14 RX ADMIN — INSULIN GLARGINE 20 UNITS: 100 INJECTION, SOLUTION SUBCUTANEOUS at 21:54

## 2021-03-14 RX ADMIN — GABAPENTIN 300 MG: 300 CAPSULE ORAL at 21:57

## 2021-03-14 RX ADMIN — TOPIRAMATE 200 MG: 100 TABLET, FILM COATED ORAL at 08:38

## 2021-03-14 NOTE — MALNUTRITION/BMI
This medical record reflects one or more clinical indicators suggestive of malnutrition and/or morbid obesity  Malnutrition Findings:              BMI Findings:  Adult BMI Classifications: Morbid Obesity 50-59 9     Body mass index is 57 66 kg/m²  See Nutrition note dated 3/14/2021 for additional details  Completed nutrition assessment is viewable in the nutrition documentation

## 2021-03-14 NOTE — ASSESSMENT & PLAN NOTE
· Presented with generalized weakness secondary to major depression  · Patient states she feels overwhelmed due to recent stress and comorbidities  · Psychiatric consult, appreciate recommendations  · PT OT consut

## 2021-03-14 NOTE — PROGRESS NOTES
Patient examined spoke with the nurse and discussed with the resident physician  Patient is alert awake cooperative resting in the bed affect flat withdrawn she looks depressed but she is cooperative and communicate her feelings well she reports that she has trouble in ambulating and she is using walker to go to the bathroom and she has difficulty in her daily care at home she has 2 week  Patient also admits being depressed for reasons stated before in my notes  She feels isolated and cannot socialize cannot get out of the house and that is making her more depressed  Recently adjusted her depression medication from 40 mg Prozac to 60 mg daily she is happy with that and taking the medication  Patient denies feeling suicidal   No psychosis no hallucinations patient is cooperative  Nurse reports no behavior problem  Medical evaluation and treatment is in progress  Patient agreed that she will benefit from short-term physical rehab to get stronger on her feet so she can take care of herself and return home  I reviewed all her medications  Therapy done with good response  Patient will have a psychiatric follow-up with the psychiatrist at [facility] after the discharge  I will follow up

## 2021-03-14 NOTE — ASSESSMENT & PLAN NOTE
Lab Results   Component Value Date    HGBA1C 11 5 (H) 03/03/2021       Recent Labs     03/13/21  1132 03/13/21  1551 03/14/21  0818 03/14/21  1153   POCGLU 214* 180* 130 155*       Blood Sugar Average: Last 72 hrs:  (P) 030 [id number]     · Uncontrolled, patient is noncompliant  · Patient is supposed to be on insulin, empagliflozin, alogliptin  · Continue Lantus 20 units q h s  + sliding scale insulin  · Hold home oral medication for now  · Currently blood glucose is at goals

## 2021-03-14 NOTE — ASSESSMENT & PLAN NOTE
· UA showed bacteriuria, patient denies any urinary symptoms  · She has been afebrile, no leukocytosis  · Status post 1 dose of IV ceftriaxone in ED  · Will hold off further doses of antibiotic for now

## 2021-03-14 NOTE — PLAN OF CARE
Problem: SAFETY ADULT  Goal: Patient will remain free of falls  Description: INTERVENTIONS:  - Assess patient frequently for physical needs  -  Identify cognitive and physical deficits and behaviors that affect risk of falls    -  [place] fall precautions as indicated by assessment   - Educate patient/family on patient safety including physical limitations  - Instruct patient to call for assistance with activity based on assessment  - Modify environment to reduce risk of injury  - Consider OT/PT consult to assist with strengthening/mobility  Outcome: Progressing  Goal: Maintain or return to baseline ADL function  Description: INTERVENTIONS:  -  Assess patient's ability to carry out ADLs; assess patient's baseline for ADL function and identify physical deficits which impact ability to perform ADLs (bathing, care of mouth/teeth, toileting, grooming, dressing, etc )  - Assess/evaluate cause of self-care deficits   - Assess range of motion  - Assess patient's mobility; develop plan if impaired  - Assess patient's need for assistive devices and provide as appropriate  - Encourage maximum independence but intervene and supervise when necessary  - Involve family in performance of ADLs  - Assess for home care needs following discharge   - Consider OT consult to assist with ADL evaluation and planning for discharge  - Provide patient education as appropriate  Outcome: Progressing  Goal: Maintain or return mobility status to optimal level  Description: INTERVENTIONS:  - Assess patient's baseline mobility status (ambulation, transfers, stairs, etc )    - Identify cognitive and physical deficits and behaviors that affect mobility  - Identify mobility aids required to assist with transfers and/or ambulation (gait belt, sit-to-stand, lift, walker, cane, etc )  - [place] fall precautions as indicated by assessment  - Record patient progress and toleration of activity level on Mobility SBAR; progress patient to next Phase/Stage  - Instruct patient to call for assistance with activity based on assessment  - Consider rehabilitation consult to assist with strengthening/weightbearing, etc   Outcome: Progressing     Problem: DISCHARGE PLANNING  Goal: Discharge to home or other facility with appropriate resources  Description: INTERVENTIONS:  - Identify barriers to discharge w/patient and caregiver  - Arrange for needed discharge resources and transportation as appropriate  - Identify discharge learning needs (meds, wound care, etc )  - Arrange for interpretive services to assist at discharge as needed  - Refer to Case Management Department for coordinating discharge planning if the patient needs post-hospital services based on physician/advanced practitioner order or complex needs related to functional status, cognitive ability, or social support system  Outcome: Progressing     Problem: Knowledge Deficit  Goal: Patient/family/caregiver demonstrates understanding of disease process, treatment plan, medications, and discharge instructions  Description: Complete learning assessment and assess knowledge base    Interventions:  - Provide teaching at level of understanding  - Provide teaching via preferred learning methods  Outcome: Progressing

## 2021-03-14 NOTE — ASSESSMENT & PLAN NOTE
· Presented with severe depression, not taking medications  · Denies any SI/HI or plan, does not need one-to-one observation at this point  · Has not seen by psychiatrist/PCP for a while due to recent COVID situation  · Recently seen by Dr Kelechi Burton in [facility] during recent hospitalization - recently increased Prozac to 60 mg  · Psychiatric consult, recommending continuation of Prozac 60 mg OD, Ativan 0 5 mg b i d   · Recommend to follow-up with her regular psychiatrist at [place] time of discharge

## 2021-03-14 NOTE — PROGRESS NOTES
Terrie U  65   Progress Note - Armando Fanning 1955, 76 y o  female MRN: [id number]  Unit/Bed#: -01 Encounter: [id number]  Primary Care Provider: Dheeraj Mills MD   Date and time admitted to hospital: 3/12/2021 11:13 AM    * Chronic fatigue and malaise  Assessment & Plan  · Presented with generalized weakness secondary to major depression  · Patient states she feels overwhelmed due to recent stress and comorbidities  · Psychiatric consult, appreciate recommendations  · PT OT consut    Severe episode of recurrent major depressive disorder, without psychotic features ([facility] )  Assessment & Plan  · Presented with severe depression, not taking medications  · Denies any SI/HI or plan, does not need one-to-one observation at this point  · Has not seen by psychiatrist/PCP for a while due to recent COVID situation  · Recently seen by Dr Kelechi Burton in [place] during recent hospitalization - recently increased Prozac to 60 mg  · Psychiatric consult, recommending continuation of Prozac 60 mg OD, Ativan 0 5 mg b i d   · Recommend to follow-up with her regular psychiatrist at [place] time of discharge  Calf tenderness  Assessment & Plan  · Patient is complaining of bilateral calf tenderness; left greater than right  · Denying any chest pain, shortness of breath, prior history of DVT  · Will follow up with D dimer tomorrow     · Will consider bilateral duplex to assess for DVT; but currently low probability    Asymptomatic bacteriuria  Assessment & Plan  · UA showed bacteriuria, patient denies any urinary symptoms  · She has been afebrile, no leukocytosis  · Status post 1 dose of IV ceftriaxone in ED  · Will hold off further doses of antibiotic for now    Acquired hypothyroidism  Assessment & Plan  · Continue home Synthroid    GERD (gastroesophageal reflux disease)  Assessment & Plan  · Continue famotidine    Diabetes mellitus ([facility] )  Assessment & Plan  Lab Results   Component Value Date    HGBA1C 11 5 (H) 2021       Recent Labs     21  1132 21  1551 21  0818 21  1153   POCGLU 214* 180* 130 155*       Blood Sugar Average: Last 72 hrs:  (P) 822 [id number]     · Uncontrolled, patient is noncompliant  · Patient is supposed to be on insulin, empagliflozin, alogliptin  · Continue Lantus 20 units q h s  + sliding scale insulin  · Hold home oral medication for now  · Currently blood glucose is at goals  Hyperlipidemia  Assessment & Plan  · Continue Lipitor    Restless leg syndrome  Assessment & Plan  · Continue home dose of gabapentin    Seizures (HCC)  Assessment & Plan  · Stable, continue home Topamax      VTE Pharmacologic Prophylaxis:   Pharmacologic: Enoxaparin (Lovenox)  Mechanical VTE Prophylaxis in Place: Yes    Discussions with Specialists or Other Care Team Provider: Yes    Education and Discussions with Family / Patient: Yes    Current Length of Stay: 2 day(s)    Current Patient Status: Inpatient     Discharge Plan / Estimated Discharge Date: TBD    Code Status: Level 1 - Full Code      Subjective:   She was seen and examined at the bedside  She is complaining of toes pain and calf tenderness bilaterally  Denies any history of DVT  She also reports that her appetite is not good for long time however she is trying to eat more  No acute event overnight  Objective:     Vitals:   Temp (24hrs), Av 7 °F (36 5 °C), Min:97 7 °F (36 5 °C), Max:97 8 °F (36 6 °C)    Temp:  [97 7 °F (36 5 °C)-97 8 °F (36 6 °C)] 97 8 °F (36 6 °C)  HR:  [59-63] 63  Resp:  [16] 16  BP: (107-112)/(48-71) 112/71  SpO2:  [97 %-98 %] 98 %  Body mass index is 57 66 kg/m²  Input and Output Summary (last 24 hours):     No intake or output data in the 24 hours ending 21 1314    Physical Exam:     Physical Exam  Vitals signs and nursing note reviewed  Constitutional:       General: She is not in acute distress  Appearance: She is obese  She is not ill-appearing     HENT:      Head: Normocephalic and atraumatic  Eyes:      General: No scleral icterus  Conjunctiva/sclera: Conjunctivae normal    Cardiovascular:      Rate and Rhythm: Normal rate and regular rhythm  Pulses: Normal pulses  Heart sounds: Normal heart sounds  No murmur  Pulmonary:      Effort: Pulmonary effort is normal  No respiratory distress  Breath sounds: Normal breath sounds  Abdominal:      General: Abdomen is flat  Bowel sounds are normal  There is no distension  Palpations: Abdomen is soft  Musculoskeletal:         General: Tenderness (Calf tenderness bilaterally left more than right  Toes are very tender to touch ) present  Right lower leg: No edema  Left lower leg: No edema  Comments: Very long and deformed toe nails  Neurological:      Mental Status: She is alert and oriented to person, place, and time  Psychiatric:         Mood and Affect: Mood normal          Behavior: Behavior normal          Additional Data:     Labs:    Results from last 7 days   Lab Units 03/13/21  0503   WBC Thousand/uL 5 98   HEMOGLOBIN g/dL 15 1   HEMATOCRIT % 46 8*   PLATELETS Thousands/uL 199  207   NEUTROS PCT % 60   LYMPHS PCT % 28   MONOS PCT % 9   EOS PCT % 3     Results from last 7 days   Lab Units 03/13/21  0503 03/12/21  1132   POTASSIUM mmol/L 3 7 3 8   CHLORIDE mmol/L 107 104   CO2 mmol/L 25 26   BUN mg/dL 26* 30*   CREATININE mg/dL 0 66 0 85   CALCIUM mg/dL 9 1 9 2   ALK PHOS U/L  --  95 2   ALT U/L  --  19   AST U/L  --  21           * I Have Reviewed All Lab Data Listed Above  * Additional Pertinent Lab Tests Reviewed:  [facility]      Recent Cultures (last 7 days):     Results from last 7 days   Lab Units 03/12/21  1729   URINE CULTURE  Culture too young- will reincubate       Last 24 Hours Medication List:   Current Facility-Administered Medications   Medication Dose Route Frequency Provider Last Rate    acetaminophen  650 mg Oral Q6H PRN José Antonio Bowie MD      aluminum-magnesium hydroxide-simethicone  30 mL Oral Q6H PRN José Antonio Bowie MD      aspirin  81 mg Oral Daily José Antonio Bowie MD      atorvastatin  40 mg Oral Daily With Violeta Mares MD      enoxaparin  60 mg Subcutaneous Q12H [address] José Antonio Bowie MD      famotidine  20 mg Oral BID José Antonio Bowie MD      FLUoxetine  60 mg Oral Daily José Antonio Bowie MD      gabapentin  100 mg Oral BID Dafne Neff MD      gabapentin  300 mg Oral HS José Antonio Bowie MD      insulin glargine  20 Units Subcutaneous HS José Antonio Bowie MD      insulin lispro  1-5 Units Subcutaneous TID Dante Beard MD      levothyroxine  50 mcg Oral Daily José Antonio Bowie MD      LORazepam  0 5 mg Oral BID Claire Jimenez MD      polyethylene glycol  17 g Oral Daily PRN José Antonio Bowie MD      topiramate  200 mg Oral BID José Antonio Bowie MD          Today, Patient Was Seen By: Thuy Zarate MD    ** Please Note: This note has been constructed using a voice recognition system   **

## 2021-03-14 NOTE — ASSESSMENT & PLAN NOTE
· Patient is complaining of bilateral calf tenderness; left greater than right  · Denying any chest pain, shortness of breath, prior history of DVT  · Will follow up with D dimer tomorrow     · Will consider bilateral duplex to assess for DVT; but currently low probability

## 2021-03-15 ENCOUNTER — APPOINTMENT (INPATIENT)
Dept: VASCULAR ULTRASOUND | Facility: HOSPITAL | Age: 66
DRG: 948 | End: 2021-03-15
Payer: COMMERCIAL

## 2021-03-15 LAB
ANION GAP SERPL CALCULATED.3IONS-SCNC: 10 MMOL/L (ref 4–13)
BUN SERPL-MCNC: 21 MG/DL (ref 6–20)
CALCIUM SERPL-MCNC: 8.9 MG/DL (ref 8.4–10.2)
CHLORIDE SERPL-SCNC: 108 MMOL/L (ref 96–108)
CO2 SERPL-SCNC: 22 MMOL/L (ref 22–33)
CREAT SERPL-MCNC: 0.69 MG/DL (ref 0.4–1.1)
D DIMER PPP FEU-MCNC: 0.86 MG/L FEU (ref 0.19–0.49)
ERYTHROCYTE [DISTWIDTH] IN BLOOD BY AUTOMATED COUNT: 13.4 % (ref 11.6–15.1)
GFR SERPL CREATININE-BSD FRML MDRD: 91 ML/MIN/1.73SQ M
GLUCOSE SERPL-MCNC: 106 MG/DL (ref 65–140)
GLUCOSE SERPL-MCNC: 107 MG/DL (ref 65–140)
GLUCOSE SERPL-MCNC: 132 MG/DL (ref 65–140)
GLUCOSE SERPL-MCNC: 159 MG/DL (ref 65–140)
GLUCOSE SERPL-MCNC: 160 MG/DL (ref 65–140)
GLUCOSE SERPL-MCNC: 171 MG/DL (ref 65–140)
HCT VFR BLD AUTO: 48.6 % (ref 34.8–46.1)
HGB BLD-MCNC: 15.9 G/DL (ref 11.5–15.4)
MCH RBC QN AUTO: 28.4 PG (ref 26.8–34.3)
MCHC RBC AUTO-ENTMCNC: 32.7 G/DL (ref 31.4–37.4)
MCV RBC AUTO: 87 FL (ref 82–98)
PLATELET # BLD AUTO: 193 THOUSANDS/UL (ref 149–390)
PMV BLD AUTO: 11.6 FL (ref 8.9–12.7)
POTASSIUM SERPL-SCNC: 3.9 MMOL/L (ref 3.5–5)
RBC # BLD AUTO: 5.59 MILLION/UL (ref 3.81–5.12)
SODIUM SERPL-SCNC: 140 MMOL/L (ref 133–145)
WBC # BLD AUTO: 5.99 THOUSAND/UL (ref 4.31–10.16)

## 2021-03-15 PROCEDURE — 97116 GAIT TRAINING THERAPY: CPT

## 2021-03-15 PROCEDURE — 97163 PT EVAL HIGH COMPLEX 45 MIN: CPT

## 2021-03-15 PROCEDURE — 99232 SBSQ HOSP IP/OBS MODERATE 35: CPT | Performed by: INTERNAL MEDICINE

## 2021-03-15 PROCEDURE — 97535 SELF CARE MNGMENT TRAINING: CPT

## 2021-03-15 PROCEDURE — 93970 EXTREMITY STUDY: CPT

## 2021-03-15 PROCEDURE — 93970 EXTREMITY STUDY: CPT | Performed by: SURGERY

## 2021-03-15 PROCEDURE — 80048 BASIC METABOLIC PNL TOTAL CA: CPT | Performed by: INTERNAL MEDICINE

## 2021-03-15 PROCEDURE — 85379 FIBRIN DEGRADATION QUANT: CPT | Performed by: INTERNAL MEDICINE

## 2021-03-15 PROCEDURE — 85027 COMPLETE CBC AUTOMATED: CPT | Performed by: INTERNAL MEDICINE

## 2021-03-15 PROCEDURE — 97167 OT EVAL HIGH COMPLEX 60 MIN: CPT

## 2021-03-15 PROCEDURE — 82948 REAGENT STRIP/BLOOD GLUCOSE: CPT

## 2021-03-15 RX ADMIN — LORAZEPAM 0.5 MG: 0.5 TABLET ORAL at 08:49

## 2021-03-15 RX ADMIN — INSULIN GLARGINE 20 UNITS: 100 INJECTION, SOLUTION SUBCUTANEOUS at 21:50

## 2021-03-15 RX ADMIN — MELATONIN 6 MG: 3 TAB ORAL at 21:49

## 2021-03-15 RX ADMIN — ENOXAPARIN SODIUM 60 MG: 60 INJECTION SUBCUTANEOUS at 08:48

## 2021-03-15 RX ADMIN — INSULIN LISPRO 1 UNITS: 100 INJECTION, SOLUTION INTRAVENOUS; SUBCUTANEOUS at 12:06

## 2021-03-15 RX ADMIN — GABAPENTIN 100 MG: 100 CAPSULE ORAL at 13:59

## 2021-03-15 RX ADMIN — FAMOTIDINE 20 MG: 20 TABLET ORAL at 16:56

## 2021-03-15 RX ADMIN — FLUOXETINE 60 MG: 20 CAPSULE ORAL at 08:54

## 2021-03-15 RX ADMIN — LORAZEPAM 0.5 MG: 0.5 TABLET ORAL at 16:56

## 2021-03-15 RX ADMIN — Medication 250 MG: at 08:49

## 2021-03-15 RX ADMIN — GABAPENTIN 100 MG: 100 CAPSULE ORAL at 08:49

## 2021-03-15 RX ADMIN — ENOXAPARIN SODIUM 60 MG: 60 INJECTION SUBCUTANEOUS at 21:50

## 2021-03-15 RX ADMIN — ASPIRIN 81 MG: 81 TABLET, DELAYED RELEASE ORAL at 08:49

## 2021-03-15 RX ADMIN — LEVOTHYROXINE SODIUM 50 MCG: 50 TABLET ORAL at 08:49

## 2021-03-15 RX ADMIN — ATORVASTATIN CALCIUM 40 MG: 40 TABLET, FILM COATED ORAL at 16:56

## 2021-03-15 RX ADMIN — Medication 250 MG: at 16:56

## 2021-03-15 RX ADMIN — FAMOTIDINE 20 MG: 20 TABLET ORAL at 08:49

## 2021-03-15 RX ADMIN — TOPIRAMATE 200 MG: 100 TABLET, FILM COATED ORAL at 16:56

## 2021-03-15 RX ADMIN — INSULIN LISPRO 1 UNITS: 100 INJECTION, SOLUTION INTRAVENOUS; SUBCUTANEOUS at 16:59

## 2021-03-15 RX ADMIN — TOPIRAMATE 200 MG: 100 TABLET, FILM COATED ORAL at 08:49

## 2021-03-15 RX ADMIN — GABAPENTIN 300 MG: 300 CAPSULE ORAL at 21:49

## 2021-03-15 NOTE — PROGRESS NOTES
Patient examined spoke with the nurse patient is alert awake cooperative she looks brighter and able to smile during the session she is sitting in a chair and she reports that she is feeling better she is calmer with anxiety and she slept better last night  Patient has trouble in ambulating and she is still weak she is willing to work in physical therapy to get stronger so she can maintain her self at home  After the discussion she also agreed that short-term physical rehab will be helpful to her so she does not bounce back to the hospital again  Patient seems better with the depression happy with medications Prozac 60 mg daily and Ativan 0 5 mg p o  B i d   Nurse reports no behavior problem  Patient is suffering from chronic depression and anxiety  Patient has a long history of depression she follows up at [facility] with her psychiatrist to get the medications  No psychosis no hallucinations  Patient denies auditory or visual hallucinations  Patient denies suicidal or homicidal ideation  She is happy to see me and thanking me for talking to her patient is cooperative and comply with the medications and treatment  I will continue medications and therapy

## 2021-03-15 NOTE — PHYSICAL THERAPY NOTE
PHYSICAL THERAPY EVALUATION & TREATMENT  Time: 7575-2248    NAME:  Jovani Hernandez  DATE: 03/15/21    AGE:   76 y o  Mrn:   [id number]  Length Of Stay: 3    ADMIT DX:  Suicidal behavior [R45 89]  UTI (urinary tract infection) [N39 0]  Depression [F32 9]  Weakness [R53 1]    Past Medical History:   Diagnosis Date    Diabetes mellitus ([facility] )     Disease of thyroid gland     Hyperlipidemia     Hypertension     Hypothyroidism (acquired)     Restless leg syndrome     Seizures ([facility] )      Past Surgical History:   Procedure Laterality Date    APPENDECTOMY      CHOLECYSTECTOMY      HIP FRACTURE SURGERY Right     TONSILLECTOMY      TUBAL LIGATION         Performed at least 2 patient identifiers during session: Name, Kimberly Kolb and ID bracelet        03/15/21 0912   PT Last Visit   PT Visit Date 03/15/21   Note Type   Note type Evaluation  (& treatment, see 'additional comments' for treatment note )   Pain Assessment   Pain Assessment Tool 0-10   Pain Score No Pain   Effect of Pain on Daily Activities n/a   Hospital Pain Intervention(s) Ambulation/increased activity;Repositioned   Multiple Pain Sites No   Home Living   Type of Home Apartment  (Senior apartment building )   Home Layout One level;Elevator   Bathroom Shower/Tub Tub/shower unit  (pt sponge bathes at bathroom sink )   [address] transfer bench   Bathroom Accessibility Accessible via [address]  (rollator walker)   Prior Function   Level of [place] Independent with ADLs and functional mobility   Lives With [facility] in the last 6 months 1 to 4   Comments Pt reports living at home alone in a senior apartment with elevator access  Pt reports being independent with ADLs and IADLs  Ambulates with no AD in the home and rollator walker in the community  Takes public transportation to community needs  Completes all cooking/cleaning/home management  Restrictions/Precautions   Weight Bearing Precautions Per Order No   Other Precautions Chair Alarm; Bed Alarm; Fall Risk   General   Additional Pertinent History Pt admitted 3/12/2021 with c/o SI due to recent difficulty caring for self, dizziness, weakness  Recent d/c from [facility]  Dx: Chronic fatigue and malaise  Family/Caregiver Present No   Cognition   Overall Cognitive Status WFL   Arousal/Participation Alert   Orientation Level Oriented X4   Memory Within functional limits   Following Commands Follows one step commands without difficulty   RUE Assessment   RUE Assessment WFL   RUE Strength   RUE Overall Strength Within Functional Limits - able to perform ADL tasks with strength   LUE Assessment   LUE Assessment WFL   LUE Strength   LUE Overall Strength Within Functional Limits - able to perform ADL tasks with strength   RLE Assessment   RLE Assessment X   Strength RLE   R Hip Flexion 2/5   R Knee Flexion 3+/5   R Knee Extension 3+/5   R Ankle Dorsiflexion 3+/5   LLE Assessment   LLE Assessment X   Strength LLE   L Hip Flexion 2/5   L Knee Flexion 3+/5   L Knee Extension 3+/5   L Ankle Dorsiflexion 3+/5   Coordination   Movements are Fluid and Coordinated 1   Sensation WFL   Light Touch   RLE Light Touch Grossly intact   LLE Light Touch Grossly intact   Bed Mobility   Supine to Sit 4  Minimal assistance   Additional items Assist x 1;Bedrails; Increased time required  (in flat bed)   Sit to Supine Unable to assess   Transfers   Sit to Stand 3  Moderate assistance   Additional items Assist x 1; Increased time required   Stand to Sit 3  Moderate assistance   Additional items Assist x 1; Increased time required   Stand pivot 4  Minimal assistance   Additional items Assist x 1; Increased time required;Verbal cues   Ambulation/Elevation   Gait pattern Improper Weight shift; Wide CLAUDIO; Decreased foot clearance; Short stride   Gait Assistance 4  Minimal assist   Additional items Assist x 1;Verbal cues; Tactile cues   Assistive Device Bariatric Rolling walker   Distance 16ft x1   Stair Management Assistance Not tested   Balance   Static Sitting Good   Dynamic Sitting Fair   Static Standing Fair   Dynamic Standing Fair   Ambulatory Fair   Endurance Deficit   Endurance Deficit Yes   Activity Tolerance   Activity Tolerance Patient limited by fatigue   Medical Staff Made Aware Spoke with SLIM and REBECCA    Nurse Made Aware Spoke with DEVAN Noriega pre/post session    Assessment   Prognosis Good   Problem List Decreased strength;Decreased range of motion;Decreased endurance; Impaired balance;Decreased mobility; Decreased safety awareness; Obesity; Decreased skin integrity   Assessment Pt seen for PT evaluation, cleared by DEVAN Starkey, activity orders of "up and OOB as tolerated"  Pt admitted 3/12/2021 with dizziness and weakness, dx Chronic fatigue and malaise  Comorbidities affecting pt's fnxl performance include: diabetes, falls, GERD, hypertension, hypothyroid, hyperlipidemia and seizures, h/o hip fracture  Personal factors affecting pt at time of PT IE include: ambulating with assistive device, inability to ambulate household distances, inability to navigate community distances, limited home support, positive fall history, inability to perform ADLS, inability to perform IADLS  and inability to live alone  PTA, pt was independent with functional mobility with no AD in the home and rollator walker in the community, independent with ADLs and IADLs, living alone in a senior apartment with elevator  Pt demonstrates fnxl impairments identified in objective findings from Elderly Mobility Scale assessment, scoring 7/20, indicating high degree of dependency on others for completion of ADLs & mobility  Gait speed and 30 second STS assessment indicate high risk of falls  Pt scores 14/24 on AM-PAC objective tool, indicating need for extensive rehab services upon d/c from the acute care setting       The Barthel Index outcome tool was used & pt scores 45 indicating severe limitations of fnxl mobility/ADLS  Pt is at risk of falls d/t multiple comorbidities, history of previous falls, impaired balance, impaired insight/safety awareness, use of assistive device, varying levels of pain , advanced age, acuity of medical illness and ongoing medical treatment of primary diagnosis  Pt's clinical presentation is currently unstable/unpredictable seen in pt's presentation of vital sign response, changing level of pain, increased fall risk, new onset of impairment of functional mobility, decreased endurance and new onset of weakness  This PT IE requires high complexity clinical decision making, based on multiple medical/physiological/fnxl/social factors which affect pt's performance w/ evaluation & therapist judgement for disposition recommendations  Pt will benefit from continued PT treatment in order to address impairments, decrease risk of falls, maximize independence w/ fnxl mobility, & ensure safety w/ mobility for transition to next level of care  Based on pt presentation & impairments, pt would most appropriately benefit from post acute STR upon d/c     Barriers to Discharge Decreased caregiver support; Inaccessible home environment   Goals   Patient Goals "to go to rehab"    STG Expiration Date 03/25/21   Short Term Goal #1 Patient PT goals established in order to address patient self reported goal of "to go to rehab"  1  Pt will complete all bed mobility in flat bed without use of bed rails at [facility] level, in order to promote increased OOB functional mobility to improve overall activity tolerance  2  Pt will complete all transfers with RW at [facility] level, in order to increase safety with functional mobility  3  Pt will ambulate >100ft with RW and S in order to increase safety with household and short community distance functional mobility  4  Pt will tolerate >3hrs OOB in upright position, in order to improve muscular endurance and respiratory status   5  Pt will improve B LE strength to >/= 4/5 MMT throughout, in order to increase safety with functional mobility and decrease risk of falls  6  Pt will demonstrate understanding and independence with LE strengthening HEP  7  Pt will improve AM-PAC score to >/= 19/24 in order to increase independence with mobility and decrease burden of care  8  Pt will improve Barthel Index score to >/= 55/100 in order to increase independence and decrease risk of falls  9  Pt will improve Elderly Mobility Scale score to >/= 11/20 in order to decrease burden of care and increase independence with functional mobility and ADLs  10  Pt will improve ambulatory balance by >/= 1/2 grade in order to promote safety and increased independence with mobility  PT Treatment Day 1  (See 'additional comments' for treatment note)   Plan   Treatment/Interventions ADL retraining;Functional transfer training;LE strengthening/ROM; Therapeutic exercise; Endurance training;Patient/family training;Equipment eval/education; Bed mobility;Gait training;Spoke to MD;Spoke to nursing;Spoke to case management;OT   PT Frequency Other (Comment)  (3-5x/wk )   Recommendation   PT Discharge Recommendation [facility] Recommended HD Bariatric wheeled walker   Change/add to Decisive BI? No   Additional Comments PHYSICAL THERAPY TREATMENT NOTE:   TIME IN: 0855   TIME OUT: 0912   TOTAL TREATMENT TIME: 17 minutes  Pt is agreeable to participate in further mobility training post IE  Pt progresses from requiring MODA to requiring [address] [facility] for transfers with increased repetition  Pt ambulates 28ft with bariatric RW and ANDREW, cont' dec step length and foot clearance  Pt stands at sink approx 2 minutes to complete functional hygiene tasks,  Pt initially able to stand with S, however pt with B knee buckling requiring assist to prevent fall and chair provided behind pt to sit   At end of session pt was left seated in bedside recliner chair with chair alarm engaged and needs within reach, RN aware of pt status  Regarding functional mobility, pt continues to require physical assist for all aspects of functional mobility, is not safe for return to home alone  Recommend post acute STR once medically cleared for d/c from the acute care setting  Will continue skilled PT POC as able and appropriate to address remaining functional impairments  Additional Comments 2 GAIT SPEED = 0 21 m/s, 30 SECOND SIT TO STAND ASSESSMENT = 5 STANDS  AM-PAC Basic Mobility Inpatient   Turning in Bed Without Bedrails 3   Lying on Back to Sitting on Edge of Flat Bed 3   Moving Bed to Chair 2   Standing Up From Chair 2   Walk in Room 3   Climb 3-5 Stairs 1   Basic Mobility Inpatient Raw Score 14   Basic Mobility Standardized Score 35 55   Modified Danyelle Scale   Modified [place] Scale 4   Barthel Index   Feeding 10   Bathing 0   Grooming Score 0   Dressing Score 5   Bladder Score 10   Bowels Score 10   Toilet Use Score 5   Transfers (Bed/Chair) Score 5   Mobility (Level Surface) Score 0   Stairs Score 0   Barthel Index Score 45       The patient's AM-PAC Basic Mobility Inpatient Short Form Raw Score is 14, Standardized Score is 35 55  A standardized score less than 42 9 suggests the patient may benefit from discharge to post-acute rehabilitation services  Please also refer to the recommendation of the physical therapist for safe discharge planning        ELDERLY MOBILITY SCALE OUTCOME MEASURE:   [facility]: ([address] [facility]; n=36; age= 70-93)  Level of independence and EMS scores:   Score > 14 = independent in basic ADLs and safe for independent mobility maneuvers (with or without device)   Score 10-13 = borderline in terms of safe mobility and independence in ADLs (require some help with ADLs and mobility maneuvers)    Score < 10 = dependent or high degree of dependency for basic ADLs and limited mobility maneuvers (such as transfers, toileting, dressing)  Discharge recommendations and EMS scores:   Score 0-6 = post acute STR / SNF / IRF   Score 7-13 = home with high levels of care - ie: skilled caretaker, community resources, family asssistance   Score 14-20 = home   Please refer to therapist full assessment & documentation for most appropriate recommendation and details for discharge  27 SECOND SIT TO STAND ASSESSMENT NORMS:  Age: 65-70 y/o   Male: <12   Female: <11  Less than the number indicated for the appropriate age and gender suggests that patient is at an increased risk of falls  These norms are also taken w/ patients who do not use upper extremities to assist to get up from the chair            Kortney Fang PT, DPT   Available via [employer]  NPI # [id number]  [place] License - [id number]  [place] License - [id number]  3/15/2021

## 2021-03-15 NOTE — PROGRESS NOTES
Terrie U  65   Progress Note - Blancaelizabeth Hugginsdavid 1955, 76 y o  female MRN: [id number]  Unit/Bed#: -01 Encounter: [id number]  Primary Care Provider: Britton Castillo MD   Date and time admitted to hospital: 3/12/2021 11:13 AM    * Chronic fatigue and malaise  Assessment & Plan  · Presented with generalized weakness secondary to major depression  · Patient states she feels overwhelmed due to recent stress and comorbidities  · Psychiatric consult, appreciate recommendations  · PT OT consult, recommend acute post rehab  ·  is working on placement    Severe episode of recurrent major depressive disorder, without psychotic features ([facility] )  Assessment & Plan  · Presented with severe depression, not taking medications  · Denies any SI/HI or plan, does not need one-to-one observation at this point  · Has not seen by psychiatrist/PCP for a while due to recent COVID situation  · Recently seen by Dr Kelli Dodson in [place] during recent hospitalization - recently increased Prozac to 60 mg  · Psychiatric consult, recommending continuation of Prozac 60 mg OD, Ativan 0 5 mg b i d   · Recommend to follow-up with her regular psychiatrist at [place] time of discharge      Calf tenderness  Assessment & Plan  · Patient is complaining of bilateral calf tenderness; left greater than right  · Denying any chest pain, shortness of breath, prior history of DVT  · D-dimer 0 89  · Follow-up bilateral duplex to assess for DVT; but currently low probability    Asymptomatic bacteriuria  Assessment & Plan  · UA showed bacteriuria, patient denies any urinary symptoms  · She has been afebrile, no leukocytosis  · Status post 1 dose of IV ceftriaxone in ED  · Will hold off further doses of antibiotic for now    Acquired hypothyroidism  Assessment & Plan  · Continue home Synthroid    GERD (gastroesophageal reflux disease)  Assessment & Plan  · Continue famotidine    Diabetes mellitus ([facility] )  Assessment & Plan  Lab Results Component Value Date    HGBA1C 11 5 (H) 2021       Recent Labs     21  1626 03/14/21  2027 03/15/21  0709 03/15/21  1059   POCGLU 163* 171* 107 159*       Blood Sugar Average: Last 72 hrs:  (P) 166 6     · Uncontrolled, patient is noncompliant  · Patient is supposed to be on insulin, empagliflozin, alogliptin  · Continue Lantus 20 units q h s  + sliding scale insulin  · Hold home oral medication for now  · Currently blood glucose is at goals  Hyperlipidemia  Assessment & Plan  · Continue Lipitor    Restless leg syndrome  Assessment & Plan  · Continue home dose of gabapentin    Seizures ([address] )  Assessment & Plan  · Stable, continue home Topamax        VTE Pharmacologic Prophylaxis:   Pharmacologic: Enoxaparin (Lovenox)  Mechanical VTE Prophylaxis in Place: No    Discussions with Specialists or Other Care Team Provider: Yes    Education and Discussions with Family / Patient: Yes    Current Length of Stay: 3 day(s)    Current Patient Status: Inpatient     Discharge Plan / Estimated Discharge Date:  Pending placement,  is working on it  Code Status: Level 1 - Full Code      Subjective:   She was seen and examined at the bedside  She reports she is feeling okay  She is pleasant and cooperative  No new complaint  No acute event overnight  Objective:     Vitals:   Temp (24hrs), Av 8 °F (36 °C), Min:96 °F (35 6 °C), Max:97 4 °F (36 3 °C)    Temp:  [96 °F (35 6 °C)-97 4 °F (36 3 °C)] 96 °F (35 6 °C)  HR:  [57-62] 60  Resp:  [18] 18  BP: (124-130)/(60-68) 128/66  SpO2:  [95 %-97 %] 97 %  Body mass index is 57 66 kg/m²  Input and Output Summary (last 24 hours):     No intake or output data in the 24 hours ending 03/15/21 1534    Physical Exam:     Physical Exam  Vitals signs and nursing note reviewed  Constitutional:       General: She is not in acute distress  Appearance: She is obese  She is not ill-appearing  HENT:      Head: Normocephalic and atraumatic     Eyes: General: No scleral icterus  Conjunctiva/sclera: Conjunctivae normal    Cardiovascular:      Rate and Rhythm: Normal rate and regular rhythm  Pulses: Normal pulses  Heart sounds: Murmur present  Pulmonary:      Effort: Pulmonary effort is normal  No respiratory distress  Breath sounds: Normal breath sounds  Abdominal:      General: Abdomen is flat  Bowel sounds are normal  There is no distension  Palpations: Abdomen is soft  Musculoskeletal:         General: Tenderness (Calf tenderness bilaterally left more than right ) present  Right lower leg: No edema  Left lower leg: No edema  Comments: Very long and deformed toe nails  Neurological:      Mental Status: She is alert and oriented to person, place, and time  Psychiatric:         Mood and Affect: Mood normal          Behavior: Behavior normal          Additional Data:     Labs:    Results from last 7 days   Lab Units 03/15/21  0448 03/13/21  0503   WBC Thousand/uL 5 99 5 98   HEMOGLOBIN g/dL 15 9* 15 1   HEMATOCRIT % 48 6* 46 8*   PLATELETS Thousands/uL 193 199  207   NEUTROS PCT %  --  60   LYMPHS PCT %  --  28   MONOS PCT %  --  9   EOS PCT %  --  3     Results from last 7 days   Lab Units 03/15/21  0448  03/12/21  1132   POTASSIUM mmol/L 3 9   < > 3 8   CHLORIDE mmol/L 108   < > 104   CO2 mmol/L 22   < > 26   BUN mg/dL 21*   < > 30*   CREATININE mg/dL 0 69   < > 0 85   CALCIUM mg/dL 8 9   < > 9 2   ALK PHOS U/L  --   --  95 2   ALT U/L  --   --  19   AST U/L  --   --  21    < > = values in this interval not displayed  * I Have Reviewed All Lab Data Listed Above  * Additional Pertinent Lab Tests Reviewed:  [facility]      Recent Cultures (last 7 days):     Results from last 7 days   Lab Units 03/12/21  1729   URINE CULTURE  >100,000 cfu/ml        Last 24 Hours Medication List:   Current Facility-Administered Medications   Medication Dose Route Frequency Provider Last Rate    acetaminophen  650 mg Oral Q6H PRN Ida Mark MD      aluminum-magnesium hydroxide-simethicone  30 mL Oral Q6H PRN Ida Mark MD      aspirin  81 mg Oral Daily Ida Mark MD      atorvastatin  40 mg Oral Daily With Kwesi Lind MD      enoxaparin  60 mg Subcutaneous Q12H Chetan Gann MD      famotidine  20 mg Oral BID Ida Mark MD      FLUoxetine  60 mg Oral Daily Ida Mark MD      gabapentin  100 mg Oral BID Sharif Borges MD      gabapentin  300 mg Oral HS Ida Mark MD      insulin glargine  20 Units Subcutaneous HS Ida Mark MD      insulin lispro  1-5 Units Subcutaneous TID AC Ida Mark MD      levothyroxine  50 mcg Oral Daily Ida Mark MD      loperamide  2 mg Oral TID PRN Everlene Spurling, MD      LORazepam  0 5 mg Oral BID Rushie Boast, MD      melatonin  6 mg Oral HS Frankie Gonzalez MD      polyethylene glycol  17 g Oral Daily PRN Ida Mark MD      saccharomyces boulardii  250 mg Oral BID Everlene Spurling, MD      topiramate  200 mg Oral BID Ida Mark MD          Today, Patient Was Seen By: Everlene Spurling, MD    ** Please Note: This note has been constructed using a voice recognition system   **

## 2021-03-15 NOTE — ASSESSMENT & PLAN NOTE
· Presented with generalized weakness secondary to major depression  · Patient states she feels overwhelmed due to recent stress and comorbidities  · Psychiatric consult, appreciate recommendations  · PT OT consult, recommend acute post rehab  ·  is working on placement

## 2021-03-15 NOTE — ASSESSMENT & PLAN NOTE
· Patient is complaining of bilateral calf tenderness; left greater than right  · Denying any chest pain, shortness of breath, prior history of DVT  · D-dimer 0 89  · Follow-up bilateral duplex to assess for DVT; but currently low probability

## 2021-03-15 NOTE — OCCUPATIONAL THERAPY NOTE
Occupational Therapy Evaluation/Treatment       03/15/21 0937   Note Type   Note type Evaluation   Restrictions/Precautions   Other Precautions Chair Alarm; Bed Alarm; Fall Risk   Pain Assessment   Pain Assessment Tool Pain Assessment not indicated - pt denies pain   Home Living   Type of Home Apartment   Home Layout One level;Elevator   Bathroom Shower/Tub Tub/shower unit   Bathroom Toilet Raised   Bathroom Equipment Tub transfer bench   Home Equipment   (rollator)   Additional Comments pt sponge bathes only   Prior Function   Level of [place] Independent with ADLs and functional mobility   Lives With Alone   Receives Help From Family   ADL Assistance Independent   IADLs Independent   Comments pt uses the bus to go for groceries and a cart      Lifestyle   Intrinsic Gratification going to [employer]   ADL   Eating Assistance 5  Supervision/Setup   Grooming Assistance 4  Minimal Assistance   UB Bathing Assistance 4  Minimal Assistance   LB Bathing Assistance 2  Maximal Assistance   UB Dressing Assistance 4  Minimal Assistance   LB Dressing Assistance 2  Maximal [address]  4  Minimal Assistance   Bed Mobility   Supine to Sit 4  Minimal assistance   Transfers   Sit to Stand 3  Moderate assistance   Stand to Sit 3  Moderate assistance   Functional Mobility   Functional Mobility 4  Minimal assistance   Additional Comments 15 feet   Additional items Rolling walker   Balance   Static Sitting Fair +   Dynamic Sitting Fair   Static Standing Fair   Dynamic Standing Fair -   Activity Tolerance   Activity Tolerance Patient limited by fatigue  (weakness)   Nurse Made Aware yesyes   RUE Assessment   RUE Assessment WFL  (B shoulder rotator cuff injury per pt)   LUE Assessment   LUE Assessment WFL  (B shoulder rotator cuff injury per pt)   Cognition   Overall Cognitive Status WFL   Arousal/Participation Cooperative   Attention Within functional limits   Orientation Level Oriented X4   Following Commands Follows one step commands without difficulty   Comments pt reports she was feeling very depressed at home prior to admission, pt with recent admission to [address] [place] [address] last week  Assessment   Limitation Decreased ADL status; Decreased UE strength;Decreased Safe judgement during ADL;Decreased endurance;Decreased self-care trans;Decreased high-level ADLs  (decreased balance and mobility )   Prognosis Good   Assessment Patient evaluated by Occupational Therapy  Patient admitted with Chronic fatigue and malaise  The patients occupational profile, medical and therapy history includes a extensive additional review of physical, cognitive, or psychosocial history related to current functional performance  Comorbidities affecting functional mobility and ADLS include: R hip fx, hypertension and seizures  Prior to admission, patient was independent with functional mobility with rollator, independent with ADLS and independent with IADLS  The evaluation identifies the following performance deficits: weakness, impaired balance, decreased endurance, increased fall risk, new onset of impairment of functional mobility, decreased ADLS, decreased IADLS, decreased activity tolerance, decreased safety awareness, impaired judgement and decreased strength, that result in activity limitations and/or participation restrictions  This evaluation requires clinical decision making of high complexity, because the patient presents with comorbidites that affect occupational performance and required significant modification of tasks or assistance with consideration of multiple treatment options  The Barthel Index was used as a functional outcome tool presenting with a score of 45, indicating marked limitations of functional mobility and ADLS  The patient's raw score on the AM-PAC Daily Activity inpatient short form is 17, standardized score is 37 26, less than 39 4   Patients at this level are likely to benefit from DC to post-acute rehabilitation services  Please refer to the recommendation of the Occupational Therapist for safe DC planning  Patient will benefit from skilled Occupational Therapy services to address above deficits and facilitate a safe return to prior level of function  Goals   Patient Goals get stronger, go home   STG Time Frame   (1-7 days)   Short Term Goal  Goals established to promote patient goal of get stronger, go home:  Patient will increase standing tolerance to 3 minutes during ADL task to decrease assistance level and decrease fall risk; Patient will increase bed mobility to supervision in preparation for ADLS and transfers; Patient will increase functional mobility to and from bathroom with rolling walker with supervision to increase performance with ADLS and to use a toilet; Patient will tolerate 10 minutes of UE ROM/strengthening to increase general activity tolerance and performance in ADLS/IADLS; Patient will improve functional activity tolerance to 10 minutes of sustained functional tasks to increase participation in basic self-care and decrease assistance level;  Patient will be able to to verbalize understanding and perform energy conservation/proper body mechanics during ADLS and functional mobility at least 75% of the time with minimal cueing to decrease signs of fatigue and increase stamina to return to prior level of function; Patient will increase dynamic sitting balance to fair+ to improve the ability to sit at edge of bed or on a chair for ADLS;  Patient will increase dynamic standing balance to fair to improve postural stability and decrease fall risk during standing ADLS and transfers       LTG Time Frame   (8-14 days)   Long Term Goal Goals established to promote patient goal of get stronger, go home:  Patient will increase standing tolerance to 6 minutes during ADL task to decrease assistance level and decrease fall risk; Patient will increase bed mobility independent in preparation for ADLS and transfers; Patient will increase functional mobility to and from bathroom with rolling walker independently to increase performance with ADLS and to use a toilet; Patient will tolerate 20 minutes of UE ROM/strengthening to increase general activity tolerance and performance in ADLS/IADLS; Patient will improve functional activity tolerance to 20 minutes of sustained functional tasks to increase participation in basic self-care and decrease assistance level;  Patient will be able to to verbalize understanding and perform energy conservation/proper body mechanics during ADLS and functional mobility at least 90% of the time without cueing to decrease signs of fatigue and increase stamina to return to prior level of function; Patient will increase dynamic sitting balance to good to improve the ability to sit at edge of bed or on a chair for ADLS;  Patient will increase dynamic standing balance to fair+ to improve postural stability and decrease fall risk during standing ADLS and transfers  Functional Transfer Goals   Pt Will Perform All Functional Transfers   (STG min assist LTG supervision )   ADL Goals   Pt Will Perform Grooming Standing at sink  (STG supervision LTG Independent )   Pt Will Perform Bathing   (STG mod assist LTG min assist )   Pt Will Perform UE Dressing   (STG supervision LTG Independent )   Pt Will Perform LE Dressing   (STG mod assist LTG min assist )   Pt Will Perform Toileting   (STG supervision LTG Independent )   Plan   Treatment Interventions ADL retraining;Functional transfer training;UE strengthening/ROM; Endurance training;Patient/family training;Equipment evaluation/education; Activityengagement; Compensatory technique education   Goal Expiration Date 03/29/21   OT Frequency 1-2x/wk   Additional Treatment Session   Start Time 3340   End Time 6662   Treatment Assessment Patient completed toilet transfer with min assist with grab bars  Urination on toilet, hygiene min assist in stance    Functional mobility 10 feet  Patient stood wash hands at sink with min assist with setup  Pts legs begane to [address] and pt needed to sitso chair brought to patient  Stand to sit min assist   Patient is generally weak and deconditioned  Patient will benefit from continued OT services to maximize functional performance with ADLS       Recommendation   OT Discharge Recommendation Post-Acute Rehabilitation Services   AM-PAC Daily Activity Inpatient   Lower Body Dressing 2   Bathing 2   Toileting 3   Upper Body Dressing 3   Grooming 3   Eating 4   Daily Activity Raw Score 17   Daily Activity Standardized Score (Calc for Raw Score >=11) 37 26   AM-PAC Applied Cognition Inpatient   Following a Speech/Presentation 4   Understanding Ordinary Conversation 4   Taking Medications 4   Remembering Where Things Are Placed or Put Away 4   Remembering List of 4-5 Errands 4   Taking Care of Complicated Tasks 4   Applied Cognition Raw Score 24   Applied Cognition Standardized Score 62 21   Barthel Index   Feeding 10   Bathing 0   Grooming Score 0   Dressing Score 5   Bladder Score 10   Bowels Score 10   Toilet Use Score 5   Transfers (Bed/Chair) Score 5   Mobility (Level Surface) Score 0   Stairs Score 0   Barthel Index Score 45   Licensure   [place] License Number  [employer] MS OTR/L

## 2021-03-15 NOTE — CASE MANAGEMENT
LOS: 3 days  Bundle: Not a bundle  Readmission: Readmission  Unplanned Readmission Score: 14    Pt admitted to hospital as an inpatient on 3/12/21 for chronic fatigue and malaise  CM Met with Pt on 3/15/21_  to introduce CM, review role, complete initial assessment and discuss DCP  Lives where: [address] [facility], [place], [place], [address]  Lives with: Alone  Home Type & Entry: Assisted living  Pt lives on 4th floor however there are no stairs in the building only elevators  DME: Straight cane and Rolator she does not use  ADLs: Needs assistance with cleaning and bathing  Pt cannot get into her tub so she only washes up at the sink  VNA history: None  STR history: Yes [address] Preference & Coverage: CAROLYN Aparicio mails  Pt prescriptions  Pt sometimes goes to [address] nearest to her home as well  Mental Health/Drug/ETOH history: None  Dialysis history: None  POA/AD/LW: None  Income/Employment: Survivor benefits  Transportation: [employer] or bus  PCP: Aimee Becker Home: Son or sons boyfriend or sons boyfriends mom  DCP:    CM spoke with pt about the possibility of rehab and discussed freedom of choice  Pt was agreeable and stated her preference as [facility] however was also open to CM sending referrals elsewhere if there were no available beds at [facility]  CM sent out referrals to rehab and is waiting for a response  D/C and transport pending acceptance to rehab facility  IMM given and placed in pt chart for scanning  CM reviewed discharge planning process including the following: identifying caregivers at home, preference for d/c planning needs,   availability of [facility] program, availability of treatment team to discuss questions or concerns patient and/or family may have regarding diagnosis, plan of care, old or new medications and discharge planning   CM will continue to follow for care coordination and update assessment as appropriate

## 2021-03-15 NOTE — ASSESSMENT & PLAN NOTE
Lab Results   Component Value Date    HGBA1C 11 5 (H) 03/03/2021       Recent Labs     03/14/21  1626 03/14/21  2027 03/15/21  0709 03/15/21  1059   POCGLU 163* 171* 107 159*       Blood Sugar Average: Last 72 hrs:  (P) 166 6     · Uncontrolled, patient is noncompliant  · Patient is supposed to be on insulin, empagliflozin, alogliptin  · Continue Lantus 20 units q h s  + sliding scale insulin  · Hold home oral medication for now  · Currently blood glucose is at goals

## 2021-03-15 NOTE — PLAN OF CARE
Problem: PHYSICAL THERAPY ADULT  Goal: Performs mobility at highest level of function for planned discharge setting  See evaluation for individualized goals  Description: Treatment/Interventions: ADL retraining, Functional transfer training, LE strengthening/ROM, Therapeutic exercise, Endurance training, Patient/family training, Equipment eval/education, Bed mobility, Gait training, Spoke to MD, Spoke to nursing, Spoke to case management, OT  Equipment Recommended: Neel Thorpe     See flowsheet documentation for full assessment, interventions and recommendations  Outcome: Progressing  Note: Prognosis: Good  Problem List: Decreased strength, Decreased range of motion, Decreased endurance, Impaired balance, Decreased mobility, Decreased safety awareness, Obesity, Decreased skin integrity  Assessment: Pt seen for PT evaluation, cleared by DEVAN Martinez, activity orders of "up and OOB as tolerated"  Pt admitted 3/12/2021 with dizziness and weakness, dx Chronic fatigue and malaise  Comorbidities affecting pt's fnxl performance include: diabetes, falls, GERD, hypertension, hypothyroid, hyperlipidemia and seizures, h/o hip fracture  Personal factors affecting pt at time of PT IE include: ambulating with assistive device, inability to ambulate household distances, inability to navigate community distances, limited home support, positive fall history, inability to perform ADLS, inability to perform IADLS  and inability to live alone  PTA, pt was independent with functional mobility with no AD in the home and rollator walker in the community, independent with ADLs and IADLs, living alone in a senior apartment with elevator  Pt demonstrates fnxl impairments identified in objective findings from Elderly Mobility Scale assessment, scoring 7/20, indicating high degree of dependency on others for completion of ADLs & mobility  Gait speed and 30 second STS assessment indicate high risk of falls   Pt scores 14/24 on AM-PAC objective tool, indicating need for extensive rehab services upon d/c from the acute care setting  The Barthel Index outcome tool was used & pt scores 45 indicating severe limitations of fnxl mobility/ADLS  Pt is at risk of falls d/t multiple comorbidities, history of previous falls, impaired balance, impaired insight/safety awareness, use of assistive device, varying levels of pain , advanced age, acuity of medical illness and ongoing medical treatment of primary diagnosis  Pt's clinical presentation is currently unstable/unpredictable seen in pt's presentation of vital sign response, changing level of pain, increased fall risk, new onset of impairment of functional mobility, decreased endurance and new onset of weakness  This PT IE requires high complexity clinical decision making, based on multiple medical/physiological/fnxl/social factors which affect pt's performance w/ evaluation & therapist judgement for disposition recommendations  Pt will benefit from continued PT treatment in order to address impairments, decrease risk of falls, maximize independence w/ fnxl mobility, & ensure safety w/ mobility for transition to next level of care  Based on pt presentation & impairments, pt would most appropriately benefit from post acute STR upon d/c    Barriers to Discharge: Decreased caregiver support, Inaccessible home environment     PT Discharge Recommendation: [address]    See flowsheet documentation for full assessment

## 2021-03-15 NOTE — ASSESSMENT & PLAN NOTE
· Presented with severe depression, not taking medications  · Denies any SI/HI or plan, does not need one-to-one observation at this point  · Has not seen by psychiatrist/PCP for a while due to recent COVID situation  · Recently seen by Dr Mari Alan in [facility] during recent hospitalization - recently increased Prozac to 60 mg  · Psychiatric consult, recommending continuation of Prozac 60 mg OD, Ativan 0 5 mg b i d   · Recommend to follow-up with her regular psychiatrist at [facility] time of discharge

## 2021-03-16 PROBLEM — E66.01 MORBID OBESITY DUE TO EXCESS CALORIES (HCC): Status: ACTIVE | Noted: 2021-03-16

## 2021-03-16 PROBLEM — B35.1 ONYCHOMYCOSIS OF TOENAIL: Status: ACTIVE | Noted: 2021-03-16

## 2021-03-16 LAB
GLUCOSE SERPL-MCNC: 139 MG/DL (ref 65–140)
GLUCOSE SERPL-MCNC: 221 MG/DL (ref 65–140)

## 2021-03-16 PROCEDURE — 82948 REAGENT STRIP/BLOOD GLUCOSE: CPT

## 2021-03-16 PROCEDURE — 99233 SBSQ HOSP IP/OBS HIGH 50: CPT | Performed by: INTERNAL MEDICINE

## 2021-03-16 PROCEDURE — 97116 GAIT TRAINING THERAPY: CPT

## 2021-03-16 RX ADMIN — INSULIN LISPRO 1 UNITS: 100 INJECTION, SOLUTION INTRAVENOUS; SUBCUTANEOUS at 12:45

## 2021-03-16 RX ADMIN — GABAPENTIN 100 MG: 100 CAPSULE ORAL at 09:21

## 2021-03-16 RX ADMIN — FAMOTIDINE 20 MG: 20 TABLET ORAL at 19:46

## 2021-03-16 RX ADMIN — Medication 250 MG: at 09:21

## 2021-03-16 RX ADMIN — ASPIRIN 81 MG: 81 TABLET, DELAYED RELEASE ORAL at 09:21

## 2021-03-16 RX ADMIN — LORAZEPAM 0.5 MG: 0.5 TABLET ORAL at 19:45

## 2021-03-16 RX ADMIN — FLUOXETINE 60 MG: 20 CAPSULE ORAL at 09:21

## 2021-03-16 RX ADMIN — MELATONIN 6 MG: 3 TAB ORAL at 22:18

## 2021-03-16 RX ADMIN — GABAPENTIN 300 MG: 300 CAPSULE ORAL at 22:18

## 2021-03-16 RX ADMIN — FAMOTIDINE 20 MG: 20 TABLET ORAL at 09:22

## 2021-03-16 RX ADMIN — GABAPENTIN 100 MG: 100 CAPSULE ORAL at 15:04

## 2021-03-16 RX ADMIN — TOPIRAMATE 200 MG: 100 TABLET, FILM COATED ORAL at 19:45

## 2021-03-16 RX ADMIN — LEVOTHYROXINE SODIUM 50 MCG: 50 TABLET ORAL at 09:21

## 2021-03-16 RX ADMIN — ENOXAPARIN SODIUM 60 MG: 60 INJECTION SUBCUTANEOUS at 09:20

## 2021-03-16 RX ADMIN — LORAZEPAM 0.5 MG: 0.5 TABLET ORAL at 09:22

## 2021-03-16 RX ADMIN — ENOXAPARIN SODIUM 60 MG: 60 INJECTION SUBCUTANEOUS at 22:18

## 2021-03-16 RX ADMIN — Medication 250 MG: at 19:45

## 2021-03-16 RX ADMIN — TOPIRAMATE 200 MG: 100 TABLET, FILM COATED ORAL at 09:22

## 2021-03-16 RX ADMIN — INSULIN GLARGINE 20 UNITS: 100 INJECTION, SOLUTION SUBCUTANEOUS at 22:18

## 2021-03-16 RX ADMIN — ATORVASTATIN CALCIUM 40 MG: 40 TABLET, FILM COATED ORAL at 19:46

## 2021-03-16 NOTE — PROGRESS NOTES
Patient examined spoke with the nurse patient is alert awake cooperative and she is in a good spirit she reports that Prozac is helping her and she is feeling better with the depression she still physically weak and she is agreeable to go for short-term physical rehab I reviewed the note from the  patient is referred to [facility] and other facilities if she does not have a bed available at [facility] this is her 1st choice  Patient is happy to see me she is chronically depressed but she looks brighter and she is able to smile during the session and able to express her feelings well  Patient denies auditory or visual hallucinations  Patient denies suicidal or homicidal ideation  No side effects of Prozac  Nurse reports no behavior problem  Discussed with the resident physician  Patient will follow-up with her psychiatrist at [facility] after the discharge  Therapy done with good response  I will follow up

## 2021-03-16 NOTE — ASSESSMENT & PLAN NOTE
Lab Results   Component Value Date    HGBA1C 11 5 (H) 03/03/2021       Recent Labs     03/15/21  1638 03/15/21  2131 03/16/21  0648 03/16/21  1047   POCGLU 160* 132 139 221*       Blood Sugar Average: Last 72 hrs:  (P) 500 [id number]     · Uncontrolled, patient is noncompliant  · Patient is supposed to be on insulin, empagliflozin, alogliptin  · Continue Lantus 20 units q h s  + sliding scale insulin  · Hold home oral medication for now  · Currently blood glucose is at goals

## 2021-03-16 NOTE — PHYSICAL THERAPY NOTE
PHYSICAL THERAPY TREATMENT  Time: 4843-2589    NAME:  Armando Dos Santos  DATE: 03/16/21    AGE:   76 y o  Mrn:   [id number]  Length Of Stay: 4    ADMIT DX:  Suicidal behavior [R45 89]  UTI (urinary tract infection) [N39 0]  Depression [F32 9]  Weakness [R53 1]    Past Medical History:   Diagnosis Date    [facility]     Disease of thyroid gland     Hyperlipidemia     Hypertension     Hypothyroidism (acquired)     Restless leg syndrome     Seizures ([facility] )      Past Surgical History:   Procedure Laterality Date    APPENDECTOMY      CHOLECYSTECTOMY      HIP FRACTURE SURGERY Right     TONSILLECTOMY      TUBAL LIGATION         Performed at least 2 patient identifiers during session: Name and ID bracelet        03/16/21 1006   PT Last Visit   PT Visit Date 03/16/21   Note Type   Note Type Treatment   Pain Assessment   Pain Assessment Tool 0-10   Pain Score No Pain   Effect of Pain on Daily Activities n/a   Hospital Pain Intervention(s) Ambulation/increased activity   Multiple Pain Sites No   Restrictions/Precautions   Weight Bearing Precautions Per Order No   Other Precautions Chair Alarm; Bed Alarm;Telemetry; Fall Risk   General   Chart Reviewed Yes   Additional Pertinent History Pt admitted 3/12/2021 with c/o SI due to recent difficulty caring for self, dizziness, weakness  Recent d/c from [facility]  Dx: Chronic fatigue and malaise  Response to Previous Treatment Patient with no complaints from previous session  Family/Caregiver Present No   Cognition   Overall Cognitive Status WFL   Arousal/Participation Alert; Cooperative   Attention Within functional limits   Orientation Level Oriented X4   Memory Within functional limits   Following Commands Follows multistep commands with increased time or repetition   Subjective   Subjective "I'm sorry I couldn't do more today"    Bed Mobility   Supine to Sit 4  Minimal assistance   Additional items Assist x 1;HOB elevated; Bedrails; Increased time required;LE management   Sit to Supine 4  Minimal assistance   Additional items Assist x 1; Increased time required;LE management   Transfers   Sit to Stand 4  Minimal assistance   Additional items Assist x 1; Increased time required;Verbal cues   Stand to Sit 4  Minimal assistance   Additional items Assist x 1;Verbal cues;Armrests   Ambulation/Elevation   Gait pattern Wide CLAUDIO; Forward Flexion;Decreased foot clearance; Short stride; Step to   Gait Assistance 3  Moderate assist  (initially CGA, progressed to req MODA once fatigued)   Additional items Assist x 1;Verbal cues; Tactile cues   Assistive Device Bariatric Rolling walker   Distance 54ft x1 with change in direction half way   Stair Management Assistance Not tested   Balance   Static Sitting Good   Dynamic Sitting Fair   Static Standing Fair   Dynamic Standing Fair -   Ambulatory Fair -   Endurance Deficit   Endurance Deficit Yes   Activity Tolerance   Activity Tolerance Patient limited by fatigue   Medical Staff Made Aware Spoke with SLIM and CM    Nurse Made Aware Spoke with RN   Assessment   Prognosis Good   Problem List Decreased strength;Decreased range of motion;Decreased endurance; Impaired balance;Decreased mobility; Decreased safety awareness; Obesity   Assessment Pt seen for PT treatment today with focus on gait training  Pt presents semi-supine in bed, denies pain and is agreeable to participate in session  Pt requires [address] for completion of bed mobility in hospital bed, A for LE management, heavy pt use of bed rails  Pt then transfers to stand with ANDREW and RW, elevated bed height  (pt reports her bed is much more elevated at home)  Pt ambulates 54ft with bariatric RW - change direction half way  Pt initially requiring CGA however once fatigued pt requiring ANDREW, then MODA  Pt impulsive to return to EOB once fatigued, demonstrates increased instability and poor RW proximity   Pt c/o dizziness and nausea post ambulation training - pt attributes to her recently taking medications  Pt declined sitting OOB in recliner chair due to same, returns to semi-supine in bed with [address]  Pt verbalizes importance of remaining OOB during the daytimes in hospital, states she will get OOB for lunch  At end of session pt was left semi-supine in bed with all needs in place, bed alarm engaged, care returned to RN  Continue to recommend STR upon d/c in order to maximize indep and safety with mobility and improve functional outcomes  Will continue skilled PT POC as able  Barriers to Discharge Decreased caregiver support; Inaccessible home environment   Goals   Patient Goals "to go to rehab"   STG Expiration Date 03/25/21   Short Term Goal #1 Patient PT goals established in order to address patient self reported goal of "to go to rehab"  1  Pt will complete all bed mobility in flat bed without use of bed rails at [facility] level, in order to promote increased OOB functional mobility to improve overall activity tolerance  2  Pt will complete all transfers with RW at [facility] level, in order to increase safety with functional mobility  3  Pt will ambulate >100ft with RW and S in order to increase safety with household and short community distance functional mobility  4  Pt will tolerate >3hrs OOB in upright position, in order to improve muscular endurance and respiratory status  5  Pt will improve B LE strength to >/= 4/5 MMT throughout, in order to increase safety with functional mobility and decrease risk of falls  6  Pt will demonstrate understanding and independence with LE strengthening HEP  7  Pt will improve AM-PAC score to >/= 19/24 in order to increase independence with mobility and decrease burden of care  8  Pt will improve Barthel Index score to >/= 55/100 in order to increase independence and decrease risk of falls  9  Pt will improve Elderly Mobility Scale score to >/= 11/20 in order to decrease burden of care and increase independence with functional mobility and ADLs   10  Pt will improve ambulatory balance by >/= 1/2 grade in order to promote safety and increased independence with mobility  PT Treatment Day 2   Plan   Treatment/Interventions ADL retraining;Functional transfer training;LE strengthening/ROM; Therapeutic exercise; Endurance training;Patient/family training;Equipment eval/education; Bed mobility;Gait training; Compensatory technique education;Spoke to MD;Spoke to nursing;Spoke to case management;OT   Progress Progressing toward goals   PT Frequency Other (Comment)  (3-5x/wk )   Recommendation   PT Discharge Recommendation [address] [facility] Recommended HD Bariatric wheeled walker   Change/add to [employer]? No   AM-PAC Basic Mobility Inpatient   Turning in Bed Without Bedrails 3   Lying on Back to Sitting on Edge of Flat Bed 3   Moving Bed to Chair 3   Standing Up From Chair 3   Walk in Room 3   Climb 3-5 Stairs 1   Basic Mobility Inpatient Raw Score 16   Basic Mobility Standardized Score 38 32       The patient's AM-PAC Basic Mobility Inpatient Short Form Raw Score is 16, Standardized Score is 38 32  A standardized score less than 42 9 suggests the patient may benefit from discharge to post-acute rehabilitation services  Please also refer to the recommendation of the Physical Therapist for safe discharge planning        Yue Jorgensen, PT, DPT   Available via [employer]  NPI # [id number]  [place] License - [id number]  [place] License - [id number]  3/16/2021

## 2021-03-16 NOTE — CASE MANAGEMENT
[facility] TCF reviewing patient, CM to s/w Crystal Valencia from admissions pending final determination

## 2021-03-16 NOTE — PLAN OF CARE
Problem: PHYSICAL THERAPY ADULT  Goal: Performs mobility at highest level of function for planned discharge setting  See evaluation for individualized goals  Description: Treatment/Interventions: ADL retraining, Functional transfer training, LE strengthening/ROM, Therapeutic exercise, Endurance training, Patient/family training, Equipment eval/education, Bed mobility, Gait training, Spoke to MD, Spoke to nursing, Spoke to case management, OT  Equipment Recommended: Gilberto Fink     See flowsheet documentation for full assessment, interventions and recommendations  Outcome: Progressing  Note: Prognosis: Good  Problem List: Decreased strength, Decreased range of motion, Decreased endurance, Impaired balance, Decreased mobility, Decreased safety awareness, Obesity  Assessment: Pt seen for PT treatment today with focus on gait training  Pt presents semi-supine in bed, denies pain and is agreeable to participate in session  Pt requires [address] [place] for completion of bed mobility in hospital bed, A for LE management, heavy pt use of bed rails  Pt then transfers to stand with ANDREW and RW, elevated bed height  (pt reports her bed is much more elevated at home)  Pt ambulates 54ft with bariatric RW - change direction half way  Pt initially requiring CGA however once fatigued pt requiring ANDREW, then MODA  Pt impulsive to return to EOB once fatigued, demonstrates increased instability and poor RW proximity  Pt c/o dizziness and nausea post ambulation training - pt attributes to her recently taking medications  Pt declined sitting OOB in recliner chair due to same, returns to semi-supine in bed with [address] [place]  Pt verbalizes importance of remaining OOB during the daytimes in hospital, states she will get OOB for lunch  At end of session pt was left semi-supine in bed with all needs in place, bed alarm engaged, care returned to RN   Continue to recommend STR upon d/c in order to maximize indep and safety with mobility and improve functional outcomes  Will continue skilled PT POC as able  Barriers to Discharge: Decreased caregiver support, Inaccessible home environment     PT Discharge Recommendation: [id number]     See flowsheet documentation for full assessment

## 2021-03-16 NOTE — PLAN OF CARE
Problem: Prexisting or High Potential for Compromised Skin Integrity  Goal: Skin integrity is maintained or improved  Description: INTERVENTIONS:  - Identify patients at risk for skin breakdown  - Assess and monitor skin integrity  - Assess and monitor nutrition and hydration status  - Monitor labs   - Assess for incontinence   - Turn and reposition patient  - Assist with mobility/ambulation  - Relieve pressure over bony prominences  - Avoid friction and shearing  - Provide appropriate hygiene as needed including keeping skin clean and dry  - Evaluate need for skin moisturizer/barrier cream  - Collaborate with interdisciplinary team   - Patient/family teaching  - Consider wound care consult   Outcome: Not Progressing     Problem: SAFETY ADULT  Goal: Patient will remain free of falls  Description: INTERVENTIONS:  - Assess patient frequently for physical needs  -  Identify cognitive and physical deficits and behaviors that affect risk of falls    -  [place] fall precautions as indicated by assessment   - Educate patient/family on patient safety including physical limitations  - Instruct patient to call for assistance with activity based on assessment  - Modify environment to reduce risk of injury  - Consider OT/PT consult to assist with strengthening/mobility  Outcome: Not Progressing  Goal: Maintain or return to baseline ADL function  Description: INTERVENTIONS:  -  Assess patient's ability to carry out ADLs; assess patient's baseline for ADL function and identify physical deficits which impact ability to perform ADLs (bathing, care of mouth/teeth, toileting, grooming, dressing, etc )  - Assess/evaluate cause of self-care deficits   - Assess range of motion  - Assess patient's mobility; develop plan if impaired  - Assess patient's need for assistive devices and provide as appropriate  - Encourage maximum independence but intervene and supervise when necessary  - Involve family in performance of ADLs  - Assess for home care needs following discharge   - Consider OT consult to assist with ADL evaluation and planning for discharge  - Provide patient education as appropriate  Outcome: Not Progressing  Goal: Maintain or return mobility status to optimal level  Description: INTERVENTIONS:  - Assess patient's baseline mobility status (ambulation, transfers, stairs, etc )    - Identify cognitive and physical deficits and behaviors that affect mobility  - Identify mobility aids required to assist with transfers and/or ambulation (gait belt, sit-to-stand, lift, walker, cane, etc )  - [place] fall precautions as indicated by assessment  - Record patient progress and toleration of activity level on Mobility SBAR; progress patient to next Phase/Stage  - Instruct patient to call for assistance with activity based on assessment  - Consider rehabilitation consult to assist with strengthening/weightbearing, etc   Outcome: Not Progressing     Problem: DISCHARGE PLANNING  Goal: Discharge to home or other facility with appropriate resources  Description: INTERVENTIONS:  - Identify barriers to discharge w/patient and caregiver  - Arrange for needed discharge resources and transportation as appropriate  - Identify discharge learning needs (meds, wound care, etc )  - Arrange for interpretive services to assist at discharge as needed  - Refer to Case Management Department for coordinating discharge planning if the patient needs post-hospital services based on physician/advanced practitioner order or complex needs related to functional status, cognitive ability, or social support system  Outcome: Not Progressing     Problem: Knowledge Deficit  Goal: Patient/family/caregiver demonstrates understanding of disease process, treatment plan, medications, and discharge instructions  Description: Complete learning assessment and assess knowledge base    Interventions:  - Provide teaching at level of understanding  - Provide teaching via preferred learning methods  Outcome: Not Progressing     Problem: Potential for Falls  Goal: Patient will remain free of falls  Description: INTERVENTIONS:  - Assess patient frequently for physical needs  -  Identify cognitive and physical deficits and behaviors that affect risk of falls    -  [place] fall precautions as indicated by assessment   - Educate patient/family on patient safety including physical limitations  - Instruct patient to call for assistance with activity based on assessment  - Modify environment to reduce risk of injury  - Consider OT/PT consult to assist with strengthening/mobility  Outcome: Not Progressing

## 2021-03-16 NOTE — CASE MANAGEMENT
This writer had a telephone conversation with patient  Information on TCF services, anticipated length of stay, mandatory quarantine, visitation, and unit's current covid status provided  Patient states that she has received her 1st dose of the Covid vaccine at [facility], and that she was scheduled for her 2nd does this Friday  Per patient appt for 2nd dose has been cancelled

## 2021-03-16 NOTE — ASSESSMENT & PLAN NOTE
· Presented with severe depression, not taking medications  · Denies any SI/HI or plan, does not need one-to-one observation at this point  · Has not seen by psychiatrist/PCP for a while due to recent COVID situation  · Recently seen by Dr Cynthia Ruiz in [facility] during recent hospitalization - recently increased Prozac to 60 mg  · Psychiatric consult, recommending continuation of Prozac 60 mg OD, Ativan 0 5 mg b i d   · Recommend to follow-up with her regular psychiatrist at [facility] time of discharge  · She reports her mood is improved

## 2021-03-16 NOTE — ASSESSMENT & PLAN NOTE
· Morbid  obesity due to excess calories evidenced by BMI 57 66  · Diet:  Carbohydrate controlled diet in the setting of uncontrolled diabetes mellitus  · Dietician recommendation: Intake is inadequate with evidence of uncontrolled DM and morbid obesity for many years  · Diabetes Education if her depression improved  · Pt would benefit by attending OP MNT when mentally stable

## 2021-03-16 NOTE — ASSESSMENT & PLAN NOTE
· Patient is complaining of bilateral calf tenderness; left greater than right  · Denying any chest pain, shortness of breath, prior history of DVT  · D-dimer 0 89  · Doppler ultrasound negative for DVT bilaterally

## 2021-03-16 NOTE — PROGRESS NOTES
Terrie U  65   Progress Note - Tiffanie Rosario 1955, 76 y o  female MRN: [id number]  Unit/Bed#: -01 Encounter: [id number]  Primary Care Provider: Luke Cox MD   Date and time admitted to hospital: 3/12/2021 11:13 AM    * Chronic fatigue and malaise  Assessment & Plan  · Presented with generalized weakness secondary to major depression  · Patient states she feels overwhelmed due to recent stress and comorbidities  · Psychiatric consult, appreciate recommendations  · PT OT consult, recommend acute post rehab  ·  is working on placement    Severe episode of recurrent major depressive disorder, without psychotic features ([facility] )  Assessment & Plan  · Presented with severe depression, not taking medications  · Denies any SI/HI or plan, does not need one-to-one observation at this point  · Has not seen by psychiatrist/PCP for a while due to recent COVID situation  · Recently seen by Dr Lisa Guallpa in [facility] during recent hospitalization - recently increased Prozac to 60 mg  · Psychiatric consult, recommending continuation of Prozac 60 mg OD, Ativan 0 5 mg b i d   · Recommend to follow-up with her regular psychiatrist at [place] time of discharge  · She reports her mood is improved  Calf tenderness  Assessment & Plan  · Patient is complaining of bilateral calf tenderness; left greater than right  · Denying any chest pain, shortness of breath, prior history of DVT  · D-dimer 0 89  · Doppler ultrasound negative for DVT bilaterally    Onychomycosis of toenail  Assessment & Plan  · Suspected onychomycosis of toenails  · Podiatry consult    Morbid obesity due to excess calories ([facility] )  Assessment & Plan  · Morbid  obesity due to excess calories evidenced by BMI 57 66  · Diet:  Carbohydrate controlled diet in the setting of uncontrolled diabetes mellitus  · Dietician recommendation: Intake is inadequate with evidence of uncontrolled DM and morbid obesity for many years     · Diabetes Education if her depression improved  · Pt would benefit by attending OP MNT when mentally stable  Asymptomatic bacteriuria  Assessment & Plan  · UA showed bacteriuria, patient denies any urinary symptoms  · She has been afebrile, no leukocytosis  · Status post 1 dose of IV ceftriaxone in ED  · Will hold off further doses of antibiotic for now    Acquired hypothyroidism  Assessment & Plan  · Continue home Synthroid    GERD (gastroesophageal reflux disease)  Assessment & Plan  · Continue famotidine    Diabetes mellitus [facility])  Assessment & Plan  Lab Results   Component Value Date    HGBA1C 11 5 (H) 2021       Recent Labs     03/15/21  1638 03/15/21  2131 21  0648 21  1047   POCGLU 160* 132 139 221*       Blood Sugar Average: Last 72 hrs:  (P) 016 [id number]     · Uncontrolled, patient is noncompliant  · Patient is supposed to be on insulin, empagliflozin, alogliptin  · Continue Lantus 20 units q h s  + sliding scale insulin  · Hold home oral medication for now  · Currently blood glucose is at goals  Hyperlipidemia  Assessment & Plan  · Continue Lipitor    Restless leg syndrome  Assessment & Plan  · Continue home dose of gabapentin    Seizures (HCC)  Assessment & Plan  · Stable, continue home Topamax        VTE Pharmacologic Prophylaxis:   Pharmacologic: Enoxaparin (Lovenox)  Mechanical VTE Prophylaxis in Place: No    Discussions with Specialists or Other Care Team Provider: Yes    Education and Discussions with Family / Patient: Patient    Current Length of Stay: 4 day(s)    Current Patient Status: Inpatient     Discharge Plan / Estimated Discharge Date:  Pending placement,  is working on it    Code Status: Level 1 - Full Code      Subjective:   She was seen and examined the bedside  She reports no new complaint  She reports her mood is better  No acute event overnight      Objective:     Vitals:   Temp (24hrs), Av 9 °F (36 1 °C), Min:96 3 °F (35 7 °C), Max:97 2 °F (36 2 °C)    Temp:  [96 3 °F (35 7 °C)-97 2 °F (36 2 °C)] 96 3 °F (35 7 °C)  HR:  [59-60] 60  Resp:  [18-20] 19  BP: ()/(52-72) 106/52  SpO2:  [94 %-97 %] 95 %  Body mass index is 57 66 kg/m²  Input and Output Summary (last 24 hours): Intake/Output Summary (Last 24 hours) at 3/16/2021 1653  Last data filed at 3/16/2021 0549  Gross per 24 hour   Intake 360 ml   Output --   Net 360 ml       Physical Exam:     Physical Exam  Vitals signs and nursing note reviewed  Constitutional:       General: She is not in acute distress  Appearance: She is obese  She is not ill-appearing  HENT:      Head: Normocephalic and atraumatic  Eyes:      General: No scleral icterus  Conjunctiva/sclera: Conjunctivae normal    Cardiovascular:      Rate and Rhythm: Normal rate and regular rhythm  Pulses: Normal pulses  Heart sounds: Murmur present  Pulmonary:      Effort: Pulmonary effort is normal  No respiratory distress  Breath sounds: Normal breath sounds  Abdominal:      General: Abdomen is flat  Bowel sounds are normal  There is no distension  Palpations: Abdomen is soft  Musculoskeletal:         General: Tenderness (toes are very tender) present  Right lower leg: No edema  Left lower leg: No edema  Comments: Very long and deformed toe nails  Neurological:      Mental Status: She is alert and oriented to person, place, and time     Psychiatric:         Mood and Affect: Mood normal          Behavior: Behavior normal          Additional Data:     Labs:    Results from last 7 days   Lab Units 03/15/21  0448 03/13/21  0503   WBC Thousand/uL 5 99 5 98   HEMOGLOBIN g/dL 15 9* 15 1   HEMATOCRIT % 48 6* 46 8*   PLATELETS Thousands/uL 193 199  207   NEUTROS PCT %  --  60   LYMPHS PCT %  --  28   MONOS PCT %  --  9   EOS PCT %  --  3     Results from last 7 days   Lab Units 03/15/21  0448  03/12/21  1132   POTASSIUM mmol/L 3 9   < > 3 8   CHLORIDE mmol/L 108   < > 104   CO2 mmol/L 22   < > 26   BUN mg/dL 21*   < > 30*   CREATININE mg/dL 0 69   < > 0 85   CALCIUM mg/dL 8 9   < > 9 2   ALK PHOS U/L  --   --  95 2   ALT U/L  --   --  19   AST U/L  --   --  21    < > = values in this interval not displayed  * I Have Reviewed All Lab Data Listed Above  * Additional Pertinent Lab Tests Reviewed: Deyan[facility] 65 Admission Reviewed      Recent Cultures (last 7 days):     Results from last 7 days   Lab Units 03/12/21  1729   URINE CULTURE  >100,000 cfu/ml        Last 24 Hours Medication List:   Current Facility-Administered Medications   Medication Dose Route Frequency Provider Last Rate    acetaminophen  650 mg Oral Q6H PRN Lexi Castellano MD      aluminum-magnesium hydroxide-simethicone  30 mL Oral Q6H PRN Lexi Castellano MD      aspirin  81 mg Oral Daily Lexi Castellano MD      atorvastatin  40 mg Oral Daily With Airam Salazar MD      enoxaparin  60 mg Subcutaneous Q12H [address] Lexi Castellano MD      famotidine  20 mg Oral BID Lexi Castellano MD      FLUoxetine  60 mg Oral Daily Lexi Castellano MD      gabapentin  100 mg Oral BID Eve Galvez MD      gabapentin  300 mg Oral HS Lexi Castellano MD      insulin glargine  20 Units Subcutaneous HS Lexi Castellano MD      insulin lispro  1-5 Units Subcutaneous TID AC Lexi Castellano MD      levothyroxine  50 mcg Oral Daily Lexi Castellano MD      loperamide  2 mg Oral TID PRN Thomas Fung MD      LORazepam  0 5 mg Oral BID Juliane Johnson MD      melatonin  6 mg Oral HS Elizabeth Willoughby MD      polyethylene glycol  17 g Oral Daily PRN Lexi Castellano MD      saccharomyces boulardii  250 mg Oral BID Thomas Fung MD      topiramate  200 mg Oral BID Lexi Castellano MD          Today, Patient Was Seen By: Thomas Fung MD    ** Please Note: This note has been constructed using a voice recognition system   **

## 2021-03-17 LAB
GLUCOSE SERPL-MCNC: 145 MG/DL (ref 65–140)
GLUCOSE SERPL-MCNC: 147 MG/DL (ref 65–140)
GLUCOSE SERPL-MCNC: 170 MG/DL (ref 65–140)
GLUCOSE SERPL-MCNC: 177 MG/DL (ref 65–140)
GLUCOSE SERPL-MCNC: 185 MG/DL (ref 65–140)

## 2021-03-17 PROCEDURE — 99232 SBSQ HOSP IP/OBS MODERATE 35: CPT | Performed by: INTERNAL MEDICINE

## 2021-03-17 PROCEDURE — 0HBRXZZ EXCISION OF TOE NAIL, EXTERNAL APPROACH: ICD-10-PCS | Performed by: PODIATRIST

## 2021-03-17 PROCEDURE — 82948 REAGENT STRIP/BLOOD GLUCOSE: CPT

## 2021-03-17 RX ADMIN — FAMOTIDINE 20 MG: 20 TABLET ORAL at 09:16

## 2021-03-17 RX ADMIN — TOPIRAMATE 200 MG: 100 TABLET, FILM COATED ORAL at 17:11

## 2021-03-17 RX ADMIN — LORAZEPAM 0.5 MG: 0.5 TABLET ORAL at 17:12

## 2021-03-17 RX ADMIN — ENOXAPARIN SODIUM 60 MG: 60 INJECTION SUBCUTANEOUS at 09:18

## 2021-03-17 RX ADMIN — ENOXAPARIN SODIUM 60 MG: 60 INJECTION SUBCUTANEOUS at 21:04

## 2021-03-17 RX ADMIN — Medication 250 MG: at 17:11

## 2021-03-17 RX ADMIN — GABAPENTIN 300 MG: 300 CAPSULE ORAL at 21:04

## 2021-03-17 RX ADMIN — INSULIN LISPRO 1 UNITS: 100 INJECTION, SOLUTION INTRAVENOUS; SUBCUTANEOUS at 17:14

## 2021-03-17 RX ADMIN — INSULIN GLARGINE 20 UNITS: 100 INJECTION, SOLUTION SUBCUTANEOUS at 21:04

## 2021-03-17 RX ADMIN — ATORVASTATIN CALCIUM 40 MG: 40 TABLET, FILM COATED ORAL at 17:12

## 2021-03-17 RX ADMIN — LORAZEPAM 0.5 MG: 0.5 TABLET ORAL at 09:16

## 2021-03-17 RX ADMIN — LEVOTHYROXINE SODIUM 50 MCG: 50 TABLET ORAL at 09:15

## 2021-03-17 RX ADMIN — Medication 250 MG: at 09:16

## 2021-03-17 RX ADMIN — ASPIRIN 81 MG: 81 TABLET, DELAYED RELEASE ORAL at 09:15

## 2021-03-17 RX ADMIN — FAMOTIDINE 20 MG: 20 TABLET ORAL at 17:12

## 2021-03-17 RX ADMIN — GABAPENTIN 100 MG: 100 CAPSULE ORAL at 14:25

## 2021-03-17 RX ADMIN — GABAPENTIN 100 MG: 100 CAPSULE ORAL at 09:16

## 2021-03-17 RX ADMIN — FLUOXETINE 60 MG: 20 CAPSULE ORAL at 09:16

## 2021-03-17 RX ADMIN — MELATONIN 6 MG: 3 TAB ORAL at 21:04

## 2021-03-17 RX ADMIN — TOPIRAMATE 200 MG: 100 TABLET, FILM COATED ORAL at 09:16

## 2021-03-17 NOTE — PROGRESS NOTES
Patient examined spoke with the nurse 's notes reviewed and noted  Patient has been referred to short-term physical rehab  Patient is working with the physical therapist and she feels that she needs more time but she is getting better  Patient looks brighter and she is smiling during the session and she is thanking me for coming and talking to her on a daily basis she needs lot of emotional help recently after the peaks no strong she was stuck in the apartment and she was not able to get out she could not say she realized she could not keep the appointment to her doctor and she was feeling like she is locked up like a assisted in her apartment and that made her more depressed  During the last admission at [facility] I increased her Prozac from 40 mg to 60 mg for increased depression and she is happy with that she reports that it is helping her and it seems like she is getting better with the depression and she continue same dose at this time  Patient has a long history of depression she is not suicidal no psychosis no hallucination and she will need psychiatric follow-up with her psychiatrist at  a clinic after the discharge  Nurse reports no behavior problem  Patient is not in distress and she offers no new complaints at this time  Therapy is done with good response  Continue her medications Prozac Ativan and Ativan p r n  At this time

## 2021-03-17 NOTE — PROGRESS NOTES
Terrie U  65   Progress Note - Kiran Poag 1955, 76 y o  female MRN: [id number]  Unit/Bed#: -01 Encounter: [id number]  Primary Care Provider: Charito Aguilar MD   Date and time admitted to hospital: 3/12/2021 11:13 AM    * Chronic fatigue and malaise  Assessment & Plan  · Presented with generalized weakness secondary to major depression  · Patient states she feels overwhelmed due to recent stress and comorbidities  · Psychiatric consult, appreciate recommendations  · PT OT consult, recommend acute post rehab  ·  is working on placement    Severe episode of recurrent major depressive disorder, without psychotic features ([facility] )  Assessment & Plan  · Presented with severe depression, not taking medications  · Denies any SI/HI or plan, does not need one-to-one observation at this point  · Has not seen by psychiatrist/PCP for a while due to recent COVID situation  · Recently seen by Dr Mari Alan in [facility] during recent hospitalization - recently increased Prozac to 60 mg  · Psychiatric consult, recommending continuation of Prozac 60 mg OD, Ativan 0 5 mg b i d   · Recommend to follow-up with her regular psychiatrist at [facility] time of discharge  · She reports her mood is improved      Calf tenderness  Assessment & Plan  · Patient is complaining of bilateral calf tenderness; left greater than right  · Denying any chest pain, shortness of breath, prior history of DVT  · D-dimer 0 89  · Doppler ultrasound negative for DVT bilaterally    Onychomycosis of toenail  Assessment & Plan  · Onychomycosis of all toenails  · Podiatry consult, appreciate recommendation    Morbid obesity due to excess calories ([facility] )  Assessment & Plan  · Morbid  obesity due to excess calories evidenced by BMI 57 66  · Diet:  Carbohydrate controlled diet in the setting of uncontrolled diabetes mellitus  · Dietician recommendation: Intake is inadequate with evidence of uncontrolled DM and morbid obesity for many years    · Diabetes Education if her depression improved  · Pt would benefit by attending OP MNT when mentally stable  Asymptomatic bacteriuria  Assessment & Plan  · UA showed bacteriuria, patient denies any urinary symptoms  · She has been afebrile, no leukocytosis  · Status post 1 dose of IV ceftriaxone in ED  · Will hold off further doses of antibiotic for now    Acquired hypothyroidism  Assessment & Plan  · Continue home Synthroid    GERD (gastroesophageal reflux disease)  Assessment & Plan  · Continue famotidine    Diabetes mellitus [facility])  Assessment & Plan  Lab Results   Component Value Date    HGBA1C 11 5 (H) 2021       Recent Labs     21  0702 21  1051 21  1647   POCGLU 177* 145* 147* 170*       Blood Sugar Average: Last 72 hrs:  (P) 155 [id number]     · Uncontrolled, patient is noncompliant  · Patient is supposed to be on insulin, empagliflozin, alogliptin  · Continue Lantus 20 units q h s  + sliding scale insulin  · Hold home oral medication for now  · Currently blood glucose is at goals  Hyperlipidemia  Assessment & Plan  · Continue Lipitor    Restless leg syndrome  Assessment & Plan  · Continue home dose of gabapentin    Seizures ([address] )  Assessment & Plan  · Stable, continue home Topamax        VTE Pharmacologic Prophylaxis:   Pharmacologic: Enoxaparin (Lovenox)  Mechanical VTE Prophylaxis in Place: No    Discussions with Specialists or Other Care Team Provider: Yes    Education and Discussions with Family / Patient: Patient    Current Length of Stay: 5 day(s)    Current Patient Status: Inpatient     Discharge Plan / Estimated Discharge Date:  Pending placement,  is working on it  Code Status: Level 1 - Full Code      Subjective:   She was seen and examined the bedside he and she reports no new complaint  She states her mood is better  No acute event overnight      Objective:     Vitals:   Temp (24hrs), Av 2 °F (36 2 °C), Min:97 °F (36 1 °C), Max:97 6 °F (36 4 °C)    Temp:  [97 °F (36 1 °C)-97 6 °F (36 4 °C)] 97 6 °F (36 4 °C)  HR:  [56-70] 62  Resp:  [16-18] 18  BP: ()/(46-64) 107/60  SpO2:  [95 %-96 %] 95 %  Body mass index is 57 66 kg/m²  Input and Output Summary (last 24 hours): Intake/Output Summary (Last 24 hours) at 3/17/2021 1739  Last data filed at 3/17/2021 0701  Gross per 24 hour   Intake 250 ml   Output --   Net 250 ml       Physical Exam:     Physical Exam  Vitals signs and nursing note reviewed  Constitutional:       General: She is not in acute distress  Appearance: She is obese  She is not ill-appearing  HENT:      Head: Normocephalic and atraumatic  Eyes:      General: No scleral icterus  Conjunctiva/sclera: Conjunctivae normal    Cardiovascular:      Rate and Rhythm: Normal rate and regular rhythm  Pulses: Normal pulses  Heart sounds: Murmur present  Pulmonary:      Effort: Pulmonary effort is normal  No respiratory distress  Breath sounds: Normal breath sounds  Abdominal:      General: Abdomen is flat  Bowel sounds are normal  There is no distension  Palpations: Abdomen is soft  Musculoskeletal:         General: Tenderness (toes are very tender) present  Right lower leg: No edema  Left lower leg: No edema  Comments: Very long and deformed toe nails  Neurological:      Mental Status: She is alert and oriented to person, place, and time     Psychiatric:         Mood and Affect: Mood normal          Behavior: Behavior normal            Additional Data:     Labs:    Results from last 7 days   Lab Units 03/15/21  0448 03/13/21  0503   WBC Thousand/uL 5 99 5 98   HEMOGLOBIN g/dL 15 9* 15 1   HEMATOCRIT % 48 6* 46 8*   PLATELETS Thousands/uL 193 199  207   NEUTROS PCT %  --  60   LYMPHS PCT %  --  28   MONOS PCT %  --  9   EOS PCT %  --  3     Results from last 7 days   Lab Units 03/15/21  0448  03/12/21  1132   POTASSIUM mmol/L 3 9   < > 3 8   CHLORIDE mmol/L 108   < > 104   CO2 mmol/L 22   < > 26   BUN mg/dL 21*   < > 30*   CREATININE mg/dL 0 69   < > 0 85   CALCIUM mg/dL 8 9   < > 9 2   ALK PHOS U/L  --   --  95 2   ALT U/L  --   --  19   AST U/L  --   --  21    < > = values in this interval not displayed  * I Have Reviewed All Lab Data Listed Above  * Additional Pertinent Lab Tests Reviewed: [facility] 65 Admission Reviewed    Imaging:      Recent Cultures (last 7 days):     Results from last 7 days   Lab Units 03/12/21  1729   URINE CULTURE  >100,000 cfu/ml        Last 24 Hours Medication List:   Current Facility-Administered Medications   Medication Dose Route Frequency Provider Last Rate    acetaminophen  650 mg Oral Q6H PRN Brielle Welch MD      aluminum-magnesium hydroxide-simethicone  30 mL Oral Q6H PRN Brielle Welch MD      aspirin  81 mg Oral Daily Brielle Welch MD      atorvastatin  40 mg Oral Daily With Tila Gardner MD      enoxaparin  60 mg Subcutaneous Q12H [address] Brielle Welch MD      famotidine  20 mg Oral BID Brielle Welch MD      FLUoxetine  60 mg Oral Daily Brielle Welch MD      gabapentin  100 mg Oral BID Marissa Ramos MD      gabapentin  300 mg Oral HS Brielle Welch MD      insulin glargine  20 Units Subcutaneous HS Brielle Welch MD      insulin lispro  1-5 Units Subcutaneous TID AC Brielle Welch MD      levothyroxine  50 mcg Oral Daily Brielle Welch MD      loperamide  2 mg Oral TID PRN Jenise Manuel MD      LORazepam  0 5 mg Oral BID Epifanio Parr MD      melatonin  6 mg Oral HS Blayne Khan MD      polyethylene glycol  17 g Oral Daily PRN Brielle Welch MD      saccharomyces boulardii  250 mg Oral BID Jenise Manuel MD      topiramate  200 mg Oral BID Brielle Welch MD          Today, Patient Was Seen By: Jenise Manuel MD    ** Please Note: This note has been constructed using a voice recognition system   **

## 2021-03-17 NOTE — ASSESSMENT & PLAN NOTE
Lab Results   Component Value Date    HGBA1C 11 5 (H) 03/03/2021       Recent Labs     03/16/21 2014 03/17/21  0702 03/17/21  1051 03/17/21  1647   POCGLU 177* 145* 147* 170*       Blood Sugar Average: Last 72 hrs:  (P) 155 [id number]     · Uncontrolled, patient is noncompliant  · Patient is supposed to be on insulin, empagliflozin, alogliptin  · Continue Lantus 20 units q h s  + sliding scale insulin  · Hold home oral medication for now  · Currently blood glucose is at goals

## 2021-03-17 NOTE — CONSULTS
Consult Routine Foot Care- Danyelle Schultet 76 y o  female MRN: [id number]  Unit/Bed#: -01 Encounter: [id number]    Assessment/Plan     Assessment:  1  Onychomycosis x 10  2  PVD     Plan:  - -pt eval and managed    need 2 out of 3  - Number and complexity of problems addressed:  1 undiagnosed new problem with uncertain prognosis   as shown    - Amount/complexity of data reviewed and analyzed: 1 of 3 categories needed    Category 1: prior patient notes were analyzed today before evaluating and managing patient  All PMH were discussed with pt today  - Risk of complications: moderate risk of morbidity from additional testing or treatment involved with this patient, which includes but not limited to:    - discussed anatomy, condition, treatment plan and options  They were instructed on proper foot care  The patient was seen today for greater than total of  45-59 minutes     This is total time spent today involving both face-to-face time and non face-to-face time  This time spent includes  reviewing their past medical history  , performing a medically appropriate examination and evaluation of the patient, counseling and educating the patient,  documenting all findings in EMR, and independently interpreting results and communicating results with  patient     and discussing their condition and treatment options, risks, and potential complications  I have discussed the findings of this examination with the patient  The discussion included a complete verbal explanation of the examination results, diagnosis and planned treatment(s)  A schedule for future care needs was explained  The patient has verbalized the understanding of these instructions at this time  If any questions should arise after returning home I have encouraged the patient to feel free to call the office     - Hallucal mycotic nails x2 debrided decreasing thickness by 1 mm   Mycotic toe nails 2-5 b/l were trimmed, decreasing length, without incidence utilizing a sharp nail nipper  - All questions and concerns addressed  - Will discuss this plan with my attending  - Podiatry signing off, thank you for the consult  History of Present Illness     HPI:  Jimmy Leone is a 76 y o  female who presents with painful, elongated toenails and dry skin  They state that they see no podiatrist outpatient  They have difficulty applying their socks and shoes due to the elongation of the nails  The pressure within their shoe gear is painful and they have been unable to cut their nails adequately  Patient states pain is 1/10 in shoe gear  Pain with pressure  Requires at risk foot care  Consults  Review of Systems   Constitutional: Negative  HENT: Negative  Eyes: Negative  Respiratory: Negative  Cardiovascular: Negative  Gastrointestinal: Negative  Musculoskeletal: Negative   Skin: elongated thickened toenails, as above   Neurological: Negative          Historical Information   Past Medical History:   Diagnosis Date    Diabetes mellitus ([address] )     Disease of thyroid gland     Hyperlipidemia     Hypertension     Hypothyroidism (acquired)     Restless leg syndrome     Seizures (HCC)      Past Surgical History:   Procedure Laterality Date    APPENDECTOMY      CHOLECYSTECTOMY      HIP FRACTURE SURGERY Right     TONSILLECTOMY      TUBAL LIGATION       Social History   Social History     Substance and Sexual Activity   Alcohol Use Yes    Frequency: Monthly or less    Drinks per session: 1 or 2    Binge frequency: Never    Comment: occasional     Social History     Substance and Sexual Activity   Drug Use Never     Social History     Tobacco Use   Smoking Status Never Smoker   Smokeless Tobacco Never Used   Tobacco Comment    not a smoker     Family History:   Family History   Problem Relation Age of Onset    Seizures Father        Meds/Allergies   Medications Prior to Admission   Medication    Alogliptin Benzoate 25 MG TABS    aspirin (Aspirin 81) 81 mg EC tablet    Empagliflozin 25 MG TABS    famotidine (PEPCID) 20 mg tablet    FLUoxetine (PROzac) 20 mg capsule    gabapentin (NEURONTIN) 100 mg capsule    gabapentin (NEURONTIN) 300 mg capsule    insulin glargine (LANTUS) 100 units/mL subcutaneous injection    levothyroxine 50 mcg tablet    LORazepam (ATIVAN) 0 5 mg tablet    topiramate (TOPAMAX) 100 mg tablet    atorvastatin (LIPITOR) 40 mg tablet     Allergies   Allergen Reactions    Pineapple Anaphylaxis    Byetta 10 Mcg Pen [Exenatide]     Marijuana [Dronabinol] GI Intolerance    Metformin Diarrhea     Other reaction(s): Unknown    Omeprazole Hives    Ondansetron      Other reaction(s): Unknown    Coconut Oil Rash    Liraglutide Rash and Other (See Comments)    Penicillins Rash       Objective   First Vitals:   Blood Pressure: 134/88 (03/12/21 1111)  Pulse: 73 (03/12/21 1111)  Temperature: 97 8 °F (36 6 °C) (03/12/21 1112)  Temp Source: Oral (03/12/21 1112)  Respirations: 14 (03/12/21 1111)  Height: 5' 2" (157 5 cm) (03/12/21 2106)  Weight - Scale: (!) 143 kg (315 lb 4 1 oz) (03/12/21 2106)  SpO2: 98 % (03/12/21 1111)    Current Vitals:   Blood Pressure: (!) 99/46 (03/17/21 0755)  Pulse: 56 (03/17/21 0755)  Temperature: (!) 97 1 °F (36 2 °C) (03/17/21 0755)  Temp Source: Tympanic (03/17/21 0755)  Respirations: 16 (03/17/21 0755)  Height: 5' 2" (157 5 cm) (03/12/21 2106)  Weight - Scale: (!) 143 kg (315 lb 4 1 oz) (03/12/21 2106)  SpO2: 96 % (03/17/21 0755)    BP (!) 99/46 (BP Location: Right arm)   Pulse 56   Temp (!) 97 1 °F (36 2 °C) (Tympanic)   Resp 16   Ht 5' 2" (1 575 m)   Wt (!) 143 kg (315 lb 4 1 oz)   SpO2 96%   BMI 57 66 kg/m²     General Appearance:    Alert, cooperative, no distress   Head:    Normocephalic, without obvious abnormality, atraumatic   Eyes:    PERRL, conjunctiva/corneas clear, EOM's intact            Nose:   Moist mucous membranes, no drainage or sinus tenderness   Throat:   No tenderness, no exudates   Neck:   Supple, symmetrical, trachea midline, no JVD   Back:      Lungs:     respirations unlabored   Chest wall:    No tenderness or deformity   Heart:   S1 and S2 normal   Abdomen:     Soft, non-tender, bowel sounds active all four quadrants,     no masses, no organomegaly     Extremities:   MMT is 4/5 to all compartments of the LE, +1/4 edema B/L, Digital ROM is intact,    Pulses:   R DP is +1/4, R PT is +1/4, L DP is +1/4, L PT is +1/4, CFT< 3sec to all digits  Thin/shiny skin noted to the B/L LE, pigmentary changes to B/L LE  Absent digital hair growth b/l  Nail thickening b/l  Skin:   Nails are yellow discolored, thickened, elongated, with notable subungual debris and > 2 mm thickness noted to toenails 1-5 B/L  No open Lesions  Calluses located none       Neurologic:   CNII-XII intact  Normal strength, sensation and reflexes       Throughout  Gross sensation is intact   Protective sensation is intact       Lab Results:   Admission on 03/12/2021   Component Date Value    WBC 03/12/2021 5 76     RBC 03/12/2021 5 73*    Hemoglobin 03/12/2021 16 3*    Hematocrit 03/12/2021 49 8*    MCV 03/12/2021 87     MCH 03/12/2021 28 4     MCHC 03/12/2021 32 7     RDW 03/12/2021 13 7     MPV 03/12/2021 11 2     Platelets 57/42/5079 209     Neutrophils Relative 03/12/2021 66     Immat GRANS % 03/12/2021 0     Lymphocytes Relative 03/12/2021 23     Monocytes Relative 03/12/2021 9     Eosinophils Relative 03/12/2021 2     Basophils Relative 03/12/2021 0     Neutrophils Absolute 03/12/2021 3 78     Immature Grans Absolute 03/12/2021 0 02     Lymphocytes Absolute 03/12/2021 1 32     Monocytes Absolute 03/12/2021 0 49     Eosinophils Absolute 03/12/2021 0 13     Basophils Absolute 03/12/2021 0 02     Sodium 03/12/2021 138     Potassium 03/12/2021 3 8     Chloride 03/12/2021 104     CO2 03/12/2021 26     ANION GAP 03/12/2021 8     BUN 03/12/2021 30*    Creatinine 03/12/2021 0 85     Glucose 03/12/2021 236*    Calcium 03/12/2021 9 2     AST 03/12/2021 21     ALT 03/12/2021 19     Alkaline Phosphatase 03/12/2021 95 2     Total Protein 03/12/2021 7 7     Albumin 03/12/2021 3 9     Total Bilirubin 03/12/2021 0 41     eGFR 03/12/2021 72     Color, UA 03/12/2021 Yellow     Clarity, UA 03/12/2021 Slightly Cloudy*    Specific Gravity, UA 03/12/2021 >=1 030     pH, UA 03/12/2021 5 0     Leukocytes, UA 03/12/2021 Negative     Nitrite, UA 03/12/2021 Negative     Protein, UA 03/12/2021 Negative     Glucose, UA 03/12/2021 1+*    Ketones, UA 03/12/2021 Negative     Urobilinogen, UA 03/12/2021 0 2     Bilirubin, UA 03/12/2021 1+*    Blood, UA 03/12/2021 2+*    Magnesium 03/12/2021 2 0     Troponin I 03/12/2021 <0 03     BNP 03/12/2021 29 5     Amph/Meth UR 03/12/2021 Negative     Barbiturate Ur 03/12/2021 Negative     Benzodiazepine Urine 03/12/2021 Negative     Cocaine Urine 03/12/2021 Negative     Methadone Urine 03/12/2021 Negative     Opiate Urine 03/12/2021 Negative     PCP Ur 03/12/2021 Negative     THC Urine 03/12/2021 Negative     Oxycodone Urine 03/12/2021 Negative     Total CK 03/12/2021 36 1*    Ethanol Lvl 03/12/2021 <10     SARS-CoV-2 03/12/2021 Negative     INFLUENZA A PCR 03/12/2021 Negative     INFLUENZA B PCR 03/12/2021 Negative     RSV PCR 03/12/2021 Negative     Ventricular Rate 03/12/2021 67     Atrial Rate 03/12/2021 67     RI Interval 03/12/2021 136     QRSD Interval 03/12/2021 145     QT Interval 03/12/2021 431     QTC Interval 03/12/2021 455     P Axis 03/12/2021 59     QRS Axis 03/12/2021 -44     T Wave Axis 03/12/2021 44     RBC, UA 03/12/2021 2-4     WBC, UA 03/12/2021 10-20*    Epithelial Cells 03/12/2021 Moderate*    Bacteria, UA 03/12/2021 Moderate*    MUCUS THREADS 03/12/2021 Occasional*    Urine Culture 03/12/2021 >100,000 cfu/ml      Platelets 66/76/4442 199     MPV 03/13/2021 11 8     POC Glucose 03/12/2021 207*    Sodium 03/13/2021 141     Potassium 03/13/2021 3 7     Chloride 03/13/2021 107     CO2 03/13/2021 25     ANION GAP 03/13/2021 9     BUN 03/13/2021 26*    Creatinine 03/13/2021 0 66     Glucose 03/13/2021 167*    Calcium 03/13/2021 9 1     eGFR 03/13/2021 92     WBC 03/13/2021 5 98     RBC 03/13/2021 5 32*    Hemoglobin 03/13/2021 15 1     Hematocrit 03/13/2021 46 8*    MCV 03/13/2021 88     MCH 03/13/2021 28 4     MCHC 03/13/2021 32 3     RDW 03/13/2021 13 6     MPV 03/13/2021 11 5     Platelets 63/66/5106 207     Neutrophils Relative 03/13/2021 60     Immat GRANS % 03/13/2021 0     Lymphocytes Relative 03/13/2021 28     Monocytes Relative 03/13/2021 9     Eosinophils Relative 03/13/2021 3     Basophils Relative 03/13/2021 0     Neutrophils Absolute 03/13/2021 3 53     Immature Grans Absolute 03/13/2021 0 01     Lymphocytes Absolute 03/13/2021 1 67     Monocytes Absolute 03/13/2021 0 55     Eosinophils Absolute 03/13/2021 0 20     Basophils Absolute 03/13/2021 0 02     POC Glucose 03/13/2021 180*    POC Glucose 03/13/2021 214*    POC Glucose 03/13/2021 180*    POC Glucose 03/14/2021 130     POC Glucose 03/14/2021 155*    POC Glucose 03/14/2021 163*    POC Glucose 03/14/2021 171*    D-Dimer, Quant  03/15/2021 0 86*    WBC 03/15/2021 5 99     RBC 03/15/2021 5 59*    Hemoglobin 03/15/2021 15 9*    Hematocrit 03/15/2021 48 6*    MCV 03/15/2021 87     MCH 03/15/2021 28 4     MCHC 03/15/2021 32 7     RDW 03/15/2021 13 4     Platelets 11/03/1248 193     MPV 03/15/2021 11 6     Sodium 03/15/2021 140     Potassium 03/15/2021 3 9     Chloride 03/15/2021 108     CO2 03/15/2021 22     ANION GAP 03/15/2021 10     BUN 03/15/2021 21*    Creatinine 03/15/2021 0 69     Glucose 03/15/2021 106     Calcium 03/15/2021 8 9     eGFR 03/15/2021 91     POC Glucose 03/15/2021 107     POC Glucose 03/15/2021 159*    POC Glucose 03/15/2021 160*    POC Glucose 03/15/2021 132     POC Glucose 03/16/2021 139     POC Glucose 03/16/2021 221*    POC Glucose 03/17/2021 147*     Imaging: I have personally reviewed pertinent films in PACS  EKG, Pathology, and Other Studies: I have personally reviewed pertinent reports        Code Status: Level 1 - Full Code  Advance Directive and Living Will:      Power of :    POLST:

## 2021-03-17 NOTE — CASE MANAGEMENT
[address] [place] [address] accepting patient for STR upon admission  Per [facility], requesting level two PASRR (OPTIONS process) completed  Per completion of PASRR, patient does not qualify for level two PASRR  Patient was not actively suicidal upon admission, per psych consult patient stated denied feeling suicidal and had no plan  Patient's [facility] stays were more than two years ago  PASRR negative at this time  Completed PASRR sent to [address] [place] [address] via [facility]  CM will continue to follow

## 2021-03-17 NOTE — PLAN OF CARE
Problem: Prexisting or High Potential for Compromised Skin Integrity  Goal: Skin integrity is maintained or improved  Description: INTERVENTIONS:  - Identify patients at risk for skin breakdown  - Assess and monitor skin integrity  - Assess and monitor nutrition and hydration status  - Monitor labs   - Assess for incontinence   - Turn and reposition patient  - Assist with mobility/ambulation  - Relieve pressure over bony prominences  - Avoid friction and shearing  - Provide appropriate hygiene as needed including keeping skin clean and dry  - Evaluate need for skin moisturizer/barrier cream  - Collaborate with interdisciplinary team   - Patient/family teaching  - Consider wound care consult   Outcome: Progressing     Problem: PAIN - ADULT  Goal: Verbalizes/displays adequate comfort level or baseline comfort level  Description: Interventions:  - Encourage patient to monitor pain and request assistance  - Assess pain using appropriate pain scale  - Administer analgesics based on type and severity of pain and evaluate response  - Implement non-pharmacological measures as appropriate and evaluate response  - Consider cultural and social influences on pain and pain management  - Notify physician/advanced practitioner if interventions unsuccessful or patient reports new pain  Outcome: Progressing     Problem: INFECTION - ADULT  Goal: Absence or prevention of progression during hospitalization  Description: INTERVENTIONS:  - Assess and monitor for signs and symptoms of infection  - Monitor lab/diagnostic results  - Monitor all insertion sites, i e  indwelling lines, tubes, and drains  - Monitor endotracheal if appropriate and nasal secretions for changes in amount and color  - [place] appropriate cooling/warming therapies per order  - Administer medications as ordered  - Instruct and encourage patient and family to use good hand hygiene technique  - Identify and instruct in appropriate isolation precautions for identified infection/condition  Outcome: Progressing  Goal: Absence of fever/infection during neutropenic period  Description: INTERVENTIONS:  - Monitor WBC    Outcome: Progressing     Problem: SAFETY ADULT  Goal: Patient will remain free of falls  Description: INTERVENTIONS:  - Assess patient frequently for physical needs  -  Identify cognitive and physical deficits and behaviors that affect risk of falls    -  [place] fall precautions as indicated by assessment   - Educate patient/family on patient safety including physical limitations  - Instruct patient to call for assistance with activity based on assessment  - Modify environment to reduce risk of injury  - Consider OT/PT consult to assist with strengthening/mobility  Outcome: Progressing  Goal: Maintain or return to baseline ADL function  Description: INTERVENTIONS:  -  Assess patient's ability to carry out ADLs; assess patient's baseline for ADL function and identify physical deficits which impact ability to perform ADLs (bathing, care of mouth/teeth, toileting, grooming, dressing, etc )  - Assess/evaluate cause of self-care deficits   - Assess range of motion  - Assess patient's mobility; develop plan if impaired  - Assess patient's need for assistive devices and provide as appropriate  - Encourage maximum independence but intervene and supervise when necessary  - Involve family in performance of ADLs  - Assess for home care needs following discharge   - Consider OT consult to assist with ADL evaluation and planning for discharge  - Provide patient education as appropriate  Outcome: Progressing  Goal: Maintain or return mobility status to optimal level  Description: INTERVENTIONS:  - Assess patient's baseline mobility status (ambulation, transfers, stairs, etc )    - Identify cognitive and physical deficits and behaviors that affect mobility  - Identify mobility aids required to assist with transfers and/or ambulation (gait belt, sit-to-stand, lift, walker, cane, etc )  - [place] fall precautions as indicated by assessment  - Record patient progress and toleration of activity level on Mobility SBAR; progress patient to next Phase/Stage  - Instruct patient to call for assistance with activity based on assessment  - Consider rehabilitation consult to assist with strengthening/weightbearing, etc   Outcome: Progressing     Problem: DISCHARGE PLANNING  Goal: Discharge to home or other facility with appropriate resources  Description: INTERVENTIONS:  - Identify barriers to discharge w/patient and caregiver  - Arrange for needed discharge resources and transportation as appropriate  - Identify discharge learning needs (meds, wound care, etc )  - Arrange for interpretive services to assist at discharge as needed  - Refer to Case Management Department for coordinating discharge planning if the patient needs post-hospital services based on physician/advanced practitioner order or complex needs related to functional status, cognitive ability, or social support system  Outcome: Progressing     Problem: Knowledge Deficit  Goal: Patient/family/caregiver demonstrates understanding of disease process, treatment plan, medications, and discharge instructions  Description: Complete learning assessment and assess knowledge base  Interventions:  - Provide teaching at level of understanding  - Provide teaching via preferred learning methods  Outcome: Progressing     Problem: Nutrition/Hydration-ADULT  Goal: Nutrient/Hydration intake appropriate for improving, restoring or maintaining nutritional needs  Description: Monitor and assess patient's nutrition/hydration status for malnutrition  Collaborate with interdisciplinary team and initiate plan and interventions as ordered  Monitor patient's weight and dietary intake as ordered or per policy  Utilize nutrition screening tool and intervene as necessary   Determine patient's food preferences and provide high-protein, high-caloric foods as appropriate  INTERVENTIONS:  - Monitor oral intake, urinary output, labs, and treatment plans  - Assess nutrition and hydration status and recommend course of action  - Evaluate amount of meals eaten  - Assist patient with eating if necessary   - Allow adequate time for meals  - Recommend/ encourage appropriate diets, oral nutritional supplements, and vitamin/mineral supplements  - Order, calculate, and assess calorie counts as needed  - Recommend, monitor, and adjust tube feedings and TPN/PPN based on assessed needs  - Assess need for intravenous fluids  - Provide specific nutrition/hydration education as appropriate  - Include patient/family/caregiver in decisions related to nutrition  Outcome: Progressing     Problem: Potential for Falls  Goal: Patient will remain free of falls  Description: INTERVENTIONS:  - Assess patient frequently for physical needs  -  Identify cognitive and physical deficits and behaviors that affect risk of falls    -  [place] fall precautions as indicated by assessment   - Educate patient/family on patient safety including physical limitations  - Instruct patient to call for assistance with activity based on assessment  - Modify environment to reduce risk of injury  - Consider OT/PT consult to assist with strengthening/mobility  Outcome: Progressing

## 2021-03-17 NOTE — ASSESSMENT & PLAN NOTE
· Presented with severe depression, not taking medications  · Denies any SI/HI or plan, does not need one-to-one observation at this point  · Has not seen by psychiatrist/PCP for a while due to recent COVID situation  · Recently seen by Dr Marleni Gupta in [facility] during recent hospitalization - recently increased Prozac to 60 mg  · Psychiatric consult, recommending continuation of Prozac 60 mg OD, Ativan 0 5 mg b i d   · Recommend to follow-up with her regular psychiatrist at [place] time of discharge  · She reports her mood is improved

## 2021-03-17 NOTE — PLAN OF CARE
Problem: Prexisting or High Potential for Compromised Skin Integrity  Goal: Skin integrity is maintained or improved  Description: INTERVENTIONS:  - Identify patients at risk for skin breakdown  - Assess and monitor skin integrity  - Assess and monitor nutrition and hydration status  - Monitor labs   - Assess for incontinence   - Turn and reposition patient  - Assist with mobility/ambulation  - Relieve pressure over bony prominences  - Avoid friction and shearing  - Provide appropriate hygiene as needed including keeping skin clean and dry  - Evaluate need for skin moisturizer/barrier cream  - Collaborate with interdisciplinary team   - Patient/family teaching  - Consider wound care consult   Outcome: Progressing     Problem: PAIN - ADULT  Goal: Verbalizes/displays adequate comfort level or baseline comfort level  Description: Interventions:  - Encourage patient to monitor pain and request assistance  - Assess pain using appropriate pain scale  - Administer analgesics based on type and severity of pain and evaluate response  - Implement non-pharmacological measures as appropriate and evaluate response  - Consider cultural and social influences on pain and pain management  - Notify physician/advanced practitioner if interventions unsuccessful or patient reports new pain  Outcome: Progressing     Problem: SAFETY ADULT  Goal: Patient will remain free of falls  Description: INTERVENTIONS:  - Assess patient frequently for physical needs  -  Identify cognitive and physical deficits and behaviors that affect risk of falls    -  [place] fall precautions as indicated by assessment   - Educate patient/family on patient safety including physical limitations  - Instruct patient to call for assistance with activity based on assessment  - Modify environment to reduce risk of injury  - Consider OT/PT consult to assist with strengthening/mobility  Outcome: Progressing  Goal: Maintain or return to baseline ADL function  Description: INTERVENTIONS:  -  Assess patient's ability to carry out ADLs; assess patient's baseline for ADL function and identify physical deficits which impact ability to perform ADLs (bathing, care of mouth/teeth, toileting, grooming, dressing, etc )  - Assess/evaluate cause of self-care deficits   - Assess range of motion  - Assess patient's mobility; develop plan if impaired  - Assess patient's need for assistive devices and provide as appropriate  - Encourage maximum independence but intervene and supervise when necessary  - Involve family in performance of ADLs  - Assess for home care needs following discharge   - Consider OT consult to assist with ADL evaluation and planning for discharge  - Provide patient education as appropriate  Outcome: Progressing  Goal: Maintain or return mobility status to optimal level  Description: INTERVENTIONS:  - Assess patient's baseline mobility status (ambulation, transfers, stairs, etc )    - Identify cognitive and physical deficits and behaviors that affect mobility  - Identify mobility aids required to assist with transfers and/or ambulation (gait belt, sit-to-stand, lift, walker, cane, etc )  - [place] fall precautions as indicated by assessment  - Record patient progress and toleration of activity level on Mobility SBAR; progress patient to next Phase/Stage  - Instruct patient to call for assistance with activity based on assessment  - Consider rehabilitation consult to assist with strengthening/weightbearing, etc   Outcome: Progressing     Problem: Nutrition/Hydration-ADULT  Goal: Nutrient/Hydration intake appropriate for improving, restoring or maintaining nutritional needs  Description: Monitor and assess patient's nutrition/hydration status for malnutrition  Collaborate with interdisciplinary team and initiate plan and interventions as ordered  Monitor patient's weight and dietary intake as ordered or per policy   Utilize nutrition screening tool and intervene as necessary  Determine patient's food preferences and provide high-protein, high-caloric foods as appropriate  INTERVENTIONS:  - Monitor oral intake, urinary output, labs, and treatment plans  - Assess nutrition and hydration status and recommend course of action  - Evaluate amount of meals eaten  - Assist patient with eating if necessary   - Allow adequate time for meals  - Recommend/ encourage appropriate diets, oral nutritional supplements, and vitamin/mineral supplements  - Order, calculate, and assess calorie counts as needed  - Recommend, monitor, and adjust tube feedings and TPN/PPN based on assessed needs  - Assess need for intravenous fluids  - Provide specific nutrition/hydration education as appropriate  - Include patient/family/caregiver in decisions related to nutrition  Outcome: Progressing     Problem: Potential for Falls  Goal: Patient will remain free of falls  Description: INTERVENTIONS:  - Assess patient frequently for physical needs  -  Identify cognitive and physical deficits and behaviors that affect risk of falls    -  [place] fall precautions as indicated by assessment   - Educate patient/family on patient safety including physical limitations  - Instruct patient to call for assistance with activity based on assessment  - Modify environment to reduce risk of injury  - Consider OT/PT consult to assist with strengthening/mobility  Outcome: Progressing

## 2021-03-18 ENCOUNTER — HOSPITAL ENCOUNTER (INPATIENT)
Facility: HOSPITAL | Age: 66
LOS: 12 days | Discharge: HOME HEALTH | DRG: 057 | End: 2021-03-30
Attending: INTERNAL MEDICINE | Admitting: INTERNAL MEDICINE
Payer: COMMERCIAL

## 2021-03-18 VITALS
OXYGEN SATURATION: 96 % | HEART RATE: 60 BPM | WEIGHT: 293 LBS | DIASTOLIC BLOOD PRESSURE: 71 MMHG | TEMPERATURE: 97.6 F | HEIGHT: 62 IN | BODY MASS INDEX: 53.92 KG/M2 | SYSTOLIC BLOOD PRESSURE: 136 MMHG | RESPIRATION RATE: 20 BRPM

## 2021-03-18 DIAGNOSIS — F32.A DEPRESSION: ICD-10-CM

## 2021-03-18 DIAGNOSIS — G25.81 RESTLESS LEG SYNDROME: ICD-10-CM

## 2021-03-18 DIAGNOSIS — E11.9 DIABETES MELLITUS (HCC): Primary | ICD-10-CM

## 2021-03-18 DIAGNOSIS — E66.01 MORBID OBESITY DUE TO EXCESS CALORIES (HCC): ICD-10-CM

## 2021-03-18 LAB
FLUAV RNA RESP QL NAA+PROBE: NEGATIVE
FLUBV RNA RESP QL NAA+PROBE: NEGATIVE
GLUCOSE SERPL-MCNC: 140 MG/DL (ref 65–140)
GLUCOSE SERPL-MCNC: 145 MG/DL (ref 65–140)
GLUCOSE SERPL-MCNC: 205 MG/DL (ref 65–140)
GLUCOSE SERPL-MCNC: 220 MG/DL (ref 65–140)
RSV RNA RESP QL NAA+PROBE: NEGATIVE
SARS-COV-2 RNA RESP QL NAA+PROBE: NEGATIVE

## 2021-03-18 PROCEDURE — 97530 THERAPEUTIC ACTIVITIES: CPT

## 2021-03-18 PROCEDURE — 82948 REAGENT STRIP/BLOOD GLUCOSE: CPT

## 2021-03-18 PROCEDURE — 0241U HB NFCT DS VIR RESP RNA 4 TRGT: CPT | Performed by: INTERNAL MEDICINE

## 2021-03-18 PROCEDURE — 99239 HOSP IP/OBS DSCHRG MGMT >30: CPT | Performed by: INTERNAL MEDICINE

## 2021-03-18 PROCEDURE — 97110 THERAPEUTIC EXERCISES: CPT

## 2021-03-18 PROCEDURE — 97116 GAIT TRAINING THERAPY: CPT

## 2021-03-18 RX ORDER — ASPIRIN 81 MG/1
81 TABLET ORAL DAILY
Status: DISCONTINUED | OUTPATIENT
Start: 2021-03-19 | End: 2021-03-30 | Stop reason: HOSPADM

## 2021-03-18 RX ORDER — ACETAMINOPHEN 325 MG/1
650 TABLET ORAL EVERY 6 HOURS PRN
Status: DISCONTINUED | OUTPATIENT
Start: 2021-03-18 | End: 2021-03-30 | Stop reason: HOSPADM

## 2021-03-18 RX ORDER — LORAZEPAM 0.5 MG/1
0.5 TABLET ORAL 2 TIMES DAILY
Status: DISCONTINUED | OUTPATIENT
Start: 2021-03-19 | End: 2021-03-30 | Stop reason: HOSPADM

## 2021-03-18 RX ORDER — LOPERAMIDE HYDROCHLORIDE 2 MG/1
2 CAPSULE ORAL 3 TIMES DAILY PRN
Status: DISCONTINUED | OUTPATIENT
Start: 2021-03-18 | End: 2021-03-30 | Stop reason: HOSPADM

## 2021-03-18 RX ORDER — LEVOTHYROXINE SODIUM 0.05 MG/1
50 TABLET ORAL DAILY
Status: DISCONTINUED | OUTPATIENT
Start: 2021-03-19 | End: 2021-03-30 | Stop reason: HOSPADM

## 2021-03-18 RX ORDER — GABAPENTIN 100 MG/1
100 CAPSULE ORAL 2 TIMES DAILY
Status: DISCONTINUED | OUTPATIENT
Start: 2021-03-19 | End: 2021-03-19

## 2021-03-18 RX ORDER — FAMOTIDINE 20 MG/1
20 TABLET, FILM COATED ORAL 2 TIMES DAILY
Status: DISCONTINUED | OUTPATIENT
Start: 2021-03-19 | End: 2021-03-30 | Stop reason: HOSPADM

## 2021-03-18 RX ORDER — ATORVASTATIN CALCIUM 40 MG/1
40 TABLET, FILM COATED ORAL
Status: DISCONTINUED | OUTPATIENT
Start: 2021-03-19 | End: 2021-03-22

## 2021-03-18 RX ORDER — GABAPENTIN 300 MG/1
300 CAPSULE ORAL
Status: DISCONTINUED | OUTPATIENT
Start: 2021-03-18 | End: 2021-03-30 | Stop reason: HOSPADM

## 2021-03-18 RX ORDER — LANOLIN ALCOHOL/MO/W.PET/CERES
6 CREAM (GRAM) TOPICAL
Refills: 0 | Status: CANCELLED
Start: 2021-03-18

## 2021-03-18 RX ORDER — INSULIN GLARGINE 100 [IU]/ML
20 INJECTION, SOLUTION SUBCUTANEOUS
Status: DISCONTINUED | OUTPATIENT
Start: 2021-03-18 | End: 2021-03-30 | Stop reason: HOSPADM

## 2021-03-18 RX ORDER — TOPIRAMATE 100 MG/1
200 TABLET, FILM COATED ORAL 2 TIMES DAILY
Status: DISCONTINUED | OUTPATIENT
Start: 2021-03-19 | End: 2021-03-30 | Stop reason: HOSPADM

## 2021-03-18 RX ORDER — LANOLIN ALCOHOL/MO/W.PET/CERES
6 CREAM (GRAM) TOPICAL
Status: DISCONTINUED | OUTPATIENT
Start: 2021-03-18 | End: 2021-03-30 | Stop reason: HOSPADM

## 2021-03-18 RX ORDER — SACCHAROMYCES BOULARDII 250 MG
250 CAPSULE ORAL 2 TIMES DAILY
Status: DISCONTINUED | OUTPATIENT
Start: 2021-03-19 | End: 2021-03-30 | Stop reason: HOSPADM

## 2021-03-18 RX ORDER — FLUOXETINE HYDROCHLORIDE 20 MG/1
60 CAPSULE ORAL DAILY
Status: DISCONTINUED | OUTPATIENT
Start: 2021-03-19 | End: 2021-03-30 | Stop reason: HOSPADM

## 2021-03-18 RX ORDER — MAGNESIUM HYDROXIDE/ALUMINUM HYDROXICE/SIMETHICONE 120; 1200; 1200 MG/30ML; MG/30ML; MG/30ML
30 SUSPENSION ORAL EVERY 6 HOURS PRN
Status: DISCONTINUED | OUTPATIENT
Start: 2021-03-18 | End: 2021-03-30 | Stop reason: HOSPADM

## 2021-03-18 RX ORDER — POLYETHYLENE GLYCOL 3350 17 G/17G
17 POWDER, FOR SOLUTION ORAL DAILY PRN
Status: DISCONTINUED | OUTPATIENT
Start: 2021-03-18 | End: 2021-03-30 | Stop reason: HOSPADM

## 2021-03-18 RX ADMIN — GABAPENTIN 100 MG: 100 CAPSULE ORAL at 14:09

## 2021-03-18 RX ADMIN — FAMOTIDINE 20 MG: 20 TABLET ORAL at 17:34

## 2021-03-18 RX ADMIN — ENOXAPARIN SODIUM 60 MG: 60 INJECTION SUBCUTANEOUS at 21:53

## 2021-03-18 RX ADMIN — Medication 250 MG: at 08:54

## 2021-03-18 RX ADMIN — LEVOTHYROXINE SODIUM 50 MCG: 50 TABLET ORAL at 08:55

## 2021-03-18 RX ADMIN — FLUOXETINE 60 MG: 20 CAPSULE ORAL at 08:54

## 2021-03-18 RX ADMIN — INSULIN GLARGINE 20 UNITS: 100 INJECTION, SOLUTION SUBCUTANEOUS at 22:07

## 2021-03-18 RX ADMIN — LORAZEPAM 0.5 MG: 0.5 TABLET ORAL at 08:54

## 2021-03-18 RX ADMIN — TOPIRAMATE 200 MG: 100 TABLET, FILM COATED ORAL at 17:35

## 2021-03-18 RX ADMIN — LORAZEPAM 0.5 MG: 0.5 TABLET ORAL at 17:35

## 2021-03-18 RX ADMIN — GABAPENTIN 300 MG: 300 CAPSULE ORAL at 22:06

## 2021-03-18 RX ADMIN — ASPIRIN 81 MG: 81 TABLET, DELAYED RELEASE ORAL at 08:55

## 2021-03-18 RX ADMIN — GABAPENTIN 100 MG: 100 CAPSULE ORAL at 08:55

## 2021-03-18 RX ADMIN — FAMOTIDINE 20 MG: 20 TABLET ORAL at 08:54

## 2021-03-18 RX ADMIN — TOPIRAMATE 200 MG: 100 TABLET, FILM COATED ORAL at 08:55

## 2021-03-18 RX ADMIN — INSULIN LISPRO 1 UNITS: 100 INJECTION, SOLUTION INTRAVENOUS; SUBCUTANEOUS at 12:02

## 2021-03-18 RX ADMIN — ENOXAPARIN SODIUM 60 MG: 60 INJECTION SUBCUTANEOUS at 08:55

## 2021-03-18 RX ADMIN — MELATONIN TAB 3 MG 6 MG: 3 TAB at 22:07

## 2021-03-18 RX ADMIN — Medication 250 MG: at 17:35

## 2021-03-18 NOTE — DISCHARGE SUMMARY
Terrie U  65   Discharge- Garry Ellsworth 1955, 76 y o  female MRN: [id number]  Unit/Bed#: -01 Encounter: [id number]  Primary Care Provider: Rima Piña MD   Date and time admitted to hospital: 3/12/2021 11:13 AM    * Chronic fatigue and malaise  Assessment & Plan  · Presented with generalized weakness secondary to major depression  · Patient states she feels overwhelmed due to recent stress and comorbidities  · Psychiatric consult, appreciate recommendations  · PT OT consult, recommend acute post rehab    Severe episode of recurrent major depressive disorder, without psychotic features ([facility] )  Assessment & Plan  · Presented with severe depression, not taking medications  · Denies any SI/HI or plan, does not need one-to-one observation at this point  · Has not seen by psychiatrist/PCP for a while due to recent COVID situation  · Recently seen by Dr Katina Forrester in [place] during recent hospitalization - recently increased Prozac to 60 mg  · Psychiatric consult, recommending continuation of Prozac 60 mg OD, Ativan 0 5 mg b i d   · Recommend to follow-up with her regular psychiatrist at [address] [facility] time of discharge  · She reports her mood is improved  Calf tenderness  Assessment & Plan  · Patient is complaining of bilateral calf tenderness; left greater than right  · Denying any chest pain, shortness of breath, prior history of DVT  · D-dimer 0 89  · Doppler ultrasound negative for DVT bilaterally    Onychomycosis of toenail  Assessment & Plan  · Onychomycosis of all toenails  · Podiatry consult, appreciate recommendation    Morbid obesity due to excess calories ([facility] [address] )  Assessment & Plan  · Morbid  obesity due to excess calories evidenced by BMI 57 66  · Diet:  Carbohydrate controlled diet in the setting of uncontrolled diabetes mellitus  · Dietician recommendation: Intake is inadequate with evidence of uncontrolled DM and morbid obesity for many years     · Diabetes Education if her depression improved  · Pt would benefit by attending OP MNT when mentally stable  Asymptomatic bacteriuria  Assessment & Plan  · UA showed bacteriuria, patient denies any urinary symptoms  · She has been afebrile, no leukocytosis  · Status post 1 dose of IV ceftriaxone in ED    Acquired hypothyroidism  Assessment & Plan  · Continue home Synthroid    GERD (gastroesophageal reflux disease)  Assessment & Plan  · Continue famotidine    Diabetes mellitus [facility])  Assessment & Plan  Lab Results   Component Value Date    HGBA1C 11 5 (H) 03/03/2021       Recent Labs     03/17/21  1647 03/17/21  2046 03/18/21  0703 03/18/21  1146   POCGLU 170* 185* 145* 220*       Blood Sugar Average: Last 72 hrs:  (P) 325 [id number]     · Uncontrolled, patient is noncompliant  · Patient is supposed to be on insulin, empagliflozin, alogliptin  · Continue Lantus 20 units q h s  + sliding scale insulin  · Currently blood glucose is at goals  · Resume home regimen upon discharge    Hyperlipidemia  Assessment & Plan  · Continue Lipitor    Restless leg syndrome  Assessment & Plan  · Continue home dose of gabapentin    Seizures ([address] )  Assessment & Plan  · Stable, continue home Topamax      Discharging Physician / Practitioner: Carole Kennedy MD  PCP: Antoine Henriquez MD  Admission Date:   Admission Orders (From admission, onward)     Ordered        03/12/21 1821  Inpatient Admission  Once                   Discharge Date: 03/18/21    Resolved Problems  Date Reviewed: 3/18/2021    None          Consultations During Hospital Stay:  · Psychiatry  · Podiatry  · PT    Procedures Performed:   · None    Significant Findings / Test Results:   VAS lower limb venous duplex study, complete bilateral   Final Result by Noemí Yost MD (03/15 1617)      XR chest 1 view portable   Final Result by Don Gil MD (03/12 1433)   No acute cardiopulmonary disease, improved     Workstation performed: [id number]      ·     Incidental Findings:   · None     Test Results Pending at Discharge (will require follow up): · None     Outpatient Tests Requested:  · None    Complications:  None    Reason for Admission:     Hospital Course:     Jovani Hernandez is a 76 y o  female patient with PMH of HTN, IDDM, depression, hypothyroidism, seizure, diabetic neuropathy, restless legs syndrome who originally presented to the hospital on 3/12/2021 due to generalized weakness and failure to thrive  Patient was recently discharged from [facility] due to chest pain for which she underwent cardiac catheterization and it was negative  Patient stated she felt overwhelmed due to recent stress and multiple comorbidities  Patient wants to give up and not taking medications  She has not seen by PCP or psychiatrist for quite sometime due to recent [place] situation  Patient denies any SI/HI or plan  Patient stated she lives alone  The family requested the rehab placement after the discharge      On admission vital signs stable, electrolytes within normal limit  Elevated glucose at 236 a m  CBC unremarkable except Hemoglobin 11 5   UA showed moderate bacteriuria with wbc's, however patient denies any urinary symptoms  She was given 1 dose of ceftriaxone in ED  She is complaining of bilateral calf tenderness left more than right  She has questionable bilateral lower extremity swelling  D-dimer slightly elevated 0 89  Doppler ultrasound negative for DVT bilaterally  She also reports of multiple tips of toe tenderness for which she was evaluated by Podiatry  She was evaluated by Psychiatry for major depressive disorder  Her Prozac dose was recently increased to 60 mg during [facility] hospitalization  She was recommended to continue Prozac 60 mg OD, Ativan by 5 mg b i d  She was recommended to follow up with her regular psychiatrist at [place] time of discharge  During hospitalization, PT evaluated and recommended acute post rehab  Patient is agreeable to it  Her mood is improved   She was hemodynamically and clinically stable to be discharged  Please see above list of diagnoses and related plan for additional information  Condition at Discharge: good     Discharge Day Visit / Exam:     Subjective:  She was seen and examined at the bedside  She is getting brighter day by day  She states she is hoping to get stronger and she can go home earlier  She reports Podiatry clipped her toe nails and the tenderness is significantly improved  She reports she will get her 2nd dose of [place] vaccine at rehab  No acute event overnight  Vitals: Blood Pressure: 136/71 (03/18/21 0707)  Pulse: 60 (03/18/21 0707)  Temperature: 97 6 °F (36 4 °C) (03/18/21 0707)  Temp Source: Tympanic (03/18/21 0707)  Respirations: 20 (03/18/21 0707)  Height: 5' 2" (157 5 cm) (03/12/21 2106)  Weight - Scale: (!) 143 kg (315 lb 4 1 oz) (03/12/21 2106)  SpO2: 96 % (03/18/21 0707)  Exam:   Physical Exam  Vitals signs and nursing note reviewed  Constitutional:       General: She is not in acute distress  Appearance: She is obese  She is not ill-appearing  HENT:      Head: Normocephalic and atraumatic  Eyes:      General: No scleral icterus  Conjunctiva/sclera: Conjunctivae normal    Cardiovascular:      Rate and Rhythm: Normal rate and regular rhythm  Pulses: Normal pulses  Heart sounds: Murmur present  Pulmonary:      Effort: Pulmonary effort is normal  No respiratory distress  Breath sounds: Normal breath sounds  No wheezing  Abdominal:      General: Bowel sounds are normal  There is no distension  Palpations: Abdomen is soft  Tenderness: There is no abdominal tenderness  Musculoskeletal:      Right lower leg: No edema  Left lower leg: No edema  Skin:     General: Skin is warm and dry  Neurological:      General: No focal deficit present  Mental Status: She is alert and oriented to person, place, and time     Psychiatric:         Behavior: Behavior normal  Comments: Pleasant mood       Discussion with Family: Discussed with the patient  Discharge instructions/Information to patient and family:   See after visit summary for information provided to patient and family  Provisions for Follow-Up Care:  See after visit summary for information related to follow-up care and any pertinent home health orders  Disposition:     Acute Rehab at [facility]    For Discharges to [facility]:   · Not Applicable to this Patient - Not Applicable to this Patient    Planned Readmission: No     Discharge Statement:  I spent 35 minutes discharging the patient  This time was spent on the day of discharge  I had direct contact with the patient on the day of discharge  Greater than 50% of the total time was spent examining patient, answering all patient questions, arranging and discussing plan of care with patient as well as directly providing post-discharge instructions  Additional time then spent on discharge activities  Discharge Medications:  See after visit summary for reconciled discharge medications provided to patient and family        ** Please Note: This note has been constructed using a voice recognition system **

## 2021-03-18 NOTE — ASSESSMENT & PLAN NOTE
· UA showed bacteriuria, patient denies any urinary symptoms  · She has been afebrile, no leukocytosis  · Status post 1 dose of IV ceftriaxone in ED

## 2021-03-18 NOTE — PLAN OF CARE
Problem: Prexisting or High Potential for Compromised Skin Integrity  Goal: Skin integrity is maintained or improved  Description: INTERVENTIONS:  - Identify patients at risk for skin breakdown  - Assess and monitor skin integrity  - Assess and monitor nutrition and hydration status  - Monitor labs   - Assess for incontinence   - Turn and reposition patient  - Assist with mobility/ambulation  - Relieve pressure over bony prominences  - Avoid friction and shearing  - Provide appropriate hygiene as needed including keeping skin clean and dry  - Evaluate need for skin moisturizer/barrier cream  - Collaborate with interdisciplinary team   - Patient/family teaching  - Consider wound care consult   Outcome: Progressing     Problem: PAIN - ADULT  Goal: Verbalizes/displays adequate comfort level or baseline comfort level  Description: Interventions:  - Encourage patient to monitor pain and request assistance  - Assess pain using appropriate pain scale  - Administer analgesics based on type and severity of pain and evaluate response  - Implement non-pharmacological measures as appropriate and evaluate response  - Consider cultural and social influences on pain and pain management  - Notify physician/advanced practitioner if interventions unsuccessful or patient reports new pain  Outcome: Progressing     Problem: INFECTION - ADULT  Goal: Absence or prevention of progression during hospitalization  Description: INTERVENTIONS:  - Assess and monitor for signs and symptoms of infection  - Monitor lab/diagnostic results  - Monitor all insertion sites, i e  indwelling lines, tubes, and drains  - - Administer medications as ordered  - Instruct and encourage patient and family to use good hand hygiene technique  - Identify and instruct in appropriate isolation precautions for identified infection/condition  Outcome: Progressing  Goal: Absence of fever/infection during neutropenic period  Description: INTERVENTIONS:  - Monitor WBC    Outcome: Progressing     Problem: SAFETY ADULT  Goal: Patient will remain free of falls  Description: INTERVENTIONS:  - Assess patient frequently for physical needs  -  Identify cognitive and physical deficits and behaviors that affect risk of falls    -  [place] fall precautions as indicated by assessment   - Educate patient/family on patient safety including physical limitations  - Instruct patient to call for assistance with activity based on assessment  - Modify environment to reduce risk of injury  - Consider OT/PT consult to assist with strengthening/mobility  Outcome: Progressing  Goal: Maintain or return to baseline ADL function  Description: INTERVENTIONS:  -  Assess patient's ability to carry out ADLs; assess patient's baseline for ADL function and identify physical deficits which impact ability to perform ADLs (bathing, care of mouth/teeth, toileting, grooming, dressing, etc )  - Assess/evaluate cause of self-care deficits   - Assess range of motion  - Assess patient's mobility; develop plan if impaired  - Assess patient's need for assistive devices and provide as appropriate  - Encourage maximum independence but intervene and supervise when necessary  - Involve family in performance of ADLs  - Assess for home care needs following discharge   - Consider OT consult to assist with ADL evaluation and planning for discharge  - Provide patient education as appropriate  Outcome: Progressing  Goal: Maintain or return mobility status to optimal level  Description: INTERVENTIONS:  - Assess patient's baseline mobility status (ambulation, transfers, stairs, etc )    - Identify cognitive and physical deficits and behaviors that affect mobility  - Identify mobility aids required to assist with transfers and/or ambulation (gait belt, sit-to-stand, lift, walker, cane, etc )  - [place] fall precautions as indicated by assessment  - Record patient progress and toleration of activity level on Mobility SBAR; progress patient to next Phase/Stage  - Instruct patient to call for assistance with activity based on assessment  - Consider rehabilitation consult to assist with strengthening/weightbearing, etc   Outcome: Progressing     Problem: DISCHARGE PLANNING  Goal: Discharge to home or other facility with appropriate resources  Description: INTERVENTIONS:  - Identify barriers to discharge w/patient and caregiver  - Arrange for needed discharge resources and transportation as appropriate  - Identify discharge learning needs (meds, wound care, etc )  - Arrange for interpretive services to assist at discharge as needed  - Refer to Case Management Department for coordinating discharge planning if the patient needs post-hospital services based on physician/advanced practitioner order or complex needs related to functional status, cognitive ability, or social support system  Outcome: Progressing     Problem: Knowledge Deficit  Goal: Patient/family/caregiver demonstrates understanding of disease process, treatment plan, medications, and discharge instructions  Description: Complete learning assessment and assess knowledge base  Interventions:  - Provide teaching at level of understanding  - Provide teaching via preferred learning methods  Outcome: Progressing     Problem: Nutrition/Hydration-ADULT  Goal: Nutrient/Hydration intake appropriate for improving, restoring or maintaining nutritional needs  Description: Monitor and assess patient's nutrition/hydration status for malnutrition  Collaborate with interdisciplinary team and initiate plan and interventions as ordered  Monitor patient's weight and dietary intake as ordered or per policy  Utilize nutrition screening tool and intervene as necessary  Determine patient's food preferences and provide high-protein, high-caloric foods as appropriate       INTERVENTIONS:  - Monitor oral intake, urinary output, labs, and treatment plans  - Assess nutrition and hydration status and recommend course of action  - Evaluate amount of meals eaten  - Assist patient with eating if necessary   - Allow adequate time for meals  - Recommend/ encourage appropriate diets, oral nutritional supplements, and vitamin/mineral supplements  - Order, calculate, and assess calorie counts as needed  - Recommend, monitor, and adjust tube feedings and TPN/PPN based on assessed needs  - Assess need for intravenous fluids  - Provide specific nutrition/hydration education as appropriate  - Include patient/family/caregiver in decisions related to nutrition  Outcome: Progressing     Problem: Potential for Falls  Goal: Patient will remain free of falls  Description: INTERVENTIONS:  - Assess patient frequently for physical needs  -  Identify cognitive and physical deficits and behaviors that affect risk of falls    -  [place] fall precautions as indicated by assessment   - Educate patient/family on patient safety including physical limitations  - Instruct patient to call for assistance with activity based on assessment  - Modify environment to reduce risk of injury  - Consider OT/PT consult to assist with strengthening/mobility  Outcome: Progressing

## 2021-03-18 NOTE — DISCHARGE INSTR - AVS FIRST PAGE
DISCHARGE INSTRUCTIONS   1  Follow up with Dr Ninfa Bergman MD in one week  in regard to recent hospitalization  Follow up with Dr Tj Giraldo in 1 month in regard to recent hospitalization  2  Take medications regularly as prescribed      3  Come back to the ER if symptoms recur or worsen

## 2021-03-18 NOTE — ASSESSMENT & PLAN NOTE
· Presented with severe depression, not taking medications  · Denies any SI/HI or plan, does not need one-to-one observation at this point  · Has not seen by psychiatrist/PCP for a while due to recent COVID situation  · Recently seen by Dr Kelli Dodson in [facility] during recent hospitalization - recently increased Prozac to 60 mg  · Psychiatric consult, recommending continuation of Prozac 60 mg OD, Ativan 0 5 mg b i d   · Recommend to follow-up with her regular psychiatrist at [place] time of discharge  · She reports her mood is improved

## 2021-03-18 NOTE — PHYSICAL THERAPY NOTE
PHYSICAL THERAPY TREATMENT  Time: 2098-5862    NAME:  Holly Puentes  DATE: 03/18/21    AGE:   76 y o  Mrn:   [id number]  Length Of Stay: 6    ADMIT DX:  Suicidal behavior [R45 89]  UTI (urinary tract infection) [N39 0]  Depression [F32 9]  Weakness [R53 1]    Past Medical History:   Diagnosis Date    Diabetes mellitus ([facility] )     Disease of thyroid gland     Hyperlipidemia     Hypertension     Hypothyroidism (acquired)     Restless leg syndrome     Seizures ([facility] )      Past Surgical History:   Procedure Laterality Date    APPENDECTOMY      CHOLECYSTECTOMY      HIP FRACTURE SURGERY Right     TONSILLECTOMY      TUBAL LIGATION         Performed at least 2 patient identifiers during session: Name and ID bracelet        03/18/21 1010   PT Last Visit   PT Visit Date 03/18/21   Note Type   Note Type Treatment   Pain Assessment   Pain Assessment Tool 0-10   Pain Score No Pain   Effect of Pain on Daily Activities n/a   Hospital Pain Intervention(s) Ambulation/increased activity;Repositioned   Multiple Pain Sites No   Restrictions/Precautions   Weight Bearing Precautions Per Order No   Other Precautions Bed Alarm; Chair Alarm; Fall Risk   General   Chart Reviewed Yes   Additional Pertinent History Pt admitted 3/12/2021 with c/o SI due to recent difficulty caring for self, dizziness, weakness  Recent d/c from [facility]  Dx: Chronic fatigue and malaise  Response to Previous Treatment Patient with no complaints from previous session  Family/Caregiver Present No   Cognition   Overall Cognitive Status WFL   Arousal/Participation Alert; Cooperative   Attention Within functional limits   Orientation Level Oriented X4   Memory Within functional limits   Following Commands Follows multistep commands with increased time or repetition   Subjective   Subjective "I'm supposed to be leaving today "    Bed Mobility   Supine to Sit 5  Supervision   Additional items Assist x 1;HOB elevated; Bedrails; Increased time required;Verbal cues Sit to Supine Unable to assess   Transfers   Sit to Stand 3  Moderate assistance   Additional items Assist x 1; Increased time required;Verbal cues   Stand to Sit 4  Minimal assistance   Additional items Assist x 1; Increased time required;Verbal cues   Stand pivot 4  Minimal assistance   Additional items Assist x 1; Increased time required;Verbal cues   Ambulation/Elevation   Gait pattern Decreased foot clearance; Forward Flexion; Wide CLAUDIO; Short stride; Excessively slow; Step to;Shuffling  (shuffling once fatigued)   Gait Assistance 4  Minimal assist  (initially S but ANDREW with fatigue )   Additional items Assist x 1; Tactile cues; Verbal cues   Assistive Device Bariatric Rolling walker   Distance 82ft x1 including change in direction    Stair Management Assistance Not tested   Balance   Static Sitting Good   Dynamic Sitting Fair   Static Standing Fair   Dynamic Standing Fair -   Ambulatory Fair -   Endurance Deficit   Endurance Deficit Yes   Activity Tolerance   Activity Tolerance Patient limited by fatigue   Medical Staff Made Aware Spoke with SLIM and CM    Nurse Made Aware Spoke with DEAVN Huang pre/post session    Exercises   Hamstring Sets Sitting;25 reps;Bilateral  (vs red theraband)   Hip Abduction Sitting;25 reps;Bilateral  (vs red theraband)   Hip Adduction Sitting;25 reps;Bilateral  (vs red theraband)   Knee AROM Long Arc Quad Sitting;25 reps;Bilateral  (vs red theraband)   Assessment   Prognosis Good   Problem List Decreased strength;Decreased range of motion;Decreased endurance; Impaired balance;Decreased mobility; Decreased safety awareness; Obesity; Decreased skin integrity   Assessment Pt seen for PT treatment today with focus on gait training and instruction of LE strengthening exercises  Pt presents semi-supine in bed, denies pain, is agreeable to participate in session  Pt completes bed mobility in hospital bed (HOB elevated + bed rails), transfers to stand with RW and MODA from standard bed height   Pt then ambulates 82ft with RW and variable S to [address] [facility] (initally close S however with fatigue approx half way pt requiring ANDREW for steadying)  Upon return to room, pt required extended functional rest break due to generalized fatigue and weakness  Therapist instructed pt on B LE exercises with red Tband resistance - LAQs, hamstring curls, hip abd, hip add (pt with difficulty with hip abd/add on R due to previous hip surgery)  Pt required increased encouragement to remain OOB in recliner chair  At end of session, pt was left seated in bedside  chair with all needs in place and lines intact, bed alarm engaged, care returned to RN  Continue skilled PT POC as able  Recommend STR upon d/c to maximize indep and safety with mobility, and return to OF  Barriers to Discharge Decreased caregiver support; Inaccessible home environment   Goals   Patient Goals "to go to rehab"    RUST Expiration Date 03/25/21   Short Term Goal #1 Patient PT goals established in order to address patient self reported goal of "to go to rehab"  1  Pt will complete all bed mobility in flat bed without use of bed rails at [facility] level, in order to promote increased OOB functional mobility to improve overall activity tolerance  2  Pt will complete all transfers with RW at [facility] level, in order to increase safety with functional mobility  3  Pt will ambulate >100ft with RW and S in order to increase safety with household and short community distance functional mobility  4  Pt will tolerate >3hrs OOB in upright position, in order to improve muscular endurance and respiratory status  5  Pt will improve B LE strength to >/= 4/5 MMT throughout, in order to increase safety with functional mobility and decrease risk of falls  6  Pt will demonstrate understanding and independence with LE strengthening HEP  7  Pt will improve AM-PAC score to >/= 19/24 in order to increase independence with mobility and decrease burden of care   8  Pt will improve Barthel Index score to >/= 55/100 in order to increase independence and decrease risk of falls  9  Pt will improve Elderly Mobility Scale score to >/= 11/20 in order to decrease burden of care and increase independence with functional mobility and ADLs  10  Pt will improve ambulatory balance by >/= 1/2 grade in order to promote safety and increased independence with mobility  PT Treatment Day 3   Plan   Treatment/Interventions ADL retraining;Functional transfer training;LE strengthening/ROM; Therapeutic exercise; Endurance training;Patient/family training;Equipment eval/education; Bed mobility;Gait training;Spoke to MD;Spoke to nursing;Spoke to case management;OT   Progress Progressing toward goals   PT Frequency Other (Comment)  (3-5x/wk )   Recommendation   PT Discharge Recommendation [facility] Recommended HD Bariatric wheeled walker   Change/add to [place]? No   AM-PAC Basic Mobility Inpatient   Turning in Bed Without Bedrails 3   Lying on Back to Sitting on Edge of Flat Bed 3   Moving Bed to Chair 3   Standing Up From Chair 2   Walk in Room 3   Climb 3-5 Stairs 1   Basic Mobility Inpatient Raw Score 15   Basic Mobility Standardized Score 36 97       The patient's AM-PAC Basic Mobility Inpatient Short Form Raw Score is 15, Standardized Score is 36 97  A standardized score less than 42 9 suggests the patient may benefit from discharge to post-acute rehabilitation services  Please also refer to the recommendation of the physical therapist for safe discharge planning        Savanna Ferro PT, DPT   Available via [employer]  [facility] # [id number]  [place] License - [id number]  [place] License - [id number]  3/18/2021

## 2021-03-18 NOTE — ASSESSMENT & PLAN NOTE
Lab Results   Component Value Date    HGBA1C 11 5 (H) 03/03/2021       Recent Labs     03/17/21  1647 03/17/21  2046 03/18/21  0703 03/18/21  1146   POCGLU 170* 185* 145* 220*       Blood Sugar Average: Last 72 hrs:  (P) 926 [id number]     · Uncontrolled, patient is noncompliant  · Patient is supposed to be on insulin, empagliflozin, alogliptin  · Continue Lantus 20 units q h s  + sliding scale insulin  · Currently blood glucose is at goals     · Resume home regimen upon discharge

## 2021-03-18 NOTE — PROGRESS NOTES
Patient examined spoke with the nurse patient is alert awake cooperative brighter and smiling communicates well thanking for coming and talking to her she needs lot of emotional support she reports that she is still weak and trouble in ambulation she is going to [facility] for physical therapy she will be discharged home once she is ready and physically strong enough  Nurse reports that she is doing all right  Patient is happy with the medications she wants to continue her Prozac 60 mg daily and Ativan 0 5 mg p o  B i d  At this time  Patient is not lethargic she is sleeping better and she seems to be in a better spirit as she feels better physically  Patient also agreed that she will follow-up with her psychiatrist at [facility] after the discharge  No side effects of Prozac and Ativan noted  No behavior problem  No psychosis no hallucinations  Patient denies suicidal ideation  Patient is not agitated  Discussed with the nurse and the resident physician  Therapy done with good response  Continue her medications the same

## 2021-03-18 NOTE — PLAN OF CARE
Problem: PHYSICAL THERAPY ADULT  Goal: Performs mobility at highest level of function for planned discharge setting  See evaluation for individualized goals  Description: Treatment/Interventions: ADL retraining, Functional transfer training, LE strengthening/ROM, Therapeutic exercise, Endurance training, Patient/family training, Equipment eval/education, Bed mobility, Gait training, Spoke to MD, Spoke to nursing, Spoke to case management, OT  Equipment Recommended: [place]ie     See flowsheet documentation for full assessment, interventions and recommendations  Outcome: Progressing  Note: Prognosis: Good  Problem List: Decreased strength, Decreased range of motion, Decreased endurance, Impaired balance, Decreased mobility, Decreased safety awareness, Obesity, Decreased skin integrity  Assessment: Pt seen for PT treatment today with focus on gait training and instruction of LE strengthening exercises  Pt presents semi-supine in bed, denies pain, is agreeable to participate in session  Pt completes bed mobility in hospital bed (HOB elevated + bed rails), transfers to stand with RW and MODA from standard bed height  Pt then ambulates 82ft with RW and variable S to [address] [facility] (initally close S however with fatigue approx half way pt requiring ANDREW for steadying)  Upon return to room, pt required extended functional rest break due to generalized fatigue and weakness  Therapist instructed pt on B LE exercises with red Tband resistance - LAQs, hamstring curls, hip abd, hip add (pt with difficulty with hip abd/add on R due to previous hip surgery)  Pt required increased encouragement to remain OOB in recliner chair  At end of session, pt was left seated in bedside  chair with all needs in place and lines intact, bed alarm engaged, care returned to RN  Continue skilled PT POC as able  Recommend STR upon d/c to maximize indep and safety with mobility, and return to PLOF     Barriers to Discharge: Decreased caregiver support, Inaccessible home environment     PT Discharge Recommendation: [address]          See flowsheet documentation for full assessment

## 2021-03-18 NOTE — PLAN OF CARE
Problem: Prexisting or High Potential for Compromised Skin Integrity  Goal: Skin integrity is maintained or improved  Description: INTERVENTIONS:  - Identify patients at risk for skin breakdown  - Assess and monitor skin integrity  - Assess and monitor nutrition and hydration status  - Monitor labs   - Assess for incontinence   - Turn and reposition patient  - Assist with mobility/ambulation  - Relieve pressure over bony prominences  - Avoid friction and shearing  - Provide appropriate hygiene as needed including keeping skin clean and dry  - Evaluate need for skin moisturizer/barrier cream  - Collaborate with interdisciplinary team   - Patient/family teaching  - Consider wound care consult   Outcome: Progressing     Problem: PAIN - ADULT  Goal: Verbalizes/displays adequate comfort level or baseline comfort level  Description: Interventions:  - Encourage patient to monitor pain and request assistance  - Assess pain using appropriate pain scale  - Administer analgesics based on type and severity of pain and evaluate response  - Implement non-pharmacological measures as appropriate and evaluate response  - Consider cultural and social influences on pain and pain management  - Notify physician/advanced practitioner if interventions unsuccessful or patient reports new pain  Outcome: Progressing     Problem: INFECTION - ADULT  Goal: Absence or prevention of progression during hospitalization  Description: INTERVENTIONS:  - Assess and monitor for signs and symptoms of infection  - Monitor lab/diagnostic results  - Monitor all insertion sites, i e  indwelling lines, tubes, and drains  - Monitor endotracheal if appropriate and nasal secretions for changes in amount and color  - [place] appropriate cooling/warming therapies per order  - Administer medications as ordered  - Instruct and encourage patient and family to use good hand hygiene technique  - Identify and instruct in appropriate isolation precautions for identified infection/condition  Outcome: Progressing  Goal: Absence of fever/infection during neutropenic period  Description: INTERVENTIONS:  - Monitor WBC    Outcome: Progressing     Problem: SAFETY ADULT  Goal: Patient will remain free of falls  Description: INTERVENTIONS:  - Assess patient frequently for physical needs  -  Identify cognitive and physical deficits and behaviors that affect risk of falls    -  [place] fall precautions as indicated by assessment   - Educate patient/family on patient safety including physical limitations  - Instruct patient to call for assistance with activity based on assessment  - Modify environment to reduce risk of injury  - Consider OT/PT consult to assist with strengthening/mobility  Outcome: Progressing  Goal: Maintain or return to baseline ADL function  Description: INTERVENTIONS:  -  Assess patient's ability to carry out ADLs; assess patient's baseline for ADL function and identify physical deficits which impact ability to perform ADLs (bathing, care of mouth/teeth, toileting, grooming, dressing, etc )  - Assess/evaluate cause of self-care deficits   - Assess range of motion  - Assess patient's mobility; develop plan if impaired  - Assess patient's need for assistive devices and provide as appropriate  - Encourage maximum independence but intervene and supervise when necessary  - Involve family in performance of ADLs  - Assess for home care needs following discharge   - Consider OT consult to assist with ADL evaluation and planning for discharge  - Provide patient education as appropriate  Outcome: Progressing  Goal: Maintain or return mobility status to optimal level  Description: INTERVENTIONS:  - Assess patient's baseline mobility status (ambulation, transfers, stairs, etc )    - Identify cognitive and physical deficits and behaviors that affect mobility  - Identify mobility aids required to assist with transfers and/or ambulation (gait belt, sit-to-stand, lift, walker, cane, etc )  - [place] fall precautions as indicated by assessment  - Record patient progress and toleration of activity level on Mobility SBAR; progress patient to next Phase/Stage  - Instruct patient to call for assistance with activity based on assessment  - Consider rehabilitation consult to assist with strengthening/weightbearing, etc   Outcome: Progressing     Problem: DISCHARGE PLANNING  Goal: Discharge to home or other facility with appropriate resources  Description: INTERVENTIONS:  - Identify barriers to discharge w/patient and caregiver  - Arrange for needed discharge resources and transportation as appropriate  - Identify discharge learning needs (meds, wound care, etc )  - Arrange for interpretive services to assist at discharge as needed  - Refer to Case Management Department for coordinating discharge planning if the patient needs post-hospital services based on physician/advanced practitioner order or complex needs related to functional status, cognitive ability, or social support system  Outcome: Progressing     Problem: Knowledge Deficit  Goal: Patient/family/caregiver demonstrates understanding of disease process, treatment plan, medications, and discharge instructions  Description: Complete learning assessment and assess knowledge base  Interventions:  - Provide teaching at level of understanding  - Provide teaching via preferred learning methods  Outcome: Progressing     Problem: Nutrition/Hydration-ADULT  Goal: Nutrient/Hydration intake appropriate for improving, restoring or maintaining nutritional needs  Description: Monitor and assess patient's nutrition/hydration status for malnutrition  Collaborate with interdisciplinary team and initiate plan and interventions as ordered  Monitor patient's weight and dietary intake as ordered or per policy  Utilize nutrition screening tool and intervene as necessary   Determine patient's food preferences and provide high-protein, high-caloric foods as appropriate  INTERVENTIONS:  - Monitor oral intake, urinary output, labs, and treatment plans  - Assess nutrition and hydration status and recommend course of action  - Evaluate amount of meals eaten  - Assist patient with eating if necessary   - Allow adequate time for meals  - Recommend/ encourage appropriate diets, oral nutritional supplements, and vitamin/mineral supplements  - Order, calculate, and assess calorie counts as needed  - Recommend, monitor, and adjust tube feedings and TPN/PPN based on assessed needs  - Assess need for intravenous fluids  - Provide specific nutrition/hydration education as appropriate  - Include patient/family/caregiver in decisions related to nutrition  Outcome: Progressing     Problem: Potential for Falls  Goal: Patient will remain free of falls  Description: INTERVENTIONS:  - Assess patient frequently for physical needs  -  Identify cognitive and physical deficits and behaviors that affect risk of falls    -  [place] fall precautions as indicated by assessment   - Educate patient/family on patient safety including physical limitations  - Instruct patient to call for assistance with activity based on assessment  - Modify environment to reduce risk of injury  - Consider OT/PT consult to assist with strengthening/mobility  Outcome: Progressing

## 2021-03-18 NOTE — ASSESSMENT & PLAN NOTE
· Presented with generalized weakness secondary to major depression  · Patient states she feels overwhelmed due to recent stress and comorbidities  · Psychiatric consult, appreciate recommendations  · PT OT consult, recommend acute post rehab

## 2021-03-18 NOTE — CASE MANAGEMENT
Per Kole Myles at [facility], authorization received patient able to admit to facility pending updated COVID test  Test requested from [facility] Betty Dial RN notified to collect test for admission  CM s/w patient by phone to review that she will be transferring to [facility] to begin STR  IMM reviewed  Transport request pending with [facility] at this time       [facility]  P: [id number]  F: [phone]  Room 557

## 2021-03-19 PROBLEM — G25.81 RLS (RESTLESS LEGS SYNDROME): Status: ACTIVE | Noted: 2021-03-19

## 2021-03-19 PROBLEM — F33.9 MDD (MAJOR DEPRESSIVE DISORDER), RECURRENT EPISODE (HCC): Status: ACTIVE | Noted: 2021-03-19

## 2021-03-19 PROBLEM — F41.9 ANXIETY: Status: ACTIVE | Noted: 2021-03-19

## 2021-03-19 LAB
GLUCOSE SERPL-MCNC: 135 MG/DL (ref 65–140)
GLUCOSE SERPL-MCNC: 181 MG/DL (ref 65–140)
GLUCOSE SERPL-MCNC: 197 MG/DL (ref 65–140)

## 2021-03-19 PROCEDURE — 99221 1ST HOSP IP/OBS SF/LOW 40: CPT | Performed by: PHYSICIAN ASSISTANT

## 2021-03-19 PROCEDURE — 97167 OT EVAL HIGH COMPLEX 60 MIN: CPT

## 2021-03-19 PROCEDURE — 97116 GAIT TRAINING THERAPY: CPT

## 2021-03-19 PROCEDURE — 99306 1ST NF CARE HIGH MDM 50: CPT | Performed by: INTERNAL MEDICINE

## 2021-03-19 PROCEDURE — 97535 SELF CARE MNGMENT TRAINING: CPT

## 2021-03-19 PROCEDURE — 82948 REAGENT STRIP/BLOOD GLUCOSE: CPT

## 2021-03-19 PROCEDURE — 97163 PT EVAL HIGH COMPLEX 45 MIN: CPT

## 2021-03-19 RX ORDER — GABAPENTIN 100 MG/1
100 CAPSULE ORAL 3 TIMES DAILY
Status: DISCONTINUED | OUTPATIENT
Start: 2021-03-19 | End: 2021-03-30 | Stop reason: HOSPADM

## 2021-03-19 RX ADMIN — FLUOXETINE 60 MG: 20 CAPSULE ORAL at 08:40

## 2021-03-19 RX ADMIN — Medication 250 MG: at 17:14

## 2021-03-19 RX ADMIN — GABAPENTIN 300 MG: 300 CAPSULE ORAL at 21:19

## 2021-03-19 RX ADMIN — FAMOTIDINE 20 MG: 20 TABLET ORAL at 17:14

## 2021-03-19 RX ADMIN — MELATONIN TAB 3 MG 6 MG: 3 TAB at 21:20

## 2021-03-19 RX ADMIN — GABAPENTIN 100 MG: 100 CAPSULE ORAL at 17:14

## 2021-03-19 RX ADMIN — INSULIN GLARGINE 20 UNITS: 100 INJECTION, SOLUTION SUBCUTANEOUS at 21:19

## 2021-03-19 RX ADMIN — LORAZEPAM 0.5 MG: 0.5 TABLET ORAL at 08:40

## 2021-03-19 RX ADMIN — TUBERCULIN PURIFIED PROTEIN DERIVATIVE 5 UNITS: 5 INJECTION INTRADERMAL at 05:52

## 2021-03-19 RX ADMIN — LORAZEPAM 0.5 MG: 0.5 TABLET ORAL at 17:14

## 2021-03-19 RX ADMIN — ASPIRIN 81 MG: 81 TABLET, COATED ORAL at 08:40

## 2021-03-19 RX ADMIN — ENOXAPARIN SODIUM 60 MG: 60 INJECTION SUBCUTANEOUS at 21:19

## 2021-03-19 RX ADMIN — GABAPENTIN 100 MG: 100 CAPSULE ORAL at 14:08

## 2021-03-19 RX ADMIN — Medication 250 MG: at 08:40

## 2021-03-19 RX ADMIN — ENOXAPARIN SODIUM 60 MG: 60 INJECTION SUBCUTANEOUS at 08:41

## 2021-03-19 RX ADMIN — FAMOTIDINE 20 MG: 20 TABLET ORAL at 08:40

## 2021-03-19 RX ADMIN — TOPIRAMATE 200 MG: 100 TABLET, FILM COATED ORAL at 17:14

## 2021-03-19 RX ADMIN — LEVOTHYROXINE SODIUM 50 MCG: 50 TABLET ORAL at 06:05

## 2021-03-19 RX ADMIN — TOPIRAMATE 200 MG: 100 TABLET, FILM COATED ORAL at 08:41

## 2021-03-19 RX ADMIN — GABAPENTIN 100 MG: 100 CAPSULE ORAL at 08:40

## 2021-03-19 NOTE — PLAN OF CARE
Problem: OCCUPATIONAL THERAPY ADULT  Goal: Performs self-care activities at highest level of function for planned discharge setting  See evaluation for individualized goals  Description: Treatment Interventions: ADL retraining, Functional transfer training, UE strengthening/ROM, Endurance training, Patient/family training, Equipment evaluation/education, Neuromuscular reeducation, Compensatory technique education, Energy conservation, Activityengagement          See flowsheet documentation for full assessment, interventions and recommendations     Outcome: Progressing  Note: Limitation: Decreased ADL status, Decreased UE ROM, Decreased UE strength, Decreased Safe judgement during ADL, Decreased endurance, Decreased self-care trans, Decreased high-level ADLs  Prognosis: Good  Assessment: see note      OT Discharge Recommendation: Home with skilled therapy

## 2021-03-19 NOTE — CASE MANAGEMENT
SW met with patient to review general assessment and admission paperwork  Patient presented as alert during conversation  Patient reported that she lives alone in a 4th floor apartment that has an elevator in the building  Patient reportedly does not have steps leading into the building  Patient reported that she needs assistance at baseline getting her legs into the tube at home  Patient reported that she has a tube bench at home, but still she struggles with getting legs over tube  Patient is able to complete dressing by sitting on toilet  Patient also reports that she has a RW, and cane at home  [facility]  Patient stated that she uses [facility] in [place] for emergent prescriptions  [facility] about [facility]  Patient would like for son Regan Pond to be part of [address] meeting  Per patient, son works overnight shift, so may not answer  PRAVIN advised that PRAVIN will leave a voice message  SW will follow

## 2021-03-19 NOTE — PLAN OF CARE
Patient arrived at [address] [place] [address] Road and oriented to the unit  She is alert and cooperative, call light in reach  States her legs are weak, ambulates with rollator walker or cane at home  Denies having pain  Skin check done, noted both groins red, cream applied  Skin under breasts, sacrum, and heels intact  Both lower legs are dry and scaly, and RLE is red by anterior tibial area  Patient stated had suicidal attempts in past with overdose of pills, but currently feeling much better, denies any suicidal idea or plan at present  Cooperative with taking meds  She is pleasant and engages in conversation easily  Will continue to monitor and offer emotional support

## 2021-03-19 NOTE — UTILIZATION REVIEW
Notification of Inpatient Admission/Inpatient Authorization Request   This is a Notification of Inpatient Admission for [address]  Be advised that this patient was admitted to our facility under Inpatient Status  Contact Suhas Toribio at [phone] for additional admission information  [address] [facility]  DEDICATED [phone]  Patient Name:   Rip Ibanez   YOB: 1955       [address]   [address]:   [facility]  Tax ID: [id number]  NPI: [id number] Attending Provider/NPI:  Phone:  Address: Susana [place]te[place], [place] [[id number]]  [phone]  Same as the facility   Place of Service Code: 24 Place of Service Name:  [facility]   Start Date: 3/12/21 1821 Discharge Date & Time: 3/18/2021  6:30 PM    Type of Admission: Inpatient Status Discharge Disposition   (if discharged): Released to SNF/TCU/SNU Facility   Patient Diagnoses: Suicidal behavior [R45 89]  UTI (urinary tract infection) [N39 0]  Depression [F32 9]  Weakness [R53 1]   Orders: Admission Orders (From admission, onward)     Ordered        03/12/21 1821  Inpatient Admission  Once                    Assigned Utilization Review Contact: Suhas Toribio  Utilization   Network Utilization Review Department  Phone: [phone]; Fax [phone]  Email: [email or web address]   ATTENTION PAYERS: Please call the assigned Utilization  directly with any questions or concerns ALL voicemails in the department are confidential  Send all requests for admission clinical reviews, approved or denied determinations and any other requests to dedicated fax number belonging to the campus where the patient is receiving treatment        Initial Clinical Review    Admission: Date/Time/Statement:   Admission Orders (From admission, onward)     Ordered        03/12/21 1821  Inpatient Admission  Once                   Orders Placed This Encounter   Procedures    Inpatient Admission     Standing Status:   Standing     Number of Occurrences:   1     Order Specific Question:   Level of Care     Answer:   Med Surg [16]     Order Specific Question:   Estimated length of stay     Answer:   More than 2 Midnights     Order Specific Question:   Certification     Answer:   I certify that inpatient services are medically necessary for this patient for a duration of greater than two midnights  See H&P and MD Progress Notes for additional information about the patient's course of treatment  ED Arrival Information     Expected Arrival Acuity Means of Arrival Escorted By Service Admission Type    - 3/12/2021 11:06 Emergent Ambulance [facility] Emergency    Arrival Complaint    failure to thrive; SI        Chief Complaint   Patient presents with    Suicidal     pt reports from after recent d/c from [place] for CP, with reports of being suicidal  Pt has not been eating or taking medications     Assessment/Plan:    76year old female presents to ed via ems from home for evaluation and treatment of suicidal ideation  She was recently discharged from [facility] where she was evaluated for chest pain  Cardiac cath negative  She has not taken her meds or been eating  Clinical assessment significant for UA moderate bacteria  Treated in ed with iv ceftriaxone and iv  9% ns bolus  Admit to inpatient medical surgical for failure to thrive, asymptomatic bacteriuria, major depression    Plan includes: consult  Psychiatry, PT/OT evaluations, bilateral lower extremity venous duplex,    Consult  behavioral health   3-16  76y o  year old female who presents with weakness and trouble in ambulation severely depressed      Social history patient lives alone she has 2 grownup sons they are good to her patient denies smoking denies abusing alcohol or drugs patient has no history of drug and alcohol abuse patient has 12th grade education and she was in [employer] [employer] for 6 years she did work in [employer]  currently she is retired      Diagnosis depression anxiety insomnia hypothyroidism diabetes high cholesterol hypertension seizures restless legs syndrome patient has history of appendectomy tonsillectomy cholecystectomy tubal ligation hip fracture surgery  Home medications patient is on Prozac 60 mg daily now and she is also other medications for other medical conditions as listed in the chart  Mental Status Exam:  76years old white female is alert awake oriented x3 affect flat withdrawn psychomotor retardation speech slow and low in tone weak tired pessimistic negative and profoundly depressed patient has trouble in ADL care because of the physical weakness  Poor sleep poor appetite  Patient denies auditory or visual hallucinations  Patient denies suicidal or homicidal ideation  No paranoia no delusion elucidated  No mood swings no agitation  Insight and judgments are adequate  Anxiety  Insomnia  Due to meant due to recently her favorite aunt       Plan:  Continue Prozac 60 mg daily  Ativan 0 5 mg p o  B i d  Psychotherapy  Psychiatric follow-up with her psychiatrist at [facility] after the discharge from the hospital     3-13   Patient reports depression and complains of calf tenderness  PT/OT evaluations an bilateral venous duplex ordered  Gabapentin ordered x 1  Continue lovenox      ED Triage Vitals   21 1112 21 1111 21 1111 21 1111 21 1111   97 8 °F (36 6 °C) 73 14 134/88 98 %      Oral Monitor           No Pain          21 (!) 143 kg (315 lb 4 1 oz)     Additional Vital Signs:    Date/Time  Temp  Pulse  Resp  BP  MAP   SpO2  O2 Device    21 0702  97 °F (36 1 °C)  Abnormal   67  16  103/53  --  98 %  None (Room air)    21 2106  97 7 °F (36 5 °C)  67  17  116/54  80  97 %  None (Room air)    21 1819  --  62  15  128/82  --  97 %  None (Room air)    21 1619  --  67  15  130/84  -- 98 %  None (Room air)          Pertinent Labs/Diagnostic Test Results:       Collection Time Result Time Vent R Atrial R AR Int  QRSD Int  QT Int  QTC Int  P Axis QRS Axis T Wave Ax    03/12/21 11:17:42 03/12/21 15:20:49 67 67 136 145 431 455 59 -44 44       Final result                Narrative:    Sinus rhythmRBBB and LAFBLeft ventricular hypertrophyAbnormal ECGWhen compared with ECG of 3/2/21No significant change was found                       XR chest 1 view portable   (03/12 1433)   No acute cardiopulmonary disease, improved          Results from last 7 days   Lab Units 03/18/21  0935 03/12/21  1138   SARS-COV-2  Negative Negative     Results from last 7 days   Lab Units 03/15/21  0448 03/13/21  0503 03/12/21  1132   WBC Thousand/uL 5 99 5 98 5 76   HEMOGLOBIN g/dL 15 9* 15 1 16 3*   HEMATOCRIT % 48 6* 46 8* 49 8*   PLATELETS Thousands/uL 193 199  207 209   NEUTROS ABS Thousands/µL  --  3 53 3 78         Results from last 7 days   Lab Units 03/15/21  0448 03/13/21  0503 03/12/21  1132   SODIUM mmol/L 140 141 138   POTASSIUM mmol/L 3 9 3 7 3 8   CHLORIDE mmol/L 108 107 104   CO2 mmol/L 22 25 26   ANION GAP mmol/L 10 9 8   BUN mg/dL 21* 26* 30*   CREATININE mg/dL 0 69 0 66 0 85   EGFR ml/min/1 73sq m 91 92 72   CALCIUM mg/dL 8 9 9 1 9 2   MAGNESIUM mg/dL  --   --  2 0     Results from last 7 days   Lab Units 03/12/21  1132   AST U/L 21   ALT U/L 19   ALK PHOS U/L 95 2   TOTAL PROTEIN g/dL 7 7   ALBUMIN g/dL 3 9   TOTAL BILIRUBIN mg/dL 0 41     Results from last 7 days   Lab Units 03/19/21  0616 03/18/21  2120 03/18/21  1614 03/18/21  1146 03/18/21  0703 03/17/21  2046 03/17/21  1647 03/17/21  1051 03/17/21  0702 03/16/21 2014 03/16/21  1047 03/16/21  0648   POC GLUCOSE mg/dl 135 205* 140 220* 145* 185* 170* 147* 145* 177* 221* 139     Results from last 7 days   Lab Units 03/15/21  0448 03/13/21  0503 03/12/21  1132   GLUCOSE RANDOM mg/dL 106 167* 236*       Results from last 7 days   Lab Units 03/12/21  1132 CK TOTAL U/L 36 1*     Results from last 7 days   Lab Units 03/12/21  1132   TROPONIN I ng/mL <0 03     Results from last 7 days   Lab Units 03/12/21  1132   BNP pg/mL 29 5         Results from last 7 days   Lab Units 03/12/21  1729   CLARITY UA  Slightly Cloudy*   COLOR UA  Yellow   SPEC GRAV UA  >=1 030   PH UA  5 0   GLUCOSE UA mg/dl 1+*   KETONES UA mg/dl Negative   BLOOD UA  2+*   PROTEIN UA mg/dl Negative   NITRITE UA  Negative   BILIRUBIN UA  1+*   UROBILINOGEN UA E U /dl 0 2   LEUKOCYTES UA  Negative   WBC UA /hpf 10-20*   RBC UA /hpf 2-4   BACTERIA UA /hpf Moderate*   EPITHELIAL CELLS WET PREP /hpf Moderate*   MUCUS THREADS  Occasional*     Results from last 7 days   Lab Units 03/18/21  0935 03/12/21  1138   INFLUENZA A PCR  Negative Negative   INFLUENZA B PCR  Negative Negative   RSV PCR  Negative Negative         Results from last 7 days   Lab Units 03/12/21  1729   AMPH/METH  Negative   BARBITURATE UR  Negative   BENZODIAZEPINE UR  Negative   COCAINE UR  Negative   METHADONE URINE  Negative   OPIATE UR  Negative   PCP UR  Negative   THC UR  Negative     Results from last 7 days   Lab Units 03/12/21  1137   ETHANOL LVL mg/dL <10       ED Treatment:   Medication Administration from 03/12/2021 1106 to 03/12/2021 2023       Date/Time Order Dose Route Action     03/12/2021 1301 sodium chloride 0 9 % bolus 500 mL   Intravenous New Bag     03/12/2021 1819 cefTRIAXone (ROCEPHIN) IVPB (premix in dextrose) 1,000 mg 50 mL 1,000 mg Intravenous New Bag        Past Medical History:   Diagnosis Date    Diabetes mellitus ([facility] )     Disease of thyroid gland     Hyperlipidemia     Hypertension     Hypothyroidism (acquired)     Restless leg syndrome     Seizures (HCC)      Present on Admission:   Severe episode of recurrent major depressive disorder, without psychotic features [facility]   Seizures ([facility] )   Restless leg syndrome   Hyperlipidemia   Diabetes mellitus [facility]   Acquired hypothyroidism   GERD (gastroesophageal reflux disease)      Admitting Diagnosis:     Suicidal behavior [R45 89]  UTI (urinary tract infection) [N39 0]  Depression [F32 9]  Weakness [R53 1]      Age/Sex: 76 y o  female       Admission Orders:    No current facility-administered medications for this encounter  Continuous IV Infusions:  No current facility-administered medications for this encounter  PRN Meds:  No current facility-administered medications for this encounter  IP CONSULT TO CASE MANAGEMENT  IP CONSULT TO PODIATRY    Network Utilization Review Department  ATTENTION: Please call with any questions or concerns to [phone] and carefully listen to the prompts so that you are directed to the right person  All voicemails are confidential   Armida Best all requests for admission clinical reviews, approved or denied determinations and any other requests to dedicated fax number below belonging to the campus where the patient is receiving treatment   List of dedicated fax numbers for the Facilities:  [address] DENIALS (Administrative/Medical Necessity) [phone]   [address] (Maternity/NICU/Pediatrics) [phone]   [address] [place] [address] [address] [place] [address] (  [address]) [address] [place] [address] [address] [place] [address] [phone]

## 2021-03-19 NOTE — UTILIZATION REVIEW
Initial Clinical Review    Admission: Date/Time/Statement:   Admission Orders (From admission, onward)     Ordered        03/12/21 1821  Inpatient Admission  Once                   Orders Placed This Encounter   Procedures    Inpatient Admission     Standing Status:   Standing     Number of Occurrences:   1     Order Specific Question:   Level of Care     Answer:   Med Surg [16]     Order Specific Question:   Estimated length of stay     Answer:   More than 2 Midnights     Order Specific Question:   Certification     Answer:   I certify that inpatient services are medically necessary for this patient for a duration of greater than two midnights  See H&P and MD Progress Notes for additional information about the patient's course of treatment  ED Arrival Information     Expected Arrival Acuity Means of Arrival Escorted By Service Admission Type    - 3/12/2021 11:06 Emergent Ambulance [facility] Emergency    Arrival Complaint    failure to thrive; SI        Chief Complaint   Patient presents with    Suicidal     pt reports from after recent d/c from [place] for CP, with reports of being suicidal  Pt has not been eating or taking medications     Assessment/Plan:    76year old female presents to ed via ems from home for evaluation and treatment of suicidal ideation  She was recently discharged from [facility] where she was evaluated for chest pain  Cardiac cath negative  She has not taken her meds or been eating  Clinical assessment significant for UA moderate bacteria  Treated in ed with iv ceftriaxone and iv  9% ns bolus  Admit to inpatient medical surgical for failure to thrive, asymptomatic bacteriuria, major depression    Plan includes: consult  Psychiatry, PT/OT evaluations, bilateral lower extremity venous duplex,    Consult  behavioral health   3-16  76y o  year old female who presents with weakness and trouble in ambulation severely depressed      Social history patient lives alone she has 2 grownup sons they are good to her patient denies smoking denies abusing alcohol or drugs patient has no history of drug and alcohol abuse patient has 12th grade education and she was in [employer] for 6 years she did work in [employer]  currently she is retired      Diagnosis depression anxiety insomnia hypothyroidism diabetes high cholesterol hypertension seizures restless legs syndrome patient has history of appendectomy tonsillectomy cholecystectomy tubal ligation hip fracture surgery  Home medications patient is on Prozac 60 mg daily now and she is also other medications for other medical conditions as listed in the chart  Mental Status Exam:  76years old white female is alert awake oriented x3 affect flat withdrawn psychomotor retardation speech slow and low in tone weak tired pessimistic negative and profoundly depressed patient has trouble in ADL care because of the physical weakness  Poor sleep poor appetite  Patient denies auditory or visual hallucinations  Patient denies suicidal or homicidal ideation  No paranoia no delusion elucidated  No mood swings no agitation  Insight and judgments are adequate  Anxiety  Insomnia  Due to meant due to recently her favorite aunt       Plan:  Continue Prozac 60 mg daily  Ativan 0 5 mg p o  B i d  Psychotherapy  Psychiatric follow-up with her psychiatrist at [facility] after the discharge from the hospital     3-13   Patient reports depression and complains of calf tenderness  PT/OT evaluations an bilateral venous duplex ordered  Gabapentin ordered x 1  Continue lovenox      ED Triage Vitals   21 1112 21 1111 21 1111 21 1111 21 1111   97 8 °F (36 6 °C) 73 14 134/88 98 %      Oral Monitor           No Pain          21 (!) 143 kg (315 lb 4 1 oz)     Additional Vital Signs:    Date/Time  Temp  Pulse  Resp  BP  MAP   SpO2  O2 Device    21 0702  97 °F (36 1 °C)  Abnormal   67  16  103/53  -- 98 %  None (Room air)    03/12/21 2106  97 7 °F (36 5 °C)  67  17  116/54  80  97 %  None (Room air)    03/12/21 1819  --  62  15  128/82  --  97 %  None (Room air)    03/12/21 1619  --  67  15  130/84  --  98 %  None (Room air)          Pertinent Labs/Diagnostic Test Results:       Collection Time Result Time Vent R Atrial R WI Int  QRSD Int  QT Int  QTC Int  P Axis QRS Axis T Wave Ax    03/12/21 11:17:42 03/12/21 15:20:49 67 67 136 145 431 455 59 -44 44       Final result                Narrative:    Sinus rhythmRBBB and LAFBLeft ventricular hypertrophyAbnormal ECGWhen compared with ECG of 3/2/21No significant change was found                       XR chest 1 view portable   (03/12 1433)   No acute cardiopulmonary disease, improved          Results from last 7 days   Lab Units 03/18/21  0935 03/12/21  1138   SARS-COV-2  Negative Negative     Results from last 7 days   Lab Units 03/15/21  0448 03/13/21  0503 03/12/21  1132   WBC Thousand/uL 5 99 5 98 5 76   HEMOGLOBIN g/dL 15 9* 15 1 16 3*   HEMATOCRIT % 48 6* 46 8* 49 8*   PLATELETS Thousands/uL 193 199  207 209   NEUTROS ABS Thousands/µL  --  3 53 3 78         Results from last 7 days   Lab Units 03/15/21  0448 03/13/21  0503 03/12/21  1132   SODIUM mmol/L 140 141 138   POTASSIUM mmol/L 3 9 3 7 3 8   CHLORIDE mmol/L 108 107 104   CO2 mmol/L 22 25 26   ANION GAP mmol/L 10 9 8   BUN mg/dL 21* 26* 30*   CREATININE mg/dL 0 69 0 66 0 85   EGFR ml/min/1 73sq m 91 92 72   CALCIUM mg/dL 8 9 9 1 9 2   MAGNESIUM mg/dL  --   --  2 0     Results from last 7 days   Lab Units 03/12/21  1132   AST U/L 21   ALT U/L 19   ALK PHOS U/L 95 2   TOTAL PROTEIN g/dL 7 7   ALBUMIN g/dL 3 9   TOTAL BILIRUBIN mg/dL 0 41     Results from last 7 days   Lab Units 03/19/21  0616 03/18/21  2120 03/18/21  1614 03/18/21  1146 03/18/21  0703 03/17/21  2046 03/17/21  1647 03/17/21  1051 03/17/21  0702 03/16/21 2014 03/16/21  1047 03/16/21  0648   POC GLUCOSE mg/dl 135 205* 140 220* 145* 185* 170* 147* 145* 177* 221* 139     Results from last 7 days   Lab Units 03/15/21  0448 03/13/21  0503 03/12/21  1132   GLUCOSE RANDOM mg/dL 106 167* 236*       Results from last 7 days   Lab Units 03/12/21  1132   CK TOTAL U/L 36 1*     Results from last 7 days   Lab Units 03/12/21  1132   TROPONIN I ng/mL <0 03     Results from last 7 days   Lab Units 03/12/21  1132   BNP pg/mL 29 5         Results from last 7 days   Lab Units 03/12/21  1729   CLARITY UA  Slightly Cloudy*   COLOR UA  Yellow   SPEC GRAV UA  >=1 030   PH UA  5 0   GLUCOSE UA mg/dl 1+*   KETONES UA mg/dl Negative   BLOOD UA  2+*   PROTEIN UA mg/dl Negative   NITRITE UA  Negative   BILIRUBIN UA  1+*   UROBILINOGEN UA E U /dl 0 2   LEUKOCYTES UA  Negative   WBC UA /hpf 10-20*   RBC UA /hpf 2-4   BACTERIA UA /hpf Moderate*   EPITHELIAL CELLS WET PREP /hpf Moderate*   MUCUS THREADS  Occasional*     Results from last 7 days   Lab Units 03/18/21  0935 03/12/21  1138   INFLUENZA A PCR  Negative Negative   INFLUENZA B PCR  Negative Negative   RSV PCR  Negative Negative         Results from last 7 days   Lab Units 03/12/21  1729   AMPH/METH  Negative   BARBITURATE UR  Negative   BENZODIAZEPINE UR  Negative   COCAINE UR  Negative   METHADONE URINE  Negative   OPIATE UR  Negative   PCP UR  Negative   THC UR  Negative     Results from last 7 days   Lab Units 03/12/21  1137   ETHANOL LVL mg/dL <10       ED Treatment:   Medication Administration from 03/12/2021 1106 to 03/12/2021 2023       Date/Time Order Dose Route Action     03/12/2021 1301 sodium chloride 0 9 % bolus 500 mL   Intravenous New Bag     03/12/2021 1819 cefTRIAXone (ROCEPHIN) IVPB (premix in dextrose) 1,000 mg 50 mL 1,000 mg Intravenous New Bag        Past Medical History:   Diagnosis Date    [facility] )     Disease of thyroid gland     Hyperlipidemia     Hypertension     Hypothyroidism (acquired)     Restless leg syndrome     Seizures (HCC)      Present on Admission:   Severe episode of recurrent major depressive disorder, without psychotic features ([facility] )   Seizures ([facility] )   Restless leg syndrome   Hyperlipidemia   [facility]   Acquired hypothyroidism   GERD (gastroesophageal reflux disease)      Admitting Diagnosis:     Suicidal behavior [R45 89]  UTI (urinary tract infection) [N39 0]  Depression [F32 9]  Weakness [R53 1]      Age/Sex: 76 y o  female       Admission Orders:    No current facility-administered medications for this encounter  Continuous IV Infusions:  No current facility-administered medications for this encounter  PRN Meds:  No current facility-administered medications for this encounter  IP CONSULT TO CASE MANAGEMENT  IP CONSULT TO PODIATRY    Network Utilization Review Department  ATTENTION: Please call with any questions or concerns to [phone] and carefully listen to the prompts so that you are directed to the right person  All voicemails are confidential   [place] all requests for admission clinical reviews, approved or denied determinations and any other requests to dedicated fax number below belonging to the campus where the patient is receiving treatment   List of dedicated fax numbers for the Facilities:  [address] DENIALS (Administrative/Medical Necessity) [phone]   [address] (Maternity/NICU/Pediatrics) [phone]   [address] [place] [address] [address] (Ven[address] "[address]) [address] [place] [address] [address] [place]) [address] [place] [address] [address] [place] [address] [phone]

## 2021-03-19 NOTE — PHYSICAL THERAPY NOTE
Physical Therapy Evaluation and Therapy    Patient's Name: Lucinda Latif    Admitting Diagnosis  Major depressive disorder [F32 9]    Problem List  Patient Active Problem List   Diagnosis    [facility]    Restless leg syndrome    Hypertension    Hyperlipidemia    Diabetes mellitus ([facility] )    Chest pain    GERD (gastroesophageal reflux disease)    Epigastric abdominal pain    Encounter for examination and observation for other specified reasons    Severe episode of recurrent major depressive disorder, without psychotic features ([facility] )    Gambling disorder, episodic, moderate    Suspected COVID-19 virus infection    Major depressive disorder    Acquired hypothyroidism    Asymptomatic bacteriuria    Chronic fatigue and malaise    Calf tenderness    Morbid obesity due to excess calories (HCC)    Onychomycosis of toenail    MDD (major depressive disorder), recurrent episode ([facility] )    Anxiety    RLS (restless legs syndrome)       Past Medical History  Past Medical History:   Diagnosis Date    Diabetes mellitus ([facility] )     Disease of thyroid gland     Hyperlipidemia     Hypertension     Hypothyroidism (acquired)     Restless leg syndrome     Seizures (HCC)        Past Surgical History  Past Surgical History:   Procedure Laterality Date    APPENDECTOMY      CHOLECYSTECTOMY      HIP FRACTURE SURGERY Right     TONSILLECTOMY      TUBAL LIGATION         Recent Imaging  No orders to display       Recent Vital Signs  Vitals:    03/18/21 2300 03/19/21 0718 03/19/21 1246 03/19/21 1437   BP: 127/58 99/52  153/74   BP Location: Right arm Right arm  Right arm   Pulse: 57 62  67   Resp: 20 20  18   Temp: 98 2 °F (36 8 °C) 97 8 °F (36 6 °C)  (!) 96 5 °F (35 8 °C)   TempSrc: Temporal Temporal  Temporal   SpO2: 96% 94%  96%   Weight:   103 kg (226 lb 3 1 oz)    Height:   5' 2" (1 575 m)        Standardized Assessments  [facility] 6-Click: 98/22    Home Evaluation (virtual) and Family Training  will address as appropriate       03/19/21 1345   PT Last Visit   PT Visit Date 03/19/21   Note Type   Note type Evaluation   Pain Assessment   Pain Assessment Tool Pain Assessment not indicated - pt denies pain   Home Living   Type of Home Apartment   Home Layout One level;Elevator   Bathroom Shower/Tub Tub/shower unit   Bathroom Toilet Raised   Bathroom Equipment Tub transfer bench   Bathroom Accessibility Accessible via walker   Home Equipment   (rollator)   Additional Comments has been sponge bathing recently   Prior Function   Level of [place] Independent with ADLs and functional mobility   Lives With Alone   Receives Help From Family   ADL Assistance Independent   IADLs Independent   Falls in the last 6 months 1 to 4   Comments uses [facility] for doctors appointments; city bus for groceries   Restrictions/Precautions   [place] Bearing Precautions Per Order No   Other Precautions Fall Risk   General   Family/Caregiver Present No   Cognition   Overall Cognitive Status WFL   Arousal/Participation Alert   Attention Within functional limits   Orientation Level Oriented X4   Memory Within functional limits   Following Commands Follows one step commands without difficulty   Strength RLE   R Hip Flexion 3-/5   R Knee Flexion 3+/5   R Knee Extension 3+/5   R Ankle Dorsiflexion 3+/5   R Ankle Plantar Flexion 3/5   Strength LLE   L Hip Flexion 3-/5   L Knee Flexion 3+/5   L Knee Extension 3+/5   L Ankle Dorsiflexion 3+/5   L Ankle Plantar Flexion 3/5   Coordination   Movements are Fluid and Coordinated 1   Sensation X   Light Touch   RLE Light Touch Impaired   RLE Light Touch Comments feet decreased to light touch and unable to tell between hot and cold   LLE Light Touch Impaired   LLE Light Touch Comments feet decreased to light touch and unable to tell between hot and cold   Bed Mobility   Rolling R 5  Supervision   Additional items Increased time required; Bedrails;Verbal cues   Rolling L 5  Supervision   Additional items Increased time required;Verbal cues; Bedrails   Supine to Sit 5  Supervision   Additional items Increased time required;Verbal cues   Sit to Supine 5  Supervision   Additional items Increased time required;Verbal cues   Transfers   Sit to Stand 5  Supervision   Additional items Assist x 1; Armrests; Increased time required;Verbal cues   Stand to Sit 5  Supervision   Additional items Assist x 1; Armrests; Increased time required;Verbal cues   Additional Comments with rollator   Ambulation/Elevation   Gait pattern Excessively slow; Short stride; Foward flexed;Decreased foot clearance; Wide CLAUDIO;Shuffling   Gait Assistance 5  Supervision   Additional items Assist x 1;Verbal cues   Assistive Device   (rollator)   Distance 20ft   Balance   Static Sitting Good   Dynamic Sitting Fair +   Static Standing Fair   Dynamic Standing Fair -   Ambulatory Fair -   Endurance Deficit   Endurance Deficit Yes   Endurance Deficit Description LE fatigue and weakness   Activity Tolerance   Activity Tolerance Patient limited by fatigue   Medical Staff Made Aware spoke to CM   Nurse Made Aware spoke to RN   Assessment   Prognosis Good   Problem List Decreased strength;Decreased range of motion;Decreased endurance; Impaired balance;Decreased mobility;Obesity; Impaired sensation   Barriers to Discharge Decreased caregiver support   Goals   Patient Goals to work hard and go home   STG Expiration Date 03/26/21   Short Term Goal #1 see grid   PT Treatment Day 1   Plan   Treatment/Interventions Functional transfer training;LE strengthening/ROM; Therapeutic exercise; Endurance training;Patient/family training;Equipment eval/education; Bed mobility;Gait training;Spoke to nursing;Spoke to case management;OT   PT Frequency   (5-7x/wk)   Recommendation   PT Discharge Recommendation Home with skilled therapy  (when rehab goals met)   Equipment Recommended [place] walker   [place] in Bed Without Bedrails 4   Lying on Back to Sitting on Edge of Flat Bed 4   Moving Bed to Chair 3   Standing Up From Chair 3   Walk in Room 3   Climb 3-5 Stairs 2   Basic Mobility Inpatient Raw Score 19   Basic Mobility Standardized Score 42 48       Assessment                                                                                                                         Jasmina Cartwright is a 76 y o  female admitted to [address] [place] [address] on 3/18/2021 for <principal problem not specified>  Pt  has a past medical history of Diabetes mellitus ([facility] ), Disease of thyroid gland, Hyperlipidemia, Hypertension, Hypothyroidism (acquired), Restless leg syndrome, and Seizures ([facility] )  PT was consulted and pt was seen on 3/19/2021 for mobility assessment and d/c planning  Pt presents supine in bed alert and agreeable to therapy  She is reporting no pain at this time  Impaired sensation to bilateral feet to light touch and she also reports impaired to temperature although not tested  Weakness in BLE  Pt requires sig extra time to transfer but able to complete with supervision  Fatigues quickly with activity and ambulation  Prefers wide RW to rollator here but likely will return to use of rollator at home vs no AD in the home which is her baseline  Pt is currently functioning at a supervision assistance x1 level for bed mobility, supervision assistance x1 level for transfers, supervision assistance x1 level for ambulation with Rolling Walker  Pt will benefit from continued skilled IP PT to address the above mentioned impairments  in order to maximize recovery and increase functional independence when completing mobility and ADLs  Currently PT recommendations for DME include rolling walker  At this time PT recommendations for d/c are home when rehab goals met          PT GG Scores  Roll left and right:  4) Supervision or touching) verbal cues and/or touching, steadying, contact guard      Sit to [place]) Supervision or touching) verbal cues and/or touching, steadying, contact guard      Lying to Sit EOB:  4) Supervision or touching) verbal cues and/or touching, steadying, contact guard      Sit to Stand:  4) Supervision or touching) verbal cues and/or touching, steadying, contact guard      Chair/Bed to chair Transfer:  4) Supervision or touching) verbal cues and/or touching, steadying, contact guard      Toilet Transfer:  4) Supervision or touching) verbal cues and/or touching, steadying, contact guard      Walk 10ft 4) Supervision or touching) verbal cues and/or touching, steadying, contact guard      Walk 50ft with 2 turns 4) Supervision or touching) verbal cues and/or touching, steadying, contact guard      Walk 150ft 88) Not attempted due to medical condition or safety concerns     Wheel 50ft with 2 turns 9) Not Applicable) pt did not complete PTA     Wheel [id number] 9) Not Applicable) pt did not complete PTA     Car transfer 88) Not attempted due to medical condition or safety concerns     Walk 10ft over uneven ground 4) Supervision or touching) verbal cues and/or touching, steadying, contact guard      1 step 9) Not Applicable) pt did not complete PTA     4 steps 9) Not Applicable) pt did not complete PTA     12 steps 9) Not Applicable) pt did not complete PTA     Pick object from floor 88) Not attempted due to medical condition or safety concerns         Physical Therapy Treatment                                           (40 minutes)                                                 03/19/21 1400   Transfers   Sit to Stand 5  Supervision   Additional items Assist x 1; Armrests; Increased time required;Verbal cues   Stand to Sit 5  Supervision   Additional items Assist x 1; Armrests; Increased time required;Verbal cues   Toilet transfer 5  Supervision   Additional items Assist x 1; Armrests; Increased time required;Verbal cues;Standard toilet   Additional Comments with RW; pt prefers wide RW vs rollator   Ambulation/Elevation   Gait pattern Excessively slow; Short stride; Foward flexed;Decreased foot clearance; Wide CLAUDIO;Shuffling   Gait Assistance 5  Supervision   Additional items Assist x 1;Verbal cues   Assistive Device Rolling walker   Distance 25ft x2, 50ft over carpet   Balance   Static Sitting Good   Dynamic Sitting Fair +   Static Standing Fair   Dynamic Standing Fair -   Ambulatory Fair -       Goals STG achieved within 1 week Performance at Initial Evaluation (3/19/2021) Last date completed     Bed mobility skills including rolling and repositioning to prevent skin breakdown and decrease caregiver burden  modified independent assistance     supervision assistance x1     3/19/2021       Supine to sit transitions to increase ease of transfer, allow pt to get out of bed, and decrease caregiver burden       modified independent assistance     supervision assistance x1     3/19/2021     Sit to supine transitions to increase ease of transfer, allow pt to get back into bed, and decrease caregiver burden       modified independent assistance     supervision assistance x1     3/19/2021     Functional transfers to facilitate safe return to previous living environment      modified independent assistance   supervision assistance x1   3/19/2021     Ambulation with least restrictive AD; including at least 2 turns for safe household distance ambulation       modified independent assistance     supervision assistance x1     3/19/2021     Improve standing functional balance to allow for pt to  object from floor          modified independent assistance          3/19/2021     Eric Pfeiffer PT, DPT

## 2021-03-19 NOTE — NURSING NOTE
Pt is alert/oriented x 4, this am stated she was "nervous" she wasn't sure what was expected of her this stay at [facility]  This nurse explained to her that she was getting her ativan as scheduled, and also that there was no reason to be nervous, we are here to help her succeed at home in a safe environment  A physician from behavioral health also came and saw pt, he increased her Gabapentin 100mg to TID along with 300mg at HS  Social service also spoke with pt already this shift and explained the importance of participating in therapy  She did express understanding  At this time pt is showing no s/s of covid 19, no cough, no SOB or fever noted

## 2021-03-19 NOTE — PLAN OF CARE
Problem: PHYSICAL THERAPY ADULT  Goal: Performs mobility at highest level of function for planned discharge setting  See evaluation for individualized goals  Description: Treatment/Interventions: Functional transfer training, LE strengthening/ROM, Therapeutic exercise, Endurance training, Patient/family training, Equipment eval/education, Bed mobility, Gait training, Spoke to nursing, Spoke to case management, OT  Equipment Recommended: Janell Chinchilla       See flowsheet documentation for full assessment, interventions and recommendations  Outcome: Progressing  Note: Prognosis: Good  Problem List: Decreased strength, Decreased range of motion, Decreased endurance, Impaired balance, Decreased mobility, Obesity, Impaired sensation     Barriers to Discharge: Decreased caregiver support     PT Discharge Recommendation: Home with skilled therapy(when rehab goals met)          See flowsheet documentation for full assessment

## 2021-03-19 NOTE — OCCUPATIONAL THERAPY NOTE
Occupational Therapy Evaluation and Treatment      Patient Name: Rina Oliveira  [id number] Date: 3/19/2021  Problem List  Active Problems:    MDD (major depressive disorder), recurrent episode (HCC)    Anxiety    RLS (restless legs syndrome)    Past Medical History  Past Medical History:   Diagnosis Date    Diabetes mellitus ([address] )     Disease of thyroid gland     Hyperlipidemia     Hypertension     Hypothyroidism (acquired)     Restless leg syndrome     Seizures (HCC)      Past Surgical History  Past Surgical History:   Procedure Laterality Date    APPENDECTOMY      CHOLECYSTECTOMY      HIP FRACTURE SURGERY Right     TONSILLECTOMY      TUBAL LIGATION            Time- 4207-1391 15 min eval 40 min txt   Vitals-  Stable   Standardized Assessment- see flowsheet   Home Evaluation (virtual)- will obtain photos as needed  Family/Caregiver Training- to initiate as needed    Additional Comments-  Pt remains in room with all needs at end of session      Assessment:         03/19/21 1340   OT Last Visit   OT Visit Date 03/19/21   Note Type   Note type Evaluation/Treatment    Restrictions/Precautions   Weight Bearing Precautions Per Order No   Other Precautions Fall Risk  (anxious )   Pain Assessment   Pain Assessment Tool 0-10   Pain Score No Pain   Home Living   Type of Home Apartment  (4th floor )   Home Layout One level;Elevator;Laundry in basement   Bathroom Shower/Tub Tub/shower unit   Bathroom Toilet Raised   Bathroom Equipment Tub transfer bench   Bathroom Accessibility Accessible;Accessible via walker   [address] [facility]  (rollator )   Additional Comments sponge bathing 2* inability enter tub- goal is to re-enter tub     Prior Function   Level of [place] Independent with ADLs and functional mobility   Lives With Alone   Receives Help From Family   ADL Assistance Independent   IADLs Independent  (laundry in basement (elevator) uses cart )   Falls in the last 6 months 1 to 4   Psychosocial   Psychosocial (WDL) X   Patient Behaviors/Mood Anxious   Subjective   Subjective "I want to be able to shower again"    ADL   Eating Assistance 7  [place] 4  [address] 3  Moderate [place] 4  [address] 3  Moderate [address]  4  Minimal Assistance   Bed Mobility   Rolling R 5  Supervision   Rolling L 5  Supervision   Supine to Sit 5  Supervision   Additional items Increased time required; Bedrails;Verbal cues   Sit to Supine 5  Supervision   Additional items Increased time required; Bedrails;Verbal cues   Transfers   Sit to Stand 5  Supervision   Additional items Armrests; Increased time required;Verbal cues   Stand to Sit 5  Supervision   Additional items Armrests; Increased time required;Verbal cues   Stand pivot 5  Supervision   Additional items Armrests; Increased time required;Verbal cues   Toilet transfer 5  Supervision   Additional items Armrests; Increased time required;Verbal cues;Raised toilet seat   Additional Comments with RW (wide)    Functional Mobility   Functional Mobility 5  Supervision   Additional Comments household distances within room    Additional items Rolling walker  ((wide))   Balance   Static Sitting Good   Dynamic Sitting Fair +   Static Standing Fair   Dynamic Standing Fair -   Ambulatory Fair -   Activity Tolerance   Activity Tolerance Patient limited by fatigue   Medical Staff Made Aware Spoke to DON and PRAVIN in regards to pt's concerns related to recieving 2nd Covid Vaccine    Nurse Made Aware Spoke to RN    RUE Assessment   RUE Assessment WFL  (Bilateral RTC injury  )   LUE Assessment   LUE Assessment WFL  (Bilateral RTC injury  )   Hand Function   Gross Motor Coordination Functional   Fine Motor Coordination Functional   Sensation   Light Touch No apparent deficits   Perception   Inattention/Neglect Appears intact Cognition   Overall Cognitive Status WFL  (anxious )   Arousal/Participation Alert; Cooperative   Attention Within functional limits   Orientation Level Oriented X4   Memory Within functional limits   Following Commands Follows one step commands without difficulty   Assessment   Limitation Decreased ADL status; Decreased UE ROM; Decreased UE strength;Decreased Safe judgement during ADL;Decreased endurance;Decreased self-care trans;Decreased high-level ADLs   Prognosis Good   Assessment Pt is a 76 y o  female seen for OT evaluation s/p admit to [facility] on 3/18/2021 w/ generalized weakness secondary to major depressive disorder  OT completed an extensive review of pt's medical and social history  Personal factors affecting pt at time of IE include:limited home support, difficulty performing ADLS, difficulty performing IADLS  and environment  As per pt report, pta living alone in 4th floor apartment with elevator access and 0 JOSE  Independent with ADLs/IADLs and mobility with no AD  Reports using rollator for community distances  Transports laundry to basement via laundry cart  Upon evaluation, pt requires Supervision with transfers and mobility using wide RW (similar to her rollator, reports TCF rollator is "too fast" compared to hers at home)  Exhibits decreased strength to B/L UE's limiting performance in ADLs/transfers, Hx B/L rotator cuff injuries  Pt currently  requires Min A UB ADLs, Mod A LB ADLs, and Min A toileting  2* the following deficits impacting occupational performance  Pt presents to OT below baseline due to the following performance deficits: weakness, decreased ROM, decreased strength, decreased balance, decreased tolerance, decreased safety awareness and decreased coping skills   These impairments, as well at pts limited home support, difficulty performing ADLS and difficulty performing IADLS  limit pts ability to safely engage in all baseline areas of occupationPt to benefit from continued skilled OT services while in the hospital to address deficits as defined above and maximize level of functional independence w ADL's and functional mobility  Occupational performance areas to address include: grooming, bathing/shower, toilet hygiene, dressing, medication management, health maintenance, functional mobility, community mobility, clothing management, cleaning and household maintenance  The patient's raw score on the AM-PAC Daily Activity inpatient short form is 18, standardized score is 38 66, greater than and less than 39 4  Please refer to the recommendation of the Occupational Therapist for safe discharge planning  Based on OT evaluation, assessment(s), performance deficits listed, and current level of function, pt identified as a HIGH complexity evaluation  From OT standpoint, recommendation at time of d/c would be home with skilled therapy    Goals   Patient Goals to go home    Plan   Treatment Interventions ADL retraining;Functional transfer training;UE strengthening/ROM; Endurance training;Patient/family training;Equipment evaluation/education; Neuromuscular reeducation; Compensatory technique education; Energy conservation; Activityengagement   Goal Expiration Date 03/26/2021   OT Treatment Day 1   OT Frequency Other (comment)  (5-7x/wk )   Additional Treatment Session   Start Time 7273   End Time 1435   Treatment Assessment Occupational Therapy treatment following evaluation focused on progressing independence with bed mobility, functional transfers, and functional mobility using RW- initially trialled rollator however pt feeling unsteady therefore transitioned to parker RW with improved stability  Limited standing tolerance, fatigues easily  Anxiety/fear of falling limiting overall performance  Pt limited by ROM, weakness, fatigue, endurance, activity tolerance , standing tolerance and static/dynamic standing balance    Pt educated on POC/purpose/benefits of OT/PT while on TCF, safe functional transfer techniques with appropriate body mechanics, fall prevention and safe discharge planning/ safety at home   Pt would benefit from continued OT sessions to further address above deficits to maximize independence      Additional Treatment Day 1   Recommendation   OT Discharge Recommendation Home with skilled therapy   AM-PAC Daily Activity Inpatient   Lower Body Dressing 2   Bathing 2   Toileting 3   Upper Body Dressing 3   Grooming 4   Eating 4   Daily Activity Raw Score 18   Daily Activity Standardized Score (Calc for Raw Score >=11) 38 66          Goals STG achieved within 1  Week  Performance at Initial Evaluation 3/19/2021  Current Performance (last date completed)   Grooming while standing at sink Ron/I Supervision     ADL transfers  Ron/I Supervision     Bathroom mobility with appropriate AD Ron/I Supervision     UB ADL  Ron/I Min A     LB ADL, AE PRN Ron/I Mod A     Toileting/clothing management and hygiene Ron/I Min A     Dynamic standing balance for increased safety when completing purposeful tasks F+ F-    Increase standing tolerance for inc'd safety with standing purposeful tasks 5-8 min 2 min     Participate in therex 1-3x/week for inc'd overall stamina/activity tolerance for purposeful tasks To be completed     Tub/shower with tub bench  Ron/I     Item retrieval for inc'd independence and safety negotiating home environment Ron/I     Bed mobility with HOB flat (elevated surface height)  Ron/I Supervision           Go Trimble, MS, OTR/L

## 2021-03-19 NOTE — CONSULTS
Consultation - Behavioral Health     Identification Data: Unruly Licona 76 y o  female MRN: [id number]  Unit/Bed#: -01 Encounter: [id number]    03/19/21  12:04 PM    Inpatient consult to Psychiatry  Consult performed by: Allie Sampson PA-C  Consult ordered by: Rehana Lyles MD        Physician Requesting Consult: Rehana Lyles MD  Principal Problem:<principal problem not specified>    Reason for Consult:  depression    History of Present Illness     Unruly Licona is a 76 y o  female with a history of Major Depressive Disorder and Generalized Anxiety Disorder who was admitted to the medical service on 3/18/2021 due to transition of care to acute rehab  Psychiatric consultation was requested continued monitoring/med management  Psychiatric symptoms prior to admission included depression, suicidal ideation, hopelessness, poor appetite, difficulty sleeping, anxiety attacks and poor self-care  Onset of symptoms was gradual starting 1 year ago with progressively worsening course since that time  Stressors preceding admission included COVID-19 issues, death of family member (aunt), ongoing anxiety, chronic mental illness, noncompliance with treatment and isolation due to COVID-19, specifially, being unable to see her children in person and living alone  By review of chart, patient was initially admitted to [place] [address] on 03/03/2021 due to chest pain  She was consulted by Dr Darian Leung due to worsening depression and anxiety and her Prozac was increased to 60 mg daily from prior 40 mg daily  Patient continued to follow with psychiatry while inpatient, until yesterday, when she was transferred from the [facility] to [facility] to continue her rehabilitation  On initial psychiatric evaluation Karin   Is pleasant, calm and cooperative upon approach and resting comfortably in her recliner in no acute distress    She appears to be a reliable historian and does not appear confused, agitated or paranoid  She demonstrates psychomotor retardation with her movements, no obvious tremor or other abnormal movements noted  She describes her mood as "doing okay, better than when I 1st came in  I am no longer suicidal so that is a good thing!"  She is able to recall the events which led to her hospitalization, please refer to initial psychiatric consult from 03/04/2021 by Dr Kim Rollins  She believes the dose increase of her Prozac to 60 mg has been beneficial, also taking Ativan 0 5 mg b i d  and gabapentin at a low dose for restless leg symptoms  Although she no longer endorses suicidality, she does admit continued symptoms of depression, specifically endorsing difficulty sleeping, decreased energy and motivation, decreased appetite, and evident psychomotor retardation  She feels that her condition is progressing but she does admit some nervousness about being in a new hospital environment with new nurses and new patients  Also bothered somewhat by increased restless legs movement last night, currently on a gabapentin dosing schedule of 100 mg in the morning, 100 mg in the early afternoon, and 300 mg before bedtime  States she did not sleep well last night and is likely going to nap this afternoon  Denies any homicidal ideations and denies any psychotic symptoms  Denies any substance abuse  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 [place], [place]        Psychiatric Review Of Systems:    sleep changes: yes  appetite changes: yes, decreased  weight changes: no  energy/anergy: yes, decreased  interest/pleasure/anhedonia: no  somatic symptoms: no  anxiety/panic: yes  edi: no  guilty/hopeless: no  self injurious behavior/risky behavior: no  Suicidal ideation: prior to admission, none currently  Homicidal ideation: no  Auditory hallucinations: no  Visual hallucinations: no  Other hallucinations: no  Delusional thinking: no    Historical Information     Past Psychiatric History:     Past Inpatient Psychiatric Treatment:   multiple prior  Past Outpatient Psychiatric Treatment:    Past outpatient psychiatric treatment through the [place] but has not seen them recently  Past Suicide Attempts: yes, multiple  Past Violent Behavior: no  Past Psychiatric Medication Trials: multiple psychiatric medication trials and she lists Prozac, Zoloft, Wellbutrin (d/c'd due to seizures), Ativan, Topamax, Neurontin     Substance Abuse History:    Social History     Tobacco History     Smoking Status  Never Smoker    Smokeless Tobacco Use  Never Used    Tobacco Comment  not a smoker          Alcohol History     Alcohol Use Status  Yes Comment  occasional          Drug Use     Drug Use Status  Never          Sexual Activity     Sexually Active  Not Currently          Activities of Daily Living    Not Asked               Additional Substance Use Detail     Questions Responses    Substance Use Assessment Denies substance use within the past 12 months    Last reviewed by Johnson Bella LPN on 6/50/0193        I have assessed this patient for substance use within the past 12 months    Alcohol use: occasional, social use  Recreational drug use:   Cocaine:  denies use  Heroin:  denies use  Marijuana:  denies use  Other drugs: denies use     Longest clean time: not applicable  History of Inpatient/Outpatient rehabilitation program: no  Smoking history: denies use    Family Psychiatric History:     Psychiatric Illness:  patient denies  Substance Abuse:  patient denies  Suicide Attempts:  patient denies    Social History:    Education: high school diploma/GED  Marital History:   Children: 2 sons, 1 age 44 and the other age 28 and with a history of intellectual disability  Living Arrangement: The patient lives alone  Occupational History: retired, 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 [address] )     Disease of thyroid gland     Hyperlipidemia     Hypertension     Hypothyroidism (acquired)     Restless leg syndrome     Seizures (HCC)      Past Surgical History:   Procedure Laterality Date    APPENDECTOMY      CHOLECYSTECTOMY      HIP FRACTURE SURGERY Right     TONSILLECTOMY      TUBAL LIGATION           Medical Review Of Systems:    Pertinent items are noted in HPI  Allergies: Allergies   Allergen Reactions    Pineapple Anaphylaxis    Byetta 10 Mcg Pen [Exenatide]     Marijuana [Dronabinol] GI Intolerance    Metformin Diarrhea     Other reaction(s): Unknown    Omeprazole Hives    Ondansetron      Other reaction(s): Unknown    Coconut Oil Rash    Liraglutide Rash and Other (See Comments)    Penicillins Rash       Medications: All current active medications have been reviewed  Medication prior to admission:   Prior to Admission Medications   Prescriptions Last Dose Informant Patient Reported? Taking? Alogliptin Benzoate 25 MG TABS Unknown at Unknown time  Yes No   Sig: Take 25 mg by mouth daily   Empagliflozin 25 MG TABS Unknown at Unknown time  Yes No   Sig: Take 25 mg by mouth every morning   FLUoxetine (PROzac) 20 mg capsule 3/18/2021 at 0854  No No   Sig: Take 3 capsules (60 mg total) by mouth daily   LORazepam (ATIVAN) 0 5 mg tablet   No No   Sig: Take 1 tablet (0 5 mg total) by mouth 2 (two) times a day for 10 doses   aspirin (Aspirin 81) 81 mg EC tablet 3/18/2021 at 0855  Yes Yes   Sig: Take 81 mg by mouth daily     atorvastatin (LIPITOR) 40 mg tablet 3/18/2021 at 1653  No No   Sig: Take 1 tablet (40 mg total) by mouth daily with dinner   Patient not taking: Reported on 3/12/2021   enoxaparin (LOVENOX) 60 mg/0 6 mL   Yes Yes   Sig: Inject under the skin every 12 (twelve) hours   famotidine (PEPCID) 20 mg tablet 3/18/2021 at 1734  No No   Sig: Take 1 tablet (20 mg total) by mouth 2 (two) times a day   gabapentin (NEURONTIN) 100 mg capsule 3/18/2021 at 1409  No No   Sig: Take 1 capsule (100 mg total) by mouth 2 (two) times a day Take 100 mg in the morning and 100 mg mid afternoon   gabapentin (NEURONTIN) 300 mg capsule 3/17/2021 at 2104  No No   Sig: Take 1 capsule (300 mg total) by mouth daily at bedtime   insulin glargine (LANTUS) 100 units/mL subcutaneous injection 3/17/2021 at 2104  No No   Sig: Inject 20 Units under the skin daily at bedtime   Patient taking differently: Inject 30 Units under the skin daily at bedtime    levothyroxine 50 mcg tablet 3/18/2021 at 1202  Yes No   Sig: Take 50 mcg by mouth daily Indications: Pt unsure of the amount  topiramate (TOPAMAX) 100 mg tablet 3/18/2021 at 1735  Yes No   Sig: Take 200 mg by mouth 2 (two) times a day       Facility-Administered Medications: None       Objective     Vital signs in last 24 hours:    Temp:  [97 4 °F (36 3 °C)-98 2 °F (36 8 °C)] 97 8 °F (36 6 °C)  HR:  [57-68] 62  Resp:  [20] 20  BP: ()/(52-73) 99/52  No intake or output data in the 24 hours ending 03/19/21 1204    Mental Status Evaluation:    Appearance:  age appropriate, adequate grooming, wearing hospital clothes   Behavior:  pleasant, cooperative, calm   Speech:  fluent, slow   Mood:  somewhat anxious, somewhat depressed   Affect:  slightly constricted   Language: naming objects and repeating phrases   Thought Process:  organized, goal directed, linear, decreased rate of thoughts   Associations: intact associations   Thought Content:  no overt delusions   Perceptual Disturbances: no auditory hallucinations, no visual hallucinations   Risk Potential: Suicidal ideation - prior to admission   Denies at present  Homicidal ideation - None at present  Potential for aggression - No   Sensorium:  oriented to person, place and time/date   Memory:  recent and remote memory grossly intact   Consciousness:  alert and awake    Attention/Concentration: attention span and concentration are age appropriate Intellect: within normal limits   Fund of Knowledge: awareness of current events: yes   Insight:  fair   Judgment: partial   Muscle Strength Muscle Tone: normal  normal   Gait/Station: in bed   Motor Activity: no abnormal movements     Laboratory Results:   I have personally reviewed all pertinent laboratory/tests results  Most Recent Labs:   Lab Results   Component Value Date    WBC 5 99 03/15/2021    RBC 5 59 (H) 03/15/2021    HGB 15 9 (H) 03/15/2021    HCT 48 6 (H) 03/15/2021     03/15/2021    RDW 13 4 03/15/2021    NEUTROABS 3 53 03/13/2021    SODIUM 140 03/15/2021    K 3 9 03/15/2021     03/15/2021    CO2 22 03/15/2021    BUN 21 (H) 03/15/2021    CREATININE 0 69 03/15/2021    GLUC 106 03/15/2021    CALCIUM 8 9 03/15/2021    AST 21 03/12/2021    ALT 19 03/12/2021    ALKPHOS 95 2 03/12/2021    TP 7 7 03/12/2021    ALB 3 9 03/12/2021    TBILI 0 41 03/12/2021    CHOLESTEROL 202 (H) 03/03/2021    HDL 43 (L) 03/03/2021    TRIG 121 0 03/03/2021    LDLCALC 135 (H) 03/03/2021    [id number] 4 123 03/03/2021    FREET4 0 85 07/08/2016    HGBA1C 11 5 (H) 03/03/2021     03/03/2021       Imaging Studies: Xr Chest 1 View Portable    Result Date: 3/12/2021  Narrative: CHEST INDICATION:   weakness  COMPARISON:  3/3/2021 EXAM PERFORMED/VIEWS:  XR CHEST PORTABLE Single view FINDINGS: Cardiomediastinal silhouette appears unremarkable  The lungs have cleared No pneumothorax or pleural effusion  Osseous structures appear within normal limits for patient age  Impression: No acute cardiopulmonary disease, improved  Workstation performed: [id number]    Xr Chest 1 View Portable    Result Date: 3/3/2021  Narrative: CHEST INDICATION:   Shortness of breath  COMPARISON:  12/4/2020 EXAM PERFORMED/VIEWS:  XR CHEST PORTABLE FINDINGS: There is mild enlargement of the cardiac silhouette Mild pulmonary vascular congestion suspected  No overt alveolar edema  No pneumothorax, no pleural effusion   Osseous structures appear within normal limits for patient age  Impression: Enlargement of cardiac silhouette with mild pulmonary vascular congestion suspected  Findings concur with the referring clinician's preliminary interpretation already in the patient's electronic health record  Workstation performed: [id number]     Vas Lower Limb Venous Duplex Study, Complete Bilateral    Result Date: 3/15/2021  Narrative:  [facility] REPORT CLINICAL: Indications: Patient reports chronic tenderness of both calves L>R  Sedentary life style  No history of DVT/PE/CA/recent trauma or surgery  CONCLUSION:  Impression: RIGHT LOWER LIMB: No evidence of acute or chronic deep vein thrombosis  No evidence of superficial thrombophlebitis noted  Doppler evaluation shows a normal response to augmentation maneuvers  Posterior tibial  arterial Doppler waveforms are biphasic  LEFT LOWER LIMB: No evidence of acute or chronic deep vein thrombosis  No evidence of superficial thrombophlebitis noted  Doppler evaluation shows a normal response to augmentation maneuvers  Tech note: Study technically challenging/limited secondary to body habitus and patient's inability to tolerate probe pressure  SIGNATURE: Electronically Signed by: Jonathon Bassett on 2021-03-15 04:17:12 PM      Code Status: Level 1 - Full Code  Advance Directive and Living Will:       Power of :      Assessment/Plan     Active Problems:    MDD (major depressive disorder), recurrent episode ([address] )    Anxiety    RLS (restless legs syndrome)      Assessment:      In summary, this is a 34-year-old female with a history recurrent MDD with anxious features and restless leg syndrome who presents for psychiatric consult after just being transferred to the acute rehabilitation service from [facility]    Patient is no longer suicidal and feels her current regimen of Prozac 60 mg daily and Ativan 0 5 mg b i d  has been beneficial, though she is somewhat anxious about being in a new environment  She continues to endorse several depressive symptoms as described in the HPI, but overall, feels her condition is improving  She does endorse worsening RLS symptoms  Regarding her psychiatric regimen, patient and I agreed to keep her medicines the same at this time  However, if she feels that her progress has plateaued, or if her depression should worsen, then we would likely consider adding Remeron 7 5 mg at bedtime  Side effects explained, patient in agreement with plan  Regarding her restless leg syndrome, we will adjust her gabapentin regimen, and she will now take 100 mg in the morning, 100 mg in the early afternoon, 100 mg in the late afternoon, and 300 mg before bedtime  May titrate as indicated but use caution as this drug can cause sedation and dizziness which can increase fall risk in the elderly  Thank you very much for this consult, Psychiatry will sign off at this time  Please do not hesitate to re-consult if necessary  Treatment Plan:     Planned Medication Changes:     All current active medications have been reviewed  Encourage group therapy, milieu therapy and occupational therapy    -continue Prozac 60mg daily for depression, consider Remeron 7 5mg qHS with slow uptitration if depression worsens during her stay   -continue Ativan 0 5mg BID for anxiety  -gabapentin now 100mg TID and 300mg before bed  -melatonin 6mg qHS for sleep    Current Facility-Administered Medications   Medication Dose Route Frequency Provider Last Rate    acetaminophen  650 mg Oral Q6H PRN Ronni Walton MD      aluminum-magnesium hydroxide-simethicone  30 mL Oral Q6H PRN Ronni Walton MD      aspirin  81 mg Oral Daily Ronni Walton MD      atorvastatin  40 mg Oral Daily With Etienne Carlos MD      enoxaparin  60 mg Subcutaneous Q12H [address] Ronni Walton MD      famotidine  20 mg Oral BID Ronni Walton MD      FLUoxetine  60 mg Oral Daily Sharon Manjarrez MD      gabapentin  100 mg Oral TID Cristiane Overall, PACARI      gabapentin  300 mg Oral HS Vonanabel Overall, JEROD      insulin glargine  20 Units Subcutaneous HS Sharon Manjarrez MD      levothyroxine  50 mcg Oral Daily Sharon Manjarrez MD      loperamide  2 mg Oral TID PRN Sharon Manjarrez MD      LORazepam  0 5 mg Oral BID Sharon Manjarrez MD      melatonin  6 mg Oral HS Sharon Manjarrez MD      polyethylene glycol  17 g Oral Daily PRN Sharon Manjarrez MD      saccharomyces boulardii  250 mg Oral BID Sharon Manjarrez MD      topiramate  200 mg Oral BID Sharon Manjarrez MD      tuberculin  5 Units Intradermal Once Sharon Manjarrez MD         Risks / Benefits of Treatment:    Risks, benefits, and possible side effects of medications explained to patient and patient verbalizes understanding and agreement for treatment  Counseling / Coordination of Care:    Patient's presentation on admission and proposed treatment plan discussed with treatment team   Diagnosis, medication changes and treatment plan reviewed with patient       Total time spent on this consult in full: 60 mins    Cristiane Sykes, JEROD 03/19/21

## 2021-03-19 NOTE — H&P
[facility]      H&P- Unruly Licona 1955, 76 y o  female MRN: [id number]  Unit/Bed#: -01 Encounter: [id number]  Primary Care Provider: Minna Leyden, MD   Date and time admitted to hospital: 3/18/2021  7:31 PM    Acquired hypothyroidism  Assessment & Plan  · Continue home Synthroid    Major depressive disorder  Assessment & Plan  Prozac was increased to 60 milligrams by Psychiatry in previous hospitalization  Continue Ativan 0 5 milligram p o  B i d  Severe episode of recurrent major depressive disorder, without psychotic features ([facility] )  Assessment & Plan  · Presented with severe depression, not taking medications  · Denies any SI/HI or plan, does not need one-to-one observation at this point  · Has not seen by psychiatrist/PCP for a while due to recent COVID situation  · Recently seen by Dr Darian Leung in [facility] during recent hospitalization - recently increased Prozac to 60 mg  · Psychiatric consult, recommending continuation of Prozac 60 mg OD, Ativan 0 5 mg b i d   · Recommend to follow-up with her regular psychiatrist at [facility] time of discharge  · She reports her mood is improved  Diabetes mellitus ([facility] )  Assessment & Plan    Lab Results   Component Value Date    HGBA1C 11 5 (H) 03/03/2021       · Uncontrolled, patient is noncompliant  · Patient is supposed to be on insulin, empagliflozin, alogliptin  · Continue Lantus 20 units q h s  + sliding scale insulin  · Currently blood glucose is at goals  · Resume home regimen upon discharge    Hypertension  Assessment & Plan  Patient does not take any medications at home   Blood pressures acceptable    Restless leg syndrome  Assessment & Plan  · Continue home dose of gabapentin    Seizures (HCC)  Assessment & Plan  · Stable, continue home Topamax    VTE Prophylaxis: Enoxaparin (Lovenox)  / sequential compression device   Code Status: Level 1  POLST: POLST form is not discussed and not completed at this time    Discussion with family: none    Anticipated Length of Stay:  Patient will be admitted on an SNF Short Term Inpatient basis with an anticipated length of stay of  More than 2 midnights  Justification for Hospital Stay: rehab    Total Time for Visit, including Counseling / Coordination of Care: 45 minutes  Greater than 50% of this total time spent on direct patient counseling and coordination of care  Chief Complaint:   Ambulatory dysfunction    History of Present Illness:    Wenceslao Sawant is a 76 y o  female with significant past medical history of type 2 diabetes, hypertension, depression, seizure disorder, restless leg syndrome who presented to the rehab following a recent hospitalization for chest pain  Patient underwent cardiac catheter was negative during this time  Patient was evaluated by physical occupational therapy and found appropriate for inpatient rehab  Patient was having more knee pain during her previous stay and she did receive Dopplers are negative for DVT  Psychiatry increase her Prozac to 60 mg daily  Patient currently was doing well  Review of Systems:    Review of Systems   Constitutional: Negative for chills and fever  HENT: Negative for ear pain and sore throat  Eyes: Negative for pain and visual disturbance  Respiratory: Negative for cough and shortness of breath  Cardiovascular: Negative for chest pain and palpitations  Gastrointestinal: Negative for abdominal pain and vomiting  Genitourinary: Negative for dysuria and hematuria  Musculoskeletal: Positive for gait problem  Negative for arthralgias and back pain  Skin: Negative for color change and rash  Neurological: Positive for tremors  Negative for seizures and syncope  All other systems reviewed and are negative        Past Medical and Surgical History:     Past Medical History:   Diagnosis Date    Diabetes mellitus ([address] )     Disease of thyroid gland     Hyperlipidemia     Hypertension     Hypothyroidism (acquired)  Restless leg syndrome     Seizures (HCC)        Past Surgical History:   Procedure Laterality Date    APPENDECTOMY      CHOLECYSTECTOMY      HIP FRACTURE SURGERY Right     TONSILLECTOMY      TUBAL LIGATION         Meds/Allergies:    Prior to Admission medications    Medication Sig Start Date End Date Taking? Authorizing Provider   aspirin (Aspirin 81) 81 mg EC tablet Take 81 mg by mouth daily  Yes Historical Provider, MD   enoxaparin (LOVENOX) 60 mg/0 6 mL Inject under the skin every 12 (twelve) hours 3/18/21  Yes Historical Provider, MD   Alogliptin Benzoate 25 MG TABS Take 25 mg by mouth daily    Historical Provider, MD   atorvastatin (LIPITOR) 40 mg tablet Take 1 tablet (40 mg total) by mouth daily with dinner  Patient not taking: Reported on 3/12/2021 3/9/21   Tenisha Claudio DO   Empagliflozin 25 MG TABS Take 25 mg by mouth every morning    Historical Provider, MD   famotidine (PEPCID) 20 mg tablet Take 1 tablet (20 mg total) by mouth 2 (two) times a day 3/9/21   Tenisha Claudio DO   FLUoxetine (PROzac) 20 mg capsule Take 3 capsules (60 mg total) by mouth daily 3/10/21   Tenisha Claudio DO   gabapentin (NEURONTIN) 100 mg capsule Take 1 capsule (100 mg total) by mouth 2 (two) times a day Take 100 mg in the morning and 100 mg mid afternoon 7/10/18   Helane Corpus, CRNP   gabapentin (NEURONTIN) 300 mg capsule Take 1 capsule (300 mg total) by mouth daily at bedtime 7/10/18   Helane Corpus, CRNP   insulin glargine (LANTUS) 100 units/mL subcutaneous injection Inject 20 Units under the skin daily at bedtime  Patient taking differently: Inject 30 Units under the skin daily at bedtime  3/9/21   Tenisha Claudio DO   levothyroxine 50 mcg tablet Take 50 mcg by mouth daily Indications: Pt unsure of the amount      Historical Provider, MD   LORazepam (ATIVAN) 0 5 mg tablet Take 1 tablet (0 5 mg total) by mouth 2 (two) times a day for 10 doses 3/9/21 3/14/21  Tenisha Claudio DO   topiramate (TOPAMAX) 100 mg tablet Take 200 mg by mouth 2 (two) times a day     Historical Provider, MD JUNIOR have reviewed home medications using allscripts  Allergies: Allergies   Allergen Reactions    Pineapple Anaphylaxis    Byetta 10 Mcg Pen [Exenatide]     Marijuana [Dronabinol] GI Intolerance    Metformin Diarrhea     Other reaction(s): Unknown    Omeprazole Hives    Ondansetron      Other reaction(s): Unknown    Coconut Oil Rash    Liraglutide Rash and Other (See Comments)    Penicillins Rash       Social History:     Marital Status: Single   Occupation:  Retired  Patient Pre-hospital Living Situation:  Lives alone  Patient Pre-hospital Level of Mobility:  Mobile  Patient Pre-hospital Diet Restrictions:  Diabetic  Substance Use History:   Social History     Substance and Sexual Activity   Alcohol Use Yes    Frequency: Monthly or less    Drinks per session: 1 or 2    Binge frequency: Never    Comment: occasional     Social History     Tobacco Use   Smoking Status Never Smoker   Smokeless Tobacco Never Used   Tobacco Comment    not a smoker     Social History     Substance and Sexual Activity   Drug Use Never       Family History:    Family History   Problem Relation Age of Onset    Seizures Father        Physical Exam:     Vitals:   Blood Pressure: 135/63 (03/20/21 0756)  Pulse: 59 (03/20/21 0756)  Temperature: (!) 97 1 °F (36 2 °C) (03/20/21 0756)  Temp Source: Temporal (03/20/21 0756)  Respirations: 18 (03/20/21 0756)  Height: 5' 2" (157 5 cm) (03/19/21 1246)  Weight - Scale: 103 kg (226 lb 3 1 oz) (03/19/21 1246)  SpO2: 95 % (03/20/21 0756)    Physical Exam  Constitutional:       General: She is not in acute distress  Appearance: Normal appearance  She is obese  HENT:      Head: Normocephalic and atraumatic  Eyes:      General:         Right eye: No discharge  Left eye: No discharge  Cardiovascular:      Rate and Rhythm: Normal rate and regular rhythm  Pulses: Normal pulses        Heart sounds: No murmur  Pulmonary:      Effort: Pulmonary effort is normal  No respiratory distress  Breath sounds: Normal breath sounds  No wheezing  Musculoskeletal:      Right lower leg: No edema  Left lower leg: No edema  Skin:     General: Skin is warm and dry  Neurological:      General: No focal deficit present  Mental Status: She is alert and oriented to person, place, and time  Mental status is at baseline  Psychiatric:         Mood and Affect: Mood normal        Additional Data:     Lab Results: I have personally reviewed pertinent reports  Results from last 7 days   Lab Units 03/15/21  0448   WBC Thousand/uL 5 99   HEMOGLOBIN g/dL 15 9*   HEMATOCRIT % 48 6*   PLATELETS Thousands/uL 193     Results from last 7 days   Lab Units 03/15/21  0448   SODIUM mmol/L 140   POTASSIUM mmol/L 3 9   CHLORIDE mmol/L 108   CO2 mmol/L 22   BUN mg/dL 21*   CREATININE mg/dL 0 69   ANION GAP mmol/L 10   CALCIUM mg/dL 8 9   GLUCOSE RANDOM mg/dL 106         Results from last 7 days   Lab Units 03/20/21  1129 03/20/21  0752 03/19/21  2045 03/19/21  1633 03/19/21  0616 03/18/21  2120 03/18/21  1614 03/18/21  1146 03/18/21  0703 03/17/21  2046 03/17/21  1647 03/17/21  1051   POC GLUCOSE mg/dl 224* 143* 197* 181* 135 205* 140 220* 145* 185* 170* 147*               Imaging: I have personally reviewed pertinent reports  No orders to display       EKG, Pathology, and Other Studies Reviewed on Admission:   · EKG: none    [facility] Review[facility]: Yes     ** Please Note: This note has been constructed using a voice recognition system   **

## 2021-03-20 LAB
GLUCOSE SERPL-MCNC: 143 MG/DL (ref 65–140)
GLUCOSE SERPL-MCNC: 166 MG/DL (ref 65–140)
GLUCOSE SERPL-MCNC: 224 MG/DL (ref 65–140)
GLUCOSE SERPL-MCNC: 240 MG/DL (ref 65–140)

## 2021-03-20 PROCEDURE — 97535 SELF CARE MNGMENT TRAINING: CPT

## 2021-03-20 PROCEDURE — 97530 THERAPEUTIC ACTIVITIES: CPT

## 2021-03-20 PROCEDURE — 97116 GAIT TRAINING THERAPY: CPT

## 2021-03-20 PROCEDURE — 82948 REAGENT STRIP/BLOOD GLUCOSE: CPT

## 2021-03-20 RX ADMIN — ENOXAPARIN SODIUM 60 MG: 60 INJECTION SUBCUTANEOUS at 21:05

## 2021-03-20 RX ADMIN — Medication 250 MG: at 10:06

## 2021-03-20 RX ADMIN — FAMOTIDINE 20 MG: 20 TABLET ORAL at 17:32

## 2021-03-20 RX ADMIN — GABAPENTIN 300 MG: 300 CAPSULE ORAL at 21:06

## 2021-03-20 RX ADMIN — FLUOXETINE 60 MG: 20 CAPSULE ORAL at 10:06

## 2021-03-20 RX ADMIN — MELATONIN TAB 3 MG 6 MG: 3 TAB at 21:05

## 2021-03-20 RX ADMIN — ASPIRIN 81 MG: 81 TABLET, COATED ORAL at 10:06

## 2021-03-20 RX ADMIN — LORAZEPAM 0.5 MG: 0.5 TABLET ORAL at 10:07

## 2021-03-20 RX ADMIN — FAMOTIDINE 20 MG: 20 TABLET ORAL at 10:07

## 2021-03-20 RX ADMIN — ENOXAPARIN SODIUM 60 MG: 60 INJECTION SUBCUTANEOUS at 10:08

## 2021-03-20 RX ADMIN — Medication 250 MG: at 17:32

## 2021-03-20 RX ADMIN — GABAPENTIN 100 MG: 100 CAPSULE ORAL at 14:30

## 2021-03-20 RX ADMIN — TOPIRAMATE 200 MG: 100 TABLET, FILM COATED ORAL at 17:33

## 2021-03-20 RX ADMIN — GABAPENTIN 100 MG: 100 CAPSULE ORAL at 17:32

## 2021-03-20 RX ADMIN — LORAZEPAM 0.5 MG: 0.5 TABLET ORAL at 17:33

## 2021-03-20 RX ADMIN — INSULIN GLARGINE 20 UNITS: 100 INJECTION, SOLUTION SUBCUTANEOUS at 21:05

## 2021-03-20 RX ADMIN — GABAPENTIN 100 MG: 100 CAPSULE ORAL at 10:07

## 2021-03-20 RX ADMIN — LEVOTHYROXINE SODIUM 50 MCG: 50 TABLET ORAL at 05:29

## 2021-03-20 RX ADMIN — TOPIRAMATE 200 MG: 100 TABLET, FILM COATED ORAL at 10:08

## 2021-03-20 NOTE — ASSESSMENT & PLAN NOTE
· Presented with severe depression, not taking medications  · Denies any SI/HI or plan, does not need one-to-one observation at this point  · Has not seen by psychiatrist/PCP for a while due to recent COVID situation  · Recently seen by Dr Darian Leung in [facility] during recent hospitalization - recently increased Prozac to 60 mg  · Psychiatric consult, recommending continuation of Prozac 60 mg OD, Ativan 0 5 mg b i d   · Recommend to follow-up with her regular psychiatrist at [place] time of discharge  · She reports her mood is improved

## 2021-03-20 NOTE — ASSESSMENT & PLAN NOTE
Lab Results   Component Value Date    HGBA1C 11 5 (H) 03/03/2021       · Uncontrolled, patient is noncompliant  · Patient is supposed to be on insulin, empagliflozin, alogliptin  · Continue Lantus 20 units q h s  + sliding scale insulin  · Currently blood glucose is at goals     · Resume home regimen upon discharge

## 2021-03-20 NOTE — PHYSICAL THERAPY NOTE
Physical Therapy Daily Treatment Note       03/20/21: 54 minutes (10:34 [id number])   PT Last Visit   PT Visit Date 03/20/21   Pain Assessment   Pain Assessment Tool Pain Assessment not indicated - pt denies pain   Restrictions/Precautions   Weight Bearing Precautions Per Order No   Other Precautions Fall Risk  (H/O anxiety and depression)   General   Family/Caregiver Present No   Cognition   Overall Cognitive Status WFL   Attention Within functional limits   Memory Within functional limits   Following Commands Follows one step commands without difficulty   Bed Mobility   Rolling R 6  Modified independent   Additional items Increased time required   Rolling L 6  Modified independent   Additional items Increased time required   Supine to Sit 5  Supervision   Additional items Increased time required   Sit to Supine 5  Supervision   Additional items Increased time required   Additional Comments HOB flat, no rails; increased effort  (Bed Height: 30  5 inches (to simulate bed at home))   Transfers   Sit to Stand 5  Supervision   Additional items Increased time required  (Good hand placement; increased effort)   Stand to Sit 5  Supervision   Additional items   (Good hand placement; controlled descent)   Stand pivot 5  Supervision  (SPT with RW)   Additional items Increased time required  (Cues for turn completion & RW management)   Toilet transfer 5  Supervision  (Sit <->stand and SPT with RW)   Additional items Increased time required;Standard toilet  (Good bathroom safety)   Additional Comments Transfers: EOB, recliner, toilet, unsecured chair  (Pushes RW too far forward, during SPT's)   Ambulation/Elevation   Gait pattern Excessively slow; Short stride;Decreased foot clearance; Forward Flexion; Shuffling; Wide CLAUDIO  (Cues to stay closer to the RW)   Gait Assistance 5  Supervision   Additional items   (No LOB, no evidence of knee instability)   Assistive Device Rolling walker   Distance 10 feet x 2, 25 feet, 30 feet->Negotiated 4 turns/obstacles during 30 feet gait trial   Balance   Static Sitting Good   Dynamic Sitting Fair +   Static Standing   (Fair/Fair + with RW)   Dynamic Standing Fair  (With RW:  S toileting activities)   Ambulatory Fair  (With RW)   Higher level balance Side stepping  (F w/RW: Stepped backwards, reached within CLAUDIO)   Higher level balance rep range to the L and to the R  (To align self with HOB: in prep for bed mobility training)   Endurance Deficit   Endurance Deficit Yes  (Provided with rest breaks prn, between activities)   Activity Tolerance   Activity Tolerance Patient limited by fatigue  (+ SOB at end of longer gait trials)   Assessment   Prognosis Good   Problem List Decreased strength;Decreased range of motion;Decreased endurance; Impaired balance;Decreased mobility;Orthopedic restrictions;Decreased skin integrity; Impaired sensation;Obesity   Assessment Pt agreeable to participate in bed mobility, transfer training, and gait training; during this morning's PT tx session  Reviewed home set-up , PLOF, and PMH with pt  Reported she has a very high bed at home ~ 30  5 inches (floor to top of bed)  Bed raised to this height during bed mobility training  Improvement assessed with bilateral rolling today  Pt required increased rest breaks between activities; due to significant fatigue  At the end of each longer distance ambulation trial and standing activity, pt reported she need to sit immediately due to "legs are getting like jello"  Barriers to Discharge Decreased caregiver support  (Lives alone; below functioanl baseline)   Goals   Patient Goals   ("To walk better without getting so tired")   STG Expiration Date 03/26/21   Short Term Goal #1 See grid   PT Treatment Day 2   Plan   Treatment/Interventions ADL retraining;Functional transfer training;LE strengthening/ROM; Therapeutic exercise; Endurance training;Cognitive reorientation;Patient/family training;Equipment eval/education; Bed mobility;Gait training; Compensatory technique education;Continued evaluation;Spoke to MD;Spoke to nursing;Spoke to case management;Spoke to advanced practitioner;OT;Family   PT Frequency   (5 to 7x/week)   Recommendation   PT Discharge Recommendation Return to previous environment with social support;Home with skilled therapy   Equipment Recommended   (To be determined)         Goals STG achieved within 1 week Performance at Initial Evaluation (3/19/2021) Last date completed      Bed mobility skills including rolling and repositioning to prevent skin breakdown and decrease caregiver burden            modified independent assistance       supervision assistance x1       3/20         Supine to sit transitions to increase ease of transfer, allow pt to get out of bed, and decrease caregiver burden          modified independent assistance       supervision assistance x1       3/20         Sit to supine transitions to increase ease of transfer, allow pt to get back into bed, and decrease caregiver burden          modified independent assistance       supervision assistance x1       3/20         Functional transfers to facilitate safe return to previous living environment        modified independent assistance    supervision assistance x1    3/20         Ambulation with least restrictive AD; including at least 2 turns for safe household distance ambulation          modified independent assistance       supervision assistance x1       3/20         Improve standing functional balance to allow for pt to  object from floor            modified independent assistance               3/20  Performed dynamic stand balance activities with RW; but did not pick an object off the floor           Vitals  Semi-recumbent:/63, HR 59, SPO2 (RA) 95 %     After ambulating with a RW for 25 feet (seated): /72, HR 85; SPO2 (RA) 99%     After toileting activities, f/b ambulating with a RW for 30 feet, f/b static stand at window for 40 seconds (seated): /72, HR 88; SPO2 (RA) 99%

## 2021-03-20 NOTE — OCCUPATIONAL THERAPY NOTE
Occupational Therapy Treatment Note    Name:  Lucinda Latif   MRN:   [id number]  Age:     76 y o  Patient Active Problem List   Diagnosis    Seizures ([facility] )    Restless leg syndrome    Hypertension    Hyperlipidemia    Diabetes mellitus ([facility] )    Chest pain    GERD (gastroesophageal reflux disease)    Epigastric abdominal pain    Encounter for examination and observation for other specified reasons    Severe episode of recurrent major depressive disorder, without psychotic features ([facility] )    Gambling disorder, episodic, moderate    Suspected COVID-19 virus infection    Major depressive disorder    Acquired hypothyroidism    Asymptomatic bacteriuria    Chronic fatigue and malaise    Calf tenderness    Morbid obesity due to excess calories (HCC)    Onychomycosis of toenail    MDD (major depressive disorder), recurrent episode (HCC)    Anxiety    RLS (restless legs syndrome)     Major depressive disorder [F32 9]      Subjective/Goals: "i'm a slow  in the morning"-- patient pleasant and cooperative and motivated for wellness    No anxious behavior noted this session     Vitals: stable throughout session     Pain: reports no pain     Treatment Time: (820-709) 59 minutes    Cognition: WFL    Precautions: fall risk + anxiety    EVALUATION information:   OT Goal expiration date: 3/26/2021   Treatment day: 2   Discharge recommendation: home with skilled therapy    HOME SET UP:  o Apartment 4th floor + elevator and laundry in basement using cart to transport   o Transfer bench but sponge bathes due to reports of not being able to get into the tub  o DME: rollator  o Lives Alone  o Bilateral RTC injury    Patient education: DEEP BREATHING TECHNIQUES T/O ACTIVITY, SLOWING OF PACE, ENERGY CONSERVATION TECHNIQUES FOR CARRY OVER UPON D/C, INCREASED FAMILY SUPPORT, CONTINUE PARTICIPATION IN SELF-CARE/MOBILITY [address], FALL PREVENTION, ENERGY CONSERVATION  and PAIN MANAGEMENT WITH FUNCTIONAL ACTIVITIES    Additional comments: Pt is a 76 y o  female seen for OT evaluation s/p admit to [facility] on 3/18/2021 w/ generalized weakness secondary to major depressive disorder    Assessment/Additional session details: Patient seen this date for OT with focus on goals as set by OTR  Patient agreeable to skilled OT session with focus on ADL's (bathing, dressing, toileting), Transfers (STS, SPT), Standing tolerance/balance, Activity tolerance, Education on safety, fall prevention and energy conservation techniques, Item retrieval/safe transport with DME ed, ADL AE training and Bathroom/kitchen/home mobility  Barriers to treatment include fatigue, home environment (management in/out or throughout home), social/family support, decreased strength/coordination, balance , activity tolerance and standing tolerance  Patient educated on safe functional transfers techniques, the appropriate use of AE/DME to improve functional performance, and activity modification techniques for energy conservation  Plans for d/c are home with home OT and family support  Patient is making gains toward OT goals with continued OT recommended at this time to max tolerance, safety and function for appropriate d/c planning  At end of session patient remains in room seated at recliner with all needs within reach    GG scores from ADL:  eating (Ind ), oral hygiene (Refused) , toileting (Min/Mod A), bathing (Min/Mod A), UB dressing (Ind ), LB dressing (S), and don/doff footwear (S)  Patient does get SOB with bending and needing increased rest breaks  She reports being familiar with AE and used to have a sock aide but it was loaned and never returned  She indicated desire to get some home services to assist as needed vaibhav with showering  Educated patient on AE to assist with transfer which she indicated having at home but remains fearful  Also ed for AE for feet management as needed        Goals STG achieved within 1  Week  Performance at Initial Evaluation 3/19/2021  Current Performance (last date completed)   Grooming while standing at sink Ron/I Supervision  3/20 easily fatigued CGA    ADL transfers  Ron/I Supervision   3/20 STS min assist increased to supervision- SPT supervision + RW   Bathroom mobility with appropriate AD Ron/I Supervision   3/20 Supervision + RW   UB ADL  Ron/I Min A   3/20 MI    LB ADL, AE PRN Ron/I Mod A   3/20 min assist overall    Toileting/clothing management and hygiene Ron/I Min A   3/20 min assist overall    Dynamic standing balance for increased safety when completing purposeful tasks F+ F-  3/20 Fair   Increase standing tolerance for inc'd safety with standing purposeful tasks 5-8 min 2 min   3/20 easily fatigued needing multiple trial to complete tasks 1-2 min max    Participate in therex 1-3x/week for inc'd overall stamina/activity tolerance for purposeful tasks To be completed       Tub/shower with tub bench  Ron/I       Item retrieval for inc'd independence and safety negotiating home environment Ron/I    3/20 Supervision (minimally challenged)    Bed mobility with HOB flat (elevated surface height)  Ron/I Supervision       Dahlia Pages  3/20/2021

## 2021-03-20 NOTE — ASSESSMENT & PLAN NOTE
Prozac was increased to 60 milligrams by Psychiatry in previous hospitalization  Continue Ativan 0 5 milligram p o  B i d

## 2021-03-20 NOTE — PLAN OF CARE
Problem: PHYSICAL THERAPY ADULT  Goal: Performs mobility at highest level of function for planned discharge setting  See evaluation for individualized goals  Description: Treatment/Interventions: Functional transfer training, LE strengthening/ROM, Therapeutic exercise, Endurance training, Patient/family training, Equipment eval/education, Bed mobility, Gait training, Spoke to nursing, Spoke to case management, OT  Equipment Recommended: Concetta Hood       See flowsheet documentation for full assessment, interventions and recommendations  Outcome: Progressing  Note: Prognosis: Good  Problem List: Decreased strength, Decreased range of motion, Decreased endurance, Impaired balance, Decreased mobility, Orthopedic restrictions, Decreased skin integrity, Impaired sensation, Obesity  Assessment: Pt agreeable to participate in bed mobility, transfer training, and gait training; during this morning's PT tx session  Reviewed home set-up , PLOF, and PMH with pt  Reported she has a very high bed at home ~ 30  5 inches (floor to top of bed)  Bed raised to this height during bed mobility training  Improvement assessed with bilateral rolling today  Pt required increased rest breaks between activities; due to significant fatigue  At the end of each longer distance ambulation trial and standing activity, pt reported she need to sit immediately due to "legs are getting like jello"  Barriers to Discharge: Decreased caregiver support(Lives alone; below functioanl baseline)     PT Discharge Recommendation: Return to previous environment with social support, Home with skilled therapy          See flowsheet documentation for full assessment

## 2021-03-21 LAB
FLUAV RNA RESP QL NAA+PROBE: NEGATIVE
FLUBV RNA RESP QL NAA+PROBE: NEGATIVE
GLUCOSE SERPL-MCNC: 130 MG/DL (ref 65–140)
GLUCOSE SERPL-MCNC: 165 MG/DL (ref 65–140)
GLUCOSE SERPL-MCNC: 183 MG/DL (ref 65–140)
GLUCOSE SERPL-MCNC: 188 MG/DL (ref 65–140)
RSV RNA RESP QL NAA+PROBE: NEGATIVE
SARS-COV-2 RNA RESP QL NAA+PROBE: NEGATIVE

## 2021-03-21 PROCEDURE — 82948 REAGENT STRIP/BLOOD GLUCOSE: CPT

## 2021-03-21 PROCEDURE — 0241U HB NFCT DS VIR RESP RNA 4 TRGT: CPT | Performed by: INTERNAL MEDICINE

## 2021-03-21 RX ADMIN — Medication 250 MG: at 08:52

## 2021-03-21 RX ADMIN — MELATONIN TAB 3 MG 6 MG: 3 TAB at 21:31

## 2021-03-21 RX ADMIN — GABAPENTIN 300 MG: 300 CAPSULE ORAL at 21:31

## 2021-03-21 RX ADMIN — ASPIRIN 81 MG: 81 TABLET, COATED ORAL at 08:52

## 2021-03-21 RX ADMIN — LORAZEPAM 0.5 MG: 0.5 TABLET ORAL at 17:43

## 2021-03-21 RX ADMIN — Medication 250 MG: at 17:44

## 2021-03-21 RX ADMIN — TOPIRAMATE 200 MG: 100 TABLET, FILM COATED ORAL at 08:53

## 2021-03-21 RX ADMIN — GABAPENTIN 100 MG: 100 CAPSULE ORAL at 08:52

## 2021-03-21 RX ADMIN — FAMOTIDINE 20 MG: 20 TABLET ORAL at 17:43

## 2021-03-21 RX ADMIN — ALUMINUM HYDROXIDE, MAGNESIUM HYDROXIDE, AND SIMETHICONE 30 ML: 200; 200; 20 SUSPENSION ORAL at 13:11

## 2021-03-21 RX ADMIN — ACETAMINOPHEN 650 MG: 325 TABLET, FILM COATED ORAL at 13:11

## 2021-03-21 RX ADMIN — LEVOTHYROXINE SODIUM 50 MCG: 50 TABLET ORAL at 05:34

## 2021-03-21 RX ADMIN — TOPIRAMATE 200 MG: 100 TABLET, FILM COATED ORAL at 17:43

## 2021-03-21 RX ADMIN — LORAZEPAM 0.5 MG: 0.5 TABLET ORAL at 08:52

## 2021-03-21 RX ADMIN — FLUOXETINE 60 MG: 20 CAPSULE ORAL at 08:53

## 2021-03-21 RX ADMIN — GABAPENTIN 100 MG: 100 CAPSULE ORAL at 13:11

## 2021-03-21 RX ADMIN — ENOXAPARIN SODIUM 60 MG: 60 INJECTION SUBCUTANEOUS at 21:31

## 2021-03-21 RX ADMIN — ENOXAPARIN SODIUM 60 MG: 60 INJECTION SUBCUTANEOUS at 08:52

## 2021-03-21 RX ADMIN — GABAPENTIN 100 MG: 100 CAPSULE ORAL at 17:44

## 2021-03-21 RX ADMIN — FAMOTIDINE 20 MG: 20 TABLET ORAL at 08:53

## 2021-03-21 NOTE — NURSING NOTE
Affect very flat today, sleeping much of the time  Patient c/o headache 4/10 intensity, relieved with Tylenol given at 1310  Also given Mylanta at 1311 for stomach upset, stomach upset still present  Poor appetite for lunch and refused supper  Patient states had a medium BM today  Refused 1630 dose of Lipitor, states it gives her muscle cramping  Emotional support given, will continue to monitor

## 2021-03-21 NOTE — PROGRESS NOTES
Medication Regimen Review (MRR)    To promote positive health outcomes and reduce adverse consequences the patient's medication therapy has been reviewed by a pharmacist for the following potential problems:   1   documented indication and therapeutic benefits  2   appropriate dose, frequency, route, and duration of therapy  3   medication interactions, side effects, and allergies  4   medication or transcription errors  Medications are also reviewed for appropriate monitoring, duplicate therapy, and dose reduction  Based on the review please see the following recommendations  Patient information:    The patient is 76 y o  admitted for hypothyroidism, major depressive order, diabetes, hypertension, restless leg syndrome, and seizures  Wt Readings from Last 1 Encounters:   03/20/21 104 kg (228 lb 6 3 oz)       Lab Results   Component Value Date    GLUCOSE 212 (H) 12/04/2020    CALCIUM 8 9 03/15/2021     01/02/2016    K 3 9 03/15/2021    CO2 22 03/15/2021     03/15/2021    BUN 21 (H) 03/15/2021    CREATININE 0 69 03/15/2021         Recommendations: There are no recommendations from the pharmacy department at this time      Amanda Hutchins, Pharmacist  [phone]

## 2021-03-22 LAB
GLUCOSE SERPL-MCNC: 156 MG/DL (ref 65–140)
GLUCOSE SERPL-MCNC: 176 MG/DL (ref 65–140)
GLUCOSE SERPL-MCNC: 189 MG/DL (ref 65–140)
GLUCOSE SERPL-MCNC: 248 MG/DL (ref 65–140)

## 2021-03-22 PROCEDURE — 97110 THERAPEUTIC EXERCISES: CPT

## 2021-03-22 PROCEDURE — 97535 SELF CARE MNGMENT TRAINING: CPT

## 2021-03-22 PROCEDURE — 97530 THERAPEUTIC ACTIVITIES: CPT

## 2021-03-22 PROCEDURE — 82948 REAGENT STRIP/BLOOD GLUCOSE: CPT

## 2021-03-22 PROCEDURE — 97116 GAIT TRAINING THERAPY: CPT

## 2021-03-22 RX ADMIN — Medication 250 MG: at 17:33

## 2021-03-22 RX ADMIN — ENOXAPARIN SODIUM 60 MG: 60 INJECTION SUBCUTANEOUS at 21:37

## 2021-03-22 RX ADMIN — FLUOXETINE 60 MG: 20 CAPSULE ORAL at 08:22

## 2021-03-22 RX ADMIN — LEVOTHYROXINE SODIUM 50 MCG: 50 TABLET ORAL at 05:25

## 2021-03-22 RX ADMIN — ATORVASTATIN CALCIUM 40 MG: 40 TABLET, FILM COATED ORAL at 16:27

## 2021-03-22 RX ADMIN — TOPIRAMATE 200 MG: 100 TABLET, FILM COATED ORAL at 17:34

## 2021-03-22 RX ADMIN — TOPIRAMATE 200 MG: 100 TABLET, FILM COATED ORAL at 08:22

## 2021-03-22 RX ADMIN — FAMOTIDINE 20 MG: 20 TABLET ORAL at 17:34

## 2021-03-22 RX ADMIN — LORAZEPAM 0.5 MG: 0.5 TABLET ORAL at 17:33

## 2021-03-22 RX ADMIN — GABAPENTIN 100 MG: 100 CAPSULE ORAL at 17:34

## 2021-03-22 RX ADMIN — GABAPENTIN 100 MG: 100 CAPSULE ORAL at 08:22

## 2021-03-22 RX ADMIN — GABAPENTIN 100 MG: 100 CAPSULE ORAL at 14:22

## 2021-03-22 RX ADMIN — Medication 250 MG: at 08:21

## 2021-03-22 RX ADMIN — INSULIN GLARGINE 20 UNITS: 100 INJECTION, SOLUTION SUBCUTANEOUS at 21:37

## 2021-03-22 RX ADMIN — MELATONIN TAB 3 MG 6 MG: 3 TAB at 21:36

## 2021-03-22 RX ADMIN — ASPIRIN 81 MG: 81 TABLET, COATED ORAL at 08:21

## 2021-03-22 RX ADMIN — ENOXAPARIN SODIUM 60 MG: 60 INJECTION SUBCUTANEOUS at 08:24

## 2021-03-22 RX ADMIN — GABAPENTIN 300 MG: 300 CAPSULE ORAL at 21:36

## 2021-03-22 RX ADMIN — FAMOTIDINE 20 MG: 20 TABLET ORAL at 08:22

## 2021-03-22 RX ADMIN — LORAZEPAM 0.5 MG: 0.5 TABLET ORAL at 08:22

## 2021-03-22 NOTE — CASE MANAGEMENT
PRAVIN received phone call back from the [place] on Friday stating that patient's next appointment is for today at 2pm via the phone  PRAVIN will make sure that patient's room is by her at this time so that appointment can be kept

## 2021-03-22 NOTE — PHYSICAL THERAPY NOTE
61' session      03/22/21 1111   PT Last Visit   PT Visit Date 03/22/21   Note Type   Note Type Treatment   Pain Assessment   Pain Assessment Tool Pain Assessment not indicated - pt denies pain   Restrictions/Precautions   Other Precautions Fall Risk   General   Chart Reviewed Yes   Family/Caregiver Present No   Cognition   Overall Cognitive Status WFL   Arousal/Participation Alert; Cooperative   Attention Within functional limits   Following Commands Follows one step commands without difficulty   Subjective   Subjective i have to sit - my legs are jelly   Bed Mobility   Supine to Sit 5  Supervision   Additional items Increased time required   Additional Comments practiced with bed at 28" high and  needed A to get legs into bed - increase effort to straighten out - then backed up onto step stool to get further onto bed  and was bale to get legs in with S  and cues    practiced backing up onto 6" step x 2 with min A first try then S on 2nd try    Transfers   Sit to Stand 5  Supervision   Additional items Increased time required   Stand to Sit 5  Supervision   Stand pivot 5  Supervision   Toilet transfer 5  Supervision  (able to manage hygiene and underwear with close S )   Additional items   (lifted underwear up from around ankles and one UE support)   Ambulation/Elevation   Gait pattern Decreased foot clearance  (waddling gt with no AD)   Gait Assistance   (DS with RW , S  with rollator and close S/ CG with no AD)   Assistive Device Rolling walker;4-wheeled walker;None   Distance [de-identified]' RW,65' rollator, 54' , 20' with no AD   Balance   Static Standing Fair   Dynamic Standing Fair   Ambulatory Fair  (with RW)   Endurance Deficit   Endurance Deficit Yes   Endurance Deficit Description sats 94 HR 87 after activity   Activity Tolerance   Activity Tolerance Patient limited by fatigue   Assessment   Prognosis Good   Problem List Decreased strength;Decreased endurance;Decreased mobility   Assessment Pt reports not using an AD in the home - - practiced here with increase in waddling gt which pt reports to be her normal- reaching for furniture when fatigued  Pt fatigued easier with use of rollator as well with report of " It gets away from me" educated in use of hand brakes to A - pt does best with RW at this time- does not have one at home- but reports that she would like an new , wider rollator  Bed set at 30 5 inches as per last note but pt reports that it is not that high  Bed adjusted to 28" ( as per pt 's estimation) but still had trouble getting legs up onto bed- did much better with  step stool   Pt reports that she has a wood step stool at home but it tis a little shaky- suggested she look on line to look for another one ( has her laptop here)    Barriers to Discharge Decreased caregiver support   Goals   Patient Goals walk a little   STG Expiration Date 03/26/21   PT Treatment Day 3   Plan   Treatment/Interventions   (cont as per POC)   Progress Progressing toward goals   PT Frequency   (5-7x/wk)     Goals STG achieved within 1 week Performance at Initial Evaluation (3/19/2021) Last date completed      Bed mobility skills including rolling and repositioning to prevent skin breakdown and decrease caregiver burden            modified independent assistance       supervision assistance x1       3/20         Supine to sit transitions to increase ease of transfer, allow pt to get out of bed, and decrease caregiver burden          modified independent assistance       supervision assistance x1       3/22         Sit to supine transitions to increase ease of transfer, allow pt to get back into bed, and decrease caregiver burden          modified independent assistance       supervision assistance x1       3/22         Functional transfers to facilitate safe return to previous living environment        modified independent assistance    supervision assistance x1    3/22         Ambulation with least restrictive AD; including at least 2 turns for safe household distance ambulation          modified independent assistance       supervision assistance x1       3/22         Improve standing functional balance to allow for pt to  object from floor            modified independent assistance               3/20  Performed dynamic stand balance activities with RW; but did not pick an object off the floor

## 2021-03-22 NOTE — NURSING NOTE
Patient again refused lipitor , stating it gave her head to toe   Cramps  Dr dukes and alerted  Lipitor discontinued

## 2021-03-22 NOTE — PLAN OF CARE
Problem: PHYSICAL THERAPY ADULT  Goal: Performs mobility at highest level of function for planned discharge setting  See evaluation for individualized goals  Description: Treatment/Interventions: Functional transfer training, LE strengthening/ROM, Therapeutic exercise, Endurance training, Patient/family training, Equipment eval/education, Bed mobility, Gait training, Spoke to nursing, Spoke to case management, OT  Equipment Recommended: Francois Michaud       See flowsheet documentation for full assessment, interventions and recommendations  Outcome: Progressing  Note: Prognosis: Good  Problem List: Decreased strength, Decreased endurance, Decreased mobility  Assessment: Pt reports not using an AD in the home - - practiced here with increase in waddling gt which pt reports to be her normal- reaching for furniture when fatigued  Pt fatigued easier with use of rollator as well with report of " It gets away from me" educated in use of hand brakes to A - pt does best with RW at this time- does not have one at home- but reports that she would like an new , wider rollator  Bed set at 30 5 inches as per last note but pt reports that it is not that high  Bed adjusted to 28" ( as per pt 's estimation) but still had trouble getting legs up onto bed- did much better with  step stool  Pt reports that she has a wood step stool at home but it tis a little shaky- suggested she look on line to look for another one ( has her laptop here)   Barriers to Discharge: Decreased caregiver support     PT Discharge Recommendation: Return to previous environment with social support, Home with skilled therapy          See flowsheet documentation for full assessment

## 2021-03-23 LAB
GLUCOSE SERPL-MCNC: 132 MG/DL (ref 65–140)
GLUCOSE SERPL-MCNC: 174 MG/DL (ref 65–140)
GLUCOSE SERPL-MCNC: 177 MG/DL (ref 65–140)
GLUCOSE SERPL-MCNC: 239 MG/DL (ref 65–140)

## 2021-03-23 PROCEDURE — 97110 THERAPEUTIC EXERCISES: CPT

## 2021-03-23 PROCEDURE — 97116 GAIT TRAINING THERAPY: CPT

## 2021-03-23 PROCEDURE — 97530 THERAPEUTIC ACTIVITIES: CPT

## 2021-03-23 PROCEDURE — 82948 REAGENT STRIP/BLOOD GLUCOSE: CPT

## 2021-03-23 PROCEDURE — 97535 SELF CARE MNGMENT TRAINING: CPT

## 2021-03-23 RX ADMIN — ENOXAPARIN SODIUM 60 MG: 60 INJECTION SUBCUTANEOUS at 08:05

## 2021-03-23 RX ADMIN — TOPIRAMATE 200 MG: 100 TABLET, FILM COATED ORAL at 17:34

## 2021-03-23 RX ADMIN — INSULIN GLARGINE 20 UNITS: 100 INJECTION, SOLUTION SUBCUTANEOUS at 22:22

## 2021-03-23 RX ADMIN — FLUOXETINE 60 MG: 20 CAPSULE ORAL at 08:05

## 2021-03-23 RX ADMIN — FAMOTIDINE 20 MG: 20 TABLET ORAL at 08:06

## 2021-03-23 RX ADMIN — GABAPENTIN 100 MG: 100 CAPSULE ORAL at 13:10

## 2021-03-23 RX ADMIN — ASPIRIN 81 MG: 81 TABLET, COATED ORAL at 08:05

## 2021-03-23 RX ADMIN — GABAPENTIN 300 MG: 300 CAPSULE ORAL at 22:23

## 2021-03-23 RX ADMIN — INSULIN LISPRO 1 UNITS: 100 INJECTION, SOLUTION INTRAVENOUS; SUBCUTANEOUS at 11:55

## 2021-03-23 RX ADMIN — LORAZEPAM 0.5 MG: 0.5 TABLET ORAL at 17:34

## 2021-03-23 RX ADMIN — GABAPENTIN 100 MG: 100 CAPSULE ORAL at 08:05

## 2021-03-23 RX ADMIN — INSULIN LISPRO 1 UNITS: 100 INJECTION, SOLUTION INTRAVENOUS; SUBCUTANEOUS at 16:53

## 2021-03-23 RX ADMIN — FAMOTIDINE 20 MG: 20 TABLET ORAL at 17:34

## 2021-03-23 RX ADMIN — ENOXAPARIN SODIUM 60 MG: 60 INJECTION SUBCUTANEOUS at 22:22

## 2021-03-23 RX ADMIN — TOPIRAMATE 200 MG: 100 TABLET, FILM COATED ORAL at 08:06

## 2021-03-23 RX ADMIN — MELATONIN TAB 3 MG 6 MG: 3 TAB at 22:23

## 2021-03-23 RX ADMIN — GABAPENTIN 100 MG: 100 CAPSULE ORAL at 17:34

## 2021-03-23 RX ADMIN — LEVOTHYROXINE SODIUM 50 MCG: 50 TABLET ORAL at 05:43

## 2021-03-23 RX ADMIN — Medication 250 MG: at 08:05

## 2021-03-23 RX ADMIN — Medication 250 MG: at 17:34

## 2021-03-23 RX ADMIN — LORAZEPAM 0.5 MG: 0.5 TABLET ORAL at 08:06

## 2021-03-23 NOTE — PLAN OF CARE
Problem: PHYSICAL THERAPY ADULT  Goal: Performs mobility at highest level of function for planned discharge setting  See evaluation for individualized goals  Description: Treatment/Interventions: Functional transfer training, LE strengthening/ROM, Therapeutic exercise, Endurance training, Patient/family training, Equipment eval/education, Bed mobility, Gait training, Spoke to nursing, Spoke to case management, OT  Equipment Recommended: Sunil Green       See flowsheet documentation for full assessment, interventions and recommendations  Outcome: Progressing  Note: Prognosis: Good  Problem List: Decreased strength, Decreased endurance, Impaired balance, Decreased mobility, Obesity  Assessment: Pt agrees that she is not steady enough to amb with no AD - at this time , pt is requesting a bariatric RW for home - SS made aware  Pt did better today with stepping up onto stool to get into bed  pt reports feeling more depressed today - in a dark room - dozing off  Encouraged pt to put her own clothes on and was able to do that with mod I   Shade opened a bit and pt stood at window to look outside - appeared in better spirits at end of f session- encouragement and support given   Barriers to Discharge: Decreased caregiver support     PT Discharge Recommendation: Return to previous environment with social support, Home with skilled therapy          See flowsheet documentation for full assessment

## 2021-03-23 NOTE — OCCUPATIONAL THERAPY NOTE
Occupational Therapy Treatment Note     Name:  Karin Stern   GFN:   [id number]  Age:     65 y o         Patient Active Problem List   Diagnosis    Seizures ([facility] )    Restless leg syndrome    Hypertension    Hyperlipidemia    [facility]    Chest pain    GERD (gastroesophageal reflux disease)    Epigastric abdominal pain    Encounter for examination and observation for other specified reasons    Severe episode of recurrent major depressive disorder, without psychotic features ([facility] )    Gambling disorder, episodic, moderate    Suspected COVID-19 virus infection    Major depressive disorder    Acquired hypothyroidism    Asymptomatic bacteriuria    Chronic fatigue and malaise    Calf tenderness    Morbid obesity due to excess calories (HCC)    Onychomycosis of toenail    MDD (major depressive disorder), recurrent episode (HCC)    Anxiety    RLS (restless legs syndrome)      Major depressive disorder [F32 9]        Subjective/Goals:  "I am so tired, I just want to sleep and shut out the world"    Vitals: stable     Pain: non expressed      Treatment Time: ([id number]) 56 minutes     Cognition: WFL     Precautions: fall risk + anxiety     EVALUATION information:  · OT Goal expiration date: 3/26/2021  · Treatment day: 4  · Discharge recommendation: home with skilled therapy   · HOME SET UP:  ? Apartment 4th floor + elevator and laundry in basement using cart to transport   ? Transfer bench but sponge bathes due to reports of not being able to get into the tub  ? DME: rollator  ? Lives Alone  ?  Bilateral RTC injury     Patient education: DEEP BREATHING TECHNIQUES T/O ACTIVITY, SLOWING OF PACE, ENERGY CONSERVATION TECHNIQUES FOR CARRY OVER UPON D/C, INCREASED FAMILY SUPPORT, CONTINUE PARTICIPATION IN SELF-CARE/MOBILITY WITH STAFF WHILE IN THE HOSPITAL , OVERALL SAFETY AWARENESS, FALL PREVENTION, ENERGY CONSERVATION  and PAIN MANAGEMENT WITH FUNCTIONAL ACTIVITIES     Additional comments: Pt is M 21 y o  female seen for OT evaluation s/p admit to [address] 3/18/2021 w/ generalized weakness secondary to major depressive disorder     Assessment/Additional session details: Patient seen this date for OT with focus on goals as set by OTR  Patient agreeable to skilled OT session with focus on ADL's (bathing, dressing, toileting), Transfers (STS, SPT), Standing tolerance/balance, Strength/ROM/HEP, Activity tolerance, Education on safety, fall prevention and energy conservation techniques, Item retrieval/safe transport with DME ed and Bathroom/kitchen/home mobility  Barriers to treatment include fatigue, pain, volition, shortess of breath, home environment (management in/out or throughout home), social/family support, decreased strength/coordination, balance , activity tolerance and standing tolerance  Patient educated on safe functional transfers techniques, the appropriate use of AE/DME to improve functional performance, and activity modification techniques for energy conservation  Plans for d/c are home with home OT and family support  Patient is making gains toward OT goals with continued OT recommended at this time to max tolerance, safety and function for appropriate d/c planning  At end of session patient remains in room seated at recliner with all needs within reach  Patient sitting in recliner upon arrival with head down expressing her level of fatigue  MARTEL was able to convince patient to wash as this may make her feel better  Although she was agreeable she washed and toileted requesting to only don a clean down and no underwear or change of socks at this time  Upon set up she completed seated bathing at MI and supervision for standing aspects 2* fatigue and "legs feeling like jello"  Due to her fatigue she also would not attempt her backside requesting assistance but was agreeable to washing front which she did successfully    Upon return to chair she indicated still being tired but appeared in better spirits and therefore to exercise we picked one of her favorite bands for some music and completed session    Overall some encouragement is needed to max potential      Goals STG achieved within 1  Week  Performance at Initial Evaluation 3/19/2021  Current Performance (last date completed)   Grooming while standing at sink Ron/I  3/23 Supervision  3/23 MI handwashing  3/22 no tolerance to standing sinkside for activity   3/20 easily fatigued CGA    ADL transfers  Ron/I  3/23 Supervision  3/22 MI STS and SPT  3/22 STS MI, SPT Supervision + RW   3/20 STS min assist increased to supervision- SPT supervision + RW   Bathroom mobility with appropriate AD Ron/I Supervision   3/23 Supervision + RW 2* fatigue and legs "feeling like jello"   UB ADL  Ron/I  3/20, 3/22, 3/23 Min A   3/23 MI    LB ADL, AE PRN Ron/I Mod A  3/23 bathing only supervision front but reported unable to complete backside- assist provided    3/22 Supervision, increased time/effot + fatigue    Toileting/clothing management and hygiene Ron/I Min A  3/23 gavino supervision, backside max (refused ability and trial)  3/22 Supervision however easily fatigued, limits tolerance/safety    3/20 min assist overall    Dynamic standing balance for increased safety when completing purposeful tasks F+ F-  3/23 Fair/Fair+   Increase standing tolerance for inc'd safety with standing purposeful tasks 5-8 min 2 min  3/23 3-4 min min challenge   3/22 1 5-2 min MAX (feels knees weak + SOB)   3/20 easily fatigued needing multiple trial to complete tasks 1-2 min max    Participate in therex 1-3x/week for inc'd overall stamina/activity tolerance for purposeful tasks To be completed  3/22, 3/23    3/23 2# weighted ball in all planes 2x10, good tolerance   Tub/shower with tub bench  Ron/I       Item retrieval for inc'd independence and safety negotiating home environment Ron/I    3/23 Supervision (minimally challenged)    Bed mobility with [facility] flat (elevated surface height)  Ron/I Supervision   3/22 Supervision       Akin Lopez  3/23/2021

## 2021-03-23 NOTE — PHYSICAL THERAPY NOTE
50' session     03/23/21 1205   PT Last Visit   PT Visit Date 03/23/21   Note Type   Note Type Treatment   Pain Assessment   Pain Assessment Tool Pain Assessment not indicated - pt denies pain   Restrictions/Precautions   Other Precautions Fall Risk  (depression)   General   Chart Reviewed Yes   Family/Caregiver Present No   Cognition   Overall Cognitive Status WFL   Arousal/Participation Alert; Cooperative   Attention Within functional limits   Following Commands Follows one step commands without difficulty   Subjective   Subjective i feel more sad today- i am sleepy   Bed Mobility   Supine to Sit 5  Supervision   Sit to Supine 5  Supervision   Additional Comments bed set at 28" - use of step stool to back up onto to get onto bed better   Transfers   Sit to Stand 5  Supervision   Stand to Sit 5  Supervision   Stand pivot 5  Supervision  (with RW)   Additional items   (close S/ CG with no AD)   Ambulation/Elevation   Gait pattern   (increase unsteadiness and waddling gt with no AD)   Gait Assistance   (S with RW , close S to CG with no AD)   Assistive Device Bariatric Rolling walker;None   Distance 61' -RW, 40' with no AD   Balance   Static Standing Fair   Dynamic Standing Fair   Ambulatory Fair  (with RW)   Endurance Deficit   Endurance Deficit Yes   Endurance Deficit Description easily fatigued -occ brief breaks   Activity Tolerance   Activity Tolerance Patient limited by fatigue   Exercises   Hip Abduction Bilateral;AROM;20 reps  (standing at rail with mod use of UEs)   Knee AROM Long Arc Quad Bilateral;AROM;20 reps   Balance training  repeated STS x 10 wiht no UE support upon standing- S   Assessment   Prognosis Good   Problem List Decreased strength;Decreased endurance; Impaired balance;Decreased mobility;Obesity   Assessment Pt agrees that she is not steady enough to amb with no AD - at this time , pt is requesting a bariatric RW for home - SS made aware   Pt did better today with stepping up onto stool to get into bed  pt reports feeling more depressed today - in a dark room - dozing off   Encouraged pt to put her own clothes on and was able to do that with mod I   Shade opened a bit and pt stood at window to look outside - appeared in better spirits at end of f session- encouragement and support given    Goals   Patient Goals sleep   STG Expiration Date 03/26/21   PT Treatment Day 4   Plan   Treatment/Interventions   (cont as per POC)   Progress Progressing toward goals   PT Frequency   (5-7x/wk)     Goals STG achieved within 1 week Performance at Initial Evaluation (3/19/2021) Last date completed      Bed mobility skills including rolling and repositioning to prevent skin breakdown and decrease caregiver burden            modified independent assistance       supervision assistance x1       3/20         Supine to sit transitions to increase ease of transfer, allow pt to get out of bed, and decrease caregiver burden          modified independent assistance       supervision assistance x1       3/23         Sit to supine transitions to increase ease of transfer, allow pt to get back into bed, and decrease caregiver burden  Pt steps up onto 6" stool (backwards) to get into bed   bed at 28" high       modified independent assistance       supervision assistance x1       3/23         Functional transfers to facilitate safe return to previous living environment        modified independent assistance    supervision assistance x1    3/23         Ambulation with least restrictive AD; including at least 2 turns for safe household distance ambulation          modified independent assistance       supervision assistance x1       3/23         Improve standing functional balance to allow for pt to  object from floor            modified independent assistance               3/20  Performed dynamic stand balance activities with RW; but did not pick an object off the floor

## 2021-03-24 LAB
GLUCOSE SERPL-MCNC: 132 MG/DL (ref 65–140)
GLUCOSE SERPL-MCNC: 133 MG/DL (ref 65–140)
GLUCOSE SERPL-MCNC: 144 MG/DL (ref 65–140)
GLUCOSE SERPL-MCNC: 183 MG/DL (ref 65–140)

## 2021-03-24 PROCEDURE — 82948 REAGENT STRIP/BLOOD GLUCOSE: CPT

## 2021-03-24 PROCEDURE — 97530 THERAPEUTIC ACTIVITIES: CPT

## 2021-03-24 PROCEDURE — 97110 THERAPEUTIC EXERCISES: CPT

## 2021-03-24 RX ORDER — ERYTHROMYCIN 250 MG/1
250 TABLET, COATED ORAL
Status: COMPLETED | OUTPATIENT
Start: 2021-03-24 | End: 2021-03-29

## 2021-03-24 RX ORDER — METOCLOPRAMIDE 10 MG/1
10 TABLET ORAL EVERY 6 HOURS PRN
Status: DISCONTINUED | OUTPATIENT
Start: 2021-03-24 | End: 2021-03-30 | Stop reason: HOSPADM

## 2021-03-24 RX ADMIN — GABAPENTIN 300 MG: 300 CAPSULE ORAL at 21:01

## 2021-03-24 RX ADMIN — ASPIRIN 81 MG: 81 TABLET, COATED ORAL at 09:09

## 2021-03-24 RX ADMIN — ENOXAPARIN SODIUM 60 MG: 60 INJECTION SUBCUTANEOUS at 09:09

## 2021-03-24 RX ADMIN — GABAPENTIN 100 MG: 100 CAPSULE ORAL at 17:21

## 2021-03-24 RX ADMIN — FAMOTIDINE 20 MG: 20 TABLET ORAL at 09:09

## 2021-03-24 RX ADMIN — Medication 250 MG: at 09:09

## 2021-03-24 RX ADMIN — FLUOXETINE 60 MG: 20 CAPSULE ORAL at 09:09

## 2021-03-24 RX ADMIN — Medication 250 MG: at 17:20

## 2021-03-24 RX ADMIN — GABAPENTIN 100 MG: 100 CAPSULE ORAL at 09:09

## 2021-03-24 RX ADMIN — LEVOTHYROXINE SODIUM 50 MCG: 50 TABLET ORAL at 05:42

## 2021-03-24 RX ADMIN — LORAZEPAM 0.5 MG: 0.5 TABLET ORAL at 17:20

## 2021-03-24 RX ADMIN — GABAPENTIN 100 MG: 100 CAPSULE ORAL at 14:00

## 2021-03-24 RX ADMIN — INSULIN GLARGINE 20 UNITS: 100 INJECTION, SOLUTION SUBCUTANEOUS at 21:01

## 2021-03-24 RX ADMIN — ENOXAPARIN SODIUM 60 MG: 60 INJECTION SUBCUTANEOUS at 20:57

## 2021-03-24 RX ADMIN — ERYTHROMYCIN 250 MG: 250 TABLET, FILM COATED ORAL at 20:00

## 2021-03-24 RX ADMIN — TOPIRAMATE 200 MG: 100 TABLET, FILM COATED ORAL at 09:09

## 2021-03-24 RX ADMIN — TOPIRAMATE 200 MG: 100 TABLET, FILM COATED ORAL at 17:21

## 2021-03-24 RX ADMIN — LORAZEPAM 0.5 MG: 0.5 TABLET ORAL at 09:09

## 2021-03-24 RX ADMIN — METOCLOPRAMIDE 10 MG: 10 TABLET ORAL at 21:10

## 2021-03-24 RX ADMIN — FAMOTIDINE 20 MG: 20 TABLET ORAL at 17:21

## 2021-03-24 RX ADMIN — MELATONIN TAB 3 MG 6 MG: 3 TAB at 21:01

## 2021-03-24 NOTE — PHYSICAL THERAPY NOTE
03/24/21 1142   Note Type   Cancel Reasons Medical status   pt in bed - defers PT today 2* feeling too nauseous  nurse made aware

## 2021-03-24 NOTE — NURSING NOTE
Patient refused to eat dinner  Pt stated, feeling nauseated, Assessed patient, abdomen tender to touch upon palpating, positive bowel sounds in quads  Pt  C/o pain to lower abdomen area   Skin pale, offer sips of ginger ale, Informed Dr Ortiz [place] and order Erythromycin 250 mg  Three times daily for 5 days and Rglan PRN

## 2021-03-24 NOTE — QUICK NOTE
Nursing reported patient with significant nausea  HbA1c 11 % poorly controlled DM, likely gastroparesis  Will start Erythromycin 250 mg TID for 5 days and Reglan PRN

## 2021-03-24 NOTE — OCCUPATIONAL THERAPY NOTE
Occupational Therapy Treatment Note     Name:  Karin Stern   OJA:   [id number]  Age:     65 y o         Patient Active Problem List   Diagnosis    Seizures ([facility] [address] )    Restless leg syndrome    Hypertension    Hyperlipidemia    Diabetes mellitus ([facility] [address] )    Chest pain    GERD (gastroesophageal reflux disease)    Epigastric abdominal pain    Encounter for examination and observation for other specified reasons    Severe episode of recurrent major depressive disorder, without psychotic features ([facility] [place] [facility] )    Gambling disorder, episodic, moderate    Suspected COVID-19 virus infection    Major depressive disorder    Acquired hypothyroidism    Asymptomatic bacteriuria    Chronic fatigue and malaise    Calf tenderness    Morbid obesity due to excess calories (HCC)    Onychomycosis of toenail    MDD (major depressive disorder), recurrent episode (HCC)    Anxiety    RLS (restless legs syndrome)      Major depressive disorder [F32 9]        Subjective/Goals: "walk by myself so I can get home"     Vitals: stable     Pain: no pain other then "stomach reported from belly shots"     Treatment Time: ([id number]) 62 minutes     Cognition: WFL     Precautions: fall risk + anxiety     EVALUATION information:  · OT Goal expiration date: 3/26/2021  · Treatment day: 5  · Discharge recommendation: home with skilled therapy   · HOME SET UP:  ? Apartment 4th floor + elevator and laundry in basement using cart to transport   ? Transfer bench but sponge bathes due to reports of not being able to get into the tub  ? DME: rollator  ? Lives Alone  ?  Bilateral RTC injury     Patient education: DEEP BREATHING TECHNIQUES T/O ACTIVITY, SLOWING OF PACE, ENERGY CONSERVATION TECHNIQUES FOR CARRY OVER UPON D/C, INCREASED FAMILY SUPPORT, CONTINUE PARTICIPATION IN SELF-CARE/MOBILITY WITH STAFF WHILE IN THE HOSPITAL , OVERALL SAFETY AWARENESS, FALL PREVENTION, ENERGY CONSERVATION  and PAIN MANAGEMENT WITH FUNCTIONAL ACTIVITIES     Additional comments: Pt is O 77 y o  female seen for OT evaluation s/p admit to [address] 3/18/2021 w/ generalized weakness secondary to major depressive disorder     Assessment/Additional session details:  Patient seen this date for OT with focus on goals as set by OTR  Patient agreeable to skilled OT session with focus on Transfers (STS, SPT), Standing tolerance/balance, Strength/ROM/HEP, Tub/shower transfers, Home management, Activity tolerance, Education on safety, fall prevention and energy conservation techniques, Item retrieval/safe transport with DME ed, ADL AE training and Bathroom/kitchen/home mobility  Barriers to treatment include fatigue, volition, home environment (management in/out or throughout home), social/family support, decreased strength/coordination, balance , activity tolerance and standing tolerance  Patient educated on safe functional transfers techniques, the appropriate use of AE/DME to improve functional performance, and activity modification techniques for energy conservation  Plans for d/c are home with home OT and family support  Patient is making gains toward OT goals with continued OT recommended at this time to max tolerance, safety and function for appropriate d/c planning  At end of session patient remains in room seated at recliner with all needs within reach  Patient looked good today with her mobility throughout the room obtaining items demonstrating good functional reach and self initiated rest breaks as needed  She reports in terms of showering that she has difficulty with right LE management therefore upon ed and trail of leg  was successful  She reports that for mobility she didn't use anything for mobility in the apartment and rollator for community  She does however admit that she feels more supported with the walker vaibhav given that knees get "weak"        Goals STG achieved within 1  Week  Performance at Initial Evaluation 3/19/2021  Current Performance (last date completed)   Grooming while standing at sink Ron/I  3/23, 3/24 Supervision  3/24 MI handwashing  3/22 no tolerance to standing sinkside for activity   3/20 easily fatigued CGA    ADL transfers  Ron/I  3/23, 3/24 Supervision  3/24 MI STS and SPT  3/22 STS MI, SPT Supervision + RW   3/20 STS min assist increased to supervision- SPT supervision + RW   Bathroom mobility with appropriate AD Ron/I  3/24 Supervision  3/24 MI + RW   3/23 Supervision + RW 2* fatigue and legs "feeling like jello"   UB ADL  Ron/I  3/20, 3/22, 3/23 Min A   3/23 MI    LB ADL, AE PRN Ron/I Mod A  3/23 bathing only supervision front but reported unable to complete backside- assist provided    3/22 Supervision, increased time/effot + fatigue    Toileting/clothing management and hygiene Ron/I Min A  3/23 gavino supervision, backside max (refused ability and trial)  3/22 Supervision however easily fatigued, limits tolerance/safety    3/20 min assist overall    Dynamic standing balance for increased safety when completing purposeful tasks F+  3/24 F- 3/24 Fair+   3/23 Fair/Fair+   Increase standing tolerance for inc'd safety with standing purposeful tasks 5-8 min  3/24 2 min  3/24 5 minutes  3/23 3-4 min min challenge   3/22 1 5-2 min MAX (feels knees weak + SOB)   3/20 easily fatigued needing multiple trial to complete tasks 1-2 min max    Participate in therex 1-3x/week for inc'd overall stamina/activity tolerance for purposeful tasks To be completed  3/22, 3/23, 3/24   3/24 increased to 3# hand weight for all planes with good tolerance    3/23 2# weighted ball in all planes 2x10, good tolerance   Tub/shower with tub bench  Ron/I  3/24   3/24 MI simulated post leg  ed    Item retrieval for inc'd independence and safety negotiating home environment Ron/I  3/24   3/24 MI   3/23 Supervision (minimally challenged)    Bed mobility with [facility][1] flat (elevated surface height)  Ron/I  3/24 Supervision  3/24 MI   3/22 [employer] [place]  3/24/2021

## 2021-03-24 NOTE — CASE MANAGEMENT
PRAVIN called and spoke with patient's son in regards to [facility] meeting  Per son he would be available tomorrow at 9:30am for meeting  Patient works overnights and this is the best time to reach him on his walk back to his home  PRAVIN scheduled meeting  PRAVIN contacted the [place] for any assistance available for patient at home for home aides  PRAVIN informed to contact Rashmi Mercado ext [phone] to assist with getting patient established with home aides  PRAVIN unable to leave a voice message   PRAVIN will continue trying to reach someone at [place] to assist

## 2021-03-24 NOTE — NURSING NOTE
Pt is alert/oriented  Denies pain this am  Uses FWW and assist x 1 with transfers and ambulation  Cont of B and B  Participates in PT/OT  At this time shows no s/s of covid  19, no SOB, no cough or fever

## 2021-03-24 NOTE — UTILIZATION REVIEW
DATE:   3/24/21      AUTH:  [id number]        MEDICAL:           Itz Robertson MD   Physician   Hospitalist   H&P       Signed   Date of Service:  3/19/2021  6:07 PM               Signed        Expand All Collapse All      [facility]        H&P- Yannick Setter 1955, 76 y o  female MRN: [id number]  Unit/Bed#: -01 Encounter: [id number]  Primary Care Provider: Wilver Chacko MD   Date and time admitted to hospital: 3/18/2021  7:31 PM     Acquired hypothyroidism  Assessment & Plan  · Continue home Synthroid     Major depressive disorder  Assessment & Plan  Prozac was increased to 60 milligrams by Psychiatry in previous hospitalization  Continue Ativan 0 5 milligram p o  B i d      Severe episode of recurrent major depressive disorder, without psychotic features ([facility] )  Assessment & Plan  · Presented with severe depression, not taking medications  · Denies any SI/HI or plan, does not need one-to-one observation at this point  · Has not seen by psychiatrist/PCP for a while due to recent COVID situation  · Recently seen by Dr Lux Velasco in [place] during recent hospitalization - recently increased Prozac to 60 mg  · Psychiatric consult, recommending continuation of Prozac 60 mg OD, Ativan 0 5 mg b i d   · Recommend to follow-up with her regular psychiatrist at [facility] [place] [place] [facility] time of discharge    · She reports her mood is improved      Diabetes mellitus ([facility] )  Assessment & Plan           Lab Results   Component Value Date     HGBA1C 11 5 (H) 03/03/2021         · Uncontrolled, patient is noncompliant  · Patient is supposed to be on insulin, empagliflozin, alogliptin  · Continue Lantus 20 units q h s  + sliding scale insulin  · Currently blood glucose is at goals    · Resume home regimen upon discharge     Hypertension  Assessment & Plan  Patient does not take any medications at home   Blood pressures acceptable     Restless leg syndrome  Assessment & Plan  · Continue home dose of gabapentin     Seizures (HCC)  Assessment & Plan  · Stable, continue home Topamax     VTE Prophylaxis: Enoxaparin (Lovenox)  / sequential compression device   Code Status: Level 1  POLST: POLST form is not discussed and not completed at this time  Discussion with family: none     Anticipated Length of Stay:  Patient will be admitted on an SNF Short Term Inpatient basis with an anticipated length of stay of  More than 2 midnights  Justification for Hospital Stay: rehab     Total Time for Visit, including Counseling / Coordination of Care: 45 minutes  Greater than 50% of this total time spent on direct patient counseling and coordination of care      Chief Complaint:   Ambulatory dysfunction     History of Present Illness:     Lucinda Arechiga is a 76 y o  female with significant past medical history of type 2 diabetes, hypertension, depression, seizure disorder, restless leg syndrome who presented to the rehab following a recent hospitalization for chest pain  Patient underwent cardiac catheter was negative during this time  Patient was evaluated by physical occupational therapy and found appropriate for inpatient rehab  Patient was having more knee pain during her previous stay and she did receive Dopplers are negative for DVT  Psychiatry increase her Prozac to 60 mg daily  Patient currently was doing well      Review of Systems:     Review of Systems   Constitutional: Negative for chills and fever  HENT: Negative for ear pain and sore throat  Eyes: Negative for pain and visual disturbance  Respiratory: Negative for cough and shortness of breath  Cardiovascular: Negative for chest pain and palpitations  Gastrointestinal: Negative for abdominal pain and vomiting  Genitourinary: Negative for dysuria and hematuria  Musculoskeletal: Positive for gait problem  Negative for arthralgias and back pain  Skin: Negative for color change and rash  Neurological: Positive for tremors   Negative for seizures and syncope  All other systems reviewed and are negative         Past Medical and Surgical History:      Medical History        Past Medical History:   Diagnosis Date    Diabetes mellitus ([facility] )      Disease of thyroid gland      Hyperlipidemia      Hypertension      Hypothyroidism (acquired)      Restless leg syndrome      Seizures (HCC)             Surgical History   Past Surgical History:   Procedure Laterality Date    APPENDECTOMY        CHOLECYSTECTOMY        HIP FRACTURE SURGERY Right      TONSILLECTOMY        TUBAL LIGATION               Meds/Allergies:             Prior to Admission medications    Medication Sig Start Date End Date Taking?  Authorizing Provider   aspirin (Aspirin 81) 81 mg EC tablet Take 81 mg by mouth daily      Yes Historical Provider, MD   enoxaparin (LOVENOX) 60 mg/0 6 mL Inject under the skin every 12 (twelve) hours 3/18/21   Yes Historical Provider, MD   Alogliptin Benzoate 25 MG TABS Take 25 mg by mouth daily       Historical Provider, MD   atorvastatin (LIPITOR) 40 mg tablet Take 1 tablet (40 mg total) by mouth daily with dinner  Patient not taking: Reported on 3/12/2021 3/9/21     Tenisha Claudio DO   Empagliflozin 25 MG TABS Take 25 mg by mouth every morning       Historical Provider, MD   famotidine (PEPCID) 20 mg tablet Take 1 tablet (20 mg total) by mouth 2 (two) times a day 3/9/21     Tenisha Claudio DO   FLUoxetine (PROzac) 20 mg capsule Take 3 capsules (60 mg total) by mouth daily 3/10/21     Tenisha Claudio DO   gabapentin (NEURONTIN) 100 mg capsule Take 1 capsule (100 mg total) by mouth 2 (two) times a day Take 100 mg in the morning and 100 mg mid afternoon 7/10/18     SAMIA Rowe   gabapentin (NEURONTIN) 300 mg capsule Take 1 capsule (300 mg total) by mouth daily at bedtime 7/10/18     Janett Acuna, CREZEKIEL   insulin glargine (LANTUS) 100 units/mL subcutaneous injection Inject 20 Units under the skin daily at bedtime  Patient taking differently: Inject 30 Units under the skin daily at bedtime  3/9/21     Tenisha Claudio, DO   levothyroxine 50 mcg tablet Take 50 mcg by mouth daily Indications: Pt unsure of the amount        Historical Provider, MD   LORazepam (ATIVAN) 0 5 mg tablet Take 1 tablet (0 5 mg total) by mouth 2 (two) times a day for 10 doses 3/9/21 3/14/21   Tenisha Claudio, DO   topiramate (TOPAMAX) 100 mg tablet Take 200 mg by mouth 2 (two) times a day        Historical Provider, MD      I have reviewed home medications using allscripts      Allergies:          Allergies   Allergen Reactions    Pineapple Anaphylaxis    Byetta 10 Mcg Pen [Exenatide]      Marijuana [Dronabinol] GI Intolerance    Metformin Diarrhea       Other reaction(s): Unknown    Omeprazole Hives    Ondansetron         Other reaction(s): Unknown    Coconut Oil Rash    Liraglutide Rash and Other (See Comments)    Penicillins Rash         Social History:     Marital Status: Single   Occupation:  Retired  Patient Pre-hospital Living Situation:  Lives alone  Patient Pre-hospital Level of Mobility:  Mobile  Patient Pre-hospital Diet Restrictions:  Diabetic  Substance Use History:   Social History           Substance and Sexual Activity   Alcohol Use Yes    Frequency: Monthly or less    Drinks per session: 1 or 2    Binge frequency: Never     Comment: occasional      Social History           Tobacco Use   Smoking Status Never Smoker   Smokeless Tobacco Never Used   Tobacco Comment     not a smoker      Social History          Substance and Sexual Activity   Drug Use Never         Family History:           Family History   Problem Relation Age of Onset    Seizures Father           Physical Exam:      Vitals:   Blood Pressure: 135/63 (03/20/21 0756)  Pulse: 59 (03/20/21 0756)  Temperature: (!) 97 1 °F (36 2 °C) (03/20/21 0756)  Temp Source: Temporal (03/20/21 0756)  Respirations: 18 (03/20/21 0756)  Height: 5' 2" (157 5 cm) (03/19/21 1246)  Weight - Scale: 103 kg (226 lb 3 1 oz) (03/19/21 1246)  SpO2: 95 % (03/20/21 0756)     Physical Exam  Constitutional:       General: She is not in acute distress  Appearance: Normal appearance  She is obese  HENT:      Head: Normocephalic and atraumatic  Eyes:      General:         Right eye: No discharge  Left eye: No discharge  Cardiovascular:      Rate and Rhythm: Normal rate and regular rhythm  Pulses: Normal pulses  Heart sounds: No murmur  Pulmonary:      Effort: Pulmonary effort is normal  No respiratory distress  Breath sounds: Normal breath sounds  No wheezing  Musculoskeletal:      Right lower leg: No edema  Left lower leg: No edema  Skin:     General: Skin is warm and dry  Neurological:      General: No focal deficit present  Mental Status: She is alert and oriented to person, place, and time  Mental status is at baseline     Psychiatric:         Mood and Affect: Mood normal          Additional Data:      Lab Results: I have personally reviewed pertinent reports             Results from last 7 days   Lab Units 03/15/21  0448   WBC Thousand/uL 5 99   HEMOGLOBIN g/dL 15 9*   HEMATOCRIT % 48 6*   PLATELETS Thousands/uL 193           Results from last 7 days   Lab Units 03/15/21  0448   SODIUM mmol/L 140   POTASSIUM mmol/L 3 9   CHLORIDE mmol/L 108   CO2 mmol/L 22   BUN mg/dL 21*   CREATININE mg/dL 0 69   ANION GAP mmol/L 10   CALCIUM mg/dL 8 9   GLUCOSE RANDOM mg/dL 106                          Results from last 7 days   Lab Units 03/20/21  1129 03/20/21  0752 03/19/21  2045 03/19/21  1633 03/19/21  0616 03/18/21  2120 03/18/21  1614 03/18/21  1146 03/18/21  0703 03/17/21  2046 03/17/21  1647 03/17/21  1051   POC GLUCOSE mg/dl 224* 143* 197* 181* 135 205* 140 220* 145* 185* 170* 147*                 Imaging: I have personally reviewed pertinent reports        No orders to display         EKG, Pathology, and Other Studies Reviewed on Admission:   · EKG: none     Allscripts / Epic Records Reviewed: Yes      ** Please Note: This note has been constructed using a voice recognition system  **                    PT NOTES:        Blank Esparza PTA   Physical Therapist Assistant   Specialty:  Physical Therapy   Physical Therapy Note       Signed   Date of Service:  3/23/2021 12:19 PM               Signed                          48' session       03/23/21 1205   PT Last Visit   PT Visit Date 03/23/21   Note Type   Note Type Treatment   Pain Assessment   Pain Assessment Tool Pain Assessment not indicated - pt denies pain   Restrictions/Precautions   Other Precautions Fall Risk  (depression)   General   Chart Reviewed Yes   Family/Caregiver Present No   Cognition   Overall Cognitive Status WFL   Arousal/Participation Alert; Cooperative   Attention Within functional limits   Following Commands Follows one step commands without difficulty   Subjective   Subjective i feel more sad today- i am sleepy   Bed Mobility   Supine to Sit 5  Supervision   Sit to Supine 5  Supervision   Additional Comments bed set at 28" - use of step stool to back up onto to get onto bed better   Transfers   Sit to Stand 5  Supervision   Stand to Sit 5  Supervision   Stand pivot 5  Supervision  (with RW)   Additional items    (close S/ CG with no AD)   Ambulation/Elevation   Gait pattern    (increase unsteadiness and waddling gt with no AD)   Gait Assistance    (S with RW , close S to CG with no AD)   Assistive Device Bariatric Rolling walker;None   Distance 61' -RW, 40' with no AD   Balance   Static Standing Fair   Dynamic Standing Fair   Ambulatory Fair  (with RW)   Endurance Deficit   Endurance Deficit Yes   Endurance Deficit Description easily fatigued -occ brief breaks   Activity Tolerance   Activity Tolerance Patient limited by fatigue   Exercises   Hip Abduction Bilateral;AROM;20 reps  (standing at rail with mod use of UEs)   Knee AROM Long Arc Quad Bilateral;AROM;20 reps   Balance training  repeated STS x 10 wiht no UE support upon standing- S   Assessment   Prognosis Good   Problem List Decreased strength;Decreased endurance; Impaired balance;Decreased mobility;Obesity   Assessment Pt agrees that she is not steady enough to amb with no AD - at this time , pt is requesting a bariatric RW for home - SS made aware  Pt did better today with stepping up onto stool to get into bed  pt reports feeling more depressed today - in a dark room - dozing off   Encouraged pt to put her own clothes on and was able to do that with mod I   Shade opened a bit and pt stood at window to look outside - appeared in better spirits at end of f session- encouragement and support given    Goals   Patient Goals sleep   STG Expiration Date 03/26/21   PT Treatment Day 4   Plan   Treatment/Interventions    (cont as per POC)   Progress Progressing toward goals   PT Frequency    (5-7x/wk)      Goals STG achieved within 1 week Performance at Initial Evaluation (3/19/2021) Last date completed      Bed mobility skills including rolling and repositioning to prevent skin breakdown and decrease caregiver burden            modified independent assistance       supervision assistance x1       3/20         Supine to sit transitions to increase ease of transfer, allow pt to get out of bed, and decrease caregiver burden          modified independent assistance       supervision assistance x1       3/23         Sit to supine transitions to increase ease of transfer, allow pt to get back into bed, and decrease caregiver burden  Pt steps up onto 6" stool (backwards) to get into bed   bed at 28" high       modified independent assistance       supervision assistance x1       3/23         Functional transfers to facilitate safe return to previous living environment        modified independent assistance    supervision assistance x1    3/23         Ambulation with least restrictive AD; including at least 2 turns for safe household distance ambulation          modified independent assistance       supervision assistance x1       3/23         Improve standing functional balance to allow for pt to  object from floor            modified independent assistance               3/20  Performed dynamic stand balance activities with RW; but did not pick an object off the floor                                     OT NOTES:          Joi Sandoval   Occupational Therapy Assistant   Specialty:  Occupational Therapy   Occupational Therapy Note       Signed   Date of Service:  3/24/2021  9:24 AM               Signed                Occupational Therapy Treatment Note     Name:  Karin Stern   QON:   [id number]  Age:     65 y o         Patient Active Problem List   Diagnosis    Seizures ([facility] )    Restless leg syndrome    Hypertension    Hyperlipidemia    Diabetes mellitus ([facility] )    Chest pain    GERD (gastroesophageal reflux disease)    Epigastric abdominal pain    Encounter for examination and observation for other specified reasons    Severe episode of recurrent major depressive disorder, without psychotic features ([facility] )    Gambling disorder, episodic, moderate    Suspected COVID-19 virus infection    Major depressive disorder    Acquired hypothyroidism    Asymptomatic bacteriuria    Chronic fatigue and malaise    Calf tenderness    Morbid obesity due to excess calories ([place] [address] )    Onychomycosis of toenail    MDD (major depressive disorder), recurrent episode ([place] [address] )    Anxiety    RLS (restless legs syndrome)      Major depressive disorder [F32 9]        Subjective/Goals: "walk by myself so I can get home"     Vitals: stable     Pain: no pain other then "stomach reported from belly shots"     Treatment Time: (542-534) 62 minutes     Cognition: WFL     Precautions: fall risk + anxiety     EVALUATION information:  · OT Goal expiration date: 3/26/2021  · Treatment day: 5  · Discharge recommendation: home with skilled therapy   · HOME SET UP:  ? Apartment 4th floor + elevator and laundry in basement using cart to transport   ? Transfer bench but sponge bathes due to reports of not being able to get into the tub  ? DME: rollator  ? Lives Alone  ? Bilateral RTC injury     Patient education: DEEP BREATHING TECHNIQUES T/O ACTIVITY, SLOWING OF PACE, ENERGY CONSERVATION TECHNIQUES FOR CARRY OVER UPON D/C, INCREASED FAMILY SUPPORT, CONTINUE PARTICIPATION IN SELF-CARE/MOBILITY WITH STAFF WHILE IN THE HOSPITAL , OVERALL SAFETY AWARENESS, FALL PREVENTION, ENERGY CONSERVATION  and PAIN MANAGEMENT WITH FUNCTIONAL ACTIVITIES     Additional comments: Pt is a 65 y o  female seen for OT evaluation s/p admit to [address] 3/18/2021 w/ generalized weakness secondary to major depressive disorder     Assessment/Additional session details:  Patient seen this date for OT with focus on goals as set by OTR  Patient agreeable to skilled OT session with focus on Transfers (STS, SPT), Standing tolerance/balance, Strength/ROM/HEP, Tub/shower transfers, Home management, Activity tolerance, Education on safety, fall prevention and energy conservation techniques, Item retrieval/safe transport with DME ed, ADL AE training and Bathroom/kitchen/home mobility  Barriers to treatment include fatigue, volition, home environment (management in/out or throughout home), social/family support, decreased strength/coordination, balance , activity tolerance and standing tolerance  Patient educated on safe functional transfers techniques, the appropriate use of AE/DME to improve functional performance, and activity modification techniques for energy conservation  Plans for d/c are home with home OT and family support  Patient is making gains toward OT goals with continued OT recommended at this time to max tolerance, safety and function for appropriate d/c planning    At end of session patient remains in room seated at recliner with all needs within reach      Patient looked good today with her mobility throughout the room obtaining items demonstrating good functional reach and self initiated rest breaks as needed  She reports in terms of showering that she has difficulty with right LE management therefore upon ed and trail of leg  was successful       She reports that for mobility she didn't use anything for mobility in the apartment and rollator for community    She does however admit that she feels more supported with the walker vaibhav given that knees get "weak"        Goals STG achieved within 1  Week  Performance at Initial Evaluation 3/19/2021  Current Performance (last date completed)   Grooming while standing at sink Ron/I  3/23, 3/24 Supervision  3/24 MI handwashing  3/22 no tolerance to standing sinkside for activity   3/20 easily fatigued CGA    ADL transfers  Ron/I  3/23, 3/24 Supervision  3/24 MI STS and SPT  3/22 STS MI, SPT Supervision + RW   3/20 STS min assist increased to supervision- SPT supervision + RW   Bathroom mobility with appropriate AD Ron/I  3/24 Supervision  3/24 MI + RW   3/23 Supervision + RW 2* fatigue and legs "feeling like jello"   UB ADL  Ron/I  3/20, 3/22, 3/23 Min A   3/23 MI    LB ADL, AE PRN Ron/I Mod A  3/23 bathing only supervision front but reported unable to complete backside- assist provided    3/22 Supervision, increased time/effot + fatigue    Toileting/clothing management and hygiene Ron/I Min A  3/23 gavino supervision, backside max (refused ability and trial)  3/22 Supervision however easily fatigued, limits tolerance/safety    3/20 min assist overall    Dynamic standing balance for increased safety when completing purposeful tasks F+  3/24 F- 3/24 Fair+   3/23 Fair/Fair+   Increase standing tolerance for inc'd safety with standing purposeful tasks 5-8 min  3/24 2 min  3/24 5 minutes  3/23 3-4 min min challenge   3/22 1 5-2 min MAX (feels knees weak + SOB)   3/20 easily fatigued needing multiple trial to complete tasks 1-2 min max    Participate in therex 1-3x/week for inc'd overall stamina/activity tolerance for purposeful tasks To be completed  3/22, 3/23, 3/24   3/24 increased to 3# hand weight for all planes with good tolerance    3/23 2# weighted ball in all planes 2x10, good tolerance   Tub/shower with tub bench  Ron/I  3/24   3/24 MI simulated post leg  ed    Item retrieval for inc'd independence and safety negotiating home environment Ron/I  3/24   3/24 MI   3/23 Supervision (minimally challenged)    Bed mobility with [facility] flat (elevated surface height)  Ron/I  3/24 Supervision  3/24 MI   3/22 Supervision       Delfin Anthony  3/24/2021                       DISCHARGE PLANNING:         MIRIAM Gamino      Specialty:  Care Coordination   Case Management   Signed   Date of Service:  3/19/2021 11:38 AM               Signed             SW met with patient to review general assessment and admission paperwork  Patient presented as alert during conversation  Patient reported that she lives alone in a 4th floor apartment that has an elevator in the building  Patient reportedly does not have steps leading into the building  Patient reported that she needs assistance at baseline getting her legs into the tube at home  Patient reported that she has a tube bench at home, but still she struggles with getting legs over tube  Patient is able to complete dressing by sitting on toilet  Patient also reports that she has a RW, and cane at home  Patient uses the [place] for pharmacy  Patient stated that she uses [facility] in [place] for emergent prescriptions  [facility] about [facility]'s [facility]  Patient would like for harmony Mae to be part of [facility] meeting  Per patient, son works overnight shift, so may not answer  PRAVIN advised that PRAVIN will leave a voice message  SW will follow

## 2021-03-25 LAB
GLUCOSE SERPL-MCNC: 115 MG/DL (ref 65–140)
GLUCOSE SERPL-MCNC: 142 MG/DL (ref 65–140)
GLUCOSE SERPL-MCNC: 143 MG/DL (ref 65–140)
GLUCOSE SERPL-MCNC: 181 MG/DL (ref 65–140)

## 2021-03-25 PROCEDURE — 97116 GAIT TRAINING THERAPY: CPT

## 2021-03-25 PROCEDURE — 97110 THERAPEUTIC EXERCISES: CPT

## 2021-03-25 PROCEDURE — 97530 THERAPEUTIC ACTIVITIES: CPT

## 2021-03-25 PROCEDURE — 82948 REAGENT STRIP/BLOOD GLUCOSE: CPT

## 2021-03-25 RX ADMIN — TOPIRAMATE 200 MG: 100 TABLET, FILM COATED ORAL at 17:00

## 2021-03-25 RX ADMIN — GABAPENTIN 300 MG: 300 CAPSULE ORAL at 22:33

## 2021-03-25 RX ADMIN — ENOXAPARIN SODIUM 60 MG: 60 INJECTION SUBCUTANEOUS at 09:00

## 2021-03-25 RX ADMIN — ERYTHROMYCIN 250 MG: 250 TABLET, FILM COATED ORAL at 06:29

## 2021-03-25 RX ADMIN — INSULIN GLARGINE 20 UNITS: 100 INJECTION, SOLUTION SUBCUTANEOUS at 22:33

## 2021-03-25 RX ADMIN — Medication 250 MG: at 17:00

## 2021-03-25 RX ADMIN — ENOXAPARIN SODIUM 60 MG: 60 INJECTION SUBCUTANEOUS at 22:33

## 2021-03-25 RX ADMIN — FAMOTIDINE 20 MG: 20 TABLET ORAL at 17:00

## 2021-03-25 RX ADMIN — MELATONIN TAB 3 MG 6 MG: 3 TAB at 22:33

## 2021-03-25 RX ADMIN — ERYTHROMYCIN 250 MG: 250 TABLET, FILM COATED ORAL at 16:59

## 2021-03-25 RX ADMIN — LEVOTHYROXINE SODIUM 50 MCG: 50 TABLET ORAL at 05:19

## 2021-03-25 RX ADMIN — GABAPENTIN 100 MG: 100 CAPSULE ORAL at 17:00

## 2021-03-25 RX ADMIN — GABAPENTIN 100 MG: 100 CAPSULE ORAL at 08:59

## 2021-03-25 RX ADMIN — FLUOXETINE 60 MG: 20 CAPSULE ORAL at 08:59

## 2021-03-25 RX ADMIN — Medication 250 MG: at 09:00

## 2021-03-25 RX ADMIN — FAMOTIDINE 20 MG: 20 TABLET ORAL at 09:00

## 2021-03-25 RX ADMIN — GABAPENTIN 100 MG: 100 CAPSULE ORAL at 14:03

## 2021-03-25 RX ADMIN — ERYTHROMYCIN 250 MG: 250 TABLET, FILM COATED ORAL at 11:49

## 2021-03-25 RX ADMIN — LORAZEPAM 0.5 MG: 0.5 TABLET ORAL at 17:00

## 2021-03-25 RX ADMIN — TOPIRAMATE 200 MG: 100 TABLET, FILM COATED ORAL at 08:59

## 2021-03-25 RX ADMIN — LORAZEPAM 0.5 MG: 0.5 TABLET ORAL at 09:00

## 2021-03-25 RX ADMIN — ASPIRIN 81 MG: 81 TABLET, COATED ORAL at 09:00

## 2021-03-25 RX ADMIN — INSULIN LISPRO 1 UNITS: 100 INJECTION, SOLUTION INTRAVENOUS; SUBCUTANEOUS at 17:00

## 2021-03-25 NOTE — PHYSICAL THERAPY NOTE
63' session including [facility]     03/25/21 1014   PT Last Visit   PT Visit Date 03/25/21   Note Type   Note Type Treatment   Pain Assessment   Pain Assessment Tool 0-10   Pain Score   (0  until standing in BR reported 9 for back pain)   Pain Location/Orientation Location: Back   Pain Onset/Description Onset: Sudden;Descriptor: Aching   Hospital Pain Intervention(s) Rest   Restrictions/Precautions   Other Precautions Fall Risk;Pain   General   Chart Reviewed Yes   Family/Caregiver Present   (son present via phone for [facility])   Cognition   Overall Cognitive Status WFL   Arousal/Participation Alert; Cooperative   Attention Within functional limits   Following Commands Follows one step commands without difficulty   Subjective   Subjective reports feelin a bit better than yesterday   Bed Mobility   Supine to Sit 6  Modified independent   Transfers   Sit to Stand 6  Modified independent   Additional items Increased time required   Stand to Sit 6  Modified independent   Stand pivot 6  Modified independent   Toilet transfer 6  Modified independent   Additional items   (cues to lift pants over thighs prior to getitng up)   Ambulation/Elevation   Gait pattern   (slow pace, decrease foot clearance  slightly flexed psoture)   Gait Assistance 6  Modified independent   Assistive Device Bariatric Rolling walker   Distance 60',20' x 2   Balance   Static Standing Fair   Dynamic Standing Fair   Ambulatory Fair   Endurance Deficit   Endurance Deficit Yes   Endurance Deficit Description easily fatigued with report of back pain after prolonged standing   Activity Tolerance   Activity Tolerance Patient limited by fatigue;Patient limited by pain   Assessment   Prognosis Good   Problem List Decreased strength;Decreased endurance;Pain;Obesity   Assessment [facility] held - see notes below  Pt in bed on arrival but agreeable to get up   Able to put pants, shirt , socks and shoes on without A but did need increase time for socks   Pt practiced getting in and out of BR managing then door with cues - will check again tomorrow and possibly issue an I sign   Barriers to Discharge Decreased caregiver support   Goals   Patient Goals get better so I can go home   STG Expiration Date 03/26/21   PT Treatment Day 5   Plan   Treatment/Interventions   (cont as per POC)   Progress Progressing toward goals   PT Frequency   (5-7x/wk)      59 Nohemy Duque held with pt, DON, SS this PTA and son Brandie Mujica present via phone  Discussed present LOF and rec for cont PT to early/ mid next week   [facility]ric [facility]W  And increase in home health services - OT still working on poss need for AE for  [employer], hygiene and poss leglifter to get legs over tub   Son requested a script for hand rail in tub in order for apt to install  Pt reports occ forgetting to take her meds in then daytime and son agrees that her memory has been decreasing - OT to be made aware of request for med management and for a MOCA   Son will also look into getting a 6" step stool to A pt into her bed  Goals STG achieved within 1 week Performance at Initial Evaluation (3/19/2021) Last date completed      Bed mobility skills including rolling and repositioning to prevent skin breakdown and decrease caregiver burden            modified independent assistance       supervision assistance x1       3/20         Supine to sit transitions to increase ease of transfer, allow pt to get out of bed, and decrease caregiver burden          modified independent assistance       supervision assistance x1       3/25         Sit to supine transitions to increase ease of transfer, allow pt to get back into bed, and decrease caregiver burden  Pt steps up onto 6" stool (backwards) to get into bed   bed at 28" high       modified independent assistance       supervision assistance x1       3/23         Functional transfers to facilitate safe return to previous living environment        modified independent assistance    supervision assistance x1    3/25         Ambulation with least restrictive AD; including at least 2 turns for safe household distance ambulation          modified independent assistance       supervision assistance x1       3/25         Improve standing functional balance to allow for pt to  object from floor            modified independent assistance               3/20  Performed dynamic stand balance activities with RW; but did not pick an object off the floor

## 2021-03-25 NOTE — PLAN OF CARE
Problem: Prexisting or High Potential for Compromised Skin Integrity  Goal: Skin integrity is maintained or improved  Description: INTERVENTIONS:  - Identify patients at risk for skin breakdown  - Assess and monitor skin integrity  - Assess and monitor nutrition and hydration status  - Monitor labs   - Assess for incontinence   - Turn and reposition patient  - Assist with mobility/ambulation  - Relieve pressure over bony prominences  - Avoid friction and shearing  - Provide appropriate hygiene as needed including keeping skin clean and dry  - Evaluate need for skin moisturizer/barrier cream  - Collaborate with interdisciplinary team   - Patient/family teaching  - Consider wound care consult   Outcome: Progressing     Problem: Potential for Falls  Goal: Patient will remain free of falls  Description: INTERVENTIONS:  - Assess patient frequently for physical needs  -  Identify cognitive and physical deficits and behaviors that affect risk of falls  -  [place] fall precautions as indicated by assessment   - Educate patient/family on patient safety including physical limitations  - Instruct patient to call for assistance with activity based on assessment  - Modify environment to reduce risk of injury  - Consider OT/PT consult to assist with strengthening/mobility  Outcome: Progressing     Problem: Nutrition/Hydration-ADULT  Goal: Nutrient/Hydration intake appropriate for improving, restoring or maintaining nutritional needs  Description: Monitor and assess patient's nutrition/hydration status for malnutrition  Collaborate with interdisciplinary team and initiate plan and interventions as ordered  Monitor patient's weight and dietary intake as ordered or per policy  Utilize nutrition screening tool and intervene as necessary  Determine patient's food preferences and provide high-protein, high-caloric foods as appropriate       INTERVENTIONS:  - Monitor oral intake, urinary output, labs, and treatment plans  - Assess nutrition and hydration status and recommend course of action  - Evaluate amount of meals eaten  - Assist patient with eating if necessary   - Allow adequate time for meals  - Recommend/ encourage appropriate diets, oral nutritional supplements, and vitamin/mineral supplements  - Order, calculate, and assess calorie counts as needed  - Recommend, monitor, and adjust tube feedings and TPN/PPN based on assessed needs  - Assess need for intravenous fluids  - Provide specific nutrition/hydration education as appropriate  - Include patient/family/caregiver in decisions related to nutrition  Outcome: Progressing

## 2021-03-25 NOTE — OCCUPATIONAL THERAPY NOTE
Occupational Therapy Goal Update     OTR notified by DON and PTA of appropriate goal updates following Juliane Duque this AM  See below for changes        Goals STG achieved within 1  Week  Performance at Initial Evaluation 3/19/2021  Current Performance (last date completed)   Grooming while standing at sink Ron/I  3/23, 3/24 Supervision  3/24 MI handwashing  3/22 no tolerance to standing sinkside for activity   3/20 easily fatigued CGA    ADL transfers  Ron/I  3/23, 3/24 Supervision  3/24 MI STS and SPT  3/22 STS MI, SPT Supervision + RW   3/20 STS min assist increased to supervision- SPT supervision + RW   Bathroom mobility with appropriate AD Ron/I  3/24 Supervision  3/24 MI + RW   3/23 Supervision + RW 2* fatigue and legs "feeling like jello"   UB ADL  Ron/I  3/20, 3/22, 3/23 Min A   3/23 MI    LB ADL, AE PRN Ron/I Mod A  3/23 bathing only supervision front but reported unable to complete backside- assist provided    3/22 Supervision, increased time/effot + fatigue    Toileting/clothing management and hygiene Ron/I Min A  3/23 gavino supervision, backside max (refused ability and trial)  3/22 Supervision however easily fatigued, limits tolerance/safety    3/20 min assist overall    Dynamic standing balance for increased safety when completing purposeful tasks F+  3/24 F- 3/24 Fair+   3/23 Fair/Fair+   Increase standing tolerance for inc'd safety with standing purposeful tasks 5-8 min  3/24 2 min  3/24 5 minutes  3/23 3-4 min min challenge   3/22 1 5-2 min MAX (feels knees weak + SOB)   3/20 easily fatigued needing multiple trial to complete tasks 1-2 min max    Participate in therex 1-3x/week for inc'd overall stamina/activity tolerance for purposeful tasks To be completed  3/22, 3/23, 3/24   3/24 increased to 3# hand weight for all planes with good tolerance    3/23 2# weighted ball in all planes 2x10, good tolerance   Tub/shower with tub bench  Ron/I  3/24   3/24 MI simulated post leg  ed    Item retrieval for inc'd independence and safety negotiating home environment Ron/I  3/24   3/24 MI   3/23 Supervision (minimally challenged)    Med mgmt to ensure safe d/c  Ron/I     Participate in further cognitive testing to assist with safe d/c planning To be completed      Bed mobility with [facility] flat (elevated surface height)  Ron/I  3/24 Supervision  3/24 MI   3/22 Formerly named [facility], MS, OTR/L

## 2021-03-25 NOTE — NURSING NOTE
Pt is AAOx4  Pt had two episodes of loose stools today  Will administer imodium if loose stool continues  Reports a poor appetite, but denies any abdominal pain or discomfort at this time  All needs and call bell within reach

## 2021-03-25 NOTE — CASE MANAGEMENT
[facility] meeting scheduled for today at 9:30am  Attended by patient, PT, DON , SW and patient's son via phone  Per therapy, patient is making progress  SHe requires additional time to get things done, but she is able to accomplish  Patient demostrated her ability to put on her socks and shoes, however process was slow  Per therapy, patient moves around better with a parker RW, and still requires asisstance and guidance on safely getting in and out of bed  Per son he will attempt to find a more steadier step stool for patient  Son is requesting that PRAVIN speak to the [facility] for parker RW due to co-pay cost    DON discussed patient's current A1C and medications  Per patient she finds herself forgetting to take some of her home medications  DON and SW counseled patient on the importance and some consequences of not taking medications  PRAVIN is attempting to work on [facility] in obtaining home aide hour assistance and medication management assistance  PRAVIN informed patient and son that SW reached out to the [facility] already and awaiting a return call  Once meeting was over, PRAVIN did receive the return call from the [address]  Per Francisca Chappell, patient would be eligible for home health and home aide services  Per [place] [facility] will need to provide scripts to her requesting services of [address], Home aide and [facility]  Per [facility] does not do blister packs for mediations  PRAVIN provided Francisca Chappell with [facility] contact number since patient will need to get any new medications from them  PRAVIN will speak with rehab MD,about necessary scripts  PRAVIN received a phone call from Esdras Burks, [id number] with Kartik Huff with the [facility] requesting that SW send referral to Lilliana Roque for [address] services  SW placed referral  SW also received a phone call from Shaina [phone] ext  [facility] home aide services  Jayleen would like to speak to someone in regards to the assistance the patient will need for care at home  PRAVIN will call Jayleen back with requested information

## 2021-03-25 NOTE — OCCUPATIONAL THERAPY NOTE
Occupational Therapy Treatment Note     Name:  Karin Stern   XHB:   [id number]  Age:     77 y o         Patient Active Problem List   Diagnosis    Seizures ([facility] )    Restless leg syndrome    Hypertension    Hyperlipidemia    Diabetes mellitus ([facility] )    Chest pain    GERD (gastroesophageal reflux disease)    Epigastric abdominal pain    Encounter for examination and observation for other specified reasons    Severe episode of recurrent major depressive disorder, without psychotic features [facility]    Gambling disorder, episodic, moderate    Suspected COVID-19 virus infection    Major depressive disorder    Acquired hypothyroidism    Asymptomatic bacteriuria    Chronic fatigue and malaise    Calf tenderness    Morbid obesity due to excess calories (HCC)    Onychomycosis of toenail    MDD (major depressive disorder), recurrent episode (HCC)    Anxiety    RLS (restless legs syndrome)      Major depressive disorder [F32 9]      Subjective/Goals: "to get back home"     Vitals: stable      Pain: none reported      Treatment Time: (4300-1014) 63 minutes     Cognition: WFL     Precautions: fall risk + anxiety     EVALUATION information:  · OT Goal expiration date: 3/26/2021  · Treatment day: 6  · Discharge recommendation: home with skilled therapy   · HOME SET UP:  ? Apartment 4th floor + elevator and laundry in basement using cart to transport   ? Transfer bench but sponge bathes due to reports of not being able to get into the tub  ? DME: rollator  ? Lives Alone  ?  Bilateral RTC injury     Patient education: DEEP BREATHING TECHNIQUES T/O ACTIVITY, SLOWING OF PACE, ENERGY CONSERVATION TECHNIQUES FOR CARRY OVER UPON D/C, INCREASED FAMILY SUPPORT, CONTINUE PARTICIPATION IN SELF-CARE/MOBILITY WITH STAFF WHILE IN THE HOSPITAL , OVERALL SAFETY AWARENESS, FALL PREVENTION, ENERGY CONSERVATION  and PAIN MANAGEMENT WITH FUNCTIONAL ACTIVITIES     Additional comments: Pt is M 50 y o  female seen for OT evaluation s/p admit to [address] 3/18/2021 w/ generalized weakness secondary to major depressive disorder     Assessment/Additional session details:  Patient seen this date for OT with focus on goals as set by OTR  Patient had a care meeting earlier in AM where goals where reviewed and present abilities  Patient's son reported patient being forgetful in taking her medications therefore it was suggested that a med management goal and a MOCA be provided to further assess needs and make appropriate recommendations  OTR added goal prior to treatment  Upon arrival to room patient seated in recliner and appears well awake and alert therefore TAHMINA educated her on the same and she recalled the conversations from the meeting and was agreeable to [facility] and Med management this date  MOCA provided obtaining a score of 22/30 indicating mild cognitive impairment  See scanned document for further details  Increased time was needed for tasks that required more "thinking" or excutive functioning however she remained compliment and was provided with the encouragement to just do her best       Medication management:  · Opening bottles-- able to open and remove pills as requested without issue  · Medication Safety (true/Faulse) questions-- good understanding and appropriate responses given when given time to answer  She does however have a difficult time explaining WHY she answered as she did  · Attempted this date reading and knowledge of medication labels however due to size of the labels she was unable to complete task indicating use of a magnifier at home therefore will assess in upcoming session with enlarged labels  · Discussion on why she isn't taking medications- patient reports that she sleeps in and therefore if a med is to be taken at "10" I don't get up until 12 or 1 so I take medication then and adjust time times   "or I forget"    Patient admits that she does not forget to take medication due to depression but mainly due to scheduling and forgetting in the morning with sleep  · Blister packs are not offered through the [facility] therefore is not an option  Son can manage medications 1x/week (Friday's) but patient will need to take for herself throughout the week  · Options reviewed with taking the AM medication she needs to her bedside each night with water and setting appropriate alarms on phone to remind her to take medications  She does admit that she will not get out of bed just to take medications  Ed on importance to medications and adherence to scheduling  Patient feels that this would be a plan willing to try but she is aware that success with option is contingent on her overall volition-- patient agreed        Continue OT at this time, patient remains at bedside with lunch and needs within reach at end of session      Goals STG achieved within 1  Week  Performance at Initial Evaluation 3/19/2021  Current Performance (last date completed)   Grooming while standing at sink Ron/I  3/23, 3/24 Supervision  3/24 MI handwashing  3/22 no tolerance to standing sinkside for activity   3/20 easily fatigued CGA    ADL transfers  Ron/I  3/23, 3/24 Supervision  3/24 MI STS and SPT  3/22 STS MI, SPT Supervision + RW   3/20 STS min assist increased to supervision- SPT supervision + RW   Bathroom mobility with appropriate AD Ron/I  3/24 Supervision  3/24 MI + RW   3/23 Supervision + RW 2* fatigue and legs "feeling like jello"   UB ADL  Ron/I  3/20, 3/22, 3/23 Min A   3/23 MI    LB ADL, AE PRN Ron/I Mod A  3/23 bathing only supervision front but reported unable to complete backside- assist provided    3/22 Supervision, increased time/effot + fatigue    Toileting/clothing management and hygiene Ron/I Min A  3/23 gavino supervision, backside max (refused ability and trial)  3/22 Supervision however easily fatigued, limits tolerance/safety    3/20 min assist overall    Dynamic standing balance for increased safety when completing purposeful tasks F+  3/24 F- 3/24 Fair+   3/23 Fair/Fair+   Increase standing tolerance for inc'd safety with standing purposeful tasks 5-8 min  3/24 2 min  3/24 5 minutes  3/23 3-4 min min challenge   3/22 1 5-2 min MAX (feels knees weak + SOB)   3/20 easily fatigued needing multiple trial to complete tasks 1-2 min max    Participate in therex 1-3x/week for inc'd overall stamina/activity tolerance for purposeful tasks To be completed  3/22, 3/23, 3/24, 3/25   3/25 increased to 3# hand weight for all planes with good tolerance    3/23 2# weighted ball in all planes 2x10, good tolerance   Tub/shower with tub bench  Ron/I  3/24   3/24 MI simulated post leg  ed    Item retrieval for inc'd independence and safety negotiating home environment Ron/I  3/24   3/24 MI   3/23 Supervision (minimally challenged)    Med mgmt to ensure safe d/c  Ron/I   3/25 See assessment however     MI open containers, MI F/T questions, MI demo of ability to obtain set amount of pills requested       Participate in further cognitive testing to assist with safe d/c planning To be completed   3/25    3/25 22/30 MOCA   Bed mobility with [facility] flat (elevated surface height)  Ron/I  3/24 Supervision  3/24 MI   3/22 Supervision        Joi Sandoval  3/25/2021

## 2021-03-25 NOTE — NURSING NOTE
2300- sleeping comfortably in bed, no abdominal discomfort complained  Slept well, checked at times, callbell within reach, needs attended

## 2021-03-25 NOTE — PLAN OF CARE
Problem: PHYSICAL THERAPY ADULT  Goal: Performs mobility at highest level of function for planned discharge setting  See evaluation for individualized goals  Description: Treatment/Interventions: Functional transfer training, LE strengthening/ROM, Therapeutic exercise, Endurance training, Patient/family training, Equipment eval/education, Bed mobility, Gait training, Spoke to nursing, Spoke to case management, OT  Equipment Recommended: Fitz Orantes       See flowsheet documentation for full assessment, interventions and recommendations  Outcome: Progressing  Note: Prognosis: Good  Problem List: Decreased strength, Decreased endurance, Pain, Obesity  Assessment: Juliane Duque held - see notes below  Pt in bed on arrival but agreeable to get up   Able to put pants, shirt , socks and shoes on without A but did need increase time for socks  Pt practiced getting in and out of BR managing then door with cues - will check again tomorrow and possibly issue an I sign   Barriers to Discharge: Decreased caregiver support     PT Discharge Recommendation: Return to previous environment with social support, Home with skilled therapy          See flowsheet documentation for full assessment

## 2021-03-26 LAB
GLUCOSE SERPL-MCNC: 113 MG/DL (ref 65–140)
GLUCOSE SERPL-MCNC: 136 MG/DL (ref 65–140)
GLUCOSE SERPL-MCNC: 178 MG/DL (ref 65–140)
GLUCOSE SERPL-MCNC: 98 MG/DL (ref 65–140)

## 2021-03-26 PROCEDURE — 97530 THERAPEUTIC ACTIVITIES: CPT

## 2021-03-26 PROCEDURE — 97110 THERAPEUTIC EXERCISES: CPT

## 2021-03-26 PROCEDURE — 97116 GAIT TRAINING THERAPY: CPT

## 2021-03-26 PROCEDURE — 99308 SBSQ NF CARE LOW MDM 20: CPT | Performed by: INTERNAL MEDICINE

## 2021-03-26 PROCEDURE — 82948 REAGENT STRIP/BLOOD GLUCOSE: CPT

## 2021-03-26 RX ADMIN — FAMOTIDINE 20 MG: 20 TABLET ORAL at 17:50

## 2021-03-26 RX ADMIN — ERYTHROMYCIN 250 MG: 250 TABLET, FILM COATED ORAL at 12:43

## 2021-03-26 RX ADMIN — FLUOXETINE 60 MG: 20 CAPSULE ORAL at 08:38

## 2021-03-26 RX ADMIN — ENOXAPARIN SODIUM 60 MG: 60 INJECTION SUBCUTANEOUS at 08:39

## 2021-03-26 RX ADMIN — LORAZEPAM 0.5 MG: 0.5 TABLET ORAL at 08:38

## 2021-03-26 RX ADMIN — Medication 250 MG: at 17:50

## 2021-03-26 RX ADMIN — LORAZEPAM 0.5 MG: 0.5 TABLET ORAL at 17:50

## 2021-03-26 RX ADMIN — LEVOTHYROXINE SODIUM 50 MCG: 50 TABLET ORAL at 06:00

## 2021-03-26 RX ADMIN — ERYTHROMYCIN 250 MG: 250 TABLET, FILM COATED ORAL at 06:00

## 2021-03-26 RX ADMIN — TOPIRAMATE 200 MG: 100 TABLET, FILM COATED ORAL at 08:38

## 2021-03-26 RX ADMIN — ENOXAPARIN SODIUM 60 MG: 60 INJECTION SUBCUTANEOUS at 21:34

## 2021-03-26 RX ADMIN — FAMOTIDINE 20 MG: 20 TABLET ORAL at 08:38

## 2021-03-26 RX ADMIN — GABAPENTIN 300 MG: 300 CAPSULE ORAL at 21:35

## 2021-03-26 RX ADMIN — GABAPENTIN 100 MG: 100 CAPSULE ORAL at 14:50

## 2021-03-26 RX ADMIN — MELATONIN TAB 3 MG 6 MG: 3 TAB at 21:35

## 2021-03-26 RX ADMIN — INSULIN GLARGINE 20 UNITS: 100 INJECTION, SOLUTION SUBCUTANEOUS at 21:35

## 2021-03-26 RX ADMIN — Medication 250 MG: at 08:38

## 2021-03-26 RX ADMIN — ASPIRIN 81 MG: 81 TABLET, COATED ORAL at 08:38

## 2021-03-26 RX ADMIN — ERYTHROMYCIN 250 MG: 250 TABLET, FILM COATED ORAL at 17:50

## 2021-03-26 RX ADMIN — TOPIRAMATE 200 MG: 100 TABLET, FILM COATED ORAL at 17:50

## 2021-03-26 RX ADMIN — GABAPENTIN 100 MG: 100 CAPSULE ORAL at 08:38

## 2021-03-26 RX ADMIN — GABAPENTIN 100 MG: 100 CAPSULE ORAL at 17:50

## 2021-03-26 RX ADMIN — INSULIN LISPRO 1 UNITS: 100 INJECTION, SOLUTION INTRAVENOUS; SUBCUTANEOUS at 17:51

## 2021-03-26 NOTE — ASSESSMENT & PLAN NOTE
· Presented with severe depression, not taking medications  · Denies any SI/HI or plan, does not need one-to-one observation at this point  · Has not seen by psychiatrist/PCP for a while due to recent COVID situation  · Recently seen by Dr Evelyne Johnson in [facility] during recent hospitalization - recently increased Prozac to 60 mg  · Psychiatric consult, recommending continuation of Prozac 60 mg OD, Ativan 0 5 mg b i d   · Recommend to follow-up with her regular psychiatrist at [place] time of discharge  · She reports her mood is improved

## 2021-03-26 NOTE — PLAN OF CARE
Problem: PHYSICAL THERAPY ADULT  Goal: Performs mobility at highest level of function for planned discharge setting  See evaluation for individualized goals  Description: Treatment/Interventions: Functional transfer training, LE strengthening/ROM, Therapeutic exercise, Endurance training, Patient/family training, Equipment eval/education, Bed mobility, Gait training, Spoke to nursing, Spoke to case management, OT  Equipment Recommended: Renny Perez       See flowsheet documentation for full assessment, interventions and recommendations  Outcome: Progressing  Note: Prognosis: Good  Problem List: Decreased strength, Decreased endurance, Decreased mobility  Assessment: Pt able to safely demonstrated using then BR and tending to all needs with mod I and no cues- issued I sign for same - pt aware to call for A in then night time hours and anytime she needs A   Pt does fatigue in LEs with prolonged standing - but has no buckling or LOB  Pt is doing better using step stool to get into bed  Barriers to Discharge: Decreased caregiver support     PT Discharge Recommendation: Return to previous environment with social support, Home with skilled therapy          See flowsheet documentation for full assessment

## 2021-03-26 NOTE — OCCUPATIONAL THERAPY NOTE
Occupational Therapy Note     Pt is progressing towards meeting projected OT goals, however would benefit greatly from extended LOS for 1 more week to further refine activity tolerance, strength, and increase independence w/ ADLs/IADLs to maximize safety and reduce risk of falls/injury/readdmision  Recommend skilled OT until 4/2/2021 to meet projected OT goals  Plan and progress to date has been communicated to MARTEL for continuity of care delivery  Recommend continued frequency of 5-7x/week for remainder of rehab stay         Goals STG achieved within 1  Week  Performance at Initial Evaluation 3/19/2021  Current Performance (last date completed)   Grooming while standing at sink Ron/I  3/23, 3/24 Supervision  3/24 MI handwashing  3/22 no tolerance to standing sinkside for activity   3/20 easily fatigued CGA    ADL transfers  Ron/I  3/23, 3/24 Supervision  3/24 MI STS and SPT  3/22 STS MI, SPT Supervision + RW   3/20 STS min assist increased to supervision- SPT supervision + RW   Bathroom mobility with appropriate AD Ron/I  3/24 Supervision  3/24 MI + RW   3/23 Supervision + RW 2* fatigue and legs "feeling like jello"   UB ADL  Ron/I  3/20, 3/22, 3/23 Min A   3/23 MI    LB ADL, AE PRN Ron/I Mod A  3/23 bathing only supervision front but reported unable to complete backside- assist provided    3/22 Supervision, increased time/effot + fatigue    Toileting/clothing management and hygiene Ron/I Min A  3/23 gavino supervision, backside max (refused ability and trial)  3/22 Supervision however easily fatigued, limits tolerance/safety    3/20 min assist overall    Dynamic standing balance for increased safety when completing purposeful tasks F+  3/24 F- 3/24 Fair+   3/23 Fair/Fair+   Increase standing tolerance for inc'd safety with standing purposeful tasks 5-8 min  3/24 2 min  3/24 5 minutes  3/23 3-4 min min challenge   3/22 1 5-2 min MAX (feels knees weak + SOB)   3/20 easily fatigued needing multiple trial to complete tasks 1-2 min max    Participate in therex 1-3x/week for inc'd overall stamina/activity tolerance for purposeful tasks To be completed  3/22, 3/23, 3/24, 3/25   3/25 increased to 3# hand weight for all planes with good tolerance    3/23 2# weighted ball in all planes 2x10, good tolerance   Tub/shower with tub bench  Ron/I  3/24   3/24 MI simulated post leg  ed    Item retrieval for inc'd independence and safety negotiating home environment Ron/I  3/24   3/24 MI   3/23 Supervision (minimally challenged)    Med mgmt to ensure safe d/c  Ron/I   3/25 See assessment however     MI open containers, MI F/T questions, MI demo of ability to obtain set amount of pills requested       Participate in further cognitive testing to assist with safe d/c planning To be completed   3/25    3/25 22/30 MOCA   Bed mobility with [facility] flat (elevated surface height)  Ron/I  3/24 Supervision  3/24 MI   3/22 [facility], MS, OTR/L

## 2021-03-26 NOTE — CASE MANAGEMENT
SW contacted [place] home aide agency to help with home assistance  SW left voice message for Jayleen at the [place]  Jayleen returned SW call and SW reviewed with Jayleen the level of assistance the patient would need while at home  After conversation, Cameron Winters feels that patient will only qualify for 11 hours a week at home  Jayleen states that since [facility] agencies have been slow in responding to hours requested  Per Jayleen, placing services in the home, could take up to two weeks  SW will seek assistance also from [facility]

## 2021-03-26 NOTE — OCCUPATIONAL THERAPY NOTE
Occupational Therapy Treatment Note     Name:  Karin Stern   ENC:   [id number]  Age:     65 y o         Patient Active Problem List   Diagnosis    Seizures ([facility] [address] )    Restless leg syndrome    Hypertension    Hyperlipidemia    Diabetes mellitus ([facility] )    Chest pain    GERD (gastroesophageal reflux disease)    Epigastric abdominal pain    Encounter for examination and observation for other specified reasons    Severe episode of recurrent major depressive disorder, without psychotic features ([facility] )    Gambling disorder, episodic, moderate    Suspected COVID-19 virus infection    Major depressive disorder    Acquired hypothyroidism    Asymptomatic bacteriuria    Chronic fatigue and malaise    Calf tenderness    Morbid obesity due to excess calories (HCC)    Onychomycosis of toenail    MDD (major depressive disorder), recurrent episode (HCC)    Anxiety    RLS (restless legs syndrome)      Major depressive disorder [F32 9]     Subjective/Goals: "I waited and didn't take a nap yet but I am tired"    Vitals: stable      Pain: none reported      Treatment Time:  (0814-3485) 60 minutes     Cognition: WFL     Precautions: fall risk + anxiety     EVALUATION information:  · OT Goal expiration date: 3/26/2021  · Treatment day: 7  · Discharge recommendation: home with skilled therapy   · HOME SET UP:  ? Apartment 4th floor + elevator and laundry in basement using cart to transport   ? Transfer bench but sponge bathes due to reports of not being able to get into the tub  ? DME: rollator  ? Lives Alone  ?  Bilateral RTC injury     Patient education: DEEP BREATHING TECHNIQUES T/O ACTIVITY, SLOWING OF PACE, ENERGY CONSERVATION TECHNIQUES FOR CARRY OVER UPON D/C, INCREASED FAMILY SUPPORT, CONTINUE PARTICIPATION IN SELF-CARE/MOBILITY WITH STAFF WHILE IN THE HOSPITAL , OVERALL SAFETY AWARENESS, FALL PREVENTION, ENERGY CONSERVATION  and PAIN MANAGEMENT WITH FUNCTIONAL ACTIVITIES     Additional comments: Pt is W 16 y o  female seen for OT evaluation s/p admit to [address] 3/18/2021 w/ generalized weakness secondary to major depressive disorder     Assessment/Additional session details:  Patient seen this date for OT with focus on goals as set by OTR  Patient agreeable to skilled OT session with focus on ADL's (bathing, dressing, toileting), Transfers (STS, SPT), Standing tolerance/balance, Strength/ROM/HEP, Tub/shower transfers, Cognition, Activity tolerance, Education on safety, fall prevention and energy conservation techniques, Item retrieval/safe transport with DME ed and Bathroom/kitchen/home mobility  Barriers to treatment include fatigue, pain, volition, social/family support, decreased strength/coordination, balance , activity tolerance and standing tolerance  Patient educated on safe functional transfers techniques, the appropriate use of AE/DME to improve functional performance, and activity modification techniques for energy conservation  Plans for d/c are home with home OT and family support  Patient is making gains toward OT goals with continued OT recommended at this time to max tolerance, safety and function for appropriate d/c planning  At end of session patient remains in room seated at recliner with all needs within reach  Medication:  Patient able to read labels provided with increased time and answered questions appropriately when given directions on where she would put pills to take her medications  She states morning for her is 10am, lunch is 12/1, dinner is 5/6 and nicolasa is 9/10        Independent sign given for room where she was encouraged to take small walks throughout her room even is not needing to toilet-- agreeable          Goals STG achieved within 1  Week  Performance at Initial Evaluation 3/19/2021  Current Performance (last date completed)   Grooming while standing at sink Ron/I  3/23, 3/24, 3/26 Supervision  3/26 MI handwashing  3/22 no tolerance to standing sinkside for activity   3/20 easily fatigued CGA    ADL transfers  Ron/I  3/23, 3/24, 3/26 Supervision  3/26  MI STS and SPT  3/22 STS MI, SPT Supervision + RW   3/20 STS min assist increased to supervision- SPT supervision + RW   Bathroom mobility with appropriate AD Ron/I  3/24, 3/26 Supervision  3/26 MI + RW   3/23 Supervision + RW 2* fatigue and legs "feeling like jello"   UB ADL  Ron/I  3/20, 3/22, 3/23 Min A   3/23 MI    LB ADL, AE PRN Ron/I Mod A  3/23 bathing only supervision front but reported unable to complete backside- assist provided    3/22 Supervision, increased time/effot + fatigue    Toileting/clothing management and hygiene Ron/I  3/26 Min A  326 MI  3/23 gavino supervision, backside max (refused ability and trial)  3/22 Supervision however easily fatigued, limits tolerance/safety    3/20 min assist overall    Dynamic standing balance for increased safety when completing purposeful tasks F+  3/24, 3/26 F- 3/26 Fair+   3/23 Fair/Fair+   Increase standing tolerance for inc'd safety with standing purposeful tasks 5-8 min  3/24 2 min  3/26 3 min average  3/24 5 minutes  3/23 3-4 min min challenge   3/22 1 5-2 min MAX (feels knees weak + SOB)   3/20 easily fatigued needing multiple trial to complete tasks 1-2 min max    Participate in therex 1-3x/week for inc'd overall stamina/activity tolerance for purposeful tasks To be completed  3/22, 3/23, 3/24, 3/25, 3/26   3/26 increased to 3# hand weight for all planes with good tolerance    3/23 2# weighted ball in all planes 2x10, good tolerance   Tub/shower with tub bench  Ron/I  3/24, 3/26   3/26 MI simulated post leg  ed    Item retrieval for inc'd independence and safety negotiating home environment Ron/I  3/24, 3/26   3/26 MI (short distance, min challenge)  3/23 Supervision (minimally challenged)    Med mgmt to ensure safe d/c  Ron/I   3/26 appears appropriate but would benefit from checks to ensure carryover of recommendations- able to recall from previous session 3/25 See assessment however     MI open containers, MI F/T questions, MI demo of ability to obtain set amount of pills requested       Participate in further cognitive testing to assist with safe d/c planning To be completed   3/25    3/25 22/30 MOCA   Bed mobility with HOB flat (elevated surface height)  Ron/I  3/24, 3/26 Supervision  3/26 MI   3/22 Supervision       Daniel Rivera  3/26/2021

## 2021-03-26 NOTE — PHYSICAL THERAPY NOTE
54' session     03/26/21 0939   PT Last Visit   PT Visit Date 03/26/21   Note Type   Note Type Treatment   Pain Assessment   Pain Assessment Tool Pain Assessment not indicated - pt denies pain   General   Chart Reviewed Yes   Family/Caregiver Present No   Cognition   Overall Cognitive Status WFL   Arousal/Participation Cooperative; Alert   Attention Within functional limits   Following Commands Follows all commands and directions without difficulty   Subjective   Subjective reports fatigueadn occ nausea today- legs weakness after prolonged standing   Bed Mobility   Rolling R 6  Modified independent  (half way 2* hip discomfort from old surgery)   Additional items   (reports she does not lay on her R side)   Rolling L 6  Modified independent   Supine to Sit 6  Modified independent   Sit to Supine 6  Modified independent   Additional items Increased time required   Additional Comments backs up onto 6" step stool to A getting intyo bed ( use of RW   Transfers   Sit to Stand 6  Modified independent   Stand to Sit 6  Modified independent   Stand pivot 6  Modified independent   Toilet transfer 6  Modified independent   Additional items   (managed pa, hygiene and hand washing)   Ambulation/Elevation   Gait pattern Decreased foot clearance  (slow pace)   Gait Assistance 6  Modified independent   Assistive Device Bariatric Rolling walker   Distance 80',20' x 2   Balance   Static Standing Fair   Dynamic Standing Fair   Ambulatory Fair   Endurance Deficit   Endurance Deficit Yes   Endurance Deficit Description reports leg fatigue after prolonged standing   Activity Tolerance   Activity Tolerance Patient tolerated treatment well   Exercises   Hip Flexion   (2# x 20)   Knee AROM Long Arc Quad   (2# x 20)   Balance training  repeated STS x 10 wiht no UE support upon standing- and S - pt able to  object from floor wiht one hand support on RW and close S / CG - rec to pt to use her reacher at home for increase safety Assessment   Prognosis Good   Problem List Decreased strength;Decreased endurance;Decreased mobility   Assessment Pt able to safely demonstrated using then BR and tending to all needs with mod I and no cues- issued I sign for same - pt aware to call for A in then night time hours and anytime she needs A   Pt does fatigue in LEs with prolonged standing - but has no buckling or LOB   Pt is doing better using step stool to get into bed   Barriers to Discharge Decreased caregiver support   Goals   Patient Goals get better so I can walk on my own   STG Expiration Date 03/26/21   PT Treatment Day 6   Plan   Treatment/Interventions   (cont as per POC)   Progress Progressing toward goals   PT Frequency   (5-7x/wk)     Goals STG achieved within 1 week Performance at Initial Evaluation (3/19/2021) Last date completed      Bed mobility skills including rolling and repositioning to prevent skin breakdown and decrease caregiver burden            modified independent assistance       supervision assistance x1       3/26         Supine to sit transitions to increase ease of transfer, allow pt to get out of bed, and decrease caregiver burden          modified independent assistance       supervision assistance x1       3/26         Sit to supine transitions to increase ease of transfer, allow pt to get back into bed, and decrease caregiver burden  Pt steps up onto 6" stool (backwards) to get into bed   bed at 28" high       modified independent assistance       supervision assistance x1       3/26         Functional transfers to facilitate safe return to previous living environment        modified independent assistance    supervision assistance x1    3/26         Ambulation with least restrictive AD; including at least 2 turns for safe household distance ambulation          modified independent assistance       supervision assistance x1       3/26         Improve standing functional balance to allow for pt to  object from floor            modified independent assistance               3/26

## 2021-03-26 NOTE — PLAN OF CARE
Problem: Prexisting or High Potential for Compromised Skin Integrity  Goal: Skin integrity is maintained or improved  Description: INTERVENTIONS:  - Identify patients at risk for skin breakdown  - Assess and monitor skin integrity  - Assess and monitor nutrition and hydration status  - Monitor labs   - Assess for incontinence   - Turn and reposition patient  - Assist with mobility/ambulation  - Relieve pressure over bony prominences  - Avoid friction and shearing  - Provide appropriate hygiene as needed including keeping skin clean and dry  - Evaluate need for skin moisturizer/barrier cream  - Collaborate with interdisciplinary team   - Patient/family teaching  - Consider wound care consult   Outcome: Progressing     Problem: Potential for Falls  Goal: Patient will remain free of falls  Description: INTERVENTIONS:  - Assess patient frequently for physical needs  -  Identify cognitive and physical deficits and behaviors that affect risk of falls  -  [place] fall precautions as indicated by assessment   - Educate patient/family on patient safety including physical limitations  - Instruct patient to call for assistance with activity based on assessment  - Modify environment to reduce risk of injury  - Consider OT/PT consult to assist with strengthening/mobility  Outcome: Progressing     Problem: Nutrition/Hydration-ADULT  Goal: Nutrient/Hydration intake appropriate for improving, restoring or maintaining nutritional needs  Description: Monitor and assess patient's nutrition/hydration status for malnutrition  Collaborate with interdisciplinary team and initiate plan and interventions as ordered  Monitor patient's weight and dietary intake as ordered or per policy  Utilize nutrition screening tool and intervene as necessary  Determine patient's food preferences and provide high-protein, high-caloric foods as appropriate       INTERVENTIONS:  - Monitor oral intake, urinary output, labs, and treatment plans  - Assess nutrition and hydration status and recommend course of action  - Evaluate amount of meals eaten  - Assist patient with eating if necessary   - Allow adequate time for meals  - Recommend/ encourage appropriate diets, oral nutritional supplements, and vitamin/mineral supplements  - Order, calculate, and assess calorie counts as needed  - Recommend, monitor, and adjust tube feedings and TPN/PPN based on assessed needs  - Assess need for intravenous fluids  - Provide specific nutrition/hydration education as appropriate  - Include patient/family/caregiver in decisions related to nutrition  Outcome: Progressing     Problem: NEUROSENSORY - ADULT  Goal: Remains free of injury related to seizures activity  Description: INTERVENTIONS  - Maintain airway, patient safety  and administer oxygen as ordered  - Monitor patient for seizure activity, document and report duration and description of seizure to physician/advanced practitioner  - If seizure occurs,  ensure patient safety during seizure  - Reorient patient post seizure  - Seizure pads on all 4 side rails  - Instruct patient/family to notify RN of any seizure activity including if an aura is experienced  - Instruct patient/family to call for assistance with activity based on nursing assessment  - Administer anti-seizure medications if ordered    Outcome: Progressing  Goal: Achieves maximal functionality and self care  Description: INTERVENTIONS  - Monitor swallowing and airway patency with patient fatigue and changes in neurological status  - Encourage and assist patient to increase activity and self care     - Encourage visually impaired, hearing impaired and aphasic patients to use assistive/communication devices  Outcome: Progressing     Problem: METABOLIC, FLUID AND ELECTROLYTES - ADULT  Goal: Electrolytes maintained within normal limits  Description: INTERVENTIONS:  - Monitor labs and assess patient for signs and symptoms of electrolyte imbalances  - Administer electrolyte replacement as ordered  - Monitor response to electrolyte replacements, including repeat lab results as appropriate  - Instruct patient on fluid and nutrition as appropriate  Outcome: Progressing  Goal: Fluid balance maintained  Description: INTERVENTIONS:  - Monitor labs   - Monitor I/O and WT  - Instruct patient on fluid and nutrition as appropriate  - Assess for signs & symptoms of volume excess or deficit  Outcome: Progressing  Goal: Glucose maintained within target range  Description: INTERVENTIONS:  - Monitor Blood Glucose as ordered  - Assess for signs and symptoms of hyperglycemia and hypoglycemia  - Administer ordered medications to maintain glucose within target range  - Assess nutritional intake and initiate nutrition service referral as needed  Outcome: Progressing     Problem: SKIN/TISSUE INTEGRITY - ADULT  Goal: Skin integrity remains intact  Description: INTERVENTIONS  - Identify patients at risk for skin breakdown  - Assess and monitor skin integrity  - Assess and monitor nutrition and hydration status  - Monitor labs (i e  albumin)  - Assess for incontinence   - Turn and reposition patient  - Assist with mobility/ambulation  - Relieve pressure over bony prominences  - Avoid friction and shearing  - Provide appropriate hygiene as needed including keeping skin clean and dry  - Evaluate need for skin moisturizer/barrier cream  - Collaborate with interdisciplinary team (i e  Nutrition, Rehabilitation, etc )   - Patient/family teaching  Outcome: Progressing

## 2021-03-26 NOTE — PROGRESS NOTES
[facility]      Progress Note - Unruly Licona 1955, 76 y o  female MRN: [id number]  Unit/Bed#: -01 Encounter: [id number]  Primary Care Provider: Minna Leyden, MD   Date and time admitted to hospital: 3/18/2021  7:31 PM      Acquired hypothyroidism  Assessment & Plan  · Continue home Synthroid    Major depressive disorder  Assessment & Plan  Prozac was increased to 60 milligrams by Psychiatry in previous hospitalization  Continue Ativan 0 5 milligram p o  B i d  Severe episode of recurrent major depressive disorder, without psychotic features ([facility] )  Assessment & Plan  · Presented with severe depression, not taking medications  · Denies any SI/HI or plan, does not need one-to-one observation at this point  · Has not seen by psychiatrist/PCP for a while due to recent COVID situation  · Recently seen by Dr Darian Leung in [facility] during recent hospitalization - recently increased Prozac to 60 mg  · Psychiatric consult, recommending continuation of Prozac 60 mg OD, Ativan 0 5 mg b i d   · Recommend to follow-up with her regular psychiatrist at [place] time of discharge  · She reports her mood is improved  Di[facility] ([facility]  Assessment & Plan    Lab Results   Component Value Date    HGBA1C 11 5 (H) 03/03/2021       · Uncontrolled, patient is noncompliant  · Patient is supposed to be on insulin, empagliflozin, alogliptin  · Continue Lantus 20 units q h s  + sliding scale insulin  · Currently blood glucose is at goals     · Resume home regimen upon discharge    Restless leg syndrome  Assessment & Plan  · Continue home dose of gabapentin    Seizures ([facility] )  Assessment & Plan  · Stable, continue home Topamax    * Hypertension  Assessment & Plan  Patient does not take any medications at home   Blood pressures acceptable      VTE Pharmacologic Prophylaxis:   Pharmacologic: Enoxaparin (Lovenox)  Mechanical VTE Prophylaxis in Place: Yes    Patient Centered Rounds: I have performed bedside rounds with nursing staff today  Discussions with Specialists or Other Care Team Provider: CM    Education and Discussions with Family / Patient: Patient     Time Spent for Care: 20 minutes  More than 50% of total time spent on counseling and coordination of care as described above  Current Length of Stay: 8 day(s)    Current Patient Status: SNF Short Term Inpatient     Code Status: Level 1 - Full Code      Subjective:   Patient seen and examined, no acute distress  Objective:     Vitals:   Temp (24hrs), Av 6 °F (36 4 °C), Min:96 6 °F (35 9 °C), Max:98 1 °F (36 7 °C)    Temp:  [96 6 °F (35 9 °C)-98 1 °F (36 7 °C)] 96 6 °F (35 9 °C)  HR:  [60-64] 60  Resp:  [20-21] 21  BP: (113-146)/(62-72) 113/62  SpO2:  [95 %-98 %] 95 %  Body mass index is 41 77 kg/m²  Input and Output Summary (last 24 hours):     No intake or output data in the 24 hours ending 21 1055    Physical Exam:     Physical Exam  Constitutional:       General: She is not in acute distress  Appearance: Normal appearance  She is obese  HENT:      Head: Normocephalic and atraumatic  Eyes:      General:         Right eye: No discharge  Left eye: No discharge  Cardiovascular:      Rate and Rhythm: Normal rate and regular rhythm  Pulses: Normal pulses  Heart sounds: No murmur  Pulmonary:      Effort: Pulmonary effort is normal  No respiratory distress  Breath sounds: Normal breath sounds  No wheezing  Musculoskeletal:      Right lower leg: No edema  Left lower leg: No edema  Skin:     General: Skin is warm and dry  Neurological:      General: No focal deficit present  Mental Status: She is alert and oriented to person, place, and time  Mental status is at baseline     Psychiatric:         Mood and Affect: Mood normal          Additional Data:     Labs:                Results from last 7 days   Lab Units 21  0605 21  2041 21  1613 21  1140 21  0604 03/24/21  2035 03/24/21  1627 03/24/21  1152 03/24/21  0625 03/23/21  2122 03/23/21  1640 03/23/21  1143   POC GLUCOSE mg/dl 98 142* 181* 143* 115 132 144* 183* 133 239* 177* 174*                   * I Have Reviewed All Lab Data Listed Above  * Additional Pertinent Lab Tests Reviewed: All Labs Within Last 24 Hours Reviewed    Imaging:    Imaging Reports Reviewed Today Include: none    Recent Cultures (last 7 days):           Last 24 Hours Medication List:   Current Facility-Administered Medications   Medication Dose Route Frequency Provider Last Rate    acetaminophen  650 mg Oral Q6H PRN Chuy Michaud MD      aluminum-magnesium hydroxide-simethicone  30 mL Oral Q6H PRN Chuy Michaud MD      aspirin  81 mg Oral Daily Chuy Michaud MD      enoxaparin  60 mg Subcutaneous Q12H [facility] Chuy Michaud MD      erythromycin base  250 mg Oral TID AC Marina Young MD      famotidine  20 mg Oral BID Chuy Michaud MD      FLUoxetine  60 mg Oral Daily Chuy Michaud MD      gabapentin  100 mg Oral TID Selvin Kramer PA-C      gabapentin  300 mg Oral HS Selvin Kramer PA-C      insulin glargine  20 Units Subcutaneous HS Chuy Michaud MD      insulin lispro  1-6 Units Subcutaneous TID [facility] Chuy Michaud MD      levothyroxine  50 mcg Oral Daily Chuy Michaud MD      loperamide  2 mg Oral TID PRN Chuy Michaud MD      LORazepam  0 5 mg Oral BID Chuy Michaud MD      melatonin  6 mg Oral HS Chuy Michaud MD      metoclopramide  10 mg Oral Q6H PRN Marina Young MD      polyethylene glycol  17 g Oral Daily PRN Chuy Michaud MD      saccharomyces boulardii  250 mg Oral BID Chuy Michaud MD      topiramate  200 mg Oral BID Chuy Michaud MD          Today, Patient Was Seen By: Chuy Michaud MD    ** Please Note: Dictation voice to text software may have been used in the creation of this document   **

## 2021-03-27 LAB
GLUCOSE SERPL-MCNC: 101 MG/DL (ref 65–140)
GLUCOSE SERPL-MCNC: 125 MG/DL (ref 65–140)
GLUCOSE SERPL-MCNC: 129 MG/DL (ref 65–140)
GLUCOSE SERPL-MCNC: 173 MG/DL (ref 65–140)

## 2021-03-27 PROCEDURE — 97110 THERAPEUTIC EXERCISES: CPT

## 2021-03-27 PROCEDURE — 99308 SBSQ NF CARE LOW MDM 20: CPT | Performed by: INTERNAL MEDICINE

## 2021-03-27 PROCEDURE — 97530 THERAPEUTIC ACTIVITIES: CPT

## 2021-03-27 PROCEDURE — 97535 SELF CARE MNGMENT TRAINING: CPT

## 2021-03-27 PROCEDURE — 82948 REAGENT STRIP/BLOOD GLUCOSE: CPT

## 2021-03-27 RX ADMIN — LORAZEPAM 0.5 MG: 0.5 TABLET ORAL at 08:21

## 2021-03-27 RX ADMIN — INSULIN GLARGINE 20 UNITS: 100 INJECTION, SOLUTION SUBCUTANEOUS at 21:38

## 2021-03-27 RX ADMIN — FAMOTIDINE 20 MG: 20 TABLET ORAL at 08:21

## 2021-03-27 RX ADMIN — GABAPENTIN 300 MG: 300 CAPSULE ORAL at 21:39

## 2021-03-27 RX ADMIN — ENOXAPARIN SODIUM 60 MG: 60 INJECTION SUBCUTANEOUS at 21:39

## 2021-03-27 RX ADMIN — GABAPENTIN 100 MG: 100 CAPSULE ORAL at 17:30

## 2021-03-27 RX ADMIN — TOPIRAMATE 200 MG: 100 TABLET, FILM COATED ORAL at 08:21

## 2021-03-27 RX ADMIN — GABAPENTIN 100 MG: 100 CAPSULE ORAL at 14:35

## 2021-03-27 RX ADMIN — LEVOTHYROXINE SODIUM 50 MCG: 50 TABLET ORAL at 05:30

## 2021-03-27 RX ADMIN — Medication 250 MG: at 17:30

## 2021-03-27 RX ADMIN — Medication 250 MG: at 08:21

## 2021-03-27 RX ADMIN — ERYTHROMYCIN 250 MG: 250 TABLET, FILM COATED ORAL at 16:45

## 2021-03-27 RX ADMIN — ENOXAPARIN SODIUM 60 MG: 60 INJECTION SUBCUTANEOUS at 08:21

## 2021-03-27 RX ADMIN — GABAPENTIN 100 MG: 100 CAPSULE ORAL at 08:21

## 2021-03-27 RX ADMIN — ERYTHROMYCIN 250 MG: 250 TABLET, FILM COATED ORAL at 12:42

## 2021-03-27 RX ADMIN — ASPIRIN 81 MG: 81 TABLET, COATED ORAL at 08:21

## 2021-03-27 RX ADMIN — FLUOXETINE 60 MG: 20 CAPSULE ORAL at 08:21

## 2021-03-27 RX ADMIN — TOPIRAMATE 200 MG: 100 TABLET, FILM COATED ORAL at 17:30

## 2021-03-27 RX ADMIN — ERYTHROMYCIN 250 MG: 250 TABLET, FILM COATED ORAL at 06:08

## 2021-03-27 RX ADMIN — MELATONIN TAB 3 MG 6 MG: 3 TAB at 21:39

## 2021-03-27 RX ADMIN — FAMOTIDINE 20 MG: 20 TABLET ORAL at 17:30

## 2021-03-27 RX ADMIN — LORAZEPAM 0.5 MG: 0.5 TABLET ORAL at 17:30

## 2021-03-27 NOTE — CASE MANAGEMENT
[address] noted for last covered day of 3/30/21  The [address] was reviewed with the patient and all questions answered  Aware of her right to appeal   "I feel I'm ready to go home "  Copy of [address] given to the patient  SW will be informed

## 2021-03-27 NOTE — OCCUPATIONAL THERAPY NOTE
Occupational Therapy Treatment Note     Name:  Karin Stern   YPZ:   [id number]  Age:     65 y o         Patient Active Problem List   Diagnosis    Seizures ([facility] )    Restless leg syndrome    Hypertension    Hyperlipidemia    Diabetes mellitus ([facility] )    Chest pain    GERD (gastroesophageal reflux disease)    Epigastric abdominal pain    Encounter for examination and observation for other specified reasons    Severe episode of recurrent major depressive disorder, without psychotic features [facility]    Gambling disorder, episodic, moderate    Suspected COVID-19 virus infection    Major depressive disorder    Acquired hypothyroidism    Asymptomatic bacteriuria    Chronic fatigue and malaise    Calf tenderness    Morbid obesity due to excess calories (HCC)    Onychomycosis of toenail    MDD (major depressive disorder), recurrent episode (HCC)    Anxiety    RLS (restless legs syndrome)      Major depressive disorder [F32 9]     Subjective/Goals: "I thought I had today off"     Vitals: stable      Pain: none reported      Treatment Time:  3139-2678 (40 min)     Cognition: WFL     Precautions: fall risk + anxiety     EVALUATION information:  · OT Goal expiration date: 3/26/2021  · Treatment day: 8  · Discharge recommendation: home with skilled therapy   · HOME SET UP:  ? Apartment 4th floor + elevator and laundry in basement using cart to transport   ? Transfer bench but sponge bathes due to reports of not being able to get into the tub  ? DME: rollator  ? Lives Alone  ?  Bilateral RTC injury     Patient education: DEEP BREATHING TECHNIQUES T/O ACTIVITY, SLOWING OF PACE, ENERGY CONSERVATION TECHNIQUES FOR CARRY OVER UPON D/C, INCREASED FAMILY SUPPORT, CONTINUE PARTICIPATION IN SELF-CARE/MOBILITY WITH STAFF WHILE IN THE HOSPITAL , OVERALL SAFETY AWARENESS, FALL PREVENTION, ENERGY CONSERVATION  and PAIN MANAGEMENT WITH FUNCTIONAL ACTIVITIES     Additional comments: Pt is J 44 y o  female seen for OT evaluation s/p admit to [address] 3/18/2021 w/ generalized weakness secondary to major depressive disorder     Assessment/Additional session details: Pt seen for OT txt  Pt pleasant and agreeable for therapy  Pt demonstrating good safety with use of independent sign for bathroom use  Pt completed seated BUE thera-ex, all planes of motion below ~ 80 degrees of shoulder flexion due to bilateral rotator cuff tears  Pt demonstrated good form and tolerance  Pt completed bed mobility Ron, increased time to manage BLEs    Pt continues to progress with OT POC, will benefit from continued services to further address deficits with functional strength, activity tolerance, and standing balance and address below goals            Goals STG achieved within 1  Week  Performance at Initial Evaluation 3/19/2021  Current Performance (last date completed)   Grooming while standing at sink Ron/I  3/23, 3/24, 3/26, 3/27 Supervision  3/27 MI handwashing  3/22 no tolerance to standing sinkside for activity   3/20 easily fatigued CGA    ADL transfers  Ron/I  3/23, 3/24, 3/26, 3/27 Supervision  3/27  MI STS and SPT  3/22 STS MI, SPT Supervision + RW   3/20 STS min assist increased to supervision- SPT supervision + RW   Bathroom mobility with appropriate AD Ron/I  3/24, 3/26, 3/27 Supervision  3/27 MI + RW   3/23 Supervision + RW 2* fatigue and legs "feeling like jello"   UB ADL  Ron/I  3/20, 3/22, 3/23 Min A   3/23 MI    LB ADL, AE PRN Ron/I Mod A  3/23 bathing only supervision front but reported unable to complete backside- assist provided    3/22 Supervision, increased time/effot + fatigue    Toileting/clothing management and hygiene Ron/I  3/26 Min A  326 MI  3/23 gavino supervision, backside max (refused ability and trial)  3/22 Supervision however easily fatigued, limits tolerance/safety    3/20 min assist overall    Dynamic standing balance for increased safety when completing purposeful tasks F+  3/24, 3/26 F- 3/26 Fair+   3/23 Fair/Fair+   Increase standing tolerance for inc'd safety with standing purposeful tasks 5-8 min  3/24 2 min  3/26 3 min average  3/24 5 minutes  3/23 3-4 min min challenge   3/22 1 5-2 min MAX (feels knees weak + SOB)   3/20 easily fatigued needing multiple trial to complete tasks 1-2 min max    Participate in therex 1-3x/week for inc'd overall stamina/activity tolerance for purposeful tasks To be completed  3/22, 3/23, 3/24, 3/25, 3/26   3/27 4# dowel, all planes of motion below ~ 80 degrees of shoulder flexion  3/26 increased to 3# hand weight for all planes with good tolerance    3/23 2# weighted ball in all planes 2x10, good tolerance   Tub/shower with tub bench  Ron/I  3/24, 3/26   3/26 MI simulated post leg  ed    Item retrieval for inc'd independence and safety negotiating home environment Ron/I  3/24, 3/26   3/26 MI (short distance, min challenge)  3/23 Supervision (minimally challenged)    Med mgmt to ensure safe d/c  Ron/I   3/26 appears appropriate but would benefit from checks to ensure carryover of recommendations- able to recall from previous session   3/25 See assessment however     MI open containers, MI F/T questions, MI demo of ability to obtain set amount of pills requested       Participate in further cognitive testing to assist with safe d/c planning To be completed   3/25    3/25 22/30 MOCA   Bed mobility with HOB flat (elevated surface height)  Ron/I  3/24, 3/26 Supervision  3/27 MI   3/22 Supervision

## 2021-03-27 NOTE — PROGRESS NOTES
[facility]  Progress Note - Angie Mack 1955, 76 y o  female MRN: [id number]  Unit/Bed#: -01 Encounter: [id number]  Primary Care Provider: Susanne Kenyon MD   Date and time admitted to hospital: 3/18/2021  7:31 PM    * Severe episode of recurrent major depressive disorder, without psychotic features ([facility] )  Assessment & Plan  · Presented with severe depression, not taking medications  · Denies any SI/HI or plan, does not need one-to-one observation at this point  · Has not seen by psychiatrist/PCP for a while due to recent COVID situation  · Recently seen by Dr Britton Pederson in [facility] during recent hospitalization - recently increased Prozac to 60 mg  · Psychiatric consult, recommending continuation of Prozac 60 mg OD, Ativan 0 5 mg b i d   · Recommend to follow-up with her regular psychiatrist at [place] time of discharge  · Progressing with physical therapy and occupational therapy continue    Acquired hypothyroidism  Assessment & Plan  · Continue home Synthroid    Major depressive disorder  Assessment & Plan  Prozac was increased to 60 milligrams by Psychiatry in previous hospitalization  Continue Ativan 0 5 milligram p o  B i d     Diabetes mellitus ([facility] )  Assessment & Plan    Lab Results   Component Value Date    HGBA1C 11 5 (H) 03/03/2021       · Uncontrolled, patient is noncompliant  · Patient is supposed to be on insulin, empagliflozin, alogliptin  · Continue Lantus 20 units q h s  + sliding scale insulin  · Symptoms of gastroparesis improved with erythromycin for 5 days  · Currently blood glucose is at goals     · Resume home regimen upon discharge    Hypertension  Assessment & Plan  Patient does not take any medications at home   Blood pressures acceptable    Restless leg syndrome  Assessment & Plan  · Continue home dose of gabapentin    Seizures (HCC)  Assessment & Plan  · Stable, continue home Topamax        VTE Pharmacologic Prophylaxis:   Pharmacologic: Enoxaparin (Lovenox)  Mechanical VTE Prophylaxis in Place: No    Patient Centered Rounds: Discussed with nursing staff    Discussions with Specialists or Other Care Team Provider: KLEBER     Education and Discussions with Family / Patient:  Discussed with patient at bedside  Time Spent for Care: 15 minutes  More than 50% of total time spent on counseling and coordination of care as described above  Current Length of Stay: 9 day(s)    Current Patient Status: SNF Short Term Inpatient   Certification Statement: The patient will continue to require additional inpatient hospital stay due to Rehabilitation    Discharge Plan:  Anticipate discharge in a week  Code Status: Level 1 - Full Code      Subjective:   Patient seen examined at bedside  She looks comfortable has no complaint  Erythromycin improved symptoms of gastroparesis  Objective:     Vitals:   Temp (24hrs), Av °F (36 1 °C), Min:96 7 °F (35 9 °C), Max:97 2 °F (36 2 °C)    Temp:  [96 7 °F (35 9 °C)-97 2 °F (36 2 °C)] 97 2 °F (36 2 °C)  HR:  [75] 75  Resp:  [18-19] 19  BP: (117-135)/(73-89) 135/89  SpO2:  [93 %-95 %] 93 %  Body mass index is 41 77 kg/m²  Input and Output Summary (last 24 hours):     No intake or output data in the 24 hours ending 21 1622    Physical Exam:     Physical Exam  Vitals signs reviewed  Constitutional:       General: She is not in acute distress  Appearance: She is obese  She is not ill-appearing  HENT:      Head: Normocephalic and atraumatic  Nose: No rhinorrhea  Eyes:      General:         Right eye: No discharge  Left eye: No discharge  Cardiovascular:      Rate and Rhythm: Normal rate and regular rhythm  Heart sounds: Normal heart sounds  No murmur  Pulmonary:      Effort: Pulmonary effort is normal  No respiratory distress  Breath sounds: Normal breath sounds  No wheezing or rales  Abdominal:      General: Bowel sounds are normal  There is no distension        Palpations: Abdomen is soft       Tenderness: There is no abdominal tenderness ( obese abdomen)  Musculoskeletal:         General: Deformity (Onychomycosis of toenail) present  Skin:     General: Skin is dry  Neurological:      Mental Status: She is alert and oriented to person, place, and time  Cranial Nerves: No cranial nerve deficit  Psychiatric:         Behavior: Behavior normal       Comments: Quite, Thankful       Additional Data:     Labs:                Results from last 7 days   Lab Units 03/27/21  1136 03/27/21  0612 03/26/21  2049 03/26/21  1638 03/26/21  1145 03/26/21  0605 03/25/21  2041 03/25/21  1613 03/25/21  1140 03/25/21  0604 03/24/21  2035 03/24/21  1627   POC GLUCOSE mg/dl 125 101 113 178* 136 98 142* 181* 143* 115 132 144*                   * I Have Reviewed All Lab Data Listed Above    * Additional Pertinent Lab Tests Reviewed: No New Labs Available For Today    Imaging:    Imaging Reports Reviewed Today Include:  None  Imaging Personally Reviewed by Myself Includes:  None    Recent Cultures (last 7 days):           Last 24 Hours Medication List:   Current Facility-Administered Medications   Medication Dose Route Frequency Provider Last Rate    acetaminophen  650 mg Oral Q6H PRN Sanjiv Vu MD      aluminum-magnesium hydroxide-simethicone  30 mL Oral Q6H PRN Sanjiv Vu MD      aspirin  81 mg Oral Daily Sanjiv Vu MD      enoxaparin  60 mg Subcutaneous Q12H [address] Sanjiv Vu MD      erythromycin base  250 mg Oral TID AC Brielle Carlson MD      famotidine  20 mg Oral BID Sanjiv Vu MD      FLUoxetine  60 mg Oral Daily Sanjiv Vu MD      gabapentin  100 mg Oral TID Ana Billy PA-C      gabapentin  300 mg Oral HS Ana Hand PA-C      insulin glargine  20 Units Subcutaneous HS Sanjiv Vu MD      insulin lispro  1-6 Units Subcutaneous TID [facility] Sanjiv Vu MD      levothyroxine  50 mcg Oral Daily Sanjiv Vu MD  loperamide  2 mg Oral TID PRN Birgit Houston MD      LORazepam  0 5 mg Oral BID Birgit Houston MD      melatonin  6 mg Oral HS Birgit Houston MD      metoclopramide  10 mg Oral Q6H PRN Nieves Wu MD      polyethylene glycol  17 g Oral Daily PRN Birgit Houston MD      saccharomyces boulardii  250 mg Oral BID Birgit Houston MD      topiramate  200 mg Oral BID Birgit Houston MD          Today, Patient Was Seen By: Nieves Wu MD    ** Please Note: Dictation voice to text software may have been used in the creation of this document   **

## 2021-03-27 NOTE — PLAN OF CARE
Problem: PHYSICAL THERAPY ADULT  Goal: Performs mobility at highest level of function for planned discharge setting  See evaluation for individualized goals  Description: Treatment/Interventions: Functional transfer training, LE strengthening/ROM, Therapeutic exercise, Endurance training, Patient/family training, Equipment eval/education, Bed mobility, Gait training, Spoke to nursing, Spoke to case management, OT  Equipment Recommended: Samantha Dalton       See flowsheet documentation for full assessment, interventions and recommendations  Outcome: Progressing  Note: Prognosis: Good  Problem List: Decreased strength, Decreased endurance, Decreased mobility  Assessment: Yosvany Augusta was fatigued today  Seated exercise went well however ambulation was reduced due to report of light headedness and weakness  Pt with dizziness following amb  BP assessed and found to be 135/68mmHg  Pt rested and was able to walk again  Barriers to Discharge: Decreased caregiver support     PT Discharge Recommendation: Return to previous environment with social support          See flowsheet documentation for full assessment

## 2021-03-27 NOTE — ASSESSMENT & PLAN NOTE
· Presented with severe depression, not taking medications  · Denies any SI/HI or plan, does not need one-to-one observation at this point  · Has not seen by psychiatrist/PCP for a while due to recent COVID situation  · Recently seen by Dr Guilherme Rouse in [facility] during recent hospitalization - recently increased Prozac to 60 mg  · Psychiatric consult, recommending continuation of Prozac 60 mg OD, Ativan 0 5 mg b i d   · Recommend to follow-up with her regular psychiatrist at [place] time of discharge    · Progressing with physical therapy and occupational therapy continue

## 2021-03-27 NOTE — ASSESSMENT & PLAN NOTE
Lab Results   Component Value Date    HGBA1C 11 5 (H) 03/03/2021       · Uncontrolled, patient is noncompliant  · Patient is supposed to be on insulin, empagliflozin, alogliptin  · Continue Lantus 20 units q h s  + sliding scale insulin  · Symptoms of gastroparesis improved with erythromycin for 5 days  · Currently blood glucose is at goals     · Resume home regimen upon discharge

## 2021-03-27 NOTE — PHYSICAL THERAPY NOTE
PHYSICAL THERAPY NOTE          Patient Name: Veronika Rubin  YKSNY'[id number] Date: 3/27/2021   Time: 0820 - 0925 65 mins       03/27/21 0820   PT Last Visit   PT Visit Date 03/27/21   Note Type   Note Type Treatment   Pain Assessment   Pain Assessment Tool 0-10   Pain Score No Pain   Medina-Baker FACES Pain Rating 0   Restrictions/Precautions   Weight Bearing Precautions Per Order No   Other Precautions Fall Risk   General   Chart Reviewed Yes   Response to Previous Treatment Patient with no complaints from previous session  Family/Caregiver Present No   Cognition   Overall Cognitive Status WFL   Arousal/Participation Cooperative; Alert   Attention Within functional limits   Orientation Level Oriented X4   Memory Within functional limits   Following Commands Follows all commands and directions without difficulty   Comments pt reports feeling down and depressed, tired and weak  Pt states that her admission was the result of her not caring for herself for 4 days (abstaining from eating, drinking, and medication)   She states she no longer feels this way but she had been suicidal    Subjective   Subjective "I'm just tired and I'm not sure what there is for me anymore"   Bed Mobility   Rolling R 6  Modified independent   Additional items Increased time required   Rolling L 6  Modified independent   Additional items Increased time required   Supine to Sit 6  Modified independent   Additional items Increased time required   Sit to Supine 6  Modified independent   Additional items Increased time required   Additional Comments safely climbs into bed without assistance   Transfers   Sit to Stand 6  Modified independent   Additional items Increased time required   Stand to Sit 6  Modified independent   Additional items Increased time required   Stand pivot 6  Modified independent   Additional Comments Pt participated well in transfers though slow and labored  Pt requested bed elevated slightly to complete sit to/from stand transfers  Ambulation/Elevation   Gait pattern Decreased foot clearance; Foward flexed   Gait Assistance 6  Modified independent   Assistive Device Bariatric Rolling walker   Distance 70', 20ft   Stair Management Assistance Not tested   Balance   Static Sitting Good   Dynamic Sitting Fair +   Static Standing Fair   Dynamic Standing Fair   Ambulatory Fair   Endurance Deficit   Endurance Deficit Yes   Endurance Deficit Description leg fatigue, lightheadedness   Activity Tolerance   Activity Tolerance Patient tolerated treatment well   Medical Staff Made Aware spoke to RN   Nurse Made Aware spoke to RN   Exercises   Hamstring Stretch   (3 x 30s)   Hamstring Sets 20 reps; Sitting   Quad Sets Sitting;20 reps   Hip Abduction 20 reps  (red theraband)   Hip Extension 20 reps  (isometric seated)   Hip Adduction 20 reps  (with ball)   Knee AROM Long Arc Quad 20 reps;Bilateral   Ankle Pumps Bilateral   Marching Sitting;20 reps;Bilateral   Balance training  head circles, shoulder circles, trunk rotations, LS flexion, all from sitting position x 8-10 mins warm up   Assessment   Prognosis Good   Problem List Decreased strength;Decreased endurance;Decreased mobility   Assessment Marbin was fatigued today  Seated exercise went well however ambulation was reduced due to report of light headedness and weakness  Pt with dizziness following amb  BP assessed and found to be 135/68mmHg  Pt rested and was able to walk again  Barriers to Discharge Decreased caregiver support   Goals   Patient Goals cont POC   Short Term Goal #1 see grid   PT Treatment Day 7   Plan   Treatment/Interventions   (Cont POC)   Progress Progressing toward goals   PT Frequency   (5-7x/wk)   Recommendation   PT Discharge Recommendation Return to previous environment with social support   Tejas Spangler walker   Change/add to [employer]?  No     Goals STG achieved within 1 week Performance at Initial Evaluation (3/19/2021) Last date completed      Bed mobility skills including rolling and repositioning to prevent skin breakdown and decrease caregiver burden            modified independent assistance       supervision assistance x1       3/27         Supine to sit transitions to increase ease of transfer, allow pt to get out of bed, and decrease caregiver burden          modified independent assistance       supervision assistance x1       3/27         Sit to supine transitions to increase ease of transfer, allow pt to get back into bed, and decrease caregiver burden  Pt steps up onto 6" stool (backwards) to get into bed   bed at 28" high       modified independent assistance       supervision assistance x1       3/26         Functional transfers to facilitate safe return to previous living environment        modified independent assistance    supervision assistance x1    3/27         Ambulation with least restrictive AD; including at least 2 turns for safe household distance ambulation          modified independent assistance       supervision assistance x1       3/26         Improve standing functional balance to allow for pt to  object from floor            modified independent assistance               3/26           Anna Lim PT

## 2021-03-28 LAB
GLUCOSE SERPL-MCNC: 125 MG/DL (ref 65–140)
GLUCOSE SERPL-MCNC: 148 MG/DL (ref 65–140)
GLUCOSE SERPL-MCNC: 160 MG/DL (ref 65–140)

## 2021-03-28 PROCEDURE — 82948 REAGENT STRIP/BLOOD GLUCOSE: CPT

## 2021-03-28 RX ADMIN — GABAPENTIN 300 MG: 300 CAPSULE ORAL at 21:57

## 2021-03-28 RX ADMIN — Medication 250 MG: at 17:09

## 2021-03-28 RX ADMIN — GABAPENTIN 100 MG: 100 CAPSULE ORAL at 17:10

## 2021-03-28 RX ADMIN — GABAPENTIN 100 MG: 100 CAPSULE ORAL at 09:20

## 2021-03-28 RX ADMIN — TOPIRAMATE 200 MG: 100 TABLET, FILM COATED ORAL at 09:19

## 2021-03-28 RX ADMIN — LORAZEPAM 0.5 MG: 0.5 TABLET ORAL at 09:20

## 2021-03-28 RX ADMIN — TOPIRAMATE 200 MG: 100 TABLET, FILM COATED ORAL at 17:09

## 2021-03-28 RX ADMIN — FAMOTIDINE 20 MG: 20 TABLET ORAL at 17:09

## 2021-03-28 RX ADMIN — LEVOTHYROXINE SODIUM 50 MCG: 50 TABLET ORAL at 05:42

## 2021-03-28 RX ADMIN — INSULIN GLARGINE 20 UNITS: 100 INJECTION, SOLUTION SUBCUTANEOUS at 21:58

## 2021-03-28 RX ADMIN — FAMOTIDINE 20 MG: 20 TABLET ORAL at 09:20

## 2021-03-28 RX ADMIN — GABAPENTIN 100 MG: 100 CAPSULE ORAL at 14:45

## 2021-03-28 RX ADMIN — FLUOXETINE 60 MG: 20 CAPSULE ORAL at 09:20

## 2021-03-28 RX ADMIN — ERYTHROMYCIN 250 MG: 250 TABLET, FILM COATED ORAL at 16:50

## 2021-03-28 RX ADMIN — ENOXAPARIN SODIUM 60 MG: 60 INJECTION SUBCUTANEOUS at 09:20

## 2021-03-28 RX ADMIN — LORAZEPAM 0.5 MG: 0.5 TABLET ORAL at 17:09

## 2021-03-28 RX ADMIN — ENOXAPARIN SODIUM 60 MG: 60 INJECTION SUBCUTANEOUS at 21:58

## 2021-03-28 RX ADMIN — ERYTHROMYCIN 250 MG: 250 TABLET, FILM COATED ORAL at 06:39

## 2021-03-28 RX ADMIN — ERYTHROMYCIN 250 MG: 250 TABLET, FILM COATED ORAL at 12:22

## 2021-03-28 RX ADMIN — INSULIN LISPRO 1 UNITS: 100 INJECTION, SOLUTION INTRAVENOUS; SUBCUTANEOUS at 17:11

## 2021-03-28 RX ADMIN — ASPIRIN 81 MG: 81 TABLET, COATED ORAL at 09:20

## 2021-03-28 RX ADMIN — Medication 250 MG: at 09:19

## 2021-03-28 RX ADMIN — MELATONIN TAB 3 MG 6 MG: 3 TAB at 21:58

## 2021-03-29 LAB
GLUCOSE SERPL-MCNC: 102 MG/DL (ref 65–140)
GLUCOSE SERPL-MCNC: 151 MG/DL (ref 65–140)
GLUCOSE SERPL-MCNC: 154 MG/DL (ref 65–140)
GLUCOSE SERPL-MCNC: 182 MG/DL (ref 65–140)

## 2021-03-29 PROCEDURE — 82948 REAGENT STRIP/BLOOD GLUCOSE: CPT

## 2021-03-29 PROCEDURE — 97535 SELF CARE MNGMENT TRAINING: CPT

## 2021-03-29 PROCEDURE — 97116 GAIT TRAINING THERAPY: CPT

## 2021-03-29 PROCEDURE — 97530 THERAPEUTIC ACTIVITIES: CPT

## 2021-03-29 RX ADMIN — GABAPENTIN 100 MG: 100 CAPSULE ORAL at 17:24

## 2021-03-29 RX ADMIN — LORAZEPAM 0.5 MG: 0.5 TABLET ORAL at 17:24

## 2021-03-29 RX ADMIN — FAMOTIDINE 20 MG: 20 TABLET ORAL at 17:24

## 2021-03-29 RX ADMIN — FAMOTIDINE 20 MG: 20 TABLET ORAL at 08:46

## 2021-03-29 RX ADMIN — TOPIRAMATE 200 MG: 100 TABLET, FILM COATED ORAL at 08:46

## 2021-03-29 RX ADMIN — TOPIRAMATE 200 MG: 100 TABLET, FILM COATED ORAL at 17:24

## 2021-03-29 RX ADMIN — ASPIRIN 81 MG: 81 TABLET, COATED ORAL at 08:46

## 2021-03-29 RX ADMIN — INSULIN LISPRO 1 UNITS: 100 INJECTION, SOLUTION INTRAVENOUS; SUBCUTANEOUS at 12:40

## 2021-03-29 RX ADMIN — INSULIN GLARGINE 20 UNITS: 100 INJECTION, SOLUTION SUBCUTANEOUS at 22:08

## 2021-03-29 RX ADMIN — ERYTHROMYCIN 250 MG: 250 TABLET, FILM COATED ORAL at 12:40

## 2021-03-29 RX ADMIN — ERYTHROMYCIN 250 MG: 250 TABLET, FILM COATED ORAL at 06:22

## 2021-03-29 RX ADMIN — Medication 250 MG: at 08:46

## 2021-03-29 RX ADMIN — GABAPENTIN 100 MG: 100 CAPSULE ORAL at 08:46

## 2021-03-29 RX ADMIN — INSULIN LISPRO 1 UNITS: 100 INJECTION, SOLUTION INTRAVENOUS; SUBCUTANEOUS at 17:24

## 2021-03-29 RX ADMIN — FLUOXETINE 60 MG: 20 CAPSULE ORAL at 08:46

## 2021-03-29 RX ADMIN — ENOXAPARIN SODIUM 60 MG: 60 INJECTION SUBCUTANEOUS at 08:46

## 2021-03-29 RX ADMIN — Medication 250 MG: at 17:24

## 2021-03-29 RX ADMIN — LEVOTHYROXINE SODIUM 50 MCG: 50 TABLET ORAL at 05:56

## 2021-03-29 RX ADMIN — MELATONIN TAB 3 MG 6 MG: 3 TAB at 22:08

## 2021-03-29 RX ADMIN — GABAPENTIN 100 MG: 100 CAPSULE ORAL at 14:49

## 2021-03-29 RX ADMIN — GABAPENTIN 300 MG: 300 CAPSULE ORAL at 22:08

## 2021-03-29 RX ADMIN — LORAZEPAM 0.5 MG: 0.5 TABLET ORAL at 08:46

## 2021-03-29 NOTE — PLAN OF CARE
Problem: PHYSICAL THERAPY ADULT  Goal: Performs mobility at highest level of function for planned discharge setting  See evaluation for individualized goals  Description: Treatment/Interventions: Functional transfer training, LE strengthening/ROM, Therapeutic exercise, Endurance training, Patient/family training, Equipment eval/education, Bed mobility, Gait training, Spoke to nursing, Spoke to case management, OT  Equipment Recommended: Kimmy Felipe       See flowsheet documentation for full assessment, interventions and recommendations  Outcome: Progressing  Note: Prognosis: Good  Problem List: Decreased endurance, Impaired balance, Decreased mobility  Assessment: Pt progressed well since admission to therapy  Today ambulated 160 feet with bariatric RW  Pt demonstrated ability to perform SPT with and without AD  She also demonstrated ability to ambulate short distances without assistive device  Increased lateral weight shift present with no UE support  Pt demonstrated ability to shift hips back on bed with step stool placed and stabilized as patient reports that she has a very high bed  Pt discharge anticipated for tomorrow  Barriers to Discharge: Decreased caregiver support     PT Discharge Recommendation: Return to previous environment with social support          See flowsheet documentation for full assessment

## 2021-03-29 NOTE — PHYSICAL THERAPY NOTE
Physical Therapy Treatment Note       03/29/21 1524   PT Last Visit   PT Visit Date 03/29/21   Note Type   Note Type Treatment   Pain Assessment   Pain Assessment Tool 0-10   Pain Score No Pain   Restrictions/Precautions   Weight Bearing Precautions Per Order No   Other Precautions Fall Risk   General   Chart Reviewed Yes   Cognition   Arousal/Participation Cooperative   Subjective   Subjective Agreeable for treatment  " I am going home tomorrow"   Bed Mobility   Rolling R 6  Modified independent   Additional items Increased time required   Rolling L 6  Modified independent   Supine to Sit 6  Modified independent   Sit to Supine 6  Modified independent   Additional Comments Use of stool for entry into high bed  Walks pelvis back right then left  Transfers   Sit to Stand 7  Independent   Stand to Sit 6  Modified independent   Additional items Armrests   Stand pivot   (Independent and Mod  Ind )   Ambulation/Elevation   Gait pattern Decreased foot clearance; Improper Weight shift; Excessively slow; Short stride  (waddling gait without AD)   Gait Assistance 6  Modified independent   Assistive Device Bariatric Rolling walker;None   Distance 160 feet , 20 feet x 2, 40 feet x 1  (shorter distance without AD with 2 180* turns)   Activity Tolerance   Activity Tolerance Patient tolerated treatment well   Exercises   Neuro re-ed repeat sit to stand from recliner without Arms x 10 independently   Assessment   Prognosis Good   Problem List Decreased endurance; Impaired balance;Decreased mobility   Assessment Pt progressed well since admission to therapy  Today ambulated 160 feet with bariatric RW  Pt demonstrated ability to perform SPT with and without AD  She also demonstrated ability to ambulate short distances without assistive device  Increased lateral weight shift present with no UE support   Pt demonstrated ability to shift hips back on bed with step stool placed and stabilized as patient reports that she has a very high bed  Pt discharge anticipated for tomorrow  Barriers to Discharge Decreased caregiver support   Goals   Patient Goals to go home tomorrow   Short Term Goal #1 see grid   PT Treatment Day 8   Plan   Treatment/Interventions Functional transfer training; Endurance training;Patient/family training;Gait training;Bed mobility; Therapeutic exercise;Spoke to nursing   Progress Progressing toward goals   Recommendation   PT Discharge Recommendation Return to previous environment with social support     Goals STG achieved within 1 week Performance at Initial Evaluation (3/19/2021) Last date completed      Bed mobility skills including rolling and repositioning to prevent skin breakdown and decrease caregiver burden            modified independent assistance       supervision assistance x1       3/29         Supine to sit transitions to increase ease of transfer, allow pt to get out of bed, and decrease caregiver burden          modified independent assistance       supervision assistance x1       3/29         Sit to supine transitions to increase ease of transfer, allow pt to get back into bed, and decrease caregiver burden  Pt steps up onto 6" stool (backwards) to get into bed   bed at 28" high       modified independent assistance       supervision assistance x1       3/29         Functional transfers to facilitate safe return to previous living environment        modified independent assistance    supervision assistance x1    3/29         Ambulation with least restrictive AD; including at least 2 turns for safe household distance ambulation          modified independent assistance       supervision assistance x1       3/29         Improve standing functional balance to allow for pt to  object from floor            modified independent assistance               3/26           Regina Nick, PT

## 2021-03-29 NOTE — OCCUPATIONAL THERAPY NOTE
OCCUPATIONAL THERAPY TREATMENT AND DISCHARGE SUMMARY    TIME: 6658-3166 53 min   VITALS: stable   PAIN:  No pain   COGNITION: WFL - anxious   PRECAUTIONS: none     EXPIRATION DATE: 4/2/2021  TREATMENT DAY: 9    ASSESSMENT:    Pt pleasant and cooperative during session  Pt reports successful utilization of independent sign within room  Participated in AM ADL with performance levels as follows: eating (Ind ), oral hygiene (Refused) , toileting (Ind ), bathing (Ind ), UB dressing (Ind ), LB dressing (Ind ), and don/doff footwear (Ind ) (slip on shoes)  Requires encouragement to complete posterior hygiene/bathing in stance  Inconsistent performance with recent ADLs  Fatigued today, requesting to return to bed at conclusion of session  At length, discussed progression in OT while on TCF and achievement of POC goals  Pt made significant gains in OT since initial evaluation  Pt educated on  safe functional transfer techniques with appropriate body mechanics, fall prevention and safe discharge planning/ safety at home   Pt ready for d/c when medically able  Pt seated in recliner with call bell in reach to conclude session  DISPOSITION: Home with skilled therapy     AE/DME RECOMMENDATIONS: RW     DISCHARGE SUMMARY: Pt discharged from OT caseload effective 3/29/2021 with anticipated d/c 3/30/21 vs 3/31/21 pending medical clearance and family support  Participated in a total of 9 OT sessions  Pt achieved 13/14 goals per POC  Remains limited by ROM, weakness, fatigue, endurance, activity tolerance , standing tolerance and static/dynamic standing balance    Recommend home therapy  focus on the above noted deficits and further achieve independence in ADL related tasks  Pt is safe to return home with family support/social support  Pt denies questions/concerns for OT at this time  No further concerns noted        Goals STG achieved within 1  Week  Performance at Initial Evaluation 3/19/2021  Current Performance (last date completed)   Grooming while standing at sink Ron/I  MET Supervision  3/29 MI handwashing  3/22 no tolerance to standing sinkside for activity   3/20 easily fatigued CGA    ADL transfers  Ron/I  MET Supervision  3/29  MI STS and SPT  3/22 STS MI, SPT Supervision + RW   3/20 STS min assist increased to supervision- SPT supervision + RW   Bathroom mobility with appropriate AD Ron/I  MET Supervision  3/29  MI + RW   3/23 Supervision + RW 2* fatigue and legs "feeling like jello"   UB ADL  Ron/I  MET Min A   3/29 MI    LB ADL, AE PRN Ron/I  NOT MET 2* consistency  Mod A  3/29 MI bathing with encouragement for posterior hygiene   3/23 bathing only supervision front but reported unable to complete backside- assist provided    3/22 Supervision, increased time/effot + fatigue    Toileting/clothing management and hygiene Ron/I  MET Min A  3/29 MI  3/23 gavino supervision, backside max (refused ability and trial)  3/22 Supervision however easily fatigued, limits tolerance/safety    3/20 min assist overall    Dynamic standing balance for increased safety when completing purposeful tasks F+  MET F- 3/29 Fair+   3/23 Fair/Fair+   Increase standing tolerance for inc'd safety with standing purposeful tasks 5-8 min  MET 2 min  3/29 3 min average  3/24 5 minutes  3/23 3-4 min min challenge   3/22 1 5-2 min MAX (feels knees weak + SOB)   3/20 easily fatigued needing multiple trial to complete tasks 1-2 min max    Participate in therex 1-3x/week for inc'd overall stamina/activity tolerance for purposeful tasks To be completed  MET   3/27 4# dowel, all planes of motion below ~ 80 degrees of shoulder flexion  3/26 increased to 3# hand weight for all planes with good tolerance    3/23 2# weighted ball in all planes 2x10, good tolerance   Tub/shower with tub bench  Ron/I  MET   3/26 MI simulated post leg  ed    Item retrieval for inc'd independence and safety negotiating home environment Ron/I  MET   3/29 MI (short distance, min challenge)  3/23 Supervision (minimally challenged)    Med mgmt to ensure safe d/c  Ron/I  MET   3/26 appears appropriate but would benefit from checks to ensure carryover of recommendations- able to recall from previous session   3/25 See assessment however     MI open containers, MI F/T questions, MI demo of ability to obtain set amount of pills requested       Participate in further cognitive testing to assist with safe d/c planning To be completed   MET    3/25 22/30 MOCA   Bed mobility with [facility] flat (elevated surface height)  Ron/I  MET Supervision  3/29 MI  [id number] AMANDA Jarrett, MS, OTR/L

## 2021-03-29 NOTE — CASE MANAGEMENT
PRAVIN continues to work on discharge planning  SW informed that [facility] issued for patient  Patient informed about [facility] and aware of when appeal would need to be filled  SW confirmed that [facility] will be able to provide [facility] services for patient  SW informed by Cristina Olivas with the [facility], that patient would qualify for about 11hours of home aide assistance for the week  There will be some delay in start of services since [place] will have to contract out to another agency for those services  SW will inform patient's son about confirmed services  SW contacted patient's son to discuss dc plans  Son made aware that [facility] hours and equipment  SW informed son of discussions had with Jayleen and the delay in care but that [place] is working on securing assistance with patient at home  SW informed son that patient is making progress with therapies, and that [facility] will continue to work with her at home  Son is aware that finale approval for [facility] [employer] is pending from the [place]  Son would prefer to transport patient home tomorrow at 5pm once roommate is done with work and he can get a ride  SW will work on informing all parties  SW was able to confirm that [place] will be delivering parker RW to patient's home  Per [facility] RN, Marley Fox, patient was called on Friday, who informed [facility] that she wasn't discharging any time soon and canceled RW  SW informed Mariana Bach, that patient was informed about dc over the weekend  Patient's parker RW now will be delivered to home now  Mariana Bach is aware that she will dc home tomorrow afternoon

## 2021-03-29 NOTE — PLAN OF CARE
Problem: OCCUPATIONAL THERAPY ADULT  Goal: Performs self-care activities at highest level of function for planned discharge setting  See evaluation for individualized goals  Description: Treatment Interventions: ADL retraining, Functional transfer training, UE strengthening/ROM, Endurance training, Patient/family training, Equipment evaluation/education, Neuromuscular reeducation, Compensatory technique education, Energy conservation, Activityengagement          See flowsheet documentation for full assessment, interventions and recommendations     Outcome: Adequate for Discharge  Note: Limitation: Decreased ADL status, Decreased UE ROM, Decreased UE strength, Decreased Safe judgement during ADL, Decreased endurance, Decreased self-care trans, Decreased high-level ADLs  Prognosis: Good  Assessment: see note      OT Discharge Recommendation: Home with skilled therapy

## 2021-03-29 NOTE — PLAN OF CARE
Problem: OCCUPATIONAL THERAPY ADULT  Goal: Performs self-care activities at highest level of function for planned discharge setting  See evaluation for individualized goals  Description: Treatment Interventions: ADL retraining, Functional transfer training, UE strengthening/ROM, Endurance training, Patient/family training, Equipment evaluation/education, Neuromuscular reeducation, Compensatory technique education, Energy conservation, Activityengagement          See flowsheet documentation for full assessment, interventions and recommendations  Outcome: Progressing  Note: Limitation: Decreased ADL status, Decreased UE ROM, Decreased UE strength, Decreased Safe judgement during ADL, Decreased endurance, Decreased self-care trans, Decreased high-level ADLs  Prognosis: Good  Assessment: Pt seen for OT txt  Pt pleasant and agreeable for therapy  Pt demonstrating good safety with use of independent sign for bathroom use  Pt completed seated BUE thera-ex, all planes of motion below ~ 80 degrees of shoulder flexion due to bilateral rotator cuff tears  Pt demonstrated good form and tolerance  Pt completed bed mobility Pal, increased time to manage BLEs  Pt continues to progress with OT POC, will benefit from continued services to further address deficits with functional strength, activity tolerance, and standing balance and address below goals        OT Discharge Recommendation: Home with skilled therapy

## 2021-03-30 VITALS
WEIGHT: 228.4 LBS | HEIGHT: 62 IN | DIASTOLIC BLOOD PRESSURE: 69 MMHG | BODY MASS INDEX: 42.03 KG/M2 | TEMPERATURE: 97.5 F | OXYGEN SATURATION: 96 % | HEART RATE: 73 BPM | SYSTOLIC BLOOD PRESSURE: 143 MMHG | RESPIRATION RATE: 20 BRPM

## 2021-03-30 LAB
GLUCOSE SERPL-MCNC: 109 MG/DL (ref 65–140)
GLUCOSE SERPL-MCNC: 149 MG/DL (ref 65–140)

## 2021-03-30 PROCEDURE — 97116 GAIT TRAINING THERAPY: CPT

## 2021-03-30 PROCEDURE — 99316 NF DSCHRG MGMT 30 MIN+: CPT | Performed by: INTERNAL MEDICINE

## 2021-03-30 PROCEDURE — 82948 REAGENT STRIP/BLOOD GLUCOSE: CPT

## 2021-03-30 PROCEDURE — 97530 THERAPEUTIC ACTIVITIES: CPT

## 2021-03-30 RX ORDER — GABAPENTIN 100 MG/1
100 CAPSULE ORAL 2 TIMES DAILY
Qty: 42 CAPSULE | Refills: 0 | Status: SHIPPED | OUTPATIENT
Start: 2021-03-30

## 2021-03-30 RX ADMIN — ASPIRIN 81 MG: 81 TABLET, COATED ORAL at 08:44

## 2021-03-30 RX ADMIN — TOPIRAMATE 200 MG: 100 TABLET, FILM COATED ORAL at 08:44

## 2021-03-30 RX ADMIN — GABAPENTIN 100 MG: 100 CAPSULE ORAL at 08:44

## 2021-03-30 RX ADMIN — LEVOTHYROXINE SODIUM 50 MCG: 50 TABLET ORAL at 05:51

## 2021-03-30 RX ADMIN — LORAZEPAM 0.5 MG: 0.5 TABLET ORAL at 08:44

## 2021-03-30 RX ADMIN — GABAPENTIN 100 MG: 100 CAPSULE ORAL at 14:39

## 2021-03-30 RX ADMIN — ENOXAPARIN SODIUM 60 MG: 60 INJECTION SUBCUTANEOUS at 08:44

## 2021-03-30 RX ADMIN — FAMOTIDINE 20 MG: 20 TABLET ORAL at 08:44

## 2021-03-30 RX ADMIN — FLUOXETINE 60 MG: 20 CAPSULE ORAL at 08:44

## 2021-03-30 RX ADMIN — Medication 250 MG: at 08:44

## 2021-03-30 NOTE — PHYSICAL THERAPY NOTE
Physical Therapy Daily Treatment Note and Discharge Summary       03/30/21   74 minutes (9:14 to 10:28)       PT Last Visit   PT Visit Date 03/30/21   Note Type   Note Type Treatment  (+ Discharge Summary)   Pain Assessment   Pain Assessment Tool Pain Assessment not indicated - pt denies pain   Restrictions/Precautions   Weight Bearing Precautions Per Order No   Other Precautions Fall Risk   General   Chart Reviewed Yes   Response to Previous Treatment Patient reporting fatigue but able to participate  Family/Caregiver Present No   Cognition   Overall Cognitive Status WFL   Arousal/Participation Cooperative   Attention Within functional limits   Orientation Level Oriented X4   Memory Within functional limits   Following Commands Follows all commands and directions without difficulty   Subjective   Subjective "I'm too exhausted to walk without the walker"   Bed Mobility   Rolling R 6  Modified independent   Additional items Increased time required   Rolling L 6  Modified independent   Additional items Increased time required   Supine to Sit 6  Modified independent   Additional items Increased time required   Sit to Supine 6  Modified independent   Additional items Increased time required   Additional Comments Bed Mobility (28 inch bed height, right side of bed, HOB flat, no rail)  Pt's son purchased a 6 inch step stool with bilateral handrails; to increase pt's ease with getting into bed  During simulation activity, pt became very fearful and reported being afraid that she was going to fall stepping backwards onto the 6 inch stool  Discussed having pt then step forwards, to perform transfer into bed  Pt requested to just demonstrate how she performs bed mobility w/o the stool at home  Pt appeared safe during this activity; w/o use of stool  Recommend Home therapist practice using stool at home to determine if this is appropriate  Pt in agreement with recommendation     Transfers   Sit to Stand 6  Modified independent   Additional items Increased time required  (Good hand placement)   Stand to Sit 6  Modified independent   Additional items Increased time required  (Good hand placement; controlled descent)   Stand pivot 6  Modified independent  (SPT with and without bariatric RW)   Additional items Increased time required  (Good turn completion, safe and steady)   Car transfer 4  Minimal assistance  (Removed footboard on bed to simulate car transfer)   Additional items Increased time required  (Required assistance w/BLE management over simulated car wall)   Additional Comments Transfers: EOB, recliner chair   Ambulation/Elevation   Gait pattern Improper Weight shift;Decreased foot clearance; Excessively slow; Short stride; Inconsistent lakisha  (Medial lateral sway)   Gait Assistance 6  Modified independent   Additional items Other (Comment)  (No LOB, no evidence of knee instability)   Assistive Device Bariatric Rolling walker   Distance 20 feet x 2, 75 feet->negotiated 4 turns/obstacles during 75 feet gait trial -> too fatigued to ambulate further    Vitals  Seated (at rest in recliner): /68, HR 73, SPO2 (RA) 99%    After ambulating with a bariatric RW for 75 feet (seated): /68, HR 82, SPO2 (RA) 96%     Balance   Static Sitting   (Good +)   Dynamic Sitting   (Fair+/Good (-))   Static Standing   (Good (-) with bariatric RW)   Dynamic Standing Fair +  (With bariatric RW: picked pen off floor with hand MOD I)   Ambulatory Fair +  (With RW)   Higher level balance Side stepping  (Fair + with bariatric RW)   Higher level balance rep range to the L and to the R  (Fair + with bariatric RW -> stepped backwards, reached outside CLAUDIO)   Endurance Deficit   Endurance Deficit Yes  (Provided with monitored rest breaks prn, between activities)   Activity Tolerance   Activity Tolerance Patient limited by fatigue;Patient tolerated treatment well   Assessment   Prognosis Good   Problem List Decreased strength;Decreased range of motion;Decreased endurance; Impaired balance;Decreased mobility; Decreased coordination;Decreased safety awareness; Impaired vision; Impaired hearing;Obesity; Decreased skin integrity;Orthopedic restrictions;Pain   Assessment Pt is discharged from skilled PT effective today, 3/30/21  Pt with plans to return to home alone, thisafternoon  Pt has neighbors in her apartment building who can assist as needed and son is available to help out on the weekends  Per Social Woker Note "  confirmed that [facility] will be able to provide [place] [id number] services for patient  SW informed by Cristina Olivas with the [address] [place] [address], that patient would qualify for about 11hours of home aide assistance for the week  There will be some delay in start of services since [place] will have to contract out to another agency for those services  "Since 3/19/21 PT Evaluation, pt was seen for 9/10 skilled PT tx sessions for therex, theract, and gait training  Pt unable to participate in skilled PT on 3/24/21; due to nausea  During course of rehab, pt with good progress in skilled PT  Improvement assessed with: functional strength, balance, activity tolerance, safety, bed mobility, transfers, and gait  Pt has been ambulating longer distances with RW and shorter distances with no AD; during tx sessions  However during today's PT tx session, pt reported not feeling well and fatigued; due to not getting any sleep all night  Ambulation only performed with RW to optimize safety and conserve energy  All STG's achieved  Please see grid below and flowsheet, for details on pt's functional mobility status at discharge  Barriers to Discharge Decreased caregiver support  (Lives alone)   Goals   Patient Goals   (Pt given options and selected "Continue to get stronger ")   STG Expiration Date 04/02/21   Short Term Goal #1 See grid   PT Treatment Day 9   Plan   Treatment/Interventions ADL retraining;Functional transfer training;LE strengthening/ROM; Elevations; Therapeutic exercise; Endurance training;Cognitive reorientation;Patient/family training;Equipment eval/education; Bed mobility;Gait training; Compensatory technique education;Continued evaluation;Spoke to MD;Spoke to nursing;Spoke to case management;Spoke to advanced practitioner;OT;Family   Progress Discontinue PT   PT Frequency   (5 to 7x/week)   Recommendation   PT Discharge Recommendation Return to previous environment with social support;Home with skilled therapy  (Home RN, PT, OT, HHA)   Equipment Recommended   (Bariatric RW)            Goals STG achieved within 1 week Performance at Initial Evaluation (3/19/2021) Last date completed      Bed mobility skills including rolling and repositioning to prevent skin breakdown and decrease caregiver burden            modified independent assistance    ACHIEVED       supervision assistance x1       3/30         Supine to sit transitions to increase ease of transfer, allow pt to get out of bed, and decrease caregiver burden          modified independent assistance    ACHIEVED       supervision assistance x1       3/30         Sit to supine transitions to increase ease of transfer, allow pt to get back into bed, and decrease caregiver burden  Pt steps up onto 6" stool (backwards) to get into bed   bed at 28" high          modified independent assistance    ACHIEVED       supervision assistance x1       3/30      Functional transfers to facilitate safe return to previous living environment        modified independent assistance    ACHIEVED    supervision assistance x1    3/30         Ambulation with least restrictive AD; including at least 2 turns for safe household distance ambulation          modified independent assistance    ACHIEVED       supervision assistance x1       3/30    3/29/21  Amb with bariatric RW for 160 feet with MOD I    Amb with no AD  for 40 feet with MOD I            Improve standing functional balance to allow for pt to  object from floor            modified independent assistance    ACHIEVED                 3/30

## 2021-03-30 NOTE — PLAN OF CARE
Problem: PHYSICAL THERAPY ADULT  Goal: Performs mobility at highest level of function for planned discharge setting  See evaluation for individualized goals  Description: Treatment/Interventions: Functional transfer training, LE strengthening/ROM, Therapeutic exercise, Endurance training, Patient/family training, Equipment eval/education, Bed mobility, Gait training, Spoke to nursing, Spoke to case management, OT  Equipment Recommended: Marianne Arriola       See flowsheet documentation for full assessment, interventions and recommendations  Outcome: Completed  Note: Prognosis: Good  Problem List: Decreased strength, Decreased range of motion, Decreased endurance, Impaired balance, Decreased mobility, Decreased coordination, Decreased safety awareness, Impaired vision, Impaired hearing, Obesity, Decreased skin integrity, Orthopedic restrictions, Pain  Assessment: Pt is discharged from skilled PT effective today, 3/30/21  Pt with plans to return to home alone, thisafternoon  Pt has neighbors in her apartment building who can assist as needed and son is available to help out on the weekends  Per Social Woker Note "  confirmed that [facility] will be able to provide [place] [id number] services for patient  SW informed by Selena Funk with the [address] [place] [address], that patient would qualify for about 11hours of home aide assistance for the week  There will be some delay in start of services since [place] will have to contract out to another agency for those services  "Since 3/19/21 PT Evaluation, pt was seen for 9/10 skilled PT tx sessions for therex, theract, and gait training  Pt unable to participate in skilled PT on 3/24/21; due to nausea  During course of rehab, pt with good progress in skilled PT  Improvement assessed with: functional strength, balance, activity tolerance, safety, bed mobility, transfers, and gait  Pt has been ambulating longer distances with RW and shorter distances with no AD; during tx sessions   However during today's PT tx session, pt reported not feeling well and fatigued; due to not getting any sleep all night  Ambulation only performed with RW to optimize safety and conserve energy  All STG's achieved  Please see grid below and flowsheet, for details on pt's functional mobility status at discharge  Barriers to Discharge: Decreased caregiver support(LIves alone)     PT Discharge Recommendation: Return to previous environment with social support, Home with skilled therapy(Home RN, PT, OT, HHA)          See flowsheet documentation for full assessment

## 2021-03-30 NOTE — DISCHARGE SUMMARY
[facility]      Discharge- Gerhardt Canny 1955, 76 y o  female MRN: [id number]  Unit/Bed#: -01 Encounter: [id number]  Primary Care Provider: Obed Weems MD   Date and time admitted to hospital: 3/18/2021  7:31 PM        Acquired hypothyroidism  Assessment & Plan  · Continue home Synthroid    Diabetes mellitus [facility])  Assessment & Plan    Lab Results   Component Value Date    HGBA1C 11 5 (H) 03/03/2021       · Uncontrolled, patient is noncompliant  · Patient is supposed to be on insulin, empagliflozin, alogliptin  · Continue Lantus 20 units q h s  + sliding scale insulin  · Symptoms of gastroparesis improved with erythromycin for 5 days  · Currently blood glucose is at goals  · Resume home regimen upon discharge    Hypertension  Assessment & Plan  Patient does not take any medications at home   Blood pressures acceptable    Restless leg syndrome  Assessment & Plan  · Continue home dose of gabapentin    Seizures (HCC)  Assessment & Plan  · Stable, continue home Topamax    * Severe episode of recurrent major depressive disorder, without psychotic features ([facility] [address] )  Assessment & Plan  · Presented with severe depression, not taking medications  · Denies any SI/HI or plan, does not need one-to-one observation at this point  · Has not seen by psychiatrist/PCP for a while due to recent COVID situation  · Recently seen by Dr Lynda Workman in [facility] during recent hospitalization - recently increased Prozac to 60 mg  · Psychiatric consult, recommending continuation of Prozac 60 mg OD, Ativan 0 5 mg b i d   · Recommend to follow-up with her regular psychiatrist at [address] [facility] time of discharge    · Progressing with physical therapy and occupational therapy continue      Discharging Physician / Practitioner: Ronni Walton MD  PCP: Obed Weems MD  Admission Date:   Admission Orders (From admission, onward)     Ordered        03/20/21 1603  Admit Patient to  Once                   Discharge Date: 3/30/2021    Resolved Problems  Date Reviewed: 3/27/2021    None          Consultations During Hospital Stay:  · PT  · OT    Procedures Performed:   · None    Significant Findings / Test Results:   · none    Incidental Findings:   · none    Test Results Pending at Discharge (will require follow up):   · none     Outpatient Tests Requested:  · none    Complications: none    Reason for Admission:  Ambulatory dysfunction    Hospital Course:     Shai Lamb is a 76 y o  female patient who originally presented to the hospital on 3/18/2021 due to a ventral dysfunction following a recent admission for chest pain  Patient underwent cardiac catheterization at that time and also were negative  Patient was admitted to the rehab for physical and occupational therapy  Patient improved with therapy  Patient will be discharged home  Patient stable day of discharge  No acute complaints at this time  Please see above list of diagnoses and related plan for additional information  Condition at Discharge: stable     Discharge Day Visit / Exam:     Subjective:  Seen examined at bedside  Patient currently no acute distress  Patient is ready to be discharged  Vitals: Blood Pressure: 143/69 (03/30/21 1537)  Pulse: 73 (03/30/21 1537)  Temperature: 97 5 °F (36 4 °C) (03/30/21 1537)  Temp Source: Temporal (03/30/21 1537)  Respirations: 20 (03/30/21 1537)  Height: 5' 2" (157 5 cm) (03/19/21 1246)  Weight - Scale: 104 kg (228 lb 6 3 oz) (03/20/21 1300)  SpO2: 96 % (03/30/21 1537)  Exam:   Physical Exam  Constitutional:       General: She is not in acute distress  Appearance: Normal appearance  HENT:      Head: Normocephalic and atraumatic  Eyes:      General:         Right eye: No discharge  Left eye: No discharge  Cardiovascular:      Rate and Rhythm: Normal rate and regular rhythm  Pulses: Normal pulses  Heart sounds: No murmur     Pulmonary:      Effort: Pulmonary effort is normal  No respiratory distress  Breath sounds: Normal breath sounds  No wheezing  Musculoskeletal:      Right lower leg: No edema  Left lower leg: No edema  Skin:     General: Skin is warm and dry  Neurological:      General: No focal deficit present  Mental Status: She is alert and oriented to person, place, and time  Mental status is at baseline  Psychiatric:         Mood and Affect: Mood normal        Discussion with Family:  none    Discharge instructions/Information to patient and family:   See after visit summary for information provided to patient and family  Provisions for Follow-Up Care:  See after visit summary for information related to follow-up care and any pertinent home health orders  Disposition:     Home with VNA Services (Reminder: Complete face to face encounter)      Planned Readmission:  none     Discharge Statement:  I spent 31 minutes discharging the patient  This time was spent on the day of discharge  I had direct contact with the patient on the day of discharge  Greater than 50% of the total time was spent examining patient, answering all patient questions, arranging and discussing plan of care with patient as well as directly providing post-discharge instructions  Additional time then spent on discharge activities  Discharge Medications:  See after visit summary for reconciled discharge medications provided to patient and family        ** Please Note: This note has been constructed using a voice recognition system **

## 2021-03-30 NOTE — CASE MANAGEMENT
The patient had been asking for her COVID booster prior to discharge  She did have her card and also information noted in Epic she had Pfizer vaccine on 2/26/21 at [facility]  Spke to the pt  Directly and told her that [facility] only provides the Moderna vaccine, and that she would need to return to the facility she received the [facility]  She stated that she was told by [facility] that wiuld be receiving the Birdia Hacking as the booster  She would like booster but would not want a different one if not recommended  Reached out to the  at [facility] for COVID vaccine   verified that the pfizer and Moderna vaccines are not mixed  This information was brought up to the pt  She w=verbalized understanding and will contact AVELINO for her booster after discharge

## 2021-03-30 NOTE — CASE MANAGEMENT
PRAVIN continues to work on [facility] [employer] for patient  SW received a phone call from [facility] with home aide services through the [facility]  SW confirmed that patient's dc is for today at [facility] informed SW that they were able to secure a home aide agency of [facility], [phone]  Per Jayleen, services are being coordinated through [facility] and should start on Friday  SW met with patient to inform her that her [place] [phone] services are in place through [facility]  Patient was also made aware that RW that was previously canceled was re-order and now will be delivered to her home  Patient confirmed that she has a rollator at home she can use until Bonita Chinchilla RW is delivered to her home  PRAVIN also informed patient about home aide agency and that information would be placed on her dc instructions  Patient in agreement with all information reviewed

## 2021-03-30 NOTE — NURSING NOTE
Pt d/c to home via son and w/c  All belongings sent along with her  At time of d/c vitals were WNL, pt did not c/o pain and showed no s/s of covid 19  She was happy and pleasant to be going home

## 2021-04-01 NOTE — CASE MANAGEMENT
The admission date and time was entered incorrectly  The correct admission date and time is 3/18/21 at 1931

## 2021-04-07 NOTE — ASSESSMENT & PLAN NOTE
· Presented with severe depression, not taking medications  · Denies any SI/HI or plan, does not need one-to-one observation at this point  · Has not seen by psychiatrist/PCP for a while due to recent COVID situation  · Recently seen by Dr Rush Orourke in [facility] during recent hospitalization - recently increased Prozac to 60 mg  · Psychiatric consult, recommending continuation of Prozac 60 mg OD, Ativan 0 5 mg b i d   · Recommend to follow-up with her regular psychiatrist at [place] time of discharge    · Progressing with physical therapy and occupational therapy continue

## 2021-05-20 ENCOUNTER — TRANSCRIBE ORDERS (OUTPATIENT)
Dept: ADMINISTRATIVE | Facility: HOSPITAL | Age: 66
End: 2021-05-20

## 2021-05-20 DIAGNOSIS — Z12.31 ENCOUNTER FOR SCREENING MAMMOGRAM FOR MALIGNANT NEOPLASM OF BREAST: Primary | ICD-10-CM

## 2021-10-23 ENCOUNTER — APPOINTMENT (EMERGENCY)
Dept: RADIOLOGY | Facility: HOSPITAL | Age: 66
End: 2021-10-23
Payer: COMMERCIAL

## 2021-10-23 ENCOUNTER — HOSPITAL ENCOUNTER (OUTPATIENT)
Facility: HOSPITAL | Age: 66
Setting detail: OBSERVATION
Discharge: HOME HEALTH | End: 2021-10-25
Attending: EMERGENCY MEDICINE | Admitting: INTERNAL MEDICINE
Payer: COMMERCIAL

## 2021-10-23 ENCOUNTER — APPOINTMENT (EMERGENCY)
Dept: CT IMAGING | Facility: HOSPITAL | Age: 66
End: 2021-10-23
Payer: COMMERCIAL

## 2021-10-23 DIAGNOSIS — E03.9 ACQUIRED HYPOTHYROIDISM: ICD-10-CM

## 2021-10-23 DIAGNOSIS — E11.9 DIABETES MELLITUS (HCC): ICD-10-CM

## 2021-10-23 DIAGNOSIS — R06.00 DYSPNEA: Primary | ICD-10-CM

## 2021-10-23 DIAGNOSIS — R07.9 CHEST PAIN: ICD-10-CM

## 2021-10-23 LAB
ANION GAP SERPL CALCULATED.3IONS-SCNC: 5 MMOL/L (ref 4–13)
BASOPHILS # BLD AUTO: 0.01 THOUSANDS/ΜL (ref 0–0.1)
BASOPHILS NFR BLD AUTO: 0 % (ref 0–1)
BNP SERPL-MCNC: 77.3 PG/ML (ref 1–100)
BUN SERPL-MCNC: 15 MG/DL (ref 6–20)
CALCIUM SERPL-MCNC: 9 MG/DL (ref 8.4–10.2)
CHLORIDE SERPL-SCNC: 97 MMOL/L (ref 96–108)
CO2 SERPL-SCNC: 32 MMOL/L (ref 22–33)
CREAT SERPL-MCNC: 0.71 MG/DL (ref 0.4–1.1)
EOSINOPHIL # BLD AUTO: 0.13 THOUSAND/ΜL (ref 0–0.61)
EOSINOPHIL NFR BLD AUTO: 2 % (ref 0–6)
ERYTHROCYTE [DISTWIDTH] IN BLOOD BY AUTOMATED COUNT: 13.1 % (ref 11.6–15.1)
FLUAV RNA RESP QL NAA+PROBE: NEGATIVE
FLUBV RNA RESP QL NAA+PROBE: NEGATIVE
GFR SERPL CREATININE-BSD FRML MDRD: 89 ML/MIN/1.73SQ M
GLUCOSE SERPL-MCNC: 253 MG/DL (ref 65–140)
GLUCOSE SERPL-MCNC: 285 MG/DL (ref 65–140)
GLUCOSE SERPL-MCNC: 324 MG/DL (ref 65–140)
GLUCOSE SERPL-MCNC: 354 MG/DL (ref 65–140)
HCT VFR BLD AUTO: 45.7 % (ref 34.8–46.1)
HGB BLD-MCNC: 15.1 G/DL (ref 11.5–15.4)
IMM GRANULOCYTES # BLD AUTO: 0.02 THOUSAND/UL (ref 0–0.2)
IMM GRANULOCYTES NFR BLD AUTO: 0 % (ref 0–2)
LYMPHOCYTES # BLD AUTO: 0.89 THOUSANDS/ΜL (ref 0.6–4.47)
LYMPHOCYTES NFR BLD AUTO: 15 % (ref 14–44)
MCH RBC QN AUTO: 28.2 PG (ref 26.8–34.3)
MCHC RBC AUTO-ENTMCNC: 33 G/DL (ref 31.4–37.4)
MCV RBC AUTO: 85 FL (ref 82–98)
MONOCYTES # BLD AUTO: 0.4 THOUSAND/ΜL (ref 0.17–1.22)
MONOCYTES NFR BLD AUTO: 7 % (ref 4–12)
NEUTROPHILS # BLD AUTO: 4.48 THOUSANDS/ΜL (ref 1.85–7.62)
NEUTS SEG NFR BLD AUTO: 76 % (ref 43–75)
PLATELET # BLD AUTO: 161 THOUSANDS/UL (ref 149–390)
PMV BLD AUTO: 10.6 FL (ref 8.9–12.7)
POTASSIUM SERPL-SCNC: 4.2 MMOL/L (ref 3.5–5)
RBC # BLD AUTO: 5.35 MILLION/UL (ref 3.81–5.12)
RSV RNA RESP QL NAA+PROBE: NEGATIVE
SARS-COV-2 RNA RESP QL NAA+PROBE: NEGATIVE
SODIUM SERPL-SCNC: 134 MMOL/L (ref 133–145)
TROPONIN I SERPL-MCNC: <0.03 NG/ML (ref 0–0.07)
WBC # BLD AUTO: 5.93 THOUSAND/UL (ref 4.31–10.16)

## 2021-10-23 PROCEDURE — 99285 EMERGENCY DEPT VISIT HI MDM: CPT

## 2021-10-23 PROCEDURE — 83880 ASSAY OF NATRIURETIC PEPTIDE: CPT | Performed by: INTERNAL MEDICINE

## 2021-10-23 PROCEDURE — 82948 REAGENT STRIP/BLOOD GLUCOSE: CPT

## 2021-10-23 PROCEDURE — 80048 BASIC METABOLIC PNL TOTAL CA: CPT | Performed by: EMERGENCY MEDICINE

## 2021-10-23 PROCEDURE — 71045 X-RAY EXAM CHEST 1 VIEW: CPT

## 2021-10-23 PROCEDURE — 36415 COLL VENOUS BLD VENIPUNCTURE: CPT | Performed by: EMERGENCY MEDICINE

## 2021-10-23 PROCEDURE — 0241U HB NFCT DS VIR RESP RNA 4 TRGT: CPT | Performed by: EMERGENCY MEDICINE

## 2021-10-23 PROCEDURE — 84484 ASSAY OF TROPONIN QUANT: CPT | Performed by: INTERNAL MEDICINE

## 2021-10-23 PROCEDURE — 84484 ASSAY OF TROPONIN QUANT: CPT | Performed by: EMERGENCY MEDICINE

## 2021-10-23 PROCEDURE — 85025 COMPLETE CBC W/AUTO DIFF WBC: CPT | Performed by: EMERGENCY MEDICINE

## 2021-10-23 PROCEDURE — 99285 EMERGENCY DEPT VISIT HI MDM: CPT | Performed by: EMERGENCY MEDICINE

## 2021-10-23 PROCEDURE — 99220 PR INITIAL OBSERVATION CARE/DAY 70 MINUTES: CPT | Performed by: INTERNAL MEDICINE

## 2021-10-23 PROCEDURE — 93005 ELECTROCARDIOGRAM TRACING: CPT

## 2021-10-23 PROCEDURE — 71275 CT ANGIOGRAPHY CHEST: CPT

## 2021-10-23 PROCEDURE — 96374 THER/PROPH/DIAG INJ IV PUSH: CPT

## 2021-10-23 RX ORDER — LANOLIN ALCOHOL/MO/W.PET/CERES
3 CREAM (GRAM) TOPICAL
Status: DISCONTINUED | OUTPATIENT
Start: 2021-10-23 | End: 2021-10-25 | Stop reason: HOSPADM

## 2021-10-23 RX ORDER — ASPIRIN 81 MG/1
81 TABLET ORAL DAILY
Status: DISCONTINUED | OUTPATIENT
Start: 2021-10-23 | End: 2021-10-25 | Stop reason: HOSPADM

## 2021-10-23 RX ORDER — INSULIN GLARGINE 100 [IU]/ML
20 INJECTION, SOLUTION SUBCUTANEOUS
Status: DISCONTINUED | OUTPATIENT
Start: 2021-10-23 | End: 2021-10-24

## 2021-10-23 RX ORDER — SODIUM CHLORIDE 9 MG/ML
3 INJECTION INTRAVENOUS
Status: DISCONTINUED | OUTPATIENT
Start: 2021-10-23 | End: 2021-10-25 | Stop reason: HOSPADM

## 2021-10-23 RX ORDER — GABAPENTIN 300 MG/1
300 CAPSULE ORAL
Status: DISCONTINUED | OUTPATIENT
Start: 2021-10-23 | End: 2021-10-25 | Stop reason: HOSPADM

## 2021-10-23 RX ORDER — FUROSEMIDE 10 MG/ML
20 INJECTION INTRAMUSCULAR; INTRAVENOUS ONCE
Status: COMPLETED | OUTPATIENT
Start: 2021-10-23 | End: 2021-10-23

## 2021-10-23 RX ORDER — FAMOTIDINE 20 MG/1
20 TABLET, FILM COATED ORAL 2 TIMES DAILY
Status: DISCONTINUED | OUTPATIENT
Start: 2021-10-23 | End: 2021-10-25 | Stop reason: HOSPADM

## 2021-10-23 RX ORDER — LEVOTHYROXINE SODIUM 0.05 MG/1
50 TABLET ORAL
Status: DISCONTINUED | OUTPATIENT
Start: 2021-10-24 | End: 2021-10-25 | Stop reason: HOSPADM

## 2021-10-23 RX ORDER — FLUOXETINE HYDROCHLORIDE 20 MG/1
60 CAPSULE ORAL DAILY
Status: DISCONTINUED | OUTPATIENT
Start: 2021-10-24 | End: 2021-10-25 | Stop reason: HOSPADM

## 2021-10-23 RX ORDER — ACETAMINOPHEN 325 MG/1
650 TABLET ORAL EVERY 6 HOURS PRN
Status: DISCONTINUED | OUTPATIENT
Start: 2021-10-23 | End: 2021-10-25 | Stop reason: HOSPADM

## 2021-10-23 RX ORDER — TOPIRAMATE 100 MG/1
200 TABLET, FILM COATED ORAL 2 TIMES DAILY
Status: DISCONTINUED | OUTPATIENT
Start: 2021-10-23 | End: 2021-10-25 | Stop reason: HOSPADM

## 2021-10-23 RX ORDER — KETOROLAC TROMETHAMINE 30 MG/ML
15 INJECTION, SOLUTION INTRAMUSCULAR; INTRAVENOUS ONCE
Status: COMPLETED | OUTPATIENT
Start: 2021-10-23 | End: 2021-10-23

## 2021-10-23 RX ORDER — ALBUTEROL SULFATE 2.5 MG/3ML
1 SOLUTION RESPIRATORY (INHALATION) ONCE
Status: COMPLETED | OUTPATIENT
Start: 2021-10-23 | End: 2021-10-23

## 2021-10-23 RX ORDER — GABAPENTIN 100 MG/1
100 CAPSULE ORAL 2 TIMES DAILY
Status: DISCONTINUED | OUTPATIENT
Start: 2021-10-23 | End: 2021-10-25 | Stop reason: HOSPADM

## 2021-10-23 RX ADMIN — IOHEXOL 85 ML: 350 INJECTION, SOLUTION INTRAVENOUS at 09:26

## 2021-10-23 RX ADMIN — INSULIN LISPRO 6 UNITS: 100 INJECTION, SOLUTION INTRAVENOUS; SUBCUTANEOUS at 21:13

## 2021-10-23 RX ADMIN — ASPIRIN 81 MG: 81 TABLET, COATED ORAL at 16:10

## 2021-10-23 RX ADMIN — ENOXAPARIN SODIUM 40 MG: 40 INJECTION SUBCUTANEOUS at 12:54

## 2021-10-23 RX ADMIN — TOPIRAMATE 200 MG: 100 TABLET, FILM COATED ORAL at 16:10

## 2021-10-23 RX ADMIN — GABAPENTIN 100 MG: 100 CAPSULE ORAL at 16:10

## 2021-10-23 RX ADMIN — FUROSEMIDE 20 MG: 10 INJECTION, SOLUTION INTRAMUSCULAR; INTRAVENOUS at 13:00

## 2021-10-23 RX ADMIN — GABAPENTIN 300 MG: 300 CAPSULE ORAL at 21:13

## 2021-10-23 RX ADMIN — INSULIN LISPRO 10 UNITS: 100 INJECTION, SOLUTION INTRAVENOUS; SUBCUTANEOUS at 15:35

## 2021-10-23 RX ADMIN — KETOROLAC TROMETHAMINE 15 MG: 30 INJECTION, SOLUTION INTRAMUSCULAR at 11:13

## 2021-10-23 RX ADMIN — INSULIN GLARGINE 20 UNITS: 100 INJECTION, SOLUTION SUBCUTANEOUS at 21:13

## 2021-10-23 RX ADMIN — Medication 3 MG: at 23:14

## 2021-10-24 PROBLEM — I95.1 ORTHOSTATIC HYPOTENSION: Status: ACTIVE | Noted: 2021-10-24

## 2021-10-24 LAB
ANION GAP SERPL CALCULATED.3IONS-SCNC: 9 MMOL/L (ref 4–13)
BUN SERPL-MCNC: 14 MG/DL (ref 6–20)
CALCIUM SERPL-MCNC: 8.7 MG/DL (ref 8.4–10.2)
CHLORIDE SERPL-SCNC: 100 MMOL/L (ref 96–108)
CO2 SERPL-SCNC: 28 MMOL/L (ref 22–33)
CREAT SERPL-MCNC: 0.67 MG/DL (ref 0.4–1.1)
ERYTHROCYTE [DISTWIDTH] IN BLOOD BY AUTOMATED COUNT: 13.1 % (ref 11.6–15.1)
GFR SERPL CREATININE-BSD FRML MDRD: 92 ML/MIN/1.73SQ M
GLUCOSE SERPL-MCNC: 151 MG/DL (ref 65–140)
GLUCOSE SERPL-MCNC: 155 MG/DL (ref 65–140)
GLUCOSE SERPL-MCNC: 157 MG/DL (ref 65–140)
GLUCOSE SERPL-MCNC: 218 MG/DL (ref 65–140)
GLUCOSE SERPL-MCNC: 229 MG/DL (ref 65–140)
HCT VFR BLD AUTO: 45.3 % (ref 34.8–46.1)
HGB BLD-MCNC: 14.7 G/DL (ref 11.5–15.4)
MCH RBC QN AUTO: 28.1 PG (ref 26.8–34.3)
MCHC RBC AUTO-ENTMCNC: 32.5 G/DL (ref 31.4–37.4)
MCV RBC AUTO: 87 FL (ref 82–98)
PLATELET # BLD AUTO: 153 THOUSANDS/UL (ref 149–390)
PMV BLD AUTO: 11.2 FL (ref 8.9–12.7)
POTASSIUM SERPL-SCNC: 3.9 MMOL/L (ref 3.5–5)
RBC # BLD AUTO: 5.24 MILLION/UL (ref 3.81–5.12)
SODIUM SERPL-SCNC: 137 MMOL/L (ref 133–145)
WBC # BLD AUTO: 4.92 THOUSAND/UL (ref 4.31–10.16)

## 2021-10-24 PROCEDURE — 99225 PR SBSQ OBSERVATION CARE/DAY 25 MINUTES: CPT | Performed by: INTERNAL MEDICINE

## 2021-10-24 PROCEDURE — 85027 COMPLETE CBC AUTOMATED: CPT | Performed by: INTERNAL MEDICINE

## 2021-10-24 PROCEDURE — 80048 BASIC METABOLIC PNL TOTAL CA: CPT | Performed by: INTERNAL MEDICINE

## 2021-10-24 PROCEDURE — 82948 REAGENT STRIP/BLOOD GLUCOSE: CPT

## 2021-10-24 RX ORDER — DOCUSATE SODIUM 100 MG/1
100 CAPSULE, LIQUID FILLED ORAL DAILY
COMMUNITY

## 2021-10-24 RX ORDER — SODIUM CHLORIDE 9 MG/ML
100 INJECTION, SOLUTION INTRAVENOUS CONTINUOUS
Status: DISPENSED | OUTPATIENT
Start: 2021-10-24 | End: 2021-10-24

## 2021-10-24 RX ORDER — DOCUSATE SODIUM 100 MG/1
100 CAPSULE, LIQUID FILLED ORAL DAILY
Status: DISCONTINUED | OUTPATIENT
Start: 2021-10-24 | End: 2021-10-25 | Stop reason: HOSPADM

## 2021-10-24 RX ORDER — LISINOPRIL 10 MG/1
10 TABLET ORAL DAILY
COMMUNITY
End: 2022-02-19 | Stop reason: HOSPADM

## 2021-10-24 RX ORDER — INSULIN GLARGINE 100 [IU]/ML
30 INJECTION, SOLUTION SUBCUTANEOUS
Status: DISCONTINUED | OUTPATIENT
Start: 2021-10-24 | End: 2021-10-25 | Stop reason: HOSPADM

## 2021-10-24 RX ORDER — CELECOXIB 200 MG/1
200 CAPSULE ORAL DAILY
COMMUNITY
End: 2022-02-01 | Stop reason: HOSPADM

## 2021-10-24 RX ORDER — CELECOXIB 100 MG/1
200 CAPSULE ORAL DAILY
Status: DISCONTINUED | OUTPATIENT
Start: 2021-10-24 | End: 2021-10-25 | Stop reason: HOSPADM

## 2021-10-24 RX ADMIN — Medication 3 MG: at 21:41

## 2021-10-24 RX ADMIN — CELECOXIB 200 MG: 100 CAPSULE ORAL at 12:38

## 2021-10-24 RX ADMIN — TOPIRAMATE 200 MG: 100 TABLET, FILM COATED ORAL at 08:47

## 2021-10-24 RX ADMIN — ACETAMINOPHEN 650 MG: 325 TABLET, FILM COATED ORAL at 21:40

## 2021-10-24 RX ADMIN — INSULIN GLARGINE 30 UNITS: 100 INJECTION, SOLUTION SUBCUTANEOUS at 21:45

## 2021-10-24 RX ADMIN — INSULIN LISPRO 2 UNITS: 100 INJECTION, SOLUTION INTRAVENOUS; SUBCUTANEOUS at 08:53

## 2021-10-24 RX ADMIN — SODIUM CHLORIDE 100 ML/HR: 0.9 INJECTION, SOLUTION INTRAVENOUS at 08:57

## 2021-10-24 RX ADMIN — GABAPENTIN 100 MG: 100 CAPSULE ORAL at 16:38

## 2021-10-24 RX ADMIN — TOPIRAMATE 200 MG: 100 TABLET, FILM COATED ORAL at 21:40

## 2021-10-24 RX ADMIN — ENOXAPARIN SODIUM 40 MG: 40 INJECTION SUBCUTANEOUS at 08:48

## 2021-10-24 RX ADMIN — INSULIN LISPRO 8 UNITS: 100 INJECTION, SOLUTION INTRAVENOUS; SUBCUTANEOUS at 21:41

## 2021-10-24 RX ADMIN — GABAPENTIN 100 MG: 100 CAPSULE ORAL at 08:47

## 2021-10-24 RX ADMIN — INSULIN LISPRO 2 UNITS: 100 INJECTION, SOLUTION INTRAVENOUS; SUBCUTANEOUS at 17:18

## 2021-10-24 RX ADMIN — GABAPENTIN 300 MG: 300 CAPSULE ORAL at 21:41

## 2021-10-24 RX ADMIN — INSULIN LISPRO 4 UNITS: 100 INJECTION, SOLUTION INTRAVENOUS; SUBCUTANEOUS at 12:38

## 2021-10-24 RX ADMIN — FLUOXETINE 60 MG: 20 CAPSULE ORAL at 08:47

## 2021-10-24 RX ADMIN — ASPIRIN 81 MG: 81 TABLET, COATED ORAL at 08:47

## 2021-10-24 RX ADMIN — LEVOTHYROXINE SODIUM 50 MCG: 50 TABLET ORAL at 05:18

## 2021-10-25 VITALS
BODY MASS INDEX: 53.92 KG/M2 | OXYGEN SATURATION: 94 % | WEIGHT: 293 LBS | HEIGHT: 62 IN | RESPIRATION RATE: 18 BRPM | HEART RATE: 75 BPM | TEMPERATURE: 98.2 F | DIASTOLIC BLOOD PRESSURE: 65 MMHG | SYSTOLIC BLOOD PRESSURE: 118 MMHG

## 2021-10-25 LAB
ATRIAL RATE: 76 BPM
GLUCOSE SERPL-MCNC: 119 MG/DL (ref 65–140)
GLUCOSE SERPL-MCNC: 142 MG/DL (ref 65–140)
GLUCOSE SERPL-MCNC: 182 MG/DL (ref 65–140)
P AXIS: 37 DEGREES
PR INTERVAL: 157 MS
QRS AXIS: -56 DEGREES
QRSD INTERVAL: 133 MS
QT INTERVAL: 405 MS
QTC INTERVAL: 459 MS
T WAVE AXIS: 58 DEGREES
VENTRICULAR RATE: 77 BPM

## 2021-10-25 PROCEDURE — 82948 REAGENT STRIP/BLOOD GLUCOSE: CPT

## 2021-10-25 PROCEDURE — 97167 OT EVAL HIGH COMPLEX 60 MIN: CPT

## 2021-10-25 PROCEDURE — 97163 PT EVAL HIGH COMPLEX 45 MIN: CPT

## 2021-10-25 PROCEDURE — 93010 ELECTROCARDIOGRAM REPORT: CPT | Performed by: INTERNAL MEDICINE

## 2021-10-25 PROCEDURE — 99217 PR OBSERVATION CARE DISCHARGE MANAGEMENT: CPT | Performed by: INTERNAL MEDICINE

## 2021-10-25 RX ORDER — INSULIN GLARGINE 100 [IU]/ML
36 INJECTION, SOLUTION SUBCUTANEOUS
Start: 2021-10-25

## 2021-10-25 RX ORDER — LEVOTHYROXINE SODIUM 0.05 MG/1
75 TABLET ORAL DAILY
Qty: 45 TABLET | Refills: 0
Start: 2021-10-25 | End: 2022-03-25

## 2021-10-25 RX ADMIN — INSULIN LISPRO 2 UNITS: 100 INJECTION, SOLUTION INTRAVENOUS; SUBCUTANEOUS at 12:26

## 2021-10-25 RX ADMIN — DOCUSATE SODIUM 100 MG: 100 CAPSULE, LIQUID FILLED ORAL at 08:34

## 2021-10-25 RX ADMIN — GABAPENTIN 100 MG: 100 CAPSULE ORAL at 08:35

## 2021-10-25 RX ADMIN — LEVOTHYROXINE SODIUM 50 MCG: 50 TABLET ORAL at 05:00

## 2021-10-25 RX ADMIN — TOPIRAMATE 200 MG: 100 TABLET, FILM COATED ORAL at 08:34

## 2021-10-25 RX ADMIN — ENOXAPARIN SODIUM 40 MG: 40 INJECTION SUBCUTANEOUS at 08:34

## 2021-10-25 RX ADMIN — GABAPENTIN 100 MG: 100 CAPSULE ORAL at 16:42

## 2021-10-25 RX ADMIN — FLUOXETINE 60 MG: 20 CAPSULE ORAL at 08:35

## 2021-10-25 RX ADMIN — ASPIRIN 81 MG: 81 TABLET, COATED ORAL at 08:35

## 2021-10-25 RX ADMIN — CELECOXIB 200 MG: 100 CAPSULE ORAL at 08:35

## 2021-11-18 ENCOUNTER — TELEPHONE (OUTPATIENT)
Dept: OBGYN CLINIC | Facility: HOSPITAL | Age: 66
End: 2021-11-18

## 2021-12-10 ENCOUNTER — OFFICE VISIT (OUTPATIENT)
Dept: OBGYN CLINIC | Facility: CLINIC | Age: 66
End: 2021-12-10
Payer: COMMERCIAL

## 2021-12-10 ENCOUNTER — APPOINTMENT (OUTPATIENT)
Dept: RADIOLOGY | Facility: AMBULARY SURGERY CENTER | Age: 66
End: 2021-12-10
Attending: ORTHOPAEDIC SURGERY
Payer: COMMERCIAL

## 2021-12-10 VITALS — BODY MASS INDEX: 53.92 KG/M2 | HEIGHT: 62 IN | WEIGHT: 293 LBS

## 2021-12-10 DIAGNOSIS — M16.51 POST-TRAUMATIC OSTEOARTHRITIS OF RIGHT HIP: Primary | ICD-10-CM

## 2021-12-10 DIAGNOSIS — M25.551 RIGHT HIP PAIN: ICD-10-CM

## 2021-12-10 PROBLEM — M16.11 PRIMARY OSTEOARTHRITIS OF ONE HIP, RIGHT: Status: ACTIVE | Noted: 2021-12-10

## 2021-12-10 PROCEDURE — 73502 X-RAY EXAM HIP UNI 2-3 VIEWS: CPT

## 2021-12-10 PROCEDURE — 99203 OFFICE O/P NEW LOW 30 MIN: CPT | Performed by: ORTHOPAEDIC SURGERY

## 2022-01-28 ENCOUNTER — APPOINTMENT (EMERGENCY)
Dept: RADIOLOGY | Facility: HOSPITAL | Age: 67
DRG: 292 | End: 2022-01-28
Payer: COMMERCIAL

## 2022-01-28 ENCOUNTER — HOSPITAL ENCOUNTER (INPATIENT)
Facility: HOSPITAL | Age: 67
LOS: 2 days | Discharge: HOME HEALTH | DRG: 292 | End: 2022-02-01
Attending: INTERNAL MEDICINE | Admitting: INTERNAL MEDICINE
Payer: COMMERCIAL

## 2022-01-28 DIAGNOSIS — R73.9 HYPERGLYCEMIA: ICD-10-CM

## 2022-01-28 DIAGNOSIS — R09.02 HYPOXIA: ICD-10-CM

## 2022-01-28 DIAGNOSIS — R06.02 SOB (SHORTNESS OF BREATH): Primary | ICD-10-CM

## 2022-01-28 PROBLEM — R06.00 DYSPNEA ON EXERTION: Status: ACTIVE | Noted: 2022-01-28

## 2022-01-28 PROBLEM — R06.09 DYSPNEA ON EXERTION: Status: ACTIVE | Noted: 2022-01-28

## 2022-01-28 LAB
2HR DELTA HS TROPONIN: 2 NG/L
ALBUMIN SERPL BCP-MCNC: 3.6 G/DL (ref 3.4–4.8)
ALP SERPL-CCNC: 102 U/L (ref 35–140)
ALT SERPL W P-5'-P-CCNC: 12 U/L (ref 5–54)
ANION GAP SERPL CALCULATED.3IONS-SCNC: 4 MMOL/L (ref 4–13)
AST SERPL W P-5'-P-CCNC: 14 U/L (ref 15–41)
ATRIAL RATE: 80 BPM
BASOPHILS # BLD AUTO: 0.02 THOUSANDS/ΜL (ref 0–0.1)
BASOPHILS NFR BLD AUTO: 0 % (ref 0–1)
BILIRUB SERPL-MCNC: 0.39 MG/DL (ref 0.3–1.2)
BNP SERPL-MCNC: 106.5 PG/ML (ref 1–100)
BUN SERPL-MCNC: 18 MG/DL (ref 6–20)
CALCIUM SERPL-MCNC: 8.7 MG/DL (ref 8.4–10.2)
CARDIAC TROPONIN I PNL SERPL HS: 10 NG/L
CARDIAC TROPONIN I PNL SERPL HS: 12 NG/L
CHLORIDE SERPL-SCNC: 98 MMOL/L (ref 96–108)
CO2 SERPL-SCNC: 31 MMOL/L (ref 22–33)
CREAT SERPL-MCNC: 0.67 MG/DL (ref 0.4–1.1)
D DIMER PPP FEU-MCNC: 0.45 UG/ML FEU
EOSINOPHIL # BLD AUTO: 0.14 THOUSAND/ΜL (ref 0–0.61)
EOSINOPHIL NFR BLD AUTO: 3 % (ref 0–6)
ERYTHROCYTE [DISTWIDTH] IN BLOOD BY AUTOMATED COUNT: 12.7 % (ref 11.6–15.1)
FLUAV RNA RESP QL NAA+PROBE: NEGATIVE
FLUBV RNA RESP QL NAA+PROBE: NEGATIVE
GFR SERPL CREATININE-BSD FRML MDRD: 91 ML/MIN/1.73SQ M
GLUCOSE SERPL-MCNC: 225 MG/DL (ref 65–140)
GLUCOSE SERPL-MCNC: 253 MG/DL (ref 65–140)
GLUCOSE SERPL-MCNC: 374 MG/DL (ref 65–140)
HCT VFR BLD AUTO: 44.6 % (ref 34.8–46.1)
HGB BLD-MCNC: 14.7 G/DL (ref 11.5–15.4)
IMM GRANULOCYTES # BLD AUTO: 0.03 THOUSAND/UL (ref 0–0.2)
IMM GRANULOCYTES NFR BLD AUTO: 1 % (ref 0–2)
LYMPHOCYTES # BLD AUTO: 1.15 THOUSANDS/ΜL (ref 0.6–4.47)
LYMPHOCYTES NFR BLD AUTO: 22 % (ref 14–44)
MCH RBC QN AUTO: 28.9 PG (ref 26.8–34.3)
MCHC RBC AUTO-ENTMCNC: 33 G/DL (ref 31.4–37.4)
MCV RBC AUTO: 88 FL (ref 82–98)
MONOCYTES # BLD AUTO: 0.32 THOUSAND/ΜL (ref 0.17–1.22)
MONOCYTES NFR BLD AUTO: 6 % (ref 4–12)
NEUTROPHILS # BLD AUTO: 3.68 THOUSANDS/ΜL (ref 1.85–7.62)
NEUTS SEG NFR BLD AUTO: 68 % (ref 43–75)
NRBC BLD AUTO-RTO: 0 /100 WBCS
P AXIS: 53 DEGREES
PLATELET # BLD AUTO: 173 THOUSANDS/UL (ref 149–390)
PMV BLD AUTO: 10.6 FL (ref 8.9–12.7)
POTASSIUM SERPL-SCNC: 4.3 MMOL/L (ref 3.5–5)
PR INTERVAL: 140 MS
PROT SERPL-MCNC: 7.2 G/DL (ref 6.4–8.3)
QRS AXIS: -59 DEGREES
QRSD INTERVAL: 137 MS
QT INTERVAL: 404 MS
QTC INTERVAL: 466 MS
RBC # BLD AUTO: 5.08 MILLION/UL (ref 3.81–5.12)
RSV RNA RESP QL NAA+PROBE: NEGATIVE
SARS-COV-2 RNA RESP QL NAA+PROBE: NEGATIVE
SODIUM SERPL-SCNC: 133 MMOL/L (ref 133–145)
T WAVE AXIS: 71 DEGREES
TSH SERPL DL<=0.05 MIU/L-ACNC: 5.82 UIU/ML (ref 0.34–5.6)
VENTRICULAR RATE: 80 BPM
WBC # BLD AUTO: 5.34 THOUSAND/UL (ref 4.31–10.16)

## 2022-01-28 PROCEDURE — 85025 COMPLETE CBC W/AUTO DIFF WBC: CPT | Performed by: PHYSICIAN ASSISTANT

## 2022-01-28 PROCEDURE — 84484 ASSAY OF TROPONIN QUANT: CPT | Performed by: PHYSICIAN ASSISTANT

## 2022-01-28 PROCEDURE — 99285 EMERGENCY DEPT VISIT HI MDM: CPT | Performed by: PHYSICIAN ASSISTANT

## 2022-01-28 PROCEDURE — 99219 PR INITIAL OBSERVATION CARE/DAY 50 MINUTES: CPT | Performed by: INTERNAL MEDICINE

## 2022-01-28 PROCEDURE — 99285 EMERGENCY DEPT VISIT HI MDM: CPT

## 2022-01-28 PROCEDURE — 84443 ASSAY THYROID STIM HORMONE: CPT | Performed by: PHYSICIAN ASSISTANT

## 2022-01-28 PROCEDURE — 36415 COLL VENOUS BLD VENIPUNCTURE: CPT | Performed by: PHYSICIAN ASSISTANT

## 2022-01-28 PROCEDURE — 83880 ASSAY OF NATRIURETIC PEPTIDE: CPT | Performed by: PHYSICIAN ASSISTANT

## 2022-01-28 PROCEDURE — 71045 X-RAY EXAM CHEST 1 VIEW: CPT

## 2022-01-28 PROCEDURE — 82948 REAGENT STRIP/BLOOD GLUCOSE: CPT

## 2022-01-28 PROCEDURE — 93010 ELECTROCARDIOGRAM REPORT: CPT | Performed by: INTERNAL MEDICINE

## 2022-01-28 PROCEDURE — 96360 HYDRATION IV INFUSION INIT: CPT

## 2022-01-28 PROCEDURE — 96372 THER/PROPH/DIAG INJ SC/IM: CPT

## 2022-01-28 PROCEDURE — 85379 FIBRIN DEGRADATION QUANT: CPT | Performed by: PHYSICIAN ASSISTANT

## 2022-01-28 PROCEDURE — 0241U HB NFCT DS VIR RESP RNA 4 TRGT: CPT | Performed by: PHYSICIAN ASSISTANT

## 2022-01-28 PROCEDURE — 93005 ELECTROCARDIOGRAM TRACING: CPT

## 2022-01-28 PROCEDURE — 80053 COMPREHEN METABOLIC PANEL: CPT | Performed by: PHYSICIAN ASSISTANT

## 2022-01-28 RX ORDER — FUROSEMIDE 10 MG/ML
20 INJECTION INTRAMUSCULAR; INTRAVENOUS ONCE
Status: COMPLETED | OUTPATIENT
Start: 2022-01-28 | End: 2022-01-28

## 2022-01-28 RX ORDER — INSULIN GLARGINE 100 [IU]/ML
38 INJECTION, SOLUTION SUBCUTANEOUS
Status: DISCONTINUED | OUTPATIENT
Start: 2022-01-28 | End: 2022-01-30

## 2022-01-28 RX ORDER — ACETAMINOPHEN 325 MG/1
650 TABLET ORAL EVERY 6 HOURS PRN
Status: DISCONTINUED | OUTPATIENT
Start: 2022-01-28 | End: 2022-02-01 | Stop reason: HOSPADM

## 2022-01-28 RX ORDER — LEVOTHYROXINE SODIUM 0.07 MG/1
75 TABLET ORAL DAILY
Status: DISCONTINUED | OUTPATIENT
Start: 2022-01-29 | End: 2022-02-01 | Stop reason: HOSPADM

## 2022-01-28 RX ORDER — GABAPENTIN 100 MG/1
100 CAPSULE ORAL
Status: DISCONTINUED | OUTPATIENT
Start: 2022-01-29 | End: 2022-02-01 | Stop reason: HOSPADM

## 2022-01-28 RX ORDER — IBUPROFEN 400 MG/1
400 TABLET ORAL ONCE
Status: COMPLETED | OUTPATIENT
Start: 2022-01-28 | End: 2022-01-29

## 2022-01-28 RX ORDER — TOPIRAMATE 100 MG/1
200 TABLET, FILM COATED ORAL DAILY
Status: DISCONTINUED | OUTPATIENT
Start: 2022-01-29 | End: 2022-02-01 | Stop reason: HOSPADM

## 2022-01-28 RX ORDER — DOCUSATE SODIUM 100 MG/1
100 CAPSULE, LIQUID FILLED ORAL DAILY
Status: DISCONTINUED | OUTPATIENT
Start: 2022-01-29 | End: 2022-02-01 | Stop reason: HOSPADM

## 2022-01-28 RX ORDER — GABAPENTIN 300 MG/1
300 CAPSULE ORAL
Status: DISCONTINUED | OUTPATIENT
Start: 2022-01-28 | End: 2022-02-01 | Stop reason: HOSPADM

## 2022-01-28 RX ORDER — LISINOPRIL 10 MG/1
10 TABLET ORAL DAILY
Status: DISCONTINUED | OUTPATIENT
Start: 2022-01-29 | End: 2022-02-01 | Stop reason: HOSPADM

## 2022-01-28 RX ORDER — CELECOXIB 100 MG/1
200 CAPSULE ORAL DAILY
Status: DISCONTINUED | OUTPATIENT
Start: 2022-01-29 | End: 2022-01-31

## 2022-01-28 RX ORDER — IBUPROFEN 600 MG/1
600 TABLET ORAL ONCE
Status: COMPLETED | OUTPATIENT
Start: 2022-01-28 | End: 2022-01-28

## 2022-01-28 RX ORDER — ACETAMINOPHEN 325 MG/1
650 TABLET ORAL EVERY 6 HOURS PRN
Status: DISCONTINUED | OUTPATIENT
Start: 2022-01-28 | End: 2022-01-28 | Stop reason: SDUPTHER

## 2022-01-28 RX ORDER — FLUOXETINE HYDROCHLORIDE 20 MG/1
60 CAPSULE ORAL DAILY
Status: DISCONTINUED | OUTPATIENT
Start: 2022-01-29 | End: 2022-02-01 | Stop reason: HOSPADM

## 2022-01-28 RX ADMIN — ACETAMINOPHEN 650 MG: 325 TABLET, FILM COATED ORAL at 23:35

## 2022-01-28 RX ADMIN — GABAPENTIN 300 MG: 300 CAPSULE ORAL at 21:16

## 2022-01-28 RX ADMIN — IBUPROFEN 600 MG: 600 TABLET ORAL at 16:20

## 2022-01-28 RX ADMIN — SODIUM CHLORIDE 500 ML: 0.9 INJECTION, SOLUTION INTRAVENOUS at 15:56

## 2022-01-28 RX ADMIN — INSULIN HUMAN 8 UNITS: 100 INJECTION, SOLUTION PARENTERAL at 15:56

## 2022-01-28 RX ADMIN — INSULIN LISPRO 8 UNITS: 100 INJECTION, SOLUTION INTRAVENOUS; SUBCUTANEOUS at 21:16

## 2022-01-28 RX ADMIN — FUROSEMIDE 20 MG: 10 INJECTION, SOLUTION INTRAMUSCULAR; INTRAVENOUS at 21:16

## 2022-01-28 RX ADMIN — INSULIN GLARGINE 38 UNITS: 100 INJECTION, SOLUTION SUBCUTANEOUS at 21:16

## 2022-01-28 NOTE — ASSESSMENT & PLAN NOTE
Unclear etiology, COVID tested negative  No prior history of heart failure or COPD  Has not smoked in the past   No clear evidence of pneumonia on the chest x-ray  BNP is high normal   Will give trial of Lasix 20 mg IV 1 time    Monitor patient overnight on telemetry

## 2022-01-28 NOTE — ED NOTES
Pox down to 90% on RA when ambulating approx 50 ft to BR and back with use of walker       Lorraine Lott RN  01/28/22 [id number]

## 2022-01-28 NOTE — ASSESSMENT & PLAN NOTE
Likely contributing to her exertional shortness of breath, patient also has history of sleep apnea, does not use CPAP

## 2022-01-28 NOTE — H&P
Andres 1955, 78 y o  female MRN: [id number]  Unit/Bed#: ED OVR 23 Encounter: [id number]  Primary Care Provider: Maritza Blanco MD   Date and time admitted to hospital: 1/28/2022  1:48 PM    * Dyspnea on exertion  Assessment & Plan  Unclear etiology, COVID tested negative  No prior history of heart failure or COPD  Has not smoked in the past   No clear evidence of pneumonia on the chest x-ray  BNP is high normal   Will give trial of Lasix 20 mg IV 1 time  Monitor patient overnight on telemetry    Seizures [facility])  Assessment & Plan  Continue Topamax, patient states she takes 200 mg once daily  Morbid obesity due to excess calories [facility])  Assessment & Plan  Likely contributing to her exertional shortness of breath, patient also has history of sleep apnea, does not use CPAP  Diabetes mellitus [facility])  Assessment & Plan  Lab Results   Component Value Date    HGBA1C 11 5 (H) 03/03/2021       Recent Labs     01/28/22  1742   POCGLU 253*       Blood Sugar Average: Last 72 hrs:  (P) 253     Continue Lantus, hold rest of the medications, give sliding scale insulin  Hypertension  Assessment & Plan  Continue lisinopril  VTE Prophylaxis: Enoxaparin (Lovenox)  / sequential compression device   Code Status:  CPR is okay, no intubation  POLST: There is no POLST form on file for this patient (pre-hospital)  Discussion with family:  None    Anticipated Length of Stay:  Patient will be admitted on an Observation basis with an anticipated length of stay of  less 2 midnights  Justification for Hospital Stay:  Exertion shortness of breath    Total Time for Visit, including Counseling / Coordination of Care: 60 minutes  Greater than 50% of this total time spent on direct patient counseling and coordination of care      Chief Complaint:   Shortness of breath    History of Present Illness:    Veronika Rubin is a 78 y o  female who presents with past medical history of morbid obesity, hypertension, diabetes, seizure disorder comes with complaints of shortness of breath with exertion  As per the patient her symptoms started 2 days ago, gradually getting worse, she states she usually has some shortness of breath with exertion however it is much worse now  Also had discomfort in the chest 2 days ago, currently does not have any chest pain  Denies any fever, chills however complains of generalized weakness and myalgias  Denies any nausea, vomiting, abdominal pain  Denies any urinary or bowel complaints  Patient states she lives in independent living facility, lot of other residents have [facility]  X-ray does not show any acute findings, patient has trace edema, high normal BNP  She becomes mildly hypoxic low 90s upon exertion, at rest she saturates up to 96%  D-dimer 0 45  Unlikely PE  Had some chest discomfort 2 days ago, troponins have been negative in the ED  Aniyah Del Castillo Review of Systems:    More than 10 system review of systems was performed, pertinent positive and negative findings mentioned as per history present illness  Past Medical and Surgical History:     Past Medical History:   Diagnosis Date    Diabetes mellitus ([facility] [address] )     Disease of thyroid gland     Hyperlipidemia     Hypertension     Hypothyroidism (acquired)     Restless leg syndrome     Seizures (HCC)        Past Surgical History:   Procedure Laterality Date    APPENDECTOMY      CHOLECYSTECTOMY      HIP FRACTURE SURGERY Right     TONSILLECTOMY      TUBAL LIGATION         Meds/Allergies:    Prior to Admission medications    Medication Sig Start Date End Date Taking? Authorizing Provider   acetaminophen (TYLENOL) 325 mg tablet Take 650 mg by mouth every 6 (six) hours as needed for mild pain (pain score 1-3)  Historical Provider, MD   Alogliptin Benzoate 25 MG TABS Take 25 mg by mouth daily    Historical Provider, MD   aspirin (Aspirin 81) 81 mg EC tablet Take 81 mg by mouth daily    Patient not taking: Reported on 10/23/2021    Historical Provider, MD   celecoxib (CeleBREX) 200 mg capsule Take 200 mg by mouth daily    Historical Provider, MD   Cholecalciferol 25 MCG (1000 UT) capsule Take 1,000 Units by mouth daily  Historical Provider, MD   docusate sodium (COLACE) 100 mg capsule Take 100 mg by mouth daily    Historical Provider, MD   Empagliflozin 25 MG TABS Take 25 mg by mouth every morning    Historical Provider, MD   famotidine (PEPCID) 20 mg tablet Take 1 tablet (20 mg total) by mouth 2 (two) times a day  Patient not taking: Reported on 10/23/2021 3/9/21   Tenisha Claudio DO   FLUoxetine (PROzac) 20 mg capsule Take 3 capsules (60 mg total) by mouth daily 3/10/21   Tenisha Claudio DO   Fluoxetine HCl, PMDD, 20 MG TABS Take 20 mg by mouth daily    Historical Provider, MD   gabapentin (NEURONTIN) 100 mg capsule Take 1 capsule (100 mg total) by mouth 2 (two) times a day Take 100 mg in the morning and 100 mg mid afternoon 3/30/21   Betty Jensen MD   gabapentin (NEURONTIN) 300 mg capsule Take 1 capsule (300 mg total) by mouth daily at bedtime 7/10/18   Deborrah Linear, SAMIA   insulin aspart, w/niacinamide, (Fiasp FlexTouch) 100 Units/mL injection pen Inject under the skin 3 (three) times a day before meals Indications: per sliding scale  Historical Provider, MD   insulin glargine (LANTUS) 100 units/mL subcutaneous injection Inject 36 Units under the skin daily at bedtime 10/25/21   Kuldip Posey MD   levothyroxine 50 mcg tablet Take 1 5 tablets (75 mcg total) by mouth daily 10/25/21 11/24/21  Kuldip Posey MD   lisinopril (ZESTRIL) 10 mg tablet Take 10 mg by mouth daily    Historical Provider, MD   topiramate (TOPAMAX) 100 mg tablet Take 200 mg by mouth 2 (two) times a day     Historical Provider, MD JUNIOR have reviewed home medications using allscripts  Allergies:    Allergies   Allergen Reactions    Pineapple - Food Allergy Anaphylaxis    Statins Other (See Comments) Cramping from head to toe     Byetta 10 Mcg Pen [Exenatide]     Marijuana [Dronabinol] GI Intolerance    Metformin Diarrhea     Other reaction(s): Unknown    Omeprazole Hives    Ondansetron      Other reaction(s): Unknown    Coconut Oil - Food Allergy Rash    Liraglutide Rash and Other (See Comments)     Generalized rash    Penicillins Rash       Social History:     Marital Status: Single     Social History     Substance and Sexual Activity   Alcohol Use Yes    Comment: occasional     Social History     Tobacco Use   Smoking Status Never Smoker   Smokeless Tobacco Never Used   Tobacco Comment    not a smoker     Social History     Substance and Sexual Activity   Drug Use Never       Family History:    non-contributory    Physical Exam:     Vitals:   Blood Pressure: 147/67 (01/28/22 1625)  Pulse: 81 (01/28/22 1625)  Temperature: 97 8 °F (36 6 °C) (01/28/22 1625)  Temp Source: Oral (01/28/22 1625)  Respirations: (!) 24 (01/28/22 1625)  Height: 5' 2" (157 5 cm) (01/28/22 1350)  Weight - Scale: (!) 136 kg (300 lb 4 3 oz) (01/28/22 1350)  SpO2: 94 % (01/28/22 1625)    Physical Exam     General appearance:  Patient not in acute distress, morbidly obese  Eyes:  Pupils equal reacting to light  ENT:  Moist oral mucous membranes  CVS:  S1-S2 heard, regular rate and rhythm, 1+ pitting edema  Chest:  Bilateral air entry present, clear to auscultation  Abdomen:  Soft, nontender, bowel sounds present  CNS:  No focal neurological deficits  Genitourinary: deferred  Skin:  No acute rash, chronic skin changes bilateral lower extremities  psychiatric:  No psychosis  Musculoskeletal:  No joint deformities      Additional Data:     Lab Results: I have personally reviewed pertinent reports        Results from last 7 days   Lab Units 01/28/22  1431   WBC Thousand/uL 5 34   HEMOGLOBIN g/dL 14 7   HEMATOCRIT % 44 6   PLATELETS Thousands/uL 173   NEUTROS PCT % 68   LYMPHS PCT % 22   MONOS PCT % 6   EOS PCT % 3     Results from last 7 days   Lab Units 01/28/22  1431   SODIUM mmol/L 133   POTASSIUM mmol/L 4 3   CHLORIDE mmol/L 98   CO2 mmol/L 31   BUN mg/dL 18   CREATININE mg/dL 0 67   ANION GAP mmol/L 4   CALCIUM mg/dL 8 7   ALBUMIN g/dL 3 6   TOTAL BILIRUBIN mg/dL 0 39   ALK PHOS U/L 102 0   ALT U/L 12   AST U/L 14*   GLUCOSE RANDOM mg/dL 374*         Results from last 7 days   Lab Units 01/28/22  1742   POC GLUCOSE mg/dl 253*               Imaging: I have personally reviewed pertinent reports  XR chest 1 view portable   Final Result by Stefan Cadet MD (01/28 3469)   No acute cardiopulmonary disease  Findings are stable            Workstation performed: [id number]             EKG, Pathology, and Other Studies Reviewed on Admission:   · EKG:  Sinus rhythm    Allscripts / Epic Records Reviewed: Yes     ** Please Note: This note has been constructed using a voice recognition system   **

## 2022-01-28 NOTE — ED PROVIDER NOTES
History  Chief Complaint   Patient presents with    Shortness of Breath     Pt presents to ED via EMS from home w/ sob  Pt normally sob but worsening  Pt told to come to ED by PCP to be evaluated for CHF  Pt (+) hx DM  Pt with Past Medical History: DM, Hyperlipidemia, HTN, Hypothyroidism (acquired), Restless leg syndrome, Seizures, GERD, Major depressive disorder,   Past Surgical History: APPENDECTOMY, CHOLECYSTECTOMY, Right HIP FRACTURE SURGERY, TONSILLECTOMY, TUBAL LIGATION    Presents to ED c/o 2 day history of generalized malaise, myalgias, diffuse intermittent headaches, cough, shortness of breath worse with exertion, worse when lying flat  Positive recent COVID contacts  Patient denies fever, no chest pain, no abdominal pain, no NVD, no bowel complaints, no urinary symptoms, no rash            Prior to Admission Medications   Prescriptions Last Dose Informant Patient Reported? Taking? Alogliptin Benzoate 25 MG TABS   Yes No   Sig: Take 25 mg by mouth daily   Cholecalciferol 25 MCG (1000 UT) capsule   Yes No   Sig: Take 1,000 Units by mouth daily  Empagliflozin 25 MG TABS   Yes No   Sig: Take 25 mg by mouth every morning   FLUoxetine (PROzac) 20 mg capsule   No No   Sig: Take 3 capsules (60 mg total) by mouth daily   Fluoxetine HCl, PMDD, 20 MG TABS   Yes No   Sig: Take 20 mg by mouth daily   acetaminophen (TYLENOL) 325 mg tablet   Yes No   Sig: Take 650 mg by mouth every 6 (six) hours as needed for mild pain (pain score 1-3)  aspirin (Aspirin 81) 81 mg EC tablet   Yes No   Sig: Take 81 mg by mouth daily     Patient not taking: Reported on 10/23/2021   celecoxib (CeleBREX) 200 mg capsule   Yes No   Sig: Take 200 mg by mouth daily   docusate sodium (COLACE) 100 mg capsule   Yes No   Sig: Take 100 mg by mouth daily   famotidine (PEPCID) 20 mg tablet   No No   Sig: Take 1 tablet (20 mg total) by mouth 2 (two) times a day   Patient not taking: Reported on 10/23/2021   gabapentin (NEURONTIN) 100 mg capsule   No No   Sig: Take 1 capsule (100 mg total) by mouth 2 (two) times a day Take 100 mg in the morning and 100 mg mid afternoon   gabapentin (NEURONTIN) 300 mg capsule   No No   Sig: Take 1 capsule (300 mg total) by mouth daily at bedtime   insulin aspart, w/niacinamide, (Fiasp FlexTouch) 100 Units/mL injection pen   Yes No   Sig: Inject under the skin 3 (three) times a day before meals Indications: per sliding scale  insulin glargine (LANTUS) 100 units/mL subcutaneous injection   No No   Sig: Inject 36 Units under the skin daily at bedtime   levothyroxine 50 mcg tablet   No No   Sig: Take 1 5 tablets (75 mcg total) by mouth daily   lisinopril (ZESTRIL) 10 mg tablet   Yes No   Sig: Take 10 mg by mouth daily   topiramate (TOPAMAX) 100 mg tablet   Yes No   Sig: Take 200 mg by mouth 2 (two) times a day       Facility-Administered Medications: None       Past Medical History:   Diagnosis Date    Diabetes mellitus ([facility] [address] )     Disease of thyroid gland     Hyperlipidemia     Hypertension     Hypothyroidism (acquired)     Restless leg syndrome     Seizures (HCC)        Past Surgical History:   Procedure Laterality Date    APPENDECTOMY      CHOLECYSTECTOMY      HIP FRACTURE SURGERY Right     TONSILLECTOMY      TUBAL LIGATION         Family History   Problem Relation Age of Onset    Seizures Father      I have reviewed and agree with the history as documented  E-Cigarette/Vaping    E-Cigarette Use Never User      E-Cigarette/Vaping Substances    Nicotine No     THC No     CBD No     Flavoring No     Other No     Unknown No      Social History     Tobacco Use    Smoking status: Never Smoker    Smokeless tobacco: Never Used    Tobacco comment: not a smoker   Vaping Use    Vaping Use: Never used   Substance Use Topics    Alcohol use: Yes     Comment: occasional    Drug use: Never       Review of Systems   Constitutional: Positive for activity change and chills  Negative for fever     HENT: Negative for congestion, hearing loss, rhinorrhea, sore throat and trouble swallowing  Eyes: Negative for visual disturbance  Respiratory: Positive for cough and shortness of breath  Cardiovascular: Negative for chest pain and leg swelling  Gastrointestinal: Negative for abdominal pain, constipation, diarrhea and vomiting  Genitourinary: Negative for dysuria, flank pain, frequency, hematuria, vaginal bleeding and vaginal discharge  Musculoskeletal: Positive for myalgias  Negative for arthralgias  Skin: Negative for pallor and rash  Neurological: Positive for weakness and headaches  Negative for dizziness  Psychiatric/Behavioral: Negative for behavioral problems  All other systems reviewed and are negative  Physical Exam  Physical Exam  Vitals and nursing note reviewed  Constitutional:       General: She is in acute distress  Appearance: She is well-developed  She is obese  HENT:      Head: Normocephalic and atraumatic  Right Ear: External ear normal       Left Ear: External ear normal       Nose: Nose normal       Mouth/Throat:      Mouth: Mucous membranes are moist       Pharynx: Oropharynx is clear  Eyes:      Conjunctiva/sclera: Conjunctivae normal    Cardiovascular:      Rate and Rhythm: Normal rate and regular rhythm  Heart sounds: Murmur heard  Pulmonary:      Effort: Tachypnea and respiratory distress (mild) present  Breath sounds: No decreased breath sounds  Chest:      Chest wall: No mass or tenderness  Abdominal:      General: Bowel sounds are normal       Palpations: Abdomen is soft  Tenderness: There is no abdominal tenderness  Musculoskeletal:         General: Normal range of motion  Cervical back: Normal range of motion  Right lower leg: No edema  Left lower leg: No edema  Lymphadenopathy:      Cervical: No cervical adenopathy  Skin:     General: Skin is warm and dry  Findings: No rash     Neurological: General: No focal deficit present  Mental Status: She is alert and oriented to person, place, and time     Psychiatric:         Behavior: Behavior normal          Vital Signs  ED Triage Vitals   Temperature Pulse Respirations Blood Pressure SpO2   01/28/22 1350 01/28/22 1350 01/28/22 1350 01/28/22 1354 01/28/22 1350   98 °F (36 7 °C) 82 (!) 25 156/70 99 %      Temp Source Heart Rate Source Patient Position - Orthostatic VS BP Location FiO2 (%)   01/28/22 1449 01/28/22 1350 01/28/22 1449 01/28/22 1449 --   Oral Monitor Lying Left arm       Pain Score       01/28/22 1620       6           Vitals:    01/28/22 1350 01/28/22 1354 01/28/22 1449 01/28/22 1625   BP:  156/70 144/69 147/67   Pulse: 82  83 81   Patient Position - Orthostatic VS:   Lying Lying         Visual Acuity      ED Medications  Medications   acetaminophen (TYLENOL) tablet 650 mg (has no administration in time range)   enoxaparin (LOVENOX) subcutaneous injection 40 mg (has no administration in time range)   insulin lispro (HumaLOG) 100 units/mL subcutaneous injection 2-12 Units (has no administration in time range)   insulin lispro (HumaLOG) 100 units/mL subcutaneous injection 4-20 Units (has no administration in time range)   sodium chloride 0 9 % bolus 500 mL (0 mL Intravenous Stopped 1/28/22 1642)   insulin regular (HumuLIN R,NovoLIN R) injection 8 Units (8 Units Subcutaneous Given 1/28/22 1556)   ibuprofen (MOTRIN) tablet 600 mg (600 mg Oral Given 1/28/22 1620)       Diagnostic Studies  Results Reviewed     Procedure Component Value Units Date/Time    Fingerstick Glucose (POCT) [[id number]]  (Abnormal) Collected: 01/28/22 1742    Lab Status: Final result Updated: 01/28/22 1744     POC Glucose 253 mg/dl     HS Troponin I 2hr [[id number]]  (Normal) Collected: 01/28/22 1622    Lab Status: Final result Specimen: Blood from Arm, Left Updated: 01/28/22 1650     hs TnI 2hr 12 ng/L      Delta 2hr hsTnI 2 ng/L     B-Type Natriuretic Peptide(BNP) CA, GH, EA Campuses Only [[id number]]  (Abnormal) Collected: 01/28/22 1431    Lab Status: Final result Specimen: Blood from Arm, Left Updated: 01/28/22 1607      5 pg/mL     COVID/FLU/RSV - 2 hour TAT [[id number]]  (Normal) Collected: 01/28/22 1435    Lab Status: Final result Specimen: Nares from Nasopharyngeal Swab Updated: 01/28/22 1521     SARS-CoV-2 Negative     INFLUENZA A PCR Negative     INFLUENZA B PCR Negative     RSV PCR Negative    Narrative:      FOR PEDIATRIC PATIENTS - copy/paste COVID Guidelines URL to browser: [email or web address]  [employer]    SARS-CoV-2 assay is a Nucleic Acid Amplification assay intended for the  qualitative detection of nucleic acid from SARS-CoV-2 in nasopharyngeal  swabs  Results are for the presumptive identification of SARS-CoV-2 RNA  Positive results are indicative of infection with SARS-CoV-2, the virus  causing COVID-19, but do not rule out bacterial infection or co-infection  with other viruses  Laboratories within the [place] and its  territories are required to report all positive results to the appropriate  public health authorities  Negative results do not preclude SARS-CoV-2  infection and should not be used as the sole basis for treatment or other  patient management decisions  Negative results must be combined with  clinical observations, patient history, and epidemiological information  This test has not been FDA cleared or approved  This test has been authorized by FDA under an Emergency Use Authorization  (EUA)  This test is only authorized for the duration of time the  declaration that circumstances exist justifying the authorization of the  emergency use of an in vitro diagnostic tests for detection of SARS-CoV-2  virus and/or diagnosis of COVID-19 infection under section 564(b)(1) of  the Act, 21 U  S C  027RCN-1(Q)(0), unless the authorization is terminated  or revoked sooner   The test has been validated but independent review by FDA  and CLIA is pending  Test performed using [employer] GeneXpert: This RT-PCR assay targets N2,  a region unique to SARS-CoV-2  A conserved region in the E-gene was chosen  for pan-Sarbecovirus detection which includes SARS-CoV-2  TSH [[id number]]  (Abnormal) Collected: 01/28/22 1431    Lab Status: Final result Specimen: Blood from Arm, Left Updated: 01/28/22 1511     TSH 3RD GENERATON 5 821 uIU/mL     Narrative:      Patients undergoing fluorescein dye angiography may retain small amounts of fluorescein in the body for 48-72 hours post procedure  Samples containing fluorescein can produce falsely depressed TSH values  If the patient had this procedure,a specimen should be resubmitted post fluorescein clearance        HS Troponin 0hr (reflex protocol) [[id number]]  (Normal) Collected: 01/28/22 1431    Lab Status: Final result Specimen: Blood from Arm, Left Updated: 01/28/22 1502     hs TnI 0hr 10 ng/L     Comprehensive metabolic panel [[id number]]  (Abnormal) Collected: 01/28/22 1431    Lab Status: Final result Specimen: Blood from Arm, Left Updated: 01/28/22 1455     Sodium 133 mmol/L      Potassium 4 3 mmol/L      Chloride 98 mmol/L      CO2 31 mmol/L      ANION GAP 4 mmol/L      BUN 18 mg/dL      Creatinine 0 67 mg/dL      Glucose 374 mg/dL      Calcium 8 7 mg/dL      AST 14 U/L      ALT 12 U/L      Alkaline Phosphatase 102 0 U/L      Total Protein 7 2 g/dL      Albumin 3 6 g/dL      Total Bilirubin 0 39 mg/dL      eGFR 91 ml/min/1 73sq m     Narrative:      [place] guidelines for Chronic Kidney Disease (CKD):     Stage 1 with normal or high GFR (GFR > 90 mL/min/1 73 square meters)    Stage 2 Mild CKD (GFR = 60-89 mL/min/1 73 square meters)    Stage 3A Moderate CKD (GFR = 45-59 mL/min/1 73 square meters)    Stage 3B Moderate CKD (GFR = 30-44 mL/min/1 73 square meters)    Stage 4 Severe CKD (GFR = 15-29 mL/min/1 73 square meters)    Stage 5 End Stage CKD (GFR <15 mL/min/1 73 square meters)  Note: GFR calculation is accurate only with a steady state creatinine    D-Dimer [[id number]]  (Normal) Collected: 01/28/22 1431    Lab Status: Final result Specimen: Blood from Arm, Left Updated: 01/28/22 1449     D-Dimer, Quant 0 45 ug/ml FEU     CBC and differential [[id number]] Collected: 01/28/22 1431    Lab Status: Final result Specimen: Blood from Arm, Left Updated: 01/28/22 1436     WBC 5 34 Thousand/uL      RBC 5 08 Million/uL      Hemoglobin 14 7 g/dL      Hematocrit 44 6 %      MCV 88 fL      MCH 28 9 pg      MCHC 33 0 g/dL      RDW 12 7 %      MPV 10 6 fL      Platelets 065 Thousands/uL      nRBC 0 /100 WBCs      Neutrophils Relative 68 %      Immat GRANS % 1 %      Lymphocytes Relative 22 %      Monocytes Relative 6 %      Eosinophils Relative 3 %      Basophils Relative 0 %      Neutrophils Absolute 3 68 Thousands/µL      Immature Grans Absolute 0 03 Thousand/uL      Lymphocytes Absolute 1 15 Thousands/µL      Monocytes Absolute 0 32 Thousand/µL      Eosinophils Absolute 0 14 Thousand/µL      Basophils Absolute 0 02 Thousands/µL                  XR chest 1 view portable   Final Result by Vida Galdamez MD (01/28 1540)   No acute cardiopulmonary disease        Findings are stable            Workstation performed: [id number]                    Procedures  Procedures         ED Course  ED Course as of 01/28/22 1747 Fri Jan 28, 2022   1558 D-Dimer, Quant: 0 45   1558 SARS-COV-2: Negative   1558 WBC: 5 34   1613 Pt with hyperglycemia, treated in ED, TSH elevated in pt with hypothyroidism, labs otherwise unremarkable, cxr nad, ddimer normal, no covid   1011 Delta 2hr hsTnI: 2   1723 Ambulatory pOx 90%, pt tachypneic, sob, dizzy                                             MDM    Disposition  Final diagnoses:   SOB (shortness of breath)   Hypoxia   Hyperglycemia     Time reflects when diagnosis was documented in both MDM as applicable and the Disposition within this note     Time User Action Codes Description Comment    1/28/2022  5:30 PM Freya Sicks Add [R06 02] SOB (shortness of breath)     1/28/2022  5:30 PM Freya Sicks Add [R09 02] Hypoxia     1/28/2022  5:30 PM Freya Sicks Add [R73 9] Hyperglycemia       ED Disposition     ED Disposition Condition Date/Time Comment    Admit Stable Fri Jan 28, 2022  5:47 PM Case was discussed with Ml Harkins and the patient's admission status was agreed to be Admission Status: observation status to the service of Dr Ml Harkins   Follow-up Information    None         Patient's Medications   Discharge Prescriptions    No medications on file       No discharge procedures on file      PDMP Review       Value Time User    PDMP Reviewed  Yes 3/30/2021  3:19 PM Luke Sam MD          ED Provider  Electronically Signed by           Camille Velázquez PA-C  01/28/22 2152

## 2022-01-28 NOTE — ASSESSMENT & PLAN NOTE
Lab Results   Component Value Date    HGBA1C 11 5 (H) 03/03/2021       Recent Labs     01/28/22  1742   POCGLU 253*       Blood Sugar Average: Last 72 hrs:  (P) 253     Continue Lantus, hold rest of the medications, give sliding scale insulin

## 2022-01-29 LAB
ANION GAP SERPL CALCULATED.3IONS-SCNC: 6 MMOL/L (ref 4–13)
BASOPHILS # BLD AUTO: 0.03 THOUSANDS/ΜL (ref 0–0.1)
BASOPHILS NFR BLD AUTO: 1 % (ref 0–1)
BUN SERPL-MCNC: 17 MG/DL (ref 6–20)
CALCIUM SERPL-MCNC: 8.4 MG/DL (ref 8.4–10.2)
CHLORIDE SERPL-SCNC: 101 MMOL/L (ref 96–108)
CO2 SERPL-SCNC: 31 MMOL/L (ref 22–33)
CREAT SERPL-MCNC: 0.64 MG/DL (ref 0.4–1.1)
EOSINOPHIL # BLD AUTO: 0.17 THOUSAND/ΜL (ref 0–0.61)
EOSINOPHIL NFR BLD AUTO: 3 % (ref 0–6)
ERYTHROCYTE [DISTWIDTH] IN BLOOD BY AUTOMATED COUNT: 12.7 % (ref 11.6–15.1)
GFR SERPL CREATININE-BSD FRML MDRD: 92 ML/MIN/1.73SQ M
GLUCOSE P FAST SERPL-MCNC: 122 MG/DL (ref 70–105)
GLUCOSE SERPL-MCNC: 122 MG/DL (ref 65–140)
GLUCOSE SERPL-MCNC: 124 MG/DL (ref 65–140)
GLUCOSE SERPL-MCNC: 158 MG/DL (ref 65–140)
GLUCOSE SERPL-MCNC: 166 MG/DL (ref 65–140)
GLUCOSE SERPL-MCNC: 172 MG/DL (ref 65–140)
HCT VFR BLD AUTO: 42.9 % (ref 34.8–46.1)
HGB BLD-MCNC: 13.9 G/DL (ref 11.5–15.4)
IMM GRANULOCYTES # BLD AUTO: 0.03 THOUSAND/UL (ref 0–0.2)
IMM GRANULOCYTES NFR BLD AUTO: 1 % (ref 0–2)
LYMPHOCYTES # BLD AUTO: 1.32 THOUSANDS/ΜL (ref 0.6–4.47)
LYMPHOCYTES NFR BLD AUTO: 23 % (ref 14–44)
MCH RBC QN AUTO: 28.8 PG (ref 26.8–34.3)
MCHC RBC AUTO-ENTMCNC: 32.4 G/DL (ref 31.4–37.4)
MCV RBC AUTO: 89 FL (ref 82–98)
MONOCYTES # BLD AUTO: 0.44 THOUSAND/ΜL (ref 0.17–1.22)
MONOCYTES NFR BLD AUTO: 8 % (ref 4–12)
NEUTROPHILS # BLD AUTO: 3.84 THOUSANDS/ΜL (ref 1.85–7.62)
NEUTS SEG NFR BLD AUTO: 64 % (ref 43–75)
NRBC BLD AUTO-RTO: 0 /100 WBCS
PLATELET # BLD AUTO: 173 THOUSANDS/UL (ref 149–390)
PMV BLD AUTO: 10.9 FL (ref 8.9–12.7)
POTASSIUM SERPL-SCNC: 3.9 MMOL/L (ref 3.5–5)
RBC # BLD AUTO: 4.83 MILLION/UL (ref 3.81–5.12)
SODIUM SERPL-SCNC: 138 MMOL/L (ref 133–145)
TSH SERPL DL<=0.05 MIU/L-ACNC: 5.13 UIU/ML (ref 0.34–5.6)
WBC # BLD AUTO: 5.83 THOUSAND/UL (ref 4.31–10.16)

## 2022-01-29 PROCEDURE — 83036 HEMOGLOBIN GLYCOSYLATED A1C: CPT | Performed by: PHYSICIAN ASSISTANT

## 2022-01-29 PROCEDURE — 99225 PR SBSQ OBSERVATION CARE/DAY 25 MINUTES: CPT | Performed by: PHYSICIAN ASSISTANT

## 2022-01-29 PROCEDURE — 80048 BASIC METABOLIC PNL TOTAL CA: CPT | Performed by: INTERNAL MEDICINE

## 2022-01-29 PROCEDURE — 82948 REAGENT STRIP/BLOOD GLUCOSE: CPT

## 2022-01-29 PROCEDURE — 84443 ASSAY THYROID STIM HORMONE: CPT | Performed by: PHYSICIAN ASSISTANT

## 2022-01-29 PROCEDURE — 85025 COMPLETE CBC W/AUTO DIFF WBC: CPT | Performed by: INTERNAL MEDICINE

## 2022-01-29 RX ORDER — FUROSEMIDE 10 MG/ML
40 INJECTION INTRAMUSCULAR; INTRAVENOUS ONCE
Status: COMPLETED | OUTPATIENT
Start: 2022-01-29 | End: 2022-01-29

## 2022-01-29 RX ORDER — HYDRALAZINE HYDROCHLORIDE 20 MG/ML
10 INJECTION INTRAMUSCULAR; INTRAVENOUS EVERY 6 HOURS PRN
Status: DISCONTINUED | OUTPATIENT
Start: 2022-01-29 | End: 2022-02-01 | Stop reason: HOSPADM

## 2022-01-29 RX ADMIN — IBUPROFEN 400 MG: 400 TABLET, FILM COATED ORAL at 03:24

## 2022-01-29 RX ADMIN — INSULIN GLARGINE 38 UNITS: 100 INJECTION, SOLUTION SUBCUTANEOUS at 21:00

## 2022-01-29 RX ADMIN — GABAPENTIN 300 MG: 300 CAPSULE ORAL at 21:01

## 2022-01-29 RX ADMIN — FUROSEMIDE 40 MG: 10 INJECTION, SOLUTION INTRAMUSCULAR; INTRAVENOUS at 11:47

## 2022-01-29 RX ADMIN — GABAPENTIN 100 MG: 100 CAPSULE ORAL at 09:20

## 2022-01-29 RX ADMIN — TOPIRAMATE 200 MG: 100 TABLET, FILM COATED ORAL at 09:20

## 2022-01-29 RX ADMIN — LISINOPRIL 10 MG: 10 TABLET ORAL at 09:20

## 2022-01-29 RX ADMIN — INSULIN LISPRO 2 UNITS: 100 INJECTION, SOLUTION INTRAVENOUS; SUBCUTANEOUS at 12:22

## 2022-01-29 RX ADMIN — INSULIN LISPRO 2 UNITS: 100 INJECTION, SOLUTION INTRAVENOUS; SUBCUTANEOUS at 16:44

## 2022-01-29 RX ADMIN — ENOXAPARIN SODIUM 40 MG: 40 INJECTION SUBCUTANEOUS at 09:19

## 2022-01-29 RX ADMIN — LEVOTHYROXINE SODIUM 75 MCG: 75 TABLET ORAL at 09:20

## 2022-01-29 RX ADMIN — FLUOXETINE 60 MG: 20 CAPSULE ORAL at 09:19

## 2022-01-29 RX ADMIN — GABAPENTIN 100 MG: 100 CAPSULE ORAL at 11:46

## 2022-01-29 RX ADMIN — INSULIN LISPRO 4 UNITS: 100 INJECTION, SOLUTION INTRAVENOUS; SUBCUTANEOUS at 21:00

## 2022-01-29 NOTE — ASSESSMENT & PLAN NOTE
· Continue home lisinopril    · SBP elevated at 150s today, asymptomatic  · Will trial additional dose of Lasix today, consider increasing lisinopril if BP remains elevated

## 2022-01-29 NOTE — ASSESSMENT & PLAN NOTE
Lab Results   Component Value Date    HGBA1C 11 5 (H) 03/03/2021       Recent Labs     01/28/22  1742 01/28/22  1937 01/29/22  0755 01/29/22  1139   POCGLU 253* 225* 124 172*       Blood Sugar Average: Last 72 hrs:  (P) 193 5   · Check updated A1c  · Continue home Lantus and SSI while inpatient, blood glucose today stable

## 2022-01-29 NOTE — ASSESSMENT & PLAN NOTE
· Unclear etiology, COVID tested negative  · No prior history of heart failure or COPD  Has not smoked in the past   · Not previously on home oxygen  · No clear evidence of pneumonia on the chest x-ray  · BNP is high-normal at 106  5   · TSH slightly elevated at 5 821, will check T4  · Patient still with SOB, currently on 2-3 L   On examination with crackles on auscultation of lungs  · Given 1 time dose IV Lasix 20 mg yesterday, will trial 40 mg IV Lasix x1 today  · Obtain ECHO if patient remains inpatient, suspect possible underlying heart failure  · Monitor respiratory function

## 2022-01-29 NOTE — ASSESSMENT & PLAN NOTE
· Likely contributing to her exertional shortness of breath, patient also has history of sleep apnea, does not use CPAP    · Would benefit from referral to Sleep Medicine

## 2022-01-29 NOTE — PROGRESS NOTES
Brien 44  Progress Note - [place] Abdullahi 1955, 78 y o  female MRN: [id number]  Unit/Bed#: -Lynne Encounter: [id number]  Primary Care Provider: Fransico Roberts MD   Date and time admitted to hospital: 1/28/2022  1:48 PM    * Dyspnea on exertion  Assessment & Plan  · Unclear etiology, COVID tested negative  · No prior history of heart failure or COPD  Has not smoked in the past   · Not previously on home oxygen  · No clear evidence of pneumonia on the chest x-ray  · BNP is high-normal at 106  5   · TSH slightly elevated at 5 821, will check T4  · Patient still with SOB, currently on 2-3 L  On examination with crackles on auscultation of lungs  · Given 1 time dose IV Lasix 20 mg yesterday, will trial 40 mg IV Lasix x1 today  · Obtain ECHO if patient remains inpatient, suspect possible underlying heart failure  · Monitor respiratory function    Morbid obesity due to excess calories (HCC)  Assessment & Plan  · Likely contributing to her exertional shortness of breath, patient also has history of sleep apnea, does not use CPAP  · Would benefit from referral to Sleep Medicine    Diabetes mellitus [facility])  Assessment & Plan  Lab Results   Component Value Date    HGBA1C 11 5 (H) 03/03/2021       Recent Labs     01/28/22  1742 01/28/22  1937 01/29/22  0755 01/29/22  1139   POCGLU 253* 225* 124 172*       Blood Sugar Average: Last 72 hrs:  (P) 193 5   · Check updated A1c  · Continue home Lantus and SSI while inpatient, blood glucose today stable    Hypertension  Assessment & Plan  · Continue home lisinopril  · SBP elevated at 150s today, asymptomatic  · Will trial additional dose of Lasix today, consider increasing lisinopril if BP remains elevated    Seizures (HCC)  Assessment & Plan  · Continue Topamax, patient states she takes 200 mg once daily  VTE Pharmacologic Prophylaxis:   High Risk (Score >/= 5) - Pharmacological DVT Prophylaxis Ordered: enoxaparin (Lovenox)   Sequential Compression Devices Ordered  Patient Centered Rounds: I performed bedside rounds with nursing staff today  Discussions with Specialists or Other Care Team Provider:  None    Education and Discussions with Family / Patient: Patient declined call to   Time Spent for Care: 30 minutes  More than 50% of total time spent on counseling and coordination of care as described above  Current Length of Stay: 0 day(s)  Current Patient Status: Observation   Certification Statement: The patient will continue to require additional inpatient hospital stay due to Monitor respiratory function with diuresis  Discharge Plan: Anticipate discharge in 24-48 hrs to home  Code Status: Level 3 - DNAR and DNI    Subjective:   Patient is seen at bedside this a m , states that she continues to feel short of breath, especially with exertion, denies chest pain or palpitations at this time  Objective:     Vitals:   Temp (24hrs), Av 7 °F (36 5 °C), Min:97 3 °F (36 3 °C), Max:98 °F (36 7 °C)    Temp:  [97 3 °F (36 3 °C)-98 °F (36 7 °C)] 97 6 °F (36 4 °C)  HR:  [72-83] 73  Resp:  [20-25] 22  BP: (130-164)/(67-89) 130/72  SpO2:  [94 %-99 %] 96 %  Body mass index is 55 56 kg/m²  Input and Output Summary (last 24 hours): Intake/Output Summary (Last 24 hours) at 2022 1313  Last data filed at 2022 0601  Gross per 24 hour   Intake 500 ml   Output 400 ml   Net 100 ml       Physical Exam:   Physical Exam  Constitutional:       General: She is not in acute distress  Appearance: She is obese  She is not ill-appearing, toxic-appearing or diaphoretic  Cardiovascular:      Rate and Rhythm: Normal rate and regular rhythm  Pulses: Normal pulses  Heart sounds: Normal heart sounds  Pulmonary:      Effort: Pulmonary effort is normal  No respiratory distress  Breath sounds: Rales present        Comments: Rales across lung fields  Abdominal:      General: Bowel sounds are normal  There is no distension  Palpations: Abdomen is soft  Tenderness: There is no abdominal tenderness  Musculoskeletal:         General: Swelling present  No tenderness  Comments: Mild bilateral lower extremity nonpitting edema   Skin:     General: Skin is warm  Neurological:      General: No focal deficit present  Mental Status: She is alert and oriented to person, place, and time  Psychiatric:         Mood and Affect: Mood normal          Behavior: Behavior normal           Additional Data:     Labs:  Results from last 7 days   Lab Units 01/29/22  0350   WBC Thousand/uL 5 83   HEMOGLOBIN g/dL 13 9   HEMATOCRIT % 42 9   PLATELETS Thousands/uL 173   NEUTROS PCT % 64   LYMPHS PCT % 23   MONOS PCT % 8   EOS PCT % 3     Results from last 7 days   Lab Units 01/29/22  0350 01/28/22  1431 01/28/22  1431   SODIUM mmol/L 138   < > 133   POTASSIUM mmol/L 3 9   < > 4 3   CHLORIDE mmol/L 101   < > 98   CO2 mmol/L 31   < > 31   BUN mg/dL 17   < > 18   CREATININE mg/dL 0 64   < > 0 67   ANION GAP mmol/L 6   < > 4   CALCIUM mg/dL 8 4   < > 8 7   ALBUMIN g/dL  --   --  3 6   TOTAL BILIRUBIN mg/dL  --   --  0 39   ALK PHOS U/L  --   --  102 0   ALT U/L  --   --  12   AST U/L  --   --  14*   GLUCOSE RANDOM mg/dL 122   < > 374*    < > = values in this interval not displayed           Results from last 7 days   Lab Units 01/29/22  1139 01/29/22  0755 01/28/22  1937 01/28/22  1742   POC GLUCOSE mg/dl 172* 124 225* 253*               Lines/Drains:  Invasive Devices  Report    Peripheral Intravenous Line            Peripheral IV 01/28/22 Left Antecubital <1 day                  Imaging: Reviewed radiology reports from this admission including: chest xray    Recent Cultures (last 7 days):         Last 24 Hours Medication List:   Current Facility-Administered Medications   Medication Dose Route Frequency Provider Last Rate    acetaminophen  650 mg Oral Q6H PRN Edward Helm MD      celecoxib  200 mg Oral Daily Sherly ZEE Alonzo Spear MD      docusate sodium  100 mg Oral Daily Malcom Moses MD      enoxaparin  40 mg Subcutaneous Daily Malcom Moses MD      FLUoxetine  60 mg Oral Daily Malcom Moses MD      gabapentin  100 mg Oral BID before breakfast/lunch Malcom Moses MD      gabapentin  300 mg Oral HS Malcom Moses MD      hydrALAZINE  10 mg Intravenous Q6H PRN Jacque Sage PA-C      insulin glargine  38 Units Subcutaneous HS Malcom Moses MD      insulin lispro  2-12 Units Subcutaneous TID AC Malcom Moses MD      insulin lispro  4-20 Units Subcutaneous HS Malcom Moses MD      levothyroxine  75 mcg Oral Daily Malcom Moses MD      lisinopril  10 mg Oral Daily Malcom Moses MD      topiramate  200 mg Oral Daily Malcom Moses MD          Today, Patient Was Seen By: Edward Balbuena PA-C    **Please Note: This note may have been constructed using a voice recognition system  **

## 2022-01-29 NOTE — UTILIZATION REVIEW
Initial Clinical Review    Admission: Date/Time/Statement:   Admission Orders (From admission, onward)     Ordered        01/28/22 1743  Place in Observation  Once                      Orders Placed This Encounter   Procedures    Place in Observation     Standing Status:   Standing     Number of Occurrences:   1     Order Specific Question:   Level of Care     Answer:   Med Surg [16]     Order Specific Question:   Bed request comments     Answer:   telemetry     ED Arrival Information     Expected Arrival Acuity    - 1/28/2022 13:48 Urgent         Means of arrival Escorted by Service Admission type    Ambulance [facility] Urgent         Arrival complaint            Chief Complaint   Patient presents with    Shortness of Breath     Pt presents to ED via EMS from home w/ sob  Pt normally sob but worsening  Pt told to come to ED by PCP to be evaluated for CHF  Pt (+) hx DM  Initial Presentation: 78 y o  female who presents with past medical history of morbid obesity, hypertension, diabetes, seizure disorder comes with complaints of shortness of breath with exertion  As per the patient her symptoms started 2 days ago, gradually getting worse, she states she usually has some shortness of breath with exertion however it is much worse now  Also had discomfort in the chest 2 days ago, currently does not have any chest pain  Denies any fever, chills however complains of generalized weakness and myalgias  Denies any nausea, vomiting, abdominal pain  Denies any urinary or bowel complaints      Patient states she lives in independent living facility, lot of other residents have [address] [place]  X-ray does not show any acute findings, patient has trace edema, high normal BNP  She becomes mildly hypoxic low 90s upon exertion, at rest she saturates up to 96%  D-dimer 0 45  Unlikely PE  Had some chest discomfort 2 days ago, troponins have been negative in the ED    Dyspnea on exertion  Assessment & Plan  Unclear etiology, COVID tested negative  No prior history of heart failure or COPD  Has not smoked in the past   No clear evidence of pneumonia on the chest x-ray  BNP is high normal   Will give trial of Lasix 20 mg IV 1 time  Monitor patient overnight on telemetry     Seizures ([facility] )  Assessment & Plan  Continue Topamax, patient states she takes 200 mg once daily      Morbid obesity due to excess calories [facility])  Assessment & Plan  Likely contributing to her exertional shortness of breath, patient also has history of sleep apnea, does not use CPAP      Diabetes mellitus ([facility] )  Assessment & Plan        Lab Results   Component Value Date     HGBA1C 11 5 (H) 03/03/2021             Recent Labs     01/28/22  1742   POCGLU 253*         Blood Sugar Average: Last 72 hrs:  (P) 253      Continue Lantus, hold rest of the medications, give sliding scale insulin      Hypertension  Assessment & Plan  Continue lisinopril  Anticipated Length of Stay:  Patient will be admitted on an Observation basis with an anticipated length of stay of  less 2 midnights     Justification for Hospital Stay:  Exertion shortness of breath          Date:    Day 2:     ED Triage Vitals   Temperature Pulse Respirations Blood Pressure SpO2   01/28/22 1350 01/28/22 1350 01/28/22 1350 01/28/22 1354 01/28/22 1350   98 °F (36 7 °C) 82 (!) 25 156/70 99 %      Temp Source Heart Rate Source Patient Position - Orthostatic VS BP Location FiO2 (%)   01/28/22 1449 01/28/22 1350 01/28/22 1449 01/28/22 1449 --   Oral Monitor Lying Left arm       Pain Score       01/28/22 1620       6          Wt Readings from Last 1 Encounters:   01/28/22 (!) 138 kg (303 lb 12 7 oz)     Additional Vital Signs:   Pertinent Labs/Diagnostic Test Results:   Results from last 7 days   Lab Units 01/28/22  1435   SARS-COV-2  Negative     Results from last 7 days   Lab Units 01/29/22  0350 01/28/22  1431   WBC Thousand/uL 5 83 5 34   HEMOGLOBIN g/dL 13 9 14 7   HEMATOCRIT % 42 9 44 6 PLATELETS Thousands/uL 173 173   NEUTROS ABS Thousands/µL 3 84 3 68     Results from last 7 days   Lab Units 01/29/22  0350 01/28/22  1431   SODIUM mmol/L 138 133   POTASSIUM mmol/L 3 9 4 3   CHLORIDE mmol/L 101 98   CO2 mmol/L 31 31   ANION GAP mmol/L 6 4   BUN mg/dL 17 18   CREATININE mg/dL 0 64 0 67   EGFR ml/min/1 73sq m 92 91   CALCIUM mg/dL 8 4 8 7     Results from last 7 days   Lab Units 01/28/22  1431   AST U/L 14*   ALT U/L 12   ALK PHOS U/L 102 0   TOTAL PROTEIN g/dL 7 2   ALBUMIN g/dL 3 6   TOTAL BILIRUBIN mg/dL 0 39     Results from last 7 days   Lab Units 01/29/22  0755 01/28/22  1937 01/28/22  1742   POC GLUCOSE mg/dl 124 225* 253*     Results from last 7 days   Lab Units 01/29/22  0350 01/28/22  1431   GLUCOSE RANDOM mg/dL 122 374*             No results found for: BETA-HYDROXYBUTYRATE                   Results from last 7 days   Lab Units 01/28/22  1622 01/28/22  1431   HS TNI 0HR ng/L  --  10   HS TNI 2HR ng/L 12  --    HSTNI D2 ng/L 2  --      Results from last 7 days   Lab Units 01/28/22  1431   D-DIMER QUANTITATIVE ug/ml FEU 0 45         Results from last 7 days   Lab Units 01/28/22  1431   TSH 3RD GENERATON uIU/mL 5 821*                     Results from last 7 days   Lab Units 01/28/22  1431   BNP pg/mL 106 5*                                 Results from last 7 days   Lab Units 01/28/22  1435   INFLUENZA A PCR  Negative   INFLUENZA B PCR  Negative   RSV PCR  Negative                                           ED Treatment:   Medication Administration from 01/28/2022 1347 to 01/28/2022 1842       Date/Time Order Dose Route Action Action by Comments     01/28/2022 1642 sodium chloride 0 9 % bolus 500 mL 0 mL Intravenous Stopped Mis Kitchen RN      01/28/2022 1556 sodium chloride 0 9 % bolus 500 mL 500 mL Intravenous G[address]bailey[address]ral[address] 37 Mis Kitchen RN      01/28/2022 1556 insulin regular (HumuLIN R,NovoLIN R) injection 8 Units 8 Units Subcutaneous Given Mis Kitchen RN 01/28/2022 1620 ibuprofen (MOTRIN) tablet 600 mg 600 mg Oral Given Jeramy Small RN         Past Medical History:   Diagnosis Date    Diabetes mellitus ([facility] )     Disease of thyroid gland     Hyperlipidemia     Hypertension     Hypothyroidism (acquired)     Restless leg syndrome     Seizures (HCC)      Present on Admission:   Seizures ([facility] )   Hypertension   Diabetes mellitus ([facility] )   Morbid obesity due to excess calories (HCC)   Dyspnea on exertion      Admitting Diagnosis: Shortness of breath [R06 02]  SOB (shortness of breath) [R06 02]  Hyperglycemia [R73 9]  Hypoxia [R09 02]  Age/Sex: 78 y o  female  Admission Orders:  Scheduled Medications:  celecoxib, 200 mg, Oral, Daily  docusate sodium, 100 mg, Oral, Daily  enoxaparin, 40 mg, Subcutaneous, Daily  FLUoxetine, 60 mg, Oral, Daily  gabapentin, 100 mg, Oral, BID before breakfast/lunch  gabapentin, 300 mg, Oral, HS  insulin glargine, 38 Units, Subcutaneous, HS  insulin lispro, 2-12 Units, Subcutaneous, TID AC  insulin lispro, 4-20 Units, Subcutaneous, HS  levothyroxine, 75 mcg, Oral, Daily  lisinopril, 10 mg, Oral, Daily  topiramate, 200 mg, Oral, Daily      Continuous IV Infusions:     PRN Meds:  acetaminophen, 650 mg, Oral, Q6H PRN        None    Network Utilization Review Department  ATTENTION: Please call with any questions or concerns to [phone] and carefully listen to the prompts so that you are directed to the right person  All voicemails are confidential   Sveta Grand all requests for admission clinical reviews, approved or denied determinations and any other requests to dedicated fax number below belonging to the campus where the patient is receiving treatment   List of dedicated fax numbers for the Facilities:  [address] DENIALS (Administrative/Medical Necessity) [phone]   [address] (Maternity/NICU/Pediatrics) [phone]   5000 [facility] [phone]   [address] [facility] [phone]   [address] [place] [address] [address] [place] [address] Leslie Villegas [address] [phone]   [address] [phone]

## 2022-01-30 LAB
ANION GAP SERPL CALCULATED.3IONS-SCNC: 6 MMOL/L (ref 4–13)
BUN SERPL-MCNC: 16 MG/DL (ref 6–20)
CALCIUM SERPL-MCNC: 8.7 MG/DL (ref 8.4–10.2)
CHLORIDE SERPL-SCNC: 102 MMOL/L (ref 96–108)
CO2 SERPL-SCNC: 31 MMOL/L (ref 22–33)
CREAT SERPL-MCNC: 0.6 MG/DL (ref 0.4–1.1)
EST. AVERAGE GLUCOSE BLD GHB EST-MCNC: 295 MG/DL
GFR SERPL CREATININE-BSD FRML MDRD: 94 ML/MIN/1.73SQ M
GLUCOSE SERPL-MCNC: 103 MG/DL (ref 65–140)
GLUCOSE SERPL-MCNC: 113 MG/DL (ref 65–140)
GLUCOSE SERPL-MCNC: 130 MG/DL (ref 65–140)
GLUCOSE SERPL-MCNC: 145 MG/DL (ref 65–140)
HBA1C MFR BLD: 11.9 %
MAGNESIUM SERPL-MCNC: 1.7 MG/DL (ref 1.6–2.6)
POTASSIUM SERPL-SCNC: 3.6 MMOL/L (ref 3.5–5)
SODIUM SERPL-SCNC: 139 MMOL/L (ref 133–145)

## 2022-01-30 PROCEDURE — 83735 ASSAY OF MAGNESIUM: CPT | Performed by: PHYSICIAN ASSISTANT

## 2022-01-30 PROCEDURE — 80048 BASIC METABOLIC PNL TOTAL CA: CPT | Performed by: PHYSICIAN ASSISTANT

## 2022-01-30 PROCEDURE — 82948 REAGENT STRIP/BLOOD GLUCOSE: CPT

## 2022-01-30 PROCEDURE — 99232 SBSQ HOSP IP/OBS MODERATE 35: CPT | Performed by: PHYSICIAN ASSISTANT

## 2022-01-30 PROCEDURE — 94761 N-INVAS EAR/PLS OXIMETRY MLT: CPT

## 2022-01-30 RX ORDER — FUROSEMIDE 10 MG/ML
20 INJECTION INTRAMUSCULAR; INTRAVENOUS ONCE
Status: COMPLETED | OUTPATIENT
Start: 2022-01-30 | End: 2022-01-30

## 2022-01-30 RX ORDER — INSULIN GLARGINE 100 [IU]/ML
36 INJECTION, SOLUTION SUBCUTANEOUS
Status: DISCONTINUED | OUTPATIENT
Start: 2022-01-30 | End: 2022-02-01 | Stop reason: HOSPADM

## 2022-01-30 RX ORDER — MAGNESIUM SULFATE 1 G/100ML
1 INJECTION INTRAVENOUS ONCE
Status: COMPLETED | OUTPATIENT
Start: 2022-01-30 | End: 2022-01-30

## 2022-01-30 RX ADMIN — ENOXAPARIN SODIUM 40 MG: 40 INJECTION SUBCUTANEOUS at 09:32

## 2022-01-30 RX ADMIN — INSULIN GLARGINE 36 UNITS: 100 INJECTION, SOLUTION SUBCUTANEOUS at 22:20

## 2022-01-30 RX ADMIN — TOPIRAMATE 200 MG: 100 TABLET, FILM COATED ORAL at 09:32

## 2022-01-30 RX ADMIN — GABAPENTIN 300 MG: 300 CAPSULE ORAL at 22:11

## 2022-01-30 RX ADMIN — GABAPENTIN 100 MG: 100 CAPSULE ORAL at 17:41

## 2022-01-30 RX ADMIN — MAGNESIUM SULFATE HEPTAHYDRATE 1 G: 1 INJECTION, SOLUTION INTRAVENOUS at 10:29

## 2022-01-30 RX ADMIN — FUROSEMIDE 20 MG: 10 INJECTION, SOLUTION INTRAMUSCULAR; INTRAVENOUS at 17:41

## 2022-01-30 RX ADMIN — LISINOPRIL 10 MG: 10 TABLET ORAL at 09:32

## 2022-01-30 RX ADMIN — GABAPENTIN 100 MG: 100 CAPSULE ORAL at 09:32

## 2022-01-30 RX ADMIN — LEVOTHYROXINE SODIUM 75 MCG: 75 TABLET ORAL at 09:32

## 2022-01-30 RX ADMIN — INSULIN LISPRO 4 UNITS: 100 INJECTION, SOLUTION INTRAVENOUS; SUBCUTANEOUS at 22:20

## 2022-01-30 RX ADMIN — FLUOXETINE 60 MG: 20 CAPSULE ORAL at 09:32

## 2022-01-30 NOTE — PLAN OF CARE
Problem: MOBILITY - ADULT  Goal: Maintain or return to baseline ADL function  Description: INTERVENTIONS:  -  Assess patient's ability to carry out ADLs; assess patient's baseline for ADL function and identify physical deficits which impact ability to perform ADLs (bathing, care of mouth/teeth, toileting, grooming, dressing, etc )  - Assess/evaluate cause of self-care deficits   - Assess range of motion  - Assess patient's mobility; develop plan if impaired  - Assess patient's need for assistive devices and provide as appropriate  - Encourage maximum independence but intervene and supervise when necessary  - Involve family in performance of ADLs  - Assess for home care needs following discharge   - Consider OT consult to assist with ADL evaluation and planning for discharge  - Provide patient education as appropriate  Outcome: Progressing     Problem: Potential for Falls  Goal: Patient will remain free of falls  Description: INTERVENTIONS:  - Educate patient/family on patient safety including physical limitations  - Instruct patient to call for assistance with activity   - Consult OT/PT to assist with strengthening/mobility   - Keep Call bell within reach  - Keep bed low and locked with side rails adjusted as appropriate  - Keep care items and personal belongings within reach  - Initiate and maintain comfort rounds  - Make Fall Risk Sign visible to staff  - Offer Toileting every 2 Hours, in advance of need  - Initiate/Maintain bed alarm  - Apply yellow socks and bracelet for high fall risk patients  - Consider moving patient to room near nurses station  Outcome: Progressing  Pt is aaox3 and up with assistance  Pt is ambulating with walker  Pt denies any pain or distress  Pt is on 1 liters nasal cannula  Bed is locked and in the lowest position  Pt able to ring for assistance

## 2022-01-30 NOTE — ASSESSMENT & PLAN NOTE
Lab Results   Component Value Date    HGBA1C 11 9 (H) 01/29/2022       Recent Labs     01/29/22  1614 01/29/22  2047 01/30/22  0748 01/30/22  1208   POCGLU 158* 166* 113 130       Blood Sugar Average: Last 72 hrs:  (P) 167 625   · Updated A1c 11 9  · Home regimen: Alogliptin Benzoate 25 mg daily,Empagliflozin 25 mg daily, SSI, Lantus 36 units qHS  · Initially was started on Lantus 38 units and SSI  · Will decrease Lantus to home dose of 36 units q h s , continue SSI

## 2022-01-30 NOTE — PROGRESS NOTES
Terrie U  65   Progress Note - Haley Caban 1955, 78 y o  female MRN: [id number]  Unit/Bed#: -01 Encounter: [id number]  Primary Care Provider: Ann Mccormack MD   Date and time admitted to hospital: 1/28/2022  1:48 PM    * Dyspnea on exertion  Assessment & Plan  · Patient presents with 2 day history of worsening dyspnea on exertion  Reports chronic history of mild dyspnea on exertion, unable to resolve with rest   · No prior history of heart failure or COPD  Has not smoked in the past   · Was to follow-up with Cardiology outpatient and have ECHO, needs to reschedule  · Not previously on home oxygen  · No clear evidence of pneumonia on the chest x-ray  · COVID negative  · BNP is high-normal at 106  5   · TSH slightly elevated at 5 821, recheck TSH normal, likely subclinical   · Suspect possible underlying heart failure, as patient had mildly elevated BNP, SOB, crackles on auscultation of lungs, and responded well to IV Lasix during admission  · Was previously on 2-3 L O2, weaned down to room air at rest  · RT performed a saturation screen, required 1 L O2 with ambulation with sats 91%, needs home oxygen set up  · Case management on board, unable to have home oxygen set up today as patient has insurance through [place]  · Given 1 time dose IV Lasix 20 mg 1/28, 1x dose 40 mg IV Lasix 1/29, responded well to diuresis  · Patient will need ECHO to evaluate for heart failure, can obtain if remains inpatient  · Will need Cardiology follow-up outpatient  · Monitor respiratory function    Morbid obesity due to excess calories ([address] )  Assessment & Plan  · Likely contributing to her exertional shortness of breath, patient also has history of sleep apnea, does not use CPAP    · Would benefit from referral to Sleep Medicine    Diabetes mellitus [facility])  Assessment & Plan  Lab Results   Component Value Date    HGBA1C 11 9 (H) 01/29/2022       Recent Labs     01/29/22  1614 01/29/22 2047 22  0748 22  1208   POCGLU 158* 166* 113 130       Blood Sugar Average: Last 72 hrs:  (P) 167 625   · Updated A1c 11 9  · Home regimen: Alogliptin Benzoate 25 mg daily,Empagliflozin 25 mg daily, SSI, Lantus 36 units qHS  · Initially was started on Lantus 38 units and SSI  · Will decrease Lantus to home dose of 36 units q h s , continue SSI    Hypertension  Assessment & Plan  · Continue home lisinopril  · BP reviewed, stable    Seizures (HCC)  Assessment & Plan  · Continue Topamax, patient states she takes 200 mg once daily  VTE Pharmacologic Prophylaxis:   Moderate Risk (Score 3-4) - Pharmacological DVT Prophylaxis Ordered: enoxaparin (Lovenox)  Patient Centered Rounds: I performed bedside rounds with nursing staff today  Discussions with Specialists or Other Care Team Provider:  Case Management    Education and Discussions with Family / Patient: Attempted to update  (son) via phone  Unable to contact  Time Spent for Care: 45 minutes  More than 50% of total time spent on counseling and coordination of care as described above  Current Length of Stay: 0 day(s)  Current Patient Status: Observation   Certification Statement: The patient, admitted on an observation basis, will now require > 2 midnight hospital stay due to Unable to have home oxygen set up for discharge today  Discharge Plan: Anticipate discharge tomorrow to home  Code Status: Level 3 - DNAR and DNI    Subjective:   Patient is seen at bedside this a m , states that her SOB is much improved, tolerated ambulation well with respiratory today  No other acute complaints at this time  Objective:     Vitals:   Temp (24hrs), Av 2 °F (36 2 °C), Min:96 1 °F (35 6 °C), Max:98 3 °F (36 8 °C)    Temp:  [96 1 °F (35 6 °C)-98 3 °F (36 8 °C)] 96 5 °F (35 8 °C)  HR:  [68-90] 71  Resp:  [20-28] 28  BP: (121-156)/() 124/70  SpO2:  [92 %-96 %] 94 %  Body mass index is 55 56 kg/m²       Input and Output Summary (last 24 hours):   No intake or output data in the 24 hours ending 01/30/22 1437    Physical Exam:   Physical Exam  Constitutional:       General: She is not in acute distress  Appearance: She is obese  She is not ill-appearing, toxic-appearing or diaphoretic  Cardiovascular:      Rate and Rhythm: Normal rate and regular rhythm  Pulses: Normal pulses  Heart sounds: Normal heart sounds  Pulmonary:      Effort: Pulmonary effort is normal  No respiratory distress  Breath sounds: Normal breath sounds  Abdominal:      General: Bowel sounds are normal  There is no distension  Palpations: Abdomen is soft  Tenderness: There is no abdominal tenderness  Musculoskeletal:         General: No swelling or tenderness  Skin:     General: Skin is warm  Neurological:      General: No focal deficit present  Mental Status: She is alert and oriented to person, place, and time  Psychiatric:         Mood and Affect: Mood normal          Behavior: Behavior normal           Additional Data:     Labs:  Results from last 7 days   Lab Units 01/29/22  0350   WBC Thousand/uL 5 83   HEMOGLOBIN g/dL 13 9   HEMATOCRIT % 42 9   PLATELETS Thousands/uL 173   NEUTROS PCT % 64   LYMPHS PCT % 23   MONOS PCT % 8   EOS PCT % 3     Results from last 7 days   Lab Units 01/30/22  0532 01/29/22  0350 01/28/22  1431   SODIUM mmol/L 139   < > 133   POTASSIUM mmol/L 3 6   < > 4 3   CHLORIDE mmol/L 102   < > 98   CO2 mmol/L 31   < > 31   BUN mg/dL 16   < > 18   CREATININE mg/dL 0 60   < > 0 67   ANION GAP mmol/L 6   < > 4   CALCIUM mg/dL 8 7   < > 8 7   ALBUMIN g/dL  --   --  3 6   TOTAL BILIRUBIN mg/dL  --   --  0 39   ALK PHOS U/L  --   --  102 0   ALT U/L  --   --  12   AST U/L  --   --  14*   GLUCOSE RANDOM mg/dL 103   < > 374*    < > = values in this interval not displayed           Results from last 7 days   Lab Units 01/30/22  1208 01/30/22  4360 01/29/22  2047 01/29/22  1614 01/29/22  1139 01/29/22  0755 01/28/22  1937 01/28/22  1742   POC GLUCOSE mg/dl 130 113 166* 158* 172* 124 225* 253*     Results from last 7 days   Lab Units 01/29/22  0350   HEMOGLOBIN A1C % 11 9*           Lines/Drains:  Invasive Devices  Report    Peripheral Intravenous Line            Peripheral IV 01/28/22 Left Antecubital 2 days                      Imaging: Reviewed radiology reports from this admission including: chest xray    Recent Cultures (last 7 days):         Last 24 Hours Medication List:   Current Facility-Administered Medications   Medication Dose Route Frequency Provider Last Rate    acetaminophen  650 mg Oral Q6H PRN Marlena Lees MD      celecoxib  200 mg Oral Daily Marlena Lees MD      docusate sodium  100 mg Oral Daily Marlena Lees MD      enoxaparin  40 mg Subcutaneous Daily Marlena Lees MD      FLUoxetine  60 mg Oral Daily Marlena Lees MD      gabapentin  100 mg Oral BID before breakfast/lunch Marlena Lees MD      gabapentin  300 mg Oral HS Marlena Lees MD      hydrALAZINE  10 mg Intravenous Q6H PRN Mariposa Wilder PA-C      insulin glargine  36 Units Subcutaneous HS Jacque Sage PA-C      insulin lispro  2-12 Units Subcutaneous TID AC Marlena Lees MD      insulin lispro  4-20 Units Subcutaneous HS Marlena Lees MD      levothyroxine  75 mcg Oral Daily Marlena Lees MD      lisinopril  10 mg Oral Daily Marlena Lees MD      topiramate  200 mg Oral Daily Marlena Lees MD          Today, Patient Was Seen By: Mariposa Wilder PA-C    **Please Note: This note may have been constructed using a voice recognition system  **

## 2022-01-30 NOTE — ASSESSMENT & PLAN NOTE
· Patient presents with 2 day history of worsening dyspnea on exertion  Reports chronic history of mild dyspnea on exertion, unable to resolve with rest   · No prior history of heart failure or COPD  Has not smoked in the past   · Was to follow-up with Cardiology outpatient and have ECHO, needs to reschedule  · Not previously on home oxygen  · No clear evidence of pneumonia on the chest x-ray  · COVID negative  · BNP is high-normal at 106  5   · TSH slightly elevated at 5 821, recheck TSH normal, likely subclinical   · Suspect possible underlying heart failure, as patient had mildly elevated BNP, SOB, crackles on auscultation of lungs, and responded well to IV Lasix during admission  · Was previously on 2-3 L O2, weaned down to room air at rest  · RT performed a saturation screen, required 1 L O2 with ambulation with sats 91%, needs home oxygen set up  · Case management on board, unable to have home oxygen set up today as patient has insurance through [place]  · Given 1 time dose IV Lasix 20 mg 1/28, 1x dose 40 mg IV Lasix 1/29, responded well to diuresis  · Patient will need ECHO to evaluate for heart failure, can obtain if remains inpatient  · Will need Cardiology follow-up outpatient  · Monitor respiratory function

## 2022-01-30 NOTE — RESPIRATORY THERAPY NOTE
Home Oxygen Qualifying Test       Patient name: Varsha Carballo        : 1955   Date of Test:  2022  Diagnosis:      Home Oxygen Test:    **Medicare Guidelines require item(s) 1-5 on all ambulatory patients or 1 and 2 on non-ambulatory patients  1   Baseline SPO2 on Room Air at rest 87 %  2   SPO2 during exercise on Room Air NA %  During exercise monitor SpO2  If SPO2 increases >=89% with ambulation do not add supplemental oxygen  If <= 88% on room air add O2 via NC and titrate patient  Patient must be ambulated with O2 and titrated to > 88% with exertion  3   SPO2 on Oxygen at rest 94 % 1 lpm     4   SPO2 during exercise on Oxygen  91% a liter flow of 1 lpm     5   Exercise performed:          duration 6 (min), distance 250 (feet)          [x]  Supplemental Home Oxygen is indicated  []  Client does not qualify for home oxygen  Respiratory Additional Notes-     Patient was seen for a home oxygen qualifying test   Testing procedure was explained to patient prior to starting evaluation  Patient was on room air saturating 90% and desaturated to 88% with slight movement at rest   Supplemental oxygen was started and titrated to 1L via nasal cannula  Saturation were maintained at 91% or greater on 1L for remainder of testing  Patient walked with the assistance of a rolling walker, and covered 250 feet  During testing she did pause two times  Patient's breathing was labored and she stated leg discomfort during walk  Patient was returned to her room and remains on 1L NC      Nikita Calhoun, RT

## 2022-01-31 LAB
GLUCOSE SERPL-MCNC: 109 MG/DL (ref 65–140)
GLUCOSE SERPL-MCNC: 117 MG/DL (ref 65–140)
GLUCOSE SERPL-MCNC: 122 MG/DL (ref 65–140)
GLUCOSE SERPL-MCNC: 151 MG/DL (ref 65–140)
GLUCOSE SERPL-MCNC: 92 MG/DL (ref 65–140)

## 2022-01-31 PROCEDURE — 94761 N-INVAS EAR/PLS OXIMETRY MLT: CPT

## 2022-01-31 PROCEDURE — 82948 REAGENT STRIP/BLOOD GLUCOSE: CPT

## 2022-01-31 PROCEDURE — 99232 SBSQ HOSP IP/OBS MODERATE 35: CPT | Performed by: PHYSICIAN ASSISTANT

## 2022-01-31 PROCEDURE — 99221 1ST HOSP IP/OBS SF/LOW 40: CPT

## 2022-01-31 RX ORDER — FUROSEMIDE 10 MG/ML
20 INJECTION INTRAMUSCULAR; INTRAVENOUS ONCE
Status: COMPLETED | OUTPATIENT
Start: 2022-01-31 | End: 2022-01-31

## 2022-01-31 RX ADMIN — GABAPENTIN 100 MG: 100 CAPSULE ORAL at 14:04

## 2022-01-31 RX ADMIN — FUROSEMIDE 20 MG: 10 INJECTION, SOLUTION INTRAMUSCULAR; INTRAVENOUS at 17:15

## 2022-01-31 RX ADMIN — LEVOTHYROXINE SODIUM 75 MCG: 75 TABLET ORAL at 09:44

## 2022-01-31 RX ADMIN — ENOXAPARIN SODIUM 40 MG: 40 INJECTION SUBCUTANEOUS at 09:42

## 2022-01-31 RX ADMIN — DOCUSATE SODIUM 100 MG: 100 CAPSULE, LIQUID FILLED ORAL at 09:42

## 2022-01-31 RX ADMIN — TOPIRAMATE 200 MG: 100 TABLET, FILM COATED ORAL at 09:44

## 2022-01-31 RX ADMIN — FLUOXETINE 60 MG: 20 CAPSULE ORAL at 09:43

## 2022-01-31 RX ADMIN — GABAPENTIN 100 MG: 100 CAPSULE ORAL at 09:47

## 2022-01-31 RX ADMIN — INSULIN GLARGINE 36 UNITS: 100 INJECTION, SOLUTION SUBCUTANEOUS at 22:25

## 2022-01-31 RX ADMIN — LISINOPRIL 10 MG: 10 TABLET ORAL at 09:44

## 2022-01-31 RX ADMIN — GABAPENTIN 300 MG: 300 CAPSULE ORAL at 22:25

## 2022-01-31 NOTE — CASE MANAGEMENT
Case Management Discharge Planning Note    Patient name Lisset Gale  Location /-08 MRN [id number]  : 1955 Date 2022       Current Admission Date: 2022  Current Admission Diagnosis:Dyspnea on exertion   Patient Active Problem List    Diagnosis Date Noted    Dyspnea on exertion 2022    Primary osteoarthritis of one hip, right 12/10/2021    Orthostatic hypotension 10/24/2021    Morbid obesity due to excess calories ([facility] ) 2021    Onychomycosis of toenail 2021    Calf tenderness 2021    Asymptomatic bacteriuria 2021    Chronic fatigue and malaise 2021    Acquired hypothyroidism 2021    Suspected COVID-19 virus infection 2020    Major depressive disorder 2020    Gambling disorder, episodic, moderate 2018    Severe episode of recurrent major depressive disorder, without psychotic features ([facility] ) 2018    Encounter for examination and observation for other specified reasons     GERD (gastroesophageal reflux disease) 07/10/2016    Epigastric abdominal pain 07/10/2016    Chest pain 2016    Seizures (HCC)     Restless leg syndrome     Hypertension     Hyperlipidemia     Diabetes mellitus ([facility] )       LOS (days): 1  Geometric Mean LOS (GMLOS) (days):   Days to GMLOS:     OBJECTIVE:  Risk of Unplanned Readmission Score: 11         Current admission status: Inpatient   Preferred Pharmacy:   [facility] [place] [address]  Phone: [phone] Fax: [phone]    Primary Care Provider: Christine Reyes MD    Primary Insurance: V[employer]ERANS  Secondary Insurance:     DISCHARGE DETAILS:    CM received CB from [facility] stating that orders were sent to [address] [place] [address] called [facility] to confirm, per [place] Horace confirmed received signed orders   [facility] stating crew will likely be unable to deliver today, however will have scheduled for first thing tomorrow morning  Melvi Schwab stating on-call will call this CM at [phone] if able to provide tonight for D/C home  CM received call from [facility]  with [facility] miranda MEEKS crew in area to deliver O2 today  [facility]  confirmed home O2 can be delivered tomorrow   Alex Cali to call this CM in the morning with delivery time  CM will follow

## 2022-01-31 NOTE — CASE MANAGEMENT
Case Management Progress Note    Patient name King Abdullahi  Location /-08 MRN [id number]  : 1955 Date 2022       LOS (days): 1  Geometric Mean LOS (GMLOS) (days):   Days to GMLOS:        OBJECTIVE:        Current admission status: Inpatient  Preferred Pharmacy:   [address] [place] [address]  Phone: [phone] Fax: [phone]    Primary Care Provider: Fransico Roberts MD    Primary Insurance: VETERANS  Secondary Insurance:     PROGRESS NOTE:    CM received VM from [facility] stating that call was received and notification of need for order was sent to patient's PCP for orders  CM attempted to call oRnit Elliott back at [phone] x [phone] - no answer, CM left VM requesting CB  CM called [address] @ [phone] EXT [phone] and left VM for on-call RN  CM received call back from [address] [place] [address] (Phone: [phone])  Daisha Méndez stating that she was told to call this CM back but unsure of status of order and recommending this CM call Viviana Esposito @ [phone]  CM called that number and s/w Stephanie Brown stating that [facility] not [facility] and recommended [facility] call [facility]  CM called [facility] at [phone] and s/w Tello Pepe stating not [facility] contacted provider  Cm received call from [facility] stating that she is aware of orders and orders have not been sent  CM requested to s/w Supervisor, Shirlene Hopson stating no supervisor available  CM explained that patient has been cleared for greater than 24 hours due to needing home oxygen setup and that patient is not happy at this time  [facility] stating that orders are pending sign off with PCP  CM called [facility] @ [phone] and s/w Erica Dykes stating no orders received and unwilling to take verbal order from this CM   Erica Dykes stating no supervisor available and would not provide fax number for needed clinical information stating "[facility] has that "     REBECCA called Estrellita Campuzano back and stated that orders are still pending at this time  Estrellita Campuzano stating she will see if SAMIA at practice is able to provide verbal orders to [facility]      REBECCA will continue to follow

## 2022-01-31 NOTE — ASSESSMENT & PLAN NOTE
I was informed by an intake person, patient has no insurance and that the business office is aware of it.       · Continue Topamax, patient states she takes 200 mg once daily

## 2022-01-31 NOTE — ASSESSMENT & PLAN NOTE
Lab Results   Component Value Date    HGBA1C 11 9 (H) 01/29/2022       Recent Labs     01/30/22  1630 01/30/22  2212 01/31/22  0724 01/31/22  1122   POCGLU 145* 151* 92 117       Blood Sugar Average: Last 72 hrs:  (P) 263 [id number]   · Updated A1c 11 9  · Home regimen: Alogliptin Benzoate 25 mg daily,Empagliflozin 25 mg daily, SSI, Lantus 36 units qHS  · Initially was started on Lantus 38 units and SSI  · Will decrease Lantus to home dose of 36 units q h s , continue SSI

## 2022-01-31 NOTE — RESPIRATORY THERAPY NOTE
Home Oxygen Qualifying Test       Patient name: Floyd Cavanaugh        : 1955   Date of Test:  2022  Diagnosis:      Home Oxygen Test:    **Medicare Guidelines require item(s) 1-5 on all ambulatory patients or 1 and 2 on non-ambulatory patients  1   Baseline SPO2 on Room Air at rest 88 %  2   SPO2 during exercise on Room Air na %  During exercise monitor SpO2  If SPO2 increases >=89% with ambulation do not add supplemental             oxygen  If <= 88% on room air add O2 via NC and titrate patient  Patient must be ambulated with O2 and titrated to > 88% with exertion  3   SPO2 on Oxygen at rest 96 % 1 lpm     4   SPO2 during exercise on Oxygen  94% a liter flow of 2 lpm     5   Exercise performed:         Pt ambulated around her room           [x]  Supplemental Home Oxygen is indicated  []  Client does not qualify for home oxygen        Respiratory Additional Notes- Pt will need 1 L for rest/HS and 2L for ambulation/exertion  Fany Choi, RT

## 2022-01-31 NOTE — DISCHARGE SUMMARY
Terrie U  65   Discharge- Cece George 1955, 78 y o  female MRN: [id number]  Unit/Bed#: -Lynne Encounter: [id number]  Primary Care Provider: Cinthia Merida MD   Date and time admitted to hospital: 1/28/2022  1:48 PM    * Dyspnea on exertion  Assessment & Plan  · Patient presents with 2 day history of worsening dyspnea on exertion  Reports chronic history of mild dyspnea on exertion, unable to resolve with rest   · No prior history of heart failure or COPD  Has not smoked in the past   · Was to follow-up with Cardiology outpatient and have ECHO, needs to reschedule  · Not previously on home oxygen  · No clear evidence of pneumonia on the chest x-ray  · COVID negative  · BNP is high-normal at 106  5   · TSH slightly elevated at 5 821, recheck TSH normal, likely subclinical   · Suspect possible underlying heart failure, as patient had mildly elevated BNP, SOB, and responded well to IV Lasix during admission  · Was previously on 2-3 L O2, weaned down to room air at rest  · RT performed a saturation screen, required 1 L O2 with ambulation with sats 91%, Plan for Home O2 setup today 2/1  · Case management on board  · Upon assessment, patient with mild volume overload with fine crackles on auscultation and B/L lower extremity edema  Patient denies acute distress, resting comfortably on RA  · Given multiple doses of IV diuretics, responded well to diuresis  · Will order 1 x dose of 40 mg IV Lasix now  · Echo: Results pending  · Will need Cardiology follow-up outpatient  · Curbside discussion with Cardiology  Will start on oral lasix 40 mg Daily and repeat BMP outpatient within 1 week of discharge to evaluate renal function/electrolytes  Will need outpatient discharge with Cardiology        Diabetes mellitus [facility])  Assessment & Plan  Lab Results   Component Value Date    HGBA1C 11 9 (H) 01/29/2022       Recent Labs     01/31/22  1122 01/31/22  1518 01/31/22  2122 02/01/22  0715   POCGLU 117 109 122 91       Blood Sugar Average: Last 72 hrs:  (P) 130   · Updated A1c 11 9  · Home regimen: Alogliptin Benzoate 25 mg daily,Empagliflozin 25 mg daily, SSI, Lantus 36 units qHS  · S/p Lantus 38 units and SSI  · Continue home dose Lantus 36 units HS in addition to SSI while admitted  · Can transition back to home regimen upon discharge    Morbid obesity due to excess calories (HCC)  Assessment & Plan  · Likely contributing to her exertional shortness of breath, patient also has history of sleep apnea, does not use CPAP  · Would benefit from referral to pulmonology, for possible sleep study    Hypertension  Assessment & Plan  · BP reviewed and stable  · Continue home lisinopril  Seizures ([address] )  Assessment & Plan  · Continue Topamax, patient states she takes 200 mg once daily  Medical Problems             Resolved Problems  Date Reviewed: 2/1/2022    None              Discharging Physician / Practitioner: SAMIA Samson  PCP: Ashley Weeks MD  Admission Date:   Admission Orders (From admission, onward)     Ordered        01/30/22 1514  Inpatient Admission  Once            01/28/22 1743  Place in Observation  Once                      Discharge Date: 02/01/22    Consultations During Hospital Stay:  · None    Procedures Performed:   · None    Significant Findings / Test Results:   XR chest 1 view portable   Final Result by Chaparro Johnson MD (01/28 1540)   No acute cardiopulmonary disease        Findings are stable            Workstation performed: [id number]           Results Reviewed     Procedure Component Value Units Date/Time    Fingerstick Glucose (POCT) [[id number]]  (Abnormal) Collected: 01/28/22 1742    Lab Status: Final result Updated: 01/28/22 1744     POC Glucose 253 mg/dl     HS Troponin I 2hr [[id number]]  (Normal) Collected: 01/28/22 1622    Lab Status: Final result Specimen: Blood from Arm, Left Updated: 01/28/22 1650     hs TnI 2hr 12 ng/L      Delta 2hr hsTnI 2 ng/L     B-Type Natriuretic Peptide(BNP) CA, GH, EA Campuses Only [[id number]]  (Abnormal) Collected: 01/28/22 1431    Lab Status: Final result Specimen: Blood from Arm, Left Updated: 01/28/22 1607      5 pg/mL     COVID/FLU/RSV - 2 hour TAT [[id number]]  (Normal) Collected: 01/28/22 1435    Lab Status: Final result Specimen: Nares from Nasopharyngeal Swab Updated: 01/28/22 1521     SARS-CoV-2 Negative     INFLUENZA A PCR Negative     INFLUENZA B PCR Negative     RSV PCR Negative    Narrative:      FOR PEDIATRIC PATIENTS - copy/paste COVID Guidelines URL to browser: [email or web address]  [email or web address]    SARS-CoV-2 assay is a Nucleic Acid Amplification assay intended for the  qualitative detection of nucleic acid from SARS-CoV-2 in nasopharyngeal  swabs  Results are for the presumptive identification of SARS-CoV-2 RNA  Positive results are indicative of infection with SARS-CoV-2, the virus  causing COVID-19, but do not rule out bacterial infection or co-infection  with other viruses  Laboratories within the [place] and its  territories are required to report all positive results to the appropriate  public health authorities  Negative results do not preclude SARS-CoV-2  infection and should not be used as the sole basis for treatment or other  patient management decisions  Negative results must be combined with  clinical observations, patient history, and epidemiological information  This test has not been FDA cleared or approved  This test has been authorized by FDA under an Emergency Use Authorization  (EUA)  This test is only authorized for the duration of time the  declaration that circumstances exist justifying the authorization of the  emergency use of an in vitro diagnostic tests for detection of SARS-CoV-2  virus and/or diagnosis of COVID-19 infection under section 564(b)(1) of  the Act, 21 U  S C  715KGW-9(Q)(1), unless the authorization is terminated  or revoked sooner  The test has been validated but independent review by FDA  and CLIA is pending  Test performed using [employer] Gene[employer]pert: This RT-PCR assay targets N2,  a region unique to SARS-CoV-2  A conserved region in the E-gene was chosen  for pan-Sarbecovirus detection which includes SARS-CoV-2  TSH [[id number]]  (Abnormal) Collected: 01/28/22 1431    Lab Status: Final result Specimen: Blood from Arm, Left Updated: 01/28/22 1511     TSH 3RD GENERATON 5 821 uIU/mL     Narrative:      Patients undergoing fluorescein dye angiography may retain small amounts of fluorescein in the body for 48-72 hours post procedure  Samples containing fluorescein can produce falsely depressed TSH values  If the patient had this procedure,a specimen should be resubmitted post fluorescein clearance        HS Troponin 0hr (reflex protocol) [[id number]]  (Normal) Collected: 01/28/22 1431    Lab Status: Final result Specimen: Blood from Arm, Left Updated: 01/28/22 1502     hs TnI 0hr 10 ng/L     Comprehensive metabolic panel [[id number]]  (Abnormal) Collected: 01/28/22 1431    Lab Status: Final result Specimen: Blood from Arm, Left Updated: 01/28/22 1455     Sodium 133 mmol/L      Potassium 4 3 mmol/L      Chloride 98 mmol/L      CO2 31 mmol/L      ANION GAP 4 mmol/L      BUN 18 mg/dL      Creatinine 0 67 mg/dL      Glucose 374 mg/dL      Calcium 8 7 mg/dL      AST 14 U/L      ALT 12 U/L      Alkaline Phosphatase 102 0 U/L      Total Protein 7 2 g/dL      Albumin 3 6 g/dL      Total Bilirubin 0 39 mg/dL      eGFR 91 ml/min/1 73sq m     Narrative:      [place] guidelines for Chronic Kidney Disease (CKD):     Stage 1 with normal or high GFR (GFR > 90 mL/min/1 73 square meters)    Stage 2 Mild CKD (GFR = 60-89 mL/min/1 73 square meters)    Stage 3A Moderate CKD (GFR = 45-59 mL/min/1 73 square meters)    Stage 3B Moderate CKD (GFR = 30-44 mL/min/1 73 square meters)    Stage 4 Severe CKD (GFR = 15-29 mL/min/1 73 square meters)    Stage 5 End Stage CKD (GFR <15 mL/min/1 73 square meters)  Note: GFR calculation is accurate only with a steady state creatinine    D-Dimer [[id number]]  (Normal) Collected: 01/28/22 1431    Lab Status: Final result Specimen: Blood from Arm, Left Updated: 01/28/22 1449     D-Dimer, Quant 0 45 ug/ml FEU     CBC and differential [[id number]] Collected: 01/28/22 1431    Lab Status: Final result Specimen: Blood from Arm, Left Updated: 01/28/22 1436     WBC 5 34 Thousand/uL      RBC 5 08 Million/uL      Hemoglobin 14 7 g/dL      Hematocrit 44 6 %      MCV 88 fL      MCH 28 9 pg      MCHC 33 0 g/dL      RDW 12 7 %      MPV 10 6 fL      Platelets 259 Thousands/uL      nRBC 0 /100 WBCs      Neutrophils Relative 68 %      Immat GRANS % 1 %      Lymphocytes Relative 22 %      Monocytes Relative 6 %      Eosinophils Relative 3 %      Basophils Relative 0 %      Neutrophils Absolute 3 68 Thousands/µL      Immature Grans Absolute 0 03 Thousand/uL      Lymphocytes Absolute 1 15 Thousands/µL      Monocytes Absolute 0 32 Thousand/µL      Eosinophils Absolute 0 14 Thousand/µL      Basophils Absolute 0 02 Thousands/µL           Incidental Findings:   · None     Test Results Pending at Discharge (will require follow up): · None     Outpatient Tests Requested:  · Follow-up with cardiologist Dr Jay Null outpatient, with scheduled appointment on 02/17/2022 for echo  · Given referral for Pulmonology for Sleep Medicine consult to evaluate for sleep apnea given obesity history    Complications:  None    Reason for Admission:  Dyspnea on exertion    Hospital Course:   Tani Urbina is a 78 y o  female patient who originally presented to the hospital on 1/28/2022 due to worsening dyspnea on exertion, suspect in setting of possible underlying heart failure    Patient presented with 2 day history of worsening dyspnea on exertion, has chronic history of mild dyspnea on exertion, arrived to ED as her symptoms were unable to resolve with rest   No known history of heart failure or COPD, no smoking history  On arrival to ED, overall workup was unremarkable, chest x-ray clear, COVID negative  Patient presented with SOB, mildly elevated BNP at 106, and crackles on auscultation of lungs, initially require to 3 L O2  Throughout course of admission, patient's respiratory status had improved with gentle IV Lasix diuresis, was able to be weaned down to 1 L O2  Case Management set up home oxygen on discharge  Echo completed while inpatient  Patient already has scheduled appointment on 02/17/2022 with Dr Jay Peres  Please see above list of diagnoses and related plan for additional information  Condition at Discharge: stable    Discharge Day Visit / Exam:   Subjective:  Patient is seen at bedside  Patient states she feels okay today  Patient notes lower extremity tenderness  Patient  states that her shortness of breath is much improved  Patient denies any acute complaints at this time, states she would like to go home  Vitals: Blood Pressure: 128/64 (02/01/22 1056)  Pulse: 64 (02/01/22 1056)  Temperature: 97 7 °F (36 5 °C) (02/01/22 0709)  Temp Source: Tympanic (02/01/22 0709)  Respirations: 18 (02/01/22 0709)  Height: 5' 2" (157 5 cm) (02/01/22 1056)  Weight - Scale: (!) 137 kg (303 lb) (02/01/22 1056)  SpO2: 96 % (02/01/22 0709)  Exam:   Physical Exam  Constitutional:       General: She is not in acute distress  Appearance: She is obese  She is ill-appearing (Chronically)  She is not toxic-appearing  HENT:      Head: Normocephalic  Nose: Nose normal  No congestion  Mouth/Throat:      Mouth: Mucous membranes are moist       Pharynx: Oropharynx is clear  Cardiovascular:      Rate and Rhythm: Normal rate and regular rhythm  Pulses: Normal pulses  Heart sounds: Normal heart sounds  No murmur heard  Pulmonary:      Effort: Pulmonary effort is normal  No respiratory distress        Breath sounds: Decreased breath sounds present  Abdominal:      General: Bowel sounds are normal  There is no distension  Palpations: Abdomen is soft  Tenderness: There is no abdominal tenderness  Musculoskeletal:         General: Normal range of motion  Right lower leg: Edema (Non pitting) present  Left lower leg: Edema (Non pitting) present  Skin:     General: Skin is warm and dry  Capillary Refill: Capillary refill takes less than 2 seconds  Neurological:      General: No focal deficit present  Mental Status: She is alert and oriented to person, place, and time  Mental status is at baseline  Motor: No weakness  Psychiatric:         Mood and Affect: Mood normal          Speech: Speech normal          Behavior: Behavior normal  Behavior is cooperative  Thought Content: Thought content normal          Judgment: Judgment normal           Discussion with Family: Patient declined call to   Discharge instructions/Information to patient and family:   See after visit summary for information provided to patient and family  Provisions for Follow-Up Care:  See after visit summary for information related to follow-up care and any pertinent home health orders  Disposition:   Home with [facility]NA Services (Reminder: Complete face to face encounter) patient already has HHS services at home    Planned Readmission:  None     Discharge Statement:  I spent 45 minutes discharging the patient  This time was spent on the day of discharge  I had direct contact with the patient on the day of discharge  Greater than 50% of the total time was spent examining patient, answering all patient questions, arranging and discussing plan of care with patient as well as directly providing post-discharge instructions  Additional time then spent on discharge activities  Discharge Medications:  See after visit summary for reconciled discharge medications provided to patient and/or family        **Please Note: This note may have been constructed using a voice recognition system**

## 2022-01-31 NOTE — DISCHARGE INSTR - AVS FIRST PAGE
Recommend following up with Cardiology outpatient, already have outpatient echo scheduled for 02/17/2022 with Dr Lb Lee will be discharged with home oxygen, follow-up with your PCP for continued management   Will give referral for Sleep Medicine to evaluate for sleep apnea

## 2022-01-31 NOTE — ASSESSMENT & PLAN NOTE
· Patient presents with 2 day history of worsening dyspnea on exertion  Reports chronic history of mild dyspnea on exertion, unable to resolve with rest   · No prior history of heart failure or COPD  Has not smoked in the past   · Was to follow-up with Cardiology outpatient and have ECHO, needs to reschedule  · Not previously on home oxygen  · No clear evidence of pneumonia on the chest x-ray  · COVID negative  · BNP is high-normal at 106  5   · TSH slightly elevated at 5 821, recheck TSH normal, likely subclinical   · Suspect possible underlying heart failure, as patient had mildly elevated BNP, SOB, crackles on auscultation of lungs, and responded well to IV Lasix during admission  · Was previously on 2-3 L O2, weaned down to room air at rest  · RT performed a saturation screen, required 1 L O2 with ambulation with sats 91%, needs home oxygen set up  · Case management on board, unable to have home oxygen set up today as patient has insurance through [place]  · Given 1 time dose IV Lasix 20 mg 1/28, 1x dose 40 mg IV Lasix 1/29, responded well to diuresis  · Patient will need ECHO to evaluate for heart failure, can obtain if remains inpatient, otherwise scheduled for 02/17/2022 outpatient with Dr Madeleine Novoa  · Will need Cardiology follow-up outpatient  · Monitor respiratory function

## 2022-01-31 NOTE — PROGRESS NOTES
Terrie U  65   Progress Note - Gerhardt Canny 1955, 78 y o  female MRN: [id number]  Unit/Bed#: -01 Encounter: [id number]  Primary Care Provider: Valarie Baez MD   Date and time admitted to hospital: 1/28/2022  1:48 PM    * Dyspnea on exertion  Assessment & Plan  · Patient presents with 2 day history of worsening dyspnea on exertion  Reports chronic history of mild dyspnea on exertion, unable to resolve with rest   · No prior history of heart failure or COPD  Has not smoked in the past   · Was to follow-up with Cardiology outpatient and have ECHO, needs to reschedule  · Not previously on home oxygen  · No clear evidence of pneumonia on the chest x-ray  · COVID negative  · BNP is high-normal at 106  5   · TSH slightly elevated at 5 821, recheck TSH normal, likely subclinical   · Suspect possible underlying heart failure, as patient had mildly elevated BNP, SOB, crackles on auscultation of lungs, and responded well to IV Lasix during admission  · Was previously on 2-3 L O2, weaned down to room air at rest  · RT performed a saturation screen, required 1 L O2 with ambulation with sats 91%, needs home oxygen set up  · Case management on board, unable to have home oxygen set up today as patient has insurance through [place]  · Given 1 time dose IV Lasix 20 mg 1/28, 1x dose 40 mg IV Lasix 1/29, responded well to diuresis  · Patient will need ECHO to evaluate for heart failure, can obtain if remains inpatient, otherwise scheduled for 02/17/2022 outpatient with Dr Juju Barreto  · Will need Cardiology follow-up outpatient  · Monitor respiratory function    Morbid obesity due to excess calories ([address] )  Assessment & Plan  · Likely contributing to her exertional shortness of breath, patient also has history of sleep apnea, does not use CPAP    · Would benefit from referral to Sleep Medicine    Diabetes mellitus [facility])  Assessment & Plan  Lab Results   Component Value Date    HGBA1C 11 9 (H) 01/29/2022 Recent Labs     22  1630 22  2212 22  0724 22  1122   POCGLU 145* 151* 92 117       Blood Sugar Average: Last 72 hrs:  (P) 022 [id number]   · Updated A1c 11 9  · Home regimen: Alogliptin Benzoate 25 mg daily,Empagliflozin 25 mg daily, SSI, Lantus 36 units qHS  · Initially was started on Lantus 38 units and SSI  · Will decrease Lantus to home dose of 36 units q h s , continue SSI    Hypertension  Assessment & Plan  · Continue home lisinopril  · BP reviewed, stable    Seizures (HCC)  Assessment & Plan  · Continue Topamax, patient states she takes 200 mg once daily  VTE Pharmacologic Prophylaxis: VTE Score: 7 High Risk (Score >/= 5) - Pharmacological DVT Prophylaxis Ordered: enoxaparin (Lovenox)  Sequential Compression Devices Ordered  Patient Centered Rounds: I performed bedside rounds with nursing staff today  Discussions with Specialists or Other Care Team Provider:  Case Management    Education and Discussions with Family / Patient: Patient declined call to   Time Spent for Care: 30 minutes  More than 50% of total time spent on counseling and coordination of care as described above  Current Length of Stay: 1 day(s)  Current Patient Status: Inpatient   Certification Statement: The patient will continue to require additional inpatient hospital stay due to Pending home oxygen set up  Discharge Plan: Anticipate discharge tomorrow to home with home services  Code Status: Level 3 - DNAR and DNI    Subjective:   Patient seen at bedside this a m , states that her SOB is much improved, states she feels ready for discharge home when home oxygen set up ready  Objective:     Vitals:   Temp (24hrs), Av °F (36 1 °C), Min:96 °F (35 6 °C), Max:97 7 °F (36 5 °C)    Temp:  [96 °F (35 6 °C)-97 7 °F (36 5 °C)] 97 2 °F (36 2 °C)  HR:  [65-73] 66  Resp:  [18-19] 19  BP: (122-140)/(55-71) 140/71  SpO2:  [93 %-97 %] 97 %  Body mass index is 55 56 kg/m²       Input and Output Summary (last 24 hours):   No intake or output data in the 24 hours ending 01/31/22 1630    Physical Exam:   Physical Exam  Constitutional:       General: She is not in acute distress  Appearance: She is obese  She is not ill-appearing, toxic-appearing or diaphoretic  Cardiovascular:      Rate and Rhythm: Normal rate and regular rhythm  Pulses: Normal pulses  Heart sounds: Normal heart sounds  Pulmonary:      Effort: Pulmonary effort is normal  No respiratory distress  Breath sounds: Normal breath sounds  Abdominal:      General: Bowel sounds are normal  There is no distension  Palpations: Abdomen is soft  Tenderness: There is no abdominal tenderness  Musculoskeletal:         General: No swelling or tenderness  Skin:     General: Skin is warm  Neurological:      General: No focal deficit present  Mental Status: She is alert and oriented to person, place, and time  Psychiatric:         Mood and Affect: Mood normal          Behavior: Behavior normal           Additional Data:     Labs:  Results from last 7 days   Lab Units 01/29/22  0350   WBC Thousand/uL 5 83   HEMOGLOBIN g/dL 13 9   HEMATOCRIT % 42 9   PLATELETS Thousands/uL 173   NEUTROS PCT % 64   LYMPHS PCT % 23   MONOS PCT % 8   EOS PCT % 3     Results from last 7 days   Lab Units 01/30/22  0532 01/29/22  0350 01/28/22  1431   SODIUM mmol/L 139   < > 133   POTASSIUM mmol/L 3 6   < > 4 3   CHLORIDE mmol/L 102   < > 98   CO2 mmol/L 31   < > 31   BUN mg/dL 16   < > 18   CREATININE mg/dL 0 60   < > 0 67   ANION GAP mmol/L 6   < > 4   CALCIUM mg/dL 8 7   < > 8 7   ALBUMIN g/dL  --   --  3 6   TOTAL BILIRUBIN mg/dL  --   --  0 39   ALK PHOS U/L  --   --  102 0   ALT U/L  --   --  12   AST U/L  --   --  14*   GLUCOSE RANDOM mg/dL 103   < > 374*    < > = values in this interval not displayed           Results from last 7 days   Lab Units 01/31/22  1518 01/31/22  1122 01/31/22  0724 01/30/22  2212 01/30/22  1630 01/30/22  1208 01/30/22  0748 01/29/22  2047 01/29/22  1614 01/29/22  1139 01/29/22  0755 01/28/22  1937   POC GLUCOSE mg/dl 109 117 92 151* 145* 130 113 166* 158* 172* 124 225*     Results from last 7 days   Lab Units 01/29/22  0350   HEMOGLOBIN A1C % 11 9*           Lines/Drains:  Invasive Devices  Report    Peripheral Intravenous Line            Peripheral IV 01/28/22 Left Antecubital 3 days                      Imaging: Reviewed radiology reports from this admission including: chest xray    Recent Cultures (last 7 days):         Last 24 Hours Medication List:   Current Facility-Administered Medications   Medication Dose Route Frequency Provider Last Rate    acetaminophen  650 mg Oral Q6H PRN Birgit Spangler MD      docusate sodium  100 mg Oral Daily Birgit Spangler MD      enoxaparin  40 mg Subcutaneous Daily Birgit Spangler MD      FLUoxetine  60 mg Oral Daily Birgit Spangler MD      furosemide  20 mg Intravenous Once Jacque Sage PA-C      gabapentin  100 mg Oral BID before breakfast/lunch Birgit Spangler MD      gabapentin  300 mg Oral HS Birgit Spangler MD      hydrALAZINE  10 mg Intravenous Q6H PRN Newton Mays PA-C      insulin glargine  36 Units Subcutaneous HS Jacque Sage PA-C      insulin lispro  2-12 Units Subcutaneous TID AC Birgit Spangler MD      insulin lispro  4-20 Units Subcutaneous HS Birgit Spangler MD      levothyroxine  75 mcg Oral Daily Birgit Spangler MD      lisinopril  10 mg Oral Daily Birgit Spangler MD      topiramate  200 mg Oral Daily Birgit Spangler MD          Today, Patient Was Seen By: Newton Mays PA-C    **Please Note: This note may have been constructed using a voice recognition system  **

## 2022-01-31 NOTE — CASE MANAGEMENT
Case Management Progress Note    Patient name Andrea Mckee  Location /-50 MRN [id number]  : 1955 Date 2022       LOS (days): 1  Geometric Mean LOS (GMLOS) (days):   Days to GMLOS:        OBJECTIVE:        Current admission status: Inpatient  Preferred [facility]acy:   [facility] - [address] [place] [address]  Phone: [phone] Fax: [phone]    Primary Care Provider: Charmain Lundborg, MD    Primary Insurance: [employer]  Secondary Insurance:     PROGRESS NOTE:    CM aware patient needing home O2 for discharge  Patient has primary [facility] coverage and must go through [place]  CM contacted [place] for home O2 setup @ [phone]  No answer, REBECCA left  requesting CB  Clinicals faxed to [place] at [phone]  CM waiting response for home O2 coordination at this time

## 2022-02-01 ENCOUNTER — APPOINTMENT (INPATIENT)
Dept: NON INVASIVE DIAGNOSTICS | Facility: HOSPITAL | Age: 67
DRG: 292 | End: 2022-02-01
Payer: COMMERCIAL

## 2022-02-01 VITALS
HEART RATE: 80 BPM | TEMPERATURE: 98.5 F | SYSTOLIC BLOOD PRESSURE: 110 MMHG | WEIGHT: 293 LBS | RESPIRATION RATE: 17 BRPM | BODY MASS INDEX: 53.92 KG/M2 | DIASTOLIC BLOOD PRESSURE: 75 MMHG | HEIGHT: 62 IN | OXYGEN SATURATION: 96 %

## 2022-02-01 LAB
AORTIC ROOT: 2.2 CM
AORTIC VALVE MEAN VELOCITY: 16.4 M/S
APICAL FOUR CHAMBER EJECTION FRACTION: 67 %
AV AREA BY CONTINUOUS VTI: 1 CM2
AV AREA PEAK VELOCITY: 1 CM2
AV LVOT MEAN GRADIENT: 2 MMHG
AV LVOT PEAK GRADIENT: 3 MMHG
AV MEAN GRADIENT: 12 MMHG
AV PEAK GRADIENT: 22 MMHG
AV VALVE AREA: 0.97 CM2
AV VELOCITY RATIO: 0.38
AVA (PLAN): 1.6 CM2
DOP CALC AO PEAK VEL: 2.33 M/S
DOP CALC AO VTI: 53.14 CM
DOP CALC LVOT AREA: 2.54 CM2
DOP CALC LVOT DIAMETER: 1.8 CM
DOP CALC LVOT PEAK VEL VTI: 20.33 CM
DOP CALC LVOT PEAK VEL: 0.89 M/S
DOP CALC LVOT STROKE INDEX: 23.7 ML/M2
DOP CALC LVOT STROKE VOLUME: 51.71 CM3
E WAVE DECELERATION TIME: 193 MS
FRACTIONAL SHORTENING: 34 % (ref 28–44)
GLUCOSE SERPL-MCNC: 91 MG/DL (ref 65–140)
GLUCOSE SERPL-MCNC: 93 MG/DL (ref 65–140)
INTERVENTRICULAR SEPTUM IN DIASTOLE (PARASTERNAL SHORT AXIS VIEW): 1.1 CM (ref 0.59–1.1)
LAAS-AP4: 22.9 CM2
LEFT ATRIUM SIZE: 3.8 CM
LEFT INTERNAL DIMENSION IN SYSTOLE: 3.1 CM (ref 2.1–4)
LEFT VENTRICULAR INTERNAL DIMENSION IN DIASTOLE: 4.7 CM (ref 13.07–19.48)
LEFT VENTRICULAR POSTERIOR WALL IN END DIASTOLE: 1.1 CM (ref 0.57–1.09)
LEFT VENTRICULAR STROKE VOLUME: 66 ML
MV E'TISSUE VEL-LAT: 5 CM/S
MV PEAK A VEL: 0.78 M/S
MV PEAK E VEL: 106 CM/S
RA PRESSURE ESTIMATED: 5 MMHG
RIGHT ATRIAL 2D VOLUME: 69 ML
RIGHT ATRIUM AREA SYSTOLE A4C: 22.5 CM2
RIGHT VENTRICLE ID DIMENSION: 4.2 CM
RV PSP: 51 MMHG
SL CV PED ECHO LEFT VENTRICLE DIASTOLIC VOLUME (MOD BIPLANE) 2D: 103 ML
SL CV PED ECHO LEFT VENTRICLE SYSTOLIC VOLUME (MOD BIPLANE) 2D: 37 ML
TR MAX PG: 46 MMHG
TRICUSPID VALVE PEAK REGURGITATION VELOCITY: 3.4 M/S
Z-SCORE OF INTERVENTRICULAR SEPTUM IN END DIASTOLE: 1.96
Z-SCORE OF LEFT VENTRICULAR DIMENSION IN END SYSTOLE: -12.22
Z-SCORE OF LEFT VENTRICULAR POSTERIOR WALL IN END DIASTOLE: 2.07

## 2022-02-01 PROCEDURE — 82948 REAGENT STRIP/BLOOD GLUCOSE: CPT

## 2022-02-01 PROCEDURE — 93306 TTE W/DOPPLER COMPLETE: CPT

## 2022-02-01 PROCEDURE — 93306 TTE W/DOPPLER COMPLETE: CPT | Performed by: INTERNAL MEDICINE

## 2022-02-01 RX ORDER — FUROSEMIDE 10 MG/ML
40 INJECTION INTRAMUSCULAR; INTRAVENOUS ONCE
Status: COMPLETED | OUTPATIENT
Start: 2022-02-01 | End: 2022-02-01

## 2022-02-01 RX ORDER — FUROSEMIDE 40 MG/1
40 TABLET ORAL DAILY
Status: DISCONTINUED | OUTPATIENT
Start: 2022-02-02 | End: 2022-02-01 | Stop reason: HOSPADM

## 2022-02-01 RX ORDER — FUROSEMIDE 40 MG/1
40 TABLET ORAL DAILY
Qty: 30 TABLET | Refills: 0 | Status: SHIPPED | OUTPATIENT
Start: 2022-02-02 | End: 2022-02-19 | Stop reason: HOSPADM

## 2022-02-01 RX ORDER — POTASSIUM CHLORIDE 20 MEQ/1
20 TABLET, EXTENDED RELEASE ORAL DAILY
Qty: 30 TABLET | Refills: 0 | Status: SHIPPED | OUTPATIENT
Start: 2022-02-01

## 2022-02-01 RX ADMIN — FUROSEMIDE 40 MG: 10 INJECTION, SOLUTION INTRAMUSCULAR; INTRAVENOUS at 14:03

## 2022-02-01 RX ADMIN — FLUOXETINE 60 MG: 20 CAPSULE ORAL at 08:59

## 2022-02-01 RX ADMIN — LEVOTHYROXINE SODIUM 75 MCG: 75 TABLET ORAL at 08:59

## 2022-02-01 RX ADMIN — GABAPENTIN 100 MG: 100 CAPSULE ORAL at 09:06

## 2022-02-01 RX ADMIN — TOPIRAMATE 200 MG: 100 TABLET, FILM COATED ORAL at 08:59

## 2022-02-01 RX ADMIN — GABAPENTIN 100 MG: 100 CAPSULE ORAL at 14:03

## 2022-02-01 RX ADMIN — LISINOPRIL 10 MG: 10 TABLET ORAL at 08:59

## 2022-02-01 RX ADMIN — ENOXAPARIN SODIUM 40 MG: 40 INJECTION SUBCUTANEOUS at 08:59

## 2022-02-01 NOTE — NURSING NOTE
Lab work slip given to patient  Portable oxygen tank also supplied  No further questions at this time

## 2022-02-01 NOTE — ASSESSMENT & PLAN NOTE
Lab Results   Component Value Date    HGBA1C 11 9 (H) 01/29/2022       Recent Labs     01/31/22  1122 01/31/22  1518 01/31/22 2122 02/01/22  0715   POCGLU 117 109 122 91       Blood Sugar Average: Last 72 hrs:  (P) 130   · Updated A1c 11 9  · Home regimen: Alogliptin Benzoate 25 mg daily,Empagliflozin 25 mg daily, SSI, Lantus 36 units qHS  · S/p Lantus 38 units and SSI  · Continue home dose Lantus 36 units HS in addition to SSI while admitted  · Can transition back to home regimen upon discharge

## 2022-02-01 NOTE — PLAN OF CARE
Problem: MOBILITY - ADULT  Goal: Maintain or return to baseline ADL function  Description: INTERVENTIONS:  -  Assess patient's ability to carry out ADLs; assess patient's baseline for ADL function and identify physical deficits which impact ability to perform ADLs (bathing, care of mouth/teeth, toileting, grooming, dressing, etc )  - Assess/evaluate cause of self-care deficits   - Assess range of motion  - Assess patient's mobility; develop plan if impaired  - Assess patient's need for assistive devices and provide as appropriate  - Encourage maximum independence but intervene and supervise when necessary  - Involve family in performance of ADLs  - Assess for home care needs following discharge   - Consider OT consult to assist with ADL evaluation and planning for discharge  - Provide patient education as appropriate  Outcome: Progressing     Problem: Potential for Falls  Goal: Patient will remain free of falls  Description: INTERVENTIONS:  - Educate patient/family on patient safety including physical limitations  - Instruct patient to call for assistance with activity   - Consult OT/PT to assist with strengthening/mobility   - Keep Call bell within reach  - Keep bed low and locked with side rails adjusted as appropriate  - Keep care items and personal belongings within reach  - Initiate and maintain comfort rounds  - Offer Toileting every 2 Hours, in advance of need  - Initiate/Maintain bed alarm  - Apply yellow socks and bracelet for high fall risk patients  - Consider moving patient to room near nurses station  Outcome: Progressing

## 2022-02-01 NOTE — ASSESSMENT & PLAN NOTE
· Patient presents with 2 day history of worsening dyspnea on exertion  Reports chronic history of mild dyspnea on exertion, unable to resolve with rest   · No prior history of heart failure or COPD  Has not smoked in the past   · Was to follow-up with Cardiology outpatient and have ECHO, needs to reschedule  · Not previously on home oxygen  · No clear evidence of pneumonia on the chest x-ray  · COVID negative  · BNP is high-normal at 106  5   · TSH slightly elevated at 5 821, recheck TSH normal, likely subclinical   · Suspect possible underlying heart failure, as patient had mildly elevated BNP, SOB, and responded well to IV Lasix during admission  · Was previously on 2-3 L O2, weaned down to room air at rest  · RT performed a saturation screen, required 1 L O2 with ambulation with sats 91%, Plan for Home O2 setup today 2/1  · Case management on board  · Upon assessment, patient with mild volume overload with fine crackles on auscultation and B/L lower extremity edema  Patient denies acute distress, resting comfortably on RA  · Given multiple doses of IV diuretics, responded well to diuresis  · Will order 1 x dose of 40 mg IV Lasix now  · Echo: Results pending  · Will need Cardiology follow-up outpatient  · Curbside discussion with Cardiology  Will start on oral lasix 40 mg Daily and repeat BMP outpatient within 1 week of discharge to evaluate renal function/electrolytes  Will need outpatient discharge with Cardiology

## 2022-02-01 NOTE — PLAN OF CARE
Problem: MOBILITY - ADULT  Goal: Maintain or return to baseline ADL function  Description: INTERVENTIONS:  -  Assess patient's ability to carry out ADLs; assess patient's baseline for ADL function and identify physical deficits which impact ability to perform ADLs (bathing, care of mouth/teeth, toileting, grooming, dressing, etc )  - Assess/evaluate cause of self-care deficits   - Assess range of motion  - Assess patient's mobility; develop plan if impaired  - Assess patient's need for assistive devices and provide as appropriate  - Encourage maximum independence but intervene and supervise when necessary  - Involve family in performance of ADLs  - Assess for home care needs following discharge   - Consider OT consult to assist with ADL evaluation and planning for discharge  - Provide patient education as appropriate  Outcome: Progressing  Goal: Maintains/Returns to pre admission functional level  Description: INTERVENTIONS:  - Perform BMAT or MOVE assessment daily    - Set and communicate daily mobility goal to care team and patient/family/caregiver     - Collaborate with rehabilitation services on mobility goals if consulted      - Out of bed for toileting  - Record patient progress and toleration of activity level   Outcome: Progressing     Problem: Potential for Falls  Goal: Patient will remain free of falls  Description: INTERVENTIONS:  - Educate patient/family on patient safety including physical limitations  - Instruct patient to call for assistance with activity   - Consult OT/PT to assist with strengthening/mobility   - Keep Call bell within reach  - Keep bed low and locked with side rails adjusted as appropriate  - Keep care items and personal belongings within reach  - Initiate and maintain comfort rounds  - Make Fall Risk Sign visible to staff  - Offer Toileting every 2 Hours, in advance of need  -    - Apply yellow socks and bracelet for high fall risk patients  - Consider moving patient to room near nurses station  Outcome: Progressing (4) no impairment

## 2022-02-01 NOTE — CASE MANAGEMENT
Case Management Progress Note    Patient name Wenceslao Sawant  Location /-90 MRN [id number]  : 1955 Date 2022       LOS (days): 2  Geometric Mean LOS (GMLOS) (days):   Days to GMLOS:        OBJECTIVE:        Current admission status: Inpatient  Preferred Pharmacy:   [facility] [address] PULIDO [address] [place] [address]  Phone: [phone] Fax: [phone]    Primary Care Provider: Christel Jo MD    Primary Insurance: [employer]  Secondary Insurance:     PROGRESS NOTE:    CM s/w Rhianna Beck @ [facility] to determine delivery of home O2 for discharge  Rhianna Beck stating that RT this was dispatched to is not on-site yet today  CM requested call back from RT as home O2 coordination has delayed patient's discharge for greater than 48 hours at this time  Rhianna Beck stating page will be sent to RT to call this CM

## 2022-02-01 NOTE — DISCHARGE INSTRUCTIONS
Dyspnea   WHAT YOU NEED TO KNOW:   Dyspnea is breathing difficulty or discomfort  You may have labored, painful, or shallow breathing  You may feel breathless or short of breath  Dyspnea can occur during rest or with activity  You may have dyspnea for a short time, or it might become chronic  Dyspnea is often a symptom of a disease or condition  DISCHARGE INSTRUCTIONS:   Seek care immediately if:   · Your signs and symptoms are the same or worse within 24 hours of treatment  · You have shaking chills or a fever over 102°F      · You have new pain, pressure, or tightness in your chest      · You have a new or worse cough or wheezing, or you cough up blood  · You feel like you cannot get enough air  · The skin over your ribs or on your neck sinks in when you breathe  · You have a severe headache with vomiting and abdominal pain  · You feel confused or dizzy  Call your doctor or specialist if:   · You have questions or concerns about your condition or care  Medicines:   · Medicines  may be used to treat the cause of your dyspnea  Medicines may reduce swelling in your airway or decrease extra fluid from around your heart or lungs  Other medicines may be used to decrease anxiety and help you feel calm and relaxed  · Take your medicine as directed  Contact your healthcare provider if you think your medicine is not helping or if you have side effects  Tell him or her if you are allergic to any medicine  Keep a list of the medicines, vitamins, and herbs you take  Include the amounts, and when and why you take them  Bring the list or the pill bottles to follow-up visits  Carry your medicine list with you in case of an emergency  Manage long-term dyspnea:   · Create an action plan  You and your healthcare provider can work together to create a plan for how to handle episodes of dyspnea   The plan can include daily activities, treatment changes, and what to do if you have severe breathing problems  · Lean forward on your elbows when you sit  This helps your lungs expand and may make it easier to breathe  · Use pursed-lip breathing any time you feel short of breath  Breathe in through your nose and then slowly breathe out through your mouth with your lips slightly puckered  It should take you twice as long to breathe out as it did to breathe in  · Do not smoke  Nicotine and other chemicals in cigarettes and cigars can cause lung damage and make it harder to breathe  Ask your healthcare provider for information if you currently smoke and need help to quit  E-cigarettes or smokeless tobacco still contain nicotine  Talk to your healthcare provider before you use these products  · Reach or maintain a healthy weight  Your healthcare provider can help you create a safe weight loss plan if you are overweight  · Exercise as directed  Exercise can help your lungs work more easily  Exercise can also help you lose weight if needed  Try to get at least 30 minutes of exercise most days of the week  Your healthcare provider can help you create an exercise plan that is safe for you  Follow up with your healthcare provider or specialist as directed:  Write down your questions so you remember to ask them during your visits  © Copyright [employer] 2021 Information is for End User's use only and may not be sold, redistributed or otherwise used for commercial purposes  All illustrations and images included in CareNotes® are the copyrighted property of [employer]  or [employer]  The above information is an  only  It is not intended as medical advice for individual conditions or treatments  Talk to your doctor, nurse or pharmacist before following any medical regimen to see if it is safe and effective for you

## 2022-02-01 NOTE — CASE MANAGEMENT
Case Management Discharge Planning Note    Patient name Win Ferrellield  Location /-87 MRN [id number]  : 1955 Date 2022       Current Admission Date: 2022  Current Admission Diagnosis:Dyspnea on exertion   Patient Active Problem List    Diagnosis Date Noted    Dyspnea on exertion 2022    Primary osteoarthritis of one hip, right 12/10/2021    Orthostatic hypotension 10/24/2021    Morbid obesity due to excess calories ([facility] [address] ) 2021    Onychomycosis of toenail 2021    Calf tenderness 2021    Asymptomatic bacteriuria 2021    Chronic fatigue and malaise 2021    Acquired hypothyroidism 2021    Suspected COVID-19 virus infection 2020    Major depressive disorder 2020    Gambling disorder, episodic, moderate 2018    Severe episode of recurrent major depressive disorder, without psychotic features ([facility] [address] ) 2018    Encounter for examination and observation for other specified reasons     GERD (gastroesophageal reflux disease) 07/10/2016    Epigastric abdominal pain 07/10/2016    Chest pain 2016    Seizures ([facility] [address] )     Restless leg syndrome     Hypertension     Hyperlipidemia     Diabetes mellitus ([facility] [address] )       LOS (days): 2  Geometric Mean LOS (GMLOS) (days):   Days to GMLOS:     OBJECTIVE:  Risk of Unplanned Readmission Score: 11         Current admission status: Inpatient   [facility] [address]  [address] [address]  Phone: [phone] Fax: [phone]    Primary Care Provider: Javier Hickman MD    Primary Insurance: [email or web address]  Secondary Insurance:     DISCHARGE DETAILS:    CM s/w Trisha Tejeda ([phone]) regarding delivery of home O2  Trisha Tejeda stating protocol is to have home setup delivered prior to provided portable for D/C home  CM s/w patient's son by phone   Son stating that he is unable to make it to patient's home to let Lilliana Barger in to provide home setup  Son stating that he attempted to call senior living supervisor to coordinate, however supervisor is off due to Willie FERNANDEZ s/w Lilliana Barger regarding possibility of e-tank  Lilliana Barger stating that he will give patient an e-tank that is rechargeable so that patient can discharge home and call [phone] number to schedule home setup at convenience  Lilliana Barger stating will have delivered to [facility] by 1300 today  REBECCA will continue to follow

## 2022-02-01 NOTE — ASSESSMENT & PLAN NOTE
· Likely contributing to her exertional shortness of breath, patient also has history of sleep apnea, does not use CPAP    · Would benefit from referral to pulmonology, for possible sleep study

## 2022-02-07 NOTE — UTILIZATION REVIEW
Initial Clinical Review - admitted as Observation on 1/28/22 (date incorrect on previous review)  Converted to Inpatient on 1/30/22  Admission: Date/Time/Statement:   Admission Orders (From admission, onward)     Ordered        01/30/22 1514  Inpatient Admission  Once            01/28/22 1743  Place in Observation  Once                      Orders Placed This Encounter   Procedures    Place in Observation     Standing Status:   Standing     Number of Occurrences:   1     Order Specific Question:   Level of Care     Answer:   Med Surg [16]     Order Specific Question:   Bed request comments     Answer:   telemetry    Inpatient Admission     Standing Status:   Standing     Number of Occurrences:   1     Order Specific Question:   Level of Care     Answer:   Med Surg [16]     Order Specific Question:   Estimated length of stay     Answer:   More than 2 Midnights     Order Specific Question:   Certification     Answer:   I certify that inpatient services are medically necessary for this patient for a duration of greater than two midnights  See H&P and MD Progress Notes for additional information about the patient's course of treatment  ED Arrival Information     Expected Arrival Acuity    - 1/28/2022 13:48 Urgent         Means of arrival Escorted by Service Admission type    Ambulance [facility] Urgent         Arrival complaint            Chief Complaint   Patient presents with    Shortness of Breath     Pt presents to ED via EMS from home w/ sob  Pt normally sob but worsening  Pt told to come to ED by PCP to be evaluated for CHF  Pt (+) hx DM  Initial Presentation: 78 y o  female presents to the ED from an independent living facility with SOB with exertion x 2 days  PMH of medical history of morbid obesity, HTN, DM and seizure disorder    Patient states she usually has some shortness of breath with exertion however it is now much worse    Also had discomfort in the chest two days ago, currently no chest pain  CXR with no acute findings, lungs CTA, trace edema  Plan: Admit to Observation for evaluation and treatment of dyspnea on exertion:  Trial Lasix 20 mg IV x1, monitor on telemetry overnight  1/29 Internal Medicine:  Patient still with SOB, currently on 2-3L O2 NC  Crackles on auscultation  Mild, B/L LE non-pitting edema  Trial Lasix 40 mg IV x1 today  Check ECHO  The patient will continue to require inpatient hospital stay due to monitoring of respiratory function with diureses  1/30 Internal Medicine: Patient states SOB is much improved  Suspect possibly underlying heart failure, patient responded well to IV Lasix  Respiratory Therapy performed home O2 test, patient required 1L with ambulation, needs home O2 set up  Unable to have home oxygen setup for discharge today as patient has insurance through [address] [facility]  Anticipate discharge home tomorrow  1/31  Internal Medicine: patient states that her SOB is much improved, states she feels ready for discharge home when home oxygen set up ready  Normal breath sounds on physical exam   Case Management: [facility], awaiting [facility]  Received call from [facility] stating that she is aware of orders and orders have not been sent  CM requested to s/w Supervisor, Adarsh Mujica stating no supervisor available  CM explained that patient has been cleared for greater than 24 hours due to needing home oxygen setup and that patient is not happy at this time  Surekha stating that orders are pending sign off with PCP  Yfn Dawkins called [facility] @ [phone] and s/w [place] No stating no orders received and unwilling to take verbal order from this CM  Moose No stating no supervisor available and would not provide fax number for needed clinical information stating "[facility] " CM called Oneida Ramirez back and stated that orders are still pending at this time   Oneida Ramirez stating she will see if CRNP at practice is able to provide verbal orders to [facility]   CM received CB from [facility] stating that orders were sent to [facility]  CM called [facility] to confirm, per Kamila London confirmed received signed orders  Kamila London stating crew will likely be unable to deliver today, however will have scheduled for first thing tomorrow morning  Kamila London stating on-call will call this CM at EXT [phone] if able to provide tonight for D/C home  CM received call from [facility]  with [facility], no crew in area to deliver O2 today  [facility]  confirmed home O2 can be delivered tomorrow   Derik Bridges to call this CM in the morning with delivery time       2/1 Discharged to home with home health care         ED Triage Vitals   Temperature Pulse Respirations Blood Pressure SpO2   01/28/22 1350 01/28/22 1350 01/28/22 1350 01/28/22 1354 01/28/22 1350   98 °F (36 7 °C) 82 (!) 25 156/70 99 %      Temp Source Heart Rate Source Patient Position - Orthostatic VS BP Location FiO2 (%)   01/28/22 1449 01/28/22 1350 01/28/22 1449 01/28/22 1449 --   Oral Monitor Lying Left arm       Pain Score       01/28/22 1620       6          Wt Readings from Last 1 Encounters:   02/01/22 (!) 137 kg (303 lb)     Additional Vital Signs:       Date and Time Temp Pulse SpO2       O2 Resp BP   02/01/22 1442 98 5 °F (36 9 °C) 80 96 %  17 110/75   02/01/22 1439 99 2 °F (37 3 °C) 86 100 %  18 137/86   02/01/22 1136 97 1 °F (36 2 °C) (Abnormal)   73 96 %  17 135/73   02/01/22 1056 -- 64 --  -- 128/64   02/01/22 0900 -- -- --  -- --   02/01/22 0709 97 7 °F (36 5 °C) 64 96 %  18 116/70   01/31/22 2100 -- -- --  -- --   01/31/22 2039 96 °F (35 6 °C) (Abnormal)   64 97 %  17 122/62   01/31/22 1512 97 2 °F (36 2 °C) (Abnormal)   66 97 %  19 140/71   01/31/22 0900 -- -- --  1L NC -- --   01/31/22 0723 96 °F (35 6 °C) (Abnormal)   65 96 %  18 124/55   01/30/22 2300 -- -- --  -- --   01/30/22 2300 97 7 °F (36 5 °C) 69 97 %  18 122/69   01/30/22 1700 97 °F (36 1 °C) (Abnormal)   73 93 %  1L NC 18 128/64 22 1209 96 5 °F (35 8 °C) (Abnormal)   71 94 %  28 (Abnormal)   124/70   22 0850 -- -- --  -- --   22 0745 96 1 °F (35 6 °C) (Abnormal)   68 92 %  20 121/64   22 2059 98 3 °F (36 8 °C) 71 95 %  20 156/94   22 1647 -- -- 94 %  1L NC -- --   22 1439 98 °F (36 7 °C) 90 96 %  20 129/100   22 1312 97 6 °F (36 4 °C) 73 96 %  22 130/72   22 0759 -- -- --  -- --   22 0759 98 °F (36 7 °C) 72 96 %  20 156/86   22 0000 -- -- --  3L NC -- --   22 1933 97 3 °F (36 3 °C) (Abnormal)   75 95 %  20 164/89   22 1741 -- -- --  -- --   22 1625 97 8 °F (36 6 °C) 81 94 %  Room air 24 (Abnormal)   147/67         Pertinent Labs/Diagnostic Test Results:        ECHO:      Left Ventricle: Left ventricular cavity size is normal  Wall thickness is mildly increased  Systolic function is normal  Although no diagnostic wall motion abnormality was identified this cannot be entirely excluded on the basis of this study  Diastolic function is moderately abnormal, consistent with grade II (pseudonormal) relaxation  Left atrial filling pressure is elevated  There is concentric remodeling    Right Ventricle: Right ventricular cavity size is mildly dilated  Systolic function is normal     Left Atrium: The atrium is mildly dilated    Right Atrium: The atrium is mildly dilated    Aortic Valve: The aortic valve is probably trileaflet  The leaflets are moderately thickened  The leaflets are moderately calcified  There is mild to moderate stenosis  GABRIELA 1 2 cm2/MG 12 mmHg/DI 0 38  The aortic valve velocity is increased due to stenosis    Mitral Valve: There is moderate annular calcification    Tricuspid Valve: There is mild regurgitation  The right ventricular systolic pressure is moderately elevated   The estimated right ventricular systolic pressure is 80 78 mmHg          Home Oxygen Qualifying Test         Patient name: Tabatha Silverman        : 1955   Date of Test:  2022             Diagnosis:        1  Baseline SPO2 on Room Air at rest 88 %                    2   SPO2 during exercise on Room Air na %  During exercise monitor SpO2  If SPO2 increases >=89% with ambulation do not add supplemental             oxygen  If <= 88% on room air add O2 via NC and titrate patient  Patient must be ambulated with O2 and titrated to > 88% with exertion          3  SPO2 on Oxygen at rest 96 % 1 lpm      4  SPO2 during exercise on Oxygen  94% a liter flow of 2 lpm      5  Exercise performed:         Pt ambulated around her room        [x]? Supplemental Home Oxygen is indicated      []? Client does not qualify for home oxygen         Respiratory Additional Notes- Pt will need 1 L for rest/HS and 2L for ambulation/exertion         Home Oxygen Qualifying Test         Patient name: Sarah Pereira        : 1955   Date of Test:  2022             Diagnosis:      1  Baseline SPO2 on Room Air at rest 87 %               2   SPO2 during exercise on Room Air NA %  During exercise monitor SpO2  If SPO2 increases >=89% with ambulation do not add supplemental oxygen  If <= 88% on room air add O2 via NC and titrate patient  Patient must be ambulated with O2 and titrated to > 88% with exertion        3  SPO2 on Oxygen at rest 94 % 1 lpm      4  SPO2 during exercise on Oxygen  91% a liter flow of 1 lpm      5  Exercise performed:          duration 6 (min), distance 250 (feet)       [x]? Supplemental Home Oxygen is indicated      []? Client does not qualify for home oxygen         Respiratory Additional Notes-      Patient was seen for a home oxygen qualifying test   Testing procedure was explained to patient prior to starting evaluation    Patient was on room air saturating 90% and desaturated to 88% with slight movement at rest   Supplemental oxygen was started and titrated to 1L via nasal cannula  Saturation were maintained at 91% or greater on 1L for remainder of testing  Patient walked with the assistance of a rolling walker, and covered 250 feet  During testing she did pause two times  Patient's breathing was labored and she stated leg discomfort during walk  Patient was returned to her room and remains on 1L NC           1/28 CXR:  No acute cardiopulmonary disease  1/28 EKG:    Sinus rhythm  Probable left atrial enlargement  Right bundle branch block  Left anterior fascicular block  Nonspecific T abnormalities, lateral leads         Pertinent Labs/Diagnostic Test Results:        Results from last 7 days   Lab Units 01/28/22  1435   SARS-COV-2   Negative          Results from last 7 days   Lab Units 01/29/22  0350 01/28/22  1431   WBC Thousand/uL 5 83 5 34   HEMOGLOBIN g/dL 13 9 14 7   HEMATOCRIT % 42 9 44 6   PLATELETS Thousands/uL 173 173   NEUTROS ABS Thousands/µL 3 84 3 68          Ref   Range 1/30/2022 05:32   Sodium Latest Ref Range: 133 - 145 mmol/L 139   Potassium Latest Ref Range: 3 5 - 5 0 mmol/L 3 6   Chloride Latest Ref Range: 96 - 108 mmol/L 102   CO2 Latest Ref Range: 22 - 33 mmol/L 31   Anion Gap Latest Ref Range: 4 - 13 mmol/L 6   BUN Latest Ref Range: 6 - 20 mg/dL 16   Creatinine Latest Ref Range: 0 40 - 1 10 mg/dL 0 60   Glucose, Random Latest Ref Range: 65 - 140 mg/dL 103   Calcium Latest Ref Range: 8 4 - 10 2 mg/dL 8 7   eGFR Latest Units: ml/min/1 73sq m 94   Magnesium Latest Ref Range: 1 6 - 2 6 mg/dL 1 7             Results from last 7 days   Lab Units 01/29/22  0350 01/28/22  1431   SODIUM mmol/L 138 133   POTASSIUM mmol/L 3 9 4 3   CHLORIDE mmol/L 101 98   CO2 mmol/L 31 31   ANION GAP mmol/L 6 4   BUN mg/dL 17 18   CREATININE mg/dL 0 64 0 67   EGFR ml/min/1 73sq m 92 91   CALCIUM mg/dL 8 4 8 7           Results from last 7 days   Lab Units 01/28/22  1431   AST U/L 14*   ALT U/L 12   ALK PHOS U/L 102 0   TOTAL PROTEIN g/dL 7 2   ALBUMIN g/dL 3 6   TOTAL BILIRUBIN mg/dL 0 39             Results from last 7 days   Lab Units 01/29/22  0755 01/28/22  1937 01/28/22  1742   POC GLUCOSE mg/dl 124 225* 253*            Results from last 7 days   Lab Units 01/29/22  0350 01/28/22  1431   GLUCOSE RANDOM mg/dL 122 374*               Results from last 7 days   Lab Units 01/28/22  1622 01/28/22  1431   HS TNI 0HR ng/L  --  10   HS TNI 2HR ng/L 12  --    HSTNI D2 ng/L 2  --            Results from last 7 days   Lab Units 01/28/22  1431   D-DIMER QUANTITATIVE ug/ml FEU 0 45               Results from last 7 days   Lab Units 01/28/22  1431   TSH 3RD GENERATON uIU/mL 5 821*                 Results from last 7 days   Lab Units 01/28/22  1431   BNP pg/mL 106 5*                 Results from last 7 days   Lab Units 01/28/22  1435   INFLUENZA A PCR   Negative   INFLUENZA B PCR   Negative   RSV PCR   Negative           Results from last 7 days   Lab Units 02/01/22  1106 02/01/22  0715 01/31/22  2122   POC GLUCOSE mg/dl 93 91 122           ED Treatment:   Medication Administration from 01/28/2022 1347 to 01/28/2022 1842       Date/Time Order Dose Route Action     01/28/2022 1642 sodium chloride 0 9 % bolus 500 mL 0 mL Intravenous Stopped     01/28/2022 1556 sodium chloride 0 9 % bolus 500 mL 500 mL Intravenous New Bag     01/28/2022 1556 insulin regular (HumuLIN R,NovoLIN R) injection 8 Units 8 Units Subcutaneous Given     01/28/2022 1620 ibuprofen (MOTRIN) tablet 600 mg 600 mg Oral Given        Past Medical History:   Diagnosis Date    Diabetes mellitus ([facility] )     Disease of thyroid gland     Hyperlipidemia     Hypertension     Hypothyroidism (acquired)     Restless leg syndrome     Seizures ([facility] )      Present on Admission:   Seizures ([facility] )   Hypertension   Diabetes mellitus ([facility] )   Morbid obesity due to excess calories (HCC)   Dyspnea on exertion      Admitting Diagnosis: Shortness of breath [R06 02]  SOB (shortness of breath) [R06 02]  Hyperglycemia [R73 9]  Hypoxia [R09 02]  Age/Sex: 78 y o  female       Admission Orders:    SCD  Scheduled Medications:      Scheduled Medications:  celecoxib, 200 mg, Oral, Daily  docusate sodium, 100 mg, Oral, Daily  enoxaparin, 40 mg, Subcutaneous, Daily  FLUoxetine, 60 mg, Oral, Daily  gabapentin, 100 mg, Oral, BID before breakfast/lunch  gabapentin, 300 mg, Oral, HS  insulin glargine, 38 Units, Subcutaneous, HS  insulin lispro, 2-12 Units, Subcutaneous, TID AC  insulin lispro, 4-20 Units, Subcutaneous, HS  levothyroxine, 75 mcg, Oral, Daily  lisinopril, 10 mg, Oral, Daily  topiramate, 200 mg, Oral, Daily       furosemide (LASIX) injection 40 mg  Dose: 40 mg  Freq: Once Route: IV  Start: 02/01/22 1130 End: 02/01/22 1403     furosemide (LASIX) injection 20 mg  Dose: 20 mg  Freq: Once Route: IV  Start: 01/31/22 1545 End: 01/31/22 1715    furosemide (LASIX) injection 20 mg  Dose: 20 mg  Freq: Once Route: IV  Start: 01/30/22 1530 End: 01/30/22 1741      furosemide (LASIX) injection 40 mg  Dose: 40 mg  Freq: Once Route: IV  Start: 01/29/22 1045 End: 01/29/22 1147    furosemide (LASIX) injection 20 mg  Dose: 20 mg  Freq: Once Route: IV  Start: 01/28/22 1800 End: 01/28/22 2116      Continuous IV Infusions: None  PRN Meds:  acetaminophen, 650 mg, Oral, Q6H PRN                     Network Utilization Review Department  ATTENTION: Please call with any questions or concerns to [phone] and carefully listen to the prompts so that you are directed to the right person  All voicemails are confidential   France Ramirez all requests for admission clinical reviews, approved or denied determinations and any other requests to dedicated fax number below belonging to the campus where the patient is receiving treatment   List of dedicated fax numbers for the Facilities:  [address] [place] [address] DENIALS (Administrative/Medical Necessity) [phone]   [address] (Maternity/NICU/Pediatrics) [phone]   [address] [facility] 40 125 [facility]  [phone]   [address] [facility] [place] [id number] [place] [address] [address] Vil[address] [phone]

## 2022-02-16 ENCOUNTER — HOSPITAL ENCOUNTER (INPATIENT)
Facility: HOSPITAL | Age: 67
LOS: 1 days | Discharge: HOME HEALTH | DRG: 291 | End: 2022-02-19
Attending: INTERNAL MEDICINE | Admitting: INTERNAL MEDICINE
Payer: COMMERCIAL

## 2022-02-16 ENCOUNTER — APPOINTMENT (EMERGENCY)
Dept: RADIOLOGY | Facility: HOSPITAL | Age: 67
DRG: 291 | End: 2022-02-16
Payer: COMMERCIAL

## 2022-02-16 ENCOUNTER — APPOINTMENT (EMERGENCY)
Dept: CT IMAGING | Facility: HOSPITAL | Age: 67
DRG: 291 | End: 2022-02-16
Payer: COMMERCIAL

## 2022-02-16 DIAGNOSIS — E66.01 MORBID OBESITY DUE TO EXCESS CALORIES (HCC): ICD-10-CM

## 2022-02-16 DIAGNOSIS — R07.9 CHEST PAIN, UNSPECIFIED TYPE: Primary | ICD-10-CM

## 2022-02-16 DIAGNOSIS — I50.33 ACUTE ON CHRONIC HEART FAILURE WITH PRESERVED EJECTION FRACTION (HCC): ICD-10-CM

## 2022-02-16 PROBLEM — J96.10 CHRONIC RESPIRATORY FAILURE (HCC): Status: ACTIVE | Noted: 2022-02-16

## 2022-02-16 LAB
2HR DELTA HS TROPONIN: 1 NG/L
ALBUMIN SERPL BCP-MCNC: 3.4 G/DL (ref 3.4–4.8)
ALP SERPL-CCNC: 96.8 U/L (ref 35–140)
ALT SERPL W P-5'-P-CCNC: 13 U/L (ref 5–54)
ANION GAP SERPL CALCULATED.3IONS-SCNC: 5 MMOL/L (ref 4–13)
AST SERPL W P-5'-P-CCNC: 17 U/L (ref 15–41)
ATRIAL RATE: 71 BPM
ATRIAL RATE: 76 BPM
BASOPHILS # BLD AUTO: 0.02 THOUSANDS/ΜL (ref 0–0.1)
BASOPHILS NFR BLD AUTO: 0 % (ref 0–1)
BILIRUB SERPL-MCNC: 0.3 MG/DL (ref 0.3–1.2)
BNP SERPL-MCNC: 148 PG/ML (ref 1–100)
BUN SERPL-MCNC: 24 MG/DL (ref 6–20)
CALCIUM ALBUM COR SERPL-MCNC: 9 MG/DL (ref 8.3–10.1)
CALCIUM SERPL-MCNC: 8.5 MG/DL (ref 8.4–10.2)
CARDIAC TROPONIN I PNL SERPL HS: 12 NG/L
CARDIAC TROPONIN I PNL SERPL HS: 13 NG/L
CHLORIDE SERPL-SCNC: 99 MMOL/L (ref 96–108)
CO2 SERPL-SCNC: 33 MMOL/L (ref 22–33)
CREAT SERPL-MCNC: 0.83 MG/DL (ref 0.4–1.1)
EOSINOPHIL # BLD AUTO: 0.13 THOUSAND/ΜL (ref 0–0.61)
EOSINOPHIL NFR BLD AUTO: 3 % (ref 0–6)
ERYTHROCYTE [DISTWIDTH] IN BLOOD BY AUTOMATED COUNT: 12.2 % (ref 11.6–15.1)
GFR SERPL CREATININE-BSD FRML MDRD: 73 ML/MIN/1.73SQ M
GLUCOSE SERPL-MCNC: 202 MG/DL (ref 65–140)
GLUCOSE SERPL-MCNC: 283 MG/DL (ref 65–140)
HCT VFR BLD AUTO: 46.6 % (ref 34.8–46.1)
HGB BLD-MCNC: 15.3 G/DL (ref 11.5–15.4)
IMM GRANULOCYTES # BLD AUTO: 0.02 THOUSAND/UL (ref 0–0.2)
IMM GRANULOCYTES NFR BLD AUTO: 0 % (ref 0–2)
LYMPHOCYTES # BLD AUTO: 1.03 THOUSANDS/ΜL (ref 0.6–4.47)
LYMPHOCYTES NFR BLD AUTO: 21 % (ref 14–44)
MCH RBC QN AUTO: 28.7 PG (ref 26.8–34.3)
MCHC RBC AUTO-ENTMCNC: 32.8 G/DL (ref 31.4–37.4)
MCV RBC AUTO: 87 FL (ref 82–98)
MONOCYTES # BLD AUTO: 0.31 THOUSAND/ΜL (ref 0.17–1.22)
MONOCYTES NFR BLD AUTO: 6 % (ref 4–12)
NEUTROPHILS # BLD AUTO: 3.34 THOUSANDS/ΜL (ref 1.85–7.62)
NEUTS SEG NFR BLD AUTO: 70 % (ref 43–75)
NRBC BLD AUTO-RTO: 0 /100 WBCS
P AXIS: 53 DEGREES
P AXIS: 57 DEGREES
PLATELET # BLD AUTO: 156 THOUSANDS/UL (ref 149–390)
PMV BLD AUTO: 11 FL (ref 8.9–12.7)
POTASSIUM SERPL-SCNC: 3.7 MMOL/L (ref 3.5–5)
PR INTERVAL: 138 MS
PR INTERVAL: 143 MS
PROT SERPL-MCNC: 6.7 G/DL (ref 6.4–8.3)
QRS AXIS: -55 DEGREES
QRS AXIS: -55 DEGREES
QRSD INTERVAL: 137 MS
QRSD INTERVAL: 139 MS
QT INTERVAL: 423 MS
QT INTERVAL: 429 MS
QTC INTERVAL: 467 MS
QTC INTERVAL: 476 MS
RBC # BLD AUTO: 5.34 MILLION/UL (ref 3.81–5.12)
SODIUM SERPL-SCNC: 137 MMOL/L (ref 133–145)
T WAVE AXIS: 39 DEGREES
T WAVE AXIS: 51 DEGREES
VENTRICULAR RATE: 71 BPM
VENTRICULAR RATE: 76 BPM
WBC # BLD AUTO: 4.85 THOUSAND/UL (ref 4.31–10.16)

## 2022-02-16 PROCEDURE — 74174 CTA ABD&PLVS W/CONTRAST: CPT

## 2022-02-16 PROCEDURE — 99219 PR INITIAL OBSERVATION CARE/DAY 50 MINUTES: CPT

## 2022-02-16 PROCEDURE — 80053 COMPREHEN METABOLIC PANEL: CPT | Performed by: INTERNAL MEDICINE

## 2022-02-16 PROCEDURE — 36415 COLL VENOUS BLD VENIPUNCTURE: CPT | Performed by: INTERNAL MEDICINE

## 2022-02-16 PROCEDURE — 93005 ELECTROCARDIOGRAM TRACING: CPT

## 2022-02-16 PROCEDURE — 71045 X-RAY EXAM CHEST 1 VIEW: CPT

## 2022-02-16 PROCEDURE — 85025 COMPLETE CBC W/AUTO DIFF WBC: CPT | Performed by: INTERNAL MEDICINE

## 2022-02-16 PROCEDURE — 93010 ELECTROCARDIOGRAM REPORT: CPT | Performed by: INTERNAL MEDICINE

## 2022-02-16 PROCEDURE — 84484 ASSAY OF TROPONIN QUANT: CPT | Performed by: INTERNAL MEDICINE

## 2022-02-16 PROCEDURE — 82948 REAGENT STRIP/BLOOD GLUCOSE: CPT

## 2022-02-16 PROCEDURE — 99285 EMERGENCY DEPT VISIT HI MDM: CPT

## 2022-02-16 PROCEDURE — 83880 ASSAY OF NATRIURETIC PEPTIDE: CPT | Performed by: INTERNAL MEDICINE

## 2022-02-16 PROCEDURE — 99285 EMERGENCY DEPT VISIT HI MDM: CPT | Performed by: INTERNAL MEDICINE

## 2022-02-16 PROCEDURE — 71275 CT ANGIOGRAPHY CHEST: CPT

## 2022-02-16 PROCEDURE — G1004 CDSM NDSC: HCPCS

## 2022-02-16 RX ORDER — LIDOCAINE 50 MG/G
1 PATCH TOPICAL DAILY
Status: DISCONTINUED | OUTPATIENT
Start: 2022-02-16 | End: 2022-02-19 | Stop reason: HOSPADM

## 2022-02-16 RX ORDER — FLUOXETINE HYDROCHLORIDE 20 MG/1
60 CAPSULE ORAL DAILY
Status: DISCONTINUED | OUTPATIENT
Start: 2022-02-17 | End: 2022-02-19 | Stop reason: HOSPADM

## 2022-02-16 RX ORDER — TOPIRAMATE 100 MG/1
200 TABLET, FILM COATED ORAL 2 TIMES DAILY
Status: DISCONTINUED | OUTPATIENT
Start: 2022-02-16 | End: 2022-02-19 | Stop reason: HOSPADM

## 2022-02-16 RX ORDER — GABAPENTIN 300 MG/1
300 CAPSULE ORAL
Status: DISCONTINUED | OUTPATIENT
Start: 2022-02-16 | End: 2022-02-18

## 2022-02-16 RX ORDER — DOCUSATE SODIUM 100 MG/1
100 CAPSULE, LIQUID FILLED ORAL DAILY
Status: DISCONTINUED | OUTPATIENT
Start: 2022-02-17 | End: 2022-02-19 | Stop reason: HOSPADM

## 2022-02-16 RX ORDER — LEVOTHYROXINE SODIUM 0.07 MG/1
75 TABLET ORAL DAILY
Status: DISCONTINUED | OUTPATIENT
Start: 2022-02-17 | End: 2022-02-19 | Stop reason: HOSPADM

## 2022-02-16 RX ORDER — FUROSEMIDE 10 MG/ML
40 INJECTION INTRAMUSCULAR; INTRAVENOUS 2 TIMES DAILY
Status: DISCONTINUED | OUTPATIENT
Start: 2022-02-16 | End: 2022-02-19 | Stop reason: HOSPADM

## 2022-02-16 RX ORDER — ACETAMINOPHEN 325 MG/1
650 TABLET ORAL EVERY 4 HOURS PRN
Status: DISCONTINUED | OUTPATIENT
Start: 2022-02-16 | End: 2022-02-19 | Stop reason: HOSPADM

## 2022-02-16 RX ORDER — GABAPENTIN 100 MG/1
100 CAPSULE ORAL 2 TIMES DAILY
Status: DISCONTINUED | OUTPATIENT
Start: 2022-02-17 | End: 2022-02-17

## 2022-02-16 RX ORDER — LISINOPRIL 10 MG/1
10 TABLET ORAL DAILY
Status: DISCONTINUED | OUTPATIENT
Start: 2022-02-17 | End: 2022-02-18

## 2022-02-16 RX ORDER — NYSTATIN 100000 [USP'U]/G
POWDER TOPICAL 2 TIMES DAILY
Status: DISCONTINUED | OUTPATIENT
Start: 2022-02-16 | End: 2022-02-19 | Stop reason: HOSPADM

## 2022-02-16 RX ORDER — INSULIN GLARGINE 100 [IU]/ML
36 INJECTION, SOLUTION SUBCUTANEOUS
Status: DISCONTINUED | OUTPATIENT
Start: 2022-02-16 | End: 2022-02-18

## 2022-02-16 RX ADMIN — LIDOCAINE 5% 1 PATCH: 700 PATCH TOPICAL at 22:54

## 2022-02-16 RX ADMIN — FUROSEMIDE 40 MG: 10 INJECTION, SOLUTION INTRAMUSCULAR; INTRAVENOUS at 21:49

## 2022-02-16 RX ADMIN — GABAPENTIN 300 MG: 300 CAPSULE ORAL at 21:48

## 2022-02-16 RX ADMIN — TOPIRAMATE 200 MG: 100 TABLET, FILM COATED ORAL at 21:48

## 2022-02-16 RX ADMIN — IOHEXOL 100 ML: 350 INJECTION, SOLUTION INTRAVENOUS at 17:08

## 2022-02-16 RX ADMIN — INSULIN GLARGINE 36 UNITS: 100 INJECTION, SOLUTION SUBCUTANEOUS at 22:55

## 2022-02-16 NOTE — ED PROVIDER NOTES
History  Chief Complaint   Patient presents with    Chest Pain     arrived via [facility] with c/o chest pressure x 4 days  also c/o pain between shoulder blades  324 ASA and 1SLNTG given by EMS  recently discharged from inpt admission     This is a 78years old brought by EMS for having chest pain  Patient stated the chest pain going from the front to the back under the shoulder blades more on the left side  Patient has mild shortness of breaths  Patient stated that the pain started at 4:00 a m  And it was continuous  Patient has no nausea vomiting diaphoresis  Patient was admitted last week for CHF and recent discharged from the hospital and at that time she has echocardiogram   Ambulance gave patient aspirin and 2 nitro with no help for the chest pain  Patient is morbidly wheeze, having history of hypertension diabetes  Patient is supposed to see cardiologist by the end drinks months  Patient is uncomfortable at the ER  Patient has blood pressure 123/62 at the ER  Patient has pulse ox 93%  One 0 air  Prior to Admission Medications   Prescriptions Last Dose Informant Patient Reported? Taking? Alogliptin Benzoate 25 MG TABS   Yes No   Sig: Take 25 mg by mouth daily   Empagliflozin 25 MG TABS   Yes No   Sig: Take 25 mg by mouth every morning   FLUoxetine (PROzac) 20 mg capsule   No No   Sig: Take 3 capsules (60 mg total) by mouth daily   acetaminophen (TYLENOL) 325 mg tablet   Yes No   Sig: Take 650 mg by mouth every 6 (six) hours as needed for mild pain (pain score 1-3)     Patient not taking: Reported on 1/28/2022   docusate sodium (COLACE) 100 mg capsule   Yes No   Sig: Take 100 mg by mouth daily   furosemide (LASIX) 40 mg tablet   No No   Sig: Take 1 tablet (40 mg total) by mouth daily   gabapentin (NEURONTIN) 100 mg capsule   No No   Sig: Take 1 capsule (100 mg total) by mouth 2 (two) times a day Take 100 mg in the morning and 100 mg mid afternoon   gabapentin (NEURONTIN) 300 mg capsule No No   Sig: Take 1 capsule (300 mg total) by mouth daily at bedtime   insulin aspart, w/niacinamide, (Fiasp FlexTouch) 100 Units/mL injection pen   Yes No   Sig: Inject under the skin 3 (three) times a day before meals Indications: per sliding scale  insulin glargine (LANTUS) 100 units/mL subcutaneous injection   No No   Sig: Inject 36 Units under the skin daily at bedtime   levothyroxine 50 mcg tablet   No No   Sig: Take 1 5 tablets (75 mcg total) by mouth daily   lisinopril (ZESTRIL) 10 mg tablet   Yes No   Sig: Take 10 mg by mouth daily   potassium chloride (K-DUR,KLOR-CON) 20 mEq tablet   No No   Sig: Take 1 tablet (20 mEq total) by mouth daily   topiramate (TOPAMAX) 100 mg tablet   Yes No   Sig: Take 200 mg by mouth 2 (two) times a day       Facility-Administered Medications: None       Past Medical History:   Diagnosis Date    Diabetes mellitus ([facility] )     Disease of thyroid gland     Hyperlipidemia     Hypertension     Hypothyroidism (acquired)     Restless leg syndrome     Seizures (HCC)        Past Surgical History:   Procedure Laterality Date    APPENDECTOMY      CHOLECYSTECTOMY      HIP FRACTURE SURGERY Right     TONSILLECTOMY      TUBAL LIGATION         Family History   Problem Relation Age of Onset    Seizures Father      I have reviewed and agree with the history as documented  E-Cigarette/Vaping    E-Cigarette Use Never User      E-Cigarette/Vaping Substances    Nicotine No     THC No     CBD No     Flavoring No     Other No     Unknown No      Social History     Tobacco Use    Smoking status: Never Smoker    Smokeless tobacco: Never Used    Tobacco comment: not a smoker   Vaping Use    Vaping Use: Never used   Substance Use Topics    Alcohol use: Yes     Comment: occasional    Drug use: Never       Review of Systems   Constitutional: Negative for fatigue and fever     HENT: Negative for congestion, nosebleeds, postnasal drip, rhinorrhea, sinus pressure, sneezing, sore throat, tinnitus and trouble swallowing  Respiratory: Negative for cough and shortness of breath  Cardiovascular: Positive for chest pain  Negative for palpitations  Gastrointestinal: Negative for abdominal pain, diarrhea, nausea and vomiting  Genitourinary: Negative for difficulty urinating, dysuria, flank pain and frequency  Musculoskeletal: Negative for back pain, myalgias, neck pain and neck stiffness  Skin: Negative for color change, pallor and rash  Neurological: Negative for dizziness, weakness, light-headedness, numbness and headaches  Hematological: Negative for adenopathy  Does not bruise/bleed easily  Psychiatric/Behavioral: Negative for agitation and behavioral problems  Physical Exam  Physical Exam  Vitals and nursing note reviewed  Constitutional:       General: She is not in acute distress  Appearance: She is well-developed  She is not ill-appearing, toxic-appearing or diaphoretic  HENT:      Head: Normocephalic and atraumatic  Mouth/Throat:      Pharynx: No oropharyngeal exudate  Neck:      Thyroid: No thyromegaly  Vascular: No hepatojugular reflux or JVD  Cardiovascular:      Rate and Rhythm: Normal rate and regular rhythm  Pulses:           Carotid pulses are 2+ on the right side and 2+ on the left side  Radial pulses are 2+ on the right side and 2+ on the left side  Dorsalis pedis pulses are 2+ on the right side and 2+ on the left side  Posterior tibial pulses are 2+ on the right side and 2+ on the left side  Heart sounds: Normal heart sounds  No murmur heard  No friction rub  No gallop  No S3 or S4 sounds  Pulmonary:      Effort: Pulmonary effort is normal  No tachypnea, accessory muscle usage or respiratory distress  Breath sounds: Normal breath sounds  No decreased breath sounds, wheezing, rhonchi or rales  Abdominal:      General: Bowel sounds are normal  There is no abdominal bruit        Palpations: Abdomen is soft  There is no fluid wave, hepatomegaly, splenomegaly or mass  Tenderness: There is no abdominal tenderness  There is no guarding or rebound  Genitourinary:     Rectum: Guaiac result negative  Musculoskeletal:         General: No tenderness or deformity  Normal range of motion  Cervical back: Normal range of motion and neck supple  Right lower leg: No tenderness  No edema  Left lower leg: No tenderness  No edema  Lymphadenopathy:      Cervical: No cervical adenopathy  Skin:     General: Skin is warm and dry  Capillary Refill: Capillary refill takes less than 2 seconds  Coloration: Skin is not cyanotic or pale  Findings: No ecchymosis, erythema or rash  Nails: There is no clubbing  Neurological:      Mental Status: She is alert and oriented to person, place, and time     Psychiatric:         Behavior: Behavior normal          Vital Signs  ED Triage Vitals   Temperature Pulse Respirations Blood Pressure SpO2   02/16/22 1557 02/16/22 1557 02/16/22 1557 02/16/22 1557 02/16/22 1557   98 5 °F (36 9 °C) 80 20 123/62 93 %      Temp Source Heart Rate Source Patient Position - Orthostatic VS BP Location FiO2 (%)   02/16/22 1557 02/16/22 1830 02/16/22 1830 02/16/22 1830 --   Oral Monitor Lying Left arm       Pain Score       02/16/22 1557       5           Vitals:    02/16/22 1557 02/16/22 1830 02/16/22 2008   BP: 123/62 166/70 164/79   Pulse: 80 72 65   Patient Position - Orthostatic VS:  Lying Lying         Visual Acuity      ED Medications  Medications   docusate sodium (COLACE) capsule 100 mg (has no administration in time range)   FLUoxetine (PROzac) capsule 60 mg (has no administration in time range)   levothyroxine tablet 75 mcg (has no administration in time range)   lisinopril (ZESTRIL) tablet 10 mg (has no administration in time range)   topiramate (TOPAMAX) tablet 200 mg (has no administration in time range)   furosemide (LASIX) injection 40 mg (has no administration in time range)   enoxaparin (LOVENOX) subcutaneous injection 40 mg (has no administration in time range)   acetaminophen (TYLENOL) tablet 650 mg (has no administration in time range)   insulin glargine (LANTUS) subcutaneous injection 36 Units 0 36 mL (has no administration in time range)   insulin lispro (HumaLOG) 100 units/mL subcutaneous injection 2-12 Units (has no administration in time range)   gabapentin (NEURONTIN) capsule 300 mg (has no administration in time range)   iohexol (OMNIPAQUE) 350 MG/ML injection (MULTI-DOSE) 100 mL (100 mL Intravenous Given 2/16/22 1708)       Diagnostic Studies  Results Reviewed     Procedure Component Value Units Date/Time    HS Troponin I 2hr [[id number]]  (Normal) Collected: 02/16/22 1842    Lab Status: Final result Specimen: Blood from Line, Venous Updated: 02/16/22 1913     hs TnI 2hr 13 ng/L      Delta 2hr hsTnI 1 ng/L     HS Troponin I 4hr [[id number]]     Lab Status: No result Specimen: Blood     B-Type Natriuretic Peptide(BNP) CA, GH, EA [facility] Only [[id number]]  (Abnormal) Collected: 02/16/22 1634    Lab Status: Final result Specimen: Blood from Arm, Right Updated: 02/16/22 1708      0 pg/mL     HS Troponin 0hr (reflex protocol) [[id number]]  (Normal) Collected: 02/16/22 1635    Lab Status: Final result Specimen: Blood from Arm, Right Updated: 02/16/22 1703     hs TnI 0hr 12 ng/L     Comprehensive metabolic panel [[id number]]  (Abnormal) Collected: 02/16/22 1634    Lab Status: Final result Specimen: Blood from Arm, Right Updated: 02/16/22 1657     Sodium 137 mmol/L      Potassium 3 7 mmol/L      Chloride 99 mmol/L      CO2 33 mmol/L      ANION GAP 5 mmol/L      BUN 24 mg/dL      Creatinine 0 83 mg/dL      Glucose 283 mg/dL      Calcium 8 5 mg/dL      Corrected Calcium 9 0 mg/dL      AST 17 U/L      ALT 13 U/L      Alkaline Phosphatase 96 8 U/L      Total Protein 6 7 g/dL      Albumin 3 4 g/dL      Total Bilirubin 0 30 mg/dL      eGFR 73 ml/min/1 73sq m     Narrative:      National Kidney Disease Foundation guidelines for Chronic Kidney Disease (CKD):     Stage 1 with normal or high GFR (GFR > 90 mL/min/1 73 square meters)    Stage 2 Mild CKD (GFR = 60-89 mL/min/1 73 square meters)    Stage 3A Moderate CKD (GFR = 45-59 mL/min/1 73 square meters)    Stage 3B Moderate CKD (GFR = 30-44 mL/min/1 73 square meters)    Stage 4 Severe CKD (GFR = 15-29 mL/min/1 73 square meters)    Stage 5 End Stage CKD (GFR <15 mL/min/1 73 square meters)  Note: GFR calculation is accurate only with a steady state creatinine    CBC and differential [[id number]]  (Abnormal) Collected: 02/16/22 1634    Lab Status: Final result Specimen: Blood from Arm, Right Updated: 02/16/22 1641     WBC 4 85 Thousand/uL      RBC 5 34 Million/uL      Hemoglobin 15 3 g/dL      Hematocrit 46 6 %      MCV 87 fL      MCH 28 7 pg      MCHC 32 8 g/dL      RDW 12 2 %      MPV 11 0 fL      Platelets 278 Thousands/uL      nRBC 0 /100 WBCs      Neutrophils Relative 70 %      Immat GRANS % 0 %      Lymphocytes Relative 21 %      Monocytes Relative 6 %      Eosinophils Relative 3 %      Basophils Relative 0 %      Neutrophils Absolute 3 34 Thousands/µL      Immature Grans Absolute 0 02 Thousand/uL      Lymphocytes Absolute 1 03 Thousands/µL      Monocytes Absolute 0 31 Thousand/µL      Eosinophils Absolute 0 13 Thousand/µL      Basophils Absolute 0 02 Thousands/µL                  CTA dissection protocol chest/abdomen/pelvis   Final Result by Dionisio Soto MD (02/16 1296)         1  Negative for aortic dissection or aneurysm  No intramural hematoma  2   Interstitial pulmonary edema  3   Diverticulosis  No evidence of acute diverticulitis  Workstation performed: [id number]         XR chest portable   Final Result by Radha Sinclair MD (02/16 3937)      No acute cardiopulmonary disease                    Workstation performed: [id number]                    Procedures  Procedures         ED Course  ED Course as of 02/16/22 2035   Wed Feb 16, 2022   1623 EKG; Sinus rhythm rate 71/min, RBBB,LAFB, LVH             HEART Risk Score      Most Recent Value   Heart Score Risk Calculator    History 1 Filed at: 02/16/2022 1822   ECG 1 Filed at: 02/16/2022 7637   Age 2 Filed at: 02/16/2022 1822   Risk Factors 2 Filed at: 02/16/2022 1822   Troponin 0 Filed at: 02/16/2022 1822   HEART Score 6 Filed at: 02/16/2022 1822                          KENDALL Risk Score      Most Recent Value   Age >= 72 1 Filed at: 02/16/2022 1821   Known CAD (stenosis >= 50%) --   Recent (<=24 hrs) Service Angina 1 Filed at: 02/16/2022 1821   ST Deviation >= 0 5 mm 0 Filed at: 02/16/2022 1821   3+ CAD Risk Factors (FHx, HTN, HLP, DM, Smoker) 1 Filed at: 02/16/2022 1821   Aspirin Use Past 7 Days 1 Filed at: 02/16/2022 1821   Elevated Cardiac Markers --   KENDALL Risk Score (Calculated) --                  MDM  Number of Diagnoses or Management Options  Diagnosis management comments: A this is a 78years old came for having chest pain started at 4:00 a m  Anamaria Martin Patient stated the pain goes from the retrosternal area to her back  Patient received aspirin, nitro by EMS with little help  Patient has no shortness of breaths, nausea, vomiting  Patient was admitted to the hospital last week for CHF  CTA of chest abdomen pelvis done shows no about dissection  EKG shows no change from previous 1 which was RBBB,LAFB,LVH  TROPONIN WAS NEGATIVE         Amount and/or Complexity of Data Reviewed  Clinical lab tests: ordered and reviewed  Tests in the radiology section of CPT®: ordered and reviewed    Risk of Complications, Morbidity, and/or Mortality  Presenting problems: moderate  Diagnostic procedures: moderate  Management options: low        Disposition  Final diagnoses:   Chest pain, unspecified type     Time reflects when diagnosis was documented in both MDM as applicable and the Disposition within this note     Time User Action Codes Description Comment    2/16/2022  6:37 PM Andrei No Add [R07 9] Chest pain, unspecified type     2/16/2022  7:49 PM Kailee Mackay Add [I50 33] Acute on chronic heart failure with preserved ejection fraction [facility])       ED Disposition     ED Disposition Condition Date/Time Comment    Admit Stable Wed Feb 16, 2022  6:38 PM Case was discussed with Estella Candelario and the patient's admission status was agreed to be admitted for observationto the service of Dr Jeanice Shone    None         Current Discharge Medication List      CONTINUE these medications which have NOT CHANGED    Details   acetaminophen (TYLENOL) 325 mg tablet Take 650 mg by mouth every 6 (six) hours as needed for mild pain (pain score 1-3)  Alogliptin Benzoate 25 MG TABS Take 25 mg by mouth daily      docusate sodium (COLACE) 100 mg capsule Take 100 mg by mouth daily      Empagliflozin 25 MG TABS Take 25 mg by mouth every morning      FLUoxetine (PROzac) 20 mg capsule Take 3 capsules (60 mg total) by mouth daily  Qty: 90 capsule, Refills: 0    Associated Diagnoses: Major depressive disorder      furosemide (LASIX) 40 mg tablet Take 1 tablet (40 mg total) by mouth daily  Qty: 30 tablet, Refills: 0    Associated Diagnoses: SOB (shortness of breath)      !! gabapentin (NEURONTIN) 100 mg capsule Take 1 capsule (100 mg total) by mouth 2 (two) times a day Take 100 mg in the morning and 100 mg mid afternoon  Qty: 42 capsule, Refills: 0    Associated Diagnoses: Restless leg syndrome      !! gabapentin (NEURONTIN) 300 mg capsule Take 1 capsule (300 mg total) by mouth daily at bedtime  Qty: 21 capsule, Refills: 0    Associated Diagnoses: Restless leg syndrome      insulin aspart, w/niacinamide, (Fiasp FlexTouch) 100 Units/mL injection pen Inject under the skin 3 (three) times a day before meals Indications: per sliding scale        insulin glargine (LANTUS) 100 units/mL subcutaneous injection Inject 36 Units under the skin daily at bedtime    Associated Diagnoses: Diabetes mellitus (HCC)      levothyroxine 50 mcg tablet Take 1 5 tablets (75 mcg total) by mouth daily  Qty: 45 tablet, Refills: 0    Associated Diagnoses: Acquired hypothyroidism      lisinopril (ZESTRIL) 10 mg tablet Take 10 mg by mouth daily      potassium chloride (K-DUR,KLOR-CON) 20 mEq tablet Take 1 tablet (20 mEq total) by mouth daily  Qty: 30 tablet, Refills: 0    Associated Diagnoses: SOB (shortness of breath)      topiramate (TOPAMAX) 100 mg tablet Take 200 mg by mouth 2 (two) times a day        !! - Potential duplicate medications found  Please discuss with provider  No discharge procedures on file      PDMP Review       Value Time User    PDMP Reviewed  Yes 3/30/2021  3:19 PM Allegra Stover MD          ED Provider  Electronically Signed by           Ross Christopher MD  02/16/22 2035

## 2022-02-17 LAB
ANION GAP SERPL CALCULATED.3IONS-SCNC: 6 MMOL/L (ref 4–13)
BUN SERPL-MCNC: 16 MG/DL (ref 6–20)
CALCIUM SERPL-MCNC: 8.8 MG/DL (ref 8.4–10.2)
CHLORIDE SERPL-SCNC: 99 MMOL/L (ref 96–108)
CO2 SERPL-SCNC: 35 MMOL/L (ref 22–33)
CREAT SERPL-MCNC: 0.64 MG/DL (ref 0.4–1.1)
ERYTHROCYTE [DISTWIDTH] IN BLOOD BY AUTOMATED COUNT: 12 % (ref 11.6–15.1)
GFR SERPL CREATININE-BSD FRML MDRD: 92 ML/MIN/1.73SQ M
GLUCOSE P FAST SERPL-MCNC: 143 MG/DL (ref 70–105)
GLUCOSE SERPL-MCNC: 139 MG/DL (ref 65–140)
GLUCOSE SERPL-MCNC: 143 MG/DL (ref 65–140)
GLUCOSE SERPL-MCNC: 185 MG/DL (ref 65–140)
GLUCOSE SERPL-MCNC: 219 MG/DL (ref 65–140)
GLUCOSE SERPL-MCNC: 224 MG/DL (ref 65–140)
HCT VFR BLD AUTO: 46.9 % (ref 34.8–46.1)
HGB BLD-MCNC: 15.4 G/DL (ref 11.5–15.4)
MAGNESIUM SERPL-MCNC: 1.5 MG/DL (ref 1.6–2.6)
MCH RBC QN AUTO: 28.6 PG (ref 26.8–34.3)
MCHC RBC AUTO-ENTMCNC: 32.8 G/DL (ref 31.4–37.4)
MCV RBC AUTO: 87 FL (ref 82–98)
PLATELET # BLD AUTO: 160 THOUSANDS/UL (ref 149–390)
PMV BLD AUTO: 11.5 FL (ref 8.9–12.7)
POTASSIUM SERPL-SCNC: 3.2 MMOL/L (ref 3.5–5)
RBC # BLD AUTO: 5.39 MILLION/UL (ref 3.81–5.12)
SODIUM SERPL-SCNC: 140 MMOL/L (ref 133–145)
WBC # BLD AUTO: 5.7 THOUSAND/UL (ref 4.31–10.16)

## 2022-02-17 PROCEDURE — 82948 REAGENT STRIP/BLOOD GLUCOSE: CPT

## 2022-02-17 PROCEDURE — 85027 COMPLETE CBC AUTOMATED: CPT

## 2022-02-17 PROCEDURE — 80048 BASIC METABOLIC PNL TOTAL CA: CPT

## 2022-02-17 PROCEDURE — 83735 ASSAY OF MAGNESIUM: CPT | Performed by: INTERNAL MEDICINE

## 2022-02-17 PROCEDURE — 99205 OFFICE O/P NEW HI 60 MIN: CPT | Performed by: INTERNAL MEDICINE

## 2022-02-17 PROCEDURE — 99225 PR SBSQ OBSERVATION CARE/DAY 25 MINUTES: CPT | Performed by: INTERNAL MEDICINE

## 2022-02-17 RX ORDER — GABAPENTIN 100 MG/1
100 CAPSULE ORAL 2 TIMES DAILY
Status: DISCONTINUED | OUTPATIENT
Start: 2022-02-17 | End: 2022-02-18

## 2022-02-17 RX ORDER — POTASSIUM CHLORIDE 20 MEQ/1
40 TABLET, EXTENDED RELEASE ORAL ONCE
Status: COMPLETED | OUTPATIENT
Start: 2022-02-17 | End: 2022-02-17

## 2022-02-17 RX ORDER — SPIRONOLACTONE 25 MG/1
25 TABLET ORAL DAILY
Status: DISCONTINUED | OUTPATIENT
Start: 2022-02-17 | End: 2022-02-19 | Stop reason: HOSPADM

## 2022-02-17 RX ADMIN — GABAPENTIN 100 MG: 100 CAPSULE ORAL at 15:52

## 2022-02-17 RX ADMIN — POTASSIUM CHLORIDE 40 MEQ: 1500 TABLET, EXTENDED RELEASE ORAL at 09:02

## 2022-02-17 RX ADMIN — ENOXAPARIN SODIUM 40 MG: 40 INJECTION SUBCUTANEOUS at 09:03

## 2022-02-17 RX ADMIN — INSULIN LISPRO 4 UNITS: 100 INJECTION, SOLUTION INTRAVENOUS; SUBCUTANEOUS at 17:10

## 2022-02-17 RX ADMIN — NYSTATIN: 100000 POWDER TOPICAL at 21:01

## 2022-02-17 RX ADMIN — INSULIN GLARGINE 36 UNITS: 100 INJECTION, SOLUTION SUBCUTANEOUS at 21:00

## 2022-02-17 RX ADMIN — LEVOTHYROXINE SODIUM 75 MCG: 75 TABLET ORAL at 09:03

## 2022-02-17 RX ADMIN — FLUOXETINE 60 MG: 20 CAPSULE ORAL at 09:03

## 2022-02-17 RX ADMIN — LISINOPRIL 10 MG: 10 TABLET ORAL at 09:02

## 2022-02-17 RX ADMIN — GABAPENTIN 300 MG: 300 CAPSULE ORAL at 21:00

## 2022-02-17 RX ADMIN — TOPIRAMATE 200 MG: 100 TABLET, FILM COATED ORAL at 16:58

## 2022-02-17 RX ADMIN — NYSTATIN: 100000 POWDER TOPICAL at 00:19

## 2022-02-17 RX ADMIN — NYSTATIN: 100000 POWDER TOPICAL at 12:40

## 2022-02-17 RX ADMIN — INSULIN LISPRO 4 UNITS: 100 INJECTION, SOLUTION INTRAVENOUS; SUBCUTANEOUS at 12:38

## 2022-02-17 RX ADMIN — LIDOCAINE 5% 1 PATCH: 700 PATCH TOPICAL at 09:07

## 2022-02-17 RX ADMIN — FUROSEMIDE 40 MG: 10 INJECTION, SOLUTION INTRAMUSCULAR; INTRAVENOUS at 09:03

## 2022-02-17 RX ADMIN — TOPIRAMATE 200 MG: 100 TABLET, FILM COATED ORAL at 09:03

## 2022-02-17 RX ADMIN — GABAPENTIN 100 MG: 100 CAPSULE ORAL at 09:02

## 2022-02-17 NOTE — CONSULTS
Consultation - Cardiology   Win Giraldo 78 y o  female MRN: [id number]  Unit/Bed#: -01 Encounter: [id number]        Inpatient consult to Cardiology  Consult performed by: Lindsay Veras MD  Consult ordered by: Josseline Akins PA-C            Assessment/Plan   Principal Problem:    Acute on chronic heart failure with preserved ejection fraction [facility])  Active Problems:    Diabetes mellitus ([facility] )    Chest pain    Morbid obesity due to excess calories ([facility] )    Chronic respiratory failure ([facility] )    Acute on chronic heart failure with preserved EF:  No response to high-dose oral diuretics, furosemide  Agree with continuing IV furosemide  Monitor renal function  Supplement potassium  Add spironolactone 25 mg a day  At discharge consider using torsemide 40 mg a day with a follow-up BMP in 1 week  Recent echo personally reviewed  Noncardiac chest pain:  Pain is unlikely to be angina  Increased left ventricular end-diastolic pressure due to decompensated heart failure can contribute to chest pain  Diuresis will be helpful  Further ischemic evaluation not indicated  Previous nuclear stress test showed inferior ischemia but cardiac catheterization showed no obstructive CAD  Nonobstructive coronary artery disease: Coronary angiogram reviewed  No significant coronary stenosis identified  Mild coronary plaque noted  LVEDP was mildly elevated  Continue risk factor modification and statin therapy  Morbid obesity: Contributory factor to diastolic dysfunction: Calorie restriction and weight loss will be beneficial long-term  Prognosis is guarded due to this  Obstructive sleep apnea: Moderate pulmonary hypertension seen on echo as a result of untreated obstructive sleep apnea  Patient reports that she cannot tolerate the CPAP mask  This further contributes to her diastolic dysfunction  Diabetes:  Poorly controlled  Last A1c 11 9    Will benefit from continuation of empagliflozin to lower her risk of heart failure hospitalization  Plan of care discussed with the patient  Physician Requesting Consult: Finesse Grayson MD    Reason for Consult / Principal Problem:   Chief Complaint   Patient presents with    Chest Pain     arrived via [facility] with c/o chest pressure x 4 days  also c/o pain between shoulder blades  324 ASA and 1SLNTG given by EMS  recently discharged from inpt admission       HPI: Ed Fam is a 78y o  year old female with heart failure with preserved EF, mild nonobstructive CAD based on cardiac catheterization in 3/21, hypertension, major depression, diabetes, dyslipidemia and morbid obesity, presented to the emergency room by ambulance with complaints of palpitations, chest discomfort and shortness of breath especially on laying flat  She states she has woken up at night, gasping for breath  She has been taking Lasix 80 mg to alternate with 60 mg a day  Reports that her diuretics were increased recently but despite that she did not improve  Her pain was a dull ache  No exertional component to her pain  The pain did wake her up from his sleep  No particular aggravating or relieving factors  No radiation of the pain  She also reports some intermittent palpitations  She has no history of atrial fibrillation  She presented with similar complaints earlier this month for heart failure exacerbation  Was not able to get a follow-up with Cardiology  CTA chest in the emergency room was negative for dissection or pulmonary embolism  It did show mild pulmonary edema  Cardiac studies personally reviewed:  ECHO: 2/22: EF normal, Grade II DD, LAP elevated, POLLY, mild AS- MG is 12, MAC, PASP 51    Cath: 3/21: LM patent, LAD 30%, LCX patent, RCA 20%, EF 65, EDP mild elevation    Review of Systems   Constitutional: Positive for fatigue and unexpected weight change  Negative for fever  HENT: Positive for congestion  Negative for trouble swallowing      Eyes: Negative for visual disturbance  Respiratory: Positive for cough and shortness of breath  Negative for chest tightness and wheezing  Cardiovascular: Positive for chest pain and palpitations  Negative for leg swelling  Gastrointestinal: Negative for abdominal pain and blood in stool  Endocrine: Negative for polyuria  Genitourinary: Negative for hematuria  Musculoskeletal: Positive for arthralgias  Negative for myalgias  Skin: Negative for rash  Neurological: Negative for dizziness, tremors and weakness  Hematological: Does not bruise/bleed easily  Psychiatric/Behavioral: Positive for dysphoric mood and sleep disturbance  Historical Information   Past Medical History:   Diagnosis Date    Diabetes mellitus ([facility] )     Disease of thyroid gland     Hyperlipidemia     Hypertension     Hypothyroidism (acquired)     Restless leg syndrome     Seizures (HCC)      Past Surgical History:   Procedure Laterality Date    APPENDECTOMY      CHOLECYSTECTOMY      HIP FRACTURE SURGERY Right     TONSILLECTOMY      TUBAL LIGATION       Social History     Substance and Sexual Activity   Alcohol Use Yes    Comment: occasional     Social History     Substance and Sexual Activity   Drug Use Never     Social History     Tobacco Use   Smoking Status Never Smoker   Smokeless Tobacco Never Used   Tobacco Comment    not a smoker     Family History   Problem Relation Age of Onset    Seizures Father        Allergies:   Allergies   Allergen Reactions    Pineapple - Food Allergy Anaphylaxis    Statins Other (See Comments)     Cramping from head to toe     Byetta 10 Mcg Pen [Exenatide]     Marijuana [Dronabinol] GI Intolerance    Metformin Diarrhea     Other reaction(s): Unknown    Omeprazole Hives    Ondansetron      Other reaction(s): Unknown    Coconut Oil - Food Allergy Rash    Liraglutide Rash and Other (See Comments)     Generalized rash    Penicillins Rash       Medications:     Current Facility-Administered Medications:     acetaminophen (TYLENOL) tablet 650 mg, 650 mg, Oral, Q4H PRN, JEWELS Shen-C    docusate sodium (COLACE) capsule 100 mg, 100 mg, Oral, Daily, Melissa Ross PA-C    enoxaparin (LOVENOX) subcutaneous injection 40 mg, 40 mg, Subcutaneous, Daily, Melissa Ross PA-C    FLUoxetine (PROzac) capsule 60 mg, 60 mg, Oral, Daily, Melissa Ross PA-C    furosemide (LASIX) injection 40 mg, 40 mg, Intravenous, BID, Melissa Ross, PA-C, 40 mg at 02/16/22 2149    gabapentin (NEURONTIN) capsule 100 mg, 100 mg, Oral, BID, Melissa Ross PA-C    gabapentin (NEURONTIN) capsule 300 mg, 300 mg, Oral, HS, Melissa Ross PA-C, 300 mg at 02/16/22 2148    insulin glargine (LANTUS) subcutaneous injection 36 Units 0 36 mL, 36 Units, Subcutaneous, HS, Melissa Ross PA-C, 36 Units at 02/16/22 2255    insulin lispro (HumaLOG) 100 units/mL subcutaneous injection 2-12 Units, 2-12 Units, Subcutaneous, TID AC **AND** Fingerstick Glucose (POCT), , , TID AC, Melissa Ross PA-C    levothyroxine tablet 75 mcg, 75 mcg, Oral, Daily, Melissa Ross PA-C    lidocaine (LIDODERM) 5 % patch 1 patch, 1 patch, Topical, Daily, Melissa Ross PA-C, 1 patch at 02/16/22 2254    lisinopril (ZESTRIL) tablet 10 mg, 10 mg, Oral, Daily, Melissa Ross PA-C    nystatin (MYCOSTATIN) powder, , Topical, BID, Melissa Ross PA-C, Given at 02/17/22 0019    potassium chloride (K-DUR,KLOR-CON) CR tablet 40 mEq, 40 mEq, Oral, Once, Evan Ruiz MD    topiramate (TOPAMAX) tablet 200 mg, 200 mg, Oral, BID, Melissa Ross PA-C, 200 mg at 02/16/22 2148     Objective:   Vitals:   Vitals:    02/17/22 0747   BP: 131/70   Pulse: 63   Resp: 16   Temp: 97 9 °F (36 6 °C)   SpO2: 99%     Body mass index is 49 53 kg/m²    Orthostatic Blood Pressures      Most Recent Value   Blood Pressure 131/70 filed at 02/17/2022 0747   Patient Position - Orthostatic VS Lying filed at 02/17/2022 1232        Systolic (97NWN), OEN:440 , Min:110 , CIW:377     Diastolic (50GQK), PQE:16, Min:62, Max:79      Intake/Output Summary (Last 24 hours) at 2/17/2022 0854  Last data filed at 2/17/2022 0101  Gross per 24 hour   Intake --   Output 400 ml   Net -400 ml     Weight (last 2 days)     Date/Time Weight    02/17/22 0700 123 (270 8)    02/16/22 2008 129 (283 29)    02/16/22 1557 127 (280)        Invasive Devices  Report    Peripheral Intravenous Line            Peripheral IV 02/16/22 Right Hand 1 day    Peripheral IV 02/16/22 Right Antecubital <1 day                Physical Exam      GEN:  Obese, No acute distress  EYES:  no icterus  ENT: no abnormality  NECK: no bruit  HEART: regular rhythm, normal S1 and S2, grade 2 murmur  LUNGS:  Decreased air entry, no wheezing   ABDOMEN: normal bowel sounds, nontender  EXTREMITIES: no edema  JOINTS: No deformities  NEURO:  Alert, no focal deficits  SKIN: warm, no lesions  PSYCH: Normal mood and affect        Labs:       CBC with diff:   Results from last 7 days   Lab Units 02/17/22  0508 02/16/22  1634   WBC Thousand/uL 5 70 4 85   HEMOGLOBIN g/dL 15 4 15 3   HEMATOCRIT % 46 9* 46 6*   MCV fL 87 87   PLATELETS Thousands/uL 160 156   MCH pg 28 6 28 7   MCHC g/dL 32 8 32 8   RDW % 12 0 12 2   MPV fL 11 5 11 0   NRBC AUTO /100 WBCs  --  0     CMP:  Results from last 7 days   Lab Units 02/17/22  0508 02/16/22  1634   POTASSIUM mmol/L 3 2* 3 7   CHLORIDE mmol/L 99 99   CO2 mmol/L 35* 33   BUN mg/dL 16 24*   CREATININE mg/dL 0 64 0 83   CALCIUM mg/dL 8 8 8 5   AST U/L  --  17   ALT U/L  --  13   ALK PHOS U/L  --  96 8   EGFR ml/min/1 73sq m 92 73     NT-proBNP: No results found for: NTBNP   Magnesium:  Results from last 7 days   Lab Units 02/17/22  0508   MAGNESIUM mg/dL 1 5*     Coags:    TSH:     Hemoglobin A1C:    Lipid Profile:   Lab Results   Component Value Date    CHOL 197 01/02/2016     Lab Results   Component Value Date    HDL 43 (L) 03/03/2021    HDL 47 07/07/2016    HDL 34 01/02/2016     Lab Results   Component Value Date    LDLCALC 135 (H) 03/03/2021    LDLCALC 130 (H) 07/07/2016    LDLCALC 122 (H) 01/02/2016     Lab Results   Component Value Date    TRIG 121 0 03/03/2021    TRIG 173 (H) 07/07/2016    TRIG 206 01/02/2016       Imaging & Testing   Cardiac testing:     EKG reviewed personally:  Sinus rhythm  Probable left atrial enlargement  RBBB and LAFB  Left ventricular hypertrophy  No significant change since prior tracing    Imaging:   I have personally reviewed pertinent films in PACS  XR chest portable    Result Date: 2/16/2022  Narrative: CHEST INDICATION:   chest pain  COMPARISON:  Chest radiograph from 1/28/2022 and chest CT from 10/23/2021  EXAM PERFORMED/VIEWS:  XR CHEST PORTABLE FINDINGS: Cardiomediastinal silhouette appears unremarkable  The lungs are clear  No pneumothorax or pleural effusion  Osseous structures appear within normal limits for patient age  Impression: No acute cardiopulmonary disease  Workstation performed: [id number]     XR chest 1 view portable    Result Date: 1/28/2022  Narrative: CHEST INDICATION:   sob  COMPARISON:  10/23/2021 EXAM PERFORMED/VIEWS:  XR CHEST PORTABLE Single view FINDINGS: Cardiomediastinal silhouette appears unremarkable  The lungs are clear  No pneumothorax or pleural effusion  Osseous structures appear within normal limits for patient age  Impression: No acute cardiopulmonary disease  Findings are stable Workstation performed: [id number]     CTA dissection protocol chest/abdomen/pelvis    Impression: 1  Negative for aortic dissection or aneurysm  No intramural hematoma  2   Interstitial pulmonary edema  3   Diverticulosis  No evidence of acute diverticulitis   Workstation performed: [id number]     Echo complete w/ contrast if indicated    Result Date: 2/1/2022  Narrative: [place]  Left Ventricle: Left ventricular cavity size is normal  Wall thickness is mildly increased  Systolic function is normal  Although no diagnostic wall motion abnormality was identified this cannot be entirely excluded on the basis of this study  Diastolic function is moderately abnormal, consistent with grade II (pseudonormal) relaxation  Left atrial filling pressure is elevated  There is concentric remodeling    Right Ventricle: Right ventricular cavity size is mildly dilated  Systolic function is normal    Left Atrium: The atrium is mildly dilated    Right Atrium: The atrium is mildly dilated    Aortic Valve: The aortic valve is probably trileaflet  The leaflets are moderately thickened  The leaflets are moderately calcified  There is mild to moderate stenosis  GABRIELA 1 2 cm2/MG 12 mmHg/DI 0 38  The aortic valve velocity is increased due to stenosis    Mitral Valve: There is moderate annular calcification    Tricuspid Valve: There is mild regurgitation  The right ventricular systolic pressure is moderately elevated  The estimated right ventricular systolic pressure is 84 15 mmHg  Lizzette Wells MD, [facility]      Portions of the record may have been created with voice recognition software  Occasional wrong word or "sound a like" substitutions may have occurred due to the inherent limitations of voice recognition software  Please read the chart carefully and recognize, using context, where substitutions have occurred  Please call the [facility] of the note for any clarifications

## 2022-02-17 NOTE — ASSESSMENT & PLAN NOTE
Wt Readings from Last 3 Encounters:   02/17/22 123 kg (270 lb 12 8 oz)   02/01/22 (!) 137 kg (303 lb)   12/10/21 135 kg (297 lb)     · C/o chest pain, palpitations, shortness of breath  Recently hospitalized 2/1 for CHF exacerbation  Had plans to f/u with cardiology, unable to get appt until March  Diuretic dosing recently changed by PCP now Lasix 80 mg 3 times a week and 60 mg 4 times a week starting this past week    · CTA:  Interstitial pulmonary edema  · Echo 2/1/22:  EF 36%, grade 2 diastolic dysfunction, mild to moderate AS, mild TR  · Normal TSH  Plan  · Start Lasix 40 mg IV b i d   · Daily weights, I&Os  · Cardiology consult --> recommended started the patient on low-dose spironolactone since improved diastolic CHF mortality  · Outpatient follow-up with sleep medicine since patient has CELIA and she is not tolerating the CPAP she will benefit from inspire

## 2022-02-17 NOTE — ASSESSMENT & PLAN NOTE
Lab Results   Component Value Date    HGBA1C 11 9 (H) 01/29/2022       No results for input(s): POCGLU in the last 72 hours      Blood Sugar Average: Last 72 hrs:  ·  uncontrolled  · Home regimen:  Alogliptin 25 mg, empagliflozin 25mg, Lantus 36 units HS  · Continue home Lantus and SSI  · Accu-Cheks a c  HS

## 2022-02-17 NOTE — PLAN OF CARE
Problem: Potential for Falls  Goal: Patient will remain free of falls  Description: INTERVENTIONS:  - Educate patient/family on patient safety including physical limitations  - Instruct patient to call for assistance with activity   - Consult OT/PT to assist with strengthening/mobility   - Keep Call bell within reach  - Keep bed low and locked with side rails adjusted as appropriate  - Keep care items and personal belongings within reach  - Initiate and maintain comfort rounds  - Make Fall Risk Sign visible to staff  - Offer Toileting every 2 Hours, in advance of need  - Initiate/Maintain alarm  - Obtain necessary fall risk management equipment:   - Apply yellow socks and bracelet for high fall risk patients  - Consider moving patient to room near nurses station  2/17/2022 1121 by Kristal Gagnon RN  Outcome: Progressing  2/17/2022 1121 by Kristal Gagnon RN  Outcome: Progressing     Problem: MOBILITY - ADULT  Goal: Maintain or return to baseline ADL function  Description: INTERVENTIONS:  -  Assess patient's ability to carry out ADLs; assess patient's baseline for ADL function and identify physical deficits which impact ability to perform ADLs (bathing, care of mouth/teeth, toileting, grooming, dressing, etc )  - Assess/evaluate cause of self-care deficits   - Assess range of motion  - Assess patient's mobility; develop plan if impaired  - Assess patient's need for assistive devices and provide as appropriate  - Encourage maximum independence but intervene and supervise when necessary  - Involve family in performance of ADLs  - Assess for home care needs following discharge   - Consider OT consult to assist with ADL evaluation and planning for discharge  - Provide patient education as appropriate  2/17/2022 1121 by Kristal Gagnon RN  Outcome: Progressing  2/17/2022 1121 by Kristal Gagnon RN  Outcome: Progressing  Goal: Maintains/Returns to pre admission functional level  Description: INTERVENTIONS:  - Perform BMAT or MOVE assessment daily    - Set and communicate daily mobility goal to care team and patient/family/caregiver  - Collaborate with rehabilitation services on mobility goals if consulted  - Perform Range of Motion 4 times a day  - Reposition patient every 2 hours  - Dangle patient 3 times a day  - Stand patient 3 times a day  - Ambulate patient 3 times a day  - Out of bed to chair 3 times a day   - Out of bed for meals 3 times a day  - Out of bed for toileting  - Record patient progress and toleration of activity level   2/17/2022 1121 by Jacinda Still RN  Outcome: Progressing  2/17/2022 1121 by Jacinda Still RN  Outcome: Progressing     Problem: Nutrition/Hydration-ADULT  Goal: Nutrient/Hydration intake appropriate for improving, restoring or maintaining nutritional needs  Description: Monitor and assess patient's nutrition/hydration status for malnutrition  Collaborate with interdisciplinary team and initiate plan and interventions as ordered  Monitor patient's weight and dietary intake as ordered or per policy  Utilize nutrition screening tool and intervene as necessary  Determine patient's food preferences and provide high-protein, high-caloric foods as appropriate       INTERVENTIONS:  - Monitor oral intake, urinary output, labs, and treatment plans  - Assess nutrition and hydration status and recommend course of action  - Evaluate amount of meals eaten  - Assist patient with eating if necessary   - Allow adequate time for meals  - Recommend/ encourage appropriate diets, oral nutritional supplements, and vitamin/mineral supplements  - Order, calculate, and assess calorie counts as needed  - Recommend, monitor, and adjust tube feedings and TPN/PPN based on assessed needs  - Assess need for intravenous fluids  - Provide specific nutrition/hydration education as appropriate  - Include patient/family/caregiver in decisions related to nutrition  2/17/2022 1121 by Jacinda Still RN  Outcome: Progressing  2/17/2022 1121 by Mingo Talbert RN  Outcome: Progressing     Problem: CARDIOVASCULAR - ADULT  Goal: Maintains optimal cardiac output and hemodynamic stability  Description: INTERVENTIONS:  - Monitor I/O, vital signs and rhythm  - Monitor for S/S and trends of decreased cardiac output  - Administer and titrate ordered vasoactive medications to optimize hemodynamic stability  - Assess quality of pulses, skin color and temperature  - Assess for signs of decreased coronary artery perfusion  - Instruct patient to report change in severity of symptoms  Outcome: Progressing  Goal: Absence of cardiac dysrhythmias or at baseline rhythm  Description: INTERVENTIONS:  - Continuous cardiac monitoring, vital signs, obtain 12 lead EKG if ordered  - Administer antiarrhythmic and heart rate control medications as ordered  - Monitor electrolytes and administer replacement therapy as ordered  Outcome: Progressing

## 2022-02-17 NOTE — ASSESSMENT & PLAN NOTE
· C/o chest pain and palpitations worsening over the past 4 days  Described as a pressure sensation  Does not radiate  Received aspirin and nitroglycerin in EMS  States pain better controlled now than on arrival   · Has hx of cardiac cath done 3/2021 showing  mild nonobstructive CAD  · EKG:   Without ischemic changes  · Troponin negative  · Monitor on tele  · Notify provider for worsening chest pain

## 2022-02-17 NOTE — ASSESSMENT & PLAN NOTE
Wt Readings from Last 3 Encounters:   02/16/22 127 kg (280 lb)   02/01/22 (!) 137 kg (303 lb)   12/10/21 135 kg (297 lb)     · C/o chest pain, palpitations, shortness of breath  Recently hospitalized 2/1 for CHF exacerbation  Had plans to f/u with cardiology, unable to get appt until March  Diuretic dosing recently changed by PCP now Lasix 80 mg 3 times a week and 60 mg 4 times a week starting this past week    · CTA:  Interstitial pulmonary edema  · Echo 2/1/22:  EF 99%, grade 2 diastolic dysfunction, mild to moderate AS, mild TR  · Start Lasix 40 mg IV b i d   · Daily weights, I&Os  · Cardiology consult

## 2022-02-17 NOTE — UTILIZATION REVIEW
INPATIENT CLINICAL REVIEW    Admission: Date/Time/Statement: 2/16/22 AT 1839 OBSERVATION - CONVERTED TO INPATIENT 2/18/22 AT 1559 2ND CHEST PAIN WITH CHF REQUIRING IV DIURESIS, DM WITH LABILE BS REQUIRING INSULIN ADJUSTMENTS AND NOW  SOMNOLENCE  REQUIRING CONTINUED EVALUATION AFTER > 45 HOURS OBSERVATION     02/18/22 1559  Inpatient Admission  Once        Transfer Service: Hospitalist       Question Answer Comment   Level of Care Med Surg    Estimated length of stay More than 2 Midnights    Certification I certify that inpatient services are medically necessary for this patient for a duration of greater than two midnights  See H&P and MD Progress Notes for additional information about the patient's course of treatment  02/18/22 1558   02/16/22 1839  Place in Observation  Once        Question: Level of Care Answer: Med Surg    02/16/22 1839     ED Arrival Information     Expected Arrival Acuity    - 2/16/2022 15:54 Emergent    Means of arrival Escorted by Service Admission type    Ambulance Unknown Hospitalist Emergency    Arrival complaint    Chest pain     Chief Complaint   Patient presents with    Chest Pain     arrived via [facility] with c/o chest pressure x 4 days  also c/o pain between shoulder blades  324 ASA and 1SLNTG given by EMS  recently discharged from inpt admission     Initial Presentation:   79 y/o female with PMHx Morbid Obesity (BMP 49 53), HTN, CAD, CHF with recent admission 1/28- 2/1/22 2nd CAST suspected CHF, chronic respiratory failure on 1 L NC O2 at home, DM, Hypothyroidism - presents via EMS to [facility] ED on 1/16/22 pm 2nd 4 day history of worsening chest pain and palpitations described as her heart "skipping a beat " Reports the chest  pain has been awakening her from sleep with left shoulder pain and shortness of breath especially when lying flat  Seen by PCP this week who adjusted her Lasix to 80 mg 3 times a week and 60 mg 4 times a week    En route to ED - EMS tx with O2 at 2 L, ASA + NTG  In ED - vs wnl    SpO2 93 %  On 2 L NC O2, Lungs clear, no rub or gallop, no edema   EKG with SR, LAHB + RBBB  CT shows intersttiial pulmonary edema  Labs:  K+ 3 7    BUN/ Cr 24/ 0 83  Troponin neg    Placed in Observation 2/16/22 at 1839 2nd Chest Pain with [facility] - Telemetry, IV lasix 40 mg bid, Cardiology consult  2/17 -  Reports chest pain improved but still feels short of breath  Good urinary output and diuretic response  2/17 CARDIOLOGY:  Acute on chronic heart failure with preserved EF:  No response to high-dose oral diuretics, furosemide  Agree with continuing IV furosemide  Monitor renal function  Supplement potassium  Add spironolactone 25 mg a day  At discharge consider using torsemide 40 mg a day with a follow-up BMP in 1 week  Noncardiac chest pain:  Pain is unlikely to be angina  Increased left ventricular end-diastolic pressure due to decompensated heart failure can contribute to chest pain  Diuresis will be helpful  Further ischemic evaluation not indicated  Previous nuclear stress test showed inferior ischemia but cardiac catheterization showed no obstructive CAD    Nonobstructive coronary artery disease: Coronary angiogram reviewed  No significant coronary stenosis identified  Mild coronary plaque noted  LVEDP was mildly elevated  Continue risk factor modification and statin therapy      Morbid obesity: Contributory factor to diastolic dysfunction: Calorie restriction and weight loss will be beneficial long-term  Prognosis is guarded due to this  2/18/22:  Somnolence -  more somnolent this morning though easy to arouse and have meaningful communication  no neurological deficits  Reports unable to sleep last night  but overall she feels well   mildly hypotensive    will currently discontinue gabapentin to prevent delirium, continue topiramate for seizures, follow urinalysis for possible asymptomatic bacteriuria causing somnolence, BS 93 this am - will decrease lantus  CHF - continue IV Lasix bid, Daily weights, I+O's  Per Cardiology - started on low-dose spironolactone since improved diastolic CHF mortality  Discontinue lisinopril    2/16/22 AT 1839 OBSERVATION - CONVERTED TO INPATIENT 2/18/22 AT 1559 2ND CHEST PAIN WITH CHF REQUIRING IV DIURESIS, DM WITH LABILE BS REQUIRING INSULIN ADJUSTMENTS AND NOW  SOMNOLENCE  REQUIRING CONTINUED EVALUATION AFTER > 45 HOURS OBSERVATION     ED Triage Vitals   Temperature Pulse Respirations Blood Pressure SpO2   02/16/22 1557 02/16/22 1557 02/16/22 1557 02/16/22 1557 02/16/22 1557   98 5 °F (36 9 °C) 80 20 123/62 93 % w/ 2 L NC O2      Temp Source Heart Rate Source Patient Position - Orthostatic VS BP Location FiO2 (%)   02/16/22 1557 02/16/22 1830 02/16/22 1830 02/16/22 1830 --   Oral Monitor Lying Left arm       Pain Score       02/16/22 1557       5          Wt Readings from Last 1 Encounters:   02/18/22 122 kg (269 lb 9 6 oz)     Additional Vital Signs:  Date/Time Temp Pulse Resp BP MAP (mmHg) SpO2 Calculated FIO2 (%) - Nasal Cannula Nasal Cannula O2 (L/min) O2 Device Patient Position - Orthostatic VS   02/18/22 1453 98 °F (36 7 °C) 80 18 100/61 -- 95 % -- -- -- Sitting   02/18/22 1215 97 2 °F (36 2 °C) Abnormal  63 16 103/62 -- 97 % -- -- -- Lying   02/18/22 1100 -- 67 16 134/57 -- -- -- -- -- Lying   02/18/22 0756 97 4 °F (36 3 °C) Abnormal  61 17 92/50 -- 96 % -- -- -- Lying   02/18/22 0100 97 4 °F (36 3 °C) Abnormal  62 17 108/62 77 98 % 28 2 L/min Nasal cannula Lying   02/17/22 2105 97 6 °F (36 4 °C) 80 18 110/70 -- -- -- -- -- Lying   02/17/22 1502 97 4 °F (36 3 °C) Abnormal  67 16 104/62 -- 96 % -- -- -- Lying   02/17/22 1110 96 4 °F (35 8 °C) Abnormal  69 17 101/60 -- 99 % -- -- -- Lying   02/17/22 0900 -- -- -- -- -- -- 28 2 L/min Nasal cannula --   02/17/22 0747 97 9 °F (36 6 °C) 63 16 131/70 -- 99 % -- -- -- Lying   02/17/22 0300 97 6 °F (36 4 °C) 70 17 110/70 85 98 % 28 2 L/min Nasal cannula Lying 02/16/22 2300 97 3 °F (36 3 °C) Abnormal  64 17 141/76 100 99 % 28 2 L/min Nasal cannula Lying   02/16/22 2008 96 2 °F (35 7 °C) Abnormal  65 16 164/79 -- 99 % -- -- -- Lying   02/16/22 1830 -- 72 18 166/70 -- 100 % 28 2 L/min Nasal cannula Lying      02/16 0701   02/17 0700   Urine (mL/kg/hr) 400   Total Output 400   Net -400       Unmeasured Urine Occurrence 2 x     WEIGHTS  Date/Time Weight Weight Method Height   02/18/22 0600 122 kg (269 lb 9 6 oz) Standing scale --   02/17/22 0700 123 kg (270 lb 12 8 oz) Standing scale --   02/16/22 2008 129 kg (283 lb 4 7 oz) Bed scale 5' 2" (1 575 m)   02/16/22 1557 127 kg (280 lb) -- 5' 2" (1 575 m)     Pertinent Labs/Diagnostic Test Results:   2/16 CTA - CHEST, ABDOMEN AND PELVIS - WITHOUT AND WITH IV CONTRAST  1   Negative for aortic dissection or aneurysm   No intramural hematoma  2   Interstitial pulmonary edema  3   Diverticulosis   No evidence of acute diverticulitis        2/16 - 12 LEAD EKG:  Sinus rhythm  Left anterior fascicular block  Right bundle branch block  No significant change since prior tracing    Results from last 7 days   Lab Units 02/18/22  0524 02/17/22  0508 02/16/22  1634   WBC Thousand/uL 5 03 5 70 4 85   HEMOGLOBIN g/dL 15 1 15 4 15 3   HEMATOCRIT % 47 4* 46 9* 46 6*   PLATELETS Thousands/uL 146* 160 156   NEUTROS ABS Thousands/µL 2 91  --  3 34     Results from last 7 days   Lab Units 02/18/22  0524 02/17/22  0508 02/16/22  1634   SODIUM mmol/L 140 140 137   POTASSIUM mmol/L 4 0 3 2* 3 7   CHLORIDE mmol/L 100 99 99   CO2 mmol/L 33 35* 33   ANION GAP mmol/L 7 6 5   BUN mg/dL 22* 16 24*   CREATININE mg/dL 0 72 0 64 0 83   EGFR ml/min/1 73sq m 86 92 73   CALCIUM mg/dL 9 2 8 8 8 5   MAGNESIUM mg/dL  --  1 5*  --      Results from last 7 days   Lab Units 02/16/22  1634   AST U/L 17   ALT U/L 13   ALK PHOS U/L 96 8   TOTAL PROTEIN g/dL 6 7   ALBUMIN g/dL 3 4   TOTAL BILIRUBIN mg/dL 0 30     Results from last 7 days   Lab Units 02/18/22  1120 02/18/22  0716 02/18/22  9874 02/17/22  2053 02/17/22  1556 02/17/22  1115 02/17/22  0702 02/16/22  2113   POC GLUCOSE mg/dl 124 114 114 185* 219* 224* 139 202*     Results from last 7 days   Lab Units 02/18/22  0524 02/17/22  0508 02/16/22  1634   GLUCOSE RANDOM mg/dL 93 143* 283*     Results from last 7 days   Lab Units 02/16/22  1842 02/16/22  1635   HS TNI 0HR ng/L  --  12   HS TNI 2HR ng/L 13  --    HSTNI D2 ng/L 1  --      Results from last 7 days   Lab Units 02/16/22  1634   BNP pg/mL 148 0*     ED Treatment:   Medication Administration from 02/16/2022 1553 to 02/16/2022 1935       Date/Time Order Dose Route Action     02/16/2022 1708 iohexol (OMNIPAQUE) 350 MG/ML injection (MULTI-DOSE) 100 mL 100 mL Intravenous Given     Past Medical History:   Diagnosis Date    Diabetes mellitus ([facility] )     Disease of thyroid gland     Hyperlipidemia     Hypertension     Hypothyroidism (acquired)     Restless leg syndrome     Seizures ([facility] )      Present on Admission:   (Resolved) Chest pain   Diabetes mellitus ([facility] )   Morbid obesity due to excess calories ([facility] )    Admitting Diagnosis: Chest pain [R07 9]  Chest pain, unspecified type [R07 9]    Age/Sex: 78 y o  female    Admission Orders:  Telemetry  VS q4hrs  Nasal O2 at 2 L/min - Titrate SpO2 > 92 %  Continuous Pulse Oximetry  SCD  Up + OOB as tolerated  Diet Cardiovascular;  Sodium 2 GM; Consistent Carbohydrate Diet Level 3 (6 carb servings/90 grams CHO/meal), Fluid Restriction 2000 ML   BBG qid before meals + BT  I+O q shift  Daily weight  Repeat EKG prn chest pain  Serial HS Troponin q2hrs x 3    Scheduled Medications:  docusate sodium, 100 mg, Oral, Daily  enoxaparin, 40 mg, Subcutaneous, Daily  FLUoxetine, 60 mg, Oral, Daily  IV furosemide, 40 mg, Intravenous, BID  gabapentin, 100 mg, Oral, BID  gabapentin, 300 mg, Oral, HS  insulin glargine, 36 Units, Subcutaneous, HS  insulin lispro, 2-12 Units, Subcutaneous, TID AC  levothyroxine, 75 mcg, Oral, Daily  lidocaine, 1 patch, Topical, Daily  lisinopril, 10 mg, Oral, Daily  nystatin, , Topical, BID  spironolactone, 25 mg, Oral, Daily  topiramate, 200 mg, Oral, BID    Continuous IV Infusions:  NONE    PRN Meds:     acetaminophen (TYLENOL) tablet 650 m, Oral, Q4H PRN     IP CONSULT TO NUTRITION SERVICES  IP CONSULT TO CARDIOLOGY    Network Utilization Review Department  ATTENTION: Please call with any questions or concerns to [phone] and carefully listen to the prompts so that you are directed to the right person  All voicemails are confidential   Audrey Waters all requests for admission clinical reviews, approved or denied determinations and any other requests to dedicated fax number below belonging to the campus where the patient is receiving treatment   List of dedicated fax numbers for the Facilities:  [address] DENIALS (Administrative/Medical Necessity) [phone]   [address] (Maternity/NICU/Pediatrics) [phone]   [address]  [address] [address] [facility] [place] [id number] [place] [address] [address] [facility] [place] [address] [phone]

## 2022-02-17 NOTE — H&P
Andres 1955, 78 y o  female MRN: [id number]  Unit/Bed#: -01 Encounter: [id number]  Primary Care Provider: Imani Velasco MD   Date and time admitted to hospital: 2/16/2022  3:54 PM    * Acute on chronic heart failure with preserved ejection fraction [facility])  Assessment & Plan  Wt Readings from Last 3 Encounters:   02/16/22 127 kg (280 lb)   02/01/22 (!) 137 kg (303 lb)   12/10/21 135 kg (297 lb)     · C/o chest pain, palpitations, shortness of breath  Recently hospitalized 2/1 for CHF exacerbation  Had plans to f/u with cardiology, unable to get appt until March  Diuretic dosing recently changed by PCP now Lasix 80 mg 3 times a week and 60 mg 4 times a week starting this past week  · CTA:  Interstitial pulmonary edema  · Echo 2/1/22:  EF 37%, grade 2 diastolic dysfunction, mild to moderate AS, mild TR  · Start Lasix 40 mg IV b i d   · Daily weights, I&Os  · Cardiology consult       Chest pain  Assessment & Plan  · C/o chest pain and palpitations worsening over the past 4 days  Described as a pressure sensation  Does not radiate  Received aspirin and nitroglycerin in EMS  States pain better controlled now than on arrival   · Has hx of cardiac cath done 3/2021 showing  mild nonobstructive CAD  · EKG: Without ischemic changes  · Troponin negative  · Monitor on tele  · Notify provider for worsening chest pain    Diabetes mellitus ([address] )  Assessment & Plan  Lab Results   Component Value Date    HGBA1C 11 9 (H) 01/29/2022       No results for input(s): POCGLU in the last 72 hours      Blood Sugar Average: Last 72 hrs:  ·  uncontrolled  · Home regimen:  Alogliptin 25 mg, empagliflozin 25mg, Lantus 36 units HS  · Continue home Lantus and SSI  · Accu-Cheks a c  HS    Chronic respiratory failure (HCC)  Assessment & Plan  · Maintained on 1 L oxygen at home, recently started after previous admission earlier this month  · Continue    Morbid obesity due to excess calories ([facility] )  Assessment & Plan  Noted  Encouraged lifestyle and dietary modifications    VTE Pharmacologic Prophylaxis: VTE Score: 6 Lovenox   Code Status: Level 3 - DNAR and DNI   Discussion with family: Patient declined call to   Anticipated Length of Stay: Patient will be admitted on an observation basis with an anticipated length of stay of less than 2 midnights secondary to chest pain, acute decompenstated HF  Total Time for Visit, including Counseling / Coordination of Care: 60 minutes Greater than 50% of this total time spent on direct patient counseling and coordination of care  Chief Complaint: chest pain     History of Present Illness:  Demetris Tejada is a 78 y o  female with a PMH of DM, HTN, hypothyroidism, CAD, HFpEF who presents with chest pain and palpitations worsening over the past 4 days  Describes it as her heart "skipping a beat " States that the pain has been awakening her from sleep  Denies any associated diaphoresis, abdominal pain, nausea, vomiting  Has some left shoulder pain, states started after the chest pain  Also with complaints of shortness of breath especially with lying flat  Patient with history of diastolic dysfunction  Patient states she is compliant with her medications  States her PCP this week has adjusted her Lasix to 80 mg 3 times a week and 60 mg 4 times a week  Patient admitted the 2/1/22 for CHF exacerbation  Was instructed to follow up with Cardiology outpatient unable to attend appointment on March  In EMS transport received aspirin and nitroglycerin  In ED CTA dissection obtained and negative  Showed interstitial edema  Plan for observation with telemetry monitoring  Start IV diuresis  Cardiology consult  Patient confirmed that she is a level 3 DNR/DNI  Review of Systems:  Review of Systems   Constitutional: Negative for chills, fatigue and fever  HENT: Negative for congestion  Eyes: Negative for visual disturbance  Respiratory: Positive for shortness of breath  Negative for choking, chest tightness and wheezing  Cardiovascular: Positive for chest pain and palpitations  Negative for leg swelling  Gastrointestinal: Negative for abdominal distention, abdominal pain, diarrhea, nausea and vomiting  Endocrine: Negative for polyuria  Genitourinary: Negative for difficulty urinating, dysuria and urgency  Skin: Negative for rash  Neurological: Negative for weakness  Psychiatric/Behavioral: Positive for sleep disturbance  Past Medical and Surgical History:   Past Medical History:   Diagnosis Date    Diabetes mellitus ([facility] )     Disease of thyroid gland     Hyperlipidemia     Hypertension     Hypothyroidism (acquired)     Restless leg syndrome     Seizures (HCC)        Past Surgical History:   Procedure Laterality Date    APPENDECTOMY      CHOLECYSTECTOMY      HIP FRACTURE SURGERY Right     TONSILLECTOMY      TUBAL LIGATION         Meds/Allergies:  Prior to Admission medications    Medication Sig Start Date End Date Taking? Authorizing Provider   acetaminophen (TYLENOL) 325 mg tablet Take 650 mg by mouth every 6 (six) hours as needed for mild pain (pain score 1-3)    Patient not taking: Reported on 1/28/2022    Historical Provider, MD   Alogliptin Benzoate 25 MG TABS Take 25 mg by mouth daily    Historical Provider, MD   docusate sodium (COLACE) 100 mg capsule Take 100 mg by mouth daily    Historical Provider, MD   Empagliflozin 25 MG TABS Take 25 mg by mouth every morning    Historical Provider, MD   FLUoxetine (PROzac) 20 mg capsule Take 3 capsules (60 mg total) by mouth daily 3/10/21   Tenisha Claudio DO   furosemide (LASIX) 40 mg tablet Take 1 tablet (40 mg total) by mouth daily 2/2/22   SAMIA Hager   gabapentin (NEURONTIN) 100 mg capsule Take 1 capsule (100 mg total) by mouth 2 (two) times a day Take 100 mg in the morning and 100 mg mid afternoon 3/30/21   Chuy Michaud MD   gabapentin (NEURONTIN) 300 mg capsule Take 1 capsule (300 mg total) by mouth daily at bedtime 7/10/18   SAMIA Drake   insulin aspart, w/niacinamide, (Fiasp FlexTouch) 100 Units/mL injection pen Inject under the skin 3 (three) times a day before meals Indications: per sliding scale  Historical Provider, MD   insulin glargine (LANTUS) 100 units/mL subcutaneous injection Inject 36 Units under the skin daily at bedtime 10/25/21   Perez Rodriguez MD   levothyroxine 50 mcg tablet Take 1 5 tablets (75 mcg total) by mouth daily 10/25/21 11/24/21  Perez Rodriguez MD   lisinopril (ZESTRIL) 10 mg tablet Take 10 mg by mouth daily    Historical Provider, MD   potassium chloride (K-DUR,KLOR-CON) 20 mEq tablet Take 1 tablet (20 mEq total) by mouth daily 2/1/22   SAMIA Rios   topiramate (TOPAMAX) 100 mg tablet Take 200 mg by mouth 2 (two) times a day     Historical Provider, MD     I have reviewed home medications with patient personally  Allergies:    Allergies   Allergen Reactions    Pineapple - Food Allergy Anaphylaxis    Statins Other (See Comments)     Cramping from head to toe     Byetta 10 Mcg Pen [Exenatide]     Marijuana [Dronabinol] GI Intolerance    Metformin Diarrhea     Other reaction(s): Unknown    Omeprazole Hives    Ondansetron      Other reaction(s): Unknown    Coconut Oil - Food Allergy Rash    Liraglutide Rash and Other (See Comments)     Generalized rash    Penicillins Rash       Social History:  Marital Status: Single   Occupation:   Patient Pre-hospital Living Situation: Home  Patient Pre-hospital Level of Mobility: walks with walker  Patient Pre-hospital Diet Restrictions: diabetic  Substance Use History:   Social History     Substance and Sexual Activity   Alcohol Use Yes    Comment: occasional     Social History     Tobacco Use   Smoking Status Never Smoker   Smokeless Tobacco Never Used   Tobacco Comment    not a smoker     Social History     Substance and Sexual Activity   Drug Use Never       Family History:  Family History   Problem Relation Age of Onset    Seizures Father        Physical Exam:     Vitals:   Blood Pressure: 141/76 (02/16/22 2300)  Pulse: 64 (02/16/22 2300)  Temperature: (!) 97 3 °F (36 3 °C) (02/16/22 2300)  Temp Source: Oral (02/16/22 2300)  Respirations: 17 (02/16/22 2300)  Height: 5' 2" (157 5 cm) (02/16/22 2008)  Weight - Scale: 129 kg (283 lb 4 7 oz) (02/16/22 2008)  SpO2: 99 % (02/16/22 2300)    Physical Exam  Vitals and nursing note reviewed  Constitutional:       General: She is not in acute distress  Appearance: She is obese  She is not toxic-appearing  HENT:      Head: Normocephalic and atraumatic  Eyes:      Pupils: Pupils are equal, round, and reactive to light  Cardiovascular:      Rate and Rhythm: Normal rate and regular rhythm  Pulses: Normal pulses  Heart sounds: Murmur heard  Pulmonary:      Effort: Pulmonary effort is normal  No respiratory distress  Breath sounds: Normal breath sounds  No wheezing or rales  Abdominal:      General: Abdomen is flat  Bowel sounds are normal  There is no distension  Palpations: Abdomen is soft  Musculoskeletal:      Right lower leg: No edema  Left lower leg: No edema  Skin:     General: Skin is warm and dry  Capillary Refill: Capillary refill takes less than 2 seconds  Neurological:      General: No focal deficit present  Mental Status: She is alert and oriented to person, place, and time  Cranial Nerves: No cranial nerve deficit     Psychiatric:         Mood and Affect: Mood normal          Behavior: Behavior normal           Additional Data:     Lab Results:  Results from last 7 days   Lab Units 02/16/22  1634   WBC Thousand/uL 4 85   HEMOGLOBIN g/dL 15 3   HEMATOCRIT % 46 6*   PLATELETS Thousands/uL 156   NEUTROS PCT % 70   LYMPHS PCT % 21   MONOS PCT % 6   EOS PCT % 3     Results from last 7 days   Lab Units 02/16/22  1634   SODIUM mmol/L 137 POTASSIUM mmol/L 3 7   CHLORIDE mmol/L 99   CO2 mmol/L 33   BUN mg/dL 24*   CREATININE mg/dL 0 83   ANION GAP mmol/L 5   CALCIUM mg/dL 8 5   ALBUMIN g/dL 3 4   TOTAL BILIRUBIN mg/dL 0 30   ALK PHOS U/L 96 8   ALT U/L 13   AST U/L 17   GLUCOSE RANDOM mg/dL 283*         Results from last 7 days   Lab Units 02/16/22  2113   POC GLUCOSE mg/dl 202*               Imaging: Reviewed radiology reports from this admission including: CTA, CXR  CTA dissection protocol chest/abdomen/pelvis   Final Result by Michelle Arteaga MD (02/16 2657)         1  Negative for aortic dissection or aneurysm  No intramural hematoma  2   Interstitial pulmonary edema  3   Diverticulosis  No evidence of acute diverticulitis  Workstation performed: [id number]         XR chest portable   Final Result by William Lee MD (02/16 2427)      No acute cardiopulmonary disease  Workstation performed: [id number]             EKG and Other Studies Reviewed on Admission:   · EKG: NSR  HR 71     ** Please Note: This note has been constructed using a voice recognition system   **

## 2022-02-17 NOTE — ASSESSMENT & PLAN NOTE
· Maintained on 1 L oxygen at home, recently started after previous admission earlier this month  · Continue

## 2022-02-17 NOTE — ASSESSMENT & PLAN NOTE
Lab Results   Component Value Date    HGBA1C 11 9 (H) 01/29/2022       Recent Labs     02/16/22  2113 02/17/22  0702 02/17/22  1115   POCGLU 202* 139 224*       Blood Sugar Average: Last 72 hrs:  · (P) 198 [id number] uncontrolled  · Home regimen:  Alogliptin 25 mg, empagliflozin 25mg, Lantus 36 units HS  · Continue home Lantus and SSI  · Accu-Cheks a c  HS

## 2022-02-17 NOTE — PROGRESS NOTES
Terrie U  65   Progress Note - Janina Rueda 1955, 78 y o  female MRN: [id number]  Unit/Bed#: -01 Encounter: [id number]  Primary Care Provider: Mireille Chapman MD   Date and time admitted to hospital: 2/16/2022  3:54 PM    Chronic respiratory failure ([address] )  Assessment & Plan  · Maintained on 1 L oxygen at home, recently started after previous admission earlier this month  · Continue    Morbid obesity due to excess calories [facility])  Assessment & Plan  Noted  Encouraged lifestyle and dietary modifications    Chest pain  Assessment & Plan  · C/o chest pain and palpitations worsening over the past 4 days  Described as a pressure sensation  Does not radiate  Received aspirin and nitroglycerin in EMS  States pain better controlled now than on arrival   · Has hx of cardiac cath done 3/2021 showing  mild nonobstructive CAD  · EKG: Without ischemic changes  · Troponin negative  · Monitor on tele  · Notify provider for worsening chest pain    Diabetes mellitus [facility])  Assessment & Plan  Lab Results   Component Value Date    HGBA1C 11 9 (H) 01/29/2022       Recent Labs     02/16/22  2113 02/17/22  0702 02/17/22  1115   POCGLU 202* 139 224*       Blood Sugar Average: Last 72 hrs:  · (P) 365 [id number] uncontrolled  · Home regimen:  Alogliptin 25 mg, empagliflozin 25mg, Lantus 36 units HS  · Continue home Lantus and SSI  · Accu-Cheks a c  HS    * Acute on chronic heart failure with preserved ejection fraction [facility])  Assessment & Plan  Wt Readings from Last 3 Encounters:   02/17/22 123 kg (270 lb 12 8 oz)   02/01/22 (!) 137 kg (303 lb)   12/10/21 135 kg (297 lb)     · C/o chest pain, palpitations, shortness of breath  Recently hospitalized 2/1 for CHF exacerbation  Had plans to f/u with cardiology, unable to get appt until March  Diuretic dosing recently changed by PCP now Lasix 80 mg 3 times a week and 60 mg 4 times a week starting this past week    · CTA:  Interstitial pulmonary edema  · Echo 22:  EF 77%, grade 2 diastolic dysfunction, mild to moderate AS, mild TR  · Normal TSH  Plan  · Start Lasix 40 mg IV b i d   · Daily weights, I&Os  · Cardiology consult --> recommended started the patient on low-dose spironolactone since improved diastolic CHF mortality  · Outpatient follow-up with sleep medicine since patient has CELIA and she is not tolerating the CPAP she will benefit from inspire          VTE Pharmacologic Prophylaxis:   VTE Score: 6 Moderate Risk (Score 3-4) - Pharmacological DVT Prophylaxis Ordered: Enoxaparin (Lovenox)  Mechanical VTE Prophylaxis in Place: Yes    Patient Centered Rounds: I have performed bedside rounds with nursing staff today  Discussions with Specialists or Other Care Team Provider:  Cardiology    Education and Discussions with Family / Patient: Patient declined call to   Current Length of Stay: 0 day(s)    Current Patient Status: Observation     Discharge Plan / Estimated Discharge Date: Anticipate discharge in 48 hrs to home with home services  Code Status: Level 3 - DNAR and DNI      Subjective:   Patient reports that the chest pain is gone but she is still feels short of breath  No abdominal complaints  Good urinary output and diuretic response  Objective:     Vitals:   Temp (24hrs), Av 3 °F (36 3 °C), Min:96 2 °F (35 7 °C), Max:98 5 °F (36 9 °C)    Temp:  [96 2 °F (35 7 °C)-98 5 °F (36 9 °C)] 96 4 °F (35 8 °C)  HR:  [63-80] 69  Resp:  [16-20] 17  BP: (101-166)/(60-79) 101/60  SpO2:  [93 %-100 %] 99 %  Body mass index is 49 53 kg/m²  Input and Output Summary (last 24 hours): Intake/Output Summary (Last 24 hours) at 2022 1203  Last data filed at 2022 0101  Gross per 24 hour   Intake --   Output 400 ml   Net -400 ml       Physical Exam:     Physical Exam  Constitutional:       General: She is not in acute distress  Appearance: She is obese  Cardiovascular:      Rate and Rhythm: Normal rate  Pulses: Normal pulses  Heart sounds: Murmur heard  Pulmonary:      Effort: Pulmonary effort is normal       Breath sounds: Wheezing present  Abdominal:      General: Abdomen is flat  Bowel sounds are normal       Palpations: Abdomen is soft  Musculoskeletal:         General: Normal range of motion  Cervical back: Normal range of motion  Right lower leg: Edema present  Left lower leg: Edema present  Skin:     General: Skin is warm  Capillary Refill: Capillary refill takes less than 2 seconds  Neurological:      General: No focal deficit present  Mental Status: She is alert and oriented to person, place, and time  Mental status is at baseline  Psychiatric:         Mood and Affect: Mood normal           Additional Data:     Labs:  Results from last 7 days   Lab Units 02/17/22  0508 02/16/22  1634 02/16/22  1634   WBC Thousand/uL 5 70   < > 4 85   HEMOGLOBIN g/dL 15 4   < > 15 3   HEMATOCRIT % 46 9*   < > 46 6*   PLATELETS Thousands/uL 160   < > 156   NEUTROS PCT %  --   --  70   LYMPHS PCT %  --   --  21   MONOS PCT %  --   --  6   EOS PCT %  --   --  3    < > = values in this interval not displayed  Results from last 7 days   Lab Units 02/17/22  0508 02/16/22  1634 02/16/22  1634   SODIUM mmol/L 140   < > 137   POTASSIUM mmol/L 3 2*   < > 3 7   CHLORIDE mmol/L 99   < > 99   CO2 mmol/L 35*   < > 33   BUN mg/dL 16   < > 24*   CREATININE mg/dL 0 64   < > 0 83   ANION GAP mmol/L 6   < > 5   CALCIUM mg/dL 8 8   < > 8 5   ALBUMIN g/dL  --   --  3 4   TOTAL BILIRUBIN mg/dL  --   --  0 30   ALK PHOS U/L  --   --  96 8   ALT U/L  --   --  13   AST U/L  --   --  17   GLUCOSE RANDOM mg/dL 143*   < > 283*    < > = values in this interval not displayed           Results from last 7 days   Lab Units 02/17/22  1115 02/17/22  0702 02/16/22  2113   POC GLUCOSE mg/dl 224* 139 202*               Imaging: Reviewed radiology reports from this admission including: chest xray    Recent Cultures (last 7 days):           Lines/Drains:  Invasive Devices  Report    Peripheral Intravenous Line            Peripheral IV 02/16/22 Right Hand 1 day    Peripheral IV 02/16/22 Right Antecubital <1 day                Telemetry:   Telemetry Orders (From admission, onward)             48 Hour Telemetry Monitoring  Continuous x 48 hours        References:    Telemetry Guidelines   Question:  Reason for 48 Hour Telemetry  Answer:  Acute Decompensated CHF (continuous diuretic infusion or total diuretic dose > 200 mg daily, associated electrolyte derangement, ionotropic drip, history of ventricular arrhythmia, or new EF <35%)                    Last 24 Hours Medication List:   Current Facility-Administered Medications   Medication Dose Route Frequency Provider Last Rate    acetaminophen  650 mg Oral Q4H PRN Melissa Lira, PA-EDWAR      docusate sodium  100 mg Oral Daily Melissa Lira, PA-C      enoxaparin  40 mg Subcutaneous Daily Melissa Lira, PA-C      FLUoxetine  60 mg Oral Daily Melissa Lira, PA-C      furosemide  40 mg Intravenous BID Melissa Lira, PA-C      gabapentin  100 mg Oral BID Melissa Lira, PA-C      gabapentin  300 mg Oral HS Melissa Lira, PA-C      insulin glargine  36 Units Subcutaneous HS Melissa Lira, PA-C      insulin lispro  2-12 Units Subcutaneous TID AC Melissa Lira, PA-C      levothyroxine  75 mcg Oral Daily Melissa Lira, PA-C      lidocaine  1 patch Topical Daily Melissa Lira, PA-C      lisinopril  10 mg Oral Daily Melissa Lira, PA-C      nystatin   Topical BID Melissa Lira, PA-C      spironolactone  25 mg Oral Daily Sona Clarke MD      topiramate  200 mg Oral BID Melissa Lira, JEROD          Today, Patient Was Seen By: Sona Clarke MD    ** Please Note: This note has been constructed using a voice recognition system   **

## 2022-02-18 PROBLEM — R40.0 SOMNOLENCE: Status: ACTIVE | Noted: 2022-02-18

## 2022-02-18 LAB
ANION GAP SERPL CALCULATED.3IONS-SCNC: 7 MMOL/L (ref 4–13)
ATRIAL RATE: 71 BPM
BACTERIA UR QL AUTO: NORMAL /HPF
BASOPHILS # BLD AUTO: 0.03 THOUSANDS/ΜL (ref 0–0.1)
BASOPHILS NFR BLD AUTO: 1 % (ref 0–1)
BILIRUB UR QL STRIP: NEGATIVE
BUN SERPL-MCNC: 22 MG/DL (ref 6–20)
CALCIUM SERPL-MCNC: 9.2 MG/DL (ref 8.4–10.2)
CHLORIDE SERPL-SCNC: 100 MMOL/L (ref 96–108)
CLARITY UR: CLEAR
CO2 SERPL-SCNC: 33 MMOL/L (ref 22–33)
COLOR UR: YELLOW
CREAT SERPL-MCNC: 0.72 MG/DL (ref 0.4–1.1)
EOSINOPHIL # BLD AUTO: 0.18 THOUSAND/ΜL (ref 0–0.61)
EOSINOPHIL NFR BLD AUTO: 4 % (ref 0–6)
ERYTHROCYTE [DISTWIDTH] IN BLOOD BY AUTOMATED COUNT: 12.1 % (ref 11.6–15.1)
GFR SERPL CREATININE-BSD FRML MDRD: 86 ML/MIN/1.73SQ M
GLUCOSE SERPL-MCNC: 114 MG/DL (ref 65–140)
GLUCOSE SERPL-MCNC: 114 MG/DL (ref 65–140)
GLUCOSE SERPL-MCNC: 124 MG/DL (ref 65–140)
GLUCOSE SERPL-MCNC: 156 MG/DL (ref 65–140)
GLUCOSE SERPL-MCNC: 164 MG/DL (ref 65–140)
GLUCOSE SERPL-MCNC: 93 MG/DL (ref 65–140)
GLUCOSE UR STRIP-MCNC: NEGATIVE MG/DL
HCT VFR BLD AUTO: 47.4 % (ref 34.8–46.1)
HGB BLD-MCNC: 15.1 G/DL (ref 11.5–15.4)
HGB UR QL STRIP.AUTO: NEGATIVE
IMM GRANULOCYTES # BLD AUTO: 0.01 THOUSAND/UL (ref 0–0.2)
IMM GRANULOCYTES NFR BLD AUTO: 0 % (ref 0–2)
KETONES UR STRIP-MCNC: NEGATIVE MG/DL
LEUKOCYTE ESTERASE UR QL STRIP: ABNORMAL
LYMPHOCYTES # BLD AUTO: 1.48 THOUSANDS/ΜL (ref 0.6–4.47)
LYMPHOCYTES NFR BLD AUTO: 29 % (ref 14–44)
MCH RBC QN AUTO: 28.4 PG (ref 26.8–34.3)
MCHC RBC AUTO-ENTMCNC: 31.9 G/DL (ref 31.4–37.4)
MCV RBC AUTO: 89 FL (ref 82–98)
MONOCYTES # BLD AUTO: 0.42 THOUSAND/ΜL (ref 0.17–1.22)
MONOCYTES NFR BLD AUTO: 8 % (ref 4–12)
NEUTROPHILS # BLD AUTO: 2.91 THOUSANDS/ΜL (ref 1.85–7.62)
NEUTS SEG NFR BLD AUTO: 58 % (ref 43–75)
NITRITE UR QL STRIP: NEGATIVE
NON-SQ EPI CELLS URNS QL MICRO: NORMAL /HPF
NRBC BLD AUTO-RTO: 0 /100 WBCS
P AXIS: 57 DEGREES
PH UR STRIP.AUTO: 7.5 [PH]
PLATELET # BLD AUTO: 146 THOUSANDS/UL (ref 149–390)
PMV BLD AUTO: 11.8 FL (ref 8.9–12.7)
POTASSIUM SERPL-SCNC: 4 MMOL/L (ref 3.5–5)
PR INTERVAL: 143 MS
PROT UR STRIP-MCNC: NEGATIVE MG/DL
QRS AXIS: -55 DEGREES
QRSD INTERVAL: 139 MS
QT INTERVAL: 429 MS
QTC INTERVAL: 467 MS
RBC # BLD AUTO: 5.31 MILLION/UL (ref 3.81–5.12)
RBC #/AREA URNS AUTO: NORMAL /HPF
SODIUM SERPL-SCNC: 140 MMOL/L (ref 133–145)
SP GR UR STRIP.AUTO: 1.01 (ref 1–1.03)
T WAVE AXIS: 39 DEGREES
UROBILINOGEN UR QL STRIP.AUTO: 0.2 E.U./DL
VENTRICULAR RATE: 71 BPM
WBC # BLD AUTO: 5.03 THOUSAND/UL (ref 4.31–10.16)
WBC #/AREA URNS AUTO: NORMAL /HPF

## 2022-02-18 PROCEDURE — 93010 ELECTROCARDIOGRAM REPORT: CPT | Performed by: INTERNAL MEDICINE

## 2022-02-18 PROCEDURE — 81001 URINALYSIS AUTO W/SCOPE: CPT | Performed by: INTERNAL MEDICINE

## 2022-02-18 PROCEDURE — 80048 BASIC METABOLIC PNL TOTAL CA: CPT | Performed by: INTERNAL MEDICINE

## 2022-02-18 PROCEDURE — 99232 SBSQ HOSP IP/OBS MODERATE 35: CPT | Performed by: INTERNAL MEDICINE

## 2022-02-18 PROCEDURE — 81003 URINALYSIS AUTO W/O SCOPE: CPT | Performed by: INTERNAL MEDICINE

## 2022-02-18 PROCEDURE — 85025 COMPLETE CBC W/AUTO DIFF WBC: CPT | Performed by: INTERNAL MEDICINE

## 2022-02-18 PROCEDURE — 82948 REAGENT STRIP/BLOOD GLUCOSE: CPT

## 2022-02-18 RX ORDER — ONDANSETRON 2 MG/ML
4 INJECTION INTRAMUSCULAR; INTRAVENOUS EVERY 8 HOURS PRN
Status: DISCONTINUED | OUTPATIENT
Start: 2022-02-18 | End: 2022-02-18

## 2022-02-18 RX ORDER — INSULIN GLARGINE 100 [IU]/ML
30 INJECTION, SOLUTION SUBCUTANEOUS
Status: DISCONTINUED | OUTPATIENT
Start: 2022-02-18 | End: 2022-02-19 | Stop reason: HOSPADM

## 2022-02-18 RX ORDER — MAGNESIUM SULFATE HEPTAHYDRATE 40 MG/ML
2 INJECTION, SOLUTION INTRAVENOUS ONCE
Status: COMPLETED | OUTPATIENT
Start: 2022-02-18 | End: 2022-02-18

## 2022-02-18 RX ADMIN — MAGNESIUM SULFATE HEPTAHYDRATE 2 G: 40 INJECTION, SOLUTION INTRAVENOUS at 08:57

## 2022-02-18 RX ADMIN — GABAPENTIN 100 MG: 100 CAPSULE ORAL at 08:58

## 2022-02-18 RX ADMIN — LIDOCAINE 5% 1 PATCH: 700 PATCH TOPICAL at 09:07

## 2022-02-18 RX ADMIN — FUROSEMIDE 40 MG: 10 INJECTION, SOLUTION INTRAMUSCULAR; INTRAVENOUS at 17:57

## 2022-02-18 RX ADMIN — ENOXAPARIN SODIUM 40 MG: 40 INJECTION SUBCUTANEOUS at 08:57

## 2022-02-18 RX ADMIN — TOPIRAMATE 200 MG: 100 TABLET, FILM COATED ORAL at 08:58

## 2022-02-18 RX ADMIN — LEVOTHYROXINE SODIUM 75 MCG: 75 TABLET ORAL at 08:58

## 2022-02-18 RX ADMIN — NYSTATIN: 100000 POWDER TOPICAL at 09:07

## 2022-02-18 RX ADMIN — INSULIN GLARGINE 30 UNITS: 100 INJECTION, SOLUTION SUBCUTANEOUS at 22:06

## 2022-02-18 RX ADMIN — FUROSEMIDE 40 MG: 10 INJECTION, SOLUTION INTRAMUSCULAR; INTRAVENOUS at 08:55

## 2022-02-18 RX ADMIN — TOPIRAMATE 200 MG: 100 TABLET, FILM COATED ORAL at 20:49

## 2022-02-18 RX ADMIN — FLUOXETINE 60 MG: 20 CAPSULE ORAL at 08:58

## 2022-02-18 NOTE — ASSESSMENT & PLAN NOTE
Lab Results   Component Value Date    HGBA1C 11 9 (H) 01/29/2022       Recent Labs     02/17/22  1556 02/17/22 2053 02/18/22  0628 02/18/22  0716   POCGLU 219* 185* 114 114       Blood Sugar Average: Last 72 hrs:  · (P) 171 uncontrolled  · Home regimen:  Alogliptin 25 mg, empagliflozin 25mg, Lantus 36 units HS--> Lantus decreased to 30 units while at the hospital sees blood sugar has been on the 100s    · Continue home Lantus and SSI  · Accu-Cheks a c  HS

## 2022-02-18 NOTE — PLAN OF CARE
Problem: Potential for Falls  Goal: Patient will remain free of falls  Description: INTERVENTIONS:  - Educate patient/family on patient safety including physical limitations  - Instruct patient to call for assistance with activity   - Consult OT/PT to assist with strengthening/mobility   - Keep Call bell within reach  - Keep bed low and locked with side rails adjusted as appropriate  - Keep care items and personal belongings within reach  - Initiate and maintain comfort rounds  - Make Fall Risk Sign visible to staff  - Offer Toileting every 2 Hours, in advance of need  - Initiate/Maintain alarm  - Obtain necessary fall risk management equipment:   - Apply yellow socks and bracelet for high fall risk patients  - Consider moving patient to room near nurses station  Outcome: Progressing     Problem: MOBILITY - ADULT  Goal: Maintain or return to baseline ADL function  Description: INTERVENTIONS:  -  Assess patient's ability to carry out ADLs; assess patient's baseline for ADL function and identify physical deficits which impact ability to perform ADLs (bathing, care of mouth/teeth, toileting, grooming, dressing, etc )  - Assess/evaluate cause of self-care deficits   - Assess range of motion  - Assess patient's mobility; develop plan if impaired  - Assess patient's need for assistive devices and provide as appropriate  - Encourage maximum independence but intervene and supervise when necessary  - Involve family in performance of ADLs  - Assess for home care needs following discharge   - Consider OT consult to assist with ADL evaluation and planning for discharge  - Provide patient education as appropriate  Outcome: Progressing  Goal: Maintains/Returns to pre admission functional level  Description: INTERVENTIONS:  - Perform BMAT or MOVE assessment daily    - Set and communicate daily mobility goal to care team and patient/family/caregiver     - Collaborate with rehabilitation services on mobility goals if consulted  - Perform Range of Motion 4 times a day  - Reposition patient every 2 hours  - Dangle patient  times a day  - Stand patient 3 times a day  - Ambulate patient 3 times a day  - Out of bed to chair 3 times a day   - Out of bed for meals 3 times a day  - Out of bed for toileting  - Record patient progress and toleration of activity level   Outcome: Progressing     Problem: Nutrition/Hydration-ADULT  Goal: Nutrient/Hydration intake appropriate for improving, restoring or maintaining nutritional needs  Description: Monitor and assess patient's nutrition/hydration status for malnutrition  Collaborate with interdisciplinary team and initiate plan and interventions as ordered  Monitor patient's weight and dietary intake as ordered or per policy  Utilize nutrition screening tool and intervene as necessary  Determine patient's food preferences and provide high-protein, high-caloric foods as appropriate       INTERVENTIONS:  - Monitor oral intake, urinary output, labs, and treatment plans  - Assess nutrition and hydration status and recommend course of action  - Evaluate amount of meals eaten  - Assist patient with eating if necessary   - Allow adequate time for meals  - Recommend/ encourage appropriate diets, oral nutritional supplements, and vitamin/mineral supplements  - Order, calculate, and assess calorie counts as needed  - Recommend, monitor, and adjust tube feedings and TPN/PPN based on assessed needs  - Assess need for intravenous fluids  - Provide specific nutrition/hydration education as appropriate  - Include patient/family/caregiver in decisions related to nutrition  Outcome: Progressing     Problem: CARDIOVASCULAR - ADULT  Goal: Maintains optimal cardiac output and hemodynamic stability  Description: INTERVENTIONS:  - Monitor I/O, vital signs and rhythm  - Monitor for S/S and trends of decreased cardiac output  - Administer and titrate ordered vasoactive medications to optimize hemodynamic stability  - Assess quality of pulses, skin color and temperature  - Assess for signs of decreased coronary artery perfusion  - Instruct patient to report change in severity of symptoms  Outcome: Progressing  Goal: Absence of cardiac dysrhythmias or at baseline rhythm  Description: INTERVENTIONS:  - Continuous cardiac monitoring, vital signs, obtain 12 lead EKG if ordered  - Administer antiarrhythmic and heart rate control medications as ordered  - Monitor electrolytes and administer replacement therapy as ordered  Outcome: Progressing

## 2022-02-18 NOTE — PROGRESS NOTES
Cardiology Progress Note - Eliel Linarse 78 y o  female MRN: [id number]    Unit/Bed#: -01 Encounter: [id number]        Hospital Problems:  Principal Problem:    Acute on chronic heart failure with preserved ejection fraction (HCC)  Active Problems:    Diabetes mellitus ([address] )    Chest pain    Morbid obesity due to excess calories (HCC)    Chronic respiratory failure (formerly [facility])      Assessment/Recommendations:       Acute on chronic heart failure with preserved EF:   currently on IV diuretics  Received the morning dose of Lasix after discussion with RN, blood pressure is on the lower side  Can resume Aldactone as well  Currently no intake and outputs recorded  Weight is slightly trending down  Her respiratory status has improved  She is more somnolent now  Telemetry shows sinus rhythm  I would recommend continuation of diuretics overnight and hopefully transition her to p o  torsemide 40 mg a day at discharge with follow-up BMP in 1 week  Lethargy:  She appears more somnolent today  Unclear etiology  Discussed with primary team           Noncardiac chest pain:  No recurrence of chest pain overnight  Pain is unlikely to be angina  Increased left ventricular end-diastolic pressure due to decompensated heart failure can contribute to chest pain  Further ischemic evaluation not indicated, during this hospitalization  Previous nuclear stress test showed inferior ischemia but cardiac catheterization showed no obstructive CAD      Nonobstructive coronary artery disease: Coronary angiogram reviewed  No significant coronary stenosis identified  Mild coronary plaque noted  LVEDP was mildly elevated  Continue risk factor modification and statin therapy        Obstructive sleep apnea: Moderate pulmonary hypertension seen on echo as a result of untreated obstructive sleep apnea  Patient reports that she cannot tolerate the CPAP mask     obesity and untreated sleep apnea, further contributes to her diastolic dysfunction        Diabetes:  Poorly controlled  Last A1c 11 9  Will benefit from continuation of empagliflozin to lower her risk of heart failure hospitalization      Plan of care discussed with the primary team     Please reach out to on-call cardiologist over the weekend for any questions  Subjective:     Patient seen and examined  Overnight events reviewed  She states she is very tired this morning  She has no energy  Telemetry shows sinus rhythm with no significant bradycardia or pauses  Intake and output not recorded  However she states that she is urinating well  Weight is slightly down  She had not gotten the morning doses of her medicines because of slightly low blood pressure  I did discuss with the RN to give her the morning Lasix dose  Review of System:  Review of ten system was conducted and was negative except for findings stated  elsewhere  Review of Systems   Constitutional: Positive for appetite change and fatigue  HENT: Positive for congestion  Eyes: Negative  Respiratory: Positive for cough and shortness of breath  Cardiovascular: Positive for leg swelling  Negative for chest pain and palpitations  Gastrointestinal: Positive for nausea  Endocrine: Negative  Genitourinary: Negative  Musculoskeletal: Positive for arthralgias  Skin: Negative for rash  Allergic/Immunologic: Negative  Neurological: Positive for weakness  Negative for headaches  Hematological: Does not bruise/bleed easily  Psychiatric/Behavioral: Positive for decreased concentration and sleep disturbance              Current Facility-Administered Medications:     acetaminophen (TYLENOL) tablet 650 mg, 650 mg, Oral, Q4H PRN, Melissa Villeda PA-C    docusate sodium (COLACE) capsule 100 mg, 100 mg, Oral, Daily, Melissa Villeda PA-C    enoxaparin (LOVENOX) subcutaneous injection 40 mg, 40 mg, Subcutaneous, Daily, Melissa Villeda PA-C, 40 mg at 02/18/22 6048    FLUoxetine (PROzac) capsule 60 mg, 60 mg, Oral, Daily, Melissa Collado PA-C, 60 mg at 02/18/22 0858    furosemide (LASIX) injection 40 mg, 40 mg, Intravenous, BID, Melissa Collado PA-C, 40 mg at 02/17/22 3728    gabapentin (NEURONTIN) capsule 100 mg, 100 mg, Oral, BID, Adia Guo MD, 100 mg at 02/18/22 1397    gabapentin (NEURONTIN) capsule 300 mg, 300 mg, Oral, HS, Melissa Collado PA-C, 300 mg at 02/17/22 2100    insulin glargine (LANTUS) subcutaneous injection 36 Units 0 36 mL, 36 Units, Subcutaneous, HS, Melissa Collado PA-C, 36 Units at 02/17/22 2100    insulin lispro (HumaLOG) 100 units/mL subcutaneous injection 2-12 Units, 2-12 Units, Subcutaneous, TID AC, 4 Units at 02/17/22 1710 **AND** Fingerstick Glucose (POCT), , , TID AC, Melissa Collado PA-C    levothyroxine tablet 75 mcg, 75 mcg, Oral, Daily, Melissa Collado PA-C, 75 mcg at 02/18/22 0858    lidocaine (LIDODERM) 5 % patch 1 patch, 1 patch, Topical, Daily, Melissa Collado PA-C, 1 patch at 02/18/22 1722    lisinopril (ZESTRIL) tablet 10 mg, 10 mg, Oral, Daily, Melissa Collado PA-C, 10 mg at 02/17/22 0902    magnesium sulfate 2 g/50 mL IVPB (premix) 2 g, 2 g, Intravenous, Once, Adia Guo MD, 2 g at 02/18/22 0857    nystatin (MYCOSTATIN) powder, , Topical, BID, Melissa Collado PA-C, Given at 02/18/22 0907    spironolactone (ALDACTONE) tablet 25 mg, 25 mg, Oral, Daily, Adia Guo MD    topiramate (TOPAMAX) tablet 200 mg, 200 mg, Oral, BID, Melissa Collado PA-C, 200 mg at 02/18/22 8511     Objective:     Vitals:   Blood pressure 92/50, pulse 61, temperature (!) 97 4 °F (36 3 °C), temperature source Axillary, resp  rate 17, height 5' 2" (1 575 m), weight 122 kg (269 lb 9 6 oz), SpO2 96 %  Body mass index is 49 31 kg/m²    Orthostatic Blood Pressures      Most Recent Value   Blood Pressure 92/50 filed at 02/18/2022 1284 Patient Position - Orthostatic VS Lying filed at 02/18/2022 2961         Systolic (45TKH), KBN:821 , Min:92 , NDQ:275     Diastolic (02UMU), BXD:15, Min:50, Max:70    No intake or output data in the 24 hours ending 02/18/22 0927  Weight (last 2 days)     Date/Time Weight    02/18/22 0600 122 (269 6)    02/17/22 0700 123 (270 8)    02/16/22 2008 129 (283 29)    02/16/22 1557 127 (280)          Physical Exam:    GEN:   appears somnolent, able to answer questions  EYES:  no icterus  ENT:  On oxygen  NECK: no bruit  HEART: regular rhythm, normal S1 and S2, grade 2 systolic murmur  LUNGS:  Decreased air entry, no wheezing   ABDOMEN: normal bowel sounds, nontender  EXTREMITIES: no edema  JOINTS: No deformities  NEURO:   somnolent but no focal deficits  SKIN: warm, no lesions  PSYCH:  Depressed    Labs & Results:        Results from last 7 days   Lab Units 02/18/22  0524 02/17/22  0508 02/16/22  1634   WBC Thousand/uL 5 03 5 70 4 85   HEMOGLOBIN g/dL 15 1 15 4 15 3   HEMATOCRIT % 47 4* 46 9* 46 6*   PLATELETS Thousands/uL 146* 160 156         Results from last 7 days   Lab Units 02/18/22  0524 02/17/22  0508 02/16/22  1634   POTASSIUM mmol/L 4 0 3 2* 3 7   CHLORIDE mmol/L 100 99 99   CO2 mmol/L 33 35* 33   BUN mg/dL 22* 16 24*   CREATININE mg/dL 0 72 0 64 0 83   CALCIUM mg/dL 9 2 8 8 8 5   ALK PHOS U/L  --   --  96 8   ALT U/L  --   --  13   AST U/L  --   --  17         Results from last 7 days   Lab Units 02/17/22  0508   MAGNESIUM mg/dL 1 5*     No results for input(s): NTBNP in the last 72 hours  Imaging Studies:     XR chest portable    Result Date: 2/16/2022  Narrative: CHEST INDICATION:   chest pain  COMPARISON:  Chest radiograph from 1/28/2022 and chest CT from 10/23/2021  EXAM PERFORMED/VIEWS:  XR CHEST PORTABLE FINDINGS: Cardiomediastinal silhouette appears unremarkable  The lungs are clear  No pneumothorax or pleural effusion  Osseous structures appear within normal limits for patient age  Impression: No acute cardiopulmonary disease  Workstation performed: [id number]     XR chest 1 view portable    Result Date: 1/28/2022  Narrative: CHEST INDICATION:   sob  COMPARISON:  10/23/2021 EXAM PERFORMED/VIEWS:  XR CHEST PORTABLE Single view FINDINGS: Cardiomediastinal silhouette appears unremarkable  The lungs are clear  No pneumothorax or pleural effusion  Osseous structures appear within normal limits for patient age  Impression: No acute cardiopulmonary disease  Findings are stable Workstation performed: [id number]     CTA dissection protocol chest/abdomen/pelvis    Result Date: 2/16/2022  Narrative: CTA - CHEST, ABDOMEN AND PELVIS - WITHOUT AND WITH IV CONTRAST INDICATION:   r/o dissection  COMPARISON:  Most recent prior imaging is a chest CT dated October 23, 2021  Most recent abdomen and pelvic CT scan is dated August 1, 2020  TECHNIQUE: CT examination of the chest, abdomen and pelvis was performed both prior to and after the administration of intravenous contrast   The noncontrast portion of this examination was performed utilizing low radiation dose technique  Thin section angiographic arterial phase post contrast technique was used in order to evaluate for aortic dissection  3D reformatted images and volume rendering were performed on an independent workstation  Additionally, axial, sagittal, and coronal 2D reformatted images were created from the source data and submitted for interpretation  Radiation dose length product (DLP) for this visit:  8016 mGy-cm   This examination, like all CT scans performed in the [facility], was performed utilizing techniques to minimize radiation dose exposure, including the use of iterative reconstruction and automated exposure control  IV Contrast:  100 mL of iohexol (OMNIPAQUE) Enteric Contrast:  Enteric contrast was not administered  FINDINGS: AORTA:  There is no aortic dissection or intramural hematoma   There is no aortic aneurysm  CHEST LUNGS:  Septal thickening greatest in the left lower lobe with areas of groundglass opacity also greatest in the left lower lobe  No consolidation  PLEURA:  Unremarkable  HEART/PULMONARY ARTERIAL TREE:  Unremarkable for patient's age  MEDIASTINUM AND REAL:  Unremarkable  CHEST WALL AND LOWER NECK:   Unremarkable  ABDOMEN LIVER/BILIARY TREE:  Unremarkable  GALLBLADDER:  Gallbladder is surgically absent  SPLEEN:  Unremarkable  PANCREAS:  Fatty atrophy  ADRENAL GLANDS:  1 9 x 1 9 cm right adrenal nodule measures less than 10 Hounsfield units on the nonenhanced portion of the study compatible with a lipid rich adrenal adenoma  KIDNEYS/URETERS:  Unremarkable  No hydronephrosis  STOMACH AND BOWEL:  Multiple colonic diverticula  APPENDIX:  No findings to suggest appendicitis  ABDOMINOPELVIC CAVITY:  No ascites or free intraperitoneal air  No lymphadenopathy  PELVIS REPRODUCTIVE ORGANS:  Unremarkable for patient's age  URINARY BLADDER:  Unremarkable  ABDOMINAL WALL/INGUINAL REGIONS:  Mild dependent edema, and probable injection sites in the left anterior abdominal wall over the upper pelvis  OSSEOUS STRUCTURES:  Degenerative changes in the right hip with postoperative hardware in the posterior right acetabulum  Degenerative changes throughout the spine  Impression: 1  Negative for aortic dissection or aneurysm  No intramural hematoma  2   Interstitial pulmonary edema  3   Diverticulosis  No evidence of acute diverticulitis  Workstation performed: [id number]     Echo complete w/ contrast if indicated    Result Date: 2/1/2022  Narrative: [employer]  Left Ventricle: Left ventricular cavity size is normal  Wall thickness is mildly increased  Systolic function is normal  Although no diagnostic wall motion abnormality was identified this cannot be entirely excluded on the basis of this study  Diastolic function is moderately abnormal, consistent with grade II (pseudonormal) relaxation    Left atrial filling pressure is elevated  There is concentric remodeling    Right Ventricle: Right ventricular cavity size is mildly dilated  Systolic function is normal    Left Atrium: The atrium is mildly dilated    Right Atrium: The atrium is mildly dilated    Aortic Valve: The aortic valve is probably trileaflet  The leaflets are moderately thickened  The leaflets are moderately calcified  There is mild to moderate stenosis  GABRIELA 1 2 cm2/MG 12 mmHg/DI 0 38  The aortic valve velocity is increased due to stenosis    Mitral Valve: There is moderate annular calcification    Tricuspid Valve: There is mild regurgitation  The right ventricular systolic pressure is moderately elevated  The estimated right ventricular systolic pressure is 96 92 mmHg  Available cardiology studies, imaging and lab results independently reviewed today  This note was completed in part utilizing [employer] direct voice recognition software  Grammatical errors, random word insertion, spelling mistakes, occasional wrong word or "sound-alike" substitutions and incomplete sentences may be an occasional consequence of the system secondary to software limitations, ambient noise and hardware issues  At the time of dictation, efforts were made to edit, clarify and /or correct errors  Please read the chart carefully and recognize, using context, where substitutions have occurred  If you have any questions or concerns about the context, text or information contained within the body of this dictation, please contact myself, the provider, for further clarification

## 2022-02-18 NOTE — ASSESSMENT & PLAN NOTE
Wt Readings from Last 3 Encounters:   02/18/22 122 kg (269 lb 9 6 oz)   02/01/22 (!) 137 kg (303 lb)   12/10/21 135 kg (297 lb)     · C/o chest pain, palpitations, shortness of breath  Recently hospitalized 2/1 for CHF exacerbation  Had plans to f/u with cardiology, unable to get appt until March  Diuretic dosing recently changed by PCP now Lasix 80 mg 3 times a week and 60 mg 4 times a week starting this past week  · CTA:  Interstitial pulmonary edema  · Echo 2/1/22:  EF 02%, grade 2 diastolic dysfunction, mild to moderate AS, mild TR  · Normal TSH  Plan  · Start Lasix 40 mg IV b i d   · Daily weights, I&Os  · Cardiology consult --> recommended started the patient on low-dose spironolactone since improved diastolic CHF mortality  Discontinue lisinopril    · Outpatient follow-up with sleep medicine since patient has ECLIA and she is not tolerating the CPAP she will benefit from jenna

## 2022-02-18 NOTE — ASSESSMENT & PLAN NOTE
-this morning during evaluation patient was somnolent easy to arouse and with no neurological deficits   -at that point patient was mildly hypotensive  -no necessary antihypertensive medications will be on hold like lisinopril   -will currently discontinue gabapentin to prevent delirium  -continue topiramate since patient is on that medication for seizures  -follow urine analysis for possible asymptomatic bacteriuria making the patient somnolent  -Lantus decrease seen patient blood sugar is on the 0s and possibly this could be a reason why patient is feeling a little bit somnolent not used to to having this kind of blood sugar levels  Sylvia Jackson

## 2022-02-18 NOTE — OCCUPATIONAL THERAPY NOTE
02/18/22 1538   OT Last Visit   OT Visit Date 02/18/22   Note Type   Note type Cancelled Session   Cancel Reasons Medical status     OT order received and chart review performed  At this time, OT evaluation cancelled due to patient not medically appropriate  Pt visited at bedside, reporting chills, nausea, and headache, notably less alert than previous day per RN  RN Zulema Monae made aware, will follow up with patient once medical status improves  OT will follow and evaluate as appropriate       Jenaro Haines, OT

## 2022-02-18 NOTE — CASE MANAGEMENT
Case Management Assessment & Discharge Planning Note    Patient name Demetris Tejada  Location /-86 MRN [id number]  : 1955 Date 2022       Current Admission Date: 2022  Current Admission Diagnosis:Acute on chronic heart failure with preserved ejection fraction [facility])   Patient Active Problem List    Diagnosis Date Noted    Somnolence 2022    Acute on chronic heart failure with preserved ejection fraction ([facility] ) 2022    Chronic respiratory failure ([facility] ) 2022    Dyspnea on exertion 2022    Primary osteoarthritis of one hip, right 12/10/2021    Orthostatic hypotension 10/24/2021    Morbid obesity due to excess calories ([facility] ) 2021    Onychomycosis of toenail 2021    Calf tenderness 2021    Asymptomatic bacteriuria 2021    Chronic fatigue and malaise 2021    Acquired hypothyroidism 2021    Suspected COVID-19 virus infection 2020    Major depressive disorder 2020    Gambling disorder, episodic, moderate 2018    Severe episode of recurrent major depressive disorder, without psychotic features ([facility] ) 2018    Encounter for examination and observation for other specified reasons     GERD (gastroesophageal reflux disease) 07/10/2016    Epigastric abdominal pain 07/10/2016    Seizures ([facility] )     Restless leg syndrome     Hypertension     Hyperlipidemia     Diabetes mellitus ([facility] )       LOS (days): 0  Geometric Mean LOS (GMLOS) (days):   Days to GMLOS:     OBJECTIVE:      Current admission status: Observation     Preferred Pharmacy: [place] MEDS MUST BE FILLED AT THE [facility]  Primary Care Provider: Ricky Arcos MD    Primary Insurance: [facility]  Secondary Insurance: [place] [facility] REP    ASSESSMENT:  [address]  [facility] Representative - Son   Primary Phone: [phone] (Mobile)               Advance Directives  Does patient have a [address] [place] [address] Avenue?: (Patient is unsure if it is finalized/formal but she identifies her son as HCR)  Primary Contact: son Adi Andrade    Readmission Root Cause  30 Day Readmission: No    Patient Information  Admitted from[de-identified] Home  Mental Status: Alert  During Assessment patient was accompanied by: Not accompanied during assessment  Assessment information provided by[de-identified] Patient  Primary Caregiver: Self  Support Systems: Son,Home care staff  [place] of Residence: [address] [place] [address] do you live in?: [place] entry access options   Select all that apply : Elevator  Type of Current Residence: Apartment  Floor Level: 4  Upon entering residence, is there a bedroom on the main floor (no further steps)?: Yes  Upon entering residence, is there a bathroom on the main floor (no further steps)?: Yes  Living Arrangements: Lives Alone  Is patient a ?: Yes  Is patient active with [facility])?: Yes  Is patient service connected?: No (As per Gisela Smith in [employer], patient is not service connected but she is eligible for certain services d/t low income)    Activities of Daily Living Prior to Admission  Functional Status: Assistance  Completes ADLs independently?: No  Level of ADL dependence: Assistance (from [facility] with showering Tuesdays and Thursdays)  Ambulates independently?: Yes  Does patient use assisted devices?: Yes  Assisted Devices (DME) used: Walker,Portable Oxygen tanks,Home Oxygen concentrator,Straight [address] [place] [address], Ne  DME Company Name (respiratory supplies): [employer]  O2 Rate(s): 1  Does patient currently own DME?: Yes  What DME does the patient currently own?: Portable Oxygen tanks,Home Oxygen concentrator,Walker,Straight Cane  Does the patient have a history of Short-Term Rehab?: Yes ([facility])  Does patient have a history of HHC?: Yes  [facility]    Current Home Health Care  Type of Current Home Care Services: Home PT,Nurse visit,Home health aide  Current Home Health Agency[de-identified] Other (please enter name in comment) ([address])  [facility] Provider[de-identified] PCP    Patient Information Continued  Does patient have prescription coverage?: Yes  Does patient receive dialysis treatments?: No  Does patient have a history of substance abuse?: No  Does patient have a history of Mental Health Diagnosis?: Yes (Depression, Anxiety)  Is patient receiving treatment for mental health?: Yes (patient has telehealth visits with her psychiatrist Dr Camila Fisher every 2-3 months)  Has patient received inpatient treatment related to mental health in the last 2 years?: No    Means of Transportation  Means of Transport to [facility][de-identified] [employer] [address]:    Discharge planning discussed with[de-identified] patient at bedside  Freedom of Choice: Yes  Comments - Freedom of Choice: CM met with patient at bedside to introduce self and role  Patient is alert and oriented, though very tired, sitting at edge of bed  Patient resides alone at [facility], a  [employer] [facility] in [place]  She's in a 4th floor apt with elevator access  She lives alone but has an aide that comes to help her bathe and with light house work on Tuesdays and Thursdays  The aide is supplied through [address] and they are also current for SN/PT, which patient would like to resume at dc  She's on 1L chronic O2 through [facility]  Her son is her biggest support, however, he lives in [place] and doesn't drive  Patient will need a ride home at dc and will need to get all [facility]s [facility]         Requested [address] [facility] [address]         Is the patient interested in [place] [address] at discharge?: Yes  [address] requested[de-identified] [place] Name[de-identified] Other ([address])  [address] Provider[de-identified] PCP  Home Health Services Needed[de-identified] Diabetes Management,Evaluate Functional Status and Safety,Gait/ADL Training,Heart Failure Management,Oxygen Via Nasal Cannula,Strengthening/Theraputic Exercises to Improve Function  Oxygen LPM Ordered (if applicable based on home health services needed):: 1 LPM  Homebound Criteria Met[de-identified] Uses an Assist Device (i e  cane, walker, etc),Requires the Assistance of Another Person for Safe Ambulation or to Leave the Home  Supporting Clincal Findings[de-identified] Limited Endurance,Requires Oxygen,Dyspnea with Exertion,Fatigues Easliy in [employer]     Other Referral/Resources/Interventions Provided:  Interventions: Transportation,[facility]  Referral Comments: Referral sent to [facility] via [facility] for [facility] for SN/PT  [facility] called  [facility] at [phone] and spoke with Stephanie Romano in the transportation dept  She reports patient is eligible for transportation assistance at [place] d/t her low income, and CM Dept can contact the [facility] over the weekend at [phone]  This number goes to the AOD in the ED and they can assist with weekend transport needs  Patient will require BLS d/t O2; she is unable to afford [facility] and has no one to bring her O2 in from home      Would you like to participate in our [address] service program?  : No - Declined       Discharge Destination Plan[de-identified] Home with [address] [facility] V[address] [facility] [place] with [facility])

## 2022-02-18 NOTE — PROGRESS NOTES
[facility] [address]  Progress Note - Noni Buitrago 1955, 78 y o  female MRN: [id number]  Unit/Bed#: -01 Encounter: [id number]  Primary Care Provider: Doyle Sandoval MD   Date and time admitted to hospital: 2/16/2022  3:54 PM    Somnolence  Assessment & Plan  -this morning during evaluation patient was somnolent easy to arouse and with no neurological deficits   -at that point patient was mildly hypotensive  -no necessary antihypertensive medications will be on hold like lisinopril   -will currently discontinue gabapentin to prevent delirium  -continue topiramate since patient is on that medication for seizures  -follow urine analysis for possible asymptomatic bacteriuria making the patient somnolent  -Lantus decrease seen patient blood sugar is on the 0s and possibly this could be a reason why patient is feeling a little bit somnolent not used to to having this kind of blood sugar levels       Chronic respiratory failure (HCC)  Assessment & Plan  · Maintained on 1 L oxygen at home, recently started after previous admission earlier this month  · Continue    Morbid obesity due to excess calories [facility])  Assessment & Plan  Noted  Encouraged lifestyle and dietary modifications    Diabetes mellitus [facility])  Assessment & Plan  Lab Results   Component Value Date    HGBA1C 11 9 (H) 01/29/2022       Recent Labs     02/17/22  1556 02/17/22  2053 02/18/22  0628 02/18/22  0716   POCGLU 219* 185* 114 114       Blood Sugar Average: Last 72 hrs:  · (P) 171 uncontrolled  · Home regimen:  Alogliptin 25 mg, empagliflozin 25mg, Lantus 36 units HS--> Lantus decreased to 30 units while at the hospital sees blood sugar has been on the 100s    · Continue home Lantus and SSI  · Accu-Cheks a c  HS      * Acute on chronic heart failure with preserved ejection fraction [facility])  Assessment & Plan  Wt Readings from Last 3 Encounters:   02/18/22 122 kg (269 lb 9 6 oz)   02/01/22 (!) 137 kg (303 lb)   12/10/21 135 kg (297 lb) · C/o chest pain, palpitations, shortness of breath  Recently hospitalized 2/1 for CHF exacerbation  Had plans to f/u with cardiology, unable to get appt until March  Diuretic dosing recently changed by PCP now Lasix 80 mg 3 times a week and 60 mg 4 times a week starting this past week  · CTA:  Interstitial pulmonary edema  · Echo 2/1/22:  EF 26%, grade 2 diastolic dysfunction, mild to moderate AS, mild TR  · Normal TSH  Plan  · Start Lasix 40 mg IV b i d   · Daily weights, I&Os  · Cardiology consult --> recommended started the patient on low-dose spironolactone since improved diastolic CHF mortality  Discontinue lisinopril  · Outpatient follow-up with sleep medicine since patient has CELIA and she is not tolerating the CPAP she will benefit from inspire    Chest pain-resolved as of 2/18/2022  Assessment & Plan  · C/o chest pain and palpitations worsening over the past 4 days  Described as a pressure sensation  Does not radiate  Received aspirin and nitroglycerin in EMS  States pain better controlled now than on arrival   · Has hx of cardiac cath done 3/2021 showing  mild nonobstructive CAD  · EKG: Without ischemic changes  · Troponin negative  · Monitor on tele  · Notify provider for worsening chest pain          VTE Pharmacologic Prophylaxis:   VTE Score: 6 Low Risk (Score 0-2) - Encourage Ambulation  Mechanical VTE Prophylaxis in Place: Yes    Patient Centered Rounds: I have performed bedside rounds with nursing staff today  Discussions with Specialists or Other Care Team Provider:  Cardiology      Current Length of Stay: 0 day(s)    Current Patient Status: Observation     Discharge Plan / Estimated Discharge Date: Anticipate discharge in 48 hrs to home with home services  Code Status: Level 3 - DNAR and DNI      Subjective:   Patient feels a little bit more somnolent this morning, she is easy to arouse and possible to have a meaningful communication with her    This no neurological deficits, patient states that last night she was not able to sleep but overall she feels well  Objective:     Vitals:   Temp (24hrs), Av 2 °F (36 2 °C), Min:96 4 °F (35 8 °C), Max:97 6 °F (36 4 °C)    Temp:  [96 4 °F (35 8 °C)-97 6 °F (36 4 °C)] 97 4 °F (36 3 °C)  HR:  [61-80] 61  Resp:  [16-18] 17  BP: ()/(50-70) 92/50  SpO2:  [96 %-99 %] 96 %  Body mass index is 49 31 kg/m²  Input and Output Summary (last 24 hours):     No intake or output data in the 24 hours ending 22 1048    Physical Exam:     Physical Exam  Constitutional:       General: She is not in acute distress  Appearance: She is obese  HENT:      Head: Normocephalic and atraumatic  Cardiovascular:      Rate and Rhythm: Normal rate  Pulses: Normal pulses  Pulmonary:      Effort: Pulmonary effort is normal    Abdominal:      General: Abdomen is flat  Bowel sounds are normal       Palpations: Abdomen is soft  Musculoskeletal:         General: Normal range of motion  Cervical back: Normal range of motion and neck supple  Right lower leg: No edema  Left lower leg: No edema  Skin:     General: Skin is warm  Capillary Refill: Capillary refill takes less than 2 seconds  Neurological:      General: No focal deficit present  Mental Status: She is alert and oriented to person, place, and time  Cranial Nerves: No cranial nerve deficit  Motor: No weakness        Comments: Somnolent but easy arousable   Psychiatric:         Mood and Affect: Mood normal           Additional Data:     Labs:  Results from last 7 days   Lab Units 22  0524   WBC Thousand/uL 5 03   HEMOGLOBIN g/dL 15 1   HEMATOCRIT % 47 4*   PLATELETS Thousands/uL 146*   NEUTROS PCT % 58   LYMPHS PCT % 29   MONOS PCT % 8   EOS PCT % 4     Results from last 7 days   Lab Units 22  0524 22  0508 22  1634   SODIUM mmol/L 140   < > 137   POTASSIUM mmol/L 4 0   < > 3 7   CHLORIDE mmol/L 100   < > 99   CO2 mmol/L 33   < > 33   BUN mg/dL 22*   < > 24*   CREATININE mg/dL 0 72   < > 0 83   ANION GAP mmol/L 7   < > 5   CALCIUM mg/dL 9 2   < > 8 5   ALBUMIN g/dL  --   --  3 4   TOTAL BILIRUBIN mg/dL  --   --  0 30   ALK PHOS U/L  --   --  96 8   ALT U/L  --   --  13   AST U/L  --   --  17   GLUCOSE RANDOM mg/dL 93   < > 283*    < > = values in this interval not displayed           Results from last 7 days   Lab Units 02/18/22  0716 02/18/22  0628 02/17/22  2053 02/17/22  1556 02/17/22  1115 02/17/22  0702 02/16/22  2113   POC GLUCOSE mg/dl 114 114 185* 219* 224* 139 202*               Imaging: Reviewed radiology reports from this admission including: chest xray    Recent Cultures (last 7 days):           Lines/Drains:  Invasive Devices  Report    Peripheral Intravenous Line            Peripheral IV 02/16/22 Right Hand 2 days    Peripheral IV 02/16/22 Right Antecubital 1 day                Telemetry:   Telemetry Orders (From admission, onward)             48 Hour Telemetry Monitoring  Continuous x 48 hours        Expiring   References:    Telemetry Guidelines   Question:  Reason for 48 Hour Telemetry  Answer:  Acute Decompensated CHF (continuous diuretic infusion or total diuretic dose > 200 mg daily, associated electrolyte derangement, ionotropic drip, history of ventricular arrhythmia, or new EF <35%)                    Last 24 Hours Medication List:   Current Facility-Administered Medications   Medication Dose Route Frequency Provider Last Rate    acetaminophen  650 mg Oral Q4H PRN JEWELS Shen-EDWAR      docusate sodium  100 mg Oral Daily JEWELS Shen-C      enoxaparin  40 mg Subcutaneous Daily JEWELS Shen-EDWAR      FLUoxetine  60 mg Oral Daily Melissa Ross, PA-C      furosemide  40 mg Intravenous BID Essence Kern PA-C      insulin glargine  30 Units Subcutaneous HS vEan Ruiz MD      insulin lispro  2-12 Units Subcutaneous TID AC Melissa Ross JEROD      levothyroxine  75 mcg Oral Daily Melissa San, JEROD      lidocaine  1 patch Topical Daily Melissa San, JEROD      magnesium sulfate  2 g Intravenous Once Magdaleno Caceres MD      nystatin   Topical BID Melissa San, JEROD      spironolactone  25 mg Oral Daily Magdaleno Caceres MD      topiramate  200 mg Oral BID Melissa San PA-C          Today, Patient Was Seen By: Magdaleno Caceres MD    ** Please Note: This note has been constructed using a voice recognition system   **

## 2022-02-18 NOTE — PLAN OF CARE
Problem: Potential for Falls  Goal: Patient will remain free of falls  Description: INTERVENTIONS:  - Educate patient/family on patient safety including physical limitations  - Instruct patient to call for assistance with activity   - Consult OT/PT to assist with strengthening/mobility   - Keep Call bell within reach  - Keep bed low and locked with side rails adjusted as appropriate  - Keep care items and personal belongings within reach  - Initiate and maintain comfort rounds  - Make Fall Risk Sign visible to staff  - Offer Toileting every 2 Hours, in advance of need  - Initiate/Maintain alarm  - Obtain necessary fall risk management equipment  - Apply yellow socks and bracelet for high fall risk patients  - Consider moving patient to room near nurses station  Outcome: Progressing     Problem: MOBILITY - ADULT  Goal: Maintain or return to baseline ADL function  Description: INTERVENTIONS:  -  Assess patient's ability to carry out ADLs; assess patient's baseline for ADL function and identify physical deficits which impact ability to perform ADLs (bathing, care of mouth/teeth, toileting, grooming, dressing, etc )  - Assess/evaluate cause of self-care deficits   - Assess range of motion  - Assess patient's mobility; develop plan if impaired  - Assess patient's need for assistive devices and provide as appropriate  - Encourage maximum independence but intervene and supervise when necessary  - Involve family in performance of ADLs  - Assess for home care needs following discharge   - Consider OT consult to assist with ADL evaluation and planning for discharge  - Provide patient education as appropriate  Outcome: Progressing  Goal: Maintains/Returns to pre admission functional level  Description: INTERVENTIONS:  - Perform BMAT or MOVE assessment daily    - Set and communicate daily mobility goal to care team and patient/family/caregiver     - Collaborate with rehabilitation services on mobility goals if consulted  - Perform Range of Motion 2 times a day  - Reposition patient every 3 hours  - Dangle patient 3 times a day  - Stand patient 3 times a day  - Ambulate patient 2 times a day  - Out of bed to chair 3 times a day   - Out of bed for meals 3 times a day  - Out of bed for toileting  - Record patient progress and toleration of activity level   Outcome: Progressing     Problem: Nutrition/Hydration-ADULT  Goal: Nutrient/Hydration intake appropriate for improving, restoring or maintaining nutritional needs  Description: Monitor and assess patient's nutrition/hydration status for malnutrition  Collaborate with interdisciplinary team and initiate plan and interventions as ordered  Monitor patient's weight and dietary intake as ordered or per policy  Utilize nutrition screening tool and intervene as necessary  Determine patient's food preferences and provide high-protein, high-caloric foods as appropriate       INTERVENTIONS:  - Monitor oral intake, urinary output, labs, and treatment plans  - Assess nutrition and hydration status and recommend course of action  - Evaluate amount of meals eaten  - Assist patient with eating if necessary   - Allow adequate time for meals  - Recommend/ encourage appropriate diets, oral nutritional supplements, and vitamin/mineral supplements  - Order, calculate, and assess calorie counts as needed  - Recommend, monitor, and adjust tube feedings and TPN/PPN based on assessed needs  - Assess need for intravenous fluids  - Provide specific nutrition/hydration education as appropriate  - Include patient/family/caregiver in decisions related to nutrition  Outcome: Progressing     Problem: CARDIOVASCULAR - ADULT  Goal: Maintains optimal cardiac output and hemodynamic stability  Description: INTERVENTIONS:  - Monitor I/O, vital signs and rhythm  - Monitor for S/S and trends of decreased cardiac output  - Administer and titrate ordered vasoactive medications to optimize hemodynamic stability  - Assess quality of pulses, skin color and temperature  - Assess for signs of decreased coronary artery perfusion  - Instruct patient to report change in severity of symptoms  Outcome: Progressing  Goal: Absence of cardiac dysrhythmias or at baseline rhythm  Description: INTERVENTIONS:  - Continuous cardiac monitoring, vital signs, obtain 12 lead EKG if ordered  - Administer antiarrhythmic and heart rate control medications as ordered  - Monitor electrolytes and administer replacement therapy as ordered  Outcome: Progressing

## 2022-02-18 NOTE — PHYSICAL THERAPY NOTE
Physical Therapy Cancellation Note       02/18/22 1540   PT Last Visit   PT Visit Date 02/18/22   Note Type   Note type Cancelled Session   Cancel Reasons Medical status   Additional Comments PT consult received and chart reviewed  Spoke with OT Bella Kendall whom attempted OT evaluation  States that pt with multiple acute medical complaints and changes in status (per DEVAN Monae)  Will hold PT evaluation on this date and f/u as karoline Woody, PT, DPT   Available via [employer]  NP # [id number]  [place] - [id number]  0/05/9520

## 2022-02-19 VITALS
TEMPERATURE: 96.9 F | WEIGHT: 271.17 LBS | OXYGEN SATURATION: 96 % | DIASTOLIC BLOOD PRESSURE: 63 MMHG | HEART RATE: 63 BPM | RESPIRATION RATE: 18 BRPM | SYSTOLIC BLOOD PRESSURE: 107 MMHG | BODY MASS INDEX: 49.9 KG/M2 | HEIGHT: 62 IN

## 2022-02-19 PROBLEM — R40.0 SOMNOLENCE: Status: RESOLVED | Noted: 2022-02-18 | Resolved: 2022-02-19

## 2022-02-19 LAB
ANION GAP SERPL CALCULATED.3IONS-SCNC: 8 MMOL/L (ref 4–13)
BASOPHILS # BLD AUTO: 0.02 THOUSANDS/ΜL (ref 0–0.1)
BASOPHILS NFR BLD AUTO: 0 % (ref 0–1)
BUN SERPL-MCNC: 25 MG/DL (ref 6–20)
CALCIUM SERPL-MCNC: 9.1 MG/DL (ref 8.4–10.2)
CHLORIDE SERPL-SCNC: 98 MMOL/L (ref 96–108)
CO2 SERPL-SCNC: 33 MMOL/L (ref 22–33)
CREAT SERPL-MCNC: 0.88 MG/DL (ref 0.4–1.1)
EOSINOPHIL # BLD AUTO: 0.2 THOUSAND/ΜL (ref 0–0.61)
EOSINOPHIL NFR BLD AUTO: 4 % (ref 0–6)
ERYTHROCYTE [DISTWIDTH] IN BLOOD BY AUTOMATED COUNT: 12 % (ref 11.6–15.1)
GFR SERPL CREATININE-BSD FRML MDRD: 68 ML/MIN/1.73SQ M
GLUCOSE SERPL-MCNC: 110 MG/DL (ref 65–140)
GLUCOSE SERPL-MCNC: 121 MG/DL (ref 65–140)
GLUCOSE SERPL-MCNC: 98 MG/DL (ref 65–140)
HCT VFR BLD AUTO: 49.9 % (ref 34.8–46.1)
HGB BLD-MCNC: 15.6 G/DL (ref 11.5–15.4)
IMM GRANULOCYTES # BLD AUTO: 0.01 THOUSAND/UL (ref 0–0.2)
IMM GRANULOCYTES NFR BLD AUTO: 0 % (ref 0–2)
LYMPHOCYTES # BLD AUTO: 1.15 THOUSANDS/ΜL (ref 0.6–4.47)
LYMPHOCYTES NFR BLD AUTO: 20 % (ref 14–44)
MCH RBC QN AUTO: 28.7 PG (ref 26.8–34.3)
MCHC RBC AUTO-ENTMCNC: 31.3 G/DL (ref 31.4–37.4)
MCV RBC AUTO: 92 FL (ref 82–98)
MONOCYTES # BLD AUTO: 0.52 THOUSAND/ΜL (ref 0.17–1.22)
MONOCYTES NFR BLD AUTO: 9 % (ref 4–12)
NEUTROPHILS # BLD AUTO: 3.79 THOUSANDS/ΜL (ref 1.85–7.62)
NEUTS SEG NFR BLD AUTO: 67 % (ref 43–75)
NRBC BLD AUTO-RTO: 0 /100 WBCS
PLATELET # BLD AUTO: 178 THOUSANDS/UL (ref 149–390)
PMV BLD AUTO: 11 FL (ref 8.9–12.7)
POTASSIUM SERPL-SCNC: 3.5 MMOL/L (ref 3.5–5)
RBC # BLD AUTO: 5.43 MILLION/UL (ref 3.81–5.12)
SODIUM SERPL-SCNC: 139 MMOL/L (ref 133–145)
WBC # BLD AUTO: 5.69 THOUSAND/UL (ref 4.31–10.16)

## 2022-02-19 PROCEDURE — 82948 REAGENT STRIP/BLOOD GLUCOSE: CPT

## 2022-02-19 PROCEDURE — 80048 BASIC METABOLIC PNL TOTAL CA: CPT | Performed by: INTERNAL MEDICINE

## 2022-02-19 PROCEDURE — 99239 HOSP IP/OBS DSCHRG MGMT >30: CPT | Performed by: INTERNAL MEDICINE

## 2022-02-19 PROCEDURE — 85025 COMPLETE CBC W/AUTO DIFF WBC: CPT | Performed by: INTERNAL MEDICINE

## 2022-02-19 RX ORDER — NYSTATIN 100000 [USP'U]/G
POWDER TOPICAL 2 TIMES DAILY
Qty: 15 G | Refills: 0 | Status: SHIPPED | OUTPATIENT
Start: 2022-02-19

## 2022-02-19 RX ORDER — SPIRONOLACTONE 25 MG/1
25 TABLET ORAL DAILY
Qty: 30 TABLET | Refills: 0 | Status: SHIPPED | OUTPATIENT
Start: 2022-02-19

## 2022-02-19 RX ORDER — TORSEMIDE 20 MG/1
40 TABLET ORAL DAILY
Qty: 60 TABLET | Refills: 0 | Status: SHIPPED | OUTPATIENT
Start: 2022-02-19

## 2022-02-19 RX ADMIN — FLUOXETINE 60 MG: 20 CAPSULE ORAL at 10:26

## 2022-02-19 RX ADMIN — SPIRONOLACTONE 25 MG: 25 TABLET ORAL at 10:26

## 2022-02-19 RX ADMIN — LEVOTHYROXINE SODIUM 75 MCG: 75 TABLET ORAL at 10:26

## 2022-02-19 RX ADMIN — ENOXAPARIN SODIUM 40 MG: 40 INJECTION SUBCUTANEOUS at 10:25

## 2022-02-19 RX ADMIN — FUROSEMIDE 40 MG: 10 INJECTION, SOLUTION INTRAMUSCULAR; INTRAVENOUS at 10:25

## 2022-02-19 RX ADMIN — TOPIRAMATE 200 MG: 100 TABLET, FILM COATED ORAL at 10:26

## 2022-02-19 NOTE — ASSESSMENT & PLAN NOTE
· C/o chest pain and palpitations worsening over the past 4 days  Described as a pressure sensation  Does not radiate  Received aspirin and nitroglycerin in EMS  States pain better controlled now than on arrival   · Has hx of cardiac cath done 3/2021 showing  mild nonobstructive CAD  · EKG: Without ischemic changes  · Troponin negative  · Monitor on tele  · Notify provider for worsening chest pain  · As per Cardiology no coronary artery disease requiring acute intervention    Outpatient follow-up

## 2022-02-19 NOTE — CASE MANAGEMENT
Case Management Progress Note    Patient name Cece George  Location /-24 MRN [id number]  : 1955 Date 2022       LOS (days): 1  Geometric Mean LOS (GMLOS) (days):   Days to GMLOS:        OBJECTIVE:        Current admission status: Inpatient  Preferred Pharmacy:   [facility] [address] [address]  Phone: [phone] Fax: [phone]    Primary Care Provider: Cinthia Merida MD    Primary Insurance: [employer]  Secondary Insurance: [facility] REP    PROGRESS NOTE:    SW called the [place] and spoke with rep Leanne Siegel about transportation home  He called their ambulance company and confirmed that they have other scheduled runs that they need to do and may not have a truck available until late afternoon or early evening  W/C van arranged with [facility] for transport home  Requested cost to be covered by department

## 2022-02-19 NOTE — ASSESSMENT & PLAN NOTE
Wt Readings from Last 3 Encounters:   02/19/22 123 kg (271 lb 2 7 oz)   02/01/22 (!) 137 kg (303 lb)   12/10/21 135 kg (297 lb)     · C/o chest pain, palpitations, shortness of breath  Recently hospitalized 2/1 for CHF exacerbation  Had plans to f/u with cardiology, unable to get appt until March  Diuretic dosing recently changed by PCP now Lasix 80 mg 3 times a week and 60 mg 4 times a week starting this past week  · CTA:  Interstitial pulmonary edema  · Echo 2/1/22:  EF 73%, grade 2 diastolic dysfunction, mild to moderate AS, mild TR  · Normal TSH  Plan  · Start Lasix 40 mg IV b i d --> switched to torsemide 40 milligrams on discharge  · Daily weights, I&Os  · Cardiology consult --> recommended started the patient on low-dose spironolactone since improved diastolic CHF mortality  Discontinue lisinopril    Patient is on SGLT -2 at home  · Outpatient follow-up with sleep medicine since patient has CELIA and she is not tolerating the CPAP she will benefit from inspire

## 2022-02-19 NOTE — DISCHARGE INSTR - AVS FIRST PAGE
Please follow-up with your cardiologist regarding this admission  Some of your home medications were changed to optimize your heart failure  Please follow-up with your primary care physician in regard this admission in the next week  Please do your blood work in 1 week  Please holding taking lisinopril anterior your blood work is done  Please follow-up with Dr Flor Monteiro his and sleep specialist that can help you with your CELIA, and evaluation for inspire

## 2022-02-19 NOTE — ASSESSMENT & PLAN NOTE
Lab Results   Component Value Date    HGBA1C 11 9 (H) 01/29/2022       Recent Labs     02/18/22  1603 02/18/22  2104 02/19/22  0619 02/19/22  0754   POCGLU 164* 156* 121 98       Blood Sugar Average: Last 72 hrs:  · (P) 155 uncontrolled  · Home regimen:  Alogliptin 25 mg, empagliflozin 25mg, Lantus 36 units HS--> Lantus decreased to 30 units while at the hospital sees blood sugar has been on the 100s    · Continue home Lantus and SSI  · Accu-Cheks a c  HS

## 2022-02-19 NOTE — ASSESSMENT & PLAN NOTE
-this morning during evaluation patient was somnolent easy to arouse and with no neurological deficits   -at that point patient was mildly hypotensive  -no necessary antihypertensive medications will be on hold like lisinopril   -will currently discontinue gabapentin to prevent delirium  -continue topiramate since patient is on that medication for seizures  -follow urine analysis for possible asymptomatic bacteriuria making the patient somnolent  -Lantus decrease seen patient blood sugar is on the 0s and possibly this could be a reason why patient is feeling a little bit somnolent not used to to having this kind of blood sugar levels  Carolyne Chew

## 2022-02-19 NOTE — PLAN OF CARE
Problem: Potential for Falls  Goal: Patient will remain free of falls  Description: INTERVENTIONS:  - Educate patient/family on patient safety including physical limitations  - Instruct patient to call for assistance with activity   - Consult OT/PT to assist with strengthening/mobility   - Keep Call bell within reach  - Keep bed low and locked with side rails adjusted as appropriate  - Keep care items and personal belongings within reach  - Initiate and maintain comfort rounds  - Make Fall Risk Sign visible to staff  - Offer Toileting every 2 Hours, in advance of need  - Initiate/Maintain alarm  - Obtain necessary fall risk management equipment  - Apply yellow socks and bracelet for high fall risk patients  - Consider moving patient to room near nurses station  Outcome: Progressing     Problem: MOBILITY - ADULT  Goal: Maintain or return to baseline ADL function  Description: INTERVENTIONS:  -  Assess patient's ability to carry out ADLs; assess patient's baseline for ADL function and identify physical deficits which impact ability to perform ADLs (bathing, care of mouth/teeth, toileting, grooming, dressing, etc )  - Assess/evaluate cause of self-care deficits   - Assess range of motion  - Assess patient's mobility; develop plan if impaired  - Assess patient's need for assistive devices and provide as appropriate  - Encourage maximum independence but intervene and supervise when necessary  - Involve family in performance of ADLs  - Assess for home care needs following discharge   - Consider OT consult to assist with ADL evaluation and planning for discharge  - Provide patient education as appropriate  Outcome: Progressing  Goal: Maintains/Returns to pre admission functional level  Description: INTERVENTIONS:  - Perform BMAT or MOVE assessment daily    - Set and communicate daily mobility goal to care team and patient/family/caregiver     - Collaborate with rehabilitation services on mobility goals if consulted  - Perform Range of Motion 2 times a day  - Reposition patient every 2 hours  - Dangle patient 3 times a day  - Stand patient 3 times a day  - Ambulate patient 2 times a day  - Out of bed to chair 2 times a day   - Out of bed for meals 2 times a day  - Out of bed for toileting  - Record patient progress and toleration of activity level   Outcome: Progressing     Problem: Nutrition/Hydration-ADULT  Goal: Nutrient/Hydration intake appropriate for improving, restoring or maintaining nutritional needs  Description: Monitor and assess patient's nutrition/hydration status for malnutrition  Collaborate with interdisciplinary team and initiate plan and interventions as ordered  Monitor patient's weight and dietary intake as ordered or per policy  Utilize nutrition screening tool and intervene as necessary  Determine patient's food preferences and provide high-protein, high-caloric foods as appropriate       INTERVENTIONS:  - Monitor oral intake, urinary output, labs, and treatment plans  - Assess nutrition and hydration status and recommend course of action  - Evaluate amount of meals eaten  - Assist patient with eating if necessary   - Allow adequate time for meals  - Recommend/ encourage appropriate diets, oral nutritional supplements, and vitamin/mineral supplements  - Order, calculate, and assess calorie counts as needed  - Recommend, monitor, and adjust tube feedings and TPN/PPN based on assessed needs  - Assess need for intravenous fluids  - Provide specific nutrition/hydration education as appropriate  - Include patient/family/caregiver in decisions related to nutrition  Outcome: Progressing     Problem: CARDIOVASCULAR - ADULT  Goal: Maintains optimal cardiac output and hemodynamic stability  Description: INTERVENTIONS:  - Monitor I/O, vital signs and rhythm  - Monitor for S/S and trends of decreased cardiac output  - Administer and titrate ordered vasoactive medications to optimize hemodynamic stability  - Assess quality of pulses, skin color and temperature  - Assess for signs of decreased coronary artery perfusion  - Instruct patient to report change in severity of symptoms  Outcome: Progressing  Goal: Absence of cardiac dysrhythmias or at baseline rhythm  Description: INTERVENTIONS:  - Continuous cardiac monitoring, vital signs, obtain 12 lead EKG if ordered  - Administer antiarrhythmic and heart rate control medications as ordered  - Monitor electrolytes and administer replacement therapy as ordered  Outcome: Progressing

## 2022-02-19 NOTE — DISCHARGE SUMMARY
Brien 44  Discharge- Kb Oliveira 1955, 78 y o  female MRN: [id number]  Unit/Bed#: -01 Encounter: [id number]  Primary Care Provider: Daisha Thorne MD   Date and time admitted to hospital: 2/16/2022  3:54 PM    Chronic respiratory failure ([address] )  Assessment & Plan  · Maintained on 1 L oxygen at home, recently started after previous admission earlier this month  · Continue    Morbid obesity due to excess calories [facility])  Assessment & Plan  Noted  Encouraged lifestyle and dietary modifications  Outpatient bariatric surgery consult    Diabetes mellitus [facility])  Assessment & Plan  Lab Results   Component Value Date    HGBA1C 11 9 (H) 01/29/2022       Recent Labs     02/18/22  1603 02/18/22  2104 02/19/22  0619 02/19/22  0754   POCGLU 164* 156* 121 98       Blood Sugar Average: Last 72 hrs:  · (P) 155 uncontrolled  · Home regimen:  Alogliptin 25 mg, empagliflozin 25mg, Lantus 36 units HS--> Lantus decreased to 30 units while at the hospital sees blood sugar has been on the 100s  · Continue home Lantus and SSI  · Accu-Cheks a c  HS      * Acute on chronic heart failure with preserved ejection fraction [facility])  Assessment & Plan  Wt Readings from Last 3 Encounters:   02/19/22 123 kg (271 lb 2 7 oz)   02/01/22 (!) 137 kg (303 lb)   12/10/21 135 kg (297 lb)     · C/o chest pain, palpitations, shortness of breath  Recently hospitalized 2/1 for CHF exacerbation  Had plans to f/u with cardiology, unable to get appt until March  Diuretic dosing recently changed by PCP now Lasix 80 mg 3 times a week and 60 mg 4 times a week starting this past week    · CTA:  Interstitial pulmonary edema  · Echo 2/1/22:  EF 17%, grade 2 diastolic dysfunction, mild to moderate AS, mild TR  · Normal TSH  Plan  · Start Lasix 40 mg IV b i d --> switched to torsemide 40 milligrams on discharge  · Daily weights, I&Os  · Cardiology consult --> recommended started the patient on low-dose spironolactone since improved diastolic CHF mortality  Discontinue lisinopril  Patient is on SGLT -2 at home  · Outpatient follow-up with sleep medicine since patient has CELIA and she is not tolerating the CPAP she will benefit from inspire    Somnolence-resolved as of 2/19/2022  Assessment & Plan  -this morning during evaluation patient was somnolent easy to arouse and with no neurological deficits   -at that point patient was mildly hypotensive  -no necessary antihypertensive medications will be on hold like lisinopril   -will currently discontinue gabapentin to prevent delirium  -continue topiramate since patient is on that medication for seizures  -follow urine analysis for possible asymptomatic bacteriuria making the patient somnolent  -Lantus decrease seen patient blood sugar is on the 0s and possibly this could be a reason why patient is feeling a little bit somnolent not used to to having this kind of blood sugar levels       Chest pain-resolved as of 2/18/2022  Assessment & Plan  · C/o chest pain and palpitations worsening over the past 4 days  Described as a pressure sensation  Does not radiate  Received aspirin and nitroglycerin in EMS  States pain better controlled now than on arrival   · Has hx of cardiac cath done 3/2021 showing  mild nonobstructive CAD  · EKG: Without ischemic changes  · Troponin negative  · Monitor on tele  · Notify provider for worsening chest pain  · As per Cardiology no coronary artery disease requiring acute intervention    Outpatient follow-up        Discharging Resident Physician: Juan Francisco Joseph MD  Attending: Sam Cali MD  PCP: Stevie Eisenberg MD  Admission Date: 2/16/2022  Discharge Date: 02/19/22    Disposition:     Home with [facility]NA Services (Reminder: Complete face to face encounter)    Hospital Course:     Ruben Parnell is a 78 y o  female patient morbid obesity, type 2 diabetes mellitus, chronic hypoxic respiratory failure on home oxygen, diastolic CHF, CELIA, who originally presented to the hospital on 2/16/2022 due to shortness of breath  Patient was found to be on acute CHF this compensation  She was placed on Lasix 40 b i d  With good response  A spironolactone was added since it shows benefit in mortality in patients with diastolic heart failure  Cardiology evaluated the patient and recommended discharging on torsemide 40 milligrams p o  Daily, spironolactone 25 milligrams p o  Daily and we currently hold on lisinopril due to soft blood pressures  If needed this can be restarted as an outpatient  She will have an outpatient BMP to be done in 1 week  Patient had an episode of somnolence during hospitalization that was most likely related to her gabapentin in/relative hypoglycemia for the patient  After decreasing Lantus and holding gabapentin patient mentation improved  Gabapentin was slowly restart during hospitalization with no further somnolence  Patient has restless leg syndrome and states that is the current dose of gabapentin is the only thing that is actually making her symptoms better  Patient does have history of CELIA but she does not tolerate CPAP  She was recommended to follow up patient with Sleep Medicine for possible inspire  On discharge patient was hemodynamically stable, all the corresponding follow-up some recommendations were given  Condition at Discharge: good     Discharge Day Visit / Exam:     Subjective:  No acute complaints, no overnight events  Vitals: Blood Pressure: 107/63 (02/19/22 0750)  Pulse: 63 (02/19/22 0750)  Temperature: (!) 96 9 °F (36 1 °C) (02/19/22 0750)  Temp Source: Tympanic (02/19/22 0750)  Respirations: 18 (02/19/22 0750)  Height: 5' 2" (157 5 cm) (02/16/22 2008)  Weight - Scale: 123 kg (271 lb 2 7 oz) (02/19/22 0600)  SpO2: 96 % (02/19/22 0750)  Exam:   Physical Exam  Constitutional:       General: She is not in acute distress  Appearance: She is obese  HENT:      Head: Normocephalic and atraumatic     Cardiovascular: Rate and Rhythm: Normal rate  Pulses: Normal pulses  Pulmonary:      Effort: Pulmonary effort is normal       Breath sounds: No wheezing or rhonchi  Abdominal:      General: Abdomen is flat  Bowel sounds are normal       Palpations: Abdomen is soft  Musculoskeletal:         General: Normal range of motion  Cervical back: Normal range of motion  Right lower leg: No edema  Left lower leg: No edema  Skin:     General: Skin is warm  Capillary Refill: Capillary refill takes less than 2 seconds  Neurological:      General: No focal deficit present  Mental Status: She is alert and oriented to person, place, and time  Mental status is at baseline  Psychiatric:         Mood and Affect: Mood normal            Discharge instructions/Information to patient and family:   See after visit summary for information provided to patient and family  Provisions for Follow-Up Care:  See after visit summary for information related to follow-up care and any pertinent home health orders  Discharge Medications:  See after visit summary for reconciled discharge medications provided to patient and family        ** Please Note: This note has been constructed using a voice recognition system **

## 2022-02-19 NOTE — DISCHARGE INSTRUCTIONS
Chronic Respiratory Failure   AMBULATORY CARE:   Chronic respiratory failure (CRF)  is a long-term condition that happens when your lungs cannot get enough oxygen into your blood  CRF can also happen when your lungs cannot get the carbon dioxide out of your blood  A buildup of carbon dioxide in your blood can cause damage to your organs  The decrease in oxygen and the buildup of carbon dioxide can happen at the same time  CRF may develop over a period of days to years  Common signs and symptoms of CRF:   · Rapid breathing    · Shortness of breath, especially with activity    · A bluish color on your skin, fingernails, and lips    · Waking up with a headache or feeling very sleepy during the day    · Confusion or feeling like you never get enough sleep    · Swelling of your hands and feet    Treatment  depends on the cause and how severe it is  You may need any of the following:  · Medicines  may be given to treat a lung or heart disease  · Oxygen  may be given if your blood oxygen levels are low  · Ventilation  helps get oxygen into your lungs and carbon dioxide out  Ventilation also makes the work of breathing easier  Some systems, such as a CPAP or BiPAP, may only be needed while you sleep  A mechanical ventilator may be needed some or all of the time  It is attached to a mask or breathing tube  Call your local emergency number (911 in the [address] [facility]), or have someone close to you watch and call if:   · You are having more trouble catching your breath  · You have stopped breathing  Call your doctor or lung specialist if:   · You have new symptoms  · Your symptoms get worse  · You have questions or concerns about your condition or care  Manage or prevent shortness of breath:   · Use your oxygen as directed  You may need extra oxygen if your blood oxygen level is lower than it should be  · Use pursed-lip breathing any time you feel short of breath    Take a deep breath in through your nose  Slowly breathe out through your mouth with your lips pursed for twice as long as you inhaled  You can also practice this breathing pattern while you bend, lift, climb stairs, or exercise  It slows down your breathing and helps move more air in and out of your lungs  Manage CRF:   · Do not smoke  Nicotine and other chemicals in cigarettes and cigars can cause lung damage and make your symptoms worse  Ask your healthcare provider for information if you currently smoke and need help to quit  E-cigarettes or smokeless tobacco still contain nicotine  Talk to your healthcare provider before you use these products  Secondhand smoke can also make your symptoms worse  Avoid others who are smoking  · Prevent the spread of germs  Wash your hands often with soap and water  Use gel hand cleanser when there is no soap and water available  Do not touch your eyes, nose, or mouth unless you have washed your hands first  Cover your mouth when you cough  Cough into a tissue or your shirtsleeve so you do not spread germs from your hands  If you are sick, stay away from others as much as possible  · Limit alcohol  A drink of alcohol is 12 ounces of beer, 5 ounces of wine, or 1½ ounces of liquor  · Ask about vaccines you may need  Vaccines can help prevent some lung infections  Examples include pneumonia, pertussis (whooping cough), tuberculosis (TB), and diphtheria  Get a flu vaccine every year as soon as it becomes available  · Prepare for emergencies  Keep phone numbers for your healthcare provider, hospital, and someone close to you with you at all times  Also, keep a list of the medicines you take  · Go to pulmonary rehabilitation (rehab)  Your healthcare provider may suggest pulmonary rehab  Pulmonary rehab will educate you about your condition and will help you improve your quality of life  Follow up with your doctor or lung specialist as directed: You may need more tests or treatments  Write down your questions so you remember to ask them during your visits  © Copyright [employer] 2021 Information is for End User's use only and may not be sold, redistributed or otherwise used for commercial purposes  All illustrations and images included in CareNotes® are the copyrighted property of A D A M [employer] Inc  or [employer]  The above information is an  only  It is not intended as medical advice for individual conditions or treatments  Talk to your doctor, nurse or pharmacist before following any medical regimen to see if it is safe and effective for you  Diabetes and Nutrition   AMBULATORY CARE:   Nutrition plans  help with healthy eating patterns that improve health  Nutrition plans and regular exercise help keep your blood sugar levels steady  They also help delay or prevent complications of diabetes, such as diabetic kidney disease  Call your local emergency number [phone] in the [address]) if:   · You have any of the following signs of a heart attack:      ? Squeezing, pressure, or pain in your chest    ? You may  also have any of the following:     § Discomfort or pain in your back, neck, jaw, stomach, or arm    § Shortness of breath    § Nausea or vomiting    § Lightheadedness or a sudden cold sweat      Seek care immediately if:   · You have a low blood sugar level and it does not improve with treatment  Symptoms are trouble thinking, a pounding heartbeat, and sweating  · Your blood sugar level is above 240 mg/dL and does not come down within 15 minutes of treatment  · You have ketones in your blood or urine  · You have nausea or are vomiting and cannot keep any food or liquid down  · You have blurred or double vision  · Your breath has a fruity, sweet smell, or your breathing is shallow  Call your doctor or diabetes care team if:   · Your blood sugar levels are higher than your target goals  · You often have low blood sugar levels      · You have trouble coping with diabetes, or you feel anxious or depressed  · You have questions or concerns about your condition or care  A dietitian will help you create a nutrition plan  to meet your needs and your family's needs  The goal is for you to reach or maintain healthy weight, blood sugar, blood pressure, and lipid levels  You should meet with the dietitian at least 1 time each year  You will learn the following:  · How food affects your blood sugar levels    · How to create healthy eating habits    · How to make food choices based on your activity level, weight, and glucose levels    · How your favorite foods may fit into your plan    · How to keep track of carbohydrates    · Correct portion sizes for each food    · Changes you can make to your plan if you get pregnant or are breastfeeding    What you can do before you meet with the dietitian:   · Do not skip meals  The goal is to keep your blood sugar level steady  Blood sugar levels may drop too low if you have received insulin and do not eat  · Eat more high-fiber foods, such as fresh or frozen fruits and vegetables, whole-grain breads, and beans  Fiber helps control or lower blood sugar and cholesterol levels  Choose whole fruits instead of fruit juice as much as possible  Sugar may be added to juice, and fiber may be removed  · Choose heart-healthy fats  Foods high in heart-healthy fats include olive oil, nuts, avocados, and fatty fish, such as salmon and tuna  Foods high in unhealthy fats include red meat, full-fat dairy products, and soft margarine  Unhealthy fats can increase your risk for heart disease, increase bad cholesterol, and lower good cholesterol  · Choose complex carbohydrates  Foods with complex carbohydrates include brown rice, whole-grain breads and cereals, and cooked beans  Foods with simple carbohydrates include white bread, white rice, most cold cereals, and snack foods   Your plan will include the amount of carbohydrate to have at one time or in a day  Your blood sugar level can get too high if you eat too much carbohydrate at one time  Blood sugar levels do not spike as high or drop as quickly with complex carbohydrates as with simple carbohydrates  Choose complex carbohydrates whenever possible  · Have less sodium (salt)  The risk for high blood pressure (BP) increases with high-sodium foods  Limit high-sodium foods, such as soy sauce, potato chips, and canned soup  Do not add salt to food you cook  Limit your use of table salt  Read labels to have no more than 2,300 milligrams of sodium in one day  · Limit artificial sweeteners  These may be found in food or drinks, such as diet soft drinks or other low-calorie beverages  Artificial sweeteners are low in calories  They may help you lower your overall calories and carbohydrates  It is important not to have more calories from other foods to make up for the calories saved  Artificial sweeteners do not have any nutrition  Eat whole foods and drink water as much as possible  Your plan may include beverages with artificial sweeteners for a short time  These can help you transition from high-sugar beverages to water  · Use the plate method for each meal   This method can help you eat the right amount of carbohydrates and keep your blood sugar levels under control  ? Draw an imaginary line down the middle of a 9-inch dinner plate  On one side, draw another line to divide that section in half  Your plate will have one large section and 2 small sections  ? Fill the largest section with non-starchy vegetables  These include broccoli, spinach, cucumbers, peppers, cauliflower, and tomatoes  ? Add a starch to one of the small sections  Starches include pasta, rice, whole-grain bread, tortillas, corn, potatoes, and beans  ? Add meat or another source of protein to the other small section   Examples include chicken or turkey without skin, fish, lean beef or pork, low-fat cheese, tofu, and eggs  ? Add dairy products or fruit next to your plate if your meal plan allows  Examples of dairy include skim or 1% milk and low-fat yogurt  If you do not drink milk or eat dairy products, you may be able to add another serving of starchy food instead  ? Have a low-calorie or calorie-free drink with your meal  Examples include water or unsweetened tea or coffee  Know the risks if you choose to drink alcohol:  Alcohol can cause hypoglycemia (low blood sugar level), especially if you use insulin  Alcohol can cause high blood sugar and BP levels, and weight gain if you drink too much  Women 21 years or older and men 72 years or older should limit alcohol to 1 drink a day  Men aged 24 to 59 years should limit alcohol to 2 drinks a day  A drink of alcohol is 12 ounces of beer, 5 ounces of wine, or 1½ ounces of liquor  Hypoglycemia can happen hours after you drink alcohol  Check your blood sugar level for several hours after you drink alcohol  Have a source of fast-acting carbohydrates with you in case your level goes too low  You need immediate care if you have signs or symptoms of hypoglycemia, such as sweating, confusion, or fainting  Maintain a healthy weight:  A healthy weight can help you control your diabetes  You can maintain a healthy weight with a nutrition plan and exercise  Ask your healthcare provider how much you should weigh  Ask him or her to help you create a weight loss plan if you are overweight  Together you can set weight loss and maintenance goals  Follow up with your diabetes team as directed:  Write down your questions so you remember to ask them during your visits  © Copyright [employer] 2021 Information is for End User's use only and may not be sold, redistributed or otherwise used for commercial purposes   All illustrations and images included in CareNotes® are the copyrighted property of [employer]  or [employer]  The above information is an  only  It is not intended as medical advice for individual conditions or treatments  Talk to your doctor, nurse or pharmacist before following any medical regimen to see if it is safe and effective for you  Heart Failure, Ambulatory Care   American Heart Association: Physical Changes to Report  American Heart Association  Gardenia Nazario1 [facility]  Available from URL: https://"[employer]/  As accessed 2013-08-15  Dong DIAZ: An overview of chronic heart failure management  Nurs Times 2012; 108(14-15):16-20  Carolyn DIAZ: Congestive Heart Failure  In: Carlitos FJ, ed  The 5-Minute Clinical Consult 2013, 21st ed  [address] [facility] [place], [place], 2013  [address] 1939 B: Diagnosis and evaluation of heart failure  Am Fam Physician 2012; 85(12):7328-6345  Nelly KUMAR, Minesh S, Kaylene ESPOSITO, et al: [facility] guidelines for the diagnosis and treatment of acute and chronic heart failure 2012: The Task Force for the Diagnosis and Treatment of Acute and Chronic Heart Failure 2012 of the European Society of Cardiology  Developed in collaboration with the Heart Failure Association (HFA) of the ESC  Eur J Heart Fail 2012; 14(8):803-869  Enzo PS: Acute heart failure syndromes: initial management  [address] [place] [address] 2011; 03(2):824-355  Bandar TRAVIS, Letty Davey, et al: A patient's guide to living confidently with chronic heart failure  Circulation 2013; 404(79):P793-W876  Betty CABRALES, Manisha Buckner, et al: Best practices for heart failure: a focused review  Home Healthc Nurse 2012; 30(7):394-405  © 2014 [address] is for End User's use only and may not be sold, redistributed or otherwise used for commercial purposes   All illustrations and images included in CareNotes® are the copyrighted property of [employer]  or [employer]  The above information is an  only  It is not intended as medical advice for individual conditions or treatments  Talk to your doctor, nurse or pharmacist before following any medical regimen to see if it is safe and effective for you

## 2022-02-19 NOTE — PLAN OF CARE
Problem: Potential for Falls  Goal: Patient will remain free of falls  Description: INTERVENTIONS:  - Educate patient/family on patient safety including physical limitations  - Instruct patient to call for assistance with activity   - Consult OT/PT to assist with strengthening/mobility   - Keep Call bell within reach  - Keep bed low and locked with side rails adjusted as appropriate  - Keep care items and personal belongings within reach  - Initiate and maintain comfort rounds  - Make Fall Risk Sign visible to staff  - Offer Toileting every 2 Hours, in advance of need  - Initiate/Maintain bed alarm  - Obtain necessary fall risk management equipment:   - Apply yellow socks and bracelet for high fall risk patients  - Consider moving patient to room near nurses station  Outcome: Adequate for Discharge     Problem: MOBILITY - ADULT  Goal: Maintain or return to baseline ADL function  Description: INTERVENTIONS:  -  Assess patient's ability to carry out ADLs; assess patient's baseline for ADL function and identify physical deficits which impact ability to perform ADLs (bathing, care of mouth/teeth, toileting, grooming, dressing, etc )  - Assess/evaluate cause of self-care deficits   - Assess range of motion  - Assess patient's mobility; develop plan if impaired  - Assess patient's need for assistive devices and provide as appropriate  - Encourage maximum independence but intervene and supervise when necessary  - Involve family in performance of ADLs  - Assess for home care needs following discharge   - Consider OT consult to assist with ADL evaluation and planning for discharge  - Provide patient education as appropriate  Outcome: Adequate for Discharge  Goal: Maintains/Returns to pre admission functional level  Description: INTERVENTIONS:  - Perform BMAT or MOVE assessment daily    - Set and communicate daily mobility goal to care team and patient/family/caregiver     - Collaborate with rehabilitation services on mobility goals if consulted  - Perform Range of Motion 3 times a day  - Reposition patient every 2 hours  - Dangle patient 3 times a day  - Stand patient 3 times a day  - Ambulate patient 3 times a day  - Out of bed to chair 3 times a day   - Out of bed for meals 3 times a day  - Out of bed for toileting  - Record patient progress and toleration of activity level   Outcome: Adequate for Discharge     Problem: Nutrition/Hydration-ADULT  Goal: Nutrient/Hydration intake appropriate for improving, restoring or maintaining nutritional needs  Description: Monitor and assess patient's nutrition/hydration status for malnutrition  Collaborate with interdisciplinary team and initiate plan and interventions as ordered  Monitor patient's weight and dietary intake as ordered or per policy  Utilize nutrition screening tool and intervene as necessary  Determine patient's food preferences and provide high-protein, high-caloric foods as appropriate       INTERVENTIONS:  - Monitor oral intake, urinary output, labs, and treatment plans  - Assess nutrition and hydration status and recommend course of action  - Evaluate amount of meals eaten  - Assist patient with eating if necessary   - Allow adequate time for meals  - Recommend/ encourage appropriate diets, oral nutritional supplements, and vitamin/mineral supplements  - Order, calculate, and assess calorie counts as needed  - Recommend, monitor, and adjust tube feedings and TPN/PPN based on assessed needs  - Assess need for intravenous fluids  - Provide specific nutrition/hydration education as appropriate  - Include patient/family/caregiver in decisions related to nutrition  Outcome: Adequate for Discharge     Problem: CARDIOVASCULAR - ADULT  Goal: Maintains optimal cardiac output and hemodynamic stability  Description: INTERVENTIONS:  - Monitor I/O, vital signs and rhythm  - Monitor for S/S and trends of decreased cardiac output  - Administer and titrate ordered vasoactive medications to optimize hemodynamic stability  - Assess quality of pulses, skin color and temperature  - Assess for signs of decreased coronary artery perfusion  - Instruct patient to report change in severity of symptoms  Outcome: Adequate for Discharge  Goal: Absence of cardiac dysrhythmias or at baseline rhythm  Description: INTERVENTIONS:  - Continuous cardiac monitoring, vital signs, obtain 12 lead EKG if ordered  - Administer antiarrhythmic and heart rate control medications as ordered  - Monitor electrolytes and administer replacement therapy as ordered  Outcome: Adequate for Discharge

## 2022-02-20 NOTE — UTILIZATION REVIEW
INPATIENT CLINICAL REVIEW    Admission: Date/Time/Statement: 2/16/22 AT 1839 OBSERVATION - CONVERTED TO INPATIENT 2/18/22 AT 1559 2ND CHEST PAIN WITH CHF REQUIRING IV DIURESIS, DM WITH LABILE BS REQUIRING INSULIN ADJUSTMENTS AND NOW  SOMNOLENCE  REQUIRING CONTINUED EVALUATION AFTER > 45 HOURS OBSERVATION     02/18/22 1559  Inpatient Admission  Once        Transfer Service: Hospitalist       Question Answer Comment   Level of Care Med Surg    Estimated length of stay More than 2 Midnights    Certification I certify that inpatient services are medically necessary for this patient for a duration of greater than two midnights  See H&P and MD Progress Notes for additional information about the patient's course of treatment  02/18/22 1558   02/16/22 1839  Place in Observation  Once        Question: Level of Care Answer: Med Surg    02/16/22 1839     ED Arrival Information     Expected Arrival Acuity    - 2/16/2022 15:54 Emergent    Means of arrival Escorted by Service Admission type    Ambulance Unknown Hospitalist Emergency    Arrival complaint    Chest pain     Chief Complaint   Patient presents with    Chest Pain     arrived via [facility] with c/o chest pressure x 4 days  also c/o pain between shoulder blades  324 ASA and 1SLNTG given by EMS  recently discharged from inpt admission     Initial Presentation:   79 y/o female with PMHx Morbid Obesity (BMP 49 53), HTN, CAD, CHF with recent admission 1/28- 2/1/22 2nd CAST suspected CHF, chronic respiratory failure on 1 L NC O2 at home, DM, Hypothyroidism - presents via EMS to [facility] ED on 1/16/22 pm 2nd 4 day history of worsening chest pain and palpitations described as her heart "skipping a beat " Reports the chest  pain has been awakening her from sleep with left shoulder pain and shortness of breath especially when lying flat  Seen by PCP this week who adjusted her Lasix to 80 mg 3 times a week and 60 mg 4 times a week    En route to ED - EMS tx with O2 at 2 L, ASA + NTG  In ED - vs wnl    SpO2 93 %  On 2 L NC O2, Lungs clear, no rub or gallop, no edema   EKG with SR, LAHB + RBBB  CT shows intersttiial pulmonary edema  Labs:  K+ 3 7    BUN/ Cr 24/ 0 83  Troponin neg    Placed in Observation 2/16/22 at 1839 2nd Chest Pain with [facility] - Telemetry, IV lasix 40 mg bid, Cardiology consult  2/17 -  Reports chest pain improved but still feels short of breath  Good urinary output and diuretic response  2/17 CARDIOLOGY:  Acute on chronic heart failure with preserved EF:  No response to high-dose oral diuretics, furosemide  Agree with continuing IV furosemide  Monitor renal function  Supplement potassium  Add spironolactone 25 mg a day  At discharge consider using torsemide 40 mg a day with a follow-up BMP in 1 week  Noncardiac chest pain:  Pain is unlikely to be angina  Increased left ventricular end-diastolic pressure due to decompensated heart failure can contribute to chest pain  Diuresis will be helpful  Further ischemic evaluation not indicated  Previous nuclear stress test showed inferior ischemia but cardiac catheterization showed no obstructive CAD    Nonobstructive coronary artery disease: Coronary angiogram reviewed  No significant coronary stenosis identified  Mild coronary plaque noted  LVEDP was mildly elevated  Continue risk factor modification and statin therapy      Morbid obesity: Contributory factor to diastolic dysfunction: Calorie restriction and weight loss will be beneficial long-term  Prognosis is guarded due to this  2/18/22:  Somnolence -  more somnolent this morning though easy to arouse and have meaningful communication  no neurological deficits  Reports unable to sleep last night  but overall she feels well   mildly hypotensive    will currently discontinue gabapentin to prevent delirium, continue topiramate for seizures, follow urinalysis for possible asymptomatic bacteriuria causing somnolence, BS 93 this am - will decrease lantus  CHF - continue IV Lasix bid, Daily weights, I+O's  Per Cardiology - started on low-dose spironolactone since improved diastolic CHF mortality  Discontinue lisinopril    2/16/22 AT 1839 OBSERVATION - CONVERTED TO INPATIENT 2/18/22 AT 1559 2ND CHEST PAIN WITH CHF REQUIRING IV DIURESIS, DM WITH LABILE BS REQUIRING INSULIN ADJUSTMENTS AND NOW  SOMNOLENCE  REQUIRING CONTINUED EVALUATION AFTER > 45 HOURS OBSERVATION     ED Triage Vitals   Temperature Pulse Respirations Blood Pressure SpO2   02/16/22 1557 02/16/22 1557 02/16/22 1557 02/16/22 1557 02/16/22 1557   98 5 °F (36 9 °C) 80 20 123/62 93 % w/ 2 L NC O2      Temp Source Heart Rate Source Patient Position - Orthostatic VS BP Location FiO2 (%)   02/16/22 1557 02/16/22 1830 02/16/22 1830 02/16/22 1830 --   Oral Monitor Lying Left arm       Pain Score       02/16/22 1557       5          Wt Readings from Last 1 Encounters:   02/19/22 123 kg (271 lb 2 7 oz)     Additional Vital Signs:  Date/Time Temp Pulse Resp BP MAP (mmHg) SpO2 Calculated FIO2 (%) - Nasal Cannula Nasal Cannula O2 (L/min) O2 Device Patient Position - Orthostatic VS   02/18/22 1453 98 °F (36 7 °C) 80 18 100/61 -- 95 % -- -- -- Sitting   02/18/22 1215 97 2 °F (36 2 °C) Abnormal  63 16 103/62 -- 97 % -- -- -- Lying   02/18/22 1100 -- 67 16 134/57 -- -- -- -- -- Lying   02/18/22 0756 97 4 °F (36 3 °C) Abnormal  61 17 92/50 -- 96 % -- -- -- Lying   02/18/22 0100 97 4 °F (36 3 °C) Abnormal  62 17 108/62 77 98 % 28 2 L/min Nasal cannula Lying   02/17/22 2105 97 6 °F (36 4 °C) 80 18 110/70 -- -- -- -- -- Lying   02/17/22 1502 97 4 °F (36 3 °C) Abnormal  67 16 104/62 -- 96 % -- -- -- Lying   02/17/22 1110 96 4 °F (35 8 °C) Abnormal  69 17 101/60 -- 99 % -- -- -- Lying   02/17/22 0900 -- -- -- -- -- -- 28 2 L/min Nasal cannula --   02/17/22 0747 97 9 °F (36 6 °C) 63 16 131/70 -- 99 % -- -- -- Lying   02/17/22 0300 97 6 °F (36 4 °C) 70 17 110/70 85 98 % 28 2 L/min Nasal cannula Lying 02/16/22 2300 97 3 °F (36 3 °C) Abnormal  64 17 141/76 100 99 % 28 2 L/min Nasal cannula Lying   02/16/22 2008 96 2 °F (35 7 °C) Abnormal  65 16 164/79 -- 99 % -- -- -- Lying   02/16/22 1830 -- 72 18 166/70 -- 100 % 28 2 L/min Nasal cannula Lying      02/16 0701   02/17 0700   Urine (mL/kg/hr) 400   Total Output 400   Net -400       Unmeasured Urine Occurrence 2 x     WEIGHTS  Date/Time Weight Weight Method Height   02/18/22 0600 122 kg (269 lb 9 6 oz) Standing scale --   02/17/22 0700 123 kg (270 lb 12 8 oz) Standing scale --   02/16/22 2008 129 kg (283 lb 4 7 oz) Bed scale 5' 2" (1 575 m)   02/16/22 1557 127 kg (280 lb) -- 5' 2" (1 575 m)     Pertinent Labs/Diagnostic Test Results:   2/16 CTA - CHEST, ABDOMEN AND PELVIS - WITHOUT AND WITH IV CONTRAST  1   Negative for aortic dissection or aneurysm   No intramural hematoma  2   Interstitial pulmonary edema  3   Diverticulosis   No evidence of acute diverticulitis  2/16 - 12 LEAD EKG:  Sinus rhythm  Left anterior fascicular block  Right bundle branch block  No significant change since prior tracing    Results from last 7 days   Lab Units 02/19/22  0556 02/18/22  0524 02/17/22  0508 02/16/22  1634 02/16/22  1634   WBC Thousand/uL 5 69 5 03 5 70   < > 4 85   HEMOGLOBIN g/dL 15 6* 15 1 15 4  --  15 3   HEMATOCRIT % 49 9* 47 4* 46 9*  --  46 6*   PLATELETS Thousands/uL 178 146* 160   < > 156   NEUTROS ABS Thousands/µL 3 79 2 91  --   --  3 34    < > = values in this interval not displayed       Results from last 7 days   Lab Units 02/19/22  0556 02/18/22  0524 02/17/22  0508 02/16/22  1634   SODIUM mmol/L 139 140 140 137   POTASSIUM mmol/L 3 5 4 0 3 2* 3 7   CHLORIDE mmol/L 98 100 99 99   CO2 mmol/L 33 33 35* 33   ANION GAP mmol/L 8 7 6 5   BUN mg/dL 25* 22* 16 24*   CREATININE mg/dL 0 88 0 72 0 64 0 83   EGFR ml/min/1 73sq m 68 86 92 73   CALCIUM mg/dL 9 1 9 2 8 8 8 5   MAGNESIUM mg/dL  --   --  1 5*  --      Results from last 7 days   Lab Units 02/16/22  1634   AST U/L 17   ALT U/L 13   ALK PHOS U/L 96 8   TOTAL PROTEIN g/dL 6 7   ALBUMIN g/dL 3 4   TOTAL BILIRUBIN mg/dL 0 30     Results from last 7 days   Lab Units 02/19/22  0754 02/19/22  0619 02/18/22  2104 02/18/22  1603 02/18/22  1120 02/18/22  0716 02/18/22  0628 02/17/22  2053 02/17/22  1556 02/17/22  1115 02/17/22  0702 02/16/22  2113   POC GLUCOSE mg/dl 98 121 156* 164* 124 114 114 185* 219* 224* 139 202*     Results from last 7 days   Lab Units 02/19/22  0556 02/18/22  0524 02/17/22  0508 02/16/22  1634   GLUCOSE RANDOM mg/dL 110 93 143* 283*     Results from last 7 days   Lab Units 02/16/22  1842 02/16/22  1635   HS TNI 0HR ng/L  --  12   HS TNI 2HR ng/L 13  --    HSTNI D2 ng/L 1  --      Results from last 7 days   Lab Units 02/16/22  1634   BNP pg/mL 148 0*     ED Treatment:   Medication Administration from 02/16/2022 1553 to 02/16/2022 1935       Date/Time Order Dose Route Action     02/16/2022 1708 iohexol (OMNIPAQUE) 350 MG/ML injection (MULTI-DOSE) 100 mL 100 mL Intravenous Given     Past Medical History:   Diagnosis Date    Diabetes mellitus ([facility] )     Disease of thyroid gland     Hyperlipidemia     Hypertension     Hypothyroidism (acquired)     Restless leg syndrome     Seizures (HCC)      Present on Admission:   (Resolved) Chest pain   Diabetes mellitus ([facility] )   Morbid obesity due to excess calories (HCC)    Admitting Diagnosis: Chest pain [R07 9]  Chest pain, unspecified type [R07 9]    Age/Sex: 78 y o  female    Admission Orders:  Telemetry  VS q4hrs  Nasal O2 at 2 L/min - Titrate SpO2 > 92 %  Continuous Pulse Oximetry  SCD  Up + OOB as tolerated  Diet Cardiovascular;  Sodium 2 GM; Consistent Carbohydrate Diet Level 3 (6 carb servings/90 grams CHO/meal), Fluid Restriction 2000 ML   BBG qid before meals + BT  I+O q shift  Daily weight  Repeat EKG prn chest pain  Serial HS Troponin q2hrs x 3    Scheduled Medications:  docusate sodium, 100 mg, Oral, Daily  enoxaparin, 40 mg, Subcutaneous, Daily  FLUoxetine, 60 mg, Oral, Daily  IV furosemide, 40 mg, Intravenous, BID  gabapentin, 100 mg, Oral, BID  gabapentin, 300 mg, Oral, HS  insulin glargine, 36 Units, Subcutaneous, HS  insulin lispro, 2-12 Units, Subcutaneous, TID AC  levothyroxine, 75 mcg, Oral, Daily  lidocaine, 1 patch, Topical, Daily  lisinopril, 10 mg, Oral, Daily  nystatin, , Topical, BID  spironolactone, 25 mg, Oral, Daily  topiramate, 200 mg, Oral, BID    Continuous IV Infusions:  NONE    PRN Meds:     acetaminophen (TYLENOL) tablet 650 m, Oral, Q4H PRN     IP CONSULT TO NUTRITION SERVICES  IP CONSULT TO CARDIOLOGY    Network Utilization Review Department  ATTENTION: Please call with any questions or concerns to [phone] and carefully listen to the prompts so that you are directed to the right person  All voicemails are confidential   Sveta Grand all requests for admission clinical reviews, approved or denied determinations and any other requests to dedicated fax number below belonging to the campus where the patient is receiving treatment   List of dedicated fax numbers for the Facilities:  [address] DENIALS (Administrative/Medical Necessity) [phone]   [address] (Maternity/NICU/Pediatrics) [phone]   [address]  [address] Medical [place] [address] [address] [address] [place] [address] Leslie Villegas [address] [facility] [phone]

## 2022-02-21 NOTE — UTILIZATION REVIEW
Notification of Discharge   This is a Notification of Discharge from our facility [address]  Please be advised that this patient has been discharge from our facility  Below you will find the admission and discharge date and time including the patients disposition  UTILIZATION REVIEW CONTACT:  [address] [place]  Utilization   Network Utilization Review Department  Phone: [phone] x carefully listen to the prompts  All voicemails are confidential   Email: [email or web address]     PHYSICIAN ADVISORY SERVICES:  FOR [id number] REVIEW - MEDICAL NECESSITY DENIAL  Phone: [phone]  Fax: [phone]  Email: [email or web address]     PRESENTATION DATE: 2/16/2022  3:54 PM  OBERVATION ADMISSION DATE:   INPATIENT ADMISSION DATE: 2/18/22  3:58 PM   DISCHARGE DATE: 2/19/2022 10:45 AM  DISPOSITION: Home with [place] with [address] INFORMATION:  Send all requests for admission clinical reviews, approved or denied determinations and any other requests to dedicated fax number below belonging to the campus where the patient is receiving treatment   List of dedicated fax numbers:  [address] [place] [address] DENIALS (Administrative/Medical Necessity) [phone]   [address] (Maternity/NICU/Pediatrics) [phone]   [place] [phone]   Navin Ros [phone]   Helder Sosa [phone]   2000 [facility] [address] [place] [address],[address] [phone]   [facility]  [phone]   [address] [place] [address], [place] [id number] [facility] 1000 [address] [facility] [phone]

## 2022-03-25 ENCOUNTER — OFFICE VISIT (OUTPATIENT)
Dept: CARDIOLOGY CLINIC | Facility: CLINIC | Age: 67
End: 2022-03-25
Payer: COMMERCIAL

## 2022-03-25 VITALS
WEIGHT: 284 LBS | HEART RATE: 72 BPM | OXYGEN SATURATION: 99 % | BODY MASS INDEX: 52.26 KG/M2 | DIASTOLIC BLOOD PRESSURE: 86 MMHG | HEIGHT: 62 IN | SYSTOLIC BLOOD PRESSURE: 130 MMHG

## 2022-03-25 DIAGNOSIS — Z79.4 TYPE 2 DIABETES MELLITUS WITH HYPERGLYCEMIA, WITH LONG-TERM CURRENT USE OF INSULIN (HCC): Primary | ICD-10-CM

## 2022-03-25 DIAGNOSIS — I25.10 CORONARY ARTERY DISEASE INVOLVING NATIVE CORONARY ARTERY OF NATIVE HEART WITHOUT ANGINA PECTORIS: ICD-10-CM

## 2022-03-25 DIAGNOSIS — E11.65 TYPE 2 DIABETES MELLITUS WITH HYPERGLYCEMIA, WITH LONG-TERM CURRENT USE OF INSULIN (HCC): Primary | ICD-10-CM

## 2022-03-25 DIAGNOSIS — I50.32 CHRONIC HEART FAILURE WITH PRESERVED EJECTION FRACTION (HCC): ICD-10-CM

## 2022-03-25 DIAGNOSIS — I10 PRIMARY HYPERTENSION: ICD-10-CM

## 2022-03-25 PROCEDURE — 99214 OFFICE O/P EST MOD 30 MIN: CPT | Performed by: INTERNAL MEDICINE

## 2022-03-25 RX ORDER — OMEPRAZOLE 20 MG/1
20 CAPSULE, DELAYED RELEASE ORAL DAILY
COMMUNITY

## 2022-03-25 RX ORDER — EZETIMIBE 10 MG/1
10 TABLET ORAL DAILY
COMMUNITY

## 2022-03-25 RX ORDER — IBUPROFEN 400 MG/1
TABLET ORAL EVERY 6 HOURS PRN
COMMUNITY

## 2022-03-25 RX ORDER — ASPIRIN 81 MG/1
81 TABLET ORAL DAILY
COMMUNITY

## 2022-03-25 RX ORDER — OINTMENT BASE NO.104
OINTMENT (GRAM) TOPICAL AS NEEDED
COMMUNITY

## 2022-03-25 NOTE — PROGRESS NOTES
[facility]    CHIEF COMPLAINT:   Chief Complaint   Patient presents with    New Patient Visit     establish cardiac care, refer by PCP/[facility] post [facility] and Echo    Shortness of Breath     on exertion    Leg Swelling     both     Weight Gain       HPI:  Angie Mack is a 78 y o  female with a past medical history of morbid obesity, diabetes, obstructive sleep apnea, nonobstructive coronary artery disease and heart failure with preserved ejection fraction who presents today for follow-up  Briefly, she was admitted on 02/17/2022 for acute on chronic heart failure with preserved ejection fraction  Prior to her admission she was taking Lasix 80 mg and alternating with 60 mg  She was switched over to torsemide 40 mg daily  Spironolactone 25 mg daily was also started  Sometime after admission she had repeat blood work in had acute kidney injury  Her torsemide dose was lowered to 10 mg and again increased back to 20 mg  She has home-based primary care and has since followed up with them  She is weighing herself on a daily basis and does report that she is compliant with diet  She does drink approximately 2 L of fluids a day  May not have great compliance with monitoring sodium  She does report some increasing lower extremity edema and shortness of breath when she exerts herself  She was instructed this morning to increase her torsemide to 40 mg every day x3 days  She reports that her weight yesterday on her home scale was 276 lb and this morning was 281 lb  Her diuretics are currently being managed by heart failure team from [facility] or the [facility] - she is unsure  They are calling her on Monday to follow-up  She has tried statin medication in the past and cannot tolerate them due to muscle cramps  She is on aspirin 81 mg and ezetimibe 10 mg daily      Currently denies any fever, chills, fatigue, new visual changes, lightheadedness, syncope, chest pain, palpitations, shortness of breath at rest, orthopnea, PND      The following portions of the patient's history were reviewed and updated as appropriate: allergies, current medications, past family history, past medical history, past social history, past surgical history, and problem list     SINCE LAST OV I REVIEWED WITH THE PATIENT THE INTERIM LABS, TEST RESULTS, CONSULTANT(S) NOTES AND PERFORMED AN INTERIM REVIEW OF HISTORY    Past Medical History:   Diagnosis Date    Diabetes mellitus ([facility] )     Disease of thyroid gland     Hyperlipidemia     Hypertension     Hypothyroidism (acquired)     Restless leg syndrome     Seizures ([facility] )        Past Surgical History:   Procedure Laterality Date    APPENDECTOMY      CHOLECYSTECTOMY      HIP FRACTURE SURGERY Right     TONSILLECTOMY      TUBAL LIGATION         Social History     Socioeconomic History    Marital status: Single     Spouse name: Not on file    Number of children: Not on file    Years of education: Not on file    Highest education level: Not on file   Occupational History    Not on file   Tobacco Use    Smoking status: Never Smoker    Smokeless tobacco: Never Used    Tobacco comment: not a smoker   Vaping Use    Vaping Use: Never used   Substance and Sexual Activity    Alcohol use: Yes     Comment: occasional    Drug use: Never    Sexual activity: Not Currently   Other Topics Concern    Not on file   Social History Narrative    Not on file     Social Determinants of Health     Financial Resource Strain: Not on file   Food Insecurity: Not on file   Transportation Needs: Not on file   Physical Activity: Not on file   Stress: Not on file   Social Connections: Not on file   Intimate Partner Violence: Not on file   Housing Stability: Not on file       Family History   Problem Relation Age of Onset    Seizures Father        Allergies   Allergen Reactions    Pineapple - Food Allergy Anaphylaxis    Statins Other (See Comments)     Cramping from head to toe  Bupropion Other (See Comments)    Byetta 10 Mcg Pen [Exenatide]     Ceftriaxone Other (See Comments)    Marijuana [Dronabinol] GI Intolerance    Metformin Diarrhea     Other reaction(s): Unknown    Omeprazole Hives    Ondansetron      Other reaction(s): Unknown    Coconut Oil - Food Allergy Rash    Liraglutide Rash and Other (See Comments)     Generalized rash    Penicillins Rash       Current Outpatient Medications   Medication Sig Dispense Refill    aspirin (ECOTRIN LOW STRENGTH) 81 mg EC tablet Take 81 mg by mouth daily      docusate sodium (COLACE) 100 mg capsule Take 100 mg by mouth daily      Empagliflozin 25 MG TABS Take 25 mg by mouth every morning      Ergocalciferol (VITAMIN D2 PO) Take by mouth      ezetimibe (ZETIA) 10 mg tablet Take 10 mg by mouth daily      FLUoxetine (PROzac) 20 mg capsule Take 3 capsules (60 mg total) by mouth daily 90 capsule 0    gabapentin (NEURONTIN) 100 mg capsule Take 1 capsule (100 mg total) by mouth 2 (two) times a day Take 100 mg in the morning and 100 mg mid afternoon 42 capsule 0    gabapentin (NEURONTIN) 300 mg capsule Take 1 capsule (300 mg total) by mouth daily at bedtime 21 capsule 0    hydrophilic ointment Apply topically as needed for dry skin      ibuprofen (MOTRIN) 400 mg tablet Take by mouth every 6 (six) hours as needed for mild pain      insulin glargine (LANTUS) 100 units/mL subcutaneous injection Inject 36 Units under the skin daily at bedtime (Patient taking differently: Inject 38 Units under the skin daily at bedtime  )      levothyroxine 50 mcg tablet Take 1 5 tablets (75 mcg total) by mouth daily 45 tablet 0    nystatin (MYCOSTATIN) powder Apply topically 2 (two) times a day 15 g 0    omeprazole (PriLOSEC) 20 mg delayed release capsule Take 20 mg by mouth daily      potassium chloride (K-DUR,KLOR-CON) 20 mEq tablet Take 1 tablet (20 mEq total) by mouth daily 30 tablet 0    spironolactone (ALDACTONE) 25 mg tablet Take 1 tablet (25 mg total) by mouth daily 30 tablet 0    topiramate (TOPAMAX) 100 mg tablet Take 200 mg by mouth 2 (two) times a day       torsemide (DEMADEX) 20 mg tablet Take 2 tablets (40 mg total) by mouth daily 60 tablet 0    acetaminophen (TYLENOL) 325 mg tablet Take 650 mg by mouth every 6 (six) hours as needed for mild pain (pain score 1-3)  (Patient not taking: Reported on 1/28/2022)      Cholecalciferol 25 MCG (1000 UT) capsule Take 1,000 Units by mouth daily  (Patient not taking: Reported on 3/25/2022)      insulin aspart, w/niacinamide, (Fiasp FlexTouch) 100 Units/mL injection pen Inject under the skin 3 (three) times a day before meals Indications: per sliding scale  (Patient not taking: Reported on 3/25/2022)       No current facility-administered medications for this visit  /86   Pulse 72   Ht 5' 2" (1 575 m)   Wt 129 kg (284 lb)   SpO2 99%   BMI 51 94 kg/m²     Review of Systems   All other systems reviewed and are negative  Physical Exam  Vitals reviewed  Constitutional:       General: She is not in acute distress  Appearance: She is well-developed  She is obese  She is not ill-appearing or toxic-appearing  HENT:      Head: Normocephalic and atraumatic  Neck:      Vascular: No JVD  Cardiovascular:      Rate and Rhythm: Normal rate and regular rhythm  Pulses: Normal pulses  Heart sounds: Normal heart sounds  No murmur heard  No gallop  Pulmonary:      Effort: Pulmonary effort is normal  No tachypnea or respiratory distress  Breath sounds: Normal breath sounds  No decreased breath sounds, wheezing, rhonchi or rales  Abdominal:      General: Bowel sounds are normal       Palpations: Abdomen is soft  Musculoskeletal:      Right lower leg: Right lower leg edema: trace  Left lower leg: Left lower leg edema: trace  Skin:     General: Skin is warm and dry  Coloration: Skin is not cyanotic or pale  Neurological:      Mental Status: She is alert  Psychiatric:         Mood and Affect: Mood normal          Behavior: Behavior normal             Lab Results   Component Value Date     01/02/2016    K 3 5 02/19/2022    CL 98 02/19/2022    CO2 33 02/19/2022    BUN 25 (H) 02/19/2022    CREATININE 0 88 02/19/2022    GLUCOSE 212 (H) 12/04/2020    CALCIUM 9 1 02/19/2022    ALT 13 02/16/2022    AST 17 02/16/2022    INR 0 96 03/03/2021       Lab Results   Component Value Date    CHOL 197 01/02/2016    HDL 43 (L) 03/03/2021    LDLCALC 135 (H) 03/03/2021    TRIG 121 0 03/03/2021       Lab Results   Component Value Date    WBC 5 69 02/19/2022    HGB 15 6 (H) 02/19/2022    HCT 49 9 (H) 02/19/2022     02/19/2022       Lab Results   Component Value Date     01/29/2022    HGBA1C 11 9 (H) 01/29/2022       No results found for: TSH    Cardiac studies:   TTE-02/2022:    Left Ventricle: Left ventricular cavity size is normal  Wall thickness is mildly increased  Systolic function is normal  Although no diagnostic wall motion abnormality was identified this cannot be entirely excluded on the basis of this study  Diastolic function is moderately abnormal, consistent with grade II (pseudonormal) relaxation  Left atrial filling pressure is elevated  There is concentric remodeling    Right Ventricle: Right ventricular cavity size is mildly dilated  Systolic function is normal     Left Atrium: The atrium is mildly dilated    Right Atrium: The atrium is mildly dilated    Aortic Valve: The aortic valve is probably trileaflet  The leaflets are moderately thickened  The leaflets are moderately calcified  There is mild to moderate stenosis  GABRIELA 1 2 cm2/MG 12 mmHg/DI 0 38  The aortic valve velocity is increased due to stenosis    Mitral Valve: There is moderate annular calcification    Tricuspid Valve: There is mild regurgitation  The right ventricular systolic pressure is moderately elevated   The estimated right ventricular systolic pressure is 64 04 [facility]     Cardiac catheterization-03/2021:  Findings:     1  Dominance: Right dominant coronary system     2  Left main Coronary artery: Normal size vessels  It bifurcates into large LAD and a nondominant circumflex system  Mild luminal regularities of left main noted no focal stenosis  3  Left anterior descending artery: LAD is a large-size vessel and it has mild luminal irregularities causing 25- 30% stenosis proximal area  It gives a diagonal which has mild luminal irregularities  No focal stenosis seen      4  Circumflex Coronary artery: Circumflex is a nondominant medium size vessel  It has mild luminal regularities  No focal stenosis seen      5  Right coronary artery: RCA is normal size vessel and it has mild luminal irregularities causing no focal stenosis  Muscular stenosis maybe 20%      6  Left ventriculogram: LV gram done in KEYES view shows normal LV systolic function EF around 65%  There was no gradient across aortic valve  LVEDP was mildly elevated  ASSESSMENT AND PLAN:  There are no diagnoses linked to this encounter  Bob Velazquez was seen today for new patient visit, shortness of breath, leg swelling and weight gain  Diagnoses and all orders for this visit:  #  Chronic heart failure with preserved ejection fraction ([facility] )  #  Coronary artery disease involving native coronary artery of native heart without angina pectoris  #  Primary hypertension  #  Type 2 diabetes mellitus with hyperglycemia, with long-term current use of insulin [facility])    48-year-old female with the above-mentioned past medical history who presents today for follow-up      -Echocardiogram with preserved LVEF, grade 2 DD, biatrial enlargement, mildly dilated RV with normal systolic function, moderate pulmonary hypertension   -Discussed importance of blood pressure control, weight loss, exercise, diabetes management and how all are related to diastolic dysfunction and congestive heart failure   -She has previously been noncompliant with CPAP but we discussed the importance of treatment  We also discussed the nasal cushion mask which she may be willing to try   -Has complaints of edema and weight gain  Also mild CAST  No orthopnea  She has mild volume overload on exam today  Agree with increasing torsemide to 40 mg daily x3 days  She can continue to weigh herself on a daily basis to assess for adjustments  Her diuretics are currently being managed by her primary care team   She should continue spironolactone 25 mg daily and her SGLT 2 inhibitor   Discussed importance of monitoring salt and fluid intake    -Continue aspirin and ezetimibe       Lillian Dorsey MD

## 2022-03-25 NOTE — PATIENT INSTRUCTIONS
Please continue your current cardiac medications as prescribed  I agree with torsemide 40 mg daily for the next 3 days  Please continue to weight yourself on a daily basis  Thank you for choosing [address]  Please call our office or use [employer] with any questions

## 2022-04-18 NOTE — OCCUPATIONAL THERAPY NOTE
Occupational Therapy Treatment Note     Name:  Shai Lamb   MRN:   [id number]  Age:     76 y o  Patient Active Problem List   Diagnosis    Seizures ([facility] )    Restless leg syndrome    Hypertension    Hyperlipidemia    Diabetes mellitus ([facility] )    Chest pain    GERD (gastroesophageal reflux disease)    Epigastric abdominal pain    Encounter for examination and observation for other specified reasons    Severe episode of recurrent major depressive disorder, without psychotic features ([facility] )    Gambling disorder, episodic, moderate    Suspected COVID-19 virus infection    Major depressive disorder    Acquired hypothyroidism    Asymptomatic bacteriuria    Chronic fatigue and malaise    Calf tenderness    Morbid obesity due to excess calories (HCC)    Onychomycosis of toenail    MDD (major depressive disorder), recurrent episode (HCC)    Anxiety    RLS (restless legs syndrome)      Major depressive disorder [F32 9]        Subjective/Goals: "I did not have a good night last night"-- patient reports not feeling well day before     Vitals: stable     Pain: no level offered-- prime c/o of fatigue/SOB however does report knee discomfort and weakness with stance/mobility      Treatment Time: ([id number]) 60 minutes     Cognition: WFL     Precautions: fall risk + anxiety     EVALUATION information:  · OT Goal expiration date: 3/26/2021  · Treatment day: 3  · Discharge recommendation: home with skilled therapy   · HOME SET UP:  ? Apartment 4th floor + elevator and laundry in basement using cart to transport   ? Transfer bench but sponge bathes due to reports of not being able to get into the tub  ? DME: rollator  ? Lives Alone  ?  Bilateral RTC injury     Patient education: DEEP BREATHING TECHNIQUES T/O ACTIVITY, SLOWING OF PACE, ENERGY CONSERVATION TECHNIQUES FOR CARRY OVER UPON D/C, INCREASED FAMILY SUPPORT, CONTINUE PARTICIPATION IN SELF-CARE/MOBILITY WITH STAFF WHILE IN [facility] , OVERALL SAFETY AWARENESS, FALL PREVENTION, ENERGY CONSERVATION  and PAIN MANAGEMENT WITH FUNCTIONAL ACTIVITIES     Additional comments: Pt is G 74 y o  female seen for OT evaluation s/p admit to [address] 3/18/2021 w/ generalized weakness secondary to major depressive disorder     Assessment/Additional session details: Patient seen this date for OT with focus on goals as set by OTR  Patient agreeable to skilled OT session with focus on ADL's (bathing, dressing, toileting), Transfers (STS, SPT), Standing tolerance/balance, Strength/ROM/HEP, Activity tolerance, Education on safety, fall prevention and energy conservation techniques, Item retrieval/safe transport with DME ed and Bathroom/kitchen/home mobility  Barriers to treatment include fatigue, pain, safety, shortess of breath, home environment (management in/out or throughout home), social/family support, decreased strength/coordination, balance , activity tolerance and standing tolerance  Patient educated on safe functional transfers techniques, the appropriate use of AE/DME to improve functional performance, and activity modification techniques for energy conservation  Plans for d/c are home with home OT and family support  Patient is making gains toward OT goals with continued OT recommended at this time to max tolerance, safety and function for appropriate d/c planning  At end of session patient remains in room seated at recliner with all needs within reach      Increased time needed for self management of ADL's and completion of exercises this date however remains pleasant and cooperative        Goals STG achieved within 1  Week  Performance at Initial Evaluation 3/19/2021  Current Performance (last date completed)   Grooming while standing at sink Ron/I Supervision  3/22 no tolerance to standing sinkside for activity   3/20 easily fatigued CGA    ADL transfers  Ron/I Supervision  3/22 STS MI, SPT Supervision + RW   3/20 STS min assist increased to supervision- SPT supervision + RW   Bathroom mobility with appropriate AD Ron/I Supervision   3/20 Supervision + RW   UB ADL  Ron/I  3/20, 3/22 Min A   3/22 MI    LB ADL, AE PRN Ron/I Mod A   3/22 Supervision, increased time/effot + fatigue    Toileting/clothing management and hygiene Ron/I Min A  3/22 Supervision however easily fatigued, limits tolerance/safety    3/20 min assist overall    Dynamic standing balance for increased safety when completing purposeful tasks F+ F-  3/22 Fair   Increase standing tolerance for inc'd safety with standing purposeful tasks 5-8 min 2 min  3/22 1 5-2 min MAX (feels knees weak + SOB)   3/20 easily fatigued needing multiple trial to complete tasks 1-2 min max    Participate in therex 1-3x/week for inc'd overall stamina/activity tolerance for purposeful tasks To be completed  3/22    3/22 2# weighted ball in all planes 2x10, good tolerance   Tub/shower with tub bench  Ron/I       Item retrieval for inc'd independence and safety negotiating home environment Ron/I    3/20 Supervision (minimally challenged)    Bed mobility with [facility] flat (elevated surface height)  Ron/I Supervision   3/22 Supervision       Freddy Swift  3/22/2021 Dr Monyhan

## 2022-04-20 ENCOUNTER — TELEPHONE (OUTPATIENT)
Dept: CARDIOLOGY CLINIC | Facility: CLINIC | Age: 67
End: 2022-04-20

## 2022-04-20 NOTE — TELEPHONE ENCOUNTER
NP from the [place] heart failure team called with an update  She had a virtual visit with the patient yesterday  Her dry weight is 277lbs and yesterday she was 283lbs  She has minimal lower extremity edema and shortness of breath with exertion  O2 was 96% on 1L O2 by nasal cannula  she has been complaining of chest tightness at times but is not severe enough for an ED visit  She is taking torsemide 40mg daily and was instructed to take an extra 20mg last night  Her last bp check was on 3/15 and was 103/70 hr 80  She admits to not being compliant with her diet  NP is asking if you think she should have a stress test ordered  Please advise

## 2022-04-21 NOTE — TELEPHONE ENCOUNTER
Grzegorz Brumfield NP with the [place] she will follow up with the patient and have her monitor her weights symptoms and fluid restrictions

## 2022-06-08 ENCOUNTER — HOSPITAL ENCOUNTER (OUTPATIENT)
Dept: MAMMOGRAPHY | Facility: HOSPITAL | Age: 67
Discharge: HOME | End: 2022-06-08
Payer: COMMERCIAL

## 2022-06-08 VITALS — HEIGHT: 62 IN | WEIGHT: 284.39 LBS | BODY MASS INDEX: 52.33 KG/M2

## 2022-06-08 DIAGNOSIS — Z12.31 ENCOUNTER FOR SCREENING MAMMOGRAM FOR MALIGNANT NEOPLASM OF BREAST: ICD-10-CM

## 2022-06-08 PROCEDURE — 77067 SCR MAMMO BI INCL CAD: CPT

## 2022-06-08 PROCEDURE — 77063 BREAST TOMOSYNTHESIS BI: CPT

## 2022-06-23 ENCOUNTER — APPOINTMENT (EMERGENCY)
Dept: RADIOLOGY | Facility: HOSPITAL | Age: 67
End: 2022-06-23
Payer: COMMERCIAL

## 2022-06-23 ENCOUNTER — HOSPITAL ENCOUNTER (EMERGENCY)
Facility: HOSPITAL | Age: 67
Discharge: HOME | End: 2022-06-23
Attending: EMERGENCY MEDICINE | Admitting: EMERGENCY MEDICINE
Payer: COMMERCIAL

## 2022-06-23 VITALS
OXYGEN SATURATION: 97 % | TEMPERATURE: 97.5 F | RESPIRATION RATE: 16 BRPM | SYSTOLIC BLOOD PRESSURE: 113 MMHG | HEART RATE: 71 BPM | DIASTOLIC BLOOD PRESSURE: 63 MMHG

## 2022-06-23 DIAGNOSIS — R00.2 PALPITATIONS: Primary | ICD-10-CM

## 2022-06-23 DIAGNOSIS — R25.2 MUSCLE CRAMPS: ICD-10-CM

## 2022-06-23 LAB
2HR DELTA HS TROPONIN: 1 NG/L
ALBUMIN SERPL BCP-MCNC: 4.1 G/DL (ref 3.5–5)
ALP SERPL-CCNC: 119 U/L (ref 34–104)
ALT SERPL W P-5'-P-CCNC: 13 U/L (ref 7–52)
ANION GAP SERPL CALCULATED.3IONS-SCNC: 10 MMOL/L (ref 4–13)
AST SERPL W P-5'-P-CCNC: 16 U/L (ref 13–39)
BACTERIA UR QL AUTO: ABNORMAL /HPF
BASOPHILS # BLD AUTO: 0.04 THOUSANDS/ΜL (ref 0–0.1)
BASOPHILS NFR BLD AUTO: 1 % (ref 0–1)
BILIRUB SERPL-MCNC: 0.46 MG/DL (ref 0.2–1)
BILIRUB UR QL STRIP: NEGATIVE
BNP SERPL-MCNC: 14 PG/ML (ref 0–100)
BUN SERPL-MCNC: 40 MG/DL (ref 5–25)
CALCIUM SERPL-MCNC: 9.8 MG/DL (ref 8.4–10.2)
CARDIAC TROPONIN I PNL SERPL HS: 18 NG/L
CARDIAC TROPONIN I PNL SERPL HS: 19 NG/L
CHLORIDE SERPL-SCNC: 85 MMOL/L (ref 96–108)
CLARITY UR: CLEAR
CO2 SERPL-SCNC: 35 MMOL/L (ref 21–32)
COLOR UR: YELLOW
CREAT SERPL-MCNC: 1.16 MG/DL (ref 0.6–1.3)
EOSINOPHIL # BLD AUTO: 0.25 THOUSAND/ΜL (ref 0–0.61)
EOSINOPHIL NFR BLD AUTO: 4 % (ref 0–6)
ERYTHROCYTE [DISTWIDTH] IN BLOOD BY AUTOMATED COUNT: 12.8 % (ref 11.6–15.1)
GFR SERPL CREATININE-BSD FRML MDRD: 48 ML/MIN/1.73SQ M
GLUCOSE SERPL-MCNC: 242 MG/DL (ref 65–140)
GLUCOSE UR STRIP-MCNC: ABNORMAL MG/DL
HCT VFR BLD AUTO: 42.6 % (ref 34.8–46.1)
HGB BLD-MCNC: 14.4 G/DL (ref 11.5–15.4)
HGB UR QL STRIP.AUTO: NEGATIVE
HYALINE CASTS #/AREA URNS LPF: ABNORMAL /LPF
IMM GRANULOCYTES # BLD AUTO: 0.05 THOUSAND/UL (ref 0–0.2)
IMM GRANULOCYTES NFR BLD AUTO: 1 % (ref 0–2)
KETONES UR STRIP-MCNC: NEGATIVE MG/DL
LEUKOCYTE ESTERASE UR QL STRIP: ABNORMAL
LYMPHOCYTES # BLD AUTO: 1.48 THOUSANDS/ΜL (ref 0.6–4.47)
LYMPHOCYTES NFR BLD AUTO: 22 % (ref 14–44)
MCH RBC QN AUTO: 30 PG (ref 26.8–34.3)
MCHC RBC AUTO-ENTMCNC: 33.8 G/DL (ref 31.4–37.4)
MCV RBC AUTO: 89 FL (ref 82–98)
MONOCYTES # BLD AUTO: 0.52 THOUSAND/ΜL (ref 0.17–1.22)
MONOCYTES NFR BLD AUTO: 8 % (ref 4–12)
MUCOUS THREADS UR QL AUTO: ABNORMAL
NEUTROPHILS # BLD AUTO: 4.43 THOUSANDS/ΜL (ref 1.85–7.62)
NEUTS SEG NFR BLD AUTO: 64 % (ref 43–75)
NITRITE UR QL STRIP: NEGATIVE
NON-SQ EPI CELLS URNS QL MICRO: ABNORMAL /HPF
NRBC BLD AUTO-RTO: 0 /100 WBCS
PH UR STRIP.AUTO: 5.5 [PH]
PLATELET # BLD AUTO: 183 THOUSANDS/UL (ref 149–390)
PMV BLD AUTO: 10.9 FL (ref 8.9–12.7)
POTASSIUM SERPL-SCNC: 3.6 MMOL/L (ref 3.5–5.3)
PROT SERPL-MCNC: 8.1 G/DL (ref 6.4–8.4)
PROT UR STRIP-MCNC: NEGATIVE MG/DL
RBC # BLD AUTO: 4.8 MILLION/UL (ref 3.81–5.12)
RBC #/AREA URNS AUTO: ABNORMAL /HPF
SODIUM SERPL-SCNC: 130 MMOL/L (ref 135–147)
SP GR UR STRIP.AUTO: 1.01 (ref 1–1.03)
UROBILINOGEN UR QL STRIP.AUTO: 0.2 E.U./DL
WBC # BLD AUTO: 6.77 THOUSAND/UL (ref 4.31–10.16)
WBC #/AREA URNS AUTO: ABNORMAL /HPF

## 2022-06-23 PROCEDURE — 36415 COLL VENOUS BLD VENIPUNCTURE: CPT | Performed by: PHYSICIAN ASSISTANT

## 2022-06-23 PROCEDURE — 93005 ELECTROCARDIOGRAM TRACING: CPT

## 2022-06-23 PROCEDURE — 71045 X-RAY EXAM CHEST 1 VIEW: CPT

## 2022-06-23 PROCEDURE — 83880 ASSAY OF NATRIURETIC PEPTIDE: CPT | Performed by: PHYSICIAN ASSISTANT

## 2022-06-23 PROCEDURE — 81001 URINALYSIS AUTO W/SCOPE: CPT | Performed by: PHYSICIAN ASSISTANT

## 2022-06-23 PROCEDURE — 80053 COMPREHEN METABOLIC PANEL: CPT | Performed by: PHYSICIAN ASSISTANT

## 2022-06-23 PROCEDURE — 99285 EMERGENCY DEPT VISIT HI MDM: CPT

## 2022-06-23 PROCEDURE — 84484 ASSAY OF TROPONIN QUANT: CPT | Performed by: PHYSICIAN ASSISTANT

## 2022-06-23 PROCEDURE — 85025 COMPLETE CBC W/AUTO DIFF WBC: CPT | Performed by: PHYSICIAN ASSISTANT

## 2022-06-23 PROCEDURE — 99285 EMERGENCY DEPT VISIT HI MDM: CPT | Performed by: PHYSICIAN ASSISTANT

## 2022-06-23 RX ORDER — ACETAMINOPHEN 325 MG/1
650 TABLET ORAL ONCE
Status: COMPLETED | OUTPATIENT
Start: 2022-06-23 | End: 2022-06-23

## 2022-06-23 RX ADMIN — ACETAMINOPHEN 650 MG: 325 TABLET, FILM COATED ORAL at 12:54

## 2022-06-23 NOTE — DISCHARGE INSTRUCTIONS
Continue to take all your medications as directed  Monitor your blood sugar and follow your diabetic diet    Follow-up with your providers

## 2022-06-23 NOTE — ED PROVIDER NOTES
History  Chief Complaint   Patient presents with    Palpitations     Reports feeling of heart "fluttering" x3 days and weight gain of 8lbs in 1 day  called her cardiologist who increased her dose of torsemide to 80mg per day  now states she has generalized cramping over her body since this morning, states cramping is worse under her L breast and on her back  Past Medical History: Diabetes mellitus, Hyperlipidemia, HTN, Hypothyroidism, Restless leg syndrome, Seizures, Severe episode of recurrent major depressive disorder, without psychotic features, Restless leg syndrome, GERD, Orthostatic hypotension, Right Primary osteoarthritis of one hip, Chronic heart failure with preserved ejection fraction, chronic respiratory failure, coronary artery disease involving native coronary artery of native heart without angina pectoris  Past Surgical History:  APPENDECTOMY, CHOLECYSTECTOMY, Right HIP FRACTURE SURGERY, TONSILLECTOMY, TUBAL LIGATION      Presents to ED h/o HF, c/o 8 lb weight gain in 1 day, called cardiologist who increased her dose of torsemide to 80mg per day, now c/o palpitations, no back pain and whole body muscle cramping x 1 day, intermittent cp, no sob, ambulates at home on her own, no abd pain, + nausea, only took her Torsemide today, no vomiting or bowel changes, no urinary complaints, no rash, no LE edema  Pt states heart failure team recommended that pt come to ED      Old records show: Echo 2/1/22:  EF 49%, grade 2 diastolic dysfunction, mild to moderate AS, mild TR            Prior to Admission Medications   Prescriptions Last Dose Informant Patient Reported? Taking? Cholecalciferol 25 MCG (1000 UT) capsule  Self Yes No   Sig: Take 1,000 Units by mouth daily     Patient not taking: Reported on 3/25/2022   Empagliflozin 25 MG TABS  Self Yes No   Sig: Take 25 mg by mouth every morning   Ergocalciferol (VITAMIN D2 PO)  Self Yes No   Sig: Take by mouth   FLUoxetine (PROzac) 20 mg capsule  Self No No Sig: Take 3 capsules (60 mg total) by mouth daily   acetaminophen (TYLENOL) 325 mg tablet  Self Yes No   Sig: Take 650 mg by mouth every 6 (six) hours as needed for mild pain (pain score 1-3)  Patient not taking: Reported on 1/28/2022   aspirin (ECOTRIN LOW STRENGTH) 81 mg EC tablet  Self Yes No   Sig: Take 81 mg by mouth daily   docusate sodium (COLACE) 100 mg capsule  Self Yes No   Sig: Take 100 mg by mouth daily   ezetimibe (ZETIA) 10 mg tablet  Self Yes No   Sig: Take 10 mg by mouth daily   gabapentin (NEURONTIN) 100 mg capsule  Self No No   Sig: Take 1 capsule (100 mg total) by mouth 2 (two) times a day Take 100 mg in the morning and 100 mg mid afternoon   gabapentin (NEURONTIN) 300 mg capsule  Self No No   Sig: Take 1 capsule (300 mg total) by mouth daily at bedtime   hydrophilic ointment  Self Yes No   Sig: Apply topically as needed for dry skin   ibuprofen (MOTRIN) 400 mg tablet  Self Yes No   Sig: Take by mouth every 6 (six) hours as needed for mild pain   insulin aspart, w/niacinamide, (Fiasp FlexTouch) 100 Units/mL injection pen  Self Yes No   Sig: Inject under the skin 3 (three) times a day before meals Indications: per sliding scale     Patient not taking: Reported on 3/25/2022   insulin glargine (LANTUS) 100 units/mL subcutaneous injection  Self No No   Sig: Inject 36 Units under the skin daily at bedtime   Patient taking differently: Inject 38 Units under the skin daily at bedtime     levothyroxine 50 mcg tablet  Self No No   Sig: Take 1 5 tablets (75 mcg total) by mouth daily   nystatin (MYCOSTATIN) powder  Self No No   Sig: Apply topically 2 (two) times a day   omeprazole (PriLOSEC) 20 mg delayed release capsule  Self Yes No   Sig: Take 20 mg by mouth daily   potassium chloride (K-DUR,KLOR-CON) 20 mEq tablet  Self No No   Sig: Take 1 tablet (20 mEq total) by mouth daily   spironolactone (ALDACTONE) 25 mg tablet  Self No No   Sig: Take 1 tablet (25 mg total) by mouth daily   topiramate (TOPAMAX) 100 mg tablet  Self Yes No   Sig: Take 200 mg by mouth 2 (two) times a day    torsemide (DEMADEX) 20 mg tablet  Self No No   Sig: Take 2 tablets (40 mg total) by mouth daily      Facility-Administered Medications: None       Past Medical History:   Diagnosis Date    Diabetes mellitus ([address] )     Disease of thyroid gland     Hyperlipidemia     Hypertension     Hypothyroidism (acquired)     Restless leg syndrome     Seizures (HCC)        Past Surgical History:   Procedure Laterality Date    APPENDECTOMY      CHOLECYSTECTOMY      HIP FRACTURE SURGERY Right     TONSILLECTOMY      TUBAL LIGATION         Family History   Problem Relation Age of Onset    Brain cancer Mother     Seizures Father     Colon cancer Maternal Grandfather     No Known Problems Son     No Known Problems Son     Breast cancer Maternal Aunt 30     I have reviewed and agree with the history as documented  E-Cigarette/Vaping    E-Cigarette Use Never User      E-Cigarette/Vaping Substances    Nicotine No     THC No     CBD No     Flavoring No     Other No     Unknown No      Social History     Tobacco Use    Smoking status: Never Smoker    Smokeless tobacco: Never Used    Tobacco comment: not a smoker   Vaping Use    Vaping Use: Never used   Substance Use Topics    Alcohol use: Yes     Comment: occasional    Drug use: Never       Review of Systems   Constitutional: Negative for chills and fever  HENT: Positive for trouble swallowing  Negative for hearing loss, nosebleeds and sore throat  Respiratory: Positive for shortness of breath  Negative for cough  Cardiovascular: Positive for chest pain  Negative for leg swelling  Gastrointestinal: Positive for nausea  Negative for abdominal pain and vomiting  Genitourinary: Negative for dysuria, frequency, vaginal bleeding and vaginal discharge  Musculoskeletal: Positive for myalgias  Negative for arthralgias and gait problem  Skin: Negative for pallor and rash  Neurological: Negative for dizziness, weakness and headaches  Psychiatric/Behavioral: Negative for behavioral problems  All other systems reviewed and are negative  Physical Exam  Physical Exam  Vitals and nursing note reviewed  Constitutional:       General: She is in acute distress  Appearance: She is well-developed  She is obese  HENT:      Head: Normocephalic and atraumatic  Right Ear: External ear normal       Left Ear: External ear normal       Nose: Nose normal       Mouth/Throat:      Mouth: Mucous membranes are moist       Pharynx: Oropharynx is clear  Eyes:      Conjunctiva/sclera: Conjunctivae normal    Cardiovascular:      Rate and Rhythm: Normal rate and regular rhythm  Pulmonary:      Effort: Pulmonary effort is normal  No respiratory distress  Breath sounds: Normal breath sounds  Abdominal:      General: Bowel sounds are normal       Palpations: Abdomen is soft  Tenderness: There is no abdominal tenderness  There is no right CVA tenderness or left CVA tenderness  Musculoskeletal:         General: Normal range of motion  Cervical back: Normal range of motion and neck supple  Right lower leg: No edema  Left lower leg: No edema  Skin:     General: Skin is warm and dry  Capillary Refill: Capillary refill takes less than 2 seconds  Neurological:      General: No focal deficit present  Mental Status: She is alert and oriented to person, place, and time  Motor: No weakness     Psychiatric:         Behavior: Behavior normal          Vital Signs  ED Triage Vitals   Temperature Pulse Respirations Blood Pressure SpO2   06/23/22 1221 06/23/22 1221 06/23/22 1221 06/23/22 1221 06/23/22 1220   97 5 °F (36 4 °C) 71 16 120/64 98 %      Temp Source Heart Rate Source Patient Position - Orthostatic VS BP Location FiO2 (%)   06/23/22 1221 06/23/22 1221 06/23/22 1221 06/23/22 1221 --   Oral Monitor Sitting Left arm       Pain Score       06/23/22 1254       6           Vitals:    06/23/22 1221 06/23/22 1444   BP: 120/64 113/63   Pulse: 71 71   Patient Position - Orthostatic VS: Sitting          Visual Acuity      ED Medications  Medications   acetaminophen (TYLENOL) tablet 650 mg (650 mg Oral Given 6/23/22 1254)       Diagnostic Studies  Results Reviewed     Procedure Component Value Units Date/Time    HS Troponin I 2hr [[id number]]  (Normal) Collected: 06/23/22 1446    Lab Status: Final result Specimen: Blood Updated: 06/23/22 1514     hs TnI 2hr 19 ng/L      Delta 2hr hsTnI 1 ng/L     B-Type Natriuretic Peptide(BNP) AN, CA, EA Camp[facility] Only [[id number]]  (Normal) Collected: 06/23/22 1241    Lab Status: Final result Specimen: Blood from Arm, Right Updated: 06/23/22 1407     BNP 14 pg/mL     Urine Microscopic [[id number]]  (Abnormal) Collected: 06/23/22 1251    Lab Status: Final result Specimen: Urine, Clean Catch Updated: 06/23/22 1340     RBC, UA None Seen /hpf      WBC, UA 0-5 /hpf      Epithelial Cells Occasional /hpf      Bacteria, UA Occasional /hpf      Hyaline Casts, UA 4-10 /lpf      MUCUS THREADS Occasional    HS Troponin 0hr (reflex protocol) [[id number]]  (Normal) Collected: 06/23/22 1241    Lab Status: Final result Specimen: Blood from Arm, Right Updated: 06/23/22 1317     hs TnI 0hr 18 ng/L     Comprehensive metabolic panel [[id number]]  (Abnormal) Collected: 06/23/22 1241    Lab Status: Final result Specimen: Blood from Arm, Right Updated: 06/23/22 1309     Sodium 130 mmol/L      Potassium 3 6 mmol/L      Chloride 85 mmol/L      CO2 35 mmol/L      ANION GAP 10 mmol/L      BUN 40 mg/dL      Creatinine 1 16 mg/dL      Glucose 242 mg/dL      Calcium 9 8 mg/dL      AST 16 U/L      ALT 13 U/L      Alkaline Phosphatase 119 U/L      Total Protein 8 1 g/dL      Albumin 4 1 g/dL      Total Bilirubin 0 46 mg/dL      eGFR 48 ml/min/1 73sq m     Narrative:      [place] guidelines for Chronic Kidney Disease (CKD):     Stage 1 with normal or high GFR (GFR > 90 mL/min/1 73 square meters)    Stage 2 Mild CKD (GFR = 60-89 mL/min/1 73 square meters)    Stage 3A Moderate CKD (GFR = 45-59 mL/min/1 73 square meters)    Stage 3B Moderate CKD (GFR = 30-44 mL/min/1 73 square meters)    Stage 4 Severe CKD (GFR = 15-29 mL/min/1 73 square meters)    Stage 5 End Stage CKD (GFR <15 mL/min/1 73 square meters)  Note: GFR calculation is accurate only with a steady state creatinine    UA w Reflex to Microscopic w Reflex to Culture [[id number]]  (Abnormal) Collected: 06/23/22 1251    Lab Status: Final result Specimen: Urine, Clean Catch Updated: 06/23/22 1309     Color, UA Yellow     Clarity, UA Clear     Specific Gravity, UA 1 015     pH, UA 5 5     Leukocytes, UA Trace     Nitrite, UA Negative     Protein, UA Negative mg/dl      Glucose, UA 1+ mg/dl      Ketones, UA Negative mg/dl      Urobilinogen, UA 0 2 E U /dl      Bilirubin, UA Negative     Occult Blood, UA Negative    CBC and differential [[id number]] Collected: 06/23/22 1241    Lab Status: Final result Specimen: Blood from Arm, Right Updated: 06/23/22 1248     WBC 6 77 Thousand/uL      RBC 4 80 Million/uL      Hemoglobin 14 4 g/dL      Hematocrit 42 6 %      MCV 89 fL      MCH 30 0 pg      MCHC 33 8 g/dL      RDW 12 8 %      MPV 10 9 fL      Platelets 405 Thousands/uL      nRBC 0 /100 WBCs      Neutrophils Relative 64 %      Immat GRANS % 1 %      Lymphocytes Relative 22 %      Monocytes Relative 8 %      Eosinophils Relative 4 %      Basophils Relative 1 %      Neutrophils Absolute 4 43 Thousands/µL      Immature Grans Absolute 0 05 Thousand/uL      Lymphocytes Absolute 1 48 Thousands/µL      Monocytes Absolute 0 52 Thousand/µL      Eosinophils Absolute 0 25 Thousand/µL      Basophils Absolute 0 04 Thousands/µL                  XR chest 1 view portable   ED Interpretation by Babak Lawson PA-C (06/23 1323)   Cm, increased vas congestion        Final Result by Nancy Thorne MD (06/23 3865) No acute cardiopulmonary disease  Workstation performed: [id number]                    Procedures  Procedures         ED Course  ED Course as of 06/23/22 1523   Thu Jun 23, 2022   1409 BNP: 14   1516 Delta 2hr hsTnI: 1                                             MDM  Number of Diagnoses or Management Options  Diagnosis management comments: Pt feeling better, able to ambulate in ED without difficulty, at baseline, no acute electrolyte abnormalities, CXR wnl, no acute CHF, delta trop normal, stable for dc, has good FU, has VNA and PT come to home, and heart failure tream       Amount and/or Complexity of Data Reviewed  Clinical lab tests: ordered and reviewed  Tests in the radiology section of CPT®: ordered and reviewed  Review and summarize past medical records: yes        Disposition  Final diagnoses:   Palpitations   Muscle cramps     Time reflects when diagnosis was documented in both MDM as applicable and the Disposition within this note     Time User Action Codes Description Comment    6/23/2022  3:18 PM Ottis Sham Add [R00 2] Palpitations     6/23/2022  3:18 PM Ottis Sham Add [R25 2] Muscle cramps       ED Disposition     ED Disposition   Discharge    Condition   Stable    Date/Time   Thu Jun 23, 2022  3:18 PM    Comment   Garry Ellsworth discharge to home/self care  Follow-up Information     Follow up With Specialties Details Why Contact Info    Xochitl Garnica MD [address]  [phone]      Your specialist              Patient's Medications   Discharge Prescriptions    No medications on file       No discharge procedures on file      PDMP Review       Value Time User    PDMP Reviewed  Yes 3/30/2021  3:19 PM Naty Burdick MD          ED Provider  Electronically Signed by           Celestine Leblanc PA-C  06/23/22 1528

## 2022-06-23 NOTE — ED NOTES
Patient able to ambulate with walker independently, pt requested lyft home  Lyft requested for pt at this time        Keyona Franco RN  06/23/22 5380

## 2022-06-24 LAB
ATRIAL RATE: 74 BPM
P AXIS: 43 DEGREES
PR INTERVAL: 140 MS
QRS AXIS: -49 DEGREES
QRSD INTERVAL: 130 MS
QT INTERVAL: 426 MS
QTC INTERVAL: 472 MS
T WAVE AXIS: 57 DEGREES
VENTRICULAR RATE: 74 BPM

## 2022-06-24 PROCEDURE — 93010 ELECTROCARDIOGRAM REPORT: CPT | Performed by: INTERNAL MEDICINE

## 2022-09-19 ENCOUNTER — APPOINTMENT (EMERGENCY)
Dept: CT IMAGING | Facility: HOSPITAL | Age: 67
DRG: 866 | End: 2022-09-19
Payer: OTHER GOVERNMENT

## 2022-09-19 ENCOUNTER — APPOINTMENT (OUTPATIENT)
Dept: RADIOLOGY | Facility: HOSPITAL | Age: 67
DRG: 866 | End: 2022-09-19
Payer: OTHER GOVERNMENT

## 2022-09-19 ENCOUNTER — HOSPITAL ENCOUNTER (INPATIENT)
Facility: HOSPITAL | Age: 67
LOS: 2 days | Discharge: HOME HEALTH | DRG: 866 | End: 2022-09-22
Attending: EMERGENCY MEDICINE | Admitting: HOSPITALIST
Payer: OTHER GOVERNMENT

## 2022-09-19 DIAGNOSIS — I50.33 ACUTE ON CHRONIC HEART FAILURE WITH PRESERVED EJECTION FRACTION (HCC): ICD-10-CM

## 2022-09-19 DIAGNOSIS — J81.1 PULMONARY EDEMA: ICD-10-CM

## 2022-09-19 DIAGNOSIS — R19.7 DIARRHEA: ICD-10-CM

## 2022-09-19 DIAGNOSIS — R55 SYNCOPE: Primary | ICD-10-CM

## 2022-09-19 DIAGNOSIS — E11.9 DIABETES MELLITUS (HCC): ICD-10-CM

## 2022-09-19 DIAGNOSIS — I50.32 CHRONIC HEART FAILURE WITH PRESERVED EJECTION FRACTION (HCC): ICD-10-CM

## 2022-09-19 DIAGNOSIS — E03.9 ACQUIRED HYPOTHYROIDISM: ICD-10-CM

## 2022-09-19 DIAGNOSIS — E78.2 MIXED HYPERLIPIDEMIA: ICD-10-CM

## 2022-09-19 DIAGNOSIS — M25.551 RIGHT HIP PAIN: ICD-10-CM

## 2022-09-19 DIAGNOSIS — R07.9 CHEST PAIN: ICD-10-CM

## 2022-09-19 DIAGNOSIS — R10.9 ABDOMINAL PAIN: ICD-10-CM

## 2022-09-19 DIAGNOSIS — F32.9 MAJOR DEPRESSIVE DISORDER, REMISSION STATUS UNSPECIFIED, UNSPECIFIED WHETHER RECURRENT: ICD-10-CM

## 2022-09-19 PROBLEM — R79.89 ELEVATED D-DIMER: Status: ACTIVE | Noted: 2022-09-19

## 2022-09-19 LAB
2HR DELTA HS TROPONIN: -1 NG/L
ALBUMIN SERPL BCP-MCNC: 3.8 G/DL (ref 3.5–5)
ALP SERPL-CCNC: 107 U/L (ref 34–104)
ALT SERPL W P-5'-P-CCNC: 11 U/L (ref 7–52)
ANION GAP SERPL CALCULATED.3IONS-SCNC: 8 MMOL/L (ref 4–13)
AST SERPL W P-5'-P-CCNC: 12 U/L (ref 13–39)
ATRIAL RATE: 65 BPM
BACTERIA UR QL AUTO: ABNORMAL /HPF
BASOPHILS # BLD AUTO: 0.03 THOUSANDS/ΜL (ref 0–0.1)
BASOPHILS NFR BLD AUTO: 0 % (ref 0–1)
BILIRUB DIRECT SERPL-MCNC: 0.06 MG/DL (ref 0–0.2)
BILIRUB SERPL-MCNC: 0.44 MG/DL (ref 0.2–1)
BILIRUB UR QL STRIP: NEGATIVE
BNP SERPL-MCNC: 89 PG/ML (ref 0–100)
BUN SERPL-MCNC: 18 MG/DL (ref 5–25)
CALCIUM SERPL-MCNC: 9.1 MG/DL (ref 8.4–10.2)
CARDIAC TROPONIN I PNL SERPL HS: 10 NG/L
CARDIAC TROPONIN I PNL SERPL HS: 11 NG/L
CHLORIDE SERPL-SCNC: 96 MMOL/L (ref 96–108)
CLARITY UR: CLEAR
CO2 SERPL-SCNC: 31 MMOL/L (ref 21–32)
COLOR UR: YELLOW
CREAT SERPL-MCNC: 0.88 MG/DL (ref 0.6–1.3)
D DIMER PPP FEU-MCNC: 7.83 UG/ML FEU
EOSINOPHIL # BLD AUTO: 0.15 THOUSAND/ΜL (ref 0–0.61)
EOSINOPHIL NFR BLD AUTO: 2 % (ref 0–6)
ERYTHROCYTE [DISTWIDTH] IN BLOOD BY AUTOMATED COUNT: 12.5 % (ref 11.6–15.1)
GFR SERPL CREATININE-BSD FRML MDRD: 68 ML/MIN/1.73SQ M
GLUCOSE SERPL-MCNC: 185 MG/DL (ref 65–140)
GLUCOSE SERPL-MCNC: 214 MG/DL (ref 65–140)
GLUCOSE SERPL-MCNC: 295 MG/DL (ref 65–140)
GLUCOSE UR STRIP-MCNC: NEGATIVE MG/DL
HCT VFR BLD AUTO: 45.4 % (ref 34.8–46.1)
HEMOCCULT STL QL IA: POSITIVE
HGB BLD-MCNC: 14.9 G/DL (ref 11.5–15.4)
HGB UR QL STRIP.AUTO: ABNORMAL
IMM GRANULOCYTES # BLD AUTO: 0.07 THOUSAND/UL (ref 0–0.2)
IMM GRANULOCYTES NFR BLD AUTO: 1 % (ref 0–2)
KETONES UR STRIP-MCNC: NEGATIVE MG/DL
LACTATE SERPL-SCNC: 1.9 MMOL/L (ref 0.5–2)
LEUKOCYTE ESTERASE UR QL STRIP: NEGATIVE
LIPASE SERPL-CCNC: 9 U/L (ref 11–82)
LYMPHOCYTES # BLD AUTO: 1 THOUSANDS/ΜL (ref 0.6–4.47)
LYMPHOCYTES NFR BLD AUTO: 11 % (ref 14–44)
MCH RBC QN AUTO: 29.2 PG (ref 26.8–34.3)
MCHC RBC AUTO-ENTMCNC: 32.8 G/DL (ref 31.4–37.4)
MCV RBC AUTO: 89 FL (ref 82–98)
MONOCYTES # BLD AUTO: 0.4 THOUSAND/ΜL (ref 0.17–1.22)
MONOCYTES NFR BLD AUTO: 4 % (ref 4–12)
NEUTROPHILS # BLD AUTO: 7.83 THOUSANDS/ΜL (ref 1.85–7.62)
NEUTS SEG NFR BLD AUTO: 82 % (ref 43–75)
NITRITE UR QL STRIP: NEGATIVE
NON-SQ EPI CELLS URNS QL MICRO: ABNORMAL /HPF
NRBC BLD AUTO-RTO: 0 /100 WBCS
P AXIS: 46 DEGREES
PH UR STRIP.AUTO: 5.5 [PH]
PLATELET # BLD AUTO: 186 THOUSANDS/UL (ref 149–390)
PMV BLD AUTO: 11.4 FL (ref 8.9–12.7)
POTASSIUM SERPL-SCNC: 3.4 MMOL/L (ref 3.5–5.3)
PR INTERVAL: 136 MS
PROT SERPL-MCNC: 7.4 G/DL (ref 6.4–8.4)
PROT UR STRIP-MCNC: NEGATIVE MG/DL
QRS AXIS: -59 DEGREES
QRSD INTERVAL: 136 MS
QT INTERVAL: 458 MS
QTC INTERVAL: 477 MS
RBC # BLD AUTO: 5.11 MILLION/UL (ref 3.81–5.12)
RBC #/AREA URNS AUTO: ABNORMAL /HPF
SARS-COV-2 RNA RESP QL NAA+PROBE: NEGATIVE
SODIUM SERPL-SCNC: 135 MMOL/L (ref 135–147)
SP GR UR STRIP.AUTO: 1.01 (ref 1–1.03)
T WAVE AXIS: 67 DEGREES
TSH SERPL DL<=0.05 MIU/L-ACNC: 6.87 UIU/ML (ref 0.45–4.5)
UROBILINOGEN UR QL STRIP.AUTO: 0.2 E.U./DL
VENTRICULAR RATE: 65 BPM
WBC # BLD AUTO: 9.48 THOUSAND/UL (ref 4.31–10.16)
WBC #/AREA URNS AUTO: ABNORMAL /HPF

## 2022-09-19 PROCEDURE — 83036 HEMOGLOBIN GLYCOSYLATED A1C: CPT | Performed by: INTERNAL MEDICINE

## 2022-09-19 PROCEDURE — 99285 EMERGENCY DEPT VISIT HI MDM: CPT

## 2022-09-19 PROCEDURE — 72125 CT NECK SPINE W/O DYE: CPT

## 2022-09-19 PROCEDURE — 93005 ELECTROCARDIOGRAM TRACING: CPT

## 2022-09-19 PROCEDURE — 70450 CT HEAD/BRAIN W/O DYE: CPT

## 2022-09-19 PROCEDURE — 71045 X-RAY EXAM CHEST 1 VIEW: CPT

## 2022-09-19 PROCEDURE — 80048 BASIC METABOLIC PNL TOTAL CA: CPT | Performed by: EMERGENCY MEDICINE

## 2022-09-19 PROCEDURE — 96374 THER/PROPH/DIAG INJ IV PUSH: CPT

## 2022-09-19 PROCEDURE — 36415 COLL VENOUS BLD VENIPUNCTURE: CPT | Performed by: EMERGENCY MEDICINE

## 2022-09-19 PROCEDURE — 74177 CT ABD & PELVIS W/CONTRAST: CPT

## 2022-09-19 PROCEDURE — 71275 CT ANGIOGRAPHY CHEST: CPT

## 2022-09-19 PROCEDURE — G0328 FECAL BLOOD SCRN IMMUNOASSAY: HCPCS | Performed by: EMERGENCY MEDICINE

## 2022-09-19 PROCEDURE — 80076 HEPATIC FUNCTION PANEL: CPT | Performed by: EMERGENCY MEDICINE

## 2022-09-19 PROCEDURE — 96361 HYDRATE IV INFUSION ADD-ON: CPT

## 2022-09-19 PROCEDURE — 83880 ASSAY OF NATRIURETIC PEPTIDE: CPT | Performed by: EMERGENCY MEDICINE

## 2022-09-19 PROCEDURE — 85025 COMPLETE CBC W/AUTO DIFF WBC: CPT | Performed by: EMERGENCY MEDICINE

## 2022-09-19 PROCEDURE — 83690 ASSAY OF LIPASE: CPT | Performed by: EMERGENCY MEDICINE

## 2022-09-19 PROCEDURE — 84443 ASSAY THYROID STIM HORMONE: CPT | Performed by: INTERNAL MEDICINE

## 2022-09-19 PROCEDURE — 81001 URINALYSIS AUTO W/SCOPE: CPT | Performed by: EMERGENCY MEDICINE

## 2022-09-19 PROCEDURE — 99220 PR INITIAL OBSERVATION CARE/DAY 70 MINUTES: CPT | Performed by: INTERNAL MEDICINE

## 2022-09-19 PROCEDURE — 85379 FIBRIN DEGRADATION QUANT: CPT | Performed by: EMERGENCY MEDICINE

## 2022-09-19 PROCEDURE — 87635 SARS-COV-2 COVID-19 AMP PRB: CPT | Performed by: EMERGENCY MEDICINE

## 2022-09-19 PROCEDURE — 84484 ASSAY OF TROPONIN QUANT: CPT | Performed by: EMERGENCY MEDICINE

## 2022-09-19 PROCEDURE — 93010 ELECTROCARDIOGRAM REPORT: CPT | Performed by: INTERNAL MEDICINE

## 2022-09-19 PROCEDURE — 73502 X-RAY EXAM HIP UNI 2-3 VIEWS: CPT

## 2022-09-19 PROCEDURE — 82948 REAGENT STRIP/BLOOD GLUCOSE: CPT

## 2022-09-19 PROCEDURE — 81003 URINALYSIS AUTO W/O SCOPE: CPT | Performed by: EMERGENCY MEDICINE

## 2022-09-19 PROCEDURE — 83605 ASSAY OF LACTIC ACID: CPT | Performed by: EMERGENCY MEDICINE

## 2022-09-19 RX ORDER — INSULIN LISPRO 100 [IU]/ML
1-5 INJECTION, SOLUTION INTRAVENOUS; SUBCUTANEOUS
Status: DISCONTINUED | OUTPATIENT
Start: 2022-09-19 | End: 2022-09-22 | Stop reason: HOSPADM

## 2022-09-19 RX ORDER — IBUPROFEN 400 MG/1
400 TABLET ORAL EVERY 6 HOURS PRN
Status: DISCONTINUED | OUTPATIENT
Start: 2022-09-19 | End: 2022-09-22 | Stop reason: HOSPADM

## 2022-09-19 RX ORDER — ACETAMINOPHEN 325 MG/1
650 TABLET ORAL EVERY 6 HOURS PRN
Status: DISCONTINUED | OUTPATIENT
Start: 2022-09-19 | End: 2022-09-22 | Stop reason: HOSPADM

## 2022-09-19 RX ORDER — MORPHINE SULFATE 4 MG/ML
4 INJECTION, SOLUTION INTRAMUSCULAR; INTRAVENOUS ONCE
Status: COMPLETED | OUTPATIENT
Start: 2022-09-19 | End: 2022-09-19

## 2022-09-19 RX ORDER — LEVOTHYROXINE SODIUM 0.07 MG/1
75 TABLET ORAL
Status: DISCONTINUED | OUTPATIENT
Start: 2022-09-20 | End: 2022-09-22 | Stop reason: HOSPADM

## 2022-09-19 RX ORDER — SODIUM CHLORIDE, SODIUM LACTATE, POTASSIUM CHLORIDE, CALCIUM CHLORIDE 600; 310; 30; 20 MG/100ML; MG/100ML; MG/100ML; MG/100ML
75 INJECTION, SOLUTION INTRAVENOUS CONTINUOUS
Status: DISCONTINUED | OUTPATIENT
Start: 2022-09-19 | End: 2022-09-19

## 2022-09-19 RX ORDER — PANTOPRAZOLE SODIUM 40 MG/1
40 TABLET, DELAYED RELEASE ORAL
Status: DISCONTINUED | OUTPATIENT
Start: 2022-09-20 | End: 2022-09-22 | Stop reason: HOSPADM

## 2022-09-19 RX ORDER — INSULIN GLARGINE 100 [IU]/ML
36 INJECTION, SOLUTION SUBCUTANEOUS
Status: DISCONTINUED | OUTPATIENT
Start: 2022-09-19 | End: 2022-09-22 | Stop reason: HOSPADM

## 2022-09-19 RX ORDER — ONDANSETRON 2 MG/ML
4 INJECTION INTRAMUSCULAR; INTRAVENOUS EVERY 6 HOURS PRN
Status: DISCONTINUED | OUTPATIENT
Start: 2022-09-19 | End: 2022-09-22 | Stop reason: HOSPADM

## 2022-09-19 RX ORDER — SODIUM CHLORIDE, SODIUM LACTATE, POTASSIUM CHLORIDE, CALCIUM CHLORIDE 600; 310; 30; 20 MG/100ML; MG/100ML; MG/100ML; MG/100ML
75 INJECTION, SOLUTION INTRAVENOUS CONTINUOUS
Status: DISCONTINUED | OUTPATIENT
Start: 2022-09-19 | End: 2022-09-20

## 2022-09-19 RX ORDER — POTASSIUM CHLORIDE 20 MEQ/1
20 TABLET, EXTENDED RELEASE ORAL ONCE
Status: COMPLETED | OUTPATIENT
Start: 2022-09-19 | End: 2022-09-19

## 2022-09-19 RX ORDER — GABAPENTIN 100 MG/1
100 CAPSULE ORAL 2 TIMES DAILY
Status: DISCONTINUED | OUTPATIENT
Start: 2022-09-19 | End: 2022-09-22 | Stop reason: HOSPADM

## 2022-09-19 RX ORDER — GABAPENTIN 300 MG/1
300 CAPSULE ORAL
Status: DISCONTINUED | OUTPATIENT
Start: 2022-09-19 | End: 2022-09-22 | Stop reason: HOSPADM

## 2022-09-19 RX ORDER — MELATONIN
1000 DAILY
Status: DISCONTINUED | OUTPATIENT
Start: 2022-09-20 | End: 2022-09-22 | Stop reason: HOSPADM

## 2022-09-19 RX ORDER — SODIUM CHLORIDE 9 MG/ML
3 INJECTION INTRAVENOUS
Status: DISCONTINUED | OUTPATIENT
Start: 2022-09-19 | End: 2022-09-22 | Stop reason: HOSPADM

## 2022-09-19 RX ORDER — POTASSIUM CHLORIDE 20 MEQ/1
20 TABLET, EXTENDED RELEASE ORAL DAILY
Status: DISCONTINUED | OUTPATIENT
Start: 2022-09-20 | End: 2022-09-22 | Stop reason: HOSPADM

## 2022-09-19 RX ORDER — HEPARIN SODIUM 5000 [USP'U]/ML
5000 INJECTION, SOLUTION INTRAVENOUS; SUBCUTANEOUS EVERY 8 HOURS SCHEDULED
Status: DISCONTINUED | OUTPATIENT
Start: 2022-09-19 | End: 2022-09-22 | Stop reason: HOSPADM

## 2022-09-19 RX ORDER — ASPIRIN 81 MG/1
81 TABLET ORAL DAILY
Status: DISCONTINUED | OUTPATIENT
Start: 2022-09-20 | End: 2022-09-22 | Stop reason: HOSPADM

## 2022-09-19 RX ORDER — FLUOXETINE HYDROCHLORIDE 20 MG/1
60 CAPSULE ORAL DAILY
Status: DISCONTINUED | OUTPATIENT
Start: 2022-09-20 | End: 2022-09-21

## 2022-09-19 RX ORDER — TOPIRAMATE 100 MG/1
200 TABLET, FILM COATED ORAL 2 TIMES DAILY
Status: DISCONTINUED | OUTPATIENT
Start: 2022-09-19 | End: 2022-09-22 | Stop reason: HOSPADM

## 2022-09-19 RX ADMIN — HEPARIN SODIUM 5000 UNITS: 5000 INJECTION INTRAVENOUS; SUBCUTANEOUS at 21:37

## 2022-09-19 RX ADMIN — SODIUM CHLORIDE 1000 ML: 0.9 INJECTION, SOLUTION INTRAVENOUS at 12:51

## 2022-09-19 RX ADMIN — IBUPROFEN 400 MG: 400 TABLET, FILM COATED ORAL at 21:53

## 2022-09-19 RX ADMIN — IOHEXOL 100 ML: 350 INJECTION, SOLUTION INTRAVENOUS at 14:44

## 2022-09-19 RX ADMIN — GABAPENTIN 300 MG: 300 CAPSULE ORAL at 21:06

## 2022-09-19 RX ADMIN — MORPHINE SULFATE 4 MG: 4 INJECTION INTRAVENOUS at 12:51

## 2022-09-19 RX ADMIN — INSULIN LISPRO 1 UNITS: 100 INJECTION, SOLUTION INTRAVENOUS; SUBCUTANEOUS at 18:53

## 2022-09-19 RX ADMIN — POTASSIUM CHLORIDE 20 MEQ: 1500 TABLET, EXTENDED RELEASE ORAL at 15:48

## 2022-09-19 RX ADMIN — TOPIRAMATE 200 MG: 100 TABLET, FILM COATED ORAL at 18:46

## 2022-09-19 RX ADMIN — GABAPENTIN 100 MG: 100 CAPSULE ORAL at 18:46

## 2022-09-19 RX ADMIN — SODIUM CHLORIDE, SODIUM LACTATE, POTASSIUM CHLORIDE, AND CALCIUM CHLORIDE 75 ML/HR: .6; .31; .03; .02 INJECTION, SOLUTION INTRAVENOUS at 18:46

## 2022-09-19 RX ADMIN — INSULIN GLARGINE 36 UNITS: 100 INJECTION, SOLUTION SUBCUTANEOUS at 21:35

## 2022-09-19 RX ADMIN — MORPHINE SULFATE 4 MG: 4 INJECTION INTRAVENOUS at 15:58

## 2022-09-19 NOTE — ASSESSMENT & PLAN NOTE
· BP reviewed and mildly elevated  · Home Medication:  · Spirolactone and Torsemide  · Hold in the setting of presenting dry on examination  · Will start low dose Hydralazine  · Continue to monitor BP closely

## 2022-09-19 NOTE — ASSESSMENT & PLAN NOTE
Lab Results   Component Value Date    HGBA1C 11 9 (H) 01/29/2022       No results for input(s): POCGLU in the last 72 hours      Blood Sugar Average: Last 72 hrs:     · Appears DM uncontrolled  · Home Medication:   · Lantus 38 units qHs  · Will order repeat HgbA1C  · Initiate Accu-Checks and SSI AC/HS  · Will restart Lantus but at 36 units qhs  · Hypoglycemic protocol  · Diabetic Diet

## 2022-09-19 NOTE — H&P
[facility] 1955, 78 y o  female MRN: [id number]  Unit/Bed#: HALEY Encounter: [id number]  Primary Care Provider: Franklin Dalal MD   Date and time admitted to hospital: 9/19/2022 11:55 AM    * Syncope  Assessment & Plan  Patient presented this afternoon after experiencing syncopal following >1hr on the commode with diarrhea episode  Patient stated she also noted blood in her stool  Patient awoke on the floor and called EMS  · Suspect possible orthostatsis with known history and possible vasovagal response following diarrhea episode vs r/o GI bleed in the setting of bloody diarrhea  · Patient presents dry on examination  · CTH/CT Spine: No acute intracranial abnormality  No cervical spine fracture or traumatic malalignment  · CT Chest: Mild interstitial pulmonary edema  · BNP on admission: 89  · HS Trops: 11->10  · S/p 1L NSS Bolus in EF  · Will Hold Home Torsemide  · C/w Light IVF  · EKG on admission: NSR  RBBB and LAFB  No significant changes from previous EKG  · Echo (2/2022): EF 67%  Diastolic function moderately abnormal, consistent with grade 2 relaxation  · Monitor on Telemetry overnight  · Will check Orthostatics  · HemOccult: Positive  · Hgb inder on admission: 14 9  · Monitor H&H q8 hrs  · Neuro Checks q4 hours  · Notify physician for any changes in mentation  · PT/OT consulted    Elevated d-dimer  Assessment & Plan  · POA  · D-dimer: 7 83  · CTA Chest: No pulmonary embolism     · Will Order B/L Vascular Duplex  · Initiate DVT prophylaxis    Chronic heart failure with preserved ejection fraction [facility])  Assessment & Plan  Wt Readings from Last 3 Encounters:   06/08/22 129 kg (284 lb 6 3 oz)   03/25/22 129 kg (284 lb)   02/19/22 123 kg (271 lb 2 7 oz)     · Does Not appear to be in acute exacerbation  · CT Chest: Mild interstitial edema  · BNP: 89  · Patient presenting dry  · Hold Home Medication:  · Torsemide/Spirolactone  · Monitor I/O's  · Daily Weights  · Cardiac Diet    Diabetes mellitus ([facility] [address] )  Assessment & Plan  Lab Results   Component Value Date    HGBA1C 11 9 (H) 01/29/2022       No results for input(s): POCGLU in the last 72 hours  Blood Sugar Average: Last 72 hrs:     · Appears DM uncontrolled  · Home Medication:   · Lantus 38 units qHs  · Will order repeat HgbA1C  · Initiate Accu-Checks and SSI AC/HS  · Will restart Lantus but at 36 units qhs  · Hypoglycemic protocol  · Diabetic Diet    Hypertension  Assessment & Plan  · BP reviewed and mildly elevated  · Home Medication:  · Spirolactone and Torsemide  · Hold in the setting of presenting dry on examination  · Will start low dose Hydralazine  · Continue to monitor BP closely    Hyperlipidemia  Assessment & Plan  · Home Medication:   · Zetia  · Per Chart review patient no longer taking  · Will check lipid panel    Chronic respiratory failure (HCC)  Assessment & Plan  · Per Patient, Chronically wears 3L NC at baseline  · Does not appear in acute respiratory distress  · Continue Respiratory protocol    Seizures (HCC)  Assessment & Plan  · Hx  · Continue Home Medication:  · Topomax    Morbid obesity due to excess calories (HCC)  Assessment & Plan  · Encouraged Lifestyle modifications    Acquired hypothyroidism  Assessment & Plan  · Continue Home Medication:  · Levothyroxine    Major depressive disorder  Assessment & Plan  · Continue Home medication:  · Prozac    GERD (gastroesophageal reflux disease)  Assessment & Plan  · Continue Home Medication:  · Protonix    VTE Pharmacologic Prophylaxis:   High Risk (Score >/= 5) - Pharmacological DVT Prophylaxis Ordered: heparin  Sequential Compression Devices Ordered  Code Status: Level 1 - Full Code   Discussion with family: Patient declined call to        Anticipated Length of Stay: Patient will be admitted on an observation basis with an anticipated length of stay of less than 2 midnights secondary to syncopal episode requring further evaluaiton and PT/OT consultaiton  Total Time for Visit, including Counseling / Coordination of Care: 60 minutes Greater than 50% of this total time spent on direct patient counseling and coordination of care  Chief Complaint: Syncopal Episode    History of Present Illness:  Shai Lamb is a 78 y o  female multiple comorbidities presents with complaints of syncopal episode  Patient stated that she had episode of diarrhea this morning around 10:00 a m  And she was on the commode for almost an hour  She walked out of the bathroom and she felt weak and lightheaded and dizzy and fell on the back and loss consciousness  Patient states that she might have lost consciousness for at least 5-10 minutes and she woke up and she pressed the Life Alert as she could not get up and walk  Patient states that she has been having viral illness for the past week with sore throat and cough and fever of for almost 1 week  Patient denies of blood per rectum  Patient denies of nausea or vomiting  Complains of abdominal pain which is generalized more around the umbilicus intensity of 54/40 and she received total of 8 mg of IV morphine  Patient denies of chest pain or shortness of breath  Patient also states she has gained almost 9 lb over the past day  Patient denies of palpitations  Also has lower extremity edema which is chronic and has been on torsemide 60 mg daily and has been compliant with diet and fluid restriction and medications  Patient was noted to have elevated D-dimer which was extremity elevated and PE scan was negative for PE   CT of the head and cervical spine showed no acute findings  Review of Systems:  Review of Systems   Constitutional: Positive for activity change and fatigue  HENT: Negative  Eyes: Negative  Respiratory: Positive for shortness of breath  Cardiovascular: Positive for leg swelling  Gastrointestinal: Positive for abdominal pain, diarrhea and nausea  Endocrine: Negative  Genitourinary: Negative  Musculoskeletal: Positive for gait problem and myalgias  Allergic/Immunologic: Negative  Hematological: Negative  Psychiatric/Behavioral: Negative  Past Medical and Surgical History:   Past Medical History:   Diagnosis Date    [facility]F [facility]congest[facility] failure) ([facility] )     Diabetes mellitus ([facility] )     Disease of thyroid gland     Hyperlipidemia     Hypertension     Hypothyroidism (acquired)     Restless leg syndrome     Seizures (HCC)        Past Surgical History:   Procedure Laterality Date    APPENDECTOMY      CHOLECYSTECTOMY      HIP FRACTURE SURGERY Right     TONSILLECTOMY      TUBAL LIGATION         Meds/Allergies:  Prior to Admission medications    Medication Sig Start Date End Date Taking?  Authorizing Provider   aspirin (ECOTRIN LOW STRENGTH) 81 mg EC tablet Take 81 mg by mouth daily   Yes Historical Provider, MD   docusate sodium (COLACE) 100 mg capsule Take 100 mg by mouth daily   Yes Historical Provider, MD   Ergocalciferol (VITAMIN D2 PO) Take by mouth   Yes Historical Provider, MD   FLUoxetine (PROzac) 20 mg capsule Take 3 capsules (60 mg total) by mouth daily 3/10/21  Yes Tenisha Claudio DO   gabapentin (NEURONTIN) 100 mg capsule Take 1 capsule (100 mg total) by mouth 2 (two) times a day Take 100 mg in the morning and 100 mg mid afternoon 3/30/21  Yes Lana Jack MD   gabapentin (NEURONTIN) 300 mg capsule Take 1 capsule (300 mg total) by mouth daily at bedtime 7/10/18  Yes SAMIA Sanderson   ibuprofen (MOTRIN) 400 mg tablet Take by mouth every 6 (six) hours as needed for mild pain   Yes Historical Provider, MD   nystatin (MYCOSTATIN) powder Apply topically 2 (two) times a day 2/19/22  Yes Jennifer Villeda MD   omeprazole (PriLOSEC) 20 mg delayed release capsule Take 20 mg by mouth daily   Yes Historical Provider, MD   potassium chloride (K-DUR,KLOR-CON) 20 mEq tablet Take 1 tablet (20 mEq total) by mouth daily 2/1/22  Yes SAMIA Mike spironolactone (ALDACTONE) 25 mg tablet Take 1 tablet (25 mg total) by mouth daily 2/19/22  Yes Lilliana Oliveros MD   topiramate (TOPAMAX) 100 mg tablet Take 200 mg by mouth 2 (two) times a day    Yes Historical Provider, MD   torsemide (DEMADEX) 20 mg tablet Take 2 tablets (40 mg total) by mouth daily 2/19/22  Yes Lilliana Oliveros MD   acetaminophen (TYLENOL) 325 mg tablet Take 650 mg by mouth every 6 (six) hours as needed for mild pain (pain score 1-3)  Patient not taking: Reported on 1/28/2022    Historical Provider, MD   Cholecalciferol 25 MCG (1000 UT) capsule Take 1,000 Units by mouth daily  Patient not taking: Reported on 3/25/2022    Historical Provider, MD   Empagliflozin 25 MG TABS Take 25 mg by mouth every morning  Patient not taking: Reported on 9/19/2022    Historical Provider, MD   ezetimibe (ZETIA) 10 mg tablet Take 10 mg by mouth daily  Patient not taking: Reported on 9/19/2022    Historical Provider, MD   hydrophilic ointment Apply topically as needed for dry skin    Historical Provider, MD   insulin aspart, w/niacinamide, (Fiasp FlexTouch) 100 Units/mL injection pen Inject under the skin 3 (three) times a day before meals Indications: per sliding scale  Patient not taking: Reported on 3/25/2022    Historical Provider, MD   insulin glargine (LANTUS) 100 units/mL subcutaneous injection Inject 36 Units under the skin daily at bedtime  Patient taking differently: Inject 38 Units under the skin daily at bedtime   10/25/21   Yon Goldstein MD   levothyroxine 50 mcg tablet Take 1 5 tablets (75 mcg total) by mouth daily 10/25/21 3/25/22  Yon Goldstein MD     I have reviewed home medications with patient personally  Allergies:    Allergies   Allergen Reactions    Pineapple - Food Allergy Anaphylaxis    Statins Other (See Comments)     Cramping from head to toe     Bupropion Other (See Comments)    Byetta 10 Mcg Pen [Exenatide]     Ceftriaxone Other (See Comments)    Marijuana [Dronabinol] GI Intolerance    Metformin Diarrhea     Other reaction(s): Unknown    Omeprazole Hives    Ondansetron      Other reaction(s): Unknown    Coconut Oil - Food Allergy Rash    Liraglutide Rash and Other (See Comments)     Generalized rash    Penicillins Rash       Social History:  Marital Status: Single   Patient Pre-hospital Living Situation: Home  Patient Pre-hospital Level of Mobility: walks    Substance Use History:   Social History     Substance and Sexual Activity   Alcohol Use Yes    Comment: occasional     Social History     Tobacco Use   Smoking Status Never Smoker   Smokeless Tobacco Never Used   Tobacco Comment    not a smoker     Social History     Substance and Sexual Activity   Drug Use Never       Family History:  Family History   Problem Relation Age of Onset    Brain cancer Mother     Seizures Father     Colon cancer Maternal Grandfather     No Known Problems Son     No Known Problems Son     Breast cancer Maternal Aunt 30       Physical Exam:     Vitals:   Blood Pressure: 151/72 (09/19/22 1554)  Pulse: 71 (09/19/22 1554)  Temperature: 97 6 °F (36 4 °C) (09/19/22 1201)  Temp Source: Oral (09/19/22 1201)  Respirations: 16 (09/19/22 1554)  SpO2: 98 % (09/19/22 1554)    Physical Exam   HEENT-PERRLA, dry oral mucosa  Neck-supple, no JVD elevation   Respiratory-bibasilar crackles heard  Cardiovascular system-S1, S2 heard, no murmur or gallops or rubs  Abdomen-soft, nontender, no guarding or rigidity, bowel sounds heard  Extremities-bilateral lower extremity edema present 2+   Peripheral pulses palpable  Musculoskeletal-no contractures  Central nervous system-no acute focal neurological deficit ,no sensory or motor deficit noted    Skin-no rash noted        Additional Data:     Lab Results:  Results from last 7 days   Lab Units 09/19/22  1240   WBC Thousand/uL 9 48   HEMOGLOBIN g/dL 14 9   HEMATOCRIT % 45 4   PLATELETS Thousands/uL 186   NEUTROS PCT % 82*   LYMPHS PCT % 11*   MONOS PCT % 4   EOS PCT % 2     Results from last 7 days   Lab Units 09/19/22  1240   SODIUM mmol/L 135   POTASSIUM mmol/L 3 4*   CHLORIDE mmol/L 96   CO2 mmol/L 31   BUN mg/dL 18   CREATININE mg/dL 0 88   ANION GAP mmol/L 8   CALCIUM mg/dL 9 1   ALBUMIN g/dL 3 8   TOTAL BILIRUBIN mg/dL 0 44   ALK PHOS U/L 107*   ALT U/L 11   AST U/L 12*   GLUCOSE RANDOM mg/dL 295*                 Results from last 7 days   Lab Units 09/19/22  1240   LACTIC ACID mmol/L 1 9       Imaging: Reviewed radiology reports from this admission including: chest CT scan, abdominal/pelvic CT and CT head  PE Study with CT abdomen & pelvis with contrast   Final Result by Milton Valerio MD (09/19 1507)      Cardiomegaly and mild interstitial pulmonary edema  No pulmonary embolism  No acute aortic dissection or intramural hematoma  Multiple diverticula within the descending colon without findings of diverticulitis  Normal caliber bowel without obstruction  Workstation performed: [id number]         CT head without contrast   Final Result by Shireen Aviles DO (09/19 1509)   No acute intracranial abnormality  Workstation performed: [id number]         CT spine cervical without contrast   Final Result by Wenceslao Mathis MD (09/19 1417)      No cervical spine fracture or traumatic malalignment  Workstation performed: [id number]         X-ray chest 1 view portable   Final Result by Roney Stienberg MD (09/19 1302)      No acute cardiopulmonary disease  Findings are stable            Workstation performed: [id number]         XR hip/pelv 2-3 vws right if performed   ED Interpretation by Dee Dee Bush DO (09/19 1243)   No acute orthopedic findings      Final Result by Roney Steinberg MD (09/19 1306)   Degenerative changes both hips      No acute osseous abnormality        Similar to prior study      Workstation performed: [id number]         VAS lower limb venous duplex study, complete bilateral    (Results Pending)       EKG and Other Studies Reviewed on Admission:   · EKG: NSR  HR 65  left and right bundle branch block present which is old  ** Please Note: This note has been constructed using a voice recognition system   **

## 2022-09-19 NOTE — ASSESSMENT & PLAN NOTE
Wt Readings from Last 3 Encounters:   06/08/22 129 kg (284 lb 6 3 oz)   03/25/22 129 kg (284 lb)   02/19/22 123 kg (271 lb 2 7 oz)     · Does Not appear to be in acute exacerbation  · CT Chest: Mild interstitial edema  · BNP: 89  · Patient presenting dry  · Hold Home Medication:  · Torsemide/Spirolactone  · Monitor I/O's  · Daily Weights  · Cardiac Diet

## 2022-09-19 NOTE — ASSESSMENT & PLAN NOTE
· POA  · D-dimer: 7 83  · CTA Chest: No pulmonary embolism     · Will Order B/L Vascular Duplex  · Initiate DVT prophylaxis

## 2022-09-19 NOTE — ASSESSMENT & PLAN NOTE
· Per Patient, Chronically wears 3L NC at baseline  · Does not appear in acute respiratory distress  · Continue Respiratory protocol

## 2022-09-19 NOTE — ASSESSMENT & PLAN NOTE
Patient presented this afternoon after experiencing syncopal following >1hr on the commode with diarrhea episode  Patient stated she also noted blood in her stool  Patient awoke on the floor and called EMS  · Suspect possible orthostatsis with known history and possible vasovagal response following diarrhea episode vs r/o GI bleed in the setting of bloody diarrhea  · Patient presents dry on examination  · CTH/CT Spine: No acute intracranial abnormality  No cervical spine fracture or traumatic malalignment  · CT Chest: Mild interstitial pulmonary edema  · BNP on admission: 89  · HS Trops: 11->10  · S/p 1L NSS Bolus in EF  · Will Hold Home Torsemide  · C/w Light IVF  · EKG on admission: NSR  RBBB and LAFB  No significant changes from previous EKG  · Echo (2/2022): EF 67%  Diastolic function moderately abnormal, consistent with grade 2 relaxation     · Monitor on Telemetry overnight  · Will check Orthostatics  · HemOccult: Positive  · Hgb inder on admission: 14 9  · Monitor H&H q8 hrs  · Neuro Checks q4 hours  · Notify physician for any changes in mentation  · PT/OT consulted

## 2022-09-20 ENCOUNTER — APPOINTMENT (OUTPATIENT)
Dept: VASCULAR ULTRASOUND | Facility: HOSPITAL | Age: 67
DRG: 866 | End: 2022-09-20
Payer: OTHER GOVERNMENT

## 2022-09-20 LAB
ANION GAP SERPL CALCULATED.3IONS-SCNC: 7 MMOL/L (ref 4–13)
BUN SERPL-MCNC: 14 MG/DL (ref 5–25)
CALCIUM SERPL-MCNC: 8.6 MG/DL (ref 8.4–10.2)
CHLORIDE SERPL-SCNC: 103 MMOL/L (ref 96–108)
CHOLEST SERPL-MCNC: 188 MG/DL
CO2 SERPL-SCNC: 29 MMOL/L (ref 21–32)
CREAT SERPL-MCNC: 0.67 MG/DL (ref 0.6–1.3)
ERYTHROCYTE [DISTWIDTH] IN BLOOD BY AUTOMATED COUNT: 12.7 % (ref 11.6–15.1)
EST. AVERAGE GLUCOSE BLD GHB EST-MCNC: 292 MG/DL
GFR SERPL CREATININE-BSD FRML MDRD: 91 ML/MIN/1.73SQ M
GLUCOSE P FAST SERPL-MCNC: 119 MG/DL (ref 65–99)
GLUCOSE SERPL-MCNC: 119 MG/DL (ref 65–140)
GLUCOSE SERPL-MCNC: 123 MG/DL (ref 65–140)
GLUCOSE SERPL-MCNC: 127 MG/DL (ref 65–140)
GLUCOSE SERPL-MCNC: 134 MG/DL (ref 65–140)
GLUCOSE SERPL-MCNC: 141 MG/DL (ref 65–140)
GLUCOSE SERPL-MCNC: 160 MG/DL (ref 65–140)
HBA1C MFR BLD: 11.8 %
HCT VFR BLD AUTO: 38.2 % (ref 34.8–46.1)
HDLC SERPL-MCNC: 33 MG/DL
HGB BLD-MCNC: 12.5 G/DL (ref 11.5–15.4)
LDLC SERPL CALC-MCNC: 124 MG/DL (ref 0–100)
MCH RBC QN AUTO: 30.4 PG (ref 26.8–34.3)
MCHC RBC AUTO-ENTMCNC: 32.7 G/DL (ref 31.4–37.4)
MCV RBC AUTO: 93 FL (ref 82–98)
NONHDLC SERPL-MCNC: 155 MG/DL
PLATELET # BLD AUTO: 164 THOUSANDS/UL (ref 149–390)
PMV BLD AUTO: 11.1 FL (ref 8.9–12.7)
POTASSIUM SERPL-SCNC: 3.4 MMOL/L (ref 3.5–5.3)
RBC # BLD AUTO: 4.11 MILLION/UL (ref 3.81–5.12)
SODIUM SERPL-SCNC: 139 MMOL/L (ref 135–147)
TRIGL SERPL-MCNC: 156 MG/DL
WBC # BLD AUTO: 5.96 THOUSAND/UL (ref 4.31–10.16)

## 2022-09-20 PROCEDURE — 85027 COMPLETE CBC AUTOMATED: CPT | Performed by: INTERNAL MEDICINE

## 2022-09-20 PROCEDURE — 93970 EXTREMITY STUDY: CPT

## 2022-09-20 PROCEDURE — 80061 LIPID PANEL: CPT | Performed by: INTERNAL MEDICINE

## 2022-09-20 PROCEDURE — 93970 EXTREMITY STUDY: CPT | Performed by: SURGERY

## 2022-09-20 PROCEDURE — 97167 OT EVAL HIGH COMPLEX 60 MIN: CPT

## 2022-09-20 PROCEDURE — 97163 PT EVAL HIGH COMPLEX 45 MIN: CPT

## 2022-09-20 PROCEDURE — 82948 REAGENT STRIP/BLOOD GLUCOSE: CPT

## 2022-09-20 PROCEDURE — 80048 BASIC METABOLIC PNL TOTAL CA: CPT | Performed by: INTERNAL MEDICINE

## 2022-09-20 PROCEDURE — 99232 SBSQ HOSP IP/OBS MODERATE 35: CPT | Performed by: PHYSICIAN ASSISTANT

## 2022-09-20 PROCEDURE — 99285 EMERGENCY DEPT VISIT HI MDM: CPT | Performed by: EMERGENCY MEDICINE

## 2022-09-20 RX ORDER — FUROSEMIDE 10 MG/ML
40 INJECTION INTRAMUSCULAR; INTRAVENOUS ONCE
Status: COMPLETED | OUTPATIENT
Start: 2022-09-20 | End: 2022-09-20

## 2022-09-20 RX ORDER — POTASSIUM CHLORIDE 20 MEQ/1
20 TABLET, EXTENDED RELEASE ORAL ONCE
Status: COMPLETED | OUTPATIENT
Start: 2022-09-20 | End: 2022-09-20

## 2022-09-20 RX ORDER — ALBUTEROL SULFATE 2.5 MG/3ML
2.5 SOLUTION RESPIRATORY (INHALATION) EVERY 6 HOURS PRN
Status: DISCONTINUED | OUTPATIENT
Start: 2022-09-20 | End: 2022-09-22 | Stop reason: HOSPADM

## 2022-09-20 RX ADMIN — HEPARIN SODIUM 5000 UNITS: 5000 INJECTION INTRAVENOUS; SUBCUTANEOUS at 21:43

## 2022-09-20 RX ADMIN — PANTOPRAZOLE SODIUM 40 MG: 40 TABLET, DELAYED RELEASE ORAL at 05:34

## 2022-09-20 RX ADMIN — ASPIRIN 81 MG: 81 TABLET, COATED ORAL at 08:47

## 2022-09-20 RX ADMIN — POTASSIUM CHLORIDE 20 MEQ: 1500 TABLET, EXTENDED RELEASE ORAL at 08:47

## 2022-09-20 RX ADMIN — ONDANSETRON 4 MG: 2 INJECTION INTRAMUSCULAR; INTRAVENOUS at 11:04

## 2022-09-20 RX ADMIN — TOPIRAMATE 200 MG: 100 TABLET, FILM COATED ORAL at 17:19

## 2022-09-20 RX ADMIN — INSULIN GLARGINE 36 UNITS: 100 INJECTION, SOLUTION SUBCUTANEOUS at 21:44

## 2022-09-20 RX ADMIN — TOPIRAMATE 200 MG: 100 TABLET, FILM COATED ORAL at 08:48

## 2022-09-20 RX ADMIN — GABAPENTIN 100 MG: 100 CAPSULE ORAL at 08:48

## 2022-09-20 RX ADMIN — HEPARIN SODIUM 5000 UNITS: 5000 INJECTION INTRAVENOUS; SUBCUTANEOUS at 15:10

## 2022-09-20 RX ADMIN — FLUOXETINE 60 MG: 20 CAPSULE ORAL at 08:48

## 2022-09-20 RX ADMIN — POTASSIUM CHLORIDE 20 MEQ: 1500 TABLET, EXTENDED RELEASE ORAL at 08:48

## 2022-09-20 RX ADMIN — INSULIN LISPRO 1 UNITS: 100 INJECTION, SOLUTION INTRAVENOUS; SUBCUTANEOUS at 13:31

## 2022-09-20 RX ADMIN — Medication 1000 UNITS: at 08:47

## 2022-09-20 RX ADMIN — FUROSEMIDE 40 MG: 10 INJECTION, SOLUTION INTRAMUSCULAR; INTRAVENOUS at 13:32

## 2022-09-20 RX ADMIN — HEPARIN SODIUM 5000 UNITS: 5000 INJECTION INTRAVENOUS; SUBCUTANEOUS at 05:34

## 2022-09-20 RX ADMIN — LEVOTHYROXINE SODIUM 75 MCG: 75 TABLET ORAL at 05:34

## 2022-09-20 RX ADMIN — GABAPENTIN 300 MG: 300 CAPSULE ORAL at 21:44

## 2022-09-20 RX ADMIN — GABAPENTIN 100 MG: 100 CAPSULE ORAL at 17:18

## 2022-09-20 NOTE — PLAN OF CARE
Problem: MOBILITY - ADULT  Goal: Maintain or return to baseline ADL function  Description: INTERVENTIONS:  -  Assess patient's ability to carry out ADLs; assess patient's baseline for ADL function and identify physical deficits which impact ability to perform ADLs (bathing, care of mouth/teeth, toileting, grooming, dressing, etc )  - Assess/evaluate cause of self-care deficits   - Assess range of motion  - Assess patient's mobility; develop plan if impaired  - Assess patient's need for assistive devices and provide as appropriate  - Encourage maximum independence but intervene and supervise when necessary  - Involve family in performance of ADLs  - Assess for home care needs following discharge   - Consider OT consult to assist with ADL evaluation and planning for discharge  - Provide patient education as appropriate  Outcome: Progressing  Goal: Maintains/Returns to pre admission functional level  Description: INTERVENTIONS:  - Perform BMAT or MOVE assessment daily    - Set and communicate daily mobility goal to care team and patient/family/caregiver  - Collaborate with rehabilitation services on mobility goals if consulted  - Perform Range of Motion 3 times a day  - Reposition patient every 2 hours    - Dangle patient 3 times a day  - Stand patient 3 times a day  - Ambulate patient 3 times a day  - Out of bed to chair 3 times a day   - Out of bed for meals 3 times a day  - Out of bed for toileting  - Record patient progress and toleration of activity level   Outcome: Progressing     Problem: PAIN - ADULT  Goal: Verbalizes/displays adequate comfort level or baseline comfort level  Description: Interventions:  - Encourage patient to monitor pain and request assistance  - Assess pain using appropriate pain scale  - Administer analgesics based on type and severity of pain and evaluate response  - Implement non-pharmacological measures as appropriate and evaluate response  - Consider cultural and social influences on pain and pain management  - Notify physician/advanced practitioner if interventions unsuccessful or patient reports new pain  Outcome: Progressing     Problem: INFECTION - ADULT  Goal: Absence or prevention of progression during hospitalization  Description: INTERVENTIONS:  - Assess and monitor for signs and symptoms of infection  - Monitor lab/diagnostic results  - Monitor all insertion sites, i e  indwelling lines, tubes, and drains  - Monitor endotracheal if appropriate and nasal secretions for changes in amount and color  - [place] appropriate cooling/warming therapies per order  - Administer medications as ordered  - Instruct and encourage patient and family to use good hand hygiene technique  - Identify and instruct in appropriate isolation precautions for identified infection/condition  Outcome: Progressing  Goal: Absence of fever/infection during neutropenic period  Description: INTERVENTIONS:  - Monitor WBC    Outcome: Progressing     Problem: SAFETY ADULT  Goal: Maintain or return to baseline ADL function  Description: INTERVENTIONS:  -  Assess patient's ability to carry out ADLs; assess patient's baseline for ADL function and identify physical deficits which impact ability to perform ADLs (bathing, care of mouth/teeth, toileting, grooming, dressing, etc )  - Assess/evaluate cause of self-care deficits   - Assess range of motion  - Assess patient's mobility; develop plan if impaired  - Assess patient's need for assistive devices and provide as appropriate  - Encourage maximum independence but intervene and supervise when necessary  - Involve family in performance of ADLs  - Assess for home care needs following discharge   - Consider OT consult to assist with ADL evaluation and planning for discharge  - Provide patient education as appropriate  Outcome: Progressing  Goal: Maintains/Returns to pre admission functional level  Description: INTERVENTIONS:  - Perform BMAT or MOVE assessment daily    - Set and communicate daily mobility goal to care team and patient/family/caregiver  - Collaborate with rehabilitation services on mobility goals if consulted  - Perform Range of Motion 3 times a day  - Reposition patient every 2 hours    - Dangle patient 3 times a day  - Stand patient 3 times a day  - Ambulate patient 3 times a day  - Out of bed to chair 3 times a day   - Out of bed for meals 3 times a day  - Out of bed for toileting  - Record patient progress and toleration of activity level   Outcome: Progressing  Goal: Patient will remain free of falls  Description: INTERVENTIONS:  - Educate patient/family on patient safety including physical limitations  - Instruct patient to call for assistance with activity   - Consult OT/PT to assist with strengthening/mobility   - Keep Call bell within reach  - Keep bed low and locked with side rails adjusted as appropriate  - Keep care items and personal belongings within reach  - Initiate and maintain comfort rounds  - Make Fall Risk Sign visible to staff  - Offer Toileting every 2 Hours, in advance of need  - Initiate/Maintain bed alarm  - Obtain necessary fall risk management equipment:   - Apply yellow socks and bracelet for high fall risk patients  - Consider moving patient to room near nurses station  Outcome: Progressing     Problem: DISCHARGE PLANNING  Goal: Discharge to home or other facility with appropriate resources  Description: INTERVENTIONS:  - Identify barriers to discharge w/patient and caregiver  - Arrange for needed discharge resources and transportation as appropriate  - Identify discharge learning needs (meds, wound care, etc )  - Arrange for interpretive services to assist at discharge as needed  - Refer to Case Management Department for coordinating discharge planning if the patient needs post-hospital services based on physician/advanced practitioner order or complex needs related to functional status, cognitive ability, or social support system  Outcome: Progressing     Problem: Knowledge Deficit  Goal: Patient/family/caregiver demonstrates understanding of disease process, treatment plan, medications, and discharge instructions  Description: Complete learning assessment and assess knowledge base    Interventions:  - Provide teaching at level of understanding  - Provide teaching via preferred learning methods  Outcome: Progressing

## 2022-09-20 NOTE — OCCUPATIONAL THERAPY NOTE
Occupational Therapy Cancellation Note     Patient Name: Roxanna Baumgarten [id number] Date: 9/20/2022 09/20/22 1006   OT Last Visit   OT Visit Date 09/20/22   Note Type   Note type Cancelled Session   Cancel Reasons Refusal   Additional Comments OT consult recieved and chart reviewed  RN verbalized pt appropriate to see  Pt declining therapy at this time, requesting rest d/t working c multiple staff members all morning   Agreeable to session later in PM  Will f/u for OT evaluation   Cole Finn OT

## 2022-09-20 NOTE — OCCUPATIONAL THERAPY NOTE
Occupational Therapy Evaluation      Wenceslao Sawant    9/20/2022    Patient Active Problem List   Diagnosis    Seizures ([place] [address] )    Restless leg syndrome    Hypertension    Hyperlipidemia    Diabetes mellitus (HCC)    GERD (gastroesophageal reflux disease)    Epigastric abdominal pain    Encounter for examination and observation for other specified reasons    Severe episode of recurrent major depressive disorder, without psychotic features ([place] [address] )    Gambling disorder, episodic, moderate    Suspected COVID-19 virus infection    Major depressive disorder    Acquired hypothyroidism    Diarrhea    Asymptomatic bacteriuria    Chronic fatigue and malaise    Calf tenderness    Morbid obesity due to excess calories ([facility])    Onychomycosis of toenail    Orthostatic hypotension    Primary osteoarthritis of one hip, right    Dyspnea on exertion    Chronic heart failure with preserved ejection fraction (HCC)    Chronic respiratory failure (HCC)    Coronary artery disease involving native coronary artery of native heart without angina pectoris    Syncope    Elevated d-dimer       Past Medical History:   Diagnosis Date    CHF (congestive heart failure) (HCC)     Diabetes mellitus ([place] [address] )     Disease of thyroid gland     Hyperlipidemia     Hypertension     Hypothyroidism (acquired)     Restless leg syndrome     Seizures ([place] [address] )        Past Surgical History:   Procedure Laterality Date    APPENDECTOMY      CHOLECYSTECTOMY      HIP FRACTURE SURGERY Right     TONSILLECTOMY      TUBAL LIGATION          09/20/22 1317   OT Last Visit   OT Visit Date 09/20/22   Note Type   Note type Evaluation   Restrictions/Precautions   Weight Bearing Precautions Per Order No   Other Precautions Chair Alarm; Bed Alarm; Fall Risk;Pain;O2;Telemetry  (2L O2 via NC, worn at baseline)   Pain Assessment   Pain Assessment Tool 0-10   Pain Score 4   Pain Location/Orientation Location: Abdomen;Orientation: Lower   Pain Onset/Description Onset: Ongoing; Descriptor: Discomfort; Other (Comment)  (nausea)   Effect of Pain on Daily Activities comfort, activity tolerance   Hospital Pain Intervention(s) Medication (See MAR); Ambulation/increased activity; Emotional support;Repositioned  (zofran from RN prior to session)   Home Living   Type of [address] level;Performs ADLs on one level; Able to live on main level with bedroom/bathroom; Access; Elevator  (0 JOSE, elevator to apartment  )   [employer]  (pt reports having tub "made into" a walk in shower )   [address] [place] [address] chair;Grab bars in shower;Hand-held shower  (used to have TTB, has shower chair now )   [address] aid;Life alert  (rollator used for community mobility, no AD within the home  Pt endorses furniture surfing)   Additional Comments Laundry in basement (accessible via elevator) pt reports putting laundry basket onto rollator and pushing downstairs  Prior Function   Level of [place] Independent with ADLs and functional mobility; Needs assistance with IADLs   Lives With (S)  Alone   Receives Help From Home health  (HHPT per pt, HHA 2x/weel for 2hr/each)   ADL Assistance Needs assistance  ((I) spongebathes , A to get into shower  Mod (I) c LHAE for LB dressing)   IADLs Needs assistance  ((I) med managmenet, (I) laundry  A c cleaning , cooking, home management  Joel Peoples for transportation)   Falls in the last 6 months 1 to 4  (1 syncope event leading to admission, denies others)   Vocational Unemployed   Comments (-) driving, pt utilizes [employer] and has delivery groceries  HHA assits with home management, meals  Pt reports spongebathing (I), HHA assists c showering 2x/week  Wears 2L O2 at baseline, however reports "I haven't worn in a few weeks because I felt like I didn't need it anymore"  Reports having home PT   Lifestyle   [employer] PTA, pt reports being mod (I) c rollator, A c ADLs and IADLs   Lives alone in 1 story apartment c elevator access  Utilizes Gem Arguello, (+) HHA, (+) HHPT   Reciprocal Relationships children   Service to Others unemployed   ADL   Eating Assistance 5  Supervision/Setup   Grooming Assistance 5  Supervision/Setup   [address] 5  Supervision/Setup    [address] [facility] [address] [facility] [address]  [address] 4  Minimal Assistance   Additional Comments Pt limited by decreased activity tolerance, lightheaded/dizziness c activity, generalized weakness, Functional reach for LB ADLs limited by body habitus  Bed Mobility   Supine to Sit 5  Supervision   Additional items Increased time required;HOB elevated  (+ effort)   Sit to Supine 5  Supervision   Additional items Increased time required  (bed flat, able to boost self to [facility] ())   Transfers   Sit to Stand 4  Minimal assistance  (CGA)   Additional items Assist x 1; Increased time required;Verbal cues  (pt utilized momentum for initiation of transfer, multiple attempts prior to successful sit>stand)   Stand to Sit 4  Minimal assistance  (CGA)   Additional items Verbal cues; Increased time required;Assist x 1  (VC for proper hand placement)   Stand pivot 4  Minimal assistance   Additional items Assist x 1; Increased time required;Verbal cues   Additional Comments multiple sit<>stand transfers completed throughout session, initally requiring CGA from bed surface, however transitionned to min A to achieve safe sitting surface on rollator following onset of dizziness/lightheadness during mobility trial    Functional Mobility   Functional Mobility 4  Minimal assistance   Additional Comments Functional mobility short household distance bed <>door requiring functional rest break mid way  Trialed RA, SpO2 mid 90s at rest, as low as 84% following mobility  Increased to 90s c 2L O2 re palced   During mobility Pt c c/o dizziness and lightheadedness, BP WNL, suspect d/t hypoxia, RT Nelson Fam and DEVAN Meadows made aware  Additional items   (rollator)   Balance   Static Sitting Good   Dynamic Sitting Fair +   Static Standing Fair   Dynamic Standing Fair -   Activity Tolerance   Activity Tolerance Patient limited by fatigue;Patient limited by pain;Treatment limited secondary to medical complications (Comment)  (dizziness/lightheadedness)   Medical Staff Made Aware Pt benefited from co-evaluation of skilled OT and PT therapists in order to most appropriately address functional deficits d/t extensive assistance required for safe functional mobility, decreased activity tolerance, and regression from functioning level prior to admission and/or onset of present illness  OT/PT objectives were addressed separately; please see PT note for specific goal areas targeted  Claudio Vázquez, RT Nelson Fam, PT CM)   Nurse Made Aware DEVAN Meadows pre/post session   RUE Assessment   RUE Assessment WFL  (MMT grossly 4/5 based on functional assessment)   LUE Assessment   LUE Assessment WFL  (MMT grossly 4/5 based on functional assessment)   Hand Function   Gross Motor Coordination Functional   Fine Motor Coordination Functional   Sensation   Light Touch No apparent deficits   Cognition   Overall Cognitive Status WFL   Arousal/Participation Alert   Attention Within functional limits   Orientation Level Oriented X4   Memory Within functional limits   Following Commands Follows one step commands without difficulty   Comments Pt agreeable to OT session + OOB mobility c encouragement  Cooperative throughout session c continued motivation throughout session   Assessment   Limitation Decreased ADL status; Decreased UE strength;Decreased Safe judgement during ADL;Decreased endurance;Decreased self-care trans;Decreased high-level ADLs   Prognosis Good   Assessment Patient is a 78 y o  female seen for OT evaluation at [facility] following admission on 9/19/2022  s/p Syncope  Comorbidities impacting functional performance include: seizures, , HTN, HLD, DM, GERD, MDD, acquired hypothyroidism, diarrhea, morbid obesity, CHFcEF, CRF, elevated d-dimer  Patient presents with active orders for OT eval and treat and up and OOB as tolerated   Performed at least 2 patient identifiers during session including name and wristband  PTA, pt reports being mod (I) c rollator, A c ADLs and IADLs  Lives alone in 1 story apartment c elevator access  Utilizes Akila Marshall, (+) HHA, (+) HHPT Upon initial evaluation, patient requires supervision for UB ADLs, min assistance for LB ADLs, and CGA/Min A for transfers and functional mobility within room and bathroom with rollator  Based on functional eval, patient presents A&Ox4 with intact  attention, impaired  safety awareness, and intact  short term and long term memory  Occupational performance is affected by the following deficits:  decreased muscular strength , decreased standing tolerance for self care tasks , decreased dynamic balance impacting functional reach, decreased trunk control  and (+) pain  Based on these findings, functional performance deficits, and medical acuity pt has been identified as a high complexity evaluation  Personal factors impacting performance at the time of evaluation include: decreased (+) Hx of falls , decreased social/family support within the home and decreased IADL independence  Patient would benefit from OT services to maximize level of functional independence and safety in self-care and transfers  Occupational areas to address include:bathing/showering, toileting, dressing , personal hygiene/grooming , bed mobility , functional mobility, transfer to all surfaces, safety procedures , fall prevention  and energy conservation    From OT standpoint, recommendation at time of D/C would be return to previous environment with home health rehabilitation pending anticipated progress c functional mobility and ADL tasks    Goals   Patient Goals to return home, to be able to return home c continued home therapy   Plan   Treatment Interventions ADL retraining;Functional transfer training;UE strengthening/ROM; Endurance training;Patient/family training;Equipment evaluation/education; Energy conservation; Activityengagement; Compensatory technique education   Goal Expiration Date 09/30/22   OT Treatment Day 0   OT Frequency 2-3x/wk   Recommendation   OT Discharge Recommendation Home with home health rehabilitation  (pending anticipated progress c functional mobility and ADL independence)   Additional Comments  Pt seated in bed at end of session, encouragement for OOB to recliner throughout hospitalization  OOB mobility encouraged throughout hospitalization to continue to improve functional independence  BSC placed at bedside, michael guzman RN and pt approval, pt encouraged to ambulate to [facility] vs bathroom for all toileting needs to improve functional status  Additional Comments 2 The patient's raw score on the AM-PAC Daily Activity inpatient short form is 19, standardized score is 40 22, greater than 39 4  Patients at this level are likely to benefit from discharge to home  Please refer to the recommendation of the Occupational Therapist for safe discharge planning     AM-PAC Daily Activity Inpatient   Lower Body Dressing 3   Bathing 3   Toileting 3   Upper Body Dressing 3   Grooming 3   Eating 4   Daily Activity Raw Score 19   Daily Activity Standardized Score (Calc for Raw Score >=11) 40 22   AM-PAC Applied Cognition Inpatient   Following a Speech/Presentation 4   Understanding Ordinary Conversation 4   Taking Medications 4   Remembering Where Things Are Placed or Put Away 4   Remembering List of 4-5 Errands 4   Taking Care of Complicated Tasks 3   Applied Cognition Raw Score 23   Applied Cognition Standardized Score 53 08   Pt will complete UB ADLs Independent  with use of AE as needed for increased ADL independence within 10 days      Pt will complete LB ADLs Mod independent   with use of AE as needed for increased ADL independence within 10 days  Pt will complete toileting Mod independent   with use of DME for increased ADL independence within 10 days  Pt will demonstrate proper body mechanics to complete transfers and functional mobility with Mod independent  and use of AD for increased safety and functional mobility within 10 days  Pt will demonstrate standing tolerance of 5 min with Mod independent  and use of AD for increased endurance and activity tolerance within 10 days  Pt will demonstrate OOB sitting tolerance of 2-4 hr/day for increased activity tolerance and engagement within 10 days  Pt will demonstrate proper body mechanics and fall prevention strategies during 100% of tx sessions for increased safety awareness during ADL/IADLs    Pt will verbalize and demonstrate understanding of energy conservation/deep breathing techniques for increased activity tolerance and endurance during meaningful activities       Shikha Light, OT

## 2022-09-20 NOTE — ASSESSMENT & PLAN NOTE
Patient presented this afternoon after experiencing syncopal following >1hr on the commode with diarrhea episode  Patient stated she also noted blood in her stool  Patient awoke on the floor and called EMS  · CTH/CT Spine: No acute intracranial abnormality  No cervical spine fracture or traumatic malalignment  · HS Trops: 11->10  EKG without signs of ischemia  · UA unremarkable  · Suspect orthostasis with known history vs vasovagal response following diarrhea episode  · S/p 1L NSS Bolus in ED, maintained on gentle IV fluids as patient was clinically dry on arrival   · Positive orthostatics 9/19, negative orthostatics today    · D/C IVFs  · Neuro Checks q4 hours, notify provider for any changes in mentation  · PT/OT consulted

## 2022-09-20 NOTE — ED PROVIDER NOTES
History  Chief Complaint   Patient presents with    Syncope     Pt arrived via [facility], pt states having abdominal pain lower left and right quad  Pt expressed going to the bathroom upon waking up pt fell  Pt unaware if she hit her head  Denies any head or neck pain  Chest pain present  Right hip pain present      77 y/o female history of diastolic CHF, diabetes, hypertension, hyperlipidemia, hypothyroidism presents to the emergency department with multiple complaints  Patient states that she is having abdominal pain in the lower abdomen, diarrhea that is nonbloody and not black, nausea  Also notes chest pain located in the center of the chest   Feels like a pressure  Present for greater than 1 day  Patient is going to the bathroom  Had an episode of diarrhea  Upon walking back from the bathroom patient had a syncopal episode  Awoke on the ground  Complaining of right hip pain  Patient lives alone  Syncope  Episode history:  Single  Most recent episode: Today  Timing:  Rare  Progression:  Unchanged  Chronicity:  New  Witnessed: no    Relieved by:  Nothing  Worsened by:  Nothing  Ineffective treatments:  None tried  Associated symptoms: nausea    Abdominal Pain  Pain location:  Suprapubic, LLQ and RLQ  Pain quality: aching    Pain radiates to:  Does not radiate  Pain severity:  Severe  Onset quality:  Gradual  Associated symptoms: diarrhea and nausea        Prior to Admission Medications   Prescriptions Last Dose Informant Patient Reported? Taking? Cholecalciferol 25 MCG (1000 UT) capsule  Self Yes No   Sig: Take 1,000 Units by mouth daily     Patient not taking: Reported on 3/25/2022   Empagliflozin 25 MG TABS Not Taking at Unknown time Self Yes No   Sig: Take 25 mg by mouth every morning   Patient not taking: Reported on 9/19/2022   Ergocalciferol (VITAMIN D2 PO) 9/18/2022 at Unknown time Self Yes Yes   Sig: Take by mouth   FLUoxetine (PROzac) 20 mg capsule 9/18/2022 at Unknown time Self No Yes   Sig: Take 3 capsules (60 mg total) by mouth daily   acetaminophen (TYLENOL) 325 mg tablet  Self Yes No   Sig: Take 650 mg by mouth every 6 (six) hours as needed for mild pain (pain score 1-3)  Patient not taking: Reported on 1/28/2022   aspirin (ECOTRIN LOW STRENGTH) 81 mg EC tablet 9/18/2022 at Unknown time Self Yes Yes   Sig: Take 81 mg by mouth daily   docusate sodium (COLACE) 100 mg capsule 9/18/2022 at Unknown time Self Yes Yes   Sig: Take 100 mg by mouth daily   ezetimibe (ZETIA) 10 mg tablet Not Taking at Unknown time Self Yes No   Sig: Take 10 mg by mouth daily   Patient not taking: Reported on 9/19/2022   gabapentin (NEURONTIN) 100 mg capsule 9/18/2022 at Unknown time Self No Yes   Sig: Take 1 capsule (100 mg total) by mouth 2 (two) times a day Take 100 mg in the morning and 100 mg mid afternoon   gabapentin (NEURONTIN) 300 mg capsule 9/18/2022 at Unknown time Self No Yes   Sig: Take 1 capsule (300 mg total) by mouth daily at bedtime   hydrophilic ointment  Self Yes No   Sig: Apply topically as needed for dry skin   ibuprofen (MOTRIN) 400 mg tablet 9/18/2022 at Unknown time Self Yes Yes   Sig: Take by mouth every 6 (six) hours as needed for mild pain   insulin aspart, w/niacinamide, (Fiasp FlexTouch) 100 Units/mL injection pen  Self Yes No   Sig: Inject under the skin 3 (three) times a day before meals Indications: per sliding scale     Patient not taking: Reported on 3/25/2022   insulin glargine (LANTUS) 100 units/mL subcutaneous injection  Self No No   Sig: Inject 36 Units under the skin daily at bedtime   Patient taking differently: Inject 38 Units under the skin daily at bedtime     levothyroxine 50 mcg tablet  Self No No   Sig: Take 1 5 tablets (75 mcg total) by mouth daily   nystatin (MYCOSTATIN) powder 9/18/2022 at Unknown time Self No Yes   Sig: Apply topically 2 (two) times a day   omeprazole (PriLOSEC) 20 mg delayed release capsule 9/18/2022 at Unknown time Self Yes Yes   Sig: Take 20 mg by mouth daily   potassium chloride (K-DUR,KLOR-CON) 20 mEq tablet 9/18/2022 at Unknown time Self No Yes   Sig: Take 1 tablet (20 mEq total) by mouth daily   spironolactone (ALDACTONE) 25 mg tablet 9/18/2022 at Unknown time Self No Yes   Sig: Take 1 tablet (25 mg total) by mouth daily   topiramate (TOPAMAX) 100 mg tablet 9/18/2022 at Unknown time Self Yes Yes   Sig: Take 200 mg by mouth 2 (two) times a day    torsemide (DEMADEX) 20 mg tablet 9/18/2022 at Unknown time Self No Yes   Sig: Take 2 tablets (40 mg total) by mouth daily      Facility-Administered Medications: None       Past Medical History:   Diagnosis Date    CHF (congestive heart failure) (HCC)     Diabetes mellitus ([facility] [address] )     Disease of thyroid gland     Hyperlipidemia     Hypertension     Hypothyroidism (acquired)     Restless leg syndrome     Seizures (HCC)        Past Surgical History:   Procedure Laterality Date    APPENDECTOMY      CHOLECYSTECTOMY      HIP FRACTURE SURGERY Right     TONSILLECTOMY      TUBAL LIGATION         Family History   Problem Relation Age of Onset    Brain cancer Mother     Seizures Father     Colon cancer Maternal Grandfather     No Known Problems Son     No Known Problems Son     Breast cancer Maternal Aunt 30     I have reviewed and agree with the history as documented  E-Cigarette/Vaping    E-Cigarette Use Never User      E-Cigarette/Vaping Substances    Nicotine No     THC No     CBD No     Flavoring No     Other No     Unknown No      Social History     Tobacco Use    Smoking status: Never Smoker    Smokeless tobacco: Never Used    Tobacco comment: not a smoker   Vaping Use    Vaping Use: Never used   Substance Use Topics    Alcohol use: Yes     Comment: occasional    Drug use: Never       Review of Systems   Cardiovascular: Positive for syncope  Gastrointestinal: Positive for abdominal pain, diarrhea and nausea  Neurological: Positive for syncope     All other systems reviewed and are negative  Physical Exam  Physical Exam  Vitals and nursing note reviewed  Constitutional:       General: She is in acute distress  Appearance: Normal appearance  She is obese  She is not ill-appearing  Comments: On 1 5L NC     HENT:      Head: Normocephalic and atraumatic  Right Ear: External ear normal       Left Ear: External ear normal       Nose: Nose normal       Mouth/Throat:      Mouth: Mucous membranes are moist    Eyes:      General:         Right eye: No discharge  Left eye: No discharge  Conjunctiva/sclera: Conjunctivae normal    Cardiovascular:      Rate and Rhythm: Normal rate and regular rhythm  Pulses: Normal pulses  Heart sounds: No murmur heard  Pulmonary:      Effort: Pulmonary effort is normal       Breath sounds: Normal breath sounds  Abdominal:      General: Abdomen is flat  There is no distension  Tenderness: There is no abdominal tenderness  Musculoskeletal:         General: Normal range of motion  Cervical back: Normal range of motion  Right lower leg: Edema present  Left lower leg: Edema present  Skin:     General: Skin is warm  Capillary Refill: Capillary refill takes less than 2 seconds  Findings: No rash  Neurological:      General: No focal deficit present  Mental Status: She is alert  Mental status is at baseline     Psychiatric:         Mood and Affect: Mood normal          Behavior: Behavior normal          Vital Signs  ED Triage Vitals   Temperature Pulse Respirations Blood Pressure SpO2   09/19/22 1201 09/19/22 1201 09/19/22 1201 09/19/22 1201 09/19/22 1201   97 6 °F (36 4 °C) 74 18 149/95 100 %      Temp Source Heart Rate Source Patient Position - Orthostatic VS BP Location FiO2 (%)   09/19/22 1201 09/19/22 1201 09/19/22 1201 09/19/22 1201 --   Oral Monitor Lying Left arm       Pain Score       09/19/22 1251       10 - Worst Possible Pain           Vitals:    09/20/22 0704 09/20/22 1055 09/20/22 1059 09/20/22 1101   BP: 130/72 152/94 161/90 (!) 163/103   Pulse: 69 75 71 78   Patient Position - Orthostatic VS: Lying Lying - Orthostatic VS Sitting - Orthostatic VS Standing - Orthostatic VS         Visual Acuity  Visual Acuity    Flowsheet Row Most Recent Value   L Pupil Size (mm) 2   R Pupil Size (mm) 2   L Pupil Shape Round   R Pupil Shape Round          ED Medications  Medications   sodium chloride (PF) 0 9 % injection 3 mL (has no administration in time range)   acetaminophen (TYLENOL) tablet 650 mg (has no administration in time range)   ondansetron (ZOFRAN) injection 4 mg (4 mg Intravenous Given 9/20/22 1104)   heparin (porcine) subcutaneous injection 5,000 Units (5,000 Units Subcutaneous Given 9/20/22 0534)   aspirin (ECOTRIN LOW STRENGTH) EC tablet 81 mg (81 mg Oral Given 9/20/22 0847)   cholecalciferol (VITAMIN D3) tablet 1,000 Units (1,000 Units Oral Given 9/20/22 0847)   FLUoxetine (PROzac) capsule 60 mg (60 mg Oral Given 9/20/22 0848)   gabapentin (NEURONTIN) capsule 100 mg (100 mg Oral Given 9/20/22 0848)   gabapentin (NEURONTIN) capsule 300 mg (300 mg Oral Given 9/19/22 2106)   insulin glargine (LANTUS) subcutaneous injection 36 Units 0 36 mL (36 Units Subcutaneous Given 9/19/22 2135)   levothyroxine tablet 75 mcg (75 mcg Oral Given 9/20/22 0534)   pantoprazole (PROTONIX) EC tablet 40 mg (40 mg Oral Given 9/20/22 0534)   potassium chloride (K-DUR,KLOR-CON) CR tablet 20 mEq (20 mEq Oral Given 9/20/22 0848)   topiramate (TOPAMAX) tablet 200 mg (200 mg Oral Given 9/20/22 0848)   insulin lispro (HumaLOG) 100 units/mL subcutaneous injection 1-5 Units (1 Units Subcutaneous Not Given 9/20/22 0839)   insulin lispro (HumaLOG) 100 units/mL subcutaneous injection 1-5 Units (1 Units Subcutaneous Refused 9/19/22 2138)   ibuprofen (MOTRIN) tablet 400 mg (400 mg Oral Given 9/19/22 2153)   furosemide (LASIX) injection 40 mg (has no administration in time range)   sodium chloride 0 9 % bolus 1,000 mL (1,000 mL Intravenous New Bag 9/19/22 1251)   morphine injection 4 mg (4 mg Intravenous Given 9/19/22 1251)   iohexol (OMNIPAQUE) 350 MG/ML injection (SINGLE-DOSE) 100 mL (100 mL Intravenous Given 9/19/22 1444)   potassium chloride (K-DUR,KLOR-CON) CR tablet 20 mEq (20 mEq Oral Given 9/19/22 1548)   morphine injection 4 mg (4 mg Intravenous Given 9/19/22 1558)   potassium chloride (K-DUR,KLOR-CON) CR tablet 20 mEq (20 mEq Oral Given 9/20/22 0847)       Diagnostic Studies  Results Reviewed     Procedure Component Value Units Date/Time    Lipid panel [[id number]]  (Abnormal) Collected: 09/20/22 0617    Lab Status: Final result Specimen: Blood from Hand, Right Updated: 09/20/22 0734     Cholesterol 188 mg/dL      Triglycerides 156 mg/dL      HDL, Direct 33 mg/dL      LDL Calculated 124 mg/dL      Non-HDL-Chol (CHOL-HDL) 155 mg/dl     Basic metabolic panel [[id number]]  (Abnormal) Collected: 09/20/22 0617    Lab Status: Final result Specimen: Blood from Hand, Right Updated: 09/20/22 0734     Sodium 139 mmol/L      Potassium 3 4 mmol/L      Chloride 103 mmol/L      CO2 29 mmol/L      ANION GAP 7 mmol/L      BUN 14 mg/dL      Creatinine 0 67 mg/dL      Glucose 119 mg/dL      Glucose, Fasting 119 mg/dL      Calcium 8 6 mg/dL      eGFR 91 ml/min/1 73sq m     Narrative:      [place] guidelines for Chronic Kidney Disease (CKD):     Stage 1 with normal or high GFR (GFR > 90 mL/min/1 73 square meters)    Stage 2 Mild CKD (GFR = 60-89 mL/min/1 73 square meters)    Stage 3A Moderate CKD (GFR = 45-59 mL/min/1 73 square meters)    Stage 3B Moderate CKD (GFR = 30-44 mL/min/1 73 square meters)    Stage 4 Severe CKD (GFR = 15-29 mL/min/1 73 square meters)    Stage 5 End Stage CKD (GFR <15 mL/min/1 73 square meters)  Note: GFR calculation is accurate only with a steady state creatinine    CBC (With Platelets) [[id number]]  (Normal) Collected: 09/20/22 0617    Lab Status: Final result Specimen: Blood from Arm, Right Updated: 09/20/22 0708     WBC 5 96 Thousand/uL      RBC 4 11 Million/uL      Hemoglobin 12 5 g/dL      Hematocrit 38 2 %      MCV 93 fL      MCH 30 4 pg      MCHC 32 7 g/dL      RDW 12 7 %      Platelets 009 Thousands/uL      MPV 11 1 fL     Hemoglobin A1C w/ EAG Estimation [[id number]]  (Abnormal) Collected: 09/19/22 1240    Lab Status: Final result Specimen: Blood from Arm, Right Updated: 09/20/22 0623     Hemoglobin A1C 11 8 %       mg/dl     UA w Reflex to Microscopic w Reflex to Culture [[id number]]  (Abnormal) Collected: 09/19/22 2154    Lab Status: Final result Specimen: Urine, Clean Catch Updated: 09/19/22 2236     Color, UA Yellow     Clarity, UA Clear     Specific Gravity, UA 1 010     pH, UA 5 5     Leukocytes, UA Negative     Nitrite, UA Negative     Protein, UA Negative mg/dl      Glucose, UA Negative mg/dl      Ketones, UA Negative mg/dl      Urobilinogen, UA 0 2 E U /dl      Bilirubin, UA Negative     Occult Blood, UA 2+    TSH, 3rd generation [[id number]]  (Abnormal) Collected: 09/19/22 1240    Lab Status: Final result Specimen: Blood from Arm, Right Updated: 09/19/22 1732     TSH 3RD GENERATON 6 866 uIU/mL     Narrative:      Patients undergoing fluorescein dye angiography may retain small amounts of fluorescein in the body for 48-72 hours post procedure  Samples containing fluorescein can produce falsely depressed TSH values  If the patient had this procedure,a specimen should be resubmitted post fluorescein clearance        Urinalysis with microscopic [[id number]]     Lab Status: No result Specimen: Urine     HS Troponin I 2hr [[id number]]  (Normal) Collected: 09/19/22 1508    Lab Status: Final result Specimen: Blood from Arm, Left Updated: 09/19/22 1541     hs TnI 2hr 10 ng/L      Delta 2hr hsTnI -1 ng/L     HS Troponin I 4hr [[id number]]     Lab Status: No result Specimen: Blood     COVID only [[id number]]  (Normal) Collected: 09/19/22 1244    Lab Status: Final result Specimen: Nares from Nose Updated: 09/19/22 4292     SARS-CoV-2 Negative    Narrative:      FOR PEDIATRIC PATIENTS - copy/paste COVID Guidelines URL to browser: [email or web address]    SARS-CoV-2 assay is a Nucleic Acid Amplification assay intended for the  qualitative detection of nucleic acid from SARS-CoV-2 in nasopharyngeal  swabs  Results are for the presumptive identification of SARS-CoV-2 RNA  Positive results are indicative of infection with SARS-CoV-2, the virus  causing COVID-19, but do not rule out bacterial infection or co-infection  with other viruses  Laboratories within the [place] and its  territories are required to report all positive results to the appropriate  public health authorities  Negative results do not preclude SARS-CoV-2  infection and should not be used as the sole basis for treatment or other  patient management decisions  Negative results must be combined with  clinical observations, patient history, and epidemiological information  This test has not been FDA cleared or approved  This test has been authorized by FDA under an Emergency Use Authorization  (EUA)  This test is only authorized for the duration of time the  declaration that circumstances exist justifying the authorization of the  emergency use of an in vitro diagnostic tests for detection of SARS-CoV-2  virus and/or diagnosis of COVID-19 infection under section 564(b)(1) of  the Act, 21 U  S C  272LUL-8(M)(5), unless the authorization is terminated  or revoked sooner  The test has been validated but independent review by FDA  and CLIA is pending  Test performed using [employer] GeneXpert: This RT-PCR assay targets N2,  a region unique to SARS-CoV-2  A conserved region in the E-gene was chosen  for pan-Sarbecovirus detection which includes SARS-CoV-2  According to CMS-2020-01-R, this platform meets the definition of high-throughput technology      D-dimer, quantitative [[id number]]  (Abnormal) Collected: 09/19/22 1240    Lab Status: Final result Specimen: Blood from Arm, Right Updated: 09/19/22 1325     D-Dimer, Quant 7 83 ug/ml FEU     Narrative: In the evaluation for possible pulmonary embolism, in the appropriate (Well's Score of 4 or less) patient, the age adjusted d-dimer cutoff for this patient can be calculated as:    Age x 0 01 (in ug/mL) for Age-adjusted D-dimer exclusion threshold for a patient over 50 years      Occult Bloood,Fecal Immunochemical [[id number]]  (Abnormal) Collected: 09/19/22 1240    Lab Status: Final result Specimen: Stool from Per Rectum Updated: 09/19/22 1317     OCCULT BLD, FECAL IMMUNOLOGICAL Positive    Narrative:        Performed by Fecal Immunochemical Test     HS Troponin 0hr (reflex protocol) [[id number]]  (Normal) Collected: 09/19/22 1240    Lab Status: Final result Specimen: Blood from Arm, Right Updated: 09/19/22 1316     hs TnI 0hr 11 ng/L     B-Type Natriuretic Peptide(BNP), AN, CA, EA Campuses Only [[id number]]  (Normal) Collected: 09/19/22 1240    Lab Status: Final result Specimen: Blood from Arm, Right Updated: 09/19/22 1315     BNP 89 pg/mL     Basic metabolic panel [[id number]]  (Abnormal) Collected: 09/19/22 1240    Lab Status: Final result Specimen: Blood from Arm, Right Updated: 09/19/22 1311     Sodium 135 mmol/L      Potassium 3 4 mmol/L      Chloride 96 mmol/L      CO2 31 mmol/L      ANION GAP 8 mmol/L      BUN 18 mg/dL      Creatinine 0 88 mg/dL      Glucose 295 mg/dL      Calcium 9 1 mg/dL      eGFR 68 ml/min/1 73sq m     Narrative:      [facility] guidelines for Chronic Kidney Disease (CKD):     Stage 1 with normal or high GFR (GFR > 90 mL/min/1 73 square meters)    Stage 2 Mild CKD (GFR = 60-89 mL/min/1 73 square meters)    Stage 3A Moderate CKD (GFR = 45-59 mL/min/1 73 square meters)    Stage 3B Moderate CKD (GFR = 30-44 mL/min/1 73 square meters)    Stage 4 Severe CKD (GFR = 15-29 mL/min/1 73 square meters)    Stage 5 End Stage CKD (GFR <15 mL/min/1 73 square meters)  Note: GFR calculation is accurate only with a steady state creatinine    Hepatic function panel [[id number]]  (Abnormal) Collected: 09/19/22 1240    Lab Status: Final result Specimen: Blood from Arm, Right Updated: 09/19/22 1311     Total Bilirubin 0 44 mg/dL      Bilirubin, Direct 0 06 mg/dL      Alkaline Phosphatase 107 U/L      AST 12 U/L      ALT 11 U/L      Total Protein 7 4 g/dL      Albumin 3 8 g/dL     Lipase [[id number]]  (Abnormal) Collected: 09/19/22 1240    Lab Status: Final result Specimen: Blood from Arm, Right Updated: 09/19/22 1311     Lipase 9 u/L     Lactic acid, plasma [[id number]]  (Normal) Collected: 09/19/22 1240    Lab Status: Final result Specimen: Blood from Arm, Right Updated: 09/19/22 1311     LACTIC ACID 1 9 mmol/L     Narrative:      Result may be elevated if tourniquet was used during collection  CBC and differential [[id number]]  (Abnormal) Collected: 09/19/22 1240    Lab Status: Final result Specimen: Blood from Arm, Right Updated: 09/19/22 1251     WBC 9 48 Thousand/uL      RBC 5 11 Million/uL      Hemoglobin 14 9 g/dL      Hematocrit 45 4 %      MCV 89 fL      MCH 29 2 pg      MCHC 32 8 g/dL      RDW 12 5 %      MPV 11 4 fL      Platelets 645 Thousands/uL      nRBC 0 /100 WBCs      Neutrophils Relative 82 %      Immat GRANS % 1 %      Lymphocytes Relative 11 %      Monocytes Relative 4 %      Eosinophils Relative 2 %      Basophils Relative 0 %      Neutrophils Absolute 7 83 Thousands/µL      Immature Grans Absolute 0 07 Thousand/uL      Lymphocytes Absolute 1 00 Thousands/µL      Monocytes Absolute 0 40 Thousand/µL      Eosinophils Absolute 0 15 Thousand/µL      Basophils Absolute 0 03 Thousands/µL                  PE Study with CT abdomen & pelvis with contrast   Final Result by Percy Harrison MD (09/19 9537)      Cardiomegaly and mild interstitial pulmonary edema  No pulmonary embolism    No acute aortic dissection or intramural hematoma  Multiple diverticula within the descending colon without findings of diverticulitis  Normal caliber bowel without obstruction  Workstation performed: [id number]         CT head without contrast   Final Result by Afia Xiong DO (09/19 1509)   No acute intracranial abnormality  Workstation performed: [id number]         CT spine cervical without contrast   Final Result by Linden Ca MD (09/19 1417)      No cervical spine fracture or traumatic malalignment  Workstation performed: [id number]         X-ray chest 1 view portable   Final Result by Dorie Benedict MD (09/19 1309)      No acute cardiopulmonary disease  Findings are stable            Workstation performed: [id number]         XR hip/pelv 2-3 vws right if performed   ED Interpretation by Jamar Mo DO (09/19 1243)   No acute orthopedic findings      Final Result by Dorie Benedict MD (09/19 1306)   Degenerative changes both hips      No acute osseous abnormality  Similar to prior study      Workstation performed: [id number]         VAS lower limb venous duplex study, complete bilateral    (Results Pending)              Procedures  Procedures         ED Course  ED Course as of 09/20/22 1122   Mon Sep 19, 2022   1251 Procedure Note: EKG  Date/Time: 09/19/22 12:53 PM   Interpreted by: Maxim Flores  Indications / Diagnosis: CP  ECG reviewed by me, the ED Provider: yes   The EKG demonstrates:  Rhythm: normal sinus  Intervals: normal intervals except QRS wide  Axis: L axis  QRS/Blocks: wide QRS/ RBBB and LAFB  ST Changes: No acute ST Changes, no STD/JOSE      1311 [place] syncope risk score of 3  8 1 percent chance of 30 day adverse event      1322 OCCULT BLD, FECAL IMMUNOLOGICAL(!): Positive   1342 D-Dimer, Quant(!): 7 83   1419 BNP: 89   1559 Procedure Note: EKG  Date/Time: 09/19/22 3:59 PM   Interpreted by: Syed Conti Moisés Van  Indications / Diagnosis: CP  ECG reviewed by me, the ED Provider: yes   The EKG demonstrates:  Rhythm: normal sinus  Intervals: normal intervals except white QRS  Axis: normal axis  QRS/Blocks: Wide QRS, right bundle-branch block, left anterior fascicular block  ST Changes: No acute ST Changes, no STD/JOSE  HEART Risk Score    Flowsheet Row Most Recent Value   Heart Score Risk Calculator    History 0 Filed at: 09/20/2022 1120   ECG 0 Filed at: 09/20/2022 1120   Age 2 Filed at: 09/20/2022 1120   Risk Factors 2 Filed at: 09/20/2022 1120   Troponin 0 Filed at: 09/20/2022 1120   HEART Score 4 Filed at: 09/20/2022 1120                        SBIRT 20yo+    Flowsheet Row Most Recent Value   SBIRT (23 yo +)    In order to provide better care to our patients, we are screening all of our patients for alcohol and drug use  Would it be okay to ask you these screening questions? Yes Filed at: 09/19/2022 1316   Initial Alcohol Screen: US AUDIT-C     1  How often do you have a drink containing alcohol? 0 Filed at: 09/19/2022 1316   2  How many drinks containing alcohol do you have on a typical day you are drinking? 0 Filed at: 09/19/2022 1316   3a  Male UNDER 65: How often do you have five or more drinks on one occasion? 0 Filed at: 09/19/2022 1316   3b  FEMALE Any Age, or MALE 65+: How often do you have 4 or more drinks on one occassion? 0 Filed at: 09/19/2022 1316   Audit-C Score 0 Filed at: 09/19/2022 1316   CANDY: How many times in the past year have you    Used an illegal drug or used a prescription medication for non-medical reasons?  Never Filed at: 09/19/2022 1316                    MDM  Number of Diagnoses or Management Options  Abdominal pain: new and requires workup  Chest pain: new and requires workup  Diarrhea: new and requires workup  Pulmonary edema: new and requires workup  Right hip pain: new and requires workup  Syncope: new and requires workup  Diagnosis management comments: Patient presents with syncope, chest pain  Will evaluate for ACS, arrhythmia, electrolyte abnormalities, pneumonia, pneumothorax, pulmonary embolism, heart failure exacerbation  Chest x-ray with mild pulmonary edema  Patient has pitting lower extremity edema  Likely slight CHF exacerbation  D-dimer is elevated  CT PE scan without pulmonary embolism  Patient is high risk secondary to a Hitchins syncope score of 3  Will plan on admission for chest pain, syncope        Amount and/or Complexity of Data Reviewed  Clinical lab tests: ordered and reviewed  Tests in the radiology section of CPT®: ordered and reviewed  Discuss the patient with other providers: yes  Independent visualization of images, tracings, or specimens: yes    Risk of Complications, Morbidity, and/or Mortality  Presenting problems: high  Diagnostic procedures: high  Management options: high        Disposition  Final diagnoses:   Syncope   Chest pain   Abdominal pain   Right hip pain   Pulmonary edema   Diarrhea     Time reflects when diagnosis was documented in both MDM as applicable and the Disposition within this note     Time User Action Codes Description Comment    9/19/2022 12:44 PM Franchester Corry Add [R55] Syncope     9/19/2022 12:44 PM Franchester Corry Add [R07 9] Chest pain     9/19/2022 12:44 PM Franchester Corry Add [R10 9] Abdominal pain     9/19/2022 12:45 PM Franchester Corry Add [M25 551] Right hip pain     9/19/2022  3:48 PM Franchester Corry Add [J81 1] Pulmonary edema     9/19/2022  4:11 PM Franchester Corry Add [R19 7] Diarrhea     9/20/2022  9:40 AM Brian Sage Add [I50 32] Chronic heart failure with preserved ejection fraction [facility])       ED Disposition     ED Disposition   Admit    Condition   Stable    Date/Time   Mon Sep 19, 2022  4:11 PM    Comment   Case was discussed with altaf and the patient's admission status was agreed to be Admission Status: observation status to the service of Dr Antonio Banda   Follow-up Information    None         Current Discharge Medication List      CONTINUE these medications which have NOT CHANGED    Details   aspirin (ECOTRIN LOW STRENGTH) 81 mg EC tablet Take 81 mg by mouth daily      docusate sodium (COLACE) 100 mg capsule Take 100 mg by mouth daily      Ergocalciferol (VITAMIN D2 PO) Take by mouth      FLUoxetine (PROzac) 20 mg capsule Take 3 capsules (60 mg total) by mouth daily  Qty: 90 capsule, Refills: 0    Associated Diagnoses: Major depressive disorder      !! gabapentin (NEURONTIN) 100 mg capsule Take 1 capsule (100 mg total) by mouth 2 (two) times a day Take 100 mg in the morning and 100 mg mid afternoon  Qty: 42 capsule, Refills: 0    Associated Diagnoses: Restless leg syndrome      !! gabapentin (NEURONTIN) 300 mg capsule Take 1 capsule (300 mg total) by mouth daily at bedtime  Qty: 21 capsule, Refills: 0    Associated Diagnoses: Restless leg syndrome      ibuprofen (MOTRIN) 400 mg tablet Take by mouth every 6 (six) hours as needed for mild pain      nystatin (MYCOSTATIN) powder Apply topically 2 (two) times a day  Qty: 15 g, Refills: 0    Associated Diagnoses:  Morbid obesity due to excess calories (HCC)      omeprazole (PriLOSEC) 20 mg delayed release capsule Take 20 mg by mouth daily      potassium chloride (K-DUR,KLOR-CON) 20 mEq tablet Take 1 tablet (20 mEq total) by mouth daily  Qty: 30 tablet, Refills: 0    Associated Diagnoses: SOB (shortness of breath)      spironolactone (ALDACTONE) 25 mg tablet Take 1 tablet (25 mg total) by mouth daily  Qty: 30 tablet, Refills: 0    Associated Diagnoses: Acute on chronic heart failure with preserved ejection fraction (HCC)      topiramate (TOPAMAX) 100 mg tablet Take 200 mg by mouth 2 (two) times a day       torsemide (DEMADEX) 20 mg tablet Take 2 tablets (40 mg total) by mouth daily  Qty: 60 tablet, Refills: 0    Associated Diagnoses: Acute on chronic heart failure with preserved ejection fraction (HCC)      acetaminophen (TYLENOL) 325 mg tablet Take 650 mg by mouth every 6 (six) hours as needed for mild pain (pain score 1-3)  Cholecalciferol 25 MCG (1000 UT) capsule Take 1,000 Units by mouth daily  Empagliflozin 25 MG TABS Take 25 mg by mouth every morning      ezetimibe (ZETIA) 10 mg tablet Take 10 mg by mouth daily      hydrophilic ointment Apply topically as needed for dry skin      insulin aspart, w/niacinamide, (Fiasp FlexTouch) 100 Units/mL injection pen Inject under the skin 3 (three) times a day before meals Indications: per sliding scale  insulin glargine (LANTUS) 100 units/mL subcutaneous injection Inject 36 Units under the skin daily at bedtime    Associated Diagnoses: Diabetes mellitus (HCC)      levothyroxine 50 mcg tablet Take 1 5 tablets (75 mcg total) by mouth daily  Qty: 45 tablet, Refills: 0    Associated Diagnoses: Acquired hypothyroidism       !! - Potential duplicate medications found  Please discuss with provider  No discharge procedures on file      PDMP Review       Value Time User    PDMP Reviewed  Yes 3/30/2021  3:19 PM Tyler Valdovinos MD          ED Provider  Electronically Signed by           Vinod Negrete DO  09/20/22 1121

## 2022-09-20 NOTE — ASSESSMENT & PLAN NOTE
· Per Patient, Chronically wears 3L NC at baseline, reports occasional noncompliance    · Does not appear in acute respiratory distress  · Stable at 2 L NC O2  · Continue Respiratory protocol

## 2022-09-20 NOTE — ASSESSMENT & PLAN NOTE
Wt Readings from Last 3 Encounters:   09/20/22 135 kg (297 lb 2 9 oz)   06/08/22 129 kg (284 lb 6 3 oz)   03/25/22 129 kg (284 lb)     · Patient reports worsening lower extremity edema, decreased urination and gaining close to 10 lbs  over the past week despite compliance with home diuretics and diet  Patient clinically dry on arrival   · Echo (2/2022): EF 67%  Diastolic function moderately abnormal, consistent with grade 2 relaxation  · Home Medication: Torsemide 40 mg daily, Spirolactone 25 mg daily  · CT Chest: Mild interstitial pulmonary edema  · BNP: 89    · Patient clinically volume overloaded today, will give 1x dose 40mg IV Lasix today    · Cardiology consulted, recommendations appreciated   · Monitor I/O's, Daily Weights  · 1 5 L fluid restriction

## 2022-09-20 NOTE — ASSESSMENT & PLAN NOTE
· BP reviewed and elevated, SBP 160s  Patient asymptomatic    · Home Medication: Spirolactone and Torsemide  · Hold in the setting of presenting dry on examination  · Will start low dose Hydralazine, to receive 1 time dose IV Lasix today  · Continue to monitor BP closely

## 2022-09-20 NOTE — ASSESSMENT & PLAN NOTE
· Patient presented with 1 diarrhea episode, stated she also noted blood in her stool  · FOBT: Positive  · Patient denies any active bleeding at this time, no further BMs or episodes of diarrhea since admission  · Check enteric stool panel  · Hgb remains stable: 14 9->12 5  Consider hemodilution in setting of IVFs     · Hold off on GI consult for now, consider if bleeding occurs or further decrease in Hgb

## 2022-09-20 NOTE — PLAN OF CARE
Problem: OCCUPATIONAL THERAPY ADULT  Goal: Performs self-care activities at highest level of function for planned discharge setting  See evaluation for individualized goals  Description: Treatment Interventions: ADL retraining, Functional transfer training, UE strengthening/ROM, Endurance training, Patient/family training, Equipment evaluation/education, Energy conservation, Activityengagement, Compensatory technique education          See flowsheet documentation for full assessment, interventions and recommendations  Note: Limitation: Decreased ADL status, Decreased UE strength, Decreased Safe judgement during ADL, Decreased endurance, Decreased self-care trans, Decreased high-level ADLs  Prognosis: Good  Assessment: Patient is a 78 y o  female seen for OT evaluation at [place] [address] following admission on 9/19/2022  s/p Syncope  Comorbidities impacting functional performance include: seizures, , HTN, HLD, DM, GERD, MDD, acquired hypothyroidism, diarrhea, morbid obesity, CHFcEF, CRF, elevated d-dimer  Patient presents with active orders for OT eval and treat and up and OOB as tolerated   Performed at least 2 patient identifiers during session including name and wristband  PTA, pt reports being mod (I) c rollator, A c ADLs and IADLs  Lives alone in 1 story apartment c elevator access  Utilizes Murali Landry, (+) HHA, (+) HHPT Upon initial evaluation, patient requires supervision for UB ADLs, min assistance for LB ADLs, and CGA/Min A for transfers and functional mobility within room and bathroom with rollator  Based on functional eval, patient presents A&Ox4 with intact  attention, impaired  safety awareness, and intact  short term and long term memory   Occupational performance is affected by the following deficits:  decreased muscular strength , decreased standing tolerance for self care tasks , decreased dynamic balance impacting functional reach, decreased trunk control  and (+) pain  Based on these findings, functional performance deficits, and medical acuity pt has been identified as a high complexity evaluation  Personal factors impacting performance at the time of evaluation include: decreased (+) Hx of falls , decreased social/family support within the home and decreased IADL independence  Patient would benefit from OT services to maximize level of functional independence and safety in self-care and transfers  Occupational areas to address include:bathing/showering, toileting, dressing , personal hygiene/grooming , bed mobility , functional mobility, transfer to all surfaces, safety procedures , fall prevention  and energy conservation   From OT standpoint, recommendation at time of D/C would be return to previous environment with home health rehabilitation pending anticipated progress c functional mobility and ADL tasks       OT Discharge Recommendation: Home with home health rehabilitation (pending anticipated progress c functional mobility and ADL independence)     Britt Colby, OT

## 2022-09-20 NOTE — ASSESSMENT & PLAN NOTE
· Per Chart review patient no longer taking Zetia  · Lipid panel: , TGCs 156, HDL 33,      · Likely would anticipate starting statin, will hold off for now given elevated LFTs

## 2022-09-20 NOTE — PHYSICAL THERAPY NOTE
PHYSICAL THERAPY EVALUATION  DATE: 09/20/22  TIME: 2098-3060    NAME:  Kirby Stern  AGE:   78 y o  Mrn:   [id number]  Length Of Stay: 0    ADMIT DX:  Diarrhea [R19 7]  Syncope [R55]  Chest pain [R07 9]  Pulmonary edema [J81 1]  Abdominal pain [R10 9]  Right hip pain [M25 551]    Past Medical History:   Diagnosis Date    CHF (congestive heart failure) ([facility] )     Diabetes mellitus ([place] [address] )     Disease of thyroid gland     Hyperlipidemia     Hypertension     Hypothyroidism (acquired)     Restless leg syndrome     Seizures ([place] [address] )      Past Surgical History:   Procedure Laterality Date    APPENDECTOMY      CHOLECYSTECTOMY      HIP FRACTURE SURGERY Right     TONSILLECTOMY      TUBAL LIGATION         Performed at least 2 patient identifiers during session: Name, Laura Romano, and ID bracelet       09/20/22 1351   PT Last Visit   PT Visit Date 09/20/22   Note Type   Note type Evaluation   Pain Assessment   Pain Assessment Tool 0-10   Pain Score 4   Pain Location/Orientation Orientation: Lower; Location: Abdomen   Pain Onset/Description Onset: Ongoing; Descriptor: Discomfort   Effect of Pain on Daily Activities limits comfort and tolerance of functional mobility   Patient's Stated Pain Goal No pain   Hospital Pain Intervention(s) Repositioned; Ambulation/increased activity; Emotional support   Multiple Pain Sites No   Restrictions/Precautions   Weight Bearing Precautions Per Order No   Other Precautions Chair Alarm; Bed Alarm; Fall Risk;Pain;Telemetry   Home Living   Type of Home Apartment  (Senior apartment building)   Home Layout One level;Elevator   Bathroom Shower/Tub Walk-in shower  (pt reports having tub "made into" a walk in shower )   [address] chair;Hand-held shower;Grab bars in shower  (used to have tub transfer bench, now has shower chair)   [address] via walker   [address] [place] [address] aid;Life alert  (Rollator walker (used at baseline for community mobility), no AD used for household ambulation)   Additional Comments Laundry in basement level of apartment, elevator access  Uses laundry bag on rollator  Prior Function   Level of [place] Other (Comment)  (independent functional mobility, Assist with ADLs and IADLs)   Lives With (S)  Alone   Receives Help From Home health  (reports HHA 2x/wk for 2hrs/day in order to assist with showering + cleaning; has HHPT 3x/wk)   ADL Assistance Needs assistance  (sponge bathes at sink indep, HHA assists with shower transfers and showering 2x/wk)   IADLs Needs assistance   Falls in the last 6 months 1 to 4  (1 syncope event leading to admission, denies others)   Vocational Retired   Comments Pt lives at home alone in a senior apartment building with elevator access, no stairs  Pt independently completes functional mobility, with no AD in the home, has rollator walker for community mobility  Is indep with Basic ADLs but has HHA assistance with showering 2x/wk  Has Home health PT 3x/wk  General   Additional Pertinent History Pt admitted 9/19/22 with syncopal event resulting in fall, +LifeAlert used to call EMS  Family/Caregiver Present No   Cognition   Overall Cognitive Status WFL   Arousal/Participation Cooperative   Attention Within functional limits   Orientation Level Oriented X4   Memory Within functional limits   Following Commands Follows one step commands without difficulty   Subjective   Subjective "I have home PT 3 times a week   He walks with me and we do exercises "   RUE Assessment   RUE Assessment WFL   LUE Assessment   LUE Assessment WFL   RLE Assessment   RLE Assessment WFL  (grossly 3/5 to 3+/5 MMT throughout)   LLE Assessment   LLE Assessment WFL  (grossly 3/5 to 3+/5 MMT throughout)   Coordination   Movements are Fluid and Coordinated 1   Sensation X   Light Touch   RLE Light Touch Impaired   RLE Light Touch Comments pt reports baseline numbness in B foot due to neuropathy   LLE Light Touch Impaired   LLE Light Touch Comments pt reports baseline numbness in B foot due to neuropathy   Proprioception   RLE Proprioception Grossly intact   LLE Proprioception Grossly Intact   Bed Mobility   Supine to Sit 5  Supervision   Additional items Assist x 1;HOB elevated; Bedrails; Increased time required;Verbal cues  (signif increased effort by pt)   Sit to Supine 5  Supervision   Additional items Assist x 1; Increased time required;Verbal cues  (flat bed)   Transfers   Sit to Stand 4  Minimal assistance  (CGA)   Additional items Assist x 1; Increased time required;Verbal cues  (increased momentum required)   Stand to Sit 4  Minimal assistance  (CGA)   Additional items Assist x 1; Increased time required;Verbal cues;Armrests   Stand pivot 4  Minimal assistance   Additional items Assist x 1; Increased time required;Verbal cues   Additional Comments Multiple transfers completed t/o session  Initially requiring CGA and increased momentum/effort by pt  However with onset of fatigue and dizziness, pt requiring ANDREW for safe controlled descent to surface  Ambulation/Elevation   Gait pattern Wide CLAUDIO; Forward Flexion;Decreased foot clearance; Short stride   Gait Assistance 4  Minimal assist  (CGA)   Additional items Assist x 1;Verbal cues   Assistive Device 4-wheeled walker   Distance 25ft x2  (each trial limited d/t onset of significant dizziness  vitals taken after each episode and WNL )   Stair Management Assistance Not tested   Balance   Static Sitting Good   Dynamic Sitting Fair   Static Standing Fair   Dynamic Standing Fair   Ambulatory Fair   Endurance Deficit   Endurance Deficit Yes   Activity Tolerance   Activity Tolerance Patient limited by fatigue;Patient limited by pain;Treatment limited secondary to medical complications (Comment)  (dizziness with ambulation)   Medical Staff Made Aware Spoke with REBECCA MORENO OT   Nurse Made Aware Spoke with DEVAN Laughlin pre/post session   Assessment   Prognosis Good   Problem List Decreased endurance; Impaired balance;Decreased mobility; Decreased safety awareness; Impaired sensation;Obesity;Pain   Assessment Pt seen for PT evaluation for mobility assessment & discharge needs  Activity orders: up and OOB as tolerated  Pt admitted 9/19/2022 w/ syncopal event resulting on fall, life alert utilized to call EMS, dx Syncope  Comorbidities affecting pt's fnxl performance include: obesity, seizures, DM, depression, chronic heart failure, COVID-19, orthostatic hypotension  During PT IE, pt completes bed mobility with S, transfers with CGA/ANDREW, and ambulates 25ft x2 with rollator walker and CGA/ANDREW  Pt's functional mobility severely limited on this date due to onset of dizziness  The AM-PAC & Barthel Index outcome tools were used to assist in determining pt safety w/ mobility/self care & appropriate d/c recommendations, see above for scores  Pt is at risk of falls d/t multiple comorbidities, h/o falls, impaired balance, impaired sensation, use of ambulatory aid, varying levels of pain , acuity of medical illness and ongoing medical treatment of primary dx  Pt's clinical presentation is currently unstable/unpredictable as seen in pt's presentation of vital sign response, changing level of pain, increased fall risk, new onset of impairment of functional mobility, decreased endurance and new onset of weakness, and requires high complexity clinical decision making  Pt will benefit from continued PT services in order to address impairments, decrease risk of falls, maximize independence w/ fnxl mobility, & ensure safety w/ mobility for transition to next level of care  Based on pt presentation & impairments, pt would most appropriately benefit from return to home with home health PT services, pending progress     Barriers to Discharge Decreased caregiver support   Goals   Patient Goals "to go back home with my home therapy"   STG Expiration Date 09/30/22   Short Term Goal #1 Patient PT goals established in order to address patient self reported goal of "to go home by home with home PT, not rehab"  Pt will complete all bed mobility independently in flat bed, in order to promote increased OOB functional mobility to improve overall activity tolerance  Pt will complete all transfers independently/at marquis level, in order to increase safety with functional mobility  Pt will ambulate >150ft with LRAD and S in order to increase safety with household and short community functional mobility  Pt will improve AM-PAC score to >/= 23/24 in order to increase independence with mobility and decrease burden of care  Pt will improve Barthel Index score to >/= 65/100 in order to increase independence and decrease risk of falls  Pt will demonstrate independence and understanding of strengthening HEP  PT Treatment Day 0   Plan   Treatment/Interventions Functional transfer training;LE strengthening/ROM; Therapeutic exercise; Endurance training;Patient/family training;Equipment eval/education;Gait training;Bed mobility;Spoke to nursing;Spoke to case management;Spoke to advanced practitioner   PT Frequency 3-5x/wk   Recommendation   PT Discharge Recommendation Home with home health rehabilitation   AM-PAC Basic Mobility Inpatient   Turning in Bed Without Bedrails 3   Lying on Back to Sitting on Edge of Flat Bed 3   Moving Bed to Chair 3   Standing Up From Chair 3   Walk in Room 3   Climb 3-5 Stairs 2   Basic Mobility Inpatient Raw Score 17   Basic Mobility Standardized Score 39 67   Highest Level Of Mobility   -HL Goal 5: Stand one or more mins   -HL Achieved 7: Walk 25 feet or more   Modified [place]anyelle Scale   Modified [place] Scale 4   Barthel Index   Feeding 10   Bathing 0   Grooming Score 5   Dressing Score 5   Bladder Score 10   Bowels Score 10   Toilet Use Score 5   Transfers (Bed/Chair) Score 10   Mobility (Level Surface) Score 0   Stairs Score 0   Barthel Index Score 55   End of Consult   Patient Position at End of Consult Supine;Bed/Chair alarm activated; All needs within reach   End of Consult Comments Based on patient's [facility] Level of Mobility scores today, patient currently has a goal of [facility] Levels: 7: WALK 25 FEET OR MORE, to be completed with RN staffing each shift, in order to improve overall activity tolerance and mobility, combat hospital related deconditioning, and maximize outcomes for d/c from the acute care setting  The patient's AM-PAC Basic Mobility Inpatient Short Form Raw Score is 17  A Raw score of greater than 16 suggests the patient may benefit from discharge to home  Please also refer to the recommendation of the Physical Therapist for safe discharge planning        Mali Butler PT, DPT   Available via [employer]  [facility] # [id number]  [place] License - [id number]  7/69/0912

## 2022-09-20 NOTE — PLAN OF CARE
Problem: MOBILITY - ADULT  Goal: Maintain or return to baseline ADL function  Description: INTERVENTIONS:  -  Assess patient's ability to carry out ADLs; assess patient's baseline for ADL function and identify physical deficits which impact ability to perform ADLs (bathing, care of mouth/teeth, toileting, grooming, dressing, etc )  - Assess/evaluate cause of self-care deficits   - Assess range of motion  - Assess patient's mobility; develop plan if impaired  - Assess patient's need for assistive devices and provide as appropriate  - Encourage maximum independence but intervene and supervise when necessary  - Involve family in performance of ADLs  - Assess for home care needs following discharge   - Consider OT consult to assist with ADL evaluation and planning for discharge  - Provide patient education as appropriate  Outcome: Progressing     Problem: PAIN - ADULT  Goal: Verbalizes/displays adequate comfort level or baseline comfort level  Description: Interventions:  - Encourage patient to monitor pain and request assistance  - Assess pain using appropriate pain scale  - Administer analgesics based on type and severity of pain and evaluate response  - Implement non-pharmacological measures as appropriate and evaluate response  - Consider cultural and social influences on pain and pain management  - Notify physician/advanced practitioner if interventions unsuccessful or patient reports new pain  Outcome: Progressing     Problem: INFECTION - ADULT  Goal: Absence or prevention of progression during hospitalization  Description: INTERVENTIONS:  - Assess and monitor for signs and symptoms of infection  - Monitor lab/diagnostic results  - Monitor all insertion sites, i e  indwelling lines, tubes, and drains  - Monitor endotracheal if appropriate and nasal secretions for changes in amount and color  - [place] appropriate cooling/warming therapies per order  - Administer medications as ordered  - Instruct and encourage patient and family to use good hand hygiene technique  - Identify and instruct in appropriate isolation precautions for identified infection/condition  Outcome: Progressing     Problem: SAFETY ADULT  Goal: Maintain or return to baseline ADL function  Description: INTERVENTIONS:  -  Assess patient's ability to carry out ADLs; assess patient's baseline for ADL function and identify physical deficits which impact ability to perform ADLs (bathing, care of mouth/teeth, toileting, grooming, dressing, etc )  - Assess/evaluate cause of self-care deficits   - Assess range of motion  - Assess patient's mobility; develop plan if impaired  - Assess patient's need for assistive devices and provide as appropriate  - Encourage maximum independence but intervene and supervise when necessary  - Involve family in performance of ADLs  - Assess for home care needs following discharge   - Consider OT consult to assist with ADL evaluation and planning for discharge  - Provide patient education as appropriate  Outcome: Progressing     Problem: DISCHARGE PLANNING  Goal: Discharge to home or other facility with appropriate resources  Description: INTERVENTIONS:  - Identify barriers to discharge w/patient and caregiver  - Arrange for needed discharge resources and transportation as appropriate  - Identify discharge learning needs (meds, wound care, etc )  - Arrange for interpretive services to assist at discharge as needed  - Refer to Case Management Department for coordinating discharge planning if the patient needs post-hospital services based on physician/advanced practitioner order or complex needs related to functional status, cognitive ability, or social support system  Outcome: Progressing     Problem: Knowledge Deficit  Goal: Patient/family/caregiver demonstrates understanding of disease process, treatment plan, medications, and discharge instructions  Description: Complete learning assessment and assess knowledge base   Interventions:  - Provide teaching at level of understanding  - Provide teaching via preferred learning methods  Outcome: Progressing     Problem: Prexisting or High Potential for Compromised Skin Integrity  Goal: Skin integrity is maintained or improved  Description: INTERVENTIONS:  - Identify patients at risk for skin breakdown  - Assess and monitor skin integrity  - Assess and monitor nutrition and hydration status  - Monitor labs   - Assess for incontinence   - Turn and reposition patient  - Assist with mobility/ambulation  - Relieve pressure over bony prominences  - Avoid friction and shearing  - Provide appropriate hygiene as needed including keeping skin clean and dry  - Evaluate need for skin moisturizer/barrier cream  - Collaborate with interdisciplinary team   - Patient/family teaching  - Consider wound care consult   Outcome: Progressing     Problem: Potential for Falls  Goal: Patient will remain free of falls  Description: INTERVENTIONS:  -  Assess patient's ability to carry out ADLs; assess patient's baseline for ADL function and identify physical deficits which impact ability to perform ADLs (bathing, care of mouth/teeth, toileting, grooming, dressing, etc )  - Assess/evaluate cause of self-care deficits   - Assess range of motion  - Assess patient's mobility; develop plan if impaired  - Assess patient's need for assistive devices and provide as appropriate  - Encourage maximum independence but intervene and supervise when necessary  - Involve family in performance of ADLs  - Assess for home care needs following discharge   - Consider OT consult to assist with ADL evaluation and planning for discharge  - Provide patient education as appropriate  Outcome: Progressing

## 2022-09-20 NOTE — UTILIZATION REVIEW
Initial Clinical Review    Admission: Date/Time/Statement:   Admission Orders (From admission, onward)     Ordered        09/20/22 1134  Inpatient Admission  Once            09/19/22 1612  Place in Observation  Once                        Placed in observation status on 9/19 changed to inpatient status on 9/20 due to need for continued treatment of acute CHF,  Lasix IV    Orders Placed This Encounter   Procedures    Inpatient Admission     Standing Status:   Standing     Number of Occurrences:   1     Order Specific Question:   Level of Care     Answer:   Med Surg [16]     Order Specific Question:   Estimated length of stay     Answer:   More than 2 Midnights     Order Specific Question:   Certification     Answer:   I certify that inpatient services are medically necessary for this patient for a duration of greater than two midnights  See H&P and MD Progress Notes for additional information about the patient's course of treatment  ED Arrival Information     Expected   -    Arrival   9/19/2022 11:54    Acuity   Urgent            Means of arrival   Ambulance    Escorted by   [facility]    Service   Hospitalist    Admission type   Urgent            Arrival complaint   Fall             Chief Complaint   Patient presents with    Syncope     Pt arrived via [facility], pt states having abdominal pain lower left and right quad  Pt expressed going to the bathroom upon waking up pt fell  Pt unaware if she hit her head  Denies any head or neck pain  Chest pain present  Right hip pain present        Initial Presentation: 78 y o  female with multiple comorbidities, she presents with complaint of syncopal episode  She reports she had an episode of diarrhea and she reports she was on the commode for almost an hour  She felt weak, lightheaded and dizzy with a loc and fell on her back  She reports she may have LOC for at least 5-10 minutes, she woke and pressed her life  Alert as she could not get up and walk   She reports she has had a viral illness for the past week with sore throat, cough and fever  She complains of abdominal pain , generalized  More around the umbilicus rated 30/55 she received 8 mg iv morphine  She reports a 9 lb weight gain over the past day  He also has LE edema which is chronic and has been on torsemide 60 mg qd and reports compliance with diet , fluid restriction and medications  IN the ED labs showed elevated D-dimer, PE scan was negative for PE  CT head & cervical spine showed no acute findings  She is admitted to observation status  Due to her syncopal episode as well as PT/OT evaluation  Date: 9/20/2022    Day 2:  Pt on 2 L NC O2 sat 97-99% she appears in acute distress  Lasix 40 mg iv x 1  Pt with c/o abdominal pain, non bloody diarrhea, non bloody not black  b/l LE pitting  edema  CXR with mild pulmonary edema  She complains of right hip pain  Positive orthostatics on 9/19 neg orthostatics today  IVF's d/c'd  Changed to inpatient status  Due to need for continued work up  She declined to participate with PT/OT evaluation at this time  Requesting rest, agreeable to session later in PM    Pt has no further BMs or episodes of diarrhea since admission        ED Triage Vitals   Temperature Pulse Respirations Blood Pressure SpO2   09/19/22 1201 09/19/22 1201 09/19/22 1201 09/19/22 1201 09/19/22 1201   97 6 °F (36 4 °C) 74 18 149/95 100 %      Temp Source Heart Rate Source Patient Position - Orthostatic VS BP Location FiO2 (%)   09/19/22 1201 09/19/22 1201 09/19/22 1201 09/19/22 1201 --   Oral Monitor Lying Left arm       Pain Score       09/19/22 1251       10 - Worst Possible Pain          Wt Readings from Last 1 Encounters:   09/20/22 135 kg (297 lb 2 9 oz)     Additional Vital Signs:     Date/Time Temp Pulse Resp BP MAP (mmHg) SpO2 Calculated FIO2 (%) - Nasal Cannula Nasal Cannula O2 Flow Rate (L/min) O2 Device Patient Position - Orthostatic VS   09/20/22 1101 -- 78 -- 163/103 Abnormal  120 -- -- -- -- Standing - Orthostatic VS   09/20/22 1059 -- 71 -- 161/90 121 -- -- -- -- Sitting - Orthostatic VS   09/20/22 1055 -- 75 -- 152/94 121 -- -- -- -- Lying - Orthostatic VS   09/20/22 0704 98 8 °F (37 1 °C) 69 17 130/72 97 99 % 28 2 L/min Nasal cannula Lying   09/19/22 2352 97 5 °F (36 4 °C) 68 18 114/61 80 97 % 28 2 L/min Nasal cannula Lying   09/19/22 2122 -- 76 -- 140/77 102 -- -- -- -- --   09/19/22 2118 -- 71 18 162/84 121 -- -- -- -- Sitting - Orthostatic VS   09/19/22 2100 97 7 °F (36 5 °C) 62 18 138/68 99 -- -- -- -- Lying - Orthostatic VS   09/19/22 1819 97 6 °F (36 4 °C) 64 17 143/68 -- 97 % 28 2 L/min Nasal cannula Lying   09/19/22 1554 -- 71 16 151/72 -- 98 % -- -- -- --   09/19/22 1501 -- 72 17 166/88 -- 96 % 32 3 L/min Nasal cannula Lying   09/19/22 1300 -- 68 18 179/75 Abnormal  -- 94 % 32 3 L/min Nasal cannula Lying   09/19/22 1201 97 6 °F (36 4 °C) 74 18 149/95 -- 100 % 28 2 L/min Nasal cannula Lying         Pertinent Labs/Diagnostic Test Results:   PE Study with CT abdomen & pelvis with contrast   Final Result by Hillary Michel MD (09/19 1507)      Cardiomegaly and mild interstitial pulmonary edema  No pulmonary embolism  No acute aortic dissection or intramural hematoma  Multiple diverticula within the descending colon without findings of diverticulitis  Normal caliber bowel without obstruction  Workstation performed: [id number]         CT head without contrast   Final Result by Norma Syed DO (09/19 1509)   No acute intracranial abnormality  Workstation performed: [id number]         CT spine cervical without contrast   Final Result by Susie Leon MD (09/19 4690)      No cervical spine fracture or traumatic malalignment  Workstation performed: [id number]         X-ray chest 1 view portable   Final Result by Madie Cordoba MD (09/19 7051)      No acute cardiopulmonary disease        Findings are stable            Workstation performed: [id number]         XR hip/pelv 2-3 vws right if performed   ED Interpretation by Carole Gracia DO (09/19 1243)   No acute orthopedic findings      Final Result by Milad Marti MD (09/19 1306)   Degenerative changes both hips      No acute osseous abnormality  Similar to prior study      Workstation performed: [id number]         VAS lower limb venous duplex study, complete bilateral    (Results Pending)       EKG - 9/19 - NSR - RBBB and LAFB - no significant changes from prior EKG       Results from last 7 days   Lab Units 09/19/22  1244   SARS-COV-2  Negative     Results from last 7 days   Lab Units 09/20/22  0617 09/19/22  1240   WBC Thousand/uL 5 96 9 48   HEMOGLOBIN g/dL 12 5 14 9   HEMATOCRIT % 38 2 45 4   PLATELETS Thousands/uL 164 186   NEUTROS ABS Thousands/µL  --  7 83*         Results from last 7 days   Lab Units 09/20/22  0617 09/19/22  1240   SODIUM mmol/L 139 135   POTASSIUM mmol/L 3 4* 3 4*   CHLORIDE mmol/L 103 96   CO2 mmol/L 29 31   ANION GAP mmol/L 7 8   BUN mg/dL 14 18   CREATININE mg/dL 0 67 0 88   EGFR ml/min/1 73sq m 91 68   CALCIUM mg/dL 8 6 9 1     Results from last 7 days   Lab Units 09/19/22  1240   AST U/L 12*   ALT U/L 11   ALK PHOS U/L 107*   TOTAL PROTEIN g/dL 7 4   ALBUMIN g/dL 3 8   TOTAL BILIRUBIN mg/dL 0 44   BILIRUBIN DIRECT mg/dL 0 06     Results from last 7 days   Lab Units 09/20/22  1114 09/20/22  0706 09/19/22  2109 09/19/22  1853   POC GLUCOSE mg/dl 141* 123 185* 214*     Results from last 7 days   Lab Units 09/20/22  0617 09/19/22  1240   GLUCOSE RANDOM mg/dL 119 295*         Results from last 7 days   Lab Units 09/19/22  1240   HEMOGLOBIN A1C % 11 8*   EAG mg/dl 292       Results from last 7 days   Lab Units 09/19/22  1508 09/19/22  1240   HS TNI 0HR ng/L  --  11   HS TNI 2HR ng/L 10  --    HSTNI D2 ng/L -1  --      Results from last 7 days   Lab Units 09/19/22  1240   D-DIMER QUANTITATIVE ug/ml FEU 7 83* Results from last 7 days   Lab Units 09/19/22  1240   TSH 3RD GENERATON uIU/mL 6 866*         Results from last 7 days   Lab Units 09/19/22  1240   LACTIC ACID mmol/L 1 9       Results from last 7 days   Lab Units 09/19/22  1240   BNP pg/mL 89       Results from last 7 days   Lab Units 09/19/22  1240   LIPASE u/L 9*       Results from last 7 days   Lab Units 09/19/22  2154   CLARITY UA  Clear   COLOR UA  Yellow   SPEC GRAV UA  1 010   PH UA  5 5   GLUCOSE UA mg/dl Negative   KETONES UA mg/dl Negative   BLOOD UA  2+*   PROTEIN UA mg/dl Negative   NITRITE UA  Negative   BILIRUBIN UA  Negative   UROBILINOGEN UA E U /dl 0 2   LEUKOCYTES UA  Negative   WBC UA /hpf 2-4   RBC UA /hpf 10-20*   BACTERIA UA /hpf Occasional   EPITHELIAL CELLS WET PREP /hpf Occasional         ED Treatment:   Medication Administration from 09/19/2022 1154 to 09/19/2022 1726       Date/Time Order Dose Route Action Comments     09/19/2022 1251 sodium chloride 0 9 % bolus 1,000 mL 1,000 mL Intravenous New Bag      09/19/2022 1251 morphine injection 4 mg 4 mg Intravenous Given      09/19/2022 1444 iohexol (OMNIPAQUE) 350 MG/ML injection (SINGLE-DOSE) 100 mL 100 mL Intravenous Given      09/19/2022 1548 potassium chloride (K-DUR,KLOR-CON) CR tablet 20 mEq 20 mEq Oral Given given with applesauce     09/19/2022 1558 morphine injection 4 mg 4 mg Intravenous Given         Past Medical History:   Diagnosis Date    CHF (congestive heart failure) (HCC)     Diabetes mellitus ([facility] [address] )     Disease of thyroid gland     Hyperlipidemia     Hypertension     Hypothyroidism (acquired)     Restless leg syndrome     Seizures (HCC)      Present on Admission:   Acquired hypothyroidism   Chronic respiratory failure (HCC)   Chronic heart failure with preserved ejection fraction (HCC)   Diabetes mellitus (HCC)   GERD (gastroesophageal reflux disease)   Hyperlipidemia   Hypertension   Seizures ([facility] [address] )   Morbid obesity due to excess calories ([facility] [address] )   Major depressive disorder      Admitting Diagnosis: Diarrhea [R19 7]  Syncope [R55]  Chest pain [R07 9]  Pulmonary edema [J81 1]  Abdominal pain [R10 9]  Right hip pain [M25 551]  Age/Sex: 78 y o  female       Admission Orders:  Scheduled Medications:  aspirin, 81 mg, Oral, Daily  cholecalciferol, 1,000 Units, Oral, Daily  FLUoxetine, 60 mg, Oral, Daily  gabapentin, 100 mg, Oral, BID  gabapentin, 300 mg, Oral, HS  heparin (porcine), 5,000 Units, Subcutaneous, Q8H JOSE  insulin glargine, 36 Units, Subcutaneous, HS  insulin lispro, 1-5 Units, Subcutaneous, TID AC  insulin lispro, 1-5 Units, Subcutaneous, HS  levothyroxine, 75 mcg, Oral, Early Morning  pantoprazole, 40 mg, Oral, Early Morning  potassium chloride, 20 mEq, Oral, Daily  topiramate, 200 mg, Oral, BID  Potassium Chloride 20 meq po once -9/19 x 1 -  9/20 x 1   Lasix 40mg iv once- 9/20 x1     Continuous IV Infusions:    lactated ringers infusion  Rate: 75 mL/hr Dose: 75 mL/hr  Freq: Continuous Route: IV  Last Dose: 75 mL/hr (09/19/22 1846)  Start: 09/19/22 1700 End: 09/20/22 0845        PRN Meds:  acetaminophen, 650 mg, Oral, Q6H PRN  ibuprofen, 400 mg, Oral, Q6H PRN - 9/19 x 1   ondansetron, 4 mg, Intravenous, Q6H PRN-9/20 x 1   sodium chloride (PF), 3 mL, Intravenous, Q1H PRN      Nursing Orders - VS - Neuro checks q4 - Telem - ortho BP checks - daily weights - I & O q shift - up & OOB as tolerated - SCD's to le's - Diet cons carb - PT/OT evaluation       Network Utilization Review Department  ATTENTION: Please call with any questions or concerns to [phone] and carefully listen to the prompts so that you are directed to the right person  All voicemails are confidential   Kannan Jeff all requests for admission clinical reviews, approved or denied determinations and any other requests to dedicated fax number below belonging to the campus where the patient is receiving treatment   List of dedicated fax numbers for the Facilities:  [address] DENIALS (Administrative/Medical Necessity) [phone]   [address] (Maternity/NICU/Pediatrics) 261 [facility],7[address] Floor [facility] 40 125 [facility]  [phone]   [address] Dumont [address] [facility] [place] [address] [id number] [place] [address] [address] [facility] [place] [address] [phone]

## 2022-09-20 NOTE — PHYSICAL THERAPY NOTE
Physical Therapy Cancellation Note       09/20/22 1008   PT Last Visit   PT Visit Date 09/20/22   Note Type   Note type Cancelled Session   Cancel Reasons Refusal   Additional Comments PT consult received and chart reviewed  PT evaluation attempt  Pt politely declines participation in session at this time, states she hasn't slept and has been busy with staff all morning  Requests to sleep and for therapist to return later in PM  Will f/u as able and appropriate to complete PT evaluation       Earnest Santos, PT, DPT   Available via [employer]  NPI # [id number]  [place] License - [id number]  [phone]

## 2022-09-20 NOTE — RESPIRATORY THERAPY NOTE
RT Protocol Note  Noni Buitrago 78 y o  female MRN: [id number]  Unit/Bed#: -01 Encounter: [id number]    Assessment    Principal Problem:    Syncope  Active Problems:    Seizures (HCC)    Hypertension    Hyperlipidemia    Diabetes mellitus (HCC)    GERD (gastroesophageal reflux disease)    Major depressive disorder    Acquired hypothyroidism    Diarrhea    Morbid obesity due to excess calories (HCC)    Chronic heart failure with preserved ejection fraction (HCC)    Chronic respiratory failure (HCC)    Elevated d-dimer      Home Pulmonary Medications:    None       Past Medical History:   Diagnosis Date    CHF (congestive heart failure) (HCC)     Diabetes mellitus (HCC)     Disease of thyroid gland     Hyperlipidemia     Hypertension     Hypothyroidism (acquired)     Restless leg syndrome     Seizures ([address] )      Social History     Socioeconomic History    Marital status: Single     Spouse name: None    Number of children: None    Years of education: None    Highest education level: None   Occupational History    None   Tobacco Use    Smoking status: Never Smoker    Smokeless tobacco: Never Used    Tobacco comment: not a smoker   Vaping Use    Vaping Use: Never used   Substance and Sexual Activity    Alcohol use: Yes     Comment: occasional    Drug use: Never    Sexual activity: Not Currently   Other Topics Concern    None   Social History Narrative    None     Social Determinants of Health     Financial Resource Strain: Not on file   Food Insecurity: Not on file   Transportation Needs: Not on file   Physical Activity: Not on file   Stress: Not on file   Social Connections: Not on file   Intimate Partner Violence: Not on file   Housing Stability: Not on file       Subjective         Objective    Physical Exam:   Assessment Type: Assess only  General Appearance: Alert, Awake  Respiratory Pattern: Normal  Chest Assessment: Chest expansion symmetrical  Bilateral Breath Sounds: Diminished    Vitals:  Blood pressure 130/71, pulse 71, temperature 97 8 °F (36 6 °C), temperature source Tympanic, resp  rate 19, height 5' 2" (1 575 m), weight 135 kg (297 lb 2 9 oz), SpO2 (P) 97 %  Imaging and other studies: I have personally reviewed pertinent reports  Plan    Respiratory Plan: No distress/Pulmonary history        Resp Comments: (P) Patient assessed per Respiratory Protocol  Patient has a history of Respiratory Failure and CHF  Patient currently is saturating at 97% on 1 5L NC, however at home she has been non-compliant wearing it  Current lungs are diminished  No S/S of distress  Patient does not taek any nebulizers or inhalers at this time  PRN albuterol ordered for SOB and Respiratory to follow

## 2022-09-20 NOTE — ASSESSMENT & PLAN NOTE
Lab Results   Component Value Date    HGBA1C 11 8 (H) 09/19/2022       Recent Labs     09/19/22  1853 09/19/22  2109 09/20/22  0706   POCGLU 214* 185* 123       Blood Sugar Average: Last 72 hrs:  (P) 174   · Appears DM uncontrolled, updated A1c 11 8  · Home Medication: Lantus 38 units qHs  · Initiate Accu-Checks and SSI AC/HS, restarted Lantus but at 36 units qhs for now  · Hypoglycemic protocol  · Diabetic Diet

## 2022-09-20 NOTE — ASSESSMENT & PLAN NOTE
· POA w/ D-dimer elevated at 7 83  · Consider to be related to CHF, otherwise unclear etiology  · COVID negative  · CTA c/a/p: No PE, aortic dissection or intramural hematoma    · VAS venous duplex LE: negative for DVT  · C/w SQ heparin for DVT PPX

## 2022-09-21 LAB
ALBUMIN SERPL BCP-MCNC: 3.3 G/DL (ref 3.5–5)
ALP SERPL-CCNC: 88 U/L (ref 34–104)
ALT SERPL W P-5'-P-CCNC: 10 U/L (ref 7–52)
ANION GAP SERPL CALCULATED.3IONS-SCNC: 5 MMOL/L (ref 4–13)
AST SERPL W P-5'-P-CCNC: 14 U/L (ref 13–39)
BASOPHILS # BLD AUTO: 0.03 THOUSANDS/ΜL (ref 0–0.1)
BASOPHILS NFR BLD AUTO: 1 % (ref 0–1)
BILIRUB SERPL-MCNC: 0.43 MG/DL (ref 0.2–1)
BUN SERPL-MCNC: 10 MG/DL (ref 5–25)
CALCIUM ALBUM COR SERPL-MCNC: 9.6 MG/DL (ref 8.3–10.1)
CALCIUM SERPL-MCNC: 9 MG/DL (ref 8.4–10.2)
CHLORIDE SERPL-SCNC: 101 MMOL/L (ref 96–108)
CO2 SERPL-SCNC: 34 MMOL/L (ref 21–32)
CREAT SERPL-MCNC: 0.77 MG/DL (ref 0.6–1.3)
D DIMER PPP FEU-MCNC: 1.42 UG/ML FEU
EOSINOPHIL # BLD AUTO: 0.27 THOUSAND/ΜL (ref 0–0.61)
EOSINOPHIL NFR BLD AUTO: 5 % (ref 0–6)
ERYTHROCYTE [DISTWIDTH] IN BLOOD BY AUTOMATED COUNT: 12.6 % (ref 11.6–15.1)
GFR SERPL CREATININE-BSD FRML MDRD: 80 ML/MIN/1.73SQ M
GLUCOSE SERPL-MCNC: 104 MG/DL (ref 65–140)
GLUCOSE SERPL-MCNC: 104 MG/DL (ref 65–140)
GLUCOSE SERPL-MCNC: 125 MG/DL (ref 65–140)
GLUCOSE SERPL-MCNC: 148 MG/DL (ref 65–140)
HCT VFR BLD AUTO: 39.7 % (ref 34.8–46.1)
HGB BLD-MCNC: 12.7 G/DL (ref 11.5–15.4)
IMM GRANULOCYTES # BLD AUTO: 0.02 THOUSAND/UL (ref 0–0.2)
IMM GRANULOCYTES NFR BLD AUTO: 0 % (ref 0–2)
LYMPHOCYTES # BLD AUTO: 1.17 THOUSANDS/ΜL (ref 0.6–4.47)
LYMPHOCYTES NFR BLD AUTO: 22 % (ref 14–44)
MAGNESIUM SERPL-MCNC: 1.7 MG/DL (ref 1.9–2.7)
MCH RBC QN AUTO: 29.9 PG (ref 26.8–34.3)
MCHC RBC AUTO-ENTMCNC: 32 G/DL (ref 31.4–37.4)
MCV RBC AUTO: 93 FL (ref 82–98)
MONOCYTES # BLD AUTO: 0.33 THOUSAND/ΜL (ref 0.17–1.22)
MONOCYTES NFR BLD AUTO: 6 % (ref 4–12)
NEUTROPHILS # BLD AUTO: 3.61 THOUSANDS/ΜL (ref 1.85–7.62)
NEUTS SEG NFR BLD AUTO: 66 % (ref 43–75)
NRBC BLD AUTO-RTO: 0 /100 WBCS
PLATELET # BLD AUTO: 162 THOUSANDS/UL (ref 149–390)
PMV BLD AUTO: 10.9 FL (ref 8.9–12.7)
POTASSIUM SERPL-SCNC: 4.2 MMOL/L (ref 3.5–5.3)
PROT SERPL-MCNC: 6.6 G/DL (ref 6.4–8.4)
RBC # BLD AUTO: 4.25 MILLION/UL (ref 3.81–5.12)
SODIUM SERPL-SCNC: 140 MMOL/L (ref 135–147)
WBC # BLD AUTO: 5.43 THOUSAND/UL (ref 4.31–10.16)

## 2022-09-21 PROCEDURE — 99232 SBSQ HOSP IP/OBS MODERATE 35: CPT

## 2022-09-21 PROCEDURE — 80053 COMPREHEN METABOLIC PANEL: CPT | Performed by: PHYSICIAN ASSISTANT

## 2022-09-21 PROCEDURE — 83735 ASSAY OF MAGNESIUM: CPT | Performed by: PHYSICIAN ASSISTANT

## 2022-09-21 PROCEDURE — 82948 REAGENT STRIP/BLOOD GLUCOSE: CPT

## 2022-09-21 PROCEDURE — 85025 COMPLETE CBC W/AUTO DIFF WBC: CPT | Performed by: PHYSICIAN ASSISTANT

## 2022-09-21 PROCEDURE — 99223 1ST HOSP IP/OBS HIGH 75: CPT | Performed by: INTERNAL MEDICINE

## 2022-09-21 PROCEDURE — 85379 FIBRIN DEGRADATION QUANT: CPT | Performed by: PHYSICIAN ASSISTANT

## 2022-09-21 RX ORDER — DULOXETIN HYDROCHLORIDE 30 MG/1
30 CAPSULE, DELAYED RELEASE ORAL DAILY
Status: DISCONTINUED | OUTPATIENT
Start: 2022-09-21 | End: 2022-09-22 | Stop reason: HOSPADM

## 2022-09-21 RX ORDER — SPIRONOLACTONE 25 MG/1
25 TABLET ORAL DAILY
Status: DISCONTINUED | OUTPATIENT
Start: 2022-09-21 | End: 2022-09-22 | Stop reason: HOSPADM

## 2022-09-21 RX ORDER — TORSEMIDE 20 MG/1
40 TABLET ORAL DAILY
Status: DISCONTINUED | OUTPATIENT
Start: 2022-09-21 | End: 2022-09-22 | Stop reason: HOSPADM

## 2022-09-21 RX ADMIN — ONDANSETRON 4 MG: 2 INJECTION INTRAMUSCULAR; INTRAVENOUS at 00:56

## 2022-09-21 RX ADMIN — LEVOTHYROXINE SODIUM 75 MCG: 75 TABLET ORAL at 05:36

## 2022-09-21 RX ADMIN — HEPARIN SODIUM 5000 UNITS: 5000 INJECTION INTRAVENOUS; SUBCUTANEOUS at 05:36

## 2022-09-21 RX ADMIN — HEPARIN SODIUM 5000 UNITS: 5000 INJECTION INTRAVENOUS; SUBCUTANEOUS at 21:56

## 2022-09-21 RX ADMIN — TOPIRAMATE 200 MG: 100 TABLET, FILM COATED ORAL at 08:52

## 2022-09-21 RX ADMIN — Medication 1000 UNITS: at 08:52

## 2022-09-21 RX ADMIN — HEPARIN SODIUM 5000 UNITS: 5000 INJECTION INTRAVENOUS; SUBCUTANEOUS at 15:28

## 2022-09-21 RX ADMIN — Medication 400 MG: at 08:52

## 2022-09-21 RX ADMIN — GABAPENTIN 100 MG: 100 CAPSULE ORAL at 18:13

## 2022-09-21 RX ADMIN — TOPIRAMATE 200 MG: 100 TABLET, FILM COATED ORAL at 18:13

## 2022-09-21 RX ADMIN — ONDANSETRON 4 MG: 2 INJECTION INTRAMUSCULAR; INTRAVENOUS at 18:17

## 2022-09-21 RX ADMIN — POTASSIUM CHLORIDE 20 MEQ: 1500 TABLET, EXTENDED RELEASE ORAL at 08:52

## 2022-09-21 RX ADMIN — GABAPENTIN 100 MG: 100 CAPSULE ORAL at 08:52

## 2022-09-21 RX ADMIN — TORSEMIDE 40 MG: 20 TABLET ORAL at 12:36

## 2022-09-21 RX ADMIN — INSULIN GLARGINE 36 UNITS: 100 INJECTION, SOLUTION SUBCUTANEOUS at 21:56

## 2022-09-21 RX ADMIN — PANTOPRAZOLE SODIUM 40 MG: 40 TABLET, DELAYED RELEASE ORAL at 05:36

## 2022-09-21 RX ADMIN — GABAPENTIN 300 MG: 300 CAPSULE ORAL at 21:56

## 2022-09-21 RX ADMIN — FLUOXETINE 60 MG: 20 CAPSULE ORAL at 08:52

## 2022-09-21 RX ADMIN — SPIRONOLACTONE 25 MG: 25 TABLET ORAL at 12:36

## 2022-09-21 RX ADMIN — DULOXETINE HYDROCHLORIDE 30 MG: 30 CAPSULE, DELAYED RELEASE ORAL at 12:36

## 2022-09-21 RX ADMIN — ASPIRIN 81 MG: 81 TABLET, COATED ORAL at 08:52

## 2022-09-21 NOTE — ASSESSMENT & PLAN NOTE
· Patient presented with 1 diarrhea episode, stated she also noted blood in her stool  · FOBT: Positive  · Patient denies any active bleeding at this time, no further BMs or episodes of diarrhea since admission  · Will hold off on enteric stool panel  · Hgb remains stable: 14 9->12 5  · Consider hemodilution in setting of IVFs     · Recommend outpatient follow up with GI

## 2022-09-21 NOTE — CASE MANAGEMENT
Case Management Assessment & Discharge Planning Note    Patient name Lisset Gale  Location /-45 MRN [id number]  : 1955 Date 2022       Current Admission Date: 2022  Current Admission Diagnosis:Syncope   Patient Active Problem List    Diagnosis Date Noted    Syncope 2022    Elevated d-dimer 2022    Coronary artery disease involving native coronary artery of native heart without angina pectoris 2022    Chronic heart failure with preserved ejection fraction ([facility] ) 2022    Chronic respiratory failure ([facility] ) 2022    Dyspnea on exertion 2022    Primary osteoarthritis of one hip, right 12/10/2021    Orthostatic hypotension 10/24/2021    Morbid obesity due to excess calories ([facility] ) 2021    Onychomycosis of toenail 2021    Calf tenderness 2021    Asymptomatic bacteriuria 2021    Chronic fatigue and malaise 2021    Diarrhea 2021    Acquired hypothyroidism 2021    Suspected COVID-19 virus infection 2020    Major depressive disorder 2020    Gambling disorder, episodic, moderate 2018    Severe episode of recurrent major depressive disorder, without psychotic features ([facility] ) 2018    Encounter for examination and observation for other specified reasons     GERD (gastroesophageal reflux disease) 07/10/2016    Epigastric abdominal pain 07/10/2016    Seizures ([facility] )     Restless leg syndrome     Hypertension     Hyperlipidemia     Diabetes mellitus ([facility] )       LOS (days): 1  Geometric Mean LOS (GMLOS) (days):   Days to GMLOS:     OBJECTIVE:    Risk of Unplanned Readmission Score: 10 43         Current admission status: Inpatient       Preferred Pharmacy:   [facility] [address] [place] [address] - [address] [place] [address] ROAD  [address] [address]  Phone: [phone] Fax: [phone]    Primary Care Provider: Jaja Suazo MD    Primary Insurance: [facility] [employer] [facility]  Secondary Insurance:     ASSESSMENT:  [address] [employer]     [facility]FF[facility] Representative - Son   Primary Phone: [phone] (Mobile)                              Patient Information  Admitted from[de-identified] Home  Mental Status: Alert  During Assessment patient was accompanied by: Not accompanied during assessment  Assessment information provided by[de-identified] Patient  Primary Caregiver: Self  Support Systems: Son, Home care staff  [place] of Residence: [address] [place] [address] do you live in?: Portal entry access options   Select all that apply : No steps to enter home, Elevator  Type of Current Residence: Apartment ([address])  Upon entering residence, is there a bedroom on the main floor (no further steps)?: Yes  Upon entering residence, is there a bathroom on the main floor (no further steps)?: Yes  In the last 12 months, was there a time when you were not able to pay the mortgage or rent on time?: No  In the last 12 months, was there a time when you did not have a steady place to sleep or slept in a shelter (including now)?: No  Homeless/housing insecurity resource given?: No  Living Arrangements: Lives Alone    Activities of Daily Living Prior to Admission  Functional Status: Independent  Completes ADLs independently?: Yes (pt receives HHA assistance with [facility] Therapy, twice a week, Tuesday and Thursdy (2 hours each visit) to assist with showers)  Ambulates independently?: Yes  Does patient use assisted devices?: Yes  Assisted Devices (DME) used: Rollator, Shower Chair  Does patient currently own DME?: Yes  What DME does the patient currently own?: Shower Chair, Straight Cane, Rollator  Does patient have a history of Outpatient Therapy (PT/OT)?: No  Does patient have a history of HHC?: Yes  Does patient currently have [address]?: Yes    Current Home Health Care  Type of Current Home Care Services: [address][de-identified] Other (please enter name in comment) ([address])  [facility] Provider[de-identified] PCP    Patient Information Continued  Within the past 12 months, you worried that your food would run out before you got the money to buy more : Never true  Within the past 12 months, the food you bought just didn't last and you didn't have money to get more : Never true  Food insecurity resource given?: No         Means of Transportation  Means of Transport to Appts[de-identified] [employer]  In the past 12 months, has lack of transportation kept you from medical appointments or from getting medications?: No  In the past 12 months, has lack of transportation kept you from meetings, work, or from getting things needed for daily living?: No  Was application for public transport provided?: No        DISCHARGE DETAILS:    Discharge planning discussed with[de-identified] patient  Freedom of Choice: Yes  Comments - Freedom of Choice: pt reported she is current with [address] for home PT, pt choice to resume with LVT upon d/c  CM contacted family/caregiver?: No- see comments (pt refused)  Were Treatment Team discharge recommendations reviewed with patient/caregiver?: Yes  Did patient/caregiver verbalize understanding of patient care needs?: Yes  Were patient/caregiver advised of the risks associated with not following Treatment Team discharge recommendations?: Yes         [address]         [facility]?: Yes  [address] requested[de-identified] Physical Therapy, Occupational [address] Name[de-identified] Other ([address])  [address] Provider[de-identified] PCP  [address] Needed[de-identified] Strengthening/Theraputic Exercises to Improve Function, Gait/ADL Training, Evaluate Functional Status and Safety  Homebound Criteria Met[de-identified] Requires the Assistance of Another Person for Safe Ambulation or to Leave the Home, Uses an Assist Device (i e  cane, walker, etc)  Supporting Clincal Findings[de-identified] Limited Endurance, Requires Oxygen    DME Referral Provided  Referral made for DME?: No    Other Referral/Resources/Interventions Provided:  Interventions: HHC  Referral Comments: CM met with pt to discuss dcp PT/OT rcmd for [facility]  Pt reported she is current with [address] for PT, pt choice to resume with LVT upon d/c  [address] referral sent to LVT via aidin, LVT confirmed for MA[facility]EN      Would you like to participate in our [address] service program?  : No - Declined    Treatment Team Recommendation: Home with [facility]  Discharge Destination Plan[de-identified] Home with Anika at Discharge : [facility]

## 2022-09-21 NOTE — PROGRESS NOTES
[address]  Progress Note - Randy Gomez 1955, 78 y o  female MRN: [id number]  Unit/Bed#: -01 Encounter: [id number]  Primary Care Provider: Sharon Orellana MD   Date and time admitted to hospital: 9/19/2022 11:55 AM    * Syncope  Assessment & Plan  Patient presented this afternoon after experiencing syncopal following >1hr on the commode with diarrhea episode  Patient stated she also noted blood in her stool  Patient awoke on the floor and called EMS  · CTH/CT Spine: No acute intracranial abnormality  No cervical spine fracture or traumatic malalignment  · HS Trops: 11->10  EKG without signs of ischemia  · UA unremarkable  · Suspect orthostasis with known history vs vasovagal response following diarrhea episode  · S/p 1L NSS Bolus in ED, maintained on gentle IV fluids as patient was clinically dry on arrival   · Positive orthostatics 9/19, negative orthostatics today  · D/C IVFs  · No acute neuro changes while admitted  Can return to [facility] neuro checks  · Notify provider for any changes in mentation  · PT/OT evaluated: Recommending HHC    Elevated d-dimer  Assessment & Plan  · POA w/ D-dimer elevated at 7 83  · Consider to be related to CHF, otherwise unclear etiology  · COVID negative  · CTA c/a/p: No PE, aortic dissection or intramural hematoma  · VAS venous duplex LE: negative for DVT  · C/w SQ heparin for DVT PPX    Chronic heart failure with preserved ejection fraction [facility])  Assessment & Plan  Wt Readings from Last 3 Encounters:   09/21/22 134 kg (295 lb 13 7 oz)   06/08/22 129 kg (284 lb 6 3 oz)   03/25/22 129 kg (284 lb)     · Patient reports gaining close to 10 lbs  over the past week despite compliance with home diuretics and diet  · Patient clinically dry on arrival   · S/p Light IVF for 10 hours on admission  · Echo (2/2022): EF 67%  Diastolic function moderately abnormal, consistent with grade 2 relaxation  · Home Medication:  Torsemide 40 mg daily, Spirolactone 25 mg daily  · CT Chest: Mild interstitial pulmonary edema  · BNP: 89  · S/p 1x dose of Furosemide (9/20)  · Patient does not appear to be in acute exacerbation  · Cardiology consulted, recommendations appreciated   · Can transition back to PO diuretics with Torsemide 40 mg daily and Spirolactone 25 mg Daily  · Stable from a cardiac standpoint for discharge  · Continue outpatient follow up with Cardiology, Dr Phu Sanford upon discharge  · Monitor I/O's, Daily Weights  · 1 5 L fluid restriction    Diarrhea  Assessment & Plan  · Patient presented with 1 diarrhea episode, stated she also noted blood in her stool  · FOBT: Positive  · Patient denies any active bleeding at this time, no further BMs or episodes of diarrhea since admission  · Will hold off on enteric stool panel  · Hgb remains stable: 14 9->12 5  · Consider hemodilution in setting of IVFs  · Recommend outpatient follow up with GI     Major depressive disorder  Assessment & Plan  · Pateint flat and minimally interactive on assessment  · Patient stated she has been in a worse than normal depressive state  · Home medication:   · Prozac  · Psych Consulted, appreciate Recommendations:  · D/C Prozac and Start Cymbalta 30 mg Daily  · Recommend outpatient follow up with Psychiatrist    Diabetes mellitus [facility])  Assessment & Plan  Lab Results   Component Value Date    HGBA1C 11 8 (H) 09/19/2022       Recent Labs     09/20/22  1526 09/20/22  2127 09/21/22  0700 09/21/22  1107   POCGLU 127 134 104 125       Blood Sugar Average: Last 72 hrs:  (P) 226 [id number]   · Appears DM uncontrolled, Updated A1c 11 8  · Home Medication:   · Lantus 38 units qHs  · C/w Accu-Checks and SSI AC/HS and Lantus at 36 units qhs for now  · Hypoglycemic protocol  · Diabetic Diet    Hypertension  Assessment & Plan  · BP reviewed and elevated, SBP 160s  · Patient asymptomatic    · Home Medication:   · Spirolactone and Torsemide  · Held in the setting of presenting dry on examination  · Can restart Home Medications  · Continue to monitor BP    Hyperlipidemia  Assessment & Plan  · Per Chart review patient no longer taking Zetia  · Lipid panel: , TGCs 156, HDL 33,   · Likely would anticipate starting statin, but patient with known allergy   · Recommend restarting Zetia upon discharge    Chronic respiratory failure ([facility] )  Assessment & Plan  · Per Patient, Chronically wears 3L NC at baseline, reports occasional noncompliance  · Does not appear in acute respiratory distress  · Stable at 2 L NC O2  · Continue Respiratory protocol    Seizures (HCC)  Assessment & Plan  · Hx seizures, well controlled  · Continue Home Medication:   · Topomax    Morbid obesity due to excess calories ([facility] )  Assessment & Plan  · Continue to encourage Lifestyle modifications    Acquired hypothyroidism  Assessment & Plan  · Continue Home Medication:   · Levothyroxine    GERD (gastroesophageal reflux disease)  Assessment & Plan  · Continue Home Medication:   · Protonix      VTE Pharmacologic Prophylaxis: VTE Score: 7 High Risk (Score >/= 5) - Pharmacological DVT Prophylaxis Ordered: heparin  Sequential Compression Devices Ordered  Patient Centered Rounds: I performed bedside rounds with nursing staff today  Discussions with Specialists or Other Care Team Provider: Cardiology, Psych    Education and Discussions with Family / Patient: Patient declined call to   Time Spent for Care: 30 minutes  More than 50% of total time spent on counseling and coordination of care as described above  Current Length of Stay: 1 day(s)  Current Patient Status: Inpatient   Certification Statement: The patient will continue to require additional inpatient hospital stay due to poor oral intake  Discharge Plan: Anticipate discharge tomorrow to home with home services  Code Status: Level 1 - Full Code    Subjective:   Patient stated she is feeling progressively worsening depression   Patient stated she has been on her prozac for a long time  Patient also complaining of abdominal pain and no real interest or ability to eat  Objective:     Vitals:   Temp (24hrs), Av 7 °F (37 1 °C), Min:97 8 °F (36 6 °C), Max:99 2 °F (37 3 °C)    Temp:  [97 8 °F (36 6 °C)-99 2 °F (37 3 °C)] 99 °F (37 2 °C)  HR:  [63-80] 63  Resp:  [16-20] 16  BP: (130-159)/(66-88) 130/70  SpO2:  [91 %-98 %] 97 %  Body mass index is 54 11 kg/m²  Input and Output Summary (last 24 hours): Intake/Output Summary (Last 24 hours) at 2022 1242  Last data filed at 2022 0300  Gross per 24 hour   Intake 350 ml   Output 2100 ml   Net -1750 ml       Physical Exam:   Physical Exam  Vitals and nursing note reviewed  Constitutional:       General: She is not in acute distress  Appearance: She is obese  HENT:      Head: Normocephalic  Nose: Nose normal  No congestion  Mouth/Throat:      Mouth: Mucous membranes are moist       Pharynx: Oropharynx is clear  Cardiovascular:      Rate and Rhythm: Normal rate and regular rhythm  Pulses: Normal pulses  Heart sounds: No murmur heard  Pulmonary:      Effort: Pulmonary effort is normal  No respiratory distress  Breath sounds: Decreased breath sounds present  Abdominal:      General: Bowel sounds are normal  There is no distension  Palpations: Abdomen is soft  Musculoskeletal:         General: Normal range of motion  Cervical back: Normal range of motion  Right lower leg: Edema (Non Pitting) present  Left lower leg: Edema (Non Pitting) present  Skin:     General: Skin is warm and dry  Capillary Refill: Capillary refill takes less than 2 seconds  Neurological:      Mental Status: She is alert and oriented to person, place, and time  Mental status is at baseline  Motor: No weakness  Psychiatric:         Mood and Affect: Mood is anxious  Affect is flat  Speech: Speech normal          Behavior: Behavior is withdrawn  Behavior is cooperative  Cognition and Memory: Cognition and memory normal           Additional Data:     Labs:  Results from last 7 days   Lab Units 09/21/22  0538   WBC Thousand/uL 5 43   HEMOGLOBIN g/dL 12 7   HEMATOCRIT % 39 7   PLATELETS Thousands/uL 162   NEUTROS PCT % 66   LYMPHS PCT % 22   MONOS PCT % 6   EOS PCT % 5     Results from last 7 days   Lab Units 09/21/22  0538   SODIUM mmol/L 140   POTASSIUM mmol/L 4 2   CHLORIDE mmol/L 101   CO2 mmol/L 34*   BUN mg/dL 10   CREATININE mg/dL 0 77   ANION GAP mmol/L 5   CALCIUM mg/dL 9 0   ALBUMIN g/dL 3 3*   TOTAL BILIRUBIN mg/dL 0 43   ALK PHOS U/L 88   ALT U/L 10   AST U/L 14   GLUCOSE RANDOM mg/dL 104         Results from last 7 days   Lab Units 09/21/22  1107 09/21/22  0700 09/20/22  2127 09/20/22  1526 09/20/22  1325 09/20/22  1114 09/20/22  0706 09/19/22  2109 09/19/22  1853   POC GLUCOSE mg/dl 125 104 134 127 160* 141* 123 185* 214*     Results from last 7 days   Lab Units 09/19/22  1240   HEMOGLOBIN A1C % 11 8*     Results from last 7 days   Lab Units 09/19/22  1240   LACTIC ACID mmol/L 1 9       Lines/Drains:  Invasive Devices  Report    Peripheral Intravenous Line  Duration           Peripheral IV 09/19/22 Left Antecubital 1 day                      Imaging: No pertinent imaging reviewed      Recent Cultures (last 7 days):         Last 24 Hours Medication List:   Current Facility-Administered Medications   Medication Dose Route Frequency Provider Last Rate    acetaminophen  650 mg Oral Q6H PRN Shonda Irving MD      albuterol  2 5 mg Nebulization Q6H PRN Shonda Irving MD      aspirin  81 mg Oral Daily Zoe Land MD      cholecalciferol  1,000 Units Oral Daily Shonda Irving MD      DULoxetine  30 mg Oral Daily Palma Fall, CRNP      gabapentin  100 mg Oral BID Shonda Irving MD      gabapentin  300 mg Oral HS Zoe Land MD      heparin (porcine)  5,000 Units Subcutaneous [facility] Zoe Bowden MD      ibuprofen  400 mg Oral Q6H PRN Elizabeth Ríos MD      insulin glargine  36 Units Subcutaneous HS Zoe Bowden MD      insulin lispro  1-5 Units Subcutaneous TID AC Zoe Bowden MD      insulin lispro  1-5 Units Subcutaneous HS Zoe Bowden MD      levothyroxine  75 mcg Oral Early Morning Keny Selby MD      magnesium oxide  400 mg Oral Daily SAMIA Rios      ondansetron  4 mg Intravenous Q6H PRN Keny Selby MD      pantoprazole  40 mg Oral Early Morning Keny Selby MD      potassium chloride  20 mEq Oral Daily Keny Selby MD      sodium chloride (PF)  3 mL Intravenous Q1H PRN Radha Katz DO      spironolactone  25 mg Oral Daily Tani Agarwal, SAMIA      topiramate  200 mg Oral BID Zoe Bowden MD      torsemide  40 mg Oral Daily SAMIA Rios          Today, Patient Was Seen By: SAMIA Rios    **Please Note: This note may have been constructed using a voice recognition system  **

## 2022-09-21 NOTE — CONSULTS
Consultation - Rebeca Manjit 78 y o  female MRN: [id number]    Unit/Bed#: -01 Encounter: [id number]      Identifying Data:  78years old white female is admitted at [facility] on 2022 with complaining of syncope  Psychiatric consultation is asked for the depression  Patient examined spoke with the nurse history physical medications labs reviewed and noted discussed with the nurse practitioner Zully Cassidy  Patient's home medications reviewed and noted  Patient is a poor historian but she is able to give out the basic background information  Patient gives a long history of depression and she is under psychiatric care with the psychiatrist Dr Wendy Monaco and she has been on Prozac 60 mg daily  Patient looks depressed and she admits being depressed she was taken off the Prozac and started her again according to the information  At this point patient is being on Prozac since long time I will consider changing it to the Cymbalta to help her with the depression  Social history patient lives alone her   and then she had a boyfriend who also  she has 2 grownup sons  Patient denies smoking denies abusing alcohol or drugs patient denies history of drugs and alcohol abuse she denies history of IV drug abuse she has 12th grade education and she was in [employer] for 6 years and then she work 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  Currently patient is retired she does not drive      Allergy pineapple statins Wellbutrin Byetta ceftriaxone marijuana metformin omeprazole ondansetron coconut oil Liraglutide and penicillin    Diagnosis  Major depression recurrent  Anxiety  Insomnia  Diabetes CHF thyroid gland problem hypertension high cholesterol hypothyroidism restless leg syndrome and seizure disorder  Appendectomy cholecystectomy hip fracture surgery tonsillectomy tubal ligation    Chief Complaints:  Chronic depression    Family History:  Son's daughter have a depression  Family History   Problem Relation Age of Onset    Brain cancer Mother     Seizures Father     Colon cancer Maternal Grandfather     No Known Problems Son     No Known Problems Son     Breast cancer Maternal Aunt 30       Legal History:  Patient denies    Mental Status Exam:  78years old white female is resting in the bed affect flat withdrawn over wake speech slow and low in tone psychomotor retardation poor historian poor sleep appetite okay  Patient denies auditory or visual hallucinations  Patient denies suicidal or homicidal ideation  Memory intact  No paranoia no delusion elucidated no mood swings poor concentration  Insight and judgments are adequate  History of Present Illness     HPI: Ruben Parnell is a 78y o  year old female who presents with syncope    Inpatient consult to Psychiatry  Consult performed by: Warren Berg MD  Consult ordered by: SAMIA Oliveros            Historical Information   Past Psychiatric History:  Patient gives an extensive psych history with at least 2 psychiatric admissions in the past and 2-3 suicide attempts in the past by taking an overdose many years ago currently she clearly denies feeling suicidal patient has been under psychiatric care at [facility] with the psychiatrist and on psychotropic medications currently she is taking Prozac 60 mg daily    Patient denies history of drugs and alcohol abuse no history of IV drug abuse and no legal problems in the past   Past Medical History:   Diagnosis Date    [facility]     [facility]     Disease of thyroid gland     Hyperlipidemia     Hypertension     Hypothyroidism (acquired)     Restless leg syndrome     Seizures ([facility] )      Past Surgical History:   Procedure Laterality Date    APPENDECTOMY      CHOLECYSTECTOMY      HIP FRACTURE SURGERY Right     TONSILLECTOMY      TUBAL LIGATION       Social History   Social History     Substance and Sexual Activity   Alcohol Use Yes    Comment: occasional     Social History     Substance and Sexual Activity   Drug Use Never     Social History     Tobacco Use   Smoking Status Never Smoker   Smokeless Tobacco Never Used   Tobacco Comment    not a smoker       Meds/Allergies   current meds:   Current Facility-Administered Medications   Medication Dose Route Frequency    acetaminophen (TYLENOL) tablet 650 mg  650 mg Oral Q6H PRN    albuterol inhalation solution 2 5 mg  2 5 mg Nebulization Q6H PRN    aspirin (ECOTRIN LOW STRENGTH) EC tablet 81 mg  81 mg Oral Daily    cholecalciferol (VITAMIN D3) tablet 1,000 Units  1,000 Units Oral Daily    FLUoxetine (PROzac) capsule 60 mg  60 mg Oral Daily    gabapentin (NEURONTIN) capsule 100 mg  100 mg Oral BID    gabapentin (NEURONTIN) capsule 300 mg  300 mg Oral HS    heparin (porcine) subcutaneous injection 5,000 Units  5,000 Units Subcutaneous Q8H [address]    ibuprofen (MOTRIN) tablet 400 mg  400 mg Oral Q6H PRN    insulin glargine (LANTUS) subcutaneous injection 36 Units 0 36 mL  36 Units Subcutaneous HS    insulin lispro (HumaLOG) 100 units/mL subcutaneous injection 1-5 Units  1-5 Units Subcutaneous TID AC    insulin lispro (HumaLOG) 100 units/mL subcutaneous injection 1-5 Units  1-5 Units Subcutaneous HS    levothyroxine tablet 75 mcg  75 mcg Oral Early Morning    magnesium oxide (MAG-OX) tablet 400 mg  400 mg Oral Daily    ondansetron (ZOFRAN) injection 4 mg  4 mg Intravenous Q6H PRN    pantoprazole (PROTONIX) EC tablet 40 mg  40 mg Oral Early Morning    potassium chloride (K-DUR,KLOR-CON) CR tablet 20 mEq  20 mEq Oral Daily    sodium chloride (PF) 0 9 % injection 3 mL  3 mL Intravenous Q1H PRN    topiramate (TOPAMAX) tablet 200 mg  200 mg Oral BID    and PTA meds:    Medications Prior to Admission   Medication    aspirin (ECOTRIN LOW STRENGTH) 81 mg EC tablet    docusate sodium (COLACE) 100 mg capsule    Ergocalciferol (VITAMIN D2 PO)    FLUoxetine (PROzac) 20 mg capsule    gabapentin (NEURONTIN) 100 mg capsule    gabapentin (NEURONTIN) 300 mg capsule    ibuprofen (MOTRIN) 400 mg tablet    nystatin (MYCOSTATIN) powder    omeprazole (PriLOSEC) 20 mg delayed release capsule    potassium chloride (K-DUR,KLOR-CON) 20 mEq tablet    spironolactone (ALDACTONE) 25 mg tablet    topiramate (TOPAMAX) 100 mg tablet    torsemide (DEMADEX) 20 mg tablet    acetaminophen (TYLENOL) 325 mg tablet    Cholecalciferol 25 MCG (1000 UT) capsule    Empagliflozin 25 MG TABS    ezetimibe (ZETIA) 10 mg tablet    hydrophilic ointment    insulin aspart, w/niacinamide, (Fiasp FlexTouch) 100 Units/mL injection pen    insulin glargine (LANTUS) 100 units/mL subcutaneous injection    levothyroxine 50 mcg tablet     Allergies   Allergen Reactions    Pineapple - Food Allergy Anaphylaxis    Statins Other (See Comments)     Cramping from head to toe     Bupropion Other (See Comments)    Byetta 10 Mcg Pen [Exenatide]     Ceftriaxone Other (See Comments)    Marijuana [Dronabinol] GI Intolerance    Metformin Diarrhea     Other reaction(s): Unknown    Omeprazole Hives    Ondansetron      Other reaction(s): Unknown    Coconut Oil - Food Allergy Rash    Liraglutide Rash and Other (See Comments)     Generalized rash    Penicillins Rash       Objective   Vitals: Blood pressure 130/70, pulse 63, temperature 99 °F (37 2 °C), temperature source Tympanic, resp  rate 16, height 5' 2" (1 575 m), weight 134 kg (295 lb 13 7 oz), SpO2 97 %        Routine Lab Results:   Admission on 09/19/2022   Component Date Value Ref Range Status    WBC 09/19/2022 9 48  4 31 - 10 16 Thousand/uL Final    RBC 09/19/2022 5 11  3 81 - 5 12 Million/uL Final    Hemoglobin 09/19/2022 14 9  11 5 - 15 4 g/dL Final    Hematocrit 09/19/2022 45 4  34 8 - 46 1 % Final    MCV 09/19/2022 89  82 - 98 fL Final    MCH 09/19/2022 29 2  26 8 - 34 3 pg Final    MCHC 09/19/2022 32 8  31 4 - 37 4 g/dL Final    RDW 09/19/2022 12 5  11 6 - 15 1 % Final    MPV 09/19/2022 11 4  8 9 - 12 7 fL Final    Platelets 49/90/0913 186  149 - 390 Thousands/uL Final    nRBC 09/19/2022 0  /100 WBCs Final    Neutrophils Relative 09/19/2022 82 (A) 43 - 75 % Final    Immat GRANS % 09/19/2022 1  0 - 2 % Final    Lymphocytes Relative 09/19/2022 11 (A) 14 - 44 % Final    Monocytes Relative 09/19/2022 4  4 - 12 % Final    Eosinophils Relative 09/19/2022 2  0 - 6 % Final    Basophils Relative 09/19/2022 0  0 - 1 % Final    Neutrophils Absolute 09/19/2022 7 83 (A) 1 85 - 7 62 Thousands/µL Final    Immature Grans Absolute 09/19/2022 0 07  0 00 - 0 20 Thousand/uL Final    Lymphocytes Absolute 09/19/2022 1 00  0 60 - 4 47 Thousands/µL Final    Monocytes Absolute 09/19/2022 0 40  0 17 - 1 22 Thousand/µL Final    Eosinophils Absolute 09/19/2022 0 15  0 00 - 0 61 Thousand/µL Final    Basophils Absolute 09/19/2022 0 03  0 00 - 0 10 Thousands/µL Final    Sodium 09/19/2022 135  135 - 147 mmol/L Final    Potassium 09/19/2022 3 4 (A) 3 5 - 5 3 mmol/L Final    Chloride 09/19/2022 96  96 - 108 mmol/L Final    CO2 09/19/2022 31  21 - 32 mmol/L Final    ANION GAP 09/19/2022 8  4 - 13 mmol/L Final    BUN 09/19/2022 18  5 - 25 mg/dL Final    Creatinine 09/19/2022 0 88  0 60 - 1 30 mg/dL Final    Standardized to IDMS reference method    Glucose 09/19/2022 295 (A) 65 - 140 mg/dL Final    If the patient is fasting, the ADA then defines impaired fasting glucose as > 100 mg/dL and diabetes as > or equal to 123 mg/dL  Specimen collection should occur prior to Sulfasalazine administration due to the potential for falsely depressed results  Specimen collection should occur prior to Sulfapyridine administration due to the potential for falsely elevated results      Calcium 09/19/2022 9 1  8 4 - 10 2 mg/dL Final    eGFR 09/19/2022 68  ml/min/1 73sq m Final    hs TnI 0hr 09/19/2022 11  "Refer to ACS Flowchart"- see link ng/L Final    Comment: Initial (time 0) result  If >=50 ng/L, Myocardial injury suggested ;  Type of myocardial injury and treatment strategy  to be determined  If 5-49 ng/L, a delta result at 2 hours and or 4 hours will be needed to further evaluate  If <4 ng/L, and chest pain has been >3 hours since onset, patient may qualify for discharge based on the HEART score in the ED  If <5 ng/L and <3hours since onset of chest pain, a delta result at 2 hours will be needed to further evaluate  HS Troponin 99th Percentile URL of a Health Population=12 ng/L with a 95% Confidence Interval of 8-18 ng/L  Second Troponin (time 2 hours)  If calculated delta >= 20 ng/L,  Myocardial injury suggested ; Type of myocardial injury and treatment strategy to be determined  If 5-49 ng/L and the calculated delta is 5-19 ng/L, consult medical service for evaluation  Continue evaluation for ischemia on ecg and other possible etiology and repeat hs troponin at 4 hours  If delta                            is <5 ng/L at 2 hours, consider discharge based on risk stratification via the HEART score (if in ED), or KENDALL risk score in IP/Observation  HS Troponin 99th Percentile URL of a Health Population=12 ng/L with a 95% Confidence Interval of 8-18 ng/L   Total Bilirubin 09/19/2022 0 44  0 20 - 1 00 mg/dL Final    Bilirubin, Direct 09/19/2022 0 06  0 00 - 0 20 mg/dL Final    Alkaline Phosphatase 09/19/2022 107 (A) 34 - 104 U/L Final    AST 09/19/2022 12 (A) 13 - 39 U/L Final    Specimen collection should occur prior to Sulfasalazine administration due to the potential for falsely depressed results   ALT 09/19/2022 11  7 - 52 U/L Final    Specimen collection should occur prior to Sulfasalazine administration due to the potential for falsely depressed results       Total Protein 09/19/2022 7 4  6 4 - 8 4 g/dL Final    Albumin 09/19/2022 3 8  3 5 - 5 0 g/dL Final    Lipase 09/19/2022 9 (A) 11 - 82 u/L Final    LACTIC ACID 09/19/2022 1 9  0 5 - 2 0 mmol/L Final    Color, UA 09/19/2022 Yellow  Yellow Final    Clarity, UA 09/19/2022 Clear  Clear Final    Specific Gravity, UA 09/19/2022 1 010  1 001 - 1 030 Final    pH, UA 09/19/2022 5 5  5 0, 5 5, 6 0, 6 5, 7 0, 7 5, 8 0 Final    Leukocytes, UA 09/19/2022 Negative  Negative Final    Nitrite, UA 09/19/2022 Negative  Negative Final    Protein, UA 09/19/2022 Negative  Negative, Interference- unable to analyze mg/dl Final    Glucose, UA 09/19/2022 Negative  Negative mg/dl Final    Ketones, UA 09/19/2022 Negative  Negative mg/dl Final    Urobilinogen, UA 09/19/2022 0 2  0 2, 1 0 E U /dl E U /dl Final    Bilirubin, UA 09/19/2022 Negative  Negative Final    Occult Blood, UA 09/19/2022 2+ (A) Negative Final    D-Dimer, Quant 09/19/2022 7 83 (A) <0 50 ug/ml FEU Final    Reference and upper limits to exclude DVT and PE are the same  Do not use to exclude if clinical symptoms are present  Pregnant women:  1st trimester:  <0 22 - 1 06 ug/ml FEU  2nd trimester:  <0 22 - 1 88 ug/ml FEU  3rd trimester:   0 24 - 3 28 ug/ml FEU    Note: Normal ranges may not apply to patients who are transgender, non-binary, or whose legal sex, sex at birth, and gender identity differ   BNP 09/19/2022 89  0 - 100 pg/mL Final    OCCULT BLD, FECAL IMMUNOLOGICAL 09/19/2022 Positive (A) Negative Final    SARS-CoV-2 09/19/2022 Negative  Negative Final    hs TnI 2hr 09/19/2022 10  "Refer to ACS Flowchart"- see link ng/L Final    Comment:                                              Initial (time 0) result  If >=50 ng/L, Myocardial injury suggested ;  Type of myocardial injury and treatment strategy  to be determined  If 5-49 ng/L, a delta result at 2 hours and or 4 hours will be needed to further evaluate  If <4 ng/L, and chest pain has been >3 hours since onset, patient may qualify for discharge based on the HEART score in the ED    If <5 ng/L and <3hours since onset of chest pain, a delta result at 2 hours will be needed to further evaluate  HS Troponin 99th Percentile URL of a Health Population=12 ng/L with a 95% Confidence Interval of 8-18 ng/L  Second Troponin (time 2 hours)  If calculated delta >= 20 ng/L,  Myocardial injury suggested ; Type of myocardial injury and treatment strategy to be determined  If 5-49 ng/L and the calculated delta is 5-19 ng/L, consult medical service for evaluation  Continue evaluation for ischemia on ecg and other possible etiology and repeat hs troponin at 4 hours  If delta                            is <5 ng/L at 2 hours, consider discharge based on risk stratification via the HEART score (if in ED), or KENDALL risk score in IP/Observation  HS Troponin 99th Percentile URL of a Health Population=12 ng/L with a 95% Confidence Interval of 8-18 ng/L   Delta 2hr hsTnI 09/19/2022 -1  <20 ng/L Final    Ventricular Rate 09/19/2022 65  BPM Final    Atrial Rate 09/19/2022 65  BPM Final    SC Interval 09/19/2022 136  ms Final    QRSD Interval 09/19/2022 136  ms Final    QT Interval 09/19/2022 458  ms Final    QTC Interval 09/19/2022 477  ms Final    P Axis 09/19/2022 46  degrees Final    QRS Axis 09/19/2022 -59  degrees Final    T Wave Axis 09/19/2022 67  degrees Final    TSH 3RD GENERATON 09/19/2022 6 866 (A) 0 450 - 4 500 uIU/mL Final    The recommended reference ranges for TSH during pregnancy are as follows:   First trimester 0 1 to 2 5 uIU/mL   Second trimester  0 2 to 3 0 uIU/mL   Third trimester 0 3 to 3 0 uIU/m    Note: Normal ranges may not apply to patients who are transgender, non-binary, or whose legal sex, sex at birth, and gender identity differ  Adult TSH (3rd generation) reference range follows the recommended guidelines of the American Thyroid Association, January, 2020   Hemoglobin A1C 09/19/2022 11 8 (A) Normal 3 8-5 6%; PreDiabetic 5 7-6 4%;  Diabetic >=6 5%; Glycemic control for adults with diabetes <7 0% % Final    EAG 09/19/2022 292  mg/dl Final    POC Glucose 09/19/2022 214 (A) 65 - 140 mg/dl Final    POC Glucose 09/19/2022 185 (A) 65 - 140 mg/dl Final    RBC, UA 09/19/2022 10-20 (A) None Seen, 0-1, 1-2, 2-4, 0-5 /hpf Final    WBC, UA 09/19/2022 2-4  None Seen, 0-1, 1-2, 0-5, 2-4 /hpf Final    Epithelial Cells 09/19/2022 Occasional  None Seen, Occasional /hpf Final    Bacteria, UA 09/19/2022 Occasional  None Seen, Occasional /hpf Final    Sodium 09/20/2022 139  135 - 147 mmol/L Final    Potassium 09/20/2022 3 4 (A) 3 5 - 5 3 mmol/L Final    Chloride 09/20/2022 103  96 - 108 mmol/L Final    CO2 09/20/2022 29  21 - 32 mmol/L Final    ANION GAP 09/20/2022 7  4 - 13 mmol/L Final    BUN 09/20/2022 14  5 - 25 mg/dL Final    Creatinine 09/20/2022 0 67  0 60 - 1 30 mg/dL Final    Standardized to IDMS reference method    Glucose 09/20/2022 119  65 - 140 mg/dL Final    If the patient is fasting, the ADA then defines impaired fasting glucose as > 100 mg/dL and diabetes as > or equal to 123 mg/dL  Specimen collection should occur prior to Sulfasalazine administration due to the potential for falsely depressed results  Specimen collection should occur prior to Sulfapyridine administration due to the potential for falsely elevated results   Glucose, Fasting 09/20/2022 119 (A) 65 - 99 mg/dL Final    Specimen collection should occur prior to Sulfasalazine administration due to the potential for falsely depressed results  Specimen collection should occur prior to Sulfapyridine administration due to the potential for falsely elevated results      Calcium 09/20/2022 8 6  8 4 - 10 2 mg/dL Final    eGFR 09/20/2022 91  ml/min/1 73sq m Final    WBC 09/20/2022 5 96  4 31 - 10 16 Thousand/uL Final    RBC 09/20/2022 4 11  3 81 - 5 12 Million/uL Final    Hemoglobin 09/20/2022 12 5  11 5 - 15 4 g/dL Final    Hematocrit 09/20/2022 38 2  34 8 - 46 1 % Final    MCV 09/20/2022 93  82 - 98 fL Final    MCH 09/20/2022 30 4  26 8 - 34 3 pg Final    MCHC 09/20/2022 32 7  31 4 - 37 4 g/dL Final    RDW 09/20/2022 12 7  11 6 - 15 1 % Final    Platelets 66/04/6527 164  149 - 390 Thousands/uL Final    MPV 09/20/2022 11 1  8 9 - 12 7 fL Final    Cholesterol 09/20/2022 188  See Comment mg/dL Final    Cholesterol:         Pediatric <18 Years        Desirable          <170 mg/dL      Borderline High    170-199 mg/dL      High               >=200 mg/dL        Adult >=18 Years            Desirable         <200 mg/dL      Borderline High   200-239 mg/dL      High              >239 mg/dL      Triglycerides 09/20/2022 156 (A) See Comment mg/dL Final    Triglyceride:     0-9Y            <75mg/dL     10Y-17Y         <90 mg/dL       >=18Y     Normal          <150 mg/dL     Borderline High 150-199 mg/dL     High            200-499 mg/dL        Very High       >499 mg/dL    Specimen collection should occur prior to N-Acetylcysteine or Metamizole administration due to the potential for falsely depressed results   HDL, Direct 09/20/2022 33 (A) >=50 mg/dL Final    LDL Calculated 09/20/2022 124 (A) 0 - 100 mg/dL Final    LDL Cholesterol:     Optimal           <100 mg/dl     Near Optimal      100-129 mg/dl     Above Optimal       Borderline High 130-159 mg/dl       High            160-189 mg/dl       Very High       >189 mg/dl         This screening LDL is a calculated result  It does not have the accuracy of the Direct Measured LDL in the monitoring of patients with hyperlipidemia and/or statin therapy  Direct Measure LDL (DXD197) must be ordered separately in these patients      Non-HDL-Chol (CHOL-HDL) 09/20/2022 155  mg/dl Final    POC Glucose 09/20/2022 123  65 - 140 mg/dl Final    POC Glucose 09/20/2022 141 (A) 65 - 140 mg/dl Final    POC Glucose 09/20/2022 160 (A) 65 - 140 mg/dl Final    POC Glucose 09/20/2022 127  65 - 140 mg/dl Final    POC Glucose 09/20/2022 134  65 - 140 mg/dl Final    WBC 09/21/2022 5 43  4 31 - 10 16 Thousand/uL Final    RBC 09/21/2022 4 25 3 81 - 5 12 Million/uL Final    Hemoglobin 09/21/2022 12 7  11 5 - 15 4 g/dL Final    Hematocrit 09/21/2022 39 7  34 8 - 46 1 % Final    MCV 09/21/2022 93  82 - 98 fL Final    MCH 09/21/2022 29 9  26 8 - 34 3 pg Final    MCHC 09/21/2022 32 0  31 4 - 37 4 g/dL Final    RDW 09/21/2022 12 6  11 6 - 15 1 % Final    MPV 09/21/2022 10 9  8 9 - 12 7 fL Final    Platelets 20/89/1947 162  149 - 390 Thousands/uL Final    nRBC 09/21/2022 0  /100 WBCs Final    Neutrophils Relative 09/21/2022 66  43 - 75 % Final    Immat GRANS % 09/21/2022 0  0 - 2 % Final    Lymphocytes Relative 09/21/2022 22  14 - 44 % Final    Monocytes Relative 09/21/2022 6  4 - 12 % Final    Eosinophils Relative 09/21/2022 5  0 - 6 % Final    Basophils Relative 09/21/2022 1  0 - 1 % Final    Neutrophils Absolute 09/21/2022 3 61  1 85 - 7 62 Thousands/µL Final    Immature Grans Absolute 09/21/2022 0 02  0 00 - 0 20 Thousand/uL Final    Lymphocytes Absolute 09/21/2022 1 17  0 60 - 4 47 Thousands/µL Final    Monocytes Absolute 09/21/2022 0 33  0 17 - 1 22 Thousand/µL Final    Eosinophils Absolute 09/21/2022 0 27  0 00 - 0 61 Thousand/µL Final    Basophils Absolute 09/21/2022 0 03  0 00 - 0 10 Thousands/µL Final    Sodium 09/21/2022 140  135 - 147 mmol/L Final    Potassium 09/21/2022 4 2  3 5 - 5 3 mmol/L Final    Chloride 09/21/2022 101  96 - 108 mmol/L Final    CO2 09/21/2022 34 (A) 21 - 32 mmol/L Final    ANION GAP 09/21/2022 5  4 - 13 mmol/L Final    BUN 09/21/2022 10  5 - 25 mg/dL Final    Creatinine 09/21/2022 0 77  0 60 - 1 30 mg/dL Final    Standardized to IDMS reference method    Glucose 09/21/2022 104  65 - 140 mg/dL Final    If the patient is fasting, the ADA then defines impaired fasting glucose as > 100 mg/dL and diabetes as > or equal to 123 mg/dL  Specimen collection should occur prior to Sulfasalazine administration due to the potential for falsely depressed results   Specimen collection should occur prior to Sulfapyridine administration due to the potential for falsely elevated results   Calcium 09/21/2022 9 0  8 4 - 10 2 mg/dL Final    Corrected Calcium 09/21/2022 9 6  8 3 - 10 1 mg/dL Final    AST 09/21/2022 14  13 - 39 U/L Final    Specimen collection should occur prior to Sulfasalazine administration due to the potential for falsely depressed results   ALT 09/21/2022 10  7 - 52 U/L Final    Specimen collection should occur prior to Sulfasalazine administration due to the potential for falsely depressed results   Alkaline Phosphatase 09/21/2022 88  34 - 104 U/L Final    Total Protein 09/21/2022 6 6  6 4 - 8 4 g/dL Final    Albumin 09/21/2022 3 3 (A) 3 5 - 5 0 g/dL Final    Total Bilirubin 09/21/2022 0 43  0 20 - 1 00 mg/dL Final    eGFR 09/21/2022 80  ml/min/1 73sq m Final    Magnesium 09/21/2022 1 7 (A) 1 9 - 2 7 mg/dL Final    D-Dimer, Quant 09/21/2022 1 42 (A) <0 50 ug/ml FEU Final    Reference and upper limits to exclude DVT and PE are the same  Do not use to exclude if clinical symptoms are present  Pregnant women:  1st trimester:  <0 22 - 1 06 ug/ml FEU  2nd trimester:  <0 22 - 1 88 ug/ml FEU  3rd trimester:   0 24 - 3 28 ug/ml FEU    Note: Normal ranges may not apply to patients who are transgender, non-binary, or whose legal sex, sex at birth, and gender identity differ   POC Glucose 09/21/2022 104  65 - 140 mg/dl Final    POC Glucose 09/21/2022 125  65 - 140 mg/dl Final         Diagnosis:  Major depression recurrent  Anxiety  Multiple medical problems    Plan:  Discontinue Prozac 60 mg daily  Cymbalta 30 mg daily  Psychotherapy  Psychiatric follow-up with her psychiatrist at [facility] after the discharge  Discussed with the nurse practitioner Gladys Zuleta and the nurse  I will follow up during the hospital stay      Narcisa Ramirez MD

## 2022-09-21 NOTE — ASSESSMENT & PLAN NOTE
Wt Readings from Last 3 Encounters:   09/21/22 134 kg (295 lb 13 7 oz)   06/08/22 129 kg (284 lb 6 3 oz)   03/25/22 129 kg (284 lb)     · Patient reports gaining close to 10 lbs  over the past week despite compliance with home diuretics and diet  · Patient clinically dry on arrival   · S/p Light IVF for 10 hours on admission  · Echo (2/2022): EF 67%  Diastolic function moderately abnormal, consistent with grade 2 relaxation  · Home Medication:  Torsemide 40 mg daily, Spirolactone 25 mg daily  · CT Chest: Mild interstitial pulmonary edema  · BNP: 89  · S/p 1x dose of Furosemide (9/20)  · Patient does not appear to be in acute exacerbation  · Cardiology consulted, recommendations appreciated   · Can transition back to PO diuretics with Torsemide 40 mg daily and Spirolactone 25 mg Daily  · Stable from a cardiac standpoint for discharge  · Continue outpatient follow up with Cardiology, Dr Hillary Gimenez upon discharge  · Monitor I/O's, Daily Weights  · 1 5 L fluid restriction

## 2022-09-21 NOTE — ASSESSMENT & PLAN NOTE
Patient presented this afternoon after experiencing syncopal following >1hr on the commode with diarrhea episode  Patient stated she also noted blood in her stool  Patient awoke on the floor and called EMS  · CTH/CT Spine: No acute intracranial abnormality  No cervical spine fracture or traumatic malalignment  · HS Trops: 11->10  EKG without signs of ischemia  · UA unremarkable  · Suspect orthostasis with known history vs vasovagal response following diarrhea episode  · S/p 1L NSS Bolus in ED, maintained on gentle IV fluids as patient was clinically dry on arrival   · Positive orthostatics 9/19, negative orthostatics today  · D/C IVFs  · No acute neuro changes while admitted   Can return to qshift neuro checks  · Notify provider for any changes in mentation  · PT/OT evaluated: Recommending [facility]

## 2022-09-21 NOTE — ASSESSMENT & PLAN NOTE
· Pateint flat and minimally interactive on assessment  · Patient stated she has been in a worse than normal depressive state  · Home medication:   · Prozac  · Psych Consulted, appreciate Recommendations:  · D/C Prozac and Start Cymbalta 30 mg Daily  · Recommend outpatient follow up with Psychiatrist

## 2022-09-21 NOTE — PLAN OF CARE
Problem: MOBILITY - ADULT  Goal: Maintain or return to baseline ADL function  Description: INTERVENTIONS:  -  Assess patient's ability to carry out ADLs; assess patient's baseline for ADL function and identify physical deficits which impact ability to perform ADLs (bathing, care of mouth/teeth, toileting, grooming, dressing, etc )  - Assess/evaluate cause of self-care deficits   - Assess range of motion  - Assess patient's mobility; develop plan if impaired  - Assess patient's need for assistive devices and provide as appropriate  - Encourage maximum independence but intervene and supervise when necessary  - Involve family in performance of ADLs  - Assess for home care needs following discharge   - Consider OT consult to assist with ADL evaluation and planning for discharge  - Provide patient education as appropriate  Outcome: Progressing     Problem: PAIN - ADULT  Goal: Verbalizes/displays adequate comfort level or baseline comfort level  Description: Interventions:  - Encourage patient to monitor pain and request assistance  - Assess pain using appropriate pain scale  - Administer analgesics based on type and severity of pain and evaluate response  - Implement non-pharmacological measures as appropriate and evaluate response  - Consider cultural and social influences on pain and pain management  - Notify physician/advanced practitioner if interventions unsuccessful or patient reports new pain  Outcome: Progressing     Problem: INFECTION - ADULT  Goal: Absence or prevention of progression during hospitalization  Description: INTERVENTIONS:  - Assess and monitor for signs and symptoms of infection  - Monitor lab/diagnostic results  - Monitor all insertion sites, i e  indwelling lines, tubes, and drains  - Monitor endotracheal if appropriate and nasal secretions for changes in amount and color  - [place] appropriate cooling/warming therapies per order  - Administer medications as ordered  - Instruct and encourage patient and family to use good hand hygiene technique  - Identify and instruct in appropriate isolation precautions for identified infection/condition  Outcome: Progressing     Problem: SAFETY ADULT  Goal: Maintain or return to baseline ADL function  Description: INTERVENTIONS:  -  Assess patient's ability to carry out ADLs; assess patient's baseline for ADL function and identify physical deficits which impact ability to perform ADLs (bathing, care of mouth/teeth, toileting, grooming, dressing, etc )  - Assess/evaluate cause of self-care deficits   - Assess range of motion  - Assess patient's mobility; develop plan if impaired  - Assess patient's need for assistive devices and provide as appropriate  - Encourage maximum independence but intervene and supervise when necessary  - Involve family in performance of ADLs  - Assess for home care needs following discharge   - Consider OT consult to assist with ADL evaluation and planning for discharge  - Provide patient education as appropriate  Outcome: Progressing

## 2022-09-21 NOTE — ASSESSMENT & PLAN NOTE
Lab Results   Component Value Date    HGBA1C 11 8 (H) 09/19/2022       Recent Labs     09/20/22  1526 09/20/22  2127 09/21/22  0700 09/21/22  1107   POCGLU 127 134 104 125       Blood Sugar Average: Last 72 hrs:  (P) 121 [id number]   · Appears DM uncontrolled, Updated A1c 11 8  · Home Medication:   · Lantus 38 units qHs  · C/w Accu-Checks and SSI AC/HS and Lantus at 36 units qhs for now  · Hypoglycemic protocol  · Diabetic Diet

## 2022-09-21 NOTE — UTILIZATION REVIEW
Inpatient Admission Authorization Request   NOTIFICATION OF INPATIENT ADMISSION/INPATIENT AUTHORIZATION REQUEST   SERVICING FACILITY:   [facility] [address]  [id number] [place] [address]  Tax ID: [id number]  NPI: [id number]  Place of Service: Inpatient [address] [facility]  [address]  Place of Service Code: 24     ATTENDING PROVIDER:  Attending Name and NPI#: Arie Manoj, [address] [[id number]]  Address: [address] [place] [address]  Phone:  [phone]     UTILIZATION REVIEW CONTACT:  Paul Prakash Utilization Review Supervisor  Network Utilization Review Department  Phone: [phone]  Fax: [phone]  Email: [email or web address]     PHYSICIAN ADVISORY SERVICES:  FOR [id number] REVIEW - MEDICAL NECESSITY DENIAL  Phone: [phone]  Fax: [phone]  Email: [email or web address]     TYPE OF REQUEST:  Inpatient Status     ADMISSION INFORMATION:  ADMISSION DATE/TIME: 9/20/22 11:35 AM  PATIENT DIAGNOSIS CODE/DESCRIPTION:  Diarrhea [R19 7]  Syncope [R55]  Chest pain [R07 9]  Pulmonary edema [J81 1]  Abdominal pain [R10 9]  Right hip pain [M25 551]  DISCHARGE DATE/TIME: No discharge date for patient encounter  IMPORTANT INFORMATION:  Please contact Chelsea Stafford directly with any questions or concerns regarding this request  Department voicemails are confidential     Send requests for admission clinical reviews, concurrent reviews, approvals, and administrative denials due to lack of clinical to fax [phone]

## 2022-09-21 NOTE — ASSESSMENT & PLAN NOTE
· BP reviewed and elevated, SBP 160s  · Patient asymptomatic    · Home Medication:   · Spirolactone and Torsemide  · Held in the setting of presenting dry on examination  · Can restart Home Medications  · Continue to monitor BP

## 2022-09-21 NOTE — ASSESSMENT & PLAN NOTE
· Per Chart review patient no longer taking Zetia  · Lipid panel: , TGCs 156, HDL 33,      · Likely would anticipate starting statin, but patient with known allergy   · Recommend restarting Zetia upon discharge

## 2022-09-21 NOTE — CONSULTS
[facility]                                              Cardiology Consult  Lucinda Latif 78 y o  female   YOB: 1955 MRN: [id number]  Unit/Bed#: -01 Encounter: [id number]      Physician Requesting Consult: Keny Selby*  Reason for Consult / Principal Problem: Heart failure, Syncope    Assessments  Principal Problem:    Syncope  Active Problems:    Seizures ([facility] [address] )    Hypertension    Hyperlipidemia    Diabetes mellitus ([facility] )    GERD (gastroesophageal reflux disease)    Major depressive disorder    Acquired hypothyroidism    Diarrhea    Morbid obesity due to excess calories (HCC)    Chronic heart failure with preserved ejection fraction (HCC)    Chronic respiratory failure (HCC)    Elevated d-dimer      Plan  Chronic diastolic heart failure  · She came in with syncope in the setting of viral syndrome and diarrhea, and subsequently received IV fluids, and was feeling congested and as a result received a dose of IV Lasix yesterday  · She had good diuresis with IV lasix 40 yesterday --> UO 2 1 L, Net -1 9 L  · Wt down to 295 lbs  · She otherwise appears near-euvolemic on clinical exam today  · SpO2 98% on 2L  · Can transition back to p o  Diuretics with p o  Torsemide 40 mg daily plus spironolactone 25 mg daily    Syncope, Aortic stenosis  · In the setting of viral syndrome plus diarrhea and being on diuretics  · Possibly related to orthostasis or vasovagal syncope  · S/p IV fluids  · She does have mild-moderate aortic stenosis on echo earlier this year, which will need to be monitored  · No further dizziness at present  · Follow up outpatient with Dr Olivia Syed from cardiac standpoint for discharge  But patient continues to report significant weakness, and lack of appetite, which is possibly related to her recent viral syndrome  Will defer to primary medical service regarding the same and overall candidacy for discharge    ECG: Personally reviewed    Normal sinus rhythm, right bundle-branch block, left anterior fascicular block  Telemetry: Personally reviewed  No significant events    History of Present Illness   HPI: Linda Mandel is a 78y o  year old female who presents with with an episode of syncope at home  Last week, she reportedly had fever, sore throat and was feeling unwell  Subsequently she had a weight gain and diarrhea earlier this week  She was on the toilet bowl for an hour with diarrhea, and then subsequently as she stood up and started to walk out she got dizzy and passed out  She woke up and pressed the Life Alert and she could not get up and walk and was brought into the ED for evaluation subsequently  She had significant abdominal pain and received IV morphine initially  He received IV fluids due to concerns for orthostatic hypotension leading to syncope, but subsequently developed volume overload yesterday  He received IV Lasix and Cardiology was consulted for further evaluation to assist with volume management and heart failure        Past Medical History:   Diagnosis Date    CHF (congestive heart failure) (HCC)     Diabetes mellitus ([facility] [address] )     Disease of thyroid gland     Hyperlipidemia     Hypertension     Hypothyroidism (acquired)     Restless leg syndrome     Seizures (HCC)      Past Surgical History:   Procedure Laterality Date    APPENDECTOMY      CHOLECYSTECTOMY      HIP FRACTURE SURGERY Right     TONSILLECTOMY      TUBAL LIGATION       Family History   Problem Relation Age of Onset    Brain cancer Mother     Seizures Father     Colon cancer Maternal Grandfather     No Known Problems Son     No Known Problems Son     Breast cancer Maternal Aunt 30     Meds/Allergies   all current active meds have been reviewed and current meds:   Current Facility-Administered Medications   Medication Dose Route Frequency    acetaminophen (TYLENOL) tablet 650 mg  650 mg Oral Q6H PRN    albuterol inhalation solution 2 5 mg  2 5 mg Nebulization Q6H PRN    aspirin (ECOTRIN LOW STRENGTH) EC tablet 81 mg  81 mg Oral Daily    cholecalciferol (VITAMIN D3) tablet 1,000 Units  1,000 Units Oral Daily    FLUoxetine (PROzac) capsule 60 mg  60 mg Oral Daily    gabapentin (NEURONTIN) capsule 100 mg  100 mg Oral BID    gabapentin (NEURONTIN) capsule 300 mg  300 mg Oral HS    heparin (porcine) subcutaneous injection 5,000 Units  5,000 Units Subcutaneous Q8H [address]    ibuprofen (MOTRIN) tablet 400 mg  400 mg Oral Q6H PRN    insulin glargine (LANTUS) subcutaneous injection 36 Units 0 36 mL  36 Units Subcutaneous HS    insulin lispro (HumaLOG) 100 units/mL subcutaneous injection 1-5 Units  1-5 Units Subcutaneous TID AC    insulin lispro (HumaLOG) 100 units/mL subcutaneous injection 1-5 Units  1-5 Units Subcutaneous HS    levothyroxine tablet 75 mcg  75 mcg Oral Early Morning    ondansetron (ZOFRAN) injection 4 mg  4 mg Intravenous Q6H PRN    pantoprazole (PROTONIX) EC tablet 40 mg  40 mg Oral Early Morning    potassium chloride (K-DUR,KLOR-CON) CR tablet 20 mEq  20 mEq Oral Daily    sodium chloride (PF) 0 9 % injection 3 mL  3 mL Intravenous Q1H PRN    topiramate (TOPAMAX) tablet 200 mg  200 mg Oral BID     Medications Prior to Admission   Medication    aspirin (ECOTRIN LOW STRENGTH) 81 mg EC tablet    docusate sodium (COLACE) 100 mg capsule    Ergocalciferol (VITAMIN D2 PO)    FLUoxetine (PROzac) 20 mg capsule    gabapentin (NEURONTIN) 100 mg capsule    gabapentin (NEURONTIN) 300 mg capsule    ibuprofen (MOTRIN) 400 mg tablet    nystatin (MYCOSTATIN) powder    omeprazole (PriLOSEC) 20 mg delayed release capsule    potassium chloride (K-DUR,KLOR-CON) 20 mEq tablet    spironolactone (ALDACTONE) 25 mg tablet    topiramate (TOPAMAX) 100 mg tablet    torsemide (DEMADEX) 20 mg tablet    acetaminophen (TYLENOL) 325 mg tablet    Cholecalciferol 25 MCG (1000 UT) capsule    Empagliflozin 25 MG TABS    ezetimibe (ZETIA) 10 mg tablet    hydrophilic ointment    insulin aspart, w/niacinamide, (Fiasp FlexTouch) 100 Units/mL injection pen    insulin glargine (LANTUS) 100 units/mL subcutaneous injection    levothyroxine 50 mcg tablet     Allergies   Allergen Reactions    Pineapple - Food Allergy Anaphylaxis    Statins Other (See Comments)     Cramping from head to toe     Bupropion Other (See Comments)    Byetta 10 Mcg Pen [Exenatide]     Ceftriaxone Other (See Comments)    Marijuana [Dronabinol] GI Intolerance    Metformin Diarrhea     Other reaction(s): Unknown    Omeprazole Hives    Ondansetron      Other reaction(s): Unknown    Coconut Oil - Food Allergy Rash    Liraglutide Rash and Other (See Comments)     Generalized rash    Penicillins Rash     Social History     Socioeconomic History    Marital status: Single     Spouse name: None    Number of children: None    Years of education: None    Highest education level: None   Occupational History    None   Tobacco Use    Smoking status: Never Smoker    Smokeless tobacco: Never Used    Tobacco comment: not a smoker   Vaping Use    Vaping Use: Never used   Substance and Sexual Activity    Alcohol use: Yes     Comment: occasional    Drug use: Never    Sexual activity: Not Currently   Other Topics Concern    None   Social History Narrative    None     Social Determinants of Health     Financial Resource Strain: Not on file   Food Insecurity: Not on file   Transportation Needs: Not on file   Physical Activity: Not on file   Stress: Not on file   Social Connections: Not on file   Intimate Partner Violence: Not on file   Housing Stability: Not on file         Review of Systems   Constitutional: Positive for appetite change  Neurological: Positive for weakness  All other systems reviewed and are negative        Vitals:    09/20/22 2130 09/21/22 0049 09/21/22 0600 09/21/22 0740   BP:  159/88  156/81   BP Location:  Left arm  Left arm   Pulse:  76  69   Resp:  20  16   Temp: 99 2 °F (37 3 °C)  98 4 °F (36 9 °C)   TempSrc:  Tympanic  Oral   SpO2: 97% 95%  98%   Weight:   134 kg (295 lb 13 7 oz)    Height:         Orthostatic Blood Pressures    Flowsheet Row Most Recent Value   Blood Pressure 156/81 filed at 09/21/2022 0740   Patient Position - Orthostatic VS Lying filed at 09/21/2022 0740        Body mass index is 54 11 kg/m²  Wt Readings from Last 5 Encounters:   09/21/22 134 kg (295 lb 13 7 oz)   06/08/22 129 kg (284 lb 6 3 oz)   03/25/22 129 kg (284 lb)   02/19/22 123 kg (271 lb 2 7 oz)   02/01/22 (!) 137 kg (303 lb)     I/O last 3 completed shifts: In: 700 [P O :700]  Out: 2600 [Urine:2600]      Physical Exam  Vitals and nursing note reviewed  Constitutional:       General: She is not in acute distress  Appearance: Normal appearance  She is well-developed  She is obese  She is not ill-appearing  HENT:      Head: Normocephalic and atraumatic  Nose: No congestion  Eyes:      General: No scleral icterus  Conjunctiva/sclera: Conjunctivae normal    Neck:      Vascular: No carotid bruit or JVD  Cardiovascular:      Rate and Rhythm: Normal rate and regular rhythm  Pulses: Normal pulses  Heart sounds: Murmur heard  Crescendo decrescendo systolic murmur is present with a grade of 3/6  No friction rub  No gallop  Pulmonary:      Effort: Pulmonary effort is normal  No respiratory distress  Breath sounds: Normal breath sounds  No rales  Abdominal:      General: There is no distension  Palpations: Abdomen is soft  Tenderness: There is no abdominal tenderness  Musculoskeletal:         General: No swelling or tenderness  Cervical back: Neck supple  Right lower leg: No edema  Left lower leg: No edema  Comments: Chronic lower extremity skin changes, with trace edema   Skin:     General: Skin is warm  Neurological:      General: No focal deficit present        Mental Status: She is alert and oriented to person, place, and time  Mental status is at baseline  Psychiatric:         Mood and Affect: Mood normal          Behavior: Behavior normal          Thought Content: Thought content normal            Labs:  Results from last 7 days   Lab Units 09/21/22  0538 09/20/22  0617 09/19/22  1240   WBC Thousand/uL 5 43 5 96 9 48   HEMOGLOBIN g/dL 12 7 12 5 14 9   HEMATOCRIT % 39 7 38 2 45 4   RDW % 12 6 12 7 12 5   PLATELETS Thousands/uL 162 164 186     Results from last 7 days   Lab Units 09/21/22  0538 09/20/22  0617 09/19/22  1240   POTASSIUM mmol/L 4 2 3 4* 3 4*   CHLORIDE mmol/L 101 103 96   CO2 mmol/L 34* 29 31   MAGNESIUM mg/dL 1 7*  --   --    BUN mg/dL 10 14 18   CREATININE mg/dL 0 77 0 67 0 88   CALCIUM mg/dL 9 0 8 6 9 1   AST U/L 14  --  12*   ALT U/L 10  --  11   ALK PHOS U/L 88  --  107*         Results from last 7 days   Lab Units 09/20/22  0617   HDL mg/dL 33*         Results from last 7 days   Lab Units 09/19/22  1240   HEMOGLOBIN A1C % 11 8*         Imaging: X-ray chest 1 view portable    Result Date: 9/19/2022  Narrative: CHEST INDICATION:   chest pain  COMPARISON:  6/23/2022 EXAM PERFORMED/VIEWS:  XR CHEST PORTABLE Single view FINDINGS: Cardiomediastinal silhouette is mildly enlarged The lungs are clear  No pneumothorax or pleural effusion  Osseous structures appear within normal limits for patient age  Impression: No acute cardiopulmonary disease  Findings are stable Workstation performed: [id number]     XR hip/pelv 2-3 vws right if performed    Result Date: 9/19/2022  Narrative: RIGHT HIP INDICATION:   R hip pain  COMPARISON:  12/10/2021 VIEWS:  XR HIP/PELV 2-3 VWS RIGHT W PELVIS IF PERFORMED Images: 3 FINDINGS: 2 posterior acetabular screws are again evident at the right hip There is no acute fracture or dislocation  Moderate degenerative arthritis both hips, unchanged No lytic or blastic osseous lesion  Soft tissues are unremarkable  The visualized lumbar spine is unremarkable       Impression: Degenerative changes both hips No acute osseous abnormality  Similar to prior study Workstation performed: [id number]     CT head without contrast    Result Date: 9/19/2022  Narrative: CT BRAIN - WITHOUT CONTRAST INDICATION:   syncope  COMPARISON:  August 1, 2020 TECHNIQUE:  CT examination of the brain was performed  In addition to axial images, sagittal and coronal 2D reformatted images were created and submitted for interpretation  Radiation dose length product (DLP) for this visit:  1251 mGy-cm   This examination, like all CT scans performed in the [facility], was performed utilizing techniques to minimize radiation dose exposure, including the use of iterative reconstruction and automated exposure control  IMAGE QUALITY:  Diagnostic  FINDINGS: PARENCHYMA: Decreased attenuation is noted in periventricular and subcortical white matter demonstrating an appearance that is statistically most likely to represent mild microangiopathic change  No CT signs of acute infarction  No intracranial mass, mass effect or midline shift  No acute parenchymal hemorrhage  VENTRICLES AND EXTRA-AXIAL SPACES:  Normal for the patient's age  VISUALIZED ORBITS AND PARANASAL SINUSES:  Unremarkable  CALVARIUM AND EXTRACRANIAL SOFT TISSUES:  Normal      Impression: No acute intracranial abnormality  Workstation performed: [id number]     CT spine cervical without contrast    Result Date: 9/19/2022  Narrative: CT CERVICAL SPINE - WITHOUT CONTRAST INDICATION:   fall  COMPARISON:  None  TECHNIQUE:  CT examination of the cervical spine was performed without intravenous contrast   Contiguous axial images were obtained  Sagittal and coronal reconstructions were performed  Radiation dose length product (DLP) for this visit:  399 mGy-cm     This examination, like all CT scans performed in the [facility], was performed utilizing techniques to minimize radiation dose exposure, including the use of iterative reconstruction and automated exposure control  IMAGE QUALITY:  Diagnostic  FINDINGS: ALIGNMENT:  Normal alignment of the cervical spine  No subluxation  VERTEBRAL BODIES:  No fracture  DEGENERATIVE CHANGES:  Mild multilevel cervical degenerative changes are noted without critical central canal stenosis  PREVERTEBRAL AND PARASPINAL SOFT TISSUES:  Unremarkable  THORACIC INLET:  Please refer to the concurrent chest, abdomen, and pelvic CT report for description of the thoracic inlet findings  Impression: No cervical spine fracture or traumatic malalignment  Workstation performed: [id number]     PE Study with CT abdomen & pelvis with contrast    Result Date: 9/19/2022  Narrative: CT PULMONARY ANGIOGRAM OF THE CHEST AND CT ABDOMEN AND PELVIS WITH INTRAVENOUS CONTRAST INDICATION:   elevated d dimer - Lower abd pain, syncope  COMPARISON:  CTA from 2/16/2022 TECHNIQUE:  CT examination of the chest, abdomen and pelvis was performed  Thin section CT angiographic technique was used in the chest in order to evaluate for pulmonary embolus and coronal 3D MIP postprocessing was performed on the acquisition scanner  Axial, sagittal, and coronal 2D reformatted images were created from the source data and submitted for interpretation  Radiation dose length product (DLP) for this visit:  2209 mGy-cm   This examination, like all CT scans performed in the [facility], was performed utilizing techniques to minimize radiation dose exposure, including the use of iterative reconstruction and automated exposure control  IV Contrast:  100 mL of iohexol (OMNIPAQUE) Enteric Contrast:  Enteric contrast was not administered  FINDINGS: CHEST PULMONARY ARTERIAL TREE:  No pulmonary embolus is seen  LUNGS:  Mild interstitial pulmonary edema  Mosaic attenuation pattern, which most likely represents small airways disease given hyperaeration of the secondary pulmonary lobules  Bibasilar subsegmental atelectasis  PLEURA:  Unremarkable  HEART/AORTA:  Cardiomegaly  No pericardial effusion  No thoracic aortic aneurysm  MEDIASTINUM AND REAL:  Unremarkable  CHEST WALL AND LOWER NECK:  Unremarkable  ABDOMEN LIVER/BILIARY TREE:  Unremarkable  GALLBLADDER:  Cholecystectomy  SPLEEN:  Unremarkable  PANCREAS:  Unremarkable  ADRENAL GLANDS: Stable right adrenal adenoma measuring 2 cm  KIDNEYS/URETERS:  Stable small cyst in the upper pole of the right kidney  Otherwise, unremarkable  STOMACH AND BOWEL:  Multiple diverticula within the descending colon without findings of diverticulitis  Normal caliber bowel without obstruction  APPENDIX:  No findings to suggest appendicitis  ABDOMINOPELVIC CAVITY: Small fat-containing periumbilical hernia  No ascites  No pneumoperitoneum  No lymphadenopathy  VESSELS:  Unremarkable for patient's age  PELVIS REPRODUCTIVE ORGANS:  Unremarkable for patient's age  URINARY BLADDER:  Unremarkable  ABDOMINAL WALL/INGUINAL REGIONS:  Unremarkable  OSSEOUS STRUCTURES:  Degenerative changes of the right hip  Again seen is a fixation screw within the right posterior acetabulum  Impression: Cardiomegaly and mild interstitial pulmonary edema  No pulmonary embolism  No acute aortic dissection or intramural hematoma  Multiple diverticula within the descending colon without findings of diverticulitis  Normal caliber bowel without obstruction  Workstation performed: [id number]     VAS lower limb venous duplex study, complete bilateral    Result Date: 9/20/2022  Narrative:  THE [facility]ULAR [facility] REPORT CLINICAL: Indications: Patient c/o increased swelling in both feet  Elevated D-Dimer  Recent fall Physician would like to R/O DVT  No history of DVT/PE/CA recent surgery  CONCLUSION: Impression: RIGHT LOWER LIMB: No evidence of acute or chronic deep vein thrombosis  No evidence of superficial thrombophlebitis noted  Doppler evaluation shows a normal response to augmentation maneuvers     LEFT LOWER LIMB: No evidence of acute or chronic deep vein thrombosis  No evidence of superficial thrombophlebitis noted  Doppler evaluation shows a normal response to augmentation maneuvers  Technically difficult/limited study  SIGNATURE: Electronically Signed by: Lindajo Alpers, MD, RPVI on 2022 04:18:07 PM      Cardiac testing:   Results for orders placed during the hospital encounter of 21    Echo complete with contrast if indicated    Narrative  [address] [facility] [place] [address]  [phone]    Transthoracic Echocardiogram  2D, M-mode, Doppler, and Color Doppler    Study date:  04-Mar-2021    Patient: Dixie TREVIZO  MR number: [id number]  Account number: [de-identified]  : 1955  Age: 76 years  Gender: Female  Status: Outpatient  Location: Bedside  Height: 62 in  Weight: 283 4 lb  BP: 129/ 62 mmHg    Indications: chest pain    Diagnoses: I43  - Cardiomyopathy in diseases classified elsewhere    Sonographer:  CHRIST Costa  Primary Physician:  Deepak Batres MD  Referring Physician:  Salvador Cruz  Group:  [facility]  Interpreting Physician:  Dick Aldana MD    SUMMARY    LEFT VENTRICLE:  Systolic function was normal  Ejection fraction was estimated in the range of 55 % to 60 % to be 60 %  Although no diagnostic regional wall motion abnormality was identified, this possibility cannot be completely excluded on the basis of this study  Wall thickness was mildly increased  There was mild concentric hypertrophy  Features were consistent with a pseudonormal left ventricular filling pattern, with concomitant abnormal relaxation and increased filling pressure (grade 2 diastolic dysfunction)  LEFT ATRIUM:  The atrium was mildly to moderately dilated  RIGHT ATRIUM:  The atrium was mildly dilated  MITRAL VALVE:  There was mild to moderate annular calcification  There was mild regurgitation      AORTIC VALVE:  The valve was probably trileaflet, but function as bicuspid  Leaflets exhibited normal thickness and normal cuspal separation  Transaortic velocity was increased due to valvular stenosis  There was mild stenosis  Peak gradient around 12 mm of mercury valve area is 1 5-1 6 cm2    TRICUSPID VALVE:  There was mild to moderate regurgitation  Estimated peak PA pressure was 50 mmHg  HISTORY: PRIOR HISTORY: HTN,Hyperlipidemia,gastroesophageal reflux disease,seizures  PROCEDURE: The procedure was performed at the bedside  This was a routine study  The transthoracic approach was used  The study included complete 2D imaging, M-mode, complete spectral Doppler, and color Doppler  The heart rate was 69 bpm,  at the start of the study  Images were obtained from the parasternal, apical, subcostal, and suprasternal notch acoustic windows  Image quality was adequate  LEFT VENTRICLE: Size was normal  Systolic function was normal  Ejection fraction was estimated in the range of 55 % to 60 % to be 60 %  Although no diagnostic regional wall motion abnormality was identified, this possibility cannot be  completely excluded on the basis of this study  Wall thickness was mildly increased  There was mild concentric hypertrophy  DOPPLER: Features were consistent with a pseudonormal left ventricular filling pattern, with concomitant abnormal  relaxation and increased filling pressure (grade 2 diastolic dysfunction)  RIGHT VENTRICLE: The size was normal  Systolic function was normal     LEFT ATRIUM: The atrium was mildly to moderately dilated  RIGHT ATRIUM: The atrium was mildly dilated  MITRAL VALVE: There was mild to moderate annular calcification  There was normal leaflet separation  DOPPLER: The transmitral velocity was within the normal range  There was no evidence for stenosis  There was mild regurgitation  AORTIC VALVE: The valve was probably trileaflet, but function as bicuspid  Leaflets exhibited normal thickness and normal cuspal separation  DOPPLER: Transaortic velocity was increased due to valvular stenosis  There was mild stenosis  Peak gradient around 12 mm of mercury valve area is 1 5-1 6 cm2 There was no regurgitation  TRICUSPID VALVE: DOPPLER: There was mild to moderate regurgitation  Estimated peak PA pressure was 50 mmHg  The findings suggest mild to moderate pulmonary hypertension  PULMONIC VALVE: DOPPLER: There was trace regurgitation  PERICARDIUM: There was no thickening or calcification  There was no pericardial effusion  AORTA: The root exhibited normal size  SYSTEMIC VEINS: IVC: The inferior vena cava was normal in size  Respirophasic changes were normal     SYSTEM MEASUREMENT TABLES    2D  EF (Teich): 59 91 %  %FS: 31 7 %  Ao Diam: 3 19 cm  EDV(Teich): 88 47 ml  ESV(Teich): 35 47 ml  IVSd: 1 27 cm  LA Area: 22 54 cm2  LA Diam: 3 91 cm  LVIDd: 4 42 cm  LVIDs: 3 02 cm  LVOT Diam: 1 86 cm  LVPWd: 1 28 cm  RA Area: 16 49 cm2  RVIDd: 4 1 cm  SV (Teich): 53 ml    PW  MV E/A Ratio: 1 08    [employer] Accredited Echocardiography Laboratory    Prepared and electronically signed by    Kassie Wheeler MD  Signed 04-Mar-2021 15:24:34    No results found for this or any previous visit  No results found for this or any previous visit  No results found for this or any previous visit

## 2022-09-22 VITALS
OXYGEN SATURATION: 89 % | SYSTOLIC BLOOD PRESSURE: 111 MMHG | RESPIRATION RATE: 20 BRPM | DIASTOLIC BLOOD PRESSURE: 78 MMHG | WEIGHT: 280.6 LBS | HEART RATE: 85 BPM | HEIGHT: 62 IN | BODY MASS INDEX: 51.64 KG/M2 | TEMPERATURE: 97.2 F

## 2022-09-22 LAB
ATRIAL RATE: 63 BPM
GLUCOSE SERPL-MCNC: 133 MG/DL (ref 65–140)
GLUCOSE SERPL-MCNC: 135 MG/DL (ref 65–140)
GLUCOSE SERPL-MCNC: 171 MG/DL (ref 65–140)
P AXIS: 42 DEGREES
PR INTERVAL: 142 MS
QRS AXIS: -50 DEGREES
QRSD INTERVAL: 128 MS
QT INTERVAL: 452 MS
QTC INTERVAL: 462 MS
T WAVE AXIS: 57 DEGREES
VENTRICULAR RATE: 63 BPM

## 2022-09-22 PROCEDURE — 97535 SELF CARE MNGMENT TRAINING: CPT

## 2022-09-22 PROCEDURE — 93010 ELECTROCARDIOGRAM REPORT: CPT | Performed by: INTERNAL MEDICINE

## 2022-09-22 PROCEDURE — 82948 REAGENT STRIP/BLOOD GLUCOSE: CPT

## 2022-09-22 PROCEDURE — 97530 THERAPEUTIC ACTIVITIES: CPT

## 2022-09-22 PROCEDURE — 97116 GAIT TRAINING THERAPY: CPT

## 2022-09-22 PROCEDURE — 99239 HOSP IP/OBS DSCHRG MGMT >30: CPT

## 2022-09-22 RX ORDER — EZETIMIBE 10 MG/1
10 TABLET ORAL DAILY
Qty: 30 TABLET | Refills: 0 | Status: SHIPPED | OUTPATIENT
Start: 2022-09-22

## 2022-09-22 RX ORDER — INSULIN GLARGINE 100 [IU]/ML
36 INJECTION, SOLUTION SUBCUTANEOUS
Qty: 10 ML | Refills: 0 | Status: ON HOLD | OUTPATIENT
Start: 2022-09-22 | End: 2022-09-30 | Stop reason: SDUPTHER

## 2022-09-22 RX ORDER — TORSEMIDE 20 MG/1
40 TABLET ORAL DAILY
Qty: 60 TABLET | Refills: 0 | Status: ON HOLD | OUTPATIENT
Start: 2022-09-22 | End: 2022-09-30 | Stop reason: SDUPTHER

## 2022-09-22 RX ORDER — DULOXETIN HYDROCHLORIDE 30 MG/1
30 CAPSULE, DELAYED RELEASE ORAL DAILY
Qty: 30 CAPSULE | Refills: 0 | Status: SHIPPED | OUTPATIENT
Start: 2022-09-23 | End: 2022-09-30

## 2022-09-22 RX ORDER — LEVOTHYROXINE SODIUM 0.05 MG/1
75 TABLET ORAL DAILY
Qty: 45 TABLET | Refills: 0 | Status: SHIPPED | OUTPATIENT
Start: 2022-09-22 | End: 2022-10-22

## 2022-09-22 RX ADMIN — PANTOPRAZOLE SODIUM 40 MG: 40 TABLET, DELAYED RELEASE ORAL at 05:51

## 2022-09-22 RX ADMIN — INSULIN LISPRO 1 UNITS: 100 INJECTION, SOLUTION INTRAVENOUS; SUBCUTANEOUS at 12:48

## 2022-09-22 RX ADMIN — SPIRONOLACTONE 25 MG: 25 TABLET ORAL at 09:39

## 2022-09-22 RX ADMIN — TORSEMIDE 40 MG: 20 TABLET ORAL at 09:39

## 2022-09-22 RX ADMIN — LEVOTHYROXINE SODIUM 75 MCG: 75 TABLET ORAL at 05:51

## 2022-09-22 RX ADMIN — ASPIRIN 81 MG: 81 TABLET, COATED ORAL at 09:39

## 2022-09-22 RX ADMIN — Medication 1000 UNITS: at 09:39

## 2022-09-22 RX ADMIN — DULOXETINE HYDROCHLORIDE 30 MG: 30 CAPSULE, DELAYED RELEASE ORAL at 09:39

## 2022-09-22 RX ADMIN — GABAPENTIN 100 MG: 100 CAPSULE ORAL at 09:39

## 2022-09-22 RX ADMIN — HEPARIN SODIUM 5000 UNITS: 5000 INJECTION INTRAVENOUS; SUBCUTANEOUS at 05:51

## 2022-09-22 RX ADMIN — Medication 400 MG: at 09:39

## 2022-09-22 RX ADMIN — ONDANSETRON 4 MG: 2 INJECTION INTRAMUSCULAR; INTRAVENOUS at 14:16

## 2022-09-22 RX ADMIN — POTASSIUM CHLORIDE 20 MEQ: 1500 TABLET, EXTENDED RELEASE ORAL at 09:39

## 2022-09-22 RX ADMIN — TOPIRAMATE 200 MG: 100 TABLET, FILM COATED ORAL at 09:39

## 2022-09-22 NOTE — PLAN OF CARE
Problem: PHYSICAL THERAPY ADULT  Goal: Performs mobility at highest level of function for planned discharge setting  See evaluation for individualized goals  Description: Treatment/Interventions: Functional transfer training, LE strengthening/ROM, Therapeutic exercise, Endurance training, Patient/family training, Equipment eval/education, Gait training, Bed mobility, Spoke to nursing, Spoke to case management, Spoke to advanced practitioner    See flowsheet documentation for full assessment, interventions and recommendations  Outcome: Progressing  Note: Prognosis: Good  Problem List: Decreased endurance, Impaired balance, Decreased mobility, Decreased safety awareness, Obesity, Impaired sensation  Assessment: Pt seen for PT treatment today with focus on increased ambulation distance training  Pt planned for d/c home today  Respiratory therapist present during session for completion of home O2 protocol, d/t pt report of self deciding not to wear her supplemental O2 over the last ~5wks, however pt desaturated on RA & experienced significant dizziness during previous session  See respiratory therapy notes for details regarding supplemental O2 needs  This session: pt completes bed mobility with S and much increased effort, transfers with S and cues for hand placement & rollator wheel lock management, ambulates 40ft x2 and 15ft x2 with rollator walker and S (functional seated rest break provided between each ambulation trial)  Pt overall displays improved gait mechanics, speed, and independence as compared to previous session, however remains limited d/t nausea and dizziness with minimal mobility efforts  Continue to recommend return to home with HHPT services; pt requests increased HHA hours if possible (CM made aware)  Barriers to Discharge: Decreased caregiver support     PT Discharge Recommendation: Home with home health rehabilitation    See flowsheet documentation for full assessment

## 2022-09-22 NOTE — ASSESSMENT & PLAN NOTE
· Per Patient, Chronically wears 3L NC at baseline, reports occasional noncompliance    · Does not appear in acute respiratory distress  · Stable at 2 L NC O2

## 2022-09-22 NOTE — ASSESSMENT & PLAN NOTE
Lab Results   Component Value Date    HGBA1C 11 8 (H) 09/19/2022       Recent Labs     09/21/22  1107 09/21/22 2025 09/22/22  0403 09/22/22  0658   POCGLU 125 148* 133 135       Blood Sugar Average: Last 72 hrs:  (P) 986 [id number]   · Appears DM uncontrolled, Updated A1c 11 8  · Home Medication:   · Lantus 38 units qHs  · C/w Lantus at 36 units qhs   · Diabetic Diet

## 2022-09-22 NOTE — ASSESSMENT & PLAN NOTE
· Pateint flat and minimally interactive on assessment  · Denies SI/HI  · Patient stated she has been in a worse than normal depressive state  · Home medication:   · Prozac  · Psych Consulted, appreciate Recommendations:  · D/C Prozac and Start Cymbalta 30 mg Daily  · Recommend outpatient follow up with Psychiatrist

## 2022-09-22 NOTE — PHYSICAL THERAPY NOTE
PHYSICAL THERAPY TREATMENT  DATE: 09/22/22  TIME: 7896-2121    NAME:  Katherine Stern  AGE:   78 y o  Mrn:   [id number]  Length Of Stay: 2    ADMIT DX:  Diarrhea [R19 7]  Syncope [R55]  Chest pain [R07 9]  Pulmonary edema [J81 1]  Abdominal pain [R10 9]  Right hip pain [M25 551]    Past Medical History:   Diagnosis Date    [facility] )     Diabetes mellitus ([place] [address] )     Disease of thyroid gland     Hyperlipidemia     Hypertension     Hypothyroidism (acquired)     Restless leg syndrome     Seizures ([place] [address] )      Past Surgical History:   Procedure Laterality Date    APPENDECTOMY      CHOLECYSTECTOMY      HIP FRACTURE SURGERY Right     TONSILLECTOMY      TUBAL LIGATION         Performed at least 2 patient identifiers during session: Name and ID bracelet     09/22/22 1440   PT Last Visit   PT Visit Date 09/22/22   Note Type   Note Type Treatment   Pain Assessment   Pain Assessment Tool 0-10   Pain Score No Pain   Effect of Pain on Daily Activities n/a   Hospital Pain Intervention(s) Ambulation/increased activity   Multiple Pain Sites No   Restrictions/Precautions   Weight Bearing Precautions Per Order No   Other Precautions Chair Alarm; Bed Alarm; Fall Risk;Telemetry;O2   General   Chart Reviewed Yes   Additional Pertinent History Pt admitted 9/19/22 with syncopal event resulting in fall, +[facility] used to call EMS  Pt planned for d/c home today  Response to Previous Treatment Patient reporting fatigue but able to participate  Family/Caregiver Present No   Cognition   Overall Cognitive Status WFL   Arousal/Participation Cooperative   Attention Within functional limits   Orientation Level Oriented X4   Memory Within functional limits   Following Commands Follows multistep commands with increased time or repetition   Comments Pt quickly irritated at times t/o session  Pt verbalizes frustration with lack of sleep in hospital, stating people "keep bothering her"   Pt displays significant signs/symptoms of depression  Subjective   Subjective "I give up on trying to sleep here "   Bed Mobility   Supine to Sit 5  Supervision   Additional items Assist x 1;HOB elevated; Bedrails; Increased time required;Verbal cues  (increased effort by pt)   Sit to Supine 5  Supervision   Additional items Assist x 1; Increased time required;Verbal cues  (increased effort by pt; completed in flat bed)   Transfers   Sit to Stand 5  Supervision   Additional items Assist x 1; Increased time required;Verbal cues  (cues for hand placement and wheel lock management on rollator walker)   Stand to Sit 5  Supervision   Additional items Assist x 1;Verbal cues  (cues for hand placement and wheel lock management on rollator walker)   Stand pivot 5  Supervision   Additional items Assist x 1; Increased time required;Verbal cues  (Rollator walker)   Ambulation/Elevation   Gait pattern Wide CLAUDIO; Forward Flexion;Decreased foot clearance; Short stride   Gait Assistance 5  Supervision   Additional items Assist x 1;Verbal cues   Assistive Device 4-wheeled walker   Distance 40ft; 40ft; 15ft; 15ft  (functional seated rest between first set of trials (40ft) d/t onset of significant nausea; toileting completed between second set of trials (15ft))   Stair Management Assistance Not tested   Ambulation/Elevation Additional Comments Pt significantly limited during ambulation training due to onset of nausea  Previous PT session, pt reported that she had been self removing supplemental O2 over the last ~5wks because she didnt think she needed it  However last session during ambulation, pt desaturated to 84% RA, therapist discussed with physician and plan was for home O2 protocol  Home O2 protocol completed with respiratory therapist -- see respiratory notes for details on home O2 requirements     Balance   Static Sitting Good   Dynamic Sitting Fair +   Static Standing Fair +  (w/ rollator walker support)   Dynamic Standing Fair +  (w/ rollator walker support)   Ambulatory Fair +  (w/ rollator walker support)   Endurance Deficit   Endurance Deficit Yes   Endurance Deficit Description Pt with moderate degree of SOB and significant nausea during mobility training  See respiratory therapy note for home O2 evaluation  Activity Tolerance   Activity Tolerance Patient limited by fatigue; Other (Comment)  (limited due to nausea)   Medical Staff Made Aware Spoke with SLIM, CM, OT, Respiratory therapist   Nurse Made Aware Spoke with Madan Mcarthur pre/post session   Assessment   Prognosis Good   Problem List Decreased endurance; Impaired balance;Decreased mobility; Decreased safety awareness; Obesity; Impaired sensation   Assessment Pt seen for PT treatment today with focus on increased ambulation distance training  Pt planned for d/c home today  Respiratory therapist present during session for completion of home O2 protocol, d/t pt report of self deciding not to wear her supplemental O2 over the last ~5wks, however pt desaturated on RA & experienced significant dizziness during previous session  See respiratory therapy notes for details regarding supplemental O2 needs  This session: pt completes bed mobility with S and much increased effort, transfers with S and cues for hand placement & rollator wheel lock management, ambulates 40ft x2 and 15ft x2 with rollator walker and S (functional seated rest break provided between each ambulation trial)  Pt overall displays improved gait mechanics, speed, and independence as compared to previous session, however remains limited d/t nausea and dizziness with minimal mobility efforts  Continue to recommend return to home with HHPT services; pt requests increased HHA hours if possible (REBECCA made aware)     Barriers to Discharge Decreased caregiver support   Goals   Patient Goals "to go back home with my home therapy"   STG Expiration Date 09/30/22   Short Term Goal #1 Patient PT goals established in order to address patient self reported goal of "to go home by home with home PT, not rehab"  Pt will complete all bed mobility independently in flat bed, in order to promote increased OOB functional mobility to improve overall activity tolerance  Pt will complete all transfers independently/at marquis level, in order to increase safety with functional mobility  Pt will ambulate >150ft with LRAD and S in order to increase safety with household and short community functional mobility  Pt will improve AM-PAC score to >/= 23/24 in order to increase independence with mobility and decrease burden of care  Pt will improve Barthel Index score to >/= 65/100 in order to increase independence and decrease risk of falls  Pt will demonstrate independence and understanding of strengthening HEP  PT Treatment Day 1   Plan   Treatment/Interventions Functional transfer training;LE strengthening/ROM; Therapeutic exercise; Endurance training;Patient/family training;Equipment eval/education;Gait training;Spoke to nursing;Spoke to case management;Spoke to advanced practitioner   Progress Progressing toward goals   PT Frequency 3-5x/wk   Recommendation   PT Discharge Recommendation Home with home health rehabilitation   AM-PAC Basic Mobility Inpatient   Turning in Bed Without Bedrails 4   Lying on Back to Sitting on Edge of Flat Bed 3   Moving Bed to Chair 3   Standing Up From Chair 3   Walk in Room 3   Climb 3-5 Stairs 2   Basic Mobility Inpatient Raw Score 18   Basic Mobility Standardized Score 41 05   Highest Level Of Mobility   JH-HLM Goal 6: Walk 10 steps or more   JH-HLM Achieved 7: Walk 25 feet or more   Education   Education Provided Mobility training;Assistive device   Patient Explanation/teachback used;Demonstrates verbal understanding;Reinforcement needed   End of Consult   Patient Position at End of Consult Supine;Bed/Chair alarm activated; All needs within reach   End of Consult Comments Based on patient's [facility] Level of Mobility scores today, patient currently has a goal of [facility] Levels: 7: WALK 25 FEET OR MORE, to be completed with RN staffing each shift, in order to improve overall activity tolerance and mobility, combat hospital related deconditioning, and maximize outcomes for d/c from the acute care setting  Vitals: POST AMBULATION (pt c/o dizziness and nausea)    09/22/22 1431   BP: 111/78   Pulse: 85   SpO2: (!) 89%       The patient's AM-PAC Basic Mobility Inpatient Short Form Raw Score is 18  A Raw score of greater than 16 suggests the patient may benefit from discharge to home  Please also refer to the recommendation of the Physical Therapist for safe discharge planning        Cami Wall PT, DPT   Available via [employer]  NP # [id number]  [place] License - [id number]  2/12/7043

## 2022-09-22 NOTE — ASSESSMENT & PLAN NOTE
· Patient presented with 1 diarrhea episode, stated she also noted blood in her stool  · FOBT: Positive  · Patient denies any active bleeding at this time, no further BMs or episodes of diarrhea since admission  · Hgb remains stable: 14 9->12 5  · Consider hemodilution in setting of IVFs     · Recommend outpatient follow up with GI

## 2022-09-22 NOTE — CASE MANAGEMENT
Case Management Progress Note    Patient name Huong Gaitan  Location /-21 MRN [id number]  : 1955 Date 2022       LOS (days): 2  Geometric Mean LOS (GMLOS) (days): 2 70  Days to GMLOS:0 6        OBJECTIVE:        Current admission status: Inpatient  Preferred Pharmacy:   [facility] [address]  [address] [place] [address]  Phone: [phone] Fax: [phone]    Primary Care Provider: Guanaco Ro MD    Primary Insurance: [facility]  Secondary Insurance:     PROGRESS NOTE:    Informed by OT patient is on home O2, but reported she wasn't using the portable O2 concentrator  There are concerns patient reported she did not know how to use the portable concentrator and wasn't wearing O2 at home routinely  Patient also reported to OT she is supposed to have 11 hours of HHA at home, but only gets 4 due to staffing shortages  CM called [facility] [phone] and spoke with Nicola Hsu in intake  Confirmed patient is open to SN/PT and LV PT can request OT from [place] as long as it is on discharge orders  CM confirmed OT is included in AMB HHC order  Nicola Hsu also confirmed patient has HHA through their agency approved by the [place]  She is unsure of how many hours, but will forward information patient reported about receiving less than approved to their director for review  Nicola Hsu will also share with clinical team patient's concerns re: o2 usage and education  Roundtrip canceled with [employer] due to patient needing O2 as patient does not have her tank with her and CM cannot supply tank from [employer] because patient uses [facility]  New Roundtrip WCV request submitted and awaiting new time  RN notified

## 2022-09-22 NOTE — ASSESSMENT & PLAN NOTE
Patient presented this afternoon after experiencing syncopal following >1hr on the commode with diarrhea episode  Patient stated she also noted blood in her stool  Patient awoke on the floor and called EMS  · CTH/CT Spine: No acute intracranial abnormality  No cervical spine fracture or traumatic malalignment  · HS Trops: 11->10  EKG without signs of ischemia  · UA unremarkable  · Suspect orthostasis with known history vs vasovagal response following diarrhea episode  · No further episodes of syncope  · S/p 1L NSS Bolus in ED, maintained on gentle IV fluids as patient was clinically dry on arrival   · Positive orthostatics 9/19, negative orthostatics today  · D/C IVFs  · No acute neuro changes while admitted   Can return to qshift neuro checks  · Notify provider for any changes in mentation  · PT/OT evaluated: Recommending HHC  · Patient medically stable for discharge

## 2022-09-22 NOTE — ASSESSMENT & PLAN NOTE
· BP reviewed and elevated, SBP 160s  · Patient asymptomatic    · Home Medication:   · Spirolactone and Torsemide  · Held in the setting of presenting dry on examination  · Continue home medications upon discharge

## 2022-09-22 NOTE — PLAN OF CARE
Problem: OCCUPATIONAL THERAPY ADULT  Goal: Performs self-care activities at highest level of function for planned discharge setting  See evaluation for individualized goals  Description: Treatment Interventions: ADL retraining, Functional transfer training, UE strengthening/ROM, Endurance training, Patient/family training, Equipment evaluation/education, Energy conservation, Activityengagement, Compensatory technique education          See flowsheet documentation for full assessment, interventions and recommendations  Outcome: Progressing  Note: Limitation: Decreased ADL status, Decreased UE strength, Decreased Safe judgement during ADL, Decreased endurance, Decreased self-care trans, Decreased high-level ADLs  Prognosis: Good  Assessment: Patient seen for OT treatment on 9/22/2022 s/p admission for Syncope Patient presents with active orders for OT eval and treat and up and OOB as tolerated   Upon arrival, pt found resting in bed showing no signs of distress  Patient agreeable to OT session  Patient participated in toileting, dressing , bed mobility , functional mobility and transfer to all surfaces with intervention focus on increasing activity tolerance, functional independence, and safety during ADL tasks  Veronika Rubin is showing improvements in activity tolerance and ADL independence  but is continuing to perform below baseline due to the following deficits: endurance ,  decreased muscular strength , decreased standing tolerance for self care tasks  and decreased dynamic balance impacting functional reach  From OT standpoint, patient would benefit from skilled intervention to maximize independence with ADLs and functional mobility  Goals remain appropriate, continue POC   At this time, recommending D/C to: return to previous environment with home health rehabilitation     OT Discharge Recommendation: Home with home health rehabilitation (Spoke megan FERNANDEZ re: increased HHA assistance , as well)     Shannan Champagne Robbi Joiner

## 2022-09-22 NOTE — RESPIRATORY THERAPY NOTE
Respiratory Care          Home Oxygen Qualifying Test     Patient name: Elliott Bellamy        : 1955   Date of Test:  2022  Diagnosis:    Home Oxygen Test:    **Medicare Guidelines require item(s) 1-5 on all ambulatory patients or 1 and 2 on non-ambulatory patients  1  Baseline SPO2 on Room Air at rest 97%   a  If <= 88% on Room Air add O2 via NC to obtain SpO2 >=88%  If LPM needed, document N/A LPM needed to reach =>88%    2  SPO2 during exertion on Room Air  85 %  a  During exertion monitor SPO2  If SPO2 increases >=89%, do not add supplemental oxygen    3  SPO2 on Oxygen at Rest N/A % at N/A LPM    4  SPO2 during exertion on Oxygen 94 % at 1 5 LPM    5  Test performed during exertion activity  Pt  Walked 40 feet  [x]  Supplemental Home Oxygen is indicated  []  Client does not qualify for home oxygen  Respiratory Additional Notes- Pt's O2 sat=97% on room air at rest  Pt  Walked 40 feet into the hallway  O2 sat=85% on room air and her O2 sat=85% with exertion  Pt  Placed on 1 5 lpm/nc with exertion and her O2 sat=94%      Rufus Peña RT

## 2022-09-22 NOTE — DISCHARGE SUMMARY
Terrie U  65   Discharge- Tani Urbina 1955, 78 y o  female MRN: [id number]  Unit/Bed#: -01 Encounter: [id number]  Primary Care Provider: Armani Pittman MD   Date and time admitted to hospital: 9/19/2022 11:55 AM    * Syncope  Assessment & Plan  Patient presented this afternoon after experiencing syncopal following >1hr on the commode with diarrhea episode  Patient stated she also noted blood in her stool  Patient awoke on the floor and called EMS  · CTH/CT Spine: No acute intracranial abnormality  No cervical spine fracture or traumatic malalignment  · HS Trops: 11->10  EKG without signs of ischemia  · UA unremarkable  · Suspect orthostasis with known history vs vasovagal response following diarrhea episode  · No further episodes of syncope  · S/p 1L NSS Bolus in ED, maintained on gentle IV fluids as patient was clinically dry on arrival   · Positive orthostatics 9/19, negative orthostatics today  · D/C IVFs  · No acute neuro changes while admitted  Can return to [facility] neuro checks  · Notify provider for any changes in mentation  · PT/OT evaluated: Recommending HHC  · Patient medically stable for discharge    Elevated d-dimer  Assessment & Plan  · POA w/ D-dimer elevated at 7 83->1 42  · Consider to be related to CHF, otherwise unclear etiology  · COVID negative  · CTA c/a/p: No PE, aortic dissection or intramural hematoma  · VAS venous duplex LE: negative for DVT  · Recommend outpatient follow-up with PCP for continued management    Chronic heart failure with preserved ejection fraction [facility])  Assessment & Plan  Wt Readings from Last 3 Encounters:   09/22/22 127 kg (280 lb 9 6 oz)   06/08/22 129 kg (284 lb 6 3 oz)   03/25/22 129 kg (284 lb)     · Patient reports gaining close to 10 lbs  over the past week despite compliance with home diuretics and diet  · Patient clinically dry on arrival   · S/p Light IVF for 10 hours on admission  · Echo (2/2022): EF 67%  Diastolic function moderately abnormal, consistent with grade 2 relaxation  · Home Medication: Torsemide 40 mg daily, Spirolactone 25 mg daily  · CT Chest: Mild interstitial pulmonary edema  · BNP: 89  · S/p 1x dose of Furosemide (9/20)  · Patient does not appear to be in acute exacerbation  · Cardiology consulted, recommendations appreciated   · Can transition back to PO diuretics with Torsemide 40 mg daily and Spirolactone 25 mg Daily  · Stable from a cardiac standpoint for discharge  · Continue outpatient follow up with Cardiology, Dr Opal Hahn upon discharge  · Daily Weights  · Fluid restriction    Diarrhea  Assessment & Plan  · Patient presented with 1 diarrhea episode, stated she also noted blood in her stool  · FOBT: Positive  · Patient denies any active bleeding at this time, no further BMs or episodes of diarrhea since admission  · Hgb remains stable: 14 9->12 5  · Consider hemodilution in setting of IVFs  · Recommend outpatient follow up with GI     Major depressive disorder  Assessment & Plan  · Pateint flat and minimally interactive on assessment  · Patient stated she has been in a worse than normal depressive state  · Home medication:   · Prozac  · Psych Consulted, appreciate Recommendations:  · D/C Prozac and Start Cymbalta 30 mg Daily  · Recommend outpatient follow up with Psychiatrist    Diabetes mellitus [facility])  Assessment & Plan  Lab Results   Component Value Date    HGBA1C 11 8 (H) 09/19/2022       Recent Labs     09/21/22  1107 09/21/22  2025 09/22/22  0403 09/22/22  0658   POCGLU 125 148* 133 135       Blood Sugar Average: Last 72 hrs:  (P) 586 [id number]   · Appears DM uncontrolled, Updated A1c 11 8  · Home Medication:   · Lantus 38 units qHs  · C/w Lantus at 36 units qhs   · Diabetic Diet    Hypertension  Assessment & Plan  · BP reviewed and elevated, SBP 160s  · Patient asymptomatic    · Home Medication:   · Spirolactone and Torsemide  · Held in the setting of presenting dry on examination  · Continue home medications upon discharge    Hyperlipidemia  Assessment & Plan  · Per Chart review patient no longer taking Zetia  · Lipid panel: , TGCs 156, HDL 33,   · Likely would anticipate starting statin, but patient with known allergy   · Recommend restarting Zetia upon discharge    Chronic respiratory failure ([facility] )  Assessment & Plan  · Per Patient, Chronically wears 3L NC at baseline, reports occasional noncompliance  · Does not appear in acute respiratory distress  · Stable at 2 L NC O2    Seizures (HCC)  Assessment & Plan  · Hx seizures, well controlled  · Continue Home Medication upon discharge:   · Topomax    Morbid obesity due to excess calories ([facility] )  Assessment & Plan  · Continue to encourage Lifestyle modifications    Acquired hypothyroidism  Assessment & Plan  · Continue Home Medication upon discharge:   · Levothyroxine          Medical Problems             Resolved Problems  Date Reviewed: 9/22/2022   None               Discharging Physician / Practitioner: SAMIA Maya  PCP: Antonia Wheeler MD  Admission Date:   Admission Orders (From admission, onward)     Ordered        09/20/22 1134  Inpatient Admission  Once            09/19/22 1612  Place in Observation  Once                      Discharge Date: 09/22/22    Consultations During Hospital Stay:  · Cardiology  · Psychiatry    Procedures Performed:   · None    Significant Findings / Test Results:     VAS lower limb venous duplex study, complete bilateral   Final Result by Ayla Jones MD (09/20 0875)      PE Study with CT abdomen & pelvis with contrast   Final Result by Donnie Romo MD (09/19 7471)      Cardiomegaly and mild interstitial pulmonary edema  No pulmonary embolism  No acute aortic dissection or intramural hematoma  Multiple diverticula within the descending colon without findings of diverticulitis  Normal caliber bowel without obstruction                 Workstation performed: [id number]         CT head without contrast   Final Result by Allyn Pearson DO (09/19 1509)   No acute intracranial abnormality  Workstation performed: [id number]         CT spine cervical without contrast   Final Result by Hermes Mc MD (09/19 1417)      No cervical spine fracture or traumatic malalignment  Workstation performed: [id number]         X-ray chest 1 view portable   Final Result by Peter Del Cid MD (09/19 1309)      No acute cardiopulmonary disease  Findings are stable            Workstation performed: [id number]         XR hip/pelv 2-3 vws right if performed   ED Interpretation by Janie Hugo DO (09/19 1243)   No acute orthopedic findings      Final Result by Peter Del Cid MD (09/19 1306)   Degenerative changes both hips      No acute osseous abnormality  Similar to prior study      Workstation performed: [id number]             Incidental Findings:   · None    Test Results Pending at Discharge (will require follow up): · None     Outpatient Tests Requested:  · Recommend outpatient follow-up with PCP  · Recommend outpatient follow-up with Cardiology  · Recommend outpatient follow-up with Psychiatry  · Recommend repeat BMP/CBC/D-dimer    Complications:  Depression    Reason for Admission:  Syncopal episode    Hospital Course:   Christine Padilla is a 78 y o  female patient with PMH of GERD, HLD, HTN, Seizures, Depression, DM2, CHF who originally presented to the hospital on 9/19/2022 due to complaints of syncopal episode  Patient stated that she had episode of diarrhea this morning around 10:00 a m  And she was on the commode for almost an hour  She walked out of the bathroom and she felt weak and lightheaded and dizzy and fell on the back and loss consciousness    Patient states that she might have lost consciousness for at least 5-10 minutes and she woke up and she pressed the Life Alert as she could not get up and walk  Patient states that she has been having viral illness for the past week with sore throat and cough and fever of for almost 1 week  Upon arrival to the ED, patient underwent trauma workup  CT head CT spine negative for any acute abnormalities  Tropes were negative upon admission with EKG with no signs of ischemia  UA was unremarkable  Suspect syncope related to orthostasis with no history versus vasovagal response following diarrheal episodes  No further episodes of syncope while admitted  Patient receives light IV fluids on admission with suspicion of patient being dry  Patient with history of CHF, patient with complaints of 10 lb weight gain, although weight steady according to chart review  Cardiology was consulted for with concerns for possible exacerbation  Patient received 1 time dose of IV furosemide and was transition to oral diuretic therapy  Concerns of diarrhea, patient with no further episodes of diarrhea while admitted  Fecal Occult blood was positive on admission but hemoglobin remained stable  Recommend outpatient follow-up with GI for further evaluation  Patient states she was feeling worsening depression with limited desire to eat but denies SI/HI  Psychiatry was consulted  Patient was transitioned from Prozac to Cymbalta  Recommend outpatient follow-up with Psychiatry upon discharge  PT/OT evaluated recommend patient home for home health care  Discussed plan with patient  All questions were answered  Patient medically stable for discharge to home with home health care  Please see above list of diagnoses and related plan for additional information  Condition at Discharge: stable    Discharge Day Visit / Exam:   Subjective:  Patient states she feels okay today  Patient denies any active chest pain, shortness a breath, abdominal pain, nausea vomiting, or diarrhea      Vitals: Blood Pressure: 134/73 (09/22/22 0730)  Pulse: 83 (09/22/22 0730)  Temperature: 98 4 °F (36 9 °C) (09/22/22 0730)  Temp Source: Oral (09/22/22 0730)  Respirations: 20 (09/22/22 0730)  Height: 5' 2" (157 5 cm) (09/19/22 1819)  Weight - Scale: 127 kg (280 lb 9 6 oz) (09/22/22 0600)  SpO2: 90 % (09/22/22 0730)  Exam:   Physical Exam  Vitals and nursing note reviewed  Constitutional:       General: She is not in acute distress  Appearance: She is obese  HENT:      Head: Normocephalic  Nose: Nose normal  No congestion  Mouth/Throat:      Mouth: Mucous membranes are moist       Pharynx: Oropharynx is clear  Cardiovascular:      Rate and Rhythm: Normal rate and regular rhythm  Pulses: Normal pulses  Heart sounds: No murmur heard  Pulmonary:      Effort: Pulmonary effort is normal  No respiratory distress  Breath sounds: Decreased breath sounds present  Abdominal:      General: Bowel sounds are normal  There is no distension  Palpations: Abdomen is soft  Tenderness: There is no abdominal tenderness  Musculoskeletal:         General: Normal range of motion  Cervical back: Normal range of motion  Right lower leg: Edema (Trace) present  Left lower leg: Edema (Trace) present  Skin:     General: Skin is warm and dry  Capillary Refill: Capillary refill takes less than 2 seconds  Neurological:      Mental Status: She is alert and oriented to person, place, and time  Mental status is at baseline  Motor: No weakness  Psychiatric:         Mood and Affect: Affect is flat  Speech: Speech normal          Behavior: Behavior is cooperative  Discussion with Family: Patient declined call to   Discharge instructions/Information to patient and family:   See after visit summary for information provided to patient and family  Provisions for Follow-Up Care:  See after visit summary for information related to follow-up care and any pertinent home health orders         Disposition:   Home with VNA Services (Reminder: Complete face to face encounter)    Planned Readmission: No     Discharge Statement:  I spent 60 minutes discharging the patient  This time was spent on the day of discharge  I had direct contact with the patient on the day of discharge  Greater than 50% of the total time was spent examining patient, answering all patient questions, arranging and discussing plan of care with patient as well as directly providing post-discharge instructions  Additional time then spent on discharge activities  Discharge Medications:  See after visit summary for reconciled discharge medications provided to patient and/or family        **Please Note: This note may have been constructed using a voice recognition system**

## 2022-09-22 NOTE — ASSESSMENT & PLAN NOTE
· POA w/ D-dimer elevated at 7 83->1 42  · Consider to be related to CHF, otherwise unclear etiology  · COVID negative  · CTA c/a/p: No PE, aortic dissection or intramural hematoma    · VAS venous duplex LE: negative for DVT  · Recommend outpatient follow-up with PCP for continued management

## 2022-09-22 NOTE — CASE MANAGEMENT
Case Management Discharge Planning Note    Patient name Demetris Tejada  Location /-56 MRN [id number]  : 1955 Date 2022       Current Admission Date: 2022  Current Admission Diagnosis:Syncope   Patient Active Problem List    Diagnosis Date Noted    Syncope 2022    Elevated d-dimer 2022    Coronary artery disease involving native coronary artery of native heart without angina pectoris 2022    Chronic heart failure with preserved ejection fraction ([facility] ) 2022    Chronic respiratory failure ([facility] ) 2022    Dyspnea on exertion 2022    Primary osteoarthritis of one hip, right 12/10/2021    Orthostatic hypotension 10/24/2021    Morbid obesity due to excess calories ([place] [address] ) 2021    Onychomycosis of toenail 2021    Calf tenderness 2021    Asymptomatic bacteriuria 2021    Chronic fatigue and malaise 2021    Diarrhea 2021    Acquired hypothyroidism 2021    Suspected COVID-19 virus infection 2020    Major depressive disorder 2020    Gambling disorder, episodic, moderate 2018    Severe episode of recurrent major depressive disorder, without psychotic features ([place] [address] ) 2018    Encounter for examination and observation for other specified reasons     GERD (gastroesophageal reflux disease) 07/10/2016    Epigastric abdominal pain 07/10/2016    Seizures ([place] [address] )     Restless leg syndrome     Hypertension     Hyperlipidemia     Diabetes mellitus ([place] [address] )       LOS (days): 2  Geometric Mean LOS (GMLOS) (days): 2 70  Days to GMLOS:0 7     OBJECTIVE:  Risk of Unplanned Readmission Score: 11 11         Current admission status: Inpatient   Preferred Pharmacy:   [facility] [address]ID[address], [address] - [address]  [address] [address]  Phone: [phone] Fax: [phone]    Primary Care Provider: Palma Castillo MD    Primary Insurance: [employer]  NETWORK [employer] [facility]  Secondary Insurance:     DISCHARGE DETAILS:                           Contacts  Patient Contacts: Attempted call to patient's son, Cathy Conde, but phone is not working  Patient also declined updates to any family per nursing  Other Referral/Resources/Interventions Provided:  Referral Comments: Aidin message sent to LVT to confirm DC for today         Treatment Team Recommendation: Home with [facility]  Discharge Destination Plan[de-identified] Home with [place] at Discharge : [facility]  Dispatcher Contacted: Yes  Number/Name of Dispatcher: Roundtrip  Transported by [employer] and Unit #): [employer]  ETA of Transport (Date): 09/22/22  ETA of Transport (Time): 1600 (no earlier time due to [employer] availability per GERARDO   RN and patient aware of pick-up time )     Transfer Mode: Wheelchair

## 2022-09-22 NOTE — OCCUPATIONAL THERAPY NOTE
Occupational Therapy Treatment Note     Patient Name: Rebeca Saravia  XFETJ'G Date: 9/22/2022  Problem List  Principal Problem:    Syncope  Active Problems:    Seizures ([facility] )    Hypertension    Hyperlipidemia    Diabetes mellitus ([facility] )    GERD (gastroesophageal reflux disease)    Major depressive disorder    Acquired hypothyroidism    Diarrhea    Morbid obesity due to excess calories (HCC)    Chronic heart failure with preserved ejection fraction [facility])    Chronic respiratory failure ([facility] )    Elevated d-dimer            09/22/22 1352   OT Last Visit   OT Visit Date 09/22/22   Note Type   Note Type Treatment   Restrictions/Precautions   Weight Bearing Precautions Per Order No   Other Precautions Chair Alarm; Bed Alarm;Telemetry;O2;Fall Risk   General   Response to Previous Treatment Patient with no complaints from previous session   Lifestyle   Autonomy PTA, pt reports being mod (I) c rollator, A c ADLs and IADLs  Lives alone in 1 story apartment c elevator access  Utilizes [employer], (+) HHA, (+) HHPT   Reciprocal Relationships children   Service to Others unemployed   Pain Assessment   Pain Assessment Tool 0-10   Pain Score No Pain   ADL   Where Assessed Other (Comment)  (standard toilet in bathroom)   Equipment Provided Sock aid   LB Dressing Assistance 6  Modified independent   LB Dressing Deficit Increased time to complete;Setup;Use of adaptive equipment   LB Dressing Comments donned socks with use of sock aid, increased time and effort requried  Pt noted to have difficulty pushing sock onto sock aid d/t neuropathy in fingers   Toileting Assistance  5  Supervision/Setup   Toileting Deficit Supervison/safety;Verbal cueing;Grab bar use; Increased time to complete  ((I) pericare with increased tmie and effort, use of GB)   Toileting Comments VC for proper placement of RW prior to transfers on/off toilet   Bed Mobility   Supine to Sit 5  Supervision   Additional items Assist x 1; Increased time required;Verbal cues;LE management  (increased effort by pt)   Sit to Supine 5  Supervision   Additional items Assist x 1; Increased time required;Verbal cues;LE management  (increased effort by pt, completed in flat bed)   Additional Comments Denied lightheaded/dizziness c positional changes   Transfers   Sit to Stand 5  Supervision   Additional items Assist x 1; Increased time required;Verbal cues  (VC for hand placement and lock management on rollator)   Stand to Sit 5  Supervision   Additional items Assist x 1; Increased time required;Verbal cues   Stand pivot 5  Supervision   Additional items Assist x 1; Increased time required;Verbal cues  (c rollator)   Toilet transfer 5  Supervision   Additional items Assist x 1; Increased time required;Verbal cues;Standard toilet  (L grab bar)   Additional Comments multiple sit<>stand transfers completed throughout session, persistent cues for proper hand palcements and lock management of rollator (used by pt at baseline)   Functional Mobility   Functional Mobility 5  Supervision   Additional Comments Functional mobility household distance into hallway  Rest break required at midway point (seated on rollator) d/t fatigue and onset of nausea  Additional functional mobility into bathroom, pt noted to desaturate into high 80s on RA, RT present and aware     Additional items   (rollator)   Toilet Transfers   Toilet Transfer From Rolling walker   Toilet Transfer Type To and from   Toilet Transfer to [employer] Transfers Supervision;Verbal cues   Toilet Transfers Comments VC for rollator nagivation and proper placement of AD prior to standing, use of GB spontaneously   Cognition   Overall Cognitive Status WFL   Arousal/Participation Cooperative   Attention Within functional limits   Orientation Level Oriented X4   Memory Within functional limits   Following Commands Follows multistep commands with increased time or repetition   Comments Pt agreeable to OT session and OOB mobility c encouragement  Intermittently irritated throughout session (quick onset) , verbalized feeling upset by lack of sleep in hospital "people keep coming in I cant sleep"  Pt displays signficiant s/s of depression, encouraged OOB mobility and opening of blinds for natural light (pt prefers for room to remain dark throughout hospitalization)   Activity Tolerance   Activity Tolerance Patient limited by fatigue; Other (Comment)  (onset of dizziness/nausea)   Medical Staff Made Aware PT, RT, RN Anil Mejia 91, CM   Assessment   Assessment Patient seen for OT treatment on 9/22/2022 s/p admission for Syncope Patient presents with active orders for OT eval and treat and up and OOB as tolerated   Upon arrival, pt found resting in bed showing no signs of distress  Patient agreeable to OT session  Patient participated in toileting, dressing , bed mobility , functional mobility and transfer to all surfaces with intervention focus on increasing activity tolerance, functional independence, and safety during ADL tasks  Shai Lamb is showing improvements in activity tolerance and ADL independence  but is continuing to perform below baseline due to the following deficits: endurance ,  decreased muscular strength , decreased standing tolerance for self care tasks  and decreased dynamic balance impacting functional reach  From OT standpoint, patient would benefit from skilled intervention to maximize independence with ADLs and functional mobility  Goals remain appropriate, continue POC  At this time, recommending D/C to: return to previous environment with home health rehabilitation   Plan   Treatment Interventions ADL retraining;Functional transfer training;UE strengthening/ROM; Endurance training;Patient/family training;Equipment evaluation/education; Energy conservation; Activityengagement   Goal Expiration Date 09/30/22   OT Treatment Day 1   OT Frequency 2-3x/wk   Recommendation   OT Discharge Recommendation Home with home health rehabilitation  (Spoke c CM re: increased HHA assistance , as well)   Additional Comments  Pt resting in bed at end of session c all needs met, call button in reach and RT present c pt   Additional Comments 2 The patient's raw score on the AM-PAC Daily Activity inpatient short form is 20, standardized score is 42 03, greater than 39 4  Patients at this level are likely to benefit from discharge to home  Please refer to the recommendation of the Occupational Therapist for safe discharge planning     AM-PAC Daily Activity Inpatient   Lower Body Dressing 3   Bathing 3   Toileting 3   Upper Body Dressing 3   Grooming 4   Eating 4   Daily Activity Raw Score 20   Daily Activity Standardized Score (Calc for Raw Score >=11) 42 03   AM-PAC Applied Cognition Inpatient   Following a Speech/Presentation 4   Understanding Ordinary Conversation 4   Taking Medications 4   Remembering Where Things Are Placed or Put Away 4   Remembering List of 4-5 Errands 4   Taking Care of Complicated Tasks 4   Applied Cognition Raw Score 24   Applied Cognition Standardized Score 62 21   Anna Kimbrough, OT

## 2022-09-22 NOTE — PROGRESS NOTES
Patient examined spoke with the nurse discussed with the nurse practitioner  Patient is alert awake cooperative she is a poor historian but she is able to communicate her feelings well she agreed with the changing the Prozac to Cymbalta and she will communicate with her psychiatrist at [facility] regarding the change of the antidepressant medication  Medical evaluation and treatment is in progress  Patient is not in distress she offers no new complaints  Patient is not agitated  Patient admits being depressed she looks depressed her affect is flat speech slow and low in tone withdrawn psychomotor retardation  Patient denies auditory or visual hallucinations  Patient denies suicidal or homicidal ideation  Therapy done with good response  Continue Cymbalta 30 mg daily  Patient will follow-up with her psychiatrist at scheduled appointment after the discharge  I will follow up

## 2022-09-23 LAB
ATRIAL RATE: 70 BPM
P AXIS: 51 DEGREES
PR INTERVAL: 139 MS
QRS AXIS: -58 DEGREES
QRSD INTERVAL: 135 MS
QT INTERVAL: 444 MS
QTC INTERVAL: 480 MS
T WAVE AXIS: 78 DEGREES
VENTRICULAR RATE: 70 BPM

## 2022-09-23 PROCEDURE — 93010 ELECTROCARDIOGRAM REPORT: CPT | Performed by: INTERNAL MEDICINE

## 2022-09-27 ENCOUNTER — HOSPITAL ENCOUNTER (INPATIENT)
Facility: HOSPITAL | Age: 67
LOS: 3 days | Discharge: NURSING, REHAB OR LONG-TERM CARE | DRG: 948 | End: 2022-09-30
Attending: EMERGENCY MEDICINE | Admitting: STUDENT IN AN ORGANIZED HEALTH CARE EDUCATION/TRAINING PROGRAM
Payer: OTHER GOVERNMENT

## 2022-09-27 DIAGNOSIS — E11.9 DIABETES MELLITUS (HCC): ICD-10-CM

## 2022-09-27 DIAGNOSIS — R53.1 GENERALIZED WEAKNESS: ICD-10-CM

## 2022-09-27 DIAGNOSIS — F33.2 SEVERE EPISODE OF RECURRENT MAJOR DEPRESSIVE DISORDER, WITHOUT PSYCHOTIC FEATURES (HCC): ICD-10-CM

## 2022-09-27 DIAGNOSIS — I50.33 ACUTE ON CHRONIC HEART FAILURE WITH PRESERVED EJECTION FRACTION (HCC): ICD-10-CM

## 2022-09-27 DIAGNOSIS — R26.2 AMBULATORY DYSFUNCTION: Primary | ICD-10-CM

## 2022-09-27 DIAGNOSIS — R82.90 ABNORMAL URINE: ICD-10-CM

## 2022-09-27 LAB
ALBUMIN SERPL BCP-MCNC: 3.8 G/DL (ref 3.5–5)
ALP SERPL-CCNC: 87 U/L (ref 34–104)
ALT SERPL W P-5'-P-CCNC: 11 U/L (ref 7–52)
ANION GAP SERPL CALCULATED.3IONS-SCNC: 12 MMOL/L (ref 4–13)
AST SERPL W P-5'-P-CCNC: 17 U/L (ref 13–39)
BACTERIA UR QL AUTO: ABNORMAL /HPF
BASOPHILS # BLD AUTO: 0.03 THOUSANDS/ΜL (ref 0–0.1)
BASOPHILS NFR BLD AUTO: 0 % (ref 0–1)
BILIRUB SERPL-MCNC: 0.51 MG/DL (ref 0.2–1)
BILIRUB UR QL STRIP: ABNORMAL
BUN SERPL-MCNC: 25 MG/DL (ref 5–25)
CALCIUM SERPL-MCNC: 9.5 MG/DL (ref 8.4–10.2)
CHLORIDE SERPL-SCNC: 101 MMOL/L (ref 96–108)
CK SERPL-CCNC: 41 U/L (ref 26–192)
CLARITY UR: ABNORMAL
CO2 SERPL-SCNC: 23 MMOL/L (ref 21–32)
COLOR UR: YELLOW
CREAT SERPL-MCNC: 0.87 MG/DL (ref 0.6–1.3)
EOSINOPHIL # BLD AUTO: 0.27 THOUSAND/ΜL (ref 0–0.61)
EOSINOPHIL NFR BLD AUTO: 4 % (ref 0–6)
ERYTHROCYTE [DISTWIDTH] IN BLOOD BY AUTOMATED COUNT: 12.7 % (ref 11.6–15.1)
GFR SERPL CREATININE-BSD FRML MDRD: 69 ML/MIN/1.73SQ M
GLUCOSE SERPL-MCNC: 117 MG/DL (ref 65–140)
GLUCOSE SERPL-MCNC: 121 MG/DL (ref 65–140)
GLUCOSE UR STRIP-MCNC: NEGATIVE MG/DL
HCT VFR BLD AUTO: 49.9 % (ref 34.8–46.1)
HGB BLD-MCNC: 16.1 G/DL (ref 11.5–15.4)
HGB UR QL STRIP.AUTO: ABNORMAL
IMM GRANULOCYTES # BLD AUTO: 0.04 THOUSAND/UL (ref 0–0.2)
IMM GRANULOCYTES NFR BLD AUTO: 1 % (ref 0–2)
KETONES UR STRIP-MCNC: ABNORMAL MG/DL
LEUKOCYTE ESTERASE UR QL STRIP: ABNORMAL
LIPASE SERPL-CCNC: 6 U/L (ref 11–82)
LYMPHOCYTES # BLD AUTO: 1.2 THOUSANDS/ΜL (ref 0.6–4.47)
LYMPHOCYTES NFR BLD AUTO: 17 % (ref 14–44)
MCH RBC QN AUTO: 29.2 PG (ref 26.8–34.3)
MCHC RBC AUTO-ENTMCNC: 32.3 G/DL (ref 31.4–37.4)
MCV RBC AUTO: 90 FL (ref 82–98)
MONOCYTES # BLD AUTO: 0.52 THOUSAND/ΜL (ref 0.17–1.22)
MONOCYTES NFR BLD AUTO: 7 % (ref 4–12)
NEUTROPHILS # BLD AUTO: 4.97 THOUSANDS/ΜL (ref 1.85–7.62)
NEUTS SEG NFR BLD AUTO: 71 % (ref 43–75)
NITRITE UR QL STRIP: NEGATIVE
NON-SQ EPI CELLS URNS QL MICRO: ABNORMAL /HPF
NRBC BLD AUTO-RTO: 0 /100 WBCS
PH UR STRIP.AUTO: 5.5 [PH]
PLATELET # BLD AUTO: 232 THOUSANDS/UL (ref 149–390)
PMV BLD AUTO: 10.6 FL (ref 8.9–12.7)
POTASSIUM SERPL-SCNC: 4.3 MMOL/L (ref 3.5–5.3)
PROT SERPL-MCNC: 7.9 G/DL (ref 6.4–8.4)
PROT UR STRIP-MCNC: ABNORMAL MG/DL
RBC # BLD AUTO: 5.52 MILLION/UL (ref 3.81–5.12)
RBC #/AREA URNS AUTO: ABNORMAL /HPF
SODIUM SERPL-SCNC: 136 MMOL/L (ref 135–147)
SP GR UR STRIP.AUTO: >=1.03 (ref 1–1.03)
UROBILINOGEN UR QL STRIP.AUTO: 1 E.U./DL
WBC # BLD AUTO: 7.03 THOUSAND/UL (ref 4.31–10.16)
WBC #/AREA URNS AUTO: ABNORMAL /HPF

## 2022-09-27 PROCEDURE — 36415 COLL VENOUS BLD VENIPUNCTURE: CPT | Performed by: PHYSICIAN ASSISTANT

## 2022-09-27 PROCEDURE — 99285 EMERGENCY DEPT VISIT HI MDM: CPT | Performed by: PHYSICIAN ASSISTANT

## 2022-09-27 PROCEDURE — 85025 COMPLETE CBC W/AUTO DIFF WBC: CPT | Performed by: PHYSICIAN ASSISTANT

## 2022-09-27 PROCEDURE — 83690 ASSAY OF LIPASE: CPT | Performed by: PHYSICIAN ASSISTANT

## 2022-09-27 PROCEDURE — 82948 REAGENT STRIP/BLOOD GLUCOSE: CPT

## 2022-09-27 PROCEDURE — 81001 URINALYSIS AUTO W/SCOPE: CPT | Performed by: PHYSICIAN ASSISTANT

## 2022-09-27 PROCEDURE — 82550 ASSAY OF CK (CPK): CPT | Performed by: PHYSICIAN ASSISTANT

## 2022-09-27 PROCEDURE — 99285 EMERGENCY DEPT VISIT HI MDM: CPT

## 2022-09-27 PROCEDURE — 80053 COMPREHEN METABOLIC PANEL: CPT | Performed by: PHYSICIAN ASSISTANT

## 2022-09-27 PROCEDURE — 99223 1ST HOSP IP/OBS HIGH 75: CPT | Performed by: STUDENT IN AN ORGANIZED HEALTH CARE EDUCATION/TRAINING PROGRAM

## 2022-09-27 RX ORDER — INSULIN LISPRO 100 [IU]/ML
1-5 INJECTION, SOLUTION INTRAVENOUS; SUBCUTANEOUS
Status: DISCONTINUED | OUTPATIENT
Start: 2022-09-28 | End: 2022-09-30 | Stop reason: HOSPADM

## 2022-09-27 RX ORDER — ENOXAPARIN SODIUM 100 MG/ML
40 INJECTION SUBCUTANEOUS DAILY
Status: DISCONTINUED | OUTPATIENT
Start: 2022-09-28 | End: 2022-09-30 | Stop reason: HOSPADM

## 2022-09-27 RX ORDER — INSULIN GLARGINE 100 [IU]/ML
36 INJECTION, SOLUTION SUBCUTANEOUS
Status: DISCONTINUED | OUTPATIENT
Start: 2022-09-27 | End: 2022-09-30 | Stop reason: HOSPADM

## 2022-09-27 RX ORDER — NYSTATIN 100000 [USP'U]/G
POWDER TOPICAL 2 TIMES DAILY
Status: DISCONTINUED | OUTPATIENT
Start: 2022-09-27 | End: 2022-09-30 | Stop reason: HOSPADM

## 2022-09-27 RX ORDER — EZETIMIBE 10 MG/1
10 TABLET ORAL DAILY
Status: DISCONTINUED | OUTPATIENT
Start: 2022-09-28 | End: 2022-09-30 | Stop reason: HOSPADM

## 2022-09-27 RX ORDER — PANTOPRAZOLE SODIUM 40 MG/1
40 TABLET, DELAYED RELEASE ORAL
Status: DISCONTINUED | OUTPATIENT
Start: 2022-09-28 | End: 2022-09-30 | Stop reason: HOSPADM

## 2022-09-27 RX ORDER — LEVOTHYROXINE SODIUM 0.03 MG/1
75 TABLET ORAL DAILY
Status: DISCONTINUED | OUTPATIENT
Start: 2022-09-28 | End: 2022-09-30 | Stop reason: HOSPADM

## 2022-09-27 RX ORDER — ASPIRIN 81 MG/1
81 TABLET ORAL DAILY
Status: DISCONTINUED | OUTPATIENT
Start: 2022-09-28 | End: 2022-09-30 | Stop reason: HOSPADM

## 2022-09-27 RX ORDER — TORSEMIDE 10 MG/1
40 TABLET ORAL DAILY
Status: DISCONTINUED | OUTPATIENT
Start: 2022-09-28 | End: 2022-09-28

## 2022-09-27 RX ORDER — GABAPENTIN 100 MG/1
CAPSULE ORAL
Status: COMPLETED
Start: 2022-09-27 | End: 2022-09-27

## 2022-09-27 RX ORDER — ACETAMINOPHEN 325 MG/1
650 TABLET ORAL EVERY 6 HOURS PRN
Status: DISCONTINUED | OUTPATIENT
Start: 2022-09-27 | End: 2022-09-30 | Stop reason: HOSPADM

## 2022-09-27 RX ORDER — TOPIRAMATE 100 MG/1
200 TABLET, FILM COATED ORAL 2 TIMES DAILY
Status: DISCONTINUED | OUTPATIENT
Start: 2022-09-27 | End: 2022-09-30 | Stop reason: HOSPADM

## 2022-09-27 RX ORDER — GABAPENTIN 100 MG/1
100 CAPSULE ORAL 2 TIMES DAILY
Status: DISCONTINUED | OUTPATIENT
Start: 2022-09-27 | End: 2022-09-30 | Stop reason: HOSPADM

## 2022-09-27 RX ORDER — SPIRONOLACTONE 25 MG/1
25 TABLET ORAL DAILY
Status: DISCONTINUED | OUTPATIENT
Start: 2022-09-28 | End: 2022-09-28

## 2022-09-27 RX ORDER — DULOXETIN HYDROCHLORIDE 30 MG/1
30 CAPSULE, DELAYED RELEASE ORAL DAILY
Status: DISCONTINUED | OUTPATIENT
Start: 2022-09-28 | End: 2022-09-29

## 2022-09-27 RX ORDER — GABAPENTIN 300 MG/1
300 CAPSULE ORAL
Status: DISCONTINUED | OUTPATIENT
Start: 2022-09-27 | End: 2022-09-30 | Stop reason: HOSPADM

## 2022-09-27 RX ORDER — ONDANSETRON 2 MG/ML
4 INJECTION INTRAMUSCULAR; INTRAVENOUS EVERY 6 HOURS PRN
Status: DISCONTINUED | OUTPATIENT
Start: 2022-09-27 | End: 2022-09-30 | Stop reason: HOSPADM

## 2022-09-27 RX ORDER — SODIUM CHLORIDE, SODIUM GLUCONATE, SODIUM ACETATE, POTASSIUM CHLORIDE, MAGNESIUM CHLORIDE, SODIUM PHOSPHATE, DIBASIC, AND POTASSIUM PHOSPHATE .53; .5; .37; .037; .03; .012; .00082 G/100ML; G/100ML; G/100ML; G/100ML; G/100ML; G/100ML; G/100ML
50 INJECTION, SOLUTION INTRAVENOUS CONTINUOUS
Status: DISPENSED | OUTPATIENT
Start: 2022-09-27 | End: 2022-09-28

## 2022-09-27 RX ADMIN — NYSTATIN 1 APPLICATION: 100000 POWDER TOPICAL at 21:01

## 2022-09-27 RX ADMIN — TOPIRAMATE 200 MG: 100 TABLET, FILM COATED ORAL at 19:51

## 2022-09-27 RX ADMIN — GABAPENTIN 300 MG: 300 CAPSULE ORAL at 21:01

## 2022-09-27 RX ADMIN — GABAPENTIN 100 MG: 100 CAPSULE ORAL at 19:52

## 2022-09-27 RX ADMIN — ONDANSETRON 4 MG: 2 INJECTION INTRAMUSCULAR; INTRAVENOUS at 20:58

## 2022-09-27 RX ADMIN — SODIUM CHLORIDE, SODIUM GLUCONATE, SODIUM ACETATE, POTASSIUM CHLORIDE, MAGNESIUM CHLORIDE, SODIUM PHOSPHATE, DIBASIC, AND POTASSIUM PHOSPHATE 50 ML/HR: .53; .5; .37; .037; .03; .012; .00082 INJECTION, SOLUTION INTRAVENOUS at 22:21

## 2022-09-27 RX ADMIN — INSULIN GLARGINE 36 UNITS: 100 INJECTION, SOLUTION SUBCUTANEOUS at 21:01

## 2022-09-27 NOTE — ASSESSMENT & PLAN NOTE
Lab Results   Component Value Date    HGBA1C 11 8 (H) 09/19/2022     Continue home insulin regimen  SSI, Accuchecks

## 2022-09-27 NOTE — H&P
[facility] 1955, 78 y o  female MRN: [id number]  Unit/Bed#: ED OVR 21 Encounter: [id number]  Primary Care Provider: Dylan Toure MD   Date and time admitted to hospital: 9/27/2022  4:59 PM    * Weakness  Assessment & Plan  Recently admitted for syncope  Reports not getting out of bed  Lives alone, requesting rehab  PT and OT to evaluate  CM to follow up    Morbid obesity due to excess calories ([facility] [address] )  Assessment & Plan  Need to lose weight, recommend diet and exercise    Acquired hypothyroidism  Assessment & Plan  Continue levothyroxine    Major depressive disorder  Assessment & Plan  Continue cymbalta    GERD (gastroesophageal reflux disease)  Assessment & Plan  ppi    Diabetes mellitus ([facility] [address] )  Assessment & Plan  Lab Results   Component Value Date    HGBA1C 11 8 (H) 09/19/2022     Continue home insulin regimen  SSI, Accuchecks        VTE Prophylaxis: Enoxaparin (Lovenox)  / sequential compression device   Code Status: FC  POLST: There is no POLST form on file for this patient (pre-hospital)  Discussion with family: patient    Anticipated Length of Stay:  Patient will be admitted on an Inpatient basis with an anticipated length of stay of  2 midnights  Justification for Hospital Stay: patent    Total Time for Visit, including Counseling / Coordination of Care: 45 minutes  Greater than 50% of this total time spent on direct patient counseling and coordination of care  Chief Complaint:   Abdominal pain    History of Present Illness:    Veronika Rubin is a 78 y o  female who presents with weakness  Recently here and discharge  Has no real complaint other than not getting out of bed  Denies cp, sob, fevers or chills  Has diarrhea without pain  Patient requesting rehab, consider long term placemernt  Review of Systems:    Review of Systems   Constitutional: Negative for activity change, appetite change, chills and fever     HENT: Negative for congestion, facial swelling, sinus pain and sore throat  Respiratory: Negative for cough, shortness of breath and wheezing  Cardiovascular: Negative for chest pain, palpitations and leg swelling  Gastrointestinal: Positive for diarrhea  Negative for abdominal distention, abdominal pain, constipation, nausea and vomiting  Endocrine: Negative for cold intolerance, heat intolerance, polydipsia, polyphagia and polyuria  Genitourinary: Negative for dysuria, flank pain and urgency  Skin: Negative for color change and pallor  Neurological: Positive for weakness  Negative for dizziness, syncope, light-headedness and headaches  Psychiatric/Behavioral: Negative for agitation, confusion and dysphoric mood  Past Medical and Surgical History:     Past Medical History:   Diagnosis Date    CHF (congestive heart failure) ([facility] )     Diabetes mellitus ([facility] )     Disease of thyroid gland     Hyperlipidemia     Hypertension     Hypothyroidism (acquired)     Restless leg syndrome     Seizures (HCC)        Past Surgical History:   Procedure Laterality Date    APPENDECTOMY      CHOLECYSTECTOMY      HIP FRACTURE SURGERY Right     TONSILLECTOMY      TUBAL LIGATION         Meds/Allergies:    Prior to Admission medications    Medication Sig Start Date End Date Taking? Authorizing Provider   acetaminophen (TYLENOL) 325 mg tablet Take 650 mg by mouth every 6 (six) hours as needed for mild pain (pain score 1-3)  Patient not taking: Reported on 1/28/2022    Historical Provider, MD   aspirin (ECOTRIN LOW STRENGTH) 81 mg EC tablet Take 81 mg by mouth daily    Historical Provider, MD   Cholecalciferol 25 MCG (1000 UT) capsule Take 1,000 Units by mouth daily    Patient not taking: Reported on 3/25/2022    Historical Provider, MD   DULoxetine (CYMBALTA) 30 mg delayed release capsule Take 1 capsule (30 mg total) by mouth daily 9/23/22   SAMIA Robles   Empagliflozin 25 MG TABS Take 25 mg by mouth every morning  Patient not taking: Reported on 9/19/2022    Historical Provider, MD   Ergocalciferol (VITAMIN D2 PO) Take by mouth    Historical Provider, MD   ezetimibe (ZETIA) 10 mg tablet Take 1 tablet (10 mg total) by mouth daily 9/22/22   Palmacie FallSAMIA   gabapentin (NEURONTIN) 100 mg capsule Take 1 capsule (100 mg total) by mouth 2 (two) times a day Take 100 mg in the morning and 100 mg mid afternoon 3/30/21   Migel John MD   gabapentin (NEURONTIN) 300 mg capsule Take 1 capsule (300 mg total) by mouth daily at bedtime 7/10/18   SAMIA Quesada   hydrophilic ointment Apply topically as needed for dry skin    Historical Provider, MD   ibuprofen (MOTRIN) 400 mg tablet Take by mouth every 6 (six) hours as needed for mild pain    Historical Provider, MD   insulin aspart, w/niacinamide, (Fiasp FlexTouch) 100 Units/mL injection pen Inject under the skin 3 (three) times a day before meals Indications: per sliding scale    Patient not taking: Reported on 3/25/2022    Historical Provider, MD   insulin glargine (LANTUS) 100 units/mL subcutaneous injection Inject 36 Units under the skin daily at bedtime 9/22/22 Tracie FallSAMIA   levothyroxine 50 mcg tablet Take 1 5 tablets (75 mcg total) by mouth daily 9/22/22 10/22/22  Palma FallSAMIA   magnesium oxide (MAG-OX) 400 mg Take 1 tablet (400 mg total) by mouth daily for 5 days 9/23/22 9/28/22 Tracie FallSAMIA   nystatin (MYCOSTATIN) powder Apply topically 2 (two) times a day 2/19/22   Angelito Garcia MD   omeprazole (PriLOSEC) 20 mg delayed release capsule Take 20 mg by mouth daily    Historical Provider, MD   potassium chloride (K-DUR,KLOR-CON) 20 mEq tablet Take 1 tablet (20 mEq total) by mouth daily 2/1/22 Tracie FallSAMIA   spironolactone (ALDACTONE) 25 mg tablet Take 1 tablet (25 mg total) by mouth daily 2/19/22   Angelito Garcia MD   topiramate (TOPAMAX) 100 mg tablet Take 200 mg by mouth 2 (two) times a day     Historical Provider, MD   torsemide (DEMADEX) 20 mg tablet Take 2 tablets (40 mg total) by mouth daily 9/22/22   SAMIA Coppola     I have reviewed home medications with patient personally  Allergies: Allergies   Allergen Reactions    Pineapple - Food Allergy Anaphylaxis    Statins Other (See Comments)     Cramping from head to toe     Bupropion Other (See Comments)    Byetta 10 Mcg Pen [Exenatide]     Ceftriaxone Other (See Comments)    Marijuana [Dronabinol] GI Intolerance    Metformin Diarrhea     Other reaction(s): Unknown    Omeprazole Hives    Ondansetron      Other reaction(s): Unknown    Coconut Oil - Food Allergy Rash    Liraglutide Rash and Other (See Comments)     Generalized rash    Penicillins Rash       Social History:     Marital Status: Single   Occupation:   Patient Pre-hospital Living Situation:   Patient Pre-hospital Level of Mobility:   Patient Pre-hospital Diet Restrictions:   Substance Use History:   Social History     Substance and Sexual Activity   Alcohol Use Yes    Comment: occasional     Social History     Tobacco Use   Smoking Status Never Smoker   Smokeless Tobacco Never Used   Tobacco Comment    not a smoker     Social History     Substance and Sexual Activity   Drug Use Never       Family History:    Family History   Problem Relation Age of Onset    Brain cancer Mother     Seizures Father     Colon cancer Maternal Grandfather     No Known Problems Son     No Known Problems Son     Breast cancer Maternal Aunt 30       Physical Exam:     Vitals:   Blood Pressure: 136/62 (09/27/22 1706)  Pulse: 72 (09/27/22 1706)  Temperature: 97 8 °F (36 6 °C) (09/27/22 1704)  Temp Source: Oral (09/27/22 1704)  Respirations: 20 (09/27/22 1706)  Height: 5' 2" (157 5 cm) (09/27/22 1701)  Weight - Scale: 127 kg (280 lb) (09/27/22 1701)  SpO2: 98 % (09/27/22 1706)    Physical Exam  Vitals and nursing note reviewed  Constitutional:       Appearance: She is obese  HENT:      Head: Normocephalic and atraumatic     Eyes: General: No scleral icterus  Conjunctiva/sclera: Conjunctivae normal    Cardiovascular:      Rate and Rhythm: Normal rate and regular rhythm  Heart sounds: Normal heart sounds  Pulmonary:      Breath sounds: Normal breath sounds  No wheezing or rhonchi  Abdominal:      General: Bowel sounds are normal  There is no distension  Palpations: Abdomen is soft  Musculoskeletal:         General: No swelling  Right lower leg: No edema  Left lower leg: No edema  Skin:     General: Skin is warm and dry  Neurological:      General: No focal deficit present  Mental Status: She is alert  Mental status is at baseline  Additional Data:     Lab Results: I have personally reviewed pertinent reports  Results from last 7 days   Lab Units 09/27/22  1736   WBC Thousand/uL 7 03   HEMOGLOBIN g/dL 16 1*   HEMATOCRIT % 49 9*   PLATELETS Thousands/uL 232   NEUTROS PCT % 71   LYMPHS PCT % 17   MONOS PCT % 7   EOS PCT % 4     Results from last 7 days   Lab Units 09/27/22  1736   SODIUM mmol/L 136   POTASSIUM mmol/L 4 3   CHLORIDE mmol/L 101   CO2 mmol/L 23   BUN mg/dL 25   CREATININE mg/dL 0 87   ANION GAP mmol/L 12   CALCIUM mg/dL 9 5   ALBUMIN g/dL 3 8   TOTAL BILIRUBIN mg/dL 0 51   ALK PHOS U/L 87   ALT U/L 11   AST U/L 17   GLUCOSE RANDOM mg/dL 121         Results from last 7 days   Lab Units 09/22/22  1055 09/22/22  0658 09/22/22  0403 09/21/22  2025 09/21/22  1107 09/21/22  0700 09/20/22  2127   POC GLUCOSE mg/dl 171* 135 133 148* 125 104 134               Imaging: I have personally reviewed pertinent reports  No orders to display       EKG, Pathology, and Other Studies Reviewed on Admission:   · EKG: none    Allscripts / Epic Records Reviewed: Yes     ** Please Note: This note has been constructed using a voice recognition system   **

## 2022-09-27 NOTE — ASSESSMENT & PLAN NOTE
Recently admitted for syncope  Reports not getting out of bed  Lives alone, requesting rehab  PT and OT to evaluate  CM to follow up

## 2022-09-27 NOTE — ED PROVIDER NOTES
History  Chief Complaint   Patient presents with    Diarrhea     Brought in by EMS, pt reports recently being discharged here 9/22, since returning home pt has had nonstop diarrhea and states has not eaten in a few days  Pt reports taking multiple doses of imodium, last dose yesterday      This is a 70-year-old female with past medical history significant for hypertension, hyperlipidemia, type 2 diabetes mellitus, obesity, congestive heart failure, coronary artery disease, and chronic respiratory failure on home oxygen presenting to the emergency department today for episodes of diarrhea and generalized weakness  She was discharged from the hospital on September 22, 2022 and notes since then she has had numerous episodes of nonbloody diarrhea daily  She also notes that she spent 4 days in bed because she could not physically get herself out of bed because she felt generally weak  She denies any focal neurologic deficits or weakness  She denies any chest pain or shortness of breath  She denies any nausea, vomiting, or constipation  She has no abdominal pain  She has no dizziness, lightheadedness, or visual disturbances  Also notes decreased appetite  No recent ABX use  Notes she no longer feels safe at home and no longer feels like she can care for herself at home as she lives alone with little help  The patient denies other complaints at this time  History provided by:  Patient   used: No    Diarrhea  Quality:  Copious  Severity:  Moderate  Onset quality:  Gradual  Duration:  5 days  Timing:  Intermittent  Progression:  Worsening  Relieved by:  None tried  Worsened by:  Nothing  Ineffective treatments:  None tried  Associated symptoms: no abdominal pain, no arthralgias, no chills, no recent cough, no diaphoresis, no fever, no headaches, no myalgias, no URI and no vomiting        Prior to Admission Medications   Prescriptions Last Dose Informant Patient Reported? Taking? Cholecalciferol 25 MCG (1000 UT) capsule Not Taking at Unknown time Self Yes No   Sig: Take 1,000 Units by mouth daily  Patient not taking: No sig reported   DULoxetine (CYMBALTA) 30 mg delayed release capsule Past Week at Unknown time  No Yes   Sig: Take 1 capsule (30 mg total) by mouth daily   Empagliflozin 25 MG TABS Not Taking at Unknown time  Yes No   Sig: Take 25 mg by mouth every morning   Patient not taking: No sig reported   Ergocalciferol (VITAMIN D2 PO) Past Week at Unknown time Self Yes Yes   Sig: Take 50,000 Units/day by mouth 2 (two) times a week   acetaminophen (TYLENOL) 325 mg tablet Not Taking at Unknown time Self Yes No   Sig: Take 650 mg by mouth every 6 (six) hours as needed for mild pain (pain score 1-3)  Patient not taking: No sig reported   aspirin (ECOTRIN LOW STRENGTH) 81 mg EC tablet Past Week at Unknown time Self Yes Yes   Sig: Take 81 mg by mouth daily   ezetimibe (ZETIA) 10 mg tablet Not Taking at Unknown time  No No   Sig: Take 1 tablet (10 mg total) by mouth daily   Patient not taking: Reported on 9/27/2022   gabapentin (NEURONTIN) 100 mg capsule Past Week at Unknown time Self No Yes   Sig: Take 1 capsule (100 mg total) by mouth 2 (two) times a day Take 100 mg in the morning and 100 mg mid afternoon   gabapentin (NEURONTIN) 300 mg capsule Past Week at Unknown time Self No Yes   Sig: Take 1 capsule (300 mg total) by mouth daily at bedtime   hydrophilic ointment  Self Yes No   Sig: Apply topically as needed for dry skin   ibuprofen (MOTRIN) 400 mg tablet Past Week at Unknown time Self Yes Yes   Sig: Take by mouth every 6 (six) hours as needed for mild pain   insulin aspart, w/niacinamide, (Fiasp FlexTouch) 100 Units/mL injection pen Past Week at Unknown time Self Yes Yes   Sig: Inject under the skin 3 (three) times a day before meals Indications: per sliding scale     insulin glargine (LANTUS) 100 units/mL subcutaneous injection Past Week at Unknown time  No Yes   Sig: Inject 36 Units under the skin daily at bedtime   levothyroxine 50 mcg tablet Past Week at Unknown time  No Yes   Sig: Take 1 5 tablets (75 mcg total) by mouth daily   magnesium oxide (MAG-OX) 400 mg Not Taking at Unknown time  No No   Sig: Take 1 tablet (400 mg total) by mouth daily for 5 days   Patient not taking: Reported on 9/27/2022   nystatin (MYCOSTATIN) powder More than a month at Unknown time Self No No   Sig: Apply topically 2 (two) times a day   omeprazole (PriLOSEC) 20 mg delayed release capsule Past Week at Unknown time Self Yes Yes   Sig: Take 20 mg by mouth daily   potassium chloride (K-DUR,KLOR-CON) 20 mEq tablet Past Week at Unknown time Self No Yes   Sig: Take 1 tablet (20 mEq total) by mouth daily   spironolactone (ALDACTONE) 25 mg tablet Past Week at Unknown time Self No Yes   Sig: Take 1 tablet (25 mg total) by mouth daily   topiramate (TOPAMAX) 100 mg tablet Past Week at Unknown time Self Yes Yes   Sig: Take 200 mg by mouth 2 (two) times a day    torsemide (DEMADEX) 20 mg tablet Past Week at Unknown time  No Yes   Sig: Take 2 tablets (40 mg total) by mouth daily      Facility-Administered Medications: None       Past Medical History:   Diagnosis Date    CHF (congestive heart failure) (HCC)     Diabetes mellitus (HCC)     Disease of thyroid gland     Hyperlipidemia     Hypertension     Hypothyroidism (acquired)     Restless leg syndrome     Seizures (HCC)        Past Surgical History:   Procedure Laterality Date    APPENDECTOMY      CHOLECYSTECTOMY      HIP FRACTURE SURGERY Right     TONSILLECTOMY      TUBAL LIGATION         Family History   Problem Relation Age of Onset    Brain cancer Mother     Seizures Father     Colon cancer Maternal Grandfather     No Known Problems Son     No Known Problems Son     Breast cancer Maternal Aunt 30     I have reviewed and agree with the history as documented      E-Cigarette/Vaping    E-Cigarette Use Never User      E-Cigarette/Vaping Substances    Nicotine No     THC No     CBD No     Flavoring No     Other No     Unknown No      Social History     Tobacco Use    Smoking status: Never Smoker    Smokeless tobacco: Never Used    Tobacco comment: not a smoker   Vaping Use    Vaping Use: Never used   Substance Use Topics    Alcohol use: Yes     Comment: occasional    Drug use: Never       Review of Systems   Constitutional: Positive for appetite change and fatigue  Negative for chills, diaphoresis and fever  Eyes: Negative for visual disturbance  Respiratory: Negative for cough, chest tightness, shortness of breath and wheezing  Cardiovascular: Negative for chest pain, palpitations and leg swelling  Gastrointestinal: Positive for diarrhea  Negative for abdominal pain, constipation, nausea and vomiting  Musculoskeletal: Negative for arthralgias, myalgias, neck pain and neck stiffness  Skin: Negative for rash and wound  Neurological: Positive for weakness (generalized)  Negative for dizziness, seizures, syncope, light-headedness, numbness and headaches  Psychiatric/Behavioral: Negative for confusion  All other systems reviewed and are negative  Physical Exam  Physical Exam  Vitals and nursing note reviewed  Constitutional:       General: She is not in acute distress  Appearance: Normal appearance  She is obese  She is not ill-appearing, toxic-appearing or diaphoretic  HENT:      Head: Normocephalic and atraumatic  Nose: Nose normal  No congestion or rhinorrhea  Mouth/Throat:      Mouth: Mucous membranes are moist       Pharynx: No oropharyngeal exudate or posterior oropharyngeal erythema  Eyes:      General: No scleral icterus  Right eye: No discharge  Left eye: No discharge  Extraocular Movements: Extraocular movements intact  Pupils: Pupils are equal, round, and reactive to light  Cardiovascular:      Rate and Rhythm: Normal rate and regular rhythm  Pulses: Normal pulses  Heart sounds: Normal heart sounds  No murmur heard  No friction rub  No gallop  Pulmonary:      Effort: Pulmonary effort is normal  No respiratory distress  Breath sounds: Normal breath sounds  No stridor  No wheezing, rhonchi or rales  Comments: On chronic O2 saturating > 95%  Clear to auscultation bilaterally without adventitious breath sounds  Chest:      Chest wall: No tenderness  Abdominal:      General: Abdomen is flat  There is no distension  Palpations: Abdomen is soft  Tenderness: There is no abdominal tenderness  There is no right CVA tenderness, left CVA tenderness, guarding or rebound  Comments: Soft, nontender, nondistended, without organomegaly   Musculoskeletal:         General: Normal range of motion  Cervical back: Normal range of motion  No tenderness  Right lower leg: No edema  Left lower leg: No edema  Skin:     General: Skin is warm and dry  Capillary Refill: Capillary refill takes less than 2 seconds  Coloration: Skin is not jaundiced or pale  Neurological:      General: No focal deficit present  Mental Status: She is alert and oriented to person, place, and time  Mental status is at baseline        Comments: 5/5 strength in bilateral upper and lower extremities  Normal sensation of bilateral upper and lower extremities  The patient is able to smile, frown, puff out cheeks, and raise eyebrows bilaterally symmetrically without difficulty  Normal finger-to-nose examination  No cerebellar signs are dysdiadochokinesia  Overall, a normal neurologic assessment   Psychiatric:         Mood and Affect: Mood normal          Behavior: Behavior normal          Vital Signs  ED Triage Vitals   Temperature Pulse Respirations Blood Pressure SpO2   09/27/22 1704 09/27/22 1706 09/27/22 1706 09/27/22 1706 09/27/22 1706   97 8 °F (36 6 °C) 72 20 136/62 98 %      Temp Source Heart Rate Source Patient Position - Orthostatic VS BP Location FiO2 (%) 09/27/22 1704 -- 09/27/22 1925 09/27/22 1925 --   Oral  Lying Right arm       Pain Score       09/27/22 1928       No Pain           Vitals:    09/27/22 1706 09/27/22 1925   BP: 136/62 133/96   Pulse: 72 75   Patient Position - Orthostatic VS:  Lying         Visual Acuity      ED Medications  Medications   acetaminophen (TYLENOL) tablet 650 mg (has no administration in time range)   ondansetron (ZOFRAN) injection 4 mg (has no administration in time range)   enoxaparin (LOVENOX) subcutaneous injection 40 mg (has no administration in time range)   insulin lispro (HumaLOG) 100 units/mL subcutaneous injection 1-5 Units (has no administration in time range)   aspirin (ECOTRIN LOW STRENGTH) EC tablet 81 mg (has no administration in time range)   DULoxetine (CYMBALTA) delayed release capsule 30 mg (has no administration in time range)   ezetimibe (ZETIA) tablet 10 mg (has no administration in time range)   gabapentin (NEURONTIN) capsule 100 mg (has no administration in time range)   gabapentin (NEURONTIN) capsule 300 mg (has no administration in time range)   insulin glargine (LANTUS) subcutaneous injection 36 Units 0 36 mL (has no administration in time range)   levothyroxine tablet 75 mcg (has no administration in time range)   pantoprazole (PROTONIX) EC tablet 40 mg (has no administration in time range)   spironolactone (ALDACTONE) tablet 25 mg (has no administration in time range)   topiramate (TOPAMAX) tablet 200 mg (has no administration in time range)   torsemide (DEMADEX) tablet 40 mg (has no administration in time range)       Diagnostic Studies  Results Reviewed     Procedure Component Value Units Date/Time    Urine Microscopic [[id number]]  (Abnormal) Collected: 09/27/22 1829    Lab Status: Final result Specimen: Urine Updated: 09/27/22 1939     RBC, UA 4-10 /hpf      WBC, UA 4-10 /hpf      Epithelial Cells Occasional /hpf      Bacteria, UA Moderate /hpf     UA w Reflex to Microscopic w Reflex to Culture [[id number]]  (Abnormal) Collected: 09/27/22 1829    Lab Status: Final result Specimen: Urine Updated: 09/27/22 1905     Color, UA Yellow     Clarity, UA Slightly Cloudy     Specific Gravity, UA >=1 030     pH, UA 5 5     Leukocytes, UA 1+     Nitrite, UA Negative     Protein, UA Trace mg/dl      Glucose, UA Negative mg/dl      Ketones, UA 15 (1+) mg/dl      Urobilinogen, UA 1 0 E U /dl      Bilirubin, UA 2+     Occult Blood, UA 1+    Comprehensive metabolic panel [[id number]] Collected: 09/27/22 1736    Lab Status: Final result Specimen: Blood from Arm, Left Updated: 09/27/22 1808     Sodium 136 mmol/L      Potassium 4 3 mmol/L      Chloride 101 mmol/L      CO2 23 mmol/L      ANION GAP 12 mmol/L      BUN 25 mg/dL      Creatinine 0 87 mg/dL      Glucose 121 mg/dL      Calcium 9 5 mg/dL      AST 17 U/L      ALT 11 U/L      Alkaline Phosphatase 87 U/L      Total Protein 7 9 g/dL      Albumin 3 8 g/dL      Total Bilirubin 0 51 mg/dL      eGFR 69 ml/min/1 73sq m     Narrative:      [facility] guidelines for Chronic Kidney Disease (CKD):     Stage 1 with normal or high GFR (GFR > 90 mL/min/1 73 square meters)    Stage 2 Mild CKD (GFR = 60-89 mL/min/1 73 square meters)    Stage 3A Moderate CKD (GFR = 45-59 mL/min/1 73 square meters)    Stage 3B Moderate CKD (GFR = 30-44 mL/min/1 73 square meters)    Stage 4 Severe CKD (GFR = 15-29 mL/min/1 73 square meters)    Stage 5 End Stage CKD (GFR <15 mL/min/1 73 square meters)  Note: GFR calculation is accurate only with a steady state creatinine    Lipase [[id number]]  (Abnormal) Collected: 09/27/22 1736    Lab Status: Final result Specimen: Blood from Arm, Left Updated: 09/27/22 1808     Lipase 6 u/L     CK Total with Reflex CKMB [[id number]]  (Normal) Collected: 09/27/22 1736    Lab Status: Final result Specimen: Blood from Arm, Left Updated: 09/27/22 1808     Total CK 41 U/L     CBC and differential [[id number]]  (Abnormal) Collected: 09/27/22 1736 Lab Status: Final result Specimen: Blood from Arm, Left Updated: 09/27/22 1741     WBC 7 03 Thousand/uL      RBC 5 52 Million/uL      Hemoglobin 16 1 g/dL      Hematocrit 49 9 %      MCV 90 fL      MCH 29 2 pg      MCHC 32 3 g/dL      RDW 12 7 %      MPV 10 6 fL      Platelets 801 Thousands/uL      nRBC 0 /100 WBCs      Neutrophils Relative 71 %      Immat GRANS % 1 %      Lymphocytes Relative 17 %      Monocytes Relative 7 %      Eosinophils Relative 4 %      Basophils Relative 0 %      Neutrophils Absolute 4 97 Thousands/µL      Immature Grans Absolute 0 04 Thousand/uL      Lymphocytes Absolute 1 20 Thousands/µL      Monocytes Absolute 0 52 Thousand/µL      Eosinophils Absolute 0 27 Thousand/µL      Basophils Absolute 0 03 Thousands/µL                  No orders to display              Procedures  Procedures         ED Course                                             MDM  Number of Diagnoses or Management Options  Ambulatory dysfunction: new and requires workup  Generalized weakness: new and requires workup  Diagnosis management comments: This is a 25-year-old female presenting to the emergency department today for generalized weakness and diarrhea  Recently discharged from the hospital and does not feel like she can care for herself at home anymore  No recent antibiotic use  Vital signs are stable  Normal neurologic assessment on physical examination  Cardiac and pulmonary examination are at the patient's baseline  Labs are reassuring  Patient has significant ambulatory dysfunction and notes she is interested in placement in a rehabilitation facility as she does not feel safe going home alone  Excepted to the service of Dr General Young for ambulatory dysfunction and likely rehab placement  The patient and/or patient's proxy verify their understanding and agree to the plan at this time  All questions answered to the patient and/or their proxy's satisfaction    All labs reviewed and utilized in the medical decision making process  All radiology studies independently viewed by me and interpreted by the radiologist  Portions of the record may have been created with voice recognition software   Occasional wrong word or "sound a like" substitutions may have occurred due to the inherent limitations of voice recognition software   Read the chart carefully and recognize, using context, where substitutions have occurred  Amount and/or Complexity of Data Reviewed  Clinical lab tests: ordered and reviewed  Review and summarize past medical records: yes  Discuss the patient with other providers: yes (Dr Garcia [address])    Patient Progress  Patient progress: stable      Disposition  Final diagnoses:   Ambulatory dysfunction   Generalized weakness     Time reflects when diagnosis was documented in both MDM as applicable and the Disposition within this note     Time User Action Codes Description Comment    9/27/2022  6:41 PM Ronald Arrieta Add [R26 2] Ambulatory dysfunction     9/27/2022  6:41 PM Michael Corrigan Add [R53 1] Generalized weakness       ED Disposition     ED Disposition   Admit    Condition   Stable    Date/Time   Tue Sep 27, 2022  6:41 PM    Comment   Case was discussed with Dr Josué Petersen and the patient's admission status was agreed to be Admission Status: inpatient status to the service of Dr Josué Petersen             Follow-up Information    None         Current Discharge Medication List      CONTINUE these medications which have NOT CHANGED    Details   aspirin (ECOTRIN LOW STRENGTH) 81 mg EC tablet Take 81 mg by mouth daily      DULoxetine (CYMBALTA) 30 mg delayed release capsule Take 1 capsule (30 mg total) by mouth daily  Qty: 30 capsule, Refills: 0    Associated Diagnoses: Major depressive disorder, remission status unspecified, unspecified whether recurrent      Ergocalciferol (VITAMIN D2 PO) Take 50,000 Units/day by mouth 2 (two) times a week      !! gabapentin (NEURONTIN) 100 mg capsule Take 1 capsule (100 mg total) by mouth 2 (two) times a day Take 100 mg in the morning and 100 mg mid afternoon  Qty: 42 capsule, Refills: 0    Associated Diagnoses: Restless leg syndrome      !! gabapentin (NEURONTIN) 300 mg capsule Take 1 capsule (300 mg total) by mouth daily at bedtime  Qty: 21 capsule, Refills: 0    Associated Diagnoses: Restless leg syndrome      ibuprofen (MOTRIN) 400 mg tablet Take by mouth every 6 (six) hours as needed for mild pain      insulin aspart, w/niacinamide, (Fiasp FlexTouch) 100 Units/mL injection pen Inject under the skin 3 (three) times a day before meals Indications: per sliding scale  insulin glargine (LANTUS) 100 units/mL subcutaneous injection Inject 36 Units under the skin daily at bedtime  Qty: 10 mL, Refills: 0    Associated Diagnoses: Diabetes mellitus (HCC)      levothyroxine 50 mcg tablet Take 1 5 tablets (75 mcg total) by mouth daily  Qty: 45 tablet, Refills: 0    Associated Diagnoses: Acquired hypothyroidism      omeprazole (PriLOSEC) 20 mg delayed release capsule Take 20 mg by mouth daily      potassium chloride (K-DUR,KLOR-CON) 20 mEq tablet Take 1 tablet (20 mEq total) by mouth daily  Qty: 30 tablet, Refills: 0    Associated Diagnoses: SOB (shortness of breath)      spironolactone (ALDACTONE) 25 mg tablet Take 1 tablet (25 mg total) by mouth daily  Qty: 30 tablet, Refills: 0    Associated Diagnoses: Acute on chronic heart failure with preserved ejection fraction (HCC)      topiramate (TOPAMAX) 100 mg tablet Take 200 mg by mouth 2 (two) times a day       torsemide (DEMADEX) 20 mg tablet Take 2 tablets (40 mg total) by mouth daily  Qty: 60 tablet, Refills: 0    Associated Diagnoses: Acute on chronic heart failure with preserved ejection fraction (HCC)      acetaminophen (TYLENOL) 325 mg tablet Take 650 mg by mouth every 6 (six) hours as needed for mild pain (pain score 1-3)  Cholecalciferol 25 MCG (1000 UT) capsule Take 1,000 Units by mouth daily  Empagliflozin 25 MG TABS Take 25 mg by mouth every morning      ezetimibe (ZETIA) 10 mg tablet Take 1 tablet (10 mg total) by mouth daily  Qty: 30 tablet, Refills: 0    Associated Diagnoses: Mixed hyperlipidemia      hydrophilic ointment Apply topically as needed for dry skin      magnesium oxide (MAG-OX) 400 mg Take 1 tablet (400 mg total) by mouth daily for 5 days  Qty: 5 tablet, Refills: 0    Associated Diagnoses: Chronic heart failure with preserved ejection fraction (HCC)      nystatin (MYCOSTATIN) powder Apply topically 2 (two) times a day  Qty: 15 g, Refills: 0    Associated Diagnoses: Morbid obesity due to excess calories [facility])       ! ! - Potential duplicate medications found  Please discuss with provider  No discharge procedures on file      PDMP Review       Value Time User    PDMP Reviewed  Yes 3/30/2021  3:19 PM Kamila London MD          ED Provider  Electronically Signed by           Mary Anne Gibson PA-C  09/27/22 6015

## 2022-09-28 PROBLEM — R82.90 ABNORMAL URINE: Status: ACTIVE | Noted: 2022-09-28

## 2022-09-28 LAB
ANION GAP SERPL CALCULATED.3IONS-SCNC: 10 MMOL/L (ref 4–13)
BACTERIA UR QL AUTO: ABNORMAL /HPF
BASOPHILS # BLD AUTO: 0.03 THOUSANDS/ΜL (ref 0–0.1)
BASOPHILS NFR BLD AUTO: 0 % (ref 0–1)
BILIRUB UR QL STRIP: ABNORMAL
BUN SERPL-MCNC: 25 MG/DL (ref 5–25)
CALCIUM SERPL-MCNC: 9.4 MG/DL (ref 8.4–10.2)
CHLORIDE SERPL-SCNC: 102 MMOL/L (ref 96–108)
CLARITY UR: ABNORMAL
CO2 SERPL-SCNC: 26 MMOL/L (ref 21–32)
COLOR UR: YELLOW
CREAT SERPL-MCNC: 0.94 MG/DL (ref 0.6–1.3)
EOSINOPHIL # BLD AUTO: 0.26 THOUSAND/ΜL (ref 0–0.61)
EOSINOPHIL NFR BLD AUTO: 3 % (ref 0–6)
ERYTHROCYTE [DISTWIDTH] IN BLOOD BY AUTOMATED COUNT: 12.8 % (ref 11.6–15.1)
GFR SERPL CREATININE-BSD FRML MDRD: 62 ML/MIN/1.73SQ M
GLUCOSE SERPL-MCNC: 117 MG/DL (ref 65–140)
GLUCOSE SERPL-MCNC: 124 MG/DL (ref 65–140)
GLUCOSE SERPL-MCNC: 142 MG/DL (ref 65–140)
GLUCOSE SERPL-MCNC: 89 MG/DL (ref 65–140)
GLUCOSE SERPL-MCNC: 91 MG/DL (ref 65–140)
GLUCOSE UR STRIP-MCNC: NEGATIVE MG/DL
HCT VFR BLD AUTO: 47 % (ref 34.8–46.1)
HGB BLD-MCNC: 15.1 G/DL (ref 11.5–15.4)
HGB UR QL STRIP.AUTO: ABNORMAL
IMM GRANULOCYTES # BLD AUTO: 0.04 THOUSAND/UL (ref 0–0.2)
IMM GRANULOCYTES NFR BLD AUTO: 1 % (ref 0–2)
KETONES UR STRIP-MCNC: ABNORMAL MG/DL
LEUKOCYTE ESTERASE UR QL STRIP: ABNORMAL
LYMPHOCYTES # BLD AUTO: 1.11 THOUSANDS/ΜL (ref 0.6–4.47)
LYMPHOCYTES NFR BLD AUTO: 13 % (ref 14–44)
MCH RBC QN AUTO: 29.3 PG (ref 26.8–34.3)
MCHC RBC AUTO-ENTMCNC: 32.1 G/DL (ref 31.4–37.4)
MCV RBC AUTO: 91 FL (ref 82–98)
MONOCYTES # BLD AUTO: 0.66 THOUSAND/ΜL (ref 0.17–1.22)
MONOCYTES NFR BLD AUTO: 8 % (ref 4–12)
NEUTROPHILS # BLD AUTO: 6.56 THOUSANDS/ΜL (ref 1.85–7.62)
NEUTS SEG NFR BLD AUTO: 75 % (ref 43–75)
NITRITE UR QL STRIP: NEGATIVE
NON-SQ EPI CELLS URNS QL MICRO: ABNORMAL /HPF
NRBC BLD AUTO-RTO: 0 /100 WBCS
PH UR STRIP.AUTO: 5.5 [PH]
PLATELET # BLD AUTO: 226 THOUSANDS/UL (ref 149–390)
PMV BLD AUTO: 10.9 FL (ref 8.9–12.7)
POTASSIUM SERPL-SCNC: 3.7 MMOL/L (ref 3.5–5.3)
PROT UR STRIP-MCNC: NEGATIVE MG/DL
RBC # BLD AUTO: 5.16 MILLION/UL (ref 3.81–5.12)
RBC #/AREA URNS AUTO: ABNORMAL /HPF
SODIUM SERPL-SCNC: 138 MMOL/L (ref 135–147)
SP GR UR STRIP.AUTO: 1.02 (ref 1–1.03)
UROBILINOGEN UR QL STRIP.AUTO: 0.2 E.U./DL
WBC # BLD AUTO: 8.66 THOUSAND/UL (ref 4.31–10.16)
WBC #/AREA URNS AUTO: ABNORMAL /HPF

## 2022-09-28 PROCEDURE — 81001 URINALYSIS AUTO W/SCOPE: CPT

## 2022-09-28 PROCEDURE — 97163 PT EVAL HIGH COMPLEX 45 MIN: CPT

## 2022-09-28 PROCEDURE — 99232 SBSQ HOSP IP/OBS MODERATE 35: CPT

## 2022-09-28 PROCEDURE — 82948 REAGENT STRIP/BLOOD GLUCOSE: CPT

## 2022-09-28 PROCEDURE — 85025 COMPLETE CBC W/AUTO DIFF WBC: CPT | Performed by: STUDENT IN AN ORGANIZED HEALTH CARE EDUCATION/TRAINING PROGRAM

## 2022-09-28 PROCEDURE — 80048 BASIC METABOLIC PNL TOTAL CA: CPT | Performed by: STUDENT IN AN ORGANIZED HEALTH CARE EDUCATION/TRAINING PROGRAM

## 2022-09-28 PROCEDURE — 97167 OT EVAL HIGH COMPLEX 60 MIN: CPT

## 2022-09-28 RX ORDER — SODIUM CHLORIDE, SODIUM GLUCONATE, SODIUM ACETATE, POTASSIUM CHLORIDE, MAGNESIUM CHLORIDE, SODIUM PHOSPHATE, DIBASIC, AND POTASSIUM PHOSPHATE .53; .5; .37; .037; .03; .012; .00082 G/100ML; G/100ML; G/100ML; G/100ML; G/100ML; G/100ML; G/100ML
75 INJECTION, SOLUTION INTRAVENOUS CONTINUOUS
Status: DISPENSED | OUTPATIENT
Start: 2022-09-28 | End: 2022-09-28

## 2022-09-28 RX ORDER — SPIRONOLACTONE 25 MG/1
25 TABLET ORAL DAILY
Status: DISCONTINUED | OUTPATIENT
Start: 2022-09-30 | End: 2022-09-30 | Stop reason: HOSPADM

## 2022-09-28 RX ORDER — TORSEMIDE 10 MG/1
40 TABLET ORAL DAILY
Status: DISCONTINUED | OUTPATIENT
Start: 2022-09-30 | End: 2022-09-30 | Stop reason: HOSPADM

## 2022-09-28 RX ORDER — NITROFURANTOIN 25; 75 MG/1; MG/1
100 CAPSULE ORAL 2 TIMES DAILY WITH MEALS
Status: DISCONTINUED | OUTPATIENT
Start: 2022-09-28 | End: 2022-09-30 | Stop reason: HOSPADM

## 2022-09-28 RX ORDER — LANOLIN ALCOHOL/MO/W.PET/CERES
9 CREAM (GRAM) TOPICAL
Status: DISCONTINUED | OUTPATIENT
Start: 2022-09-28 | End: 2022-09-30 | Stop reason: HOSPADM

## 2022-09-28 RX ADMIN — Medication 9 MG: at 21:12

## 2022-09-28 RX ADMIN — NYSTATIN 1 APPLICATION: 100000 POWDER TOPICAL at 17:14

## 2022-09-28 RX ADMIN — GABAPENTIN 100 MG: 100 CAPSULE ORAL at 17:13

## 2022-09-28 RX ADMIN — GABAPENTIN 100 MG: 100 CAPSULE ORAL at 08:21

## 2022-09-28 RX ADMIN — SODIUM CHLORIDE, SODIUM GLUCONATE, SODIUM ACETATE, POTASSIUM CHLORIDE, MAGNESIUM CHLORIDE, SODIUM PHOSPHATE, DIBASIC, AND POTASSIUM PHOSPHATE 75 ML/HR: .53; .5; .37; .037; .03; .012; .00082 INJECTION, SOLUTION INTRAVENOUS at 12:10

## 2022-09-28 RX ADMIN — SPIRONOLACTONE 25 MG: 25 TABLET ORAL at 08:21

## 2022-09-28 RX ADMIN — TOPIRAMATE 200 MG: 100 TABLET, FILM COATED ORAL at 17:13

## 2022-09-28 RX ADMIN — TORSEMIDE 40 MG: 10 TABLET ORAL at 08:21

## 2022-09-28 RX ADMIN — NYSTATIN: 100000 POWDER TOPICAL at 08:22

## 2022-09-28 RX ADMIN — Medication 9 MG: at 00:23

## 2022-09-28 RX ADMIN — EZETIMIBE 10 MG: 10 TABLET ORAL at 08:21

## 2022-09-28 RX ADMIN — ONDANSETRON 4 MG: 2 INJECTION INTRAMUSCULAR; INTRAVENOUS at 17:32

## 2022-09-28 RX ADMIN — ASPIRIN 81 MG: 81 TABLET, COATED ORAL at 08:21

## 2022-09-28 RX ADMIN — TOPIRAMATE 200 MG: 100 TABLET, FILM COATED ORAL at 08:21

## 2022-09-28 RX ADMIN — DULOXETINE HYDROCHLORIDE 30 MG: 30 CAPSULE, DELAYED RELEASE ORAL at 08:21

## 2022-09-28 RX ADMIN — NITROFURANTOIN (MONOHYDRATE/MACROCRYSTALS) 100 MG: 75; 25 CAPSULE ORAL at 17:13

## 2022-09-28 RX ADMIN — LEVOTHYROXINE SODIUM 75 MCG: 25 TABLET ORAL at 08:21

## 2022-09-28 RX ADMIN — GABAPENTIN 300 MG: 300 CAPSULE ORAL at 21:12

## 2022-09-28 RX ADMIN — INSULIN GLARGINE 36 UNITS: 100 INJECTION, SOLUTION SUBCUTANEOUS at 21:12

## 2022-09-28 RX ADMIN — ENOXAPARIN SODIUM 40 MG: 40 INJECTION SUBCUTANEOUS at 08:21

## 2022-09-28 RX ADMIN — PANTOPRAZOLE SODIUM 40 MG: 40 TABLET, DELAYED RELEASE ORAL at 17:14

## 2022-09-28 RX ADMIN — PANTOPRAZOLE SODIUM 40 MG: 40 TABLET, DELAYED RELEASE ORAL at 08:21

## 2022-09-28 NOTE — ASSESSMENT & PLAN NOTE
· U/A on admission: (+) Leuk/Protein/Ketones/Bilirubin/RBC/WBC/Bacteria  · Repeat U/A: (+) Leuk/Ketones/ WBC/Bacteria  · Suspect component of dirty catch  · Patient with mild urinary symptoms  · Will administer Macrobid x3 days for suspicion of UTI

## 2022-09-28 NOTE — PHYSICAL THERAPY NOTE
PHYSICAL THERAPY EVALUATION  DATE: 09/28/22  TIME: 4333-9728    NAME:  Roxanna Baumgarten  AGE:   78 y o  Mrn:   [id number]  Length Of Stay: 1    ADMIT DX:  Diarrhea [R19 7]  Generalized weakness [R53 1]  Ambulatory dysfunction [R26 2]    Past Medical History:   Diagnosis Date    CHF (congestive heart failure) ([facility] )     Diabetes mellitus ([place] [address] )     Disease of thyroid gland     Hyperlipidemia     Hypertension     Hypothyroidism (acquired)     Restless leg syndrome     Seizures ([place] [address] )      Past Surgical History:   Procedure Laterality Date    APPENDECTOMY      CHOLECYSTECTOMY      HIP FRACTURE SURGERY Right     TONSILLECTOMY      TUBAL LIGATION         Performed at least 2 patient identifiers during session: Name, Jaqueline Cooper, and ID bracelet     09/28/22 1116   PT Last Visit   PT Visit Date 09/28/22   Note Type   Note type Evaluation   Pain Assessment   Pain Assessment Tool 0-10   Pain Score No Pain   Effect of Pain on Daily Activities n/a   Patient's Stated Pain Goal No pain   Hospital Pain Intervention(s) Repositioned; Ambulation/increased activity   Multiple Pain Sites No   Restrictions/Precautions   Weight Bearing Precautions Per Order No   Other Precautions Chair Alarm; Bed Alarm;Multiple lines;O2;Fall Risk   Home Living   Type of Home Apartment  (Senior apartment building)   Home Layout One level;Elevator   Bathroom Shower/Tub Walk-in shower  (pt reports having tub "made into" a walk in shower)   [employer] Grab bars in shower; Shower chair;Hand-held shower  (used to have tub transfer bench, now has shower chair)   Bathroom Accessibility Accessible via walker   [address] [place] [address] Street aid;Life alert  (Rollator walker (used at baseline for community mobility), no AD used for household ambulation)   Additional Comments Laundry in basement level of apartment, elevator access  Uses laundry bag on rollator     Prior Function   Level of [place] Other (Comment)  (independent functional mobility, Assist with ADLs and IADLs)   Lives With (S)  Alone   Receives Help From Home health  (reports HHA 2x/wk for 2hrs/day in order to assist with showering + cleaning; has HHPT 3x/wk)   ADL Assistance Needs assistance  (sponge bathes at sink indep, HHA assists with shower transfers and showering 2x/wk)   IADLs Needs assistance   Falls in the last 6 months 1 to 4  (pt reports 1 syncope event prior to last hospitalization  none since last hospitalization )   Vocational Retired   Comments Pt lives at home alone in a senior apartment building with elevator access, no stairs  Pt independently completes functional mobility, with no AD in the home, has rollator walker for community mobility  Is indep with Basic ADLs but has HHA assistance with showering 2x/wk  Has Home health PT 3x/wk  Of note, pt d/c from hospital 9/22/22 with home health care, however pt reports that she was unable to get OOB d/t weakness and increased diarrhea, had dropped her phone and life alert onto the floor and was unable to reach, resulting in readmission to hospital  Pt now interested in STR  General   Additional Pertinent History Pt admitted 9/27/2022 with c/o diarrhea, weakness, ambulatory dysfunction  Family/Caregiver Present No   Cognition   Overall Cognitive Status WFL   Arousal/Participation Alert   Orientation Level Oriented X4  (increased time for orientation questions)   Memory Within functional limits   Following Commands Follows multistep commands with increased time or repetition   Comments Pt with ongoing high levels of anxiety, labile emotional status t/o session     Subjective   Subjective "I couldn't get out of bed so I laid there for two days "   RUE Assessment   RUE Assessment WFL   LUE Assessment   LUE Assessment WFL   RLE Assessment   RLE Assessment X  (grossly 3/5 to 3+/5 MMT throughout)   LLE Assessment   LLE Assessment X  (grossly 3/5 to 3+/5 MMT throughout)   Coordination   Movements are Fluid and Coordinated 1   Sensation X   Light Touch   RLE Light Touch Impaired   RLE Light Touch Comments pt reports baseline numbness in B foot due to neuropathy   LLE Light Touch Impaired   LLE Light Touch Comments pt reports baseline numbness in B foot due to neuropathy   Proprioception   RLE Proprioception Grossly intact   LLE Proprioception Grossly Intact   Bed Mobility   Supine to Sit 4  Minimal assistance   Additional items Assist x 1;HOB elevated; Bedrails; Increased time required;Verbal cues   Sit to Supine Unable to assess   Additional Comments Pt was left seated OOB in recliner chair with LEs elevated, chair alarm engaged, needs within reach  Transfers   Sit to Stand 3  Moderate assistance   Additional items Assist x 1;Bedrails; Increased time required;Verbal cues   Stand to Sit 3  Moderate assistance   Additional items Assist x 1; Armrests; Impulsive;Verbal cues  (pt with uncontrolled descent to sit)   Additional Comments Very limited standing tolerance due to onset of severe dizziness and + orthostatic hypotension  Ambulation/Elevation   Gait pattern Wide CLAUDIO; Forward Flexion;Decreased foot clearance; Short stride   Gait Assistance 3  Moderate assist   Additional items Assist x 1;Verbal cues; Tactile cues   Assistive Device Rolling walker   Distance 3ft during ambulatory transfer from [facility] to [facility]  (ambulation tolerance limited due to onset of severe dizziness and +orthostatic hypotension while seated and in standing positions)   Stair Management Assistance Not tested   Balance   Static Sitting Fair +   Dynamic Sitting Fair   Static Standing Poor +   Dynamic Standing Poor   Ambulatory Poor   Endurance Deficit   Endurance Deficit Yes   Endurance Deficit Description +orthostatic hypotension, +high levels of anxiety and FOF     Activity Tolerance   Activity Tolerance Patient limited by fatigue;Treatment limited secondary to medical complications (Comment)  (+orthostatic hypotension)   Medical Staff Made Aware Spoke wtih SLIM CREZEKIEL, CM, OT   Nurse Made Aware Spoke with BETHANY Sanchez   Assessment   Prognosis Good   Problem List Decreased strength;Decreased endurance; Impaired balance;Decreased mobility; Decreased safety awareness; Impaired sensation;Obesity   Assessment Pt seen for PT evaluation for mobility assessment & discharge needs  Activity orders: up and OOB as tolerated  Pt admitted 9/27/2022 w/ weakness, diarrhea, ambulatory dysfunction  Of note pt with recent d/c from [facility] 9/22/22 s/p syncopal event, failed at home upon d/c  Comorbidities affecting pt's fnxl performance include: obesity, seizures, DM, depression, chronic heart failure, COVID-19, orthostatic hypotension  During PT IE, pt completes bed mobility with ANDREW, transfers with MODA, and ambulates only 3ft with RW and MODA (limited d/t severe dizziness and + orthostatic hypotension  Pt's functional mobility severely limited on this date due to onset of dizziness  The AM-PAC & Barthel Index outcome tools were used to assist in determining pt safety w/ mobility/self care & appropriate d/c recommendations, see above for scores  Pt is at risk of falls d/t multiple comorbidities, h/o falls, impaired balance, impaired sensation, use of ambulatory aid, varying levels of pain, acuity of medical illness and ongoing medical treatment of primary dx  Pt's clinical presentation is currently unstable/unpredictable as seen in pt's presentation of vital sign response, changing level of pain, increased fall risk, new onset of impairment of functional mobility, decreased endurance and new onset of weakness, and requires high complexity clinical decision making  Pt will benefit from continued PT services in order to address impairments, decrease risk of falls, maximize independence w/ fnxl mobility, & ensure safety w/ mobility for transition to next level of care   Based on pt presentation & impairments, pt would most appropriately benefit from post acute STR upon d/c in order to maximize indep and safety with mobility and self care, for ultimate goal of return to home alone  Barriers to Discharge Decreased caregiver support   Goals   Patient Goals "to go to rehab"   STG Expiration Date 10/08/22   Short Term Goal #1 Patient PT goals established in order to address patient self reported goal of "to go to rehab"  Pt will complete all bed mobility independently in flat bed, in order to promote increased OOB functional mobility to improve overall activity tolerance  Pt will complete all transfers with RW at [facility] level, in order to increase safety with functional mobility  Pt will ambulate >80ft with RW and S in order to increase safety with household and short community functional mobility  Pt will improve AM-PAC score to >/= 17/24 in order to increase independence with mobility and decrease burden of care  Pt will improve Barthel Index score to >/= 50/100 in order to increase independence and decrease risk of falls  Pt will demonstrate independence and understanding of strengthening HEP  PT Treatment Day 0   Plan   Treatment/Interventions Functional transfer training;LE strengthening/ROM; Therapeutic exercise; Endurance training;Patient/family training;Equipment eval/education; Bed mobility;Gait training;Spoke to nursing;Spoke to case management;Spoke to advanced practitioner   PT Frequency 3-5x/wk   Recommendation   PT Discharge Recommendation Post acute rehabilitation services   AM-PAC Basic Mobility Inpatient   Turning in Bed Without Bedrails 3   Lying on Back to Sitting on Edge of Flat Bed 2   Moving Bed to Chair 2   Standing Up From Chair 2   Walk in Room 2   Climb 3-5 Stairs 1   Basic Mobility Inpatient Raw Score 12   Basic Mobility Standardized Score 32 23   Highest Level Of Mobility   -[facility] Goal 4: Move to chair/commode   -[facility] Achieved 4: Move to chair/commode   Modified [place] Scale   Modified [place] Scale 4   Barthel Index   Feeding 10   Bathing 0   Grooming Score 0   Dressing Score 5   Bladder Score 5   Bowels Score 10   Toilet Use Score 5   Transfers (Bed/Chair) Score 5   Mobility (Level Surface) Score 0   Stairs Score 0   Barthel Index Score 40   End of Consult   Patient Position at End of Consult Bedside chair;Bed/Chair alarm activated; All needs within reach   End of Consult Comments Based on patient's [facility] Level of Mobility scores today, patient currently has a goal of [facility] Levels: 4: MOVE TO CHAIR/COMMODE, to be completed with RN staffing each shift, in order to improve overall activity tolerance and mobility, combat hospital related deconditioning, and maximize outcomes for d/c from the acute care setting  Vitals:    09/28/22 1055 09/28/22 1100 09/28/22 1104 09/28/22 1108 09/28/22 1111   BP: 105/76 91/59 (!) 84/57 121/75 119/64   BP Location: Right arm Right arm Right arm Right arm Right arm   Symptoms?: Slight dizziness Moderate dizziness Max dizziness Moderate dizziness Slight dizziness   Position: Sitting Sitting Sitting Reclined in recliner Reclined in recliner       The patient's AM-PAC Basic Mobility Inpatient Short Form Raw Score is 12  A Raw score of less than or equal to 16 suggests the patient may benefit from discharge to post-acute rehabilitation services  Please also refer to the recommendation of the Physical Therapist for safe discharge planning        Gala Najera PT, DPT   Available via [employer]  [facility] # [id number]  [place] License - [id number]  3/18/0663

## 2022-09-28 NOTE — PROGRESS NOTES
[facility]  Progress Note - Yasmani Villeda 1955, 78 y o  female MRN: [id number]  Unit/Bed#: -01 Encounter: [id number]  Primary Care Provider: Annie Ramirez MD   Date and time admitted to hospital: 9/27/2022  4:59 PM    * Weakness  Assessment & Plan  · Recently admitted for syncope (9/19-9/22)  · Reports since returning home she was not getting out of bed  · Patient currently lives alone, requesting rehab  · PT/OT evaluated: Recommending STR  · CM following- Referrals Placed    Major depressive disorder  Assessment & Plan  · Patient with worsening depression  · Continue Cymbalta  · Recently adjusted per last hospital stay  · Per Patient, did not take any medications once she returned home  · Psychiatry Consulted, recommendations appreciated    Abnormal urine  Assessment & Plan  · U/A on admission: (+) Leuk/Protein/Ketones/Bilirubin/RBC/WBC/Bacteria  · Repeat U/A: (+) Leuk/Ketones/ WBC/Bacteria  · Suspect component of dirty catch  · Patient with mild urinary symptoms  · Will administer Macrobid x3 days for suspicion of UTI    Diarrhea  Assessment & Plan  · Patient with reports of worsened diarrhea   · Enteric Stool/C-Diff: Pending  · Per Nursing, Only 1 episode of diarrhea   · Continue to monitor Stool output    Diabetes mellitus ([facility] )  Assessment & Plan  Lab Results   Component Value Date    HGBA1C 11 8 (H) 09/19/2022     · Continue home insulin regimen:  · Lantus 36 units qHS  · C/w Accu-checks and SSI  · Hypoglycemic protocol  · Diabetic Diet      Morbid obesity due to excess calories ([facility] )  Assessment & Plan  · Educated on lifestyle modifications, including diet and exercise    Acquired hypothyroidism  Assessment & Plan  · Continue Home medication:  · levothyroxine    GERD (gastroesophageal reflux disease)  Assessment & Plan  · Continue PPI while admitted        VTE Pharmacologic Prophylaxis: VTE Score: 6 High Risk (Score >/= 5) - Pharmacological DVT Prophylaxis Ordered: enoxaparin (Lovenox)  Sequential Compression Devices Ordered  Patient Centered Rounds: I performed bedside rounds with nursing staff today  Discussions with Specialists or Other Care Team Provider: PT/OT, CM    Education and Discussions with Family / Patient: Patient declined call to   Time Spent for Care: 30 minutes  More than 50% of total time spent on counseling and coordination of care as described above  Current Length of Stay: 1 day(s)  Current Patient Status: Inpatient   Certification Statement: The patient will continue to require additional inpatient hospital stay due to [facility]  post hospital placement  Discharge Plan: Anticipate discharge in 24-48 hrs to rehab facility  Code Status: Level 1 - Full Code    Subjective:   Patient complaining of generalized weakness  Patient also noted to diarrhea  Patient denies any active chest pain, shortness a breath, abdominal pain, nausea, or vomiting  Objective:     Vitals:   Temp (24hrs), Av 8 °F (36 6 °C), Min:97 4 °F (36 3 °C), Max:98 1 °F (36 7 °C)    Temp:  [97 4 °F (36 3 °C)-98 1 °F (36 7 °C)] 98 1 °F (36 7 °C)  HR:  [72-87] 87  Resp:  [16-20] 16  BP: ()/(53-96) 119/64  SpO2:  [92 %-98 %] 92 %  Body mass index is 49 6 kg/m²  Input and Output Summary (last 24 hours): Intake/Output Summary (Last 24 hours) at 2022 1353  Last data filed at 2022 1210  Gross per 24 hour   Intake 1440 ml   Output 600 ml   Net 840 ml       Physical Exam:   Physical Exam  Vitals and nursing note reviewed  Constitutional:       General: She is not in acute distress  Appearance: She is obese  HENT:      Head: Normocephalic  Nose: Nose normal  No congestion  Mouth/Throat:      Mouth: Mucous membranes are moist       Pharynx: Oropharynx is clear  Cardiovascular:      Rate and Rhythm: Normal rate and regular rhythm  Pulses: Normal pulses  Heart sounds: No murmur heard    Pulmonary:      Effort: Pulmonary effort is normal  No respiratory distress  Breath sounds: Decreased breath sounds present  Abdominal:      General: Bowel sounds are normal  There is no distension  Palpations: Abdomen is soft  Tenderness: There is no abdominal tenderness  Musculoskeletal:         General: Normal range of motion  Cervical back: Normal range of motion  Right lower leg: No edema  Left lower leg: No edema  Skin:     General: Skin is warm and dry  Capillary Refill: Capillary refill takes less than 2 seconds  Neurological:      Mental Status: She is alert and oriented to person, place, and time  Mental status is at baseline  Motor: Weakness (Generalized) present  Psychiatric:         Mood and Affect: Affect is flat  Speech: Speech normal          Behavior: Behavior is withdrawn  Behavior is cooperative  Additional Data:     Labs:  Results from last 7 days   Lab Units 09/28/22  0436   WBC Thousand/uL 8 66   HEMOGLOBIN g/dL 15 1   HEMATOCRIT % 47 0*   PLATELETS Thousands/uL 226   NEUTROS PCT % 75   LYMPHS PCT % 13*   MONOS PCT % 8   EOS PCT % 3     Results from last 7 days   Lab Units 09/28/22  0437 09/27/22  1736   SODIUM mmol/L 138 136   POTASSIUM mmol/L 3 7 4 3   CHLORIDE mmol/L 102 101   CO2 mmol/L 26 23   BUN mg/dL 25 25   CREATININE mg/dL 0 94 0 87   ANION GAP mmol/L 10 12   CALCIUM mg/dL 9 4 9 5   ALBUMIN g/dL  --  3 8   TOTAL BILIRUBIN mg/dL  --  0 51   ALK PHOS U/L  --  87   ALT U/L  --  11   AST U/L  --  17   GLUCOSE RANDOM mg/dL 89 121         Results from last 7 days   Lab Units 09/28/22  1121 09/28/22  0708 09/27/22  2041 09/22/22  1055 09/22/22  0658 09/22/22  0403 09/21/22  2025   POC GLUCOSE mg/dl 142* 91 117 171* 135 133 148*               Lines/Drains:  Invasive Devices  Report    Peripheral Intravenous Line  Duration           Peripheral IV 09/27/22 Left Forearm <1 day                      Imaging: No pertinent imaging reviewed      Recent Cultures (last 7 days): Last 24 Hours Medication List:   Current Facility-Administered Medications   Medication Dose Route Frequency Provider Last Rate    acetaminophen  650 mg Oral Q6H PRN Maryam Mccauley MD      aspirin  81 mg Oral Daily Maryam Mccauley MD      DULoxetine  30 mg Oral Daily Maryam Mccauley MD      enoxaparin  40 mg Subcutaneous Daily Maryam Mccauley MD      ezetimibe  10 mg Oral Daily Maryam Mccauley MD      gabapentin  100 mg Oral BID Maryam Mccauley MD      gabapentin  300 mg Oral HS Maryam Mccauley MD      insulin glargine  36 Units Subcutaneous HS Maryam Mccauley MD      insulin lispro  1-5 Units Subcutaneous TID AC Maryam Mccauley MD      levothyroxine  75 mcg Oral Daily William López MD      melatonin  9 mg Oral HS Melissa Cat PA-C      multi-electrolyte  75 mL/hr Intravenous Continuous SAMIA Valdez 75 mL/hr (09/28/22 1210)    nitrofurantoin  100 mg Oral BID With Meals SAMIA Valdez      nystatin   Topical BID Melissa Cat PA-C      ondansetron  4 mg Intravenous Q6H PRN Maryam Mccauley MD      pantoprazole  40 mg Oral BID AC MD Kareem Burden ON 9/30/2022] spironolactone  25 mg Oral Daily SAMIA Duffy      topiramate  200 mg Oral BID MD Kareem Burden ON 9/30/2022] torsemide  40 mg Oral Daily SAMIA Valdez          Today, Patient Was Seen By: Missael Brothers    **Please Note: This note may have been constructed using a voice recognition system  **

## 2022-09-28 NOTE — PLAN OF CARE
Problem: Potential for Falls  Goal: Patient will remain free of falls  Description: INTERVENTIONS:  - Educate patient/family on patient safety including physical limitations  - Instruct patient to call for assistance with activity   - Consult OT/PT to assist with strengthening/mobility   - Keep Call bell within reach  - Keep bed low and locked with side rails adjusted as appropriate  - Keep care items and personal belongings within reach  - Initiate and maintain comfort rounds  - Offer Toileting every 2 Hours, in advance of need  - Initiate/Maintain bed alarm  - Apply yellow socks and bracelet for high fall risk patients  - Consider moving patient to room near nurses station  Outcome: Progressing     Problem: Nutrition/Hydration-ADULT  Goal: Nutrient/Hydration intake appropriate for improving, restoring or maintaining nutritional needs  Description: Monitor and assess patient's nutrition/hydration status for malnutrition  Collaborate with interdisciplinary team and initiate plan and interventions as ordered  Monitor patient's weight and dietary intake as ordered or per policy  Utilize nutrition screening tool and intervene as necessary  Determine patient's food preferences and provide high-protein, high-caloric foods as appropriate       INTERVENTIONS:  - Monitor oral intake, urinary output, labs, and treatment plans  - Assess nutrition and hydration status and recommend course of action  - Evaluate amount of meals eaten  - Assist patient with eating if necessary   - Allow adequate time for meals  - Recommend/ encourage appropriate diets, oral nutritional supplements, and vitamin/mineral supplements  - Assess need for intravenous fluids  - Provide specific nutrition/hydration education as appropriate  - Include patient/family/caregiver in decisions related to nutrition  Outcome: Progressing

## 2022-09-28 NOTE — PLAN OF CARE
Problem: MOBILITY - ADULT  Goal: Maintains/Returns to pre admission functional level  Description: INTERVENTIONS:  - Perform BMAT or MOVE assessment daily    - Set and communicate daily mobility goal to care team and patient/family/caregiver  - Collaborate with rehabilitation services on mobility goals if consulted  - Record patient progress and toleration of activity level   Outcome: Progressing     Problem: Nutrition/Hydration-ADULT  Goal: Nutrient/Hydration intake appropriate for improving, restoring or maintaining nutritional needs  Description: Monitor and assess patient's nutrition/hydration status for malnutrition  Collaborate with interdisciplinary team and initiate plan and interventions as ordered  Monitor patient's weight and dietary intake as ordered or per policy  Utilize nutrition screening tool and intervene as necessary  Determine patient's food preferences and provide high-protein, high-caloric foods as appropriate       INTERVENTIONS:  - Monitor oral intake, urinary output, labs, and treatment plans  - Assess nutrition and hydration status and recommend course of action  - Evaluate amount of meals eaten  - Assist patient with eating if necessary   - Allow adequate time for meals  - Recommend/ encourage appropriate diets, oral nutritional supplements, and vitamin/mineral supplements  - Order, calculate, and assess calorie counts as needed  - Recommend, monitor, and adjust tube feedings and TPN/PPN based on assessed needs  - Assess need for intravenous fluids  - Provide specific nutrition/hydration education as appropriate  - Include patient/family/caregiver in decisions related to nutrition  Outcome: Progressing     Problem: GASTROINTESTINAL - ADULT  Goal: Maintains or returns to baseline bowel function  Description: INTERVENTIONS:  - Assess bowel function  - Encourage oral fluids to ensure adequate hydration  - Administer IV fluids if ordered to ensure adequate hydration  - Administer ordered medications as needed  - Encourage mobilization and activity  - Consider nutritional services referral to assist patient with adequate nutrition and appropriate food choices  Outcome: Progressing     Problem: METABOLIC, FLUID AND ELECTROLYTES - ADULT  Goal: Electrolytes maintained within normal limits  Description: INTERVENTIONS:  - Monitor labs and assess patient for signs and symptoms of electrolyte imbalances  - Administer electrolyte replacement as ordered  - Monitor response to electrolyte replacements, including repeat lab results as appropriate  - Instruct patient on fluid and nutrition as appropriate  Outcome: Progressing  Goal: Glucose maintained within target range  Description: INTERVENTIONS:  - Monitor Blood Glucose as ordered  - Assess for signs and symptoms of hyperglycemia and hypoglycemia  - Administer ordered medications to maintain glucose within target range  - Assess nutritional intake and initiate nutrition service referral as needed  Outcome: Progressing

## 2022-09-28 NOTE — UTILIZATION REVIEW
Initial Clinical Review    Admission: Date/Time/Statement:   Admission Orders (From admission, onward)     Ordered        09/27/22 1841  INPATIENT ADMISSION  Once                      Orders Placed This Encounter   Procedures    INPATIENT ADMISSION     Standing Status:   Standing     Number of Occurrences:   1     Order Specific Question:   Level of Care     Answer:   Med Surg [16]     Order Specific Question:   Estimated length of stay     Answer:   More than 2 Midnights     Order Specific Question:   Certification     Answer:   I certify that inpatient services are medically necessary for this patient for a duration of greater than two midnights  See H&P and MD Progress Notes for additional information about the patient's course of treatment  ED Arrival Information     Expected   -    Arrival   9/27/2022 16:59    Acuity   Urgent            Means of arrival   Ambulance    Escorted by   [facility]    Service   Hospitalist    Admission type   Emergency            Arrival complaint   flu symptoms           Chief Complaint   Patient presents with    Diarrhea     Brought in by EMS, pt reports recently being discharged here 9/22, since returning home pt has had nonstop diarrhea and states has not eaten in a few days  Pt reports taking multiple doses of imodium, last dose yesterday        Initial Presentation: 78 y o  female presents to the ED from home with c/o weakness, decreased appetite and diarrhea  Recently admitted 9/19-9/22 and discharged to home  Patient reports she could not physically get herself out of bed because she felt generally weak  Patient lives alone, requesting rehab  PMH includes CHF, HTN, HL, DM, chronic respiratory failure, on 3L NC at baseline, seizures, hypothyroidism, GERD and obesity  PE: AOx3      9/27 Plan: Inpatient admission for evaluation and treatment of weakness:  PT/OT dillon     9/28 Physical Therapy discharge recommendation is post acute rehab   Occupational Therapy note: completed orthostatic BPs at EOB, significant findings with report of progressive dizziness/lightheadedness  Supine: 110/59: sittin/76, sitting 3 min 91/59, sitting 5 min 84/57, unable to complete standing d/t worsening dizziness and low BP, completed chair swap BSC > recliner to place pt in semisupine position, BP improved 121/75  Internal Medicine: Weakness, abnormal urine, diarrhea  Patient reports worsening diarrhea, enteric stool panel and C diff pending  UA on admission, + repeat UA +, start Macrobid  Patient with worsening depression, Cymbalta adjusted during last hospital stay, patient reports she did not take any medications once she returned home,  consult Psychiatry         ED Triage Vitals   Temperature Pulse Respirations Blood Pressure SpO2   22 1704 22 1706 22 1706 22 1706 22 1706   97 8 °F (36 6 °C) 72 20 136/62 98 %      Temp Source Heart Rate Source Patient Position - Orthostatic VS BP Location FiO2 (%)   22 1704 22 0025 22 1925 22 --   Oral Monitor Lying Right arm       Pain Score       22       No Pain          Wt Readings from Last 1 Encounters:   22 123 kg (271 lb 2 7 oz)     Additional Vital Signs:        Date/Time Temp Pulse Resp BP MAP (mmHg) SpO2 Calculated FIO2 (%) - Nasal Cannula Nasal Cannula O2 Flow Rate (L/min) O2 Device Patient Position - Orthostatic VS   22 1512 97 5 °F (36 4 °C) 67 18 117/61 81 100 % 28 2 L/min Nasal cannula Lying   22 1409 97 7 °F (36 5 °C) 67 16 109/57 79 -- -- -- -- Lying   22 1111 -- -- -- 119/64 94 -- -- -- -- Lying       22 1108 -- -- -- 121/75 92 -- -- -- -- Lying       22 1104 -- 87 -- 84/57  69 -- -- -- -- Sitting   22 1100 -- -- -- 91/59 75 -- -- -- -- Sitting   22 1055 -- -- -- 105/76 89 -- -- -- -- Sitting   22 1051 -- -- -- 110/59 -- -- -- -- -- Lying   22 0757 98 1 °F (36 7 °C) 72 16 102/53 -- 92 % -- -- -- Lying   09/28/22 0025 97 4 °F (36 3 °C) Abnormal  76 19 105/69 85 95 % 28 2 L/min Nasal cannula Lying   09/27/22 1925 97 7 °F (36 5 °C) 75 18 133/96 -- 97 % -- -- -- Lyin         Pertinent Labs/Diagnostic Test Results:         Results from last 7 days   Lab Units 09/28/22  0436 09/27/22  1736   WBC Thousand/uL 8 66 7 03   HEMOGLOBIN g/dL 15 1 16 1*   HEMATOCRIT % 47 0* 49 9*   PLATELETS Thousands/uL 226 232   NEUTROS ABS Thousands/µL 6 56 4 97         Results from last 7 days   Lab Units 09/28/22  0437 09/27/22  1736   SODIUM mmol/L 138 136   POTASSIUM mmol/L 3 7 4 3   CHLORIDE mmol/L 102 101   CO2 mmol/L 26 23   ANION GAP mmol/L 10 12   BUN mg/dL 25 25   CREATININE mg/dL 0 94 0 87   EGFR ml/min/1 73sq m 62 69   CALCIUM mg/dL 9 4 9 5     Results from last 7 days   Lab Units 09/27/22  1736   AST U/L 17   ALT U/L 11   ALK PHOS U/L 87   TOTAL PROTEIN g/dL 7 9   ALBUMIN g/dL 3 8   TOTAL BILIRUBIN mg/dL 0 51     Results from last 7 days   Lab Units 09/28/22  1121 09/28/22  0708 09/27/22  2041 09/22/22  1055 09/22/22  0658 09/22/22  0403 09/21/22  2025   POC GLUCOSE mg/dl 142* 91 117 171* 135 133 148*     Results from last 7 days   Lab Units 09/28/22  0437 09/27/22  1736   GLUCOSE RANDOM mg/dL 89 121             Results from last 7 days   Lab Units 09/27/22  1736   CK TOTAL U/L 41             Results from last 7 days   Lab Units 09/27/22  1736   LIPASE u/L 6*                 Results from last 7 days   Lab Units 09/28/22  1021 09/27/22  1829   CLARITY UA  Slightly Cloudy* Slightly Cloudy*   COLOR UA  Yellow Yellow   SPEC GRAV UA  1 025 >=1 030   PH UA  5 5 5 5   GLUCOSE UA mg/dl Negative Negative   KETONES UA mg/dl Trace* 15 (1+)*   BLOOD UA  1+* 1+*   PROTEIN UA mg/dl Negative Trace*   NITRITE UA  Negative Negative   BILIRUBIN UA  1+* 2+*   UROBILINOGEN UA E U /dl 0 2 1 0   LEUKOCYTES UA  2+* 1+*   WBC UA /hpf 10-20* 4-10*   RBC UA /hpf 0-5 4-10*   BACTERIA UA /hpf Moderate* Moderate*   EPITHELIAL CELLS WET PREP /hpf Moderate* Occasional       ED Treatment:   Medication Administration from 09/27/2022 1659 to 09/27/2022 1921     None        Past Medical History:   Diagnosis Date    CHF (congestive heart failure) (HCC)     Diabetes mellitus ([place] [address] )     Disease of thyroid gland     Hyperlipidemia     Hypertension     Hypothyroidism (acquired)     Restless leg syndrome     Seizures ([place] [address] )      Present on Admission:   Diabetes mellitus ([place] [address] )   GERD (gastroesophageal reflux disease)   Major depressive disorder   Acquired hypothyroidism   Morbid obesity due to excess calories ([place] [address] )      Admitting Diagnosis: Diarrhea [R19 7]  Generalized weakness [R53 1]  Ambulatory dysfunction [R26 2]  Age/Sex: 78 y o  female       Admission Orders: SCD, PT/OT  Scheduled Medications:    aspirin, 81 mg, Oral, Daily  DULoxetine, 30 mg, Oral, Daily  enoxaparin, 40 mg, Subcutaneous, Daily  ezetimibe, 10 mg, Oral, Daily  gabapentin, 100 mg, Oral, BID  gabapentin, 300 mg, Oral, HS  insulin glargine, 36 Units, Subcutaneous, HS  insulin lispro, 1-5 Units, Subcutaneous, TID AC  levothyroxine, 75 mcg, Oral, Daily  melatonin, 9 mg, Oral, HS  nitrofurantoin, 100 mg, Oral, BID With Meals  nystatin, , Topical, BID  pantoprazole, 40 mg, Oral, BID AC  [START ON 9/30/2022] spironolactone, 25 mg, Oral, Daily  topiramate, 200 mg, Oral, BID  [START ON 9/30/2022] torsemide, 40 mg, Oral, Daily      Continuous IV Infusions:    multi-electrolyte, 75 mL/hr, Intravenous, Continuous      PRN Meds:  acetaminophen, 650 mg, Oral, Q6H PRN  ondansetron, 4 mg, Intravenous, Q6H PRN        IP CONSULT TO PSYCHIATRY    Network Utilization Review Department  ATTENTION: Please call with any questions or concerns to [phone] and carefully listen to the prompts so that you are directed to the right person   All voicemails are confidential   [facility] all requests for admission clinical reviews, approved or denied determinations and any other requests to dedicated fax number below belonging to the campus where the patient is receiving treatment   List of dedicated fax numbers for the Facilities:  [address] [place] [address] DENIALS (Administrative/Medical Necessity) [phone]   [address]  (Maternity/NICU/Pediatrics) [phone]   [address]  [address] [facility] [place] [address] [address] [address] [place] [address] [address] [facility] [place] [address] [phone]

## 2022-09-28 NOTE — CASE MANAGEMENT
Case Management Discharge Planning Note    Patient name Sayda Du  Location /-71 MRN [id number]  : 1955 Date 2022       Current Admission Date: 2022  Current Admission Diagnosis:Weakness   Patient Active Problem List    Diagnosis Date Noted    Abnormal urine 2022    Weakness 2022    Syncope 2022    Elevated d-dimer 2022    Coronary artery disease involving native coronary artery of native heart without angina pectoris 2022    Chronic heart failure with preserved ejection fraction ([facility] ) 2022    Chronic respiratory failure ([facility] ) 2022    Dyspnea on exertion 2022    Primary osteoarthritis of one hip, right 12/10/2021    Orthostatic hypotension 10/24/2021    Morbid obesity due to excess calories ([facility] ) 2021    Onychomycosis of toenail 2021    Calf tenderness 2021    Asymptomatic bacteriuria 2021    Chronic fatigue and malaise 2021    Diarrhea 2021    Acquired hypothyroidism 2021    Suspected COVID-19 virus infection 2020    Major depressive disorder 2020    Gambling disorder, episodic, moderate 2018    Severe episode of recurrent major depressive disorder, without psychotic features ([facility] ) 2018    Encounter for examination and observation for other specified reasons     GERD (gastroesophageal reflux disease) 07/10/2016    Epigastric abdominal pain 07/10/2016    Seizures ([place] [address] )     Restless leg syndrome     Hypertension     Hyperlipidemia     Diabetes mellitus ([facility] )       LOS (days): 1  Geometric Mean LOS (GMLOS) (days): 2 60  Days to GMLOS:1 7     OBJECTIVE:  Risk of Unplanned Readmission Score: 12 62         Current admission status: Inpatient   Preferred Pharmacy:   [address]  [address] [place] [address]  Phone: [phone] Fax: [phone]    Primary Care Provider: Brett Chacon Angelito Arcos MD    Primary Insurance: [facility] REP  Secondary Insurance: [address] [place] [address] Number: submitted SNF auth request to [facility] via [facility] Portal  pending ref #[id number]  for [facility]  NPI: [id number]  Dr Miriam Bernal  NPI: [id number]   Clinicals attached via [facility] Portal

## 2022-09-28 NOTE — ASSESSMENT & PLAN NOTE
· Patient with reports of worsened diarrhea   · Enteric Stool/C-Diff: Pending  · Per Nursing, Only 1 episode of diarrhea   · Continue to monitor Stool output

## 2022-09-28 NOTE — ASSESSMENT & PLAN NOTE
· Patient with worsening depression  · Continue Cymbalta  · Recently adjusted per last hospital stay  · Per Patient, did not take any medications once she returned home  · Psychiatry Consulted, recommendations appreciated

## 2022-09-28 NOTE — PLAN OF CARE
Problem: OCCUPATIONAL THERAPY ADULT  Goal: Performs self-care activities at highest level of function for planned discharge setting  See evaluation for individualized goals  Description: Treatment Interventions: ADL retraining, Functional transfer training, UE strengthening/ROM, Endurance training, Patient/family training, Equipment evaluation/education, Activityengagement, Energy conservation          See flowsheet documentation for full assessment, interventions and recommendations  Note: Limitation: Decreased ADL status, Decreased UE strength, Decreased Safe judgement during ADL, Decreased endurance, Decreased self-care trans, Decreased high-level ADLs  Prognosis: Good  Assessment: Patient is a 78 y o  female seen for OT evaluation at [place] [address] following admission on 9/27/2022  s/p Weakness  Comorbidities and significant PMHx impacting functional performance include: DM, GERD, MDD, acquired hypothyroidism, morbid obesity d/t excess calories, chronic respiratory failure  Patient presents with active orders for OT eval and treat and up and OOB as tolerated   Performed at least 2 patient identifiers during session including name and wristband  PTA, pt reports being mod (I) c rollator, A c ADLs and IADLs  Lives alone in 1 story apartment c elevator access  Utilizes [place], (+) HHA, (+) HHPT Upon initial evaluation, patient requires min assistance for UB ADLs, max assistance for LB ADLs, and mod assistance for transfers with RW  Based on functional eval, pt presents with intact  attention, intact  safety awareness, intact  problem solving skills, and impaired  recall of recent events   Occupational performance is affected by the following deficits:  decreased muscular strength , acute change in mobility status , decreased standing tolerance for self care tasks , decreased dynamic balance impacting functional reach, decreased trunk control , decreased activity tolerance  and decreased emotional regulation and coping skills   Functional performance is further limited by (+) orthostatic hypotension  Based on these findings, functional performance deficits, and medical complexity pt has been identified as a high complexity evaluation  Personal factors impacting performance include: decreased (+) Hx of falls , decreased caregiver status , steps to enter home, decreased ADL independence , decreased IADL independence and High fall risk   Patient would benefit from OT services within the acute care setting to maximize level of functional independence in the following occupational areas bathing/showering, toileting, dressing , personal hygiene/grooming , bed mobility , functional mobility, transfer to all surfaces, health management , activity engagement , safety procedures , fall prevention  and energy conservation   From OT standpoint, recommendation at time of D/C would be post-acute rehabilitation        OT Discharge Recommendation: Post acute rehabilitation services       [facility]

## 2022-09-28 NOTE — ASSESSMENT & PLAN NOTE
· Recently admitted for syncope (9/19-9/22)  · Reports since returning home she was not getting out of bed  · Patient currently lives alone, requesting rehab  · PT/OT evaluated: Recommending STR  · CM following- Referrals Placed

## 2022-09-28 NOTE — ASSESSMENT & PLAN NOTE
Lab Results   Component Value Date    HGBA1C 11 8 (H) 09/19/2022     · Continue home insulin regimen:  · Lantus 36 units qHS  · C/w Accu-checks and SSI  · Hypoglycemic protocol  · Diabetic Diet

## 2022-09-28 NOTE — CASE MANAGEMENT
Case Management Assessment & Discharge Planning Note    Patient name Annie Lopez /-78 MRN [id number]  : 1955 Date 2022       Current Admission Date: 2022  Current Admission Diagnosis:Weakness   Patient Active Problem List    Diagnosis Date Noted    Abnormal urine 2022    Weakness 2022    Syncope 2022    Elevated d-dimer 2022    Coronary artery disease involving native coronary artery of native heart without angina pectoris 2022    [facility] with preserved [facility] ) 2022    Chronic respiratory failure ([facility] ) 2022    Dyspnea on exertion 2022    Primary osteoarthritis of one hip, right 12/10/2021    Orthostatic hypotension 10/24/2021    Morbid obesity due to excess calories ([facility] ) 2021    Onychomycosis of toenail 2021    Calf tenderness 2021    Asymptomatic bacteriuria 2021    Chronic fatigue and malaise 2021    Diarrhea 2021    Acquired hypothyroidism 2021    Suspected COVID-19 virus infection 2020    Major depressive disorder 2020    Gambling disorder, episodic, moderate 2018    Severe episode of recurrent major depressive disorder, without psychotic features ([facility] ) 2018    Encounter for examination and observation for other specified reasons     GERD (gastroesophageal reflux disease) 07/10/2016    Epigastric abdominal pain 07/10/2016    Seizures ([facility] )     Restless leg syndrome     Hypertension     Hyperlipidemia     Diabetes mellitus ([facility] )       LOS (days): 1  Geometric Mean LOS (GMLOS) (days): 2 60  Days to GMLOS:1 7     OBJECTIVE:  PATIENT READMITTED TO HOSPITAL  Risk of Unplanned Readmission Score: 12 62         Current admission status: Inpatient       Preferred Pharmacy:   [facility] [address] [address] [facility]  [address] [address]  Phone: [phone] Fax: [phone]    Primary Care Provider: Guanaco Ro MD    Primary Insurance: Tasia Manley [facility]  Secondary Insurance: VETERANS    ASSESSMENT:  Nika [facility] [employer]     [facility] Representative - Son   Primary Phone: [phone] (Mobile)                         Readmission Root Cause  30 Day Readmission: Yes  Who directed you to return to the hospital?: Self  Did you understand whom to contact if you had questions or problems?: Yes  Did you get your prescriptions before you left the hospital?: No  Reason[de-identified] Not preferred pharmacy  Were you able to get your prescriptions filled when you left the hospital?: Yes  Did you take your medications as prescribed?: Yes  Were you able to get to your follow-up appointments?: No  Reason[de-identified] Readmitted prior to appointment  During previous admission, was a post-acute recommendation made?: Yes  What post-acute resources were offered?: Jarret Rey  Patient was readmitted due to: weakness  Action Plan: plan is for STR - [place] [facility] currently pending  Patient Information  Admitted from[de-identified] Home  Mental Status: Alert  During Assessment patient was accompanied by: Not accompanied during assessment  Assessment information provided by[de-identified] Patient  Primary Caregiver: Self  Support Systems: Self  Home entry access options   Select all that apply : Elevator  Type of Current Residence: Apartment (senior living apartment building)  In the last 12 months, was there a time when you were not able to pay the mortgage or rent on time?: No  In the last 12 months, was there a time when you did not have a steady place to sleep or slept in a shelter (including now)?: No  Living Arrangements: Lives Alone    Activities of Daily Living Prior to Admission  Functional Status: Independent  Completes ADLs independently?: Yes  Ambulates independently?: Yes  Does patient use assisted devices?: Yes  Assisted Devices (DME) used: Rollator, Shower Chair  Does patient currently own DME?: Yes  What DME does the patient currently own?: Shower Chair, Rollator  Does patient have a history of Outpatient Therapy (PT/OT)?: No  Does the patient have a history of Short-Term Rehab?: Yes [facility])  Does patient have a history of HHC?: Yes  Does patient currently have [address]?: Yes ([address])    [address] [facility]  Type of Current Home Care Services: Home PT, Home health aide (reports HHA 2x/wk for 2hrs/day in order to assist with showering + cleaning; has HHPT 3x/wk)    Patient Information Continued  Within the past 12 months, you worried that your food would run out before you got the money to buy more : Never true  Within the past 12 months, the food you bought just didn't last and you didn't have money to get more : Never true         Means of Transportation  In the past 12 months, has lack of transportation kept you from medical appointments or from getting medications?: No  In the past 12 months, has lack of transportation kept you from meetings, work, or from getting things needed for daily living?: No  Was application for public transport provided?: No        DISCHARGE DETAILS:    Discharge planning discussed with[de-identified] patient  Freedom of Choice: Yes  Comments - Freedom of Choice: patient choice for [facility]  CM contacted family/caregiver?: No- see comments (pt refused)  Were Treatment Team discharge recommendations reviewed with patient/caregiver?: Yes  Did patient/caregiver verbalize understanding of patient care needs?: Yes  Were patient/caregiver advised of the risks associated with not following Treatment Team discharge recommendations?: Yes         [facility]         Is the patient interested in [address] at discharge?: No    DME Referral Provided  Referral made for DME?: No    Other Referral/Resources/Interventions Provided:  Interventions: Short Term Rehab  Referral Comments: CM met with pt at bedsided to discuss dcp PT/OT rcmd for STR   Pt relayed preference for [facility] as she had been there previously but is open to blanket referral if [facility] not available  [place] referral sent via aidin  NE able to offer bed for patient as long as cdiff results come back negative  Pt accepted bed offer - auth currently pending per CM d/c support  CM to continue to follow for STR placement      Would you like to participate in our [address] service program?  : No - Declined    Treatment Team Recommendation: Short Term Rehab  Discharge Destination Plan[de-identified] Short Term Rehab

## 2022-09-29 LAB
ALBUMIN SERPL BCP-MCNC: 3.5 G/DL (ref 3.5–5)
ALP SERPL-CCNC: 82 U/L (ref 34–104)
ALT SERPL W P-5'-P-CCNC: 7 U/L (ref 7–52)
ANION GAP SERPL CALCULATED.3IONS-SCNC: 11 MMOL/L (ref 4–13)
AST SERPL W P-5'-P-CCNC: 11 U/L (ref 13–39)
BASOPHILS # BLD AUTO: 0.03 THOUSANDS/ΜL (ref 0–0.1)
BASOPHILS NFR BLD AUTO: 0 % (ref 0–1)
BILIRUB SERPL-MCNC: 0.57 MG/DL (ref 0.2–1)
BUN SERPL-MCNC: 24 MG/DL (ref 5–25)
CALCIUM SERPL-MCNC: 9.1 MG/DL (ref 8.4–10.2)
CHLORIDE SERPL-SCNC: 100 MMOL/L (ref 96–108)
CO2 SERPL-SCNC: 28 MMOL/L (ref 21–32)
CREAT SERPL-MCNC: 1.01 MG/DL (ref 0.6–1.3)
EOSINOPHIL # BLD AUTO: 0.17 THOUSAND/ΜL (ref 0–0.61)
EOSINOPHIL NFR BLD AUTO: 3 % (ref 0–6)
ERYTHROCYTE [DISTWIDTH] IN BLOOD BY AUTOMATED COUNT: 12.7 % (ref 11.6–15.1)
GFR SERPL CREATININE-BSD FRML MDRD: 57 ML/MIN/1.73SQ M
GLUCOSE SERPL-MCNC: 104 MG/DL (ref 65–140)
GLUCOSE SERPL-MCNC: 109 MG/DL (ref 65–140)
GLUCOSE SERPL-MCNC: 124 MG/DL (ref 65–140)
GLUCOSE SERPL-MCNC: 83 MG/DL (ref 65–140)
GLUCOSE SERPL-MCNC: 91 MG/DL (ref 65–140)
GLUCOSE SERPL-MCNC: 95 MG/DL (ref 65–140)
HCT VFR BLD AUTO: 46.7 % (ref 34.8–46.1)
HGB BLD-MCNC: 15.2 G/DL (ref 11.5–15.4)
IMM GRANULOCYTES # BLD AUTO: 0.04 THOUSAND/UL (ref 0–0.2)
IMM GRANULOCYTES NFR BLD AUTO: 1 % (ref 0–2)
LYMPHOCYTES # BLD AUTO: 1.14 THOUSANDS/ΜL (ref 0.6–4.47)
LYMPHOCYTES NFR BLD AUTO: 17 % (ref 14–44)
MCH RBC QN AUTO: 29.3 PG (ref 26.8–34.3)
MCHC RBC AUTO-ENTMCNC: 32.5 G/DL (ref 31.4–37.4)
MCV RBC AUTO: 90 FL (ref 82–98)
MONOCYTES # BLD AUTO: 0.59 THOUSAND/ΜL (ref 0.17–1.22)
MONOCYTES NFR BLD AUTO: 9 % (ref 4–12)
NEUTROPHILS # BLD AUTO: 4.75 THOUSANDS/ΜL (ref 1.85–7.62)
NEUTS SEG NFR BLD AUTO: 70 % (ref 43–75)
NRBC BLD AUTO-RTO: 0 /100 WBCS
PLATELET # BLD AUTO: 212 THOUSANDS/UL (ref 149–390)
PMV BLD AUTO: 11.2 FL (ref 8.9–12.7)
POTASSIUM SERPL-SCNC: 3.4 MMOL/L (ref 3.5–5.3)
PROT SERPL-MCNC: 7.2 G/DL (ref 6.4–8.4)
RBC # BLD AUTO: 5.18 MILLION/UL (ref 3.81–5.12)
SODIUM SERPL-SCNC: 139 MMOL/L (ref 135–147)
WBC # BLD AUTO: 6.72 THOUSAND/UL (ref 4.31–10.16)

## 2022-09-29 PROCEDURE — G0425 INPT/ED TELECONSULT30: HCPCS | Performed by: PSYCHIATRY & NEUROLOGY

## 2022-09-29 PROCEDURE — 85025 COMPLETE CBC W/AUTO DIFF WBC: CPT

## 2022-09-29 PROCEDURE — 82948 REAGENT STRIP/BLOOD GLUCOSE: CPT

## 2022-09-29 PROCEDURE — 80053 COMPREHEN METABOLIC PANEL: CPT

## 2022-09-29 PROCEDURE — 99232 SBSQ HOSP IP/OBS MODERATE 35: CPT | Performed by: PHYSICIAN ASSISTANT

## 2022-09-29 RX ORDER — INSULIN GLARGINE 100 [IU]/ML
30 INJECTION, SOLUTION SUBCUTANEOUS ONCE
Status: COMPLETED | OUTPATIENT
Start: 2022-09-29 | End: 2022-09-29

## 2022-09-29 RX ORDER — POTASSIUM CHLORIDE 20 MEQ/1
20 TABLET, EXTENDED RELEASE ORAL ONCE
Status: COMPLETED | OUTPATIENT
Start: 2022-09-29 | End: 2022-09-29

## 2022-09-29 RX ORDER — DULOXETIN HYDROCHLORIDE 60 MG/1
60 CAPSULE, DELAYED RELEASE ORAL DAILY
Status: DISCONTINUED | OUTPATIENT
Start: 2022-09-30 | End: 2022-09-30 | Stop reason: HOSPADM

## 2022-09-29 RX ADMIN — TOPIRAMATE 200 MG: 100 TABLET, FILM COATED ORAL at 10:13

## 2022-09-29 RX ADMIN — DULOXETINE HYDROCHLORIDE 30 MG: 30 CAPSULE, DELAYED RELEASE ORAL at 10:13

## 2022-09-29 RX ADMIN — NITROFURANTOIN (MONOHYDRATE/MACROCRYSTALS) 100 MG: 75; 25 CAPSULE ORAL at 18:29

## 2022-09-29 RX ADMIN — NITROFURANTOIN (MONOHYDRATE/MACROCRYSTALS) 100 MG: 75; 25 CAPSULE ORAL at 10:12

## 2022-09-29 RX ADMIN — EZETIMIBE 10 MG: 10 TABLET ORAL at 10:12

## 2022-09-29 RX ADMIN — ONDANSETRON 4 MG: 2 INJECTION INTRAMUSCULAR; INTRAVENOUS at 15:30

## 2022-09-29 RX ADMIN — NYSTATIN: 100000 POWDER TOPICAL at 18:34

## 2022-09-29 RX ADMIN — GABAPENTIN 100 MG: 100 CAPSULE ORAL at 10:13

## 2022-09-29 RX ADMIN — TOPIRAMATE 200 MG: 100 TABLET, FILM COATED ORAL at 18:29

## 2022-09-29 RX ADMIN — POTASSIUM CHLORIDE 20 MEQ: 1500 TABLET, EXTENDED RELEASE ORAL at 19:41

## 2022-09-29 RX ADMIN — GABAPENTIN 100 MG: 100 CAPSULE ORAL at 18:29

## 2022-09-29 RX ADMIN — ENOXAPARIN SODIUM 40 MG: 40 INJECTION SUBCUTANEOUS at 10:13

## 2022-09-29 RX ADMIN — ASPIRIN 81 MG: 81 TABLET, COATED ORAL at 10:13

## 2022-09-29 RX ADMIN — Medication 9 MG: at 22:44

## 2022-09-29 RX ADMIN — LEVOTHYROXINE SODIUM 75 MCG: 25 TABLET ORAL at 10:12

## 2022-09-29 RX ADMIN — NYSTATIN: 100000 POWDER TOPICAL at 10:16

## 2022-09-29 RX ADMIN — PANTOPRAZOLE SODIUM 40 MG: 40 TABLET, DELAYED RELEASE ORAL at 10:12

## 2022-09-29 RX ADMIN — GABAPENTIN 300 MG: 300 CAPSULE ORAL at 22:44

## 2022-09-29 RX ADMIN — INSULIN GLARGINE 30 UNITS: 100 INJECTION, SOLUTION SUBCUTANEOUS at 23:42

## 2022-09-29 RX ADMIN — PANTOPRAZOLE SODIUM 40 MG: 40 TABLET, DELAYED RELEASE ORAL at 18:29

## 2022-09-29 NOTE — ASSESSMENT & PLAN NOTE
· Patient with worsening depression  · Patient takes Cymbalta, was recently adjusted per last hospital stay, however per Patient, did not take any medications once she returned home    · Psychiatry Consulted:  · Increase Cymbalta to 60 mg daily  · Continue outpatient [facility] follow-[facility]  · No suicide precautions indicated

## 2022-09-29 NOTE — PROGRESS NOTES
Brien 44  Progress Note - Noni Buitrago 1955, 78 y o  female MRN: [id number]  Unit/Bed#: -[facility] Encounter: [id number]  Primary Care Provider: Antonia Wheeler MD   Date and time admitted to hospital: 9/27/2022  4:59 PM    * Weakness  Assessment & Plan  Patient presented with generalized weakness  Recently admitted for syncope (9/19-9/22), reports since returning home she was not getting out of bed  · Patient currently lives alone, requesting rehab  · PT/OT evaluated: Recommending STR  · CM following - Referrals Placed  · Fall precautions    Diarrhea  Assessment & Plan  · Patient with reports of worsened diarrhea   · D/C enteric stool panel/C diff, patient without any BM or diarrhea since yesterday  · Continue to monitor Stool output  · Replete electrolytes p r n  Abnormal urine  Assessment & Plan  · U/A on admission: (+) Leuk/Protein/Ketones/Bilirubin/RBC/WBC/Bacteria  · Repeat U/A: (+) Leuk/Ketones/ WBC/Bacteria  · Suspect component of dirty catch  · Patient with mild urinary symptoms  · Will administer Macrobid x3 days for suspicion of UTI    Major depressive disorder  Assessment & Plan  · Patient with worsening depression  · Patient takes Cymbalta, was recently adjusted per last hospital stay, however per Patient, did not take any medications once she returned home    · Psychiatry Consulted:  · Increase Cymbalta to 60 mg daily  · Continue outpatient psychiatric follow-up through [facility]  · No suicide precautions indicated    Morbid obesity due to excess calories ([address] )  Assessment & Plan  · Educated on lifestyle modifications, including diet and exercise    Acquired hypothyroidism  Assessment & Plan  · Continue Home medication:  · Levothyroxine    GERD (gastroesophageal reflux disease)  Assessment & Plan  · Continue PPI while admitted    Diabetes mellitus [facility])  Assessment & Plan  Lab Results   Component Value Date    HGBA1C 11 8 (H) 09/19/2022     · Continue home insulin regimen:  · Lantus 36 units qHS  · C/w Accu-checks and SSI  · Hypoglycemic protocol  · Diabetic Diet      VTE Pharmacologic Prophylaxis: VTE Score: 6 High Risk (Score >/= 5) - Pharmacological DVT Prophylaxis Ordered: enoxaparin (Lovenox)  Sequential Compression Devices Ordered  Patient Centered Rounds: I performed bedside rounds with nursing staff today  Discussions with Specialists or Other Care Team Provider:  Case management    Education and Discussions with Family / Patient: Patient declined call to   Time Spent for Care: 30 minutes  More than 50% of total time spent on counseling and coordination of care as described above  Current Length of Stay: 2 day(s)  Current Patient Status: Inpatient   Certification Statement: The patient will continue to require additional inpatient hospital stay due to Pending rehab bed availability  Discharge Plan: Anticipate discharge in 24-48 hrs to rehab facility  Code Status: Level 1 - Full Code    Subjective:   Patient is seen at bedside this a m , reports that she has not any BM or diarrhea since yesterday, only with small amount stool yesterday  Denies any other acute complaints at this time  Objective:     Vitals:   Temp (24hrs), Av 8 °F (36 6 °C), Min:97 5 °F (36 4 °C), Max:98 3 °F (36 8 °C)    Temp:  [97 5 °F (36 4 °C)-98 3 °F (36 8 °C)] 98 3 °F (36 8 °C)  HR:  [67-75] 67  Resp:  [18] 18  BP: (116-136)/(53-87) 116/53  SpO2:  [94 %-100 %] 94 %  Body mass index is 49 6 kg/m²  Input and Output Summary (last 24 hours): Intake/Output Summary (Last 24 hours) at 2022 1505  Last data filed at 2022 2300  Gross per 24 hour   Intake 480 ml   Output 300 ml   Net 180 ml       Physical Exam:   Physical Exam  Constitutional:       General: She is not in acute distress  Appearance: She is obese  She is not toxic-appearing or diaphoretic  Cardiovascular:      Rate and Rhythm: Normal rate and regular rhythm        Pulses: Normal pulses  Heart sounds: Normal heart sounds  Pulmonary:      Effort: Pulmonary effort is normal  No respiratory distress  Breath sounds: Normal breath sounds  Abdominal:      General: Bowel sounds are normal  There is no distension  Palpations: Abdomen is soft  Tenderness: There is no abdominal tenderness  Musculoskeletal:         General: No swelling  Skin:     General: Skin is warm  Neurological:      General: No focal deficit present  Mental Status: She is alert  Psychiatric:         Mood and Affect: Mood normal          Behavior: Behavior normal          Additional Data:     Labs:  Results from last 7 days   Lab Units 09/29/22  0957   WBC Thousand/uL 6 72   HEMOGLOBIN g/dL 15 2   HEMATOCRIT % 46 7*   PLATELETS Thousands/uL 212   NEUTROS PCT % 70   LYMPHS PCT % 17   MONOS PCT % 9   EOS PCT % 3     Results from last 7 days   Lab Units 09/29/22  0957   SODIUM mmol/L 139   POTASSIUM mmol/L 3 4*   CHLORIDE mmol/L 100   CO2 mmol/L 28   BUN mg/dL 24   CREATININE mg/dL 1 01   ANION GAP mmol/L 11   CALCIUM mg/dL 9 1   ALBUMIN g/dL 3 5   TOTAL BILIRUBIN mg/dL 0 57   ALK PHOS U/L 82   ALT U/L 7   AST U/L 11*   GLUCOSE RANDOM mg/dL 95         Results from last 7 days   Lab Units 09/29/22  1143 09/29/22  0804 09/28/22  2101 09/28/22  1601 09/28/22  1121 09/28/22  0708 09/27/22  2041   POC GLUCOSE mg/dl 109 91 117 124 142* 91 117               Lines/Drains:  Invasive Devices  Report    Peripheral Intravenous Line  Duration           Peripheral IV 09/27/22 Left Forearm 1 day                      Imaging: No pertinent imaging reviewed      Recent Cultures (last 7 days):         Last 24 Hours Medication List:   Current Facility-Administered Medications   Medication Dose Route Frequency Provider Last Rate    acetaminophen  650 mg Oral Q6H PRN Keisha Cooper MD      aspirin  81 mg Oral Daily Keisha Cooper MD      Theo Aguiar ON 9/30/2022] DULoxetine  60 mg Oral Daily Mariposa Wilder PA-C  enoxaparin  40 mg Subcutaneous Daily Carli Castelan MD      ezetimibe  10 mg Oral Daily Carli Castelan MD      gabapentin  100 mg Oral BID Carli Castelan MD      gabapentin  300 mg Oral HS Carli Castelan MD      insulin glargine  36 Units Subcutaneous HS Carli Castelan MD      insulin lispro  1-5 Units Subcutaneous TID AC Carli Castelan MD      levothyroxine  75 mcg Oral Daily Carli Castelan MD      melatonin  9 mg Oral HS Melissa Fernandez PA-C      nitrofurantoin  100 mg Oral BID With Meals SAMIA Knight      nystatin   Topical BID Melissa Fernandez PA-C      ondansetron  4 mg Intravenous Q6H PRN Carli Castelan MD      pantoprazole  40 mg Oral BID AC Carli Castelan MD      potassium chloride  20 mEq Oral Once Newton Mays PA-C      [START ON 9/30/2022] spironolactone  25 mg Oral Daily SAMIA Knight      topiramate  200 mg Oral BID MD Kareem Beebe ON 9/30/2022] torsemide  40 mg Oral Daily SAMIA Knight          Today, Patient Was Seen By: Newton Mays    **Please Note: This note may have been constructed using a voice recognition system  **

## 2022-09-29 NOTE — PLAN OF CARE
Problem: MOBILITY - ADULT  Goal: Maintain or return to baseline ADL function  Description: INTERVENTIONS:  -  Assess patient's ability to carry out ADLs; assess patient's baseline for ADL function and identify physical deficits which impact ability to perform ADLs (bathing, care of mouth/teeth, toileting, grooming, dressing, etc )  - Assess/evaluate cause of self-care deficits   - Assess range of motion  - Assess patient's mobility; develop plan if impaired  - Assess patient's need for assistive devices and provide as appropriate  - Encourage maximum independence but intervene and supervise when necessary  - Involve family in performance of ADLs  - Assess for home care needs following discharge   - Consider OT consult to assist with ADL evaluation and planning for discharge  - Provide patient education as appropriate  Outcome: Progressing  Goal: Maintains/Returns to pre admission functional level  Description: INTERVENTIONS:  - Perform BMAT or MOVE assessment daily    - Set and communicate daily mobility goal to care team and patient/family/caregiver  - Collaborate with rehabilitation services on mobility goals if consulted  - Perform Range of Motion 4 times a day  - Reposition patient every 2 hours    - Dangle patient 3 times a day  - Stand patient 3 times a day  - Ambulate patient 3 times a day  - Out of bed to chair 3 times a day   - Out of bed for meals 3 times a day  - Out of bed for toileting  - Record patient progress and toleration of activity level   Outcome: Progressing     Problem: Potential for Falls  Goal: Patient will remain free of falls  Description: INTERVENTIONS:  - Educate patient/family on patient safety including physical limitations  - Instruct patient to call for assistance with activity   - Consult OT/PT to assist with strengthening/mobility   - Keep Call bell within reach  - Keep bed low and locked with side rails adjusted as appropriate  - Keep care items and personal belongings within reach  - Initiate and maintain comfort rounds  - Make Fall Risk Sign visible to staff  - Offer Toileting every 2 Hours, in advance of need  - Initiate/Maintain alarm  - Obtain necessary fall risk management equipment:   - Apply yellow socks and bracelet for high fall risk patients  - Consider moving patient to room near nurses station  Outcome: Progressing     Problem: Nutrition/Hydration-ADULT  Goal: Nutrient/Hydration intake appropriate for improving, restoring or maintaining nutritional needs  Description: Monitor and assess patient's nutrition/hydration status for malnutrition  Collaborate with interdisciplinary team and initiate plan and interventions as ordered  Monitor patient's weight and dietary intake as ordered or per policy  Utilize nutrition screening tool and intervene as necessary  Determine patient's food preferences and provide high-protein, high-caloric foods as appropriate       INTERVENTIONS:  - Monitor oral intake, urinary output, labs, and treatment plans  - Assess nutrition and hydration status and recommend course of action  - Evaluate amount of meals eaten  - Assist patient with eating if necessary   - Allow adequate time for meals  - Recommend/ encourage appropriate diets, oral nutritional supplements, and vitamin/mineral supplements  - Order, calculate, and assess calorie counts as needed  - Recommend, monitor, and adjust tube feedings and TPN/PPN based on assessed needs  - Assess need for intravenous fluids  - Provide specific nutrition/hydration education as appropriate  - Include patient/family/caregiver in decisions related to nutrition  Outcome: Progressing     Problem: Prexisting or High Potential for Compromised Skin Integrity  Goal: Skin integrity is maintained or improved  Description: INTERVENTIONS:  - Identify patients at risk for skin breakdown  - Assess and monitor skin integrity  - Assess and monitor nutrition and hydration status  - Monitor labs   - Assess for incontinence - Turn and reposition patient  - Assist with mobility/ambulation  - Relieve pressure over bony prominences  - Avoid friction and shearing  - Provide appropriate hygiene as needed including keeping skin clean and dry  - Evaluate need for skin moisturizer/barrier cream  - Collaborate with interdisciplinary team   - Patient/family teaching  - Consider wound care consult   Outcome: Progressing     Problem: GASTROINTESTINAL - ADULT  Goal: Maintains or returns to baseline bowel function  Description: INTERVENTIONS:  - Assess bowel function  - Encourage oral fluids to ensure adequate hydration  - Administer IV fluids if ordered to ensure adequate hydration  - Administer ordered medications as needed  - Encourage mobilization and activity  - Consider nutritional services referral to assist patient with adequate nutrition and appropriate food choices  Outcome: Progressing     Problem: METABOLIC, FLUID AND ELECTROLYTES - ADULT  Goal: Electrolytes maintained within normal limits  Description: INTERVENTIONS:  - Monitor labs and assess patient for signs and symptoms of electrolyte imbalances  - Administer electrolyte replacement as ordered  - Monitor response to electrolyte replacements, including repeat lab results as appropriate  - Instruct patient on fluid and nutrition as appropriate  Outcome: Progressing  Goal: Glucose maintained within target range  Description: INTERVENTIONS:  - Monitor Blood Glucose as ordered  - Assess for signs and symptoms of hyperglycemia and hypoglycemia  - Administer ordered medications to maintain glucose within target range  - Assess nutritional intake and initiate nutrition service referral as needed  Outcome: Progressing

## 2022-09-29 NOTE — TELEMEDICINE
TeleConsultation - P[address]ly [address] 78 y o  female MRN: [id number]  Unit/Bed#: -01 Encounter: [id number]        REQUIRED DOCUMENTATION:     1  This service was provided via Telemedicine  2  Provider located at [place]  3  TeleMed provider: Denita Goltz, MD   4  Identify all parties in room with patient during tele consult:  Patient  5  Patient was then informed that this was a Telemedicine visit and that the exam was being conducted confidentially over secure lines  My office door was closed  No one else was in the room  Patient acknowledged consent and understanding of privacy and security of the Telemedicine visit, and gave us permission to have the assistant stay in the room in order to assist with the history and to conduct the exam   I informed the patient that I have reviewed their record in Epic and presented the opportunity for them to ask any questions regarding the visit today  The patient agreed to participate  Assessment/Plan     Principal Problem:    Weakness  Active Problems:    Diabetes mellitus (HCC)    GERD (gastroesophageal reflux disease)    Major depressive disorder    Acquired hypothyroidism    Diarrhea    Morbid obesity due to excess calories ([facility])    Abnormal urine    Assessment:    Major Depressive Disorder, recurrent, moderate; unspecified anxiety disorder by history; insomnia by history    Treatment Plan:  Recommend to consider increasing Cymbalta to 60 mg p o  daily for depression  Upon discharge the patient should resume her outpatient psychiatric follow-up through the [facility] as she plans to do  No suicide precautions are indicated  Re-consult Psychiatry as needed      Current Medications:     Current Facility-Administered Medications   Medication Dose Route Frequency Provider Last Rate    acetaminophen  650 mg Oral Q6H PRN Braxton Lewis MD      aspirin  81 mg Oral Daily Braxton Lewis MD      DULoxetine  30 mg Oral Daily Braxton Lewis MD  enoxaparin  40 mg Subcutaneous Daily Keisha Cooper MD      ezetimibe  10 mg Oral Daily Keisha Cooper MD      gabapentin  100 mg Oral BID Keisha Cooper MD      gabapentin  300 mg Oral HS Keisha Cooper MD      insulin glargine  36 Units Subcutaneous HS Keisha Cooper MD      insulin lispro  1-5 Units Subcutaneous TID AC Keisha Cooper MD      levothyroxine  75 mcg Oral Daily Keisha Cooper MD      melatonin  9 mg Oral HS Melissa Ross PA-C      nitrofurantoin  100 mg Oral BID With Meals SAMIA Lassiter      nystatin   Topical BID Melissa Ross PA-C      ondansetron  4 mg Intravenous Q6H PRN Keisha Cooper MD      pantoprazole  40 mg Oral BID AC MD Kareem Matthew Aguiar ON 9/30/2022] spironolactone  25 mg Oral Daily SAMIA Duffy      topiramate  200 mg Oral BID Keisha Cooper MD     Wash Caller Theo Aguiar ON 9/30/2022] torsemide  40 mg Oral Daily SAMIA Lassiter         Risks / Benefits of Treatment:    Risks, benefits, and possible side effects of medications explained to patient and patient verbalizes understanding  Inpatient consult to Psychiatry  Consult performed by: Ez Fay MD  Consult ordered by: SAMIA Lassiter        Physician Requesting Consult: Keisha Cooper MD  Principal Problem:Weakness    Reason for Consult: depression      History of Present Illness      Patient is a 78 y o  female who presented to the emergency department with provider documented the following:   Diarrhea        Brought in by EMS, pt reports recently being discharged here 9/22, since returning home pt has had nonstop diarrhea and states has not eaten in a few days   Pt reports taking multiple doses of imodium, last dose yesterday       This is a 79-year-old female with past medical history significant for hypertension, hyperlipidemia, type 2 diabetes mellitus, obesity, congestive heart failure, coronary artery disease, and chronic respiratory failure on home oxygen presenting to the emergency department today for episodes of diarrhea and generalized weakness  She was discharged from the hospital on September 22, 2022 and notes since then she has had numerous episodes of nonbloody diarrhea daily  She also notes that she spent 4 days in bed because she could not physically get herself out of bed because she felt generally weak  She denies any focal neurologic deficits or weakness  She denies any chest pain or shortness of breath  She denies any nausea, vomiting, or constipation  She has no abdominal pain  She has no dizziness, lightheadedness, or visual disturbances  Also notes decreased appetite  No recent ABX use  Notes she no longer feels safe at home and no longer feels like she can care for herself at home as she lives alone with little help  The patient denies other complaints at this time         History provided by:  Patient   used: No    Diarrhea  Quality:  Copious  Severity:  Moderate  Onset quality:  Gradual  Duration:  5 days  Timing:  Intermittent  Progression:  Worsening  Relieved by:  None tried  Worsened by:  Nothing  Ineffective treatments:  None tried  Associated symptoms: no abdominal pain, no arthralgias, no chills, no recent cough, no diaphoresis, no fever, no headaches, no myalgias, no URI and no vomiting          Prior to Admission Medications   Prescriptions Last Dose Informant Patient Reported? Taking? Cholecalciferol 25 MCG (1000 UT) capsule Not Taking at Unknown time Self Yes No   Sig: Take 1,000 Units by mouth daily     Patient not taking: No sig reported   DULoxetine (CYMBALTA) 30 mg delayed release capsule Past Week at Unknown time   No Yes   Sig: Take 1 capsule (30 mg total) by mouth daily   Empagliflozin 25 MG TABS Not Taking at Unknown time   Yes No   Sig: Take 25 mg by mouth every morning   Patient not taking: No sig reported   Ergocalciferol (VITAMIN D2 PO) Past Week at Unknown time Self Yes Yes   Sig: Take 50,000 Units/day by mouth 2 (two) times a week   acetaminophen (TYLENOL) 325 mg tablet Not Taking at Unknown time Self Yes No   Sig: Take 650 mg by mouth every 6 (six) hours as needed for mild pain (pain score 1-3)  Patient not taking: No sig reported   aspirin (ECOTRIN LOW STRENGTH) 81 mg EC tablet Past Week at Unknown time Self Yes Yes   Sig: Take 81 mg by mouth daily   ezetimibe (ZETIA) 10 mg tablet Not Taking at Unknown time   No No   Sig: Take 1 tablet (10 mg total) by mouth daily   Patient not taking: Reported on 9/27/2022   gabapentin (NEURONTIN) 100 mg capsule Past Week at Unknown time Self No Yes   Sig: Take 1 capsule (100 mg total) by mouth 2 (two) times a day Take 100 mg in the morning and 100 mg mid afternoon   gabapentin (NEURONTIN) 300 mg capsule Past Week at Unknown time Self No Yes   Sig: Take 1 capsule (300 mg total) by mouth daily at bedtime   hydrophilic ointment   Self Yes No   Sig: Apply topically as needed for dry skin   ibuprofen (MOTRIN) 400 mg tablet Past Week at Unknown time Self Yes Yes   Sig: Take by mouth every 6 (six) hours as needed for mild pain   insulin aspart, w/niacinamide, (Fiasp FlexTouch) 100 Units/mL injection pen Past Week at Unknown time Self Yes Yes   Sig: Inject under the skin 3 (three) times a day before meals Indications: per sliding scale     insulin glargine (LANTUS) 100 units/mL subcutaneous injection Past Week at Unknown time   No Yes   Sig: Inject 36 Units under the skin daily at bedtime   levothyroxine 50 mcg tablet Past Week at Unknown time   No Yes   Sig: Take 1 5 tablets (75 mcg total) by mouth daily   magnesium oxide (MAG-OX) 400 mg Not Taking at Unknown time   No No   Sig: Take 1 tablet (400 mg total) by mouth daily for 5 days   Patient not taking: Reported on 9/27/2022   nystatin (MYCOSTATIN) powder More than a month at Unknown time Self No No   Sig: Apply topically 2 (two) times a day   omeprazole (PriLOSEC) 20 mg delayed release capsule Past Week at Unknown time Self Yes Yes   Sig: Take 20 mg by mouth daily   potassium chloride (K-DUR,KLOR-CON) 20 mEq tablet Past Week at Unknown time Self No Yes   Sig: Take 1 tablet (20 mEq total) by mouth daily   spironolactone (ALDACTONE) 25 mg tablet Past Week at Unknown time Self No Yes   Sig: Take 1 tablet (25 mg total) by mouth daily   topiramate (TOPAMAX) 100 mg tablet Past Week at Unknown time Self Yes Yes   Sig: Take 200 mg by mouth 2 (two) times a day    torsemide (DEMADEX) 20 mg tablet Past Week at Unknown time   No Yes   Sig: Take 2 tablets (40 mg total) by mouth daily      Facility-Administered Medications: None         Medical History[]Expand by Default        Past Medical History:   Diagnosis Date    CHF (congestive heart failure) (HCC)      Diabetes mellitus (HCC)      Disease of thyroid gland      Hyperlipidemia      Hypertension      Hypothyroidism (acquired)      Restless leg syndrome      Seizures (HCC)              Surgical History[]Expand by Default         Past Surgical History:   Procedure Laterality Date    APPENDECTOMY        CHOLECYSTECTOMY        HIP FRACTURE SURGERY Right      TONSILLECTOMY        TUBAL LIGATION                Family History[]Expand by Default         Family History   Problem Relation Age of Onset    Brain cancer Mother      Seizures Father      Colon cancer Maternal Grandfather      No Known Problems Son      No Known Problems Son      Breast cancer Maternal Aunt 30         I have reviewed and agree with the history as documented            E-Cigarette/Vaping    E-Cigarette Use Never User              E-Cigarette/Vaping Substances    Nicotine No      THC No      CBD No      Flavoring No      Other No      Unknown No        Social History            Tobacco Use    Smoking status: Never Smoker    Smokeless tobacco: Never Used    Tobacco comment: not a smoker   Vaping Use    Vaping Use: Never used   Substance Use Topics    Alcohol use:  Yes       Comment: occasional    Drug use: Never         Review of Systems   Constitutional: Positive for appetite change and fatigue  Negative for chills, diaphoresis and fever  Eyes: Negative for visual disturbance  Respiratory: Negative for cough, chest tightness, shortness of breath and wheezing  Cardiovascular: Negative for chest pain, palpitations and leg swelling  Gastrointestinal: Positive for diarrhea  Negative for abdominal pain, constipation, nausea and vomiting  Musculoskeletal: Negative for arthralgias, myalgias, neck pain and neck stiffness  Skin: Negative for rash and wound  Neurological: Positive for weakness (generalized)  Negative for dizziness, seizures, syncope, light-headedness, numbness and headaches  Psychiatric/Behavioral: Negative for confusion  All other systems reviewed and are negative  Past psychiatric history:  Major depression recurrent  Anxiety  Insomnia  Diabetes CHF thyroid gland problem hypertension high cholesterol hypothyroidism restless leg syndrome and seizure disorder  Appendectomy cholecystectomy hip fracture surgery tonsillectomy tubal ligation    The patient is followed by outpatient psychiatry through the [facility]     Social history: patient lives alone her   and then she had a boyfriend who also  she has 2 grownup sons  Patient denies smoking denies abusing alcohol or drugs patient denies history of drugs and alcohol abuse she denies history of IV drug abuse she has 12th grade education and she was in [employer] for 6 years and then she work 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  Currently patient is retired she does not drive  Family History:  Son's daughter have a depression    Mental status examination:  The patient is alert and well oriented all spheres  Affect is depressed and constricted but pleasant  She made good eye contact  Speech was with low volume and constricted as well but otherwise unremarkable    Sensorium is clear   Thought process was logical linear  Thought content was reality based  Associations were tight  Memory was intact in all spheres  She appears to be of average intelligence by her use vocabulary, general fund of knowledge, sentence structure and syntax  She denies suicidal homicidal ideation  She denies hallucinations other psychotic features  Insight and judgment are intact  She presents is highly motivated for treatment of her depression  Past Medical History:   Diagnosis Date    [facility]     Diabetes mellitus ([facility]     Disease of thyroid gland     Hyperlipidemia     Hypertension     Hypothyroidism (acquired)     Restless leg syndrome     Seizures (HCC)        Medical Review Of Systems:    Review of Systems    Meds/Allergies     all current active meds have been reviewed  Allergies   Allergen Reactions    Pineapple - Food Allergy Anaphylaxis    Statins Other (See Comments)     Cramping from head to toe     Bupropion Other (See Comments)    Byetta 10 Mcg Pen [Exenatide]     Ceftriaxone Other (See Comments)    Marijuana [Dronabinol] GI Intolerance    Metformin Diarrhea     Other reaction(s): Unknown    Omeprazole Hives    Ondansetron      Other reaction(s): Unknown    Coconut Oil - Food Allergy Rash    Liraglutide Rash and Other (See Comments)     Generalized rash    Penicillins Rash       Objective     Vital signs in last 24 hours:  Temp:  [97 5 °F (36 4 °C)-98 3 °F (36 8 °C)] 98 3 °F (36 8 °C)  HR:  [67-75] 67  Resp:  [16-18] 18  BP: (109-136)/(53-87) 116/53      Intake/Output Summary (Last 24 hours) at 9/29/2022 1230  Last data filed at 9/28/2022 2300  Gross per 24 hour   Intake 480 ml   Output 300 ml   Net 180 ml           Lab Results: I have personally reviewed all pertinent laboratory/tests results  Imaging Studies: X-ray chest 1 view portable    Result Date: 9/19/2022  Narrative: CHEST INDICATION:   chest pain   COMPARISON:  6/23/2022 EXAM PERFORMED/VIEWS:  XR CHEST PORTABLE Single view FINDINGS: Cardiomediastinal silhouette is mildly enlarged The lungs are clear  No pneumothorax or pleural effusion  Osseous structures appear within normal limits for patient age  Impression: No acute cardiopulmonary disease  Findings are stable Workstation performed: [id number]     XR hip/pelv 2-3 vws right if performed    Result Date: 9/19/2022  Narrative: RIGHT HIP INDICATION:   R hip pain  COMPARISON:  12/10/2021 VIEWS:  XR HIP/PELV 2-3 VWS RIGHT W PELVIS IF PERFORMED Images: 3 FINDINGS: 2 posterior acetabular screws are again evident at the right hip There is no acute fracture or dislocation  Moderate degenerative arthritis both hips, unchanged No lytic or blastic osseous lesion  Soft tissues are unremarkable  The visualized lumbar spine is unremarkable  Impression: Degenerative changes both hips No acute osseous abnormality  Similar to prior study Workstation performed: [id number]     CT head without contrast    Result Date: 9/19/2022  Narrative: CT BRAIN - WITHOUT CONTRAST INDICATION:   syncope  COMPARISON:  August 1, 2020 TECHNIQUE:  CT examination of the brain was performed  In addition to axial images, sagittal and coronal 2D reformatted images were created and submitted for interpretation  Radiation dose length product (DLP) for this visit:  1251 mGy-cm   This examination, like all CT scans performed in the [facility], was performed utilizing techniques to minimize radiation dose exposure, including the use of iterative reconstruction and automated exposure control  IMAGE QUALITY:  Diagnostic  FINDINGS: PARENCHYMA: Decreased attenuation is noted in periventricular and subcortical white matter demonstrating an appearance that is statistically most likely to represent mild microangiopathic change  No CT signs of acute infarction  No intracranial mass, mass effect or midline shift  No acute parenchymal hemorrhage  VENTRICLES AND EXTRA-AXIAL SPACES:  Normal for the patient's age  VISUALIZED ORBITS AND PARANASAL SINUSES:  Unremarkable  CALVARIUM AND EXTRACRANIAL SOFT TISSUES:  Normal      Impression: No acute intracranial abnormality  Workstation performed: [id number]     CT spine cervical without contrast    Result Date: 9/19/2022  Narrative: CT CERVICAL SPINE - WITHOUT CONTRAST INDICATION:   fall  COMPARISON:  None  TECHNIQUE:  CT examination of the cervical spine was performed without intravenous contrast   Contiguous axial images were obtained  Sagittal and coronal reconstructions were performed  Radiation dose length product (DLP) for this visit:  399 mGy-cm   This examination, like all CT scans performed in the [facility], was performed utilizing techniques to minimize radiation dose exposure, including the use of iterative reconstruction and automated exposure control  IMAGE QUALITY:  Diagnostic  FINDINGS: ALIGNMENT:  Normal alignment of the cervical spine  No subluxation  VERTEBRAL BODIES:  No fracture  DEGENERATIVE CHANGES:  Mild multilevel cervical degenerative changes are noted without critical central canal stenosis  PREVERTEBRAL AND PARASPINAL SOFT TISSUES:  Unremarkable  THORACIC INLET:  Please refer to the concurrent chest, abdomen, and pelvic CT report for description of the thoracic inlet findings  Impression: No cervical spine fracture or traumatic malalignment  Workstation performed: [id number]     PE Study with CT abdomen & pelvis with contrast    Result Date: 9/19/2022  Narrative: CT PULMONARY ANGIOGRAM OF THE CHEST AND CT ABDOMEN AND PELVIS WITH INTRAVENOUS CONTRAST INDICATION:   elevated d dimer - Lower abd pain, syncope  COMPARISON:  CTA from 2/16/2022 TECHNIQUE:  CT examination of the chest, abdomen and pelvis was performed    Thin section CT angiographic technique was used in the chest in order to evaluate for pulmonary embolus and coronal 3D MIP postprocessing was performed on the acquisition scanner  Axial, sagittal, and coronal 2D reformatted images were created from the source data and submitted for interpretation  Radiation dose length product (DLP) for this visit:  2209 mGy-cm   This examination, like all CT scans performed in the [facility], was performed utilizing techniques to minimize radiation dose exposure, including the use of iterative reconstruction and automated exposure control  IV Contrast:  100 mL of iohexol (OMNIPAQUE) Enteric Contrast:  Enteric contrast was not administered  FINDINGS: CHEST PULMONARY ARTERIAL TREE:  No pulmonary embolus is seen  LUNGS:  Mild interstitial pulmonary edema  Mosaic attenuation pattern, which most likely represents small airways disease given hyperaeration of the secondary pulmonary lobules  Bibasilar subsegmental atelectasis  PLEURA:  Unremarkable  HEART/AORTA:  Cardiomegaly  No pericardial effusion  No thoracic aortic aneurysm  MEDIASTINUM AND REAL:  Unremarkable  CHEST WALL AND LOWER NECK:  Unremarkable  ABDOMEN LIVER/BILIARY TREE:  Unremarkable  GALLBLADDER:  Cholecystectomy  SPLEEN:  Unremarkable  PANCREAS:  Unremarkable  ADRENAL GLANDS: Stable right adrenal adenoma measuring 2 cm  KIDNEYS/URETERS:  Stable small cyst in the upper pole of the right kidney  Otherwise, unremarkable  STOMACH AND BOWEL:  Multiple diverticula within the descending colon without findings of diverticulitis  Normal caliber bowel without obstruction  APPENDIX:  No findings to suggest appendicitis  ABDOMINOPELVIC CAVITY: Small fat-containing periumbilical hernia  No ascites  No pneumoperitoneum  No lymphadenopathy  VESSELS:  Unremarkable for patient's age  PELVIS REPRODUCTIVE ORGANS:  Unremarkable for patient's age  URINARY BLADDER:  Unremarkable  ABDOMINAL WALL/INGUINAL REGIONS:  Unremarkable  OSSEOUS STRUCTURES:  Degenerative changes of the right hip  Again seen is a fixation screw within the right posterior acetabulum  Impression: Cardiomegaly and mild interstitial pulmonary edema  No pulmonary embolism  No acute aortic dissection or intramural hematoma  Multiple diverticula within the descending colon without findings of diverticulitis  Normal caliber bowel without obstruction  Workstation performed: [id number]     VAS lower limb venous duplex study, complete bilateral    Result Date: 9/20/2022  Narrative:  [facility] REPORT CLINICAL: Indications: Patient c/o increased swelling in both feet  Elevated D-Dimer  Recent fall Physician would like to R/O DVT  No history of DVT/PE/CA recent surgery  CONCLUSION: Impression: RIGHT LOWER LIMB: No evidence of acute or chronic deep vein thrombosis  No evidence of superficial thrombophlebitis noted  Doppler evaluation shows a normal response to augmentation maneuvers  LEFT LOWER LIMB: No evidence of acute or chronic deep vein thrombosis  No evidence of superficial thrombophlebitis noted  Doppler evaluation shows a normal response to augmentation maneuvers  Technically difficult/limited study  SIGNATURE: Electronically Signed by: Ramos Esqueda MD, RPVI on 2022-09-20 04:18:07 PM    EKG/Pathology/Other Studies:   Lab Results   Component Value Date    VENTRATE 63 09/19/2022    ATRIALRATE 63 09/19/2022    PRINT 142 09/19/2022    QRSDINT 128 09/19/2022    QTINT 452 09/19/2022    QTCINT 462 09/19/2022    PAXIS 42 09/19/2022    QRSAXIS -50 09/19/2022    TWAVEAXIS 57 09/19/2022        Code Status: Level 1 - Full Code  Advance Directive and Living Will:      Power of :    POLST:      Counseling / Coordination of Care: Total floor / unit time spent today 30 minutes  Greater than 50% of total time was spent with the patient and / or family counseling and / or coordination of care  A description of the counseling / coordination of care:  Chart review, patient evaluation, coordination and communication with staff, nursing and provider

## 2022-09-29 NOTE — ASSESSMENT & PLAN NOTE
Patient presented with generalized weakness  Recently admitted for syncope (9/19-9/22), reports since returning home she was not getting out of bed     · Patient currently lives alone, requesting rehab  · PT/OT evaluated: Recommending STR  · CM following - Referrals Placed  · Fall precautions

## 2022-09-29 NOTE — ASSESSMENT & PLAN NOTE
Patient presented with generalized weakness  Recently admitted for syncope (9/19-9/22), reports since returning home she was not getting out of bed  · Patient currently lives alone, requesting rehab  · Orthostatics positive 9/28, s/p gentle IV fluid hydration  · Repeat orthostatics (9/30) positive, Patient symptomatic  · Patient with poor oral intake  Hold on Diuretics, initiate light IVF  · Repeat BP's Stable  Encouraged oral intake    · Initiate SAMPSON stockings while ambulatory  · Consider low dose midodrine if Orthostatics remain (+)  · PT/OT evaluated: Recommending STR  · CM following - Plan for Edmar Notice  · Continue Fall precautions while at rehab

## 2022-09-29 NOTE — ASSESSMENT & PLAN NOTE
· Patient with reports of worsened diarrhea   · D/C enteric stool panel/C diff, patient without any BM or diarrhea since yesterday  · Continue to monitor Stool output  · Replete electrolytes p r n

## 2022-09-29 NOTE — PLAN OF CARE
Problem: Nutrition/Hydration-ADULT  Goal: Nutrient/Hydration intake appropriate for improving, restoring or maintaining nutritional needs  Description: Monitor and assess patient's nutrition/hydration status for malnutrition  Collaborate with interdisciplinary team and initiate plan and interventions as ordered  Monitor patient's weight and dietary intake as ordered or per policy  Utilize nutrition screening tool and intervene as necessary  Determine patient's food preferences and provide high-protein, high-caloric foods as appropriate       INTERVENTIONS:  - Monitor oral intake, urinary output, labs, and treatment plans  - Assess nutrition and hydration status and recommend course of action  - Evaluate amount of meals eaten  - Assist patient with eating if necessary   - Allow adequate time for meals  - Recommend/ encourage appropriate diets, oral nutritional supplements, and vitamin/mineral supplements  - - - Provide specific nutrition/hydration education as appropriate  - Include patient/family/caregiver in decisions related to nutrition  Outcome: Progressing

## 2022-09-30 ENCOUNTER — TELEPHONE (OUTPATIENT)
Dept: OTHER | Facility: OTHER | Age: 67
End: 2022-09-30

## 2022-09-30 VITALS
OXYGEN SATURATION: 99 % | HEIGHT: 62 IN | BODY MASS INDEX: 49.9 KG/M2 | SYSTOLIC BLOOD PRESSURE: 100 MMHG | DIASTOLIC BLOOD PRESSURE: 50 MMHG | RESPIRATION RATE: 18 BRPM | WEIGHT: 271.17 LBS | HEART RATE: 70 BPM | TEMPERATURE: 97.8 F

## 2022-09-30 LAB
ALBUMIN SERPL BCP-MCNC: 3.6 G/DL (ref 3.5–5)
ALBUMIN SERPL BCP-MCNC: 3.6 G/DL (ref 3.5–5)
ALP SERPL-CCNC: 80 U/L (ref 34–104)
ALT SERPL W P-5'-P-CCNC: 8 U/L (ref 7–52)
ANION GAP SERPL CALCULATED.3IONS-SCNC: 7 MMOL/L (ref 4–13)
AST SERPL W P-5'-P-CCNC: 15 U/L (ref 13–39)
BILIRUB DIRECT SERPL-MCNC: 0.08 MG/DL (ref 0–0.2)
BILIRUB SERPL-MCNC: 0.49 MG/DL (ref 0.2–1)
BUN SERPL-MCNC: 23 MG/DL (ref 5–25)
CALCIUM SERPL-MCNC: 9.5 MG/DL (ref 8.4–10.2)
CHLORIDE SERPL-SCNC: 101 MMOL/L (ref 96–108)
CO2 SERPL-SCNC: 30 MMOL/L (ref 21–32)
CREAT SERPL-MCNC: 0.96 MG/DL (ref 0.6–1.3)
GFR SERPL CREATININE-BSD FRML MDRD: 61 ML/MIN/1.73SQ M
GLUCOSE SERPL-MCNC: 101 MG/DL (ref 65–140)
GLUCOSE SERPL-MCNC: 112 MG/DL (ref 65–140)
GLUCOSE SERPL-MCNC: 134 MG/DL (ref 65–140)
GLUCOSE SERPL-MCNC: 88 MG/DL (ref 65–140)
POTASSIUM SERPL-SCNC: 4 MMOL/L (ref 3.5–5.3)
PROT SERPL-MCNC: 7.4 G/DL (ref 6.4–8.4)
SARS-COV-2 RNA RESP QL NAA+PROBE: NEGATIVE
SODIUM SERPL-SCNC: 138 MMOL/L (ref 135–147)

## 2022-09-30 PROCEDURE — 82948 REAGENT STRIP/BLOOD GLUCOSE: CPT

## 2022-09-30 PROCEDURE — 80076 HEPATIC FUNCTION PANEL: CPT

## 2022-09-30 PROCEDURE — 87635 SARS-COV-2 COVID-19 AMP PRB: CPT | Performed by: INTERNAL MEDICINE

## 2022-09-30 PROCEDURE — 99239 HOSP IP/OBS DSCHRG MGMT >30: CPT

## 2022-09-30 PROCEDURE — 80048 BASIC METABOLIC PNL TOTAL CA: CPT | Performed by: PHYSICIAN ASSISTANT

## 2022-09-30 PROCEDURE — 82040 ASSAY OF SERUM ALBUMIN: CPT

## 2022-09-30 RX ORDER — INSULIN LISPRO 100 [IU]/ML
1-5 INJECTION, SOLUTION INTRAVENOUS; SUBCUTANEOUS
Refills: 0
Start: 2022-09-30

## 2022-09-30 RX ORDER — INSULIN GLARGINE 100 [IU]/ML
30 INJECTION, SOLUTION SUBCUTANEOUS
Qty: 10 ML | Refills: 0
Start: 2022-09-30

## 2022-09-30 RX ORDER — TORSEMIDE 20 MG/1
40 TABLET ORAL DAILY
Qty: 60 TABLET | Refills: 0
Start: 2022-10-03

## 2022-09-30 RX ORDER — SPIRONOLACTONE 25 MG/1
25 TABLET ORAL DAILY
Qty: 30 TABLET | Refills: 0
Start: 2022-10-03

## 2022-09-30 RX ORDER — NITROFURANTOIN 25; 75 MG/1; MG/1
100 CAPSULE ORAL 2 TIMES DAILY WITH MEALS
Qty: 2 CAPSULE | Refills: 0
Start: 2022-09-30 | End: 2022-10-01

## 2022-09-30 RX ORDER — LANOLIN ALCOHOL/MO/W.PET/CERES
9 CREAM (GRAM) TOPICAL
Refills: 0
Start: 2022-09-30

## 2022-09-30 RX ORDER — SODIUM CHLORIDE, SODIUM GLUCONATE, SODIUM ACETATE, POTASSIUM CHLORIDE, MAGNESIUM CHLORIDE, SODIUM PHOSPHATE, DIBASIC, AND POTASSIUM PHOSPHATE .53; .5; .37; .037; .03; .012; .00082 G/100ML; G/100ML; G/100ML; G/100ML; G/100ML; G/100ML; G/100ML
50 INJECTION, SOLUTION INTRAVENOUS CONTINUOUS
Status: DISPENSED | OUTPATIENT
Start: 2022-09-30 | End: 2022-09-30

## 2022-09-30 RX ORDER — DULOXETIN HYDROCHLORIDE 60 MG/1
60 CAPSULE, DELAYED RELEASE ORAL DAILY
Refills: 0
Start: 2022-10-01

## 2022-09-30 RX ADMIN — ENOXAPARIN SODIUM 40 MG: 40 INJECTION SUBCUTANEOUS at 08:49

## 2022-09-30 RX ADMIN — DULOXETINE HYDROCHLORIDE 60 MG: 60 CAPSULE, DELAYED RELEASE ORAL at 08:49

## 2022-09-30 RX ADMIN — PANTOPRAZOLE SODIUM 40 MG: 40 TABLET, DELAYED RELEASE ORAL at 17:43

## 2022-09-30 RX ADMIN — ASPIRIN 81 MG: 81 TABLET, COATED ORAL at 08:49

## 2022-09-30 RX ADMIN — TOPIRAMATE 200 MG: 100 TABLET, FILM COATED ORAL at 08:49

## 2022-09-30 RX ADMIN — NITROFURANTOIN (MONOHYDRATE/MACROCRYSTALS) 100 MG: 75; 25 CAPSULE ORAL at 08:49

## 2022-09-30 RX ADMIN — SODIUM CHLORIDE, SODIUM GLUCONATE, SODIUM ACETATE, POTASSIUM CHLORIDE, MAGNESIUM CHLORIDE, SODIUM PHOSPHATE, DIBASIC, AND POTASSIUM PHOSPHATE 50 ML/HR: .53; .5; .37; .037; .03; .012; .00082 INJECTION, SOLUTION INTRAVENOUS at 11:02

## 2022-09-30 RX ADMIN — NITROFURANTOIN (MONOHYDRATE/MACROCRYSTALS) 100 MG: 75; 25 CAPSULE ORAL at 17:35

## 2022-09-30 RX ADMIN — NYSTATIN: 100000 POWDER TOPICAL at 09:06

## 2022-09-30 RX ADMIN — EZETIMIBE 10 MG: 10 TABLET ORAL at 08:49

## 2022-09-30 RX ADMIN — ONDANSETRON 4 MG: 2 INJECTION INTRAMUSCULAR; INTRAVENOUS at 17:35

## 2022-09-30 RX ADMIN — TOPIRAMATE 200 MG: 100 TABLET, FILM COATED ORAL at 17:35

## 2022-09-30 RX ADMIN — LEVOTHYROXINE SODIUM 75 MCG: 25 TABLET ORAL at 08:49

## 2022-09-30 RX ADMIN — NYSTATIN: 100000 POWDER TOPICAL at 17:44

## 2022-09-30 RX ADMIN — GABAPENTIN 100 MG: 100 CAPSULE ORAL at 08:49

## 2022-09-30 RX ADMIN — GABAPENTIN 100 MG: 100 CAPSULE ORAL at 17:35

## 2022-09-30 RX ADMIN — PANTOPRAZOLE SODIUM 40 MG: 40 TABLET, DELAYED RELEASE ORAL at 08:49

## 2022-09-30 NOTE — PLAN OF CARE
Problem: MOBILITY - ADULT  Goal: Maintain or return to baseline ADL function  Description: INTERVENTIONS:  -  Assess patient's ability to carry out ADLs; assess patient's baseline for ADL function and identify physical deficits which impact ability to perform ADLs (bathing, care of mouth/teeth, toileting, grooming, dressing, etc )  - Assess/evaluate cause of self-care deficits   - Assess range of motion  - Assess patient's mobility; develop plan if impaired  - Assess patient's need for assistive devices and provide as appropriate  - Encourage maximum independence but intervene and supervise when necessary  - Involve family in performance of ADLs  - Assess for home care needs following discharge   - Consider OT consult to assist with ADL evaluation and planning for discharge  - Provide patient education as appropriate  Outcome: Progressing     Problem: Potential for Falls  Goal: Patient will remain free of falls  Description: INTERVENTIONS:  - Educate patient/family on patient safety including physical limitations  - Instruct patient to call for assistance with activity   - Consult OT/PT to assist with strengthening/mobility   - Keep Call bell within reach  - Keep bed low and locked with side rails adjusted as appropriate  - Keep care items and personal belongings within reach  - Initiate and maintain comfort rounds  - Make Fall Risk Sign visible to staff  - Offer Toileting every 2 Hours, in advance of need  - Initiate/Maintain bed alarm  - Obtain necessary fall risk management equipment: bed  - Apply yellow socks and bracelet for high fall risk patients  - Consider moving patient to room near nurses station  Outcome: Progressing

## 2022-09-30 NOTE — CASE MANAGEMENT
Case Management Discharge Planning Note    Patient name Linda Mandel  Location /-78 MRN [id number]  : 1955 Date 2022       Current Admission Date: 2022  Current Admission Diagnosis:Weakness   Patient Active Problem List    Diagnosis Date Noted    Abnormal urine 2022    Weakness 2022    Syncope 2022    Elevated d-dimer 2022    Coronary artery disease involving native coronary artery of native heart without angina pectoris 2022    Chronic heart failure with preserved ejection fraction ([facility] ) 2022    [facility] ) 2022    Dyspnea on exertion 2022    Primary osteoarthritis of one hip, right 12/10/2021    Orthostatic hypotension 10/24/2021    Morbid obesity due to excess calories ([facility] ) 2021    Onychomycosis of toenail 2021    Calf tenderness 2021    Asymptomatic bacteriuria 2021    Chronic fatigue and malaise 2021    Diarrhea 2021    Acquired hypothyroidism 2021    Suspected COVID-19 virus infection 2020    Major depressive disorder 2020    Gambling disorder, episodic, moderate 2018    Severe episode of recurrent major depressive disorder, without psychotic features ([facility] ) 2018    Encounter for examination and observation for other specified reasons     GERD (gastroesophageal reflux disease) 07/10/2016    Epigastric abdominal pain 07/10/2016    Seizures ([facility] )     Restless leg syndrome     Hypertension     Hyperlipidemia     Diabetes mellitus ([facility] )       LOS (days): 3  Geometric Mean LOS (GMLOS) (days): 2 60  Days to GMLOS:-0 3     OBJECTIVE:  Risk of Unplanned Readmission Score: 12 87         Current admission status: Inpatient   Preferred Pharmacy:   [facility] [address] SAN[address]E PULIDO [address] [facility], [place] - [address]  [address] [facility] [address]  Phone: [phone] Fax: [phone]    Primary Care Provider: Payton Kwon Chele Johnson MD    Primary Insurance: [employer]  Secondary Insurance: [facility] HOSPITAL REP    DISCHARGE DETAILS:                                          Other Referral/Resources/Interventions Provided:  Interventions: Transportation  Referral Comments: Transportation referral placed to [facility], confirmed for 1800 via Jared Bring to [facility]  All parties notified of same  Treatment Team Recommendation: Short Term Rehab  Discharge Destination Plan[de-identified] Short Term Rehab  Transport at Discharge : Obed Martinez by [employer] and Unit #): Jared Bring  ETA of Transport (Date): 09/30/22  ETA of Transport (Time): 1800            IMM reviewed with patient, patient agrees with discharge determination    IMM Given (Date):: 09/30/22  IMM Given to[de-identified] Patient          Accepting Facility Name, [address] : [facility]  Receiving Facility/Agency Phone Number: [phone]  Facility/Agency Fax Number: [phone]

## 2022-09-30 NOTE — PLAN OF CARE
Problem: MOBILITY - ADULT  Goal: Maintain or return to baseline ADL function  Description: INTERVENTIONS:  -  Assess patient's ability to carry out ADLs; assess patient's baseline for ADL function and identify physical deficits which impact ability to perform ADLs (bathing, care of mouth/teeth, toileting, grooming, dressing, etc )  - Assess/evaluate cause of self-care deficits   - Assess range of motion  - Assess patient's mobility; develop plan if impaired  - Assess patient's need for assistive devices and provide as appropriate  - Encourage maximum independence but intervene and supervise when necessary  - Involve family in performance of ADLs  - Assess for home care needs following discharge   - Consider OT consult to assist with ADL evaluation and planning for discharge  - Provide patient education as appropriate  Outcome: Progressing  Goal: Maintains/Returns to pre admission functional level  Description: INTERVENTIONS:  - Perform BMAT or MOVE assessment daily    - Set and communicate daily mobility goal to care team and patient/family/caregiver     - Collaborate with rehabilitation services on mobility goals if consulted  - Out of bed for toileting  - Record patient progress and toleration of activity level   Outcome: Progressing     Problem: Potential for Falls  Goal: Patient will remain free of falls  Description: INTERVENTIONS:  - Educate patient/family on patient safety including physical limitations  - Instruct patient to call for assistance with activity   - Consult OT/PT to assist with strengthening/mobility   - Keep Call bell within reach  - Keep bed low and locked with side rails adjusted as appropriate  - Keep care items and personal belongings within reach  - Initiate and maintain comfort rounds  - Make Fall Risk Sign visible to staff  - Offer Toileting every 2 Hours, in advance of need  - Initiate/Maintain alarm  - Obtain necessary fall risk management equipment:   - Apply yellow socks and bracelet for high fall risk patients  - Consider moving patient to room near nurses station  Outcome: Progressing     Problem: Nutrition/Hydration-ADULT  Goal: Nutrient/Hydration intake appropriate for improving, restoring or maintaining nutritional needs  Description: Monitor and assess patient's nutrition/hydration status for malnutrition  Collaborate with interdisciplinary team and initiate plan and interventions as ordered  Monitor patient's weight and dietary intake as ordered or per policy  Utilize nutrition screening tool and intervene as necessary  Determine patient's food preferences and provide high-protein, high-caloric foods as appropriate       INTERVENTIONS:  - Monitor oral intake, urinary output, labs, and treatment plans  - Assess nutrition and hydration status and recommend course of action  - Evaluate amount of meals eaten  - Assist patient with eating if necessary   - Allow adequate time for meals  - Recommend/ encourage appropriate diets, oral nutritional supplements, and vitamin/mineral supplements  - Order, calculate, and assess calorie counts as needed  - Recommend, monitor, and adjust tube feedings and TPN/PPN based on assessed needs  - Assess need for intravenous fluids  - Provide specific nutrition/hydration education as appropriate  - Include patient/family/caregiver in decisions related to nutrition  Outcome: Progressing     Problem: Prexisting or High Potential for Compromised Skin Integrity  Goal: Skin integrity is maintained or improved  Description: INTERVENTIONS:  - Identify patients at risk for skin breakdown  - Assess and monitor skin integrity  - Assess and monitor nutrition and hydration status  - Monitor labs   - Assess for incontinence   - Turn and reposition patient  - Assist with mobility/ambulation  - Relieve pressure over bony prominences  - Avoid friction and shearing  - Provide appropriate hygiene as needed including keeping skin clean and dry  - Evaluate need for skin moisturizer/barrier cream  - Collaborate with interdisciplinary team   - Patient/family teaching  - Consider wound care consult   Outcome: Progressing     Problem: GASTROINTESTINAL - ADULT  Goal: Maintains or returns to baseline bowel function  Description: INTERVENTIONS:  - Assess bowel function  - Encourage oral fluids to ensure adequate hydration  - Administer IV fluids if ordered to ensure adequate hydration  - Administer ordered medications as needed  - Encourage mobilization and activity  - Consider nutritional services referral to assist patient with adequate nutrition and appropriate food choices  Outcome: Progressing     Problem: METABOLIC, FLUID AND ELECTROLYTES - ADULT  Goal: Electrolytes maintained within normal limits  Description: INTERVENTIONS:  - Monitor labs and assess patient for signs and symptoms of electrolyte imbalances  - Administer electrolyte replacement as ordered  - Monitor response to electrolyte replacements, including repeat lab results as appropriate  - Instruct patient on fluid and nutrition as appropriate  Outcome: Progressing  Goal: Glucose maintained within target range  Description: INTERVENTIONS:  - Monitor Blood Glucose as ordered  - Assess for signs and symptoms of hyperglycemia and hypoglycemia  - Administer ordered medications to maintain glucose within target range  - Assess nutritional intake and initiate nutrition service referral as needed  Outcome: Progressing

## 2022-09-30 NOTE — CASE MANAGEMENT
Case Management Discharge Planning Note    Patient name Kb Oliveira  Location /-80 MRN [id number]  : 1955 Date 2022       Current Admission Date: 2022  Current Admission Diagnosis:Weakness   Patient Active Problem List    Diagnosis Date Noted    Abnormal urine 2022    Weakness 2022    Syncope 2022    Elevated d-dimer 2022    Coronary artery disease involving native coronary artery of native heart without angina pectoris 2022    Chronic heart failure with preserved ejection fraction ([facility] ) 2022    Chronic respiratory failure ([facility] ) 2022    Dyspnea on exertion 2022    Primary osteoarthritis of one hip, right 12/10/2021    Orthostatic hypotension 10/24/2021    Morbid obesity due to excess calories [facility] 2021    Onychomycosis of toenail 2021    Calf tenderness 2021    Asymptomatic bacteriuria 2021    Chronic fatigue and malaise 2021    Diarrhea 2021    Acquired hypothyroidism 2021    Suspected COVID-19 virus infection 2020    Major depressive disorder 2020    Gambling disorder, episodic, moderate 2018    Severe episode of recurrent major depressive disorder, without psychotic features ([facility] ) 2018    Encounter for examination and observation for other specified reasons     GERD (gastroesophageal reflux disease) 07/10/2016    Epigastric abdominal pain 07/10/2016    Seizures ([facility] )     Restless leg syndrome     Hypertension     Hyperlipidemia     Diabetes mellitus ([facility] )       LOS (days): 3  Geometric Mean LOS (GMLOS) (days): 2 60  Days to GMLOS:0     OBJECTIVE:  Risk of Unplanned Readmission Score: 12 66         Current admission status: Inpatient   Preferred Pharmacy:   [facility] [address] SANDEE PULIDO [address] [facility], [place] - [address]  [address] [place] [address]  Phone: [phone] Fax: [phone]    Primary Care Provider: Jon Galindo Nalini Elliott MD    Primary Insurance: [employer]  Secondary Insurance: [address] [place] Number: approved [address] #[id number] for [facility]: 9/29  NRD: 10/3  Care Coord: Priscilla Danielle  P: [phone]   F: [phone]

## 2022-09-30 NOTE — ASSESSMENT & PLAN NOTE
· Patient with reports of worsened diarrhea on admission  · Patient without any BM or diarrhea since (9/28)  · Enteric stool panel/C diff orders discontinued   · Continue to monitor Stool output

## 2022-09-30 NOTE — ASSESSMENT & PLAN NOTE
Lab Results   Component Value Date    HGBA1C 11 8 (H) 09/19/2022     · Home insulin regimen:  · Lantus 36 units qHS  · Patient with poor oral intake/ Decreased motivation to eat  · Plan to decrease Home Lantus to 30 units qHs  · Continue to adjust Insulin regimen PRN   · C/w Accu-checks and SSI  · Diabetic Diet

## 2022-09-30 NOTE — DISCHARGE SUMMARY
Brien 44  Discharge- Linda Mandel 1955, 78 y o  female MRN: [id number]  Unit/Bed#: -01 Encounter: [id number]  Primary Care Provider: Rissa Pringle MD   Date and time admitted to hospital: 9/27/2022  4:59 PM    * Weakness  Assessment & Plan  Patient presented with generalized weakness  Recently admitted for syncope (9/19-9/22), reports since returning home she was not getting out of bed  · Patient currently lives alone, requesting rehab  · Orthostatics positive 9/28, s/p gentle IV fluid hydration  · Repeat orthostatics (9/30) positive, Patient symptomatic  · Patient with poor oral intake  Hold on Diuretics, initiate light IVF  · Repeat BP's Stable  Encouraged oral intake  · Initiate SAMPSON stockings while ambulatory  · Consider low dose midodrine if Orthostatics remain (+)  · PT/OT evaluated: Recommending STR  · CM following - Plan for [place]  · Continue Fall precautions while at rehab    Major depressive disorder  Assessment & Plan  · Patient with worsening depression  · Patient was transitioned from Prozac to Cymbalta last admission  · However per Patient, did not take any medications once she returned home    · Psychiatry Consulted:  · C/w increased Cymbalta to 60 mg daily  · Continue outpatient psychiatric follow-up through [facility]  · No suicide precautions indicated    Abnormal urine  Assessment & Plan  · U/A on admission: (+) Leuk/Protein/Ketones/Bilirubin/RBC/WBC/Bacteria  · Repeat U/A: (+) Leuk/Ketones/ WBC/Bacteria  · Suspect component of dirty catch  · Patient with mild urinary symptoms  · Plan for Macrobid x3 days for suspicion of UTI to be completed (10/1)    Diarrhea  Assessment & Plan  · Patient with reports of worsened diarrhea on admission  · Patient without any BM or diarrhea since (9/28)  · Enteric stool panel/C diff orders discontinued   · Continue to monitor Stool output    Diabetes mellitus ([facility] )  Assessment & Plan  Lab Results   Component Value Date    HGBA1C 11 8 (H) 09/19/2022     · Home insulin regimen:  · Lantus 36 units qHS  · Patient with poor oral intake/ Decreased motivation to eat  · Plan to decrease Home Lantus to 30 units qHs  · Continue to adjust Insulin regimen PRN   · C/w Accu-checks and SSI  · Diabetic Diet    Morbid obesity due to excess calories ([address] )  Assessment & Plan  · Educated on lifestyle modifications, including diet and exercise    Acquired hypothyroidism  Assessment & Plan  · Continue Home medication:  · Levothyroxine    GERD (gastroesophageal reflux disease)  Assessment & Plan  · Continue PPI       Medical Problems             Resolved Problems  Date Reviewed: 9/30/2022   None               Discharging Physician / Practitioner: Isidra Camejo  PCP: Elie Dasilva MD  Admission Date:   Admission Orders (From admission, onward)     Ordered        09/27/22 [address]  Once                      Discharge Date: 09/30/22    Consultations During Hospital Stay:  · Psychiatry  · Physical/occupational therapy    Procedures Performed:   · None    Significant Findings / Test Results:   · None    Incidental Findings:   · None     Test Results Pending at Discharge (will require follow up): · None     Outpatient Tests Requested:  · Recommend outpatient follow-up with PCP  · Recommend outpatient follow-up with Psychiatry    Complications:  Depression    Reason for Admission: Weakness    Hospital Course:   Cece George is a 78 y o  female patient with PMH of GERD, HLD, HTN, Seizures, Depression, DM2, and CHFwho originally presented to the hospital on 9/27/2022 due to inability to get out of bed  Of note, patient was recently admitted from 09/19 to 9/22 for acute syncopal episode  Likely orthostasis with vasovagal response following diarrheal episode  Patient was severely depressed and started on Cymbalta  Patient was seen by PT/OT and recommended for home with home health care    Per patient following discharge she remained in bed and did not get up for 5-6 days  Patient states she did not take any medications as well  Patient admitted for generalized weakness  PT/OT now recommending short-term rehab  Patient positive for orthostatics, likely secondary to poor oral intake with chronic diuretic use  Patient received IV fluid with improvement in overall blood pressure  Patient with poor oral intake and decreased motivation likely secondary to severe depressive episode  Psych was consulted and home Cymbalta was increased to 60 mg  Patient medically optimized for discharge to [facility]  Patient will require continued encouragement to interact in care, including eating/drinking/ambulation  Discussed care with Patient, answered all questions  Please see above list of diagnoses and related plan for additional information  Condition at Discharge: stable    Discharge Day Visit / Exam:   Subjective:  Patient states she is still feeling depressed  Patient states she has minimal appetite and no real urge to get out of bed  Patient denies any acute complaints  Patient denies any active chest pain, shortness a breath, abdominal pain, nausea, vomiting, or further episodes of  diarrhea  Vitals: Blood Pressure: 100/50 (09/30/22 1529)  Pulse: 70 (09/30/22 1529)  Temperature: 97 8 °F (36 6 °C) (09/30/22 1529)  Temp Source: Tympanic (09/30/22 1529)  Respirations: 18 (09/30/22 1529)  Height: 5' 2" (157 5 cm) (09/27/22 1925)  Weight - Scale: 123 kg (271 lb 2 7 oz) (09/27/22 1925)  SpO2: 99 % (09/30/22 1529)  Exam:   Physical Exam  Vitals and nursing note reviewed  Constitutional:       Appearance: Normal appearance  HENT:      Head: Normocephalic  Nose: Nose normal  No congestion  Mouth/Throat:      Mouth: Mucous membranes are moist       Pharynx: Oropharynx is clear  Cardiovascular:      Rate and Rhythm: Normal rate and regular rhythm  Pulses: Normal pulses  Heart sounds: No murmur heard    Pulmonary: Effort: Pulmonary effort is normal  No respiratory distress  Breath sounds: Decreased breath sounds present  Abdominal:      General: Bowel sounds are normal  There is no distension  Palpations: Abdomen is soft  Musculoskeletal:         General: Normal range of motion  Cervical back: Normal range of motion  Right lower leg: No edema  Left lower leg: No edema  Skin:     General: Skin is warm and dry  Capillary Refill: Capillary refill takes less than 2 seconds  Neurological:      Mental Status: She is alert and oriented to person, place, and time  Mental status is at baseline  Motor: Weakness (Generalized) present  Psychiatric:         Mood and Affect: Affect is flat  Speech: Speech normal          Behavior: Behavior is withdrawn  Discussion with Family: Patient declined call to   Discharge instructions/Information to patient and family:   See after visit summary for information provided to patient and family  Provisions for Follow-Up Care:  See after visit summary for information related to follow-up care and any pertinent home health orders  Disposition:   Acute Rehab at [facility]    Planned Readmission: No     Discharge Statement:  I spent 60 minutes discharging the patient  This time was spent on the day of discharge  I had direct contact with the patient on the day of discharge  Greater than 50% of the total time was spent examining patient, answering all patient questions, arranging and discussing plan of care with patient as well as directly providing post-discharge instructions  Additional time then spent on discharge activities  Discharge Medications:  See after visit summary for reconciled discharge medications provided to patient and/or family        **Please Note: This note may have been constructed using a voice recognition system**

## 2022-09-30 NOTE — ASSESSMENT & PLAN NOTE
· Patient with worsening depression  · Patient was transitioned from Prozac to Cymbalta last admission  · However per Patient, did not take any medications once she returned home    · Psychiatry Consulted:  · C/w increased Cymbalta to 60 mg daily  · Continue outpatient psychiatric follow-up through [facility]  · No suicide precautions indicated

## 2022-09-30 NOTE — NURSING NOTE
Re-educated the patient on the importance of wearing SCDs to avoid blood clot formation  Pt continues to refuse to wear them  SCD declination form signed

## 2022-09-30 NOTE — ASSESSMENT & PLAN NOTE
· U/A on admission: (+) Leuk/Protein/Ketones/Bilirubin/RBC/WBC/Bacteria  · Repeat U/A: (+) Leuk/Ketones/ WBC/Bacteria  · Suspect component of dirty catch  · Patient with mild urinary symptoms  · Plan for Macrobid x3 days for suspicion of UTI to be completed (10/1)

## 2022-10-01 ENCOUNTER — NURSING HOME VISIT (OUTPATIENT)
Dept: GERIATRICS | Facility: OTHER | Age: 67
End: 2022-10-01
Payer: COMMERCIAL

## 2022-10-01 VITALS
OXYGEN SATURATION: 95 % | SYSTOLIC BLOOD PRESSURE: 120 MMHG | BODY MASS INDEX: 49.64 KG/M2 | HEART RATE: 76 BPM | RESPIRATION RATE: 18 BRPM | WEIGHT: 271.4 LBS | TEMPERATURE: 97.6 F | DIASTOLIC BLOOD PRESSURE: 78 MMHG

## 2022-10-01 DIAGNOSIS — E11.69 DIABETES MELLITUS TYPE 2 IN OBESE (HCC): Primary | ICD-10-CM

## 2022-10-01 DIAGNOSIS — E66.9 DIABETES MELLITUS TYPE 2 IN OBESE (HCC): Primary | ICD-10-CM

## 2022-10-01 PROCEDURE — 99306 1ST NF CARE HIGH MDM 50: CPT | Performed by: INTERNAL MEDICINE

## 2022-10-01 NOTE — PROGRESS NOTES
[facility]  [address], [address] [place] [address]    Nursing Home Admission    NAME: Noni Buitrago  AGE: 78 y o  SEX: female [id number]      Patient Location     [facility]    Patients care was coordinated with nursing facility staff  Recent vitals, labs and updated medications were reviewed on [facility] [facility] system of facility  Past Medical, surgical, social, medication and allergy history and patients previous records reviewed  Assessment/Plan:    Diabetes mellitus type 2 in obese [facility])    Lab Results   Component Value Date    HGBA1C 11 8 (H) 09/19/2022     Recent hemoglobin A1c was uncontrolled at 11 8  Per records recent Accu-Cheks have been running on the lower side primarily due to decreased p o  intake and patient has decreased motivation to eat  Insulin dose was recently reduced  Patient currently remains on empagliflozin, insulin glargine 30 units daily along Humalog sliding scale coverage  Follow blood sugars and adjust insulin dose accordingly  Fasting blood sugar was 100 this morning      Generalized weakness:  Patient was recently hospitalized with generalized weakness  Prior to this admission patient was hospitalized from 09/19 to 09/2022 for syncope episode attributed to orthostatic hypotension and decreased p o  intake  Blood pressure improved with IV fluids  Continue PT OT  Continue to implement interventions  Blood pressure currently stable      Major depressive disorder;  Patient was recently seen by psych service for major depression  She was transitioned from Prozac to Cymbalta during recent admission  Dose was increased to 60 mg daily  Will continue to monitor    Abnormal urine:  UA revealed pyuria upon admission  Patient had mild urinary symptoms  She is being treated with 3 day course of Macrobid for suspected UTI    Diarrhea:  Patient complained of worsening diarrhea on admission with subsequently resolved    Will continue to monitor    Morbid obesity:  Encourage lifestyle modification including diet and exercise    Hypothyroidism:  Continue levothyroxine  Lab Results   Component Value Date    [id number] 6 866 (H) 09/19/2022   Recent TSH was borderline high  Recommend repeating pad studies in 8 weeks    GERD:  Stable on PPI    Neuropathy:  Continue gabapentin    Chronic heart failure with preserved EF:  Clinically euvolemic  Continue maintenance dose of torsemide 40 mg daily and spironolactone 25 mg daily  Blood pressure is currently stable  Monitor weights  History of seizure:  Stable on topiramate    Hyperlipidemia:  Continue ezetimibe    Chief Complaint     Generalized weakness, ambulatory dysfunction, diabetes mellitus type 2, hypothyroidism, depression    HPI       Patient is a 78 y o  female with past medical history significant for depression, seizure, CHF diabetes mellitus type 2, morbid obesity, hypothyroidism and GERD  Patient was hospitalized twice during last month  Initial admission was from [address] [place] [address] for acute syncope episode  Syncope was attributed to orthostasis with physical able responds following diarrheal episode  Patient was noted to be severely depressed and started on Cymbalta  She was seen by PT OT services and discharged home with home health services  Patient had difficulty managing at home upon discharge  Per records patient remained on the bed, she was unable to take her medications  Patient was readmitted on 09/27 with generalized weakness  She was noted to have evidence of orthostatic hypotension attributed to poor oral intake along with diuretic use  She was treated with IV fluids with improvement in her blood pressure  Patient has poor oral intake and decreased motivation was suspected to be from severe depression  Psych was consulted  Home Cymbalta dose was increased to 60 mg daily    Patient was seen by PT OT services and subsequently discharged to [facility] rehab where she is being seen for post hospital admission  At the time of my evaluation patient is doing okay   Appetite remain slow  Past Medical History:   Diagnosis Date    CHF (congestive heart failure) (HCC)     Diabetes mellitus ([address] )     Disease of thyroid gland     Hyperlipidemia     Hypertension     Hypothyroidism (acquired)     Restless leg syndrome     Seizures (HCC)        Past Surgical History:   Procedure Laterality Date    APPENDECTOMY      CHOLECYSTECTOMY      HIP FRACTURE SURGERY Right     TONSILLECTOMY      TUBAL LIGATION         Social History     Tobacco Use   Smoking Status Never Smoker   Smokeless Tobacco Never Used   Tobacco Comment    not a smoker          Family History   Problem Relation Age of Onset    Brain cancer Mother     Seizures Father     Colon cancer Maternal Grandfather     No Known Problems Son     No Known Problems Son     Breast cancer Maternal Aunt 30        Allergies   Allergen Reactions    Pineapple - Food Allergy Anaphylaxis    Statins Other (See Comments)     Cramping from head to toe     Bupropion Other (See Comments)    Byetta 10 Mcg Pen [Exenatide]     Ceftriaxone Other (See Comments)    Marijuana [Dronabinol] GI Intolerance    Metformin Diarrhea     Other reaction(s): Unknown    Omeprazole Hives    Ondansetron      Other reaction(s): Unknown    Coconut Oil - Food Allergy Rash    Liraglutide Rash and Other (See Comments)     Generalized rash    Penicillins Rash          Current Outpatient Medications:     acetaminophen (TYLENOL) 325 mg tablet, Take 650 mg by mouth every 6 (six) hours as needed for mild pain (pain score 1-3)  (Patient not taking: No sig reported), Disp: , Rfl:     aspirin (ECOTRIN LOW STRENGTH) 81 mg EC tablet, Take 81 mg by mouth daily, Disp: , Rfl:     Cholecalciferol 25 MCG (1000 UT) capsule, Take 1,000 Units by mouth daily   (Patient not taking: No sig reported), Disp: , Rfl:     DULoxetine (CYMBALTA) 60 mg delayed release capsule, Take 1 capsule (60 mg total) by mouth daily Do not start before October 1, 2022 , Disp: , Rfl: 0    Empagliflozin 25 MG TABS, Take 25 mg by mouth every morning (Patient not taking: No sig reported), Disp: , Rfl:     Ergocalciferol (VITAMIN D2 PO), Take 50,000 Units/day by mouth 2 (two) times a week, Disp: , Rfl:     ezetimibe (ZETIA) 10 mg tablet, Take 1 tablet (10 mg total) by mouth daily (Patient not taking: Reported on 9/27/2022), Disp: 30 tablet, Rfl: 0    gabapentin (NEURONTIN) 100 mg capsule, Take 1 capsule (100 mg total) by mouth 2 (two) times a day Take 100 mg in the morning and 100 mg mid afternoon, Disp: 42 capsule, Rfl: 0    gabapentin (NEURONTIN) 300 mg capsule, Take 1 capsule (300 mg total) by mouth daily at bedtime, Disp: 21 capsule, Rfl: 0    hydrophilic ointment, Apply topically as needed for dry skin, Disp: , Rfl:     ibuprofen (MOTRIN) 400 mg tablet, Take by mouth every 6 (six) hours as needed for mild pain, Disp: , Rfl:     insulin glargine (LANTUS) 100 units/mL subcutaneous injection, Inject 30 Units under the skin daily at bedtime, Disp: 10 mL, Rfl: 0    insulin lispro (HumaLOG) 100 units/mL injection, Inject 1-5 Units under the skin 3 (three) times a day before meals, Disp: , Rfl: 0    levothyroxine 50 mcg tablet, Take 1 5 tablets (75 mcg total) by mouth daily, Disp: 45 tablet, Rfl: 0    melatonin 3 mg, Take 3 tablets (9 mg total) by mouth daily at bedtime, Disp: , Rfl: 0    nitrofurantoin (MACROBID) 100 mg capsule, Take 1 capsule (100 mg total) by mouth 2 (two) times a day with meals for 2 doses, Disp: 2 capsule, Rfl: 0    nystatin (MYCOSTATIN) powder, Apply topically 2 (two) times a day, Disp: 15 g, Rfl: 0    omeprazole (PriLOSEC) 20 mg delayed release capsule, Take 20 mg by mouth daily, Disp: , Rfl:     potassium chloride (K-DUR,KLOR-CON) 20 mEq tablet, Take 1 tablet (20 mEq total) by mouth daily, Disp: 30 tablet, Rfl: 0    [START ON 10/3/2022] spironolactone (ALDACTONE) 25 mg tablet, Take 1 tablet (25 mg total) by mouth daily Do not start before October 3, 2022 , Disp: 30 tablet, Rfl: 0    topiramate (TOPAMAX) 100 mg tablet, Take 200 mg by mouth 2 (two) times a day , Disp: , Rfl:     [START ON 10/3/2022] torsemide (DEMADEX) 20 mg tablet, Take 2 tablets (40 mg total) by mouth daily Do not start before October 3, 2022 , Disp: 60 tablet, Rfl: 0  No current facility-administered medications for this visit  Updated list was reviewed in [address] [place] [address] system of facility  Vitals:    10/01/22 1155   BP: 120/78   Pulse: 76   Resp: 18   Temp: 97 6 °F (36 4 °C)   SpO2: 95%       Vital signs were reviewed in point click care    Review of Systems   Constitutional: Positive for appetite change (decrease appetite) and fatigue  Negative for chills and fever  HENT: Negative for nosebleeds and rhinorrhea  Eyes: Negative for discharge and redness  Respiratory: Negative for cough, chest tightness, shortness of breath, wheezing and stridor  Cardiovascular: Negative for chest pain and leg swelling  Gastrointestinal: Negative for abdominal distention, abdominal pain, diarrhea and vomiting  Genitourinary: Negative for dysuria, flank pain and hematuria  Musculoskeletal: Positive for gait problem  Negative for arthralgias and back pain  Skin: Negative for pallor  Neurological: Positive for weakness (Generalized)  Negative for tremors, seizures, syncope and headaches  Psychiatric/Behavioral: Negative for agitation, behavioral problems and confusion  Depressed at times       Physical Exam  Constitutional:       Appearance: She is obese  HENT:      Head: Normocephalic and atraumatic  Eyes:      General: No scleral icterus  Right eye: No discharge  Left eye: No discharge  Cardiovascular:      Rate and Rhythm: Normal rate and regular rhythm  Pulmonary:      Breath sounds: No wheezing, rhonchi or rales     Abdominal:      General: There is no distension  Palpations: Abdomen is soft  Tenderness: There is no abdominal tenderness  There is no guarding  Musculoskeletal:      Cervical back: Neck supple  Right lower leg: No edema  Left lower leg: No edema  Skin:     General: Skin is dry  Coloration: Skin is not jaundiced or pale  Findings: Bruising (Ecchymosis RUE) present  Neurological:      General: No focal deficit present  Mental Status: She is oriented to person, place, and time  Cranial Nerves: No cranial nerve deficit  Motor: Weakness (Generalized) present  Psychiatric:         Mood and Affect: Mood normal          Behavior: Behavior normal            Diagnostic Data       Recent labs and imaging studies were reviewed  Lab Results   Component Value Date    SODIUM 138 09/30/2022    K 4 0 09/30/2022     09/30/2022    CO2 30 09/30/2022    BUN 23 09/30/2022    CREATININE 0 96 09/30/2022    GLUC 88 09/30/2022    CALCIUM 9 5 09/30/2022        Lab Results   Component Value Date    WBC 6 72 09/29/2022    HGB 15 2 09/29/2022    HCT 46 7 (H) 09/29/2022    MCV 90 09/29/2022     09/29/2022     Code Status:      Full code    :             This note was electronically signed by Dr Rafa Holt

## 2022-10-01 NOTE — ASSESSMENT & PLAN NOTE
Lab Results   Component Value Date    HGBA1C 11 8 (H) 09/19/2022     Recent hemoglobin A1c was uncontrolled at 11 8  Per records recent Accu-Cheks have been running on the lower side primarily due to decreased p o  intake and patient has decreased motivation to eat  Insulin dose was recently reduced  Patient currently remains on empagliflozin, insulin glargine 30 units daily along Humalog sliding scale coverage  Follow blood sugars and adjust insulin dose accordingly    Fasting blood sugar was 100 this morning

## 2022-10-03 NOTE — UTILIZATION REVIEW
Notification of Discharge   This is a Notification of Discharge from our facility [address]  Please be advised that this patient has been discharge from our facility  Below you will find the admission and discharge date and time including the patients disposition  UTILIZATION REVIEW CONTACT:  JENNIFER Stafford  Utilization   Network Utilization Review Department  Phone: [phone] x carefully listen to the prompts  All voicemails are confidential   Email: [email or web address]     PHYSICIAN ADVISORY SERVICES:  FOR [facility] REVIEW - MEDICAL NECESSITY DENIAL  Phone: [phone]  Fax: [phone]  Email: [email or web address]     PRESENTATION DATE: 9/27/2022  4:59 PM  OBERVATION ADMISSION DATE:   INPATIENT ADMISSION DATE: 9/27/22  6:41 PM   DISCHARGE DATE: 9/30/2022  6:30 PM  DISPOSITION: Non [facility] SN[facility]/SNU Non [facility] SN[facility]/TC[facility]/SNU      IMPORTANT INFORMATION:  Send all requests for admission clinical reviews, approved or denied determinations and any other requests to dedicated fax number below belonging to the campus where the patient is receiving treatment   List of dedicated fax numbers:  [address] DENIALS (Administrative/Medical Necessity) [phone]   [address] (Maternity/NICU/Pediatrics) [phone]   Colt Dalal [phone]   130 [facility] [phone]   [address] [place] [address] [phone]   [address] [phone]   [facility]  [phone]   [address] [place] [address], [facility]  24[address] [facility] 1000 [address] [facility] [phone]

## 2022-10-05 ENCOUNTER — OFFICE VISIT (OUTPATIENT)
Dept: CARDIOLOGY CLINIC | Facility: SKILLED NURSING FACILITY | Age: 67
End: 2022-10-05
Payer: COMMERCIAL

## 2022-10-05 ENCOUNTER — NURSING HOME VISIT (OUTPATIENT)
Dept: GERIATRICS | Facility: OTHER | Age: 67
End: 2022-10-05
Payer: COMMERCIAL

## 2022-10-05 ENCOUNTER — NURSING HOME VISIT (OUTPATIENT)
Dept: WOUND CARE | Facility: HOSPITAL | Age: 67
End: 2022-10-05
Payer: COMMERCIAL

## 2022-10-05 VITALS
BODY MASS INDEX: 49.57 KG/M2 | TEMPERATURE: 97.8 F | OXYGEN SATURATION: 96 % | HEART RATE: 76 BPM | WEIGHT: 271 LBS | DIASTOLIC BLOOD PRESSURE: 76 MMHG | SYSTOLIC BLOOD PRESSURE: 122 MMHG | RESPIRATION RATE: 18 BRPM

## 2022-10-05 DIAGNOSIS — I50.32 CHRONIC DIASTOLIC (CONGESTIVE) HEART FAILURE (HCC): ICD-10-CM

## 2022-10-05 DIAGNOSIS — E11.69 DIABETES MELLITUS TYPE 2 IN OBESE (HCC): ICD-10-CM

## 2022-10-05 DIAGNOSIS — E66.9 DIABETES MELLITUS TYPE 2 IN OBESE (HCC): ICD-10-CM

## 2022-10-05 DIAGNOSIS — B36.9 FUNGAL DERMATITIS: Primary | ICD-10-CM

## 2022-10-05 DIAGNOSIS — K21.9 GASTROESOPHAGEAL REFLUX DISEASE, UNSPECIFIED WHETHER ESOPHAGITIS PRESENT: ICD-10-CM

## 2022-10-05 DIAGNOSIS — Z79.4 TYPE 2 DIABETES MELLITUS WITH HYPERGLYCEMIA, WITH LONG-TERM CURRENT USE OF INSULIN (HCC): ICD-10-CM

## 2022-10-05 DIAGNOSIS — E11.65 TYPE 2 DIABETES MELLITUS WITH HYPERGLYCEMIA, WITH LONG-TERM CURRENT USE OF INSULIN (HCC): ICD-10-CM

## 2022-10-05 DIAGNOSIS — E11.69 DIABETES MELLITUS TYPE 2 IN OBESE (HCC): Primary | ICD-10-CM

## 2022-10-05 DIAGNOSIS — E66.9 DIABETES MELLITUS TYPE 2 IN OBESE (HCC): Primary | ICD-10-CM

## 2022-10-05 DIAGNOSIS — E66.01 MORBID OBESITY (HCC): ICD-10-CM

## 2022-10-05 DIAGNOSIS — R07.9 CHEST PAIN, UNSPECIFIED TYPE: Primary | ICD-10-CM

## 2022-10-05 PROCEDURE — 99304 1ST NF CARE SF/LOW MDM 25: CPT | Performed by: NURSE PRACTITIONER

## 2022-10-05 PROCEDURE — 99305 1ST NF CARE MODERATE MDM 35: CPT | Performed by: INTERNAL MEDICINE

## 2022-10-05 PROCEDURE — 99309 SBSQ NF CARE MODERATE MDM 30: CPT | Performed by: INTERNAL MEDICINE

## 2022-10-05 NOTE — PROGRESS NOTES
[facility]  [address], [address] [place] [address]  [id number]    Progress note    NAME: Roxanna Baumgarten  AGE: 78 y o  SEX: female [id number]      Patient Location     [facility]    Patients care was coordinated with nursing facility staff  Recent vitals, labs and updated medications were reviewed on [facility] system of facility  Past Medical, surgical, social, medication and allergy history and patients previous records reviewed  Assessment/Plan:    Diabetes mellitus type 2 in obese [facility])    Lab Results   Component Value Date    HGBA1C 11 8 (H) 09/19/2022     Recent HGBA1C was uncontrolled however most recent accu checks remain stable suspect due to decrease po intake  Continue Insulin Glaring  30 units qhs, SS coverage and Empagliflozin       Generalized weakness:  Patient was admitted twice during last month  Initial admission was for syncope attributed to orthostatic hypotension attributed to volume depletion and decrease p o  intake  Continue PT OT  Major depressive disorder;  Continue Duloxetine 60 mg daily  F U with psychiatry service    Abnormal urine:  UA revealed pyuria upon admission  Patient had mild urinary symptoms  She is being treated with 3 day course of Macrobid for suspected UTI    Nausea with chest heaviness:  Pt c/o nausea, decrease appetite and chest heaviness at times  Suspect GI related  EKG was negative for ischemic changes  Cardiac workup including cardiac cath within the last few years have been negative  Hypothyroidism:  Continue levothyroxine 75 mcg daily  Lab Results   Component Value Date    [id number] 6 866 (H) 09/19/2022   Repeat TSH in 8 weeks    GERD:  Pt c/o chest heaviness at times with nausea  EKG was unrevealing , possibly uncontrolled GERD   Will increase Omeprazole to 20 mg bid and follow    Neuropathy:  Continue gabapentin 300 mg qhs    Chronic heart failure with preserved EF:  Stable  Continue maintenance dose of torsemide 40 mg daily and spironolactone 25 mg daily  Monitor weights  Pt was evaluated by cardiology service earlier    History of seizure:  Stable on topiramate    Hyperlipidemia:  Continue ezetimibe    Cutaneous candidiasis:  Fungal rash reported under the breast  Pt is being followed by wound care team  Clotrimazole prescribed    Reason for visit      F U generalized weakness, ambulatory dysfunction, diabetes mellitus type 2, hypothyroidism, depression    HPI     Pt is being seen for a follow up visit  Reports not feeling well in general   C/O nausea  Upon questioning admits to having chest heaviness without any overt pain  Remains on Oxygen  Denies any vomiting, diarrhea, abdominal pain or worsening dyspnea  Pt was noted to have fungal rash under the breasts, started on topical antifungals  Blood sugars remains stable, mostly under 200   Pt reports decreased appetite    Past Medical History:   Diagnosis Date    [facility]estive [facility]) [facility] )     [facility]     Disease of thyroid gland     Hyperlipidemia     Hypertension     Hypothyroidism (acquired)     Restless leg syndrome     Seizures (HCC)        Past Surgical History:   Procedure Laterality Date    APPENDECTOMY      CHOLECYSTECTOMY      HIP FRACTURE SURGERY Right     TONSILLECTOMY      TUBAL LIGATION         Social History     Tobacco Use   Smoking Status Never Smoker   Smokeless Tobacco Never Used   Tobacco Comment    not a smoker          Family History   Problem Relation Age of Onset    Brain cancer Mother     Seizures Father     Colon cancer Maternal Grandfather     No Known Problems Son     No Known Problems Son     Breast cancer Maternal Aunt 30        Allergies   Allergen Reactions    Pineapple - Food Allergy Anaphylaxis    Statins Other (See Comments)     Cramping from head to toe     Bupropion Other (See Comments)    Byetta 10 Mcg Pen [Exenatide]     Ceftriaxone Other (See Comments)    Marijuana [Dronabinol] GI Intolerance    Metformin Diarrhea     Other reaction(s): Unknown    Omeprazole Hives    Ondansetron      Other reaction(s): Unknown    Coconut Oil - Food Allergy Rash    Liraglutide Rash and Other (See Comments)     Generalized rash    Penicillins Rash          Current Outpatient Medications:     acetaminophen (TYLENOL) 325 mg tablet, Take 650 mg by mouth every 6 (six) hours as needed for mild pain (pain score 1-3)  (Patient not taking: No sig reported), Disp: , Rfl:     aspirin (ECOTRIN LOW STRENGTH) 81 mg EC tablet, Take 81 mg by mouth daily, Disp: , Rfl:     Cholecalciferol 25 MCG (1000 UT) capsule, Take 1,000 Units by mouth daily   (Patient not taking: No sig reported), Disp: , Rfl:     DULoxetine (CYMBALTA) 60 mg delayed release capsule, Take 1 capsule (60 mg total) by mouth daily Do not start before October 1, 2022 , Disp: , Rfl: 0    Empagliflozin 25 MG TABS, Take 25 mg by mouth every morning (Patient not taking: No sig reported), Disp: , Rfl:     Ergocalciferol (VITAMIN D2 PO), Take 50,000 Units/day by mouth 2 (two) times a week, Disp: , Rfl:     ezetimibe (ZETIA) 10 mg tablet, Take 1 tablet (10 mg total) by mouth daily (Patient not taking: Reported on 9/27/2022), Disp: 30 tablet, Rfl: 0    gabapentin (NEURONTIN) 100 mg capsule, Take 1 capsule (100 mg total) by mouth 2 (two) times a day Take 100 mg in the morning and 100 mg mid afternoon, Disp: 42 capsule, Rfl: 0    gabapentin (NEURONTIN) 300 mg capsule, Take 1 capsule (300 mg total) by mouth daily at bedtime, Disp: 21 capsule, Rfl: 0    hydrophilic ointment, Apply topically as needed for dry skin, Disp: , Rfl:     insulin glargine (LANTUS) 100 units/mL subcutaneous injection, Inject 30 Units under the skin daily at bedtime, Disp: 10 mL, Rfl: 0    insulin lispro (HumaLOG) 100 units/mL injection, Inject 1-5 Units under the skin 3 (three) times a day before meals, Disp: , Rfl: 0    levothyroxine 50 mcg tablet, Take 1 5 tablets (75 mcg total) by mouth daily, Disp: 45 tablet, Rfl: 0    melatonin 3 mg, Take 3 tablets (9 mg total) by mouth daily at bedtime, Disp: , Rfl: 0    nystatin (MYCOSTATIN) powder, Apply topically 2 (two) times a day, Disp: 15 g, Rfl: 0    omeprazole (PriLOSEC) 20 mg delayed release capsule, Take 20 mg by mouth daily, Disp: , Rfl:     potassium chloride (K-DUR,KLOR-CON) 20 mEq tablet, Take 1 tablet (20 mEq total) by mouth daily, Disp: 30 tablet, Rfl: 0    spironolactone (ALDACTONE) 25 mg tablet, Take 1 tablet (25 mg total) by mouth daily Do not start before October 3, 2022 , Disp: 30 tablet, Rfl: 0    topiramate (TOPAMAX) 100 mg tablet, Take 200 mg by mouth 2 (two) times a day , Disp: , Rfl:     torsemide (DEMADEX) 20 mg tablet, Take 2 tablets (40 mg total) by mouth daily Do not start before October 3, 2022 , Disp: 60 tablet, Rfl: 0    Updated list was reviewed in [facility] system of facility  Vitals:    10/05/22 1408   BP: 122/76   Pulse: 76   Resp: 18   Temp: 97 8 °F (36 6 °C)   SpO2: 96%       Vital signs were reviewed in point [facility] care    Review of Systems   Constitutional: Positive for appetite change (decrease appetite) and fatigue  Negative for chills and fever  Respiratory: Negative for cough, chest tightness, shortness of breath and wheezing  Cardiovascular: Negative for chest pain and leg swelling  Gastrointestinal: Positive for nausea  Negative for abdominal pain, diarrhea and vomiting  Chest heaviness associated with nausea   Genitourinary: Negative for hematuria  Musculoskeletal: Positive for gait problem  Negative for arthralgias and back pain  Skin: Positive for rash (Fungal rash under the breasts)  Negative for pallor  Neurological: Positive for weakness (Generalized)  Negative for tremors and seizures  Psychiatric/Behavioral: Negative for agitation, behavioral problems and confusion  Physical Exam  Constitutional:       Appearance: She is obese     HENT: Head: Normocephalic and atraumatic  Eyes:      General: No scleral icterus  Cardiovascular:      Rate and Rhythm: Normal rate and regular rhythm  Pulmonary:      Breath sounds: No wheezing, rhonchi or rales  Abdominal:      General: There is no distension  Palpations: Abdomen is soft  Tenderness: There is no abdominal tenderness  There is no guarding  Musculoskeletal:      Cervical back: Neck supple  Right lower leg: No edema  Left lower leg: No edema  Skin:     General: Skin is dry  Coloration: Skin is not jaundiced or pale  Findings: Bruising (Ecchymosis RUE) present  Neurological:      General: No focal deficit present  Cranial Nerves: No cranial nerve deficit  Motor: Weakness (Generalized) present  Comments: Oriented in month and year   Psychiatric:         Mood and Affect: Mood normal          Behavior: Behavior normal          Diagnostic Data       Recent labs and imaging studies were reviewed  Lab Results   Component Value Date    SODIUM 138 09/30/2022    K 4 0 09/30/2022     09/30/2022    CO2 30 09/30/2022    BUN 23 09/30/2022    CREATININE 0 96 09/30/2022    GLUC 88 09/30/2022    CALCIUM 9 5 09/30/2022        Lab Results   Component Value Date    WBC 6 72 09/29/2022    HGB 15 2 09/29/2022    HCT 46 7 (H) 09/29/2022    MCV 90 09/29/2022     09/29/2022     Code Status:      Full code    :             This note was electronically signed by Dr Charmayne Obey

## 2022-10-05 NOTE — PROGRESS NOTES
[address] [facility]NA[facility]   [facility]       Patient ID: Cece George is a 78 y o  female Date of Birth 1955     Location of Service: [address] [place] [address]    Performed wound round with: Wound team     Chief Complaint : :Left breast    Wound Instructions:  Wound:  Left breast  Discontinue previous wound order  Cleanse the wound bed with soap and water, pat dry  Apply lotrisone cream to wound bed and cover with ABD pad  Frequency : twice a day and prn for soiling  Offload all wounds  Turn and reposition frequently, maximum of every two hours  Instruct / Assist with weight shifting every 15 - 20 minutes when in chair  Increase protein intake  Monitor for any sign of infection or worsening, inform PCP or patient's primary physician in your facility  Allergies  Pineapple - food allergy, Statins, Bupropion, Byetta 10 mcg pen [exenatide], Ceftriaxone, Marijuana [dronabinol], Metformin, Omeprazole, Ondansetron, Coconut oil - food allergy, Liraglutide, and Penicillins      Assessment & Plan:  1  Fungal dermatitis  Assessment & Plan:  Left breast  -+ rashes on the under the left breast due to increase moisture from perspiration  - ordered Lotrisone cream  - follow up next week      2  Diabetes mellitus type 2 in obese [facility])  Assessment & Plan:    Lab Results   Component Value Date    HGBA1C 11 8 (H) 09/19/2022    A1C results reviewed with the patient today  - uncontrolled  - under the care of Senior care team             Subjective:   10/5/2022This is a 78 y o , female referred to our service for wound/ skin alterations on left breat  Patient have a complex medical history including but not limited to  has  CHF (congestive heart failure) ([address] ), Diabetes mellitus ([address] ), Disease of thyroid gland, Hyperlipidemia, Hypertension, Hypothyroidism (acquired), Restless leg syndrome, and Seizures ([address] )     Patient was referred by Senior Care Team  Patient was seen in collaboration with the facility wound team       Received patient in bed, seems comfortable  As per patient, she have the rashes on the left breast for long time  No current treatment  No other significant issues related to the wound  Review of Systems   Constitutional: Negative  HENT: Negative  Eyes: Negative  Respiratory: Negative  Cardiovascular: Negative for chest pain and leg swelling  Gastrointestinal: Negative  Endocrine: Negative  Genitourinary: Negative  Musculoskeletal: Positive for gait problem  Skin: Positive for rash  See HPI   Neurological: Negative for dizziness and headaches  Psychiatric/Behavioral: Negative for behavioral problems  Objective:    Physical Exam  Constitutional:       Appearance: She is obese  HENT:      Head: Normocephalic and atraumatic  Nose: Nose normal       Mouth/Throat:      Pharynx: Oropharynx is clear  Eyes:      Conjunctiva/sclera: Conjunctivae normal    Cardiovascular:      Rate and Rhythm: Normal rate  Pulmonary:      Effort: Pulmonary effort is normal    Abdominal:      Tenderness: There is no abdominal tenderness  There is no guarding  Genitourinary:     Comments: incontinent  Musculoskeletal:         General: No tenderness  Cervical back: Normal range of motion  Right lower leg: No edema  Left lower leg: No edema  Comments: LROM   Skin:     Findings: Erythema and rash present  Comments: Left breast - red, excoriated, with small amount of serous drainage, 13 x 7 cm  , positive tenderness on palpation   Neurological:      Mental Status: She is alert  Gait: Gait abnormal    Psychiatric:         Mood and Affect: Mood normal          Behavior: Behavior normal               Procedures           Patient's care was coordinated with nursing facility staff  Recent vitals, labs and updated medications were reviewed on EMR or chart system of facility   Past Medical, surgical, social, medication and allergy history and patient's previous records were reviewed and updated as appropriate: Most up-to date information is available in the facility EMR where the patient is currently admitted      Patient Active Problem List   Diagnosis    Seizures ([place] [address] )    Restless leg syndrome    Hypertension    Hyperlipidemia    Diabetes mellitus ([place] [address] )    GERD (gastroesophageal reflux disease)    Epigastric abdominal pain    Encounter for examination and observation for other specified reasons    Severe episode of recurrent major depressive disorder, without psychotic features ([facility] )    Gambling disorder, episodic, moderate    Suspected COVID-19 virus infection    Major depressive disorder    Acquired hypothyroidism    Diarrhea    Asymptomatic bacteriuria    Chronic fatigue and malaise    Calf tenderness    Morbid obesity due to excess calories ([facility])    Onychomycosis of toenail    Orthostatic hypotension    Primary osteoarthritis of one hip, right    Dyspnea on exertion    Chronic heart failure with preserved ejection fraction ([facility])    Chronic respiratory failure ([place] [address] )    Coronary artery disease involving native coronary artery of native heart without angina pectoris    Syncope    Elevated d-dimer    Weakness    Abnormal urine    Diabetes mellitus type 2 in obese (HCC)    Fungal dermatitis     Past Medical History:   Diagnosis Date    CHF (congestive heart failure) (HCC)     Diabetes mellitus ([place] [address] )     Disease of thyroid gland     Hyperlipidemia     Hypertension     Hypothyroidism (acquired)     Restless leg syndrome     Seizures (HCC)      Past Surgical History:   Procedure Laterality Date    APPENDECTOMY      CHOLECYSTECTOMY      HIP FRACTURE SURGERY Right     TONSILLECTOMY      TUBAL LIGATION       Social History     Socioeconomic History    Marital status: Single     Spouse name: None    Number of children: None    Years of education: None    Highest education level: None   Occupational History    None   Tobacco Use    Smoking status: Never Smoker    Smokeless tobacco: Never Used    Tobacco comment: not a smoker   Vaping Use    Vaping Use: Never used   Substance and Sexual Activity    Alcohol use: Yes     Comment: occasional    Drug use: Never    Sexual activity: Not Currently   Other Topics Concern    None   Social History Narrative    None     Social Determinants of Health     Financial Resource Strain: Not on file   Food Insecurity: No Food Insecurity    Worried About Running Out of Food in the Last Year: Never true    Shankar of Food in the Last Year: Never true   Transportation Needs: No Transportation Needs    Lack of Transportation (Medical): No    Lack of Transportation (Non-Medical): No   Physical Activity: Not on file   Stress: Not on file   Social Connections: Not on file   Intimate Partner Violence: Not on file   Housing Stability: Unknown    Unable to Pay for Housing in the Last Year: No    Number of Places Lived in the Last Year: Not on file    Unstable Housing in the Last Year: No        Current Outpatient Medications:     acetaminophen (TYLENOL) 325 mg tablet, Take 650 mg by mouth every 6 (six) hours as needed for mild pain (pain score 1-3)  (Patient not taking: No sig reported), Disp: , Rfl:     aspirin (ECOTRIN LOW STRENGTH) 81 mg EC tablet, Take 81 mg by mouth daily, Disp: , Rfl:     Cholecalciferol 25 MCG (1000 UT) capsule, Take 1,000 Units by mouth daily   (Patient not taking: No sig reported), Disp: , Rfl:     DULoxetine (CYMBALTA) 60 mg delayed release capsule, Take 1 capsule (60 mg total) by mouth daily Do not start before October 1, 2022 , Disp: , Rfl: 0    Empagliflozin 25 MG TABS, Take 25 mg by mouth every morning (Patient not taking: No sig reported), Disp: , Rfl:     Ergocalciferol (VITAMIN D2 PO), Take 50,000 Units/day by mouth 2 (two) times a week, Disp: , Rfl:     ezetimibe (ZETIA) 10 mg tablet, Take 1 tablet (10 mg total) by mouth daily (Patient not taking: Reported on 9/27/2022), Disp: 30 tablet, Rfl: 0    gabapentin (NEURONTIN) 100 mg capsule, Take 1 capsule (100 mg total) by mouth 2 (two) times a day Take 100 mg in the morning and 100 mg mid afternoon, Disp: 42 capsule, Rfl: 0    gabapentin (NEURONTIN) 300 mg capsule, Take 1 capsule (300 mg total) by mouth daily at bedtime, Disp: 21 capsule, Rfl: 0    hydrophilic ointment, Apply topically as needed for dry skin, Disp: , Rfl:     insulin glargine (LANTUS) 100 units/mL subcutaneous injection, Inject 30 Units under the skin daily at bedtime, Disp: 10 mL, Rfl: 0    insulin lispro (HumaLOG) 100 units/mL injection, Inject 1-5 Units under the skin 3 (three) times a day before meals, Disp: , Rfl: 0    levothyroxine 50 mcg tablet, Take 1 5 tablets (75 mcg total) by mouth daily, Disp: 45 tablet, Rfl: 0    melatonin 3 mg, Take 3 tablets (9 mg total) by mouth daily at bedtime, Disp: , Rfl: 0    nystatin (MYCOSTATIN) powder, Apply topically 2 (two) times a day, Disp: 15 g, Rfl: 0    omeprazole (PriLOSEC) 20 mg delayed release capsule, Take 20 mg by mouth daily, Disp: , Rfl:     potassium chloride (K-DUR,KLOR-CON) 20 mEq tablet, Take 1 tablet (20 mEq total) by mouth daily, Disp: 30 tablet, Rfl: 0    spironolactone (ALDACTONE) 25 mg tablet, Take 1 tablet (25 mg total) by mouth daily Do not start before October 3, 2022 , Disp: 30 tablet, Rfl: 0    topiramate (TOPAMAX) 100 mg tablet, Take 200 mg by mouth 2 (two) times a day , Disp: , Rfl:     torsemide (DEMADEX) 20 mg tablet, Take 2 tablets (40 mg total) by mouth daily Do not start before October 3, 2022 , Disp: 60 tablet, Rfl: 0  Family History   Problem Relation Age of Onset    Brain cancer Mother     Seizures Father     Colon cancer Maternal Grandfather     No Known Problems Son     No Known Problems Son     Breast cancer Maternal Aunt 30              Coordination of Care: Wound team aware of the treatment plan   Facility nurse will provide wound treatment and monitor the wound for any changes  Patient / Staff education : Patient / Staff was given education on sign of infection and pressure ulcer prevention  Patient/ Staff verbalized understanding     Follow up :  Next week    Voice-recognition software may have been used in the preparation of this document  Occasional wrong word or "sound-alike" substitutions may have occurred due to the inherent limitations of voice recognition software  Interpretation should be guided by context        Flip Berry

## 2022-10-05 NOTE — ASSESSMENT & PLAN NOTE
Lab Results   Component Value Date    HGBA1C 11 8 (H) 09/19/2022    A1C results reviewed with the patient today     - uncontrolled  - under the care of Senior care team

## 2022-10-05 NOTE — ASSESSMENT & PLAN NOTE
Lab Results   Component Value Date    HGBA1C 11 8 (H) 09/19/2022     Recent HGBA1C was uncontrolled however most recent accu checks remain stable suspect due to decrease po intake   Continue Insulin Glaring  30 units qhs, SS coverage and Empagliflozin

## 2022-10-05 NOTE — PROGRESS NOTES
[facility] [place] [address] [address] [place] [address]  Phone#  [phone]  Fax#  [phone]                                               Cardiology Nursing Home Visit - Consult  Angie Mack, 78 y o  female  YOB: 1955  MRN: [id number] Encounter: [id number]      PCP - Gianluca Pack MD  Outpatient cardiologist - Zachery Blackwood    No chief complaint on file  Assessment  Chronic diastolic heart failure  Chest tightness  Nausea  CAD - non-obstructive  GERD  Morbid Obesity - BMI 49 6    Plan  Chest discomfort, nausea  ECG today - NSR, RBBB, LAFB (unchanged compared to prior ECG from 09/19/2022)  Symptoms atypical, possibly related to GI etiology (recently was admitted for   Personally reviewed images of prior nuclear stress test and left heart catheterization from March 2021  Nuclear stress test - 3/4/21 - reversible defect of apical anterior wall - ?breast artifact v ischemia  LHC - 3/5/21 - pLAD 25-30%, rest - MLI, LVEF 65%, mildly elevated EDP  She does have multiple risk factors for CAD including uncontrolled diabetes, but considering negative cardiac catheterization in March 2021, overall atypical symptoms, unchanged ECG and negative troponins on several occasions recently, less likely to be related to CAD  She continues to have symptoms, then can consider repeat stress testing  Symptoms suspicious for GI etiology - agree with PPI trial, and consider further GI evaluation    Chronic diastolic heart failure  Appears near euvolemic on clinical exam  Weight down from 284 lb earlier this year to 271 lb currently  Continue torsemide 40 mg daily plus spironolactone 25 mg daily    Discussed with primary medical service Dr Alyssa Byrnes    No results found for this visit on 10/05/22  No orders of the defined types were placed in this encounter  Return in about 4 weeks (around 11/2/2022), or if symptoms worsen or fail to improve        History of Present Illness 78 y o  female is currently admitted to the [facility] rehabilitation center after recent discharge from [facility] on 9/30/22  We are currently consulted to evaluate and assist with management of her cardiac problems  She had originally presented to [facility] on 09/27/2022 with complains of weakness, diarrhea  She was found to have positive orthostatics, she was felt to be related to poor oral intake  Her diuretics were held at that time  Additionally she was found to have UTI and was treated with antibiotics as well  She was subsequently discharged to rehab here  She now again reports symptoms of chest pressure/tightness on laying down, associated with some nausea  No associated shortness of breath or diaphoresis  No complains of palpitations, dizziness  She received a dose of Zofran short while before my arrival, but currently continues to report some discomfort  Historical Information   Past Medical History:   Diagnosis Date    [facility]congest[facility] failure[facility] [address] [facility]     Diabetes mellitus ([facility] [address] [facility]     Disease of thyroid gland     Hyperlipidemia     Hypertension     Hypothyroidism (acquired)     Restless leg syndrome     Seizures (HCC)      Past Surgical History:   Procedure Laterality Date    APPENDECTOMY      CHOLECYSTECTOMY      HIP FRACTURE SURGERY Right     TONSILLECTOMY      TUBAL LIGATION       Family History   Problem Relation Age of Onset    Brain cancer Mother     Seizures Father     Colon cancer Maternal Grandfather     No Known Problems Son     No Known Problems Son     Breast cancer Maternal Aunt 30     Current Outpatient Medications on File Prior to Visit   Medication Sig Dispense Refill    acetaminophen (TYLENOL) 325 mg tablet Take 650 mg by mouth every 6 (six) hours as needed for mild pain (pain score 1-3)   (Patient not taking: No sig reported)      aspirin (ECOTRIN LOW STRENGTH) 81 mg EC tablet Take 81 mg by mouth daily      Cholecalciferol 25 MCG (1000 UT) capsule Take 1,000 Units by mouth daily   (Patient not taking: No sig reported)      DULoxetine (CYMBALTA) 60 mg delayed release capsule Take 1 capsule (60 mg total) by mouth daily Do not start before October 1, 2022   0    Empagliflozin 25 MG TABS Take 25 mg by mouth every morning (Patient not taking: No sig reported)      Ergocalciferol (VITAMIN D2 PO) Take 50,000 Units/day by mouth 2 (two) times a week      ezetimibe (ZETIA) 10 mg tablet Take 1 tablet (10 mg total) by mouth daily (Patient not taking: Reported on 9/27/2022) 30 tablet 0    gabapentin (NEURONTIN) 100 mg capsule Take 1 capsule (100 mg total) by mouth 2 (two) times a day Take 100 mg in the morning and 100 mg mid afternoon 42 capsule 0    gabapentin (NEURONTIN) 300 mg capsule Take 1 capsule (300 mg total) by mouth daily at bedtime 21 capsule 0    hydrophilic ointment Apply topically as needed for dry skin      insulin glargine (LANTUS) 100 units/mL subcutaneous injection Inject 30 Units under the skin daily at bedtime 10 mL 0    insulin lispro (HumaLOG) 100 units/mL injection Inject 1-5 Units under the skin 3 (three) times a day before meals  0    levothyroxine 50 mcg tablet Take 1 5 tablets (75 mcg total) by mouth daily 45 tablet 0    melatonin 3 mg Take 3 tablets (9 mg total) by mouth daily at bedtime  0    nystatin (MYCOSTATIN) powder Apply topically 2 (two) times a day 15 g 0    omeprazole (PriLOSEC) 20 mg delayed release capsule Take 20 mg by mouth daily      potassium chloride (K-DUR,KLOR-CON) 20 mEq tablet Take 1 tablet (20 mEq total) by mouth daily 30 tablet 0    spironolactone (ALDACTONE) 25 mg tablet Take 1 tablet (25 mg total) by mouth daily Do not start before October 3, 2022  30 tablet 0    topiramate (TOPAMAX) 100 mg tablet Take 200 mg by mouth 2 (two) times a day       torsemide (DEMADEX) 20 mg tablet Take 2 tablets (40 mg total) by mouth daily Do not start before October 3, 2022  60 tablet 0    [DISCONTINUED] ibuprofen (MOTRIN) 400 mg tablet Take by mouth every 6 (six) hours as needed for mild pain       No current facility-administered medications on file prior to visit  Allergies   Allergen Reactions    Pineapple - Food Allergy Anaphylaxis    Statins Other (See Comments)     Cramping from head to toe     Bupropion Other (See Comments)    Byetta 10 Mcg Pen [Exenatide]     Ceftriaxone Other (See Comments)    Marijuana [Dronabinol] GI Intolerance    Metformin Diarrhea     Other reaction(s): Unknown    Omeprazole Hives    Ondansetron      Other reaction(s): Unknown    Coconut Oil - Food Allergy Rash    Liraglutide Rash and Other (See Comments)     Generalized rash    Penicillins Rash     Social History     Socioeconomic History    Marital status: Single     Spouse name: None    Number of children: None    Years of education: None    Highest education level: None   Occupational History    None   Tobacco Use    Smoking status: Never Smoker    Smokeless tobacco: Never Used    Tobacco comment: not a smoker   Vaping Use    Vaping Use: Never used   Substance and Sexual Activity    Alcohol use: Yes     Comment: occasional    Drug use: Never    Sexual activity: Not Currently   Other Topics Concern    None   Social History Narrative    None     Social Determinants of Health     Financial Resource Strain: Not on file   Food Insecurity: No Food Insecurity    Worried About Running Out of Food in the Last Year: Never true    Shankar of Food in the Last Year: Never true   Transportation Needs: No Transportation Needs    Lack of Transportation (Medical): No    Lack of Transportation (Non-Medical):  No   Physical Activity: Not on file   Stress: Not on file   Social Connections: Not on file   Intimate Partner Violence: Not on file   Housing Stability: Unknown    Unable to Pay for Housing in the Last Year: No    Number of Places Lived in the Last Year: Not on file    Unstable Housing in the Last Year: No        Review of Systems   Gastrointestinal: Positive for nausea  All other systems reviewed and are negative  Vitals: There were no vitals filed for this visit  BMI - There is no height or weight on file to calculate BMI  Wt Readings from Last 7 Encounters:   10/05/22 123 kg (271 lb)   10/01/22 123 kg (271 lb 6 4 oz)   09/27/22 123 kg (271 lb 2 7 oz)   09/22/22 127 kg (280 lb 9 6 oz)   06/08/22 129 kg (284 lb 6 3 oz)   03/25/22 129 kg (284 lb)   02/19/22 123 kg (271 lb 2 7 oz)       Physical Exam  Vitals and nursing note reviewed  Constitutional:       General: She is not in acute distress  Appearance: Normal appearance  She is well-developed  She is obese  She is not ill-appearing  HENT:      Head: Normocephalic and atraumatic  Nose: No congestion  Eyes:      General: No scleral icterus  Conjunctiva/sclera: Conjunctivae normal    Neck:      Vascular: No carotid bruit or JVD  Cardiovascular:      Rate and Rhythm: Normal rate and regular rhythm  Pulses: Normal pulses  Heart sounds: Normal heart sounds  No murmur heard  No friction rub  No gallop  Pulmonary:      Effort: Pulmonary effort is normal  No respiratory distress  Breath sounds: Normal breath sounds  No rales  Abdominal:      General: There is no distension  Palpations: Abdomen is soft  Tenderness: There is no abdominal tenderness  Musculoskeletal:         General: No swelling or tenderness  Cervical back: Neck supple  Right lower leg: No edema  Left lower leg: No edema  Skin:     General: Skin is warm  Neurological:      General: No focal deficit present  Mental Status: She is alert and oriented to person, place, and time  Mental status is at baseline  Psychiatric:         Mood and Affect: Mood normal          Behavior: Behavior normal          Thought Content:  Thought content normal          Labs:      CBC:   Lab Results   Component Value Date    WBC 6 72 09/29/2022    RBC 5 18 (H) 09/29/2022    HGB 15 2 09/29/2022    HCT 46 7 (H) 09/29/2022    MCV 90 09/29/2022     09/29/2022    RDW 12 7 09/29/2022       CMP:   Lab Results   Component Value Date     01/02/2016    K 4 0 09/30/2022     09/30/2022    CO2 30 09/30/2022    ANIONGAP 5 01/02/2016    BUN 23 09/30/2022    CREATININE 0 96 09/30/2022    EGFR 61 09/30/2022    GLUCOSE 212 (H) 12/04/2020    CALCIUM 9 5 09/30/2022    AST 15 09/30/2022    ALT 8 09/30/2022    ALKPHOS 80 09/30/2022    PROT 7 0 01/01/2016    BILITOT 0 32 01/01/2016       Magnesium:  Lab Results   Component Value Date    MG 1 7 (L) 09/21/2022       Lipid Profile:   Lab Results   Component Value Date    CHOL 197 01/02/2016    HDL 33 (L) 09/20/2022    TRIG 156 (H) 09/20/2022    LDLCALC 124 (H) 09/20/2022       Thyroid Studies:   Lab Results   Component Value Date    ALD3MIUXQRWF 6 866 (H) 09/19/2022    FREET4 0 85 07/08/2016       A1c:  No components found for: HGA1C    INR:  Lab Results   Component Value Date    INR 0 96 03/03/2021    INR 1 01 01/01/2016    INR 1 03 03/05/2014   5    Imaging: X-ray chest 1 view portable    Result Date: 9/19/2022  Narrative: CHEST INDICATION:   chest pain  COMPARISON:  6/23/2022 EXAM PERFORMED/VIEWS:  XR CHEST PORTABLE Single view FINDINGS: Cardiomediastinal silhouette is mildly enlarged The lungs are clear  No pneumothorax or pleural effusion  Osseous structures appear within normal limits for patient age  Impression: No acute cardiopulmonary disease  Findings are stable Workstation performed: [id number]     XR hip/pelv 2-3 vws right if performed    Result Date: 9/19/2022  Narrative: RIGHT HIP INDICATION:   R hip pain  COMPARISON:  12/10/2021 VIEWS:  XR HIP/PELV 2-3 VWS RIGHT W PELVIS IF PERFORMED Images: 3 FINDINGS: 2 posterior acetabular screws are again evident at the right hip There is no acute fracture or dislocation   Moderate degenerative arthritis both hips, unchanged No lytic or blastic osseous lesion  Soft tissues are unremarkable  The visualized lumbar spine is unremarkable  Impression: Degenerative changes both hips No acute osseous abnormality  Similar to prior study Workstation performed: [id number]     CT head without contrast    Result Date: 9/19/2022  Narrative: CT BRAIN - WITHOUT CONTRAST INDICATION:   syncope  COMPARISON:  August 1, 2020 TECHNIQUE:  CT examination of the brain was performed  In addition to axial images, sagittal and coronal 2D reformatted images were created and submitted for interpretation  Radiation dose length product (DLP) for this visit:  1251 mGy-cm   This examination, like all CT scans performed in the [facility], was performed utilizing techniques to minimize radiation dose exposure, including the use of iterative reconstruction and automated exposure control  IMAGE QUALITY:  Diagnostic  FINDINGS: PARENCHYMA: Decreased attenuation is noted in periventricular and subcortical white matter demonstrating an appearance that is statistically most likely to represent mild microangiopathic change  No CT signs of acute infarction  No intracranial mass, mass effect or midline shift  No acute parenchymal hemorrhage  VENTRICLES AND EXTRA-AXIAL SPACES:  Normal for the patient's age  VISUALIZED ORBITS AND PARANASAL SINUSES:  Unremarkable  CALVARIUM AND EXTRACRANIAL SOFT TISSUES:  Normal      Impression: No acute intracranial abnormality  Workstation performed: [id number]     CT spine cervical without contrast    Result Date: 9/19/2022  Narrative: CT CERVICAL SPINE - WITHOUT CONTRAST INDICATION:   fall  COMPARISON:  None  TECHNIQUE:  CT examination of the cervical spine was performed without intravenous contrast   Contiguous axial images were obtained  Sagittal and coronal reconstructions were performed  Radiation dose length product (DLP) for this visit:  399 mGy-cm     This examination, like all CT scans performed in the [facility], was performed utilizing techniques to minimize radiation dose exposure, including the use of iterative reconstruction and automated exposure control  IMAGE QUALITY:  Diagnostic  FINDINGS: ALIGNMENT:  Normal alignment of the cervical spine  No subluxation  VERTEBRAL BODIES:  No fracture  DEGENERATIVE CHANGES:  Mild multilevel cervical degenerative changes are noted without critical central canal stenosis  PREVERTEBRAL AND PARASPINAL SOFT TISSUES:  Unremarkable  THORACIC INLET:  Please refer to the concurrent chest, abdomen, and pelvic CT report for description of the thoracic inlet findings  Impression: No cervical spine fracture or traumatic malalignment  Workstation performed: [id number]     PE Study with CT abdomen & pelvis with contrast    Result Date: 9/19/2022  Narrative: CT PULMONARY ANGIOGRAM OF THE CHEST AND CT ABDOMEN AND PELVIS WITH INTRAVENOUS CONTRAST INDICATION:   elevated d dimer - Lower abd pain, syncope  COMPARISON:  CTA from 2/16/2022 TECHNIQUE:  CT examination of the chest, abdomen and pelvis was performed  Thin section CT angiographic technique was used in the chest in order to evaluate for pulmonary embolus and coronal 3D MIP postprocessing was performed on the acquisition scanner  Axial, sagittal, and coronal 2D reformatted images were created from the source data and submitted for interpretation  Radiation dose length product (DLP) for this visit:  2209 mGy-cm   This examination, like all CT scans performed in the [facility], was performed utilizing techniques to minimize radiation dose exposure, including the use of iterative reconstruction and automated exposure control  IV Contrast:  100 mL of iohexol (OMNIPAQUE) Enteric Contrast:  Enteric contrast was not administered  FINDINGS: CHEST PULMONARY ARTERIAL TREE:  No pulmonary embolus is seen  LUNGS:  Mild interstitial pulmonary edema    Mosaic attenuation pattern, which most likely represents small airways disease given hyperaeration of the secondary pulmonary lobules  Bibasilar subsegmental atelectasis  PLEURA:  Unremarkable  HEART/AORTA:  Cardiomegaly  No pericardial effusion  No thoracic aortic aneurysm  MEDIASTINUM AND REAL:  Unremarkable  CHEST WALL AND LOWER NECK:  Unremarkable  ABDOMEN LIVER/BILIARY TREE:  Unremarkable  GALLBLADDER:  Cholecystectomy  SPLEEN:  Unremarkable  PANCREAS:  Unremarkable  ADRENAL GLANDS: Stable right adrenal adenoma measuring 2 cm  KIDNEYS/URETERS:  Stable small cyst in the upper pole of the right kidney  Otherwise, unremarkable  STOMACH AND BOWEL:  Multiple diverticula within the descending colon without findings of diverticulitis  Normal caliber bowel without obstruction  APPENDIX:  No findings to suggest appendicitis  ABDOMINOPELVIC CAVITY: Small fat-containing periumbilical hernia  No ascites  No pneumoperitoneum  No lymphadenopathy  VESSELS:  Unremarkable for patient's age  PELVIS REPRODUCTIVE ORGANS:  Unremarkable for patient's age  URINARY BLADDER:  Unremarkable  ABDOMINAL WALL/INGUINAL REGIONS:  Unremarkable  OSSEOUS STRUCTURES:  Degenerative changes of the right hip  Again seen is a fixation screw within the right posterior acetabulum  Impression: Cardiomegaly and mild interstitial pulmonary edema  No pulmonary embolism  No acute aortic dissection or intramural hematoma  Multiple diverticula within the descending colon without findings of diverticulitis  Normal caliber bowel without obstruction  Workstation performed: [id number]     VAS lower limb venous duplex study, complete bilateral    Result Date: 9/20/2022  Narrative:  [facility] REPORT CLINICAL: Indications: Patient c/o increased swelling in both feet  Elevated D-Dimer  Recent fall Physician would like to R/O DVT  No history of DVT/PE/CA recent surgery  CONCLUSION: Impression: RIGHT LOWER LIMB: No evidence of acute or chronic deep vein thrombosis   No evidence of superficial thrombophlebitis noted  Doppler evaluation shows a normal response to augmentation maneuvers  LEFT LOWER LIMB: No evidence of acute or chronic deep vein thrombosis  No evidence of superficial thrombophlebitis noted  Doppler evaluation shows a normal response to augmentation maneuvers  Technically difficult/limited study  SIGNATURE: Electronically Signed by: Amy Teague MD, RPVI on 2022 04:18:07 PM      Cardiac testing:   Results for orders placed during the hospital encounter of 21    Echo complete with contrast if indicated    [address]  [address] [facility] [address]   [phone]    Transthoracic Echocardiogram  2D, M-mode, Doppler, and Color Doppler    Study date:  04-Mar-2021    Patient: Gerard TREVIZO  MR number: [id number]  Account number: [de-identified]  : 1955  Age: 76 years  Gender: Female  Status: Outpatient  Location: Bedside  Height: 62 in  Weight: 283 4 lb  BP: 129/ 62 mmHg    Indications: chest pain    Diagnoses: I43  - Cardiomyopathy in diseases classified elsewhere    Sonographer:  CHRIST Gao  Primary Physician:  Talmadge Castleman, MD  Referring Physician:  Epi Ingram  Group:  [facility]  Interpreting Physician:  Ellie Colmenares MD    SUMMARY    LEFT VENTRICLE:  Systolic function was normal  Ejection fraction was estimated in the range of 55 % to 60 % to be 60 %  Although no diagnostic regional wall motion abnormality was identified, this possibility cannot be completely excluded on the basis of this study  Wall thickness was mildly increased  There was mild concentric hypertrophy  Features were consistent with a pseudonormal left ventricular filling pattern, with concomitant abnormal relaxation and increased filling pressure (grade 2 diastolic dysfunction)  LEFT ATRIUM:  The atrium was mildly to moderately dilated  RIGHT ATRIUM:  The atrium was mildly dilated      MITRAL VALVE:  There was mild to moderate annular calcification  There was mild regurgitation  AORTIC VALVE:  The valve was probably trileaflet, but function as bicuspid  Leaflets exhibited normal thickness and normal cuspal separation  Transaortic velocity was increased due to valvular stenosis  There was mild stenosis  Peak gradient around 12 mm of mercury valve area is 1 5-1 6 cm2    TRICUSPID VALVE:  There was mild to moderate regurgitation  Estimated peak PA pressure was 50 mmHg  HISTORY: PRIOR HISTORY: HTN,Hyperlipidemia,gastroesophageal reflux disease,seizures  PROCEDURE: The procedure was performed at the bedside  This was a routine study  The transthoracic approach was used  The study included complete 2D imaging, M-mode, complete spectral Doppler, and color Doppler  The heart rate was 69 bpm,  at the start of the study  Images were obtained from the parasternal, apical, subcostal, and suprasternal notch acoustic windows  Image quality was adequate  LEFT VENTRICLE: Size was normal  Systolic function was normal  Ejection fraction was estimated in the range of 55 % to 60 % to be 60 %  Although no diagnostic regional wall motion abnormality was identified, this possibility cannot be  completely excluded on the basis of this study  Wall thickness was mildly increased  There was mild concentric hypertrophy  DOPPLER: Features were consistent with a pseudonormal left ventricular filling pattern, with concomitant abnormal  relaxation and increased filling pressure (grade 2 diastolic dysfunction)  RIGHT VENTRICLE: The size was normal  Systolic function was normal     LEFT ATRIUM: The atrium was mildly to moderately dilated  RIGHT ATRIUM: The atrium was mildly dilated  MITRAL VALVE: There was mild to moderate annular calcification  There was normal leaflet separation  DOPPLER: The transmitral velocity was within the normal range  There was no evidence for stenosis   There was mild regurgitation  AORTIC VALVE: The valve was probably trileaflet, but function as bicuspid  Leaflets exhibited normal thickness and normal cuspal separation  DOPPLER: Transaortic velocity was increased due to valvular stenosis  There was mild stenosis  Peak gradient around 12 mm of mercury valve area is 1 5-1 6 cm2 There was no regurgitation  TRICUSPID VALVE: DOPPLER: There was mild to moderate regurgitation  Estimated peak PA pressure was 50 mmHg  The findings suggest mild to moderate pulmonary hypertension  PULMONIC VALVE: DOPPLER: There was trace regurgitation  PERICARDIUM: There was no thickening or calcification  There was no pericardial effusion  AORTA: The root exhibited normal size  SYSTEMIC VEINS: IVC: The inferior vena cava was normal in size  Respirophasic changes were normal     SYSTEM MEASUREMENT TABLES    2D  EF (Teich): 59 91 %  %FS: 31 7 %  Ao Diam: 3 19 cm  EDV(Teich): 88 47 ml  ESV(Teich): 35 47 ml  IVSd: 1 27 cm  LA Area: 22 54 cm2  LA Diam: 3 91 cm  LVIDd: 4 42 cm  LVIDs: 3 02 cm  LVOT Diam: 1 86 cm  LVPWd: 1 28 cm  RA Area: 16 49 cm2  RVIDd: 4 1 cm  SV (Teich): 53 ml    PW  MV E/A Ratio: 1 08    [employer] Accredited Echocardiography Laboratory    Prepared and electronically signed by    León Headley MD  Signed 04-Mar-2021 15:24:34    No results found for this or any previous visit  No results found for this or any previous visit  No results found for this or any previous visit  VAS lower limb venous duplex study, complete bilateral     [facility] REPORT  CLINICAL:  Indications: Patient c/o increased swelling in both feet  Elevated D-Dimer  Recent fall  Physician would like to R/O DVT  No history of DVT/PE/CA recent surgery  CONCLUSION:  Impression:  RIGHT LOWER LIMB:  No evidence of acute or chronic deep vein thrombosis  No evidence of superficial thrombophlebitis noted    Doppler evaluation shows a normal response to augmentation maneuvers  LEFT LOWER LIMB:  No evidence of acute or chronic deep vein thrombosis  No evidence of superficial thrombophlebitis noted  Doppler evaluation shows a normal response to augmentation maneuvers       Technically difficult/limited study     SIGNATURE:  Electronically Signed by: Kim Rothman MD, [address] on 2022-09-20 04:18:07 PM

## 2022-10-05 NOTE — ASSESSMENT & PLAN NOTE
Left breast  -+ rashes on the under the left breast due to increase moisture from perspiration  - ordered Lotrisone cream  - follow up next week

## 2022-10-07 ENCOUNTER — NURSING HOME VISIT (OUTPATIENT)
Dept: GERIATRICS | Facility: OTHER | Age: 67
End: 2022-10-07
Payer: COMMERCIAL

## 2022-10-07 VITALS
WEIGHT: 271 LBS | OXYGEN SATURATION: 97 % | BODY MASS INDEX: 49.57 KG/M2 | RESPIRATION RATE: 18 BRPM | TEMPERATURE: 97.4 F | DIASTOLIC BLOOD PRESSURE: 65 MMHG | SYSTOLIC BLOOD PRESSURE: 127 MMHG | HEART RATE: 80 BPM

## 2022-10-07 DIAGNOSIS — E66.9 DIABETES MELLITUS TYPE 2 IN OBESE (HCC): Primary | ICD-10-CM

## 2022-10-07 DIAGNOSIS — E11.69 DIABETES MELLITUS TYPE 2 IN OBESE (HCC): Primary | ICD-10-CM

## 2022-10-07 PROCEDURE — 99309 SBSQ NF CARE MODERATE MDM 30: CPT | Performed by: INTERNAL MEDICINE

## 2022-10-07 NOTE — ASSESSMENT & PLAN NOTE
Lab Results   Component Value Date    HGBA1C 11 8 (H) 09/19/2022   Recent hemoglobin A1c was uncontrolled at 11 8  Accu-Cheks were noted to be initially low due to decreased appetite  Few readings are noted to be on higher side highest being 296  On the average blood sugars are ranging between 104 to 241  Continue empagliflozin 25 mg daily, insulin glargine 30 units at bedtime along with sliding scale coverage    Follow blood sugars and evaluate need for schedule short-acting insulin with meals

## 2022-10-07 NOTE — PROGRESS NOTES
Marie [address]  [address] [place] [address], [address] [place] [address]  [id number]    Progress note    NAME: Andrea Mckee  AGE: 78 y o  SEX: female [id number]      Patient Location     [facility] re[facility]    Assessment/Plan:    Diabetes mellitus type 2 in obese [facility])    Lab Results   Component Value Date    HGBA1C 11 8 (H) 09/19/2022   Recent hemoglobin A1c was uncontrolled at 11 8  Accu-Cheks were noted to be initially low due to decreased appetite  Few readings are noted to be on higher side highest being 296  On the average blood sugars are ranging between 104 to 241  Continue empagliflozin 25 mg daily, insulin glargine 30 units at bedtime along with sliding scale coverage  Follow blood sugars and evaluate need for schedule short-acting insulin with meals      Generalized weakness:  Continue PT OT  Continue to implement fall precautions    Major depressive disorder;  Patient remains on Duloxetine 60 mg daily  F U with psychiatry service    Abnormal urine:  UA revealed pyuria upon recent hospital admission   Patient was treated with 3 day course of Macrobid      Hypothyroidism:  Continue levothyroxine 75 mcg daily  Lab Results   Component Value Date    [id number] 6 866 (H) 09/19/2022   Repeat TSH in 8 weeks    GERD:  Continue omeprazole    Neuropathy:  Continue gabapentin 300 mg qhs    Chronic heart failure with preserved EF:  Clinically euvolemic  Continue maintenance diuretics including torsemide 40 mg and spironolactone 25 mg daily    Patient additionally remains on K supplements 20 mg daily    History of seizure:  Stable on topiramate 100 mg daily    Hyperlipidemia:  Continue ezetimibe    Cutaneous candidiasis:  Patient is being followed by wound care team   She is noted to have fungal rash under bilateral breast more pronounced under left breast   Continue topical antifungal    Reason for visit      F U generalized weakness, ambulatory dysfunction, diabetes mellitus type 2, hypothyroidism, depression    HPI     Patient is being seen for a follow-up visit today  She is doing okay at present  Noted to be more awake alert today  Nausea has resolved  She is eating better  No fever chills or chest congestion  Patient is incontinent of bowel and bladder at times  Blood sugars are ranging between 132 to 241 range  Highest reported reading was 296 after supper yesterday  Past Medical History:   Diagnosis Date    CHF (congestive heart failure) (HCC)     [facility] mellitus ([facility] [address] )     Disease of thyroid gland     Hyperlipidemia     Hypertension     Hypothyroidism (acquired)     Restless leg syndrome     Seizures (HCC)        Past Surgical History:   Procedure Laterality Date    APPENDECTOMY      CHOLECYSTECTOMY      HIP FRACTURE SURGERY Right     TONSILLECTOMY      TUBAL LIGATION         Social History     Tobacco Use   Smoking Status Never Smoker   Smokeless Tobacco Never Used   Tobacco Comment    not a smoker          Family History   Problem Relation Age of Onset    Brain cancer Mother     Seizures Father     Colon cancer Maternal Grandfather     No Known Problems Son     No Known Problems Son     Breast cancer Maternal Aunt 30        Allergies   Allergen Reactions    Pineapple - Food Allergy Anaphylaxis    Statins Other (See Comments)     Cramping from head to toe     Bupropion Other (See Comments)    Byetta 10 Mcg Pen [Exenatide]     Ceftriaxone Other (See Comments)    Marijuana [Dronabinol] GI Intolerance    Metformin Diarrhea     Other reaction(s): Unknown    Omeprazole Hives    Ondansetron      Other reaction(s): Unknown    Coconut Oil - Food Allergy Rash    Liraglutide Rash and Other (See Comments)     Generalized rash    Penicillins Rash          Current Outpatient Medications:     acetaminophen (TYLENOL) 325 mg tablet, Take 650 mg by mouth every 6 (six) hours as needed for mild pain (pain score 1-3)   (Patient not taking: No sig reported), Disp: , Rfl:    aspirin (ECOTRIN LOW STRENGTH) 81 mg EC tablet, Take 81 mg by mouth daily, Disp: , Rfl:     Cholecalciferol 25 MCG (1000 UT) capsule, Take 1,000 Units by mouth daily   (Patient not taking: No sig reported), Disp: , Rfl:     DULoxetine (CYMBALTA) 60 mg delayed release capsule, Take 1 capsule (60 mg total) by mouth daily Do not start before October 1, 2022 , Disp: , Rfl: 0    Empagliflozin 25 MG TABS, Take 25 mg by mouth every morning (Patient not taking: No sig reported), Disp: , Rfl:     Ergocalciferol (VITAMIN D2 PO), Take 50,000 Units/day by mouth 2 (two) times a week, Disp: , Rfl:     ezetimibe (ZETIA) 10 mg tablet, Take 1 tablet (10 mg total) by mouth daily (Patient not taking: Reported on 9/27/2022), Disp: 30 tablet, Rfl: 0    gabapentin (NEURONTIN) 100 mg capsule, Take 1 capsule (100 mg total) by mouth 2 (two) times a day Take 100 mg in the morning and 100 mg mid afternoon, Disp: 42 capsule, Rfl: 0    gabapentin (NEURONTIN) 300 mg capsule, Take 1 capsule (300 mg total) by mouth daily at bedtime, Disp: 21 capsule, Rfl: 0    hydrophilic ointment, Apply topically as needed for dry skin, Disp: , Rfl:     insulin glargine (LANTUS) 100 units/mL subcutaneous injection, Inject 30 Units under the skin daily at bedtime, Disp: 10 mL, Rfl: 0    insulin lispro (HumaLOG) 100 units/mL injection, Inject 1-5 Units under the skin 3 (three) times a day before meals, Disp: , Rfl: 0    levothyroxine 50 mcg tablet, Take 1 5 tablets (75 mcg total) by mouth daily, Disp: 45 tablet, Rfl: 0    melatonin 3 mg, Take 3 tablets (9 mg total) by mouth daily at bedtime, Disp: , Rfl: 0    nystatin (MYCOSTATIN) powder, Apply topically 2 (two) times a day, Disp: 15 g, Rfl: 0    omeprazole (PriLOSEC) 20 mg delayed release capsule, Take 20 mg by mouth daily, Disp: , Rfl:     potassium chloride (K-DUR,KLOR-CON) 20 mEq tablet, Take 1 tablet (20 mEq total) by mouth daily, Disp: 30 tablet, Rfl: 0    spironolactone (ALDACTONE) 25 mg tablet, Take 1 tablet (25 mg total) by mouth daily Do not start before October 3, 2022 , Disp: 30 tablet, Rfl: 0    topiramate (TOPAMAX) 100 mg tablet, Take 200 mg by mouth 2 (two) times a day , Disp: , Rfl:     torsemide (DEMADEX) 20 mg tablet, Take 2 tablets (40 mg total) by mouth daily Do not start before October 3, 2022 , Disp: 60 tablet, Rfl: 0    Updated list was reviewed in [facility] care system of facility  Vitals:    10/07/22 1129   BP: 127/65   Pulse: 80   Resp: 18   Temp: (!) 97 4 °F (36 3 °C)   SpO2: 97%       Vital signs were reviewed in point [facility] care    Review of Systems   Constitutional: Negative for chills and fever  Respiratory: Negative for cough, chest tightness, shortness of breath and wheezing  Cardiovascular: Negative for chest pain and leg swelling  Gastrointestinal: Negative for abdominal pain, diarrhea, nausea and vomiting  Genitourinary: Negative for hematuria  Musculoskeletal: Positive for gait problem  Negative for arthralgias and back pain  Skin: Positive for rash (Fungal rash under the breasts)  Negative for pallor  Neurological: Positive for weakness (Generalized)  Negative for seizures  Psychiatric/Behavioral: Negative for agitation, behavioral problems and confusion  Physical Exam  Constitutional:       Appearance: She is obese  HENT:      Head: Normocephalic and atraumatic  Eyes:      General: No scleral icterus  Cardiovascular:      Rate and Rhythm: Normal rate and regular rhythm  Pulmonary:      Breath sounds: No wheezing, rhonchi or rales  Abdominal:      General: There is no distension  Palpations: Abdomen is soft  Tenderness: There is no abdominal tenderness  There is no guarding  Musculoskeletal:      Cervical back: Neck supple  Comments: Trace edema involving bilateral lower extremities   Skin:     General: Skin is dry  Coloration: Skin is not jaundiced or pale        Findings: Erythema (Erythematous rash consistent with cutaneous candidiasis under bilateral breasts more pronounced under left breast) present  No bruising  Neurological:      General: No focal deficit present  Cranial Nerves: No cranial nerve deficit  Motor: Weakness (Generalized) present  Comments: Oriented in month and year   Psychiatric:         Mood and Affect: Mood normal          Behavior: Behavior normal          Diagnostic Data       Recent labs and imaging studies were reviewed  Lab Results   Component Value Date    SODIUM 138 09/30/2022    K 4 0 09/30/2022     09/30/2022    CO2 30 09/30/2022    BUN 23 09/30/2022    CREATININE 0 96 09/30/2022    GLUC 88 09/30/2022    CALCIUM 9 5 09/30/2022        Lab Results   Component Value Date    WBC 6 72 09/29/2022    HGB 15 2 09/29/2022    HCT 46 7 (H) 09/29/2022    MCV 90 09/29/2022     09/29/2022     Code Status:      Full code    :             This note was electronically signed by Dr Thelma Clement

## 2022-10-12 ENCOUNTER — NURSING HOME VISIT (OUTPATIENT)
Dept: GERIATRICS | Facility: OTHER | Age: 67
End: 2022-10-12
Payer: COMMERCIAL

## 2022-10-12 ENCOUNTER — NURSING HOME VISIT (OUTPATIENT)
Dept: WOUND CARE | Facility: HOSPITAL | Age: 67
End: 2022-10-12
Payer: COMMERCIAL

## 2022-10-12 VITALS
WEIGHT: 271 LBS | HEART RATE: 75 BPM | DIASTOLIC BLOOD PRESSURE: 72 MMHG | TEMPERATURE: 97.5 F | BODY MASS INDEX: 49.57 KG/M2 | SYSTOLIC BLOOD PRESSURE: 132 MMHG | OXYGEN SATURATION: 94 % | RESPIRATION RATE: 17 BRPM

## 2022-10-12 DIAGNOSIS — R53.1 WEAKNESS: ICD-10-CM

## 2022-10-12 DIAGNOSIS — Z79.4 TYPE 2 DIABETES MELLITUS WITH HYPERGLYCEMIA, WITH LONG-TERM CURRENT USE OF INSULIN (HCC): ICD-10-CM

## 2022-10-12 DIAGNOSIS — F32.9 MAJOR DEPRESSIVE DISORDER, REMISSION STATUS UNSPECIFIED, UNSPECIFIED WHETHER RECURRENT: Primary | ICD-10-CM

## 2022-10-12 DIAGNOSIS — G25.81 RESTLESS LEG SYNDROME: ICD-10-CM

## 2022-10-12 DIAGNOSIS — B36.9 FUNGAL DERMATITIS: Primary | ICD-10-CM

## 2022-10-12 DIAGNOSIS — E78.2 MIXED HYPERLIPIDEMIA: ICD-10-CM

## 2022-10-12 DIAGNOSIS — E11.65 TYPE 2 DIABETES MELLITUS WITH HYPERGLYCEMIA, WITH LONG-TERM CURRENT USE OF INSULIN (HCC): ICD-10-CM

## 2022-10-12 PROCEDURE — 99309 SBSQ NF CARE MODERATE MDM 30: CPT

## 2022-10-12 PROCEDURE — 99307 SBSQ NF CARE SF MDM 10: CPT | Performed by: NURSE PRACTITIONER

## 2022-10-12 NOTE — ASSESSMENT & PLAN NOTE
Lab Results   Component Value Date    HGBA1C 11 8 (H) 09/19/2022   · History of diabetes  · Blood sugars have been trending 200-280  · Morning blood sugar 204  · Encourage diabetic diet   · Continue Accu-Cheks  · Continue Lantus insulin 30 units at HS  · Continue sliding scale insulin with meals

## 2022-10-12 NOTE — ASSESSMENT & PLAN NOTE
· History of hyperlipidemia  · Last total cholesterol 180, triglycerides 56, HDL 33,   · Continue Zetia 10 mg daily

## 2022-10-12 NOTE — ASSESSMENT & PLAN NOTE
· Recent hospitalization for syncope 09/19/2022  · After discharge patient reports not getting out of bed or taking her medications   · Patient had increased depression   · Patient was readmitted to the hospital 09/27/2022 for generalized weakness  · Patient had positive orthostatic blood pressures and received IV fluids  · PT OT recommended short-term rehab  · Maintain fall/safety precautions  · Assist patient with ADLs as needed  · SW to follow up for DC planning

## 2022-10-12 NOTE — PROGRESS NOTES
[address] [facility]NA[facility]   [facility]       Patient ID: Christine Padilla is a 78 y o  female Date of Birth 1955     Location of Service: [address] [place] [address]    Performed wound round with: Wound team     Chief Complaint : :Left breast    Wound Instructions:  Wound:  Left breast  Discontinue previous wound order  Cleanse the wound bed with soap and water, pat dry  Apply lotrisone cream to wound bed and cover with ABD pad  Frequency : twice a day and prn for soiling  Offload all wounds  Turn and reposition frequently, maximum of every two hours  Instruct / Assist with weight shifting every 15 - 20 minutes when in chair  Increase protein intake  Monitor for any sign of infection or worsening, inform PCP or patient's primary physician in your facility  Allergies  Pineapple - food allergy, Statins, Bupropion, Byetta 10 mcg pen [exenatide], Ceftriaxone, Marijuana [dronabinol], Metformin, Omeprazole, Ondansetron, Coconut oil - food allergy, Liraglutide, and Penicillins      Assessment & Plan:  1  Fungal dermatitis  Assessment & Plan:  Left breast  -+ rashes on the under the left breast due to increase moisture from perspiration  - improved, less erythema compared to last week  - ordered Lotrisone cream  - follow up next week             Subjective:   10/5/2022This is a 78 y o , female referred to our service for wound/ skin alterations on left breat  Patient have a complex medical history including but not limited to  has  CHF (congestive heart failure) ([address] ), Diabetes mellitus ([address] ), Disease of thyroid gland, Hyperlipidemia, Hypertension, Hypothyroidism (acquired), Restless leg syndrome, and Seizures ([address] )    Patient was referred by Senior Care Team  Patient was seen in collaboration with the facility wound team       Received patient in bed, seems comfortable  As per patient, she have the rashes on the left breast for long time  No current treatment    No other significant issues related to the wound  10/12/2022 Follow up for wound on the left breast   Received patient, not in distress  Facility staff did not report any significant issues related to the wound  As per as per patient, she no longer have pain on the left breast         Review of Systems   Constitutional: Negative  HENT: Negative  Eyes: Negative  Respiratory: Negative  Cardiovascular: Negative for chest pain and leg swelling  Gastrointestinal: Negative  Endocrine: Negative  Genitourinary: Negative  Musculoskeletal: Positive for gait problem  Skin: Positive for rash  See HPI   Neurological: Negative for dizziness and headaches  Psychiatric/Behavioral: Negative for behavioral problems  Objective:    Physical Exam  Constitutional:       Appearance: She is obese  HENT:      Head: Normocephalic and atraumatic  Nose: Nose normal       Mouth/Throat:      Pharynx: Oropharynx is clear  Eyes:      Conjunctiva/sclera: Conjunctivae normal    Pulmonary:      Effort: Pulmonary effort is normal    Abdominal:      Tenderness: There is no abdominal tenderness  There is no guarding  Genitourinary:     Comments: incontinent  Musculoskeletal:         General: No tenderness  Cervical back: Normal range of motion  Right lower leg: No edema  Left lower leg: No edema  Comments: LROM   Skin:     Findings: Erythema and rash present  Comments: Left breast - red, excoriated, no drainage, 13 x 7 cm  , no tenderness on palpation   Neurological:      Mental Status: She is alert  Gait: Gait abnormal    Psychiatric:         Mood and Affect: Mood normal          Behavior: Behavior normal               Procedures           Patient's care was coordinated with nursing facility staff  Recent vitals, labs and updated medications were reviewed on EMR or chart system of facility   Past Medical, surgical, social, medication and allergy history and patient's previous records were reviewed and updated as appropriate: Most up-to date information is available in the facility EMR where the patient is currently admitted      Patient Active Problem List   Diagnosis   • Seizures ([place] [address] )   • Restless leg syndrome   • Hypertension   • Hyperlipidemia   • Diabetes mellitus ([place] [address] )   • GERD (gastroesophageal reflux disease)   • Epigastric abdominal pain   • Encounter for examination and observation for other specified reasons   • Severe episode of recurrent major depressive disorder, without psychotic features ([place] [address] )   • Gambling disorder, episodic, moderate   • Suspected COVID-19 virus infection   • Major depressive disorder   • Acquired hypothyroidism   • Diarrhea   • Asymptomatic bacteriuria   • Chronic fatigue and malaise   • Calf tenderness   • Morbid obesity due to excess calories ([facility])   • Onychomycosis of toenail   • Orthostatic hypotension   • Primary osteoarthritis of one hip, right   • Dyspnea on exertion   • Chronic heart failure with preserved ejection fraction ([facility])   • Chronic respiratory failure ([facility])   • Coronary artery disease involving native coronary artery of native heart without angina pectoris   • Syncope   • Elevated d-dimer   • Weakness   • Abnormal urine   • Diabetes mellitus type 2 in obese ([facility])   • Fungal dermatitis     Past Medical History:   Diagnosis Date   • CHF (congestive heart failure) ([place] [address] )    • Diabetes mellitus ([place] [address] )    • Disease of thyroid gland    • Hyperlipidemia    • Hypertension    • Hypothyroidism (acquired)    • Restless leg syndrome    • Seizures ([place] [address] )      Past Surgical History:   Procedure Laterality Date   • APPENDECTOMY     • CHOLECYSTECTOMY     • HIP FRACTURE SURGERY Right    • TONSILLECTOMY     • TUBAL LIGATION       Social History     Socioeconomic History   • Marital status: Single     Spouse name: None   • Number of children: None   • Years of education: None   • Highest education level: None   Occupational History   • None   Tobacco Use   • Smoking status: Never Smoker   • Smokeless tobacco: Never Used   • Tobacco comment: not a smoker   Vaping Use   • Vaping Use: Never used   Substance and Sexual Activity   • Alcohol use: Yes     Comment: occasional   • Drug use: Never   • Sexual activity: Not Currently   Other Topics Concern   • None   Social History Narrative   • None     Social Determinants of Health     Financial Resource Strain: Not on file   Food Insecurity: No Food Insecurity   • Worried About Running Out of Food in the Last Year: Never true   • Ran Out of Food in the Last Year: Never true   Transportation Needs: No Transportation Needs   • Lack of Transportation (Medical): No   • Lack of Transportation (Non-Medical): No   Physical Activity: Not on file   Stress: Not on file   Social Connections: Not on file   Intimate Partner Violence: Not on file   Housing Stability: Unknown   • Unable to Pay for Housing in the Last Year: No   • Number of Places Lived in the Last Year: Not on file   • Unstable Housing in the Last Year: No        Current Outpatient Medications:   •  acetaminophen (TYLENOL) 325 mg tablet, Take 650 mg by mouth every 6 (six) hours as needed for mild pain (pain score 1-3)  (Patient not taking: No sig reported), Disp: , Rfl:   •  aspirin (ECOTRIN LOW STRENGTH) 81 mg EC tablet, Take 81 mg by mouth daily, Disp: , Rfl:   •  Cholecalciferol 25 MCG (1000 UT) capsule, Take 1,000 Units by mouth daily   (Patient not taking: No sig reported), Disp: , Rfl:   •  DULoxetine (CYMBALTA) 60 mg delayed release capsule, Take 1 capsule (60 mg total) by mouth daily Do not start before October 1, 2022 , Disp: , Rfl: 0  •  Empagliflozin 25 MG TABS, Take 25 mg by mouth every morning (Patient not taking: No sig reported), Disp: , Rfl:   •  Ergocalciferol (VITAMIN D2 PO), Take 50,000 Units/day by mouth 2 (two) times a week, Disp: , Rfl:   •  ezetimibe (ZETIA) 10 mg tablet, Take 1 tablet (10 mg total) by mouth daily (Patient not taking: Reported on 9/27/2022), Disp: 30 tablet, Rfl: 0  •  gabapentin (NEURONTIN) 100 mg capsule, Take 1 capsule (100 mg total) by mouth 2 (two) times a day Take 100 mg in the morning and 100 mg mid afternoon, Disp: 42 capsule, Rfl: 0  •  gabapentin (NEURONTIN) 300 mg capsule, Take 1 capsule (300 mg total) by mouth daily at bedtime, Disp: 21 capsule, Rfl: 0  •  hydrophilic ointment, Apply topically as needed for dry skin, Disp: , Rfl:   •  insulin glargine (LANTUS) 100 units/mL subcutaneous injection, Inject 30 Units under the skin daily at bedtime, Disp: 10 mL, Rfl: 0  •  insulin lispro (HumaLOG) 100 units/mL injection, Inject 1-5 Units under the skin 3 (three) times a day before meals, Disp: , Rfl: 0  •  levothyroxine 50 mcg tablet, Take 1 5 tablets (75 mcg total) by mouth daily, Disp: 45 tablet, Rfl: 0  •  melatonin 3 mg, Take 3 tablets (9 mg total) by mouth daily at bedtime, Disp: , Rfl: 0  •  nystatin (MYCOSTATIN) powder, Apply topically 2 (two) times a day, Disp: 15 g, Rfl: 0  •  omeprazole (PriLOSEC) 20 mg delayed release capsule, Take 20 mg by mouth daily, Disp: , Rfl:   •  potassium chloride (K-DUR,KLOR-CON) 20 mEq tablet, Take 1 tablet (20 mEq total) by mouth daily, Disp: 30 tablet, Rfl: 0  •  spironolactone (ALDACTONE) 25 mg tablet, Take 1 tablet (25 mg total) by mouth daily Do not start before October 3, 2022 , Disp: 30 tablet, Rfl: 0  •  topiramate (TOPAMAX) 100 mg tablet, Take 200 mg by mouth 2 (two) times a day , Disp: , Rfl:   •  torsemide (DEMADEX) 20 mg tablet, Take 2 tablets (40 mg total) by mouth daily Do not start before October 3, 2022 , Disp: 60 tablet, Rfl: 0  Family History   Problem Relation Age of Onset   • Brain cancer Mother    • Seizures Father    • Colon cancer Maternal Grandfather    • No Known Problems Son    • No Known Problems Son    • Breast cancer Maternal Aunt 30              Coordination of Care: Wound team aware of the treatment plan   Facility nurse will provide wound treatment and monitor the wound for any changes  Patient / Staff education : Patient / Staff was given education on sign of infection and pressure ulcer prevention  Patient/ Staff verbalized understanding     Follow up :  Next week    Voice-recognition software may have been used in the preparation of this document  Occasional wrong word or "sound-alike" substitutions may have occurred due to the inherent limitations of voice recognition software  Interpretation should be guided by context        Lilian Adams

## 2022-10-12 NOTE — ASSESSMENT & PLAN NOTE
· Recent hospitalization for weakness  · Patient indicated worsening depression symptoms  · Patient states she did not get out of bed to the 5 days  · Patient states she had not taking any of her medications  · Continue Cymbalta 60 mg daily  · Outpatient follow-up psych as recommended

## 2022-10-12 NOTE — PROGRESS NOTES
[address]  [phone]  [employer]  Code 31 (STR)          NAME: Aakash Garza  AGE: 78 y o  SEX: female CODE STATUS: CPR    DATE OF ENCOUNTER: 10/12/2022    Assessment and Plan     1  Major depressive disorder, remission status unspecified, unspecified whether recurrent  Assessment & Plan:  · Recent hospitalization for weakness  · Patient indicated worsening depression symptoms  · Patient states she did not get out of bed to the 5 days  · Patient states she had not taking any of her medications  · Continue Cymbalta 60 mg daily  · Outpatient follow-up psych as recommended      2  Weakness  Assessment & Plan:  · Recent hospitalization for syncope 09/19/2022  · After discharge patient reports not getting out of bed or taking her medications   · Patient had increased depression   · Patient was readmitted to the hospital 09/27/2022 for generalized weakness  · Patient had positive orthostatic blood pressures and received IV fluids  · PT OT recommended short-term rehab  · Maintain fall/safety precautions  · Assist patient with ADLs as needed  · SW to follow up for DC planning      3  Type 2 diabetes mellitus with hyperglycemia, with long-term current use of insulin ([facility])  Assessment & Plan:    Lab Results   Component Value Date    HGBA1C 11 8 (H) 09/19/2022   · History of diabetes  · Blood sugars have been trending 200-280  · Morning blood sugar 204  · Encourage diabetic diet   · Continue Accu-Cheks  · Continue Lantus insulin 30 units at HS  · Continue sliding scale insulin with meals      4  Restless leg syndrome  Assessment & Plan:  · History of restless  · Continue gabapentin 300 mg at HS      5  Mixed hyperlipidemia  Assessment & Plan:  · History of hyperlipidemia  · Last total cholesterol 180, triglycerides 56, HDL 33,   · Continue Zetia 10 mg daily           All medications and routine orders were reviewed and updated as needed      Chief Complaint     STR follow up visit  Patient's care was coordinated with nursing facility staff  Recent vitals, labs, and updated medications were review on [facility] in facility  Past Medical and Surgica History      Past Medical History:   Diagnosis Date   • [facility]    • Diabetes mellitus ([facility] )    • Disease of thyroid gland    • Hyperlipidemia    • Hypertension    • Hypothyroidism (acquired)    • Restless leg syndrome    • Seizures ([facility] )      Past Surgical History:   Procedure Laterality Date   • APPENDECTOMY     • CHOLECYSTECTOMY     • HIP FRACTURE SURGERY Right    • TONSILLECTOMY     • TUBAL LIGATION       Allergies   Allergen Reactions   • Pineapple - Food Allergy Anaphylaxis   • Statins Other (See Comments)     Cramping from head to toe    • Bupropion Other (See Comments)   • Byetta 10 Mcg Pen [Exenatide]    • Ceftriaxone Other (See Comments)   • Marijuana [Dronabinol] GI Intolerance   • Metformin Diarrhea     Other reaction(s): Unknown   • Omeprazole Hives   • Ondansetron      Other reaction(s): Unknown   • Coconut Oil - Food Allergy Rash   • Liraglutide Rash and Other (See Comments)     Generalized rash   • Penicillins Rash          History of Present Illness     HPI  Karson Jacobsen is a 78year old female, she is a STR patient of [address] [place] [address] since 09/30/2022  Past Medical Hx including but not limited to depression RLS, diabetes, CAD, hypothyroidism, GERD  She was seen in collaboration with nursing for medical mgmt and STR follow up  Patient had been previously hospitalized 09/19/2022 for syncopal episode  During this hospitalization patient expressed that she was experiencing depression  Psych follow during hospitalization  After discharge patient reports she did not take any of her medications and did not get out of bed for 1 week  Patient was readmitted to the hospital 09/27/2022 for generalized weakness  PT OT recommended short-term rehab    Patient was seen and examined at bedside today  Patient is a reliable historian, alert and oriented x3 and denies any pain  During exam patient appears tired  She reports that she did not sleep well the night before due to her roommate  She states she is doing okay and has been participating in therapy  Denies CP/SOB/N/V/D  Denies lightheadedness, dizziness, headaches, vision changes  Patient states they are eating well and staying hydrated  Denies any bowel or bladder issues  Per review of SNF records, Patient is eating 3 meals per day, consuming  %  Last documented BM 10/10/2022  No concerns from nursing at this time  The patient's allergies, past medical, surgical, social and family history were reviewed and unchanged  Review of Systems     Review of Systems   Constitutional: Positive for activity change and fatigue  Negative for appetite change and fever  HENT: Negative  Negative for congestion  Eyes: Negative  Respiratory: Negative for cough, shortness of breath and wheezing  Cardiovascular: Negative  Negative for chest pain, palpitations and leg swelling  Gastrointestinal: Negative for abdominal distention, abdominal pain, constipation, diarrhea, nausea and vomiting  Endocrine: Negative  Genitourinary: Negative  Negative for difficulty urinating, dysuria and urgency  Musculoskeletal: Negative  Skin: Negative  Allergic/Immunologic: Negative  Neurological: Negative  Negative for dizziness, syncope, weakness, light-headedness and headaches  Hematological: Negative  Psychiatric/Behavioral: Positive for dysphoric mood and sleep disturbance  Objective     Vitals:   Vitals:    10/12/22 1715   BP: 132/72   Pulse: 75   Resp: 17   Temp: 97 5 °F (36 4 °C)   SpO2: 94%         Physical Exam  Constitutional:       General: She is not in acute distress  Appearance: Normal appearance  She is obese  She is not ill-appearing  HENT:      Head: Normocephalic and atraumatic  Nose: Nose normal  No congestion  Mouth/Throat:      Mouth: Mucous membranes are moist       Pharynx: Oropharynx is clear  Eyes:      Conjunctiva/sclera: Conjunctivae normal       Pupils: Pupils are equal, round, and reactive to light  Cardiovascular:      Rate and Rhythm: Normal rate and regular rhythm  Pulses: Normal pulses  Heart sounds: Normal heart sounds  Pulmonary:      Effort: Pulmonary effort is normal  No respiratory distress  Breath sounds: Normal breath sounds  No wheezing  Abdominal:      General: Bowel sounds are normal  There is no distension  Palpations: Abdomen is soft  Tenderness: There is no abdominal tenderness  Musculoskeletal:      Right lower leg: No edema  Left lower leg: No edema  Skin:     General: Skin is warm  Capillary Refill: Capillary refill takes 2 to 3 seconds  Neurological:      Mental Status: She is alert and oriented to person, place, and time  Psychiatric:         Behavior: Behavior normal          Pertinent Laboratory/Diagnostic Studies:   Reviewed in facility chart-stable      Current Medications   Medications reviewed and updated see facility [facility] for details  Current Outpatient Medications:   •  acetaminophen (TYLENOL) 325 mg tablet, Take 650 mg by mouth every 6 (six) hours as needed for mild pain (pain score 1-3)  (Patient not taking: No sig reported), Disp: , Rfl:   •  aspirin (ECOTRIN LOW STRENGTH) 81 mg EC tablet, Take 81 mg by mouth daily, Disp: , Rfl:   •  Cholecalciferol 25 MCG (1000 UT) capsule, Take 1,000 Units by mouth daily   (Patient not taking: No sig reported), Disp: , Rfl:   •  DULoxetine (CYMBALTA) 60 mg delayed release capsule, Take 1 capsule (60 mg total) by mouth daily Do not start before October 1, 2022 , Disp: , Rfl: 0  •  Empagliflozin 25 MG TABS, Take 25 mg by mouth every morning (Patient not taking: No sig reported), Disp: , Rfl:   •  Ergocalciferol (VITAMIN D2 PO), Take 50,000 Units/day by mouth 2 (two) times a week, Disp: , Rfl:   •  ezetimibe (ZETIA) 10 mg tablet, Take 1 tablet (10 mg total) by mouth daily (Patient not taking: Reported on 9/27/2022), Disp: 30 tablet, Rfl: 0  •  gabapentin (NEURONTIN) 100 mg capsule, Take 1 capsule (100 mg total) by mouth 2 (two) times a day Take 100 mg in the morning and 100 mg mid afternoon, Disp: 42 capsule, Rfl: 0  •  gabapentin (NEURONTIN) 300 mg capsule, Take 1 capsule (300 mg total) by mouth daily at bedtime, Disp: 21 capsule, Rfl: 0  •  hydrophilic ointment, Apply topically as needed for dry skin, Disp: , Rfl:   •  insulin glargine (LANTUS) 100 units/mL subcutaneous injection, Inject 30 Units under the skin daily at bedtime, Disp: 10 mL, Rfl: 0  •  insulin lispro (HumaLOG) 100 units/mL injection, Inject 1-5 Units under the skin 3 (three) times a day before meals, Disp: , Rfl: 0  •  levothyroxine 50 mcg tablet, Take 1 5 tablets (75 mcg total) by mouth daily, Disp: 45 tablet, Rfl: 0  •  melatonin 3 mg, Take 3 tablets (9 mg total) by mouth daily at bedtime, Disp: , Rfl: 0  •  nystatin (MYCOSTATIN) powder, Apply topically 2 (two) times a day, Disp: 15 g, Rfl: 0  •  omeprazole (PriLOSEC) 20 mg delayed release capsule, Take 20 mg by mouth daily, Disp: , Rfl:   •  potassium chloride (K-DUR,KLOR-CON) 20 mEq tablet, Take 1 tablet (20 mEq total) by mouth daily, Disp: 30 tablet, Rfl: 0  •  spironolactone (ALDACTONE) 25 mg tablet, Take 1 tablet (25 mg total) by mouth daily Do not start before October 3, 2022 , Disp: 30 tablet, Rfl: 0  •  topiramate (TOPAMAX) 100 mg tablet, Take 200 mg by mouth 2 (two) times a day , Disp: , Rfl:   •  torsemide (DEMADEX) 20 mg tablet, Take 2 tablets (40 mg total) by mouth daily Do not start before October 3, 2022 , Disp: 60 tablet, Rfl: 0     Plan discussed with Dr Tj Walls noted agreement with assessment and plan  Please note:  Voice-recognition software may have been used in the preparation of this document    Occasional wrong word or "sound-alike" substitutions may have occurred due to the inherent limitations of voice recognition software  Interpretation should be guided by context           Cesar Kay  10/12/2022  5:35 PM

## 2022-10-12 NOTE — ASSESSMENT & PLAN NOTE
Left breast  -+ rashes on the under the left breast due to increase moisture from perspiration  - improved, less erythema compared to last week  - ordered Lotrisone cream  - follow up next week

## 2022-10-14 ENCOUNTER — NURSING HOME VISIT (OUTPATIENT)
Dept: GERIATRICS | Facility: OTHER | Age: 67
End: 2022-10-14
Payer: COMMERCIAL

## 2022-10-14 VITALS
BODY MASS INDEX: 49.57 KG/M2 | SYSTOLIC BLOOD PRESSURE: 134 MMHG | DIASTOLIC BLOOD PRESSURE: 86 MMHG | RESPIRATION RATE: 18 BRPM | TEMPERATURE: 97.5 F | HEART RATE: 80 BPM | OXYGEN SATURATION: 97 % | WEIGHT: 271 LBS

## 2022-10-14 DIAGNOSIS — Z79.4 TYPE 2 DIABETES MELLITUS WITH HYPERGLYCEMIA, WITH LONG-TERM CURRENT USE OF INSULIN (HCC): ICD-10-CM

## 2022-10-14 DIAGNOSIS — E11.65 TYPE 2 DIABETES MELLITUS WITH HYPERGLYCEMIA, WITH LONG-TERM CURRENT USE OF INSULIN (HCC): ICD-10-CM

## 2022-10-14 DIAGNOSIS — G25.81 RESTLESS LEG SYNDROME: ICD-10-CM

## 2022-10-14 DIAGNOSIS — R53.1 WEAKNESS: Primary | ICD-10-CM

## 2022-10-14 DIAGNOSIS — F32.9 MAJOR DEPRESSIVE DISORDER, REMISSION STATUS UNSPECIFIED, UNSPECIFIED WHETHER RECURRENT: ICD-10-CM

## 2022-10-14 DIAGNOSIS — E78.2 MIXED HYPERLIPIDEMIA: ICD-10-CM

## 2022-10-14 PROCEDURE — 99309 SBSQ NF CARE MODERATE MDM 30: CPT

## 2022-10-14 NOTE — ASSESSMENT & PLAN NOTE
· Recent hospitalization for weakness  · Patient indicated worsening depression symptoms  · Patient states she did not get out of bed to the 5 days  · Patient states she had not taking any of her medications  · Continue Cymbalta 60 mg daily  · Placed consult for Dr Jessie Dean

## 2022-10-14 NOTE — ASSESSMENT & PLAN NOTE
Lab Results   Component Value Date    HGBA1C 11 8 (H) 09/19/2022   · History of diabetes  · Blood sugars have been trending 200-280  · Morning blood sugar 260  · Encourage diabetic diet   · Continue Accu-Cheks  · Continue Lantus insulin 30 units at HS  · Continue sliding scale insulin with meals

## 2022-10-14 NOTE — PROGRESS NOTES
[facility]  [address]  [phone]  Misha Steve  Code 31 (STR)          NAME: Christine Padilla  AGE: 78 y o  SEX: female CODE STATUS: CPR    DATE OF ENCOUNTER: 10/14/2022    Assessment and Plan     1  Weakness  Assessment & Plan:  · Recent hospitalization for syncope 09/19/2022  · After discharge patient reports not getting out of bed or taking her medications   · Patient had increased depression   · Patient was readmitted to the hospital 09/27/2022 for generalized weakness  · Patient had positive orthostatic blood pressures and received IV fluids  · PT OT recommended short-term rehab  · Maintain fall/safety precautions  · Assist patient with ADLs as needed  · SW to follow up for DC planning      2  Major depressive disorder, remission status unspecified, unspecified whether recurrent  Assessment & Plan:  · Recent hospitalization for weakness  · Patient indicated worsening depression symptoms  · Patient states she did not get out of bed to the 5 days  · Patient states she had not taking any of her medications  · Continue Cymbalta 60 mg daily  · Placed consult for Dr El Ayon        3  Mixed hyperlipidemia  Assessment & Plan:  · History of hyperlipidemia  · Last total cholesterol 180, triglycerides 56, HDL 33,   · Continue Zetia 10 mg daily      4  Restless leg syndrome  Assessment & Plan:  · History of restless  · Continue gabapentin 300 mg at HS      5  Type 2 diabetes mellitus with hyperglycemia, with long-term current use of insulin ([facility])  Assessment & Plan:    Lab Results   Component Value Date    HGBA1C 11 8 (H) 09/19/2022   · History of diabetes  · Blood sugars have been trending 200-280  · Morning blood sugar 260  · Encourage diabetic diet   · Continue Accu-Cheks  · Continue Lantus insulin 30 units at HS  · Continue sliding scale insulin with meals         All medications and routine orders were reviewed and updated as needed      Chief Complaint     STR follow up visit  Patient's care was coordinated with nursing facility staff  Recent vitals, labs, and updated medications were review on [facility] in facility  Past Medical and Surgica History      Past Medical History:   Diagnosis Date   • [facility]    • [facility] mellitus ([email or web address] )    • Disease of thyroid gland    • Hyperlipidemia    • Hypertension    • Hypothyroidism (acquired)    • Restless leg syndrome    • Seizures ([email or web address] )      Past Surgical History:   Procedure Laterality Date   • APPENDECTOMY     • CHOLECYSTECTOMY     • HIP FRACTURE SURGERY Right    • TONSILLECTOMY     • TUBAL LIGATION       Allergies   Allergen Reactions   • Pineapple - Food Allergy Anaphylaxis   • Statins Other (See Comments)     Cramping from head to toe    • Bupropion Other (See Comments)   • Byetta 10 Mcg Pen [Exenatide]    • Ceftriaxone Other (See Comments)   • Marijuana [Dronabinol] GI Intolerance   • Metformin Diarrhea     Other reaction(s): Unknown   • Omeprazole Hives   • Ondansetron      Other reaction(s): Unknown   • Coconut Oil - Food Allergy Rash   • Liraglutide Rash and Other (See Comments)     Generalized rash   • Penicillins Rash          History of Present Illness     HPI  Sukhwinder Spence is a 50-year-old female, she is a STR patient of [address] [place] [address] since 09/30/2022  Past Medical Hx including but not limited to depression, RLS, diabetes, CAD, hypothyroidism, GERD  She was seen in collaboration with nursing for medical management and STR follow up  Patient had been previously hospitalized 09/19/2022 for syncopal episode  During this hospitalization patient expressed that she was experiencing depression  Psych followed during hospitalization  After discharge patient reports she did not take any of her medications and did not get out of bed for 1 week  Patient was readmitted to the hospital 09/27/2022 for generalized weakness  PT OT recommended short-term rehab    Patient was seen and examined at bedside today  Patient is a reliable historian, alert and oriented x3  She complains of b/l knee pain from her fall 10/13/22, no bruising noted  Patient stated she was on the commode and attempted to get up by herself without assistance  During exam patient appears tired  She states she has been participating in therapy  Denies CP/SOB/N/V/D  Denies lightheadedness, dizziness, headaches, vision changes  Patient states they are eating well and staying hydrated  Denies any bowel or bladder issues  Per review of SNF records, Patient is eating 3 meals per day, consuming  %  Last documented BM 10/13/2022  No concerns from nursing at this time  The patient's allergies, past medical, surgical, social and family history were reviewed and unchanged  Review of Systems     Review of Systems   Constitutional: Positive for activity change and fatigue  Negative for appetite change and fever  HENT: Negative  Negative for congestion  Eyes: Negative  Respiratory: Negative  Negative for cough, shortness of breath and wheezing  Cardiovascular: Negative  Negative for chest pain, palpitations and leg swelling  Gastrointestinal: Negative  Negative for abdominal distention, abdominal pain, constipation, diarrhea, nausea and vomiting  Endocrine: Negative  Genitourinary: Negative for difficulty urinating and dysuria  Musculoskeletal:        B/l knee pain   Skin: Negative  Allergic/Immunologic: Negative  Neurological: Positive for weakness  Negative for dizziness, light-headedness and numbness  Psychiatric/Behavioral: Positive for dysphoric mood  Objective     Vitals:   Vitals:    10/14/22 1211   BP: 134/86   Pulse: 80   Resp: 18   Temp: 97 5 °F (36 4 °C)   SpO2: 97%         Physical Exam  Vitals and nursing note reviewed  Constitutional:       Appearance: Normal appearance  HENT:      Head: Normocephalic and atraumatic  Nose: Nose normal  No congestion        Mouth/Throat: Mouth: Mucous membranes are moist       Pharynx: Oropharynx is clear  Eyes:      Conjunctiva/sclera: Conjunctivae normal       Pupils: Pupils are equal, round, and reactive to light  Cardiovascular:      Rate and Rhythm: Normal rate and regular rhythm  Pulses: Normal pulses  Heart sounds: Normal heart sounds  Pulmonary:      Effort: Pulmonary effort is normal  No respiratory distress  Breath sounds: Normal breath sounds  No wheezing  Abdominal:      General: Bowel sounds are normal  There is no distension  Palpations: Abdomen is soft  Tenderness: There is no abdominal tenderness  Musculoskeletal:      Comments: B/l knee pain   Skin:     General: Skin is warm  Capillary Refill: Capillary refill takes 2 to 3 seconds  Neurological:      Mental Status: She is alert and oriented to person, place, and time  Motor: Weakness present  Psychiatric:         Behavior: Behavior normal       Comments: + for depression         Pertinent Laboratory/Diagnostic Studies:   Reviewed in facility chart-stable      Current Medications   Medications reviewed and updated see facility [facility] for details  Current Outpatient Medications:   •  acetaminophen (TYLENOL) 325 mg tablet, Take 650 mg by mouth every 6 (six) hours as needed for mild pain (pain score 1-3)  (Patient not taking: No sig reported), Disp: , Rfl:   •  aspirin (ECOTRIN LOW STRENGTH) 81 mg EC tablet, Take 81 mg by mouth daily, Disp: , Rfl:   •  Cholecalciferol 25 MCG (1000 UT) capsule, Take 1,000 Units by mouth daily   (Patient not taking: No sig reported), Disp: , Rfl:   •  DULoxetine (CYMBALTA) 60 mg delayed release capsule, Take 1 capsule (60 mg total) by mouth daily Do not start before October 1, 2022 , Disp: , Rfl: 0  •  Empagliflozin 25 MG TABS, Take 25 mg by mouth every morning (Patient not taking: No sig reported), Disp: , Rfl:   •  Ergocalciferol (VITAMIN D2 PO), Take 50,000 Units/day by mouth 2 (two) times a week, Disp: , Rfl:   •  ezetimibe (ZETIA) 10 mg tablet, Take 1 tablet (10 mg total) by mouth daily (Patient not taking: Reported on 9/27/2022), Disp: 30 tablet, Rfl: 0  •  gabapentin (NEURONTIN) 100 mg capsule, Take 1 capsule (100 mg total) by mouth 2 (two) times a day Take 100 mg in the morning and 100 mg mid afternoon, Disp: 42 capsule, Rfl: 0  •  gabapentin (NEURONTIN) 300 mg capsule, Take 1 capsule (300 mg total) by mouth daily at bedtime, Disp: 21 capsule, Rfl: 0  •  hydrophilic ointment, Apply topically as needed for dry skin, Disp: , Rfl:   •  insulin glargine (LANTUS) 100 units/mL subcutaneous injection, Inject 30 Units under the skin daily at bedtime, Disp: 10 mL, Rfl: 0  •  insulin lispro (HumaLOG) 100 units/mL injection, Inject 1-5 Units under the skin 3 (three) times a day before meals, Disp: , Rfl: 0  •  levothyroxine 50 mcg tablet, Take 1 5 tablets (75 mcg total) by mouth daily, Disp: 45 tablet, Rfl: 0  •  melatonin 3 mg, Take 3 tablets (9 mg total) by mouth daily at bedtime, Disp: , Rfl: 0  •  nystatin (MYCOSTATIN) powder, Apply topically 2 (two) times a day, Disp: 15 g, Rfl: 0  •  omeprazole (PriLOSEC) 20 mg delayed release capsule, Take 20 mg by mouth daily, Disp: , Rfl:   •  potassium chloride (K-DUR,KLOR-CON) 20 mEq tablet, Take 1 tablet (20 mEq total) by mouth daily, Disp: 30 tablet, Rfl: 0  •  spironolactone (ALDACTONE) 25 mg tablet, Take 1 tablet (25 mg total) by mouth daily Do not start before October 3, 2022 , Disp: 30 tablet, Rfl: 0  •  topiramate (TOPAMAX) 100 mg tablet, Take 200 mg by mouth 2 (two) times a day , Disp: , Rfl:   •  torsemide (DEMADEX) 20 mg tablet, Take 2 tablets (40 mg total) by mouth daily Do not start before October 3, 2022 , Disp: 60 tablet, Rfl: 0     Plan discussed with Dr Miriam Bernal noted agreement with assessment and plan  Please note:  Voice-recognition software may have been used in the preparation of this document    Occasional wrong word or "sound-alike" substitutions may have occurred due to the inherent limitations of voice recognition software  Interpretation should be guided by context           Blair Kay  10/14/2022  12:26 PM

## 2022-10-15 ENCOUNTER — TELEPHONE (OUTPATIENT)
Dept: OTHER | Facility: OTHER | Age: 67
End: 2022-10-15

## 2022-10-15 NOTE — TELEPHONE ENCOUNTER
Geraldine Glover is requesting call back to discuss an x-ray for left upper arm s/p fall 2 days ago  Paged to on call

## 2022-10-17 ENCOUNTER — NURSING HOME VISIT (OUTPATIENT)
Dept: GERIATRICS | Facility: OTHER | Age: 67
End: 2022-10-17
Payer: COMMERCIAL

## 2022-10-17 VITALS
HEART RATE: 80 BPM | OXYGEN SATURATION: 97 % | RESPIRATION RATE: 18 BRPM | WEIGHT: 271 LBS | TEMPERATURE: 97.6 F | DIASTOLIC BLOOD PRESSURE: 70 MMHG | BODY MASS INDEX: 49.57 KG/M2 | SYSTOLIC BLOOD PRESSURE: 124 MMHG

## 2022-10-17 DIAGNOSIS — Z79.4 TYPE 2 DIABETES MELLITUS WITH HYPERGLYCEMIA, WITH LONG-TERM CURRENT USE OF INSULIN (HCC): Primary | ICD-10-CM

## 2022-10-17 DIAGNOSIS — E11.65 TYPE 2 DIABETES MELLITUS WITH HYPERGLYCEMIA, WITH LONG-TERM CURRENT USE OF INSULIN (HCC): Primary | ICD-10-CM

## 2022-10-17 PROCEDURE — 99309 SBSQ NF CARE MODERATE MDM 30: CPT | Performed by: INTERNAL MEDICINE

## 2022-10-17 NOTE — PROGRESS NOTES
[address]  [address]   [address] [place], [place]  48     Progress Note  Code SNF 31    Patient Location     [facility]    Reason for visit     Follow-up depression, diabetes mellitus type 2, hypertension, cardiomyopathy, history of seizure, hypothyroidism    Patient’s care was coordinated with nursing facility staff  Recent vitals, labs and updated medications were reviewed on [facility] system of facility  Problem List Items Addressed This Visit        Endocrine    Diabetes mellitus ([facility] ) - Primary       Lab Results   Component Value Date    HGBA1C 11 8 (H) 09/19/2022     Hemoglobin A1c was uncontrolled at 11 8 on 09/19/2022  Blood sugars were noted to be on the lower side upon admission partly due to decrease appetite  Most recent Accu-Cheks have improved with readings mostly under 200 occasionally high  Fasting blood sugar was 172 this morning  Post breakfast reading was 204  Post supper reading was 261 last night  Continue empagliflozin 25 mg daily, insulin glargine 30 units q h s  and Humalog sliding scale coverage for now  Follow blood sugars and evaluate need for scheduled mealtime insulin               Left upper extremity swelling:  Patient was noted to have swelling and bruising involving left arm post fall on 10/13/2022  Recent x-ray was reported negative for fracture  Continue to monitor    Generalized weakness:  Continue PT OT    Depression:  Continue duloxetine    Hypothyroidism:  Continue levothyroxine 75 mcg daily  Repeat TSH in 8 weeks  Last TSH was borderline high at 6 8 on 09/19/2022    GERD:  Stable on omeprazole    Chronic heart failure with preserved EF:  Clinically euvolemic on torsemide 40 mg daily and spironolactone 25 mg daily    Hyperlipidemia:  Patient remains on ezetimibe    History of seizure:  Stable on topiramate    HPI       Patient is being seen for a follow-up visit today    She is doing okay at present  Denies any dyspnea or chest pain  Remains afebrile  Patient sustained an accidental fall on 10/13 from [facility]  She was subsequently noted to have left upper extremity swelling   X-ray of left humerus was  reported negative  Patient remains incontinent of bowel and bladder at times  Review of Systems   Constitutional: Negative for chills and fever  Respiratory: Negative for wheezing and stridor  Cardiovascular: Negative for chest pain and leg swelling  Gastrointestinal: Negative for abdominal distention, abdominal pain and vomiting  Genitourinary: Negative for dysuria and hematuria  Musculoskeletal: Positive for gait problem  Neurological: Positive for weakness  Negative for syncope and speech difficulty  History of fall on 10/13/2022 from [facility]   Psychiatric/Behavioral: Negative for agitation         Past Medical History:   Diagnosis Date   • CHF (congestive heart failure) ([facility])    • Diabetes mellitus ([facility] )    • Disease of thyroid gland    • Hyperlipidemia    • Hypertension    • Hypothyroidism (acquired)    • Restless leg syndrome    • Seizures ([facility] )        Past Surgical History:   Procedure Laterality Date   • APPENDECTOMY     • CHOLECYSTECTOMY     • HIP FRACTURE SURGERY Right    • TONSILLECTOMY     • TUBAL LIGATION         Social History     Tobacco Use   Smoking Status Never Smoker   Smokeless Tobacco Never Used   Tobacco Comment    not a smoker       Family History   Problem Relation Age of Onset   • Brain cancer Mother    • Seizures Father    • Colon cancer Maternal Grandfather    • No Known Problems Son    • No Known Problems Son    • Breast cancer Maternal Aunt 30        Allergies   Allergen Reactions   • Pineapple - Food Allergy Anaphylaxis   • Statins Other (See Comments)     Cramping from head to toe    • Bupropion Other (See Comments)   • Byetta 10 Mcg Pen [Exenatide]    • Ceftriaxone Other (See Comments)   • Marijuana [Dronabinol] GI Intolerance   • Metformin Diarrhea     Other reaction(s): Unknown   • Omeprazole Hives   • Ondansetron      Other reaction(s): Unknown   • Coconut Oil - Food Allergy Rash   • Liraglutide Rash and Other (See Comments)     Generalized rash   • Penicillins Rash         Current Outpatient Medications:   •  acetaminophen (TYLENOL) 325 mg tablet, Take 650 mg by mouth every 6 (six) hours as needed for mild pain (pain score 1-3)  (Patient not taking: No sig reported), Disp: , Rfl:   •  aspirin (ECOTRIN LOW STRENGTH) 81 mg EC tablet, Take 81 mg by mouth daily, Disp: , Rfl:   •  Cholecalciferol 25 MCG (1000 UT) capsule, Take 1,000 Units by mouth daily   (Patient not taking: No sig reported), Disp: , Rfl:   •  DULoxetine (CYMBALTA) 60 mg delayed release capsule, Take 1 capsule (60 mg total) by mouth daily Do not start before October 1, 2022 , Disp: , Rfl: 0  •  Empagliflozin 25 MG TABS, Take 25 mg by mouth every morning (Patient not taking: No sig reported), Disp: , Rfl:   •  Ergocalciferol (VITAMIN D2 PO), Take 50,000 Units/day by mouth 2 (two) times a week, Disp: , Rfl:   •  ezetimibe (ZETIA) 10 mg tablet, Take 1 tablet (10 mg total) by mouth daily (Patient not taking: Reported on 9/27/2022), Disp: 30 tablet, Rfl: 0  •  gabapentin (NEURONTIN) 100 mg capsule, Take 1 capsule (100 mg total) by mouth 2 (two) times a day Take 100 mg in the morning and 100 mg mid afternoon, Disp: 42 capsule, Rfl: 0  •  gabapentin (NEURONTIN) 300 mg capsule, Take 1 capsule (300 mg total) by mouth daily at bedtime, Disp: 21 capsule, Rfl: 0  •  hydrophilic ointment, Apply topically as needed for dry skin, Disp: , Rfl:   •  insulin glargine (LANTUS) 100 units/mL subcutaneous injection, Inject 30 Units under the skin daily at bedtime, Disp: 10 mL, Rfl: 0  •  insulin lispro (HumaLOG) 100 units/mL injection, Inject 1-5 Units under the skin 3 (three) times a day before meals, Disp: , Rfl: 0  •  levothyroxine 50 mcg tablet, Take 1 5 tablets (75 mcg total) by mouth daily, Disp: 45 tablet, Rfl: 0  •  melatonin 3 mg, Take 3 tablets (9 mg total) by mouth daily at bedtime, Disp: , Rfl: 0  •  nystatin (MYCOSTATIN) powder, Apply topically 2 (two) times a day, Disp: 15 g, Rfl: 0  •  omeprazole (PriLOSEC) 20 mg delayed release capsule, Take 20 mg by mouth daily, Disp: , Rfl:   •  potassium chloride (K-DUR,KLOR-CON) 20 mEq tablet, Take 1 tablet (20 mEq total) by mouth daily, Disp: 30 tablet, Rfl: 0  •  spironolactone (ALDACTONE) 25 mg tablet, Take 1 tablet (25 mg total) by mouth daily Do not start before October 3, 2022 , Disp: 30 tablet, Rfl: 0  •  topiramate (TOPAMAX) 100 mg tablet, Take 200 mg by mouth 2 (two) times a day , Disp: , Rfl:   •  torsemide (DEMADEX) 20 mg tablet, Take 2 tablets (40 mg total) by mouth daily Do not start before October 3, 2022 , Disp: 60 tablet, Rfl: 0    Updated list was reviewed in [facility] system of facility  Vitals:    10/17/22 0827   BP: 124/70   Pulse: 80   Resp: 18   Temp: 97 6 °F (36 4 °C)   SpO2: 97%       Physical Exam  Constitutional:       General: She is not in acute distress  Comments: Sleepy but arousable   HENT:      Head: Normocephalic and atraumatic  Eyes:      General: No scleral icterus  Right eye: No discharge  Left eye: No discharge  Cardiovascular:      Rate and Rhythm: Normal rate and regular rhythm  Pulmonary:      Breath sounds: No wheezing, rhonchi or rales  Abdominal:      General: There is no distension  Palpations: Abdomen is soft  Tenderness: There is no abdominal tenderness  There is no guarding  Musculoskeletal:         General: Swelling (Mild involving left upper extremity) present  Cervical back: Neck supple  Right lower leg: No edema  Left lower leg: No edema  Skin:     General: Skin is dry  Coloration: Skin is not jaundiced  Findings: Bruising (Ecchymosis involving left arm) present  Neurological:      General: No focal deficit present        Cranial Nerves: No cranial nerve deficit  Psychiatric:         Mood and Affect: Mood normal          Behavior: Behavior normal        Diagnostic Data:    Recent labs were reviewed    Labs done on 10/03/2022 were stable with BUN of 20, creatinine 0 95, sodium 141, potassium 4 5  Hemoglobin 15 1, WBC count 5 9, platelet count was 739        This note was electronically signed by Dr Tyler Sanchez

## 2022-10-17 NOTE — ASSESSMENT & PLAN NOTE
Lab Results   Component Value Date    HGBA1C 11 8 (H) 09/19/2022     Hemoglobin A1c was uncontrolled at 11 8 on 09/19/2022  Blood sugars were noted to be on the lower side upon admission partly due to decrease appetite  Most recent Accu-Cheks have improved with readings mostly under 200 occasionally high  Fasting blood sugar was 172 this morning  Post breakfast reading was 204  Post supper reading was 261 last night  Continue empagliflozin 25 mg daily, insulin glargine 30 units q h s  and Humalog sliding scale coverage for now    Follow blood sugars and evaluate need for scheduled mealtime insulin

## 2022-10-20 ENCOUNTER — NURSING HOME VISIT (OUTPATIENT)
Dept: GERIATRICS | Facility: OTHER | Age: 67
End: 2022-10-20
Payer: COMMERCIAL

## 2022-10-20 VITALS
SYSTOLIC BLOOD PRESSURE: 132 MMHG | OXYGEN SATURATION: 96 % | BODY MASS INDEX: 49.57 KG/M2 | DIASTOLIC BLOOD PRESSURE: 82 MMHG | WEIGHT: 271 LBS | RESPIRATION RATE: 16 BRPM | HEART RATE: 74 BPM | TEMPERATURE: 97.7 F

## 2022-10-20 DIAGNOSIS — F32.9 MAJOR DEPRESSIVE DISORDER, REMISSION STATUS UNSPECIFIED, UNSPECIFIED WHETHER RECURRENT: Primary | ICD-10-CM

## 2022-10-20 DIAGNOSIS — R53.1 WEAKNESS: ICD-10-CM

## 2022-10-20 DIAGNOSIS — G25.81 RESTLESS LEG SYNDROME: ICD-10-CM

## 2022-10-20 DIAGNOSIS — E11.65 TYPE 2 DIABETES MELLITUS WITH HYPERGLYCEMIA, WITH LONG-TERM CURRENT USE OF INSULIN (HCC): ICD-10-CM

## 2022-10-20 DIAGNOSIS — E78.2 MIXED HYPERLIPIDEMIA: ICD-10-CM

## 2022-10-20 DIAGNOSIS — Z79.4 TYPE 2 DIABETES MELLITUS WITH HYPERGLYCEMIA, WITH LONG-TERM CURRENT USE OF INSULIN (HCC): ICD-10-CM

## 2022-10-20 PROCEDURE — 99309 SBSQ NF CARE MODERATE MDM 30: CPT

## 2022-10-20 NOTE — PROGRESS NOTES
[facility]  [address]  [phone]  [facility]  Code 31 (STR)          NAME: Linda Mandel  AGE: 78 y o  SEX: female CODE STATUS: CPR    DATE OF ENCOUNTER: 10/20/2022    Assessment and Plan     1  Major depressive disorder, remission status unspecified, unspecified whether recurrent  Assessment & Plan:  · Recent hospitalization for weakness  · Patient indicated worsening depression symptoms  · Patient states she did not get out of bed to the 5 days  · Patient states she had not taking any of her medications  · Continue Cymbalta 60 mg daily  · Placed consult for Dr Guilherme Rouse        2  Weakness  Assessment & Plan:  · Recent hospitalization for syncope 09/19/2022  · After discharge patient reports not getting out of bed or taking her medications   · Patient had increased depression   · Patient was readmitted to the hospital 09/27/2022 for generalized weakness  · Patient had positive orthostatic blood pressures and received IV fluids  · PT OT recommended short-term rehab  · Maintain fall/safety precautions  · Assist patient with ADLs as needed  · SW to follow up for DC planning      3  Mixed hyperlipidemia  Assessment & Plan:  · History of hyperlipidemia  · Last total cholesterol 180, triglycerides 56, HDL 33,   · Continue Zetia 10 mg daily      4  Restless leg syndrome  Assessment & Plan:  · History of restless  · Continue gabapentin 300 mg at HS      5  Type 2 diabetes mellitus with hyperglycemia, with long-term current use of insulin ([facility])  Assessment & Plan:    Lab Results   Component Value Date    HGBA1C 11 8 (H) 09/19/2022   · History of diabetes  · Blood sugars have been trending 130-200  · Morning blood sugar 185  · Encourage diabetic diet   · Continue Accu-Cheks  · Continue Lantus insulin 30 units at HS  · Continue Humalog sliding scale insulin with meals         All medications and routine orders were reviewed and updated as needed      Chief Complaint     STR follow up visit  Patient's care was coordinated with nursing facility staff  Recent vitals, labs, and updated medications were review on [facility] system in facility  Past Medical and Surgica History      Past Medical History:   Diagnosis Date   • [facility]congestive [facility] failure) ([facility] )    • Diabetes mellitus ([facility] )    • Disease of thyroid gland    • Hyperlipidemia    • Hypertension    • Hypothyroidism (acquired)    • Restless leg syndrome    • Seizures ([facility] )      Past Surgical History:   Procedure Laterality Date   • APPENDECTOMY     • CHOLECYSTECTOMY     • HIP FRACTURE SURGERY Right    • TONSILLECTOMY     • TUBAL LIGATION       Allergies   Allergen Reactions   • Pineapple - Food Allergy Anaphylaxis   • Statins Other (See Comments)     Cramping from head to toe    • Bupropion Other (See Comments)   • Byetta 10 Mcg Pen [Exenatide]    • Ceftriaxone Other (See Comments)   • Marijuana [Dronabinol] GI Intolerance   • Metformin Diarrhea     Other reaction(s): Unknown   • Omeprazole Hives   • Ondansetron      Other reaction(s): Unknown   • Coconut Oil - Food Allergy Rash   • Liraglutide Rash and Other (See Comments)     Generalized rash   • Penicillins Rash          History of Present Illness     HPI  Atiya Jones is a 53-year-old female, she is a STR patient of [facility] since 09/30/2022  Past Medical Hx including but not limited to depression, RLS, diabetes, CAD, hypothyroidism, GERD  She was seen in collaboration with nursing for medical management and STR follow up  Patient had been previously hospitalized 09/19/2022 for syncopal episode  During this hospitalization patient expressed that she was experiencing depression  Psych followed during hospitalization  After discharge patient reports she did not take any of her medications and did not get out of bed for 1 week  Patient was readmitted to the hospital 09/27/2022 for generalized weakness  PT OT recommended short-term rehab    Patient was seen and examined at bedside today  Patient is a reliable historian, alert and oriented x3  She complains of right arm pain secondary to fall  Right elbow ecchymotic  During exam patient appears tired  She states she has been participating in therapy  Denies CP/SOB/N/V/D  Denies lightheadedness, dizziness, headaches, vision changes  Patient states she has had a poor appetite but has been drinking juice  Denies any bowel or bladder issues  Per review of SNF records, Patient is eating 3 meals per day, consuming  %  Last documented BM 10/19/2022  No concerns from nursing at this time  The patient's allergies, past medical, surgical, social and family history were reviewed and unchanged  Review of Systems     Review of Systems   Constitutional: Positive for activity change and appetite change  Negative for fever  HENT: Negative for congestion  Eyes: Negative  Respiratory: Positive for shortness of breath  Negative for cough  SOB with activity, currently on 2L O2 sat 96%   Cardiovascular: Negative for chest pain, palpitations and leg swelling  Gastrointestinal: Negative  Negative for abdominal distention, abdominal pain, constipation, diarrhea, nausea and vomiting  Endocrine: Negative  Genitourinary: Negative  Negative for difficulty urinating and dysuria  Musculoskeletal:        Right arm/elbow ecchymotic from prior fall   Skin: Negative  Allergic/Immunologic: Negative  Neurological: Positive for weakness  Negative for dizziness, light-headedness and headaches  Hematological: Negative  Psychiatric/Behavioral: Positive for dysphoric mood and sleep disturbance  Objective     Vitals:   Vitals:    10/20/22 1320   BP: 132/82   Pulse: 74   Resp: 16   Temp: 97 7 °F (36 5 °C)   SpO2: 96%         Physical Exam  Vitals and nursing note reviewed  Constitutional:       General: She is not in acute distress  Appearance: Normal appearance  She is not ill-appearing     HENT:      Head: Normocephalic and atraumatic  Nose: No congestion  Mouth/Throat:      Mouth: Mucous membranes are moist    Eyes:      Conjunctiva/sclera: Conjunctivae normal       Pupils: Pupils are equal, round, and reactive to light  Cardiovascular:      Rate and Rhythm: Normal rate and regular rhythm  Pulses: Normal pulses  Heart sounds: Normal heart sounds  Pulmonary:      Effort: Pulmonary effort is normal  No respiratory distress  Breath sounds: Normal breath sounds  No wheezing  Abdominal:      General: Bowel sounds are normal  There is no distension  Palpations: Abdomen is soft  Tenderness: There is no abdominal tenderness  Musculoskeletal:      Right lower leg: No edema  Left lower leg: No edema  Skin:     General: Skin is warm  Capillary Refill: Capillary refill takes 2 to 3 seconds  Findings: Bruising present  Comments: B/l arms   Neurological:      Mental Status: She is alert and oriented to person, place, and time  Motor: Weakness present  Psychiatric:         Mood and Affect: Mood normal          Behavior: Behavior normal          Pertinent Laboratory/Diagnostic Studies:   Reviewed in facility chart-stable      Current Medications   Medications reviewed and updated see facility [facility] for details  Current Outpatient Medications:   •  acetaminophen (TYLENOL) 325 mg tablet, Take 650 mg by mouth every 6 (six) hours as needed for mild pain (pain score 1-3)  (Patient not taking: No sig reported), Disp: , Rfl:   •  aspirin (ECOTRIN LOW STRENGTH) 81 mg EC tablet, Take 81 mg by mouth daily, Disp: , Rfl:   •  Cholecalciferol 25 MCG (1000 UT) capsule, Take 1,000 Units by mouth daily   (Patient not taking: No sig reported), Disp: , Rfl:   •  DULoxetine (CYMBALTA) 60 mg delayed release capsule, Take 1 capsule (60 mg total) by mouth daily Do not start before October 1, 2022 , Disp: , Rfl: 0  •  Empagliflozin 25 MG TABS, Take 25 mg by mouth every morning (Patient not taking: No sig reported), Disp: , Rfl:   •  Ergocalciferol (VITAMIN D2 PO), Take 50,000 Units/day by mouth 2 (two) times a week, Disp: , Rfl:   •  ezetimibe (ZETIA) 10 mg tablet, Take 1 tablet (10 mg total) by mouth daily (Patient not taking: Reported on 9/27/2022), Disp: 30 tablet, Rfl: 0  •  gabapentin (NEURONTIN) 100 mg capsule, Take 1 capsule (100 mg total) by mouth 2 (two) times a day Take 100 mg in the morning and 100 mg mid afternoon, Disp: 42 capsule, Rfl: 0  •  gabapentin (NEURONTIN) 300 mg capsule, Take 1 capsule (300 mg total) by mouth daily at bedtime, Disp: 21 capsule, Rfl: 0  •  hydrophilic ointment, Apply topically as needed for dry skin, Disp: , Rfl:   •  insulin glargine (LANTUS) 100 units/mL subcutaneous injection, Inject 30 Units under the skin daily at bedtime, Disp: 10 mL, Rfl: 0  •  insulin lispro (HumaLOG) 100 units/mL injection, Inject 1-5 Units under the skin 3 (three) times a day before meals, Disp: , Rfl: 0  •  levothyroxine 50 mcg tablet, Take 1 5 tablets (75 mcg total) by mouth daily, Disp: 45 tablet, Rfl: 0  •  melatonin 3 mg, Take 3 tablets (9 mg total) by mouth daily at bedtime, Disp: , Rfl: 0  •  nystatin (MYCOSTATIN) powder, Apply topically 2 (two) times a day, Disp: 15 g, Rfl: 0  •  omeprazole (PriLOSEC) 20 mg delayed release capsule, Take 20 mg by mouth daily, Disp: , Rfl:   •  potassium chloride (K-DUR,KLOR-CON) 20 mEq tablet, Take 1 tablet (20 mEq total) by mouth daily, Disp: 30 tablet, Rfl: 0  •  spironolactone (ALDACTONE) 25 mg tablet, Take 1 tablet (25 mg total) by mouth daily Do not start before October 3, 2022 , Disp: 30 tablet, Rfl: 0  •  topiramate (TOPAMAX) 100 mg tablet, Take 200 mg by mouth 2 (two) times a day , Disp: , Rfl:   •  torsemide (DEMADEX) 20 mg tablet, Take 2 tablets (40 mg total) by mouth daily Do not start before October 3, 2022 , Disp: 60 tablet, Rfl: 0     Plan discussed with Dr Alyssa Byrnes noted agreement with assessment and plan     Please note:  Voice-recognition software may have been used in the preparation of this document  Occasional wrong word or "sound-alike" substitutions may have occurred due to the inherent limitations of voice recognition software  Interpretation should be guided by context           Ninoska Kay  10/20/2022  1:43 PM

## 2022-10-20 NOTE — ASSESSMENT & PLAN NOTE
· Recent hospitalization for weakness  · Patient indicated worsening depression symptoms  · Patient states she did not get out of bed to the 5 days  · Patient states she had not taking any of her medications  · Continue Cymbalta 60 mg daily  · Placed consult for Dr El Ayon

## 2022-10-20 NOTE — ASSESSMENT & PLAN NOTE
Lab Results   Component Value Date    HGBA1C 11 8 (H) 09/19/2022   · History of diabetes  · Blood sugars have been trending 130-200  · Morning blood sugar 185  · Encourage diabetic diet   · Continue Accu-Cheks  · Continue Lantus insulin 30 units at HS  · Continue Humalog sliding scale insulin with meals

## 2022-10-24 ENCOUNTER — NURSING HOME VISIT (OUTPATIENT)
Dept: GERIATRICS | Facility: OTHER | Age: 67
End: 2022-10-24

## 2022-10-24 VITALS
OXYGEN SATURATION: 94 % | RESPIRATION RATE: 18 BRPM | TEMPERATURE: 97.8 F | HEART RATE: 78 BPM | BODY MASS INDEX: 49.57 KG/M2 | SYSTOLIC BLOOD PRESSURE: 136 MMHG | WEIGHT: 271 LBS | DIASTOLIC BLOOD PRESSURE: 83 MMHG

## 2022-10-24 DIAGNOSIS — E78.2 MIXED HYPERLIPIDEMIA: ICD-10-CM

## 2022-10-24 DIAGNOSIS — R53.1 WEAKNESS: ICD-10-CM

## 2022-10-24 DIAGNOSIS — Z79.4 TYPE 2 DIABETES MELLITUS WITH HYPERGLYCEMIA, WITH LONG-TERM CURRENT USE OF INSULIN (HCC): Primary | ICD-10-CM

## 2022-10-24 DIAGNOSIS — F32.9 MAJOR DEPRESSIVE DISORDER, REMISSION STATUS UNSPECIFIED, UNSPECIFIED WHETHER RECURRENT: ICD-10-CM

## 2022-10-24 DIAGNOSIS — E11.65 TYPE 2 DIABETES MELLITUS WITH HYPERGLYCEMIA, WITH LONG-TERM CURRENT USE OF INSULIN (HCC): Primary | ICD-10-CM

## 2022-10-24 NOTE — ASSESSMENT & PLAN NOTE
Lab Results   Component Value Date    HGBA1C 11 8 (H) 09/19/2022   · History of diabetes  · Blood sugars have been trending 130-200  · Morning blood sugar 85  · Encourage diabetic diet   · Continue Accu-Cheks  · Continue Lantus insulin 30 units at HS  · Continue Humalog sliding scale insulin with meals

## 2022-10-24 NOTE — PROGRESS NOTES
[facility]  [address]  [phone]  [place]  Code 31 (STR)          NAME: Fernando Duque  AGE: 78 y o  SEX: female CODE STATUS: CPR    DATE OF ENCOUNTER: 10/24/2022    Assessment and Plan     1  Type 2 diabetes mellitus with hyperglycemia, with long-term current use of insulin ([facility])  Assessment & Plan:    Lab Results   Component Value Date    HGBA1C 11 8 (H) 09/19/2022   · History of diabetes  · Blood sugars have been trending 130-200  · Morning blood sugar 85  · Encourage diabetic diet   · Continue Accu-Cheks  · Continue Lantus insulin 30 units at HS  · Continue Humalog sliding scale insulin with meals      2  Major depressive disorder, remission status unspecified, unspecified whether recurrent  Assessment & Plan:  · Recent hospitalization for weakness  · Patient indicated increased depression at home  · Patient states she did not get out of bed to the 5 days  · Patient states she had not taking any of her medications  · She reports feeling less depressed today  · Continue Cymbalta 60 mg daily  · Placed consult for Dr Hughes Apt        3  Mixed hyperlipidemia  Assessment & Plan:  · History of hyperlipidemia  · Last total cholesterol 180, triglycerides 56, HDL 33,   · Continue Zetia 10 mg daily      4  Weakness  Assessment & Plan:  · Recent hospitalization for syncope 09/19/2022  · After discharge patient reports not getting out of bed or taking her medications   · Patient had increased depression   · Patient was readmitted to the hospital 09/27/2022 for generalized weakness  · Patient had positive orthostatic blood pressures and received IV fluids  · PT OT recommended short-term rehab  · Maintain fall/safety precautions  · Assist patient with ADLs as needed  · SW to follow up for DC planning         All medications and routine orders were reviewed and updated as needed  Chief Complaint     STR follow up visit  Patient's care was coordinated with nursing facility staff  Recent vitals, labs, and updated medications were review on [facility] system in facility  Past Medical and Surgica History      Past Medical History:   Diagnosis Date   • CHF (congestive heart failure) ([facility] [address] )    • Diabetes mellitus ([facility] )    • Disease of thyroid gland    • Hyperlipidemia    • Hypertension    • Hypothyroidism (acquired)    • Restless leg syndrome    • Seizures ([facility] )      Past Surgical History:   Procedure Laterality Date   • APPENDECTOMY     • CHOLECYSTECTOMY     • HIP FRACTURE SURGERY Right    • TONSILLECTOMY     • TUBAL LIGATION       Allergies   Allergen Reactions   • Pineapple - Food Allergy Anaphylaxis   • Statins Other (See Comments)     Cramping from head to toe    • Bupropion Other (See Comments)   • Byetta 10 Mcg Pen [Exenatide]    • Ceftriaxone Other (See Comments)   • Marijuana [Dronabinol] GI Intolerance   • Metformin Diarrhea     Other reaction(s): Unknown   • Omeprazole Hives   • Ondansetron      Other reaction(s): Unknown   • Coconut Oil - Food Allergy Rash   • Liraglutide Rash and Other (See Comments)     Generalized rash   • Penicillins Rash          History of Present Illness     HPI  Chemo Valencia is a 58-year-old female, she is a STR patient of [facility] since 09/30/2022  Past Medical Hx including but not limited to depression, RLS, diabetes, CAD, hypothyroidism, GERD  She was seen in collaboration with nursing for medical management and STR follow up  Patient had been previously hospitalized 09/19/2022 for syncopal episode  During this hospitalization patient expressed that she was experiencing depression  Psych followed during hospitalization  After discharge patient reports she did not take any of her medications and did not get out of bed for 1 week  Patient was readmitted to the hospital 09/27/2022 for generalized weakness  PT OT recommended short-term rehab  Patient was seen and examined at bedside today   Patient is a reliable historian, alert and oriented x3  She reports that she is feeling better, she denies any pain, and says that she is feeling less depressed today  Her right arm/elbow is ecchymotic from recent fall  She states she has been participating in therapy  Denies CP/SOB/N/V/D  Denies lightheadedness, dizziness, headaches, vision changes  Patient states she has had a poor appetite but has been drinking juice  Denies any bowel or bladder issues  Per review of SNF records, Patient is eating 3 meals per day, consuming  %  Last documented BM 10/22/2022  No concerns from nursing at this time  The patient's allergies, past medical, surgical, social and family history were reviewed and unchanged  Review of Systems     Review of Systems   Constitutional: Negative  Negative for activity change, appetite change, fatigue and fever  HENT: Negative  Negative for congestion  Eyes: Negative  Respiratory: Negative  Negative for cough, shortness of breath and wheezing  Cardiovascular: Negative  Negative for chest pain, palpitations and leg swelling  Gastrointestinal: Negative  Negative for abdominal distention, abdominal pain, constipation, diarrhea, nausea and vomiting  Endocrine: Negative  Genitourinary: Negative for difficulty urinating and dysuria  Musculoskeletal: Negative  Skin: Negative  Allergic/Immunologic: Negative  Neurological: Negative  Negative for dizziness, weakness, light-headedness, numbness and headaches  Hematological: Negative  Psychiatric/Behavioral: Negative  Objective     Vitals:   Vitals:    10/24/22 1310   BP: 136/83   Pulse: 78   Resp: 18   Temp: 97 8 °F (36 6 °C)   SpO2: 94%         Physical Exam  Vitals and nursing note reviewed  Constitutional:       General: She is not in acute distress  Appearance: Normal appearance  She is not ill-appearing  HENT:      Head: Normocephalic and atraumatic  Nose: No congestion  Mouth/Throat:      Mouth: Mucous membranes are dry  Eyes:      Conjunctiva/sclera: Conjunctivae normal       Pupils: Pupils are equal, round, and reactive to light  Cardiovascular:      Rate and Rhythm: Normal rate and regular rhythm  Pulses: Normal pulses  Heart sounds: Normal heart sounds  Pulmonary:      Effort: Pulmonary effort is normal  No respiratory distress  Breath sounds: Normal breath sounds  No wheezing  Abdominal:      General: Bowel sounds are normal  There is no distension  Palpations: Abdomen is soft  Tenderness: There is no abdominal tenderness  Musculoskeletal:      Right lower leg: No edema  Left lower leg: No edema  Skin:     General: Skin is warm and dry  Capillary Refill: Capillary refill takes 2 to 3 seconds  Neurological:      Mental Status: She is alert and oriented to person, place, and time  Pertinent Laboratory/Diagnostic Studies:   Reviewed in facility chart-stable      Current Medications   Medications reviewed and updated see facility [facility] for details  Current Outpatient Medications:   •  acetaminophen (TYLENOL) 325 mg tablet, Take 650 mg by mouth every 6 (six) hours as needed for mild pain (pain score 1-3)  (Patient not taking: No sig reported), Disp: , Rfl:   •  aspirin (ECOTRIN LOW STRENGTH) 81 mg EC tablet, Take 81 mg by mouth daily, Disp: , Rfl:   •  Cholecalciferol 25 MCG (1000 UT) capsule, Take 1,000 Units by mouth daily   (Patient not taking: No sig reported), Disp: , Rfl:   •  DULoxetine (CYMBALTA) 60 mg delayed release capsule, Take 1 capsule (60 mg total) by mouth daily Do not start before October 1, 2022 , Disp: , Rfl: 0  •  Empagliflozin 25 MG TABS, Take 25 mg by mouth every morning (Patient not taking: No sig reported), Disp: , Rfl:   •  Ergocalciferol (VITAMIN D2 PO), Take 50,000 Units/day by mouth 2 (two) times a week, Disp: , Rfl:   •  ezetimibe (ZETIA) 10 mg tablet, Take 1 tablet (10 mg total) by mouth daily (Patient not taking: Reported on 9/27/2022), Disp: 30 tablet, Rfl: 0  •  gabapentin (NEURONTIN) 100 mg capsule, Take 1 capsule (100 mg total) by mouth 2 (two) times a day Take 100 mg in the morning and 100 mg mid afternoon, Disp: 42 capsule, Rfl: 0  •  gabapentin (NEURONTIN) 300 mg capsule, Take 1 capsule (300 mg total) by mouth daily at bedtime, Disp: 21 capsule, Rfl: 0  •  hydrophilic ointment, Apply topically as needed for dry skin, Disp: , Rfl:   •  insulin glargine (LANTUS) 100 units/mL subcutaneous injection, Inject 30 Units under the skin daily at bedtime, Disp: 10 mL, Rfl: 0  •  insulin lispro (HumaLOG) 100 units/mL injection, Inject 1-5 Units under the skin 3 (three) times a day before meals, Disp: , Rfl: 0  •  levothyroxine 50 mcg tablet, Take 1 5 tablets (75 mcg total) by mouth daily, Disp: 45 tablet, Rfl: 0  •  melatonin 3 mg, Take 3 tablets (9 mg total) by mouth daily at bedtime, Disp: , Rfl: 0  •  nystatin (MYCOSTATIN) powder, Apply topically 2 (two) times a day, Disp: 15 g, Rfl: 0  •  omeprazole (PriLOSEC) 20 mg delayed release capsule, Take 20 mg by mouth daily, Disp: , Rfl:   •  potassium chloride (K-DUR,KLOR-CON) 20 mEq tablet, Take 1 tablet (20 mEq total) by mouth daily, Disp: 30 tablet, Rfl: 0  •  spironolactone (ALDACTONE) 25 mg tablet, Take 1 tablet (25 mg total) by mouth daily Do not start before October 3, 2022 , Disp: 30 tablet, Rfl: 0  •  topiramate (TOPAMAX) 100 mg tablet, Take 200 mg by mouth 2 (two) times a day , Disp: , Rfl:   •  torsemide (DEMADEX) 20 mg tablet, Take 2 tablets (40 mg total) by mouth daily Do not start before October 3, 2022 , Disp: 60 tablet, Rfl: 0     Plan discussed with Dr Jonna Rachel noted agreement with assessment and plan  Please note:  Voice-recognition software may have been used in the preparation of this document  Occasional wrong word or "sound-alike" substitutions may have occurred due to the inherent limitations of voice recognition software  Interpretation should be guided by context  Hussein Kay  10/24/2022  1:23 PM

## 2022-10-24 NOTE — ASSESSMENT & PLAN NOTE
· Recent hospitalization for weakness  · Patient indicated increased depression at home  · Patient states she did not get out of bed to the 5 days  · Patient states she had not taking any of her medications  · She reports feeling less depressed today  · Continue Cymbalta 60 mg daily  · Placed consult for Dr Carl Skaggs

## 2022-10-28 ENCOUNTER — NURSING HOME VISIT (OUTPATIENT)
Dept: GERIATRICS | Facility: OTHER | Age: 67
End: 2022-10-28

## 2022-10-28 VITALS
DIASTOLIC BLOOD PRESSURE: 68 MMHG | OXYGEN SATURATION: 95 % | WEIGHT: 271 LBS | TEMPERATURE: 97.8 F | RESPIRATION RATE: 18 BRPM | BODY MASS INDEX: 49.57 KG/M2 | SYSTOLIC BLOOD PRESSURE: 124 MMHG | HEART RATE: 76 BPM

## 2022-10-28 DIAGNOSIS — Z79.4 TYPE 2 DIABETES MELLITUS WITH HYPERGLYCEMIA, WITH LONG-TERM CURRENT USE OF INSULIN (HCC): Primary | ICD-10-CM

## 2022-10-28 DIAGNOSIS — G25.81 RESTLESS LEG SYNDROME: ICD-10-CM

## 2022-10-28 DIAGNOSIS — F32.9 MAJOR DEPRESSIVE DISORDER, REMISSION STATUS UNSPECIFIED, UNSPECIFIED WHETHER RECURRENT: ICD-10-CM

## 2022-10-28 DIAGNOSIS — E78.2 MIXED HYPERLIPIDEMIA: ICD-10-CM

## 2022-10-28 DIAGNOSIS — E11.65 TYPE 2 DIABETES MELLITUS WITH HYPERGLYCEMIA, WITH LONG-TERM CURRENT USE OF INSULIN (HCC): Primary | ICD-10-CM

## 2022-10-28 NOTE — ASSESSMENT & PLAN NOTE
Lab Results   Component Value Date    HGBA1C 11 8 (H) 09/19/2022   · History of diabetes  · Blood sugars have been trending 130-200  · Morning blood sugar 149  · Encourage diabetic diet   · Continue Accu-Cheks  · Continue Lantus insulin 30 units at HS  · Continue Humalog sliding scale insulin with meals

## 2022-10-28 NOTE — PROGRESS NOTES
[facility]  [address]  [phone]  [address]  Code 31 (STR)          NAME: Jhonatan Watson  AGE: 78 y o  SEX: female CODE STATUS: CPR    DATE OF ENCOUNTER: 10/28/2022    Assessment and Plan     1  Type 2 diabetes mellitus with hyperglycemia, with long-term current use of insulin (HCC)  Assessment & Plan:    Lab Results   Component Value Date    HGBA1C 11 8 (H) 09/19/2022   · History of diabetes  · Blood sugars have been trending 130-200  · Morning blood sugar 149  · Encourage diabetic diet   · Continue Accu-Cheks  · Continue Lantus insulin 30 units at HS  · Continue Humalog sliding scale insulin with meals      2  Restless leg syndrome  Assessment & Plan:  · History of restless  · Continue gabapentin 300 mg at HS      3  Mixed hyperlipidemia  Assessment & Plan:  · History of hyperlipidemia  · Last total cholesterol 180, triglycerides 56, HDL 33,   · Continue Zetia 10 mg daily      4  Major depressive disorder, remission status unspecified, unspecified whether recurrent  Assessment & Plan:  · Recent hospitalization for weakness  · Patient indicated increased depression at home  · Patient states she did not get out of bed to the 5 days  · Patient states she had not taking any of her medications  · She reports feeling less depressed today  · Continue Cymbalta 60 mg daily             All medications and routine orders were reviewed and updated as needed  Chief Complaint     STR follow up visit  Patient's care was coordinated with nursing facility staff  Recent vitals, labs, and updated medications were review on [facility] system in facility        Past Medical and Surgica History      Past Medical History:   Diagnosis Date   • CHF (congestive heart failure) ([facility] [address] )    • Diabetes mellitus ([facility] [address] )    • Disease of thyroid gland    • Hyperlipidemia    • Hypertension    • Hypothyroidism (acquired)    • Restless leg syndrome    • Seizures ([facility] [address] )      Past Surgical History: Procedure Laterality Date   • APPENDECTOMY     • CHOLECYSTECTOMY     • HIP FRACTURE SURGERY Right    • TONSILLECTOMY     • TUBAL LIGATION       Allergies   Allergen Reactions   • Pineapple - Food Allergy Anaphylaxis   • Statins Other (See Comments)     Cramping from head to toe    • Bupropion Other (See Comments)   • Byetta 10 Mcg Pen [Exenatide]    • Ceftriaxone Other (See Comments)   • Marijuana [Dronabinol] GI Intolerance   • Metformin Diarrhea     Other reaction(s): Unknown   • Omeprazole Hives   • Ondansetron      Other reaction(s): Unknown   • Coconut Oil - Food Allergy Rash   • Liraglutide Rash and Other (See Comments)     Generalized rash   • Penicillins Rash          History of Present Illness     HPI  Tank Merchant is a 69-year-old female, she is a STR patient of [facility] since 09/30/2022  Past Medical Hx including but not limited to depression, RLS, diabetes, CAD, hypothyroidism, GERD  She was seen in collaboration with nursing for medical management and STR follow up  Patient had been previously hospitalized 09/19/2022 for syncopal episode  During this hospitalization patient expressed that she was experiencing depression  Psych followed during hospitalization  After discharge patient reports she did not take any of her medications and did not get out of bed for 1 week  Patient was readmitted to the hospital 09/27/2022 for generalized weakness  PT OT recommended short-term rehab  Patient was seen and examined at bedside today  Patient is a reliable historian, alert and oriented x3  She reports that she is feeling better, she denies any pain, and says that she is feeling less depressed today  She does state that she has not been sleeping well and then is tired during the day  She indicated that she normally takes 10 mg of Melatonin at home  Per record review patient taking 9 mg of Melatonin at home, will increase dose  Her right arm/elbow is ecchymotic from recent fall   She states she has been participating in therapy  Denies CP/SOB/N/V/D  Denies lightheadedness, dizziness, headaches, vision changes  Patient states she has had a poor appetite but has been drinking juice  Denies any bowel or bladder issues  Per review of SNF records, Patient is eating 3 meals per day, consuming  %  Last documented BM 10/26/2022  No concerns from nursing at this time  The patient's allergies, past medical, surgical, social and family history were reviewed and unchanged  Review of Systems     Review of Systems   Constitutional: Negative  Negative for activity change, appetite change and fever  HENT: Negative  Negative for congestion  Eyes: Negative  Respiratory: Negative  Negative for cough, chest tightness, shortness of breath and wheezing  Cardiovascular: Negative  Negative for chest pain, palpitations and leg swelling  Gastrointestinal: Negative  Negative for abdominal distention, abdominal pain, constipation, diarrhea, nausea and vomiting  Endocrine: Negative  Genitourinary: Negative  Negative for difficulty urinating and dysuria  Musculoskeletal: Negative  Skin: Positive for color change  ecchymotic left elbow from fall   Allergic/Immunologic: Negative  Neurological: Negative  Negative for dizziness, weakness, light-headedness and headaches  Hematological: Negative  Psychiatric/Behavioral: Negative  Objective     Vitals:   Vitals:    10/28/22 1132   BP: 124/68   Pulse: 76   Resp: 18   Temp: 97 8 °F (36 6 °C)   SpO2: 95%         Physical Exam  Vitals and nursing note reviewed  Constitutional:       General: She is not in acute distress  Appearance: Normal appearance  She is not ill-appearing  HENT:      Head: Normocephalic and atraumatic  Nose: Nose normal       Mouth/Throat:      Mouth: Mucous membranes are moist    Eyes:      Conjunctiva/sclera: Conjunctivae normal       Pupils: Pupils are equal, round, and reactive to light  Cardiovascular:      Rate and Rhythm: Normal rate and regular rhythm  Pulses: Normal pulses  Heart sounds: Normal heart sounds  Pulmonary:      Effort: Pulmonary effort is normal  No respiratory distress  Breath sounds: Normal breath sounds  No wheezing  Abdominal:      General: Bowel sounds are normal  There is no distension  Palpations: Abdomen is soft  Tenderness: There is no abdominal tenderness  Musculoskeletal:      Right lower leg: No edema  Left lower leg: No edema  Skin:     General: Skin is warm  Capillary Refill: Capillary refill takes 2 to 3 seconds  Neurological:      Mental Status: She is alert and oriented to person, place, and time  Psychiatric:         Mood and Affect: Mood normal          Behavior: Behavior normal          Pertinent Laboratory/Diagnostic Studies:   Reviewed in facility chart-stable      Current Medications   Medications reviewed and updated see facility [facility] for details  Current Outpatient Medications:   •  acetaminophen (TYLENOL) 325 mg tablet, Take 650 mg by mouth every 6 (six) hours as needed for mild pain (pain score 1-3)  (Patient not taking: No sig reported), Disp: , Rfl:   •  aspirin (ECOTRIN LOW STRENGTH) 81 mg EC tablet, Take 81 mg by mouth daily, Disp: , Rfl:   •  Cholecalciferol 25 MCG (1000 UT) capsule, Take 1,000 Units by mouth daily   (Patient not taking: No sig reported), Disp: , Rfl:   •  DULoxetine (CYMBALTA) 60 mg delayed release capsule, Take 1 capsule (60 mg total) by mouth daily Do not start before October 1, 2022 , Disp: , Rfl: 0  •  Empagliflozin 25 MG TABS, Take 25 mg by mouth every morning (Patient not taking: No sig reported), Disp: , Rfl:   •  Ergocalciferol (VITAMIN D2 PO), Take 50,000 Units/day by mouth 2 (two) times a week, Disp: , Rfl:   •  ezetimibe (ZETIA) 10 mg tablet, Take 1 tablet (10 mg total) by mouth daily (Patient not taking: Reported on 9/27/2022), Disp: 30 tablet, Rfl: 0  •  gabapentin (NEURONTIN) 100 mg capsule, Take 1 capsule (100 mg total) by mouth 2 (two) times a day Take 100 mg in the morning and 100 mg mid afternoon, Disp: 42 capsule, Rfl: 0  •  gabapentin (NEURONTIN) 300 mg capsule, Take 1 capsule (300 mg total) by mouth daily at bedtime, Disp: 21 capsule, Rfl: 0  •  hydrophilic ointment, Apply topically as needed for dry skin, Disp: , Rfl:   •  insulin glargine (LANTUS) 100 units/mL subcutaneous injection, Inject 30 Units under the skin daily at bedtime, Disp: 10 mL, Rfl: 0  •  insulin lispro (HumaLOG) 100 units/mL injection, Inject 1-5 Units under the skin 3 (three) times a day before meals, Disp: , Rfl: 0  •  levothyroxine 50 mcg tablet, Take 1 5 tablets (75 mcg total) by mouth daily, Disp: 45 tablet, Rfl: 0  •  melatonin 3 mg, Take 3 tablets (9 mg total) by mouth daily at bedtime, Disp: , Rfl: 0  •  nystatin (MYCOSTATIN) powder, Apply topically 2 (two) times a day, Disp: 15 g, Rfl: 0  •  omeprazole (PriLOSEC) 20 mg delayed release capsule, Take 20 mg by mouth daily, Disp: , Rfl:   •  potassium chloride (K-DUR,KLOR-CON) 20 mEq tablet, Take 1 tablet (20 mEq total) by mouth daily, Disp: 30 tablet, Rfl: 0  •  spironolactone (ALDACTONE) 25 mg tablet, Take 1 tablet (25 mg total) by mouth daily Do not start before October 3, 2022 , Disp: 30 tablet, Rfl: 0  •  topiramate (TOPAMAX) 100 mg tablet, Take 200 mg by mouth 2 (two) times a day , Disp: , Rfl:   •  torsemide (DEMADEX) 20 mg tablet, Take 2 tablets (40 mg total) by mouth daily Do not start before October 3, 2022 , Disp: 60 tablet, Rfl: 0     Plan discussed with Dr Grace Guo noted agreement with assessment and plan  Please note:  Voice-recognition software may have been used in the preparation of this document  Occasional wrong word or "sound-alike" substitutions may have occurred due to the inherent limitations of voice recognition software  Interpretation should be guided by context           Yonny Kay  10/28/2022 11:38 AM

## 2022-10-28 NOTE — ASSESSMENT & PLAN NOTE
· Recent hospitalization for weakness  · Patient indicated increased depression at home  · Patient states she did not get out of bed to the 5 days  · Patient states she had not taking any of her medications  · She reports feeling less depressed today  · Continue Cymbalta 60 mg daily

## 2022-10-31 ENCOUNTER — NURSING HOME VISIT (OUTPATIENT)
Dept: GERIATRICS | Facility: OTHER | Age: 67
End: 2022-10-31

## 2022-10-31 VITALS
DIASTOLIC BLOOD PRESSURE: 70 MMHG | SYSTOLIC BLOOD PRESSURE: 128 MMHG | WEIGHT: 270.8 LBS | BODY MASS INDEX: 49.53 KG/M2 | TEMPERATURE: 97.6 F | OXYGEN SATURATION: 97 % | RESPIRATION RATE: 18 BRPM | HEART RATE: 72 BPM

## 2022-10-31 DIAGNOSIS — E78.2 MIXED HYPERLIPIDEMIA: ICD-10-CM

## 2022-10-31 DIAGNOSIS — R53.1 WEAKNESS: ICD-10-CM

## 2022-10-31 DIAGNOSIS — E11.65 TYPE 2 DIABETES MELLITUS WITH HYPERGLYCEMIA, WITH LONG-TERM CURRENT USE OF INSULIN (HCC): Primary | ICD-10-CM

## 2022-10-31 DIAGNOSIS — G25.81 RESTLESS LEG SYNDROME: ICD-10-CM

## 2022-10-31 DIAGNOSIS — F32.9 MAJOR DEPRESSIVE DISORDER, REMISSION STATUS UNSPECIFIED, UNSPECIFIED WHETHER RECURRENT: ICD-10-CM

## 2022-10-31 DIAGNOSIS — Z79.4 TYPE 2 DIABETES MELLITUS WITH HYPERGLYCEMIA, WITH LONG-TERM CURRENT USE OF INSULIN (HCC): Primary | ICD-10-CM

## 2022-10-31 NOTE — PROGRESS NOTES
[facility]  [address]  [phone]  Makenna Sickle  Code 31 (STR)          NAME: Christell Litten  AGE: 75 y o  SEX: female CODE STATUS: CPR    DATE OF ENCOUNTER: 10/31/2022    Assessment and Plan     1  Type 2 diabetes mellitus with hyperglycemia, with long-term current use of insulin (HCC)  Assessment & Plan:    Lab Results   Component Value Date    HGBA1C 11 8 (H) 09/19/2022   · History of diabetes  · Blood sugars have been trending 130-200  · Morning blood sugar 196  · Encourage diabetic diet   · Continue Accu-Cheks  · Continue Lantus insulin 30 units at HS  · Continue Humalog sliding scale insulin with meals      2  Restless leg syndrome  Assessment & Plan:  · History of restless  · Continue gabapentin 300 mg at HS      3  Mixed hyperlipidemia  Assessment & Plan:  · History of hyperlipidemia  · Last total cholesterol 180, triglycerides 56, HDL 33,   · Continue Zetia 10 mg daily      4  Major depressive disorder, remission status unspecified, unspecified whether recurrent  Assessment & Plan:  · Recent hospitalization for weakness  · Patient indicated increased depression at home  · Patient states she did not get out of bed to the 5 days  · Patient states she had not taking any of her medications  · She reports feeling less depressed today  · Continue Cymbalta 60 mg daily          5  Weakness  Assessment & Plan:  · Recent hospitalization for syncope 09/19/2022  · After discharge patient reports not getting out of bed or taking her medications   · Patient had increased depression   · Patient was readmitted to the hospital 09/27/2022 for generalized weakness  · Patient had positive orthostatic blood pressures and received IV fluids  · PT OT recommended short-term rehab  · Maintain fall/safety precautions  · Assist patient with ADLs as needed  · SW to follow up for DC planning         All medications and routine orders were reviewed and updated as needed      Chief Complaint STR follow up visit  Patient's care was coordinated with nursing facility staff  Recent vitals, labs, and updated medications were review on [facility] system in facility  Past Medical and Surgica History      Past Medical History:   Diagnosis Date   • CHF (congestive heart failure) ([facility] [address] )    • Diabetes mellitus ([facility] )    • Disease of thyroid gland    • Hyperlipidemia    • Hypertension    • Hypothyroidism (acquired)    • Restless leg syndrome    • [facility]      Past Surgical History:   Procedure Laterality Date   • APPENDECTOMY     • CHOLECYSTECTOMY     • HIP FRACTURE SURGERY Right    • TONSILLECTOMY     • TUBAL LIGATION       Allergies   Allergen Reactions   • Pineapple - Food Allergy Anaphylaxis   • Statins Other (See Comments)     Cramping from head to toe    • Bupropion Other (See Comments)   • Byetta 10 Mcg Pen [Exenatide]    • Ceftriaxone Other (See Comments)   • Marijuana [Dronabinol] GI Intolerance   • Metformin Diarrhea     Other reaction(s): Unknown   • Omeprazole Hives   • Ondansetron      Other reaction(s): Unknown   • Coconut Oil - Food Allergy Rash   • Liraglutide Rash and Other (See Comments)     Generalized rash   • Penicillins Rash          History of Present Illness     HPI  Allegra Treviño is a 59-year-old female, she is a STR patient of [facility] since 09/30/2022  Past Medical Hx including but not limited to depression, RLS, diabetes, CAD, hypothyroidism, GERD  She was seen in collaboration with nursing for medical management and STR follow up  Hospital Course:  Patient had been previously hospitalized 09/19/2022 for syncopal episode  During this hospitalization patient expressed that she was experiencing depression  Psych followed during hospitalization  After discharge patient reports she did not take any of her medications and did not get out of bed for 1 week  Patient was readmitted to the hospital 09/27/2022 for generalized weakness   PT OT recommended short-term rehab   Rehab Course:  Patient was seen and examined at bedside today  Patient is a reliable historian, alert and oriented x3  Today is her birthday and she was excited to see her family over the weekend  She reports that she is feeling better, she denies any pain, and says that she her depression is better today  Her left arm/elbow is ecchymotic and edematous from recent fall  She states she has not been participating in therapy  Denies CP/SOB/N/V/D  Denies lightheadedness, dizziness, headaches, vision changes  Patient states she has had a poor appetite but has been drinking juice  Denies any bowel or bladder issues  Per review of SNF records, Patient is eating 3 meals per day, consuming  %  Last documented BM 10/29/2022  No concerns from nursing at this time  The patient's allergies, past medical, surgical, social and family history were reviewed and unchanged  Review of Systems     Review of Systems   Constitutional: Negative  HENT: Negative  Negative for congestion  Eyes: Negative  Respiratory: Negative  Negative for cough, chest tightness, shortness of breath and wheezing  Cardiovascular: Negative  Negative for chest pain, palpitations and leg swelling  Gastrointestinal: Negative  Negative for abdominal distention, abdominal pain, constipation, diarrhea, nausea and vomiting  Endocrine: Negative  Genitourinary: Negative  Negative for difficulty urinating and dysuria  Musculoskeletal: Negative  Skin: Positive for color change  ecchymotic left elbow s/p fall   Allergic/Immunologic: Negative  Neurological: Negative  Negative for dizziness, weakness, light-headedness, numbness and headaches  Hematological: Negative  Psychiatric/Behavioral: Negative  Negative for behavioral problems, confusion, dysphoric mood and suicidal ideas  The patient is not nervous/anxious            Objective     Vitals:   Vitals:    10/31/22 1205   BP: 128/70   Pulse: 72   Resp: 18 Temp: 97 6 °F (36 4 °C)   SpO2: 97%         Physical Exam  Vitals and nursing note reviewed  Constitutional:       General: She is not in acute distress  Appearance: Normal appearance  She is not ill-appearing  HENT:      Head: Normocephalic and atraumatic  Mouth/Throat:      Mouth: Mucous membranes are moist    Eyes:      Conjunctiva/sclera: Conjunctivae normal    Cardiovascular:      Rate and Rhythm: Normal rate and regular rhythm  Pulses: Normal pulses  Heart sounds: Normal heart sounds  Pulmonary:      Effort: Pulmonary effort is normal  No respiratory distress  Breath sounds: Normal breath sounds  No wheezing  Abdominal:      General: Bowel sounds are normal  There is no distension  Palpations: Abdomen is soft  Tenderness: There is no abdominal tenderness  Musculoskeletal:      Right lower leg: No edema  Left lower leg: No edema  Skin:     General: Skin is warm  Capillary Refill: Capillary refill takes 2 to 3 seconds  Findings: Bruising present  Comments: Left elbow   Neurological:      Mental Status: She is alert and oriented to person, place, and time  Psychiatric:         Mood and Affect: Mood normal          Behavior: Behavior normal          Pertinent Laboratory/Diagnostic Studies:   Reviewed in facility chart-stable      Current Medications   Medications reviewed and updated see facility [facility] for details  Current Outpatient Medications:   •  acetaminophen (TYLENOL) 325 mg tablet, Take 650 mg by mouth every 6 (six) hours as needed for mild pain (pain score 1-3)  (Patient not taking: No sig reported), Disp: , Rfl:   •  aspirin (ECOTRIN LOW STRENGTH) 81 mg EC tablet, Take 81 mg by mouth daily, Disp: , Rfl:   •  Cholecalciferol 25 MCG (1000 UT) capsule, Take 1,000 Units by mouth daily   (Patient not taking: No sig reported), Disp: , Rfl:   •  DULoxetine (CYMBALTA) 60 mg delayed release capsule, Take 1 capsule (60 mg total) by mouth daily Do not start before October 1, 2022 , Disp: , Rfl: 0  •  Empagliflozin 25 MG TABS, Take 25 mg by mouth every morning (Patient not taking: No sig reported), Disp: , Rfl:   •  Ergocalciferol (VITAMIN D2 PO), Take 50,000 Units/day by mouth 2 (two) times a week, Disp: , Rfl:   •  ezetimibe (ZETIA) 10 mg tablet, Take 1 tablet (10 mg total) by mouth daily (Patient not taking: Reported on 9/27/2022), Disp: 30 tablet, Rfl: 0  •  gabapentin (NEURONTIN) 100 mg capsule, Take 1 capsule (100 mg total) by mouth 2 (two) times a day Take 100 mg in the morning and 100 mg mid afternoon, Disp: 42 capsule, Rfl: 0  •  gabapentin (NEURONTIN) 300 mg capsule, Take 1 capsule (300 mg total) by mouth daily at bedtime, Disp: 21 capsule, Rfl: 0  •  hydrophilic ointment, Apply topically as needed for dry skin, Disp: , Rfl:   •  insulin glargine (LANTUS) 100 units/mL subcutaneous injection, Inject 30 Units under the skin daily at bedtime, Disp: 10 mL, Rfl: 0  •  insulin lispro (HumaLOG) 100 units/mL injection, Inject 1-5 Units under the skin 3 (three) times a day before meals, Disp: , Rfl: 0  •  levothyroxine 50 mcg tablet, Take 1 5 tablets (75 mcg total) by mouth daily, Disp: 45 tablet, Rfl: 0  •  melatonin 3 mg, Take 3 tablets (9 mg total) by mouth daily at bedtime, Disp: , Rfl: 0  •  nystatin (MYCOSTATIN) powder, Apply topically 2 (two) times a day, Disp: 15 g, Rfl: 0  •  omeprazole (PriLOSEC) 20 mg delayed release capsule, Take 20 mg by mouth daily, Disp: , Rfl:   •  potassium chloride (K-DUR,KLOR-CON) 20 mEq tablet, Take 1 tablet (20 mEq total) by mouth daily, Disp: 30 tablet, Rfl: 0  •  spironolactone (ALDACTONE) 25 mg tablet, Take 1 tablet (25 mg total) by mouth daily Do not start before October 3, 2022 , Disp: 30 tablet, Rfl: 0  •  topiramate (TOPAMAX) 100 mg tablet, Take 200 mg by mouth 2 (two) times a day , Disp: , Rfl:   •  torsemide (DEMADEX) 20 mg tablet, Take 2 tablets (40 mg total) by mouth daily Do not start before October 3, 2022 , Disp: 60 tablet, Rfl: 0     Plan discussed with Dr Norma Norris noted agreement with assessment and plan  Please note:  Voice-recognition software may have been used in the preparation of this document  Occasional wrong word or "sound-alike" substitutions may have occurred due to the inherent limitations of voice recognition software  Interpretation should be guided by context           Alessandro Kay  10/31/2022  12:09 PM

## 2022-10-31 NOTE — ASSESSMENT & PLAN NOTE
Lab Results   Component Value Date    HGBA1C 11 8 (H) 09/19/2022   · History of diabetes  · Blood sugars have been trending 130-200  · Morning blood sugar 196  · Encourage diabetic diet   · Continue Accu-Cheks  · Continue Lantus insulin 30 units at HS  · Continue Humalog sliding scale insulin with meals

## 2022-11-03 ENCOUNTER — NURSING HOME VISIT (OUTPATIENT)
Dept: GERIATRICS | Facility: OTHER | Age: 67
End: 2022-11-03

## 2022-11-03 VITALS
RESPIRATION RATE: 18 BRPM | WEIGHT: 270.8 LBS | DIASTOLIC BLOOD PRESSURE: 76 MMHG | OXYGEN SATURATION: 95 % | BODY MASS INDEX: 49.53 KG/M2 | SYSTOLIC BLOOD PRESSURE: 134 MMHG | HEART RATE: 82 BPM | TEMPERATURE: 97 F

## 2022-11-03 DIAGNOSIS — R53.1 WEAKNESS: ICD-10-CM

## 2022-11-03 DIAGNOSIS — Z79.4 TYPE 2 DIABETES MELLITUS WITH HYPERGLYCEMIA, WITH LONG-TERM CURRENT USE OF INSULIN (HCC): Primary | ICD-10-CM

## 2022-11-03 DIAGNOSIS — F32.9 MAJOR DEPRESSIVE DISORDER, REMISSION STATUS UNSPECIFIED, UNSPECIFIED WHETHER RECURRENT: ICD-10-CM

## 2022-11-03 DIAGNOSIS — E78.2 MIXED HYPERLIPIDEMIA: ICD-10-CM

## 2022-11-03 DIAGNOSIS — G25.81 RESTLESS LEG SYNDROME: ICD-10-CM

## 2022-11-03 DIAGNOSIS — E11.65 TYPE 2 DIABETES MELLITUS WITH HYPERGLYCEMIA, WITH LONG-TERM CURRENT USE OF INSULIN (HCC): Primary | ICD-10-CM

## 2022-11-03 NOTE — PROGRESS NOTES
[facility]  [address]  [phone]  [employer]  Code 31 (STR)          NAME: Eber Tony  AGE: 75 y o  SEX: female CODE STATUS: CPR    DATE OF ENCOUNTER: 11/3/2022    Assessment and Plan     1  Type 2 diabetes mellitus with hyperglycemia, with long-term current use of insulin (HCC)  Assessment & Plan:    Lab Results   Component Value Date    HGBA1C 11 8 (H) 09/19/2022   · History of diabetes  · Blood sugars have been trending 130-200  · Morning blood sugar 129  · Encourage diabetic diet   · Continue Accu-Cheks  · Continue Lantus insulin 30 units at HS  · Continue Humalog sliding scale insulin with meals      2  Mixed hyperlipidemia  Assessment & Plan:  · History of hyperlipidemia  · Last total cholesterol 180, triglycerides 56, HDL 33,   · Continue Zetia 10 mg daily      3  Restless leg syndrome  Assessment & Plan:  · History of restless  · Continue gabapentin 300 mg at HS      4  Major depressive disorder, remission status unspecified, unspecified whether recurrent  Assessment & Plan:  · Recent hospitalization for weakness  · Patient indicated increased depression at home  · Patient states she did not get out of bed to the 5 days  · Patient states she had not taking any of her medications  · She reports feeling less depressed today  · Continue Cymbalta 60 mg daily          5  Weakness  Assessment & Plan:  · Recent hospitalization for syncope 09/19/2022  · After discharge patient reports not getting out of bed or taking her medications   · Patient had increased depression   · Patient was readmitted to the hospital 09/27/2022 for generalized weakness  · Patient had positive orthostatic blood pressures and received IV fluids  · PT OT recommended short-term rehab  · Maintain fall/safety precautions  · Assist patient with ADLs as needed  · SW to follow up for DC planning         All medications and routine orders were reviewed and updated as needed      Chief Complaint STR follow up visit  Patient's care was coordinated with nursing facility staff  Recent vitals, labs, and updated medications were review on [facility] system in facility  Past Medical and Surgica History      Past Medical History:   Diagnosis Date   • [facility] )    • Diabetes mellitus ([facility] )    • Disease of thyroid gland    • Hyperlipidemia    • Hypertension    • Hypothyroidism (acquired)    • Restless leg syndrome    • Seizures ([facility] )      Past Surgical History:   Procedure Laterality Date   • APPENDECTOMY     • CHOLECYSTECTOMY     • HIP FRACTURE SURGERY Right    • TONSILLECTOMY     • TUBAL LIGATION       Allergies   Allergen Reactions   • Pineapple - Food Allergy Anaphylaxis   • Statins Other (See Comments)     Cramping from head to toe    • Bupropion Other (See Comments)   • Byetta 10 Mcg Pen [Exenatide]    • Ceftriaxone Other (See Comments)   • Marijuana [Dronabinol] GI Intolerance   • Metformin Diarrhea     Other reaction(s): Unknown   • Omeprazole Hives   • Ondansetron      Other reaction(s): Unknown   • Coconut Oil - Food Allergy Rash   • Liraglutide Rash and Other (See Comments)     Generalized rash   • Penicillins Rash          History of Present Illness     HPI  Patrizia Whitley is a 50-year-old female, she is a STR patient of [facility] since 09/30/2022  Past Medical Hx including but not limited to depression, RLS, diabetes, CAD, hypothyroidism, GERD  She was seen in collaboration with nursing for medical management and STR follow up  Hospital Course:  Patient had been previously hospitalized 09/19/2022 for syncopal episode  During this hospitalization patient expressed that she was experiencing depression  Psych followed during hospitalization  After discharge patient reports she did not take any of her medications and did not get out of bed for 1 week  Patient was readmitted to the hospital 09/27/2022 for generalized weakness   PT OT recommended short-term rehab   Rehab Course:  Patient was seen and examined at bedside today  Patient is a reliable historian, alert and oriented x3  She is lying in bed and has complaints of a headache  She said she did not want to ask for something for pain but decided to take some Tylenol  Her left arm/elbow is ecchymotic and edematous from recent fall  She states she is sleeping well and offers no other complaints  Denies CP/SOB/N/V/D  Denies lightheadedness, dizziness, headaches, vision changes  Patient states she has had a poor appetite but has been drinking juice  Denies any bowel or bladder issues  Per review of SNF records, Patient is eating 3 meals per day, consuming  %  Last documented BM 11/2/2022  No concerns from nursing at this time  The patient's allergies, past medical, surgical, social and family history were reviewed and unchanged  Review of Systems     Review of Systems   Constitutional: Negative  Negative for activity change, appetite change and fever  HENT: Negative  Negative for congestion  Eyes: Negative  Respiratory: Negative  Negative for shortness of breath  Cardiovascular: Negative  Negative for chest pain, palpitations and leg swelling  Gastrointestinal: Negative  Negative for abdominal distention, abdominal pain, constipation, diarrhea, nausea and vomiting  Endocrine: Negative  Genitourinary: Negative  Negative for difficulty urinating and dysuria  Musculoskeletal: Negative  Skin: Negative  Ecchymotic left elbow   Neurological: Positive for headaches  Negative for dizziness, weakness, light-headedness and numbness  Psychiatric/Behavioral: Negative  Negative for behavioral problems, confusion, dysphoric mood and sleep disturbance  Objective     Vitals:   Vitals:    11/03/22 1346   BP: 134/76   Pulse: 82   Resp: 18   Temp: (!) 97 °F (36 1 °C)   SpO2: 95%         Physical Exam  Constitutional:       General: She is not in acute distress  Appearance: Normal appearance  She is not ill-appearing  HENT:      Head: Normocephalic and atraumatic  Nose: Nose normal       Mouth/Throat:      Mouth: Mucous membranes are moist    Eyes:      Conjunctiva/sclera: Conjunctivae normal    Cardiovascular:      Rate and Rhythm: Normal rate and regular rhythm  Pulses: Normal pulses  Heart sounds: Normal heart sounds  Pulmonary:      Effort: Pulmonary effort is normal  No respiratory distress  Breath sounds: Normal breath sounds  No wheezing  Abdominal:      General: Bowel sounds are normal  There is no distension  Palpations: Abdomen is soft  Tenderness: There is no abdominal tenderness  Musculoskeletal:      Right lower leg: No edema  Left lower leg: No edema  Skin:     General: Skin is warm  Capillary Refill: Capillary refill takes 2 to 3 seconds  Neurological:      Mental Status: She is alert and oriented to person, place, and time  Psychiatric:         Mood and Affect: Mood normal          Behavior: Behavior normal          Pertinent Laboratory/Diagnostic Studies:   Reviewed in facility chart-stable      Current Medications   Medications reviewed and updated see facility [facility] for details  Current Outpatient Medications:   •  acetaminophen (TYLENOL) 325 mg tablet, Take 650 mg by mouth every 6 (six) hours as needed for mild pain (pain score 1-3)  (Patient not taking: No sig reported), Disp: , Rfl:   •  aspirin (ECOTRIN LOW STRENGTH) 81 mg EC tablet, Take 81 mg by mouth daily, Disp: , Rfl:   •  Cholecalciferol 25 MCG (1000 UT) capsule, Take 1,000 Units by mouth daily   (Patient not taking: No sig reported), Disp: , Rfl:   •  DULoxetine (CYMBALTA) 60 mg delayed release capsule, Take 1 capsule (60 mg total) by mouth daily Do not start before October 1, 2022 , Disp: , Rfl: 0  •  Empagliflozin 25 MG TABS, Take 25 mg by mouth every morning (Patient not taking: No sig reported), Disp: , Rfl:   •  Ergocalciferol (VITAMIN D2 PO), Take 50,000 Units/day by mouth 2 (two) times a week, Disp: , Rfl:   •  ezetimibe (ZETIA) 10 mg tablet, Take 1 tablet (10 mg total) by mouth daily (Patient not taking: Reported on 9/27/2022), Disp: 30 tablet, Rfl: 0  •  gabapentin (NEURONTIN) 100 mg capsule, Take 1 capsule (100 mg total) by mouth 2 (two) times a day Take 100 mg in the morning and 100 mg mid afternoon, Disp: 42 capsule, Rfl: 0  •  gabapentin (NEURONTIN) 300 mg capsule, Take 1 capsule (300 mg total) by mouth daily at bedtime, Disp: 21 capsule, Rfl: 0  •  hydrophilic ointment, Apply topically as needed for dry skin, Disp: , Rfl:   •  insulin glargine (LANTUS) 100 units/mL subcutaneous injection, Inject 30 Units under the skin daily at bedtime, Disp: 10 mL, Rfl: 0  •  insulin lispro (HumaLOG) 100 units/mL injection, Inject 1-5 Units under the skin 3 (three) times a day before meals, Disp: , Rfl: 0  •  levothyroxine 50 mcg tablet, Take 1 5 tablets (75 mcg total) by mouth daily, Disp: 45 tablet, Rfl: 0  •  melatonin 3 mg, Take 3 tablets (9 mg total) by mouth daily at bedtime, Disp: , Rfl: 0  •  nystatin (MYCOSTATIN) powder, Apply topically 2 (two) times a day, Disp: 15 g, Rfl: 0  •  omeprazole (PriLOSEC) 20 mg delayed release capsule, Take 20 mg by mouth daily, Disp: , Rfl:   •  potassium chloride (K-DUR,KLOR-CON) 20 mEq tablet, Take 1 tablet (20 mEq total) by mouth daily, Disp: 30 tablet, Rfl: 0  •  spironolactone (ALDACTONE) 25 mg tablet, Take 1 tablet (25 mg total) by mouth daily Do not start before October 3, 2022 , Disp: 30 tablet, Rfl: 0  •  topiramate (TOPAMAX) 100 mg tablet, Take 200 mg by mouth 2 (two) times a day , Disp: , Rfl:   •  torsemide (DEMADEX) 20 mg tablet, Take 2 tablets (40 mg total) by mouth daily Do not start before October 3, 2022 , Disp: 60 tablet, Rfl: 0     Plan discussed with Dr Charmayne Cowing Dr Charmayne Cowing noted agreement with assessment and plan      Please note:  Voice-recognition software may have been used in the preparation of this document  Occasional wrong word or "sound-alike" substitutions may have occurred due to the inherent limitations of voice recognition software  Interpretation should be guided by context           Blank Kay  11/3/2022  1:53 PM

## 2022-11-03 NOTE — ASSESSMENT & PLAN NOTE
Lab Results   Component Value Date    HGBA1C 11 8 (H) 09/19/2022   · History of diabetes  · Blood sugars have been trending 130-200  · Morning blood sugar 129  · Encourage diabetic diet   · Continue Accu-Cheks  · Continue Lantus insulin 30 units at HS  · Continue Humalog sliding scale insulin with meals

## 2022-11-07 ENCOUNTER — NURSING HOME VISIT (OUTPATIENT)
Dept: GERIATRICS | Facility: OTHER | Age: 67
End: 2022-11-07

## 2022-11-07 VITALS
SYSTOLIC BLOOD PRESSURE: 130 MMHG | WEIGHT: 270.8 LBS | DIASTOLIC BLOOD PRESSURE: 70 MMHG | BODY MASS INDEX: 49.53 KG/M2 | RESPIRATION RATE: 18 BRPM | OXYGEN SATURATION: 96 % | TEMPERATURE: 97.6 F | HEART RATE: 70 BPM

## 2022-11-07 DIAGNOSIS — E11.69 DIABETES MELLITUS TYPE 2 IN OBESE (HCC): Primary | ICD-10-CM

## 2022-11-07 DIAGNOSIS — E66.9 DIABETES MELLITUS TYPE 2 IN OBESE (HCC): Primary | ICD-10-CM

## 2022-11-07 RX ORDER — TRAZODONE HYDROCHLORIDE 50 MG/1
25 TABLET ORAL
COMMUNITY

## 2022-11-07 NOTE — PROGRESS NOTES
[facility] [place]  [address]   [address] [facility] [address] [place], [place], [address] U  48     Progress Note  Code SNF 31    Patient Location     [address] rehab    Reason for visit     Follow-up  diabetes mellitus type 2, hypertension, cardiomyopathy, history of seizure, hypothyroidism, depression    Problem List Items Addressed This Visit        Endocrine    Diabetes mellitus type 2 in obese [facility]) - Primary       Lab Results   Component Value Date    HGBA1C 11 8 (H) 09/19/2022     Blood sugar readings have been ranging between 142 to 221 with isolated increase of 250 on 11/05  Continue insulin glargine 30 units at bedtime along with sliding scale coverage  Evaluate need for scheduled Humalog with meals depending upon the blood sugar readings  Will follow                 Generalized weakness:  Continue PT OT    GERD:  Stable on omeprazole    Depression:  Patient remains on duloxetine 60 mg daily    Hypothyroidism:  Continue levothyroxine 75 mcg daily  TSH was borderline high at 6 8 in September 22  Repeat TSH in 6 weeks      Chronic heart failure with preserved EF:  Patient's weights have been stable at around 271 lb  Continue maintenance diuretics including torsemide 40 mg daily and spironolactone 25 mg daily    Hyperlipidemia:  Patient remains on ezetimibe    History of seizure:  Stable on topiramate    Insomnia:  Patient complains of difficulty sleeping at night  Currently remains on melatonin will add trazodone 25 mg and follow  HPI       Patient is being seen for a follow-up visit today  She is doing okay at present  Awake alert in no distress  Staff reports no acute issues  No fever chills or chest congestion  Patient feels depressive symptoms have improved except complains of difficulty sleeping      Review of Systems   Constitutional: Negative for chills and fever  Respiratory: Negative for shortness of breath and wheezing      Cardiovascular: Negative for chest pain and leg swelling  Gastrointestinal: Positive for abdominal pain  Negative for abdominal distention and vomiting  Genitourinary: Negative for dysuria and hematuria  Musculoskeletal: Positive for gait problem  Neurological: Positive for weakness  Negative for syncope and speech difficulty  History of fall on 10/13/2022 from commode   Psychiatric/Behavioral: Positive for sleep disturbance (difficulty sleeping)         Past Medical History:   Diagnosis Date   • CHF (congestive heart failure) (HCC)    • Diabetes mellitus ([place] [address] )    • Disease of thyroid gland    • Hyperlipidemia    • Hypertension    • Hypothyroidism (acquired)    • Restless leg syndrome    • Seizures ([facility] )        Past Surgical History:   Procedure Laterality Date   • APPENDECTOMY     • CHOLECYSTECTOMY     • HIP FRACTURE SURGERY Right    • TONSILLECTOMY     • TUBAL LIGATION         Social History     Tobacco Use   Smoking Status Never Smoker   Smokeless Tobacco Never Used   Tobacco Comment    not a smoker       Family History   Problem Relation Age of Onset   • Brain cancer Mother    • Seizures Father    • Colon cancer Maternal Grandfather    • No Known Problems Son    • No Known Problems Son    • Breast cancer Maternal Aunt 30        Allergies   Allergen Reactions   • Pineapple - Food Allergy Anaphylaxis   • Statins Other (See Comments)     Cramping from head to toe    • Bupropion Other (See Comments)   • Byetta 10 Mcg Pen [Exenatide]    • Ceftriaxone Other (See Comments)   • Marijuana [Dronabinol] GI Intolerance   • Metformin Diarrhea     Other reaction(s): Unknown   • Omeprazole Hives   • Ondansetron      Other reaction(s): Unknown   • Coconut Oil - Food Allergy Rash   • Liraglutide Rash and Other (See Comments)     Generalized rash   • Penicillins Rash         Current Outpatient Medications:   •  traZODone (DESYREL) 50 mg tablet, Take 25 mg by mouth daily at bedtime, Disp: , Rfl:   •  acetaminophen (TYLENOL) 325 mg tablet, Take 650 mg by mouth every 6 (six) hours as needed for mild pain (pain score 1-3)  (Patient not taking: No sig reported), Disp: , Rfl:   •  aspirin (ECOTRIN LOW STRENGTH) 81 mg EC tablet, Take 81 mg by mouth daily, Disp: , Rfl:   •  Cholecalciferol 25 MCG (1000 UT) capsule, Take 1,000 Units by mouth daily   (Patient not taking: No sig reported), Disp: , Rfl:   •  DULoxetine (CYMBALTA) 60 mg delayed release capsule, Take 1 capsule (60 mg total) by mouth daily Do not start before October 1, 2022 , Disp: , Rfl: 0  •  Empagliflozin 25 MG TABS, Take 25 mg by mouth every morning (Patient not taking: No sig reported), Disp: , Rfl:   •  Ergocalciferol (VITAMIN D2 PO), Take 50,000 Units/day by mouth 2 (two) times a week, Disp: , Rfl:   •  ezetimibe (ZETIA) 10 mg tablet, Take 1 tablet (10 mg total) by mouth daily (Patient not taking: Reported on 9/27/2022), Disp: 30 tablet, Rfl: 0  •  gabapentin (NEURONTIN) 100 mg capsule, Take 1 capsule (100 mg total) by mouth 2 (two) times a day Take 100 mg in the morning and 100 mg mid afternoon, Disp: 42 capsule, Rfl: 0  •  gabapentin (NEURONTIN) 300 mg capsule, Take 1 capsule (300 mg total) by mouth daily at bedtime, Disp: 21 capsule, Rfl: 0  •  hydrophilic ointment, Apply topically as needed for dry skin, Disp: , Rfl:   •  insulin glargine (LANTUS) 100 units/mL subcutaneous injection, Inject 30 Units under the skin daily at bedtime, Disp: 10 mL, Rfl: 0  •  insulin lispro (HumaLOG) 100 units/mL injection, Inject 1-5 Units under the skin 3 (three) times a day before meals, Disp: , Rfl: 0  •  levothyroxine 50 mcg tablet, Take 1 5 tablets (75 mcg total) by mouth daily, Disp: 45 tablet, Rfl: 0  •  melatonin 3 mg, Take 3 tablets (9 mg total) by mouth daily at bedtime, Disp: , Rfl: 0  •  nystatin (MYCOSTATIN) powder, Apply topically 2 (two) times a day, Disp: 15 g, Rfl: 0  •  omeprazole (PriLOSEC) 20 mg delayed release capsule, Take 20 mg by mouth daily, Disp: , Rfl:   •  potassium chloride (K-DUR,KLOR-CON) 20 mEq tablet, Take 1 tablet (20 mEq total) by mouth daily, Disp: 30 tablet, Rfl: 0  •  spironolactone (ALDACTONE) 25 mg tablet, Take 1 tablet (25 mg total) by mouth daily Do not start before October 3, 2022 , Disp: 30 tablet, Rfl: 0  •  topiramate (TOPAMAX) 100 mg tablet, Take 200 mg by mouth 2 (two) times a day , Disp: , Rfl:   •  torsemide (DEMADEX) 20 mg tablet, Take 2 tablets (40 mg total) by mouth daily Do not start before October 3, 2022 , Disp: 60 tablet, Rfl: 0    Updated list was reviewed in [facility] system of facility  Vitals:    11/06/22 0937   BP: 130/70   Pulse: 70   Resp: 18   Temp: 97 6 °F (36 4 °C)   SpO2: 96%       Physical Exam  Constitutional:       General: She is not in acute distress  HENT:      Head: Normocephalic and atraumatic  Eyes:      General: No scleral icterus  Cardiovascular:      Rate and Rhythm: Normal rate and regular rhythm  Pulmonary:      Breath sounds: No wheezing, rhonchi or rales  Abdominal:      General: There is no distension  Palpations: Abdomen is soft  Tenderness: There is no abdominal tenderness  There is no guarding  Musculoskeletal:      Cervical back: Neck supple  Right lower leg: No edema  Left lower leg: No edema  Skin:     General: Skin is dry  Coloration: Skin is not jaundiced  Findings: No bruising (Ecchymotic area with small hematoma involving left forearm)  Neurological:      General: No focal deficit present  Cranial Nerves: No cranial nerve deficit     Psychiatric:         Mood and Affect: Mood normal          Behavior: Behavior normal        Diagnostic Data:  Urine culture on 10/14/2022 revealed mixed bacterial growth suggestive of contamination  CBC on 10/24 revealed hemoglobin of 15 4, WBC count 6 7, platelet count 141  BMP done on 10/03 revealed stable electrolytes in creatinine with creatinine of 0 95 sodium 141 and potassium of 4 5        This note was electronically signed by Dr Delgado Officer

## 2022-11-07 NOTE — ASSESSMENT & PLAN NOTE
Lab Results   Component Value Date    HGBA1C 11 8 (H) 09/19/2022     Blood sugar readings have been ranging between 142 to 221 with isolated increase of 250 on 11/05  Continue insulin glargine 30 units at bedtime along with sliding scale coverage  Evaluate need for scheduled Humalog with meals depending upon the blood sugar readings    Will follow

## 2022-11-08 ENCOUNTER — NURSING HOME VISIT (OUTPATIENT)
Dept: GERIATRICS | Facility: OTHER | Age: 67
End: 2022-11-08

## 2022-11-08 VITALS
OXYGEN SATURATION: 97 % | DIASTOLIC BLOOD PRESSURE: 68 MMHG | RESPIRATION RATE: 18 BRPM | SYSTOLIC BLOOD PRESSURE: 124 MMHG | TEMPERATURE: 97.5 F | HEART RATE: 72 BPM

## 2022-11-08 DIAGNOSIS — G25.81 RESTLESS LEG SYNDROME: ICD-10-CM

## 2022-11-08 DIAGNOSIS — E11.65 TYPE 2 DIABETES MELLITUS WITH HYPERGLYCEMIA, WITH LONG-TERM CURRENT USE OF INSULIN (HCC): Primary | ICD-10-CM

## 2022-11-08 DIAGNOSIS — Z79.4 TYPE 2 DIABETES MELLITUS WITH HYPERGLYCEMIA, WITH LONG-TERM CURRENT USE OF INSULIN (HCC): Primary | ICD-10-CM

## 2022-11-08 DIAGNOSIS — E78.2 MIXED HYPERLIPIDEMIA: ICD-10-CM

## 2022-11-08 DIAGNOSIS — E03.9 ACQUIRED HYPOTHYROIDISM: ICD-10-CM

## 2022-11-08 NOTE — ASSESSMENT & PLAN NOTE
Lab Results   Component Value Date    HGBA1C 11 8 (H) 09/19/2022   · History of diabetes  · Blood sugars have been trending 160-220  · Morning blood sugar 174  · Encourage diabetic diet   · Continue Accu-Cheks  · Continue Lantus insulin 30 units at HS  · Continue Humalog sliding scale insulin with meals

## 2022-11-08 NOTE — PROGRESS NOTES
[facility]  [address]  [phone]  Nathaniel Awan  Code 31 (STR)          NAME: Genna Jiménez  AGE: 75 y o  SEX: female CODE STATUS: CPR    DATE OF ENCOUNTER: 11/8/2022    Assessment and Plan     1  Type 2 diabetes mellitus with hyperglycemia, with long-term current use of insulin ([facility])  Assessment & Plan:    Lab Results   Component Value Date    HGBA1C 11 8 (H) 09/19/2022   · History of diabetes  · Blood sugars have been trending 160-220  · Morning blood sugar 174  · Encourage diabetic diet   · Continue Accu-Cheks  · Continue Lantus insulin 30 units at HS  · Continue Humalog sliding scale insulin with meals      2  Acquired hypothyroidism  Assessment & Plan:  · History of hypothyroidism  · Continue Levothyroxine 75 mcg daily      3  Mixed hyperlipidemia  Assessment & Plan:  · History of hyperlipidemia, currently stable  · Last total cholesterol 180, triglycerides 56, HDL 33,   · Continue Zetia 10 mg daily      4  Restless leg syndrome  Assessment & Plan:  · History of restless  · Currently without complaints  · Continue gabapentin 300 mg at HS         All medications and routine orders were reviewed and updated as needed  Chief Complaint     STR follow up visit  Patient's care was coordinated with nursing facility staff  Recent vitals, labs, and updated medications were review on [facility] system in facility        Past Medical and Surgica History      Past Medical History:   Diagnosis Date   • CHF (congestive heart failure) ([facility] )    • Diabetes mellitus ([facility] )    • Disease of thyroid gland    • Hyperlipidemia    • Hypertension    • Hypothyroidism (acquired)    • Restless leg syndrome    • Seizures ([facility] )      Past Surgical History:   Procedure Laterality Date   • APPENDECTOMY     • CHOLECYSTECTOMY     • HIP FRACTURE SURGERY Right    • TONSILLECTOMY     • TUBAL LIGATION       Allergies   Allergen Reactions   • Pineapple - Food Allergy Anaphylaxis   • Statins Other (See Comments)     Cramping from head to toe    • Bupropion Other (See Comments)   • Byetta 10 Mcg Pen [Exenatide]    • Ceftriaxone Other (See Comments)   • Marijuana [Dronabinol] GI Intolerance   • Metformin Diarrhea     Other reaction(s): Unknown   • Omeprazole Hives   • Ondansetron      Other reaction(s): Unknown   • Coconut Oil - Food Allergy Rash   • Liraglutide Rash and Other (See Comments)     Generalized rash   • Penicillins Rash          History of Present Illness     HPI  Eileen Castillo is a 70-year-old female, she is a STR patient of [facility] SNF since 09/30/2022  Past Medical Hx including but not limited to depression, RLS, diabetes, CAD, hypothyroidism, GERD  She was seen in collaboration with nursing for medical management and STR follow up  Hospital Course:  Patient had been previously hospitalized 09/19/2022 for syncopal episode  During this hospitalization patient expressed that she was experiencing depression  Psych followed during hospitalization  After discharge patient reports she did not take any of her medications and did not get out of bed for 1 week  Patient was readmitted to the hospital 09/27/2022 for generalized weakness  PT OT recommended short-term rehab  Rehab Course:  Patient was seen and examined at bedside today  Patient is a reliable historian, alert and oriented x3  Cris Sauer is lying in bed, she is tired today  Her left arm/elbow is ecchymotic and edematous from recent fall  She states she is sleeping well and offers no other complaints  Denies CP/SOB/N/V/D  Denies lightheadedness, dizziness, headaches, vision changes  Patient states she has been eating and drinking well  Denies any bowel or bladder issues  Per review of SNF records, Patient is eating 3 meals per day, consuming  %  Last documented BM 11/4/2022  No concerns from nursing at this time  The patient's allergies, past medical, surgical, social and family history were reviewed and unchanged      Review of Systems Review of Systems   Constitutional: Negative  Negative for activity change, appetite change and fever  HENT: Negative  Negative for congestion  Eyes: Negative  Respiratory: Negative  Negative for cough, shortness of breath and wheezing  Cardiovascular: Negative for chest pain, palpitations and leg swelling  Gastrointestinal: Negative  Negative for abdominal distention, abdominal pain, constipation, diarrhea and vomiting  Endocrine: Negative  Genitourinary: Negative for difficulty urinating and dysuria  Musculoskeletal: Positive for gait problem  Skin: Negative  Left elbow hematoma getting smaller   Allergic/Immunologic: Negative  Neurological: Positive for weakness  Negative for dizziness, light-headedness and headaches  Hematological: Negative  Psychiatric/Behavioral: Negative  Negative for dysphoric mood and sleep disturbance  Objective     Vitals:   Vitals:    11/08/22 1306   BP: 124/68   Pulse: 72   Resp: 18   Temp: 97 5 °F (36 4 °C)   SpO2: 97%         Physical Exam  Vitals and nursing note reviewed  Constitutional:       General: She is not in acute distress  Appearance: Normal appearance  She is not ill-appearing  HENT:      Head: Normocephalic and atraumatic  Nose: Nose normal       Mouth/Throat:      Mouth: Mucous membranes are moist    Eyes:      Conjunctiva/sclera: Conjunctivae normal    Cardiovascular:      Rate and Rhythm: Normal rate and regular rhythm  Pulses: Normal pulses  Heart sounds: Normal heart sounds  Pulmonary:      Effort: Pulmonary effort is normal       Breath sounds: Normal breath sounds  Abdominal:      General: Bowel sounds are normal  There is no distension  Palpations: Abdomen is soft  Tenderness: There is no abdominal tenderness  Musculoskeletal:      Right lower leg: No edema  Left lower leg: No edema  Skin:     General: Skin is warm        Capillary Refill: Capillary refill takes 2 to 3 seconds  Neurological:      Mental Status: She is alert and oriented to person, place, and time  Motor: Weakness present  Psychiatric:         Mood and Affect: Mood normal          Behavior: Behavior normal          Pertinent Laboratory/Diagnostic Studies:   Reviewed in facility chart-stable      Current Medications   Medications reviewed and updated see facility [facility] for details  Current Outpatient Medications:   •  acetaminophen (TYLENOL) 325 mg tablet, Take 650 mg by mouth every 6 (six) hours as needed for mild pain (pain score 1-3)  (Patient not taking: No sig reported), Disp: , Rfl:   •  aspirin (ECOTRIN LOW STRENGTH) 81 mg EC tablet, Take 81 mg by mouth daily, Disp: , Rfl:   •  Cholecalciferol 25 MCG (1000 UT) capsule, Take 1,000 Units by mouth daily   (Patient not taking: No sig reported), Disp: , Rfl:   •  DULoxetine (CYMBALTA) 60 mg delayed release capsule, Take 1 capsule (60 mg total) by mouth daily Do not start before October 1, 2022 , Disp: , Rfl: 0  •  Empagliflozin 25 MG TABS, Take 25 mg by mouth every morning (Patient not taking: No sig reported), Disp: , Rfl:   •  Ergocalciferol (VITAMIN D2 PO), Take 50,000 Units/day by mouth 2 (two) times a week, Disp: , Rfl:   •  ezetimibe (ZETIA) 10 mg tablet, Take 1 tablet (10 mg total) by mouth daily (Patient not taking: Reported on 9/27/2022), Disp: 30 tablet, Rfl: 0  •  gabapentin (NEURONTIN) 100 mg capsule, Take 1 capsule (100 mg total) by mouth 2 (two) times a day Take 100 mg in the morning and 100 mg mid afternoon, Disp: 42 capsule, Rfl: 0  •  gabapentin (NEURONTIN) 300 mg capsule, Take 1 capsule (300 mg total) by mouth daily at bedtime, Disp: 21 capsule, Rfl: 0  •  hydrophilic ointment, Apply topically as needed for dry skin, Disp: , Rfl:   •  insulin glargine (LANTUS) 100 units/mL subcutaneous injection, Inject 30 Units under the skin daily at bedtime, Disp: 10 mL, Rfl: 0  •  insulin lispro (HumaLOG) 100 units/mL injection, Inject 1-5 Units under the skin 3 (three) times a day before meals, Disp: , Rfl: 0  •  levothyroxine 50 mcg tablet, Take 1 5 tablets (75 mcg total) by mouth daily, Disp: 45 tablet, Rfl: 0  •  melatonin 3 mg, Take 3 tablets (9 mg total) by mouth daily at bedtime, Disp: , Rfl: 0  •  nystatin (MYCOSTATIN) powder, Apply topically 2 (two) times a day, Disp: 15 g, Rfl: 0  •  omeprazole (PriLOSEC) 20 mg delayed release capsule, Take 20 mg by mouth daily, Disp: , Rfl:   •  potassium chloride (K-DUR,KLOR-CON) 20 mEq tablet, Take 1 tablet (20 mEq total) by mouth daily, Disp: 30 tablet, Rfl: 0  •  spironolactone (ALDACTONE) 25 mg tablet, Take 1 tablet (25 mg total) by mouth daily Do not start before October 3, 2022 , Disp: 30 tablet, Rfl: 0  •  topiramate (TOPAMAX) 100 mg tablet, Take 200 mg by mouth 2 (two) times a day , Disp: , Rfl:   •  torsemide (DEMADEX) 20 mg tablet, Take 2 tablets (40 mg total) by mouth daily Do not start before October 3, 2022 , Disp: 60 tablet, Rfl: 0  •  traZODone (DESYREL) 50 mg tablet, Take 25 mg by mouth daily at bedtime, Disp: , Rfl:      Plan discussed with Dr Fraser Daily  Dr Fraser Daily noted agreement with assessment and plan  Please note:  Voice-recognition software may have been used in the preparation of this document  Occasional wrong word or "sound-alike" substitutions may have occurred due to the inherent limitations of voice recognition software  Interpretation should be guided by context           Zeinab Kay  11/8/2022  1:27 PM

## 2022-11-08 NOTE — ASSESSMENT & PLAN NOTE
· History of hyperlipidemia, currently stable  · Last total cholesterol 180, triglycerides 56, HDL 33,   · Continue Zetia 10 mg daily

## 2022-11-15 ENCOUNTER — NURSING HOME VISIT (OUTPATIENT)
Dept: GERIATRICS | Facility: OTHER | Age: 67
End: 2022-11-15

## 2022-11-15 VITALS
BODY MASS INDEX: 49.53 KG/M2 | HEART RATE: 72 BPM | DIASTOLIC BLOOD PRESSURE: 64 MMHG | SYSTOLIC BLOOD PRESSURE: 127 MMHG | TEMPERATURE: 97.3 F | WEIGHT: 270.8 LBS | OXYGEN SATURATION: 95 % | RESPIRATION RATE: 18 BRPM

## 2022-11-15 DIAGNOSIS — E78.2 MIXED HYPERLIPIDEMIA: ICD-10-CM

## 2022-11-15 DIAGNOSIS — E03.9 ACQUIRED HYPOTHYROIDISM: ICD-10-CM

## 2022-11-15 DIAGNOSIS — G25.81 RESTLESS LEG SYNDROME: ICD-10-CM

## 2022-11-15 DIAGNOSIS — F32.9 MAJOR DEPRESSIVE DISORDER, REMISSION STATUS UNSPECIFIED, UNSPECIFIED WHETHER RECURRENT: ICD-10-CM

## 2022-11-15 DIAGNOSIS — E11.65 TYPE 2 DIABETES MELLITUS WITH HYPERGLYCEMIA, WITH LONG-TERM CURRENT USE OF INSULIN (HCC): Primary | ICD-10-CM

## 2022-11-15 DIAGNOSIS — Z79.4 TYPE 2 DIABETES MELLITUS WITH HYPERGLYCEMIA, WITH LONG-TERM CURRENT USE OF INSULIN (HCC): Primary | ICD-10-CM

## 2022-11-15 NOTE — ASSESSMENT & PLAN NOTE
Lab Results   Component Value Date    HGBA1C 11 8 (H) 09/19/2022   · History of diabetes  · Blood sugars have been trending 130-180  · Morning blood sugar 132  · Encourage diabetic diet   · Continue Accu-Cheks  · Continue Lantus insulin 30 units at HS  · Continue Humalog sliding scale insulin with meals

## 2022-11-15 NOTE — PROGRESS NOTES
[facility]  [address]  [phone]  [facility]  Code 31 (STR)        NAME: Ed Fam  AGE: 75 y o  SEX: female CODE STATUS: CPR    DATE OF ENCOUNTER: 11/15/2022    Assessment and Plan     1  Type 2 diabetes mellitus with hyperglycemia, with long-term current use of insulin (HCC)  Assessment & Plan:    Lab Results   Component Value Date    HGBA1C 11 8 (H) 09/19/2022   · History of diabetes  · Blood sugars have been trending 130-180  · Morning blood sugar 132  · Encourage diabetic diet   · Continue Accu-Cheks  · Continue Lantus insulin 30 units at HS  · Continue Humalog sliding scale insulin with meals      2  Acquired hypothyroidism  Assessment & Plan:  · History of hypothyroidism  · Continue Levothyroxine 75 mcg daily      3  Restless leg syndrome  Assessment & Plan:  · History of restless  · Currently without complaints  · Continue gabapentin 300 mg at HS      4  Mixed hyperlipidemia  Assessment & Plan:  · History of hyperlipidemia, currently stable  · Last total cholesterol 180, triglycerides 56, HDL 33,   · Continue Zetia 10 mg daily      5  Major depressive disorder, remission status unspecified, unspecified whether recurrent  Assessment & Plan:  · Recent hospitalization for weakness  · Patient indicated increased depression at home when last hospitalized  · Patient states she did not get out of bed to the 5 days  · Patient had not been taking any of her medications  · She states she is less depressed today and feeling good  · Continue Cymbalta 60 mg daily             All medications and routine orders were reviewed and updated as needed  Chief Complaint     STR follow up visit  Patient's care was coordinated with nursing facility staff  Recent vitals, labs, and updated medications were review on [facility] in facility        Past Medical and Surgica History      Past Medical History:   Diagnosis Date   • CHF (congestive heart failure) ([facility] [address] )    • [facility]    • Disease of thyroid gland    • Hyperlipidemia    • Hypertension    • Hypothyroidism (acquired)    • Restless leg syndrome    • Seizures ([facility] [address] )      Past Surgical History:   Procedure Laterality Date   • APPENDECTOMY     • CHOLECYSTECTOMY     • HIP FRACTURE SURGERY Right    • TONSILLECTOMY     • TUBAL LIGATION       Allergies   Allergen Reactions   • Pineapple - Food Allergy Anaphylaxis   • Statins Other (See Comments)     Cramping from head to toe    • Bupropion Other (See Comments)   • Byetta 10 Mcg Pen [Exenatide]    • Ceftriaxone Other (See Comments)   • Marijuana [Dronabinol] GI Intolerance   • Metformin Diarrhea     Other reaction(s): Unknown   • Omeprazole Hives   • Ondansetron      Other reaction(s): Unknown   • Coconut Oil - Food Allergy Rash   • Liraglutide Rash and Other (See Comments)     Generalized rash   • Penicillins Rash          History of Present Illness     HPI  Chris Ching is a 58-year-old female, she is a STR patient of [facility] since 09/30/2022  Past Medical Hx including but not limited to depression, RLS, diabetes, CAD, hypothyroidism, GERD  She was seen in collaboration with nursing for medical management and STR follow up  Hospital Course:  Patient had been previously hospitalized 09/19/2022 for syncopal episode  During this hospitalization patient expressed that she was experiencing depression  Psych followed during hospitalization  After discharge patient reports she did not take any of her medications and did not get out of bed for 1 week  Patient was readmitted to the hospital 09/27/2022 for generalized weakness  PT OT recommended short-term rehab  Rehab Course:  Patient was seen and examined at bedside today  Patient is a reliable historian, alert and oriented x3  Brandan Cobos is lying in bed, She says she has less depression and has been feeling good  Her left arm/elbow is ecchymotic and edematous from recent fall but is improving   She states she had trouble sleeping last night but offers no other complaints  Denies CP/SOB/N/V/D  Denies lightheadedness, dizziness, headaches, vision changes  Patient states she has been eating and drinking well  Denies any bowel or bladder issues  Per review of SNF records, Patient is eating 3 meals per day, consuming  %  Last documented BM 11/15/2022  No concerns from nursing at this time  The patient's allergies, past medical, surgical, social and family history were reviewed and unchanged  Review of Systems     Review of Systems   Constitutional: Positive for fatigue  Negative for activity change and fever  HENT: Negative  Negative for congestion  Eyes: Negative  Respiratory: Negative  Negative for cough, shortness of breath and wheezing  Cardiovascular: Negative  Negative for chest pain, palpitations and leg swelling  Gastrointestinal: Negative  Negative for abdominal distention, abdominal pain, constipation, diarrhea and nausea  Endocrine: Negative  Genitourinary: Negative  Negative for difficulty urinating and dysuria  Musculoskeletal: Negative  Skin: Negative  Allergic/Immunologic: Negative  Neurological: Negative  Negative for dizziness, weakness, light-headedness and headaches  Hematological: Negative  Psychiatric/Behavioral: Positive for sleep disturbance  Negative for dysphoric mood  Objective     Vitals:   Vitals:    11/15/22 1045   BP: 127/64   Pulse: 72   Resp: 18   Temp: (!) 97 3 °F (36 3 °C)   SpO2: 95%         Physical Exam  Vitals and nursing note reviewed  Constitutional:       General: She is not in acute distress  Appearance: Normal appearance  She is not ill-appearing  HENT:      Head: Normocephalic and atraumatic  Nose: Nose normal       Mouth/Throat:      Mouth: Mucous membranes are moist    Eyes:      Conjunctiva/sclera: Conjunctivae normal    Cardiovascular:      Rate and Rhythm: Normal rate and regular rhythm        Pulses: Normal pulses  Heart sounds: Normal heart sounds  Pulmonary:      Effort: Pulmonary effort is normal  No respiratory distress  Breath sounds: Normal breath sounds  No wheezing  Abdominal:      General: Bowel sounds are normal  There is no distension  Palpations: Abdomen is soft  Tenderness: There is no abdominal tenderness  Musculoskeletal:      Right lower leg: No edema  Left lower leg: No edema  Skin:     Capillary Refill: Capillary refill takes 2 to 3 seconds  Neurological:      Mental Status: She is alert and oriented to person, place, and time  Psychiatric:         Mood and Affect: Mood normal          Behavior: Behavior normal          Pertinent Laboratory/Diagnostic Studies:   Reviewed in facility chart-stable      Current Medications   Medications reviewed and updated see facility [facility] for details  Current Outpatient Medications:   •  acetaminophen (TYLENOL) 325 mg tablet, Take 650 mg by mouth every 6 (six) hours as needed for mild pain (pain score 1-3)  (Patient not taking: No sig reported), Disp: , Rfl:   •  aspirin (ECOTRIN LOW STRENGTH) 81 mg EC tablet, Take 81 mg by mouth daily, Disp: , Rfl:   •  Cholecalciferol 25 MCG (1000 UT) capsule, Take 1,000 Units by mouth daily   (Patient not taking: No sig reported), Disp: , Rfl:   •  DULoxetine (CYMBALTA) 60 mg delayed release capsule, Take 1 capsule (60 mg total) by mouth daily Do not start before October 1, 2022 , Disp: , Rfl: 0  •  Empagliflozin 25 MG TABS, Take 25 mg by mouth every morning (Patient not taking: No sig reported), Disp: , Rfl:   •  Ergocalciferol (VITAMIN D2 PO), Take 50,000 Units/day by mouth 2 (two) times a week, Disp: , Rfl:   •  ezetimibe (ZETIA) 10 mg tablet, Take 1 tablet (10 mg total) by mouth daily (Patient not taking: Reported on 9/27/2022), Disp: 30 tablet, Rfl: 0  •  gabapentin (NEURONTIN) 100 mg capsule, Take 1 capsule (100 mg total) by mouth 2 (two) times a day Take 100 mg in the morning and 100 mg mid afternoon, Disp: 42 capsule, Rfl: 0  •  gabapentin (NEURONTIN) 300 mg capsule, Take 1 capsule (300 mg total) by mouth daily at bedtime, Disp: 21 capsule, Rfl: 0  •  hydrophilic ointment, Apply topically as needed for dry skin, Disp: , Rfl:   •  insulin glargine (LANTUS) 100 units/mL subcutaneous injection, Inject 30 Units under the skin daily at bedtime, Disp: 10 mL, Rfl: 0  •  insulin lispro (HumaLOG) 100 units/mL injection, Inject 1-5 Units under the skin 3 (three) times a day before meals, Disp: , Rfl: 0  •  levothyroxine 50 mcg tablet, Take 1 5 tablets (75 mcg total) by mouth daily, Disp: 45 tablet, Rfl: 0  •  melatonin 3 mg, Take 3 tablets (9 mg total) by mouth daily at bedtime, Disp: , Rfl: 0  •  nystatin (MYCOSTATIN) powder, Apply topically 2 (two) times a day, Disp: 15 g, Rfl: 0  •  omeprazole (PriLOSEC) 20 mg delayed release capsule, Take 20 mg by mouth daily, Disp: , Rfl:   •  potassium chloride (K-DUR,KLOR-CON) 20 mEq tablet, Take 1 tablet (20 mEq total) by mouth daily, Disp: 30 tablet, Rfl: 0  •  spironolactone (ALDACTONE) 25 mg tablet, Take 1 tablet (25 mg total) by mouth daily Do not start before October 3, 2022 , Disp: 30 tablet, Rfl: 0  •  topiramate (TOPAMAX) 100 mg tablet, Take 200 mg by mouth 2 (two) times a day , Disp: , Rfl:   •  torsemide (DEMADEX) 20 mg tablet, Take 2 tablets (40 mg total) by mouth daily Do not start before October 3, 2022 , Disp: 60 tablet, Rfl: 0  •  traZODone (DESYREL) 50 mg tablet, Take 25 mg by mouth daily at bedtime, Disp: , Rfl:      Plan discussed with Dr Miriam Bernal noted agreement with assessment and plan  Please note:  Voice-recognition software may have been used in the preparation of this document  Occasional wrong word or "sound-alike" substitutions may have occurred due to the inherent limitations of voice recognition software  Interpretation should be guided by context           [address] [place], [place] [address]SAMIA  11/15/2022  1:09 PM

## 2022-11-15 NOTE — ASSESSMENT & PLAN NOTE
· Recent hospitalization for weakness  · Patient indicated increased depression at home when last hospitalized  · Patient states she did not get out of bed to the 5 days  · Patient had not been taking any of her medications  · She states she is less depressed today and feeling good  · Continue Cymbalta 60 mg daily

## 2022-11-17 ENCOUNTER — NURSING HOME VISIT (OUTPATIENT)
Dept: GERIATRICS | Facility: OTHER | Age: 67
End: 2022-11-17

## 2022-11-17 VITALS
DIASTOLIC BLOOD PRESSURE: 66 MMHG | WEIGHT: 270.8 LBS | RESPIRATION RATE: 18 BRPM | BODY MASS INDEX: 49.53 KG/M2 | HEART RATE: 76 BPM | SYSTOLIC BLOOD PRESSURE: 123 MMHG | OXYGEN SATURATION: 94 % | TEMPERATURE: 97.3 F

## 2022-11-17 DIAGNOSIS — G25.81 RESTLESS LEG SYNDROME: ICD-10-CM

## 2022-11-17 DIAGNOSIS — E11.65 TYPE 2 DIABETES MELLITUS WITH HYPERGLYCEMIA, WITH LONG-TERM CURRENT USE OF INSULIN (HCC): Primary | ICD-10-CM

## 2022-11-17 DIAGNOSIS — F32.9 MAJOR DEPRESSIVE DISORDER, REMISSION STATUS UNSPECIFIED, UNSPECIFIED WHETHER RECURRENT: ICD-10-CM

## 2022-11-17 DIAGNOSIS — Z79.4 TYPE 2 DIABETES MELLITUS WITH HYPERGLYCEMIA, WITH LONG-TERM CURRENT USE OF INSULIN (HCC): Primary | ICD-10-CM

## 2022-11-17 DIAGNOSIS — E03.9 ACQUIRED HYPOTHYROIDISM: ICD-10-CM

## 2022-11-17 DIAGNOSIS — E78.2 MIXED HYPERLIPIDEMIA: ICD-10-CM

## 2022-11-17 NOTE — ASSESSMENT & PLAN NOTE
· Recent hospitalization for weakness  · Patient indicated increased depression at home when last hospitalized  · Patient states she did not get out of bed to the 5 days  · Patient had not been taking any of her medications  · She states she is less depressed today and feeling good  · Continue Cymbalta 60 mg daily  · F/U with psychiatry as needed

## 2022-11-17 NOTE — PROGRESS NOTES
[facility]  [address]  [phone]  [address]  Code 31 (STR)        NAME: Angie Mack  AGE: 75 y o  SEX: female CODE STATUS: CPR    DATE OF ENCOUNTER: 11/17/2022    Assessment and Plan     1  Type 2 diabetes mellitus with hyperglycemia, with long-term current use of insulin ([facility])  Assessment & Plan:    Lab Results   Component Value Date    HGBA1C 11 8 (H) 09/19/2022   · History of diabetes  · Blood sugars have been trending 150-220  · Morning blood sugar 149  · Encourage diabetic diet   · Continue Accu-Cheks  · Continue Lantus insulin 30 units at HS  · Continue Humalog sliding scale insulin with meals      2  Acquired hypothyroidism  Assessment & Plan:  · History of hypothyroidism  · Continue Levothyroxine 75 mcg daily      3  Major depressive disorder, remission status unspecified, unspecified whether recurrent  Assessment & Plan:  · Recent hospitalization for weakness  · Patient indicated increased depression at home when last hospitalized  · Patient states she did not get out of bed to the 5 days  · Patient had not been taking any of her medications  · She states she is less depressed today and feeling good  · Continue Cymbalta 60 mg daily  · F/U with psychiatry as needed          4  Mixed hyperlipidemia  Assessment & Plan:  · History of hyperlipidemia, currently stable  · Last total cholesterol 180, triglycerides 56, HDL 33,   · Continue Zetia 10 mg daily      5  Restless leg syndrome  Assessment & Plan:  · History of restless  · Currently without complaints  · Continue gabapentin 300 mg at HS         All medications and routine orders were reviewed and updated as needed  Chief Complaint     STR follow up visit  Patient's care was coordinated with nursing facility staff  Recent vitals, labs, and updated medications were review on [facility] system in facility        Past Medical and Surgical History      Past Medical History:   Diagnosis Date   • CHF (congestive heart failure) (HCC)    • Diabetes mellitus ([facility] [address] )    • Disease of thyroid gland    • Hyperlipidemia    • Hypertension    • Hypothyroidism (acquired)    • Restless leg syndrome    • Seizures ([facility] [address] )      Past Surgical History:   Procedure Laterality Date   • APPENDECTOMY     • CHOLECYSTECTOMY     • HIP FRACTURE SURGERY Right    • TONSILLECTOMY     • TUBAL LIGATION       Allergies   Allergen Reactions   • Pineapple - Food Allergy Anaphylaxis   • Statins Other (See Comments)     Cramping from head to toe    • Bupropion Other (See Comments)   • Byetta 10 Mcg Pen [Exenatide]    • Ceftriaxone Other (See Comments)   • Marijuana [Dronabinol] GI Intolerance   • Metformin Diarrhea     Other reaction(s): Unknown   • Omeprazole Hives   • Ondansetron      Other reaction(s): Unknown   • Coconut Oil - Food Allergy Rash   • Liraglutide Rash and Other (See Comments)     Generalized rash   • Penicillins Rash          History of Present Illness     HPI  Benedicto Vance is a 39-year-old female, she is a STR patient of [facility] since 09/30/2022  Past Medical Hx including but not limited to depression, RLS, diabetes, CAD, hypothyroidism, GERD  She was seen in collaboration with nursing for medical management and STR follow up  Hospital Course:  Patient had been previously hospitalized 09/19/2022 for syncopal episode  During this hospitalization patient expressed that she was experiencing depression  Psych followed during hospitalization  After discharge patient reports she did not take any of her medications and did not get out of bed for 1 week  Patient was readmitted to the hospital 09/27/2022 for generalized weakness  PT OT recommended short-term rehab  Rehab Course:  Patient was seen and examined at bedside today  Patient is a reliable historian, alert and oriented x3  Lupe Route is lying in bed, she says she has less depression and has been feeling good   Her left arm/elbow is ecchymotic and edematous from recent fall but is improving  She is tired today but offers no other complaints  Denies CP/SOB/N/V/D  Denies lightheadedness, dizziness, headaches, vision changes  Patient states she has been eating and drinking well  Denies any bowel or bladder issues  Per review of SNF records, Patient is eating 3 meals per day, consuming  %  Last documented BM 11/15/2022  No concerns from nursing at this time  The patient's allergies, past medical, surgical, social and family history were reviewed and unchanged  Review of Systems     Review of Systems   Constitutional: Negative  Negative for activity change, appetite change and fever  HENT: Negative  Negative for congestion  Eyes: Negative  Respiratory: Negative  Cardiovascular: Negative  Negative for chest pain, palpitations and leg swelling  Gastrointestinal: Negative for abdominal distention, abdominal pain, constipation, diarrhea, nausea and vomiting  Endocrine: Negative  Genitourinary: Negative  Negative for difficulty urinating and dysuria  Musculoskeletal: Positive for gait problem  Skin: Negative  Allergic/Immunologic: Negative  Neurological: Positive for weakness  Negative for dizziness, light-headedness, numbness and headaches  Hematological: Negative  Psychiatric/Behavioral: Negative  Negative for confusion, dysphoric mood and sleep disturbance  Objective     Vitals:   Vitals:    11/17/22 1020   BP: 123/66   Pulse: 76   Resp: 18   Temp: (!) 97 3 °F (36 3 °C)   SpO2: 94%         Physical Exam  Vitals and nursing note reviewed  Constitutional:       General: She is not in acute distress  Appearance: Normal appearance  She is not ill-appearing  HENT:      Head: Normocephalic and atraumatic  Nose: No congestion  Mouth/Throat:      Mouth: Mucous membranes are moist    Eyes:      Conjunctiva/sclera: Conjunctivae normal    Cardiovascular:      Rate and Rhythm: Normal rate and regular rhythm        Pulses: Normal pulses  Heart sounds: Normal heart sounds  Pulmonary:      Effort: Pulmonary effort is normal       Breath sounds: Normal breath sounds  No wheezing  Abdominal:      General: Bowel sounds are normal  There is no distension  Palpations: Abdomen is soft  Tenderness: There is no abdominal tenderness  Musculoskeletal:      Right lower leg: No edema  Left lower leg: No edema  Skin:     General: Skin is warm  Capillary Refill: Capillary refill takes more than 3 seconds  Neurological:      Mental Status: She is alert and oriented to person, place, and time  Motor: Weakness present  Psychiatric:         Mood and Affect: Mood normal          Behavior: Behavior normal          Pertinent Laboratory/Diagnostic Studies:   Reviewed in facility chart-stable      Current Medications   Medications reviewed and updated see facility [facility] for details  Current Outpatient Medications:   •  acetaminophen (TYLENOL) 325 mg tablet, Take 650 mg by mouth every 6 (six) hours as needed for mild pain (pain score 1-3)  (Patient not taking: No sig reported), Disp: , Rfl:   •  aspirin (ECOTRIN LOW STRENGTH) 81 mg EC tablet, Take 81 mg by mouth daily, Disp: , Rfl:   •  Cholecalciferol 25 MCG (1000 UT) capsule, Take 1,000 Units by mouth daily   (Patient not taking: No sig reported), Disp: , Rfl:   •  DULoxetine (CYMBALTA) 60 mg delayed release capsule, Take 1 capsule (60 mg total) by mouth daily Do not start before October 1, 2022 , Disp: , Rfl: 0  •  Empagliflozin 25 MG TABS, Take 25 mg by mouth every morning (Patient not taking: No sig reported), Disp: , Rfl:   •  Ergocalciferol (VITAMIN D2 PO), Take 50,000 Units/day by mouth 2 (two) times a week, Disp: , Rfl:   •  ezetimibe (ZETIA) 10 mg tablet, Take 1 tablet (10 mg total) by mouth daily (Patient not taking: Reported on 9/27/2022), Disp: 30 tablet, Rfl: 0  •  gabapentin (NEURONTIN) 100 mg capsule, Take 1 capsule (100 mg total) by mouth 2 (two) times a day Take 100 mg in the morning and 100 mg mid afternoon, Disp: 42 capsule, Rfl: 0  •  gabapentin (NEURONTIN) 300 mg capsule, Take 1 capsule (300 mg total) by mouth daily at bedtime, Disp: 21 capsule, Rfl: 0  •  hydrophilic ointment, Apply topically as needed for dry skin, Disp: , Rfl:   •  insulin glargine (LANTUS) 100 units/mL subcutaneous injection, Inject 30 Units under the skin daily at bedtime, Disp: 10 mL, Rfl: 0  •  insulin lispro (HumaLOG) 100 units/mL injection, Inject 1-5 Units under the skin 3 (three) times a day before meals, Disp: , Rfl: 0  •  levothyroxine 50 mcg tablet, Take 1 5 tablets (75 mcg total) by mouth daily, Disp: 45 tablet, Rfl: 0  •  melatonin 3 mg, Take 3 tablets (9 mg total) by mouth daily at bedtime, Disp: , Rfl: 0  •  nystatin (MYCOSTATIN) powder, Apply topically 2 (two) times a day, Disp: 15 g, Rfl: 0  •  omeprazole (PriLOSEC) 20 mg delayed release capsule, Take 20 mg by mouth daily, Disp: , Rfl:   •  potassium chloride (K-DUR,KLOR-CON) 20 mEq tablet, Take 1 tablet (20 mEq total) by mouth daily, Disp: 30 tablet, Rfl: 0  •  spironolactone (ALDACTONE) 25 mg tablet, Take 1 tablet (25 mg total) by mouth daily Do not start before October 3, 2022 , Disp: 30 tablet, Rfl: 0  •  topiramate (TOPAMAX) 100 mg tablet, Take 200 mg by mouth 2 (two) times a day , Disp: , Rfl:   •  torsemide (DEMADEX) 20 mg tablet, Take 2 tablets (40 mg total) by mouth daily Do not start before October 3, 2022 , Disp: 60 tablet, Rfl: 0  •  traZODone (DESYREL) 50 mg tablet, Take 25 mg by mouth daily at bedtime, Disp: , Rfl:      Plan discussed with Dr Catrachita Espitia noted agreement with assessment and plan  Please note:  Voice-recognition software may have been used in the preparation of this document  Occasional wrong word or "sound-alike" substitutions may have occurred due to the inherent limitations of voice recognition software  Interpretation should be guided by context           [facility], SAMIA  11/17/2022  10:29 AM

## 2022-11-17 NOTE — ASSESSMENT & PLAN NOTE
Lab Results   Component Value Date    HGBA1C 11 8 (H) 09/19/2022   · History of diabetes  · Blood sugars have been trending 150-220  · Morning blood sugar 149  · Encourage diabetic diet   · Continue Accu-Cheks  · Continue Lantus insulin 30 units at HS  · Continue Humalog sliding scale insulin with meals

## 2022-11-22 ENCOUNTER — NURSING HOME VISIT (OUTPATIENT)
Dept: GERIATRICS | Facility: OTHER | Age: 67
End: 2022-11-22

## 2022-11-22 VITALS
TEMPERATURE: 97.3 F | SYSTOLIC BLOOD PRESSURE: 135 MMHG | HEART RATE: 79 BPM | RESPIRATION RATE: 18 BRPM | OXYGEN SATURATION: 96 % | DIASTOLIC BLOOD PRESSURE: 78 MMHG

## 2022-11-22 DIAGNOSIS — E03.9 ACQUIRED HYPOTHYROIDISM: ICD-10-CM

## 2022-11-22 DIAGNOSIS — Z79.4 TYPE 2 DIABETES MELLITUS WITH HYPERGLYCEMIA, WITH LONG-TERM CURRENT USE OF INSULIN (HCC): Primary | ICD-10-CM

## 2022-11-22 DIAGNOSIS — F32.9 MAJOR DEPRESSIVE DISORDER, REMISSION STATUS UNSPECIFIED, UNSPECIFIED WHETHER RECURRENT: ICD-10-CM

## 2022-11-22 DIAGNOSIS — K21.9 GASTROESOPHAGEAL REFLUX DISEASE, UNSPECIFIED WHETHER ESOPHAGITIS PRESENT: ICD-10-CM

## 2022-11-22 DIAGNOSIS — E11.65 TYPE 2 DIABETES MELLITUS WITH HYPERGLYCEMIA, WITH LONG-TERM CURRENT USE OF INSULIN (HCC): Primary | ICD-10-CM

## 2022-11-22 NOTE — PROGRESS NOTES
[facility]  [address]  [phone]  [place]  Code 31 (STR)        NAME: Tiffanie Rosario  AGE: 75 y o  SEX: female CODE STATUS: CPR    DATE OF ENCOUNTER: 11/22/2022    Assessment and Plan     1  Type 2 diabetes mellitus with hyperglycemia, with long-term current use of insulin ([facility])  Assessment & Plan:    Lab Results   Component Value Date    HGBA1C 11 8 (H) 09/19/2022   · History of diabetes  · Blood sugars have been trending 120-190  · Morning blood sugar 114  · Encourage diabetic diet   · Continue Accu-Cheks  · Continue Lantus insulin 30 units at HS  · Continue Humalog sliding scale insulin with meals      2  Gastroesophageal reflux disease, unspecified whether esophagitis present  Assessment & Plan:  · History of GERD  · Stable, no c/o reflux  · Continue omeprazole 20 mg daily      3  Acquired hypothyroidism  Assessment & Plan:  · History of hypothyroidism  · Continue Levothyroxine 75 mcg daily  · Repeat TSH      4  Major depressive disorder, remission status unspecified, unspecified whether recurrent  Assessment & Plan:  · Recent hospitalization for weakness  · Patient indicated increased depression at home when last hospitalized  · Patient states she did not get out of bed to the 5 days  · Patient had not been taking any of her medications  · She states she is less depressed today and feeling good  · Continue Cymbalta 60 mg daily  · F/U with psychiatry as needed             All medications and routine orders were reviewed and updated as needed  Chief Complaint     STR follow up visit  Patient's care was coordinated with nursing facility staff  Recent vitals, labs, and updated medications were review on [facility] system in facility        Past Medical and Surgical History      Past Medical History:   Diagnosis Date   • CHF (congestive heart failure) ([facility] [address] )    • Diabetes mellitus ([facility] [address] )    • Disease of thyroid gland    • Hyperlipidemia    • Hypertension    • Hypothyroidism (acquired)    • Restless leg syndrome    • Seizures ([facility] [address] )      Past Surgical History:   Procedure Laterality Date   • APPENDECTOMY     • CHOLECYSTECTOMY     • HIP FRACTURE SURGERY Right    • TONSILLECTOMY     • TUBAL LIGATION       Allergies   Allergen Reactions   • Pineapple - Food Allergy Anaphylaxis   • Statins Other (See Comments)     Cramping from head to toe    • Bupropion Other (See Comments)   • Byetta 10 Mcg Pen [Exenatide]    • Ceftriaxone Other (See Comments)   • Marijuana [Dronabinol] GI Intolerance   • Metformin Diarrhea     Other reaction(s): Unknown   • Omeprazole Hives   • Ondansetron      Other reaction(s): Unknown   • Coconut Oil - Food Allergy Rash   • Liraglutide Rash and Other (See Comments)     Generalized rash   • Penicillins Rash          History of Present Illness     HPI  Alyssa Frank is a 58-year-old female, she is a STR patient of [facility] since 09/30/2022  Past Medical Hx including but not limited to depression, RLS, diabetes, CAD, hypothyroidism, GERD  She was seen in collaboration with nursing for medical management and STR follow up  Hospital Course:  Patient had been previously hospitalized 09/19/2022 for syncopal episode  During this hospitalization patient expressed that she was experiencing depression  Psych followed during hospitalization  After discharge patient reports she did not take any of her medications and did not get out of bed for 1 week  Patient was readmitted to the hospital 09/27/2022 for generalized weakness  PT OT recommended short-term rehab  Rehab Course:  Patient was seen and examined at bedside today  Patient is a reliable historian, alert and oriented x3  Keith Davis is lying in bed, she says she is feeling good and denies any pain  Her left arm/elbow ecchymosis and edema has almost resolved  She says she is sleeping well and offers no complaints  Denies CP/SOB/N/V/D  Denies lightheadedness, dizziness, headaches, vision changes   Patient states she has been eating and drinking well  Denies any bowel or bladder issues  Per review of SNF records, Patient is eating 3 meals per day, consuming  %  Last documented BM 11/21/2022  No concerns from nursing at this time  The patient's allergies, past medical, surgical, social and family history were reviewed and unchanged  Review of Systems     Review of Systems   Constitutional: Positive for activity change and fatigue  HENT: Negative  Negative for congestion  Eyes: Negative  Respiratory: Negative  Negative for cough, shortness of breath and wheezing  Cardiovascular: Negative  Negative for chest pain, palpitations and leg swelling  Gastrointestinal: Negative for abdominal distention, abdominal pain, constipation, diarrhea, nausea and vomiting  Endocrine: Negative  Genitourinary: Negative  Negative for difficulty urinating and dysuria  Musculoskeletal: Positive for gait problem  Skin: Negative  Allergic/Immunologic: Negative  Neurological: Positive for weakness  Negative for light-headedness and headaches  Hematological: Negative  Psychiatric/Behavioral: Negative  Negative for sleep disturbance  Objective     Vitals:   Vitals:    11/22/22 1049   BP: 135/78   Pulse: 79   Resp: 18   Temp: (!) 97 3 °F (36 3 °C)   SpO2: 96%         Physical Exam  Vitals and nursing note reviewed  Constitutional:       General: She is not in acute distress  Appearance: Normal appearance  She is not ill-appearing  HENT:      Head: Normocephalic and atraumatic  Nose: No congestion  Mouth/Throat:      Mouth: Mucous membranes are moist    Eyes:      Conjunctiva/sclera: Conjunctivae normal    Cardiovascular:      Rate and Rhythm: Normal rate and regular rhythm  Pulses: Normal pulses  Heart sounds: Normal heart sounds  Pulmonary:      Effort: Pulmonary effort is normal  No respiratory distress  Breath sounds: Normal breath sounds  No wheezing  Abdominal:      General: Bowel sounds are normal       Palpations: Abdomen is soft  Musculoskeletal:      Right lower leg: No edema  Left lower leg: No edema  Skin:     Capillary Refill: Capillary refill takes 2 to 3 seconds  Neurological:      Mental Status: She is alert and oriented to person, place, and time  Motor: Weakness present  Gait: Gait abnormal    Psychiatric:         Mood and Affect: Mood normal          Behavior: Behavior normal          Pertinent Laboratory/Diagnostic Studies:   Reviewed in facility chart-stable      Current Medications   Medications reviewed and updated see facility [facility] for details  Current Outpatient Medications:   •  acetaminophen (TYLENOL) 325 mg tablet, Take 650 mg by mouth every 6 (six) hours as needed for mild pain (pain score 1-3)  (Patient not taking: No sig reported), Disp: , Rfl:   •  aspirin (ECOTRIN LOW STRENGTH) 81 mg EC tablet, Take 81 mg by mouth daily, Disp: , Rfl:   •  Cholecalciferol 25 MCG (1000 UT) capsule, Take 1,000 Units by mouth daily   (Patient not taking: No sig reported), Disp: , Rfl:   •  DULoxetine (CYMBALTA) 60 mg delayed release capsule, Take 1 capsule (60 mg total) by mouth daily Do not start before October 1, 2022 , Disp: , Rfl: 0  •  Empagliflozin 25 MG TABS, Take 25 mg by mouth every morning (Patient not taking: No sig reported), Disp: , Rfl:   •  Ergocalciferol (VITAMIN D2 PO), Take 50,000 Units/day by mouth 2 (two) times a week, Disp: , Rfl:   •  ezetimibe (ZETIA) 10 mg tablet, Take 1 tablet (10 mg total) by mouth daily (Patient not taking: Reported on 9/27/2022), Disp: 30 tablet, Rfl: 0  •  gabapentin (NEURONTIN) 100 mg capsule, Take 1 capsule (100 mg total) by mouth 2 (two) times a day Take 100 mg in the morning and 100 mg mid afternoon, Disp: 42 capsule, Rfl: 0  •  gabapentin (NEURONTIN) 300 mg capsule, Take 1 capsule (300 mg total) by mouth daily at bedtime, Disp: 21 capsule, Rfl: 0  •  hydrophilic ointment, Apply topically as needed for dry skin, Disp: , Rfl:   •  insulin glargine (LANTUS) 100 units/mL subcutaneous injection, Inject 30 Units under the skin daily at bedtime, Disp: 10 mL, Rfl: 0  •  insulin lispro (HumaLOG) 100 units/mL injection, Inject 1-5 Units under the skin 3 (three) times a day before meals, Disp: , Rfl: 0  •  levothyroxine 50 mcg tablet, Take 1 5 tablets (75 mcg total) by mouth daily, Disp: 45 tablet, Rfl: 0  •  melatonin 3 mg, Take 3 tablets (9 mg total) by mouth daily at bedtime, Disp: , Rfl: 0  •  nystatin (MYCOSTATIN) powder, Apply topically 2 (two) times a day, Disp: 15 g, Rfl: 0  •  omeprazole (PriLOSEC) 20 mg delayed release capsule, Take 20 mg by mouth daily, Disp: , Rfl:   •  potassium chloride (K-DUR,KLOR-CON) 20 mEq tablet, Take 1 tablet (20 mEq total) by mouth daily, Disp: 30 tablet, Rfl: 0  •  spironolactone (ALDACTONE) 25 mg tablet, Take 1 tablet (25 mg total) by mouth daily Do not start before October 3, 2022 , Disp: 30 tablet, Rfl: 0  •  topiramate (TOPAMAX) 100 mg tablet, Take 200 mg by mouth 2 (two) times a day , Disp: , Rfl:   •  torsemide (DEMADEX) 20 mg tablet, Take 2 tablets (40 mg total) by mouth daily Do not start before October 3, 2022 , Disp: 60 tablet, Rfl: 0  •  traZODone (DESYREL) 50 mg tablet, Take 25 mg by mouth daily at bedtime, Disp: , Rfl:      Plan discussed with Dr Melita Schwab Dr Melita Schwab noted agreement with assessment and plan  Please note:  Voice-recognition software may have been used in the preparation of this document  Occasional wrong word or "sound-alike" substitutions may have occurred due to the inherent limitations of voice recognition software  Interpretation should be guided by context           [facility] SAMIA Kay  11/22/2022  10:59 AM

## 2022-11-22 NOTE — ASSESSMENT & PLAN NOTE
Lab Results   Component Value Date    HGBA1C 11 8 (H) 09/19/2022   · History of diabetes  · Blood sugars have been trending 120-190  · Morning blood sugar 114  · Encourage diabetic diet   · Continue Accu-Cheks  · Continue Lantus insulin 30 units at HS  · Continue Humalog sliding scale insulin with meals

## 2022-11-25 ENCOUNTER — NURSING HOME VISIT (OUTPATIENT)
Dept: GERIATRICS | Facility: OTHER | Age: 67
End: 2022-11-25

## 2022-11-25 VITALS
HEART RATE: 68 BPM | OXYGEN SATURATION: 97 % | RESPIRATION RATE: 18 BRPM | SYSTOLIC BLOOD PRESSURE: 135 MMHG | DIASTOLIC BLOOD PRESSURE: 78 MMHG | TEMPERATURE: 97.5 F

## 2022-11-25 DIAGNOSIS — Z79.4 TYPE 2 DIABETES MELLITUS WITH HYPERGLYCEMIA, WITH LONG-TERM CURRENT USE OF INSULIN (HCC): Primary | ICD-10-CM

## 2022-11-25 DIAGNOSIS — E78.2 MIXED HYPERLIPIDEMIA: ICD-10-CM

## 2022-11-25 DIAGNOSIS — K21.9 GASTROESOPHAGEAL REFLUX DISEASE, UNSPECIFIED WHETHER ESOPHAGITIS PRESENT: ICD-10-CM

## 2022-11-25 DIAGNOSIS — E03.9 ACQUIRED HYPOTHYROIDISM: ICD-10-CM

## 2022-11-25 DIAGNOSIS — G25.81 RESTLESS LEG SYNDROME: ICD-10-CM

## 2022-11-25 DIAGNOSIS — E11.65 TYPE 2 DIABETES MELLITUS WITH HYPERGLYCEMIA, WITH LONG-TERM CURRENT USE OF INSULIN (HCC): Primary | ICD-10-CM

## 2022-11-25 NOTE — PROGRESS NOTES
[facility]  [address]  [phone]  [facility]  Code 31 (STR)        NAME: Marion Bear  AGE: 75 y o  SEX: female CODE STATUS: CPR    DATE OF ENCOUNTER: 11/25/2022    Assessment and Plan     1  Type 2 diabetes mellitus with hyperglycemia, with long-term current use of insulin ([facility])  Assessment & Plan:    Lab Results   Component Value Date    HGBA1C 11 8 (H) 09/19/2022   · History of diabetes  · Blood sugars have been trending 120-190  · Morning blood sugar 109  · Encourage diabetic diet   · Continue Accu-Cheks  · Continue Lantus insulin 30 units at HS  · Continue Humalog sliding scale insulin with meals      2  Gastroesophageal reflux disease, unspecified whether esophagitis present  Assessment & Plan:  · History of GERD  · Stable, no c/o reflux  · Continue omeprazole 20 mg daily      3  Acquired hypothyroidism  Assessment & Plan:  · History of hypothyroidism  · Continue Levothyroxine 75 mcg daily  · Repeat TSH      4  Restless leg syndrome  Assessment & Plan:  · History of restless  · Currently without complaints  · Continue gabapentin 300 mg at HS      5  Mixed hyperlipidemia  Assessment & Plan:  · History of hyperlipidemia, currently stable  · Last total cholesterol 180, triglycerides 56, HDL 33,   · Continue Zetia 10 mg daily         All medications and routine orders were reviewed and updated as needed  Chief Complaint     STR follow up visit  Patient's care was coordinated with nursing facility staff  Recent vitals, labs, and updated medications were review on [facility] system in facility        Past Medical and Surgical History      Past Medical History:   Diagnosis Date   • CHF (congestive heart failure) ([facility] [address] )    • [facility]    • Disease of thyroid gland    • Hyperlipidemia    • Hypertension    • Hypothyroidism (acquired)    • Restless leg syndrome    • Seizures ([facility] [address] )      Past Surgical History:   Procedure Laterality Date   • APPENDECTOMY • CHOLECYSTECTOMY     • HIP FRACTURE SURGERY Right    • TONSILLECTOMY     • TUBAL LIGATION       Allergies   Allergen Reactions   • Pineapple - Food Allergy Anaphylaxis   • Statins Other (See Comments)     Cramping from head to toe    • Bupropion Other (See Comments)   • Byetta 10 Mcg Pen [Exenatide]    • Ceftriaxone Other (See Comments)   • Marijuana [Dronabinol] GI Intolerance   • Metformin Diarrhea     Other reaction(s): Unknown   • Omeprazole Hives   • Ondansetron      Other reaction(s): Unknown   • Coconut Oil - Food Allergy Rash   • Liraglutide Rash and Other (See Comments)     Generalized rash   • Penicillins Rash          History of Present Illness     HPI  James Kimbrough is a 25-year-old female, she is a STR patient of [facility] since 09/30/2022  Past Medical Hx including but not limited to depression, RLS, diabetes, CAD, hypothyroidism, GERD  She was seen in collaboration with nursing for medical management and STR follow up  Hospital Course:  Patient had been previously hospitalized 09/19/2022 for syncopal episode  During this hospitalization patient expressed that she was experiencing depression  Psych followed during hospitalization  After discharge patient reports she did not take any of her medications and did not get out of bed for 1 week  Patient was readmitted to the hospital 09/27/2022 for generalized weakness  PT OT recommended short-term rehab  Rehab Course:  Patient was seen and examined at bedside today  Patient is a reliable historian, alert and oriented x3  Chad Grayson is lying in bed, she says she is feeling good and denies any pain  Her left arm/elbow ecchymosis and edema has improved  She says she normally sleeps well but had poor sleep last night  Melatonin ordered for bedtime  No other complaints  Denies CP/SOB/N/V/D  Denies lightheadedness, dizziness, headaches, vision changes  Patient states she has been eating and drinking well  Denies any bowel or bladder issues   Per review of SNF records, Patient is eating 3 meals per day, consuming  %  Last documented BM 11/22/2022  No concerns from nursing at this time  The patient's allergies, past medical, surgical, social and family history were reviewed and unchanged  Review of Systems     Review of Systems   Constitutional: Positive for activity change  Negative for chills and fever  HENT: Negative  Negative for congestion  Eyes: Negative  Respiratory: Negative  Negative for cough, shortness of breath and wheezing  Cardiovascular: Negative  Negative for chest pain, palpitations and leg swelling  Gastrointestinal: Negative  Negative for abdominal distention, abdominal pain, constipation, diarrhea, nausea and vomiting  Endocrine: Negative  Genitourinary: Negative  Negative for difficulty urinating and dysuria  Musculoskeletal: Positive for gait problem  Skin: Negative  Allergic/Immunologic: Negative  Neurological: Positive for weakness  Negative for dizziness and headaches  Hematological: Negative  Psychiatric/Behavioral: Positive for sleep disturbance  Objective     Vitals:   Vitals:    11/25/22 1033   BP: 135/78   Pulse: 68   Resp: 18   Temp: 97 5 °F (36 4 °C)   SpO2: 97%         Physical Exam  Vitals and nursing note reviewed  Constitutional:       General: She is not in acute distress  Appearance: Normal appearance  She is not ill-appearing  HENT:      Head: Normocephalic and atraumatic  Mouth/Throat:      Mouth: Mucous membranes are moist    Eyes:      Conjunctiva/sclera: Conjunctivae normal    Cardiovascular:      Rate and Rhythm: Normal rate and regular rhythm  Pulses: Normal pulses  Heart sounds: Normal heart sounds  Pulmonary:      Effort: Pulmonary effort is normal  No respiratory distress  Breath sounds: Normal breath sounds  No wheezing  Abdominal:      General: Bowel sounds are normal  There is no distension  Palpations: Abdomen is soft  Tenderness: There is no abdominal tenderness  Musculoskeletal:      Right lower leg: No edema  Left lower leg: No edema  Skin:     General: Skin is warm  Capillary Refill: Capillary refill takes more than 3 seconds  Neurological:      Mental Status: She is alert and oriented to person, place, and time  Motor: Weakness present  Gait: Gait abnormal    Psychiatric:         Mood and Affect: Mood normal          Behavior: Behavior normal          Pertinent Laboratory/Diagnostic Studies:   Reviewed in facility chart-stable      Current Medications   Medications reviewed and updated see facility [facility] for details  Current Outpatient Medications:   •  acetaminophen (TYLENOL) 325 mg tablet, Take 650 mg by mouth every 6 (six) hours as needed for mild pain (pain score 1-3)  (Patient not taking: No sig reported), Disp: , Rfl:   •  aspirin (ECOTRIN LOW STRENGTH) 81 mg EC tablet, Take 81 mg by mouth daily, Disp: , Rfl:   •  Cholecalciferol 25 MCG (1000 UT) capsule, Take 1,000 Units by mouth daily   (Patient not taking: No sig reported), Disp: , Rfl:   •  DULoxetine (CYMBALTA) 60 mg delayed release capsule, Take 1 capsule (60 mg total) by mouth daily Do not start before October 1, 2022 , Disp: , Rfl: 0  •  Empagliflozin 25 MG TABS, Take 25 mg by mouth every morning (Patient not taking: No sig reported), Disp: , Rfl:   •  Ergocalciferol (VITAMIN D2 PO), Take 50,000 Units/day by mouth 2 (two) times a week, Disp: , Rfl:   •  ezetimibe (ZETIA) 10 mg tablet, Take 1 tablet (10 mg total) by mouth daily (Patient not taking: Reported on 9/27/2022), Disp: 30 tablet, Rfl: 0  •  gabapentin (NEURONTIN) 100 mg capsule, Take 1 capsule (100 mg total) by mouth 2 (two) times a day Take 100 mg in the morning and 100 mg mid afternoon, Disp: 42 capsule, Rfl: 0  •  gabapentin (NEURONTIN) 300 mg capsule, Take 1 capsule (300 mg total) by mouth daily at bedtime, Disp: 21 capsule, Rfl: 0  •  hydrophilic ointment, Apply topically as needed for dry skin, Disp: , Rfl:   •  insulin glargine (LANTUS) 100 units/mL subcutaneous injection, Inject 30 Units under the skin daily at bedtime, Disp: 10 mL, Rfl: 0  •  insulin lispro (HumaLOG) 100 units/mL injection, Inject 1-5 Units under the skin 3 (three) times a day before meals, Disp: , Rfl: 0  •  levothyroxine 50 mcg tablet, Take 1 5 tablets (75 mcg total) by mouth daily, Disp: 45 tablet, Rfl: 0  •  melatonin 3 mg, Take 3 tablets (9 mg total) by mouth daily at bedtime, Disp: , Rfl: 0  •  nystatin (MYCOSTATIN) powder, Apply topically 2 (two) times a day, Disp: 15 g, Rfl: 0  •  omeprazole (PriLOSEC) 20 mg delayed release capsule, Take 20 mg by mouth daily, Disp: , Rfl:   •  potassium chloride (K-DUR,KLOR-CON) 20 mEq tablet, Take 1 tablet (20 mEq total) by mouth daily, Disp: 30 tablet, Rfl: 0  •  spironolactone (ALDACTONE) 25 mg tablet, Take 1 tablet (25 mg total) by mouth daily Do not start before October 3, 2022 , Disp: 30 tablet, Rfl: 0  •  topiramate (TOPAMAX) 100 mg tablet, Take 200 mg by mouth 2 (two) times a day , Disp: , Rfl:   •  torsemide (DEMADEX) 20 mg tablet, Take 2 tablets (40 mg total) by mouth daily Do not start before October 3, 2022 , Disp: 60 tablet, Rfl: 0  •  traZODone (DESYREL) 50 mg tablet, Take 25 mg by mouth daily at bedtime, Disp: , Rfl:      Plan discussed with Dr Rufino Pierce noted agreement with assessment and plan  Please note:  Voice-recognition software may have been used in the preparation of this document  Occasional wrong word or "sound-alike" substitutions may have occurred due to the inherent limitations of voice recognition software  Interpretation should be guided by context           [address], CRNP  11/25/2022  1:39 PM

## 2022-11-25 NOTE — ASSESSMENT & PLAN NOTE
Lab Results   Component Value Date    HGBA1C 11 8 (H) 09/19/2022   · History of diabetes  · Blood sugars have been trending 120-190  · Morning blood sugar 109  · Encourage diabetic diet   · Continue Accu-Cheks  · Continue Lantus insulin 30 units at HS  · Continue Humalog sliding scale insulin with meals

## 2022-11-29 ENCOUNTER — NURSING HOME VISIT (OUTPATIENT)
Dept: GERIATRICS | Facility: OTHER | Age: 67
End: 2022-11-29

## 2022-11-29 VITALS
HEART RATE: 78 BPM | SYSTOLIC BLOOD PRESSURE: 134 MMHG | TEMPERATURE: 97.5 F | RESPIRATION RATE: 18 BRPM | DIASTOLIC BLOOD PRESSURE: 73 MMHG | OXYGEN SATURATION: 95 %

## 2022-11-29 DIAGNOSIS — Z79.4 TYPE 2 DIABETES MELLITUS WITH HYPERGLYCEMIA, WITH LONG-TERM CURRENT USE OF INSULIN (HCC): Primary | ICD-10-CM

## 2022-11-29 DIAGNOSIS — K21.9 GASTROESOPHAGEAL REFLUX DISEASE, UNSPECIFIED WHETHER ESOPHAGITIS PRESENT: ICD-10-CM

## 2022-11-29 DIAGNOSIS — E03.9 ACQUIRED HYPOTHYROIDISM: ICD-10-CM

## 2022-11-29 DIAGNOSIS — G25.81 RESTLESS LEG SYNDROME: ICD-10-CM

## 2022-11-29 DIAGNOSIS — R07.89 CHEST PRESSURE: ICD-10-CM

## 2022-11-29 DIAGNOSIS — F32.9 MAJOR DEPRESSIVE DISORDER, REMISSION STATUS UNSPECIFIED, UNSPECIFIED WHETHER RECURRENT: ICD-10-CM

## 2022-11-29 DIAGNOSIS — E11.65 TYPE 2 DIABETES MELLITUS WITH HYPERGLYCEMIA, WITH LONG-TERM CURRENT USE OF INSULIN (HCC): Primary | ICD-10-CM

## 2022-11-29 NOTE — ASSESSMENT & PLAN NOTE
Lab Results   Component Value Date    HGBA1C 11 8 (H) 09/19/2022   · History of diabetes  · Blood sugars have been trending 120's-200  · Morning blood sugar 143  · Encourage diabetic diet   · Continue Accu-Cheks  · Continue Lantus insulin 30 units at HS  · Continue Humalog sliding scale insulin with meals

## 2022-11-29 NOTE — PROGRESS NOTES
[facility]  [address]  [phone]  Amalia Khan  Code 31 (STR)        NAME: Judi Buitrago  AGE: 75 y o  SEX: female CODE STATUS: CPR    DATE OF ENCOUNTER: 11/29/2022    Assessment and Plan     1  Type 2 diabetes mellitus with hyperglycemia, with long-term current use of insulin ([facility])  Assessment & Plan:    Lab Results   Component Value Date    HGBA1C 11 8 (H) 09/19/2022   · History of diabetes  · Blood sugars have been trending 120's-200  · Morning blood sugar 143  · Encourage diabetic diet   · Continue Accu-Cheks  · Continue Lantus insulin 30 units at HS  · Continue Humalog sliding scale insulin with meals      2  Gastroesophageal reflux disease, unspecified whether esophagitis present  Assessment & Plan:  · History of GERD  · Stable, no c/o reflux  · Continue omeprazole 20 mg daily      3  Restless leg syndrome  Assessment & Plan:  · History of restless  · Currently without complaints  · Continue gabapentin 300 mg at HS      4  Major depressive disorder, remission status unspecified, unspecified whether recurrent  Assessment & Plan:  · Recent hospitalization for weakness  · Patient indicated increased depression at home when last hospitalized  · Patient states she did not get out of bed to the 5 days  · Patient had not been taking any of her medications  · She states she is less depressed today and feeling good  · Continue Cymbalta 60 mg daily  · F/U with psychiatry as needed          5  Acquired hypothyroidism  Assessment & Plan:  · History of hypothyroidism  · Continue Levothyroxine 75 mcg daily  · Repeat TSH 0 53      6  Chest pressure  Assessment & Plan:  · Patient c/o chest pressure that began Sunday and nausea that began Monday    · Pressure does not radiate and is not reproducible  · Patient states pressure is less today  · She states she did not tell anyone at the SNF  · EKG done showed SR with right BBB and Left anterior fascicular block  · Discussed with cardiology, felt no acute changes on EKG  · VSS  · Zofran for nausea  · CBC, BMP, Troponin 11/30/22         All medications and routine orders were reviewed and updated as needed  Chief Complaint     STR follow up visit  Patient's care was coordinated with nursing facility staff  Recent vitals, labs, and updated medications were review on [facility] system in facility  Past Medical and Surgical History      Past Medical History:   Diagnosis Date   • CHF (congestive heart failure) ([facility] )    • Diabetes mellitus ([place] [address] )    • Disease of thyroid gland    • Hyperlipidemia    • Hypertension    • Hypothyroidism (acquired)    • Restless leg syndrome    • Seizures ([place] [address] )      Past Surgical History:   Procedure Laterality Date   • APPENDECTOMY     • CHOLECYSTECTOMY     • HIP FRACTURE SURGERY Right    • TONSILLECTOMY     • TUBAL LIGATION       Allergies   Allergen Reactions   • Pineapple - Food Allergy Anaphylaxis   • Statins Other (See Comments)     Cramping from head to toe    • Bupropion Other (See Comments)   • Byetta 10 Mcg Pen [Exenatide]    • Ceftriaxone Other (See Comments)   • Marijuana [Dronabinol] GI Intolerance   • Metformin Diarrhea     Other reaction(s): Unknown   • Omeprazole Hives   • Ondansetron      Other reaction(s): Unknown   • Coconut Oil - Food Allergy Rash   • Liraglutide Rash and Other (See Comments)     Generalized rash   • Penicillins Rash          History of Present Illness     HPI  Anahi Pfeiffer is a 79-year-old female, she is a STR patient of [facility] since 09/30/2022  Past Medical Hx including but not limited to depression, RLS, diabetes, CAD, hypothyroidism, GERD  She was seen in collaboration with nursing for medical management and STR follow up  Hospital Course:  Patient had been previously hospitalized 09/19/2022 for syncopal episode  During this hospitalization patient expressed that she was experiencing depression  Psych followed during hospitalization   After discharge patient reports she did not take any of her medications and did not get out of bed for 1 week  Patient was readmitted to the hospital 09/27/2022 for generalized weakness  PT OT recommended short-term rehab  Rehab Course:  Patient was seen and examined at bedside today  Patient is a reliable historian, alert and oriented x3  Jake Jensen is lying in bed, and does not appear to be in any distress  Initially patient stated she was doing well, then indicated she was having chest pressure that began Sunday and nausea that began yesterday  She states she did not report this to anyone  Pressure did not radiate and was not reproducible  Today it has lessoned  EKG was done showing SR with right BBB, left anterior fascicular block  Discussed with cardiology, felt no acute changes on EKG  Lab work ordered for AM   Denies SOB/V/D  Denies lightheadedness, dizziness, headaches, vision changes  Patient states she has been eating and drinking well  Denies any bowel or bladder issues  Per review of SNF records, Patient is eating 3 meals per day, consuming  %  Last documented BM 11/29/2022  No concerns from nursing at this time  The patient's allergies, past medical, surgical, social and family history were reviewed and unchanged  Review of Systems     Review of Systems   Constitutional: Positive for activity change  Negative for fever  HENT: Negative  Negative for congestion  Eyes: Negative  Respiratory: Negative for cough, shortness of breath and wheezing  Cardiovascular: Positive for chest pain  Negative for palpitations and leg swelling  Patient reports chest pressure beginning Sunday   Gastrointestinal: Positive for nausea  Negative for abdominal distention, abdominal pain, constipation, diarrhea and vomiting  Patient reports nausea beginning Monday   Endocrine: Negative  Genitourinary: Negative  Negative for difficulty urinating and dysuria  Musculoskeletal: Positive for gait problem  Skin: Negative  Allergic/Immunologic: Negative  Neurological: Positive for weakness  Negative for dizziness, light-headedness and headaches  Hematological: Negative  Psychiatric/Behavioral: Negative  Negative for sleep disturbance  Objective     Vitals:   Vitals:    11/29/22 1014   BP: 134/73   Pulse: 78   Resp: 18   Temp: 97 5 °F (36 4 °C)   SpO2: 95%         Physical Exam  Constitutional:       General: She is not in acute distress  Appearance: Normal appearance  She is not ill-appearing  HENT:      Head: Normocephalic and atraumatic  Nose: Nose normal  No congestion  Mouth/Throat:      Mouth: Mucous membranes are moist    Eyes:      Conjunctiva/sclera: Conjunctivae normal    Cardiovascular:      Rate and Rhythm: Normal rate and regular rhythm  Pulses: Normal pulses  Heart sounds: Normal heart sounds  Pulmonary:      Effort: Pulmonary effort is normal  No respiratory distress  Breath sounds: Normal breath sounds  No wheezing  Abdominal:      General: Bowel sounds are normal  There is no distension  Palpations: Abdomen is soft  Tenderness: There is no abdominal tenderness  Musculoskeletal:      Right lower leg: No edema  Left lower leg: No edema  Skin:     General: Skin is warm  Capillary Refill: Capillary refill takes more than 3 seconds  Neurological:      Mental Status: She is alert and oriented to person, place, and time  Motor: Weakness present  Gait: Gait abnormal    Psychiatric:         Mood and Affect: Mood normal          Behavior: Behavior normal          Pertinent Laboratory/Diagnostic Studies:   Reviewed in facility chart-stable      Current Medications   Medications reviewed and updated see facility [facility] for details  Current Outpatient Medications:   •  acetaminophen (TYLENOL) 325 mg tablet, Take 650 mg by mouth every 6 (six) hours as needed for mild pain (pain score 1-3)   (Patient not taking: No sig reported), Disp: , Rfl: •  aspirin (ECOTRIN LOW STRENGTH) 81 mg EC tablet, Take 81 mg by mouth daily, Disp: , Rfl:   •  Cholecalciferol 25 MCG (1000 UT) capsule, Take 1,000 Units by mouth daily   (Patient not taking: No sig reported), Disp: , Rfl:   •  DULoxetine (CYMBALTA) 60 mg delayed release capsule, Take 1 capsule (60 mg total) by mouth daily Do not start before October 1, 2022 , Disp: , Rfl: 0  •  Empagliflozin 25 MG TABS, Take 25 mg by mouth every morning (Patient not taking: No sig reported), Disp: , Rfl:   •  Ergocalciferol (VITAMIN D2 PO), Take 50,000 Units/day by mouth 2 (two) times a week, Disp: , Rfl:   •  ezetimibe (ZETIA) 10 mg tablet, Take 1 tablet (10 mg total) by mouth daily (Patient not taking: Reported on 9/27/2022), Disp: 30 tablet, Rfl: 0  •  gabapentin (NEURONTIN) 100 mg capsule, Take 1 capsule (100 mg total) by mouth 2 (two) times a day Take 100 mg in the morning and 100 mg mid afternoon, Disp: 42 capsule, Rfl: 0  •  gabapentin (NEURONTIN) 300 mg capsule, Take 1 capsule (300 mg total) by mouth daily at bedtime, Disp: 21 capsule, Rfl: 0  •  hydrophilic ointment, Apply topically as needed for dry skin, Disp: , Rfl:   •  insulin glargine (LANTUS) 100 units/mL subcutaneous injection, Inject 30 Units under the skin daily at bedtime, Disp: 10 mL, Rfl: 0  •  insulin lispro (HumaLOG) 100 units/mL injection, Inject 1-5 Units under the skin 3 (three) times a day before meals, Disp: , Rfl: 0  •  levothyroxine 50 mcg tablet, Take 1 5 tablets (75 mcg total) by mouth daily, Disp: 45 tablet, Rfl: 0  •  melatonin 3 mg, Take 3 tablets (9 mg total) by mouth daily at bedtime, Disp: , Rfl: 0  •  nystatin (MYCOSTATIN) powder, Apply topically 2 (two) times a day, Disp: 15 g, Rfl: 0  •  omeprazole (PriLOSEC) 20 mg delayed release capsule, Take 20 mg by mouth daily, Disp: , Rfl:   •  potassium chloride (K-DUR,KLOR-CON) 20 mEq tablet, Take 1 tablet (20 mEq total) by mouth daily, Disp: 30 tablet, Rfl: 0  •  spironolactone (ALDACTONE) 25 mg tablet, Take 1 tablet (25 mg total) by mouth daily Do not start before October 3, 2022 , Disp: 30 tablet, Rfl: 0  •  topiramate (TOPAMAX) 100 mg tablet, Take 200 mg by mouth 2 (two) times a day , Disp: , Rfl:   •  torsemide (DEMADEX) 20 mg tablet, Take 2 tablets (40 mg total) by mouth daily Do not start before October 3, 2022 , Disp: 60 tablet, Rfl: 0  •  traZODone (DESYREL) 50 mg tablet, Take 25 mg by mouth daily at bedtime, Disp: , Rfl:        Please note:  Voice-recognition software may have been used in the preparation of this document  Occasional wrong word or "sound-alike" substitutions may have occurred due to the inherent limitations of voice recognition software  Interpretation should be guided by SAMIA Moon  11/29/2022  4:13 PM

## 2022-11-29 NOTE — ASSESSMENT & PLAN NOTE
· Patient c/o chest pressure that began Sunday and nausea that began Monday    · Pressure does not radiate and is not reproducible  · Patient states pressure is less today  · She states she did not tell anyone at the SNF  · EKG done showed SR with right BBB and Left anterior fascicular block  · Discussed with cardiology, felt no acute changes on EKG  · VSS  · Zofran for nausea  · CBC, BMP, Troponin 11/30/22

## 2022-12-01 ENCOUNTER — NURSING HOME VISIT (OUTPATIENT)
Dept: GERIATRICS | Facility: OTHER | Age: 67
End: 2022-12-01

## 2022-12-01 VITALS
SYSTOLIC BLOOD PRESSURE: 130 MMHG | HEART RATE: 78 BPM | TEMPERATURE: 97.8 F | RESPIRATION RATE: 18 BRPM | OXYGEN SATURATION: 97 % | DIASTOLIC BLOOD PRESSURE: 78 MMHG

## 2022-12-01 DIAGNOSIS — E11.65 TYPE 2 DIABETES MELLITUS WITH HYPERGLYCEMIA, WITH LONG-TERM CURRENT USE OF INSULIN (HCC): ICD-10-CM

## 2022-12-01 DIAGNOSIS — Z79.4 TYPE 2 DIABETES MELLITUS WITH HYPERGLYCEMIA, WITH LONG-TERM CURRENT USE OF INSULIN (HCC): ICD-10-CM

## 2022-12-01 DIAGNOSIS — R07.89 CHEST PRESSURE: Primary | ICD-10-CM

## 2022-12-01 DIAGNOSIS — K21.9 GASTROESOPHAGEAL REFLUX DISEASE, UNSPECIFIED WHETHER ESOPHAGITIS PRESENT: ICD-10-CM

## 2022-12-01 DIAGNOSIS — F32.9 MAJOR DEPRESSIVE DISORDER, REMISSION STATUS UNSPECIFIED, UNSPECIFIED WHETHER RECURRENT: ICD-10-CM

## 2022-12-01 DIAGNOSIS — E03.9 ACQUIRED HYPOTHYROIDISM: ICD-10-CM

## 2022-12-01 NOTE — ASSESSMENT & PLAN NOTE
· On 11/30/22 patient had c/o chest pressure that began Sunday and nausea that began Monday    · EKG was done at that time and showed SR with right BBB and Left anterior fascicular block  · Discussed with cardiology, felt no acute changes on EKG  · VSS  · Patient denies any chest pain, pressure, or nausea

## 2022-12-01 NOTE — ASSESSMENT & PLAN NOTE
Lab Results   Component Value Date    HGBA1C 11 8 (H) 09/19/2022   · History of diabetes  · Blood sugars have been trending 120's-200  · Morning blood sugar 126  · Encourage diabetic diet   · Continue Accu-Cheks  · Continue Lantus insulin 30 units at HS  · Continue Humalog sliding scale insulin with meals

## 2022-12-01 NOTE — PROGRESS NOTES
[facility]  [address]  [phone]  [address]  Code 31 (STR)        NAME: Aakash Prather  AGE: 75 y o  SEX: female CODE STATUS: CPR    DATE OF ENCOUNTER: 12/1/2022    Assessment and Plan     1  Chest pressure  Assessment & Plan:  · On 11/30/22 patient had c/o chest pressure that began Sunday and nausea that began Monday  · EKG was done at that time and showed SR with right BBB and Left anterior fascicular block  · Discussed with cardiology, felt no acute changes on EKG  · VSS  · Patient denies any chest pain, pressure, or nausea      2  Major depressive disorder, remission status unspecified, unspecified whether recurrent  Assessment & Plan:  · Recent hospitalization for weakness  · Patient indicated increased depression at home when last hospitalized  · Patient states she did not get out of bed to the 5 days  · Patient had not been taking any of her medications  · She states she is less depressed today and feeling good  · Continue Cymbalta 60 mg daily  · F/U with psychiatry as needed          3  Type 2 diabetes mellitus with hyperglycemia, with long-term current use of insulin ([facility])  Assessment & Plan:    Lab Results   Component Value Date    HGBA1C 11 8 (H) 09/19/2022   · History of diabetes  · Blood sugars have been trending 120's-200  · Morning blood sugar 126  · Encourage diabetic diet   · Continue Accu-Cheks  · Continue Lantus insulin 30 units at HS  · Continue Humalog sliding scale insulin with meals      4  Acquired hypothyroidism  Assessment & Plan:  · History of hypothyroidism  · Continue Levothyroxine 75 mcg daily  · Repeat TSH 0 53      5  Gastroesophageal reflux disease, unspecified whether esophagitis present  Assessment & Plan:  · History of GERD  · Stable, no c/o reflux  · Continue omeprazole 20 mg daily         All medications and routine orders were reviewed and updated as needed      Chief Complaint     STR follow up visit  Patient's care was coordinated with nursing facility staff  Recent vitals, labs, and updated medications were review on [facility] system in facility  Past Medical and Surgical History      Past Medical History:   Diagnosis Date   • CHF (congestive heart failure) ([facility] )    • Diabetes mellitus ([facility] )    • Disease of thyroid gland    • Hyperlipidemia    • Hypertension    • Hypothyroidism (acquired)    • Restless leg syndrome    • Seizures ([facility] )      Past Surgical History:   Procedure Laterality Date   • APPENDECTOMY     • CHOLECYSTECTOMY     • HIP FRACTURE SURGERY Right    • TONSILLECTOMY     • TUBAL LIGATION       Allergies   Allergen Reactions   • Pineapple - Food Allergy Anaphylaxis   • Statins Other (See Comments)     Cramping from head to toe    • Bupropion Other (See Comments)   • Byetta 10 Mcg Pen [Exenatide]    • Ceftriaxone Other (See Comments)   • Marijuana [Dronabinol] GI Intolerance   • Metformin Diarrhea     Other reaction(s): Unknown   • Omeprazole Hives   • Ondansetron      Other reaction(s): Unknown   • Coconut Oil - Food Allergy Rash   • Liraglutide Rash and Other (See Comments)     Generalized rash   • Penicillins Rash          History of Present Illness     HPI  Ibrahima Cruz is a 77-year-old female, she is a STR patient of Counts include 234 beds at the [facility] since 09/30/2022  Past Medical Hx including but not limited to depression, RLS, diabetes, CAD, hypothyroidism, GERD  She was seen in collaboration with nursing for medical management and STR follow up  Hospital Course:  Patient had been previously hospitalized 09/19/2022 for syncopal episode  During this hospitalization patient expressed that she was experiencing depression  Psych followed during hospitalization  After discharge patient reports she did not take any of her medications and did not get out of bed for 1 week  Patient was readmitted to the hospital 09/27/2022 for generalized weakness  PT OT recommended short-term rehab    Rehab Course:  Patient was seen and examined at bedside today  Patient is a reliable historian, alert and oriented x3  Susie Felling is lying in bed and does not appear to be in any distress  The chest pressure and nausea she was having has resolved  Encouraged patient to get OOB with meals  Denies SOB/V/D  Denies lightheadedness, dizziness, headaches, vision changes  Patient states she has been eating and drinking well  Denies any bowel or bladder issues  Per review of SNF records, Patient is eating 3 meals per day, consuming  %  Last documented BM 11/30/2022  No concerns from nursing at this time  The patient's allergies, past medical, surgical, social and family history were reviewed and unchanged  Review of Systems     Review of Systems   Constitutional: Positive for activity change and fatigue  Negative for fever  HENT: Negative  Negative for congestion  Eyes: Negative  Respiratory: Negative  Negative for cough, shortness of breath and wheezing  Cardiovascular: Negative  Negative for chest pain, palpitations and leg swelling  Gastrointestinal: Negative  Negative for abdominal distention, abdominal pain, constipation, diarrhea, nausea and vomiting  Endocrine: Negative  Genitourinary: Negative  Negative for difficulty urinating and dysuria  Musculoskeletal: Positive for gait problem  Skin: Negative  Allergic/Immunologic: Negative  Neurological: Positive for weakness  Negative for dizziness, light-headedness and headaches  Hematological: Negative  Psychiatric/Behavioral: Negative  Objective     Vitals:   Vitals:    11/30/22 2200   BP: 130/78   Pulse: 78   Resp: 18   Temp: 97 8 °F (36 6 °C)   SpO2: 97%         Physical Exam  Vitals and nursing note reviewed  Constitutional:       Appearance: Normal appearance  HENT:      Head: Normocephalic and atraumatic  Nose: No congestion        Mouth/Throat:      Mouth: Mucous membranes are moist    Eyes:      Conjunctiva/sclera: Conjunctivae normal    Cardiovascular: Rate and Rhythm: Normal rate and regular rhythm  Pulses: Normal pulses  Heart sounds: Normal heart sounds  Pulmonary:      Effort: Pulmonary effort is normal  No respiratory distress  Breath sounds: Normal breath sounds  No wheezing  Abdominal:      General: Bowel sounds are normal  There is no distension  Palpations: Abdomen is soft  Tenderness: There is no abdominal tenderness  Musculoskeletal:      Right lower leg: No edema  Left lower leg: No edema  Skin:     General: Skin is warm  Capillary Refill: Capillary refill takes more than 3 seconds  Neurological:      Mental Status: She is alert and oriented to person, place, and time  Motor: No weakness  Gait: Gait normal    Psychiatric:         Mood and Affect: Mood normal          Behavior: Behavior normal          Pertinent Laboratory/Diagnostic Studies:   Reviewed in facility chart-stable      Current Medications   Medications reviewed and updated see facility [facility] for details  Current Outpatient Medications:   •  acetaminophen (TYLENOL) 325 mg tablet, Take 650 mg by mouth every 6 (six) hours as needed for mild pain (pain score 1-3)  (Patient not taking: No sig reported), Disp: , Rfl:   •  aspirin (ECOTRIN LOW STRENGTH) 81 mg EC tablet, Take 81 mg by mouth daily, Disp: , Rfl:   •  Cholecalciferol 25 MCG (1000 UT) capsule, Take 1,000 Units by mouth daily   (Patient not taking: No sig reported), Disp: , Rfl:   •  DULoxetine (CYMBALTA) 60 mg delayed release capsule, Take 1 capsule (60 mg total) by mouth daily Do not start before October 1, 2022 , Disp: , Rfl: 0  •  Empagliflozin 25 MG TABS, Take 25 mg by mouth every morning (Patient not taking: No sig reported), Disp: , Rfl:   •  Ergocalciferol (VITAMIN D2 PO), Take 50,000 Units/day by mouth 2 (two) times a week, Disp: , Rfl:   •  ezetimibe (ZETIA) 10 mg tablet, Take 1 tablet (10 mg total) by mouth daily (Patient not taking: Reported on 9/27/2022), Disp: 30 tablet, Rfl: 0  •  gabapentin (NEURONTIN) 100 mg capsule, Take 1 capsule (100 mg total) by mouth 2 (two) times a day Take 100 mg in the morning and 100 mg mid afternoon, Disp: 42 capsule, Rfl: 0  •  gabapentin (NEURONTIN) 300 mg capsule, Take 1 capsule (300 mg total) by mouth daily at bedtime, Disp: 21 capsule, Rfl: 0  •  hydrophilic ointment, Apply topically as needed for dry skin, Disp: , Rfl:   •  insulin glargine (LANTUS) 100 units/mL subcutaneous injection, Inject 30 Units under the skin daily at bedtime, Disp: 10 mL, Rfl: 0  •  insulin lispro (HumaLOG) 100 units/mL injection, Inject 1-5 Units under the skin 3 (three) times a day before meals, Disp: , Rfl: 0  •  levothyroxine 50 mcg tablet, Take 1 5 tablets (75 mcg total) by mouth daily, Disp: 45 tablet, Rfl: 0  •  melatonin 3 mg, Take 3 tablets (9 mg total) by mouth daily at bedtime, Disp: , Rfl: 0  •  nystatin (MYCOSTATIN) powder, Apply topically 2 (two) times a day, Disp: 15 g, Rfl: 0  •  omeprazole (PriLOSEC) 20 mg delayed release capsule, Take 20 mg by mouth daily, Disp: , Rfl:   •  potassium chloride (K-DUR,KLOR-CON) 20 mEq tablet, Take 1 tablet (20 mEq total) by mouth daily, Disp: 30 tablet, Rfl: 0  •  spironolactone (ALDACTONE) 25 mg tablet, Take 1 tablet (25 mg total) by mouth daily Do not start before October 3, 2022 , Disp: 30 tablet, Rfl: 0  •  topiramate (TOPAMAX) 100 mg tablet, Take 200 mg by mouth 2 (two) times a day , Disp: , Rfl:   •  torsemide (DEMADEX) 20 mg tablet, Take 2 tablets (40 mg total) by mouth daily Do not start before October 3, 2022 , Disp: 60 tablet, Rfl: 0  •  traZODone (DESYREL) 50 mg tablet, Take 25 mg by mouth daily at bedtime, Disp: , Rfl:        Please note:  Voice-recognition software may have been used in the preparation of this document  Occasional wrong word or "sound-alike" substitutions may have occurred due to the inherent limitations of voice recognition software  Interpretation should be guided by context  [facility] SAMIA Kay  12/1/2022  1:10 PM

## 2022-12-06 ENCOUNTER — NURSING HOME VISIT (OUTPATIENT)
Dept: GERIATRICS | Facility: OTHER | Age: 67
End: 2022-12-06

## 2022-12-06 VITALS
RESPIRATION RATE: 18 BRPM | OXYGEN SATURATION: 97 % | TEMPERATURE: 97.8 F | HEART RATE: 80 BPM | DIASTOLIC BLOOD PRESSURE: 72 MMHG | SYSTOLIC BLOOD PRESSURE: 126 MMHG

## 2022-12-06 DIAGNOSIS — R10.32 LEFT LOWER QUADRANT ABDOMINAL PAIN: ICD-10-CM

## 2022-12-06 DIAGNOSIS — Z79.4 TYPE 2 DIABETES MELLITUS WITH HYPERGLYCEMIA, WITH LONG-TERM CURRENT USE OF INSULIN (HCC): ICD-10-CM

## 2022-12-06 DIAGNOSIS — F32.9 MAJOR DEPRESSIVE DISORDER, REMISSION STATUS UNSPECIFIED, UNSPECIFIED WHETHER RECURRENT: ICD-10-CM

## 2022-12-06 DIAGNOSIS — E11.65 TYPE 2 DIABETES MELLITUS WITH HYPERGLYCEMIA, WITH LONG-TERM CURRENT USE OF INSULIN (HCC): ICD-10-CM

## 2022-12-06 DIAGNOSIS — E03.9 ACQUIRED HYPOTHYROIDISM: ICD-10-CM

## 2022-12-06 DIAGNOSIS — B36.9 FUNGAL DERMATITIS: Primary | ICD-10-CM

## 2022-12-06 NOTE — ASSESSMENT & PLAN NOTE
· Patient c/o LLQ abdominal pain that began this morning after a bowel movement  · Patient states she has had diverticulitis in the past and that she will occasionally have this pain that comes and goes  · Patient is afebrile, denies N/V  · LLQ is tender with palpation

## 2022-12-06 NOTE — ASSESSMENT & PLAN NOTE
· Patient has moisture associated rash under both breasts  · Patient has antifungal powder and cream ordered  · Discussed with SNF staff  · Keep area dry and clean  · monitor

## 2022-12-06 NOTE — ASSESSMENT & PLAN NOTE
Lab Results   Component Value Date    HGBA1C 11 8 (H) 09/19/2022   · History of diabetes  · Blood sugars have been trending 150-210  · Morning blood sugar 179  · Encourage diabetic diet   · Continue Accu-Cheks  · Continue Lantus insulin 30 units at HS  · Continue Humalog sliding scale insulin with meals

## 2022-12-06 NOTE — ASSESSMENT & PLAN NOTE
· Patient was hospitalized in September for weakness  · Patient indicated increased depression at home when released form hospital  · Patient states she did not get out of bed to the 5 days  · Patient had not been taking any of her medications  · Patient stated she feels more depressed around the holidays  · Continue Cymbalta 60 mg daily  · F/U with psychiatry as needed

## 2022-12-06 NOTE — PROGRESS NOTES
[facility]  [address]  [phone]  [employer]  Code 31 (STR)          NAME: Rakesh Ervin  AGE: 75 y o  SEX: female CODE STATUS: CPR    DATE OF ENCOUNTER: 12/6/2022    Assessment and Plan     1  Fungal dermatitis  Assessment & Plan:  · Patient has moisture associated rash under both breasts  · Patient has antifungal powder and cream ordered  · Discussed with SNF staff  · Keep area dry and clean  · monitor      2  Left lower quadrant abdominal pain  Assessment & Plan:  · Patient c/o LLQ abdominal pain that began this morning after a bowel movement  · Patient states she has had diverticulitis in the past and that she will occasionally have this pain that comes and goes  · Patient is afebrile, denies N/V  · LLQ is tender with palpation      3  Type 2 diabetes mellitus with hyperglycemia, with long-term current use of insulin ([facility])  Assessment & Plan:    Lab Results   Component Value Date    HGBA1C 11 8 (H) 09/19/2022   · History of diabetes  · Blood sugars have been trending 150-210  · Morning blood sugar 179  · Encourage diabetic diet   · Continue Accu-Cheks  · Continue Lantus insulin 30 units at HS  · Continue Humalog sliding scale insulin with meals      4  Acquired hypothyroidism  Assessment & Plan:  · History of hypothyroidism  · Continue Levothyroxine 75 mcg daily  · Repeat TSH 0 53      5  Major depressive disorder, remission status unspecified, unspecified whether recurrent  Assessment & Plan:  · Patient was hospitalized in September for weakness  · Patient indicated increased depression at home when released form hospital  · Patient states she did not get out of bed to the 5 days  · Patient had not been taking any of her medications  · Patient stated she feels more depressed around the holidays  · Continue Cymbalta 60 mg daily  · F/U with psychiatry as needed             All medications and routine orders were reviewed and updated as needed      Chief Complaint     STR follow up visit  Patient's care was coordinated with nursing facility staff  Recent vitals, labs, and updated medications were review on [facility] in facility  Past Medical and Surgical History      Past Medical History:   Diagnosis Date   • CHF (congestive heart failure) ([facility] )    • Diabetes mellitus ([facility] )    • Disease of thyroid gland    • Hyperlipidemia    • Hypertension    • Hypothyroidism (acquired)    • Restless leg syndrome    • Seizures ([facility] )      Past Surgical History:   Procedure Laterality Date   • APPENDECTOMY     • CHOLECYSTECTOMY     • HIP FRACTURE SURGERY Right    • TONSILLECTOMY     • TUBAL LIGATION       Allergies   Allergen Reactions   • Pineapple - Food Allergy Anaphylaxis   • Statins Other (See Comments)     Cramping from head to toe    • Bupropion Other (See Comments)   • Byetta 10 Mcg Pen [Exenatide]    • Ceftriaxone Other (See Comments)   • Marijuana [Dronabinol] GI Intolerance   • Metformin Diarrhea     Other reaction(s): Unknown   • Omeprazole Hives   • Ondansetron      Other reaction(s): Unknown   • Coconut Oil - Food Allergy Rash   • Liraglutide Rash and Other (See Comments)     Generalized rash   • Penicillins Rash          History of Present Illness     HPI  Guy Bella is a 79-year-old female, she is a STR patient of [facility] since 09/30/2022  Past Medical Hx including but not limited to depression, RLS, diabetes, CAD, hypothyroidism, GERD  She was seen in collaboration with nursing for medical management and STR follow up  Hospital Course:  Patient had been previously hospitalized 09/19/2022 for syncopal episode  During this hospitalization patient expressed that she was experiencing depression  Psych followed during hospitalization  After discharge patient reports she did not take any of her medications and did not get out of bed for 1 week  Patient was readmitted to the hospital 09/27/2022 for generalized weakness  PT OT recommended short-term rehab    Rehab Course:  Patient was seen and examined at bedside today for acute visit for rash under left breast  Patient is a reliable historian, alert and oriented x3  Julian Alvarez is lying in bed and does not appear to be in any distress  Patient has fungal rash/excoriation under both breasts, left worse than right  Antifungal powder ordered  During exam patient c/o left lower quadrant abdominal pain 5/10 that she describes as sharp  She states the pain started following a bowel movement this morning  Patient is afebrile this morning, denies any N/V, and is tolerating her diet  Patient states she has had this pain before, and it comes and goes, continue to monitor  Denies SOB/V/D  Denies lightheadedness, dizziness, headaches, vision changes  Patient states she has been eating and drinking well  Denies any bowel or bladder issues  Per review of SNF records, Patient is eating 3 meals per day, consuming  %  Last documented BM 12/5/2022  No concerns from nursing at this time  The patient's allergies, past medical, surgical, social and family history were reviewed and unchanged  Review of Systems     Review of Systems   Constitutional: Negative  Negative for chills and fever  HENT: Negative  Eyes: Negative  Respiratory: Negative  Negative for cough, shortness of breath and wheezing  Cardiovascular: Negative for chest pain, palpitations and leg swelling  Gastrointestinal: Positive for abdominal pain  Negative for constipation, diarrhea, nausea and vomiting  LLQ intermittent, sharp 5/10   Endocrine: Negative  Genitourinary: Negative  Negative for difficulty urinating and dysuria  Musculoskeletal: Positive for gait problem  Allergic/Immunologic: Negative  Neurological: Positive for weakness  Negative for dizziness, light-headedness and headaches  Psychiatric/Behavioral: Negative  Negative for sleep disturbance           Objective     Vitals:   Vitals:    12/06/22 1227   BP: 126/72   Pulse: 80 Resp: 18   Temp: 97 8 °F (36 6 °C)   SpO2: 97%         Physical Exam  Vitals and nursing note reviewed  Constitutional:       General: She is not in acute distress  Appearance: Normal appearance  She is not ill-appearing  HENT:      Head: Normocephalic and atraumatic  Nose: No congestion  Mouth/Throat:      Mouth: Mucous membranes are moist    Eyes:      Conjunctiva/sclera: Conjunctivae normal    Cardiovascular:      Rate and Rhythm: Normal rate and regular rhythm  Pulses: Normal pulses  Heart sounds: Normal heart sounds  Pulmonary:      Effort: Pulmonary effort is normal       Breath sounds: Normal breath sounds  Abdominal:      General: Bowel sounds are normal  There is no distension  Tenderness: There is abdominal tenderness  There is no rebound  Comments: LLQ   Musculoskeletal:      Right lower leg: No edema  Left lower leg: No edema  Skin:     General: Skin is warm and dry  Capillary Refill: Capillary refill takes more than 3 seconds  Neurological:      Mental Status: She is alert and oriented to person, place, and time  Motor: Weakness present  Gait: Gait abnormal    Psychiatric:         Mood and Affect: Mood normal          Behavior: Behavior normal          Pertinent Laboratory/Diagnostic Studies:   Reviewed in facility chart-stable      Current Medications   Medications reviewed and updated see facility [facility] for details  Current Outpatient Medications:   •  acetaminophen (TYLENOL) 325 mg tablet, Take 650 mg by mouth every 6 (six) hours as needed for mild pain (pain score 1-3)  (Patient not taking: No sig reported), Disp: , Rfl:   •  aspirin (ECOTRIN LOW STRENGTH) 81 mg EC tablet, Take 81 mg by mouth daily, Disp: , Rfl:   •  Cholecalciferol 25 MCG (1000 UT) capsule, Take 1,000 Units by mouth daily   (Patient not taking: No sig reported), Disp: , Rfl:   •  DULoxetine (CYMBALTA) 60 mg delayed release capsule, Take 1 capsule (60 mg total) by mouth daily Do not start before October 1, 2022 , Disp: , Rfl: 0  •  Empagliflozin 25 MG TABS, Take 25 mg by mouth every morning (Patient not taking: No sig reported), Disp: , Rfl:   •  Ergocalciferol (VITAMIN D2 PO), Take 50,000 Units/day by mouth 2 (two) times a week, Disp: , Rfl:   •  ezetimibe (ZETIA) 10 mg tablet, Take 1 tablet (10 mg total) by mouth daily (Patient not taking: Reported on 9/27/2022), Disp: 30 tablet, Rfl: 0  •  gabapentin (NEURONTIN) 100 mg capsule, Take 1 capsule (100 mg total) by mouth 2 (two) times a day Take 100 mg in the morning and 100 mg mid afternoon, Disp: 42 capsule, Rfl: 0  •  gabapentin (NEURONTIN) 300 mg capsule, Take 1 capsule (300 mg total) by mouth daily at bedtime, Disp: 21 capsule, Rfl: 0  •  hydrophilic ointment, Apply topically as needed for dry skin, Disp: , Rfl:   •  insulin glargine (LANTUS) 100 units/mL subcutaneous injection, Inject 30 Units under the skin daily at bedtime, Disp: 10 mL, Rfl: 0  •  insulin lispro (HumaLOG) 100 units/mL injection, Inject 1-5 Units under the skin 3 (three) times a day before meals, Disp: , Rfl: 0  •  levothyroxine 50 mcg tablet, Take 1 5 tablets (75 mcg total) by mouth daily, Disp: 45 tablet, Rfl: 0  •  melatonin 3 mg, Take 3 tablets (9 mg total) by mouth daily at bedtime, Disp: , Rfl: 0  •  nystatin (MYCOSTATIN) powder, Apply topically 2 (two) times a day, Disp: 15 g, Rfl: 0  •  omeprazole (PriLOSEC) 20 mg delayed release capsule, Take 20 mg by mouth daily, Disp: , Rfl:   •  potassium chloride (K-DUR,KLOR-CON) 20 mEq tablet, Take 1 tablet (20 mEq total) by mouth daily, Disp: 30 tablet, Rfl: 0  •  spironolactone (ALDACTONE) 25 mg tablet, Take 1 tablet (25 mg total) by mouth daily Do not start before October 3, 2022 , Disp: 30 tablet, Rfl: 0  •  topiramate (TOPAMAX) 100 mg tablet, Take 200 mg by mouth 2 (two) times a day , Disp: , Rfl:   •  torsemide (DEMADEX) 20 mg tablet, Take 2 tablets (40 mg total) by mouth daily Do not start before October 3, 2022 , Disp: 60 tablet, Rfl: 0  •  traZODone (DESYREL) 50 mg tablet, Take 25 mg by mouth daily at bedtime, Disp: , Rfl:        Please note:  Voice-recognition software may have been used in the preparation of this document  Occasional wrong word or "sound-alike" substitutions may have occurred due to the inherent limitations of voice recognition software  Interpretation should be guided by context           [address], CRNP  12/6/2022  3:49 PM

## 2022-12-28 ENCOUNTER — NURSING HOME VISIT (OUTPATIENT)
Dept: GERIATRICS | Facility: OTHER | Age: 67
End: 2022-12-28

## 2022-12-28 DIAGNOSIS — K21.9 GASTROESOPHAGEAL REFLUX DISEASE, UNSPECIFIED WHETHER ESOPHAGITIS PRESENT: Primary | ICD-10-CM

## 2022-12-28 DIAGNOSIS — I50.32 CHRONIC HEART FAILURE WITH PRESERVED EJECTION FRACTION (HCC): ICD-10-CM

## 2022-12-28 DIAGNOSIS — E11.65 TYPE 2 DIABETES MELLITUS WITH HYPERGLYCEMIA, WITH LONG-TERM CURRENT USE OF INSULIN (HCC): ICD-10-CM

## 2022-12-28 DIAGNOSIS — Z79.4 TYPE 2 DIABETES MELLITUS WITH HYPERGLYCEMIA, WITH LONG-TERM CURRENT USE OF INSULIN (HCC): ICD-10-CM

## 2022-12-28 DIAGNOSIS — I25.10 CORONARY ARTERY DISEASE INVOLVING NATIVE CORONARY ARTERY OF NATIVE HEART WITHOUT ANGINA PECTORIS: ICD-10-CM

## 2022-12-28 DIAGNOSIS — I10 PRIMARY HYPERTENSION: ICD-10-CM

## 2022-12-28 DIAGNOSIS — E03.9 ACQUIRED HYPOTHYROIDISM: ICD-10-CM

## 2022-12-28 NOTE — ASSESSMENT & PLAN NOTE
Wt Readings from Last 3 Encounters:   11/17/22 123 kg (270 lb 12 8 oz)   11/15/22 123 kg (270 lb 12 8 oz)   11/06/22 123 kg (270 lb 12 8 oz)         Currently appears well compensated  Continue torsemide  Continue to monitor volume status closely  Low  sodium diet

## 2022-12-28 NOTE — ASSESSMENT & PLAN NOTE
Has history of nonobstructive CAD with multiple risk factors  Follows with cardiology   denies chest pain  Continue with aspirin, statin

## 2022-12-28 NOTE — ASSESSMENT & PLAN NOTE
Lab Results   Component Value Date    HGBA1C 11 8 (H) 09/19/2022     Current blood sugars reviewed  Morning blood sugar was 197  Continue with insulin regimen  Continue with frequent Accu-Cheks and hypoglycemia protocol  Carb consistent diet

## 2022-12-28 NOTE — PROGRESS NOTES
[facility]  [address]   [address] [facility] [place], [place], [place] PATRICK  48     Progress Note  Code 31    Patient Location     [facility]    Reason for visit     Follow up    Patient’s care was coordinated with nursing facility staff  Recent vitals, labs and updated medications were reviewed on [facility] of facility  Problem List Items Addressed This Visit        Digestive    GERD (gastroesophageal reflux disease) - Primary     Stable and asymptomatic  Continue with omeprazole            Endocrine    Diabetes mellitus ([facility] )       Lab Results   Component Value Date    HGBA1C 11 8 (H) 09/19/2022     Current blood sugars reviewed  Morning blood sugar was 197  Continue with insulin regimen  Continue with frequent Accu-Cheks and hypoglycemia protocol  Carb consistent diet         Acquired hypothyroidism     Continue levothyroxine 75 MCG daily            Cardiovascular and Mediastinum    Hypertension     Current  Blood pressures reviewed and are acceptable   morning blood pressure was 132/77  Continue with torsemide and Aldactone         Chronic heart failure with preserved ejection fraction (HCC)     Wt Readings from Last 3 Encounters:   11/17/22 123 kg (270 lb 12 8 oz)   11/15/22 123 kg (270 lb 12 8 oz)   11/06/22 123 kg (270 lb 12 8 oz)         Currently appears well compensated  Continue torsemide  Continue to monitor volume status closely  Low  sodium diet         Coronary artery disease involving native coronary artery of native heart without angina pectoris     Has history of nonobstructive CAD with multiple risk factors  Follows with cardiology   denies chest pain  Continue with aspirin, statin                HPI       Patient is a 68-year-old female with past medical history of type 2 diabetes, hypothyroidism, depression, diastolic heart failure, CAD and morbid obesity  She was seen today as part of regular follow-up during her rehab course    On my encounter, she was comfortable on her bed  She did not have any active complaints  She was awake alert and oriented  She was comfortable on room air  She denies any chest pain, shortness of breath, abdominal pain, nausea or vomiting  Review of Systems   Constitutional: Negative for activity change, chills, fatigue and fever  Respiratory: Negative for cough and shortness of breath  Cardiovascular: Negative for chest pain, palpitations and leg swelling  Gastrointestinal: Negative for abdominal pain  Genitourinary: Negative for dysuria and urgency  Musculoskeletal: Negative for arthralgias and back pain  Neurological: Negative for dizziness, weakness and headaches  Psychiatric/Behavioral: Negative for agitation and confusion         Past Medical History:   Diagnosis Date   • CHF (congestive heart failure) ([facility])    • Diabetes mellitus ([place] [address] )    • Disease of thyroid gland    • Hyperlipidemia    • Hypertension    • Hypothyroidism (acquired)    • Restless leg syndrome    • Seizures ([facility] )        Past Surgical History:   Procedure Laterality Date   • APPENDECTOMY     • CHOLECYSTECTOMY     • HIP FRACTURE SURGERY Right    • TONSILLECTOMY     • TUBAL LIGATION         Social History     Tobacco Use   Smoking Status Never   Smokeless Tobacco Never   Tobacco Comments    not a smoker       Family History   Problem Relation Age of Onset   • Brain cancer Mother    • Seizures Father    • Colon cancer Maternal Grandfather    • No Known Problems Son    • No Known Problems Son    • Breast cancer Maternal Aunt 30        Allergies   Allergen Reactions   • Pineapple - Food Allergy Anaphylaxis   • Statins Other (See Comments)     Cramping from head to toe    • Bupropion Other (See Comments)   • Byetta 10 Mcg Pen [Exenatide]    • Ceftriaxone Other (See Comments)   • Marijuana [Dronabinol] GI Intolerance   • Metformin Diarrhea     Other reaction(s): Unknown   • Omeprazole Hives   • Ondansetron      Other reaction(s): Unknown • Coconut Oil - Food Allergy Rash   • Liraglutide Rash and Other (See Comments)     Generalized rash   • Penicillins Rash         Current Outpatient Medications:   •  acetaminophen (TYLENOL) 325 mg tablet, Take 650 mg by mouth every 6 (six) hours as needed for mild pain (pain score 1-3)  (Patient not taking: No sig reported), Disp: , Rfl:   •  aspirin (ECOTRIN LOW STRENGTH) 81 mg EC tablet, Take 81 mg by mouth daily, Disp: , Rfl:   •  Cholecalciferol 25 MCG (1000 UT) capsule, Take 1,000 Units by mouth daily   (Patient not taking: No sig reported), Disp: , Rfl:   •  DULoxetine (CYMBALTA) 60 mg delayed release capsule, Take 1 capsule (60 mg total) by mouth daily Do not start before October 1, 2022 , Disp: , Rfl: 0  •  Empagliflozin 25 MG TABS, Take 25 mg by mouth every morning (Patient not taking: No sig reported), Disp: , Rfl:   •  Ergocalciferol (VITAMIN D2 PO), Take 50,000 Units/day by mouth 2 (two) times a week, Disp: , Rfl:   •  ezetimibe (ZETIA) 10 mg tablet, Take 1 tablet (10 mg total) by mouth daily (Patient not taking: Reported on 9/27/2022), Disp: 30 tablet, Rfl: 0  •  gabapentin (NEURONTIN) 100 mg capsule, Take 1 capsule (100 mg total) by mouth 2 (two) times a day Take 100 mg in the morning and 100 mg mid afternoon, Disp: 42 capsule, Rfl: 0  •  gabapentin (NEURONTIN) 300 mg capsule, Take 1 capsule (300 mg total) by mouth daily at bedtime, Disp: 21 capsule, Rfl: 0  •  hydrophilic ointment, Apply topically as needed for dry skin, Disp: , Rfl:   •  insulin glargine (LANTUS) 100 units/mL subcutaneous injection, Inject 30 Units under the skin daily at bedtime, Disp: 10 mL, Rfl: 0  •  insulin lispro (HumaLOG) 100 units/mL injection, Inject 1-5 Units under the skin 3 (three) times a day before meals, Disp: , Rfl: 0  •  levothyroxine 50 mcg tablet, Take 1 5 tablets (75 mcg total) by mouth daily, Disp: 45 tablet, Rfl: 0  •  melatonin 3 mg, Take 3 tablets (9 mg total) by mouth daily at bedtime, Disp: , Rfl: 0  • nystatin (MYCOSTATIN) powder, Apply topically 2 (two) times a day, Disp: 15 g, Rfl: 0  •  omeprazole (PriLOSEC) 20 mg delayed release capsule, Take 20 mg by mouth daily, Disp: , Rfl:   •  potassium chloride (K-DUR,KLOR-CON) 20 mEq tablet, Take 1 tablet (20 mEq total) by mouth daily, Disp: 30 tablet, Rfl: 0  •  spironolactone (ALDACTONE) 25 mg tablet, Take 1 tablet (25 mg total) by mouth daily Do not start before October 3, 2022 , Disp: 30 tablet, Rfl: 0  •  topiramate (TOPAMAX) 100 mg tablet, Take 200 mg by mouth 2 (two) times a day , Disp: , Rfl:   •  torsemide (DEMADEX) 20 mg tablet, Take 2 tablets (40 mg total) by mouth daily Do not start before October 3, 2022 , Disp: 60 tablet, Rfl: 0  •  traZODone (DESYREL) 50 mg tablet, Take 25 mg by mouth daily at bedtime, Disp: , Rfl:     Updated list was reviewed in [facility] of facility  Vitals  Reviewed     Physical Exam  Vitals reviewed  Constitutional:       Appearance: She is obese  HENT:      Head: Atraumatic  Eyes:      General: No scleral icterus  Cardiovascular:      Rate and Rhythm: Normal rate and regular rhythm  Heart sounds: Normal heart sounds  Pulmonary:      Effort: No respiratory distress  Breath sounds: Normal breath sounds  Abdominal:      General: Bowel sounds are normal       Tenderness: There is no abdominal tenderness  Musculoskeletal:      Cervical back: Neck supple  Right lower leg: No edema  Left lower leg: No edema  Skin:     General: Skin is warm and dry  Capillary Refill: Capillary refill takes less than 2 seconds  Neurological:      Mental Status: She is alert and oriented to person, place, and time  Psychiatric:         Mood and Affect: Mood normal          Diagnostic Data:    Recent labs were reviewed  Additional Notes:      This note was electronically signed by Dr Krupa Diallo

## 2022-12-28 NOTE — ASSESSMENT & PLAN NOTE
Current    Blood pressures reviewed and are acceptable   morning blood pressure was 132/77  Continue with torsemide and Aldactone Left message to call back  Please use omaira schedule patient as new with Dr. Church if no new psych

## 2022-12-30 ENCOUNTER — NURSING HOME VISIT (OUTPATIENT)
Dept: GERIATRICS | Facility: OTHER | Age: 67
End: 2022-12-30

## 2022-12-30 VITALS
OXYGEN SATURATION: 96 % | TEMPERATURE: 97.8 F | RESPIRATION RATE: 18 BRPM | DIASTOLIC BLOOD PRESSURE: 76 MMHG | SYSTOLIC BLOOD PRESSURE: 120 MMHG | HEART RATE: 78 BPM

## 2022-12-30 DIAGNOSIS — E11.65 TYPE 2 DIABETES MELLITUS WITH HYPERGLYCEMIA, WITH LONG-TERM CURRENT USE OF INSULIN (HCC): Primary | ICD-10-CM

## 2022-12-30 DIAGNOSIS — E03.9 ACQUIRED HYPOTHYROIDISM: ICD-10-CM

## 2022-12-30 DIAGNOSIS — I10 PRIMARY HYPERTENSION: ICD-10-CM

## 2022-12-30 DIAGNOSIS — K21.9 GASTROESOPHAGEAL REFLUX DISEASE, UNSPECIFIED WHETHER ESOPHAGITIS PRESENT: ICD-10-CM

## 2022-12-30 DIAGNOSIS — Z79.4 TYPE 2 DIABETES MELLITUS WITH HYPERGLYCEMIA, WITH LONG-TERM CURRENT USE OF INSULIN (HCC): Primary | ICD-10-CM

## 2022-12-30 NOTE — PROGRESS NOTES
[facility]  [address]  [phone]  [facility]  Code 31 (STR)        NAME: Hossein Tirado  AGE: 75 y o  SEX: female CODE STATUS: CPR    DATE OF ENCOUNTER: 12/30/2022    Assessment and Plan     1  Type 2 diabetes mellitus with hyperglycemia, with long-term current use of insulin ([facility])  Assessment & Plan:    Lab Results   Component Value Date    HGBA1C 11 8 (H) 09/19/2022   · History of diabetes  · Blood sugars have been trending   · Morning blood sugar 107  · Encourage diabetic diet   · Continue Accu-Cheks  · Continue empagliflozin 25 mg daily  · Continue Lantus insulin 30 units at HS  · Continue Humalog sliding scale insulin with meals      2  Acquired hypothyroidism  Assessment & Plan:  · TSH 0 53  · Continue Levothyroxine 75 mcg daily        3  Gastroesophageal reflux disease, unspecified whether esophagitis present  Assessment & Plan:  · Denies reflux  · Continue omeprazole 20 mg daily      4  Primary hypertension  Assessment & Plan:  · BP has been stable, 120/67  · Continue to monitor BP  · Continue torsemide 40 mg daily  · Continue spironolactone 25 mg daily         All medications and routine orders were reviewed and updated as needed  Chief Complaint     STR follow up visit  Patient's care was coordinated with nursing facility staff  Recent vitals, labs, and updated medications were review on [facility] system in facility        Past Medical and Surgical History      Past Medical History:   Diagnosis Date   • CHF (congestive heart failure) ([facility] [address] )    • Diabetes mellitus ([facility] [address] )    • Disease of thyroid gland    • Hyperlipidemia    • Hypertension    • Hypothyroidism (acquired)    • Restless leg syndrome    • Seizures ([facility] [address] )      Past Surgical History:   Procedure Laterality Date   • APPENDECTOMY     • CHOLECYSTECTOMY     • HIP FRACTURE SURGERY Right    • TONSILLECTOMY     • TUBAL LIGATION       Allergies   Allergen Reactions   • Pineapple - Food Allergy Anaphylaxis • Statins Other (See Comments)     Cramping from head to toe    • Bupropion Other (See Comments)   • Byetta 10 Mcg Pen [Exenatide]    • Ceftriaxone Other (See Comments)   • Marijuana [Dronabinol] GI Intolerance   • Metformin Diarrhea     Other reaction(s): Unknown   • Omeprazole Hives   • Ondansetron      Other reaction(s): Unknown   • Coconut Oil - Food Allergy Rash   • Liraglutide Rash and Other (See Comments)     Generalized rash   • Penicillins Rash          History of Present Illness     KELLY Mendoza is a 79-year-old female, she is a STR patient of [facility] since 09/30/2022  Past Medical Hx including but not limited to depression, RLS, diabetes, CAD, hypothyroidism, GERD  She was seen in collaboration with nursing for medical management and STR follow up  Hospital Course:  Patient had been previously hospitalized 09/19/2022 for syncopal episode  During this hospitalization patient expressed that she was experiencing depression  Psych followed during hospitalization  After discharge patient reports she did not take any of her medications and did not get out of bed for 1 week  Patient was readmitted to the hospital 09/27/2022 for generalized weakness  PT OT recommended short-term rehab  Rehab Course:  Patient was seen and examined at bedside today  Patient is a reliable historian, alert and oriented x3  Terrance Rebollarmomatanvir is resting in bed and says she is doing well  She denies any pain on exam  She is sleeping ok and participating in therapy  Denies SOB/V/D  Denies lightheadedness, dizziness, headaches, vision changes  Patient states she has been eating and drinking well  Denies any bowel or bladder issues  Per review of SNF records, Patient is eating 3 meals per day, consuming  %  Last documented BM 12/30/2022  No concerns from nursing at this time  The patient's allergies, past medical, surgical, social and family history were reviewed and unchanged      Review of Systems     Review of Systems Constitutional: Positive for activity change  Negative for chills and fever  HENT: Negative  Negative for congestion  Eyes: Negative  Respiratory: Negative  Negative for cough, shortness of breath and wheezing  Cardiovascular: Negative  Negative for chest pain, palpitations and leg swelling  Gastrointestinal: Negative for abdominal distention, abdominal pain, constipation, diarrhea, nausea and vomiting  Endocrine: Negative  Genitourinary: Negative  Negative for difficulty urinating and dysuria  Musculoskeletal: Positive for gait problem  Skin: Negative  Allergic/Immunologic: Negative  Neurological: Positive for weakness  Negative for dizziness and headaches  Hematological: Negative  Psychiatric/Behavioral: Negative  Negative for sleep disturbance  Objective     Vitals:   Vitals:    12/30/22 0938   BP: 120/76   Pulse: 78   Resp: 18   Temp: 97 8 °F (36 6 °C)   SpO2: 96%         Physical Exam  Vitals and nursing note reviewed  Constitutional:       General: She is not in acute distress  Appearance: Normal appearance  She is not ill-appearing  HENT:      Head: Normocephalic and atraumatic  Nose: Nose normal  No congestion  Mouth/Throat:      Mouth: Mucous membranes are moist    Eyes:      Conjunctiva/sclera: Conjunctivae normal    Cardiovascular:      Rate and Rhythm: Normal rate and regular rhythm  Pulses: Normal pulses  Heart sounds: Normal heart sounds  Pulmonary:      Effort: Pulmonary effort is normal  No respiratory distress  Breath sounds: Normal breath sounds  No wheezing  Abdominal:      General: Bowel sounds are normal  There is no distension  Palpations: Abdomen is soft  Tenderness: There is no abdominal tenderness  Musculoskeletal:      Right lower leg: No edema  Left lower leg: No edema  Skin:     General: Skin is warm  Capillary Refill: Capillary refill takes more than 3 seconds     Neurological: Mental Status: She is alert and oriented to person, place, and time  Psychiatric:         Mood and Affect: Mood normal          Behavior: Behavior normal          Pertinent Laboratory/Diagnostic Studies:   Reviewed in facility chart-stable      Current Medications   Medications reviewed and updated see facility [facility] for details  Current Outpatient Medications:   •  acetaminophen (TYLENOL) 325 mg tablet, Take 650 mg by mouth every 6 (six) hours as needed for mild pain (pain score 1-3)  (Patient not taking: No sig reported), Disp: , Rfl:   •  aspirin (ECOTRIN LOW STRENGTH) 81 mg EC tablet, Take 81 mg by mouth daily, Disp: , Rfl:   •  Cholecalciferol 25 MCG (1000 UT) capsule, Take 1,000 Units by mouth daily   (Patient not taking: No sig reported), Disp: , Rfl:   •  DULoxetine (CYMBALTA) 60 mg delayed release capsule, Take 1 capsule (60 mg total) by mouth daily Do not start before October 1, 2022 , Disp: , Rfl: 0  •  Empagliflozin 25 MG TABS, Take 25 mg by mouth every morning (Patient not taking: No sig reported), Disp: , Rfl:   •  Ergocalciferol (VITAMIN D2 PO), Take 50,000 Units/day by mouth 2 (two) times a week, Disp: , Rfl:   •  ezetimibe (ZETIA) 10 mg tablet, Take 1 tablet (10 mg total) by mouth daily (Patient not taking: Reported on 9/27/2022), Disp: 30 tablet, Rfl: 0  •  gabapentin (NEURONTIN) 100 mg capsule, Take 1 capsule (100 mg total) by mouth 2 (two) times a day Take 100 mg in the morning and 100 mg mid afternoon, Disp: 42 capsule, Rfl: 0  •  gabapentin (NEURONTIN) 300 mg capsule, Take 1 capsule (300 mg total) by mouth daily at bedtime, Disp: 21 capsule, Rfl: 0  •  hydrophilic ointment, Apply topically as needed for dry skin, Disp: , Rfl:   •  insulin glargine (LANTUS) 100 units/mL subcutaneous injection, Inject 30 Units under the skin daily at bedtime, Disp: 10 mL, Rfl: 0  •  insulin lispro (HumaLOG) 100 units/mL injection, Inject 1-5 Units under the skin 3 (three) times a day before meals, Disp: , Rfl: 0  •  levothyroxine 50 mcg tablet, Take 1 5 tablets (75 mcg total) by mouth daily, Disp: 45 tablet, Rfl: 0  •  melatonin 3 mg, Take 3 tablets (9 mg total) by mouth daily at bedtime, Disp: , Rfl: 0  •  nystatin (MYCOSTATIN) powder, Apply topically 2 (two) times a day, Disp: 15 g, Rfl: 0  •  omeprazole (PriLOSEC) 20 mg delayed release capsule, Take 20 mg by mouth daily, Disp: , Rfl:   •  potassium chloride (K-DUR,KLOR-CON) 20 mEq tablet, Take 1 tablet (20 mEq total) by mouth daily, Disp: 30 tablet, Rfl: 0  •  spironolactone (ALDACTONE) 25 mg tablet, Take 1 tablet (25 mg total) by mouth daily Do not start before October 3, 2022 , Disp: 30 tablet, Rfl: 0  •  topiramate (TOPAMAX) 100 mg tablet, Take 200 mg by mouth 2 (two) times a day , Disp: , Rfl:   •  torsemide (DEMADEX) 20 mg tablet, Take 2 tablets (40 mg total) by mouth daily Do not start before October 3, 2022 , Disp: 60 tablet, Rfl: 0  •  traZODone (DESYREL) 50 mg tablet, Take 25 mg by mouth daily at bedtime, Disp: , Rfl:        Please note:  Voice-recognition software may have been used in the preparation of this document  Occasional wrong word or "sound-alike" substitutions may have occurred due to the inherent limitations of voice recognition software  Interpretation should be guided by context           [facility] SAMIA Kay  12/30/2022  1:33 PM

## 2022-12-30 NOTE — ASSESSMENT & PLAN NOTE
Lab Results   Component Value Date    HGBA1C 11 8 (H) 09/19/2022   · History of diabetes  · Blood sugars have been trending   · Morning blood sugar 107  · Encourage diabetic diet   · Continue Accu-Cheks  · Continue empagliflozin 25 mg daily  · Continue Lantus insulin 30 units at HS  · Continue Humalog sliding scale insulin with meals

## 2022-12-30 NOTE — ASSESSMENT & PLAN NOTE
· BP has been stable, 120/67  · Continue to monitor BP  · Continue torsemide 40 mg daily  · Continue spironolactone 25 mg daily

## 2023-01-01 ENCOUNTER — NURSING HOME VISIT (OUTPATIENT)
Dept: GERIATRICS | Facility: OTHER | Age: 68
End: 2023-01-01
Payer: COMMERCIAL

## 2023-01-01 ENCOUNTER — APPOINTMENT (INPATIENT)
Dept: RADIOLOGY | Facility: HOSPITAL | Age: 68
DRG: 871 | End: 2023-01-01
Payer: COMMERCIAL

## 2023-01-01 ENCOUNTER — APPOINTMENT (EMERGENCY)
Dept: CT IMAGING | Facility: HOSPITAL | Age: 68
DRG: 871 | End: 2023-01-01
Payer: COMMERCIAL

## 2023-01-01 ENCOUNTER — APPOINTMENT (EMERGENCY)
Dept: RADIOLOGY | Facility: HOSPITAL | Age: 68
DRG: 871 | End: 2023-01-01
Payer: COMMERCIAL

## 2023-01-01 ENCOUNTER — HOSPITAL ENCOUNTER (INPATIENT)
Facility: HOSPITAL | Age: 68
LOS: 4 days | DRG: 871 | End: 2023-06-20
Attending: EMERGENCY MEDICINE | Admitting: INTERNAL MEDICINE
Payer: COMMERCIAL

## 2023-01-01 VITALS
DIASTOLIC BLOOD PRESSURE: 70 MMHG | SYSTOLIC BLOOD PRESSURE: 174 MMHG | HEART RATE: 91 BPM | TEMPERATURE: 98.4 F | WEIGHT: 205.03 LBS | OXYGEN SATURATION: 99 % | BODY MASS INDEX: 36.33 KG/M2 | RESPIRATION RATE: 22 BRPM | HEIGHT: 63 IN

## 2023-01-01 DIAGNOSIS — A41.9 SEPTIC SHOCK (HCC): Primary | ICD-10-CM

## 2023-01-01 DIAGNOSIS — R65.21 SEPTIC SHOCK (HCC): Primary | ICD-10-CM

## 2023-01-01 DIAGNOSIS — R53.83 LETHARGIC: ICD-10-CM

## 2023-01-01 DIAGNOSIS — E87.20 LACTIC ACIDOSIS: ICD-10-CM

## 2023-01-01 DIAGNOSIS — R79.89 ELEVATED PROCALCITONIN: ICD-10-CM

## 2023-01-01 DIAGNOSIS — J18.9 PNEUMONIA: ICD-10-CM

## 2023-01-01 DIAGNOSIS — E11.65 TYPE 2 DIABETES MELLITUS WITH HYPERGLYCEMIA, WITH LONG-TERM CURRENT USE OF INSULIN (HCC): ICD-10-CM

## 2023-01-01 DIAGNOSIS — D72.825 BANDEMIA: ICD-10-CM

## 2023-01-01 DIAGNOSIS — I95.9 HYPOTENSION, UNSPECIFIED HYPOTENSION TYPE: Primary | ICD-10-CM

## 2023-01-01 DIAGNOSIS — Z79.4 TYPE 2 DIABETES MELLITUS WITH HYPERGLYCEMIA, WITH LONG-TERM CURRENT USE OF INSULIN (HCC): ICD-10-CM

## 2023-01-01 DIAGNOSIS — R62.7 FAILURE TO THRIVE IN ADULT: ICD-10-CM

## 2023-01-01 LAB
2HR DELTA HS TROPONIN: 2 NG/L
4HR DELTA HS TROPONIN: 9 NG/L
ABO GROUP BLD: NORMAL
ABO GROUP BLD: NORMAL
ALBUMIN SERPL BCP-MCNC: 2 G/DL (ref 3.5–5)
ALBUMIN SERPL BCP-MCNC: 2.1 G/DL (ref 3.5–5)
ALP SERPL-CCNC: 274 U/L (ref 34–104)
ALP SERPL-CCNC: 315 U/L (ref 34–104)
ALT SERPL W P-5'-P-CCNC: 24 U/L (ref 7–52)
ALT SERPL W P-5'-P-CCNC: 30 U/L (ref 7–52)
ANION GAP SERPL CALCULATED.3IONS-SCNC: 10 MMOL/L (ref 4–13)
ANION GAP SERPL CALCULATED.3IONS-SCNC: 11 MMOL/L (ref 4–13)
ANION GAP SERPL CALCULATED.3IONS-SCNC: 13 MMOL/L (ref 4–13)
ANION GAP SERPL CALCULATED.3IONS-SCNC: 14 MMOL/L (ref 4–13)
ANION GAP SERPL CALCULATED.3IONS-SCNC: 16 MMOL/L (ref 4–13)
ANION GAP SERPL CALCULATED.3IONS-SCNC: 16 MMOL/L (ref 4–13)
ANISOCYTOSIS BLD QL SMEAR: PRESENT
ANISOCYTOSIS BLD QL SMEAR: PRESENT
APTT PPP: 41 SECONDS (ref 23–37)
ARTERIAL PATENCY WRIST A: YES
ARTERIAL PATENCY WRIST A: YES
AST SERPL W P-5'-P-CCNC: 35 U/L (ref 13–39)
AST SERPL W P-5'-P-CCNC: 37 U/L (ref 13–39)
ATRIAL RATE: 66 BPM
ATRIAL RATE: 66 BPM
ATRIAL RATE: 84 BPM
BACTERIA UR QL AUTO: ABNORMAL /HPF
BASE EX.OXY STD BLDV CALC-SCNC: 67 % (ref 60–80)
BASE EX.OXY STD BLDV CALC-SCNC: 70 % (ref 60–80)
BASE EXCESS BLDA CALC-SCNC: -7.4 MMOL/L
BASE EXCESS BLDV CALC-SCNC: -6.2 MMOL/L
BASE EXCESS BLDV CALC-SCNC: -7.9 MMOL/L
BASOPHILS # BLD MANUAL: 0 THOUSAND/UL (ref 0–0.1)
BASOPHILS # BLD MANUAL: 0 THOUSAND/UL (ref 0–0.1)
BASOPHILS NFR MAR MANUAL: 0 % (ref 0–1)
BASOPHILS NFR MAR MANUAL: 0 % (ref 0–1)
BILIRUB DIRECT SERPL-MCNC: 0.72 MG/DL (ref 0–0.2)
BILIRUB SERPL-MCNC: 0.56 MG/DL (ref 0.2–1)
BILIRUB SERPL-MCNC: 1.09 MG/DL (ref 0.2–1)
BILIRUB UR QL STRIP: NEGATIVE
BLD GP AB SCN SERPL QL: NEGATIVE
BUDDING YEAST: PRESENT
BUN SERPL-MCNC: 25 MG/DL (ref 5–25)
BUN SERPL-MCNC: 26 MG/DL (ref 5–25)
BUN SERPL-MCNC: 27 MG/DL (ref 5–25)
CA-I BLD-SCNC: 0.98 MMOL/L (ref 1.12–1.32)
CA-I BLD-SCNC: 1 MMOL/L (ref 1.12–1.32)
CA-I BLD-SCNC: 1 MMOL/L (ref 1.12–1.32)
CALCIUM ALBUM COR SERPL-MCNC: 9.5 MG/DL (ref 8.3–10.1)
CALCIUM SERPL-MCNC: 7 MG/DL (ref 8.4–10.2)
CALCIUM SERPL-MCNC: 7.2 MG/DL (ref 8.4–10.2)
CALCIUM SERPL-MCNC: 7.5 MG/DL (ref 8.4–10.2)
CALCIUM SERPL-MCNC: 8 MG/DL (ref 8.4–10.2)
CARDIAC TROPONIN I PNL SERPL HS: 17 NG/L
CARDIAC TROPONIN I PNL SERPL HS: 19 NG/L
CARDIAC TROPONIN I PNL SERPL HS: 26 NG/L
CHLORIDE SERPL-SCNC: 109 MMOL/L (ref 96–108)
CHLORIDE SERPL-SCNC: 110 MMOL/L (ref 96–108)
CHLORIDE SERPL-SCNC: 110 MMOL/L (ref 96–108)
CHLORIDE SERPL-SCNC: 111 MMOL/L (ref 96–108)
CHLORIDE SERPL-SCNC: 111 MMOL/L (ref 96–108)
CHLORIDE SERPL-SCNC: 112 MMOL/L (ref 96–108)
CLARITY UR: ABNORMAL
CO2 SERPL-SCNC: 16 MMOL/L (ref 21–32)
CO2 SERPL-SCNC: 17 MMOL/L (ref 21–32)
CO2 SERPL-SCNC: 19 MMOL/L (ref 21–32)
CO2 SERPL-SCNC: 20 MMOL/L (ref 21–32)
COLOR UR: YELLOW
CREAT SERPL-MCNC: 1.82 MG/DL (ref 0.6–1.3)
CREAT SERPL-MCNC: 1.98 MG/DL (ref 0.6–1.3)
CREAT SERPL-MCNC: 2.01 MG/DL (ref 0.6–1.3)
CREAT SERPL-MCNC: 2.02 MG/DL (ref 0.6–1.3)
CREAT SERPL-MCNC: 2.16 MG/DL (ref 0.6–1.3)
CREAT SERPL-MCNC: 2.17 MG/DL (ref 0.6–1.3)
EOSINOPHIL # BLD MANUAL: 0 THOUSAND/UL (ref 0–0.4)
EOSINOPHIL # BLD MANUAL: 0 THOUSAND/UL (ref 0–0.4)
EOSINOPHIL NFR BLD MANUAL: 0 % (ref 0–6)
EOSINOPHIL NFR BLD MANUAL: 0 % (ref 0–6)
ERYTHROCYTE [DISTWIDTH] IN BLOOD BY AUTOMATED COUNT: 20.5 % (ref 11.6–15.1)
ERYTHROCYTE [DISTWIDTH] IN BLOOD BY AUTOMATED COUNT: 20.6 % (ref 11.6–15.1)
ERYTHROCYTE [DISTWIDTH] IN BLOOD BY AUTOMATED COUNT: 21.6 % (ref 11.6–15.1)
GFR SERPL CREATININE-BSD FRML MDRD: 22 ML/MIN/1.73SQ M
GFR SERPL CREATININE-BSD FRML MDRD: 22 ML/MIN/1.73SQ M
GFR SERPL CREATININE-BSD FRML MDRD: 24 ML/MIN/1.73SQ M
GFR SERPL CREATININE-BSD FRML MDRD: 24 ML/MIN/1.73SQ M
GFR SERPL CREATININE-BSD FRML MDRD: 25 ML/MIN/1.73SQ M
GFR SERPL CREATININE-BSD FRML MDRD: 28 ML/MIN/1.73SQ M
GLUCOSE SERPL-MCNC: 101 MG/DL (ref 65–140)
GLUCOSE SERPL-MCNC: 107 MG/DL (ref 65–140)
GLUCOSE SERPL-MCNC: 115 MG/DL (ref 65–140)
GLUCOSE SERPL-MCNC: 116 MG/DL (ref 65–140)
GLUCOSE SERPL-MCNC: 120 MG/DL (ref 65–140)
GLUCOSE SERPL-MCNC: 153 MG/DL (ref 65–140)
GLUCOSE SERPL-MCNC: 174 MG/DL (ref 65–140)
GLUCOSE SERPL-MCNC: 321 MG/DL (ref 65–140)
GLUCOSE SERPL-MCNC: 45 MG/DL (ref 65–140)
GLUCOSE SERPL-MCNC: 50 MG/DL (ref 65–140)
GLUCOSE SERPL-MCNC: 61 MG/DL (ref 65–140)
GLUCOSE SERPL-MCNC: 61 MG/DL (ref 65–140)
GLUCOSE SERPL-MCNC: 62 MG/DL (ref 65–140)
GLUCOSE SERPL-MCNC: 66 MG/DL (ref 65–140)
GLUCOSE SERPL-MCNC: 69 MG/DL (ref 65–140)
GLUCOSE SERPL-MCNC: 73 MG/DL (ref 65–140)
GLUCOSE SERPL-MCNC: 74 MG/DL (ref 65–140)
GLUCOSE SERPL-MCNC: 78 MG/DL (ref 65–140)
GLUCOSE SERPL-MCNC: 82 MG/DL (ref 65–140)
GLUCOSE SERPL-MCNC: 84 MG/DL (ref 65–140)
GLUCOSE SERPL-MCNC: 85 MG/DL (ref 65–140)
GLUCOSE SERPL-MCNC: 89 MG/DL (ref 65–140)
GLUCOSE SERPL-MCNC: 91 MG/DL (ref 65–140)
GLUCOSE SERPL-MCNC: <20 MG/DL (ref 65–140)
GLUCOSE UR STRIP-MCNC: NEGATIVE MG/DL
HCO3 BLDA-SCNC: 16.7 MMOL/L (ref 22–28)
HCO3 BLDV-SCNC: 17.3 MMOL/L (ref 24–30)
HCO3 BLDV-SCNC: 19 MMOL/L (ref 24–30)
HCT VFR BLD AUTO: 25.8 % (ref 34.8–46.1)
HCT VFR BLD AUTO: 30.5 % (ref 34.8–46.1)
HCT VFR BLD AUTO: 33.8 % (ref 34.8–46.1)
HGB BLD-MCNC: 10.4 G/DL (ref 11.5–15.4)
HGB BLD-MCNC: 8 G/DL (ref 11.5–15.4)
HGB BLD-MCNC: 9.6 G/DL (ref 11.5–15.4)
HGB UR QL STRIP.AUTO: ABNORMAL
HYALINE CASTS #/AREA URNS LPF: ABNORMAL /LPF
HYPERCHROMIA BLD QL SMEAR: PRESENT
HYPERCHROMIA BLD QL SMEAR: PRESENT
INR PPP: 1.31 (ref 0.84–1.19)
KETONES UR STRIP-MCNC: NEGATIVE MG/DL
LACTATE SERPL-SCNC: 3.3 MMOL/L (ref 0.5–2)
LACTATE SERPL-SCNC: 3.4 MMOL/L (ref 0.5–2)
LACTATE SERPL-SCNC: 4 MMOL/L (ref 0.5–2)
LACTATE SERPL-SCNC: 4.3 MMOL/L (ref 0.5–2)
LACTATE SERPL-SCNC: 4.7 MMOL/L (ref 0.5–2)
LACTATE SERPL-SCNC: 5.1 MMOL/L (ref 0.5–2)
LACTATE SERPL-SCNC: 5.1 MMOL/L (ref 0.5–2)
LEUKOCYTE ESTERASE UR QL STRIP: ABNORMAL
LG PLATELETS BLD QL SMEAR: PRESENT
LIPASE SERPL-CCNC: 12 U/L (ref 11–82)
LYMPHOCYTES # BLD AUTO: 0.27 THOUSAND/UL (ref 0.6–4.47)
LYMPHOCYTES # BLD AUTO: 0.51 THOUSAND/UL (ref 0.6–4.47)
LYMPHOCYTES # BLD AUTO: 10 % (ref 14–44)
LYMPHOCYTES # BLD AUTO: 6 % (ref 14–44)
MAGNESIUM SERPL-MCNC: 1.7 MG/DL (ref 1.9–2.7)
MAGNESIUM SERPL-MCNC: 2 MG/DL (ref 1.9–2.7)
MAGNESIUM SERPL-MCNC: 2 MG/DL (ref 1.9–2.7)
MAGNESIUM SERPL-MCNC: 2.1 MG/DL (ref 1.9–2.7)
MAGNESIUM SERPL-MCNC: 2.1 MG/DL (ref 1.9–2.7)
MCH RBC QN AUTO: 29 PG (ref 26.8–34.3)
MCH RBC QN AUTO: 29.4 PG (ref 26.8–34.3)
MCH RBC QN AUTO: 29.7 PG (ref 26.8–34.3)
MCHC RBC AUTO-ENTMCNC: 30.8 G/DL (ref 31.4–37.4)
MCHC RBC AUTO-ENTMCNC: 31 G/DL (ref 31.4–37.4)
MCHC RBC AUTO-ENTMCNC: 31.5 G/DL (ref 31.4–37.4)
MCV RBC AUTO: 94 FL (ref 82–98)
MCV RBC AUTO: 94 FL (ref 82–98)
MCV RBC AUTO: 97 FL (ref 82–98)
MONOCYTES # BLD AUTO: 0.04 THOUSAND/UL (ref 0–1.22)
MONOCYTES # BLD AUTO: 0.05 THOUSAND/UL (ref 0–1.22)
MONOCYTES NFR BLD: 1 % (ref 4–12)
MONOCYTES NFR BLD: 1 % (ref 4–12)
MRSA NOSE QL CULT: NORMAL
MUCOUS THREADS UR QL AUTO: ABNORMAL
MYELOCYTES NFR BLD MANUAL: 1 % (ref 0–1)
NEUTROPHILS # BLD MANUAL: 4.08 THOUSAND/UL (ref 1.85–7.62)
NEUTROPHILS # BLD MANUAL: 4.55 THOUSAND/UL (ref 1.85–7.62)
NEUTS BAND NFR BLD MANUAL: 11 % (ref 0–8)
NEUTS BAND NFR BLD MANUAL: 2 % (ref 0–8)
NEUTS SEG NFR BLD AUTO: 78 % (ref 43–75)
NEUTS SEG NFR BLD AUTO: 90 % (ref 43–75)
NITRITE UR QL STRIP: NEGATIVE
NON-SQ EPI CELLS URNS QL MICRO: ABNORMAL /HPF
NRBC BLD AUTO-RTO: 6 /100 WBC (ref 0–2)
O2 CT BLDA-SCNC: 12.4 ML/DL (ref 16–23)
O2 CT BLDV-SCNC: 10.3 ML/DL
O2 CT BLDV-SCNC: 9.6 ML/DL
OXYHGB MFR BLDA: 94.9 % (ref 94–97)
P AXIS: 44 DEGREES
P AXIS: 68 DEGREES
P AXIS: 68 DEGREES
PCO2 BLDA: 29 MM HG (ref 36–44)
PCO2 BLDV: 34.4 MM HG (ref 42–50)
PCO2 BLDV: 36.3 MM HG (ref 42–50)
PH BLDA: 7.38 [PH] (ref 7.35–7.45)
PH BLDV: 7.32 [PH] (ref 7.3–7.4)
PH BLDV: 7.34 [PH] (ref 7.3–7.4)
PH UR STRIP.AUTO: 5 [PH]
PHOSPHATE SERPL-MCNC: 3.8 MG/DL (ref 2.3–4.1)
PHOSPHATE SERPL-MCNC: 3.9 MG/DL (ref 2.3–4.1)
PHOSPHATE SERPL-MCNC: 4 MG/DL (ref 2.3–4.1)
PHOSPHATE SERPL-MCNC: 4.3 MG/DL (ref 2.3–4.1)
PLATELET # BLD AUTO: 105 THOUSANDS/UL (ref 149–390)
PLATELET # BLD AUTO: 111 THOUSANDS/UL (ref 149–390)
PLATELET # BLD AUTO: 94 THOUSANDS/UL (ref 149–390)
PLATELET BLD QL SMEAR: ABNORMAL
PLATELET BLD QL SMEAR: ABNORMAL
PMV BLD AUTO: 11.1 FL (ref 8.9–12.7)
PMV BLD AUTO: 11.4 FL (ref 8.9–12.7)
PMV BLD AUTO: 11.5 FL (ref 8.9–12.7)
PO2 BLDA: 86 MM HG (ref 75–129)
PO2 BLDV: 37.6 MM HG (ref 35–45)
PO2 BLDV: 38.1 MM HG (ref 35–45)
POTASSIUM SERPL-SCNC: 4 MMOL/L (ref 3.5–5.3)
POTASSIUM SERPL-SCNC: 4.2 MMOL/L (ref 3.5–5.3)
POTASSIUM SERPL-SCNC: 4.3 MMOL/L (ref 3.5–5.3)
POTASSIUM SERPL-SCNC: 4.8 MMOL/L (ref 3.5–5.3)
PR INTERVAL: 124 MS
PR INTERVAL: 138 MS
PR INTERVAL: 138 MS
PROCALCITONIN SERPL-MCNC: 15.32 NG/ML
PROCALCITONIN SERPL-MCNC: 19.35 NG/ML
PROT SERPL-MCNC: 4.2 G/DL (ref 6.4–8.4)
PROT SERPL-MCNC: 5.3 G/DL (ref 6.4–8.4)
PROT UR STRIP-MCNC: ABNORMAL MG/DL
PROTHROMBIN TIME: 16.5 SECONDS (ref 11.6–14.5)
QRS AXIS: -46 DEGREES
QRS AXIS: -46 DEGREES
QRS AXIS: -78 DEGREES
QRSD INTERVAL: 124 MS
QRSD INTERVAL: 128 MS
QRSD INTERVAL: 128 MS
QT INTERVAL: 406 MS
QT INTERVAL: 506 MS
QT INTERVAL: 506 MS
QTC INTERVAL: 479 MS
QTC INTERVAL: 530 MS
QTC INTERVAL: 530 MS
RBC # BLD AUTO: 2.76 MILLION/UL (ref 3.81–5.12)
RBC # BLD AUTO: 3.26 MILLION/UL (ref 3.81–5.12)
RBC # BLD AUTO: 3.5 MILLION/UL (ref 3.81–5.12)
RBC #/AREA URNS AUTO: ABNORMAL /HPF
RH BLD: POSITIVE
RH BLD: POSITIVE
SODIUM SERPL-SCNC: 141 MMOL/L (ref 135–147)
SODIUM SERPL-SCNC: 142 MMOL/L (ref 135–147)
SODIUM SERPL-SCNC: 142 MMOL/L (ref 135–147)
SODIUM SERPL-SCNC: 143 MMOL/L (ref 135–147)
SODIUM SERPL-SCNC: 143 MMOL/L (ref 135–147)
SODIUM SERPL-SCNC: 144 MMOL/L (ref 135–147)
SP GR UR STRIP.AUTO: 1.03 (ref 1–1.03)
SPECIMEN EXPIRATION DATE: NORMAL
SPECIMEN SOURCE: ABNORMAL
T WAVE AXIS: 65 DEGREES
T WAVE AXIS: 72 DEGREES
T WAVE AXIS: 72 DEGREES
TARGETS BLD QL SMEAR: PRESENT
UROBILINOGEN UR STRIP-ACNC: <2 MG/DL
VANCOMYCIN SERPL-MCNC: 22.2 UG/ML (ref 10–20)
VANCOMYCIN SERPL-MCNC: 23.3 UG/ML (ref 10–20)
VENTRICULAR RATE: 66 BPM
VENTRICULAR RATE: 66 BPM
VENTRICULAR RATE: 84 BPM
WBC # BLD AUTO: 10.58 THOUSAND/UL (ref 4.31–10.16)
WBC # BLD AUTO: 4.43 THOUSAND/UL (ref 4.31–10.16)
WBC # BLD AUTO: 5.11 THOUSAND/UL (ref 4.31–10.16)
WBC #/AREA URNS AUTO: ABNORMAL /HPF

## 2023-01-01 PROCEDURE — 80202 ASSAY OF VANCOMYCIN: CPT | Performed by: INTERNAL MEDICINE

## 2023-01-01 PROCEDURE — 99232 SBSQ HOSP IP/OBS MODERATE 35: CPT | Performed by: INTERNAL MEDICINE

## 2023-01-01 PROCEDURE — 83735 ASSAY OF MAGNESIUM: CPT | Performed by: PHYSICIAN ASSISTANT

## 2023-01-01 PROCEDURE — 99291 CRITICAL CARE FIRST HOUR: CPT | Performed by: EMERGENCY MEDICINE

## 2023-01-01 PROCEDURE — 83605 ASSAY OF LACTIC ACID: CPT

## 2023-01-01 PROCEDURE — 84100 ASSAY OF PHOSPHORUS: CPT

## 2023-01-01 PROCEDURE — 87181 SC STD AGAR DILUTION PER AGT: CPT | Performed by: PHYSICIAN ASSISTANT

## 2023-01-01 PROCEDURE — 82330 ASSAY OF CALCIUM: CPT | Performed by: INTERNAL MEDICINE

## 2023-01-01 PROCEDURE — 99310 SBSQ NF CARE HIGH MDM 45: CPT | Performed by: NURSE PRACTITIONER

## 2023-01-01 PROCEDURE — 84484 ASSAY OF TROPONIN QUANT: CPT | Performed by: PHYSICIAN ASSISTANT

## 2023-01-01 PROCEDURE — 86901 BLOOD TYPING SEROLOGIC RH(D): CPT

## 2023-01-01 PROCEDURE — 96367 TX/PROPH/DG ADDL SEQ IV INF: CPT

## 2023-01-01 PROCEDURE — 4A133B1 MONITORING OF ARTERIAL PRESSURE, PERIPHERAL, PERCUTANEOUS APPROACH: ICD-10-PCS | Performed by: INTERNAL MEDICINE

## 2023-01-01 PROCEDURE — 99285 EMERGENCY DEPT VISIT HI MDM: CPT

## 2023-01-01 PROCEDURE — 80048 BASIC METABOLIC PNL TOTAL CA: CPT

## 2023-01-01 PROCEDURE — 71045 X-RAY EXAM CHEST 1 VIEW: CPT

## 2023-01-01 PROCEDURE — 83690 ASSAY OF LIPASE: CPT

## 2023-01-01 PROCEDURE — 82805 BLOOD GASES W/O2 SATURATION: CPT | Performed by: PHYSICIAN ASSISTANT

## 2023-01-01 PROCEDURE — 99292 CRITICAL CARE ADDL 30 MIN: CPT | Performed by: PHYSICIAN ASSISTANT

## 2023-01-01 PROCEDURE — 96376 TX/PRO/DX INJ SAME DRUG ADON: CPT

## 2023-01-01 PROCEDURE — 81001 URINALYSIS AUTO W/SCOPE: CPT | Performed by: PHYSICIAN ASSISTANT

## 2023-01-01 PROCEDURE — 85027 COMPLETE CBC AUTOMATED: CPT

## 2023-01-01 PROCEDURE — 87086 URINE CULTURE/COLONY COUNT: CPT | Performed by: PHYSICIAN ASSISTANT

## 2023-01-01 PROCEDURE — 84145 PROCALCITONIN (PCT): CPT

## 2023-01-01 PROCEDURE — 83735 ASSAY OF MAGNESIUM: CPT

## 2023-01-01 PROCEDURE — 80202 ASSAY OF VANCOMYCIN: CPT | Performed by: PHYSICIAN ASSISTANT

## 2023-01-01 PROCEDURE — 80048 BASIC METABOLIC PNL TOTAL CA: CPT | Performed by: INTERNAL MEDICINE

## 2023-01-01 PROCEDURE — 85027 COMPLETE CBC AUTOMATED: CPT | Performed by: INTERNAL MEDICINE

## 2023-01-01 PROCEDURE — 80076 HEPATIC FUNCTION PANEL: CPT

## 2023-01-01 PROCEDURE — 71250 CT THORAX DX C-: CPT

## 2023-01-01 PROCEDURE — 36415 COLL VENOUS BLD VENIPUNCTURE: CPT

## 2023-01-01 PROCEDURE — 36620 INSERTION CATHETER ARTERY: CPT

## 2023-01-01 PROCEDURE — 93005 ELECTROCARDIOGRAM TRACING: CPT

## 2023-01-01 PROCEDURE — 70450 CT HEAD/BRAIN W/O DYE: CPT

## 2023-01-01 PROCEDURE — 87081 CULTURE SCREEN ONLY: CPT | Performed by: INTERNAL MEDICINE

## 2023-01-01 PROCEDURE — 82805 BLOOD GASES W/O2 SATURATION: CPT

## 2023-01-01 PROCEDURE — 80048 BASIC METABOLIC PNL TOTAL CA: CPT | Performed by: PHYSICIAN ASSISTANT

## 2023-01-01 PROCEDURE — 83735 ASSAY OF MAGNESIUM: CPT | Performed by: INTERNAL MEDICINE

## 2023-01-01 PROCEDURE — 80053 COMPREHEN METABOLIC PANEL: CPT

## 2023-01-01 PROCEDURE — 86900 BLOOD TYPING SEROLOGIC ABO: CPT

## 2023-01-01 PROCEDURE — 03HY32Z INSERTION OF MONITORING DEVICE INTO UPPER ARTERY, PERCUTANEOUS APPROACH: ICD-10-PCS | Performed by: INTERNAL MEDICINE

## 2023-01-01 PROCEDURE — 85007 BL SMEAR W/DIFF WBC COUNT: CPT

## 2023-01-01 PROCEDURE — 87040 BLOOD CULTURE FOR BACTERIA: CPT

## 2023-01-01 PROCEDURE — 84100 ASSAY OF PHOSPHORUS: CPT | Performed by: PHYSICIAN ASSISTANT

## 2023-01-01 PROCEDURE — 02H633Z INSERTION OF INFUSION DEVICE INTO RIGHT ATRIUM, PERCUTANEOUS APPROACH: ICD-10-PCS | Performed by: INTERNAL MEDICINE

## 2023-01-01 PROCEDURE — 76937 US GUIDE VASCULAR ACCESS: CPT | Performed by: PHYSICIAN ASSISTANT

## 2023-01-01 PROCEDURE — NC001 PR NO CHARGE

## 2023-01-01 PROCEDURE — NC001 PR NO CHARGE: Performed by: NURSE PRACTITIONER

## 2023-01-01 PROCEDURE — 99291 CRITICAL CARE FIRST HOUR: CPT | Performed by: PHYSICIAN ASSISTANT

## 2023-01-01 PROCEDURE — C9113 INJ PANTOPRAZOLE SODIUM, VIA: HCPCS | Performed by: PHYSICIAN ASSISTANT

## 2023-01-01 PROCEDURE — 36556 INSERT NON-TUNNEL CV CATH: CPT | Performed by: PHYSICIAN ASSISTANT

## 2023-01-01 PROCEDURE — 82805 BLOOD GASES W/O2 SATURATION: CPT | Performed by: INTERNAL MEDICINE

## 2023-01-01 PROCEDURE — 82330 ASSAY OF CALCIUM: CPT

## 2023-01-01 PROCEDURE — 82948 REAGENT STRIP/BLOOD GLUCOSE: CPT

## 2023-01-01 PROCEDURE — 83605 ASSAY OF LACTIC ACID: CPT | Performed by: PHYSICIAN ASSISTANT

## 2023-01-01 PROCEDURE — 99238 HOSP IP/OBS DSCHRG MGMT 30/<: CPT | Performed by: NURSE PRACTITIONER

## 2023-01-01 PROCEDURE — 84484 ASSAY OF TROPONIN QUANT: CPT

## 2023-01-01 PROCEDURE — 84145 PROCALCITONIN (PCT): CPT | Performed by: INTERNAL MEDICINE

## 2023-01-01 PROCEDURE — 93010 ELECTROCARDIOGRAM REPORT: CPT | Performed by: INTERNAL MEDICINE

## 2023-01-01 PROCEDURE — 85730 THROMBOPLASTIN TIME PARTIAL: CPT

## 2023-01-01 PROCEDURE — 96375 TX/PRO/DX INJ NEW DRUG ADDON: CPT

## 2023-01-01 PROCEDURE — 82330 ASSAY OF CALCIUM: CPT | Performed by: PHYSICIAN ASSISTANT

## 2023-01-01 PROCEDURE — 87147 CULTURE TYPE IMMUNOLOGIC: CPT | Performed by: PHYSICIAN ASSISTANT

## 2023-01-01 PROCEDURE — 99291 CRITICAL CARE FIRST HOUR: CPT | Performed by: INTERNAL MEDICINE

## 2023-01-01 PROCEDURE — NC001 PR NO CHARGE: Performed by: INTERNAL MEDICINE

## 2023-01-01 PROCEDURE — 86850 RBC ANTIBODY SCREEN: CPT

## 2023-01-01 PROCEDURE — 85007 BL SMEAR W/DIFF WBC COUNT: CPT | Performed by: INTERNAL MEDICINE

## 2023-01-01 PROCEDURE — 4A133J1 MONITORING OF ARTERIAL PULSE, PERIPHERAL, PERCUTANEOUS APPROACH: ICD-10-PCS | Performed by: INTERNAL MEDICINE

## 2023-01-01 PROCEDURE — 74176 CT ABD & PELVIS W/O CONTRAST: CPT

## 2023-01-01 PROCEDURE — 87186 SC STD MICRODIL/AGAR DIL: CPT | Performed by: PHYSICIAN ASSISTANT

## 2023-01-01 PROCEDURE — 83605 ASSAY OF LACTIC ACID: CPT | Performed by: INTERNAL MEDICINE

## 2023-01-01 PROCEDURE — 84100 ASSAY OF PHOSPHORUS: CPT | Performed by: INTERNAL MEDICINE

## 2023-01-01 PROCEDURE — 87077 CULTURE AEROBIC IDENTIFY: CPT | Performed by: PHYSICIAN ASSISTANT

## 2023-01-01 PROCEDURE — G1004 CDSM NDSC: HCPCS

## 2023-01-01 PROCEDURE — 85610 PROTHROMBIN TIME: CPT

## 2023-01-01 PROCEDURE — 96365 THER/PROPH/DIAG IV INF INIT: CPT

## 2023-01-01 RX ORDER — HALOPERIDOL 5 MG/ML
2 INJECTION INTRAMUSCULAR EVERY 2 HOUR PRN
Status: DISCONTINUED | OUTPATIENT
Start: 2023-01-01 | End: 2023-01-01 | Stop reason: HOSPADM

## 2023-01-01 RX ORDER — ACETAMINOPHEN 325 MG/1
650 TABLET ORAL EVERY 6 HOURS PRN
Status: DISCONTINUED | OUTPATIENT
Start: 2023-01-01 | End: 2023-01-01

## 2023-01-01 RX ORDER — SODIUM CHLORIDE, SODIUM GLUCONATE, SODIUM ACETATE, POTASSIUM CHLORIDE, MAGNESIUM CHLORIDE, SODIUM PHOSPHATE, DIBASIC, AND POTASSIUM PHOSPHATE .53; .5; .37; .037; .03; .012; .00082 G/100ML; G/100ML; G/100ML; G/100ML; G/100ML; G/100ML; G/100ML
500 INJECTION, SOLUTION INTRAVENOUS ONCE
Status: COMPLETED | OUTPATIENT
Start: 2023-01-01 | End: 2023-01-01

## 2023-01-01 RX ORDER — DEXTROSE MONOHYDRATE 100 MG/ML
40 INJECTION, SOLUTION INTRAVENOUS CONTINUOUS
Status: DISCONTINUED | OUTPATIENT
Start: 2023-01-01 | End: 2023-01-01

## 2023-01-01 RX ORDER — ALBUMIN, HUMAN INJ 5% 5 %
12.5 SOLUTION INTRAVENOUS ONCE
Status: COMPLETED | OUTPATIENT
Start: 2023-01-01 | End: 2023-01-01

## 2023-01-01 RX ORDER — HEPARIN SODIUM 5000 [USP'U]/ML
5000 INJECTION, SOLUTION INTRAVENOUS; SUBCUTANEOUS EVERY 8 HOURS SCHEDULED
Status: DISCONTINUED | OUTPATIENT
Start: 2023-01-01 | End: 2023-01-01

## 2023-01-01 RX ORDER — SODIUM CHLORIDE, SODIUM GLUCONATE, SODIUM ACETATE, POTASSIUM CHLORIDE, MAGNESIUM CHLORIDE, SODIUM PHOSPHATE, DIBASIC, AND POTASSIUM PHOSPHATE .53; .5; .37; .037; .03; .012; .00082 G/100ML; G/100ML; G/100ML; G/100ML; G/100ML; G/100ML; G/100ML
50 INJECTION, SOLUTION INTRAVENOUS CONTINUOUS
Status: DISCONTINUED | OUTPATIENT
Start: 2023-01-01 | End: 2023-01-01

## 2023-01-01 RX ORDER — EZETIMIBE 10 MG/1
10 TABLET ORAL DAILY
Status: DISCONTINUED | OUTPATIENT
Start: 2023-01-01 | End: 2023-01-01

## 2023-01-01 RX ORDER — SODIUM CHLORIDE, SODIUM GLUCONATE, SODIUM ACETATE, POTASSIUM CHLORIDE, MAGNESIUM CHLORIDE, SODIUM PHOSPHATE, DIBASIC, AND POTASSIUM PHOSPHATE .53; .5; .37; .037; .03; .012; .00082 G/100ML; G/100ML; G/100ML; G/100ML; G/100ML; G/100ML; G/100ML
1000 INJECTION, SOLUTION INTRAVENOUS ONCE
Status: COMPLETED | OUTPATIENT
Start: 2023-01-01 | End: 2023-01-01

## 2023-01-01 RX ORDER — HYDROMORPHONE HCL/PF 1 MG/ML
0.5 SYRINGE (ML) INJECTION
Status: DISCONTINUED | OUTPATIENT
Start: 2023-01-01 | End: 2023-01-01 | Stop reason: HOSPADM

## 2023-01-01 RX ORDER — INSULIN LISPRO 100 [IU]/ML
1-6 INJECTION, SOLUTION INTRAVENOUS; SUBCUTANEOUS EVERY 6 HOURS SCHEDULED
Status: DISCONTINUED | OUTPATIENT
Start: 2023-01-01 | End: 2023-01-01

## 2023-01-01 RX ORDER — LORAZEPAM 2 MG/ML
2 INJECTION INTRAMUSCULAR
Status: DISCONTINUED | OUTPATIENT
Start: 2023-01-01 | End: 2023-01-01 | Stop reason: HOSPADM

## 2023-01-01 RX ORDER — PANTOPRAZOLE SODIUM 20 MG/1
20 TABLET, DELAYED RELEASE ORAL
Status: DISCONTINUED | OUTPATIENT
Start: 2023-01-01 | End: 2023-01-01

## 2023-01-01 RX ORDER — VANCOMYCIN HYDROCHLORIDE 1 G/200ML
15 INJECTION, SOLUTION INTRAVENOUS DAILY PRN
Status: DISCONTINUED | OUTPATIENT
Start: 2023-01-01 | End: 2023-01-01

## 2023-01-01 RX ORDER — CALCIUM GLUCONATE 20 MG/ML
2 INJECTION, SOLUTION INTRAVENOUS ONCE
Status: COMPLETED | OUTPATIENT
Start: 2023-01-01 | End: 2023-01-01

## 2023-01-01 RX ORDER — DEXTROSE MONOHYDRATE 25 G/50ML
INJECTION, SOLUTION INTRAVENOUS
Status: COMPLETED
Start: 2023-01-01 | End: 2023-01-01

## 2023-01-01 RX ORDER — VANCOMYCIN HYDROCHLORIDE 1 G/200ML
15 INJECTION, SOLUTION INTRAVENOUS ONCE
Status: COMPLETED | OUTPATIENT
Start: 2023-01-01 | End: 2023-01-01

## 2023-01-01 RX ORDER — VANCOMYCIN HYDROCHLORIDE 1 G/200ML
15 INJECTION, SOLUTION INTRAVENOUS ONCE
Status: DISCONTINUED | OUTPATIENT
Start: 2023-01-01 | End: 2023-01-01

## 2023-01-01 RX ORDER — PANTOPRAZOLE SODIUM 40 MG/10ML
40 INJECTION, POWDER, LYOPHILIZED, FOR SOLUTION INTRAVENOUS
Status: DISCONTINUED | OUTPATIENT
Start: 2023-01-01 | End: 2023-01-01

## 2023-01-01 RX ORDER — BISACODYL 10 MG
10 SUPPOSITORY, RECTAL RECTAL DAILY PRN
Status: DISCONTINUED | OUTPATIENT
Start: 2023-01-01 | End: 2023-01-01 | Stop reason: HOSPADM

## 2023-01-01 RX ORDER — CHLORHEXIDINE GLUCONATE 0.12 MG/ML
15 RINSE ORAL EVERY 12 HOURS SCHEDULED
Status: DISCONTINUED | OUTPATIENT
Start: 2023-01-01 | End: 2023-01-01

## 2023-01-01 RX ORDER — LORAZEPAM 2 MG/ML
2 INJECTION INTRAMUSCULAR
Status: DISCONTINUED | OUTPATIENT
Start: 2023-01-01 | End: 2023-01-01

## 2023-01-01 RX ORDER — LEVOTHYROXINE SODIUM 0.07 MG/1
75 TABLET ORAL
Status: DISCONTINUED | OUTPATIENT
Start: 2023-01-01 | End: 2023-01-01

## 2023-01-01 RX ORDER — CALCIUM CHLORIDE 100 MG/ML
1 INJECTION INTRAVENOUS; INTRAVENTRICULAR ONCE
Status: COMPLETED | OUTPATIENT
Start: 2023-01-01 | End: 2023-01-01

## 2023-01-01 RX ORDER — MAGNESIUM SULFATE HEPTAHYDRATE 40 MG/ML
2 INJECTION, SOLUTION INTRAVENOUS ONCE
Status: COMPLETED | OUTPATIENT
Start: 2023-01-01 | End: 2023-01-01

## 2023-01-01 RX ORDER — MIRTAZAPINE 15 MG/1
30 TABLET, FILM COATED ORAL
Status: DISCONTINUED | OUTPATIENT
Start: 2023-01-01 | End: 2023-01-01

## 2023-01-01 RX ORDER — ARIPIPRAZOLE 2 MG/1
2 TABLET ORAL DAILY
Status: DISCONTINUED | OUTPATIENT
Start: 2023-01-01 | End: 2023-01-01

## 2023-01-01 RX ORDER — FLUOXETINE HYDROCHLORIDE 20 MG/1
40 CAPSULE ORAL DAILY
Status: DISCONTINUED | OUTPATIENT
Start: 2023-01-01 | End: 2023-01-01

## 2023-01-01 RX ORDER — DEXTROSE MONOHYDRATE 25 G/50ML
12.5 INJECTION, SOLUTION INTRAVENOUS ONCE
Status: COMPLETED | OUTPATIENT
Start: 2023-01-01 | End: 2023-01-01

## 2023-01-01 RX ORDER — GLYCOPYRROLATE 0.2 MG/ML
0.2 INJECTION INTRAMUSCULAR; INTRAVENOUS EVERY 4 HOURS PRN
Status: DISCONTINUED | OUTPATIENT
Start: 2023-01-01 | End: 2023-01-01 | Stop reason: HOSPADM

## 2023-01-01 RX ADMIN — DEXTROSE MONOHYDRATE 25 ML: 25 INJECTION, SOLUTION INTRAVENOUS at 23:57

## 2023-01-01 RX ADMIN — MEROPENEM 1000 MG: 1 INJECTION, POWDER, FOR SOLUTION INTRAVENOUS at 12:37

## 2023-01-01 RX ADMIN — CHLORHEXIDINE GLUCONATE 15 ML: 1.2 SOLUTION ORAL at 10:21

## 2023-01-01 RX ADMIN — SODIUM CHLORIDE, SODIUM GLUCONATE, SODIUM ACETATE, POTASSIUM CHLORIDE, MAGNESIUM CHLORIDE, SODIUM PHOSPHATE, DIBASIC, AND POTASSIUM PHOSPHATE 500 ML: .53; .5; .37; .037; .03; .012; .00082 INJECTION, SOLUTION INTRAVENOUS at 04:25

## 2023-01-01 RX ADMIN — PANTOPRAZOLE SODIUM 40 MG: 40 INJECTION, POWDER, FOR SOLUTION INTRAVENOUS at 10:15

## 2023-01-01 RX ADMIN — HEPARIN SODIUM 5000 UNITS: 5000 INJECTION INTRAVENOUS; SUBCUTANEOUS at 14:12

## 2023-01-01 RX ADMIN — DEXTROSE MONOHYDRATE 12.5 G: 25 INJECTION, SOLUTION INTRAVENOUS at 06:55

## 2023-01-01 RX ADMIN — SODIUM CHLORIDE 1000 MG: 9 INJECTION, SOLUTION INTRAVENOUS at 16:15

## 2023-01-01 RX ADMIN — LORAZEPAM 2 MG: 2 INJECTION INTRAMUSCULAR; INTRAVENOUS at 04:54

## 2023-01-01 RX ADMIN — NOREPINEPHRINE BITARTRATE 8 MCG/MIN: 1 INJECTION, SOLUTION, CONCENTRATE INTRAVENOUS at 00:43

## 2023-01-01 RX ADMIN — VANCOMYCIN HYDROCHLORIDE 1750 MG: 5 INJECTION, POWDER, LYOPHILIZED, FOR SOLUTION INTRAVENOUS at 22:03

## 2023-01-01 RX ADMIN — HYDROMORPHONE HYDROCHLORIDE 0.5 MG: 1 INJECTION, SOLUTION INTRAMUSCULAR; INTRAVENOUS; SUBCUTANEOUS at 21:49

## 2023-01-01 RX ADMIN — LORAZEPAM 2 MG: 2 INJECTION INTRAMUSCULAR; INTRAVENOUS at 21:49

## 2023-01-01 RX ADMIN — SODIUM CHLORIDE, SODIUM GLUCONATE, SODIUM ACETATE, POTASSIUM CHLORIDE, MAGNESIUM CHLORIDE, SODIUM PHOSPHATE, DIBASIC, AND POTASSIUM PHOSPHATE 1000 ML: .53; .5; .37; .037; .03; .012; .00082 INJECTION, SOLUTION INTRAVENOUS at 12:00

## 2023-01-01 RX ADMIN — VASOPRESSIN 0.04 UNITS/MIN: 20 INJECTION INTRAVENOUS at 05:27

## 2023-01-01 RX ADMIN — CALCIUM CHLORIDE 1 G: 100 INJECTION INTRAVENOUS; INTRAVENTRICULAR at 10:15

## 2023-01-01 RX ADMIN — DEXTROSE MONOHYDRATE 50 ML: 25 INJECTION, SOLUTION INTRAVENOUS at 09:47

## 2023-01-01 RX ADMIN — HYDROMORPHONE HYDROCHLORIDE 0.5 MG: 1 INJECTION, SOLUTION INTRAMUSCULAR; INTRAVENOUS; SUBCUTANEOUS at 03:50

## 2023-01-01 RX ADMIN — CALCIUM GLUCONATE 2 G: 20 INJECTION, SOLUTION INTRAVENOUS at 12:21

## 2023-01-01 RX ADMIN — SODIUM CHLORIDE, SODIUM GLUCONATE, SODIUM ACETATE, POTASSIUM CHLORIDE, MAGNESIUM CHLORIDE, SODIUM PHOSPHATE, DIBASIC, AND POTASSIUM PHOSPHATE 75 ML/HR: .53; .5; .37; .037; .03; .012; .00082 INJECTION, SOLUTION INTRAVENOUS at 00:46

## 2023-01-01 RX ADMIN — SODIUM CHLORIDE, SODIUM GLUCONATE, SODIUM ACETATE, POTASSIUM CHLORIDE, MAGNESIUM CHLORIDE, SODIUM PHOSPHATE, DIBASIC, AND POTASSIUM PHOSPHATE 1000 ML: .53; .5; .37; .037; .03; .012; .00082 INJECTION, SOLUTION INTRAVENOUS at 19:04

## 2023-01-01 RX ADMIN — VASOPRESSIN 0.04 UNITS/MIN: 20 INJECTION INTRAVENOUS at 12:43

## 2023-01-01 RX ADMIN — ALBUMIN (HUMAN) 12.5 G: 12.5 INJECTION, SOLUTION INTRAVENOUS at 21:54

## 2023-01-01 RX ADMIN — HEPARIN SODIUM 5000 UNITS: 5000 INJECTION INTRAVENOUS; SUBCUTANEOUS at 22:53

## 2023-01-01 RX ADMIN — HEPARIN SODIUM 5000 UNITS: 5000 INJECTION INTRAVENOUS; SUBCUTANEOUS at 05:43

## 2023-01-01 RX ADMIN — NOREPINEPHRINE BITARTRATE 10 MCG/MIN: 1 INJECTION, SOLUTION, CONCENTRATE INTRAVENOUS at 19:38

## 2023-01-01 RX ADMIN — CHLORHEXIDINE GLUCONATE 15 ML: 1.2 SOLUTION ORAL at 21:15

## 2023-01-01 RX ADMIN — CHLORHEXIDINE GLUCONATE 15 ML: 1.2 SOLUTION ORAL at 22:00

## 2023-01-01 RX ADMIN — SODIUM CHLORIDE, SODIUM GLUCONATE, SODIUM ACETATE, POTASSIUM CHLORIDE, MAGNESIUM CHLORIDE, SODIUM PHOSPHATE, DIBASIC, AND POTASSIUM PHOSPHATE 1000 ML: .53; .5; .37; .037; .03; .012; .00082 INJECTION, SOLUTION INTRAVENOUS at 23:01

## 2023-01-01 RX ADMIN — HYDROMORPHONE HYDROCHLORIDE 0.5 MG: 1 INJECTION, SOLUTION INTRAMUSCULAR; INTRAVENOUS; SUBCUTANEOUS at 17:11

## 2023-01-01 RX ADMIN — NOREPINEPHRINE BITARTRATE 6 MCG/MIN: 1 INJECTION, SOLUTION, CONCENTRATE INTRAVENOUS at 10:19

## 2023-01-01 RX ADMIN — HYDROMORPHONE HYDROCHLORIDE 0.5 MG: 1 INJECTION, SOLUTION INTRAMUSCULAR; INTRAVENOUS; SUBCUTANEOUS at 04:54

## 2023-01-01 RX ADMIN — MEROPENEM 1000 MG: 1 INJECTION, POWDER, FOR SOLUTION INTRAVENOUS at 23:49

## 2023-01-01 RX ADMIN — VASOPRESSIN 0.04 UNITS/MIN: 20 INJECTION INTRAVENOUS at 21:54

## 2023-01-01 RX ADMIN — ALBUMIN (HUMAN) 12.5 G: 12.5 INJECTION, SOLUTION INTRAVENOUS at 00:17

## 2023-01-01 RX ADMIN — SODIUM CHLORIDE, SODIUM GLUCONATE, SODIUM ACETATE, POTASSIUM CHLORIDE, MAGNESIUM CHLORIDE, SODIUM PHOSPHATE, DIBASIC, AND POTASSIUM PHOSPHATE 100 ML/HR: .53; .5; .37; .037; .03; .012; .00082 INJECTION, SOLUTION INTRAVENOUS at 05:44

## 2023-01-01 RX ADMIN — SODIUM CHLORIDE, SODIUM GLUCONATE, SODIUM ACETATE, POTASSIUM CHLORIDE, MAGNESIUM CHLORIDE, SODIUM PHOSPHATE, DIBASIC, AND POTASSIUM PHOSPHATE 500 ML: .53; .5; .37; .037; .03; .012; .00082 INJECTION, SOLUTION INTRAVENOUS at 21:45

## 2023-01-01 RX ADMIN — HEPARIN SODIUM 5000 UNITS: 5000 INJECTION INTRAVENOUS; SUBCUTANEOUS at 05:09

## 2023-01-01 RX ADMIN — MAGNESIUM SULFATE HEPTAHYDRATE 2 G: 40 INJECTION, SOLUTION INTRAVENOUS at 17:56

## 2023-01-01 RX ADMIN — HYDROMORPHONE HYDROCHLORIDE 0.5 MG: 1 INJECTION, SOLUTION INTRAMUSCULAR; INTRAVENOUS; SUBCUTANEOUS at 19:32

## 2023-01-01 RX ADMIN — MEROPENEM 1000 MG: 1 INJECTION, POWDER, FOR SOLUTION INTRAVENOUS at 14:22

## 2023-01-01 RX ADMIN — DEXTROSE 40 ML/HR: 10 SOLUTION INTRAVENOUS at 08:37

## 2023-01-01 RX ADMIN — HEPARIN SODIUM 5000 UNITS: 5000 INJECTION INTRAVENOUS; SUBCUTANEOUS at 21:15

## 2023-01-01 RX ADMIN — NOREPINEPHRINE BITARTRATE 3 MCG/MIN: 1 INJECTION, SOLUTION, CONCENTRATE INTRAVENOUS at 12:00

## 2023-01-01 RX ADMIN — HYDROMORPHONE HYDROCHLORIDE 0.5 MG: 1 INJECTION, SOLUTION INTRAMUSCULAR; INTRAVENOUS; SUBCUTANEOUS at 16:06

## 2023-01-01 RX ADMIN — GLYCOPYRROLATE 0.2 MG: 0.2 INJECTION, SOLUTION INTRAMUSCULAR; INTRAVENOUS at 10:59

## 2023-01-01 RX ADMIN — LORAZEPAM 2 MG: 2 INJECTION INTRAMUSCULAR; INTRAVENOUS at 19:32

## 2023-01-01 RX ADMIN — ALBUMIN (HUMAN) 12.5 G: 12.5 INJECTION, SOLUTION INTRAVENOUS at 04:25

## 2023-01-01 RX ADMIN — HEPARIN SODIUM 5000 UNITS: 5000 INJECTION INTRAVENOUS; SUBCUTANEOUS at 14:09

## 2023-01-01 RX ADMIN — NOREPINEPHRINE BITARTRATE 5 MCG/MIN: 1 INJECTION, SOLUTION, CONCENTRATE INTRAVENOUS at 19:06

## 2023-01-01 RX ADMIN — HYDROMORPHONE HYDROCHLORIDE 0.5 MG: 1 INJECTION, SOLUTION INTRAMUSCULAR; INTRAVENOUS; SUBCUTANEOUS at 10:59

## 2023-01-01 RX ADMIN — SODIUM CHLORIDE, SODIUM GLUCONATE, SODIUM ACETATE, POTASSIUM CHLORIDE, MAGNESIUM CHLORIDE, SODIUM PHOSPHATE, DIBASIC, AND POTASSIUM PHOSPHATE 1000 ML: .53; .5; .37; .037; .03; .012; .00082 INJECTION, SOLUTION INTRAVENOUS at 17:07

## 2023-01-01 RX ADMIN — GLYCOPYRROLATE 0.2 MG: 0.2 INJECTION, SOLUTION INTRAMUSCULAR; INTRAVENOUS at 17:11

## 2023-01-01 RX ADMIN — HYDROMORPHONE HYDROCHLORIDE 0.5 MG: 1 INJECTION, SOLUTION INTRAMUSCULAR; INTRAVENOUS; SUBCUTANEOUS at 01:20

## 2023-01-01 RX ADMIN — LORAZEPAM 2 MG: 2 INJECTION INTRAMUSCULAR; INTRAVENOUS at 01:20

## 2023-01-01 RX ADMIN — HYDROMORPHONE HYDROCHLORIDE 0.5 MG: 1 INJECTION, SOLUTION INTRAMUSCULAR; INTRAVENOUS; SUBCUTANEOUS at 13:44

## 2023-01-01 RX ADMIN — VANCOMYCIN HYDROCHLORIDE 1000 MG: 1 INJECTION, SOLUTION INTRAVENOUS at 12:28

## 2023-01-05 ENCOUNTER — NURSING HOME VISIT (OUTPATIENT)
Dept: GERIATRICS | Facility: OTHER | Age: 68
End: 2023-01-05

## 2023-01-05 VITALS
TEMPERATURE: 97.2 F | RESPIRATION RATE: 18 BRPM | SYSTOLIC BLOOD PRESSURE: 123 MMHG | DIASTOLIC BLOOD PRESSURE: 72 MMHG | OXYGEN SATURATION: 96 % | HEART RATE: 82 BPM

## 2023-01-05 DIAGNOSIS — E11.65 TYPE 2 DIABETES MELLITUS WITH HYPERGLYCEMIA, WITH LONG-TERM CURRENT USE OF INSULIN (HCC): ICD-10-CM

## 2023-01-05 DIAGNOSIS — K21.9 GASTROESOPHAGEAL REFLUX DISEASE, UNSPECIFIED WHETHER ESOPHAGITIS PRESENT: Primary | ICD-10-CM

## 2023-01-05 DIAGNOSIS — Z79.4 TYPE 2 DIABETES MELLITUS WITH HYPERGLYCEMIA, WITH LONG-TERM CURRENT USE OF INSULIN (HCC): ICD-10-CM

## 2023-01-05 DIAGNOSIS — E03.9 ACQUIRED HYPOTHYROIDISM: ICD-10-CM

## 2023-01-05 DIAGNOSIS — I10 PRIMARY HYPERTENSION: ICD-10-CM

## 2023-01-05 NOTE — ASSESSMENT & PLAN NOTE
· BP has been stable, 123/72  · Continue to monitor BP  · Continue torsemide 40 mg daily  · Continue spironolactone 25 mg daily

## 2023-01-05 NOTE — ASSESSMENT & PLAN NOTE
Lab Results   Component Value Date    HGBA1C 11 8 (H) 09/19/2022   · History of diabetes  · Blood sugars have been trending 120-239  · Morning blood sugar 112  · Encourage diabetic diet   · Continue Accu-Cheks  · Continue empagliflozin 25 mg daily  · Continue Lantus insulin 30 units at HS  · Continue Humalog sliding scale insulin with meals

## 2023-01-05 NOTE — PROGRESS NOTES
[facility]  [address]  [phone]  Martha Thorne  Code 31 (STR)      NAME: Kb Oliveira  AGE: 75 y o  SEX: female CODE STATUS: CPR    DATE OF ENCOUNTER: 1/5/2023    Assessment and Plan     1  Gastroesophageal reflux disease, unspecified whether esophagitis present  Assessment & Plan:  · Currently denies reflux  · Continue omeprazole 20 mg daily      2  Type 2 diabetes mellitus with hyperglycemia, with long-term current use of insulin ([facility])  Assessment & Plan:    Lab Results   Component Value Date    HGBA1C 11 8 (H) 09/19/2022   · History of diabetes  · Blood sugars have been trending 120-239  · Morning blood sugar 112  · Encourage diabetic diet   · Continue Accu-Cheks  · Continue empagliflozin 25 mg daily  · Continue Lantus insulin 30 units at HS  · Continue Humalog sliding scale insulin with meals      3  Acquired hypothyroidism  Assessment & Plan:  · Last TSH 0 53  · Continue Levothyroxine 75 mcg daily        4  Primary hypertension  Assessment & Plan:  · BP has been stable, 123/72  · Continue to monitor BP  · Continue torsemide 40 mg daily  · Continue spironolactone 25 mg daily         All medications and routine orders were reviewed and updated as needed  Chief Complaint     STR follow up visit  Patient's care was coordinated with nursing facility staff  Recent vitals, labs, and updated medications were review on [facility] system in facility        Past Medical and Surgical History      Past Medical History:   Diagnosis Date   • CHF (congestive heart failure) ([facility] [address] )    • Diabetes mellitus ([facility] [address] )    • Disease of thyroid gland    • Hyperlipidemia    • Hypertension    • Hypothyroidism (acquired)    • Restless leg syndrome    • Seizures ([facility] [address] )      Past Surgical History:   Procedure Laterality Date   • APPENDECTOMY     • CHOLECYSTECTOMY     • HIP FRACTURE SURGERY Right    • TONSILLECTOMY     • TUBAL LIGATION       Allergies   Allergen Reactions   • Pineapple - Food Allergy Anaphylaxis   • Statins Other (See Comments)     Cramping from head to toe    • Bupropion Other (See Comments)   • Byetta 10 Mcg Pen [Exenatide]    • Ceftriaxone Other (See Comments)   • Marijuana [Dronabinol] GI Intolerance   • Metformin Diarrhea     Other reaction(s): Unknown   • Omeprazole Hives   • Ondansetron      Other reaction(s): Unknown   • Coconut Oil - Food Allergy Rash   • Liraglutide Rash and Other (See Comments)     Generalized rash   • Penicillins Rash          History of Present Illness     HPI  Liz Goodrich is a 70-year-old female, she is a STR patient of [facility] since 09/30/2022  Past Medical Hx including but not limited to depression, RLS, diabetes, CAD, hypothyroidism, GERD  She was seen in collaboration with nursing for medical management and STR follow up  Hospital Course:  Patient had been previously hospitalized 09/19/2022 for syncopal episode  During this hospitalization patient expressed that she was experiencing depression  Psychiatry followed patient during hospitalization  After discharge patient reports she did not take any of her medications and did not get out of bed for 1 week  Patient was readmitted to the hospital 09/27/2022 for generalized weakness  PT OT recommended short-term rehab  Rehab Course:  Patient was seen and examined at bedside today  Patient is a reliable historian and is alert and oriented x3  On exam patient is resting in bed and offers no complaints  She denies any pain  She says therapy is going well  She denies CP/SOB/N/V/D  She denies lightheadedness, dizziness, headaches, vision changes  Patient states she has been eating and drinking well  Denies any bowel or bladder issues  Per review of SNF records, Patient is eating 3 meals per day, consuming  %  Last documented BM 1/5/2023  No concerns from nursing at this time  The patient's allergies, past medical, surgical, social and family history were reviewed and unchanged      Review of Systems Review of Systems   Constitutional: Negative  Negative for appetite change, chills and fever  HENT: Negative  Negative for congestion  Respiratory: Negative  Negative for cough, shortness of breath and wheezing  Cardiovascular: Negative  Negative for chest pain, palpitations and leg swelling  Gastrointestinal: Negative  Negative for abdominal distention, abdominal pain, constipation, diarrhea, nausea and vomiting  Endocrine: Negative  Genitourinary: Negative  Negative for difficulty urinating and dysuria  Musculoskeletal: Positive for gait problem  Skin: Negative  Allergic/Immunologic: Negative  Neurological: Positive for weakness  Negative for dizziness, light-headedness and headaches  Hematological: Negative  Psychiatric/Behavioral: Negative  Negative for sleep disturbance  Objective     Vitals:   Vitals:    01/05/23 1559   BP: 123/72   Pulse: 82   Resp: 18   Temp: (!) 97 2 °F (36 2 °C)   SpO2: 96%         Physical Exam  Constitutional:       General: She is not in acute distress  Appearance: Normal appearance  She is not ill-appearing  HENT:      Head: Normocephalic and atraumatic  Nose: Nose normal  No congestion  Mouth/Throat:      Mouth: Mucous membranes are moist    Eyes:      Conjunctiva/sclera: Conjunctivae normal    Cardiovascular:      Rate and Rhythm: Normal rate and regular rhythm  Pulses: Normal pulses  Heart sounds: Normal heart sounds  Pulmonary:      Effort: Pulmonary effort is normal  No respiratory distress  Breath sounds: Normal breath sounds  No wheezing  Abdominal:      General: Bowel sounds are normal  There is no distension  Palpations: Abdomen is soft  Tenderness: There is no abdominal tenderness  Musculoskeletal:      Right lower leg: No edema  Left lower leg: No edema  Skin:     General: Skin is warm     Neurological:      Mental Status: She is alert and oriented to person, place, and time       Motor: Weakness present  Gait: Gait abnormal    Psychiatric:         Mood and Affect: Mood normal          Behavior: Behavior normal          Thought Content: Thought content normal          Judgment: Judgment normal          Pertinent Laboratory/Diagnostic Studies:   Reviewed in facility chart-stable      Current Medications   Medications reviewed and updated see facility [facility] for details  Current Outpatient Medications:   •  acetaminophen (TYLENOL) 325 mg tablet, Take 650 mg by mouth every 6 (six) hours as needed for mild pain (pain score 1-3)  (Patient not taking: No sig reported), Disp: , Rfl:   •  aspirin (ECOTRIN LOW STRENGTH) 81 mg EC tablet, Take 81 mg by mouth daily, Disp: , Rfl:   •  Cholecalciferol 25 MCG (1000 UT) capsule, Take 1,000 Units by mouth daily   (Patient not taking: No sig reported), Disp: , Rfl:   •  DULoxetine (CYMBALTA) 60 mg delayed release capsule, Take 1 capsule (60 mg total) by mouth daily Do not start before October 1, 2022 , Disp: , Rfl: 0  •  Empagliflozin 25 MG TABS, Take 25 mg by mouth every morning (Patient not taking: No sig reported), Disp: , Rfl:   •  Ergocalciferol (VITAMIN D2 PO), Take 50,000 Units/day by mouth 2 (two) times a week, Disp: , Rfl:   •  ezetimibe (ZETIA) 10 mg tablet, Take 1 tablet (10 mg total) by mouth daily (Patient not taking: Reported on 9/27/2022), Disp: 30 tablet, Rfl: 0  •  gabapentin (NEURONTIN) 100 mg capsule, Take 1 capsule (100 mg total) by mouth 2 (two) times a day Take 100 mg in the morning and 100 mg mid afternoon, Disp: 42 capsule, Rfl: 0  •  gabapentin (NEURONTIN) 300 mg capsule, Take 1 capsule (300 mg total) by mouth daily at bedtime, Disp: 21 capsule, Rfl: 0  •  hydrophilic ointment, Apply topically as needed for dry skin, Disp: , Rfl:   •  insulin glargine (LANTUS) 100 units/mL subcutaneous injection, Inject 30 Units under the skin daily at bedtime, Disp: 10 mL, Rfl: 0  •  insulin lispro (HumaLOG) 100 units/mL injection, Inject 1-5 Units under the skin 3 (three) times a day before meals, Disp: , Rfl: 0  •  levothyroxine 50 mcg tablet, Take 1 5 tablets (75 mcg total) by mouth daily, Disp: 45 tablet, Rfl: 0  •  melatonin 3 mg, Take 3 tablets (9 mg total) by mouth daily at bedtime, Disp: , Rfl: 0  •  nystatin (MYCOSTATIN) powder, Apply topically 2 (two) times a day, Disp: 15 g, Rfl: 0  •  omeprazole (PriLOSEC) 20 mg delayed release capsule, Take 20 mg by mouth daily, Disp: , Rfl:   •  potassium chloride (K-DUR,KLOR-CON) 20 mEq tablet, Take 1 tablet (20 mEq total) by mouth daily, Disp: 30 tablet, Rfl: 0  •  spironolactone (ALDACTONE) 25 mg tablet, Take 1 tablet (25 mg total) by mouth daily Do not start before October 3, 2022 , Disp: 30 tablet, Rfl: 0  •  topiramate (TOPAMAX) 100 mg tablet, Take 200 mg by mouth 2 (two) times a day , Disp: , Rfl:   •  torsemide (DEMADEX) 20 mg tablet, Take 2 tablets (40 mg total) by mouth daily Do not start before October 3, 2022 , Disp: 60 tablet, Rfl: 0  •  traZODone (DESYREL) 50 mg tablet, Take 25 mg by mouth daily at bedtime, Disp: , Rfl:        Please note:  Voice-recognition software may have been used in the preparation of this document  Occasional wrong word or "sound-alike" substitutions may have occurred due to the inherent limitations of voice recognition software  Interpretation should be guided by context           [facility] SAMIA Kay  1/5/2023  4:10 PM

## 2023-01-05 NOTE — ASSESSMENT & PLAN NOTE
Last TSH 0 53  · Continue Levothyroxine 75 mcg daily [id number]                                   Authorizing Provider:  Talat Gallegos DO Collected:           06/23/2020 03:50 PM          Ordering Location:     [facility]     Received:            06/24/2020 07:01 AM                                 [facility]                                                                 First Screen:          Sirnea Calderon                                                           

## 2023-01-09 ENCOUNTER — NURSING HOME VISIT (OUTPATIENT)
Dept: GERIATRICS | Facility: OTHER | Age: 68
End: 2023-01-09

## 2023-01-09 VITALS
HEART RATE: 69 BPM | TEMPERATURE: 97.6 F | SYSTOLIC BLOOD PRESSURE: 105 MMHG | OXYGEN SATURATION: 97 % | DIASTOLIC BLOOD PRESSURE: 59 MMHG | RESPIRATION RATE: 16 BRPM

## 2023-01-09 DIAGNOSIS — E03.9 ACQUIRED HYPOTHYROIDISM: ICD-10-CM

## 2023-01-09 DIAGNOSIS — I10 PRIMARY HYPERTENSION: ICD-10-CM

## 2023-01-09 DIAGNOSIS — K21.9 GASTROESOPHAGEAL REFLUX DISEASE, UNSPECIFIED WHETHER ESOPHAGITIS PRESENT: ICD-10-CM

## 2023-01-09 DIAGNOSIS — E11.65 TYPE 2 DIABETES MELLITUS WITH HYPERGLYCEMIA, WITH LONG-TERM CURRENT USE OF INSULIN (HCC): Primary | ICD-10-CM

## 2023-01-09 DIAGNOSIS — Z79.4 TYPE 2 DIABETES MELLITUS WITH HYPERGLYCEMIA, WITH LONG-TERM CURRENT USE OF INSULIN (HCC): Primary | ICD-10-CM

## 2023-01-09 NOTE — ASSESSMENT & PLAN NOTE
· BPs have been stable, trending 105//70   · On recent lab work creatinine was noted to be 1 82, increased from 1 42  · will hold torsemide and spironolactone  · Add 200 mL of water 3 times daily with med Pass x5 days  · Encourage adequate hydration/nutrition  · Continue to monitor BP  · Repeat BMP 1/12/2023

## 2023-01-09 NOTE — ASSESSMENT & PLAN NOTE
Lab Results   Component Value Date    HGBA1C 11 8 (H) 09/19/2022   · History of diabetes  · Blood sugars have been trending   · Morning blood sugar 102  · Encourage diabetic diet   · Continue Accu-Cheks  · Continue empagliflozin 25 mg daily  · Continue Lantus insulin 30 units at HS  · Continue Humalog sliding scale insulin with meals

## 2023-01-09 NOTE — PROGRESS NOTES
[facility]  [address]  [phone]  Staley Elietos  Code 31 (STR)        NAME: Gerhardt Canny  AGE: 75 y o  SEX: female CODE STATUS: CPR    DATE OF ENCOUNTER: 1/9/2023    Assessment and Plan     1  Type 2 diabetes mellitus with hyperglycemia, with long-term current use of insulin ([facility])  Assessment & Plan:    Lab Results   Component Value Date    HGBA1C 11 8 (H) 09/19/2022   · History of diabetes  · Blood sugars have been trending   · Morning blood sugar 102  · Encourage diabetic diet   · Continue Accu-Cheks  · Continue empagliflozin 25 mg daily  · Continue Lantus insulin 30 units at HS  · Continue Humalog sliding scale insulin with meals      2  Gastroesophageal reflux disease, unspecified whether esophagitis present  Assessment & Plan:  · Currently denies reflux  · Continue omeprazole 20 mg daily      3  Acquired hypothyroidism  Assessment & Plan:  · Last TSH 0 53  · Continue Levothyroxine 75 mcg daily        4  Primary hypertension  Assessment & Plan:  · BPs have been stable, trending 105//70   · On recent lab work creatinine was noted to be 1 82, increased from 1 42  · will hold torsemide and spironolactone  · Add 200 mL of water 3 times daily with med Pass x5 days  · Encourage adequate hydration/nutrition  · Continue to monitor BP  · Repeat BMP 1/12/2023         All medications and routine orders were reviewed and updated as needed  Chief Complaint     STR follow up visit  Patient's care was coordinated with nursing facility staff  Recent vitals, labs, and updated medications were review on [facility] system in facility        Past Medical and Surgical History      Past Medical History:   Diagnosis Date   • CHF (congestive heart failure) ([facility] [address] )    • Diabetes mellitus ([facility] [address] )    • Disease of thyroid gland    • Hyperlipidemia    • Hypertension    • Hypothyroidism (acquired)    • Restless leg syndrome    • Seizures ([facility] [address] )      Past Surgical History:   Procedure Laterality Date   • APPENDECTOMY     • CHOLECYSTECTOMY     • HIP FRACTURE SURGERY Right    • TONSILLECTOMY     • TUBAL LIGATION       Allergies   Allergen Reactions   • Pineapple - Food Allergy Anaphylaxis   • Statins Other (See Comments)     Cramping from head to toe    • Bupropion Other (See Comments)   • Byetta 10 Mcg Pen [Exenatide]    • Ceftriaxone Other (See Comments)   • Marijuana [Dronabinol] GI Intolerance   • Metformin Diarrhea     Other reaction(s): Unknown   • Omeprazole Hives   • Ondansetron      Other reaction(s): Unknown   • Coconut Oil - Food Allergy Rash   • Liraglutide Rash and Other (See Comments)     Generalized rash   • Penicillins Rash          History of Present Illness     HPI  Juan Fisher is a 58-year-old female, she is a STR patient of [facility] since 09/30/2022  Past Medical Hx including but not limited to depression, RLS, diabetes, CAD, hypothyroidism, GERD  She was seen in collaboration with nursing for medical management and STR follow up  Hospital Course:  Patient had been previously hospitalized 09/19/2022 for syncopal episode  During this hospitalization patient expressed that she was experiencing depression  Psychiatry followed patient during hospitalization  After discharge patient reports she did not take any of her medications and did not get out of bed for 1 week  Patient was readmitted to the hospital 09/27/2022 for generalized weakness  PT OT recommended short-term rehab  Rehab Course:  Patient was seen and examined at bedside today  Patient is a reliable historian and is alert and oriented x3  On exam patient is resting in bed and offers no complaints  She denies any pain and does not appear to be in any distress  She says therapy has been going well  On recent lab work patient's creatinine was noted to be 1 82, increased from 1 42, BP this morning was 105/59    Will hold torsemide and spironolactone, give 200 mL of H2O 3 times daily with med pass, repeat BMP 1/12/2023  She denies CP/SOB/N/V/D  She denies lightheadedness, dizziness, headaches, vision changes  Patient states she has been eating and drinking well  Denies any bowel or bladder issues  Per review of SNF records, Patient is eating 3 meals per day, consuming  %  Last documented BM 1/7/2023  No concerns from nursing at this time  The patient's allergies, past medical, surgical, social and family history were reviewed and unchanged  The patient's allergies, past medical, surgical, social and family history were reviewed and unchanged  Review of Systems     Review of Systems   Constitutional: Positive for activity change  Negative for appetite change, chills and fever  HENT: Negative  Negative for congestion  Eyes: Negative  Respiratory: Negative  Negative for cough, shortness of breath and wheezing  Cardiovascular: Positive for leg swelling  Negative for chest pain and palpitations  Trace bilateral lower extremity edema   Gastrointestinal: Negative for abdominal distention, abdominal pain, constipation, diarrhea, nausea and vomiting  Endocrine: Negative  Genitourinary: Negative  Negative for difficulty urinating and dysuria  Musculoskeletal: Positive for gait problem  Skin: Negative  Allergic/Immunologic: Negative  Neurological: Positive for weakness  Negative for dizziness, light-headedness and headaches  Hematological: Negative  Psychiatric/Behavioral: Negative  Negative for sleep disturbance  Objective     Vitals:   Vitals:    01/09/23 1107   BP: 105/59   Pulse: 69   Resp: 16   Temp: 97 6 °F (36 4 °C)   SpO2: 97%         Physical Exam  Vitals and nursing note reviewed  Constitutional:       General: She is not in acute distress  Appearance: Normal appearance  She is not ill-appearing  HENT:      Head: Normocephalic and atraumatic        Mouth/Throat:      Mouth: Mucous membranes are moist    Eyes:      Conjunctiva/sclera: Conjunctivae normal    Cardiovascular:      Rate and Rhythm: Normal rate and regular rhythm  Pulses: Normal pulses  Heart sounds: Normal heart sounds  Pulmonary:      Effort: Pulmonary effort is normal  No respiratory distress  Breath sounds: Normal breath sounds  No wheezing  Abdominal:      General: Bowel sounds are normal  There is no distension  Palpations: Abdomen is soft  Tenderness: There is no abdominal tenderness  Musculoskeletal:      Right lower leg: Edema present  Left lower leg: Edema present  Skin:     General: Skin is warm  Capillary Refill: Capillary refill takes more than 3 seconds  Neurological:      Mental Status: She is alert and oriented to person, place, and time  Motor: Weakness present  Gait: Gait abnormal    Psychiatric:         Mood and Affect: Mood normal          Behavior: Behavior normal          Pertinent Laboratory/Diagnostic Studies:   Reviewed in facility chart-stable      Current Medications   Medications reviewed and updated see facility [facility] for details  Current Outpatient Medications:   •  acetaminophen (TYLENOL) 325 mg tablet, Take 650 mg by mouth every 6 (six) hours as needed for mild pain (pain score 1-3)  (Patient not taking: No sig reported), Disp: , Rfl:   •  aspirin (ECOTRIN LOW STRENGTH) 81 mg EC tablet, Take 81 mg by mouth daily, Disp: , Rfl:   •  Cholecalciferol 25 MCG (1000 UT) capsule, Take 1,000 Units by mouth daily   (Patient not taking: No sig reported), Disp: , Rfl:   •  DULoxetine (CYMBALTA) 60 mg delayed release capsule, Take 1 capsule (60 mg total) by mouth daily Do not start before October 1, 2022 , Disp: , Rfl: 0  •  Empagliflozin 25 MG TABS, Take 25 mg by mouth every morning (Patient not taking: No sig reported), Disp: , Rfl:   •  Ergocalciferol (VITAMIN D2 PO), Take 50,000 Units/day by mouth 2 (two) times a week, Disp: , Rfl:   •  ezetimibe (ZETIA) 10 mg tablet, Take 1 tablet (10 mg total) by mouth daily (Patient not taking: Reported on 9/27/2022), Disp: 30 tablet, Rfl: 0  •  gabapentin (NEURONTIN) 100 mg capsule, Take 1 capsule (100 mg total) by mouth 2 (two) times a day Take 100 mg in the morning and 100 mg mid afternoon, Disp: 42 capsule, Rfl: 0  •  gabapentin (NEURONTIN) 300 mg capsule, Take 1 capsule (300 mg total) by mouth daily at bedtime, Disp: 21 capsule, Rfl: 0  •  hydrophilic ointment, Apply topically as needed for dry skin, Disp: , Rfl:   •  insulin glargine (LANTUS) 100 units/mL subcutaneous injection, Inject 30 Units under the skin daily at bedtime, Disp: 10 mL, Rfl: 0  •  insulin lispro (HumaLOG) 100 units/mL injection, Inject 1-5 Units under the skin 3 (three) times a day before meals, Disp: , Rfl: 0  •  levothyroxine 50 mcg tablet, Take 1 5 tablets (75 mcg total) by mouth daily, Disp: 45 tablet, Rfl: 0  •  melatonin 3 mg, Take 3 tablets (9 mg total) by mouth daily at bedtime, Disp: , Rfl: 0  •  nystatin (MYCOSTATIN) powder, Apply topically 2 (two) times a day, Disp: 15 g, Rfl: 0  •  omeprazole (PriLOSEC) 20 mg delayed release capsule, Take 20 mg by mouth daily, Disp: , Rfl:   •  potassium chloride (K-DUR,KLOR-CON) 20 mEq tablet, Take 1 tablet (20 mEq total) by mouth daily, Disp: 30 tablet, Rfl: 0  •  spironolactone (ALDACTONE) 25 mg tablet, Take 1 tablet (25 mg total) by mouth daily Do not start before October 3, 2022 , Disp: 30 tablet, Rfl: 0  •  topiramate (TOPAMAX) 100 mg tablet, Take 200 mg by mouth 2 (two) times a day , Disp: , Rfl:   •  torsemide (DEMADEX) 20 mg tablet, Take 2 tablets (40 mg total) by mouth daily Do not start before October 3, 2022 , Disp: 60 tablet, Rfl: 0  •  traZODone (DESYREL) 50 mg tablet, Take 25 mg by mouth daily at bedtime, Disp: , Rfl:        Please note:  Voice-recognition software may have been used in the preparation of this document  Occasional wrong word or "sound-alike" substitutions may have occurred due to the inherent limitations of voice recognition software  Interpretation should be guided by [facility] SAMIA Kay  1/9/2023  4:16 PM

## 2023-01-19 ENCOUNTER — NURSING HOME VISIT (OUTPATIENT)
Dept: GERIATRICS | Facility: OTHER | Age: 68
End: 2023-01-19

## 2023-01-19 VITALS
RESPIRATION RATE: 18 BRPM | DIASTOLIC BLOOD PRESSURE: 74 MMHG | TEMPERATURE: 97.9 F | HEART RATE: 69 BPM | SYSTOLIC BLOOD PRESSURE: 122 MMHG | OXYGEN SATURATION: 98 %

## 2023-01-19 DIAGNOSIS — E11.65 TYPE 2 DIABETES MELLITUS WITH HYPERGLYCEMIA, WITH LONG-TERM CURRENT USE OF INSULIN (HCC): ICD-10-CM

## 2023-01-19 DIAGNOSIS — I50.32 CHRONIC HEART FAILURE WITH PRESERVED EJECTION FRACTION (HCC): ICD-10-CM

## 2023-01-19 DIAGNOSIS — Z79.4 TYPE 2 DIABETES MELLITUS WITH HYPERGLYCEMIA, WITH LONG-TERM CURRENT USE OF INSULIN (HCC): ICD-10-CM

## 2023-01-19 DIAGNOSIS — N17.9 AKI (ACUTE KIDNEY INJURY) (HCC): Primary | ICD-10-CM

## 2023-01-19 DIAGNOSIS — I10 PRIMARY HYPERTENSION: ICD-10-CM

## 2023-01-19 DIAGNOSIS — K21.9 GASTROESOPHAGEAL REFLUX DISEASE, UNSPECIFIED WHETHER ESOPHAGITIS PRESENT: ICD-10-CM

## 2023-01-19 NOTE — ASSESSMENT & PLAN NOTE
· BPs have been stable,122/74  · Continue to monitor BP  · Torsemide and spironolactone held for REGINO, cr 2 03

## 2023-01-19 NOTE — ASSESSMENT & PLAN NOTE
· Patients CR on 1/6/23 was 1 82, torsemide and spironolactone was held, SNF saff was instructed to give extra 200 ml H2O TID with med pass  · Cr on 1/12/23 was 1 65/ BUN 37  · Repeat labs 1/19/23 Cr was 2 03/BUN 53, K+ 5 9, Na 134    · Ordered to place peripheral IV line, give 1/2 NS at 70/hour x 3 liters  · Bladder scan Q shift x 3, call for greater than 350 mls  · VS q shift X 3 days and document  · D/c oral potassium  · Repeat BMP 1/23/23  · Encourage PO hydration  · Continue to monitor

## 2023-01-19 NOTE — ASSESSMENT & PLAN NOTE
Lab Results   Component Value Date    HGBA1C 11 8 (H) 09/19/2022   · History of diabetes  · Blood sugars have been stable, morning BS 75  · Encourage diabetic diet   · Continue Accu-Cheks  · Continue empagliflozin 25 mg daily  · Continue Lantus insulin 30 units at HS  · Continue Humalog sliding scale insulin with meals

## 2023-01-19 NOTE — ASSESSMENT & PLAN NOTE
Wt Readings from Last 3 Encounters:   11/17/22 123 kg (270 lb 12 8 oz)   11/15/22 123 kg (270 lb 12 8 oz)   11/06/22 123 kg (270 lb 12 8 oz)   · weights have been stable  · No edema on exam  · torsemide and spironolactone held for REGINO, Cr 2 03  · Continue to monitor

## 2023-01-19 NOTE — PROGRESS NOTES
[facility]  [address]  [phone]  Christel Mix  27 ([facility])  Acute Visit      NAME: Gabrielle Mckeon  AGE: 75 y o  SEX: female CODE STATUS: CPR    DATE OF ENCOUNTER: 1/19/2023    Assessment and Plan     1  REGINO (acute kidney injury) ([facility] [address] )  Assessment & Plan:  · Patients CR on 1/6/23 was 1 82, torsemide and spironolactone was held, SNF saff was instructed to give extra 200 ml H2O TID with med pass  · Cr on 1/12/23 was 1 65/ BUN 37  · Repeat labs 1/19/23 Cr was 2 03/BUN 53, K+ 5 9, Na 134  · Ordered to place peripheral IV line, give 1/2 NS at 70/hour x 3 liters  · Bladder scan Q shift x 3, call for greater than 350 mls  · VS q shift X 3 days and document  · D/c oral potassium  · Repeat BMP 1/23/23  · Encourage PO hydration  · Continue to monitor      2  Chronic heart failure with preserved ejection fraction (HCC)  Assessment & Plan:  Wt Readings from Last 3 Encounters:   11/17/22 123 kg (270 lb 12 8 oz)   11/15/22 123 kg (270 lb 12 8 oz)   11/06/22 123 kg (270 lb 12 8 oz)   · weights have been stable  · No edema on exam  · torsemide and spironolactone held for REGINO, Cr 2 03  · Continue to monitor            3  Type 2 diabetes mellitus with hyperglycemia, with long-term current use of insulin ([facility])  Assessment & Plan:    Lab Results   Component Value Date    HGBA1C 11 8 (H) 09/19/2022   · History of diabetes  · Blood sugars have been stable, morning BS 75  · Encourage diabetic diet   · Continue Accu-Cheks  · Continue empagliflozin 25 mg daily  · Continue Lantus insulin 30 units at HS  · Continue Humalog sliding scale insulin with meals      4  Gastroesophageal reflux disease, unspecified whether esophagitis present  Assessment & Plan:  · Currently denies reflux  · Continue omeprazole 20 mg daily      5   Primary hypertension  Assessment & Plan:  · BPs have been stable,122/74  · Continue to monitor BP  · Torsemide and spironolactone held for REGINO, cr 2 03           All medications and routine orders were reviewed and updated as needed  Chief Complaint   Acute visit for REGINO  Patient's care was coordinated with nursing facility staff  Recent vitals, labs, and updated medications were review on [facility] system in facility  Past Medical and Surgical History      Past Medical History:   Diagnosis Date   • CHF (congestive heart failure) ([facility] )    • Diabetes mellitus ([facility] )    • Disease of thyroid gland    • Hyperlipidemia    • Hypertension    • Hypothyroidism (acquired)    • Restless leg syndrome    • Seizures ([facility] )      Past Surgical History:   Procedure Laterality Date   • APPENDECTOMY     • CHOLECYSTECTOMY     • HIP FRACTURE SURGERY Right    • TONSILLECTOMY     • TUBAL LIGATION       Allergies   Allergen Reactions   • Pineapple - Food Allergy Anaphylaxis   • Statins Other (See Comments)     Cramping from head to toe    • Bupropion Other (See Comments)   • Byetta 10 Mcg Pen [Exenatide]    • Ceftriaxone Other (See Comments)   • Marijuana [Dronabinol] GI Intolerance   • Metformin Diarrhea     Other reaction(s): Unknown   • Omeprazole Hives   • Ondansetron      Other reaction(s): Unknown   • Coconut Oil - Food Allergy Rash   • Liraglutide Rash and Other (See Comments)     Generalized rash   • Penicillins Rash          History of Present Illness     HPI  Maira Chicas is a 62-year-old female, she is a LTC resident of [facility] since 09/30/2022  Past Medical Hx including but not limited to depression, RLS, diabetes, CAD, hypothyroidism, GERD  She was seen in collaboration with nursing for medical management and Acute visit for REGINO  Hospital Course:  Patient had been previously hospitalized 09/19/2022 for syncopal episode  During this hospitalization patient expressed that she was experiencing depression  Psychiatry followed patient during hospitalization  After discharge patient reports she did not take any of her medications and did not get out of bed for 1 week   Patient was readmitted to the hospital 09/27/2022 for generalized weakness  PT OT recommended short-term rehab  Patient completed STR and transitioned to LTC  Patient was seen and examined at bedside today  On exam patient is sleepy but arousable  On 1/6/23 patient's creatinine was noted to be 1 82, increased from 1 42  Torsemide and spironolactone were held, 200 mL of H2O 3 times daily with med pass was ordered  Repeat BMP 1/12/2023 showed Cr of 1 65/ BUN 37  BMP was repeated 1/19/23 showing Cr had increased to 2 03/BUN 53, potassium 5 9, Na 134  Ordered peripheral IV placement with ½ NS at 70/hour x 3 liters, d/c oral potassium, VS Q shift x 3 days, Bladder scan Q shift x 3, call for greater than 350 mls, repeat BMP 1/23/23  Continue to monitor  She denies CP/SOB/N/V/D  She denies lightheadedness, dizziness, headaches, vision changes  Denies any bowel or bladder issues  Per review of SNF records, Patient is eating 3 meals per day, consuming 50-75 %  Last documented BM 1/19/2023  No concerns from nursing at this time  The patient's allergies, past medical, surgical, social and family history were reviewed and unchanged  Review of Systems     Review of Systems   Constitutional: Positive for activity change  Negative for fever  HENT: Negative for congestion  Eyes: Negative  Respiratory: Negative  Negative for cough, shortness of breath and wheezing  Cardiovascular: Negative  Negative for chest pain, palpitations and leg swelling  Gastrointestinal: Negative for abdominal distention, abdominal pain, constipation, diarrhea, nausea and vomiting  Endocrine: Negative  Genitourinary: Negative  Negative for difficulty urinating and dysuria  Musculoskeletal: Positive for gait problem  Skin: Negative  Allergic/Immunologic: Negative  Neurological: Positive for weakness  Negative for dizziness, facial asymmetry, speech difficulty, light-headedness and headaches     Psychiatric/Behavioral: Negative for sleep disturbance  Objective     Vitals:   Vitals:    01/19/23 1640   BP: 122/74   Pulse: 69   Resp: 18   Temp: 97 9 °F (36 6 °C)   SpO2: 98%         Physical Exam  Vitals and nursing note reviewed  Constitutional:       General: She is sleeping  She is not in acute distress  HENT:      Head: Normocephalic and atraumatic  Nose: No congestion  Mouth/Throat:      Mouth: Mucous membranes are moist    Eyes:      Conjunctiva/sclera: Conjunctivae normal    Cardiovascular:      Rate and Rhythm: Normal rate and regular rhythm  Pulses: Normal pulses  Heart sounds: Normal heart sounds  Pulmonary:      Effort: Pulmonary effort is normal  No respiratory distress  Breath sounds: Normal breath sounds  No wheezing  Abdominal:      General: Bowel sounds are normal  There is no distension  Palpations: Abdomen is soft  Tenderness: There is no abdominal tenderness  Musculoskeletal:      Right lower leg: No edema  Left lower leg: No edema  Skin:     General: Skin is warm  Capillary Refill: Capillary refill takes more than 3 seconds  Neurological:      Mental Status: She is oriented to person, place, and time and easily aroused  Motor: Weakness present  Gait: Gait abnormal    Psychiatric:         Behavior: Behavior normal          Pertinent Laboratory/Diagnostic Studies:   Reviewed in facility chart-stable      Current Medications   Medications reviewed and updated see facility [facility] for details  Current Outpatient Medications:   •  acetaminophen (TYLENOL) 325 mg tablet, Take 650 mg by mouth every 6 (six) hours as needed for mild pain (pain score 1-3)  , Disp: , Rfl:   •  Cholecalciferol 25 MCG (1000 UT) capsule, Take 1,000 Units by mouth daily  , Disp: , Rfl:   •  Empagliflozin 25 MG TABS, Take 25 mg by mouth every morning, Disp: , Rfl:   •  ezetimibe (ZETIA) 10 mg tablet, Take 1 tablet (10 mg total) by mouth daily, Disp: 30 tablet, Rfl: 0  •  aspirin (Diannia Argue LOW STRENGTH) 81 mg EC tablet, Take 81 mg by mouth daily, Disp: , Rfl:   •  DULoxetine (CYMBALTA) 60 mg delayed release capsule, Take 1 capsule (60 mg total) by mouth daily Do not start before October 1, 2022 , Disp: , Rfl: 0  •  Ergocalciferol (VITAMIN D2 PO), Take 50,000 Units/day by mouth 2 (two) times a week, Disp: , Rfl:   •  gabapentin (NEURONTIN) 100 mg capsule, Take 1 capsule (100 mg total) by mouth 2 (two) times a day Take 100 mg in the morning and 100 mg mid afternoon, Disp: 42 capsule, Rfl: 0  •  gabapentin (NEURONTIN) 300 mg capsule, Take 1 capsule (300 mg total) by mouth daily at bedtime, Disp: 21 capsule, Rfl: 0  •  hydrophilic ointment, Apply topically as needed for dry skin, Disp: , Rfl:   •  insulin glargine (LANTUS) 100 units/mL subcutaneous injection, Inject 30 Units under the skin daily at bedtime, Disp: 10 mL, Rfl: 0  •  insulin lispro (HumaLOG) 100 units/mL injection, Inject 1-5 Units under the skin 3 (three) times a day before meals, Disp: , Rfl: 0  •  levothyroxine 50 mcg tablet, Take 1 5 tablets (75 mcg total) by mouth daily, Disp: 45 tablet, Rfl: 0  •  melatonin 3 mg, Take 3 tablets (9 mg total) by mouth daily at bedtime, Disp: , Rfl: 0  •  nystatin (MYCOSTATIN) powder, Apply topically 2 (two) times a day, Disp: 15 g, Rfl: 0  •  omeprazole (PriLOSEC) 20 mg delayed release capsule, Take 20 mg by mouth daily, Disp: , Rfl:   •  topiramate (TOPAMAX) 100 mg tablet, Take 200 mg by mouth 2 (two) times a day , Disp: , Rfl:   •  traZODone (DESYREL) 50 mg tablet, Take 25 mg by mouth daily at bedtime, Disp: , Rfl:        Please note:  Voice-recognition software may have been used in the preparation of this document  Occasional wrong word or "sound-alike" substitutions may have occurred due to the inherent limitations of voice recognition software  Interpretation should be guided by context           [facility] SAMIA Kay  1/19/2023  4:59 PM

## 2023-01-20 ENCOUNTER — NURSING HOME VISIT (OUTPATIENT)
Dept: GERIATRICS | Facility: OTHER | Age: 68
End: 2023-01-20

## 2023-01-20 VITALS
HEART RATE: 70 BPM | TEMPERATURE: 97.9 F | OXYGEN SATURATION: 96 % | DIASTOLIC BLOOD PRESSURE: 72 MMHG | RESPIRATION RATE: 18 BRPM | SYSTOLIC BLOOD PRESSURE: 121 MMHG

## 2023-01-20 DIAGNOSIS — I50.32 CHRONIC HEART FAILURE WITH PRESERVED EJECTION FRACTION (HCC): ICD-10-CM

## 2023-01-20 DIAGNOSIS — Z79.4 TYPE 2 DIABETES MELLITUS WITH HYPERGLYCEMIA, WITH LONG-TERM CURRENT USE OF INSULIN (HCC): ICD-10-CM

## 2023-01-20 DIAGNOSIS — E11.65 TYPE 2 DIABETES MELLITUS WITH HYPERGLYCEMIA, WITH LONG-TERM CURRENT USE OF INSULIN (HCC): ICD-10-CM

## 2023-01-20 DIAGNOSIS — N17.9 AKI (ACUTE KIDNEY INJURY) (HCC): Primary | ICD-10-CM

## 2023-01-20 DIAGNOSIS — I10 PRIMARY HYPERTENSION: ICD-10-CM

## 2023-01-20 NOTE — ASSESSMENT & PLAN NOTE
· Patient's torsemide and spironolactone were held for creatinine of 1 83 on 1/6/2023, patient was given additional oral fluids with each med Pass  ·  Cr on 1/12/23 was 1 65/ BUN 37  · Repeat labs 1/19/23, Cr was 2 03/BUN 53, K+ 5 9, Na 134    · Patient receiving 1/2 NS at 70 an hour x3 L  · Bladder scan Q shift x 3, call for greater than 350 mls, bladder scan was 137  · VS q shift X 3 days and document  · Repeat BMP 1/23/23  · Encourage PO hydration  · Continue to monitor

## 2023-01-20 NOTE — ASSESSMENT & PLAN NOTE
Lab Results   Component Value Date    HGBA1C 11 8 (H) 09/19/2022   · History of diabetes  · Blood sugars have been stable, morning   · Continue Accu-Cheks  · Continue empagliflozin 25 mg daily  · Continue Lantus insulin 30 units at HS  · Continue Humalog sliding scale insulin with meals  · Encourage diabetic diet

## 2023-01-20 NOTE — PROGRESS NOTES
[facility]  Aleida Zavala 78  [phone]  [facility]  Code 31 (STR)  Acute visit        NAME: Marshall Orellana  AGE: 75 y o  SEX: female CODE STATUS: CPR    DATE OF ENCOUNTER: 1/20/2023    Assessment and Plan     1  REGINO (acute kidney injury) ([facility] [address] )  Assessment & Plan:  · Patient's torsemide and spironolactone were held for creatinine of 1 83 on 1/6/2023, patient was given additional oral fluids with each med Pass  ·  Cr on 1/12/23 was 1 65/ BUN 37  · Repeat labs 1/19/23, Cr was 2 03/BUN 53, K+ 5 9, Na 134  · Patient receiving 1/2 NS at 70 an hour x3 L  · Bladder scan Q shift x 3, call for greater than 350 mls, bladder scan was 137  · VS q shift X 3 days and document  · Repeat BMP 1/23/23  · Encourage PO hydration  · Continue to monitor      2  Primary hypertension  Assessment & Plan:  · BPs have been stable,121/72  · Continue to monitor BP  · Torsemide and spironolactone held for REGINO, cr 2 03  · CBC, BMP 1/23/2023        3  Type 2 diabetes mellitus with hyperglycemia, with long-term current use of insulin ([facility])  Assessment & Plan:    Lab Results   Component Value Date    HGBA1C 11 8 (H) 09/19/2022   · History of diabetes  · Blood sugars have been stable, morning   · Continue Accu-Cheks  · Continue empagliflozin 25 mg daily  · Continue Lantus insulin 30 units at HS  · Continue Humalog sliding scale insulin with meals  · Encourage diabetic diet      4  Chronic heart failure with preserved ejection fraction [facility])  Assessment & Plan:  Wt Readings from Last 3 Encounters:   11/17/22 123 kg (270 lb 12 8 oz)   11/15/22 123 kg (270 lb 12 8 oz)   11/06/22 123 kg (270 lb 12 8 oz)   · weights have been stable  · No edema on exam  · torsemide and spironolactone held for REGINO, Cr 2 03  · Continue to monitor               All medications and routine orders were reviewed and updated as needed  Chief Complaint   Acute Visit  Patient's care was coordinated with nursing facility staff   Recent vitals, labs, and updated medications were review on [facility] system in facility  Past Medical and Surgical History      Past Medical History:   Diagnosis Date   • CHF (congestive heart failure) ([facility] )    • Diabetes mellitus ([facility] )    • Disease of thyroid gland    • Hyperlipidemia    • Hypertension    • Hypothyroidism (acquired)    • Restless leg syndrome    • Seizures ([facility] )      Past Surgical History:   Procedure Laterality Date   • APPENDECTOMY     • CHOLECYSTECTOMY     • HIP FRACTURE SURGERY Right    • TONSILLECTOMY     • TUBAL LIGATION       Allergies   Allergen Reactions   • Pineapple - Food Allergy Anaphylaxis   • Statins Other (See Comments)     Cramping from head to toe    • Bupropion Other (See Comments)   • Byetta 10 Mcg Pen [Exenatide]    • Ceftriaxone Other (See Comments)   • Marijuana [Dronabinol] GI Intolerance   • Metformin Diarrhea     Other reaction(s): Unknown   • Omeprazole Hives   • Ondansetron      Other reaction(s): Unknown   • Coconut Oil - Food Allergy Rash   • Liraglutide Rash and Other (See Comments)     Generalized rash   • Penicillins Rash          History of Present Illness     HPI  José Hernandez is a 79-year-old female, she is a LTC resident of [facility] since 09/30/2022  Past Medical Hx including but not limited to depression, RLS, diabetes, CAD, hypothyroidism, GERD  She was seen in collaboration with nursing for medical management and Acute visit for REGINO  Hospital Course:  Patient had been previously hospitalized 09/19/2022 for syncopal episode  During this hospitalization patient expressed that she was experiencing depression  Psychiatry followed patient during hospitalization  After discharge patient reports she did not take any of her medications and did not get out of bed for 1 week  Patient was readmitted to the hospital 09/27/2022 for generalized weakness  PT OT recommended short-term rehab  Patient completed STR and transitioned to LTC     Patient was seen and examined while on her way to therapy  Patient is more awake and alert today  She says she is feeling a little better today  Patient receiving 1/2 NS x3 L  Repeat CBC, BMP 1/23/2023  Patient denies abdominal pain, difficulty urinating  Continue to encourage p o  fluids  Monitor for S/S of fluid overload  She denies CP/SOB/N/V/D  She denies lightheadedness, dizziness, headaches, vision changes  Denies any bowel or bladder issues  Per review of SNF records, Patient is eating 3 meals per day, consuming 50-75 %  Last documented BM 1/19/2023  No concerns from nursing at this time  The patient's allergies, past medical, surgical, social and family history were reviewed and unchanged  Review of Systems     Review of Systems   Constitutional: Positive for activity change and fatigue  Negative for appetite change, chills and fever  HENT: Negative  Negative for congestion  Eyes: Negative  Respiratory: Negative  Negative for cough, shortness of breath and wheezing  Cardiovascular: Negative  Negative for chest pain, palpitations and leg swelling  Gastrointestinal: Negative  Negative for abdominal distention, abdominal pain, constipation, diarrhea, nausea and vomiting  Endocrine: Negative  Genitourinary: Negative  Negative for difficulty urinating and dysuria  Musculoskeletal: Positive for gait problem  Skin: Negative  Allergic/Immunologic: Negative  Neurological: Positive for weakness  Negative for dizziness, light-headedness and headaches  Hematological: Negative  Psychiatric/Behavioral: Negative  Negative for sleep disturbance  Objective     Vitals:   Vitals:    01/20/23 1023   BP: 121/72   Pulse: 70   Resp: 18   Temp: 97 9 °F (36 6 °C)   SpO2: 96%         Physical Exam  Vitals and nursing note reviewed  Constitutional:       General: She is not in acute distress  Appearance: Normal appearance  She is not ill-appearing     HENT:      Head: Normocephalic and atraumatic  Nose: No congestion  Mouth/Throat:      Mouth: Mucous membranes are moist    Eyes:      Conjunctiva/sclera: Conjunctivae normal    Cardiovascular:      Rate and Rhythm: Normal rate and regular rhythm  Heart sounds: Normal heart sounds  Pulmonary:      Effort: No respiratory distress  Breath sounds: Normal breath sounds  No wheezing  Abdominal:      General: Bowel sounds are normal  There is no distension  Palpations: Abdomen is soft  Tenderness: There is no abdominal tenderness  Musculoskeletal:      Right lower leg: No edema  Left lower leg: No edema  Skin:     General: Skin is warm  Capillary Refill: Capillary refill takes more than 3 seconds  Neurological:      Mental Status: She is alert and oriented to person, place, and time  Motor: Weakness present  Gait: Gait abnormal    Psychiatric:         Mood and Affect: Mood normal          Behavior: Behavior normal          Pertinent Laboratory/Diagnostic Studies:   Reviewed in facility chart-stable      Current Medications   Medications reviewed and updated see facility [facility] for details  Current Outpatient Medications:   •  acetaminophen (TYLENOL) 325 mg tablet, Take 650 mg by mouth every 6 (six) hours as needed for mild pain (pain score 1-3)  , Disp: , Rfl:   •  aspirin (ECOTRIN LOW STRENGTH) 81 mg EC tablet, Take 81 mg by mouth daily, Disp: , Rfl:   •  Cholecalciferol 25 MCG (1000 UT) capsule, Take 1,000 Units by mouth daily  , Disp: , Rfl:   •  DULoxetine (CYMBALTA) 60 mg delayed release capsule, Take 1 capsule (60 mg total) by mouth daily Do not start before October 1, 2022 , Disp: , Rfl: 0  •  Empagliflozin 25 MG TABS, Take 25 mg by mouth every morning, Disp: , Rfl:   •  Ergocalciferol (VITAMIN D2 PO), Take 50,000 Units/day by mouth 2 (two) times a week, Disp: , Rfl:   •  ezetimibe (ZETIA) 10 mg tablet, Take 1 tablet (10 mg total) by mouth daily, Disp: 30 tablet, Rfl: 0  •  gabapentin (NEURONTIN) 100 mg capsule, Take 1 capsule (100 mg total) by mouth 2 (two) times a day Take 100 mg in the morning and 100 mg mid afternoon, Disp: 42 capsule, Rfl: 0  •  gabapentin (NEURONTIN) 300 mg capsule, Take 1 capsule (300 mg total) by mouth daily at bedtime, Disp: 21 capsule, Rfl: 0  •  hydrophilic ointment, Apply topically as needed for dry skin, Disp: , Rfl:   •  insulin glargine (LANTUS) 100 units/mL subcutaneous injection, Inject 30 Units under the skin daily at bedtime, Disp: 10 mL, Rfl: 0  •  insulin lispro (HumaLOG) 100 units/mL injection, Inject 1-5 Units under the skin 3 (three) times a day before meals, Disp: , Rfl: 0  •  levothyroxine 50 mcg tablet, Take 1 5 tablets (75 mcg total) by mouth daily, Disp: 45 tablet, Rfl: 0  •  melatonin 3 mg, Take 3 tablets (9 mg total) by mouth daily at bedtime, Disp: , Rfl: 0  •  nystatin (MYCOSTATIN) powder, Apply topically 2 (two) times a day, Disp: 15 g, Rfl: 0  •  omeprazole (PriLOSEC) 20 mg delayed release capsule, Take 20 mg by mouth daily, Disp: , Rfl:   •  topiramate (TOPAMAX) 100 mg tablet, Take 200 mg by mouth 2 (two) times a day , Disp: , Rfl:   •  traZODone (DESYREL) 50 mg tablet, Take 25 mg by mouth daily at bedtime, Disp: , Rfl:        Please note:  Voice-recognition software may have been used in the preparation of this document  Occasional wrong word or "sound-alike" substitutions may have occurred due to the inherent limitations of voice recognition software  Interpretation should be guided by context           [facility] SAMIA Kay  1/20/2023  6:20 PM

## 2023-01-20 NOTE — ASSESSMENT & PLAN NOTE
· BPs have been stable,121/72  · Continue to monitor BP  · Torsemide and spironolactone held for REGINO, cr 2 03  · CBC, BMP 1/23/2023

## 2023-01-23 ENCOUNTER — NURSING HOME VISIT (OUTPATIENT)
Dept: GERIATRICS | Facility: OTHER | Age: 68
End: 2023-01-23

## 2023-01-23 VITALS
DIASTOLIC BLOOD PRESSURE: 72 MMHG | TEMPERATURE: 97.9 F | HEART RATE: 70 BPM | SYSTOLIC BLOOD PRESSURE: 126 MMHG | RESPIRATION RATE: 18 BRPM | OXYGEN SATURATION: 96 %

## 2023-01-23 DIAGNOSIS — N17.9 AKI (ACUTE KIDNEY INJURY) (HCC): Primary | ICD-10-CM

## 2023-01-23 DIAGNOSIS — E11.65 TYPE 2 DIABETES MELLITUS WITH HYPERGLYCEMIA, WITH LONG-TERM CURRENT USE OF INSULIN (HCC): ICD-10-CM

## 2023-01-23 DIAGNOSIS — Z79.4 TYPE 2 DIABETES MELLITUS WITH HYPERGLYCEMIA, WITH LONG-TERM CURRENT USE OF INSULIN (HCC): ICD-10-CM

## 2023-01-23 DIAGNOSIS — K21.9 GASTROESOPHAGEAL REFLUX DISEASE, UNSPECIFIED WHETHER ESOPHAGITIS PRESENT: ICD-10-CM

## 2023-01-23 DIAGNOSIS — I10 PRIMARY HYPERTENSION: ICD-10-CM

## 2023-01-23 DIAGNOSIS — I50.32 CHRONIC HEART FAILURE WITH PRESERVED EJECTION FRACTION (HCC): ICD-10-CM

## 2023-01-23 NOTE — ASSESSMENT & PLAN NOTE
· BPs have been stable,126/72  · Continue to monitor BP  · Torsemide and spironolactone held for REGINO, cr 2 03  · CBC, BMP 1/23/2023 awaiting results

## 2023-01-23 NOTE — ASSESSMENT & PLAN NOTE
Lab Results   Component Value Date    HGBA1C 11 8 (H) 09/19/2022   · History of diabetes  · Blood sugars have been stable, morning BS 92  · Continue Accu-Cheks  · Continue empagliflozin 25 mg daily  · Continue Lantus insulin 30 units at HS  · Continue Humalog sliding scale insulin with meals  · Encourage diabetic diet

## 2023-01-23 NOTE — PROGRESS NOTES
[facility]  Aleida Zavala 78  [phone]  [facility]  Code [facility] ([facility]        NAME: Gabrielle Mckeon  AGE: 75 y o  SEX: female CODE STATUS: CPR    DATE OF ENCOUNTER: 1/23/2023    Assessment and Plan     1  REGINO (acute kidney injury) ([facility] [address] )  Assessment & Plan:  · Patient's torsemide and spironolactone were held for creatinine of 1 83 on 1/6/2023, patient was given additional oral fluids with each med Pass  ·  Cr on 1/12/23 was 1 65/ BUN 37  · Repeat labs 1/19/23, Cr was 2 03/BUN 53, K+ 5 9, Na 134  · Patient receiving 1/2 NS at 70 an hour x3 L  · Bladder scan Q shift x 3, call for greater than 350 mls, bladder scan was 137  · VS q shift X 3 days and document  · Repeat BMP 1/23/23, awaiting results  · Patient reports feeling better today  · Encourage PO hydration  · Continue to monitor      2  Primary hypertension  Assessment & Plan:  · BPs have been stable,126/72  · Continue to monitor BP  · Torsemide and spironolactone held for REGINO, cr 2 03  · CBC, BMP 1/23/2023 awaiting results        3  Chronic heart failure with preserved ejection fraction ([facility])  Assessment & Plan:  Wt Readings from Last 3 Encounters:   11/17/22 123 kg (270 lb 12 8 oz)   11/15/22 123 kg (270 lb 12 8 oz)   11/06/22 123 kg (270 lb 12 8 oz)   · weights have been stable  · No edema on exam  · torsemide and spironolactone held for REGINO, Cr 2 03  · Continue to monitor            4  Type 2 diabetes mellitus with hyperglycemia, with long-term current use of insulin ([facility])  Assessment & Plan:    Lab Results   Component Value Date    HGBA1C 11 8 (H) 09/19/2022   · History of diabetes  · Blood sugars have been stable, morning BS 92  · Continue Accu-Cheks  · Continue empagliflozin 25 mg daily  · Continue Lantus insulin 30 units at HS  · Continue Humalog sliding scale insulin with meals  · Encourage diabetic diet      5   Gastroesophageal reflux disease, unspecified whether esophagitis present  Assessment & Plan:  · Currently denies reflux  · Continue omeprazole 20 mg daily         All medications and routine orders were reviewed and updated as needed  Chief Complaint     STR follow up visit  Patient's care was coordinated with nursing facility staff  Recent vitals, labs, and updated medications were review on [facility] system in facility  Past Medical and Surgical History      Past Medical History:   Diagnosis Date   • CHF (congestive heart failure) ([facility] )    • Diabetes mellitus ([place] [address] )    • Disease of thyroid gland    • Hyperlipidemia    • Hypertension    • Hypothyroidism (acquired)    • Restless leg syndrome    • Seizures ([place] [address] )      Past Surgical History:   Procedure Laterality Date   • APPENDECTOMY     • CHOLECYSTECTOMY     • HIP FRACTURE SURGERY Right    • TONSILLECTOMY     • TUBAL LIGATION       Allergies   Allergen Reactions   • Pineapple - Food Allergy Anaphylaxis   • Statins Other (See Comments)     Cramping from head to toe    • Bupropion Other (See Comments)   • Byetta 10 Mcg Pen [Exenatide]    • Ceftriaxone Other (See Comments)   • Marijuana [Dronabinol] GI Intolerance   • Metformin Diarrhea     Other reaction(s): Unknown   • Omeprazole Hives   • Ondansetron      Other reaction(s): Unknown   • Coconut Oil - Food Allergy Rash   • Liraglutide Rash and Other (See Comments)     Generalized rash   • Penicillins Rash          History of Present Illness     HPI  Azucena Reaves is a 62-year-old female, she is a LTC resident of [facility] since 09/30/2022  Past Medical Hx including but not limited to depression, RLS, diabetes, CAD, hypothyroidism, GERD  She was seen in collaboration with nursing for medical management and Acute visit for REGINO  Hospital Course:  Patient had been previously hospitalized 09/19/2022 for syncopal episode  During this hospitalization patient expressed that she was experiencing depression  Psychiatry followed patient during hospitalization   After discharge patient reports she did not take any of her medications and did not get out of bed for 1 week  Patient was readmitted to the hospital 09/27/2022 for generalized weakness  PT OT recommended short-term rehab  Patient completed STR and transitioned to LTC  Patient was seen and examined at bedside  On exam she is awake, alert and oriented x4  She states she is feeling better today  Patient is being treated for an REGINO, creatinine of 2 03, she received 3 L IV fluids over the weekend  Bladder scans have been under 200  No S/S of urinary retention  Repeat BMP on 1/23/2023 has not resulted, will follow-up  She denies pain, says her appetite is good, and denies difficulty sleeping  She denies CP/SOB/N/V/D  She denies lightheadedness, dizziness, headaches, vision changes  Denies any bowel or bladder issues  Per review of SNF records, Patient is eating 3 meals per day, consuming 50-75 %  Last documented BM 1/22/2023  No concerns from nursing at this time  The patient's allergies, past medical, surgical, social and family history were reviewed and unchanged  Review of Systems     Review of Systems   Constitutional: Positive for activity change  Negative for appetite change and fever  HENT: Negative  Negative for congestion  Eyes: Negative  Respiratory: Negative for cough, shortness of breath and wheezing  Cardiovascular: Negative  Negative for chest pain, palpitations and leg swelling  Gastrointestinal: Negative for abdominal distention, abdominal pain, constipation, diarrhea, nausea and vomiting  Endocrine: Negative  Genitourinary: Negative for difficulty urinating, dysuria and flank pain  Musculoskeletal: Positive for gait problem  Skin: Negative  Allergic/Immunologic: Negative  Neurological: Positive for weakness  Negative for dizziness, light-headedness and headaches  Hematological: Negative  Psychiatric/Behavioral: Negative  Negative for confusion and sleep disturbance           Objective     Vitals:   Vitals: 01/23/23 1015   BP: 126/72   Pulse: 70   Resp: 18   Temp: 97 9 °F (36 6 °C)   SpO2: 96%         Physical Exam  Vitals and nursing note reviewed  Constitutional:       General: She is not in acute distress  Appearance: Normal appearance  She is not ill-appearing  HENT:      Head: Normocephalic and atraumatic  Nose: No congestion  Mouth/Throat:      Mouth: Mucous membranes are moist    Eyes:      Conjunctiva/sclera: Conjunctivae normal    Cardiovascular:      Rate and Rhythm: Normal rate and regular rhythm  Pulses: Normal pulses  Heart sounds: Normal heart sounds  Pulmonary:      Effort: Pulmonary effort is normal  No respiratory distress  Breath sounds: Normal breath sounds  No wheezing  Abdominal:      General: Bowel sounds are normal  There is no distension  Palpations: Abdomen is soft  Tenderness: There is no abdominal tenderness  Musculoskeletal:      Right lower leg: No edema  Left lower leg: No edema  Skin:     General: Skin is warm and dry  Capillary Refill: Capillary refill takes more than 3 seconds  Neurological:      General: No focal deficit present  Mental Status: She is alert and oriented to person, place, and time  Psychiatric:         Mood and Affect: Mood normal          Behavior: Behavior normal          Pertinent Laboratory/Diagnostic Studies:   Reviewed in facility chart-stable      Current Medications   Medications reviewed and updated see facility [facility] for details  Current Outpatient Medications:   •  acetaminophen (TYLENOL) 325 mg tablet, Take 650 mg by mouth every 6 (six) hours as needed for mild pain (pain score 1-3)  , Disp: , Rfl:   •  aspirin (ECOTRIN LOW STRENGTH) 81 mg EC tablet, Take 81 mg by mouth daily, Disp: , Rfl:   •  Cholecalciferol 25 MCG (1000 UT) capsule, Take 1,000 Units by mouth daily  , Disp: , Rfl:   •  DULoxetine (CYMBALTA) 60 mg delayed release capsule, Take 1 capsule (60 mg total) by mouth daily Do not start before October 1, 2022 , Disp: , Rfl: 0  •  Empagliflozin 25 MG TABS, Take 25 mg by mouth every morning, Disp: , Rfl:   •  Ergocalciferol (VITAMIN D2 PO), Take 50,000 Units/day by mouth 2 (two) times a week, Disp: , Rfl:   •  ezetimibe (ZETIA) 10 mg tablet, Take 1 tablet (10 mg total) by mouth daily, Disp: 30 tablet, Rfl: 0  •  gabapentin (NEURONTIN) 100 mg capsule, Take 1 capsule (100 mg total) by mouth 2 (two) times a day Take 100 mg in the morning and 100 mg mid afternoon, Disp: 42 capsule, Rfl: 0  •  gabapentin (NEURONTIN) 300 mg capsule, Take 1 capsule (300 mg total) by mouth daily at bedtime, Disp: 21 capsule, Rfl: 0  •  hydrophilic ointment, Apply topically as needed for dry skin, Disp: , Rfl:   •  insulin glargine (LANTUS) 100 units/mL subcutaneous injection, Inject 30 Units under the skin daily at bedtime, Disp: 10 mL, Rfl: 0  •  insulin lispro (HumaLOG) 100 units/mL injection, Inject 1-5 Units under the skin 3 (three) times a day before meals, Disp: , Rfl: 0  •  levothyroxine 50 mcg tablet, Take 1 5 tablets (75 mcg total) by mouth daily, Disp: 45 tablet, Rfl: 0  •  melatonin 3 mg, Take 3 tablets (9 mg total) by mouth daily at bedtime, Disp: , Rfl: 0  •  nystatin (MYCOSTATIN) powder, Apply topically 2 (two) times a day, Disp: 15 g, Rfl: 0  •  omeprazole (PriLOSEC) 20 mg delayed release capsule, Take 20 mg by mouth daily, Disp: , Rfl:   •  topiramate (TOPAMAX) 100 mg tablet, Take 200 mg by mouth 2 (two) times a day , Disp: , Rfl:   •  traZODone (DESYREL) 50 mg tablet, Take 25 mg by mouth daily at bedtime, Disp: , Rfl:        Please note:  Voice-recognition software may have been used in the preparation of this document  Occasional wrong word or "sound-alike" substitutions may have occurred due to the inherent limitations of voice recognition software  Interpretation should be guided by context           [address] [place] [address]  1/23/2023  4:57 PM

## 2023-01-23 NOTE — ASSESSMENT & PLAN NOTE
· Patient's torsemide and spironolactone were held for creatinine of 1 83 on 1/6/2023, patient was given additional oral fluids with each med Pass  ·  Cr on 1/12/23 was 1 65/ BUN 37  · Repeat labs 1/19/23, Cr was 2 03/BUN 53, K+ 5 9, Na 134    · Patient receiving 1/2 NS at 70 an hour x3 L  · Bladder scan Q shift x 3, call for greater than 350 mls, bladder scan was 137  · VS q shift X 3 days and document  · Repeat BMP 1/23/23, awaiting results  · Patient reports feeling better today  · Encourage PO hydration  · Continue to monitor

## 2023-01-25 ENCOUNTER — NURSING HOME VISIT (OUTPATIENT)
Dept: GERIATRICS | Facility: OTHER | Age: 68
End: 2023-01-25

## 2023-01-25 VITALS
HEART RATE: 76 BPM | DIASTOLIC BLOOD PRESSURE: 78 MMHG | OXYGEN SATURATION: 97 % | TEMPERATURE: 97.9 F | RESPIRATION RATE: 18 BRPM | SYSTOLIC BLOOD PRESSURE: 126 MMHG

## 2023-01-25 DIAGNOSIS — I10 PRIMARY HYPERTENSION: ICD-10-CM

## 2023-01-25 DIAGNOSIS — E11.69 DIABETES MELLITUS TYPE 2 IN OBESE (HCC): ICD-10-CM

## 2023-01-25 DIAGNOSIS — N17.9 AKI (ACUTE KIDNEY INJURY) (HCC): ICD-10-CM

## 2023-01-25 DIAGNOSIS — R11.0 NAUSEA: Primary | ICD-10-CM

## 2023-01-25 DIAGNOSIS — E66.9 DIABETES MELLITUS TYPE 2 IN OBESE (HCC): ICD-10-CM

## 2023-01-25 NOTE — ASSESSMENT & PLAN NOTE
· SNF staff reports patient has not been eating well  · Patient reports she has been nauseous  · Abdomen soft non tender on exam  · Patient denies vomiting, constipation, diarrhea  · Will order Zofran 4 mg Q 8 hours PRN x 5 days

## 2023-01-25 NOTE — PROGRESS NOTES
[facility]  Aleida Zavala [address]  [phone]  [employer]  Code 28 (C)  Acute Visit        NAME: Israel Crawford  AGE: 75 y o  SEX: female CODE STATUS: CPR    DATE OF ENCOUNTER: 1/25/2023    Assessment and Plan     1  Nausea  Assessment & Plan:  · SNF staff reports patient has not been eating well  · Patient reports she has been nauseous  · Abdomen soft non tender on exam  · Patient denies vomiting, constipation, diarrhea  · Will order Zofran 4 mg Q 8 hours PRN x 5 days       2  REGINO (acute kidney injury) ([facility] [address] )  Assessment & Plan:  · Patient's torsemide and spironolactone were held for creatinine of 1 83 on 1/6/2023, patient was given additional oral fluids with each med Pass  · Cr on 1/12/23 was 1 65/ BUN 37  · Repeat labs 1/19/23, Cr was 2 03/BUN 53, K+ 5 9, Na 134  · Patient receiving 1/2 NS at 70 an hour x3 L  · Bladder scan Q shift x 3, under 200 mls  · Repeat BMP 1/23/23, not resulted, repeat 1/26/23  · Encourage PO hydration  · Continue to monitor      3  Primary hypertension  Assessment & Plan:  · BPs have been stable,126/78  · Continue to monitor BP  · Torsemide and spironolactone held for REGINO, cr 2 03  · CBC, BMP 1/26/2023         4  Diabetes mellitus type 2 in obese [facility])  Assessment & Plan:    Lab Results   Component Value Date    HGBA1C 11 8 (H) 09/19/2022   · History of diabetes  · Blood sugars have been stable, morning BS 95  · Continue Accu-Cheks  · Continue empagliflozin 25 mg daily  · Continue Lantus insulin 30 units at HS  · Continue Humalog sliding scale insulin with meals  · Encourage diabetic diet         All medications and routine orders were reviewed and updated as needed  Chief Complaint     Acute follow up visit for REGINO/nausea  Patient's care was coordinated with nursing facility staff  Recent vitals, labs, and updated medications were review on [facility] system in facility        Past Medical and Surgical History      Past Medical History: Diagnosis Date   • CHF (congestive heart failure) (HCC)    • Diabetes mellitus ([facility] [address] )    • Disease of thyroid gland    • Hyperlipidemia    • Hypertension    • Hypothyroidism (acquired)    • Restless leg syndrome    • Seizures ([facility] [address] )      Past Surgical History:   Procedure Laterality Date   • APPENDECTOMY     • CHOLECYSTECTOMY     • HIP FRACTURE SURGERY Right    • TONSILLECTOMY     • TUBAL LIGATION       Allergies   Allergen Reactions   • Pineapple - Food Allergy Anaphylaxis   • Statins Other (See Comments)     Cramping from head to toe    • Bupropion Other (See Comments)   • Byetta 10 Mcg Pen [Exenatide]    • Ceftriaxone Other (See Comments)   • Marijuana [Dronabinol] GI Intolerance   • Metformin Diarrhea     Other reaction(s): Unknown   • Omeprazole Hives   • Ondansetron      Other reaction(s): Unknown   • Coconut Oil - Food Allergy Rash   • Liraglutide Rash and Other (See Comments)     Generalized rash   • Penicillins Rash          History of Present Illness     KELLY Mcclain is a 79-year-old female, she is a LTC resident of [facility] since 09/30/2022  Past Medical Hx including but not limited to depression, RLS, diabetes, CAD, hypothyroidism, GERD  She was seen in collaboration with nursing for medical management and Acute visit for REGINO/nausea  Patient completed STR and transitioned to LTC  Patient was seen and examined at bedside  On exam she is AAO x4  She is sitting in her wheelchair after having completed therapy  Patient is being treated for an REGINO, creatinine of 2 03, she received 3 L IV fluids over the weekend  Bladder scans have been under 200 mls  No S/S of urinary retention  Repeat BMP on 1/23/2023 not completed, reordered for 1/26/23, will follow-up  Per SNF staff patient has not been eating well  On exam patient states she has been nauseous, without vomiting  Will order Zofran 4 mg q8 hours PRN x 5 days and monitor  She denies CP/SOB/V/C/D   She denies lightheadedness, dizziness, headaches, vision changes  Denies any other bowel or bladder issues  Per review of SNF records, Last documented BM 1/24/2023  No other concerns from nursing at this time  The patient's allergies, past medical, surgical, social and family history were reviewed and unchanged  Review of Systems     Review of Systems   Constitutional: Positive for activity change and appetite change  Negative for chills and fever  HENT: Negative  Negative for congestion  Eyes: Negative  Respiratory: Negative  Negative for cough, shortness of breath and wheezing  Cardiovascular: Negative  Negative for chest pain and palpitations  Gastrointestinal: Positive for nausea  Negative for abdominal distention, abdominal pain, constipation, diarrhea and vomiting  Endocrine: Negative  Genitourinary: Negative  Negative for difficulty urinating, dysuria and flank pain  Musculoskeletal: Positive for gait problem  Skin: Negative  Allergic/Immunologic: Negative  Neurological: Positive for weakness  Negative for dizziness and headaches  Hematological: Negative  Psychiatric/Behavioral: Negative  Negative for sleep disturbance  Objective     Vitals:   Vitals:    01/25/23 1739   BP: 126/78   Pulse: 76   Resp: 18   Temp: 97 9 °F (36 6 °C)   SpO2: 97%         Physical Exam  Vitals and nursing note reviewed  Constitutional:       General: She is not in acute distress  Appearance: Normal appearance  She is obese  She is ill-appearing  HENT:      Head: Normocephalic and atraumatic  Nose: No congestion  Mouth/Throat:      Mouth: Mucous membranes are moist    Eyes:      Conjunctiva/sclera: Conjunctivae normal    Cardiovascular:      Rate and Rhythm: Normal rate and regular rhythm  Pulmonary:      Effort: Pulmonary effort is normal  No respiratory distress  Breath sounds: Normal breath sounds  No wheezing  Abdominal:      General: Bowel sounds are normal  There is no distension  Palpations: Abdomen is soft  Tenderness: There is no abdominal tenderness  Musculoskeletal:      Right lower leg: No edema  Left lower leg: No edema  Skin:     General: Skin is warm  Capillary Refill: Capillary refill takes more than 3 seconds  Neurological:      Mental Status: She is alert and oriented to person, place, and time  Motor: Weakness present  Gait: Gait abnormal    Psychiatric:         Mood and Affect: Mood normal          Behavior: Behavior normal          Pertinent Laboratory/Diagnostic Studies:   Reviewed in facility chart-stable      Current Medications   Medications reviewed and updated see facility [facility] for details  Current Outpatient Medications:   •  acetaminophen (TYLENOL) 325 mg tablet, Take 650 mg by mouth every 6 (six) hours as needed for mild pain (pain score 1-3)  , Disp: , Rfl:   •  aspirin (ECOTRIN LOW STRENGTH) 81 mg EC tablet, Take 81 mg by mouth daily, Disp: , Rfl:   •  Cholecalciferol 25 MCG (1000 UT) capsule, Take 1,000 Units by mouth daily  , Disp: , Rfl:   •  DULoxetine (CYMBALTA) 60 mg delayed release capsule, Take 1 capsule (60 mg total) by mouth daily Do not start before October 1, 2022 , Disp: , Rfl: 0  •  Empagliflozin 25 MG TABS, Take 25 mg by mouth every morning, Disp: , Rfl:   •  Ergocalciferol (VITAMIN D2 PO), Take 50,000 Units/day by mouth 2 (two) times a week, Disp: , Rfl:   •  ezetimibe (ZETIA) 10 mg tablet, Take 1 tablet (10 mg total) by mouth daily, Disp: 30 tablet, Rfl: 0  •  gabapentin (NEURONTIN) 100 mg capsule, Take 1 capsule (100 mg total) by mouth 2 (two) times a day Take 100 mg in the morning and 100 mg mid afternoon, Disp: 42 capsule, Rfl: 0  •  gabapentin (NEURONTIN) 300 mg capsule, Take 1 capsule (300 mg total) by mouth daily at bedtime, Disp: 21 capsule, Rfl: 0  •  hydrophilic ointment, Apply topically as needed for dry skin, Disp: , Rfl:   •  insulin glargine (LANTUS) 100 units/mL subcutaneous injection, Inject 30 Units under the skin daily at bedtime, Disp: 10 mL, Rfl: 0  •  insulin lispro (HumaLOG) 100 units/mL injection, Inject 1-5 Units under the skin 3 (three) times a day before meals, Disp: , Rfl: 0  •  levothyroxine 50 mcg tablet, Take 1 5 tablets (75 mcg total) by mouth daily, Disp: 45 tablet, Rfl: 0  •  melatonin 3 mg, Take 3 tablets (9 mg total) by mouth daily at bedtime, Disp: , Rfl: 0  •  nystatin (MYCOSTATIN) powder, Apply topically 2 (two) times a day, Disp: 15 g, Rfl: 0  •  omeprazole (PriLOSEC) 20 mg delayed release capsule, Take 20 mg by mouth daily, Disp: , Rfl:   •  topiramate (TOPAMAX) 100 mg tablet, Take 200 mg by mouth 2 (two) times a day , Disp: , Rfl:   •  traZODone (DESYREL) 50 mg tablet, Take 25 mg by mouth daily at bedtime, Disp: , Rfl:        Please note:  Voice-recognition software may have been used in the preparation of this document  Occasional wrong word or "sound-alike" substitutions may have occurred due to the inherent limitations of voice recognition software  Interpretation should be guided by context           [address] [place] [address]  1/25/2023  5:51 PM

## 2023-01-25 NOTE — ASSESSMENT & PLAN NOTE
· BPs have been stable,126/78  · Continue to monitor BP  · Torsemide and spironolactone held for REGINO, cr 2 03  · CBC, BMP 1/26/2023

## 2023-01-25 NOTE — ASSESSMENT & PLAN NOTE
· Patient's torsemide and spironolactone were held for creatinine of 1 83 on 1/6/2023, patient was given additional oral fluids with each med Pass  · Cr on 1/12/23 was 1 65/ BUN 37  · Repeat labs 1/19/23, Cr was 2 03/BUN 53, K+ 5 9, Na 134    · Patient receiving 1/2 NS at 70 an hour x3 L  · Bladder scan Q shift x 3, under 200 mls  · Repeat BMP 1/23/23, not resulted, repeat 1/26/23  · Encourage PO hydration  · Continue to monitor

## 2023-01-25 NOTE — ASSESSMENT & PLAN NOTE
Lab Results   Component Value Date    HGBA1C 11 8 (H) 09/19/2022   · History of diabetes  · Blood sugars have been stable, morning BS 95  · Continue Accu-Cheks  · Continue empagliflozin 25 mg daily  · Continue Lantus insulin 30 units at HS  · Continue Humalog sliding scale insulin with meals  · Encourage diabetic diet

## 2023-01-26 ENCOUNTER — NURSING HOME VISIT (OUTPATIENT)
Dept: GERIATRICS | Facility: OTHER | Age: 68
End: 2023-01-26

## 2023-01-26 VITALS
HEART RATE: 70 BPM | RESPIRATION RATE: 18 BRPM | TEMPERATURE: 97.6 F | DIASTOLIC BLOOD PRESSURE: 69 MMHG | OXYGEN SATURATION: 96 % | SYSTOLIC BLOOD PRESSURE: 118 MMHG

## 2023-01-26 DIAGNOSIS — I50.32 CHRONIC HEART FAILURE WITH PRESERVED EJECTION FRACTION (HCC): ICD-10-CM

## 2023-01-26 DIAGNOSIS — I10 PRIMARY HYPERTENSION: ICD-10-CM

## 2023-01-26 DIAGNOSIS — R11.0 NAUSEA: ICD-10-CM

## 2023-01-26 DIAGNOSIS — N17.9 AKI (ACUTE KIDNEY INJURY) (HCC): Primary | ICD-10-CM

## 2023-01-26 NOTE — ASSESSMENT & PLAN NOTE
· Patient had complained of nausea 1/25/2023  · On exam patient is eating her lunch and denies nausea/vomiting/abdominal pain  · Abdomen soft nontender  · Last documented bowel movement 1/25/2023  · Continue to monitor

## 2023-01-26 NOTE — PROGRESS NOTES
[facility]  [address]  [phone]  Sharmaine Mendez  Code 32 ([facility])  Acute visit        NAME: Efren Odonnell  AGE: 75 y o  SEX: female CODE STATUS: CPR    DATE OF ENCOUNTER: 1/26/2023    Assessment and Plan     1  REGINO (acute kidney injury) ([facility] [address] )  Assessment & Plan:  · Torsemide and spironolactone were held for elevated creatinine  · Baseline creatinine 0 6-0 8  · 1/19/23, Cr was 2 03/BUN 53, K+ 5 9, Na 134  · Patient received 1/2 NSS x3 L  · Bladder scan Q shift x 3, under 200 mls  · 1/26/23 creatinine was 1 70  · Will give additional liter on 1/2 NSS at 60/hour  · Repeat BMP 1/30/23  · Encourage PO hydration  · Continue to monitor      2  Primary hypertension  Assessment & Plan:  · BPs have been stable, 118/69  · Continue to monitor BP  · Torsemide and spironolactone held for REGINO, cr 2 03  · CBC, BMP 1/26/2023, creatinine 1 70  · Repeat BMP 1/30/2023  · No S/S of fluid overload        3  Chronic heart failure with preserved ejection fraction [facility])  Assessment & Plan:  Wt Readings from Last 3 Encounters:   11/17/22 123 kg (270 lb 12 8 oz)   11/15/22 123 kg (270 lb 12 8 oz)   11/06/22 123 kg (270 lb 12 8 oz)   · weights have been stable  · No S/S of fluid overload, LSC, 96% RA  · torsemide and spironolactone held for REGINO, Cr 2 03  · Creatinine 1/26/2023 was 1 7, patient will receive additional liter of IVF  · Continue to monitor            4  Nausea  Assessment & Plan:  · Patient had complained of nausea 1/25/2023  · On exam patient is eating her lunch and denies nausea/vomiting/abdominal pain  · Abdomen soft nontender  · Last documented bowel movement 1/25/2023  · Continue to monitor         All medications and routine orders were reviewed and updated as needed  Chief Complaint   Acute visit for REGINO  Patient's care was coordinated with nursing facility staff  Recent vitals, labs, and updated medications were review on [facility] system in facility        Past Medical and Surgical History      Past Medical History:   Diagnosis Date   • CHF (congestive heart failure) ([facility] )    • Diabetes mellitus ([facility] )    • Disease of thyroid gland    • Hyperlipidemia    • Hypertension    • Hypothyroidism (acquired)    • Restless leg syndrome    • Seizures ([facility] )      Past Surgical History:   Procedure Laterality Date   • APPENDECTOMY     • CHOLECYSTECTOMY     • HIP FRACTURE SURGERY Right    • TONSILLECTOMY     • TUBAL LIGATION       Allergies   Allergen Reactions   • Pineapple - Food Allergy Anaphylaxis   • Statins Other (See Comments)     Cramping from head to toe    • Bupropion Other (See Comments)   • Byetta 10 Mcg Pen [Exenatide]    • Ceftriaxone Other (See Comments)   • Marijuana [Dronabinol] GI Intolerance   • Metformin Diarrhea     Other reaction(s): Unknown   • Omeprazole Hives   • Ondansetron      Other reaction(s): Unknown   • Coconut Oil - Food Allergy Rash   • Liraglutide Rash and Other (See Comments)     Generalized rash   • Penicillins Rash          History of Present Illness     HPI  Ana Chang is a 58-year-old female, she is a LTC resident of [facility] since 09/30/2022  Past Medical Hx including but not limited to depression, RLS, diabetes, CAD, hypothyroidism, GERD  She was seen in collaboration with nursing for medical management and Acute visit for REGINO/nausea  Patient completed STR and transitioned to LTC  Patient was seen and examined at bedside  Patient is sitting in her chair at side of the bed having her lunch, she denies nausea today, abdomen is soft nontender, + bowel sounds x4  Patient is being treated for an REGINO, creatinine of 2 03, she received 3 L ½ NSS  Bladder scans have been under 200 mls  No S/S of urinary retention  On 1/26/2023 creatinine was 1 7, will give additional liter ½ NSS at 60/hour and repeat BMP 1/30/23  Encourage oral intake of fluids  She denies CP/SOB/N/V/C/D  She denies lightheadedness, dizziness, headaches, vision changes   Denies any other bowel or bladder issues  Per review of SNF records, Last documented BM 1/25/2023  No other concerns from nursing at this time  The patient's allergies, past medical, surgical, social and family history were reviewed and unchanged  Review of Systems     Review of Systems   Constitutional: Positive for activity change  Negative for appetite change, chills and fever  HENT: Negative  Negative for congestion  Eyes: Negative  Respiratory: Negative  Negative for cough, shortness of breath and wheezing  Cardiovascular: Negative  Negative for chest pain, palpitations and leg swelling  Gastrointestinal: Negative  Negative for abdominal distention, abdominal pain, constipation, diarrhea, nausea and vomiting  Endocrine: Negative  Genitourinary: Negative  Negative for difficulty urinating and dysuria  Musculoskeletal: Positive for gait problem  Skin: Negative  Allergic/Immunologic: Negative  Neurological: Positive for weakness  Negative for dizziness, light-headedness and headaches  Hematological: Negative  Psychiatric/Behavioral: Negative  Negative for confusion and sleep disturbance  Objective     Vitals:   Vitals:    01/26/23 1638   BP: 118/69   Pulse: 70   Resp: 18   Temp: 97 6 °F (36 4 °C)   SpO2: 96%         Physical Exam  Vitals and nursing note reviewed  Constitutional:       General: She is not in acute distress  Appearance: Normal appearance  She is not ill-appearing  HENT:      Head: Normocephalic and atraumatic  Nose: No congestion  Mouth/Throat:      Mouth: Mucous membranes are moist    Eyes:      Conjunctiva/sclera: Conjunctivae normal    Cardiovascular:      Rate and Rhythm: Normal rate and regular rhythm  Heart sounds: Normal heart sounds  Pulmonary:      Effort: Pulmonary effort is normal  No respiratory distress  Breath sounds: Normal breath sounds  No wheezing     Abdominal:      General: Bowel sounds are normal  There is no distension  Palpations: Abdomen is soft  Tenderness: There is no abdominal tenderness  Musculoskeletal:      Right lower leg: No edema  Left lower leg: No edema  Skin:     General: Skin is warm  Neurological:      Mental Status: She is alert and oriented to person, place, and time  Motor: Weakness present  Gait: Gait abnormal    Psychiatric:         Mood and Affect: Mood normal          Behavior: Behavior normal          Pertinent Laboratory/Diagnostic Studies:   Reviewed in facility chart-stable      Current Medications   Medications reviewed and updated see facility [facility] for details  Current Outpatient Medications:   •  acetaminophen (TYLENOL) 325 mg tablet, Take 650 mg by mouth every 6 (six) hours as needed for mild pain (pain score 1-3)  , Disp: , Rfl:   •  aspirin (ECOTRIN LOW STRENGTH) 81 mg EC tablet, Take 81 mg by mouth daily, Disp: , Rfl:   •  Cholecalciferol 25 MCG (1000 UT) capsule, Take 1,000 Units by mouth daily  , Disp: , Rfl:   •  DULoxetine (CYMBALTA) 60 mg delayed release capsule, Take 1 capsule (60 mg total) by mouth daily Do not start before October 1, 2022 , Disp: , Rfl: 0  •  Empagliflozin 25 MG TABS, Take 25 mg by mouth every morning, Disp: , Rfl:   •  Ergocalciferol (VITAMIN D2 PO), Take 50,000 Units/day by mouth 2 (two) times a week, Disp: , Rfl:   •  ezetimibe (ZETIA) 10 mg tablet, Take 1 tablet (10 mg total) by mouth daily, Disp: 30 tablet, Rfl: 0  •  gabapentin (NEURONTIN) 100 mg capsule, Take 1 capsule (100 mg total) by mouth 2 (two) times a day Take 100 mg in the morning and 100 mg mid afternoon, Disp: 42 capsule, Rfl: 0  •  gabapentin (NEURONTIN) 300 mg capsule, Take 1 capsule (300 mg total) by mouth daily at bedtime, Disp: 21 capsule, Rfl: 0  •  hydrophilic ointment, Apply topically as needed for dry skin, Disp: , Rfl:   •  insulin glargine (LANTUS) 100 units/mL subcutaneous injection, Inject 30 Units under the skin daily at bedtime, Disp: 10 mL, Rfl: 0  •  insulin lispro (HumaLOG) 100 units/mL injection, Inject 1-5 Units under the skin 3 (three) times a day before meals, Disp: , Rfl: 0  •  levothyroxine 50 mcg tablet, Take 1 5 tablets (75 mcg total) by mouth daily, Disp: 45 tablet, Rfl: 0  •  melatonin 3 mg, Take 3 tablets (9 mg total) by mouth daily at bedtime, Disp: , Rfl: 0  •  nystatin (MYCOSTATIN) powder, Apply topically 2 (two) times a day, Disp: 15 g, Rfl: 0  •  omeprazole (PriLOSEC) 20 mg delayed release capsule, Take 20 mg by mouth daily, Disp: , Rfl:   •  topiramate (TOPAMAX) 100 mg tablet, Take 200 mg by mouth 2 (two) times a day , Disp: , Rfl:   •  traZODone (DESYREL) 50 mg tablet, Take 25 mg by mouth daily at bedtime, Disp: , Rfl:        Please note:  Voice-recognition software may have been used in the preparation of this document  Occasional wrong word or "sound-alike" substitutions may have occurred due to the inherent limitations of voice recognition software  Interpretation should be guided by context           [facility] SAMIA Sanchez  1/26/2023  4:42 PM

## 2023-01-26 NOTE — ASSESSMENT & PLAN NOTE
· Torsemide and spironolactone were held for elevated creatinine  · Baseline creatinine 0 6-0 8  · 1/19/23, Cr was 2 03/BUN 53, K+ 5 9, Na 134    · Patient received 1/2 NSS x3 L  · Bladder scan Q shift x 3, under 200 mls  · 1/26/23 creatinine was 1 70  · Will give additional liter on 1/2 NSS at 60/hour  · Repeat BMP 1/30/23  · Encourage PO hydration  · Continue to monitor

## 2023-01-26 NOTE — ASSESSMENT & PLAN NOTE
Wt Readings from Last 3 Encounters:   11/17/22 123 kg (270 lb 12 8 oz)   11/15/22 123 kg (270 lb 12 8 oz)   11/06/22 123 kg (270 lb 12 8 oz)   · weights have been stable  · No S/S of fluid overload, LSC, 96% RA  · torsemide and spironolactone held for REGINO, Cr 2 03  · Creatinine 1/26/2023 was 1 7, patient will receive additional liter of IVF  · Continue to monitor

## 2023-01-26 NOTE — ASSESSMENT & PLAN NOTE
· BPs have been stable, 118/69  · Continue to monitor BP  · Torsemide and spironolactone held for REGINO, cr 2 03  · CBC, BMP 1/26/2023, creatinine 1 70  · Repeat BMP 1/30/2023  · No S/S of fluid overload

## 2023-02-01 ENCOUNTER — NURSING HOME VISIT (OUTPATIENT)
Dept: GERIATRICS | Facility: OTHER | Age: 68
End: 2023-02-01

## 2023-02-01 VITALS
DIASTOLIC BLOOD PRESSURE: 75 MMHG | RESPIRATION RATE: 18 BRPM | HEART RATE: 79 BPM | WEIGHT: 270.8 LBS | BODY MASS INDEX: 49.53 KG/M2 | TEMPERATURE: 97.9 F | SYSTOLIC BLOOD PRESSURE: 116 MMHG

## 2023-02-01 DIAGNOSIS — N17.9 AKI (ACUTE KIDNEY INJURY) (HCC): Primary | ICD-10-CM

## 2023-02-01 NOTE — ASSESSMENT & PLAN NOTE
Patient was recently noted to have REGINO suspected to be prerenal in etiology  Torsemide and spironolactone were held  Baseline creatinine is between 0 6-0 8  Creatinine recently went up to 2  Diuretics were held  Patient was treated with IV fluids  Most recent BMP revealed creatinine to be 1 13  Continue to monitor  Avoid hypotension and nephrotoxins    Remove midline

## 2023-02-01 NOTE — PROGRESS NOTES
[address]  [address]   [address] [facility] [address] [facility] [address]  49     Progress Note  Code [facility] 32    Patient Location     [facility] rehab    Reason for visit     F U REGINO, HTN, CHF, Hpothyroidism, Seizure, HLD    Patient’s care was coordinated with nursing facility staff  Recent vitals, labs and updated medications were reviewed on "[employer] of facility  Problem List Items Addressed This Visit        Genitourinary    REGINO (acute kidney injury) ([facility] [address] ) - Primary     Patient was recently noted to have REGINO suspected to be prerenal in etiology  Torsemide and spironolactone were held  Baseline creatinine is between 0 6-0 8  Creatinine recently went up to 2  Diuretics were held  Patient was treated with IV fluids  Most recent BMP revealed creatinine to be 1 13  Continue to monitor  Avoid hypotension and nephrotoxins  Remove midline          Hyperlipidemia:  Pt is listed to be allergic to statin  Continue to Ezetimibe  Lipid profile in September/22 revealed total cholesterol to be 188 triglycerides 156  Lab Results   Component Value Date    CHOLESTEROL 188 09/20/2022    CHOLESTEROL 202 (H) 03/03/2021    CHOLESTEROL 212 (H) 07/07/2016     Lab Results   Component Value Date    HDL 33 (L) 09/20/2022    HDL 43 (L) 03/03/2021    HDL 47 07/07/2016     Lab Results   Component Value Date    TRIG 156 (H) 09/20/2022    TRIG 121 0 03/03/2021    TRIG 173 (H) 07/07/2016     Lab Results   Component Value Date    [place] 155 09/20/2022       Diabetes mellitus type 2:  Hemoglobin A1c was uncontrolled at 11 8 on 9/19/2022  Most recent Accu-Cheks have been running on the lower side  Fasting blood sugar was only 69 on 1/29/2023  Postprandial readings are at times noted to be under 120  Patient currently remains on empagliflozin 25 mg daily along with insulin glargine 30 units nightly and Humalog sliding scale coverage  Decrease insulin glargine to 20 units and follow  Repeat hemoglobin A1c in 1 month    Hypertension:   Blood pressure stable  Pt isurrently not on any antihypertensive medication  Diuretics were discontinued recently due to REGINO    Chronic CHF:  Clinically compensated  Diuretics including torsemide and spironolactone were recently discontinued due to REGINO  Monitor weights and resume diuretics if needed  CAD following native coronary artery of native heart without angina pectoris:  Stable  No anginal symptoms  Continue aspirin statin  Follow-up with cardiology    Hypothyroidism:   Continue levothyroxine TSH was normal at   53 on 11/23/2020    Insomnia:  Continue Trazodone      HPI       Patient is being seen for follow-up visit today  She is doing okay at present  Denies any dyspnea chest pain GI or  complaints  Patient was recently noted to have REGINO with creatinine going up to 2  She was started on IV fluids  Creatinine subsequently came down to 1  13  Patient still has midline in place  Appetite appears to be fair  Patient is awake alert able to make her needs known    Review of Systems   Constitutional: Negative for chills and fever  Respiratory: Negative for shortness of breath and wheezing  Cardiovascular: Negative for chest pain and leg swelling  Gastrointestinal: Negative for abdominal distention, abdominal pain and vomiting  Endocrine:        Blood sugars are running on the lower side   Genitourinary: Negative for flank pain and hematuria  Musculoskeletal: Positive for gait problem  Neurological: Negative for seizures and syncope  Psychiatric/Behavioral: Negative for agitation and behavioral problems         Past Medical History:   Diagnosis Date   • CHF (congestive heart failure) (HCC)    • Diabetes mellitus ([facility] )    • Disease of thyroid gland    • Hyperlipidemia    • Hypertension    • Hypothyroidism (acquired)    • Restless leg syndrome    • Seizures ([facility] )        Past Surgical History:   Procedure Laterality Date   • APPENDECTOMY     • CHOLECYSTECTOMY     • HIP FRACTURE SURGERY Right    • TONSILLECTOMY     • TUBAL LIGATION         Social History     Tobacco Use   Smoking Status Never   Smokeless Tobacco Never   Tobacco Comments    not a smoker       Family History   Problem Relation Age of Onset   • Brain cancer Mother    • Seizures Father    • Colon cancer Maternal Grandfather    • No Known Problems Son    • No Known Problems Son    • Breast cancer Maternal Aunt 30        Allergies   Allergen Reactions   • Pineapple - Food Allergy Anaphylaxis   • Statins Other (See Comments)     Cramping from head to toe    • Bupropion Other (See Comments)   • [place]etta 10 Mcg Pen [Exenatide]    • Ceftriaxone Other (See Comments)   • Marijuana [Dronabinol] GI Intolerance   • Metformin Diarrhea     Other reaction(s): Unknown   • Omeprazole Hives   • Ondansetron      Other reaction(s): Unknown   • Coconut Oil - Food Allergy Rash   • Liraglutide Rash and Other (See Comments)     Generalized rash   • Penicillins Rash         Current Outpatient Medications:   •  acetaminophen (TYLENOL) 325 mg tablet, Take 650 mg by mouth every 6 (six) hours as needed for mild pain (pain score 1-3)  , Disp: , Rfl:   •  aspirin (ECOTRIN LOW STRENGTH) 81 mg EC tablet, Take 81 mg by mouth daily, Disp: , Rfl:   •  Cholecalciferol 25 MCG (1000 UT) capsule, Take 1,000 Units by mouth daily  , Disp: , Rfl:   •  DULoxetine (CYMBALTA) 60 mg delayed release capsule, Take 1 capsule (60 mg total) by mouth daily Do not start before October 1, 2022 , Disp: , Rfl: 0  •  Empagliflozin 25 MG TABS, Take 25 mg by mouth every morning, Disp: , Rfl:   •  Ergocalciferol (VITAMIN D2 PO), Take 50,000 Units/day by mouth 2 (two) times a week, Disp: , Rfl:   •  ezetimibe (ZETIA) 10 mg tablet, Take 1 tablet (10 mg total) by mouth daily, Disp: 30 tablet, Rfl: 0  •  gabapentin (NEURONTIN) 100 mg capsule, Take 1 capsule (100 mg total) by mouth 2 (two) times a day Take 100 mg in the morning and 100 mg mid afternoon, Disp: 42 capsule, Rfl: 0  •  gabapentin (NEURONTIN) 300 mg capsule, Take 1 capsule (300 mg total) by mouth daily at bedtime, Disp: 21 capsule, Rfl: 0  •  hydrophilic ointment, Apply topically as needed for dry skin, Disp: , Rfl:   •  insulin glargine (LANTUS) 100 units/mL subcutaneous injection, Inject 30 Units under the skin daily at bedtime, Disp: 10 mL, Rfl: 0  •  insulin lispro (HumaLOG) 100 units/mL injection, Inject 1-5 Units under the skin 3 (three) times a day before meals, Disp: , Rfl: 0  •  levothyroxine 50 mcg tablet, Take 1 5 tablets (75 mcg total) by mouth daily, Disp: 45 tablet, Rfl: 0  •  melatonin 3 mg, Take 3 tablets (9 mg total) by mouth daily at bedtime, Disp: , Rfl: 0  •  nystatin (MYCOSTATIN) powder, Apply topically 2 (two) times a day, Disp: 15 g, Rfl: 0  •  omeprazole (PriLOSEC) 20 mg delayed release capsule, Take 20 mg by mouth daily, Disp: , Rfl:   •  topiramate (TOPAMAX) 100 mg tablet, Take 200 mg by mouth 2 (two) times a day , Disp: , Rfl:   •  traZODone (DESYREL) 50 mg tablet, Take 25 mg by mouth daily at bedtime, Disp: , Rfl:     Updated list was reviewed in [facility] system of facility  Vitals:    02/01/23 1717   BP: 116/75   Pulse: 79   Resp: 18   Temp: 97 9 °F (36 6 °C)       Physical Exam  Constitutional:       General: She is not in acute distress  HENT:      Head: Normocephalic and atraumatic  Eyes:      General:         Right eye: No discharge  Left eye: No discharge  Cardiovascular:      Rate and Rhythm: Normal rate  Heart sounds: Murmur heard  Abdominal:      General: There is no distension  Palpations: Abdomen is soft  Tenderness: There is no abdominal tenderness  There is no guarding  Musculoskeletal:      Comments: Trace edema involving b/l LE   Skin:     Coloration: Skin is not jaundiced or pale  Neurological:      General: No focal deficit present  Mental Status: She is alert        Cranial Nerves: No cranial nerve deficit  Comments: Oriented to month and year   Psychiatric:         Mood and Affect: Mood normal          Behavior: Behavior normal          Diagnostic Data:    Recent labs were reviewed  Additional Notes:      This note was electronically signed by Dr Luis Enrique Millard

## 2023-02-19 NOTE — CASE MANAGEMENT
Initial Clinical Review    Admission: Date/Time/Statement: Observation 1/3 @ 2238    Orders Placed This Encounter   Procedures    Place in Observation (expected length of stay for this patient is less than two midnights)     Standing Status:   Standing     Number of Occurrences:   1     Order Specific Question:   Admitting Physician     Answer:   Junior Brown [[id number]]     Order Specific Question:   Level of Care     Answer:   Med Surg [16]       ED: Date/Time/Mode of Arrival:   ED Arrival Information     Expected Arrival Acuity Means of Arrival Escorted By Service Admission Type    1/3/2018 19:52 1/3/2018 19:57 Emergent Ambulance [facility] Emergency Medicine Emergency    Arrival Complaint    chest pain          Chief Complaint:   Chief Complaint   Patient presents with    Chest Pain     Pt complains of cough for the last week today [place] she complins of a squeezing chest pain in the center to her chest         History of Illness: 60 y o  female who presents with a PMHx of HTN, HLD, DM, and GERD presenting to the ED with substernal chest pain for a duration of about 5 hours  She states that [place] and had three mudslides and then went to the buffet where she ate pork and sauerkraut, meatloaf, and multiple other foods  About an hour later she began to develop progressive "chest squeezing" that after ten minutes of pain she called EMS  She states that it radiated into her left arm and up into her left chin  She denies however any CAST, diaphoresis, paraesthesias, N/V although she does feel somewhat "queasy"  She states that she has a hx of GERD and that her doctor prescribed omeprazole but that she never took it  She admits to GERD episodes about twice a month  She states this feels different than usual GERD  She also admits to missing some doses of insulin every once in a while    She admits to a cough for three days that she got from 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, described as dry  Denies f/c  She states that her lower abdominal pain has been relieved since her most recent solid BM had in the hospital          ED Vital Signs:   ED Triage Vitals   Temperature Pulse Respirations Blood Pressure SpO2   01/03/18 2001 01/03/18 2001 01/03/18 2001 01/03/18 2042 01/03/18 2001   97 8 °F (36 6 °C) 105 20 128/76 98 %      Temp src Heart Rate Source Patient Position - Orthostatic VS BP Location FiO2 (%)   -- 01/03/18 2042 01/03/18 2042 01/04/18 0218 --    Monitor Lying Right arm       Pain Score       01/03/18 2001       Worst Possible Pain        Wt Readings from Last 1 Encounters:   01/03/18 127 kg (280 lb)       Vital Signs (abnormal): WNL    Abnormal Labs:    01/03/18 2004    Sodium 136 - 145 mmol/L 135     Potassium 3 5 - 5 3 mmol/L 3 8    Comments: Slightly Hemolyzed;  Results May be Affected   Chloride 100 - 108 mmol/L 98     CO2 21 - 32 mmol/L 31    Anion Gap 4 - 13 mmol/L 6    BUN 5 - 25 mg/dL 13    Creatinine 0 60 - 1 30 mg/dL 0 94    Comments: Standardized to IDMS reference method   Glucose 65 - 140 mg/dL 418       01/03/18 2002     POC Troponin I 0 00 - 0 08 ng/ml 0 00      Diagnostic Test Results: CXR - No acute consolidation congestion    ED Treatment:   Medication Administration from 01/03/2018 1952 to 01/04/2018 5061       Date/Time Order Dose Route Action     01/03/2018 2044 aspirin chewable tablet 324 mg 324 mg Oral Given     01/03/2018 2045 nitroglycerin (NITRO-BID) 2 % TD ointment 1 inch 1 inch Topical Given     01/04/2018 0533 levothyroxine tablet 50 mcg 50 mcg Oral Given     01/04/2018 0157 topiramate (TOPAMAX) tablet 200 mg 200 mg Oral Given     01/04/2018 0718 insulin lispro (HumaLOG) 100 units/mL subcutaneous injection 2-12 Units 4 Units Subcutaneous Given     01/04/2018 0157 insulin lispro (HumaLOG) 100 units/mL subcutaneous injection 1-5 Units 2 Units Subcutaneous Given     01/04/2018 0156 pantoprazole (PROTONIX) EC tablet 40 mg 40 mg Oral Given     01/04/2018 0235 rOPINIRole (REQUIP) tablet 1 mg 1 mg Oral Given     01/04/2018 0717 insulin aspart protamine-insulin aspart (NovoLOG 70/30) 100 units/mL subcutaneous injection 30 Units 30 Units Subcutaneous Given     01/04/2018 0832 promethazine (PHENERGAN) injection 12 5 mg 12 5 mg Intramuscular Given          Past Medical/Surgical History: Active Ambulatory Problems     Diagnosis Date Noted    Seizures ([place] [address] )     Restless leg syndrome     Hypertension     Hyperlipidemia     Diabetes mellitus ([place] [address] )     Chest pain 07/06/2016    Accelerated hypertension 07/06/2016    GERD (gastroesophageal reflux disease) 07/10/2016    Epigastric abdominal pain 07/10/2016     Resolved Ambulatory Problems     Diagnosis Date Noted    No Resolved Ambulatory Problems     Past Medical History:   Diagnosis Date    Diabetes mellitus ([place] [address] )     Disease of thyroid gland     Hyperlipidemia     Hypertension     Hypothyroidism (acquired)     Restless leg syndrome     Seizures (HCC)        Admitting Diagnosis: Chest pain [R07 9]    Age/Sex: 60 y o  female    Assessment/Plan:   Chest pain   Assessment & Plan     1  Pt with no cardiac hx, similar episodes of CP with negative cardiac quintero  First trop negative  EKG without ST changes, remarkable only for new RBBB  Will admit for obs, keep on tele, trend troponins  Pain control  2  No SOB, VSS  3  Suspect GI cause possibly GERD exacerbation as pt is noncompliant with PPI regimen and consumed large amounts of acidic foods as well as alcohol today  Initiate PPI therapy         GERD (gastroesophageal reflux disease)   Assessment & Plan     1   Epigastric ttp on exam, Pt prescribed PPI by PCP, she states she does no takes this  Only takes TUMS in response to GERD  Reports GERD flares 2x/month on average  2   Will treat pt with PPI and advise f/u with PCP to re-consider PPI therapy again as an outpatient         Diabetes mellitus ([place] [address] )   Assessment & Plan     1   Pt on 60 U Novolog 70/30 BID AC, reports poor compliance and skipped her dinnertime dose  Continue this regimen and add SSI ac and hs  Monitor BS's        Hyperlipidemia   Assessment & Plan     1  Pt trialed multiple statins all of which had to be dc'ed 2/2 myalgias         Hypertension   Assessment & Plan     1  Appears that pt does not take any anti-hypertensives outpatient  Continue to monitor BP, encourage f/u with PCP         Restless leg syndrome   Assessment & Plan     1  Controlled on Requip three times daily        Seizures ([address] )   Assessment & Plan     1  Controlled on Topamax            VTE Prophylaxis: Enoxaparin (Lovenox)  / reason for no mechanical VTE prophylaxis not required   Code Status: DNR/DNI  POLST: POLST form is not discussed and not completed at this time    Discussion with family: nobody      Admission Orders:  Tele monitoring  Scheduled Meds:   enoxaparin 40 mg Subcutaneous Daily   FLUoxetine 20 mg Oral Daily   insulin aspart protamine-insulin aspart 30 Units Subcutaneous BID AC   insulin lispro 1-5 Units Subcutaneous HS   insulin lispro 2-12 Units Subcutaneous TID With Meals   levothyroxine 50 mcg Oral Early Morning   pantoprazole 40 mg Oral Early Morning   rOPINIRole 1 mg Oral TID   topiramate 200 mg Oral QAM   topiramate 200 mg Oral HS     Continuous Infusions:    PRN Meds:   acetaminophen    calcium carbonate    loperamide    morphine injection    promethazine No

## 2023-03-01 ENCOUNTER — NURSING HOME VISIT (OUTPATIENT)
Dept: GERIATRICS | Facility: OTHER | Age: 68
End: 2023-03-01

## 2023-03-01 VITALS
BODY MASS INDEX: 39.47 KG/M2 | TEMPERATURE: 97.5 F | RESPIRATION RATE: 18 BRPM | OXYGEN SATURATION: 97 % | HEART RATE: 91 BPM | SYSTOLIC BLOOD PRESSURE: 119 MMHG | DIASTOLIC BLOOD PRESSURE: 70 MMHG | WEIGHT: 215.8 LBS

## 2023-03-01 DIAGNOSIS — E78.2 MIXED HYPERLIPIDEMIA: ICD-10-CM

## 2023-03-01 DIAGNOSIS — Z79.4 TYPE 2 DIABETES MELLITUS WITH HYPERGLYCEMIA, WITH LONG-TERM CURRENT USE OF INSULIN (HCC): ICD-10-CM

## 2023-03-01 DIAGNOSIS — R19.7 DIARRHEA, UNSPECIFIED TYPE: ICD-10-CM

## 2023-03-01 DIAGNOSIS — E03.9 ACQUIRED HYPOTHYROIDISM: ICD-10-CM

## 2023-03-01 DIAGNOSIS — I10 PRIMARY HYPERTENSION: ICD-10-CM

## 2023-03-01 DIAGNOSIS — E11.65 TYPE 2 DIABETES MELLITUS WITH HYPERGLYCEMIA, WITH LONG-TERM CURRENT USE OF INSULIN (HCC): ICD-10-CM

## 2023-03-01 DIAGNOSIS — K21.9 GASTROESOPHAGEAL REFLUX DISEASE, UNSPECIFIED WHETHER ESOPHAGITIS PRESENT: Primary | ICD-10-CM

## 2023-03-01 NOTE — ASSESSMENT & PLAN NOTE
Lab Results   Component Value Date    HGBA1C 11 8 (H) 09/19/2022   · Blood sugars have been controlled, trending   · Morning blood sugar 102  · Continue Accu-Cheks  · Continue empagliflozin 25 mill daily  · Continue Lantus insulin 20 units at at bedtime  · Continue SSI  · Encourage diabetic diet  · Avoid hypoglycemia  · Monitor hemoglobin A1c

## 2023-03-01 NOTE — PROGRESS NOTES
[address] [facility]  Venkat Zavala 78  [phone]  [facility]  Code   28 ([facility])        NAME: Rome Charles  AGE: 75 y o  SEX: female CODE STATUS: CPR    DATE OF ENCOUNTER: 3/1/2023    Assessment and Plan     1  Gastroesophageal reflux disease, unspecified whether esophagitis present  Assessment & Plan:  · Currently denies reflux  · Continue omeprazole 20 mg daily      2  Type 2 diabetes mellitus with hyperglycemia, with long-term current use of insulin ([facility])  Assessment & Plan:    Lab Results   Component Value Date    HGBA1C 11 8 (H) 09/19/2022   · Blood sugars have been controlled, trending   · Morning blood sugar 102  · Continue Accu-Cheks  · Continue empagliflozin 25 mill daily  · Continue Lantus insulin 20 units at at bedtime  · Continue SSI  · Encourage diabetic diet  · Avoid hypoglycemia  · Monitor hemoglobin A1c      3  Acquired hypothyroidism  Assessment & Plan:  · Continue Levothyroxine 75 mcg daily        4  Primary hypertension  Assessment & Plan:  · BPs have been stable, 118/70   · continue to monitor BP  · No S/S of fluid overload  · Currently not on any antihypertensives        5  Mixed hyperlipidemia  Assessment & Plan:  · Continue Zetia 10 mg daily      6  Diarrhea, unspecified type  Assessment & Plan:  · SNF staff reports patient has been having loose stools  · We will order Imodium x24 hours  · Monitor         All medications and routine orders were reviewed and updated as needed  Chief Complaint     [facility] follow up visit   Patient's care was coordinated with nursing facility staff  Recent vitals, labs, and updated medications were review on [facility] system in facility        Past Medical and Surgical History      Past Medical History:   Diagnosis Date   • CHF (congestive heart failure) ([facility] [address] )    • Diabetes mellitus ([facility] [address] )    • Disease of thyroid gland    • Hyperlipidemia    • Hypertension    • Hypothyroidism (acquired)    • Restless leg syndrome    • Seizures [facility])      Past Surgical History:   Procedure Laterality Date   • APPENDECTOMY     • CHOLECYSTECTOMY     • HIP FRACTURE SURGERY Right    • TONSILLECTOMY     • TUBAL LIGATION       Allergies   Allergen Reactions   • Pineapple - Food Allergy Anaphylaxis   • Statins Other (See Comments)     Cramping from head to toe    • Bupropion Other (See Comments)   • Byetta 10 Mcg Pen [Exenatide]    • Ceftriaxone Other (See Comments)   • Marijuana [Dronabinol] GI Intolerance   • Metformin Diarrhea     Other reaction(s): Unknown   • Omeprazole Hives   • Ondansetron      Other reaction(s): Unknown   • Coconut Oil - Food Allergy Rash   • Liraglutide Rash and Other (See Comments)     Generalized rash   • Penicillins Rash          History of Present Illness     HPI  Mercedes Tejada is a 69-year-old female, she is a LTC resident of [facility] since 09/30/2022  Past Medical Hx including but not limited to depression, RLS, diabetes, CAD, hypothyroidism, GERD  She was seen in collaboration with nursing for medical management and LTC follow-up  Patient was seen and examined at bedside  On exam patient is resting in bed, she does not appear to be in any distress  She is alert and oriented x3  She reports feeling tired today  She denies pain and has a good appetite  She offers no complaints and appears to be doing well  Per SNF staff patient has been having loose stools, will order Imodium and monitor  She denies CP/SOB/N/V/C/D  She denies lightheadedness, dizziness, headaches, vision changes  Denies any other bowel or bladder issues  Per review of SNF records, patient is eating 3 meals a day, 50 to 75%  Last documented BM 3/1/2023  No other concerns from nursing at this time  The patient's allergies, past medical, surgical, social and family history were reviewed and unchanged  Review of Systems     Review of Systems   Constitutional: Negative for activity change, appetite change, chills and fever  HENT: Negative    Negative for congestion  Eyes: Negative  Respiratory: Negative  Negative for cough, shortness of breath and wheezing  Cardiovascular: Negative  Negative for chest pain, palpitations and leg swelling  Gastrointestinal: Negative  Negative for abdominal distention, abdominal pain, constipation, diarrhea, nausea and vomiting  Endocrine: Negative  Genitourinary: Negative  Negative for difficulty urinating and dysuria  Musculoskeletal: Positive for gait problem  Skin: Negative  Allergic/Immunologic: Negative  Neurological: Positive for weakness  Negative for dizziness, light-headedness and headaches  Hematological: Negative  Psychiatric/Behavioral: Negative  Negative for confusion and sleep disturbance  Objective     Vitals:   Vitals:    03/01/23 1432   BP: 119/70   Pulse: 91   Resp: 18   Temp: 97 5 °F (36 4 °C)   SpO2: 97%         Physical Exam  Vitals and nursing note reviewed  Constitutional:       General: She is not in acute distress  Appearance: Normal appearance  She is not ill-appearing  HENT:      Head: Normocephalic and atraumatic  Nose: No congestion  Mouth/Throat:      Mouth: Mucous membranes are moist    Eyes:      Conjunctiva/sclera: Conjunctivae normal    Cardiovascular:      Rate and Rhythm: Normal rate and regular rhythm  Heart sounds: Normal heart sounds  Pulmonary:      Effort: Pulmonary effort is normal  No respiratory distress  Breath sounds: Normal breath sounds  No wheezing  Abdominal:      General: Bowel sounds are normal  There is no distension  Palpations: Abdomen is soft  Tenderness: There is no abdominal tenderness  Musculoskeletal:      Right lower leg: No edema  Left lower leg: No edema  Skin:     General: Skin is warm  Neurological:      Mental Status: She is alert and oriented to person, place, and time  Motor: Weakness present        Gait: Gait abnormal    Psychiatric:         Mood and Affect: Mood normal          Behavior: Behavior normal          Pertinent Laboratory/Diagnostic Studies:   Reviewed in facility chart-stable      Current Medications   Medications reviewed and updated see facility [facility] for details  Current Outpatient Medications:   •  acetaminophen (TYLENOL) 325 mg tablet, Take 650 mg by mouth every 6 (six) hours as needed for mild pain (pain score 1-3)  , Disp: , Rfl:   •  aspirin (ECOTRIN LOW STRENGTH) 81 mg EC tablet, Take 81 mg by mouth daily, Disp: , Rfl:   •  Cholecalciferol 25 MCG (1000 UT) capsule, Take 1,000 Units by mouth daily  , Disp: , Rfl:   •  DULoxetine (CYMBALTA) 60 mg delayed release capsule, Take 1 capsule (60 mg total) by mouth daily Do not start before October 1, 2022 , Disp: , Rfl: 0  •  Empagliflozin 25 MG TABS, Take 25 mg by mouth every morning, Disp: , Rfl:   •  Ergocalciferol (VITAMIN D2 PO), Take 50,000 Units/day by mouth 2 (two) times a week, Disp: , Rfl:   •  ezetimibe (ZETIA) 10 mg tablet, Take 1 tablet (10 mg total) by mouth daily, Disp: 30 tablet, Rfl: 0  •  gabapentin (NEURONTIN) 100 mg capsule, Take 1 capsule (100 mg total) by mouth 2 (two) times a day Take 100 mg in the morning and 100 mg mid afternoon, Disp: 42 capsule, Rfl: 0  •  gabapentin (NEURONTIN) 300 mg capsule, Take 1 capsule (300 mg total) by mouth daily at bedtime, Disp: 21 capsule, Rfl: 0  •  hydrophilic ointment, Apply topically as needed for dry skin, Disp: , Rfl:   •  insulin glargine (LANTUS) 100 units/mL subcutaneous injection, Inject 30 Units under the skin daily at bedtime, Disp: 10 mL, Rfl: 0  •  insulin lispro (HumaLOG) 100 units/mL injection, Inject 1-5 Units under the skin 3 (three) times a day before meals, Disp: , Rfl: 0  •  levothyroxine 50 mcg tablet, Take 1 5 tablets (75 mcg total) by mouth daily, Disp: 45 tablet, Rfl: 0  •  melatonin 3 mg, Take 3 tablets (9 mg total) by mouth daily at bedtime, Disp: , Rfl: 0  •  nystatin (MYCOSTATIN) powder, Apply topically 2 (two) times a day, Disp: 15 g, Rfl: 0  •  omeprazole (PriLOSEC) 20 mg delayed release capsule, Take 20 mg by mouth daily, Disp: , Rfl:   •  topiramate (TOPAMAX) 100 mg tablet, Take 200 mg by mouth 2 (two) times a day , Disp: , Rfl:   •  traZODone (DESYREL) 50 mg tablet, Take 25 mg by mouth daily at bedtime, Disp: , Rfl:        Please note:  Voice-recognition software may have been used in the preparation of this document  Occasional wrong word or "sound-alike" substitutions may have occurred due to the inherent limitations of voice recognition software  Interpretation should be guided by context           [address] [place] [address]  3/1/2023  2:43 PM

## 2023-03-01 NOTE — ASSESSMENT & PLAN NOTE
· BPs have been stable, 118/70   · continue to monitor BP  · No S/S of fluid overload  · Currently not on any antihypertensives

## 2023-03-01 NOTE — ASSESSMENT & PLAN NOTE
· SNF staff reports patient has been having loose stools  · We will order Imodium x24 hours  · Monitor

## 2023-03-11 ENCOUNTER — HOSPITAL ENCOUNTER (INPATIENT)
Facility: HOSPITAL | Age: 68
LOS: 5 days | Discharge: NURSING, REHAB OR LONG-TERM CARE | End: 2023-03-16
Attending: ANESTHESIOLOGY | Admitting: STUDENT IN AN ORGANIZED HEALTH CARE EDUCATION/TRAINING PROGRAM

## 2023-03-11 ENCOUNTER — TELEPHONE (OUTPATIENT)
Dept: OTHER | Facility: OTHER | Age: 68
End: 2023-03-11

## 2023-03-11 ENCOUNTER — APPOINTMENT (EMERGENCY)
Dept: RADIOLOGY | Facility: HOSPITAL | Age: 68
End: 2023-03-11

## 2023-03-11 ENCOUNTER — APPOINTMENT (EMERGENCY)
Dept: CT IMAGING | Facility: HOSPITAL | Age: 68
End: 2023-03-11

## 2023-03-11 ENCOUNTER — APPOINTMENT (OUTPATIENT)
Dept: RADIOLOGY | Facility: HOSPITAL | Age: 68
End: 2023-03-11

## 2023-03-11 ENCOUNTER — HOSPITAL ENCOUNTER (EMERGENCY)
Facility: HOSPITAL | Age: 68
End: 2023-03-11
Attending: EMERGENCY MEDICINE

## 2023-03-11 VITALS
RESPIRATION RATE: 12 BRPM | SYSTOLIC BLOOD PRESSURE: 98 MMHG | TEMPERATURE: 99.5 F | BODY MASS INDEX: 39.52 KG/M2 | HEART RATE: 87 BPM | DIASTOLIC BLOOD PRESSURE: 49 MMHG | OXYGEN SATURATION: 99 % | WEIGHT: 216.05 LBS

## 2023-03-11 DIAGNOSIS — N17.9 AKI (ACUTE KIDNEY INJURY) (HCC): ICD-10-CM

## 2023-03-11 DIAGNOSIS — G93.40 ENCEPHALOPATHY: Primary | ICD-10-CM

## 2023-03-11 DIAGNOSIS — R65.20 SEVERE SEPSIS (HCC): Primary | ICD-10-CM

## 2023-03-11 DIAGNOSIS — A41.9 SEPSIS (HCC): ICD-10-CM

## 2023-03-11 DIAGNOSIS — N39.0 URINARY TRACT INFECTION: ICD-10-CM

## 2023-03-11 DIAGNOSIS — A41.9 SEVERE SEPSIS (HCC): Primary | ICD-10-CM

## 2023-03-11 DIAGNOSIS — R41.82 ALTERED MENTAL STATUS: ICD-10-CM

## 2023-03-11 DIAGNOSIS — F32.A DEPRESSION: ICD-10-CM

## 2023-03-11 PROBLEM — I50.32 CHRONIC HEART FAILURE WITH PRESERVED EJECTION FRACTION (HCC): Chronic | Status: ACTIVE | Noted: 2022-02-16

## 2023-03-11 PROBLEM — R06.89 RESPIRATORY INSUFFICIENCY: Status: ACTIVE | Noted: 2023-03-11

## 2023-03-11 PROBLEM — G47.00 INSOMNIA: Status: ACTIVE | Noted: 2023-03-11

## 2023-03-11 LAB
ALBUMIN SERPL BCP-MCNC: 3.8 G/DL (ref 3.5–5)
ALP SERPL-CCNC: 100 U/L (ref 34–104)
ALT SERPL W P-5'-P-CCNC: 14 U/L (ref 7–52)
AMMONIA PLAS-SCNC: 62 UMOL/L (ref 18–72)
ANION GAP SERPL CALCULATED.3IONS-SCNC: 13 MMOL/L (ref 4–13)
APAP SERPL-MCNC: <10 UG/ML (ref 10–20)
APTT PPP: 24 SECONDS (ref 23–37)
AST SERPL W P-5'-P-CCNC: 34 U/L (ref 13–39)
BACTERIA UR QL AUTO: ABNORMAL /HPF
BASE EX.OXY STD BLDV CALC-SCNC: 83.5 % (ref 60–80)
BASE EXCESS BLDA CALC-SCNC: -6 MMOL/L
BASE EXCESS BLDV CALC-SCNC: -5.1 MMOL/L
BASOPHILS # BLD AUTO: 0.04 THOUSANDS/ÂΜL (ref 0–0.1)
BASOPHILS NFR BLD AUTO: 0 % (ref 0–1)
BILIRUB SERPL-MCNC: 0.5 MG/DL (ref 0.2–1)
BILIRUB UR QL STRIP: ABNORMAL
BODY TEMPERATURE: 99.5 DEGREES FEHRENHEIT
BUN SERPL-MCNC: 105 MG/DL (ref 5–25)
CALCIUM SERPL-MCNC: 10.3 MG/DL (ref 8.4–10.2)
CHLORIDE SERPL-SCNC: 106 MMOL/L (ref 96–108)
CLARITY UR: ABNORMAL
CO2 SERPL-SCNC: 20 MMOL/L (ref 21–32)
COLOR UR: YELLOW
CREAT SERPL-MCNC: 5.15 MG/DL (ref 0.6–1.3)
EOSINOPHIL # BLD AUTO: 0 THOUSAND/ÂΜL (ref 0–0.61)
EOSINOPHIL NFR BLD AUTO: 0 % (ref 0–6)
ERYTHROCYTE [DISTWIDTH] IN BLOOD BY AUTOMATED COUNT: 15.3 % (ref 11.6–15.1)
ETHANOL SERPL-MCNC: <10 MG/DL
FLUAV RNA RESP QL NAA+PROBE: NEGATIVE
FLUBV RNA RESP QL NAA+PROBE: NEGATIVE
GFR SERPL CREATININE-BSD FRML MDRD: 7 ML/MIN/1.73SQ M
GLUCOSE SERPL-MCNC: 107 MG/DL (ref 65–140)
GLUCOSE SERPL-MCNC: 107 MG/DL (ref 65–140)
GLUCOSE SERPL-MCNC: 138 MG/DL (ref 65–140)
GLUCOSE UR STRIP-MCNC: NEGATIVE MG/DL
HCO3 BLDA-SCNC: 17.7 MMOL/L (ref 22–28)
HCO3 BLDV-SCNC: 19.4 MMOL/L (ref 24–30)
HCT VFR BLD AUTO: 50 % (ref 34.8–46.1)
HGB BLD-MCNC: 15.7 G/DL (ref 11.5–15.4)
HGB UR QL STRIP.AUTO: ABNORMAL
HOROWITZ INDEX BLDA+IHG-RTO: 40 MM[HG]
IMM GRANULOCYTES # BLD AUTO: 0.23 THOUSAND/UL (ref 0–0.2)
IMM GRANULOCYTES NFR BLD AUTO: 2 % (ref 0–2)
INR PPP: 1.21 (ref 0.84–1.19)
KETONES UR STRIP-MCNC: NEGATIVE MG/DL
LACTATE SERPL-SCNC: 1.6 MMOL/L (ref 0.5–2)
LEUKOCYTE ESTERASE UR QL STRIP: ABNORMAL
LYMPHOCYTES # BLD AUTO: 1.15 THOUSANDS/ÂΜL (ref 0.6–4.47)
LYMPHOCYTES NFR BLD AUTO: 8 % (ref 14–44)
MCH RBC QN AUTO: 29 PG (ref 26.8–34.3)
MCHC RBC AUTO-ENTMCNC: 31.4 G/DL (ref 31.4–37.4)
MCV RBC AUTO: 92 FL (ref 82–98)
MONOCYTES # BLD AUTO: 0.73 THOUSAND/ÂΜL (ref 0.17–1.22)
MONOCYTES NFR BLD AUTO: 5 % (ref 4–12)
NEUTROPHILS # BLD AUTO: 11.93 THOUSANDS/ÂΜL (ref 1.85–7.62)
NEUTS SEG NFR BLD AUTO: 85 % (ref 43–75)
NITRITE UR QL STRIP: NEGATIVE
NON-SQ EPI CELLS URNS QL MICRO: ABNORMAL /HPF
NRBC BLD AUTO-RTO: 0 /100 WBCS
O2 CT BLDA-SCNC: 19.3 ML/DL (ref 16–23)
O2 CT BLDV-SCNC: 18.7 ML/DL
OXYHGB MFR BLDA: 98.9 % (ref 94–97)
PCO2 BLDA: 29.7 MM HG (ref 36–44)
PCO2 BLDV: 34.8 MM HG (ref 42–50)
PCO2 TEMP ADJ BLDA: 30.4 MM HG (ref 36–44)
PEEP RESPIRATORY: 6 CM[H2O]
PH BLD: 7.38 [PH] (ref 7.35–7.45)
PH BLDA: 7.39 [PH] (ref 7.35–7.45)
PH BLDV: 7.36 [PH] (ref 7.3–7.4)
PH UR STRIP.AUTO: 6 [PH]
PLATELET # BLD AUTO: 293 THOUSANDS/UL (ref 149–390)
PMV BLD AUTO: 11.4 FL (ref 8.9–12.7)
PO2 BLD: 165.8 MM HG (ref 75–129)
PO2 BLDA: 162.9 MM HG (ref 75–129)
PO2 BLDV: 48.8 MM HG (ref 35–45)
POTASSIUM SERPL-SCNC: 5.1 MMOL/L (ref 3.5–5.3)
PROCALCITONIN SERPL-MCNC: 0.77 NG/ML
PROT SERPL-MCNC: 8.5 G/DL (ref 6.4–8.4)
PROT UR STRIP-MCNC: ABNORMAL MG/DL
PROTHROMBIN TIME: 15.6 SECONDS (ref 11.6–14.5)
RBC # BLD AUTO: 5.42 MILLION/UL (ref 3.81–5.12)
RBC #/AREA URNS AUTO: ABNORMAL /HPF
RSV RNA RESP QL NAA+PROBE: NEGATIVE
SALICYLATES SERPL-MCNC: <5 MG/DL (ref 3–20)
SARS-COV-2 RNA RESP QL NAA+PROBE: NEGATIVE
SODIUM SERPL-SCNC: 139 MMOL/L (ref 135–147)
SP GR UR STRIP.AUTO: 1.02 (ref 1–1.03)
SPECIMEN SOURCE: ABNORMAL
TSH SERPL DL<=0.05 MIU/L-ACNC: 2.18 UIU/ML (ref 0.45–4.5)
UROBILINOGEN UR QL STRIP.AUTO: 0.2 E.U./DL
VENT AC: 16
VENT- AC: AC
VT SETTING VENT: 350 ML
WBC # BLD AUTO: 14.08 THOUSAND/UL (ref 4.31–10.16)
WBC #/AREA URNS AUTO: ABNORMAL /HPF

## 2023-03-11 RX ORDER — ROCURONIUM BROMIDE 10 MG/ML
50 INJECTION, SOLUTION INTRAVENOUS ONCE
Status: DISCONTINUED | OUTPATIENT
Start: 2023-03-11 | End: 2023-03-11 | Stop reason: HOSPADM

## 2023-03-11 RX ORDER — SODIUM CHLORIDE, SODIUM GLUCONATE, SODIUM ACETATE, POTASSIUM CHLORIDE, MAGNESIUM CHLORIDE, SODIUM PHOSPHATE, DIBASIC, AND POTASSIUM PHOSPHATE .53; .5; .37; .037; .03; .012; .00082 G/100ML; G/100ML; G/100ML; G/100ML; G/100ML; G/100ML; G/100ML
1000 INJECTION, SOLUTION INTRAVENOUS ONCE
Status: COMPLETED | OUTPATIENT
Start: 2023-03-11 | End: 2023-03-11

## 2023-03-11 RX ORDER — HEPARIN SODIUM 5000 [USP'U]/ML
5000 INJECTION, SOLUTION INTRAVENOUS; SUBCUTANEOUS EVERY 8 HOURS SCHEDULED
Status: DISCONTINUED | OUTPATIENT
Start: 2023-03-11 | End: 2023-03-16 | Stop reason: HOSPADM

## 2023-03-11 RX ORDER — PROPOFOL 10 MG/ML
5-50 INJECTION, EMULSION INTRAVENOUS
Status: DISCONTINUED | OUTPATIENT
Start: 2023-03-11 | End: 2023-03-12

## 2023-03-11 RX ORDER — ASPIRIN 81 MG/1
81 TABLET, CHEWABLE ORAL DAILY
Status: DISCONTINUED | OUTPATIENT
Start: 2023-03-12 | End: 2023-03-16 | Stop reason: HOSPADM

## 2023-03-11 RX ORDER — ROCURONIUM BROMIDE 10 MG/ML
30 INJECTION, SOLUTION INTRAVENOUS ONCE
Status: COMPLETED | OUTPATIENT
Start: 2023-03-11 | End: 2023-03-11

## 2023-03-11 RX ORDER — CEFEPIME HYDROCHLORIDE 2 G/50ML
2000 INJECTION, SOLUTION INTRAVENOUS EVERY 12 HOURS
Status: DISCONTINUED | OUTPATIENT
Start: 2023-03-11 | End: 2023-03-12 | Stop reason: DRUGHIGH

## 2023-03-11 RX ORDER — ACETAMINOPHEN 650 MG/1
650 SUPPOSITORY RECTAL ONCE
Status: COMPLETED | OUTPATIENT
Start: 2023-03-11 | End: 2023-03-11

## 2023-03-11 RX ORDER — LEVOTHYROXINE SODIUM 0.07 MG/1
75 TABLET ORAL
Status: DISCONTINUED | OUTPATIENT
Start: 2023-03-12 | End: 2023-03-16 | Stop reason: HOSPADM

## 2023-03-11 RX ORDER — CEFEPIME HYDROCHLORIDE 2 G/50ML
2000 INJECTION, SOLUTION INTRAVENOUS ONCE
Status: COMPLETED | OUTPATIENT
Start: 2023-03-11 | End: 2023-03-11

## 2023-03-11 RX ORDER — SODIUM CHLORIDE, SODIUM GLUCONATE, SODIUM ACETATE, POTASSIUM CHLORIDE, MAGNESIUM CHLORIDE, SODIUM PHOSPHATE, DIBASIC, AND POTASSIUM PHOSPHATE .53; .5; .37; .037; .03; .012; .00082 G/100ML; G/100ML; G/100ML; G/100ML; G/100ML; G/100ML; G/100ML
100 INJECTION, SOLUTION INTRAVENOUS CONTINUOUS
Status: DISCONTINUED | OUTPATIENT
Start: 2023-03-11 | End: 2023-03-13

## 2023-03-11 RX ORDER — NYSTATIN 100000 [USP'U]/G
POWDER TOPICAL 2 TIMES DAILY
Status: DISCONTINUED | OUTPATIENT
Start: 2023-03-12 | End: 2023-03-16 | Stop reason: HOSPADM

## 2023-03-11 RX ORDER — ROCURONIUM BROMIDE 10 MG/ML
100 INJECTION, SOLUTION INTRAVENOUS ONCE
Status: COMPLETED | OUTPATIENT
Start: 2023-03-11 | End: 2023-03-11

## 2023-03-11 RX ORDER — TOPIRAMATE 100 MG/1
200 TABLET, FILM COATED ORAL 2 TIMES DAILY
Status: DISCONTINUED | OUTPATIENT
Start: 2023-03-12 | End: 2023-03-12

## 2023-03-11 RX ORDER — FENTANYL CITRATE-0.9 % NACL/PF 10 MCG/ML
100 PLASTIC BAG, INJECTION (ML) INTRAVENOUS CONTINUOUS
Status: DISCONTINUED | OUTPATIENT
Start: 2023-03-11 | End: 2023-03-11 | Stop reason: HOSPADM

## 2023-03-11 RX ORDER — ROCURONIUM BROMIDE 10 MG/ML
50 INJECTION, SOLUTION INTRAVENOUS ONCE
Status: COMPLETED | OUTPATIENT
Start: 2023-03-11 | End: 2023-03-11

## 2023-03-11 RX ORDER — CHLORHEXIDINE GLUCONATE 0.12 MG/ML
15 RINSE ORAL EVERY 12 HOURS SCHEDULED
Status: DISCONTINUED | OUTPATIENT
Start: 2023-03-11 | End: 2023-03-16 | Stop reason: HOSPADM

## 2023-03-11 RX ORDER — INSULIN LISPRO 100 [IU]/ML
1-6 INJECTION, SOLUTION INTRAVENOUS; SUBCUTANEOUS EVERY 6 HOURS SCHEDULED
Status: DISCONTINUED | OUTPATIENT
Start: 2023-03-12 | End: 2023-03-16 | Stop reason: HOSPADM

## 2023-03-11 RX ORDER — EZETIMIBE 10 MG/1
10 TABLET ORAL DAILY
Status: DISCONTINUED | OUTPATIENT
Start: 2023-03-12 | End: 2023-03-16 | Stop reason: HOSPADM

## 2023-03-11 RX ORDER — FENTANYL CITRATE 50 UG/ML
100 INJECTION, SOLUTION INTRAMUSCULAR; INTRAVENOUS ONCE
Status: COMPLETED | OUTPATIENT
Start: 2023-03-11 | End: 2023-03-11

## 2023-03-11 RX ORDER — ETOMIDATE 2 MG/ML
30 INJECTION INTRAVENOUS ONCE
Status: COMPLETED | OUTPATIENT
Start: 2023-03-11 | End: 2023-03-11

## 2023-03-11 RX ORDER — PROPOFOL 10 MG/ML
5-50 INJECTION, EMULSION INTRAVENOUS
Status: DISCONTINUED | OUTPATIENT
Start: 2023-03-11 | End: 2023-03-11 | Stop reason: HOSPADM

## 2023-03-11 RX ADMIN — VANCOMYCIN HYDROCHLORIDE 1750 MG: 1 INJECTION, POWDER, LYOPHILIZED, FOR SOLUTION INTRAVENOUS at 18:41

## 2023-03-11 RX ADMIN — ACETAMINOPHEN 650 MG: 650 SUPPOSITORY RECTAL at 17:11

## 2023-03-11 RX ADMIN — NOREPINEPHRINE BITARTRATE 5 MCG/MIN: 1 INJECTION INTRAVENOUS at 20:28

## 2023-03-11 RX ADMIN — CHLORHEXIDINE GLUCONATE 15 ML: 1.2 RINSE ORAL at 22:57

## 2023-03-11 RX ADMIN — FENTANYL CITRATE 100 MCG/HR: 50 INJECTION INTRAVENOUS at 18:40

## 2023-03-11 RX ADMIN — ROCURONIUM BROMIDE 100 MG: 10 SOLUTION INTRAVENOUS at 17:26

## 2023-03-11 RX ADMIN — CEFEPIME HYDROCHLORIDE 2000 MG: 2 INJECTION, SOLUTION INTRAVENOUS at 17:59

## 2023-03-11 RX ADMIN — ROCURONIUM BROMIDE 30 MG: 10 SOLUTION INTRAVENOUS at 17:37

## 2023-03-11 RX ADMIN — ROCURONIUM BROMIDE 50 MG: 10 SOLUTION INTRAVENOUS at 17:38

## 2023-03-11 RX ADMIN — SODIUM CHLORIDE 1000 ML: 0.9 INJECTION, SOLUTION INTRAVENOUS at 17:11

## 2023-03-11 RX ADMIN — SODIUM CHLORIDE, SODIUM GLUCONATE, SODIUM ACETATE, POTASSIUM CHLORIDE, MAGNESIUM CHLORIDE, SODIUM PHOSPHATE, DIBASIC, AND POTASSIUM PHOSPHATE 1000 ML: .53; .5; .37; .037; .03; .012; .00082 INJECTION, SOLUTION INTRAVENOUS at 18:04

## 2023-03-11 RX ADMIN — HEPARIN SODIUM 5000 UNITS: 5000 INJECTION INTRAVENOUS; SUBCUTANEOUS at 22:57

## 2023-03-11 RX ADMIN — SODIUM CHLORIDE, SODIUM GLUCONATE, SODIUM ACETATE, POTASSIUM CHLORIDE AND MAGNESIUM CHLORIDE 100 ML/HR: 526; 502; 368; 37; 30 INJECTION, SOLUTION INTRAVENOUS at 22:50

## 2023-03-11 RX ADMIN — ETOMIDATE 30 MG: 20 INJECTION, SOLUTION INTRAVENOUS at 17:26

## 2023-03-11 RX ADMIN — SODIUM CHLORIDE, SODIUM GLUCONATE, SODIUM ACETATE, POTASSIUM CHLORIDE, MAGNESIUM CHLORIDE, SODIUM PHOSPHATE, DIBASIC, AND POTASSIUM PHOSPHATE 1000 ML: .53; .5; .37; .037; .03; .012; .00082 INJECTION, SOLUTION INTRAVENOUS at 18:10

## 2023-03-11 RX ADMIN — FENTANYL CITRATE 100 MCG: 50 INJECTION, SOLUTION INTRAMUSCULAR; INTRAVENOUS at 17:57

## 2023-03-11 NOTE — SEPSIS NOTE
Sepsis Note   Artist Bharathi 75 y o  female MRN: [id number]  Unit/Bed#: ED 04 Encounter: [id number]       Initial Sepsis Screening     Row Name 03/11/23 1932 03/11/23 1819             Is the patient's history suggestive of a new or worsening infection? -- Yes (Proceed)  -MD       Suspected source of infection -- suspect infection, source unknown  -MD       Indicate SIRS criteria -- Hyperthemia > 38 3C (100 9F) OR Hypothermia <36C (96 8F); Tachycardia > 90 bpm;Leukocytosis (WBC > 34456 IJL) OR Leukopenia (WBC <4000 IJL) OR Bandemia (WBC >10% bands)  -MD       Are two or more of the above signs & symptoms of infection both present and new to the patient? -- Yes (Proceed)  -MD       Assess for evidence of organ dysfunction: Are any of the below criteria present within 6 hours of suspected infection and SIRS criteria that are NOT considered to be chronic conditions? -- SBP < 90;Creatinine > 2 0 AND > 0 5 above baseline  -MD       Date of presentation of severe sepsis -- 03/11/23  -MD       Time of presentation of severe sepsis -- 1707  -MD       Date of presentation of septic shock -- --       Time of presentation of septic shock -- --       Fluid Resuscitation: 30 ml/kg IV fluid bolus will be given based on ideal body weight (use if BMI >30)  -MD --       Is the patient is persistently hypotensive in the hour after fluid bolus administration? If yes, patient meets criteria for vasopressor use  NO  -MD --       Sepsis Note: Click "NEXT" below (NOT "close") to generate sepsis note based on above information   -- --             User Key  (r) = Recorded By, (t) = Taken By, (c) = Cosigned By    [address] Name Provider Type    MD Ely Miller DO Physician                Default Flowsheet Data (last 720 hours)     Sepsis Reassess     Row Name 03/11/23 1932                   Repeat Volume Status and Tissue Perfusion Assessment Performed    Date of Reassessment: 03/11/23  -MD        Time of Reassessment: 1932  -MD Sepsis Reassessment Note: Click "NEXT" below (NOT "close") to generate sepsis reassessment note  YES (proceed by clicking "NEXT")  -MD        Repeat Volume Status and Tissue Perfusion Assessment Performed --              User Key  (r) = Recorded By, (t) = Taken By, (c) = Cosigned By    [address] Name Provider Type    MD Saurav Mcneal DO Physician                Body mass index is 39 52 kg/m²    Wt Readings from Last 1 Encounters:   03/11/23 98 kg (216 lb 0 8 oz)        Ideal body weight: 50 1 kg (110 lb 7 2 oz)  Adjusted ideal body weight: 69 3 kg (152 lb 11 oz)

## 2023-03-11 NOTE — Clinical Note
Jose Luis Taylor was seen and treated in our emergency department on 3/11/2023  Diagnosis:     Eduar Cao  may return to work on return date  She may return on this date: 03/12/2023         If you have any questions or concerns, please don't hesitate to call        Scott Dover DO    ______________________________           _______________          _______________  Hospital Representative                              Date                                Time

## 2023-03-11 NOTE — Clinical Note
May Never accompanied José Hernandez to the emergency department on 3/11/2023  Return date if applicable: 07/75/6788        If you have any questions or concerns, please don't hesitate to call        Nancie Lozano, DO

## 2023-03-11 NOTE — RESPIRATORY THERAPY NOTE
RT  Note  Husam Sammie 75 y o  female MRN: [id number]  Unit/Bed#: ED 04 Encounter: [id number]          Patient transported to CT Scan on transport vent and returned to room  No issues during transport

## 2023-03-11 NOTE — ED PROVIDER NOTES
History  Chief Complaint   Patient presents with   • Altered Mental Status     Brought in by EMS from SNF for unresponsive,  responsive to painful stimuli     39-year-old female previous history of CHF with most recent echo with normal ejection fraction, diabetes, hyperlipidemia, hypertension, hypothyroidism, seizures  Patient presents to the emergency department for altered mental status  Found in her nursing home being unresponsive to painful stimuli  Not talking  Not opening her eyes  I reviewed patient's record  Patient had a couple admissions that were DNI but excepted chest compressions  A couple that were DNR/DNI and the most recent 2 admissions where she was full code  Patient arrives with a POLST form indicating that she would want CPR/intubation but would not want a feeding tube  Spoke with the son  Son wants her treated as full code at this point  Patient is unresponsive to verbal stimuli or painful stimuli  Unable to give history  History obtained via EMS, son  Altered Mental Status  Presenting symptoms: disorientation and lethargy    Severity:  Severe  Most recent episode: Today  Episode history:  Single  Timing:  Constant  Progression:  Unchanged  Chronicity:  New  Associated symptoms: fever        Prior to Admission Medications   Prescriptions Last Dose Informant Patient Reported? Taking? Cholecalciferol 25 MCG (1000 UT) capsule   Yes No   Sig: Take 1,000 Units by mouth daily  DULoxetine (CYMBALTA) 60 mg delayed release capsule   No No   Sig: Take 1 capsule (60 mg total) by mouth daily Do not start before October 1, 2022  Empagliflozin 25 MG TABS   Yes No   Sig: Take 25 mg by mouth every morning   Ergocalciferol (VITAMIN D2 PO)   Yes No   Sig: Take 50,000 Units/day by mouth 2 (two) times a week   acetaminophen (TYLENOL) 325 mg tablet   Yes No   Sig: Take 650 mg by mouth every 6 (six) hours as needed for mild pain (pain score 1-3)     aspirin (ECOTRIN LOW STRENGTH) 81 mg EC tablet   Yes No   Sig: Take 81 mg by mouth daily   ezetimibe (ZETIA) 10 mg tablet   No No   Sig: Take 1 tablet (10 mg total) by mouth daily   gabapentin (NEURONTIN) 100 mg capsule   No No   Sig: Take 1 capsule (100 mg total) by mouth 2 (two) times a day Take 100 mg in the morning and 100 mg mid afternoon   gabapentin (NEURONTIN) 300 mg capsule   No No   Sig: Take 1 capsule (300 mg total) by mouth daily at bedtime   hydrophilic ointment   Yes No   Sig: Apply topically as needed for dry skin   insulin glargine (LANTUS) 100 units/mL subcutaneous injection   No No   Sig: Inject 30 Units under the skin daily at bedtime   insulin lispro (HumaLOG) 100 units/mL injection   No No   Sig: Inject 1-5 Units under the skin 3 (three) times a day before meals   levothyroxine 50 mcg tablet   No No   Sig: Take 1 5 tablets (75 mcg total) by mouth daily   melatonin 3 mg   No No   Sig: Take 3 tablets (9 mg total) by mouth daily at bedtime   nystatin (MYCOSTATIN) powder   No No   Sig: Apply topically 2 (two) times a day   omeprazole (PriLOSEC) 20 mg delayed release capsule   Yes No   Sig: Take 20 mg by mouth daily   topiramate (TOPAMAX) 100 mg tablet   Yes No   Sig: Take 200 mg by mouth 2 (two) times a day    traZODone (DESYREL) 50 mg tablet   Yes No   Sig: Take 25 mg by mouth daily at bedtime      Facility-Administered Medications: None       Past Medical History:   Diagnosis Date   • CHF (congestive heart failure) (HCC)    • Diabetes mellitus (HCC)    • Disease of thyroid gland    • Hyperlipidemia    • Hypertension    • Hypothyroidism (acquired)    • Restless leg syndrome    • Seizures (HCC)        Past Surgical History:   Procedure Laterality Date   • APPENDECTOMY     • CHOLECYSTECTOMY     • HIP FRACTURE SURGERY Right    • TONSILLECTOMY     • TUBAL LIGATION         Family History   Problem Relation Age of Onset   • Brain cancer Mother    • Seizures Father    • Colon cancer Maternal Grandfather    • No Known Problems Son    • No Known Problems Son    • Breast cancer Maternal Aunt 30     I have reviewed and agree with the history as documented  E-Cigarette/Vaping   • E-Cigarette Use Never User      E-Cigarette/Vaping Substances   • Nicotine No    • THC No    • CBD No    • Flavoring No    • Other No    • Unknown No      Social History     Tobacco Use   • Smoking status: Never   • Smokeless tobacco: Never   • Tobacco comments:     not a smoker   Vaping Use   • Vaping Use: Never used   Substance Use Topics   • Alcohol use: Yes     Comment: occasional   • Drug use: Never       Review of Systems   Unable to perform ROS: Patient unresponsive   Constitutional: Positive for fever  Physical Exam  Physical Exam  Constitutional:       Appearance: She is obese  She is ill-appearing and toxic-appearing  HENT:      Head: Normocephalic and atraumatic  Comments: Pupils 3mm and responsive  Eyes:      Pupils:      Right eye: Pupil is round and reactive  Left eye: Pupil is round and reactive  Comments: Pupils are equally responsive to light  She does not track me  She does not open her eyes  Cardiovascular:      Rate and Rhythm: Tachycardia present  Heart sounds: No murmur heard  Pulmonary:      Effort: No respiratory distress  Breath sounds: No wheezing or rales  Abdominal:      General: There is distension  Palpations: There is no mass  Musculoskeletal:         General: No swelling or tenderness  Cervical back: No rigidity  Skin:     General: Skin is warm  Neurological:      Mental Status: She is unresponsive  GCS: GCS eye subscore is 1  GCS verbal subscore is 2  GCS motor subscore is 1  Cranial Nerves: No facial asymmetry           Vital Signs  ED Triage Vitals   Temperature Pulse Respirations Blood Pressure SpO2   03/11/23 1701 03/11/23 1701 03/11/23 1701 03/11/23 1701 03/11/23 1701   (!) 102 1 °F (38 9 °C) (!) 119 20 120/85 94 %      Temp Source Heart Rate Source Patient Position - Orthostatic VS BP Location FiO2 (%)   03/11/23 1701 03/11/23 1701 03/11/23 1723 03/11/23 1723 03/11/23 1744   Axillary Monitor Lying Left arm 100      Pain Score       --                  Vitals:    03/11/23 2007 03/11/23 2030 03/11/23 2037 03/11/23 2052   BP: (!) 84/70 (!) 87/68 164/82 120/81   Pulse: 86 86 80 87   Patient Position - Orthostatic VS: Lying Lying Lying Lying         Visual Acuity  Visual Acuity    Flowsheet Row Most Recent Value   L Pupil Size (mm) 3   R Pupil Size (mm) 3          ED Medications  Medications   ROCuronium (ZEMURON) injection 50 mg (has no administration in time range)   fentaNYL 1000 mcg in sodium chloride 0 9% 100mL infusion (50 mcg/hr Intravenous Rate/Dose Change 3/11/23 2012)   propofol (DIPRIVAN) 1000 mg in 100 mL infusion (premix) (0 mcg/kg/min × 98 kg Intravenous Stopped 3/11/23 1752)   norepinephrine (LEVOPHED) 4 mg (STANDARD CONCENTRATION) IV in sodium chloride 0 9% 250 mL (0 mcg/min Intravenous Stopped 3/11/23 2038)   acetaminophen (TYLENOL) rectal suppository 650 mg (650 mg Rectal Given 3/11/23 1711)   sodium chloride 0 9 % bolus 1,000 mL (0 mL Intravenous Stopped 3/11/23 1820)   etomidate (AMIDATE) 2 mg/mL injection 30 mg (30 mg Intravenous Given 3/11/23 1726)   ROCuronium (ZEMURON) injection 100 mg (100 mg Intravenous Given 3/11/23 1726)   ROCuronium (ZEMURON) injection 50 mg (50 mg Intravenous Given 3/11/23 1738)   ROCuronium (ZEMURON) injection 30 mg (30 mg Intravenous Given 3/11/23 1737)   cefepime (MAXIPIME) IVPB (premix in dextrose) 2,000 mg 50 mL (0 mg Intravenous Stopped 3/11/23 1818)   vancomycin (VANCOCIN) 1,750 mg in sodium chloride 0 9 % 500 mL IVPB (0 mg Intravenous Stopped 3/11/23 2049)   multi-electrolyte (ISOLYTE-S PH 7 4) bolus 1,000 mL (0 mL Intravenous Stopped 3/11/23 1929)   fentanyl citrate (PF) 100 MCG/2ML 100 mcg (100 mcg Intravenous Given 3/11/23 1757)   multi-electrolyte (ISOLYTE-S PH 7 4) bolus 1,000 mL (0 mL Intravenous Stopped 3/11/23 2002) Diagnostic Studies  Results Reviewed     Procedure Component Value Units Date/Time    Urine Microscopic [[id number]]  (Abnormal) Collected: 03/11/23 1854    Lab Status: Final result Specimen: Urine, Indwelling Kay Catheter Updated: 03/11/23 1944     RBC, UA Innumerable /hpf      WBC, UA Innumerable /hpf      Epithelial Cells Occasional /hpf      Bacteria, UA Innumerable /hpf     Urine culture [[id number]] Collected: 03/11/23 1854    Lab Status: In process Specimen: Urine, Indwelling Kay Catheter Updated: 03/11/23 1944    APTT [[id number]]  (Normal) Collected: 03/11/23 1915    Lab Status: Final result Specimen: Blood from Arm, Right Updated: 03/11/23 1943     PTT 24 seconds     Protime-INR [[id number]]  (Abnormal) Collected: 03/11/23 1915    Lab Status: Final result Specimen: Blood from Arm, Right Updated: 03/11/23 1943     Protime 15 6 seconds      INR 1 21    UA w Reflex to Microscopic w Reflex to Culture [[id number]]  (Abnormal) Collected: 03/11/23 1854    Lab Status: Final result Specimen: Urine, Indwelling Kay Catheter Updated: 03/11/23 1929     Color, UA Yellow     Clarity, UA Cloudy     Specific [place], UA 1 020     pH, UA 6 0     Leukocytes, UA 2+     Nitrite, UA Negative     Protein, UA 2+ mg/dl      Glucose, UA Negative mg/dl      Ketones, UA Negative mg/dl      Urobilinogen, UA 0 2 E U /dl      Bilirubin, UA 1+     Occult Blood, UA 3+    FLU/RSV/COVID - if FLU/RSV clinically relevant [[id number]]  (Normal) Collected: 03/11/23 1755    Lab Status: Final result Specimen: Nares from Nose Updated: 03/11/23 1841     SARS-CoV-2 Negative     INFLUENZA A PCR Negative     INFLUENZA B PCR Negative     RSV PCR Negative    Narrative:      FOR PEDIATRIC PATIENTS - copy/paste COVID Guidelines URL to browser: [email or web address]    SARS-CoV-2 assay is a Nucleic Acid Amplification assay intended for the  qualitative detection of nucleic acid from SARS-CoV-2 in nasopharyngeal  swabs  Results are for the presumptive identification of SARS-CoV-2 RNA  Positive results are indicative of infection with SARS-CoV-2, the virus  causing COVID-19, but do not rule out bacterial infection or co-infection  with other viruses  Laboratories within the [place] and its  territories are required to report all positive results to the appropriate  public health authorities  Negative results do not preclude SARS-CoV-2  infection and should not be used as the sole basis for treatment or other  patient management decisions  Negative results must be combined with  clinical observations, patient history, and epidemiological information  This test has not been FDA cleared or approved  This test has been authorized by FDA under an Emergency Use Authorization  (EUA)  This test is only authorized for the duration of time the  declaration that circumstances exist justifying the authorization of the  emergency use of an in vitro diagnostic tests for detection of SARS-CoV-2  virus and/or diagnosis of COVID-19 infection under section 564(b)(1) of  the Act, 21 U  [place] C  190ZLE-1(G)(5), unless the authorization is terminated  or revoked sooner  The test has been validated but independent review by FDA  and CLIA is pending  Test performed using [employer] GeneXpert: This RT-PCR assay targets N2,  a region unique to SARS-CoV-2  A conserved region in the E-gene was chosen  for pan-Sarbecovirus detection which includes SARS-CoV-2  According to CMS-2020-01-R, this platform meets the definition of high-throughput technology  Lactic acid [[id number]]  (Normal) Collected: 03/11/23 1707    Lab Status: Final result Specimen: Blood from Arm, Right Updated: 03/11/23 1812     LACTIC ACID 1 6 mmol/L     Narrative:      Result may be elevated if tourniquet was used during collection      Ethanol [[id number]]  (Normal) Collected: 03/11/23 1707    Lab Status: Final result Specimen: Blood from Arm, Right Updated: 03/11/23 1812     Ethanol Lvl <10 mg/dL     Ammonia [[id number]]  (Normal) Collected: 03/11/23 1707    Lab Status: Final result Specimen: Blood from Arm, Right Updated: 03/11/23 1812     Ammonia 62 umol/L     Procalcitonin [[id number]]  (Abnormal) Collected: 03/11/23 1707    Lab Status: Final result Specimen: Blood from Arm, Right Updated: 03/11/23 1803     Procalcitonin 0 77 ng/ml     TSH, 3rd generation with Free T4 reflex [[id number]]  (Normal) Collected: 03/11/23 1707    Lab Status: Final result Specimen: Blood from Arm, Right Updated: 03/11/23 1803     TSH 3RD GENERATON 2 178 uIU/mL     Narrative:      Patients undergoing fluorescein dye angiography may retain small amounts of fluorescein in the body for 48-72 hours post procedure  Samples containing fluorescein can produce falsely depressed TSH values  If the patient had this procedure,a specimen should be resubmitted post fluorescein clearance        Comprehensive metabolic panel [[id number]]  (Abnormal) Collected: 03/11/23 1707    Lab Status: Final result Specimen: Blood from Arm, Right Updated: 03/11/23 1741     Sodium 139 mmol/L      Potassium 5 1 mmol/L      Chloride 106 mmol/L      CO2 20 mmol/L      ANION GAP 13 mmol/L       mg/dL      Creatinine 5 15 mg/dL      Glucose 107 mg/dL      Calcium 10 3 mg/dL      AST 34 U/L      ALT 14 U/L      Alkaline Phosphatase 100 U/L      Total Protein 8 5 g/dL      Albumin 3 8 g/dL      Total Bilirubin 0 50 mg/dL      eGFR 7 ml/min/1 73sq m     Narrative:      National Kidney Disease Foundation guidelines for Chronic Kidney Disease (CKD):   •  Stage 1 with normal or high GFR (GFR > 90 mL/min/1 73 square meters)  •  Stage 2 Mild CKD (GFR = 60-89 mL/min/1 73 square meters)  •  Stage 3A Moderate CKD (GFR = 45-59 mL/min/1 73 square meters)  •  Stage 3B Moderate CKD (GFR = 30-44 mL/min/1 73 square meters)  •  Stage 4 Severe CKD (GFR = 15-29 mL/min/1 73 square meters)  •  Stage 5 End Stage CKD (GFR <15 mL/min/1 73 square meters)  Note: GFR calculation is accurate only with a steady state creatinine    Salicylate level [[id number]]  (Normal) Collected: 03/11/23 1707    Lab Status: Final result Specimen: Blood from Arm, Right Updated: 74/00/05 4227     Salicylate Lvl <5 mg/dL     Acetaminophen level-If concentration is detectable, please discuss with medical  on call  [[id number]]  (Abnormal) Collected: 03/11/23 1707    Lab Status: Final result Specimen: Blood from Arm, Right Updated: 03/11/23 1740     Acetaminophen Level <10 ug/mL     Blood culture #2 [[id number]] Collected: 03/11/23 1707    Lab Status: In process Specimen: Blood from Arm, Right Updated: 03/11/23 1729    Blood culture #1 [[id number]] Collected: 03/11/23 1708    Lab Status:  In process Specimen: Blood from Arm, Right Updated: 03/11/23 1729    Blood gas, venous [[id number]]  (Abnormal) Collected: 03/11/23 1707    Lab Status: Final result Specimen: Blood from Arm, Right Updated: 03/11/23 1721     pH, Major 7 363     pCO2, Major 34 8 mm Hg      pO2, Major 48 8 mm Hg      HCO3, Major 19 4 mmol/L      Base Excess, Major -5 1 mmol/L      O2 Content, Major 18 7 ml/dL      O2 HGB, VENOUS 83 5 %     CBC and differential [[id number]]  (Abnormal) Collected: 03/11/23 1706    Lab Status: Final result Specimen: Blood Updated: 03/11/23 1716     WBC 14 08 Thousand/uL      RBC 5 42 Million/uL      Hemoglobin 15 7 g/dL      Hematocrit 50 0 %      MCV 92 fL      MCH 29 0 pg      MCHC 31 4 g/dL      RDW 15 3 %      MPV 11 4 fL      Platelets 589 Thousands/uL      nRBC 0 /100 WBCs      Neutrophils Relative 85 %      Immat GRANS % 2 %      Lymphocytes Relative 8 %      Monocytes Relative 5 %      Eosinophils Relative 0 %      Basophils Relative 0 %      Neutrophils Absolute 11 93 Thousands/µL      Immature Grans Absolute 0 23 Thousand/uL      Lymphocytes Absolute 1 15 Thousands/µL      Monocytes Absolute 0 73 Thousand/µL      Eosinophils Absolute 0 00 Thousand/µL      Basophils Absolute 0 04 Thousands/µL     Fingerstick Glucose (POCT) [[id number]]  (Normal) Collected: 03/11/23 1711    Lab Status: Final result Updated: 03/11/23 1712     POC Glucose 107 mg/dl                  CT chest abdomen pelvis wo contrast   Final Result by Sayda Napier MD (03/11 1852)      Bibasilar pulmonary opacities are more likely to represent atelectasis/hypoventilatory change than pneumonia  No acute inflammatory findings in the abdomen or the pelvis  The study was marked in [facility] for immediate notification  Workstation performed: [id number]         CT head without contrast   Final Result by Maury Lennon DO (03/11 1841)      No acute intracranial abnormality  Workstation performed: [id number]         XR chest 1 view portable    (Results Pending)              Procedures  Intubation    Date/Time: 3/11/2023 5:34 PM  Performed by: Cheryle Lota, DO  Authorized by: Cheryle Lota, DO     Patient location:  ED  Other Assisting Provider: No    Consent:     Consent obtained:  Emergent situation    Consent given by:  Healthcare agent (son)    Risks discussed:  Death    Alternatives discussed:  No treatment  Universal protocol:     Procedure explained and questions answered to patient or proxy's satisfaction: yes      Patient identity confirmed:  Hospital-assigned identification number  Pre-procedure details:     Patient status:  Unresponsive    Pretreatment medications:  None    Paralytics:  Rocuronium  Indications:     Indications for intubation: airway protection    Procedure details:     Preoxygenation:  Nonrebreather mask    CPR in progress: no      Intubation method: Glid scope  Nasal intubation technique:  Guided    Laryngoscope blade:   Mac 3    Tube size (mm):  7 0    Tube type:  Cuffed    Number of attempts:  3 or more    Ventilation between attempts: yes      Cricoid pressure: yes      Tube visualized through cords: yes    Placement assessment: ETT to lip:  22    Tube secured with:  ETT gr    Breath sounds:  Equal    Placement verification: chest rise, condensation, CXR verification, direct visualization, equal breath sounds, esophageal detector, ETCO2 detector, fiberoptic scope and tube exhalation      CXR findings:  ETT in proper place  Post-procedure details:     Patient tolerance of procedure: Tolerated well, no immediate complications  CriticalCare Time    Date/Time: 3/11/2023 7:06 PM  Performed by: Thu Adams DO  Authorized by: Thu Adams DO     Critical care provider statement:     Critical care time (minutes):  60    Critical care start time:  3/11/2023 6:06 PM    Critical care end time:  3/11/2023 7:06 PM    Critical care time was exclusive of:  Separately billable procedures and treating other patients    Critical care was necessary to treat or prevent imminent or life-threatening deterioration of the following conditions:  Renal failure, sepsis and shock    Critical care was time spent personally by me on the following activities:  Blood draw for specimens, obtaining history from patient or surrogate, ordering and performing treatments and interventions, ordering and review of laboratory studies, development of treatment plan with patient or surrogate, re-evaluation of patient's condition, evaluation of patient's response to treatment, review of old charts, ventilator management and examination of patient             ED Course  ED Course as of 03/11/23 2053   Sat Mar 11, 2023   1711 Quick review of patient's chart shows that she has a POLST form showing that she would want CPR/resuscitation  I spoke with the patient's son  He states that she would want intubation at this point  States she would want chest compressions and to treat as full code until we know if the prognosis is poor  Patient is currently GCS 4   Not protecting her airway  Will intubate     1747 WBC(!): 14 08   1747 Creatinine(!): 5 15   1802 Patient is SIRS positive and severe sepsis  Will give fluid bolus based on ideal body weight of 50kg     1829 Procedure Note: EKG  Date/Time: 03/11/23 6:29 PM   Interpreted by: Lakesha Marie  Indications / Diagnosis: AMS  ECG reviewed by me, the ED Provider: yes   The EKG demonstrates:  Rhythm: sinus tach 118 bpm  Intervals: normal intervals  Axis: L axis  QRS/Blocks: normal QRS/ Inc RBBB, LAFB, LVH  ST Changes: No acute ST Changes, no STD/JOSE      1847 Updated patient's son on care thus far  65 Spoke with ICU  Patient is excepted to [facility]  Initial Sepsis Screening     Row Name 03/11/23 1932 03/11/23 1819             Is the patient's history suggestive of a new or worsening infection? -- Yes (Proceed)  -MD       Suspected source of infection -- suspect infection, source unknown  -MD       Indicate SIRS criteria -- Hyperthemia > 38 3C (100 9F) OR Hypothermia <36C (96 8F); Tachycardia > 90 bpm;Leukocytosis (WBC > 41121 IJL) OR Leukopenia (WBC <4000 IJL) OR Bandemia (WBC >10% bands)  -MD       Are two or more of the above signs & symptoms of infection both present and new to the patient? -- Yes (Proceed)  -MD       Assess for evidence of organ dysfunction: Are any of the below criteria present within 6 hours of suspected infection and SIRS criteria that are NOT considered to be chronic conditions? -- SBP < 90;Creatinine > 2 0 AND > 0 5 above baseline  -MD       Date of presentation of severe sepsis -- 03/11/23  -MD       Time of presentation of severe sepsis -- 1707  -MD       Date of presentation of septic shock -- --       Time of presentation of septic shock -- --       Fluid Resuscitation: 30 ml/kg IV fluid bolus will be given based on ideal body weight (use if BMI >30)  -MD --       Is the patient is persistently hypotensive in the hour after fluid bolus administration? If yes, patient meets criteria for vasopressor use   NO  -MD --       Sepsis Note: Click "NEXT" below (NOT "close") to generate sepsis note based on above information  -- --             User Key  (r) = Recorded By, (t) = Taken By, (c) = Cosigned By    [id number]  Name Provider Type    MD Ely Miller DO Physician              Default Flowsheet Data (last 720 hours)     Sepsis Reassess     Row Name 03/11/23 1932                   Repeat Volume Status and Tissue Perfusion Assessment Performed    Date of Reassessment: 03/11/23  -MD        Time of Reassessment: 1932  -MD        Sepsis Reassessment Note: Click "NEXT" below (NOT "close") to generate sepsis reassessment note  YES (proceed by clicking "NEXT")  -MD        Repeat Volume Status and Tissue Perfusion Assessment Performed --              User Key  (r) = Recorded By, (t) = Taken By, (c) = Cosigned By    [id number]  Name Provider Type    MD Ely Miller DO Physician              SBIRT 22yo+    Flowsheet Row Most Recent Value   SBIRT (25 yo +)    In order to provide better care to our patients, we are screening all of our patients for alcohol and drug use  Would it be okay to ask you these screening questions? Unable to answer at this time Filed at: 03/11/2023 1740                    Medical Decision Making  Febrile altered female  Unsure etiology of fever  GCS 4  Not protecting airway  Intubated with 7 0 tube  Complicated intubation  Attempted sedation with 30 etomidate and 100 mg rocuronium  Patient not adequately paralyzed  IV flushes fine  Additional 30mg and 50mg dosage of rocuronium given with 3 total attempts at intubation  Patient still with slight movements  Intubated to 22 at the lips       ET tube with good placement and no pneumonia per my read of the chest xray  Patient is febrile tachycardic  ECG with sinus tach no signs of ischemia on ECG  Patient evaluated for intracranial bleed  None  Eval for acute intrathoracic and abdominal abnormality  Not able to do IV contrast secondary to renal failure       Patient given 30 cc/kg fluids for severe sepsis  Patient is responsive to fluids and not hypotensive after fluids while I was with patient  Remains unresponsive  Sedation with fentanyl  Propofol ordered but held  UA with probable UTI  Urine is extremely cloudy and foul smelling  Probable source of infection  Spoke with patient's family multiple times with goals of care  Patient is full code at this point per son  Patient re-evaluated multple times  Critically ill  Patient accept ed to [facility]  Signed out to my colleague pending transport  REGINO (acute kidney injury) [facility]): acute illness or injury  Altered mental status: acute illness or injury  Severe sepsis ([facility] [address] ): acute illness or injury that poses a threat to life or bodily functions  Urinary tract infection: acute illness or injury that poses a threat to life or bodily functions  Amount and/or Complexity of Data Reviewed  Labs: ordered  Decision-making details documented in ED Course  Radiology: ordered  Risk  OTC drugs  Prescription drug management  Disposition  Final diagnoses:   [facility]   Altered mental status   Urinary tract infection   REGINO (acute kidney injury) ([place] [address] )     Time reflects when diagnosis was documented in both MDM as applicable and the Disposition within this note     Time User Action Codes Description Comment    3/11/2023  6:59 PM Kaleigh Cruz Add [A41 9,  R65 20] Severe sepsis ([facility] )     3/11/2023  6:59 PM Kaleigh Sanchezg Add [R41 82] Altered mental status     3/11/2023  7:43 PM Berndolores Sanchezg Add [N39 0] Urinary tract infection     3/11/2023  8:49 PM Jared Katz Add [N17 9] REGINO (acute kidney injury) [facility])       ED Disposition     ED Disposition   Transfer to Another Facility-In Network    Condition   --    Date/Time   Sat Mar 11, 2023  6:59 PM    Comment   Efren Ice should be transferred out to [facility]             MD Documentation    Flowsheet Row Most Recent Value   Patient Condition The patient has been stabilized such that within reasonable medical probability, no material deterioration of the patient condition or the condition of the unborn child(angelo) is likely to result from the transfer   Reason for Transfer Level of Care needed not available at this facility, No bed available at level of patient's needs  [ICU]   Benefits of Transfer Specialized equipment and/or services available at the receiving facility (Include comment)________________________  [ICU]   Risks of Transfer Potential for delay in receiving treatment, Potential deterioration of medical condition, Loss of IV   Accepting Physician [address] [facility] Name, [address]   Sending MD [facility]   Provider Certification General risk, such as traffic hazards, adverse weather conditions, rough terrain or turbulence, possible failure of equipment (including vehicle or aircraft), or consequences of actions of persons outside the control of the transport personnel, Unanticipated needs of medical equipment and personnel during transport, Risk of worsening condition, The possibility of a transport vehicle being unavailable      RN Documentation    Flowsheet Row Most [address] [facility] Name, [address]      Follow-up Information    None         Patient's Medications   Discharge Prescriptions    No medications on file       Outpatient Discharge Orders   Insert , Temp, Indwelling Blad Cath, Simple   Standing Status: Future Standing Exp   Date: 03/11/24       PDMP Review       Value Time User    PDMP Reviewed  Yes 3/30/2021  3:19 PM Kaley Yen MD          ED Provider  Electronically Signed by           Brenna Quiroz DO  03/11/23 2053

## 2023-03-12 LAB
ALBUMIN SERPL BCP-MCNC: 2.7 G/DL (ref 3.5–5)
ALP SERPL-CCNC: 68 U/L (ref 34–104)
ALT SERPL W P-5'-P-CCNC: 15 U/L (ref 7–52)
ANION GAP SERPL CALCULATED.3IONS-SCNC: 13 MMOL/L (ref 4–13)
ANION GAP SERPL CALCULATED.3IONS-SCNC: 15 MMOL/L (ref 4–13)
AST SERPL W P-5'-P-CCNC: 30 U/L (ref 13–39)
BACTERIA UR QL AUTO: ABNORMAL /HPF
BASOPHILS # BLD AUTO: 0.02 THOUSANDS/ÂΜL (ref 0–0.1)
BASOPHILS NFR BLD AUTO: 0 % (ref 0–1)
BILIRUB SERPL-MCNC: 0.46 MG/DL (ref 0.2–1)
BILIRUB UR QL STRIP: ABNORMAL
BUN SERPL-MCNC: 93 MG/DL (ref 5–25)
BUN SERPL-MCNC: 95 MG/DL (ref 5–25)
CA-I BLD-SCNC: 1.05 MMOL/L (ref 1.12–1.32)
CALCIUM ALBUM COR SERPL-MCNC: 8.8 MG/DL (ref 8.3–10.1)
CALCIUM SERPL-MCNC: 7.6 MG/DL (ref 8.4–10.2)
CALCIUM SERPL-MCNC: 7.8 MG/DL (ref 8.4–10.2)
CHLORIDE SERPL-SCNC: 108 MMOL/L (ref 96–108)
CHLORIDE SERPL-SCNC: 108 MMOL/L (ref 96–108)
CLARITY UR: ABNORMAL
CO2 SERPL-SCNC: 20 MMOL/L (ref 21–32)
CO2 SERPL-SCNC: 23 MMOL/L (ref 21–32)
COLOR UR: YELLOW
CREAT SERPL-MCNC: 3.94 MG/DL (ref 0.6–1.3)
CREAT SERPL-MCNC: 4.43 MG/DL (ref 0.6–1.3)
EOSINOPHIL # BLD AUTO: 0.02 THOUSAND/ÂΜL (ref 0–0.61)
EOSINOPHIL NFR BLD AUTO: 0 % (ref 0–6)
ERYTHROCYTE [DISTWIDTH] IN BLOOD BY AUTOMATED COUNT: 15.1 % (ref 11.6–15.1)
GFR SERPL CREATININE-BSD FRML MDRD: 11 ML/MIN/1.73SQ M
GFR SERPL CREATININE-BSD FRML MDRD: 9 ML/MIN/1.73SQ M
GLUCOSE SERPL-MCNC: 101 MG/DL (ref 65–140)
GLUCOSE SERPL-MCNC: 105 MG/DL (ref 65–140)
GLUCOSE SERPL-MCNC: 66 MG/DL (ref 65–140)
GLUCOSE SERPL-MCNC: 79 MG/DL (ref 65–140)
GLUCOSE SERPL-MCNC: 92 MG/DL (ref 65–140)
GLUCOSE UR STRIP-MCNC: NEGATIVE MG/DL
HCT VFR BLD AUTO: 39.1 % (ref 34.8–46.1)
HGB BLD-MCNC: 11.9 G/DL (ref 11.5–15.4)
HGB UR QL STRIP.AUTO: ABNORMAL
IMM GRANULOCYTES # BLD AUTO: 0.18 THOUSAND/UL (ref 0–0.2)
IMM GRANULOCYTES NFR BLD AUTO: 1 % (ref 0–2)
KETONES UR STRIP-MCNC: ABNORMAL MG/DL
LEUKOCYTE ESTERASE UR QL STRIP: ABNORMAL
LYMPHOCYTES # BLD AUTO: 1.28 THOUSANDS/ÂΜL (ref 0.6–4.47)
LYMPHOCYTES NFR BLD AUTO: 9 % (ref 14–44)
MAGNESIUM SERPL-MCNC: 2.4 MG/DL (ref 1.9–2.7)
MCH RBC QN AUTO: 28.7 PG (ref 26.8–34.3)
MCHC RBC AUTO-ENTMCNC: 30.2 G/DL (ref 31.4–37.4)
MCV RBC AUTO: 95 FL (ref 82–98)
MONOCYTES # BLD AUTO: 0.87 THOUSAND/ÂΜL (ref 0.17–1.22)
MONOCYTES NFR BLD AUTO: 6 % (ref 4–12)
NEUTROPHILS # BLD AUTO: 11.35 THOUSANDS/ÂΜL (ref 1.85–7.62)
NEUTS SEG NFR BLD AUTO: 84 % (ref 43–75)
NITRITE UR QL STRIP: NEGATIVE
NON-SQ EPI CELLS URNS QL MICRO: ABNORMAL /HPF
NRBC BLD AUTO-RTO: 0 /100 WBCS
PH UR STRIP.AUTO: 5.5 [PH]
PHOSPHATE SERPL-MCNC: 3.4 MG/DL (ref 2.3–4.1)
PLATELET # BLD AUTO: 191 THOUSANDS/UL (ref 149–390)
PMV BLD AUTO: 11.4 FL (ref 8.9–12.7)
POTASSIUM SERPL-SCNC: 3.7 MMOL/L (ref 3.5–5.3)
POTASSIUM SERPL-SCNC: 3.8 MMOL/L (ref 3.5–5.3)
PROCALCITONIN SERPL-MCNC: 0.59 NG/ML
PROT SERPL-MCNC: 5.9 G/DL (ref 6.4–8.4)
PROT UR STRIP-MCNC: ABNORMAL MG/DL
RBC # BLD AUTO: 4.11 MILLION/UL (ref 3.81–5.12)
RBC #/AREA URNS AUTO: ABNORMAL /HPF
SODIUM SERPL-SCNC: 143 MMOL/L (ref 135–147)
SODIUM SERPL-SCNC: 144 MMOL/L (ref 135–147)
SP GR UR STRIP.AUTO: >=1.03 (ref 1–1.03)
UROBILINOGEN UR QL STRIP.AUTO: 0.2 E.U./DL
VANCOMYCIN TROUGH SERPL-MCNC: 58.8 UG/ML (ref 10–20)
WBC # BLD AUTO: 13.72 THOUSAND/UL (ref 4.31–10.16)
WBC #/AREA URNS AUTO: ABNORMAL /HPF

## 2023-03-12 RX ORDER — TOPIRAMATE 100 MG/1
100 TABLET, FILM COATED ORAL 2 TIMES DAILY
Status: DISCONTINUED | OUTPATIENT
Start: 2023-03-12 | End: 2023-03-16 | Stop reason: HOSPADM

## 2023-03-12 RX ORDER — SODIUM CHLORIDE, SODIUM GLUCONATE, SODIUM ACETATE, POTASSIUM CHLORIDE, MAGNESIUM CHLORIDE, SODIUM PHOSPHATE, DIBASIC, AND POTASSIUM PHOSPHATE .53; .5; .37; .037; .03; .012; .00082 G/100ML; G/100ML; G/100ML; G/100ML; G/100ML; G/100ML; G/100ML
1000 INJECTION, SOLUTION INTRAVENOUS ONCE
Status: COMPLETED | OUTPATIENT
Start: 2023-03-12 | End: 2023-03-12

## 2023-03-12 RX ORDER — SODIUM CHLORIDE, SODIUM GLUCONATE, SODIUM ACETATE, POTASSIUM CHLORIDE, MAGNESIUM CHLORIDE, SODIUM PHOSPHATE, DIBASIC, AND POTASSIUM PHOSPHATE .53; .5; .37; .037; .03; .012; .00082 G/100ML; G/100ML; G/100ML; G/100ML; G/100ML; G/100ML; G/100ML
500 INJECTION, SOLUTION INTRAVENOUS ONCE
Status: COMPLETED | OUTPATIENT
Start: 2023-03-12 | End: 2023-03-12

## 2023-03-12 RX ORDER — CEFEPIME HYDROCHLORIDE 1 G/50ML
1000 INJECTION, SOLUTION INTRAVENOUS EVERY 12 HOURS
Status: DISCONTINUED | OUTPATIENT
Start: 2023-03-12 | End: 2023-03-14

## 2023-03-12 RX ORDER — DEXTROSE MONOHYDRATE 25 G/50ML
25 INJECTION, SOLUTION INTRAVENOUS ONCE
Status: COMPLETED | OUTPATIENT
Start: 2023-03-13 | End: 2023-03-13

## 2023-03-12 RX ORDER — VANCOMYCIN HYDROCHLORIDE 1 G/200ML
15 INJECTION, SOLUTION INTRAVENOUS DAILY PRN
Status: DISCONTINUED | OUTPATIENT
Start: 2023-03-12 | End: 2023-03-13

## 2023-03-12 RX ORDER — POTASSIUM CHLORIDE 20MEQ/15ML
20 LIQUID (ML) ORAL ONCE
Status: COMPLETED | OUTPATIENT
Start: 2023-03-12 | End: 2023-03-12

## 2023-03-12 RX ORDER — ALBUMIN (HUMAN) 12.5 G/50ML
25 SOLUTION INTRAVENOUS ONCE
Status: COMPLETED | OUTPATIENT
Start: 2023-03-12 | End: 2023-03-12

## 2023-03-12 RX ADMIN — TOPIRAMATE 200 MG: 100 TABLET, FILM COATED ORAL at 08:38

## 2023-03-12 RX ADMIN — CEFEPIME HYDROCHLORIDE 1000 MG: 1 INJECTION, SOLUTION INTRAVENOUS at 04:19

## 2023-03-12 RX ADMIN — POTASSIUM CHLORIDE 20 MEQ: 1.5 SOLUTION ORAL at 10:46

## 2023-03-12 RX ADMIN — HEPARIN SODIUM 5000 UNITS: 5000 INJECTION INTRAVENOUS; SUBCUTANEOUS at 13:35

## 2023-03-12 RX ADMIN — NYSTATIN 1 APPLICATION.: 100000 POWDER TOPICAL at 18:14

## 2023-03-12 RX ADMIN — EZETIMIBE 10 MG: 10 TABLET ORAL at 08:38

## 2023-03-12 RX ADMIN — ALBUMIN (HUMAN) 25 G: 0.25 INJECTION, SOLUTION INTRAVENOUS at 10:46

## 2023-03-12 RX ADMIN — CEFEPIME HYDROCHLORIDE 1000 MG: 1 INJECTION, SOLUTION INTRAVENOUS at 16:14

## 2023-03-12 RX ADMIN — CHLORHEXIDINE GLUCONATE 15 ML: 1.2 RINSE ORAL at 21:41

## 2023-03-12 RX ADMIN — SODIUM CHLORIDE, SODIUM GLUCONATE, SODIUM ACETATE, POTASSIUM CHLORIDE AND MAGNESIUM CHLORIDE 100 ML/HR: 526; 502; 368; 37; 30 INJECTION, SOLUTION INTRAVENOUS at 08:40

## 2023-03-12 RX ADMIN — Medication 20 MG: at 08:41

## 2023-03-12 RX ADMIN — NYSTATIN 1 APPLICATION.: 100000 POWDER TOPICAL at 08:38

## 2023-03-12 RX ADMIN — CHLORHEXIDINE GLUCONATE 15 ML: 1.2 RINSE ORAL at 08:38

## 2023-03-12 RX ADMIN — POTASSIUM CHLORIDE 20 MEQ: 1.5 SOLUTION ORAL at 18:14

## 2023-03-12 RX ADMIN — TOPIRAMATE 100 MG: 100 TABLET, FILM COATED ORAL at 21:41

## 2023-03-12 RX ADMIN — SODIUM CHLORIDE, SODIUM GLUCONATE, SODIUM ACETATE, POTASSIUM CHLORIDE AND MAGNESIUM CHLORIDE 100 ML/HR: 526; 502; 368; 37; 30 INJECTION, SOLUTION INTRAVENOUS at 16:38

## 2023-03-12 RX ADMIN — SODIUM CHLORIDE, SODIUM GLUCONATE, SODIUM ACETATE, POTASSIUM CHLORIDE, MAGNESIUM CHLORIDE, SODIUM PHOSPHATE, DIBASIC, AND POTASSIUM PHOSPHATE 1000 ML: .53; .5; .37; .037; .03; .012; .00082 INJECTION, SOLUTION INTRAVENOUS at 01:06

## 2023-03-12 RX ADMIN — HEPARIN SODIUM 5000 UNITS: 5000 INJECTION INTRAVENOUS; SUBCUTANEOUS at 05:32

## 2023-03-12 RX ADMIN — ALBUMIN (HUMAN) 25 G: 0.25 INJECTION, SOLUTION INTRAVENOUS at 19:27

## 2023-03-12 RX ADMIN — LEVOTHYROXINE SODIUM 75 MCG: 75 TABLET ORAL at 05:32

## 2023-03-12 RX ADMIN — ASPIRIN 81 MG CHEWABLE TABLET 81 MG: 81 TABLET CHEWABLE at 08:38

## 2023-03-12 RX ADMIN — SODIUM CHLORIDE, SODIUM GLUCONATE, SODIUM ACETATE, POTASSIUM CHLORIDE, MAGNESIUM CHLORIDE, SODIUM PHOSPHATE, DIBASIC, AND POTASSIUM PHOSPHATE 500 ML: .53; .5; .37; .037; .03; .012; .00082 INJECTION, SOLUTION INTRAVENOUS at 04:16

## 2023-03-12 RX ADMIN — HEPARIN SODIUM 5000 UNITS: 5000 INJECTION INTRAVENOUS; SUBCUTANEOUS at 21:42

## 2023-03-12 NOTE — PROGRESS NOTES
Wasted 350 mcg or 35 ml from Fentanyl bag( 10 mcg/ml) that was received from  [address] [place] [address], witnessed by Lisa Lance RN

## 2023-03-12 NOTE — ASSESSMENT & PLAN NOTE
Wt Readings from Last 3 Encounters:   03/11/23 91 6 kg (201 lb 15 1 oz)   03/11/23 98 kg (216 lb 0 8 oz)   03/01/23 97 9 kg (215 lb 12 8 oz)   · ECHO in 2/22 revealed G2DD, mild to moderate aortic stenosis, and RV pressure of 51

## 2023-03-12 NOTE — ASSESSMENT & PLAN NOTE
· POA as evidenced by: tachycardia, leukocytosis, and elevated creatine  · Lactic acid 1 6  · Procal 0 8  · CT chest/ abd/pelvis: Bibasilar pulmonary opacities are more likely to represent atelectasis/hypoventilatory change than pneumonia  No acute inflammatory findings in the abdomen or the pelvis    · UA 2+ leukocytes and innumerable bacteria with profoundly purulent urine     Plan:  · Follow-up with blood cultures  · Follow-up with MRSA  · Follow-up with urine culture  · Continue cefepime/vanco for now  · Procalcitonin pending

## 2023-03-12 NOTE — ASSESSMENT & PLAN NOTE
· Patient presents from nursing home after being found unresponsive  · Upon arrival to [place] ED she had a GCS of 3 and was therefore intubated  · CT head was negative   · VB 36/ 35/ 49/ 19  · TSH 2 178  · Coma panel WNL   · Ammonia 62    Plan:  · Obtain infectious work-up as outlined  · Hold sedating medications   · Continue neuro exams per unit routine

## 2023-03-12 NOTE — ASSESSMENT & PLAN NOTE
Problem: Pain:  Goal: Pain level will decrease  Description: Pain level will decrease  10/10/2021 0027 by Regla Julien RN  Outcome: Met This Shift  Note: Denies pain this shift. 10/9/2021 1235 by Kobe Pichardo LPN  Outcome: Met This Shift  Note: Patient denies pain     Problem: Anxiety:  Goal: Level of anxiety will decrease  Description: Level of anxiety will decrease  10/10/2021 0027 by Regla Julien RN  Outcome: Not Met This Shift  Note: Complained of anxiety states she is anxious about getting discharged in the am. Requested atarax with relief. 10/9/2021 1235 by Kobe Pichardo LPN  Outcome: Ongoing  Note: Medication given for anxiety, see MAR     Problem: Activity:  Goal: Sleeping patterns will improve  Description: Sleeping patterns will improve  10/10/2021 0027 by Regla Julien RN  Outcome: Met This Shift  Note: Slept 7.5 hours last evening. Requested trazodone this shift. 10/9/2021 1235 by Kobe Pichardo LPN  Outcome: Ongoing  Note: Patient slept 7.5 hours during the night, will continue to monitor     Problem: Altered Mood, Depressive Behavior:  Goal: Able to verbalize and/or display a decrease in depressive symptoms  Description: Able to verbalize and/or display a decrease in depressive symptoms  10/10/2021 0027 by Regla Julien RN  Outcome: Met This Shift  Note: Continues to be depressed but states it is much less. Rates her mood an 8 with 10 being the best. Affect bright. Good eye contact. States she is hopeful for the future. Good peer interaction. 10/9/2021 1235 by Kobe Pichardo LPN  Outcome: Ongoing  Note: Patient verbaalizes having little depressive symptoms, rates mood 6-7, has flat affect and brightens, will monitor  Goal: Ability to disclose and discuss suicidal ideas will improve  Description: Ability to disclose and discuss suicidal ideas will improve  10/10/2021 0027 by Regla Julien RN  Outcome: Met This Shift  Note: Denies suicidal ideations this shift. · [facility]  · ABG upon arrival to W: 7 39/ 30/ 163/ 18  · AC/VC 16/350/40/6    Plan:  · Hold sedation to obtain neuro exam   · Daily SBT  · VAP bundle 10/9/2021 1235 by Marianna Del Toro LPN  Outcome: Met This Shift  Note: Denies suicidal ideations  Goal: Absence of self-harm  Description: Absence of self-harm  10/10/2021 0027 by Radha Garcia RN  Outcome: Met This Shift  Note: Pt remains safe and free from harm. 15 minute safety checks are being completed throughout shift. 10/9/2021 1235 by Marianna Del Toro LPN  Outcome: Met This Shift  Note: No self harm  Goal: Patient specific goal  Description: Patient specific goal  10/10/2021 0027 by Radha Garcia RN  Outcome: Met This Shift  Note: To go home tomorrow. 10/9/2021 1235 by Marianna Del Toro LPN  Outcome: Ongoing  Note: Goal:  go home tomorrow  Goal: Participates in care planning  Description: Participates in care planning  10/10/2021 0027 by Radha Garcia RN  Outcome: Met This Shift  Note: Care plan reviewed with patient and verbalize understanding of the plan of care and contribute to goal setting. 10/9/2021 1235 by Marianna Del Toro LPN  Outcome: Met This Shift  Note: Participates in care planning     Problem: Substance Abuse:  Goal: Absence of drug withdrawal signs and symptoms  Description: Absence of drug withdrawal signs and symptoms  10/10/2021 0027 by Radha Garcia RN  Outcome: Completed  Note: Denies withdrawal signs and symptoms.    10/9/2021 1235 by Marianna Del Toro LPN  Outcome: Met This Shift  Note: No s/s of drug withdrawal  Goal: Patient specific goal  Description: Patient specific goal  10/10/2021 0027 by Radha Garcia RN  Outcome: Completed  10/9/2021 1235 by Marianna Del Toro LPN  Outcome: Ongoing  Note: Goal;  to go home tomorrow     Problem: Coping:  Goal: Ability to identify problematic behaviors that deter socialization will improve  Description: Ability to identify problematic behaviors that deter socialization will improve  10/10/2021 0027 by Radha Garcia RN  Outcome: Ongoing  10/9/2021 1235 by Marianna Del Toro LPN  Outcome: Met This Shift  Note: Patient out socializing with peers

## 2023-03-12 NOTE — PROGRESS NOTES
Dakota Chavarria is a 75 y o  female who is currently ordered Vancomycin IV with management by the Pharmacy Consult service  Relevant clinical data and objective / subjective history reviewed  Vancomycin Assessment:  Indication and Goal AUC/Trough: Urinary tract infection (goal -600, trough >10)  Clinical Status: improving  Micro:     Renal Function:  SCr: 4 43 mg/dL  CrCl: 13 1 mL/min  Renal replacement: Not on dialysis  Days of Therapy: 2  Current Dose: 1750mg (25mg/kg) loading dose given  Vancomycin Plan:  New Dosinmg (15mg/kg) pulse-dosed PRN vanco level<=15mcg/ml  Hold dose today, 3/12/23, trough = 58 8  Next Level: 3/13/23 at 0600  Renal Function Monitoring: Daily BMP and [place] will continue to follow closely for s/sx of nephrotoxicity, infusion reactions and appropriateness of therapy  BMP and CBC will be ordered per protocol  We will continue to follow the patient’s culture results and clinical progress daily      Elizabeth Amaya, Pharmacist

## 2023-03-12 NOTE — ASSESSMENT & PLAN NOTE
· Profoundly purulent urine in arceo catheter  · UA revealed leukocytes and innumerable bacteria   · See rest of plan as outlined above

## 2023-03-12 NOTE — RESPIRATORY THERAPY NOTE
RT Ventilator Management Note  Rome Charles 75 y o  female MRN: [id number]  Unit/Bed#: ICU 05 Encounter: [id number]      Daily Screen    No data found in the last 10 encounters             Physical Exam:   Assessment Type: Assess only  General Appearance: Sleeping  Respiratory Pattern: Assisted  Chest Assessment: Chest expansion symmetrical  Bilateral Breath Sounds: Diminished  R Breath Sounds: Diminished  L Breath Sounds: Diminished  Cough: None      Resp Comments: patient tried on PSV placed back on A/C due to apnea

## 2023-03-12 NOTE — ED NOTES
Manager of [facility] calls for update on pt  Pt's chart accessed for this reason        Yonatan Parish RN  03/12/23 1358

## 2023-03-12 NOTE — ASSESSMENT & PLAN NOTE
· POA as evidenced by: tachycardia, leukocytosis, and elevated creatine  · Lactic acid 1 6  · Procal 0 8  · CT chest/ abd/pelvis: Bibasilar pulmonary opacities are more likely to represent atelectasis/hypoventilatory change than pneumonia  No acute inflammatory findings in the abdomen or the pelvis    · UA 2+ leukocytes and innumerable bacteria with profoundly purulent urine     Plan:  · Obtain blood cultures  · Obtain MRSA  · Obtain urine culture  · Continue cefepime/vanco for now  · Obtain repeat Procal in AM

## 2023-03-12 NOTE — SEPSIS NOTE
Sepsis Note   Kassandra Lazo 75 y o  female MRN: [id number]  Unit/Bed#: ICU 05 Encounter: [id number]       Initial Sepsis Screening     Row Name 03/12/23 0519                Is the patient's history suggestive of a new or worsening infection? Yes (Proceed)  -TT        Suspected source of infection urinary tract infection  -TT        Indicate SIRS criteria Tachycardia > 90 bpm;Leukocytosis (WBC > 02486 IJL) OR Leukopenia (WBC <4000 IJL) OR Bandemia (WBC >10% bands)  -TT        Are two or more of the above signs & symptoms of infection both present and new to the patient? Yes (Proceed)  -TT        Assess for evidence of organ dysfunction: Are any of the below criteria present within 6 hours of suspected infection and SIRS criteria that are NOT considered to be chronic conditions? Creatinine > 2 0  -TT        Date of presentation of severe sepsis 03/11/23  -TT        Time of presentation of severe sepsis 2305  -TT              User Key  (r) = Recorded By, (t) = Taken By, (c) = Cosigned By    Initials Name Provider Type    TT 4101 [facility] [place] Nurse Practitioner                    Body mass index is 35 77 kg/m²    Wt Readings from Last 1 Encounters:   03/11/23 91 6 kg (201 lb 15 1 oz)     IBW (Ideal Body Weight): 52 4 kg    Ideal body weight: 52 4 kg (115 lb 8 3 oz)  Adjusted ideal body weight: 68 1 kg (150 lb 1 4 oz)

## 2023-03-12 NOTE — H&P
[address] 1955, 75 y o  female MRN: [id number]  Unit/Bed#: ICU 05 Encounter: [id number]  Primary Care Provider: Estefani Silva MD   Date and time admitted to hospital: 3/11/2023 10:17 PM    * Sepsis ([address] )  Assessment & Plan  · POA as evidenced by: tachycardia, leukocytosis, and elevated creatine  · Lactic acid 1 6  · Procal 0 8  · CT chest/ abd/pelvis: Bibasilar pulmonary opacities are more likely to represent atelectasis/hypoventilatory change than pneumonia  No acute inflammatory findings in the abdomen or the pelvis    · UA 2+ leukocytes and innumerable bacteria with profoundly purulent urine     Plan:  · Obtain blood cultures  · Obtain MRSA  · Obtain urine culture  · Continue cefepime/vanco for now  · Obtain repeat Procal in AM     Acute encephalopathy  Assessment & Plan  · Patient presents from nursing home after being found unresponsive  · Upon arrival to [facility] ED she had a GCS of 3 and was therefore intubated  · CT head was negative   · VB 36/ 35/ 49/ 19  · TSH 2 178  · Coma panel WNL   · Ammonia 62    Plan:  · Obtain infectious work-up as outlined  · Hold sedating medications   · Continue neuro exams per unit routine     UTI (urinary tract infection)  Assessment & Plan  · Profoundly purulent urine in arceo catheter  · UA revealed leukocytes and innumerable bacteria   · See rest of plan as outlined above    REGINO (acute kidney injury) ([facility] )  Assessment & Plan  · Patient presents with creatine 5 2 and   · Previous creatine on 23 was 1 13  · CT without evidence of hydro   · Was administered 2L of isolyte at [facility]  · Per son, SNF reported 15 lb weight loss over the past month with poor oral intake     Plan:  · Continue Isolyte for now for IV hydration   · Continue arceo with strict I&O's  · Consider nephro consult     Respiratory insufficiency  Assessment & Plan  · Patient was intubated upon arrival to [facility] ED for airway protection    Plan:  · Obtain ABG  · Obtain CXR   · Hold sedation to obtain neuro exam   · VAP bundle     Chronic heart failure with preserved ejection fraction (HCC)  Assessment & Plan  Wt Readings from Last 3 Encounters:   03/11/23 91 6 kg (201 lb 15 1 oz)   03/11/23 98 kg (216 lb 0 8 oz)   03/01/23 97 9 kg (215 lb 12 8 oz)   · ECHO in 2/22 revealed G2DD, mild to moderate aortic stenosis, and RV pressure of 51          Insomnia  Assessment & Plan  · Hold trazodone     GERD (gastroesophageal reflux disease)  Assessment & Plan  · Continue omeprazole     Diabetes mellitus ([facility] )  Assessment & Plan  · Hold outpatient oral diabetic agents and lantus   · Start on SSI with Q 6 hour accu checks     Hyperlipidemia  Assessment & Plan  · Continue zetia      -------------------------------------------------------------------------------------------------------------  Chief Complaint: Unresponsive    History of Present Illness   HX and PE limited by: Intubated  Brianna Darby is a 75 y o  female who presents from nursing home after being found unresponsive  She has a past medical history of hypertension, hyperlipidemia, CHF, GERD, CAD, diabetes, hypothyroidism, and insomnia  Upon presentation to [place] ED, she had a GCS of 3 and was therefore intubated  CT imaging was unrevealing  She met sepsis criteria with tachycardia, leukocytosis, and REGINO  She was given cefepime and vancomycin and 2 L of IV fluids  UA revealed 2+ leukocytes and innumerable bacteria  She was transferred to [address] [place] [address] for higher level care    History obtained from chart review  -------------------------------------------------------------------------------------------------------------  Dispo:  Admit to Critical Care     Code Status: Level 1 - Full Code  --------------------------------------------------------------------------------------------------------------  Review of Systems   Unable to perform ROS: Intubated     Review of systems was unable to be performed secondary to Intubated    Physical Exam  Vitals reviewed  Constitutional:       Appearance: She is obese  She is ill-appearing  Interventions: She is intubated and restrained  HENT:      Nose: Nose normal       Mouth/Throat:      Mouth: Mucous membranes are dry  Eyes:      Pupils: Pupils are equal, round, and reactive to light  Cardiovascular:      Rate and Rhythm: Regular rhythm  Tachycardia present  Pulses: Normal pulses  Heart sounds: Normal heart sounds  Pulmonary:      Effort: No respiratory distress  She is intubated  Breath sounds: No wheezing, rhonchi or rales  Abdominal:      Palpations: Abdomen is soft  Genitourinary:     Comments: Purulent drainage  Kay present  Musculoskeletal:      Right lower leg: No edema  Left lower leg: No edema  Skin:     General: Skin is warm and dry  Coloration: Skin is pale  Neurological:      GCS: GCS eye subscore is 1  GCS verbal subscore is 1  GCS motor subscore is 1  Comments: Patient was transferred sedated on propofol and fentanyl       --------------------------------------------------------------------------------------------------------------  Vitals:   Vitals:    03/11/23 2225 03/11/23 2250 03/11/23 2300   BP:  139/85 111/64   Pulse:  101 86   Resp:  16 16   Temp:  97 5 °F (36 4 °C) 97 5 °F (36 4 °C)   TempSrc:   Temporal   SpO2:  100% 100%   Weight: 91 6 kg (201 lb 15 1 oz)  91 6 kg (201 lb 15 1 oz)   Height:   5' 3" (1 6 m)     Temp  Min: 97 5 °F (36 4 °C)  Max: 102 1 °F (38 9 °C)  IBW (Ideal Body Weight): 52 4 kg  Height: 5' 3" (160 cm)  Body mass index is 35 77 kg/m²      Laboratory and Diagnostics:  Results from last 7 days   Lab Units 03/11/23  1706   WBC Thousand/uL 14 08*   HEMOGLOBIN g/dL 15 7*   HEMATOCRIT % 50 0*   PLATELETS Thousands/uL 293   NEUTROS PCT % 85*   MONOS PCT % 5     Results from last 7 days   Lab Units 03/11/23  1707   SODIUM mmol/L 139   POTASSIUM mmol/L 5 1 CHLORIDE mmol/L 106   CO2 mmol/L 20*   ANION GAP mmol/L 13   BUN mg/dL 105*   CREATININE mg/dL 5 15*   CALCIUM mg/dL 10 3*   GLUCOSE RANDOM mg/dL 107   ALT U/L 14   AST U/L 34   ALK PHOS U/L 100   ALBUMIN g/dL 3 8   TOTAL BILIRUBIN mg/dL 0 50          Results from last 7 days   Lab Units 03/11/23  1915   INR  1 21*   PTT seconds 24          Results from last 7 days   Lab Units 03/11/23  1707   LACTIC ACID mmol/L 1 6     ABG:  Results from last 7 days   Lab Units 03/11/23  2243   PH ART  7 392   PCO2 ART mm Hg 29 7*   PO2 ART mm Hg 162 9*   HCO3 ART mmol/L 17 7*   BASE EXC ART mmol/L -6 0   ABG SOURCE  Radial, Left     VBG:  Results from last 7 days   Lab Units 03/11/23  2243 03/11/23  1707   PH KYMBERLY   --  7 363   PCO2 KYMBERLY mm Hg  --  34 8*   PO2 KYMBERLY mm Hg  --  48 8*   HCO3 KYMBERLY mmol/L  --  19 4*   BASE EXC KYMBERLY mmol/L  --  -5 1   ABG SOURCE  Radial, Left  --      Results from last 7 days   Lab Units 03/11/23  1707   PROCALCITONIN ng/ml 0 77*       Micro:        EKG: Sinus tach  Imaging: I have personally reviewed pertinent reports          Historical Information   Past Medical History:   Diagnosis Date   • CHF (congestive heart failure) ([facility] )    • Diabetes mellitus ([facility] )    • Disease of thyroid gland    • Hyperlipidemia    • Hypertension    • Hypothyroidism (acquired)    • Restless leg syndrome    • Seizures (HCC)      Past Surgical History:   Procedure Laterality Date   • APPENDECTOMY     • CHOLECYSTECTOMY     • HIP FRACTURE SURGERY Right    • TONSILLECTOMY     • TUBAL LIGATION       Social History   Social History     Substance and Sexual Activity   Alcohol Use Yes    Comment: occasional     Social History     Substance and Sexual Activity   Drug Use Never     Social History     Tobacco Use   Smoking Status Never   Smokeless Tobacco Never   Tobacco Comments    not a smoker     Family History:   Family History   Problem Relation Age of Onset   • Brain cancer Mother    • Seizures Father    • Colon cancer Maternal Grandfather    • No Known Problems Son    • No Known Problems Son    • Breast cancer Maternal Aunt 27     Family history unknown and I have reviewed this patient's family history and commented on sigificant items within the HPI    Medications:  Current Facility-Administered Medications   Medication Dose Route Frequency   • [START ON 3/12/2023] aspirin chewable tablet 81 mg  81 mg Oral Daily   • cefepime (MAXIPIME) IVPB (premix in dextrose) 2,000 mg 50 mL  2,000 mg Intravenous Q12H   • chlorhexidine (PERIDEX) 0 12 % oral rinse 15 mL  15 mL Mouth/Throat Q12H [facility]   • [START ON 3/12/2023] ezetimibe (ZETIA) tablet 10 mg  10 mg Oral Daily   • heparin (porcine) subcutaneous injection 5,000 Units  5,000 Units Subcutaneous Q8H [facility]   • [START ON 3/12/2023] insulin lispro (HumaLOG) 100 units/mL subcutaneous injection 1-6 Units  1-6 Units Subcutaneous Q6H [facility]   • [START ON 3/12/2023] levothyroxine tablet 75 mcg  75 mcg Oral Early Morning   • multi-electrolyte (PLASMALYTE-A/ISOLYTE-S PH 7 4) IV solution  100 mL/hr Intravenous Continuous   • [START ON 3/12/2023] nystatin (MYCOSTATIN) powder   Topical BID   • [START ON 3/12/2023] omeprazole (PRILOSEC) suspension 2 mg/mL  20 mg Oral Daily   • propofol (DIPRIVAN) 1000 mg in 100 mL infusion (premix)  5-50 mcg/kg/min Intravenous Titrated   • [START ON 3/12/2023] topiramate (TOPAMAX) tablet 200 mg  200 mg Oral BID   • [START ON 3/12/2023] vancomycin (VANCOCIN) 1,250 mg in sodium chloride 0 9 % 250 mL IVPB  15 mg/kg Intravenous Q12H     Home medications:  Prior to Admission Medications   Prescriptions Last Dose Informant Patient Reported? Taking? Cholecalciferol 25 MCG (1000 UT) capsule Unknown  Yes No   Sig: Take 1,000 Units by mouth daily  DULoxetine (CYMBALTA) 60 mg delayed release capsule Unknown  No No   Sig: Take 1 capsule (60 mg total) by mouth daily Do not start before October 1, 2022     Empagliflozin 25 MG TABS Unknown  Yes No   Sig: Take 25 mg by mouth every morning Ergocalciferol (VITAMIN D2 PO) Unknown  Yes No   Sig: Take 50,000 Units/day by mouth 2 (two) times a week   acetaminophen (TYLENOL) 325 mg tablet Unknown  Yes No   Sig: Take 650 mg by mouth every 6 (six) hours as needed for mild pain (pain score 1-3)  aspirin (ECOTRIN LOW STRENGTH) 81 mg EC tablet Unknown  Yes No   Sig: Take 81 mg by mouth daily   ezetimibe (ZETIA) 10 mg tablet Unknown  No No   Sig: Take 1 tablet (10 mg total) by mouth daily   gabapentin (NEURONTIN) 100 mg capsule Unknown  No No   Sig: Take 1 capsule (100 mg total) by mouth 2 (two) times a day Take 100 mg in the morning and 100 mg mid afternoon   gabapentin (NEURONTIN) 300 mg capsule Unknown  No No   Sig: Take 1 capsule (300 mg total) by mouth daily at bedtime   hydrophilic ointment Unknown  Yes No   Sig: Apply topically as needed for dry skin   insulin glargine (LANTUS) 100 units/mL subcutaneous injection Unknown  No No   Sig: Inject 30 Units under the skin daily at bedtime   insulin lispro (HumaLOG) 100 units/mL injection Unknown  No No   Sig: Inject 1-5 Units under the skin 3 (three) times a day before meals   levothyroxine 50 mcg tablet   No No   Sig: Take 1 5 tablets (75 mcg total) by mouth daily   melatonin 3 mg Unknown  No No   Sig: Take 3 tablets (9 mg total) by mouth daily at bedtime   nystatin (MYCOSTATIN) powder Unknown  No No   Sig: Apply topically 2 (two) times a day   omeprazole (PriLOSEC) 20 mg delayed release capsule Unknown  Yes No   Sig: Take 20 mg by mouth daily   topiramate (TOPAMAX) 100 mg tablet Unknown  Yes No   Sig: Take 200 mg by mouth 2 (two) times a day    traZODone (DESYREL) 50 mg tablet Unknown  Yes No   Sig: Take 25 mg by mouth daily at bedtime      Facility-Administered Medications: None     Allergies:   Allergies   Allergen Reactions   • Pineapple - Food Allergy Anaphylaxis   • Statins Other (See Comments)     Cramping from head to toe    • Bupropion Other (See Comments)   • Byetta 10 Mcg Pen [Exenatide]    • Ceftriaxone Other (See Comments)   • Marijuana [Dronabinol] GI Intolerance   • Metformin Diarrhea     Other reaction(s): Unknown   • Omeprazole Hives   • Ondansetron      Other reaction(s): Unknown   • Coconut Oil - Food Allergy Rash   • Liraglutide Rash and Other (See Comments)     Generalized rash   • Penicillins Rash       ------------------------------------------------------------------------------------------------------------  Advance Directive and Living Will:      Power of :    POLST:    ------------------------------------------------------------------------------------------------------------  Anticipated Length of Stay is > 2 midnights    Care Time Delivered:   No Critical Care time spent       [facility]        Portions of the record may have been created with voice recognition software  Occasional wrong word or "sound a like" substitutions may have occurred due to the inherent limitations of voice recognition software    Read the chart carefully and recognize, using context, where substitutions have occurred

## 2023-03-12 NOTE — ASSESSMENT & PLAN NOTE
· Patient presents from nursing home after being found unresponsive  · Upon arrival to [place] ED she had a GCS of 3 and was therefore intubated  · CT head was negative   · VB 36/ 35/ 49/ 19  · TSH 2 178  · Coma panel WNL   · Ammonia 62    Plan:  · Obtain infectious work-up as outlined  · Hold sedating medications   · Continue neuro exams per unit routine

## 2023-03-12 NOTE — ASSESSMENT & PLAN NOTE
· Patient was intubated upon arrival to [place] ED for airway protection    Plan:  · Obtain ABG  · Obtain CXR   · Hold sedation to obtain neuro exam   · VAP bundle

## 2023-03-12 NOTE — EMTALA/ACUTE CARE TRANSFER
[facility] EMERGENCY DEPARTMENT  [address] [address]  Dept: [phone]      EMTALA TRANSFER CONSENT    NAME Simon Razo                                         1955                              MRN [id number]    I have been informed of my rights regarding examination, treatment, and transfer   by Dr Scott Dover DO    Benefits: Specialized equipment and/or services available at the receiving facility (Include comment)________________________ (ICU)    Risks: Potential for delay in receiving treatment, Potential deterioration of medical condition, Loss of IV      Consent for Transfer:  I acknowledge that my medical condition has been evaluated and explained to me by the emergency department physician or other qualified medical person and/or my attending physician, who has recommended that I be transferred to the service of  Accepting Physician: Sandra Varela at [address] [facility] [address], [address] : [address] [facility]  The above potential benefits of such transfer, the potential risks associated with such transfer, and the probable risks of not being transferred have been explained to me, and I fully understand them  The doctor has explained that, in my case, the benefits of transfer outweigh the risks  I agree to be transferred  I authorize the performance of emergency medical procedures and treatments upon me in both transit and upon arrival at the receiving facility  Additionally, I authorize the release of any and all medical records to the receiving facility and request they be transported with me, if possible  I understand that the safest mode of transportation during a medical emergency is an ambulance and that the Hospital advocates the use of this mode of transport   Risks of traveling to the receiving facility by car, including absence of medical control, life sustaining equipment, such as oxygen, and medical personnel has been explained to me and I fully understand them  (LIZETH CORRECT BOX BELOW)  [  ]  I consent to the stated transfer and to be transported by ambulance/helicopter  [  ]  I consent to the stated transfer, but refuse transportation by ambulance and accept full responsibility for my transportation by car  I understand the risks of non-ambulance transfers and I exonerate the Hospital and its staff from any deterioration in my condition that results from this refusal     X___________________________________________    DATE  23  TIME________  Signature of patient or legally responsible individual signing on patient behalf           RELATIONSHIP TO PATIENT_________________________          Provider Certification    NAME Tanvi Alvares                                         1955                              MRN [id number]    A medical screening exam was performed on the above named patient  Based on the examination:    Condition Necessitating Transfer The primary encounter diagnosis was Severe sepsis ([address] )  Diagnoses of Altered mental status and Urinary tract infection were also pertinent to this visit      Patient Condition: The patient has been stabilized such that within reasonable medical probability, no material deterioration of the patient condition or the condition of the unborn child(angelo) is likely to result from the transfer    Reason for Transfer: Level of Care needed not available at this facility, No bed available at level of patient's needs (ICU)    Transfer Requirements: [facility] [address]   · Space available and qualified personnel available for treatment as acknowledged by    · Agreed to accept transfer and to provide appropriate medical treatment as acknowledged by       Reid Benjamin  · Appropriate medical records of the examination and treatment of the patient are provided at the time of transfer   [address] _______  · Transfer will be performed by qualified personnel from    and appropriate transfer equipment as required, including the use of necessary and appropriate life support measures  Provider Certification: I have examined the patient and explained the following risks and benefits of being transferred/refusing transfer to the patient/family:  General risk, such as traffic hazards, adverse weather conditions, rough terrain or turbulence, possible failure of equipment (including vehicle or aircraft), or consequences of actions of persons outside the control of the transport personnel, Unanticipated needs of medical equipment and personnel during transport, Risk of worsening condition, The possibility of a transport vehicle being unavailable      Based on these reasonable risks and benefits to the patient and/or the unborn child(angelo), and based upon the information available at the time of the patient’s examination, I certify that the medical benefits reasonably to be expected from the provision of appropriate medical treatments at another medical facility outweigh the increasing risks, if any, to the individual’s medical condition, and in the case of labor to the unborn child, from effecting the transfer      X____________________________________________ DATE 03/11/23        TIME_______      ORIGINAL - SEND TO MEDICAL RECORDS   COPY - SEND WITH PATIENT DURING TRANSFER

## 2023-03-12 NOTE — PROGRESS NOTES
[address]la [address]  Progress Note - Janae Mcmullen 1955, 75 y o  female MRN: [id number]  Unit/Bed#: ICU 05 Encounter: [id number]  Primary Care Provider: Mary Anne Perez MD   Date and time admitted to hospital: 3/11/2023 10:17 PM    * Sepsis ([facility] 75 )  Assessment & Plan  · POA as evidenced by: tachycardia, leukocytosis, and elevated creatine  · Lactic acid 1 6  · Procal 0 8  · CT chest/ abd/pelvis: Bibasilar pulmonary opacities are more likely to represent atelectasis/hypoventilatory change than pneumonia  No acute inflammatory findings in the abdomen or the pelvis    · UA 2+ leukocytes and innumerable bacteria with profoundly purulent urine     Plan:  · Follow-up with blood cultures  · Follow-up with MRSA  · Follow-up with urine culture  · Continue cefepime/vanco for now  · Procalcitonin pending    Acute encephalopathy  Assessment & Plan  · Patient presents from nursing home after being found unresponsive  · Upon arrival to [facility] ED she had a GCS of 3 and was therefore intubated  · CT head was negative   · VB 36/ 35/ 49/ 19  · TSH 2 178  · Coma panel WNL   · Ammonia 62    Plan:  · Obtain infectious work-up as outlined  · Hold sedating medications   · Continue neuro exams per unit routine     UTI (urinary tract infection)  Assessment & Plan  · Profoundly purulent urine in arceo catheter  · UA revealed leukocytes and innumerable bacteria   · See rest of plan as outlined above    REGINO (acute kidney injury) ([facility] 75 )  Assessment & Plan  · Patient presents with creatine 5 2 and   · Previous creatine on 23 was 1 13  · CT without evidence of hydro   · Was administered 2L of isolyte at [facility]  · Per son, SNF reported 15 lb weight loss over the past month with poor oral intake   · Given additional 1L isolyte on admission to [facility]    Plan:  · Continue Isolyte for IV hydration   · Continue arceo with strict I&O's  · Consider nephro consult     Respiratory insufficiency  Assessment & Plan  · [facility] for [facility]  · ABG upon arrival to W: 7 39/ 30/ 163/ 18  · AC/VC 16/350/40/6    Plan:  · Hold sedation to obtain neuro exam   · Daily SBT  · VAP bundle     Chronic heart failure with preserved ejection fraction (HCC)  Assessment & Plan  Wt Readings from Last 3 Encounters:   23 91 6 kg (201 lb 15 1 oz)   23 98 kg (216 lb 0 8 oz)   23 97 9 kg (215 lb 12 8 oz)   · ECHO in  revealed G2DD, mild to moderate aortic stenosis, and RV pressure of 51          Insomnia  Assessment & Plan  · Hold trazodone     GERD (gastroesophageal reflux disease)  Assessment & Plan  · Continue omeprazole     Diabetes mellitus ([facility] )  Assessment & Plan  · Hold outpatient oral diabetic agents and lantus   · Continue SSI with Q 6 hour accu checks     Hyperlipidemia  Assessment & Plan  · Continue zetia      ----------------------------------------------------------------------------------------  HPI/24hr events: Patient was transferred from [facility] in ED  Received intubated and on fentanyl/propofol  Sedation has been held but patient only moves to painful stimuli       Patient appropriate for transfer out of the ICU today?: No  Disposition: Continue Critical Care   Code Status: Level 1 - Full Code  ---------------------------------------------------------------------------------------  SUBJECTIVE  TINA    Review of Systems   Unable to perform ROS: Intubated       ---------------------------------------------------------------------------------------  OBJECTIVE    Vitals   Vitals:    23 0130 23 0145 23 0300 23 0315   BP: (!) 87/51 93/50 101/58 96/54   Pulse: 68 71  75   Resp: 16 17  16   Temp: (!) 96 6 °F (35 9 °C) (!) 96 4 °F (35 8 °C)  (!) 96 3 °F (35 7 °C)   TempSrc:       SpO2: 100% 100%  100%   Weight:       Height:         Temp (24hrs), Av 6 °F (36 4 °C), Min:96 3 °F (35 7 °C), Max:102 1 °F (38 9 °C)  Current: Temperature: (!) 96 3 °F (35 7 °C)          Respiratory:  SpO2: SpO2: 100 %, SpO2 Activity: SpO2 Activity: At Rest       Invasive/non-invasive ventilation settings   Respiratory    Lab Data (Last 4 hours)      03/11 2243            pH, Arterial       7 392             pCO2, Arterial       29 7             pO2, Arterial       162 9             HCO3, Arterial       17 7             Base Excess, Arterial       -6 0                  O2/Vent Data       03/11 2240   Most Recent         Vent Mode AC/VC  AC/VC      Resp Rate (BPM) (BPM) 16  16      Vt (mL) (mL) 350  350      FIO2 (%) (%) 40  40      PEEP (cmH2O) (cmH2O) 6  6      MV 7 1  7 1                  Physical Exam  Constitutional:       Appearance: She is obese  She is ill-appearing  Interventions: She is intubated and restrained  HENT:      Mouth/Throat:      Comments: ETT in place  Eyes:      Pupils: Pupils are equal, round, and reactive to light  Cardiovascular:      Rate and Rhythm: Normal rate and regular rhythm  Pulmonary:      Effort: No respiratory distress  She is intubated  Breath sounds: No wheezing, rhonchi or rales  Abdominal:      Palpations: Abdomen is soft  Genitourinary:     Comments: Kay in place  Skin:     General: Skin is cool and dry  Neurological:      Comments:  Withdraws to painful stimuli       Laboratory and Diagnostics:  Results from last 7 days   Lab Units 03/11/23  1706   WBC Thousand/uL 14 08*   HEMOGLOBIN g/dL 15 7*   HEMATOCRIT % 50 0*   PLATELETS Thousands/uL 293   NEUTROS PCT % 85*   MONOS PCT % 5     Results from last 7 days   Lab Units 03/11/23  1707   SODIUM mmol/L 139   POTASSIUM mmol/L 5 1   CHLORIDE mmol/L 106   CO2 mmol/L 20*   ANION GAP mmol/L 13   BUN mg/dL 105*   CREATININE mg/dL 5 15*   CALCIUM mg/dL 10 3*   GLUCOSE RANDOM mg/dL 107   ALT U/L 14   AST U/L 34   ALK PHOS U/L 100   ALBUMIN g/dL 3 8   TOTAL BILIRUBIN mg/dL 0 50          Results from last 7 days   Lab Units 03/11/23  1915   INR 1 21*   PTT seconds 24          Results from last 7 days   Lab Units 03/11/23  1707   LACTIC ACID mmol/L 1 6     ABG:  Results from last 7 days   Lab Units 03/11/23  2243   PH ART  7 392   PCO2 ART mm Hg 29 7*   PO2 ART mm Hg 162 9*   HCO3 ART mmol/L 17 7*   BASE EXC ART mmol/L -6 0   ABG SOURCE  Radial, Left     VBG:  Results from last 7 days   Lab Units 03/11/23  2243 03/11/23  1707   PH KYMBERLY   --  7 363   PCO2 KYMBERLY mm Hg  --  34 8*   PO2 KYMBERLY mm Hg  --  48 8*   HCO3 KYMBERLY mmol/L  --  19 4*   BASE EXC KYMBERLY mmol/L  --  -5 1   ABG SOURCE  Radial, Left  --      Results from last 7 days   Lab Units 03/11/23  1707   PROCALCITONIN ng/ml 0 77*       Micro  Results from last 7 days   Lab Units 03/11/23  1708 03/11/23  1707   BLOOD CULTURE  Received in Microbiology Lab  Culture in Progress  Received in Microbiology Lab  Culture in Progress  Imaging: I have personally reviewed pertinent reports  Intake and Output  I/O       03/10 0701  03/11 0700 03/11 0701  03/12 0700    Urine (mL/kg/hr)  155    Total Output  155    Net  -155                Height and Weights   Height: 5' 3" (160 cm)  IBW (Ideal Body Weight): 52 4 kg  Body mass index is 35 77 kg/m²  Weight (last 2 days)     Date/Time Weight    03/11/23 2300 91 6 (201 94)    03/11/23 2225 91 6 (201 94)            Nutrition       Diet Orders   (From admission, onward)             Start     Ordered    03/11/23 2222  Diet NPO  Diet effective now        References:    Nutrtion Support Algorithm Enteral vs  Parenteral   Question Answer Comment   Diet Type NPO    RD to adjust diet per protocol?  Yes        03/11/23 2224                  Active Medications  Scheduled Meds:  Current Facility-Administered Medications   Medication Dose Route Frequency Provider Last Rate   • aspirin  81 mg Oral Daily SAMIA De Jesus     • cefepime  1,000 mg Intravenous Q12H SAMIA De Jesus     • chlorhexidine  15 mL Mouth/Throat Q12H [address] [facility], SAMIA     • ezetimibe  10 mg Oral Daily SAMIA Pearce     • heparin (porcine)  5,000 Units Subcutaneous [facility] BreannaSAMIA Mcknight     • insulin lispro  1-6 Units Subcutaneous Q6H [facility] BreannaSAMIA Mcknight     • levothyroxine  75 mcg Oral Early Morning SAMIA Pearce     • multi-electrolyte  100 mL/hr Intravenous Continuous SAMIA Pearce 100 mL/hr (03/11/23 2250)   • nystatin   Topical BID SAMIA Pearce     • omeprazole (PRILOSEC) suspension 2 mg/mL  20 mg Oral Daily BreannaSAMIA Mcknight     • propofol  5-50 mcg/kg/min Intravenous Titrated SAMIA Pearce     • topiramate  200 mg Oral BID SAMIA Pearce     • vancomycin  15 mg/kg Intravenous Q12H SAMIA Iraheta       Continuous Infusions:  multi-electrolyte, 100 mL/hr, Last Rate: 100 mL/hr (03/11/23 2250)  propofol, 5-50 mcg/kg/min      PRN Meds:        Invasive Devices Review  Invasive Devices     Peripheral Intravenous Line  Duration           Peripheral IV 03/11/23 Left;Ventral (anterior) Hand 1 day    Peripheral IV 03/11/23 Left Antecubital <1 day    Peripheral IV 03/11/23 Right Antecubital <1 day          Drain  Duration           Urethral Catheter Non-latex 16 Fr  <1 day          Airway  Duration           ETT  Cuffed 7 mm <1 day                Rationale for remaining devices: Medically necessary  ---------------------------------------------------------------------------------------  Advance Directive and Living Will:      Power of :    POLST:    ---------------------------------------------------------------------------------------  Care Time Delivered:   No Critical Care time spent       SAMIA Casillas      Portions of the record may have been created with voice recognition software  Occasional wrong word or "sound a like" substitutions may have occurred due to the inherent limitations of voice recognition software    Read the chart carefully and recognize, using context, where substitutions have occurred

## 2023-03-12 NOTE — ED NOTES
Transfer Information:   with [facility] @ 2130  [facility] ICU 5  Dr Filippo Gross accepting  Call report: [address], RN  03/11/23 2022

## 2023-03-12 NOTE — ASSESSMENT & PLAN NOTE
· Patient presents with creatine 5 2 and   · Previous creatine on 1/30/23 was 1 13  · CT without evidence of hydro   · Was administered 2L of isolyte at [facility]  · Per son, SNF reported 15 lb weight loss over the past month with poor oral intake   · Given additional 1L isolyte on admission to   [facility]:  · Continue Isolyte for IV hydration   · Continue arceo with strict I&O's  · Consider nephro consult

## 2023-03-12 NOTE — PROGRESS NOTES
Dakota Chavarria is a 75 y o  female who is currently ordered Vancomycin IV with management by the Pharmacy Consult service  Relevant clinical data and objective / subjective history reviewed  Vancomycin Assessment:  Indication and Goal AUC/Trough: Urinary tract infection (goal -600, trough >10)  Clinical Status:  critical care  Micro:   MRSA, urine and blood cultures in progress  Renal Function:  SCr: 5 15 mg/dL  CrCl: 11 2 mL/min  Renal replacement: Not on dialysis  Days of Therapy: 1  Current Dose: 1750mg (25mg/kg) loading dose given  Vancomycin Plan:  New Dosinmg (15mg/kg) pulse-dosed PRN vanco level<=15mcg/ml  Next Level: 3/12 @ 0600  Renal Function Monitoring: Daily BMP and [place] will continue to follow closely for s/sx of nephrotoxicity, infusion reactions and appropriateness of therapy  BMP and CBC will be ordered per protocol  We will continue to follow the patient’s culture results and clinical progress daily      Claudine Campuzano, Pharmacist

## 2023-03-12 NOTE — ASSESSMENT & PLAN NOTE
· Patient presents with creatine 5 2 and   · Previous creatine on 1/30/23 was 1 13  · CT without evidence of hydro   · Was administered 2L of isolyte at [facility]  · Per son, SNF reported 15 lb weight loss over the past month with poor oral intake     Plan:  · Continue Isolyte for now for IV hydration   · Continue arceo with strict I&O's  · Consider nephro consult

## 2023-03-13 ENCOUNTER — APPOINTMENT (INPATIENT)
Dept: RADIOLOGY | Facility: HOSPITAL | Age: 68
End: 2023-03-13

## 2023-03-13 LAB
ANION GAP SERPL CALCULATED.3IONS-SCNC: 12 MMOL/L (ref 4–13)
ATRIAL RATE: 118 BPM
ATRIAL RATE: 83 BPM
BASOPHILS # BLD AUTO: 0.02 THOUSANDS/ÂΜL (ref 0–0.1)
BASOPHILS NFR BLD AUTO: 0 % (ref 0–1)
BUN SERPL-MCNC: 81 MG/DL (ref 5–25)
CA-I BLD-SCNC: 1.08 MMOL/L (ref 1.12–1.32)
CALCIUM SERPL-MCNC: 7.5 MG/DL (ref 8.4–10.2)
CHLORIDE SERPL-SCNC: 112 MMOL/L (ref 96–108)
CO2 SERPL-SCNC: 22 MMOL/L (ref 21–32)
CREAT SERPL-MCNC: 3.18 MG/DL (ref 0.6–1.3)
EOSINOPHIL # BLD AUTO: 0.21 THOUSAND/ÂΜL (ref 0–0.61)
EOSINOPHIL NFR BLD AUTO: 2 % (ref 0–6)
ERYTHROCYTE [DISTWIDTH] IN BLOOD BY AUTOMATED COUNT: 15.5 % (ref 11.6–15.1)
GFR SERPL CREATININE-BSD FRML MDRD: 14 ML/MIN/1.73SQ M
GLUCOSE SERPL-MCNC: 169 MG/DL (ref 65–140)
GLUCOSE SERPL-MCNC: 53 MG/DL (ref 65–140)
GLUCOSE SERPL-MCNC: 56 MG/DL (ref 65–140)
GLUCOSE SERPL-MCNC: 62 MG/DL (ref 65–140)
GLUCOSE SERPL-MCNC: 68 MG/DL (ref 65–140)
GLUCOSE SERPL-MCNC: 72 MG/DL (ref 65–140)
GLUCOSE SERPL-MCNC: 77 MG/DL (ref 65–140)
GLUCOSE SERPL-MCNC: 85 MG/DL (ref 65–140)
GLUCOSE SERPL-MCNC: 89 MG/DL (ref 65–140)
GLUCOSE SERPL-MCNC: 95 MG/DL (ref 65–140)
HCT VFR BLD AUTO: 31.4 % (ref 34.8–46.1)
HGB BLD-MCNC: 9.9 G/DL (ref 11.5–15.4)
IMM GRANULOCYTES # BLD AUTO: 0.1 THOUSAND/UL (ref 0–0.2)
IMM GRANULOCYTES NFR BLD AUTO: 1 % (ref 0–2)
LYMPHOCYTES # BLD AUTO: 0.94 THOUSANDS/ÂΜL (ref 0.6–4.47)
LYMPHOCYTES NFR BLD AUTO: 9 % (ref 14–44)
MAGNESIUM SERPL-MCNC: 2.3 MG/DL (ref 1.9–2.7)
MCH RBC QN AUTO: 29.5 PG (ref 26.8–34.3)
MCHC RBC AUTO-ENTMCNC: 31.5 G/DL (ref 31.4–37.4)
MCV RBC AUTO: 94 FL (ref 82–98)
MONOCYTES # BLD AUTO: 0.54 THOUSAND/ÂΜL (ref 0.17–1.22)
MONOCYTES NFR BLD AUTO: 5 % (ref 4–12)
NEUTROPHILS # BLD AUTO: 8.29 THOUSANDS/ÂΜL (ref 1.85–7.62)
NEUTS SEG NFR BLD AUTO: 83 % (ref 43–75)
NRBC BLD AUTO-RTO: 0 /100 WBCS
P AXIS: 59 DEGREES
P AXIS: 68 DEGREES
PHOSPHATE SERPL-MCNC: 2.1 MG/DL (ref 2.3–4.1)
PLATELET # BLD AUTO: 159 THOUSANDS/UL (ref 149–390)
PMV BLD AUTO: 11.8 FL (ref 8.9–12.7)
POTASSIUM SERPL-SCNC: 3.5 MMOL/L (ref 3.5–5.3)
PR INTERVAL: 110 MS
PR INTERVAL: 128 MS
PROCALCITONIN SERPL-MCNC: 0.44 NG/ML
QRS AXIS: -59 DEGREES
QRS AXIS: -63 DEGREES
QRSD INTERVAL: 110 MS
QRSD INTERVAL: 122 MS
QT INTERVAL: 326 MS
QT INTERVAL: 408 MS
QTC INTERVAL: 456 MS
QTC INTERVAL: 479 MS
RBC # BLD AUTO: 3.36 MILLION/UL (ref 3.81–5.12)
SODIUM SERPL-SCNC: 146 MMOL/L (ref 135–147)
T WAVE AXIS: 85 DEGREES
T WAVE AXIS: 87 DEGREES
VANCOMYCIN SERPL-MCNC: 17.2 UG/ML (ref 10–20)
VENTRICULAR RATE: 118 BPM
VENTRICULAR RATE: 83 BPM
WBC # BLD AUTO: 10.1 THOUSAND/UL (ref 4.31–10.16)

## 2023-03-13 PROCEDURE — 0BH17EZ INSERTION OF ENDOTRACHEAL AIRWAY INTO TRACHEA, VIA NATURAL OR ARTIFICIAL OPENING: ICD-10-PCS | Performed by: STUDENT IN AN ORGANIZED HEALTH CARE EDUCATION/TRAINING PROGRAM

## 2023-03-13 PROCEDURE — 5A1945Z RESPIRATORY VENTILATION, 24-96 CONSECUTIVE HOURS: ICD-10-PCS | Performed by: STUDENT IN AN ORGANIZED HEALTH CARE EDUCATION/TRAINING PROGRAM

## 2023-03-13 RX ORDER — DEXTROSE MONOHYDRATE 25 G/50ML
25 INJECTION, SOLUTION INTRAVENOUS ONCE
Status: COMPLETED | OUTPATIENT
Start: 2023-03-13 | End: 2023-03-13

## 2023-03-13 RX ORDER — DEXTROSE MONOHYDRATE 100 MG/ML
50 INJECTION, SOLUTION INTRAVENOUS CONTINUOUS
Status: DISCONTINUED | OUTPATIENT
Start: 2023-03-13 | End: 2023-03-16

## 2023-03-13 RX ORDER — DEXTROSE, SODIUM CHLORIDE, SODIUM LACTATE, POTASSIUM CHLORIDE, AND CALCIUM CHLORIDE 5; .6; .31; .03; .02 G/100ML; G/100ML; G/100ML; G/100ML; G/100ML
100 INJECTION, SOLUTION INTRAVENOUS CONTINUOUS
Status: DISCONTINUED | OUTPATIENT
Start: 2023-03-13 | End: 2023-03-13

## 2023-03-13 RX ORDER — ALBUMIN, HUMAN INJ 5% 5 %
12.5 SOLUTION INTRAVENOUS ONCE
Status: COMPLETED | OUTPATIENT
Start: 2023-03-13 | End: 2023-03-13

## 2023-03-13 RX ORDER — DEXTROSE MONOHYDRATE 25 G/50ML
INJECTION, SOLUTION INTRAVENOUS
Status: COMPLETED
Start: 2023-03-13 | End: 2023-03-13

## 2023-03-13 RX ORDER — POTASSIUM CHLORIDE 20MEQ/15ML
40 LIQUID (ML) ORAL ONCE
Status: COMPLETED | OUTPATIENT
Start: 2023-03-13 | End: 2023-03-13

## 2023-03-13 RX ORDER — DEXTROSE MONOHYDRATE 25 G/50ML
50 INJECTION, SOLUTION INTRAVENOUS ONCE
Status: COMPLETED | OUTPATIENT
Start: 2023-03-13 | End: 2023-03-13

## 2023-03-13 RX ADMIN — DEXTROSE MONOHYDRATE 25 ML: 25 INJECTION, SOLUTION INTRAVENOUS at 00:14

## 2023-03-13 RX ADMIN — DEXTROSE MONOHYDRATE 25 ML: 25 INJECTION, SOLUTION INTRAVENOUS at 11:54

## 2023-03-13 RX ADMIN — DEXTROSE, SODIUM CHLORIDE, SODIUM LACTATE, POTASSIUM CHLORIDE, AND CALCIUM CHLORIDE 50 ML/HR: 5; .6; .31; .03; .02 INJECTION, SOLUTION INTRAVENOUS at 14:31

## 2023-03-13 RX ADMIN — LEVOTHYROXINE SODIUM 75 MCG: 75 TABLET ORAL at 05:34

## 2023-03-13 RX ADMIN — ALBUMIN (HUMAN) 12.5 G: 12.5 INJECTION, SOLUTION INTRAVENOUS at 02:44

## 2023-03-13 RX ADMIN — SODIUM CHLORIDE, SODIUM GLUCONATE, SODIUM ACETATE, POTASSIUM CHLORIDE AND MAGNESIUM CHLORIDE 100 ML/HR: 526; 502; 368; 37; 30 INJECTION, SOLUTION INTRAVENOUS at 02:26

## 2023-03-13 RX ADMIN — CEFEPIME HYDROCHLORIDE 1000 MG: 1 INJECTION, SOLUTION INTRAVENOUS at 05:34

## 2023-03-13 RX ADMIN — DEXTROSE MONOHYDRATE 25 ML: 25 INJECTION, SOLUTION INTRAVENOUS at 14:30

## 2023-03-13 RX ADMIN — DEXTROSE MONOHYDRATE 25 ML: 25 INJECTION, SOLUTION INTRAVENOUS at 05:52

## 2023-03-13 RX ADMIN — POTASSIUM CHLORIDE 40 MEQ: 1.5 SOLUTION ORAL at 08:45

## 2023-03-13 RX ADMIN — NYSTATIN 1 APPLICATION.: 100000 POWDER TOPICAL at 08:46

## 2023-03-13 RX ADMIN — HEPARIN SODIUM 5000 UNITS: 5000 INJECTION INTRAVENOUS; SUBCUTANEOUS at 14:35

## 2023-03-13 RX ADMIN — CHLORHEXIDINE GLUCONATE 15 ML: 1.2 RINSE ORAL at 08:46

## 2023-03-13 RX ADMIN — EZETIMIBE 10 MG: 10 TABLET ORAL at 08:45

## 2023-03-13 RX ADMIN — NYSTATIN 1 APPLICATION.: 100000 POWDER TOPICAL at 17:17

## 2023-03-13 RX ADMIN — CEFEPIME HYDROCHLORIDE 1000 MG: 1 INJECTION, SOLUTION INTRAVENOUS at 18:39

## 2023-03-13 RX ADMIN — HEPARIN SODIUM 5000 UNITS: 5000 INJECTION INTRAVENOUS; SUBCUTANEOUS at 21:59

## 2023-03-13 RX ADMIN — Medication 20 MG: at 08:46

## 2023-03-13 RX ADMIN — HEPARIN SODIUM 5000 UNITS: 5000 INJECTION INTRAVENOUS; SUBCUTANEOUS at 05:34

## 2023-03-13 RX ADMIN — TOPIRAMATE 100 MG: 100 TABLET, FILM COATED ORAL at 08:47

## 2023-03-13 RX ADMIN — ASPIRIN 81 MG CHEWABLE TABLET 81 MG: 81 TABLET CHEWABLE at 08:46

## 2023-03-13 RX ADMIN — DEXTROSE MONOHYDRATE 50 ML: 25 INJECTION, SOLUTION INTRAVENOUS at 18:03

## 2023-03-13 RX ADMIN — DEXTROSE 50 ML/HR: 10 SOLUTION INTRAVENOUS at 18:06

## 2023-03-13 RX ADMIN — CHLORHEXIDINE GLUCONATE 15 ML: 1.2 RINSE ORAL at 21:59

## 2023-03-13 RX ADMIN — SODIUM CHLORIDE, SODIUM GLUCONATE, SODIUM ACETATE, POTASSIUM CHLORIDE AND MAGNESIUM CHLORIDE 100 ML/HR: 526; 502; 368; 37; 30 INJECTION, SOLUTION INTRAVENOUS at 10:54

## 2023-03-13 NOTE — RESPIRATORY THERAPY NOTE
Patient extubated per PA Ruh  Extubated to 3L NC  No issues, negative for stridor, weak phonation, VSS, Spo2 100%  Will maintain support and monitor

## 2023-03-13 NOTE — PROGRESS NOTES
Brien [address]  Progress Note - Hayder Spangler 1955, 75 y o  female MRN: [id number]  Unit/Bed#: ICU 05 Encounter: [id number]  Primary Care Provider: Javi Ortega MD   Date and time admitted to hospital: 3/11/2023 10:17 PM    * [facility]  Assessment & Plan  · POA as evidenced by: tachycardia, leukocytosis, and elevated creatine  · Lactic acid 1 6  · Procal 0 8 >0 6  · CT chest/ abd/pelvis: Bibasilar pulmonary opacities are more likely to represent atelectasis/hypoventilatory change than pneumonia  No acute inflammatory findings in the abdomen or the pelvis    · UA 2+ leukocytes and innumerable bacteria with profoundly purulent urine     Plan:  · Follow-up with blood cultures  · Follow-up with MRSA  · Follow-up with urine culture  · Continue cefepime/ vanco    Acute encephalopathy  Assessment & Plan  · Patient presents from nursing home after being found unresponsive  · Upon arrival to [place] ED she had a GCS of 3 and was therefore intubated  · CT head was negative   · VB 36/ 35/ 49/ 19  · TSH 2 178  · Coma panel WNL   · Ammonia 62  · Infectious work-up in process  · Patient currently following commands, but remains lethargic    Plan:  · Continue to hold sedating medications   · Continue neuro exams per unit routine     UTI (urinary tract infection)  Assessment & Plan  · Profoundly purulent urine in arceo catheter  · UA revealed leukocytes and innumerable bacteria   · See rest of plan as outlined above    REGINO (acute kidney injury) ([facility] 75 )  Assessment & Plan  · Patient presents with creatine 5 2 and   · Previous creatine on 23 was 1 13  · CT without evidence of hydro   · Received 3 5L of isolyte on admission  · Per son, SNF reported 15 lb weight loss over the past month with poor oral intake     Plan:  · Continue Isolyte for IV hydration   · Continue arceo with strict I&O's  · Continue to trend end points     Respiratory insufficiency  Assessment & Plan  · Patient was intubated upon arrival to [facility] ED for airway protection  · ABG upon arrival to W: 7 39/ 30/ 163/ 18  · AC/VC 16/350/40/6  · Failed SBT yesterday secondary to apnea    Plan:  · Continue to hold sedation as outlined  · Daily SBT  · VAP bundle     Chronic heart failure with preserved ejection fraction (HCC)  Assessment & Plan  Wt Readings from Last 3 Encounters:   03/12/23 92 8 kg (204 lb 9 4 oz)   03/11/23 98 kg (216 lb 0 8 oz)   03/01/23 97 9 kg (215 lb 12 8 oz)   · ECHO in 2/22 revealed G2DD, mild to moderate aortic stenosis, and RV pressure of 51          Insomnia  Assessment & Plan  · Hold trazodone     GERD (gastroesophageal reflux disease)  Assessment & Plan  · Continue omeprazole     Diabetes mellitus ([facility] )  Assessment & Plan  · Hold outpatient oral diabetic agents and lantus   · Continue SSI with Q 6 hour accu checks     Hyperlipidemia  Assessment & Plan  · Continue zetia      ----------------------------------------------------------------------------------------  HPI/24hr events: Patient woke up and followed commands  However remains lethargic  Failed SBT secondary to low tidal volumes and then apnea  Urine culture is revealing E  Coli  Overnight patient had hypotension and was administered albumin       Patient appropriate for transfer out of the ICU today?: No  Disposition: Continue Critical Care   Code Status: Level 2 - DNAR: but accepts endotracheal intubation  ---------------------------------------------------------------------------------------  SUBJECTIVE  TINA    Review of Systems  Review of systems was unable to be performed secondary to Intubated  ---------------------------------------------------------------------------------------  OBJECTIVE    Vitals   Vitals:    03/12/23 1800 03/12/23 1900 03/12/23 1910 03/12/23 1938   BP: (!) 87/51 (!) 78/46 (!) 79/47    BP Location:       Pulse: 74 66 63 72   Resp: 16 16 16 16   Temp: (!) 96 3 °F (35 7 °C) (!) 96 1 °F (35 6 °C) (!) 96 1 °F (35 6 °C) TempSrc:  Esophageal     SpO2: 100% 100% 100% 100%   Weight:       Height:         Temp (24hrs), Av 5 °F (35 8 °C), Min:96 1 °F (35 6 °C), Max:97 5 °F (36 4 °C)  Current: Temperature: (!) 96 1 °F (35 6 °C)          Respiratory:  SpO2: SpO2: 99 %, SpO2 Activity: SpO2 Activity: At Rest       Invasive/non-invasive ventilation settings   Respiratory    Lab Data (Last 4 hours)    None         O2/Vent Data (Last 4 hours)      1938           Vent Mode AC/VC       Resp Rate (BPM) (BPM) 16       Vt (mL) (mL) 350       FIO2 (%) (%) 30       PEEP (cmH2O) (cmH2O) 6       MV 7 09                   Physical Exam  Vitals reviewed  Constitutional:       Appearance: She is obese  She is ill-appearing  Interventions: She is intubated  HENT:      Mouth/Throat:      Comments: ETT in situ  Eyes:      Pupils: Pupils are equal, round, and reactive to light  Cardiovascular:      Rate and Rhythm: Normal rate and regular rhythm  Pulses: Normal pulses  Heart sounds: Normal heart sounds  Pulmonary:      Effort: She is intubated  Breath sounds: No wheezing, rhonchi or rales  Abdominal:      Palpations: Abdomen is soft  Genitourinary:     Comments: Kay present  Skin:     General: Skin is warm and dry  Coloration: Skin is pale  Neurological:      Mental Status: She is lethargic  Comments:  Follows commands       Laboratory and Diagnostics:  Results from last 7 days   Lab Units 23  0543 23  1706   WBC Thousand/uL 13 72* 14 08*   HEMOGLOBIN g/dL 11 9 15 7*   HEMATOCRIT % 39 1 50 0*   PLATELETS Thousands/uL 191 293   NEUTROS PCT % 84* 85*   MONOS PCT % 6 5     Results from last 7 days   Lab Units 23  1605 23  0543 23  1707   SODIUM mmol/L 144 143 139   POTASSIUM mmol/L 3 8 3 7 5 1   CHLORIDE mmol/L 108 108 106   CO2 mmol/L 23 20* 20*   ANION GAP mmol/L 13 15* 13   BUN mg/dL 93* 95* 105*   CREATININE mg/dL 3 94* 4 43* 5 15*   CALCIUM mg/dL 7 6* 7 8* 10 3* GLUCOSE RANDOM mg/dL 79 105 107   ALT U/L  --  15 14   AST U/L  --  30 34   ALK PHOS U/L  --  68 100   ALBUMIN g/dL  --  2 7* 3 8   TOTAL BILIRUBIN mg/dL  --  0 46 0 50     Results from last 7 days   Lab Units 03/12/23  0543   MAGNESIUM mg/dL 2 4   PHOSPHORUS mg/dL 3 4      Results from last 7 days   Lab Units 03/11/23  1915   INR  1 21*   PTT seconds 24          Results from last 7 days   Lab Units 03/11/23  1707   LACTIC ACID mmol/L 1 6     ABG:  Results from last 7 days   Lab Units 03/11/23  2243   PH ART  7 392   PCO2 ART mm Hg 29 7*   PO2 ART mm Hg 162 9*   HCO3 ART mmol/L 17 7*   BASE EXC ART mmol/L -6 0   ABG SOURCE  Radial, Left     VBG:  Results from last 7 days   Lab Units 03/11/23  2243 03/11/23  1707   PH KYMBERLY   --  7 363   PCO2 KYMBERLY mm Hg  --  34 8*   PO2 KYMBERLY mm Hg  --  48 8*   HCO3 KYMBERLY mmol/L  --  19 4*   BASE EXC KYMBERLY mmol/L  --  -5 1   ABG SOURCE  Radial, Left  --      Results from last 7 days   Lab Units 03/12/23  0543 03/11/23  1707   PROCALCITONIN ng/ml 0 59* 0 77*       Micro  Results from last 7 days   Lab Units 03/12/23  0023 03/11/23  2229 03/11/23  1708 03/11/23  1707   BLOOD CULTURE  Received in Microbiology Lab  Culture in Progress  Received in Microbiology Lab  Culture in Progress  Received in Microbiology Lab  Culture in Progress  Received in Microbiology Lab  Culture in Progress  EKG: nsr  Imaging: I have personally reviewed pertinent reports  Intake and Output  I/O       03/11 0701  03/12 0700 03/12 0701  03/13 0700    I V  (mL/kg) 1500 (16 2)     NG/GT 30 100    IV Piggyback 50     Total Intake(mL/kg) 1580 (17) 100 (1 1)    Urine (mL/kg/hr) 310 400 (0 3)    Emesis/NG output 25 20    Stool  100    Total Output 335 520    Net +1245 -420          Unmeasured Stool Occurrence  1 x          Height and Weights   Height: 5' 3" (160 cm)  IBW (Ideal Body Weight): 52 4 kg  Body mass index is 36 24 kg/m²    Weight (last 2 days)     Date/Time Weight    03/12/23 7743 92 8 (227 47) 03/11/23 2300 91 6 (201 94)    03/11/23 2225 91 6 (201 94)            Nutrition       Diet Orders   (From admission, onward)             Start     Ordered    03/12/23 1056  Room Service  Once        Question:  Type of Service  Answer:  Room Service - Appropriate with Assistance    03/12/23 1055    03/11/23 2222  Diet NPO  Diet effective now        References:    Nutrtion Support Algorithm Enteral vs  Parenteral   Question Answer Comment   Diet Type NPO    RD to adjust diet per protocol?  Yes        03/11/23 2224                  Active Medications  Scheduled Meds:  Current Facility-Administered Medications   Medication Dose Route Frequency Provider Last Rate   • aspirin  81 mg Oral Daily SAMIA Hdez     • cefepime  1,000 mg Intravenous Q12H SAMIA Iraheta 1,000 mg (03/12/23 1614)   • chlorhexidine  15 mL Mouth/Throat Q12H [address] SAMIA Hdez     • ezetimibe  10 mg Oral Daily SAMIA Hdez     • heparin (porcine)  5,000 Units Subcutaneous [facility] SAMIA Hdez     • insulin lispro  1-6 Units Subcutaneous Q6H [address] SAMIA Hdez     • levothyroxine  75 mcg Oral Early Morning SAMIA Hdez     • multi-electrolyte  100 mL/hr Intravenous Continuous SAMIA Hdez 100 mL/hr (03/12/23 1638)   • nystatin   Topical BID SAMIA Iraheta     • omeprazole (PRILOSEC) suspension 2 mg/mL  20 mg Oral Daily SAMIA Hdez     • topiramate  100 mg Oral BID SAMIA Holland     • vancomycin  15 mg/kg (Adjusted) Intravenous Daily PRN Martin Morris MD       Continuous Infusions:  multi-electrolyte, 100 mL/hr, Last Rate: 100 mL/hr (03/12/23 1638)      PRN Meds:   vancomycin, 15 mg/kg (Adjusted), Daily PRN        Invasive Devices Review  Invasive Devices     Peripheral Intravenous Line  Duration           Peripheral IV 03/11/23 Left Antecubital 1 day    Peripheral IV 03/11/23 Left;Ventral (anterior) Hand 1 day    Peripheral IV 03/11/23 Right Antecubital 1 day    Long-Dwell Peripheral IV (Midline) 03/12/23 Right Brachial <1 day          Drain  Duration           Urethral Catheter Non-latex 16 Fr  1 day    NG/OG/Enteral Tube Orogastric 16 Fr Center mouth <1 day    Rectal Tube With balloon <1 day          Airway  Duration           ETT  Cuffed 7 mm 1 day                Rationale for remaining devices: Medically necessary  ---------------------------------------------------------------------------------------  Advance Directive and Living Will:      Power of :    POLST:    ---------------------------------------------------------------------------------------  Care Time Delivered:   No Critical Care time spent       SAMIA Casillas      Portions of the record may have been created with voice recognition software  Occasional wrong word or "sound a like" substitutions may have occurred due to the inherent limitations of voice recognition software    Read the chart carefully and recognize, using context, where substitutions have occurred

## 2023-03-13 NOTE — DISCHARGE INSTR - OTHER ORDERS
Plan:   Skin care plans:    1-Cleanse wound to mid-back & right post lower leg with NSS & pat dry  Open small Dermagran square & apply to wounds in a single layer cut to size of wounds  Cover with small Allevyn foam dressings  Change every other day & prn soilage/dislodgement  2-Apply Calazime or equivalent Zinc paste to sacrum, buttocks TID and PRN  3-Hydraguard to bilateral heel BID and PRN  4-Float heels on 2 pillows to offload pressure  5-Pressure redistribution cushion when out of bed  6-Turn/reposition q2h or when medically stable for pressure re-distribution on skin  7-Moisturize skin daily with skin nourishing cream  8-Would recommend low air loss, pressure redistribution mattress due to multiple full thickness sacrobuttock wounds

## 2023-03-13 NOTE — WOUND OSTOMY CARE
Progress Note - Wound   Janae Mcmullen 75 y o  female MRN: [id number]  Unit/Bed#: ICU 05 Encounter: [id number]      Assessment: This is a 75year old female patient admitted on 3/11/23 with sepsis  She has a history of chronic heart failure and IDDM  She was received in ICU bed extremely lethargic and non-verbal  She was repositioned with assist of 2 persons and has a arceo to gravity and rectal tube in place  She was extubated this a m  Assessment Findings:  1-Multiple full thickness and partial thickness wounds to sacrobuttock area (labeled as mid-sacrum) due to moisture, friction and shear  Orders in place for wound care and for prevention  Please see below for detailed wound assessments  2-Unstageable wound to mid back present on admission - orders in place for wound care and for prevention  3-Full thickness wound to right post lower leg of unknown etiology - orders in place for wound care  4-Bilateral heels intact - orders in place for skin care and for prevention  Plan:   Skin care plans:  1-Cleanse wound to mid-back & right post lower leg with NSS & pat dry  Open small Dermagran square & apply to wounds in a single layer cut to size of wounds  Cover with small Allevyn foam dressings  Change every other day & prn soilage/dislodgement  2-Calazime to sacrum, buttocks TID and PRN  3-Hydraguard to bilateral heel BID and PRN  4-Float heels on 2 pillows to offload pressure  5-Ehob cushion when out of bed  6-Turn/reposition q2h or when medically stable for pressure re-distribution on skin  7-Moisturize skin daily with skin nourishing cream  8-Please order P-500 when patient transferred from ICU  Wound 03/11/23 MASD Sacrum Mid (Active)   Wound Image   03/13/23 1630   Wound Description Slough; Yellow;Pink;Granulation tissue 03/13/23 1630   Pressure Injury Stage Please do not stage 03/13/23 1630   Anjali-wound Assessment Hyperpigmented 03/13/23 1630   Wound Length (cm) 2 cm 03/13/23 1630   Wound Width (cm) 7 5 cm 03/13/23 1630   Wound Depth (cm) 0 1 cm 03/13/23 1630   Wound Surface Area (cm^2) 15 cm^2 03/13/23 1630   Wound Volume (cm^3) 1 5 cm^3 03/13/23 1630   Calculated Wound Volume (cm^3) 1 5 cm^3 03/13/23 1630   Drainage Amount None 03/13/23 1630   Non-staged Wound Description Full thickness 03/12/23 1600   Treatments Cleansed 03/13/23 1630   Dressing Moisture barrier 03/13/23 1630   Wound packed? Yes 03/12/23 1600   Dressing Changed New 03/13/23 1630   Dressing Status Intact 03/13/23 1630       Wound 03/13/23 Pressure Injury Back Medial;Upper (Active)   Wound Image   03/13/23 1630   Wound Description Yellow;Slough 03/13/23 1630   Pressure Injury Stage Unstageable 03/13/23 1630   Anjali-wound Assessment Intact 03/13/23 1630   Wound Length (cm) 2 2 cm 03/13/23 1630   Wound Width (cm) 0 5 cm 03/13/23 1630   Wound Depth (cm) 0 1 cm 03/13/23 1630   Wound Surface Area (cm^2) 1 1 cm^2 03/13/23 1630   Wound Volume (cm^3) 0 11 cm^3 03/13/23 1630   Calculated Wound Volume (cm^3) 0 11 cm^3 03/13/23 1630   Drainage Amount Small 03/13/23 1630   Drainage Description Green;Yellow 03/13/23 1630   Treatments Cleansed 03/13/23 1630   Dressing Dermagran gauze; Allevyn Foam 03/13/23 1630   Dressing Changed New 03/13/23 1630   Dressing Status Clean;Dry; Intact 03/13/23 1630       Wound 03/13/23 Leg Posterior;Proximal;Right; Lower (Active)   Wound Image   03/13/23 1630   Wound Description Pink;Granulation tissue; Yellow;Slough 03/13/23 1630   Anjali-wound Assessment Intact 03/13/23 1630   Wound Length (cm) 1 cm 03/13/23 1630   Wound Width (cm) 3 5 cm 03/13/23 1630   Wound Depth (cm) 0 1 cm 03/13/23 1630   Wound Surface Area (cm^2) 3 5 cm^2 03/13/23 1630   Wound Volume (cm^3) 0 35 cm^3 03/13/23 1630   Calculated Wound Volume (cm^3) 0 35 cm^3 03/13/23 1630   Drainage Amount Moderate 03/13/23 1630   Drainage Description Yellow 03/13/23 1630   Non-staged Wound Description Full thickness 03/13/23 1630   Treatments Cleansed 03/13/23 1630 Dressing Dermagran gauze; Foam 03/13/23 1630   Dressing Changed New 03/13/23 1630   Dressing Status Clean;Dry; Intact 03/13/23 1630     Discussed assessment findings, and plan of care/recommendations with Mook Mcgee  Wound care will follow along with patient throughout admission, please call or tiger text with questions and concerns    Recommendations written as orders    Odalis Parekh RN, MSN, [employer]

## 2023-03-13 NOTE — ASSESSMENT & PLAN NOTE
· Patient was intubated upon arrival to [place] ED for airway protection  · ABG upon arrival to [facility]": 7 39/ 30/ 163/ 18  · AC/VC 16/350/40/6  · Failed SBT yesterday secondary to apnea    Plan:  · Continue to hold sedation as outlined  · Daily SBT  · VAP bundle

## 2023-03-13 NOTE — PROGRESS NOTES
Vancomycin IV Pharmacy-to-Dose Consultation     Vancomycin has been discontinued  Pharmacy will sign off  Please contact or re-consult with questions      [address] , [address], Pharmacist

## 2023-03-13 NOTE — UTILIZATION REVIEW
Initial Clinical Review    Admission: Date/Time/Statement:   Admission Orders (From admission, onward)     Ordered        03/11/23 2224  Inpatient Admission  Once                      Orders Placed This Encounter   Procedures   • Inpatient Admission     Standing Status:   Standing     Number of Occurrences:   1     Order Specific Question:   Level of Care     Answer:   Critical Care [15]     Order Specific Question:   Estimated length of stay     Answer:   More than 2 Midnights     Order Specific Question:   Certification     Answer:   I certify that inpatient services are medically necessary for this patient for a duration of greater than two midnights  See H&P and MD Progress Notes for additional information about the patient's course of treatment  Initial Presentation: 75 y o  female , presented to the ED @ [address], Transferred to [facility] level of care, via EMS  Admitted as Inpatient due to Sepsis  Date: 03/11/2023   [address] [facility]:  presents from nursing home after being found unresponsive  She has a past medical history of hypertension, hyperlipidemia, CHF, GERD, CAD, diabetes, hypothyroidism, and insomnia     [facility], she had a GCS of 3 and was therefore intubated  CT imaging was unrevealing  She met sepsis criteria with tachycardia, leukocytosis, and REGINO  She was given cefepime and vancomycin and 2 L of IV fluids  UA revealed 2+ leukocytes and innumerable bacteria  She was transferred to [address] [place] [address] for higher level care  Obtain Blood & Urine cultures  Obtain MRSA  Continue IV Cefepine / Vanco   Obtain repeat Procal in AM   Neuro checks  UTI: Profoundly purulent urine in arceo  Day 2: 03/12/2023 Received intubated and on fentanyl/propofol  Sedation has been held but patient only moves to painful stimuli  Intubated / vented  Follow up Blood & urine cultures  IV flds  Arceo, strict I&O  Continue IV Abxs    Accuchecks q6h       03/13/2023 Patient woke up and followed commands  However remains lethargic  Failed SBT secondary to low tidal volumes and then apnea  Remains intubated / vented  Urine culture is revealing E  Coli  Overnight patient had hypotension and was administered albumin  Continue IV Abxs  Follow up cultures  Rahul Spence I&O  Accuchecks q6h        ED Triage Vitals   Temperature Pulse Respirations Blood Pressure SpO2   03/11/23 2250 03/11/23 2250 03/11/23 2250 03/11/23 2250 03/11/23 2250   97 5 °F (36 4 °C) 101 16 139/85 100 %      Temp Source Heart Rate Source Patient Position - Orthostatic VS BP Location FiO2 (%)   03/11/23 2300 03/11/23 2300 03/11/23 2300 03/11/23 2300 03/11/23 2300   Temporal Monitor Lying Left arm 40      Pain Score       03/11/23 2300       No Pain          Wt Readings from Last 1 Encounters:   03/13/23 93 7 kg (206 lb 9 1 oz)     Additional Vital Signs:   Date/Time Temp Pulse Resp BP MAP (mmHg) SpO2 FiO2 (%) O2 Device Patient Position - Orthostatic VS   03/13/23 1300 97 3 °F (36 3 °C) Abnormal  80 16 89/51 Abnormal  68 99 % -- -- --   03/13/23 0800 97 9 °F (36 6 °C) 70 16 88/52 Abnormal  68 99 % -- -- --   03/13/23 0738 -- -- -- -- -- 99 % -- -- --   03/13/23 0600 98 1 °F (36 7 °C) 71 16 92/51 67 99 % -- -- --   03/13/23 0530 97 9 °F (36 6 °C) 76 17 92/55 69 99 % -- -- --   03/13/23 0500 97 3 °F (36 3 °C) Abnormal  71 16 91/54 71 99 % -- -- --   03/13/23 0430 97 3 °F (36 3 °C) Abnormal  109 Abnormal  16 92/54 70 99 % -- -- --   03/13/23 0400 97 3 °F (36 3 °C) Abnormal  -- -- -- -- -- -- -- --   03/13/23 0330 98 2 °F (36 8 °C) 77 18 103/59 69 98 % -- -- --   03/13/23 0320 -- -- -- -- -- 99 % -- -- --   03/13/23 0300 97 9 °F (36 6 °C) 74 16 89/52 Abnormal  65 99 % -- -- --   03/13/23 0236 97 7 °F (36 5 °C) 66 17 79/46 Abnormal   58 Abnormal  99 % -- -- --   BP: SAMIA Carlos aware oredered albumin at 03/13/23 0236   03/13/23 0200 97 3 °F (36 3 °C) Abnormal  65 16 80/47 Abnormal  59 Abnormal  99 % -- -- -- 03/13/23 0130 97 2 °F (36 2 °C) Abnormal  69 16 89/54 Abnormal  69 99 % -- -- --   03/13/23 0100 97 2 °F (36 2 °C) Abnormal  72 17 90/54 68 99 % -- -- --   03/13/23 0000 97 °F (36 1 °C) Abnormal  65 16 83/48 Abnormal  61 Abnormal  100 % 30 Ventilator --   03/12/23 2300 96 6 °F (35 9 °C) Abnormal   82 32 Abnormal  88/63 Abnormal  71 100 % -- -- --   Temp: alfredo hugger on at 03/12/23 2300   03/12/23 2200 96 3 °F (35 7 °C) Abnormal  72 17 100/67 79 100 % -- -- --   03/12/23 2100 96 1 °F (35 6 °C) Abnormal  75 16 106/56 78 100 % -- -- --   03/12/23 2000 96 3 °F (35 7 °C) Abnormal   71 19 94/50 66 100 % -- -- --   Temp: warm blanket applied at 03/12/23 2000 03/12/23 1938 -- 72 16 -- -- 100 % -- -- --   03/12/23 1910 96 1 °F (35 6 °C) Abnormal  63 16 79/47 Abnormal   59 Abnormal  100 % -- -- --   BP: SAMIA Kent aware at 03/12/23 1910 03/12/23 1900 96 1 °F (35 6 °C) Abnormal  66 16 78/46 Abnormal  58 Abnormal  100 % -- -- --   03/12/23 1800 96 3 °F (35 7 °C) Abnormal  74 16 87/51 Abnormal  64 Abnormal  100 % -- -- --   03/12/23 1700 96 3 °F (35 7 °C) Abnormal  66 16 86/50 Abnormal  64 Abnormal  100 % -- -- --   03/12/23 1600 96 3 °F (35 7 °C) Abnormal  82 26 Abnormal  93/63 72 100 % -- -- --   03/12/23 1536 96 3 °F (35 7 °C) Abnormal  66 16 -- -- 100 % -- -- --   03/12/23 1500 96 1 °F (35 6 °C) Abnormal  68 18 95/54 69 100 % -- -- --   03/12/23 1400 96 1 °F (35 6 °C) Abnormal  70 16 110/61 80 100 % -- -- --   03/12/23 1340 96 1 °F (35 6 °C) Abnormal  72 18 139/84 105 100 % 30 -- --   03/12/23 1300 96 1 °F (35 6 °C) Abnormal  62 18 85/48 Abnormal  63 Abnormal  100 % -- -- --   03/12/23 1220 -- -- -- -- -- 98 % -- -- --   03/12/23 1200 96 3 °F (35 7 °C) Abnormal  63 16 98/58 60 Abnormal  100 % -- -- --   03/12/23 1100 96 3 °F (35 7 °C) Abnormal  71 16 118/56 58 Abnormal  100 % -- -- --   03/12/23 1000 96 4 °F (35 8 °C) Abnormal  73 17 112/84 2 Abnormal  98 % -- -- --   03/12/23 0900 96 8 °F (36 °C) Abnormal  83 24 Abnormal  95/66 76 100 % -- -- --   03/12/23 0800 96 6 °F (35 9 °C) Abnormal  80 13 120/60 84 100 % 40 Ventilator Lying   03/12/23 0700 96 4 °F (35 8 °C) Abnormal  75 16 105/56 75 100 % -- -- --   03/12/23 0645 96 3 °F (35 7 °C) Abnormal  80 18 121/81 97 100 % -- -- --   03/12/23 0630 96 4 °F (35 8 °C) Abnormal  72 16 121/81 97 100 % -- -- --   03/12/23 0615 96 4 °F (35 8 °C) Abnormal  74 16 102/54 68 100 % -- -- --   03/12/23 0600 96 3 °F (35 7 °C) Abnormal  78 16 108/52 65 100 % -- -- --   03/12/23 0545 96 1 °F (35 6 °C) Abnormal  83 19 109/59 81 100 % -- -- --   03/12/23 0543 96 1 °F (35 6 °C) Abnormal  83 18 109/59 81 100 % -- -- --   03/12/23 0530 96 3 °F (35 7 °C) Abnormal  81 16 107/59 77 100 % -- -- --   03/12/23 0515 96 3 °F (35 7 °C) Abnormal  85 17 100/53 73 100 % -- -- --   03/12/23 0500 96 4 °F (35 8 °C) Abnormal  80 16 104/52 75 100 % -- -- --   03/12/23 0445 96 4 °F (35 8 °C) Abnormal  83 18 120/58 83 100 % -- -- --   03/12/23 0430 96 3 °F (35 7 °C) Abnormal  72 16 106/54 74 100 % -- -- --   03/12/23 0415 96 4 °F (35 8 °C) Abnormal  77 16 103/55  78 100 % -- -- --   BP: Isolyte 500 cc bolus hung at 03/12/23 0415   03/12/23 0400 96 4 °F (35 8 °C) Abnormal  75 16 86/54 Abnormal  66 100 % -- -- --   03/12/23 0345 96 4 °F (35 8 °C) Abnormal  77 18 87/52 Abnormal  66 100 % -- -- --   03/12/23 0330 96 4 °F (35 8 °C) Abnormal  83 18 118/57 80 100 % -- -- --   03/12/23 0315 96 3 °F (35 7 °C) Abnormal  75 16 96/54 72 100 % -- -- --   03/12/23 0300 -- -- -- 101/58 76 -- -- -- --   03/12/23 0145 96 4 °F (35 8 °C) Abnormal  71 17 93/50 66 100 % -- -- --   03/12/23 0130 96 6 °F (35 9 °C) Abnormal  68 16 87/51 Abnormal  65 100 % -- -- --   03/12/23 0115 96 8 °F (36 °C) Abnormal  72 16 93/54 70 100 % -- -- --   03/12/23 0100 96 8 °F (36 °C) Abnormal  76 16 75/45 Abnormal   55 Abnormal  100 % -- -- --   BP: Isolyte bolus started @ 0106 at 03/12/23 0100   03/12/23 0045 97 °F (36 1 °C) Abnormal  79 16 -- -- 100 % -- -- --   03/12/23 0030 97 °F (36 1 °C) Abnormal  83 21 117/58 83 100 % -- -- --   03/12/23 0015 97 °F (36 1 °C) Abnormal  80 16 -- -- 99 % -- -- --   03/12/23 0000 97 °F (36 1 °C) Abnormal  80 16 -- -- 100 % -- -- --   03/11/23 2345 97 2 °F (36 2 °C) Abnormal  79 16 -- -- 100 % -- -- --   03/11/23 2330 97 2 °F (36 2 °C) Abnormal  80 16 97/62 75 100 % -- -- --   03/11/23 2315 97 3 °F (36 3 °C) Abnormal  82 16 -- -- 100 % -- -- --   03/11/23 2300 97 5 °F (36 4 °C) 86 16 111/64 81 100 % 40 Ventilator  Lying     Date and Time Eye Opening Best Verbal Response Best Motor Response La Coma Scale Score   03/13/23 0400 3 1 6 10   03/13/23 0000 3 1 6 10   03/12/23 2000 3 1 6 10   03/12/23 1600 3 1 6 10   03/12/23 1200 3 1 6 10   03/12/23 0800 3 1 6 10   03/12/23 0400 1 1 4 6   03/12/23 0000 1 1 4 6   03/11/23 1719 1 1 1 3   03/11/23 1714 2 1 1 4         Pertinent Labs/Diagnostic Test Results:   STAT CXR ICU   Final Result by Ian Singh MD (03/12 1040)      ET and enteric tubes as detailed  The tip of another tube or monitoring device projects over the mid mediastinum          Results from last 7 days   Lab Units 03/11/23  1755   SARS-COV-2  Negative     Results from last 7 days   Lab Units 03/13/23  0543 03/12/23  0543 03/11/23  1706   WBC Thousand/uL 10 10 13 72* 14 08*   HEMOGLOBIN g/dL 9 9* 11 9 15 7*   HEMATOCRIT % 31 4* 39 1 50 0*   PLATELETS Thousands/uL 159 191 293   NEUTROS ABS Thousands/µL 8 29* 11 35* 11 93*     Results from last 7 days   Lab Units 03/13/23  0543 03/12/23  1605 03/12/23  0543 03/11/23  1707   SODIUM mmol/L 146 144 143 139   POTASSIUM mmol/L 3 5 3 8 3 7 5 1   CHLORIDE mmol/L 112* 108 108 106   CO2 mmol/L 22 23 20* 20*   ANION GAP mmol/L 12 13 15* 13   BUN mg/dL 81* 93* 95* 105*   CREATININE mg/dL 3 18* 3 94* 4 43* 5 15*   EGFR ml/min/1 73sq m 14 11 9 7   CALCIUM mg/dL 7 5* 7 6* 7 8* 10 3*   CALCIUM, IONIZED mmol/L 1 08*  --  1 05*  --    MAGNESIUM mg/dL 2 3  --  2 4  -- PHOSPHORUS mg/dL 2 1*  --  3 4  --      Results from last 7 days   Lab Units 03/12/23  0543 03/11/23  1707   AST U/L 30 34   ALT U/L 15 14   ALK PHOS U/L 68 100   TOTAL PROTEIN g/dL 5 9* 8 5*   ALBUMIN g/dL 2 7* 3 8   TOTAL BILIRUBIN mg/dL 0 46 0 50   AMMONIA umol/L  --  62     Results from last 7 days   Lab Units 03/13/23  0614 03/13/23  0542 03/12/23  2338 03/12/23  1245 03/12/23  0704 03/11/23  2321 03/11/23  1711   POC GLUCOSE mg/dl 95 53* 66 92 101 138 107     Results from last 7 days   Lab Units 03/13/23  0543 03/12/23  1605 03/12/23  0543 03/11/23  1707   GLUCOSE RANDOM mg/dL 56* 79 105 107      Results from last 7 days   Lab Units 03/11/23  2243   PH ART  7 392   PCO2 ART mm Hg 29 7*   PO2 ART mm Hg 162 9*   HCO3 ART mmol/L 17 7*   BASE EXC ART mmol/L -6 0   O2 CONTENT ART mL/dL 19 3   O2 HGB, ARTERIAL % 98 9*   ABG SOURCE  Radial, Left     Results from last 7 days   Lab Units 03/11/23  1707   PH KYMBERLY  7 363   PCO2 KYMBERLY mm Hg 34 8*   PO2 KYMBERLY mm Hg 48 8*   HCO3 KYMBERLY mmol/L 19 4*   BASE EXC KYMBERLY mmol/L -5 1   O2 CONTENT KYMBERLY ml/dL 18 7   O2 HGB, VENOUS % 83 5*     Results from last 7 days   Lab Units 03/11/23  1915   PROTIME seconds 15 6*   INR  1 21*   PTT seconds 24     Results from last 7 days   Lab Units 03/11/23  1707   TSH 3RD GENERATON uIU/mL 2 178     Results from last 7 days   Lab Units 03/13/23  0543 03/12/23  0543 03/11/23  1707   PROCALCITONIN ng/ml 0 44* 0 59* 0 77*     Results from last 7 days   Lab Units 03/11/23  1707   LACTIC ACID mmol/L 1 6       Results from last 7 days   Lab Units 03/12/23  0022 03/11/23  1854   CLARITY UA  Turbid Cloudy*   COLOR UA  Yellow Yellow   SPEC GRAV UA  >=1 030 1 020   PH UA  5 5 6 0   GLUCOSE UA mg/dl Negative Negative   KETONES UA mg/dl 15 (1+)* Negative   BLOOD UA  Large* 3+*   PROTEIN UA mg/dl 100 (2+)* 2+*   NITRITE UA  Negative Negative   BILIRUBIN UA  Moderate* 1+*   UROBILINOGEN UA E U /dl 0 2 0 2   LEUKOCYTES UA  Moderate* 2+*   WBC UA /hpf Innumerable* Innumerable*   RBC UA /hpf Field obscured, unable to enumerate* Innumerable*   BACTERIA UA /hpf Field obscured, unable to enumerate* Innumerable*   EPITHELIAL CELLS WET PREP /hpf Field obscured, unable to enumerate* Occasional     Results from last 7 days   Lab Units 03/11/23  1755   INFLUENZA A PCR  Negative   INFLUENZA B PCR  Negative   RSV PCR  Negative     Results from last 7 days   Lab Units 03/11/23  1707   ETHANOL LVL mg/dL <10   ACETAMINOPHEN LVL ug/mL <69*   SALICYLATE LVL mg/dL <5     Results from last 7 days   Lab Units 03/12/23  0023 03/11/23  2229 03/11/23  1854 03/11/23  1708 03/11/23  1707   BLOOD CULTURE  Received in Microbiology Lab  Culture in Progress  Received in Microbiology Lab  Culture in Progress  --  No Growth at 24 hrs  No Growth at 24 hrs     URINE CULTURE   --   --  >100,000 cfu/ml Escherichia coli*  --   --      Past Medical History:   Diagnosis Date   • CHF (congestive heart failure) ([facility])    • Diabetes mellitus ([facility])    • Disease of thyroid gland    • Hyperlipidemia    • Hypertension    • Hypothyroidism (acquired)    • Restless leg syndrome    • [facility]      Present on Admission:  • REGINO (acute kidney injury) ([facility] )  • Chronic heart failure with preserved ejection fraction ([facility])  • Diabetes mellitus ([facility] )  • GERD (gastroesophageal reflux disease)  • Hyperlipidemia      Admitting Diagnosis: Severe sepsis ([facility] )  Age/Sex: 75 y o  female  Admission Orders:  NPO  Up with Assistance  Intubated / vented  Daily awakening trial  Cardio-Pulmonary monitoring  Daily weight / I&O  Neuro checks q4h  Apply forced warm air blanket  Mikey SCDs  Elevate HOC  Oral Care    Scheduled Medications:  aspirin, 81 mg, Oral, Daily  cefepime, 1,000 mg, Intravenous, Q12H  chlorhexidine, 15 mL, Mouth/Throat, Q12H JOSE  ezetimibe, 10 mg, Oral, Daily  heparin (porcine), 5,000 Units, Subcutaneous, Q8H [facility]  insulin lispro, 1-6 Units, Subcutaneous, Q6H JOSE  levothyroxine, 75 mcg, Oral, Early Morning  nystatin, , Topical, BID  omeprazole (PRILOSEC) suspension 2 mg/mL, 20 mg, Oral, Daily  topiramate, 100 mg, Oral, BID      Continuous IV Infusions:  multi-electrolyte, 100 mL/hr, Intravenous, Continuous      PRN Meds:       IP CONSULT TO CASE MANAGEMENT    Network Utilization Review Department  ATTENTION: Please call with any questions or concerns to [phone] and carefully listen to the prompts so that you are directed to the right person  All voicemails are confidential   Henry Haney all requests for admission clinical reviews, approved or denied determinations and any other requests to dedicated fax number below belonging to the campus where the patient is receiving treatment   List of dedicated fax numbers for the Facilities:  [address] DENIALS (Administrative/Medical Necessity) [phone]   [address] (Maternity/NICU/Pediatrics) [phone]   912 Corrine Duque [phone]   [facility] [phone]   1303 [facility] [address] [address] Corine [facility] 28 [phone]   1554 [facility] [place] alexandra [facility] 134 815 [facility] [phone]

## 2023-03-13 NOTE — ASSESSMENT & PLAN NOTE
· Patient presents with creatine 5 2 and   · Previous creatine on 1/30/23 was 1 13  · CT without evidence of hydro   · Received 3 5L of isolyte on admission  · Per son, SNF reported 15 lb weight loss over the past month with poor oral intake     Plan:  · Continue Isolyte for IV hydration   · Continue arceo with strict I&O's  · Continue to trend end points

## 2023-03-13 NOTE — ASSESSMENT & PLAN NOTE
· POA as evidenced by: tachycardia, leukocytosis, and elevated creatine  · Lactic acid 1 6  · Procal 0 8 >0 6  · CT chest/ abd/pelvis: Bibasilar pulmonary opacities are more likely to represent atelectasis/hypoventilatory change than pneumonia  No acute inflammatory findings in the abdomen or the pelvis    · UA 2+ leukocytes and innumerable bacteria with profoundly purulent urine     Plan:  · Follow-up with blood cultures  · Follow-up with MRSA  · Follow-up with urine culture  · Continue cefepime/ vanco

## 2023-03-13 NOTE — PROGRESS NOTES
Tom Garvin is a 75 y o  female who is currently ordered Vancomycin IV with management by the Pharmacy Consult service  Relevant clinical data and objective / subjective history reviewed  Vancomycin Assessment:  Indication and Goal AUC/Trough: Urinary tract infection (goal -600, trough >10)  Clinical Status: stable  Micro:     Renal Function:  SCr: 3 18 mg/dL  CrCl: 18 3 mL/min  Renal replacement: Not on dialysis  Days of Therapy: 3  Current Dose: 1750mg (25mg/kg) loading dose given  Vancomycin Plan:  New Dosinmg (15mg/kg) pulse-dosed PRN vanco level<=15mcg/ml  Estimated AUC: n/a mcg*hr/mL  Estimated Trough: 16 mcg/mL  Next Level: 3/14/2023  Renal Function Monitoring: Daily BMP and UOP  Patient's trough came today at 17 2 so no dose will be given today   Pharmacy will continue to follow closely for s/sx of nephrotoxicity, infusion reactions and appropriateness of therapy  BMP and CBC will be ordered per protocol  We will continue to follow the patient’s culture results and clinical progress daily      Stefany Correa, Pharmacist

## 2023-03-13 NOTE — ASSESSMENT & PLAN NOTE
Wt Readings from Last 3 Encounters:   03/12/23 92 8 kg (204 lb 9 4 oz)   03/11/23 98 kg (216 lb 0 8 oz)   03/01/23 97 9 kg (215 lb 12 8 oz)   · ECHO in 2/22 revealed G2DD, mild to moderate aortic stenosis, and RV pressure of 51

## 2023-03-13 NOTE — ASSESSMENT & PLAN NOTE
· Patient presents from nursing home after being found unresponsive  · Upon arrival to [place] ED she had a GCS of 3 and was therefore intubated  · CT head was negative   · VB 36/ 35/ 49/ 19  · TSH 2 178  · Coma panel WNL   · Ammonia 62  · Infectious work-up in process  · Patient currently following commands, but remains lethargic    Plan:  · Continue to hold sedating medications   · Continue neuro exams per unit routine

## 2023-03-14 LAB
ANION GAP SERPL CALCULATED.3IONS-SCNC: 10 MMOL/L (ref 4–13)
BACTERIA UR CULT: ABNORMAL
BACTERIA UR CULT: ABNORMAL
BASOPHILS # BLD AUTO: 0.03 THOUSANDS/ÂΜL (ref 0–0.1)
BASOPHILS NFR BLD AUTO: 0 % (ref 0–1)
BUN SERPL-MCNC: 65 MG/DL (ref 5–25)
CA-I BLD-SCNC: 1.06 MMOL/L (ref 1.12–1.32)
CALCIUM SERPL-MCNC: 7.9 MG/DL (ref 8.4–10.2)
CHLORIDE SERPL-SCNC: 114 MMOL/L (ref 96–108)
CO2 SERPL-SCNC: 23 MMOL/L (ref 21–32)
CREAT SERPL-MCNC: 2.39 MG/DL (ref 0.6–1.3)
EOSINOPHIL # BLD AUTO: 0.46 THOUSAND/ÂΜL (ref 0–0.61)
EOSINOPHIL NFR BLD AUTO: 5 % (ref 0–6)
ERYTHROCYTE [DISTWIDTH] IN BLOOD BY AUTOMATED COUNT: 15.6 % (ref 11.6–15.1)
GFR SERPL CREATININE-BSD FRML MDRD: 20 ML/MIN/1.73SQ M
GLUCOSE SERPL-MCNC: 111 MG/DL (ref 65–140)
GLUCOSE SERPL-MCNC: 118 MG/DL (ref 65–140)
GLUCOSE SERPL-MCNC: 155 MG/DL (ref 65–140)
GLUCOSE SERPL-MCNC: 160 MG/DL (ref 65–140)
GLUCOSE SERPL-MCNC: 95 MG/DL (ref 65–140)
HCT VFR BLD AUTO: 35.6 % (ref 34.8–46.1)
HGB BLD-MCNC: 10.8 G/DL (ref 11.5–15.4)
IMM GRANULOCYTES # BLD AUTO: 0.12 THOUSAND/UL (ref 0–0.2)
IMM GRANULOCYTES NFR BLD AUTO: 1 % (ref 0–2)
LYMPHOCYTES # BLD AUTO: 0.81 THOUSANDS/ÂΜL (ref 0.6–4.47)
LYMPHOCYTES NFR BLD AUTO: 8 % (ref 14–44)
MAGNESIUM SERPL-MCNC: 2.2 MG/DL (ref 1.9–2.7)
MCH RBC QN AUTO: 29 PG (ref 26.8–34.3)
MCHC RBC AUTO-ENTMCNC: 30.3 G/DL (ref 31.4–37.4)
MCV RBC AUTO: 95 FL (ref 82–98)
MONOCYTES # BLD AUTO: 0.58 THOUSAND/ÂΜL (ref 0.17–1.22)
MONOCYTES NFR BLD AUTO: 6 % (ref 4–12)
NEUTROPHILS # BLD AUTO: 7.85 THOUSANDS/ÂΜL (ref 1.85–7.62)
NEUTS SEG NFR BLD AUTO: 80 % (ref 43–75)
NRBC BLD AUTO-RTO: 0 /100 WBCS
PHOSPHATE SERPL-MCNC: 1.8 MG/DL (ref 2.3–4.1)
PLATELET # BLD AUTO: 162 THOUSANDS/UL (ref 149–390)
PMV BLD AUTO: 12 FL (ref 8.9–12.7)
POTASSIUM SERPL-SCNC: 3.5 MMOL/L (ref 3.5–5.3)
RBC # BLD AUTO: 3.73 MILLION/UL (ref 3.81–5.12)
SODIUM SERPL-SCNC: 147 MMOL/L (ref 135–147)
WBC # BLD AUTO: 9.85 THOUSAND/UL (ref 4.31–10.16)

## 2023-03-14 RX ADMIN — NYSTATIN 1 APPLICATION.: 100000 POWDER TOPICAL at 08:35

## 2023-03-14 RX ADMIN — NYSTATIN: 100000 POWDER TOPICAL at 17:40

## 2023-03-14 RX ADMIN — CHLORHEXIDINE GLUCONATE 15 ML: 1.2 RINSE ORAL at 21:05

## 2023-03-14 RX ADMIN — POTASSIUM PHOSPHATE, MONOBASIC AND POTASSIUM PHOSPHATE, DIBASIC 21 MMOL: 224; 236 INJECTION, SOLUTION, CONCENTRATE INTRAVENOUS at 09:31

## 2023-03-14 RX ADMIN — CEFEPIME HYDROCHLORIDE 1000 MG: 1 INJECTION, SOLUTION INTRAVENOUS at 05:41

## 2023-03-14 RX ADMIN — ERTAPENEM SODIUM 1000 MG: 1 INJECTION, POWDER, LYOPHILIZED, FOR SOLUTION INTRAMUSCULAR; INTRAVENOUS at 18:43

## 2023-03-14 RX ADMIN — HEPARIN SODIUM 5000 UNITS: 5000 INJECTION INTRAVENOUS; SUBCUTANEOUS at 15:21

## 2023-03-14 RX ADMIN — CHLORHEXIDINE GLUCONATE 15 ML: 1.2 RINSE ORAL at 08:53

## 2023-03-14 RX ADMIN — INSULIN LISPRO 1 UNITS: 100 INJECTION, SOLUTION INTRAVENOUS; SUBCUTANEOUS at 17:41

## 2023-03-14 RX ADMIN — HEPARIN SODIUM 5000 UNITS: 5000 INJECTION INTRAVENOUS; SUBCUTANEOUS at 05:46

## 2023-03-14 RX ADMIN — HEPARIN SODIUM 5000 UNITS: 5000 INJECTION INTRAVENOUS; SUBCUTANEOUS at 21:05

## 2023-03-14 NOTE — PLAN OF CARE
Problem: MOBILITY - ADULT  Goal: Maintain or return to baseline ADL function  Description: INTERVENTIONS:  -  Assess patient's ability to carry out ADLs; assess patient's baseline for ADL function and identify physical deficits which impact ability to perform ADLs (bathing, care of mouth/teeth, toileting, grooming, dressing, etc )  - Assess/evaluate cause of self-care deficits   - Assess range of motion  - Assess patient's mobility; develop plan if impaired  - Assess patient's need for assistive devices and provide as appropriate  - Encourage maximum independence but intervene and supervise when necessary  - Involve family in performance of ADLs  - Assess for home care needs following discharge   - Consider OT consult to assist with ADL evaluation and planning for discharge  - Provide patient education as appropriate  Outcome: Progressing  Goal: Maintains/Returns to pre admission functional level  Description: INTERVENTIONS:  - Perform BMAT or MOVE assessment daily    - Set and communicate daily mobility goal to care team and patient/family/caregiver  - Collaborate with rehabilitation services on mobility goals if consulted  - Perform Range of Motion 4 times a day  - Reposition patient every 2 hours    -- Record patient progress and toleration of activity level   Outcome: Progressing     Problem: Prexisting or High Potential for Compromised Skin Integrity  Goal: Skin integrity is maintained or improved  Description: INTERVENTIONS:  - Identify patients at risk for skin breakdown  - Assess and monitor skin integrity  - Assess and monitor nutrition and hydration status  - Monitor labs   - Assess for incontinence   - Turn and reposition patient  - Assist with mobility/ambulation  - Relieve pressure over bony prominences  - Avoid friction and shearing  - Provide appropriate hygiene as needed including keeping skin clean and dry  - Evaluate need for skin moisturizer/barrier cream  - Collaborate with interdisciplinary team   - Patient/family teaching  - Consider wound care consult   Outcome: Progressing     Problem: PAIN - ADULT  Goal: Verbalizes/displays adequate comfort level or baseline comfort level  Description: Interventions:  - Encourage patient to monitor pain and request assistance  - Assess pain using appropriate pain scale  - Administer analgesics based on type and severity of pain and evaluate response  - Implement non-pharmacological measures as appropriate and evaluate response  - Consider cultural and social influences on pain and pain management  - Notify physician/advanced practitioner if interventions unsuccessful or patient reports new pain  Outcome: Progressing     Problem: INFECTION - ADULT  Goal: Absence or prevention of progression during hospitalization  Description: INTERVENTIONS:  - Assess and monitor for signs and symptoms of infection  - Monitor lab/diagnostic results  - Monitor all insertion sites, i e  indwelling lines, tubes, and drains  - Monitor endotracheal if appropriate and nasal secretions for changes in amount and color  - [place] appropriate cooling/warming therapies per order  - Administer medications as ordered  - Instruct and encourage patient and family to use good hand hygiene technique  - Identify and instruct in appropriate isolation precautions for identified infection/condition  Outcome: Progressing     Problem: SAFETY ADULT  Goal: Maintain or return to baseline ADL function  Description: INTERVENTIONS:  -  Assess patient's ability to carry out ADLs; assess patient's baseline for ADL function and identify physical deficits which impact ability to perform ADLs (bathing, care of mouth/teeth, toileting, grooming, dressing, etc )  - Assess/evaluate cause of self-care deficits   - Assess range of motion  - Assess patient's mobility; develop plan if impaired  - Assess patient's need for assistive devices and provide as appropriate  - Encourage maximum independence but intervene and supervise when necessary  - Involve family in performance of ADLs  - Assess for home care needs following discharge   - Consider OT consult to assist with ADL evaluation and planning for discharge  - Provide patient education as appropriate  Outcome: Progressing  Goal: Maintains/Returns to pre admission functional level  Description: INTERVENTIONS:  - Perform BMAT or MOVE assessment daily    - Set and communicate daily mobility goal to care team and patient/family/caregiver  - Collaborate with rehabilitation services on mobility goals if consulted  - Perform Range of Motion 4 times a day  - Reposition patient every 2 hours    -- Record patient progress and toleration of activity level   Outcome: Progressing  Goal: Patient will remain free of falls  Description: INTERVENTIONS:  - Educate patient/family on patient safety including physical limitations  - Instruct patient to call for assistance with activity   - Consult OT/PT to assist with strengthening/mobility   - Keep Call bell within reach  - Keep bed low and locked with side rails adjusted as appropriate  - Keep care items and personal belongings within reach  - Initiate and maintain comfort rounds  - Make Fall Risk Sign visible to staff  - Offer Toileting every 2 Hours, in advance of need  - Initiate/Maintain bed alarm  - Apply yellow socks and bracelet for high fall risk patients  - Consider moving patient to room near nurses station  Outcome: Progressing     Problem: DISCHARGE PLANNING  Goal: Discharge to home or other facility with appropriate resources  Description: INTERVENTIONS:  - Identify barriers to discharge w/patient and caregiver  - Arrange for needed discharge resources and transportation as appropriate  - Identify discharge learning needs (meds, wound care, etc )  - Arrange for interpretive services to assist at discharge as needed  - Refer to Case Management Department for coordinating discharge planning if the patient needs post-hospital services based on physician/advanced practitioner order or complex needs related to functional status, cognitive ability, or social support system  Outcome: Progressing     Problem: Knowledge Deficit  Goal: Patient/family/caregiver demonstrates understanding of disease process, treatment plan, medications, and discharge instructions  Description: Complete learning assessment and assess knowledge base  Interventions:  - Provide teaching at level of understanding  - Provide teaching via preferred learning methods  Outcome: Progressing     Problem: RESPIRATORY - ADULT  Goal: Achieves optimal ventilation and oxygenation  Description: INTERVENTIONS:  - Assess for changes in respiratory status  - Assess for changes in mentation and behavior  - Position to facilitate oxygenation and minimize respiratory effort  - Oxygen administered by appropriate delivery if ordered  - Initiate smoking cessation education as indicated  - Encourage broncho-pulmonary hygiene including cough, deep breathe, Incentive Spirometry  - Assess the need for suctioning and aspirate as needed  - Assess and instruct to report SOB or any respiratory difficulty  - Respiratory Therapy support as indicated  Outcome: Progressing     Problem: Nutrition/Hydration-ADULT  Goal: Nutrient/Hydration intake appropriate for improving, restoring or maintaining nutritional needs  Description: Monitor and assess patient's nutrition/hydration status for malnutrition  Collaborate with interdisciplinary team and initiate plan and interventions as ordered  Monitor patient's weight and dietary intake as ordered or per policy  Utilize nutrition screening tool and intervene as necessary  Determine patient's food preferences and provide high-protein, high-caloric foods as appropriate       INTERVENTIONS:  - Monitor oral intake, urinary output, labs, and treatment plans  - Assess nutrition and hydration status and recommend course of action  - Evaluate amount of meals eaten  - Assist patient with eating if necessary   - Allow adequate time for meals  - Recommend/ encourage appropriate diets, oral nutritional supplements, and vitamin/mineral supplements  - Order, calculate, and assess calorie counts as needed  - Assess need for intravenous fluids  - Provide specific nutrition/hydration education as appropriate  - Include patient/family/caregiver in decisions related to nutrition  Outcome: Progressing

## 2023-03-14 NOTE — ASSESSMENT & PLAN NOTE
· Patient was intubated upon arrival to [facility] ED for airway protection  · ABG upon arrival to [facility]: 7 39/ 30/ 163/ 18  · Extubated on 3/13  · Continue to hold sedation as outlined

## 2023-03-14 NOTE — PROGRESS NOTES
-- Patient: Oskar Vanessa  -- MRN: [id number]  -- [facility] Request ID: [id number]  -- Level of care reserved: Igor Richard  -- Partner Reserved: [facility], [address] [place] [address] [phone]  -- Clinical needs requested:  -- Geography searched[place] [phone]  -- Start of Service:  -- Request sent: 3:41pm EDT on 3/14/2023 by Vivek Velasquez  -- Partner reserved: 4:44pm EDT on 3/14/2023 by Vivek Velasquez  -- Choice list shared: 4:42pm EDT on 3/14/2023 by Vivek Velasquez

## 2023-03-14 NOTE — ASSESSMENT & PLAN NOTE
· Patient presents with creatine 5 2 and   · Previous creatine on 1/30/23 was 1 13  · CT without evidence of hydro   · Received 3 5L of isolyte on admission  · Per son, SNF reported 15 lb weight loss over the past month with poor oral intake   · Continue arceo with strict I&O's  · Continue to trend end points

## 2023-03-14 NOTE — ASSESSMENT & PLAN NOTE
Wt Readings from Last 3 Encounters:   03/13/23 93 7 kg (206 lb 9 1 oz)   03/11/23 98 kg (216 lb 0 8 oz)   03/01/23 97 9 kg (215 lb 12 8 oz)   · ECHO in 2/22 revealed G2DD, mild to moderate aortic stenosis, and RV pressure of 51

## 2023-03-14 NOTE — SPEECH THERAPY NOTE
Speech-Language Pathology Bedside Swallow Evaluation      Patient Name: Talon DANG Date: 3/14/2023     Problem List  Principal Problem:    Sepsis ([facility] )  Active Problems:    Hyperlipidemia    Diabetes mellitus ([facility] )    GERD (gastroesophageal reflux disease)    Chronic heart failure with preserved ejection fraction (HCC)    REGINO (acute kidney injury) ([facility] )    UTI (urinary tract infection)    Insomnia    Acute encephalopathy    Respiratory insufficiency      Past Medical History  Past Medical History:   Diagnosis Date   • CHF (congestive heart failure) ([facility])    • Diabetes mellitus ([facility] )    • Disease of thyroid gland    • Hyperlipidemia    • Hypertension    • Hypothyroidism (acquired)    • Restless leg syndrome    • Seizures ([facility] )        Past Surgical History  Past Surgical History:   Procedure Laterality Date   • APPENDECTOMY     • CHOLECYSTECTOMY     • HIP FRACTURE SURGERY Right    • TONSILLECTOMY     • TUBAL LIGATION         Summary   Pt presented with s/s suggestive of moderate oral dysphagia (related to cognitive deficits and pt is edentulous) plus suspected mild pharyngeal dysphagia (suspectd to be related to intubation)  Recommended Diet: puree/level 1 diet and nectar thick liquids   Recommended Form of Meds: crushed with puree   Aspiration precautions and swallowing strategies: upright posture, only feed when fully alert, slow rate of feeding and small bites/sips        Current Medical Status  Talon Jiménez is a 76 yo F SNF resident with a PMH of hypertension, hyperlipidemia, CHF, GERD, CAD, diabetes, hypothyroidism, and insomnia  She was admitted 3/11 after  being found unresponsive  DX:    1  Sepsis CT chest/ abd/pelvis: Bibasilar pulmonary opacities are more likely to represent atelectasis/hypoventilatory change than pneumonia  No acute inflammatory findings in the abdomen or the pelvis  UA 2+ leukocytes and innumerable bacteria with profoundly purulent urine   2   Encephalopathy  3   UTI, REGINO  4  Respiratory insufficiency (intubated 3/11-3/13)  5  Chronic heart failure, hlt, DM, GERD    SLP Swallowing Evaluation ordered at this time       Current Precautions:  Fall, Aspiration, Contact (ESBL, MDRO)    Allergies:  No known food allergies except coconut oil    Past medical history:  Please see H&P for details    Special Studies:  3/13 CT of head:  No acute intracranial abnormality identified with findings detailed above  If clinical concern for acute ischemia, recommend more sensitive MRI brain   for better evaluation  See above for CT of chest    Social/Education/Vocational Hx:  Pt lives at / [place] [address]   Current Risks for Dysphagia & Aspiration: recent intubation and AMS  Current Diet: NPO   Baseline Diet: mechanically altered/level 2 diet and thin liquids (per pt)  Note: SNF reported 15 lb weight loss over the past month with poor oral intake       Baseline Assessment   Behavior/Cognition: alert  Speech/Language Status: able to follow commands inconsistently and limited verbal output with paraphasias and perseveration noted  Patient Positioning: upright in bed  Pain Status/Interventions/Response to Interventions:  No report of or nonverbal indications of pain  Swallow Mechanism Exam  Facial: symmetrical  Labial: decreased effort  Lingual: decreased ROM (poor lateralization, ++Dry and ?effort  Velum: unable to visualize  Mandible: adequate ROM  Dentition: edentulous  Vocal quality:clear/adequate   Reflexive Cough fair strength (poor volitional cough)  Respiratory Status: on 3L O2      Consistencies Assessed and Performance   Consistencies Administered: ice chips, thin liquids, nectar thick, honey thick and puree     Oral Stage: At least mod impaired  Weak mastication with ice chps  Variable speech of bolus formation and transfer (mild to significantly slowed) but with no significant oral residue noted    No overt s/s reduced oral control (despite delayed transfer and "swishing" with sips of water  Pharyngeal Stage: suspect mild impairment  Swallow Mechanics:  Swallowing initiation appeared prompt  Laryngeal rise was palpated and judged to be within functional limits  No coughing, throat clearing, change in vocal quality or respiratory status noted today with puree, thick liquid by cup/sraw, ice chips  Episodic cough with cup sips of water/thin liquid    Esophageal Concerns: no reported s/s today    Strategies and Efficacy: was fully upright, alert with min distractions and fed slwoly    Summary and Recommendations (see above)    Results Reviewed with: patient, RN and CRNP/MD     Treatment Recommended: yes (min of 3x weekly)    Patient Stated Goal: unable to state at this time    Dysphagia LTG  -Patient will demonstrate safe and effective oral intake (without overt s/s significant oral/pharyngeal dysphagia including s/s penetration or aspiration) for the highest appropriate diet level       Short Term Goals:  -Pt will tolerate Dysphagia 1/pureed diet and nectar thick liquid with no significant s/s oral or pharyngeal dysphagia across 1-3 diagnostic session/s    -Patient will tolerate trials of upgraded food and liquid texture with no significant s/s of oral or pharyngeal dysphagia including aspiration across multiple diagnostic sessions     -Patient will comply with Speech/Language Evaluation (and MBS IF indicated-->suspect it will not be necessary)    -Pt will use trained strategies with 80% accuracy to eliminate overt s/s penetration/aspiration with the least restrictive food/liquid consistencies    Mckenna Banks [address] [place] [address] [phone]

## 2023-03-14 NOTE — CASE MANAGEMENT
Case Management Discharge Planning Note    Patient name Elyse Ceron  Location 2 [facility]/2 [facility] MRN [id number]  : 1955 Date 3/14/2023       Current Admission Date: 3/11/2023  Current Admission Diagnosis:Sepsis [facility])   Patient Active Problem List    Diagnosis Date Noted   • UTI (urinary tract infection) 2023   • Insomnia 2023   • Sepsis ([facility] ) 2023   • Acute encephalopathy 2023   • Respiratory insufficiency 2023   • Nausea 2023   • REGINO (acute kidney injury) ([facility] ) 2023   • Left lower quadrant abdominal pain 2022   • Chest pressure 2022   • Fungal dermatitis 10/05/2022   • Diabetes mellitus type 2 in obese ([facility] ) 10/01/2022   • Abnormal urine 2022   • Weakness 2022   • Syncope 2022   • Elevated d-dimer 2022   • Coronary artery disease involving native coronary artery of native heart without angina pectoris 2022   • Chronic heart failure with preserved ejection fraction ([facility] ) 2022   • Chronic respiratory failure ([place] [id number] ) 2022   • Dyspnea on exertion 2022   • Primary osteoarthritis of one hip, right 12/10/2021   • Orthostatic hypotension 10/24/2021   • Morbid obesity due to excess calories ([place] [address] ) 2021   • Onychomycosis of toenail 2021   • Calf tenderness 2021   • Asymptomatic bacteriuria 2021   • Chronic fatigue and malaise 2021   • Diarrhea 2021   • Acquired hypothyroidism 2021   • Suspected COVID-19 virus infection 2020   • Major depressive disorder 2020   • Gambling disorder, episodic, moderate 2018   • Severe episode of recurrent major depressive disorder, without psychotic features ([facility] ) 2018   • Encounter for examination and observation for other specified reasons    • GERD (gastroesophageal reflux disease) 07/10/2016   • Epigastric abdominal pain 07/10/2016   • Seizures ([facility] )    • Restless leg syndrome    • Hypertension    • Hyperlipidemia    • Diabetes mellitus ([facility] )       LOS (days): 3  Geometric Mean LOS (GMLOS) (days):   Days to GMLOS:     OBJECTIVE:  Risk of Unplanned Readmission Score: 28 53     Current admission status: Inpatient   Preferred Pharmacy:   [facility] [address], [place] - [address]  [address] [address]  Phone: [phone] Fax: [phone]    Primary Care Provider: Anand Gibbons MD    Primary Insurance: [id number] [facility]  Secondary Insurance: [facility] REP    DISCHARGE DETAILS:    Other Referral/Resources/Interventions Provided:  Interventions: Facility Return, SNF  Referral Comments: [facility] pt in [facility]  Facility will be reserved as requested by son      Treatment Team Recommendation: Facility Return, SNF  Discharge Destination Plan[de-identified] Facility Return, SNF  Transport at Discharge : [facility] Ambulance

## 2023-03-14 NOTE — PLAN OF CARE
Pt presented with s/s suggestive of moderate oral dysphagia (related to cognitive deficits and pt is edentulous) plus suspected mild pharyngeal dysphagia (suspectd to be related to intubation)        Recommended Diet: puree/level 1 diet and nectar thick liquids   Recommended Form of Meds: crushed with puree   Aspiration precautions and swallowing strategies: upright posture, only feed when fully alert, slow rate of feeding and small bites/sips

## 2023-03-14 NOTE — ASSESSMENT & PLAN NOTE
· POA as evidenced by: tachycardia, leukocytosis, and elevated creatine  · Lactic acid 1 6  · Procal 0 8 >0 6  · CT chest/ abd/pelvis: Bibasilar pulmonary opacities are more likely to represent atelectasis/hypoventilatory change than pneumonia  No acute inflammatory findings in the abdomen or the pelvis    · UA 2+ leukocytes and innumerable bacteria with profoundly purulent urine   · Follow-up with blood cultures  · Follow-up with MRSA  · Follow-up with urine culture  · Continue cefepime/ vanco

## 2023-03-14 NOTE — CASE MANAGEMENT
Case Management Assessment & Discharge Planning Note    Patient name Marshall Cord  Location [address]2 [facility] MRN [id number]  : 1955 Date 3/14/2023       Current Admission Date: 3/11/2023  Current Admission Diagnosis:Sepsis [facility])   Patient Active Problem List    Diagnosis Date Noted   • UTI (urinary tract infection) 2023   • Insomnia 2023   • Sepsis ([facility] ) 2023   • Acute encephalopathy 2023   • Respiratory insufficiency 2023   • Nausea 2023   • REGINO (acute kidney injury) ([facility] ) 2023   • Left lower quadrant abdominal pain 2022   • Chest pressure 2022   • Fungal dermatitis 10/05/2022   • Diabetes mellitus type 2 in obese ([place] [address] ) 10/01/2022   • Abnormal urine 2022   • Weakness 2022   • Syncope 2022   • Elevated d-dimer 2022   • Coronary artery disease involving native coronary artery of native heart without angina pectoris 2022   • Chronic heart failure with preserved ejection fraction ([facility] ) 2022   • Chronic respiratory failure ([place] [address] ) 2022   • Dyspnea on exertion 2022   • Primary osteoarthritis of one hip, right 12/10/2021   • Orthostatic hypotension 10/24/2021   • Morbid obesity due to excess calories ([place] [address] ) 2021   • Onychomycosis of toenail 2021   • Calf tenderness 2021   • Asymptomatic bacteriuria 2021   • Chronic fatigue and malaise 2021   • Diarrhea 2021   • Acquired hypothyroidism 2021   • Suspected COVID-19 virus infection 2020   • Major depressive disorder 2020   • Gambling disorder, episodic, moderate 2018   • Severe episode of recurrent major depressive disorder, without psychotic features ([facility] ) 2018   • Encounter for examination and observation for other specified reasons    • GERD (gastroesophageal reflux disease) 07/10/2016   • Epigastric abdominal pain 07/10/2016   • Seizures ([facility] )    • Restless leg syndrome    • Hypertension • Hyperlipidemia    • Diabetes mellitus ([facility] )       LOS (days): 3  Geometric Mean LOS (GMLOS) (days):   Days to GMLOS:     OBJECTIVE:    Risk of Unplanned Readmission Score: 28 47     Current admission status: Inpatient  Referral Reason: Other (Disposition planning)    Preferred Pharmacy:   [facility]  [address] [place] [address]  Phone: [phone] Fax: [phone]    Primary Care Provider: Jaime Acevedo MD    Primary Insurance: [address] [facility]  Secondary Insurance: [facility] REP    ASSESSMENT:  [address] [facility] [address], [address] Representative - Son   Primary Phone: [phone] (Mobile)  Home Phone: [phone]               Readmission Root Cause  30 Day Readmission: No    Patient Information  Admitted from[de-identified] Facility [facility])  Mental Status: Alert  During Assessment patient was accompanied by: Not accompanied during assessment  Assessment information provided by[de-identified] Son  Primary Caregiver: Family  Caregiver's Name[de-identified] Janice Dominguezing Relationship to Patient[de-identified] Other (Specify) (staff)  Caregiver's Telephone Number[de-identified] [phone]  Support Systems: [facility] Residence: [address] [facility] [address] do you live in?: [place]  Type of Current Residence: Facility  Living Arrangements: Other (Comment) (lives in skilled nursing facility)    Activities of Daily Living Prior to Admission  Functional Status: Assistance  Completes ADLs independently?: No  Level of ADL dependence: Assistance  Ambulates independently?: No  Level of ambulatory dependence: Assistance  Does the patient have a history of Short-Term Rehab?: Yes  Does patient have a history of HHC?: Yes  Does patient currently have [address]?: No    Patient Information Continued  Income Source: Pension/residential  Does patient have prescription coverage?: Yes  Does patient receive dialysis treatments?: No  Does patient have a history of substance abuse?: No  Does patient have a history of Mental Health Diagnosis?: Yes (Depression per chart)    Means of Transportation  Means of Transport to Appts[de-identified] Other (Comment) (medical transport)    DISCHARGE DETAILS:    Discharge planning discussed with[de-identified] Patient and son, Ayad Stern of Choice: Yes  Comments - Freedom of Choice: SW following to assist with DCP  Pt is a resident at [facility]  Pt met with pt and spoke with son over phone  Son is requesting transfer back to [facility] upon discharge  Referral will be made    CM contacted family/caregiver?: Yes  Were Treatment Team discharge recommendations reviewed with patient/caregiver?: Yes  Did patient/caregiver verbalize understanding of patient care needs?: N/A- going to facility  Were patient/caregiver advised of the risks associated with not following Treatment Team discharge recommendations?: Yes    Contacts  Patient Contacts: [place] Distance  Relationship to Patient[de-identified] Family (son)  Contact Method: Phone  Phone Number: [phone]  Reason/Outcome: Discharge Planning    Other Referral/Resources/Interventions Provided:  Interventions: Facility Return, SNF  Referral Comments: Referral made to [facility] to prepare for eventual return    Treatment Team Recommendation: Facility Return, SNF  Discharge Destination Plan[de-identified] Facility Return, SNF  Transport at Discharge : BLS Ambulance

## 2023-03-14 NOTE — PROGRESS NOTES
[address]  Progress Note - Vishal Nuno 1955, 75 y o  female MRN: [id number]  Unit/Bed#: ICU 05 Encounter: [id number]  Primary Care Provider: Magen Rea MD   Date and time admitted to hospital: 3/11/2023 10:17 PM    * Sepsis ([facility] )  Assessment & Plan  · POA as evidenced by: tachycardia, leukocytosis, and elevated creatine  · Lactic acid 1 6  · Procal 0 8 >0 6  · CT chest/ abd/pelvis: Bibasilar pulmonary opacities are more likely to represent atelectasis/hypoventilatory change than pneumonia  No acute inflammatory findings in the abdomen or the pelvis    · UA 2+ leukocytes and innumerable bacteria with profoundly purulent urine   · Follow-up with blood cultures  · Follow-up with MRSA  · Follow-up with urine culture  · Continue cefepime/ vanco    Acute encephalopathy  Assessment & Plan  · Patient presents from nursing home after being found unresponsive  · Upon arrival to [facility] ED she had a GCS of 3 and was therefore intubated  · CT head was negative   · VB 36/ 35/ 49/ 19  · TSH 2 178  · Coma panel WNL   · Ammonia 62  · Infectious work-up in process  · Patient currently following commands, but remains lethargic    Plan:  · Continue to hold sedating medications   · Continue neuro exams per unit routine     UTI (urinary tract infection)  Assessment & Plan  · Profoundly purulent urine in arceo catheter  · UA revealed leukocytes and innumerable bacteria   · See rest of plan as outlined above    REGINO (acute kidney injury) ([facility] )  Assessment & Plan  · Patient presents with creatine 5 2 and   · Previous creatine on 23 was 1 13  · CT without evidence of hydro   · Received 3 5L of isolyte on admission  · Per son, SNF reported 15 lb weight loss over the past month with poor oral intake   · Continue arceo with strict I&O's  · Continue to trend end points     Respiratory insufficiency  Assessment & Plan  · Patient was intubated upon arrival to [facility] ED for airway protection  · ABG upon arrival to [facility]: 7 39/ 30/ 163/ 18  · Extubated on 3/13  · Continue to hold sedation as outlined    Chronic heart failure with preserved ejection fraction (HCC)  Assessment & Plan  Wt Readings from Last 3 Encounters:   03/13/23 93 7 kg (206 lb 9 1 oz)   03/11/23 98 kg (216 lb 0 8 oz)   03/01/23 97 9 kg (215 lb 12 8 oz)   · ECHO in 2/22 revealed G2DD, mild to moderate aortic stenosis, and RV pressure of 51          Insomnia  Assessment & Plan  · Hold trazodone     GERD (gastroesophageal reflux disease)  Assessment & Plan  · Continue omeprazole     Diabetes mellitus ([address] )  Assessment & Plan  · Hold outpatient oral diabetic agents and lantus   · Continue SSI with Q 6 hour accu checks     Hyperlipidemia  Assessment & Plan  · Continue zetia      ----------------------------------------------------------------------------------------  HPI/24hr events: Patient was extubated yesterday afternoon  She did not have increased lethargy in the setting of hypoglycemia  She was started on D10 at 50 MLS per hour with improvement of her mental status  Patient appropriate for transfer out of the ICU today?: No  Disposition: Transfer to Med-Surg   Code Status: Level 2 - DNAR: but accepts endotracheal intubation  ---------------------------------------------------------------------------------------  SUBJECTIVE  "I am okay"     Review of Systems   Constitutional: Positive for fatigue  HENT: Negative  Eyes: Negative  Respiratory: Positive for shortness of breath  Cardiovascular: Negative  Gastrointestinal: Negative  Endocrine: Negative  Genitourinary: Negative  Musculoskeletal: Negative  Skin: Negative  Allergic/Immunologic: Negative  Neurological: Positive for weakness  Hematological: Negative  Psychiatric/Behavioral: Negative        Review of systems was reviewed and negative unless stated above in HPI/24-hour events ---------------------------------------------------------------------------------------  OBJECTIVE    Vitals   Vitals:    23 0000 23 0100 23 0200 23 0300   BP: 118/56 126/60 110/57 115/65   BP Location: Left arm      Pulse: 80 90 81 81   Resp: 18 18 21 21   Temp:       TempSrc:       SpO2: 100% 99% 100% 100%   Weight:       Height:         Temp (24hrs), Av °F (36 1 °C), Min:93 7 °F (34 3 °C), Max:98 1 °F (36 7 °C)  Current: Temperature: (!) 96 9 °F (36 1 °C)          Respiratory:  SpO2: SpO2: 100 %  Nasal Cannula O2 Flow Rate (L/min): 3 L/min    Invasive/non-invasive ventilation settings   Respiratory    Lab Data (Last 4 hours)    None         O2/Vent Data (Last 4 hours)    None                Physical Exam  Vitals and nursing note reviewed  Constitutional:       Appearance: She is ill-appearing and toxic-appearing  HENT:      Head: Normocephalic and atraumatic  Right Ear: Tympanic membrane, ear canal and external ear normal       Left Ear: Tympanic membrane, ear canal and external ear normal       Nose: Nose normal       Mouth/Throat:      Mouth: Mucous membranes are dry  Pharynx: Oropharynx is clear  Eyes:      Extraocular Movements: Extraocular movements intact  Conjunctiva/sclera: Conjunctivae normal       Pupils: Pupils are equal, round, and reactive to light  Cardiovascular:      Rate and Rhythm: Normal rate and regular rhythm  Pulmonary:      Comments: On 2 L NC  B/L breath sounds diminished but clear   Abdominal:      General: Bowel sounds are normal       Palpations: Abdomen is soft  Genitourinary:     Comments: Urinary catheter   Musculoskeletal:         General: Normal range of motion  Cervical back: Normal range of motion and neck supple  Skin:     General: Skin is warm and dry  Capillary Refill: Capillary refill takes less than 2 seconds     Neurological:      Comments: Arouses   Oriented to person and place only  Follows commands Psychiatric:      Comments: Flat                Laboratory and Diagnostics:  Results from last 7 days   Lab Units 03/13/23  0543 03/12/23  0543 03/11/23  1706   WBC Thousand/uL 10 10 13 72* 14 08*   HEMOGLOBIN g/dL 9 9* 11 9 15 7*   HEMATOCRIT % 31 4* 39 1 50 0*   PLATELETS Thousands/uL 159 191 293   NEUTROS PCT % 83* 84* 85*   MONOS PCT % 5 6 5     Results from last 7 days   Lab Units 03/13/23  0543 03/12/23  1605 03/12/23  0543 03/11/23  1707   SODIUM mmol/L 146 144 143 139   POTASSIUM mmol/L 3 5 3 8 3 7 5 1   CHLORIDE mmol/L 112* 108 108 106   CO2 mmol/L 22 23 20* 20*   ANION GAP mmol/L 12 13 15* 13   BUN mg/dL 81* 93* 95* 105*   CREATININE mg/dL 3 18* 3 94* 4 43* 5 15*   CALCIUM mg/dL 7 5* 7 6* 7 8* 10 3*   GLUCOSE RANDOM mg/dL 56* 79 105 107   ALT U/L  --   --  15 14   AST U/L  --   --  30 34   ALK PHOS U/L  --   --  68 100   ALBUMIN g/dL  --   --  2 7* 3 8   TOTAL BILIRUBIN mg/dL  --   --  0 46 0 50     Results from last 7 days   Lab Units 03/13/23  0543 03/12/23  0543   MAGNESIUM mg/dL 2 3 2 4   PHOSPHORUS mg/dL 2 1* 3 4      Results from last 7 days   Lab Units 03/11/23  1915   INR  1 21*   PTT seconds 24          Results from last 7 days   Lab Units 03/11/23  1707   LACTIC ACID mmol/L 1 6     ABG:  Results from last 7 days   Lab Units 03/11/23  2243   PH ART  7 392   PCO2 ART mm Hg 29 7*   PO2 ART mm Hg 162 9*   HCO3 ART mmol/L 17 7*   BASE EXC ART mmol/L -6 0   ABG SOURCE  Radial, Left     VBG:  Results from last 7 days   Lab Units 03/11/23  2243 03/11/23  1707   PH KYMBERLY   --  7 363   PCO2 KYMBERLY mm Hg  --  34 8*   PO2 KYMBERLY mm Hg  --  48 8*   HCO3 KYMBERLY mmol/L  --  19 4*   BASE EXC KYMBERLY mmol/L  --  -5 1   ABG SOURCE  Radial, Left  --      Results from last 7 days   Lab Units 03/13/23  0543 03/12/23  0543 03/11/23  1707   PROCALCITONIN ng/ml 0 44* 0 59* 0 77*       Micro  Results from last 7 days   Lab Units 03/12/23  0023 03/12/23  0022 03/11/23  2258 03/11/23  2229 03/11/23  1854 03/11/23  1708 03/11/23  1707 BLOOD CULTURE  No Growth at 24 hrs   --   --  No Growth at 24 hrs   --  No Growth at 48 hrs  No Growth at 48 hrs  URINE CULTURE   --  >100,000 cfu/ml Escherichia coli*  --   --  >100,000 cfu/ml Escherichia coli ESBL*  >100,000 cfu/ml  --   --    MRSA CULTURE ONLY   --   --  Culture results to follow  --   --   --   --        EKG: NSR alarms on   Imaging: I have personally reviewed pertinent reports  CT chest abdomen pelvis wo contrast    Result Date: 3/11/2023  Impression: Bibasilar pulmonary opacities are more likely to represent atelectasis/hypoventilatory change than pneumonia  No acute inflammatory findings in the abdomen or the pelvis  The study was marked in [facility] for immediate notification  Workstation performed: [id number]     XR chest 1 view portable    Result Date: 3/12/2023  Impression: Interval placement of endotracheal tube which terminates approximately 2 cm above the laura No acute cardiopulmonary disease  Workstation performed: [id number]     CT head without contrast    Result Date: 3/11/2023  Impression: No acute intracranial abnormality  Workstation performed: [id number]     STAT CXR ICU    Result Date: 3/12/2023  Impression: ET and enteric tubes as detailed  The tip of another tube or monitoring device projects over the mid mediastinum  The study was marked in [facility] for immediate notification  Workstation performed: [id number]     Intake and Output  I/O       03/12 0701  03/13 0700 03/13 0701  03/14 0700    I V  (mL/kg) 1200 (12 8)     NG/     IV Piggyback 300     Total Intake(mL/kg) 1630 (17 4)     Urine (mL/kg/hr) 800 (0 4) 1080 (0 5)    Emesis/NG output 70     Stool 200 200    Total Output 1070 1280    Net +560 -1280          Unmeasured Stool Occurrence 2 x           Height and Weights   Height: 5' 3" (160 cm)  IBW (Ideal Body Weight): 52 4 kg  Body mass index is 36 59 kg/m²    Weight (last 2 days)     Date/Time Weight    03/13/23 0547 93 7 (206 57)    03/12/23 0543 92 8 (204 59)            Nutrition       Diet Orders   (From admission, onward)             Start     Ordered    03/12/23 1056  Room Service  Once        Question:  Type of Service  Answer:  Room Service - Appropriate with Assistance    03/12/23 1055    03/11/23 2222  Diet NPO  Diet effective now        References:    Nutrtion Support Algorithm Enteral vs  Parenteral   Question Answer Comment   Diet Type NPO    RD to adjust diet per protocol?  Yes        03/11/23 2224                  Active Medications  Scheduled Meds:  Current Facility-Administered Medications   Medication Dose Route Frequency Provider Last Rate   • aspirin  81 mg Oral Daily SAMIA Choi     • cefepime  1,000 mg Intravenous Q12H SAMIA Iraheta 1,000 mg (03/13/23 1839)   • chlorhexidine  15 mL Mouth/Throat Q12H [facility] SAMIA Choi     • dextrose  50 mL/hr Intravenous Continuous Cristin Caro PA-C 50 mL/hr (03/13/23 1901)   • ezetimibe  10 mg Oral Daily SAMIA Choi     • heparin (porcine)  5,000 Units Subcutaneous [facility] SAMIA Choi     • insulin lispro  1-6 Units Subcutaneous Q6H [facility] SAMIA Choi     • levothyroxine  75 mcg Oral Early Morning SAMIA Choi     • nystatin   Topical BID SAMIA Choi     • omeprazole (PRILOSEC) suspension 2 mg/mL  20 mg Oral Daily SAMIA Choi     • topiramate  100 mg Oral BID SAMIA Holland       Continuous Infusions:  dextrose, 50 mL/hr, Last Rate: 50 mL/hr (03/13/23 1901)      PRN Meds:        Invasive Devices Review  Invasive Devices     Peripheral Intravenous Line  Duration           Long-Dwell Peripheral IV (Midline) 03/12/23 Right Brachial 2 days    Peripheral IV 03/11/23 Left Antecubital 2 days    Peripheral IV 03/11/23 Right Antecubital 2 days          Drain  Duration           Urethral Catheter Non-latex 16 Fr  2 days    Rectal Tube With balloon 1 day ---------------------------------------------------------------------------------------  Care Time Delivered:   No Critical Care time spent       [facility]      Portions of the record may have been created with voice recognition software  Occasional wrong word or "sound a like" substitutions may have occurred due to the inherent limitations of voice recognition software    Read the chart carefully and recognize, using context, where substitutions have occurred

## 2023-03-14 NOTE — ASSESSMENT & PLAN NOTE
· Patient presents from nursing home after being found unresponsive  · Upon arrival to [facility] ED she had a GCS of 3 and was therefore intubated  · CT head was negative   · VB 36/ 35/ 49/ 19  · TSH 2 178  · Coma panel WNL   · Ammonia 62  · Infectious work-up in process  · Patient currently following commands, but remains lethargic    Plan:  · Continue to hold sedating medications   · Continue neuro exams per unit routine

## 2023-03-15 LAB
ANION GAP SERPL CALCULATED.3IONS-SCNC: 9 MMOL/L (ref 4–13)
ANISOCYTOSIS BLD QL SMEAR: PRESENT
BASOPHILS # BLD MANUAL: 0.09 THOUSAND/UL (ref 0–0.1)
BASOPHILS NFR MAR MANUAL: 1 % (ref 0–1)
BUN SERPL-MCNC: 52 MG/DL (ref 5–25)
CALCIUM SERPL-MCNC: 8 MG/DL (ref 8.4–10.2)
CHLORIDE SERPL-SCNC: 113 MMOL/L (ref 96–108)
CO2 SERPL-SCNC: 22 MMOL/L (ref 21–32)
CREAT SERPL-MCNC: 1.92 MG/DL (ref 0.6–1.3)
EOSINOPHIL # BLD MANUAL: 0.18 THOUSAND/UL (ref 0–0.4)
EOSINOPHIL NFR BLD MANUAL: 2 % (ref 0–6)
ERYTHROCYTE [DISTWIDTH] IN BLOOD BY AUTOMATED COUNT: 15.9 % (ref 11.6–15.1)
GFR SERPL CREATININE-BSD FRML MDRD: 26 ML/MIN/1.73SQ M
GIANT PLATELETS BLD QL SMEAR: PRESENT
GLUCOSE SERPL-MCNC: 133 MG/DL (ref 65–140)
GLUCOSE SERPL-MCNC: 136 MG/DL (ref 65–140)
GLUCOSE SERPL-MCNC: 144 MG/DL (ref 65–140)
GLUCOSE SERPL-MCNC: 148 MG/DL (ref 65–140)
GLUCOSE SERPL-MCNC: 153 MG/DL (ref 65–140)
GLUCOSE SERPL-MCNC: 154 MG/DL (ref 65–140)
GLUCOSE SERPL-MCNC: 179 MG/DL (ref 65–140)
HCT VFR BLD AUTO: 35.9 % (ref 34.8–46.1)
HGB BLD-MCNC: 11.3 G/DL (ref 11.5–15.4)
HYPERCHROMIA BLD QL SMEAR: PRESENT
LG PLATELETS BLD QL SMEAR: PRESENT
LYMPHOCYTES # BLD AUTO: 1.01 THOUSAND/UL (ref 0.6–4.47)
LYMPHOCYTES # BLD AUTO: 11 % (ref 14–44)
MAGNESIUM SERPL-MCNC: 1.9 MG/DL (ref 1.9–2.7)
MCH RBC QN AUTO: 29.4 PG (ref 26.8–34.3)
MCHC RBC AUTO-ENTMCNC: 31.5 G/DL (ref 31.4–37.4)
MCV RBC AUTO: 93 FL (ref 82–98)
METAMYELOCYTES NFR BLD MANUAL: 2 % (ref 0–1)
MONOCYTES # BLD AUTO: 0.83 THOUSAND/UL (ref 0–1.22)
MONOCYTES NFR BLD: 9 % (ref 4–12)
MRSA NOSE QL CULT: NORMAL
NEUTROPHILS # BLD MANUAL: 6.9 THOUSAND/UL (ref 1.85–7.62)
NEUTS SEG NFR BLD AUTO: 75 % (ref 43–75)
PHOSPHATE SERPL-MCNC: 2.2 MG/DL (ref 2.3–4.1)
PLATELET # BLD AUTO: 247 THOUSANDS/UL (ref 149–390)
PLATELET BLD QL SMEAR: ADEQUATE
PMV BLD AUTO: 12.8 FL (ref 8.9–12.7)
POLYCHROMASIA BLD QL SMEAR: PRESENT
POTASSIUM SERPL-SCNC: 3.3 MMOL/L (ref 3.5–5.3)
RBC # BLD AUTO: 3.85 MILLION/UL (ref 3.81–5.12)
RBC MORPH BLD: PRESENT
ROULEAUX BLD QL SMEAR: PRESENT
SODIUM SERPL-SCNC: 144 MMOL/L (ref 135–147)
WBC # BLD AUTO: 9.2 THOUSAND/UL (ref 4.31–10.16)

## 2023-03-15 RX ORDER — FLUOXETINE HYDROCHLORIDE 20 MG/1
40 CAPSULE ORAL DAILY
Status: DISCONTINUED | OUTPATIENT
Start: 2023-03-15 | End: 2023-03-16 | Stop reason: HOSPADM

## 2023-03-15 RX ORDER — MIRTAZAPINE 15 MG/1
7.5 TABLET, FILM COATED ORAL
Status: DISCONTINUED | OUTPATIENT
Start: 2023-03-15 | End: 2023-03-16

## 2023-03-15 RX ORDER — ONDANSETRON 4 MG/1
4 TABLET, ORALLY DISINTEGRATING ORAL EVERY 6 HOURS PRN
Status: DISCONTINUED | OUTPATIENT
Start: 2023-03-15 | End: 2023-03-16 | Stop reason: HOSPADM

## 2023-03-15 RX ORDER — CALCIUM CARBONATE 200(500)MG
500 TABLET,CHEWABLE ORAL DAILY PRN
Status: DISCONTINUED | OUTPATIENT
Start: 2023-03-15 | End: 2023-03-16 | Stop reason: HOSPADM

## 2023-03-15 RX ORDER — POTASSIUM CHLORIDE 20 MEQ/1
40 TABLET, EXTENDED RELEASE ORAL ONCE
Status: COMPLETED | OUTPATIENT
Start: 2023-03-15 | End: 2023-03-15

## 2023-03-15 RX ADMIN — ONDANSETRON 4 MG: 4 TABLET, ORALLY DISINTEGRATING ORAL at 11:14

## 2023-03-15 RX ADMIN — TOPIRAMATE 100 MG: 100 TABLET, FILM COATED ORAL at 21:23

## 2023-03-15 RX ADMIN — LEVOTHYROXINE SODIUM 75 MCG: 75 TABLET ORAL at 06:03

## 2023-03-15 RX ADMIN — NYSTATIN: 100000 POWDER TOPICAL at 17:39

## 2023-03-15 RX ADMIN — HEPARIN SODIUM 5000 UNITS: 5000 INJECTION INTRAVENOUS; SUBCUTANEOUS at 14:11

## 2023-03-15 RX ADMIN — HEPARIN SODIUM 5000 UNITS: 5000 INJECTION INTRAVENOUS; SUBCUTANEOUS at 21:23

## 2023-03-15 RX ADMIN — MIRTAZAPINE 7.5 MG: 15 TABLET, FILM COATED ORAL at 21:23

## 2023-03-15 RX ADMIN — HEPARIN SODIUM 5000 UNITS: 5000 INJECTION INTRAVENOUS; SUBCUTANEOUS at 06:03

## 2023-03-15 RX ADMIN — POTASSIUM CHLORIDE 40 MEQ: 1500 TABLET, EXTENDED RELEASE ORAL at 11:13

## 2023-03-15 RX ADMIN — INSULIN LISPRO 1 UNITS: 100 INJECTION, SOLUTION INTRAVENOUS; SUBCUTANEOUS at 00:07

## 2023-03-15 RX ADMIN — DEXTROSE 50 ML/HR: 10 SOLUTION INTRAVENOUS at 09:00

## 2023-03-15 RX ADMIN — ERTAPENEM SODIUM 1000 MG: 1 INJECTION, POWDER, LYOPHILIZED, FOR SOLUTION INTRAMUSCULAR; INTRAVENOUS at 17:39

## 2023-03-15 RX ADMIN — NYSTATIN: 100000 POWDER TOPICAL at 08:57

## 2023-03-15 RX ADMIN — CHLORHEXIDINE GLUCONATE 15 ML: 1.2 RINSE ORAL at 08:59

## 2023-03-15 NOTE — PROGRESS NOTES
Brien 44  Progress Note - Hilaria Boothe 1955, 75 y o  female MRN: [id number]  Unit/Bed#: [address] [place] [address] Encounter: [id number]  Primary Care Provider: Rashaun Thompson MD   Date and time admitted to hospital: 3/11/2023 10:17 PM    * Sepsis [facility])  Assessment & Plan  Sepsis present on admission evidenced by tachycardia, leukocytosis and elevated creatinine  CT chest and abdomen pelvis showed bibasilar pulmonary opacities more likely representing atelectasis/hypoventilatory change than pneumonia  No acute inflammatory findings in the abdomen or pelvis as per radiology report  UA positive for leukocytes, innumerable bacteria with profoundly purulent urine noted  Urine culture shows greater than 100,000 CFU's E  coli ESBL  Patient changed from cefepime and Vanco to ertapenem  Blood cultures negative at 72 hours  Afebrile  Monitor fever curve  Repeat CBC in a   Acute encephalopathy  Assessment & Plan  Patient had acute encephalopathy on admission  She had been found in the nursing home unresponsive  She arrived to [facility] and had a GCS of 3 and was intubated at that time  CT of the head was negative   , Panel was within normal limits  Ammonia level was 62  Blood cultures negative to date  Patient more awake alert, more at her baseline as per son  UTI (urinary tract infection)  Assessment & Plan  Patient noted to have extremely purulent urine in Kay catheter  UA showed leukocytes and innumerable bacteria  Plan as outlined above  REGINO (acute kidney injury) [facility])  Assessment & Plan  Improving  Initial creatinine 5 2  Patient did receive IV fluids  Baseline creatinine appears to be 1 13   Currently 1 92  Continue to avoid nephrotoxic agents  Continue to avoid periods of relative hypotension  Repeat BMP in a   Respiratory insufficiency  Assessment & Plan  Patient had been intubated upon arrival at [facility] ED for airway protection    She was extubated on 3/13   Patient more awake alert, able to carry on conversation  Currently on 1 L nasal cannula  Wean as tolerated  Chronic heart failure with preserved ejection fraction (HCC)  Assessment & Plan  Wt Readings from Last 3 Encounters:   03/15/23 96 8 kg (213 lb 8 oz)   03/11/23 98 kg (216 lb 0 8 oz)   03/01/23 97 9 kg (215 lb 12 8 oz)     Patient had recent echocardiogram in 2/22 with grade 2 diastolic dysfunction, mild to moderate aortic stenosis and RV pressure of 51  She does not appear to be volume overloaded at this time  Continue to monitor daily weights, intake and outputs  Insomnia  Assessment & Plan  Patient's home medication trazodone currently on hold  May offer melatonin    Diabetes mellitus [facility])  Assessment & Plan  Lab Results   Component Value Date    HGBA1C 11 8 (H) 09/19/2022       Recent Labs     03/15/23  0613 03/15/23  0735 03/15/23  1120 03/15/23  1800   POCGLU 148* 133 144* 153*       Blood Sugar Average: Last 72 hrs:  (P) 421 [id number]     Holding patient's oral diabetic agents and Lantus at this time  We will continue sliding scale coverage before meals and at bedtime    GERD (gastroesophageal reflux disease)  Assessment & Plan  Continue omeprazole    Hyperlipidemia  Assessment & Plan  Continue Zetia  Heart healthy diet          VTE Pharmacologic Prophylaxis: VTE Score: 8 High Risk (Score >/= 5) - Pharmacological DVT Prophylaxis Ordered: heparin  Sequential Compression Devices Ordered  Patient Centered Rounds: I performed bedside rounds with nursing staff today  Discussions with Specialists or Other Care Team Provider: Multidisciplinary team    Education and Discussions with Family / Patient: Updated  (son) at bedside      Total Time Spent on Date of Encounter in care of patient: 45 minutes This time was spent on one or more of the following: performing physical exam; counseling and coordination of care; obtaining or reviewing history; documenting in the medical record; reviewing/ordering tests, medications or procedures; communicating with other healthcare professionals and discussing with patient's family/caregivers  Current Length of Stay: 4 day(s)  Current Patient Status: Inpatient   Certification Statement: The patient will continue to require additional inpatient hospital stay due to IV antibiotics, repeat labs  Discharge Plan: Anticipate discharge in 24-48 hrs to rehab facility  Code Status: Level 2 - DNAR: but accepts endotracheal intubation    Subjective:   Patient seen laying in bed flat affect, appears depressed  Son at bedside  Patient tearful at times  Reports having some rectal discomfort secondary to presence of rectal tube  Reports she is not very hungry  When asked if she feels depressed she indicated yes  Objective:     Vitals:   Temp (24hrs), Av 7 °F (36 5 °C), Min:95 9 °F (35 5 °C), Max:98 6 °F (37 °C)    Temp:  [95 9 °F (35 5 °C)-98 6 °F (37 °C)] 97 9 °F (36 6 °C)  HR:  [] 98  Resp:  [16-18] 18  BP: (114-132)/(64-73) 128/68  SpO2:  [94 %-99 %] 95 %  Body mass index is 37 82 kg/m²  Input and Output Summary (last 24 hours): Intake/Output Summary (Last 24 hours) at 3/15/2023 1859  Last data filed at 3/15/2023 1801  Gross per 24 hour   Intake --   Output 1000 ml   Net -1000 ml       Physical Exam:   Physical Exam  Vitals and nursing note reviewed  Constitutional:       General: She is not in acute distress  Appearance: She is obese  She is ill-appearing  HENT:      Head: Normocephalic  Nose: Nose normal       Mouth/Throat:      Mouth: Mucous membranes are dry  Eyes:      Extraocular Movements: Extraocular movements intact  Conjunctiva/sclera: Conjunctivae normal    Cardiovascular:      Rate and Rhythm: Normal rate and regular rhythm  Pulses: Normal pulses  Heart sounds: Murmur heard     Pulmonary:      Effort: Pulmonary effort is normal       Comments: Diminished at bases  Abdominal: General: Bowel sounds are normal  There is no distension  Palpations: Abdomen is soft  Tenderness: There is no abdominal tenderness  Genitourinary:     Comments: arceo catheter present, cloudy yellow urine noted  Musculoskeletal:      Cervical back: Normal range of motion  Comments: Generalized weakness   Skin:     General: Skin is dry  Capillary Refill: Capillary refill takes less than 2 seconds  Coloration: Skin is pale  Neurological:      General: No focal deficit present  Mental Status: She is oriented to person, place, and time  Psychiatric:         Mood and Affect: Mood is depressed  Affect is flat and tearful  Speech: Speech normal          Behavior: Behavior is slowed and withdrawn  Behavior is cooperative  Additional Data:     Labs:  Results from last 7 days   Lab Units 03/15/23  0617 03/14/23  0550   WBC Thousand/uL 9 20 9 85   HEMOGLOBIN g/dL 11 3* 10 8*   HEMATOCRIT % 35 9 35 6   PLATELETS Thousands/uL 247 162   NEUTROS PCT %  --  80*   LYMPHS PCT %  --  8*   LYMPHO PCT % 11*  --    MONOS PCT %  --  6   MONO PCT % 9  --    EOS PCT % 2 5     Results from last 7 days   Lab Units 03/15/23  0906 03/12/23  1605 03/12/23  0543   SODIUM mmol/L 144   < > 143   POTASSIUM mmol/L 3 3*   < > 3 7   CHLORIDE mmol/L 113*   < > 108   CO2 mmol/L 22   < > 20*   BUN mg/dL 52*   < > 95*   CREATININE mg/dL 1 92*   < > 4 43*   ANION GAP mmol/L 9   < > 15*   CALCIUM mg/dL 8 0*   < > 7 8*   ALBUMIN g/dL  --   --  2 7*   TOTAL BILIRUBIN mg/dL  --   --  0 46   ALK PHOS U/L  --   --  68   ALT U/L  --   --  15   AST U/L  --   --  30   GLUCOSE RANDOM mg/dL 136   < > 105    < > = values in this interval not displayed       Results from last 7 days   Lab Units 03/11/23  1915   INR  1 21*     Results from last 7 days   Lab Units 03/15/23  1800 03/15/23  1120 03/15/23  0735 03/15/23  0613 03/15/23  0005 03/14/23  2051 03/14/23  1550 03/14/23  1353 03/14/23  1144 03/13/23  2320 03/13/23  1818 03/13/23  1800   POC GLUCOSE mg/dl 153* 144* 133 148* 154* 160* 155* 111 118 89 169* 72         Results from last 7 days   Lab Units 03/13/23  0543 03/12/23  0543 03/11/23  1707   LACTIC ACID mmol/L  --   --  1 6   PROCALCITONIN ng/ml 0 44* 0 59* 0 77*       Lines/Drains:  Invasive Devices     Peripheral Intravenous Line  Duration           Long-Dwell Peripheral IV (Midline) 03/12/23 Right Brachial 3 days          Drain  Duration           External Urinary Catheter <1 day                      Imaging: No pertinent imaging reviewed  Recent Cultures (last 7 days):   Results from last 7 days   Lab Units 03/12/23  0023 03/12/23  0022 03/11/23  2229 03/11/23  1854 03/11/23  1708 03/11/23  1707   BLOOD CULTURE  No Growth at 72 hrs   --  No Growth at 72 hrs   --  No Growth at 72 hrs  No Growth at 72 hrs     URINE CULTURE   --  >100,000 cfu/ml Escherichia coli ESBL*  --  >100,000 cfu/ml Escherichia coli ESBL*  >100,000 cfu/ml  --   --        Last 24 Hours Medication List:   Current Facility-Administered Medications   Medication Dose Route Frequency Provider Last Rate   • aspirin  81 mg Oral Daily Attila Riddle PA-C     • calcium carbonate  500 mg Oral Daily PRN SAMIA Ca     • chlorhexidine  15 mL Mouth/Throat L43Q [address] Rhona Boland PA-C     • dextrose  50 mL/hr Intravenous Continuous Rhona Boland PA-C 50 mL/hr (03/15/23 0900)   • ertapenem  1,000 mg Intravenous Q88Q Rhona Boland PA-C 1,736 mg (03/15/23 1739)   • ezetimibe  10 mg Oral Daily Attila Riddle PA-C     • FLUoxetine  40 mg Oral Daily Kelli Cazares MD     • heparin (porcine)  5,000 Units Subcutaneous [facility] Rhona Boland PA-C     • insulin lispro  1-6 Units Subcutaneous T6Y [address] Casper Ryan     • levothyroxine  75 mcg Oral Early Morning Rhona Boland PA-C     • mirtazapine  7 5 mg Oral HS Kelli Cazares MD     • nystatin   Topical BID Attila Riddle PA-C     • omeprazole (PRILOSEC) suspension 2 mg/mL  20 mg Oral Daily Roberto Boland PA-C     • ondansetron  4 mg Oral Q6H PRN SAMIA Paul     • topiramate  100 mg Oral BID James Mei PA-C          Today, Patient Was Seen By: SAMIA Paul    **Please Note: This note may have been constructed using a voice recognition system  **

## 2023-03-15 NOTE — ASSESSMENT & PLAN NOTE
Patient had acute encephalopathy on admission  She had been found in the nursing home unresponsive  [facility] and had a GCS of 3 and was intubated at that time  CT of the head was negative   , Panel was within normal limits  Ammonia level was 62  Blood cultures negative to date  Patient more awake alert, more at her baseline as per son

## 2023-03-15 NOTE — PLAN OF CARE
Assessment:  S/S thin liquid dysphagia/suggestive of laryngeal penetration or aspiration with thin liquid persist      Plan/Recommendations:  Continue current conservative diet of puréed food and nectar thick liquid  Continue SLP treatment 3 times weekly to ensure safe intake of the least restrictive diet  Research re:  baseline cognitive language skills

## 2023-03-15 NOTE — ASSESSMENT & PLAN NOTE
Lab Results   Component Value Date    HGBA1C 11 8 (H) 09/19/2022       Recent Labs     03/15/23  0613 03/15/23  0735 03/15/23  1120 03/15/23  1800   POCGLU 148* 133 144* 153*       Blood Sugar Average: Last 72 hrs:  (P) 085 [id number]     Holding patient's oral diabetic agents and Lantus at this time    We will continue sliding scale coverage before meals and at bedtime

## 2023-03-15 NOTE — ASSESSMENT & PLAN NOTE
Improving  Initial creatinine 5 2  Patient did receive IV fluids  Baseline creatinine appears to be 1 13   Currently 1 92  Continue to avoid nephrotoxic agents  Continue to avoid periods of relative hypotension  Repeat BMP in a m

## 2023-03-15 NOTE — DISCHARGE INSTR - DIET
Pureed Food, Nectar thick liquid  Medications crushed to begin  SLP to f/u re: potential for safe upgrade

## 2023-03-15 NOTE — ASSESSMENT & PLAN NOTE
Sepsis present on admission evidenced by tachycardia, leukocytosis and elevated creatinine  CT chest and abdomen pelvis showed bibasilar pulmonary opacities more likely representing atelectasis/hypoventilatory change than pneumonia  No acute inflammatory findings in the abdomen or pelvis as per radiology report  UA positive for leukocytes, innumerable bacteria with profoundly purulent urine noted  Urine culture shows greater than 100,000 CFU's E  coli ESBL  Patient changed from cefepime and Vanco to ertapenem  Blood cultures negative at 72 hours  Afebrile  Monitor fever curve  Repeat CBC in a m

## 2023-03-15 NOTE — ASSESSMENT & PLAN NOTE
Patient had been intubated upon arrival at [place] ED for airway protection  She was extubated on 3/13  Patient more awake alert, able to carry on conversation  Currently on 1 L nasal cannula  Wean as tolerated

## 2023-03-15 NOTE — PLAN OF CARE
Problem: MOBILITY - ADULT  Goal: Maintain or return to baseline ADL function  Description: INTERVENTIONS:  -  Assess patient's ability to carry out ADLs; assess patient's baseline for ADL function and identify physical deficits which impact ability to perform ADLs (bathing, care of mouth/teeth, toileting, grooming, dressing, etc )  - Assess/evaluate cause of self-care deficits   - Assess range of motion  - Assess patient's mobility; develop plan if impaired  - Assess patient's need for assistive devices and provide as appropriate  - Encourage maximum independence but intervene and supervise when necessary  - Involve family in performance of ADLs  - Assess for home care needs following discharge   - Consider OT consult to assist with ADL evaluation and planning for discharge  - Provide patient education as appropriate  Outcome: Progressing  Goal: Maintains/Returns to pre admission functional level  Description: INTERVENTIONS:  - Perform BMAT or MOVE assessment daily    - Set and communicate daily mobility goal to care team and patient/family/caregiver  - Collaborate with rehabilitation services on mobility goals if consulted  - Perform Range of Motion 4 times a day  - Reposition patient every 2 hours    -- Record patient progress and toleration of activity level   Outcome: Progressing     Problem: Prexisting or High Potential for Compromised Skin Integrity  Goal: Skin integrity is maintained or improved  Description: INTERVENTIONS:  - Identify patients at risk for skin breakdown  - Assess and monitor skin integrity  - Assess and monitor nutrition and hydration status  - Monitor labs   - Assess for incontinence   - Turn and reposition patient  - Assist with mobility/ambulation  - Relieve pressure over bony prominences  - Avoid friction and shearing  - Provide appropriate hygiene as needed including keeping skin clean and dry  - Evaluate need for skin moisturizer/barrier cream  - Collaborate with interdisciplinary team   - Patient/family teaching  - Consider wound care consult   Outcome: Progressing     Problem: PAIN - ADULT  Goal: Verbalizes/displays adequate comfort level or baseline comfort level  Description: Interventions:  - Encourage patient to monitor pain and request assistance  - Assess pain using appropriate pain scale  - Administer analgesics based on type and severity of pain and evaluate response  - Implement non-pharmacological measures as appropriate and evaluate response  - Consider cultural and social influences on pain and pain management  - Notify physician/advanced practitioner if interventions unsuccessful or patient reports new pain  Outcome: Progressing     Problem: INFECTION - ADULT  Goal: Absence or prevention of progression during hospitalization  Description: INTERVENTIONS:  - Assess and monitor for signs and symptoms of infection  - Monitor lab/diagnostic results  - Monitor all insertion sites, i e  indwelling lines, tubes, and drains  - Monitor endotracheal if appropriate and nasal secretions for changes in amount and color  - [place] appropriate cooling/warming therapies per order  - Administer medications as ordered  - Instruct and encourage patient and family to use good hand hygiene technique  - Identify and instruct in appropriate isolation precautions for identified infection/condition  Outcome: Progressing     Problem: SAFETY ADULT  Goal: Maintain or return to baseline ADL function  Description: INTERVENTIONS:  -  Assess patient's ability to carry out ADLs; assess patient's baseline for ADL function and identify physical deficits which impact ability to perform ADLs (bathing, care of mouth/teeth, toileting, grooming, dressing, etc )  - Assess/evaluate cause of self-care deficits   - Assess range of motion  - Assess patient's mobility; develop plan if impaired  - Assess patient's need for assistive devices and provide as appropriate  - Encourage maximum independence but intervene and supervise when necessary  - Involve family in performance of ADLs  - Assess for home care needs following discharge   - Consider OT consult to assist with ADL evaluation and planning for discharge  - Provide patient education as appropriate  Outcome: Progressing  Goal: Maintains/Returns to pre admission functional level  Description: INTERVENTIONS:  - Perform BMAT or MOVE assessment daily    - Set and communicate daily mobility goal to care team and patient/family/caregiver  - Collaborate with rehabilitation services on mobility goals if consulted  - Perform Range of Motion 4 times a day  - Reposition patient every 2 hours    -- Record patient progress and toleration of activity level   Outcome: Progressing  Goal: Patient will remain free of falls  Description: INTERVENTIONS:  - Educate patient/family on patient safety including physical limitations  - Instruct patient to call for assistance with activity   - Consult OT/PT to assist with strengthening/mobility   - Keep Call bell within reach  - Keep bed low and locked with side rails adjusted as appropriate  - Keep care items and personal belongings within reach  - Initiate and maintain comfort rounds  - Make Fall Risk Sign visible to staff  - Offer Toileting every 2 Hours, in advance of need  - Initiate/Maintain alarm  - Obtain necessary fall risk management equipment  - Apply yellow socks and bracelet for high fall risk patients  - Consider moving patient to room near nurses station  Outcome: Progressing     Problem: DISCHARGE PLANNING  Goal: Discharge to home or other facility with appropriate resources  Description: INTERVENTIONS:  - Identify barriers to discharge w/patient and caregiver  - Arrange for needed discharge resources and transportation as appropriate  - Identify discharge learning needs (meds, wound care, etc )  - Arrange for interpretive services to assist at discharge as needed  - Refer to Case Management Department for coordinating discharge planning if the patient needs post-hospital services based on physician/advanced practitioner order or complex needs related to functional status, cognitive ability, or social support system  Outcome: Progressing     Problem: Knowledge Deficit  Goal: Patient/family/caregiver demonstrates understanding of disease process, treatment plan, medications, and discharge instructions  Description: Complete learning assessment and assess knowledge base  Interventions:  - Provide teaching at level of understanding  - Provide teaching via preferred learning methods  Outcome: Progressing     Problem: RESPIRATORY - ADULT  Goal: Achieves optimal ventilation and oxygenation  Description: INTERVENTIONS:  - Assess for changes in respiratory status  - Assess for changes in mentation and behavior  - Position to facilitate oxygenation and minimize respiratory effort  - Oxygen administered by appropriate delivery if ordered  - Initiate smoking cessation education as indicated  - Encourage broncho-pulmonary hygiene including cough, deep breathe, Incentive Spirometry  - Assess the need for suctioning and aspirate as needed  - Assess and instruct to report SOB or any respiratory difficulty  - Respiratory Therapy support as indicated  Outcome: Progressing     Problem: Nutrition/Hydration-ADULT  Goal: Nutrient/Hydration intake appropriate for improving, restoring or maintaining nutritional needs  Description: Monitor and assess patient's nutrition/hydration status for malnutrition  Collaborate with interdisciplinary team and initiate plan and interventions as ordered  Monitor patient's weight and dietary intake as ordered or per policy  Utilize nutrition screening tool and intervene as necessary  Determine patient's food preferences and provide high-protein, high-caloric foods as appropriate       INTERVENTIONS:  - Monitor oral intake, urinary output, labs, and treatment plans  - Assess nutrition and hydration status and recommend course of action  - Evaluate amount of meals eaten  - Assist patient with eating if necessary   - Allow adequate time for meals  - Recommend/ encourage appropriate diets, oral nutritional supplements, and vitamin/mineral supplements  - Order, calculate, and assess calorie counts as needed  - Assess need for intravenous fluids  - Provide specific nutrition/hydration education as appropriate  - Include patient/family/caregiver in decisions related to nutrition  Outcome: Progressing

## 2023-03-15 NOTE — ASSESSMENT & PLAN NOTE
Patient noted to have extremely purulent urine in Kay catheter  UA showed leukocytes and innumerable bacteria  Plan as outlined above

## 2023-03-15 NOTE — SPEECH THERAPY NOTE
Speech/Language Pathology Progress Note    Patient Name: Connor DENNISEJN'I Date: 3/15/2023     Problem List  Principal Problem:    Sepsis ([facility] )  Active Problems:    Hyperlipidemia    Diabetes mellitus ([facility] )    GERD (gastroesophageal reflux disease)    Chronic heart failure with preserved ejection fraction (HCC)    REGINO (acute kidney injury) ([facility] )    UTI (urinary tract infection)    Insomnia    Acute encephalopathy    Respiratory insufficiency    Subjective:  " Apple juice gives me diarrhea  Orange juice gives me diarrhea "  " He came to the way  He came to the way " (?meaning of final comments)    Objective:  Pt was seen for diagnostic dysphagia therapy to ensure tolerance of current diet (puree/nectar thick liquid) and to assess potential for safe diet upgrade  Pt was alert and positioned upright  Karin refused al pureed food except pudding and she refused all soft foods offered  She was assessed with vanilla pudding, nectar thick apple juice/water, ice chips and sips of water  Bolus formation and transfer were effective  Swallow initiation appeared prompt  Laryngeal rise was good by palpation  Cough noted with sips of water again  She also presented with s/s impaired thought formulation and expression with word and phrase/short sentence repetition (?h/o cognitive/communication impairment)  Son reportedly present earlier am but not available at time of my visit to provide any hx re: same  Assessment:  S/S thin liquid dysphagia/suggestive of laryngeal penetration or aspiration with thin liquid persist      Plan/Recommendations:  Continue current conservative diet of puréed food and nectar thick liquid  Continue SLP treatment 3 times weekly to ensure safe intake of the least restrictive diet  Research re:  baseline cognitive language skills      Jazmyne Abernathy [address] [phone]

## 2023-03-15 NOTE — CONSULTS
Consultation - Hossein Tirado 75 y o  female MRN: [id number]    Unit/Bed#: [address] [address] Encounter: [id number]      Identifying Data: 75years old white female is admitted at [address] [place] [address] on March 11, 2023 from the nursing home  Psychiatric consultation is asked for severe depression patient examined spoke with the nurse history physical medications labs reviewed and noted drugs and alcohol level was negative patient is known to me from seen previously at this hospital please refer to my consultation done on March 4, 2021 for the detail recently patient was also seen at [address] for severe depression  Reportedly patient is severely depressed poor eating and she lost 15 pounds  Patient is very sluggish and poor historian but she was able to answer my questions and she was able to tell me that she has worked with a psychiatrist at [place] and treated with Prozac for many years  She is willing to take her depression medication currently patient is not ordered on any depression medication recently patient was treated with Cymbalta 60 mg daily and trazodone 50 mg at bedtime  Please refer to my previous consultation for the detail  Social history currently patient lives in the nursing home she denies smoking denies abusing alcohol or drugs patient has 2 grownup sons she has 12th grade education and she worked in [employer] and different jobs she was in the [employer] for 6 years and she has a [employer] benefits      Allergy patient has several allergies  Pineapple statin Wellbutrin Byetta Cipro marijuana metformin omeprazole Zofran coconut oil penicillin liraglutide    Diagnosis  Major depression recurrent  Anxiety  Mild dementia with depression  Insomnia  Diabetes hypothyroidism hypertension high cholesterol seizure disorder restless leg syndrome  Appendectomy tonsillectomy cholecystectomy tubal ligation hip fracture surgery    Chief Complaints: Depression and anxiety    Family History: Patient denies  Family History   Problem Relation Age of Onset   • Brain cancer Mother    • Seizures Father    • Colon cancer Maternal Grandfather    • No Known Problems Son    • No Known Problems Son    • Breast cancer Maternal Aunt 27       Legal History: Patient denies    Mental Status Exam: 75years old white female is alert awake oriented to place recognized me but does not remember my name poor historian but she was able to answer simple questions patient is very sluggish not lethargic memory somewhat impaired affect flat withdrawn psychomotor retardation speech slow and low in tone patient denies auditory or visual hallucination  Patient denies suicidal or homicidal ideation  Poor concentration  Insight and judgment are adequate  History of Present Illness     HPI: Israel Crawford is a 75y o  year old female who presents with     Inpatient consult to Psychiatry  Consult performed by: Derik Contreras MD  Consult ordered by: SAMIA Nathan            Historical Information   Past Psychiatric History: Patient has an extensive psych history with at least 5-6 psychiatric admissions in the past and 3-4 suicide attempts by taking an overdose in the past she always been under psychiatric care at [facility] clinic to see a psychiatrist and she had seen a therapist in the past she was treated with Prozac 40 mg daily for a long time and she was tried on 60 mg daily to help her with the depressive episode  Currently patient is not ordered any depression medication at the hospital during this admission  Patient is a resident of nursing home and currently she may not be able to go to see her psychiatrist at [facility] clinic for follow-up    Patient has no history of drugs and alcohol abuse no DUI no rehab and no legal problems in the past   After the discussion patient agreed to start her on Prozac 40 mg daily and start her on small dose of Remeron 7 5 mg p o  at bedtime to help with the sleep and open up her appetite so she started eating enough and regain her lost weight    Major depression recurrent    Past Medical History:   Diagnosis Date   • CHF (congestive heart failure) (HCC)    • Diabetes mellitus ([address] )    • Disease of thyroid gland    • Hyperlipidemia    • Hypertension    • Hypothyroidism (acquired)    • Restless leg syndrome    • Seizures (HCC)      Past Surgical History:   Procedure Laterality Date   • APPENDECTOMY     • CHOLECYSTECTOMY     • HIP FRACTURE SURGERY Right    • TONSILLECTOMY     • TUBAL LIGATION       Social History   Social History     Substance and Sexual Activity   Alcohol Use Yes    Comment: occasional     Social History     Substance and Sexual Activity   Drug Use Never     Social History     Tobacco Use   Smoking Status Never   Smokeless Tobacco Never   Tobacco Comments    not a smoker       Meds/Allergies   current meds:   Current Facility-Administered Medications   Medication Dose Route Frequency   • aspirin chewable tablet 81 mg  81 mg Oral Daily   • calcium carbonate (TUMS) chewable tablet 500 mg  500 mg Oral Daily PRN   • chlorhexidine (PERIDEX) 0 12 % oral rinse 15 mL  15 mL Mouth/Throat Q12H [facility]   • dextrose infusion 10 %  50 mL/hr Intravenous Continuous   • ertapenem (INVanz) 1,000 mg in sodium chloride 0 9 % 50 mL IVPB  1,000 mg Intravenous Q24H   • ezetimibe (ZETIA) tablet 10 mg  10 mg Oral Daily   • heparin (porcine) subcutaneous injection 5,000 Units  5,000 Units Subcutaneous Q8H [facility]   • insulin lispro (HumaLOG) 100 units/mL subcutaneous injection 1-6 Units  1-6 Units Subcutaneous Q6H [facility]   • levothyroxine tablet 75 mcg  75 mcg Oral Early Morning   • nystatin (MYCOSTATIN) powder   Topical BID   • omeprazole (PRILOSEC) suspension 2 mg/mL  20 mg Oral Daily   • ondansetron (ZOFRAN-ODT) dispersible tablet 4 mg  4 mg Oral Q6H PRN   • topiramate (TOPAMAX) tablet 100 mg  100 mg Oral BID    and PTA meds:    Medications Prior to Admission   Medication   • acetaminophen (TYLENOL) 325 mg tablet   • aspirin (ECOTRIN LOW STRENGTH) 81 mg EC tablet   • Cholecalciferol 25 MCG (1000 UT) capsule   • DULoxetine (CYMBALTA) 60 mg delayed release capsule   • Empagliflozin 25 MG TABS   • Ergocalciferol (VITAMIN D2 PO)   • ezetimibe (ZETIA) 10 mg tablet   • gabapentin (NEURONTIN) 100 mg capsule   • gabapentin (NEURONTIN) 300 mg capsule   • hydrophilic ointment   • insulin glargine (LANTUS) 100 units/mL subcutaneous injection   • insulin lispro (HumaLOG) 100 units/mL injection   • levothyroxine 50 mcg tablet   • melatonin 3 mg   • nystatin (MYCOSTATIN) powder   • omeprazole (PriLOSEC) 20 mg delayed release capsule   • topiramate (TOPAMAX) 100 mg tablet   • traZODone (DESYREL) 50 mg tablet     Allergies   Allergen Reactions   • Pineapple - Food Allergy Anaphylaxis   • Statins Other (See Comments)     Cramping from head to toe    • Bupropion Other (See Comments)   • Byetta 10 Mcg Pen [Exenatide]    • Ceftriaxone Other (See Comments)   • Marijuana [Dronabinol] GI Intolerance   • Metformin Diarrhea     Other reaction(s): Unknown   • Omeprazole Hives   • Ondansetron      Other reaction(s): Unknown   • Coconut Oil - Food Allergy Rash   • Liraglutide Rash and Other (See Comments)     Generalized rash   • Penicillins Rash       Objective   Vitals: Blood pressure 114/64, pulse 99, temperature 98 6 °F (37 °C), temperature source Axillary, resp  rate 16, height 5' 3" (1 6 m), weight 96 8 kg (213 lb 8 oz), SpO2 97 %  Routine Lab Results:   No results displayed because visit has over 200 results  Diagnosis: Major depression recurrent  Anxiety  Insomnia  Mild dementia with depression    Plan: Prozac 40 mg daily  Remeron 7 5 mg nightly  Psychotherapy  Psychiatric follow-up recommended at [facility] with her psychiatrist after the discharge home  I will follow-up during the hospital stay  Discussed with the nurse        Guido Sharma MD

## 2023-03-16 VITALS
DIASTOLIC BLOOD PRESSURE: 65 MMHG | HEIGHT: 63 IN | HEART RATE: 84 BPM | RESPIRATION RATE: 16 BRPM | BODY MASS INDEX: 37.81 KG/M2 | OXYGEN SATURATION: 95 % | SYSTOLIC BLOOD PRESSURE: 108 MMHG | TEMPERATURE: 97.5 F | WEIGHT: 213.41 LBS

## 2023-03-16 LAB
ANION GAP SERPL CALCULATED.3IONS-SCNC: 8 MMOL/L (ref 4–13)
BACTERIA UR CULT: ABNORMAL
BACTERIA UR CULT: ABNORMAL
BUN SERPL-MCNC: 43 MG/DL (ref 5–25)
CALCIUM SERPL-MCNC: 8.1 MG/DL (ref 8.4–10.2)
CHLORIDE SERPL-SCNC: 112 MMOL/L (ref 96–108)
CO2 SERPL-SCNC: 22 MMOL/L (ref 21–32)
CREAT SERPL-MCNC: 1.71 MG/DL (ref 0.6–1.3)
ERYTHROCYTE [DISTWIDTH] IN BLOOD BY AUTOMATED COUNT: 15.9 % (ref 11.6–15.1)
GFR SERPL CREATININE-BSD FRML MDRD: 30 ML/MIN/1.73SQ M
GLUCOSE SERPL-MCNC: 122 MG/DL (ref 65–140)
GLUCOSE SERPL-MCNC: 129 MG/DL (ref 65–140)
GLUCOSE SERPL-MCNC: 153 MG/DL (ref 65–140)
GLUCOSE SERPL-MCNC: 201 MG/DL (ref 65–140)
HCT VFR BLD AUTO: 36.9 % (ref 34.8–46.1)
HGB BLD-MCNC: 11.4 G/DL (ref 11.5–15.4)
MCH RBC QN AUTO: 29 PG (ref 26.8–34.3)
MCHC RBC AUTO-ENTMCNC: 30.9 G/DL (ref 31.4–37.4)
MCV RBC AUTO: 94 FL (ref 82–98)
PLATELET # BLD AUTO: 190 THOUSANDS/UL (ref 149–390)
PMV BLD AUTO: 11.8 FL (ref 8.9–12.7)
POTASSIUM SERPL-SCNC: 3.5 MMOL/L (ref 3.5–5.3)
RBC # BLD AUTO: 3.93 MILLION/UL (ref 3.81–5.12)
SODIUM SERPL-SCNC: 142 MMOL/L (ref 135–147)
WBC # BLD AUTO: 8.25 THOUSAND/UL (ref 4.31–10.16)

## 2023-03-16 RX ORDER — TOPIRAMATE 100 MG/1
100 TABLET, FILM COATED ORAL 2 TIMES DAILY
Refills: 0
Start: 2023-03-16

## 2023-03-16 RX ORDER — FLUOXETINE HYDROCHLORIDE 40 MG/1
40 CAPSULE ORAL DAILY
Refills: 0
Start: 2023-03-17

## 2023-03-16 RX ADMIN — ERTAPENEM SODIUM 1000 MG: 1 INJECTION, POWDER, LYOPHILIZED, FOR SOLUTION INTRAMUSCULAR; INTRAVENOUS at 13:23

## 2023-03-16 RX ADMIN — INSULIN LISPRO 1 UNITS: 100 INJECTION, SOLUTION INTRAVENOUS; SUBCUTANEOUS at 13:23

## 2023-03-16 RX ADMIN — HEPARIN SODIUM 5000 UNITS: 5000 INJECTION INTRAVENOUS; SUBCUTANEOUS at 06:43

## 2023-03-16 RX ADMIN — ONDANSETRON 4 MG: 4 TABLET, ORALLY DISINTEGRATING ORAL at 14:43

## 2023-03-16 RX ADMIN — CHLORHEXIDINE GLUCONATE 15 ML: 1.2 RINSE ORAL at 08:51

## 2023-03-16 RX ADMIN — INSULIN LISPRO 2 UNITS: 100 INJECTION, SOLUTION INTRAVENOUS; SUBCUTANEOUS at 00:02

## 2023-03-16 RX ADMIN — HEPARIN SODIUM 5000 UNITS: 5000 INJECTION INTRAVENOUS; SUBCUTANEOUS at 13:23

## 2023-03-16 NOTE — ASSESSMENT & PLAN NOTE
Patient had been intubated upon arrival at [facility] ED for airway protection  She was extubated on 3/13  Patient more awake alert, able to carry on conversation    Currently on room air  Patient will be discharged to nursing home today

## 2023-03-16 NOTE — ASSESSMENT & PLAN NOTE
Patient had acute encephalopathy on admission  She had been found in the nursing home unresponsive  She arrived to [place] ED and had a GCS of 3 and was intubated at that time  CT of the head was negative  Coma Panel was within normal limits  Ammonia level was 62  Blood cultures negative to date  Patient more awake alert, more at her baseline as per son    Patient will be discharged to nursing home today

## 2023-03-16 NOTE — SPEECH THERAPY NOTE
Records reviewed  Aware of plan for d/c at 15:00  Noted report of h/o significant depression and son's report that pt near her baseline cognitive/langauge function  Diet orders already on AVS in preparation for d/c

## 2023-03-16 NOTE — ASSESSMENT & PLAN NOTE
Wt Readings from Last 3 Encounters:   03/16/23 96 8 kg (213 lb 6 5 oz)   03/11/23 98 kg (216 lb 0 8 oz)   03/01/23 97 9 kg (215 lb 12 8 oz)     Patient had recent echocardiogram in 2/22 with grade 2 diastolic dysfunction, mild to moderate aortic stenosis and RV pressure of 51  She does not appear to be volume overloaded at this time    Patient's weights and intakes and output should be monitored at nursing home  Discharge to nursing home today

## 2023-03-16 NOTE — PLAN OF CARE
Problem: MOBILITY - ADULT  Goal: Maintain or return to baseline ADL function  Description: INTERVENTIONS:  -  Assess patient's ability to carry out ADLs; assess patient's baseline for ADL function and identify physical deficits which impact ability to perform ADLs (bathing, care of mouth/teeth, toileting, grooming, dressing, etc )  - Assess/evaluate cause of self-care deficits   - Assess range of motion  - Assess patient's mobility; develop plan if impaired  - Assess patient's need for assistive devices and provide as appropriate  - Encourage maximum independence but intervene and supervise when necessary  - Involve family in performance of ADLs  - Assess for home care needs following discharge   - Consider OT consult to assist with ADL evaluation and planning for discharge  - Provide patient education as appropriate  Outcome: Progressing  Goal: Maintains/Returns to pre admission functional level  Description: INTERVENTIONS:  - Perform BMAT or MOVE assessment daily    - Set and communicate daily mobility goal to care team and patient/family/caregiver  - Collaborate with rehabilitation services on mobility goals if consulted  - Perform Range of Motion 4 times a day  - Reposition patient every 2 hours    -- Record patient progress and toleration of activity level   Outcome: Progressing     Problem: Prexisting or High Potential for Compromised Skin Integrity  Goal: Skin integrity is maintained or improved  Description: INTERVENTIONS:  - Identify patients at risk for skin breakdown  - Assess and monitor skin integrity  - Assess and monitor nutrition and hydration status  - Monitor labs   - Assess for incontinence   - Turn and reposition patient  - Assist with mobility/ambulation  - Relieve pressure over bony prominences  - Avoid friction and shearing  - Provide appropriate hygiene as needed including keeping skin clean and dry  - Evaluate need for skin moisturizer/barrier cream  - Collaborate with interdisciplinary team   - Patient/family teaching  - Consider wound care consult   Outcome: Progressing     Problem: PAIN - ADULT  Goal: Verbalizes/displays adequate comfort level or baseline comfort level  Description: Interventions:  - Encourage patient to monitor pain and request assistance  - Assess pain using appropriate pain scale  - Administer analgesics based on type and severity of pain and evaluate response  - Implement non-pharmacological measures as appropriate and evaluate response  - Consider cultural and social influences on pain and pain management  - Notify physician/advanced practitioner if interventions unsuccessful or patient reports new pain  Outcome: Progressing     Problem: INFECTION - ADULT  Goal: Absence or prevention of progression during hospitalization  Description: INTERVENTIONS:  - Assess and monitor for signs and symptoms of infection  - Monitor lab/diagnostic results  - Monitor all insertion sites, i e  indwelling lines, tubes, and drains  - Monitor endotracheal if appropriate and nasal secretions for changes in amount and color  - [place] appropriate cooling/warming therapies per order  - Administer medications as ordered  - Instruct and encourage patient and family to use good hand hygiene technique  - Identify and instruct in appropriate isolation precautions for identified infection/condition  Outcome: Progressing     Problem: SAFETY ADULT  Goal: Maintain or return to baseline ADL function  Description: INTERVENTIONS:  -  Assess patient's ability to carry out ADLs; assess patient's baseline for ADL function and identify physical deficits which impact ability to perform ADLs (bathing, care of mouth/teeth, toileting, grooming, dressing, etc )  - Assess/evaluate cause of self-care deficits   - Assess range of motion  - Assess patient's mobility; develop plan if impaired  - Assess patient's need for assistive devices and provide as appropriate  - Encourage maximum independence but intervene and supervise when necessary  - Involve family in performance of ADLs  - Assess for home care needs following discharge   - Consider OT consult to assist with ADL evaluation and planning for discharge  - Provide patient education as appropriate  Outcome: Progressing  Goal: Maintains/Returns to pre admission functional level  Description: INTERVENTIONS:  - Perform BMAT or MOVE assessment daily    - Set and communicate daily mobility goal to care team and patient/family/caregiver  - Collaborate with rehabilitation services on mobility goals if consulted  - Perform Range of Motion 4 times a day  - Reposition patient every 2 hours    -- Record patient progress and toleration of activity level   Outcome: Progressing  Goal: Patient will remain free of falls  Description: INTERVENTIONS:  - Educate patient/family on patient safety including physical limitations  - Instruct patient to call for assistance with activity   - Consult OT/PT to assist with strengthening/mobility   - Keep Call bell within reach  - Keep bed low and locked with side rails adjusted as appropriate  - Keep care items and personal belongings within reach  - Initiate and maintain comfort rounds  - Make Fall Risk Sign visible to staff  - Offer Toileting every  Hours, in advance of need  - Initiate/Maintain alarm  - Obtain necessary fall risk management equipment  - Apply yellow socks and bracelet for high fall risk patients  - Consider moving patient to room near nurses station  Outcome: Progressing     Problem: DISCHARGE PLANNING  Goal: Discharge to home or other facility with appropriate resources  Description: INTERVENTIONS:  - Identify barriers to discharge w/patient and caregiver  - Arrange for needed discharge resources and transportation as appropriate  - Identify discharge learning needs (meds, wound care, etc )  - Arrange for interpretive services to assist at discharge as needed  - Refer to Case Management Department for coordinating discharge planning if the patient needs post-hospital services based on physician/advanced practitioner order or complex needs related to functional status, cognitive ability, or social support system  Outcome: Progressing     Problem: Knowledge Deficit  Goal: Patient/family/caregiver demonstrates understanding of disease process, treatment plan, medications, and discharge instructions  Description: Complete learning assessment and assess knowledge base  Interventions:  - Provide teaching at level of understanding  - Provide teaching via preferred learning methods  Outcome: Progressing     Problem: RESPIRATORY - ADULT  Goal: Achieves optimal ventilation and oxygenation  Description: INTERVENTIONS:  - Assess for changes in respiratory status  - Assess for changes in mentation and behavior  - Position to facilitate oxygenation and minimize respiratory effort  - Oxygen administered by appropriate delivery if ordered  - Initiate smoking cessation education as indicated  - Encourage broncho-pulmonary hygiene including cough, deep breathe, Incentive Spirometry  - Assess the need for suctioning and aspirate as needed  - Assess and instruct to report SOB or any respiratory difficulty  - Respiratory Therapy support as indicated  Outcome: Progressing     Problem: Nutrition/Hydration-ADULT  Goal: Nutrient/Hydration intake appropriate for improving, restoring or maintaining nutritional needs  Description: Monitor and assess patient's nutrition/hydration status for malnutrition  Collaborate with interdisciplinary team and initiate plan and interventions as ordered  Monitor patient's weight and dietary intake as ordered or per policy  Utilize nutrition screening tool and intervene as necessary  Determine patient's food preferences and provide high-protein, high-caloric foods as appropriate       INTERVENTIONS:  - Monitor oral intake, urinary output, labs, and treatment plans  - Assess nutrition and hydration status and recommend course of action  - Evaluate amount of meals eaten  - Assist patient with eating if necessary   - Allow adequate time for meals  - Recommend/ encourage appropriate diets, oral nutritional supplements, and vitamin/mineral supplements  - Order, calculate, and assess calorie counts as needed  - Assess need for intravenous fluids  - Provide specific nutrition/hydration education as appropriate  - Include patient/family/caregiver in decisions related to nutrition  Outcome: Progressing

## 2023-03-16 NOTE — ASSESSMENT & PLAN NOTE
Lab Results   Component Value Date    HGBA1C 11 8 (H) 09/19/2022       Recent Labs     03/15/23  2104 03/15/23  2355 03/16/23  0630 03/16/23  1158   POCGLU 179* 201* 129 153*       Blood Sugar Average: Last 72 hrs:  (P) 934 [id number]     Resume patient's oral diabetic agents and Lantus at this time

## 2023-03-16 NOTE — PROGRESS NOTES
Patient examined spoke to the nurse medical progress note reviewed by nurse practitioner Alex Ramos  Patient remained profoundly depressed withdrawn psychomotor retardation affect flat patient confabulates with the questions she was able to respond to my simple verbal command but she is taking a long pause and not able to come out with the right answer she could not tell me the name of the place where she is she was able to tell me her age overall patient seems more sluggish and she has a hard time in expressing herself  Patient is known to me from seen previously patient has a long history of depression currently patient is profoundly depressed she has been treated with the Prozac by her psychiatrist at [facility] for many years she is started on Prozac 40 mg daily and Remeron small dose but patient is very sluggish I thought that it might help her to open up her appetite and start eating better but I will discontinue Remeron to avoid the polypharmacy at this time patient always maintained her depression with the Prozac I will continue her Prozac patient has been tried on different medications in the past Cymbalta Wellbutrin and more  At least patient was able to answer my question and she agreed to continue Prozac at this time  No behavioral problem reported or noted  Medical treatment is in progress  No evidence of hallucination no psychosis  Patient is not agitated  Patient denies feeling suicidal   Therapy is done with good response  I will follow-up

## 2023-03-16 NOTE — ASSESSMENT & PLAN NOTE
Sepsis present on admission evidenced by tachycardia, leukocytosis and elevated creatinine  CT chest and abdomen pelvis showed bibasilar pulmonary opacities more likely representing atelectasis/hypoventilatory change than pneumonia  No acute inflammatory findings in the abdomen or pelvis as per radiology report  UA positive for leukocytes, innumerable bacteria with profoundly purulent urine noted  Urine culture shows greater than 100,000 CFU's E  coli ESBL  Patient changed from cefepime and Vanco to ertapenem; will get third dose of ertapenem today prior to discharge for completeness as per ryan discussion with ID  Blood cultures negative to date  Afebrile    Patient will be discharged to nursing home today

## 2023-03-16 NOTE — DISCHARGE SUMMARY
Brien 44  Discharge- [facility] Ice 1955, 75 y o  female MRN: [id number]  Unit/Bed#: [address] [place] [address] Encounter: [id number]  Primary Care Provider: Estella Arce MD   Date and time admitted to hospital: 3/11/2023 10:17 PM    * Sepsis [facility])  Assessment & Plan  Sepsis present on admission evidenced by tachycardia, leukocytosis and elevated creatinine  CT chest and abdomen pelvis showed bibasilar pulmonary opacities more likely representing atelectasis/hypoventilatory change than pneumonia  No acute inflammatory findings in the abdomen or pelvis as per radiology report  UA positive for leukocytes, innumerable bacteria with profoundly purulent urine noted  Urine culture shows greater than 100,000 CFU's E  coli ESBL  Patient changed from cefepime and Vanco to ertapenem; will get third dose of ertapenem today prior to discharge for completeness as per ryan discussion with ID  Blood cultures negative to date  Afebrile  Patient will be discharged to nursing home today          Acute encephalopathy  Assessment & Plan  Patient had acute encephalopathy on admission  She had been found in the nursing home unresponsive  She arrived to [facility] ED and had a GCS of 3 and was intubated at that time  CT of the head was negative  Coma Panel was within normal limits  Ammonia level was 62  Blood cultures negative to date  Patient more awake alert, more at her baseline as per son  Patient will be discharged to nursing home today    UTI (urinary tract infection)  Assessment & Plan  Patient noted to have extremely purulent urine in Kay catheter  UA showed leukocytes and innumerable bacteria  Plan as outlined above  Discharge to nursing home today    REGINO (acute kidney injury) [facility])  Assessment & Plan  Improving  Initial creatinine 5 2  Patient did receive IV fluids  Baseline creatinine appears to be 1 13   Currently 1 71    Encourage oral intake  Repeat BMP in 1 week  Discharge to nursing home today    Respiratory insufficiency  Assessment & Plan  Patient had been intubated upon arrival at [facility] ED for airway protection  She was extubated on 3/13  Patient more awake alert, able to carry on conversation  Currently on room air  Patient will be discharged to nursing home today    Chronic heart failure with preserved ejection fraction [facility])  Assessment & Plan  Wt Readings from Last 3 Encounters:   03/16/23 96 8 kg (213 lb 6 5 oz)   03/11/23 98 kg (216 lb 0 8 oz)   03/01/23 97 9 kg (215 lb 12 8 oz)     Patient had recent echocardiogram in 2/22 with grade 2 diastolic dysfunction, mild to moderate aortic stenosis and RV pressure of 51  She does not appear to be volume overloaded at this time  Patient's weights and intakes and output should be monitored at nursing home  Discharge to nursing home today        Insomnia  Assessment & Plan  Patient's home medication trazodone currently on hold  Psychiatry seeing patient, started on Prozac and Remeron  Continue to follow psychiatry at nursing home    Diabetes mellitus [facility])  Assessment & Plan  Lab Results   Component Value Date    HGBA1C 11 8 (H) 09/19/2022       Recent Labs     03/15/23  2104 03/15/23  2355 03/16/23  0630 03/16/23  1158   POCGLU 179* 201* 129 153*       Blood Sugar Average: Last 72 hrs:  (P) 500 [id number]     Resume patient's oral diabetic agents and Lantus at this time        GERD (gastroesophageal reflux disease)  Assessment & Plan  Continue omeprazole    Hyperlipidemia  Assessment & Plan  Continue Zetia  Heart healthy diet        Medical Problems     Resolved Problems  Date Reviewed: 3/16/2023   None       Discharging Physician / Practitioner: Kathlyne Schilder, CRNP  PCP: Bee Gonzalez MD  Admission Date:   Admission Orders (From admission, onward)     Ordered        03/11/23 2224  Inpatient Admission  Once                      Discharge Date: 03/16/23    Consultations During Hospital Stay:  · Critical care  · Speech  · Psychiatry  · PT/OT    Procedures Performed:   · Intubation/extubation    Significant Findings / Test Results:   · Chest x-ray performed 3/12 showed no acute cardiopulmonary disease as per radiology report  · CT of the brain performed on 3/11 showed no acute intracranial abnormality as per radiology report  · CT of chest abdomen pelvis performed on 3/11 showed bibasilar pulmonary opacities more likely represent atelectasis/hypoventilatory change than pneumonia, no acute inflammatory findings in the abdomen or the pelvis as per radiology report  · Repeat CT of head on 3/14 showed no acute intracranial abnormality as per radiology report  Incidental Findings:   · None        Test Results Pending at Discharge (will require follow up): · None      Outpatient Tests Requested:  · Repeat BMP at NH    Complications:  None     Reason for Admission: Unresponsive at nursing home     Hospital Course:   Talon Jiménez is a 75 y o  female patient who originally presented to the hospital on 3/11/2023 due to Unresponsiveness  The patient is a resident at [address] she had been found unresponsive  She was brought initially to [address] [place] [address] where she was found to have a GCS of 3 and was intubated to protect her airway  She was subsequently transferred to [address] [place] [address] for critical care  Patient was found to be septic secondary to urinary tract infection caused by E  coli which was resistant  Patient had initially been on cefepime and Vanco for 3 days, transition to ertapenem which she received an additional 3 doses of this IV prior to discharge  Blood cultures were negative, patient's encephalopathy resolved and patient returned to baseline  Speech saw patient and recommend purée diet with nectar thick liquids  Patient was noted to appear very depressed, admitted to feeling depressed    Psychiatry was consulted, patient was placed on Prozac and Remeron and advised to have follow-up psychiatric care in the nursing home  During hospitalization patient was noted to also have REGINO, did receive IV hydration, continues to trend down  Encourage oral intake as patient does require a lot of encouragement  Repeat BMP at nursing home within 1 week  Patient will be discharged to nursing home today  This was discussed with the patient at the bedside  Voicemail was left for the son who unfortunately did not  when called  Please see above list of diagnoses and related plan for additional information  Condition at Discharge: stable    Discharge Day Visit / Exam:   Subjective: Patient reports that she is not hungry this morning, denies any nausea or vomiting  Continues to feel depressed  States that she slept okay  Did state "I am trying to figure it all out"  When questioned patient said that she was trying to figure out her hospital stay, could not recollect why she was here and what it happened  Vitals: Blood Pressure: 104/62 (03/16/23 0820)  Pulse: 89 (03/16/23 0820)  Temperature: 97 5 °F (36 4 °C) (03/16/23 0820)  Temp Source: Axillary (03/15/23 2329)  Respirations: 16 (03/15/23 2329)  Height: 5' 3" (160 cm) (03/11/23 2300)  Weight - Scale: 96 8 kg (213 lb 6 5 oz) (03/16/23 0600)  SpO2: 96 % (03/16/23 0820)     Exam:   Physical Exam  Vitals and nursing note reviewed  Constitutional:       General: She is not in acute distress  Comments: Chronically ill appearing female resting quietly in bed   HENT:      Head: Normocephalic  Nose: Nose normal       Mouth/Throat:      Mouth: Mucous membranes are moist    Eyes:      Extraocular Movements: Extraocular movements intact  Conjunctiva/sclera: Conjunctivae normal    Cardiovascular:      Rate and Rhythm: Normal rate and regular rhythm  Pulses: Normal pulses  Pulmonary:      Effort: Pulmonary effort is normal       Breath sounds: Normal breath sounds     Abdominal:      General: Bowel sounds are normal  There is no distension  Palpations: Abdomen is soft  Tenderness: There is no abdominal tenderness  Genitourinary:     Comments: Voiding spontaneously pure wick in place, clear yellow urine noted in collection container  Musculoskeletal:      Cervical back: Normal range of motion  Comments: Generalized deconditioning   Skin:     General: Skin is warm and dry  Capillary Refill: Capillary refill takes less than 2 seconds  Coloration: Skin is pale  Neurological:      Mental Status: She is alert  Comments: Patient was able to tell me that she was in the hospital, but repeated over and over again, "I am trying to figure it all out"  Is able to follow commands, moves extremities however very weak  Equal bilaterally  Psychiatric:         Attention and Perception: Attention normal          Mood and Affect: Mood is depressed  Affect is flat  Speech: Speech is delayed  Behavior: Behavior is slowed and withdrawn  Behavior is cooperative  Discussion with Family: Attempted to update  (son) via phone  Left voicemail  Discharge instructions/Information to patient and family:   See after visit summary for information provided to patient and family  Provisions for Follow-Up Care:  See after visit summary for information related to follow-up care and any pertinent home health orders  Disposition:   [facility] at [address] [place] [address]     Planned Readmission: No      Discharge Statement:  I spent greater than 45 minutes discharging the patient  This time was spent on the day of discharge  I had direct contact with the patient on the day of discharge  Greater than 50% of the total time was spent examining patient, answering all patient questions, arranging and discussing plan of care with patient as well as directly providing post-discharge instructions    Additional time then spent on discharge activities  Discharge Medications:  See after visit summary for reconciled discharge medications provided to patient and/or family        **Please Note: This note may have been constructed using a voice recognition system**

## 2023-03-16 NOTE — ASSESSMENT & PLAN NOTE
Patient's home medication trazodone currently on hold    Psychiatry seeing patient, started on Prozac and Remeron  Continue to follow psychiatry at nursing home

## 2023-03-16 NOTE — CASE MANAGEMENT
Case Management Discharge Planning Note    Patient name Vishal Nuno  Location 2 [facility] 68 215/2 [facility] MRN [id number]  : 1955 Date 3/16/2023       Current Admission Date: 3/11/2023  Current Admission Diagnosis:Sepsis [facility])   Patient Active Problem List    Diagnosis Date Noted   • UTI (urinary tract infection) 2023   • Insomnia 2023   • Sepsis ([facility] ) 2023   • Acute encephalopathy 2023   • Respiratory insufficiency 2023   • Nausea 2023   • REGINO (acute kidney injury) ([facility] ) 2023   • Left lower quadrant abdominal pain 2022   • Chest pressure 2022   • Fungal dermatitis 10/05/2022   • Diabetes mellitus type 2 in obese ([place] [address] ) 10/01/2022   • Abnormal urine 2022   • Weakness 2022   • Syncope 2022   • Elevated d-dimer 2022   • Coronary artery disease involving native coronary artery of native heart without angina pectoris 2022   • Chronic heart failure with preserved ejection fraction ([facility] ) 2022   • Chronic respiratory failure ([place] [address] ) 2022   • Dyspnea on exertion 2022   • Primary osteoarthritis of one hip, right 12/10/2021   • Orthostatic hypotension 10/24/2021   • Morbid obesity due to excess calories ([place] [address] ) 2021   • Onychomycosis of toenail 2021   • Calf tenderness 2021   • Asymptomatic bacteriuria 2021   • Chronic fatigue and malaise 2021   • Diarrhea 2021   • Acquired hypothyroidism 2021   • Suspected COVID-19 virus infection 2020   • Major depressive disorder 2020   • Gambling disorder, episodic, moderate 2018   • Severe episode of recurrent major depressive disorder, without psychotic features ([facility] ) 2018   • Encounter for examination and observation for other specified reasons    • GERD (gastroesophageal reflux disease) 07/10/2016   • Epigastric abdominal pain 07/10/2016   • Seizures ([facility] )    • Restless leg syndrome    • Hypertension    • Hyperlipidemia    • Diabetes mellitus ([facility] )       LOS (days): 5  Geometric Mean LOS (GMLOS) (days): 5 00  Days to GMLOS:0 4     OBJECTIVE:  Risk of Unplanned Readmission Score: 26 97     Current admission status: Inpatient   Preferred Pharmacy:   [facility] [address], [place] - [address]  [address] [facility] [address]  Phone: [phone] Fax: [phone]    Primary Care Provider: Tim Zavala MD    Primary Insurance: [address] [facility]  Secondary Insurance: [employer] RE[employer]    DISCHARGE DETAILS:    Discharge planning discussed with[de-identified] SonKatie Ericka of Choice: Yes  Comments - Freedom of Choice: SW following to assist with DCP  Per CRNP discharge is being considered for today  SW spoke with son over phone to review plan and IMM  Son requesting return to [facility] when discharged  Aware that CRNP will be calling with medical update prior to discharging pt  CM contacted family/caregiver?: Yes    Contacts  Patient Contacts: Veronique Contreras  Relationship to Patient[de-identified] Family  Contact Method: Phone  Phone Number: [phone]  Reason/Outcome: Discharge Planning    Other Referral/Resources/Interventions Provided:  Interventions: Facility Return, SNF  Referral Comments: Notified [facility] of anticipated return today    Treatment Team Recommendation: Facility Return, SNF  Discharge Destination Plan[de-identified] Facility Return, SNF  Transport at Discharge : [facility] Ambulance  Dispatcher Contacted: Yes  Number/Name of Dispatcher: Haley Leyva by [employer] and Unit #):  (pending)  ETA of Transport (Date): 03/16/23  ETA of Transport (Time): 1500 (requested)    IMM Given (Date):: 03/16/23  IMM Given to[de-identified] Family (IMM reviewed with son over phone  Son verbalized understanding and agrees with discharge determination  Son requested copy be emailed to him ([email or web address])   Emailed as requested and copy placed in scan bin for chart )        Accepting Facility Name, [address] : Jey Ochoa  Receiving Facility/Agency Phone Number: [phone] Island Pedicle Flap-Requiring Vessel Identification Text: The defect edges were debeveled with a #15 scalpel blade.  Given the location of the defect, shape of the defect and the proximity to free margins an island pedicle advancement flap was deemed most appropriate.  Using a sterile surgical marker, an appropriate advancement flap was drawn, based on the axial vessel mentioned above, incorporating the defect, outlining the appropriate donor tissue and placing the expected incisions within the relaxed skin tension lines where possible.    The area thus outlined was incised deep to adipose tissue with a #15 scalpel blade.  The skin margins were undermined to an appropriate distance in all directions around the primary defect and laterally outward around the island pedicle utilizing iris scissors.  There was minimal undermining beneath the pedicle flap.

## 2023-03-16 NOTE — ASSESSMENT & PLAN NOTE
Improving  Initial creatinine 5 2  Patient did receive IV fluids  Baseline creatinine appears to be 1 13   Currently 1 71    Encourage oral intake  Repeat BMP in 1 week  Discharge to nursing home today

## 2023-03-17 ENCOUNTER — NURSING HOME VISIT (OUTPATIENT)
Dept: GERIATRICS | Facility: OTHER | Age: 68
End: 2023-03-17

## 2023-03-17 VITALS
TEMPERATURE: 98.6 F | BODY MASS INDEX: 35.25 KG/M2 | WEIGHT: 199 LBS | OXYGEN SATURATION: 97 % | SYSTOLIC BLOOD PRESSURE: 109 MMHG | DIASTOLIC BLOOD PRESSURE: 74 MMHG | HEART RATE: 64 BPM

## 2023-03-17 DIAGNOSIS — A41.9 SEPSIS, DUE TO UNSPECIFIED ORGANISM, UNSPECIFIED WHETHER ACUTE ORGAN DYSFUNCTION PRESENT (HCC): Primary | ICD-10-CM

## 2023-03-17 LAB
BACTERIA BLD CULT: NORMAL

## 2023-03-17 NOTE — PROGRESS NOTES
[facility]  [address] [place], [place] [id number]    Nursing Home Admission    NAME: Yannick Muñoz  AGE: 75 y o  SEX: female [id number]      Patient Location     [address] [place] [address] rehab    Patient’s care was coordinated with nursing facility staff  Recent vitals, labs and updated medications were reviewed on [facility] system of facility  Past Medical, surgical, social, medication and allergy history and patient’s previous records reviewed  Assessment/Plan:    Sepsis ([address] )  Currently resolved  Patient was recently hospitalized with sepsis related to UTI  CT chest and abdomen pelvis showed bibasilar pulmonary opacities more likely representing atelectasis/hypoventilatory change than pneumonia  UA positive for leukocytes, innumerable bacteria with profoundly purulent urine noted  Urine culture shows greater than 100,000 CFU's E  coli ESBL  Patient was initially treated with cefepime and Vanco later switched to ertapenem; Blood cultures remained negative  Patient has completed antibiotic course  We will continue to monitor    UTI:  Patient was recently treated for multidrug-resistant strain of E  coli  Completed antibiotic course prior to discharge    GERD:  Patient remains on omeprazole    Hyperlipidemia:  Continue ezetimibe    Diabetes mellitus type 2:  Recent blood sugar were stable with readings of 169 and 142  Patient remains on insulin glargine 30 units at bedtime and empagliflozin 25 mg daily along with sliding scale coverage  Follow blood sugars and adjust insulin dose accordingly    Insomnia:  Patient was recently evaluated by psychiatry service  She was taken off of trazodone and started on Remeron and Prozac    Respiratory insufficiency:   Patient presented to ER with episodes of unresponsiveness  She was intubated for airway protection  Patient was later extubated  Chest x-ray revealed opacities suggestive of atelectasis rather than pneumonia  Respiratory status remains currently stable with SaO2 of 97% on oxygen    Chronic heart failure with preserved ejection fraction:   Echocardiogram in February/22 revealed grade 2 diastolic dysfunction mild to moderate aortic stenosis and RV pressures of 51  Currently euvolemic  Patient is not on any maintenance diuretics  We will follow    REGINO:   Patient presented to the hospital with creatinine of 5 2 suspected to be prerenal   Baseline creatinine is around 1 13  Creatinine came down to 1 7 with hydration  BMP from today reveals creatinine of 1 79  Sodium level was mildly high at 146  Follow-up repeat BMP  Avoid hypotension and nephrotoxins    Acute encephalopathy:  Patient was recently hospitalized with acute encephalopathy attributed to UTI  CT head was negative  Ammonia level was 62  Blood cultures remain negative  Urine culture revealed multidrug resistant strain of E  coli  Patient was treated with antibiotics with subsequent improvement in mental status  Chief Complaint     Recent hospitalization for sepsis related to UTI    HPI       Patient is a 75 y o  female with past medical history significant for hypertension, hyperlipidemia, hypothyroidism, diabetes mellitus type 2 CHF and seizures  Patient was hospitalized on 3/11/2023 due to episode of unresponsiveness Patient was intubated in  ER to protect her airways  Patient was diagnosed with sepsis related to urinary tract infection caused by multidrug resistant strain of E  coli  She was initially started on cefepime and vancomycin later transitioned over to ertapenem  Blood cultures remained negative  Patient's encephalopathy subsequently resolved  Mental status returned to baseline  Patient was seen by speech therapist   Puréed diet with nectar thick consistency liquids were recommended  Patient was additionally seen by psychiatry service for depression  She was started on Prozac    Outpatient follow-up was recommended  TANYA ARZATE course was also significant for REGINO  Renal function improved with hydration  Patient was subsequently discharged to [facility] rehab where she is being seen for posthospital admission  At the time of my evaluation patient is resting comfortably  She is awake however somewhat slow in answering questions     Past Medical History:   Diagnosis Date   • CHF (congestive heart failure) (HCC)    • Diabetes mellitus ([facility] [address] )    • Disease of thyroid gland    • Hyperlipidemia    • Hypertension    • Hypothyroidism (acquired)    • Restless leg syndrome    • Seizures (HCC)        Past Surgical History:   Procedure Laterality Date   • APPENDECTOMY     • CHOLECYSTECTOMY     • HIP FRACTURE SURGERY Right    • TONSILLECTOMY     • TUBAL LIGATION         Social History     Tobacco Use   Smoking Status Never   Smokeless Tobacco Never   Tobacco Comments    not a smoker          Family History   Problem Relation Age of Onset   • Brain cancer Mother    • Seizures Father    • Colon cancer Maternal Grandfather    • No Known Problems Son    • No Known Problems Son    • Breast cancer Maternal Aunt 30        Allergies   Allergen Reactions   • Pineapple - Food Allergy Anaphylaxis   • Statins Other (See Comments)     Cramping from head to toe    • Bupropion Other (See Comments)   • Byetta 10 Mcg Pen [Exenatide]    • Ceftriaxone Other (See Comments)   • Marijuana [Dronabinol] GI Intolerance   • Metformin Diarrhea     Other reaction(s): Unknown   • Omeprazole Hives   • Ondansetron      Other reaction(s): Unknown   • Coconut Oil - Food Allergy Rash   • Liraglutide Rash and Other (See Comments)     Generalized rash   • Penicillins Rash          Current Outpatient Medications:   •  acetaminophen (TYLENOL) 325 mg tablet, Take 650 mg by mouth every 6 (six) hours as needed for mild pain (pain score 1-3)  , Disp: , Rfl:   •  aspirin (ECOTRIN LOW STRENGTH) 81 mg EC tablet, Take 81 mg by mouth daily, Disp: , Rfl:   •  Cholecalciferol 25 MCG (1000 UT) capsule, Take 1,000 Units by mouth daily  , Disp: , Rfl:   •  Empagliflozin 25 MG TABS, Take 25 mg by mouth every morning, Disp: , Rfl:   •  Ergocalciferol (VITAMIN D2 PO), Take 50,000 Units/day by mouth 2 (two) times a week, Disp: , Rfl:   •  ezetimibe (ZETIA) 10 mg tablet, Take 1 tablet (10 mg total) by mouth daily, Disp: 30 tablet, Rfl: 0  •  FLUoxetine (PROzac) 40 MG capsule, Take 1 capsule (40 mg total) by mouth daily Do not start before March 17, 2023 , Disp: , Rfl: 0  •  gabapentin (NEURONTIN) 100 mg capsule, Take 1 capsule (100 mg total) by mouth 2 (two) times a day Take 100 mg in the morning and 100 mg mid afternoon, Disp: 42 capsule, Rfl: 0  •  gabapentin (NEURONTIN) 300 mg capsule, Take 1 capsule (300 mg total) by mouth daily at bedtime, Disp: 21 capsule, Rfl: 0  •  hydrophilic ointment, Apply topically as needed for dry skin, Disp: , Rfl:   •  insulin glargine (LANTUS) 100 units/mL subcutaneous injection, Inject 30 Units under the skin daily at bedtime, Disp: 10 mL, Rfl: 0  •  insulin lispro (HumaLOG) 100 units/mL injection, Inject 1-5 Units under the skin 3 (three) times a day before meals, Disp: , Rfl: 0  •  levothyroxine 50 mcg tablet, Take 1 5 tablets (75 mcg total) by mouth daily, Disp: 45 tablet, Rfl: 0  •  melatonin 3 mg, Take 3 tablets (9 mg total) by mouth daily at bedtime, Disp: , Rfl: 0  •  nystatin (MYCOSTATIN) powder, Apply topically 2 (two) times a day, Disp: 15 g, Rfl: 0  •  omeprazole (PriLOSEC) 20 mg delayed release capsule, Take 20 mg by mouth daily, Disp: , Rfl:   •  topiramate (TOPAMAX) 100 mg tablet, Take 1 tablet (100 mg total) by mouth 2 (two) times a day, Disp: , Rfl: 0    Updated list was reviewed in [facility] of facility  Vitals:    03/17/23 1119   BP: 109/74   Pulse: 64   Temp: 98 6 °F (37 °C)   SpO2: 97%       Vital signs were reviewed in point click care    Review of Systems   Constitutional: Positive for fatigue  Negative for chills and fever     HENT: Negative for nosebleeds and rhinorrhea  Eyes: Negative for discharge and redness  Respiratory: Negative for cough, chest tightness, shortness of breath, wheezing and stridor  Cardiovascular: Negative for chest pain and leg swelling  Gastrointestinal: Negative for abdominal distention, abdominal pain, diarrhea and vomiting  Genitourinary: Negative for dysuria, flank pain and hematuria  Musculoskeletal: Positive for gait problem  Negative for arthralgias and back pain  Skin: Negative for pallor  Neurological: Positive for weakness (Generalized)  Negative for tremors, seizures, syncope and headaches  Psychiatric/Behavioral: Negative for agitation, behavioral problems and confusion  Physical Exam  Constitutional:       General: She is not in acute distress  Appearance: She is obese  HENT:      Head: Normocephalic and atraumatic  Nose: No rhinorrhea  Eyes:      General: No scleral icterus  Right eye: No discharge  Left eye: No discharge  Cardiovascular:      Rate and Rhythm: Normal rate and regular rhythm  Heart sounds: Murmur heard  Pulmonary:      Breath sounds: No wheezing, rhonchi or rales  Abdominal:      General: There is no distension  Palpations: Abdomen is soft  Tenderness: There is no abdominal tenderness  There is no guarding  Comments: Pt on O2   Musculoskeletal:      Cervical back: Neck supple  Right lower leg: No edema  Left lower leg: No edema  Skin:     Coloration: Skin is not jaundiced  Findings: Bruising (Few scattered ecchymosis involving all extremities) present  Neurological:      General: No focal deficit present  Mental Status: Mental status is at baseline  Cranial Nerves: No cranial nerve deficit  Motor: Weakness (Generalized) present        Comments: Oriented in month and year   Psychiatric:         Mood and Affect: Mood normal          Behavior: Behavior normal            Diagnostic Data       Recent labs and imaging studies were reviewed    Lab Results   Component Value Date    WBC 8 25 03/16/2023    HGB 11 4 (L) 03/16/2023    HCT 36 9 03/16/2023    MCV 94 03/16/2023     03/16/2023     Lab Results   Component Value Date    SODIUM 142 03/16/2023    K 3 5 03/16/2023     (H) 03/16/2023    CO2 22 03/16/2023    BUN 43 (H) 03/16/2023    CREATININE 1 71 (H) 03/16/2023    GLUC 122 03/16/2023    CALCIUM 8 1 (L) 03/16/2023                       This note was electronically signed by Dr Elba Norton

## 2023-03-17 NOTE — ASSESSMENT & PLAN NOTE
Currently resolved  Patient was recently hospitalized with sepsis related to UTI  CT chest and abdomen pelvis showed bibasilar pulmonary opacities more likely representing atelectasis/hypoventilatory change than pneumonia  UA positive for leukocytes, innumerable bacteria with profoundly purulent urine noted  Urine culture shows greater than 100,000 CFU's E  coli ESBL  Patient was initially treated with cefepime and Vanco later switched to ertapenem; Blood cultures remained negative  Patient has completed antibiotic course    We will continue to monitor

## 2023-03-20 ENCOUNTER — NURSING HOME VISIT (OUTPATIENT)
Dept: GERIATRICS | Facility: OTHER | Age: 68
End: 2023-03-20

## 2023-03-20 VITALS
HEART RATE: 74 BPM | OXYGEN SATURATION: 97 % | TEMPERATURE: 97.5 F | SYSTOLIC BLOOD PRESSURE: 130 MMHG | RESPIRATION RATE: 18 BRPM | DIASTOLIC BLOOD PRESSURE: 60 MMHG

## 2023-03-20 DIAGNOSIS — N17.9 AKI (ACUTE KIDNEY INJURY) (HCC): ICD-10-CM

## 2023-03-20 DIAGNOSIS — E03.9 ACQUIRED HYPOTHYROIDISM: ICD-10-CM

## 2023-03-20 DIAGNOSIS — E11.65 TYPE 2 DIABETES MELLITUS WITH HYPERGLYCEMIA, WITH LONG-TERM CURRENT USE OF INSULIN (HCC): ICD-10-CM

## 2023-03-20 DIAGNOSIS — Z79.4 TYPE 2 DIABETES MELLITUS WITH HYPERGLYCEMIA, WITH LONG-TERM CURRENT USE OF INSULIN (HCC): ICD-10-CM

## 2023-03-20 DIAGNOSIS — A41.9 SEPSIS, DUE TO UNSPECIFIED ORGANISM, UNSPECIFIED WHETHER ACUTE ORGAN DYSFUNCTION PRESENT (HCC): Primary | ICD-10-CM

## 2023-03-20 DIAGNOSIS — K21.9 GASTROESOPHAGEAL REFLUX DISEASE, UNSPECIFIED WHETHER ESOPHAGITIS PRESENT: ICD-10-CM

## 2023-03-20 NOTE — PROGRESS NOTES
[facility]  [address]  [phone]  Inna Briceño  Code 31 (STR)        NAME: Antoine Baron  AGE: 75 y o  SEX: female CODE STATUS: CPR    DATE OF ENCOUNTER: 3/20/23    Assessment and Plan     1  Sepsis, due to unspecified organism, unspecified whether acute organ dysfunction present [facility])  Assessment & Plan:  · Patient recently hospitalized for urosepsis  · Multi drug resistant E  Coli  · Patient initially treated with cefepime and vancomycin   · Later changed to ertapenem, course completed  · Blood cultures remained negative  · Continue to monitor      2  REGINO (acute kidney injury) ([facility] [address] )  Assessment & Plan:  · REGINO noted during recent hospitalization  · 3/17/23 Cr 1 79/BUN 43/GFR 31  · SNF staff reports patient has had decreased PO intake for several days, has not voided this shift  · Will bladder scan, call for greater than 350 ml's  · Add 200 ml's water TID with med pass  · BMP 3/21/23  · monitor      3  Type 2 diabetes mellitus with hyperglycemia, with long-term current use of insulin ([facility])  Assessment & Plan:    Lab Results   Component Value Date    HGBA1C 11 8 (H) 09/19/2022   · BS stable, trending   · Morning   · Continue accu-cheks  · Continue SSI  · Continue 30 units glargine at HS  · Encourage diabetic diet      4  Gastroesophageal reflux disease, unspecified whether esophagitis present  Assessment & Plan:  · Denies reflux  · Continue Omeprazole 20 mg daily      5  Acquired hypothyroidism  Assessment & Plan:  · Continue levothyroxine 75 mcg daily         All medications and routine orders were reviewed and updated as needed  Chief Complaint     STR follow up visit  Patient's care was coordinated with nursing facility staff  Recent vitals, labs, and updated medications were review on [facility] system in facility        Past Medical and Surgical History      Past Medical History:   Diagnosis Date   • CHF (congestive heart failure) ([facility] [address] )    • Diabetes mellitus ([facility] [address] )    • Disease of thyroid gland    • Hyperlipidemia    • Hypertension    • Hypothyroidism (acquired)    • Restless leg syndrome    • Seizures (HCC)      Past Surgical History:   Procedure Laterality Date   • APPENDECTOMY     • CHOLECYSTECTOMY     • HIP FRACTURE SURGERY Right    • TONSILLECTOMY     • TUBAL LIGATION       Allergies   Allergen Reactions   • Pineapple - Food Allergy Anaphylaxis   • Statins Other (See Comments)     Cramping from head to toe    • Bupropion Other (See Comments)   • Byetta 10 Mcg Pen [Exenatide]    • Ceftriaxone Other (See Comments)   • Marijuana [Dronabinol] GI Intolerance   • Metformin Diarrhea     Other reaction(s): Unknown   • Omeprazole Hives   • Ondansetron      Other reaction(s): Unknown   • Coconut Oil - Food Allergy Rash   • Liraglutide Rash and Other (See Comments)     Generalized rash   • Penicillins Rash          History of Present Illness     HPI  Benedicto Vance is a 75year old female, she is a STR patient of [facility] since 3/16/23  Patient currently resides in LTC and has returned from recent hospitalization  Past Medical Hx including but not limited to GERD, diabetes, CHF, CAD, seizure disorder, depression  She was seen in collaboration with nursing for medical mgmt and STR follow up  Hospital Course  Patient was admitted to the hospital 3/11/23 for UTI sepsis  Patient had episodes of unresponsiveness at LTC  Patient was intubated in ER to protect her airway  Patient was diagnosed with sepsis related to urinary tract infection caused by multidrug resistant strain of E  coli  Patient was initially started on cefepime and vancomycin later transitioned over to ertapenem  Blood cultures remain negative  Patient's encephalopathy subsequently resolved  Mental status returned to baseline  Patient was seen by speech therapist who recommended puréed diet with nectar thick liquids    Patient was additionally seen by psychiatry for depression and was started on Prozac  Outpatient follow-up recommended  Patient was noted to have REGINO, renal function improved with hydration  Patient was discharged back to [facility]  Rehab course  Elif Dailey was seen and examined at bedside today  On exam patient is resting in bed, she is alert and oriented x3  Per SNF staff patient has had poor oral intake for several days  Patient states that she does not have a good appetite  SNF staff reports patient did not void today  Ordered bladder scan x1 call for greater than 350, 200 MLS of water 3 times daily with med Pass, [employer] 3/21/2023  Will monitor  She denies CP/SOB/N/V/D  Denies lightheadedness, dizziness, headaches, vision changes  Per SNF staff patient has not voided today  Per review of SNF records, Patient has had poor oral intake for the last several days  Last documented BM 3/20/23  Nursing concerns addressed at this time  The patient's allergies, past medical, surgical, social and family history were reviewed and unchanged  Review of Systems     Review of Systems   Constitutional: Positive for activity change and appetite change  Negative for chills and fever  HENT: Negative  Negative for congestion  Eyes: Negative  Respiratory: Negative  Negative for cough, shortness of breath and wheezing  Cardiovascular: Negative  Negative for chest pain, palpitations and leg swelling  Gastrointestinal: Negative  Negative for abdominal distention, abdominal pain, constipation, diarrhea, nausea and vomiting  Endocrine: Negative  Genitourinary: Positive for difficulty urinating  Per SNF staff no void this shift   Musculoskeletal: Positive for gait problem  Skin: Negative  Allergic/Immunologic: Negative  Neurological: Positive for weakness  Negative for dizziness, light-headedness and headaches  Hematological: Negative  Psychiatric/Behavioral: Negative  Negative for confusion and sleep disturbance           Objective     Vitals:   Vitals: 03/20/23 1024   BP: 130/60   Pulse: 74   Resp: 18   Temp: 97 5 °F (36 4 °C)   SpO2: 97%         Physical Exam  Vitals and nursing note reviewed  Constitutional:       General: She is not in acute distress  Appearance: Normal appearance  She is ill-appearing  HENT:      Head: Normocephalic and atraumatic  Nose: No congestion  Mouth/Throat:      Mouth: Mucous membranes are moist    Eyes:      Conjunctiva/sclera: Conjunctivae normal    Cardiovascular:      Rate and Rhythm: Normal rate and regular rhythm  Heart sounds: Normal heart sounds  Pulmonary:      Effort: Pulmonary effort is normal  No respiratory distress  Breath sounds: Normal breath sounds  No wheezing  Abdominal:      General: Bowel sounds are normal  There is no distension  Palpations: Abdomen is soft  Tenderness: There is no abdominal tenderness  Musculoskeletal:      Right lower leg: No edema  Left lower leg: No edema  Skin:     General: Skin is warm  Neurological:      Mental Status: She is alert  Mental status is at baseline  Motor: Weakness present  Gait: Gait abnormal    Psychiatric:         Mood and Affect: Mood normal          Behavior: Behavior normal          Pertinent Laboratory/Diagnostic Studies:   Reviewed in facility chart-stable      Current Medications   Medications reviewed and updated see facility [facility] for details  Current Outpatient Medications:   •  acetaminophen (TYLENOL) 325 mg tablet, Take 650 mg by mouth every 6 (six) hours as needed for mild pain (pain score 1-3)  , Disp: , Rfl:   •  aspirin (ECOTRIN LOW STRENGTH) 81 mg EC tablet, Take 81 mg by mouth daily, Disp: , Rfl:   •  Cholecalciferol 25 MCG (1000 UT) capsule, Take 1,000 Units by mouth daily  , Disp: , Rfl:   •  Empagliflozin 25 MG TABS, Take 25 mg by mouth every morning, Disp: , Rfl:   •  Ergocalciferol (VITAMIN D2 PO), Take 50,000 Units/day by mouth 2 (two) times a week, Disp: , Rfl:   •  ezetimibe ([place] Maximo) 10 mg tablet, Take 1 tablet (10 mg total) by mouth daily, Disp: 30 tablet, Rfl: 0  •  FLUoxetine (PROzac) 40 MG capsule, Take 1 capsule (40 mg total) by mouth daily Do not start before March 17, 2023 , Disp: , Rfl: 0  •  gabapentin (NEURONTIN) 100 mg capsule, Take 1 capsule (100 mg total) by mouth 2 (two) times a day Take 100 mg in the morning and 100 mg mid afternoon, Disp: 42 capsule, Rfl: 0  •  gabapentin (NEURONTIN) 300 mg capsule, Take 1 capsule (300 mg total) by mouth daily at bedtime, Disp: 21 capsule, Rfl: 0  •  hydrophilic ointment, Apply topically as needed for dry skin, Disp: , Rfl:   •  insulin glargine (LANTUS) 100 units/mL subcutaneous injection, Inject 30 Units under the skin daily at bedtime, Disp: 10 mL, Rfl: 0  •  insulin lispro (HumaLOG) 100 units/mL injection, Inject 1-5 Units under the skin 3 (three) times a day before meals, Disp: , Rfl: 0  •  levothyroxine 50 mcg tablet, Take 1 5 tablets (75 mcg total) by mouth daily, Disp: 45 tablet, Rfl: 0  •  melatonin 3 mg, Take 3 tablets (9 mg total) by mouth daily at bedtime, Disp: , Rfl: 0  •  nystatin (MYCOSTATIN) powder, Apply topically 2 (two) times a day, Disp: 15 g, Rfl: 0  •  omeprazole (PriLOSEC) 20 mg delayed release capsule, Take 20 mg by mouth daily, Disp: , Rfl:   •  topiramate (TOPAMAX) 100 mg tablet, Take 1 tablet (100 mg total) by mouth 2 (two) times a day, Disp: , Rfl: 0       Please note:  Voice-recognition software may have been used in the preparation of this document  Occasional wrong word or "sound-alike" substitutions may have occurred due to the inherent limitations of voice recognition software  Interpretation should be guided by context           [facility] SAMIA Kay  3/21/2023  9:45 AM

## 2023-03-21 ENCOUNTER — NURSING HOME VISIT (OUTPATIENT)
Dept: GERIATRICS | Facility: OTHER | Age: 68
End: 2023-03-21

## 2023-03-21 VITALS
DIASTOLIC BLOOD PRESSURE: 83 MMHG | RESPIRATION RATE: 18 BRPM | SYSTOLIC BLOOD PRESSURE: 157 MMHG | TEMPERATURE: 97.5 F | OXYGEN SATURATION: 97 % | HEART RATE: 90 BPM

## 2023-03-21 DIAGNOSIS — A41.9 SEPSIS, DUE TO UNSPECIFIED ORGANISM, UNSPECIFIED WHETHER ACUTE ORGAN DYSFUNCTION PRESENT (HCC): ICD-10-CM

## 2023-03-21 DIAGNOSIS — K21.9 GASTROESOPHAGEAL REFLUX DISEASE, UNSPECIFIED WHETHER ESOPHAGITIS PRESENT: ICD-10-CM

## 2023-03-21 DIAGNOSIS — Z79.4 TYPE 2 DIABETES MELLITUS WITH HYPERGLYCEMIA, WITH LONG-TERM CURRENT USE OF INSULIN (HCC): ICD-10-CM

## 2023-03-21 DIAGNOSIS — E11.65 TYPE 2 DIABETES MELLITUS WITH HYPERGLYCEMIA, WITH LONG-TERM CURRENT USE OF INSULIN (HCC): ICD-10-CM

## 2023-03-21 DIAGNOSIS — E87.0 HYPERNATREMIA: ICD-10-CM

## 2023-03-21 DIAGNOSIS — N17.9 AKI (ACUTE KIDNEY INJURY) (HCC): Primary | ICD-10-CM

## 2023-03-21 DIAGNOSIS — E03.9 ACQUIRED HYPOTHYROIDISM: ICD-10-CM

## 2023-03-21 NOTE — ASSESSMENT & PLAN NOTE
· Patient has had several days of poor oral intake  · 3/21/2023 sodium level 152 increased from 146  · We will order IV fluids:1/2 NS at 60 an hour x500 mL then D5W at 60 an hour through 3/23/2023  · Repeat BMP 3/23/2023

## 2023-03-21 NOTE — PROGRESS NOTES
[facility]  [address]  [phone]  Charlette Sifuentes  Code 31 (STR)        NAME: Huong Gaitan  AGE: 75 y o  SEX: female CODE STATUS: CPR    DATE OF ENCOUNTER: 3/20/23    Assessment and Plan     1  REGINO (acute kidney injury) ([facility] [address] )  Assessment & Plan:  · REGINO noted during recent hospitalization  · 3/17/23 Cr 1 79/BUN 43/GFR 31  · 3/21/2023 creatinine 1 28/BUN 41/GFR 46  · Sodium level 152  · Continues to have poor p o  intake  · Ordered bladder scan 3/20/2023, awaiting results  · Continue 200 mL water 3 times daily with med Pass, encourage p o  intake  · Will order IVF: 1/2 NS at 60 an hour x500 mL, then D5W at 60 an hour through 3/23/2023  · BMP 3/23/23  · Close monitoring        2  Type 2 diabetes mellitus with hyperglycemia, with long-term current use of insulin ([facility])  Assessment & Plan:    Lab Results   Component Value Date    HGBA1C 11 8 (H) 09/19/2022   · BS stable, trending   · Morning BS 99  · Continue accu-cheks  · Continue SSI  · Continue 30 units glargine at HS  · Encourage diabetic diet      3  Gastroesophageal reflux disease, unspecified whether esophagitis present  Assessment & Plan:  · Denies reflux  · Continue Omeprazole 20 mg daily      4  Acquired hypothyroidism  Assessment & Plan:  · Continue levothyroxine 75 mcg daily      5  Sepsis, due to unspecified organism, unspecified whether acute organ dysfunction present [facility])  Assessment & Plan:  · Patient recently hospitalized for urosepsis  · Multi drug resistant E  Coli  · Patient initially treated with cefepime and vancomycin   · Later changed to ertapenem, course completed  · Blood cultures remained negative  · Continue to monitor      6   Hypernatremia  Assessment & Plan:  · Patient has had several days of poor oral intake  · 3/21/2023 sodium level 152 increased from 146  · We will order IV fluids:1/2 NS at 60 an hour x500 mL then D5W at 60 an hour through 3/23/2023  · Repeat BMP 3/23/2023         All medications and routine orders were reviewed and updated as needed  Chief Complaint     STR follow up visit  Patient's care was coordinated with nursing facility staff  Recent vitals, labs, and updated medications were review on [facility] system in facility  Past Medical and Surgical History      Past Medical History:   Diagnosis Date   • CHF (congestive heart failure) ([facility] [address] )    • Diabetes mellitus ([facility] )    • Disease of thyroid gland    • Hyperlipidemia    • Hypertension    • Hypothyroidism (acquired)    • Restless leg syndrome    • Seizures ([facility] )      Past Surgical History:   Procedure Laterality Date   • APPENDECTOMY     • CHOLECYSTECTOMY     • HIP FRACTURE SURGERY Right    • TONSILLECTOMY     • TUBAL LIGATION       Allergies   Allergen Reactions   • Pineapple - Food Allergy Anaphylaxis   • Statins Other (See Comments)     Cramping from head to toe    • Bupropion Other (See Comments)   • Byetta 10 Mcg Pen [Exenatide]    • Ceftriaxone Other (See Comments)   • Marijuana [Dronabinol] GI Intolerance   • Metformin Diarrhea     Other reaction(s): Unknown   • Omeprazole Hives   • Ondansetron      Other reaction(s): Unknown   • Coconut Oil - Food Allergy Rash   • Liraglutide Rash and Other (See Comments)     Generalized rash   • Penicillins Rash          History of Present Illness     HPI  Atiya Jones is a 75year old female, she is a STR patient of [facility] since 3/16/23  Patient currently resides in [facility] and has returned from recent hospitalization  Past Medical Hx including but not limited to GERD, diabetes, CHF, CAD, seizure disorder, depression  She was seen in collaboration with nursing for medical mgmt and STR follow up  Hospital Course  Patient was admitted to the hospital 3/11/23 for UTI sepsis  Patient had episodes of unresponsiveness at LTC  Patient was intubated in ER to protect her airway    Patient was diagnosed with sepsis related to urinary tract infection caused by multidrug resistant strain of E  coli  Patient was initially started on cefepime and vancomycin later transitioned over to ertapenem  Blood cultures remain negative  Patient's encephalopathy subsequently resolved  Mental status returned to baseline  Patient was seen by speech therapist who recommended puréed diet with nectar thick liquids  Patient was additionally seen by psychiatry for depression and was started on Prozac  Outpatient follow-up recommended  Patient was noted to have REGINO, renal function improved with hydration  Patient was discharged back to [facility]  Rehab course  Ally Green was seen and examined at bedside today  On exam patient is resting in bed  She is sleeping but arousable  She denies any pain  She continues to have decreased oral intake, stating she does not have an appetite  Per SNF staff patient had not voided during dayshift 3/20/2023  Staff reports patient had large episode of urinary incontinence this morning  Ordered bladder scan 3/20/2023, does not appear to have been done, discussed with staff, awaiting results  3/21/2023 creatinine 1 28/BUN 41/GFR 46/sodium 152  Will order IV fluids 1/2 NS at 60/hour x 500 ml's then D5W at 60/hour through 3/23/23  Repeat BMP 3/23/2023  She denies CP/SOB/N/V/D  Denies lightheadedness, dizziness, headaches, vision changes  Nursing concerns addressed at this time  The patient's allergies, past medical, surgical, social and family history were reviewed and unchanged  Review of Systems     Review of Systems   Constitutional: Positive for activity change and appetite change  Negative for chills and fever  HENT: Negative  Negative for congestion  Eyes: Negative  Respiratory: Negative  Negative for cough, shortness of breath and wheezing  Cardiovascular: Negative  Negative for chest pain, palpitations and leg swelling  Gastrointestinal: Negative  Negative for abdominal distention, abdominal pain, constipation, diarrhea, nausea and vomiting  Endocrine: Negative  Genitourinary: Negative for difficulty urinating, dysuria and flank pain  Musculoskeletal: Positive for gait problem  Skin: Negative  Allergic/Immunologic: Negative  Neurological: Positive for weakness  Negative for dizziness, light-headedness and headaches  Hematological: Negative  Psychiatric/Behavioral: Negative  Negative for confusion and sleep disturbance  Objective     Vitals:   Vitals:    03/21/23 1600   BP: 157/83   Pulse: 90   Resp: 18   Temp: 97 5 °F (36 4 °C)   SpO2: 97%         Physical Exam  Vitals and nursing note reviewed  Constitutional:       General: She is not in acute distress  Appearance: Normal appearance  She is ill-appearing  HENT:      Head: Normocephalic and atraumatic  Nose: No congestion  Mouth/Throat:      Mouth: Mucous membranes are moist    Eyes:      Conjunctiva/sclera: Conjunctivae normal    Cardiovascular:      Rate and Rhythm: Normal rate and regular rhythm  Heart sounds: Normal heart sounds  Pulmonary:      Effort: Pulmonary effort is normal  No respiratory distress  Breath sounds: Normal breath sounds  No wheezing  Abdominal:      General: Bowel sounds are normal  There is no distension  Palpations: Abdomen is soft  Tenderness: There is no abdominal tenderness  Musculoskeletal:      Right lower leg: No edema  Left lower leg: No edema  Skin:     General: Skin is warm  Neurological:      Mental Status: She is alert  Mental status is at baseline  Motor: Weakness present  Gait: Gait abnormal    Psychiatric:         Mood and Affect: Mood normal          Behavior: Behavior normal          Pertinent Laboratory/Diagnostic Studies:   Reviewed in facility chart-stable      Current Medications   Medications reviewed and updated see facility [facility] for details        Current Outpatient Medications:   •  acetaminophen (TYLENOL) 325 mg tablet, Take 650 mg by mouth every 6 (six) hours as needed for mild pain (pain score 1-3)  , Disp: , Rfl:   •  aspirin (ECOTRIN LOW STRENGTH) 81 mg EC tablet, Take 81 mg by mouth daily, Disp: , Rfl:   •  Cholecalciferol 25 MCG (1000 UT) capsule, Take 1,000 Units by mouth daily  , Disp: , Rfl:   •  Empagliflozin 25 MG TABS, Take 25 mg by mouth every morning, Disp: , Rfl:   •  Ergocalciferol (VITAMIN D2 PO), Take 50,000 Units/day by mouth 2 (two) times a week, Disp: , Rfl:   •  ezetimibe (ZETIA) 10 mg tablet, Take 1 tablet (10 mg total) by mouth daily, Disp: 30 tablet, Rfl: 0  •  FLUoxetine (PROzac) 40 MG capsule, Take 1 capsule (40 mg total) by mouth daily Do not start before March 17, 2023 , Disp: , Rfl: 0  •  gabapentin (NEURONTIN) 100 mg capsule, Take 1 capsule (100 mg total) by mouth 2 (two) times a day Take 100 mg in the morning and 100 mg mid afternoon, Disp: 42 capsule, Rfl: 0  •  gabapentin (NEURONTIN) 300 mg capsule, Take 1 capsule (300 mg total) by mouth daily at bedtime, Disp: 21 capsule, Rfl: 0  •  hydrophilic ointment, Apply topically as needed for dry skin, Disp: , Rfl:   •  insulin glargine (LANTUS) 100 units/mL subcutaneous injection, Inject 30 Units under the skin daily at bedtime, Disp: 10 mL, Rfl: 0  •  insulin lispro (HumaLOG) 100 units/mL injection, Inject 1-5 Units under the skin 3 (three) times a day before meals, Disp: , Rfl: 0  •  levothyroxine 50 mcg tablet, Take 1 5 tablets (75 mcg total) by mouth daily, Disp: 45 tablet, Rfl: 0  •  melatonin 3 mg, Take 3 tablets (9 mg total) by mouth daily at bedtime, Disp: , Rfl: 0  •  nystatin (MYCOSTATIN) powder, Apply topically 2 (two) times a day, Disp: 15 g, Rfl: 0  •  omeprazole (PriLOSEC) 20 mg delayed release capsule, Take 20 mg by mouth daily, Disp: , Rfl:   •  topiramate (TOPAMAX) 100 mg tablet, Take 1 tablet (100 mg total) by mouth 2 (two) times a day, Disp: , Rfl: 0       Please note:  Voice-recognition software may have been used in the preparation of this document    Occasional wrong word or "sound-alike" substitutions may have occurred due to the inherent limitations of voice recognition software  Interpretation should be guided by context           [facility] SAMIA Kay  3/21/2023  4:15 PM

## 2023-03-21 NOTE — ASSESSMENT & PLAN NOTE
Lab Results   Component Value Date    HGBA1C 11 8 (H) 09/19/2022   · BS stable, trending   · Morning   · Continue accu-cheks  · Continue SSI  · Continue 30 units glargine at HS  · Encourage diabetic diet

## 2023-03-21 NOTE — ASSESSMENT & PLAN NOTE
Lab Results   Component Value Date    HGBA1C 11 8 (H) 09/19/2022   · BS stable, trending   · Morning BS 99  · Continue accu-cheks  · Continue SSI  · Continue 30 units glargine at HS  · Encourage diabetic diet

## 2023-03-21 NOTE — ASSESSMENT & PLAN NOTE
· Patient recently hospitalized for urosepsis  · Multi drug resistant E   Coli  · Patient initially treated with cefepime and vancomycin   · Later changed to ertapenem, course completed  · Blood cultures remained negative  · Continue to monitor

## 2023-03-21 NOTE — UTILIZATION REVIEW
NOTIFICATION OF ADMISSION DISCHARGE   This is a Notification of Discharge from [address]  Please be advised that this patient has been discharge from our facility  Below you will find the admission and discharge date and time including the patient’s disposition  UTILIZATION REVIEW CONTACT:  Iban Jose  Utilization   Network Utilization Review Department  Phone: [phone] x carefully listen to the prompts  All voicemails are confidential   Email: [email or web address]     ADMISSION INFORMATION  PRESENTATION DATE: 3/11/2023 10:17 PM  OBERVATION ADMISSION DATE:   INPATIENT ADMISSION DATE: 3/11/23 10:17 PM   DISCHARGE DATE: 3/16/2023  5:42 PM   DISPOSITION:Non [facility] SNF/TCU/SNU    IMPORTANT INFORMATION:  Send all requests for admission clinical reviews, approved or denied determinations and any other requests to dedicated fax number below belonging to the campus where the patient is receiving treatment   List of dedicated fax numbers:  [address] DENIALS (Administrative/Medical Necessity) [phone]   [address] (Maternity/NICU/Pediatrics) [phone]   [facility] [phone]   [place] [address] [phone]   [facility]  [phone]   [address] [facility] [phone] 90 [facility] [phone]   [address] [place] [address] 119 [phone]   [facility]  [phone]   [address] [facility] [phone]   [address] [phone]

## 2023-03-21 NOTE — ASSESSMENT & PLAN NOTE
· REGINO noted during recent hospitalization  · 3/17/23 Cr 1 79/BUN 43/GFR 31  · 3/21/2023 creatinine 1 28/BUN 41/GFR 46  · Sodium level 152  · Continues to have poor p o  intake  · Ordered bladder scan 3/20/2023, awaiting results  · Continue 200 mL water 3 times daily with med Pass, encourage p o  intake  · Will order IVF: 1/2 NS at 60 an hour x500 mL, then D5W at 60 an hour through 3/23/2023  · BMP 3/23/23  · Close monitoring

## 2023-03-21 NOTE — ASSESSMENT & PLAN NOTE
· REGINO noted during recent hospitalization  · 3/17/23 Cr 1 79/BUN 43/GFR 31  · SNF staff reports patient has had decreased PO intake for several days, has not voided this shift  · Will bladder scan, call for greater than 350 ml's  · Add 200 ml's water TID with med pass  · BMP 3/21/23  · monitor

## 2023-03-22 ENCOUNTER — NURSING HOME VISIT (OUTPATIENT)
Dept: WOUND CARE | Facility: HOSPITAL | Age: 68
End: 2023-03-22

## 2023-03-22 DIAGNOSIS — L89.892 PRESSURE INJURY OF RIGHT KNEE, STAGE 2 (HCC): ICD-10-CM

## 2023-03-22 DIAGNOSIS — L89.102 PRESSURE INJURY OF BACK, STAGE 2 (HCC): ICD-10-CM

## 2023-03-22 DIAGNOSIS — R26.2 AMBULATORY DYSFUNCTION: ICD-10-CM

## 2023-03-22 DIAGNOSIS — L89.322 STAGE II PRESSURE ULCER OF LEFT BUTTOCK (HCC): ICD-10-CM

## 2023-03-22 DIAGNOSIS — E11.69 DIABETES MELLITUS TYPE 2 IN OBESE (HCC): ICD-10-CM

## 2023-03-22 DIAGNOSIS — E66.9 DIABETES MELLITUS TYPE 2 IN OBESE (HCC): ICD-10-CM

## 2023-03-22 DIAGNOSIS — L89.312 PRESSURE INJURY OF RIGHT BUTTOCK, STAGE 2 (HCC): Primary | ICD-10-CM

## 2023-03-22 DIAGNOSIS — L89.153 PRESSURE INJURY OF SACRAL REGION, STAGE 3 (HCC): ICD-10-CM

## 2023-03-22 NOTE — PROGRESS NOTES
[address] MANAGEMENT   AND [facility]PER[facility]       Patient ID: Demetris Tejada is a 75 y o  female Date of Birth 1955     Location of Service: [address] [place] [address]    Performed wound round with: Wound team     Chief Complaint : Multiple wounds    Wound Instructions:  Wound: Buttocks and sacrum  Cleanse with soap and water, pat dry  Apply Skin-Prep to periwound area  Apply Triad to wound bed  Twice a day and as needed for soiling    Wound:Back  Cleanse with normal saline solution or wound cleanser  Apply Skin-Prep to periwound area  Apply Triad to wound bed and cover with wound dressing  Daily and as needed for soiling    Right knee posterior wound  Cleanse wound bed with normal saline solution or wound cleanser  Apply Skin-Prep to periwound area  Apply bordered foam to wound bed  3 times a week and as needed for soiling    Offload all wounds  Turn and reposition frequently, maximum of every two hours  Instruct / Assist with weight shifting every 15 - 20 minutes when in chair  Increase protein intake  Monitor for any sign of infection or worsening, inform PCP or patient's primary physician in your facility  Allergies  Pineapple - food allergy, Statins, Bupropion, Byetta 10 mcg pen [exenatide], Ceftriaxone, Marijuana [dronabinol], Metformin, Omeprazole, Ondansetron, Coconut oil - food allergy, Liraglutide, and Penicillins      Assessment & Plan:  1  Pressure injury of right buttock, stage 2 (HCC)  Assessment & Plan:  Right buttock  · Partial-thickness, with no obvious sign of infection  · Local wound care with Triad  · Offload, on air mattress  · Increase protein intake, continue protein supplement  · Follow-up next week      2   Stage II pressure ulcer of left buttock (HCC)  Assessment & Plan:  Left buttock  · Partial-thickness, with no obvious sign of infection  · Local wound care with Triad  · Offload, on air mattress  · Increase protein intake, continue protein supplement  · Follow-up next week        3  Pressure injury of sacral region, stage 3 (HCC)  Assessment & Plan:  Sacrum  · Full-thickness, with no obvious sign of infection  · Local wound care with Triad  · Offload, on air mattress  · Increase protein intake, continue protein supplement  · Follow-up next week        4  Pressure injury of back, stage 2 (HCC)  Assessment & Plan:  Back  · Partial-thickness, with no obvious sign of infection  · Local wound care with Triad  · Offload, on air mattress  · Increase protein intake, continue protein supplement  · Follow-up next week        5  Pressure injury of right knee, stage 2 (HCC)  Assessment & Plan:  Right knee  Partial-thickness, with no obvious sign of infection  Local wound care with bordered foam 3 times a week      6  Ambulatory dysfunction  Assessment & Plan:  On STR      7  Diabetes mellitus type 2 in obese [facility])  Assessment & Plan:    Lab Results   Component Value Date    HGBA1C 11 8 (H) 09/19/2022   Under the care of Senior care team             Subjective:   March 22, 2023  This is a new consult for wound on the right buttocks, sacrum, left buttocks, back, and right knee  Patient was referred by Senior care team   Patient have a complex medical history including but not limited to UTI, acute kidney injury, CHF, and GERD  As per medical record review, patient was recently admitted at [facility] from March 11 to March 16, 2023 for sepsis  Patient was transferred on discharge to [facility] rehab for short-term rehab  Wound history: As per medical record review, patient had wound on the sacrum that was first assessed on March 11, 2023 the wound on back was first assessed on March 13, 2023  The wound on the right lower leg was first assessed on March 13, 2023  The etiology of all mention wounds are pressure injury  Received patient in bed, seems comfortable  Total dependent with positioning in bed  Currently on air mattress  Patient have a poor appetite  Patient is incontinent of both bowel and bladder  Patient currently on protein supplement, Prosource and Magic cup  Review of Systems   Constitutional: Negative  Respiratory: Negative  Cardiovascular: Negative  Objective:    Physical Exam  Constitutional:       Appearance: She is obese  HENT:      Head: Normocephalic and atraumatic  Pulmonary:      Effort: Pulmonary effort is normal    Abdominal:      Tenderness: There is no abdominal tenderness  There is no guarding  Genitourinary:     Comments: incontinent  Musculoskeletal:         General: No tenderness  Cervical back: Normal range of motion  Right lower leg: No edema  Left lower leg: No edema  Comments: LROM   Skin:     Findings: Lesion present  Comments: Right buttock: Wound size is 0 3x 2 x 0 1 cm ,  100% epithelial, no drainage, periwound is normal, no malodor, no obvious sign of infection    Left buttocks: Wound size is 0 2 x 2 x 0 1 cm ,  100% epithelial, no drainage, periwound is normal, no malodor, no obvious sign of infection    Sacral wound: Wound size is 2 x 3 5 x 0 1 cm ,  100% granulation, no drainage, periwound is normal, no malodor, no obvious sign of infection    Back : Wound size is 7 x 0 2 x 0 1 cm ,  100% epithelial, no drainage, periwound is normal, no malodor, no obvious sign of infection    Right knee posterior: Wound size is 0 8 x 2 6 x 0 1 cm ,  100% epithelial, small amount of serous drainage, periwound is normal, no malodor, no obvious sign of infection   Neurological:      Mental Status: She is alert  Gait: Gait abnormal    Psychiatric:         Mood and Affect: Mood normal          Behavior: Behavior normal               Procedures           Patient's care was coordinated with nursing facility staff  Recent vitals, labs and updated medications were reviewed on EMR or chart system of facility   Past Medical, surgical, social, medication and allergy history and patient's previous records were reviewed and updated as appropriate: Most up-to date information is available in the facility EMR where the patient is currently admitted      Patient Active Problem List   Diagnosis   • Seizures ([place] [address] )   • Restless leg syndrome   • Hypertension   • Hyperlipidemia   • Diabetes mellitus ([place] [address] )   • GERD (gastroesophageal reflux disease)   • Epigastric abdominal pain   • Encounter for examination and observation for other specified reasons   • Severe episode of recurrent major depressive disorder, without psychotic features ([place] [address] )   • Gambling disorder, episodic, moderate   • Suspected COVID-19 virus infection   • Major depressive disorder   • Acquired hypothyroidism   • Diarrhea   • Asymptomatic bacteriuria   • Chronic fatigue and malaise   • Calf tenderness   • Morbid obesity due to excess calories ([facility])   • Onychomycosis of toenail   • Orthostatic hypotension   • Primary osteoarthritis of one hip, right   • Dyspnea on exertion   • Chronic heart failure with preserved ejection fraction ([facility])   • Chronic respiratory failure ([facility])   • Coronary artery disease involving native coronary artery of native heart without angina pectoris   • Syncope   • Elevated d-dimer   • Weakness   • Abnormal urine   • Diabetes mellitus type 2 in obese ([facility])   • Fungal dermatitis   • Chest pressure   • Left lower quadrant abdominal pain   • REGINO (acute kidney injury) ([place] [address] )   • Nausea   • UTI (urinary tract infection)   • Insomnia   • Sepsis ([facility])   • Acute encephalopathy   • Respiratory insufficiency   • Hypernatremia   • Pressure injury of right buttock, stage 2 ([facility])   • Stage II pressure ulcer of left buttock ([facility])   • Pressure injury of sacral region, stage 3 ([facility])   • Pressure injury of back, stage 2 ([facility])   • Pressure injury of right knee, stage 2 ([facility])   • Ambulatory dysfunction     Past Medical History:   Diagnosis Date   • CHF (congestive heart failure) ([facility])    • Diabetes mellitus ([place] [address] )    • Disease of thyroid gland    • Hyperlipidemia    • Hypertension    • Hypothyroidism (acquired)    • Restless leg syndrome    • Seizures (HCC)      Past Surgical History:   Procedure Laterality Date   • APPENDECTOMY     • CHOLECYSTECTOMY     • HIP FRACTURE SURGERY Right    • TONSILLECTOMY     • TUBAL LIGATION       Social History     Socioeconomic History   • Marital status: Single     Spouse name: None   • Number of children: None   • Years of education: None   • Highest education level: None   Occupational History   • None   Tobacco Use   • Smoking status: Never   • Smokeless tobacco: Never   • Tobacco comments:     not a smoker   Vaping Use   • Vaping Use: Never used   Substance and Sexual Activity   • Alcohol use: Yes     Comment: occasional   • Drug use: Never   • Sexual activity: Not Currently   Other Topics Concern   • None   Social History Narrative   • None     Social Determinants of Health     Financial Resource Strain: Not on file   Food Insecurity: No Food Insecurity   • Worried About Running Out of Food in the Last Year: Never true   • Ran Out of Food in the Last Year: Never true   Transportation Needs: No Transportation Needs   • Lack of Transportation (Medical): No   • Lack of Transportation (Non-Medical): No   Physical Activity: Not on file   Stress: Not on file   Social Connections: Not on file   Intimate Partner Violence: Not on file   Housing Stability: Unknown   • Unable to Pay for Housing in the Last Year: No   • Number of Places Lived in the Last Year: Not on file   • Unstable Housing in the Last Year: No        Current Outpatient Medications:   •  acetaminophen (TYLENOL) 325 mg tablet, Take 650 mg by mouth every 6 (six) hours as needed for mild pain (pain score 1-3)  , Disp: , Rfl:   •  aspirin (ECOTRIN LOW STRENGTH) 81 mg EC tablet, Take 81 mg by mouth daily, Disp: , Rfl:   •  Cholecalciferol 25 MCG (1000 UT) capsule, Take 1,000 Units by mouth daily  , Disp: , Rfl:   •  Empagliflozin 25 MG TABS, Take 25 mg by mouth every morning, Disp: , Rfl:   •  Ergocalciferol (VITAMIN D2 PO), Take 50,000 Units/day by mouth 2 (two) times a week, Disp: , Rfl:   •  ezetimibe (ZETIA) 10 mg tablet, Take 1 tablet (10 mg total) by mouth daily, Disp: 30 tablet, Rfl: 0  •  FLUoxetine (PROzac) 40 MG capsule, Take 1 capsule (40 mg total) by mouth daily Do not start before March 17, 2023 , Disp: , Rfl: 0  •  gabapentin (NEURONTIN) 100 mg capsule, Take 1 capsule (100 mg total) by mouth 2 (two) times a day Take 100 mg in the morning and 100 mg mid afternoon, Disp: 42 capsule, Rfl: 0  •  gabapentin (NEURONTIN) 300 mg capsule, Take 1 capsule (300 mg total) by mouth daily at bedtime, Disp: 21 capsule, Rfl: 0  •  hydrophilic ointment, Apply topically as needed for dry skin, Disp: , Rfl:   •  insulin glargine (LANTUS) 100 units/mL subcutaneous injection, Inject 30 Units under the skin daily at bedtime, Disp: 10 mL, Rfl: 0  •  insulin lispro (HumaLOG) 100 units/mL injection, Inject 1-5 Units under the skin 3 (three) times a day before meals, Disp: , Rfl: 0  •  levothyroxine 50 mcg tablet, Take 1 5 tablets (75 mcg total) by mouth daily, Disp: 45 tablet, Rfl: 0  •  melatonin 3 mg, Take 3 tablets (9 mg total) by mouth daily at bedtime, Disp: , Rfl: 0  •  nystatin (MYCOSTATIN) powder, Apply topically 2 (two) times a day, Disp: 15 g, Rfl: 0  •  omeprazole (PriLOSEC) 20 mg delayed release capsule, Take 20 mg by mouth daily, Disp: , Rfl:   •  topiramate (TOPAMAX) 100 mg tablet, Take 1 tablet (100 mg total) by mouth 2 (two) times a day, Disp: , Rfl: 0  Family History   Problem Relation Age of Onset   • Brain cancer Mother    • Seizures Father    • Colon cancer Maternal Grandfather    • No Known Problems Son    • No Known Problems Son    • Breast cancer Maternal Aunt 30              Coordination of Care: Wound team aware of the treatment plan  Facility nurse will provide wound treatment and monitor the wound for any changes       Patient / Staff education : Patient / Staff was given education on sign of infection and pressure ulcer prevention  Patient/ Staff verbalized understanding     Follow up :  Next week    Voice-recognition software may have been used in the preparation of this document  Occasional wrong word or "sound-alike" substitutions may have occurred due to the inherent limitations of voice recognition software  Interpretation should be guided by context        Yessy Vo

## 2023-03-23 ENCOUNTER — NURSING HOME VISIT (OUTPATIENT)
Dept: GERIATRICS | Facility: OTHER | Age: 68
End: 2023-03-23

## 2023-03-23 DIAGNOSIS — I50.32 CHRONIC HEART FAILURE WITH PRESERVED EJECTION FRACTION (HCC): Chronic | ICD-10-CM

## 2023-03-23 DIAGNOSIS — Z79.4 TYPE 2 DIABETES MELLITUS WITH HYPERGLYCEMIA, WITH LONG-TERM CURRENT USE OF INSULIN (HCC): ICD-10-CM

## 2023-03-23 DIAGNOSIS — E11.65 TYPE 2 DIABETES MELLITUS WITH HYPERGLYCEMIA, WITH LONG-TERM CURRENT USE OF INSULIN (HCC): ICD-10-CM

## 2023-03-23 DIAGNOSIS — R53.1 WEAKNESS: ICD-10-CM

## 2023-03-23 DIAGNOSIS — E03.9 ACQUIRED HYPOTHYROIDISM: ICD-10-CM

## 2023-03-23 DIAGNOSIS — I10 PRIMARY HYPERTENSION: ICD-10-CM

## 2023-03-23 DIAGNOSIS — K21.9 GASTROESOPHAGEAL REFLUX DISEASE, UNSPECIFIED WHETHER ESOPHAGITIS PRESENT: Primary | ICD-10-CM

## 2023-03-23 DIAGNOSIS — E78.2 MIXED HYPERLIPIDEMIA: ICD-10-CM

## 2023-03-23 DIAGNOSIS — E87.0 HYPERNATREMIA: ICD-10-CM

## 2023-03-23 PROBLEM — L89.153 PRESSURE INJURY OF SACRAL REGION, STAGE 3 (HCC): Status: ACTIVE | Noted: 2023-03-23

## 2023-03-23 PROBLEM — L89.312 PRESSURE INJURY OF RIGHT BUTTOCK, STAGE 2 (HCC): Status: ACTIVE | Noted: 2023-03-23

## 2023-03-23 PROBLEM — L89.102 PRESSURE INJURY OF BACK, STAGE 2 (HCC): Status: ACTIVE | Noted: 2023-03-23

## 2023-03-23 PROBLEM — R26.2 AMBULATORY DYSFUNCTION: Status: ACTIVE | Noted: 2023-03-23

## 2023-03-23 PROBLEM — L89.322 STAGE II PRESSURE ULCER OF LEFT BUTTOCK (HCC): Status: ACTIVE | Noted: 2023-03-23

## 2023-03-23 PROBLEM — L89.892: Status: ACTIVE | Noted: 2023-03-23

## 2023-03-23 NOTE — ASSESSMENT & PLAN NOTE
Back  · Partial-thickness, with no obvious sign of infection  · Local wound care with Triad  · Offload, on air mattress  · Increase protein intake, continue protein supplement  · Follow-up next week

## 2023-03-23 NOTE — ASSESSMENT & PLAN NOTE
Current blood pressure reviewed and is acceptable  Currently not on any antihypertensives  Continue to monitor blood pressure as per rehab protocol

## 2023-03-23 NOTE — ASSESSMENT & PLAN NOTE
Lab Results   Component Value Date    HGBA1C 11 8 (H) 09/19/2022     Continue with 30 units glargine at bedtime  Continue with frequent Accu-Cheks  Additional coverage by sliding scale insulin  Carb consistent diet  Hypoglycemia protocol

## 2023-03-23 NOTE — ASSESSMENT & PLAN NOTE
Recent sodium level-156  Currently on IV fluids  Etiology-decreased p o  intake  Continue to monitor sodium  Encourage p o  intake

## 2023-03-23 NOTE — ASSESSMENT & PLAN NOTE
Lab Results   Component Value Date    HGBA1C 11 8 (H) 09/19/2022   Under the care of Senior care team

## 2023-03-23 NOTE — ASSESSMENT & PLAN NOTE
Right knee  Partial-thickness, with no obvious sign of infection  Local wound care with bordered foam 3 times a week

## 2023-03-23 NOTE — ASSESSMENT & PLAN NOTE
Sacrum  · Full-thickness, with no obvious sign of infection  · Local wound care with Triad  · Offload, on air mattress  · Increase protein intake, continue protein supplement  · Follow-up next week

## 2023-03-23 NOTE — ASSESSMENT & PLAN NOTE
Wt Readings from Last 3 Encounters:   03/17/23 90 3 kg (199 lb)   03/16/23 96 8 kg (213 lb 6 5 oz)   03/11/23 98 kg (216 lb 0 8 oz)       Appears euvolemic  Continue to monitor volume status clinically  Daily weights, I's and O's

## 2023-03-23 NOTE — ASSESSMENT & PLAN NOTE
Left buttock  · Partial-thickness, with no obvious sign of infection  · Local wound care with Triad  · Offload, on air mattress  · Increase protein intake, continue protein supplement  · Follow-up next week

## 2023-03-23 NOTE — PROGRESS NOTES
[address]  [address]   [address] [facility] [address] [facility], [address]     Progress Note  Code [id number]    Patient Location     [facility] rehab     Reason for visit     Follow up on chronic medical conditions     Patient’s care was coordinated with nursing facility staff  Recent vitals, labs and updated medications were reviewed on [facility] of facility  Problem List Items Addressed This Visit        Digestive    GERD (gastroesophageal reflux disease) - Primary     Continue omeprazole 20 mg daily            Endocrine    Diabetes mellitus ([facility] [address] )       Lab Results   Component Value Date    HGBA1C 11 8 (H) 09/19/2022     Continue with 30 units glargine at bedtime  Continue with frequent Accu-Cheks  Additional coverage by sliding scale insulin  Carb consistent diet  Hypoglycemia protocol         Acquired hypothyroidism     Continue levothyroxine 75 mcg daily            Cardiovascular and Mediastinum    Chronic heart failure with preserved ejection fraction (HCC) (Chronic)     Wt Readings from Last 3 Encounters:   03/17/23 90 3 kg (199 lb)   03/16/23 96 8 kg (213 lb 6 5 oz)   03/11/23 98 kg (216 lb 0 8 oz)       Appears euvolemic  Continue to monitor volume status clinically  Daily weights, I's and O's           Hypertension     Current blood pressure reviewed and is acceptable  Currently not on any antihypertensives  Continue to monitor blood pressure as per rehab protocol            Other    Hyperlipidemia     Continue ezetimibe 10 mg daily         Weakness     Fall precautions  Safety precautions  PT/OT         Hypernatremia     Recent sodium level-156  Currently on IV fluids  Etiology-decreased p o  intake  Continue to monitor sodium  Encourage p o  intake                HPI         Patient is a 43-year-old female  With past medical history significant for GERD, type 2 diabetes mellitus, CHF, CAD, seizure disorder, depression    She was recently in the hospital for sepsis secondary to UTI  She had episodes of unresponsiveness/acute encephalopathy  She was diagnosed in ER to protect her airway  Her urine was found to have multidrug resistant strain of E  coli  She was given IV ertapenem  Her blood cultures remained negative  Patient's encephalopathy subsequently resolved  She was seen and examined at bedside to today  She was laying comfortably on her bed  She was sleepy, but arousable, oriented x2  She has had poor oral intake for few days now  She was found to have hypernatremia secondary to decreased p o  intake  She is on IV fluids  We are monitoring her BMP  She denies chest pain, shortness of breath, nausea, vomiting, diarrhea  Endorses generalized weakness and loss of appetite  Review of Systems   Constitutional: Positive for activity change and fatigue  Negative for chills and fever  HENT: Negative for congestion  Respiratory: Negative for cough and shortness of breath  Cardiovascular: Negative for chest pain, palpitations and leg swelling  Gastrointestinal: Positive for abdominal distention  Negative for abdominal pain, constipation and diarrhea  Genitourinary: Negative for dysuria and urgency  Musculoskeletal: Positive for arthralgias  Skin: Positive for pallor  Neurological: Positive for weakness  Negative for dizziness and numbness  Psychiatric/Behavioral: Positive for confusion  Negative for agitation         Past Medical History:   Diagnosis Date   • CHF (congestive heart failure) (HCC)    • Diabetes mellitus ([facility] )    • Disease of thyroid gland    • Hyperlipidemia    • Hypertension    • Hypothyroidism (acquired)    • Restless leg syndrome    • Seizures ([facility] )        Past Surgical History:   Procedure Laterality Date   • APPENDECTOMY     • CHOLECYSTECTOMY     • HIP FRACTURE SURGERY Right    • TONSILLECTOMY     • TUBAL LIGATION         Social History     Tobacco Use   Smoking Status Never   Smokeless Tobacco Never   Tobacco Comments    not a smoker       Family History   Problem Relation Age of Onset   • Brain cancer Mother    • Seizures Father    • Colon cancer Maternal Grandfather    • No Known Problems Son    • No Known Problems Son    • Breast cancer Maternal Aunt 30        Allergies   Allergen Reactions   • Pineapple - Food Allergy Anaphylaxis   • Statins Other (See Comments)     Cramping from head to toe    • Bupropion Other (See Comments)   • Byetta 10 Mcg Pen [Exenatide]    • Ceftriaxone Other (See Comments)   • Marijuana [Dronabinol] GI Intolerance   • Metformin Diarrhea     Other reaction(s): Unknown   • Omeprazole Hives   • Ondansetron      Other reaction(s): Unknown   • Coconut Oil - Food Allergy Rash   • Liraglutide Rash and Other (See Comments)     Generalized rash   • Penicillins Rash         Current Outpatient Medications:   •  acetaminophen (TYLENOL) 325 mg tablet, Take 650 mg by mouth every 6 (six) hours as needed for mild pain (pain score 1-3)  , Disp: , Rfl:   •  aspirin (ECOTRIN LOW STRENGTH) 81 mg EC tablet, Take 81 mg by mouth daily, Disp: , Rfl:   •  Cholecalciferol 25 MCG (1000 UT) capsule, Take 1,000 Units by mouth daily  , Disp: , Rfl:   •  Empagliflozin 25 MG TABS, Take 25 mg by mouth every morning, Disp: , Rfl:   •  Ergocalciferol (VITAMIN D2 PO), Take 50,000 Units/day by mouth 2 (two) times a week, Disp: , Rfl:   •  ezetimibe (ZETIA) 10 mg tablet, Take 1 tablet (10 mg total) by mouth daily, Disp: 30 tablet, Rfl: 0  •  FLUoxetine (PROzac) 40 MG capsule, Take 1 capsule (40 mg total) by mouth daily Do not start before March 17, 2023 , Disp: , Rfl: 0  •  gabapentin (NEURONTIN) 100 mg capsule, Take 1 capsule (100 mg total) by mouth 2 (two) times a day Take 100 mg in the morning and 100 mg mid afternoon, Disp: 42 capsule, Rfl: 0  •  gabapentin (NEURONTIN) 300 mg capsule, Take 1 capsule (300 mg total) by mouth daily at bedtime, Disp: 21 capsule, Rfl: 0  •  hydrophilic ointment, Apply topically as needed for dry skin, Disp: , Rfl:   •  insulin glargine (LANTUS) 100 units/mL subcutaneous injection, Inject 30 Units under the skin daily at bedtime, Disp: 10 mL, Rfl: 0  •  insulin lispro (HumaLOG) 100 units/mL injection, Inject 1-5 Units under the skin 3 (three) times a day before meals, Disp: , Rfl: 0  •  levothyroxine 50 mcg tablet, Take 1 5 tablets (75 mcg total) by mouth daily, Disp: 45 tablet, Rfl: 0  •  melatonin 3 mg, Take 3 tablets (9 mg total) by mouth daily at bedtime, Disp: , Rfl: 0  •  nystatin (MYCOSTATIN) powder, Apply topically 2 (two) times a day, Disp: 15 g, Rfl: 0  •  omeprazole (PriLOSEC) 20 mg delayed release capsule, Take 20 mg by mouth daily, Disp: , Rfl:   •  topiramate (TOPAMAX) 100 mg tablet, Take 1 tablet (100 mg total) by mouth 2 (two) times a day, Disp: , Rfl: 0    Updated list was reviewed in [facility] of facility  Weight-211 8 pounds  Blood pressure 144/80  Temperature 97 6  Pulse 74  [id number]     blood sugar 98  Physical Exam  Vitals reviewed  Constitutional:       Comments: Chronically ill    HENT:      Head: Atraumatic  Mouth/Throat:      Mouth: Mucous membranes are dry  Eyes:      General: No scleral icterus  Cardiovascular:      Rate and Rhythm: Normal rate and regular rhythm  Heart sounds: Normal heart sounds  Pulmonary:      Effort: No respiratory distress  Breath sounds: Normal breath sounds  Abdominal:      General: Bowel sounds are normal  There is distension  Tenderness: There is no abdominal tenderness  Musculoskeletal:      Right lower leg: No edema  Left lower leg: No edema  Skin:     General: Skin is warm and dry  Capillary Refill: Capillary refill takes less than 2 seconds  Neurological:      Mental Status: She is disoriented  Motor: Weakness present  Diagnostic Data:    Recent labs were reviewed  Additional Notes:      This note was electronically signed by Dr Tommi Seip

## 2023-03-25 ENCOUNTER — TELEPHONE (OUTPATIENT)
Dept: OTHER | Facility: OTHER | Age: 68
End: 2023-03-25

## 2023-03-29 ENCOUNTER — NURSING HOME VISIT (OUTPATIENT)
Dept: WOUND CARE | Facility: HOSPITAL | Age: 68
End: 2023-03-29

## 2023-03-29 ENCOUNTER — NURSING HOME VISIT (OUTPATIENT)
Dept: GERIATRICS | Facility: OTHER | Age: 68
End: 2023-03-29

## 2023-03-29 VITALS
SYSTOLIC BLOOD PRESSURE: 145 MMHG | TEMPERATURE: 97.6 F | OXYGEN SATURATION: 97 % | RESPIRATION RATE: 18 BRPM | DIASTOLIC BLOOD PRESSURE: 87 MMHG | BODY MASS INDEX: 37.25 KG/M2 | WEIGHT: 210.3 LBS | HEART RATE: 91 BPM

## 2023-03-29 DIAGNOSIS — L89.312 PRESSURE INJURY OF RIGHT BUTTOCK, STAGE 2 (HCC): Primary | ICD-10-CM

## 2023-03-29 DIAGNOSIS — R33.9 URINARY RETENTION: Primary | ICD-10-CM

## 2023-03-29 DIAGNOSIS — L89.322 STAGE II PRESSURE ULCER OF LEFT BUTTOCK (HCC): ICD-10-CM

## 2023-03-29 DIAGNOSIS — L89.102 PRESSURE INJURY OF BACK, STAGE 2 (HCC): ICD-10-CM

## 2023-03-29 DIAGNOSIS — L89.892 PRESSURE INJURY OF RIGHT KNEE, STAGE 2 (HCC): ICD-10-CM

## 2023-03-29 DIAGNOSIS — L89.153 PRESSURE INJURY OF SACRAL REGION, STAGE 3 (HCC): ICD-10-CM

## 2023-03-29 NOTE — ASSESSMENT & PLAN NOTE
Patient was noted to have distended noted on palpation physical exam today     Patient has not been voiding  Nurse was advised to straight cath the patient  Straight cath revealed PVR of 999 mL  Possible neurogenic bladder  Kay catheter to be left in place for now  Check CBC and BMP in a m

## 2023-03-29 NOTE — PROGRESS NOTES
[facility]  [address] [place] [address], [address] [place] [address]  [id number]    Nursing Home Admission    NAME: Katja Lutz  AGE: 75 y o  SEX: female [id number]      Patient Location     [address] [place] [address] rehab    Patient’s care was coordinated with nursing facility staff  Recent vitals, labs and updated medications were reviewed on [facility]  Past Medical, surgical, social, medication and allergy history and patient’s previous records reviewed  Assessment/Plan:    Urinary retention  Patient was noted to have distended noted on palpation physical exam today     Patient has not been voiding  Nurse was advised to straight cath the patient  Straight cath revealed PVR of 999 mL  Possible neurogenic bladder  Kay catheter to be left in place for now  Check CBC and BMP in a m  Decreased p o  intake:  Patient has had poor oral intake since returning from the hospital   She was noted to have evidence of urinary retention on today's encounter  Kay catheter was placed  Encourage p o  intake  Follow-up with dietitian  Add protein supplements  Discussed with the son  He will be coming in to talk to the patient tomorrow  Son is considering alternative means of feeding if patient's p o  intake continues to remain low  Reconsult Dr Felecia Peguero from psychiatry service  Depression could be contributing to decreased intake  May consider trial of Remeron  This was discontinued previously to avoid polypharmacy    Depression; Patient has longstanding history of depression  She was evaluated by psych service at the hospital   Per records patient has been tried on multiple medications in the past   Currently remains on Prozac  Remeron was discontinued to avoid polypharmacy  Reconsult Dr Felecia Peguero for depression    UTI:  Patient was recently treated for multidrug-resistant strain of E  coli  Completed antibiotic course      GERD:  Continue omeprazole    Hyperlipidemia:  Continue ezetimibe    Diabetes mellitus type 2:  Blood sugars are running on the lower side due to poor oral intake with readings between   Patient currently remains on insulin glargine 18 units at bedtime and empagliflozin 25 mg daily  Decrease Lantus to 8 units and follow blood sugars    Respiratory insufficiency:   Patient presented to ER with episodes of unresponsiveness  She was intubated for airway protection  Patient was later extubated  Chest x-ray revealed opacities suggestive of atelectasis rather than pneumonia  Respiratory status remains currently stable with SaO2 of 97% on oxygen    Chronic heart failure with preserved ejection fraction:   Echocardiogram in February/22 revealed grade 2 diastolic dysfunction mild to moderate aortic stenosis and RV pressures of 51  Currently euvolemic  Patient is not on any maintenance diuretics  We will follow    REGINO:   Patient presented to the hospital with creatinine of 5 2 suspected to be prerenal   Baseline creatinine is around 1 13  Creatinine came down to 1 7 with hydration  BMP from 3/23 revealed creatinine to be lower at 1 15 with BUN of 36  Sodium level was 145  Given profound urinary retention with repeat BMP in a m  Acute encephalopathy:  Patient was recently hospitalized with acute encephalopathy attributed to UTI  CT head was negative  Ammonia level was 62  Blood cultures remain negative  Urine culture revealed multidrug resistant strain of E  coli  Patient was treated with antibiotics with subsequent improvement in mental status  Sacral and left buttock wound:  Being followed by wound care team  Recent notes were reviewed    Goal of care discussion:  Goals of care discussion was carried out with patient's son  Patient is DNR for now however son wishes to continue current level of care  Son is considering alternative means of feeding if patient's p o  intake remains poor    High amount  Reason for visit     Overall decline, decreased p o  intake    HPI     Patient is being seen for a follow-up visit today  Care coordinated with nursing staff  Per staff pt is noted to have significant decline since returning from the hospital   P o  intake is down  Patient remains bedbound  She is needing significant assistance with all ADLs no fever chills or dyspnea  At the time of my evaluation patient denies any active complaints  She is slow in answering questions  Past Medical History:   Diagnosis Date   • CHF (congestive heart failure) (HCC)    • Diabetes mellitus ([place] [address] )    • Disease of thyroid gland    • Hyperlipidemia    • Hypertension    • Hypothyroidism (acquired)    • Restless leg syndrome    • Seizures ([facility] )        Past Surgical History:   Procedure Laterality Date   • APPENDECTOMY     • CHOLECYSTECTOMY     • HIP FRACTURE SURGERY Right    • TONSILLECTOMY     • TUBAL LIGATION         Social History     Tobacco Use   Smoking Status Never   Smokeless Tobacco Never   Tobacco Comments    not a smoker          Family History   Problem Relation Age of Onset   • Brain cancer Mother    • Seizures Father    • Colon cancer Maternal Grandfather    • No Known Problems Son    • No Known Problems Son    • Breast cancer Maternal Aunt 30        Allergies   Allergen Reactions   • Pineapple - Food Allergy Anaphylaxis   • Statins Other (See Comments)     Cramping from head to toe    • Bupropion Other (See Comments)   • Byetta 10 Mcg Pen [Exenatide]    • Ceftriaxone Other (See Comments)   • Marijuana [Dronabinol] GI Intolerance   • Metformin Diarrhea     Other reaction(s): Unknown   • Omeprazole Hives   • Ondansetron      Other reaction(s): Unknown   • Coconut Oil - Food Allergy Rash   • Liraglutide Rash and Other (See Comments)     Generalized rash   • Penicillins Rash          Current Outpatient Medications:   •  acetaminophen (TYLENOL) 325 mg tablet, Take 650 mg by mouth every 6 (six) hours as needed for mild pain (pain score 1-3)  , Disp: , Rfl:   •  aspirin (ECOTRIN LOW STRENGTH) 81 mg EC tablet, Take 81 mg by mouth daily, Disp: , Rfl:   •  Cholecalciferol 25 MCG (1000 UT) capsule, Take 1,000 Units by mouth daily  , Disp: , Rfl:   •  Empagliflozin 25 MG TABS, Take 25 mg by mouth every morning, Disp: , Rfl:   •  Ergocalciferol (VITAMIN D2 PO), Take 50,000 Units/day by mouth 2 (two) times a week, Disp: , Rfl:   •  ezetimibe (ZETIA) 10 mg tablet, Take 1 tablet (10 mg total) by mouth daily, Disp: 30 tablet, Rfl: 0  •  FLUoxetine (PROzac) 40 MG capsule, Take 1 capsule (40 mg total) by mouth daily Do not start before March 17, 2023 , Disp: , Rfl: 0  •  gabapentin (NEURONTIN) 100 mg capsule, Take 1 capsule (100 mg total) by mouth 2 (two) times a day Take 100 mg in the morning and 100 mg mid afternoon, Disp: 42 capsule, Rfl: 0  •  gabapentin (NEURONTIN) 300 mg capsule, Take 1 capsule (300 mg total) by mouth daily at bedtime, Disp: 21 capsule, Rfl: 0  •  hydrophilic ointment, Apply topically as needed for dry skin, Disp: , Rfl:   •  insulin glargine (LANTUS) 100 units/mL subcutaneous injection, Inject 30 Units under the skin daily at bedtime, Disp: 10 mL, Rfl: 0  •  insulin lispro (HumaLOG) 100 units/mL injection, Inject 1-5 Units under the skin 3 (three) times a day before meals, Disp: , Rfl: 0  •  levothyroxine 50 mcg tablet, Take 1 5 tablets (75 mcg total) by mouth daily, Disp: 45 tablet, Rfl: 0  •  melatonin 3 mg, Take 3 tablets (9 mg total) by mouth daily at bedtime, Disp: , Rfl: 0  •  nystatin (MYCOSTATIN) powder, Apply topically 2 (two) times a day, Disp: 15 g, Rfl: 0  •  omeprazole (PriLOSEC) 20 mg delayed release capsule, Take 20 mg by mouth daily, Disp: , Rfl:   •  topiramate (TOPAMAX) 100 mg tablet, Take 1 tablet (100 mg total) by mouth 2 (two) times a day, Disp: , Rfl: 0    Updated list was reviewed in [facility] system of facility       Vitals:    03/29/23 1454   BP: 145/87   Pulse: 91   Resp: 18   Temp: 97 6 °F (36 4 °C)   SpO2: 97%       Vital signs were reviewed in point click care    Review of Systems   Constitutional: Positive for appetite change (Poor oral intake) and fatigue  Negative for chills and fever  HENT: Negative for nosebleeds  Eyes: Negative for redness  Respiratory: Negative for cough, chest tightness, shortness of breath, wheezing and stridor  Cardiovascular: Negative for chest pain and leg swelling  Gastrointestinal: Negative for abdominal pain, diarrhea and vomiting  Genitourinary: Negative for flank pain and hematuria  Musculoskeletal: Positive for gait problem  Negative for arthralgias and back pain  Neurological: Positive for weakness (Generalized)  Negative for tremors, seizures and syncope  Psychiatric/Behavioral: Positive for confusion  Negative for agitation and behavioral problems  Physical Exam  Constitutional:       General: She is not in acute distress  Appearance: She is obese  Comments: Pt appears weak, slow in answering questions   HENT:      Head: Normocephalic and atraumatic  Nose: No rhinorrhea  Eyes:      General: No scleral icterus  Right eye: No discharge  Left eye: No discharge  Cardiovascular:      Rate and Rhythm: Normal rate and regular rhythm  Heart sounds: Murmur heard  Pulmonary:      Breath sounds: No wheezing, rhonchi or rales  Abdominal:      General: There is no distension  Palpations: Abdomen is soft  Tenderness: There is no abdominal tenderness  There is no guarding  Comments: Urinary bladder distended, palpable   Musculoskeletal:      Cervical back: Neck supple  Right lower leg: Edema present  Left lower leg: Edema present  Comments: Mild edema involving B/L LE   Skin:     Coloration: Skin is not jaundiced  Findings: Bruising (Few scattered ecchymosis involving all extremities) present  Neurological:      Mental Status: Mental status is at baseline  Cranial Nerves: No cranial nerve deficit        Motor: Weakness (Generalized) present  Comments: Knows the year, could not remember the month correctly   Psychiatric:         Mood and Affect: Mood normal          Behavior: Behavior normal            Diagnostic Data        Labs from 3/23 reviewed  Lab Results   Component Value Date    WBC 8 25 03/16/2023    HGB 11 4 (L) 03/16/2023    HCT 36 9 03/16/2023    MCV 94 03/16/2023     03/16/2023     Lab Results   Component Value Date    SODIUM 142 03/16/2023    K 3 5 03/16/2023     (H) 03/16/2023    CO2 22 03/16/2023    BUN 43 (H) 03/16/2023    CREATININE 1 71 (H) 03/16/2023    GLUC 122 03/16/2023    CALCIUM 8 1 (L) 03/16/2023       Care coordinated with pt's son over the phone          This note was electronically signed by Dr Nika Cheatham

## 2023-03-30 ENCOUNTER — NURSING HOME VISIT (OUTPATIENT)
Dept: GERIATRICS | Facility: OTHER | Age: 68
End: 2023-03-30

## 2023-03-30 VITALS
RESPIRATION RATE: 18 BRPM | HEART RATE: 85 BPM | TEMPERATURE: 98 F | BODY MASS INDEX: 37.25 KG/M2 | WEIGHT: 210.3 LBS | OXYGEN SATURATION: 98 % | SYSTOLIC BLOOD PRESSURE: 149 MMHG | DIASTOLIC BLOOD PRESSURE: 81 MMHG

## 2023-03-30 DIAGNOSIS — E87.0 HYPERNATREMIA: Primary | ICD-10-CM

## 2023-03-30 NOTE — PROGRESS NOTES
[address] [facility]       Patient ID: Mahesh Duke is a 75 y o  female Date of Birth 1955     Location of Service: [address] [place] [address]    Performed wound round with: Wound team     Chief Complaint : Multiple wounds    Wound Instructions:  Wound: Buttocks and sacrum  Cleanse with soap and water, pat dry  Apply Skin-Prep to periwound area  Apply Triad to wound bed  Twice a day and as needed for soiling    Wound:Back  Cleanse with normal saline solution or wound cleanser  Apply Skin-Prep to periwound area  Apply Triad to wound bed and cover with wound dressing  Daily and as needed for soiling    Offload all wounds  Turn and reposition frequently, maximum of every two hours  Instruct / Assist with weight shifting every 15 - 20 minutes when in chair  Increase protein intake  Monitor for any sign of infection or worsening, inform PCP or patient's primary physician in your facility  Allergies  Pineapple - food allergy, Statins, Bupropion, Byetta 10 mcg pen [exenatide], Ceftriaxone, Marijuana [dronabinol], Metformin, Omeprazole, Ondansetron, Coconut oil - food allergy, Liraglutide, and Penicillins      Assessment & Plan:  1  Pressure injury of right buttock, stage 2 (HCC)  Assessment & Plan:  Right buttock  · Increasing wound size, partial-thickness, with no obvious sign of infection  · Local wound care with Triad -continue  · Offload, on air mattress  · Increase protein intake, continue protein supplement  · Follow-up next week      2  Stage II pressure ulcer of left buttock (HCC)  Assessment & Plan:  Left buttock  · Increasing wound size, partial-thickness, with no obvious sign of infection  · Local wound care with Triad -continue  · Offload, on air mattress  · Increase protein intake, continue protein supplement  · Follow-up next week        3   Pressure injury of sacral region, stage 3 (HCC)  Assessment & Plan:  Sacrum  · Increase in wound size, full-thickness, with no obvious sign of infection  · Local wound care with Triad -continue  · Offload, on air mattress  · Increase protein intake, continue protein supplement  · Follow-up next week        4  Pressure injury of back, stage 2 ([facility])  Assessment & Plan:  Back  · Significant decrease in wound size, partial-thickness, with no obvious sign of infection  · Local wound care with Triad  · Offload, on air mattress  · Increase protein intake, continue protein supplement  · Follow-up next week        5  Pressure injury of right knee, stage 2 [facility]  Assessment & Plan:  Wound on the right knee is healed             Subjective:   March 22, 2023  This is a new consult for wound on the right buttocks, sacrum, left buttocks, back, and right knee  Patient was referred by Senior care team   Patient have a complex medical history including but not limited to UTI, acute kidney injury, CHF, and GERD  As per medical record review, patient was recently admitted at [facility] from March 11 to March 16, 2023 for sepsis  Patient was transferred on discharge to [facility] rehab for short-term rehab  Wound history: As per medical record review, patient had wound on the sacrum that was first assessed on March 11, 2023 the wound on back was first assessed on March 13, 2023  The wound on the right lower leg was first assessed on March 13, 2023  The etiology of all mention wounds are pressure injury  Received patient in bed, seems comfortable  Total dependent with positioning in bed  Currently on air mattress  Patient have a poor appetite  Patient is incontinent of both bowel and bladder  Patient currently on protein supplement, Prosource and Magic cup  March 29, 2023  Follow-up for wound on the right buttock, sacrum, left buttock, back, and right knee  Received patient in bed, seems comfortable  Denies pain  Facility did not report any significant issues related to the wound        Review of Systems Constitutional: Negative  Respiratory: Negative  Cardiovascular: Negative  Objective:    Physical Exam  Constitutional:       Appearance: She is obese  HENT:      Head: Normocephalic and atraumatic  Pulmonary:      Effort: Pulmonary effort is normal    Abdominal:      Tenderness: There is no abdominal tenderness  There is no guarding  Genitourinary:     Comments: incontinent  Musculoskeletal:         General: No tenderness  Cervical back: Normal range of motion  Right lower leg: No edema  Left lower leg: No edema  Comments: LROM   Skin:     Findings: Lesion present  Comments: Right buttock: Wound size is 1 x 0 5 x 0 1 cm ,  100% granulation, no drainage, periwound is normal, with no obvious sign of infection    Sacral wound: Wound size is 2 x 0 5 x 0 1 cm ,  100% epithelial, no drainage, periwound is normal, with no obvious sign of infection    Left buttocks: Wound size is 4 x 3 x 0 1 cm ,  100% epithelial, no drainage, periwound is normal, with no obvious sign of infection    back: Wound size is 5 x 0 2 x 0 1 cm ,  100% epithelial, no drainage, periwound is normal, with no obvious sign of infection    Right knee posterior: Wound is healed   Neurological:      Mental Status: She is alert  Gait: Gait abnormal    Psychiatric:         Mood and Affect: Mood normal          Behavior: Behavior normal               Procedures           Patient's care was coordinated with nursing facility staff  Recent vitals, labs and updated medications were reviewed on EMR or chart system of facility  Past Medical, surgical, social, medication and allergy history and patient's previous records were reviewed and updated as appropriate: Most up-to date information is available in the facility EMR where the patient is currently admitted      Patient Active Problem List   Diagnosis   • Seizures ([facility] )   • Restless leg syndrome   • Hypertension   • Hyperlipidemia   • Diabetes mellitus ([facility] )   • GERD (gastroesophageal reflux disease)   • Epigastric abdominal pain   • Encounter for examination and observation for other specified reasons   • Severe episode of recurrent major depressive disorder, without psychotic features ([place] [address] )   • Gambling disorder, episodic, moderate   • Suspected COVID-19 virus infection   • Major depressive disorder   • Acquired hypothyroidism   • Diarrhea   • Asymptomatic bacteriuria   • Chronic fatigue and malaise   • Calf tenderness   • Morbid obesity due to excess calories ([facility])   • Onychomycosis of toenail   • Orthostatic hypotension   • Primary osteoarthritis of one hip, right   • Dyspnea on exertion   • Chronic heart failure with preserved ejection fraction ([facility])   • Chronic respiratory failure ([facility])   • Coronary artery disease involving native coronary artery of native heart without angina pectoris   • Syncope   • Elevated d-dimer   • Weakness   • Abnormal urine   • Diabetes mellitus type 2 in obese ([facility])   • Fungal dermatitis   • Chest pressure   • Left lower quadrant abdominal pain   • REGINO (acute kidney injury) ([place] [address] )   • Nausea   • UTI (urinary tract infection)   • Insomnia   • Sepsis ([facility])   • Acute encephalopathy   • Respiratory insufficiency   • Hypernatremia   • Pressure injury of right buttock, stage 2 ([facility])   • Stage II pressure ulcer of left buttock ([facility])   • Pressure injury of sacral region, stage 3 ([facility])   • Pressure injury of back, stage 2 ([facility])   • Pressure injury of right knee, stage 2 ([facility])   • Ambulatory dysfunction   • Urinary retention     Past Medical History:   Diagnosis Date   • CHF (congestive heart failure) ([facility])    • Diabetes mellitus ([place] [address] )    • Disease of thyroid gland    • Hyperlipidemia    • Hypertension    • Hypothyroidism (acquired)    • Restless leg syndrome    • Seizures ([place] [address] )      Past Surgical History:   Procedure Laterality Date   • APPENDECTOMY     • CHOLECYSTECTOMY     • HIP FRACTURE SURGERY Right    • TONSILLECTOMY     • TUBAL LIGATION Social History     Socioeconomic History   • Marital status: Single     Spouse name: None   • Number of children: None   • Years of education: None   • Highest education level: None   Occupational History   • None   Tobacco Use   • Smoking status: Never   • Smokeless tobacco: Never   • Tobacco comments:     not a smoker   Vaping Use   • Vaping Use: Never used   Substance and Sexual Activity   • Alcohol use: Yes     Comment: occasional   • Drug use: Never   • Sexual activity: Not Currently   Other Topics Concern   • None   Social History Narrative   • None     Social Determinants of Health     Financial Resource Strain: Not on file   Food Insecurity: No Food Insecurity   • Worried About Running Out of Food in the Last Year: Never true   • Ran Out of Food in the Last Year: Never true   Transportation Needs: No Transportation Needs   • Lack of Transportation (Medical): No   • Lack of Transportation (Non-Medical): No   Physical Activity: Not on file   Stress: Not on file   Social Connections: Not on file   Intimate Partner Violence: Not on file   Housing Stability: Unknown   • Unable to Pay for Housing in the Last Year: No   • Number of Places Lived in the Last Year: Not on file   • Unstable Housing in the Last Year: No        Current Outpatient Medications:   •  acetaminophen (TYLENOL) 325 mg tablet, Take 650 mg by mouth every 6 (six) hours as needed for mild pain (pain score 1-3)  , Disp: , Rfl:   •  aspirin (ECOTRIN LOW STRENGTH) 81 mg EC tablet, Take 81 mg by mouth daily, Disp: , Rfl:   •  Cholecalciferol 25 MCG (1000 UT) capsule, Take 1,000 Units by mouth daily  , Disp: , Rfl:   •  Empagliflozin 25 MG TABS, Take 25 mg by mouth every morning, Disp: , Rfl:   •  Ergocalciferol (VITAMIN D2 PO), Take 50,000 Units/day by mouth 2 (two) times a week, Disp: , Rfl:   •  ezetimibe (ZETIA) 10 mg tablet, Take 1 tablet (10 mg total) by mouth daily, Disp: 30 tablet, Rfl: 0  •  FLUoxetine (PROzac) 40 MG capsule, Take 1 capsule "(40 mg total) by mouth daily Do not start before March 17, 2023 , Disp: , Rfl: 0  •  gabapentin (NEURONTIN) 100 mg capsule, Take 1 capsule (100 mg total) by mouth 2 (two) times a day Take 100 mg in the morning and 100 mg mid afternoon, Disp: 42 capsule, Rfl: 0  •  gabapentin (NEURONTIN) 300 mg capsule, Take 1 capsule (300 mg total) by mouth daily at bedtime, Disp: 21 capsule, Rfl: 0  •  hydrophilic ointment, Apply topically as needed for dry skin, Disp: , Rfl:   •  insulin lispro (HumaLOG) 100 units/mL injection, Inject 1-5 Units under the skin 3 (three) times a day before meals, Disp: , Rfl: 0  •  levothyroxine 50 mcg tablet, Take 1 5 tablets (75 mcg total) by mouth daily, Disp: 45 tablet, Rfl: 0  •  melatonin 3 mg, Take 3 tablets (9 mg total) by mouth daily at bedtime, Disp: , Rfl: 0  •  nystatin (MYCOSTATIN) powder, Apply topically 2 (two) times a day, Disp: 15 g, Rfl: 0  •  omeprazole (PriLOSEC) 20 mg delayed release capsule, Take 20 mg by mouth daily, Disp: , Rfl:   Family History   Problem Relation Age of Onset   • Brain cancer Mother    • Seizures Father    • Colon cancer Maternal Grandfather    • No Known Problems Son    • No Known Problems Son    • Breast cancer Maternal Aunt 30              Coordination of Care: Wound team aware of the treatment plan  Facility nurse will provide wound treatment and monitor the wound for any changes  Patient / Staff education : Patient / Staff was given education on sign of infection and pressure ulcer prevention  Patient/ Staff verbalized understanding     Follow up :  Next week    Voice-recognition software may have been used in the preparation of this document  Occasional wrong word or \"sound-alike\" substitutions may have occurred due to the inherent limitations of voice recognition software  Interpretation should be guided by yudi Guevara"

## 2023-03-30 NOTE — ASSESSMENT & PLAN NOTE
Back  · Significant decrease in wound size, partial-thickness, with no obvious sign of infection  · Local wound care with Triad  · Offload, on air mattress  · Increase protein intake, continue protein supplement  · Follow-up next week

## 2023-03-30 NOTE — ASSESSMENT & PLAN NOTE
Patient has had recurrent hypenatremia due to poor oral intake and free water deficit  Sodium level was recently 156  She was treated with IV fluids last week  Patient has relapse of hypenatremia with sodium level of 152 on today's labs  P o  intake remains poor  Discussed with son, wishes to continue current level of care for now  We will start half-normal saline at 60 cc an hour followed by D5 water at 60 cc  Follow-up repeat BMP    Suspect patient remains at increased risk for recurrent hyponatremia as long as p o  intake remains poor

## 2023-03-30 NOTE — PROGRESS NOTES
[address]  [address]   [address] [facility] [address] [place], [place]  48     Progress Note  Code SNF 31    Patient Location     [facility]    Reason for visit     Abnormal labs, Hypernatremia, Poor oral intake    Patient’s care was coordinated with nursing facility staff  Recent vitals, labs and updated medications were reviewed on [facility] system of facility  Problem List Items Addressed This Visit        Other    Hypernatremia - Primary     Patient has had recurrent hypenatremia due to poor oral intake and free water deficit  Sodium level was recently 156  She was treated with IV fluids last week  Patient has relapse of hypenatremia with sodium level of 152 on today's labs  P o  intake remains poor  Discussed with son, wishes to continue current level of care for now  We will start half-normal saline at 60 cc an hour followed by D5 water at 60 cc  Follow-up repeat BMP  Suspect patient remains at increased risk for recurrent hyponatremia as long as p o  intake remains poor          Urinary retention:   Patient was noted to have PVR of 999 cc on straight cath from yesterday  Straight cath was done for palpable urinary bladder  Kay catheter remains in place for now  Decreased p o  intake:  Patient has had poor oral intake since readmission from the hospital   Follow-up with dietitian  Add mirtazapine  Suspect patient has some underlying depression contributing to decreased intake in addition to other factors    Diabetes mellitus type II:  Insulin dose was recently reduced follow blood sugars  Blood sugars continue to remain on the lower side due to decreased p o  intake  Fasting blood sugar was 74 this morning  Will discontinue Lantus and follow  Patient remains on empagliflozin 5 mg daily    Goal of care discussion:  Patient has had a significant decline during the last 2 weeks  P o   intake remains poor with recurrent hyponatremia needing IV fluids  Son wishes to continue current level of care  Follow patient's progress over the next few weeks before making further decision regarding aggressive care  with alternative means of feeding versus comfort care    Depression:  Patient has longstanding history of depression  Currently remains on Prozac 40 mg daily  Discussed with psych service yesterday  Add Remeron for both depression and poor oral intake    UTI:  Patient recently completed antibiotic course for multidrug-resistant strain of E Coli    Hypothyroidism:  Continue Levothyroxine     Lab Results   Component Value Date    [id number] 2 178 03/11/2023       HPI       Patient is being seen for evaluation of abnormal labs  BMP from today reveals sodium to be high at 152,  BUN 42 with creatinine of 1 23  Of note patient has had poor oral intake during last few days  She was noted to have hypenatremia last week as well with sodium level of 156  Patient was treated with hypotonic IV fluids  P o  intake continues to remain low  Patient had evidence of urinary retention with PVR of around 1000 cc yesterday  Kay was placed  At the time of my evaluation today patient appears to be weak  She is mostly bedbound needing significant assistance with all ADLs  Patient is awake answering questions appropriately  Blood sugars are noted to be on the lower side  Review of Systems   Constitutional: Positive for fatigue  Negative for chills and fever  Respiratory: Negative for shortness of breath and wheezing  Cardiovascular: Negative for chest pain and leg swelling  Genitourinary: Negative for hematuria  Urinary retention with distended bladder noted earlier   Musculoskeletal: Positive for gait problem  Neurological: Positive for weakness  Negative for seizures  Psychiatric/Behavioral: Positive for dysphoric mood         Past Medical History:   Diagnosis Date   • CHF (congestive heart failure) ([facility] )    • Diabetes mellitus ([facility] )    • Disease of thyroid gland    • Hyperlipidemia    • Hypertension    • Hypothyroidism (acquired)    • Restless leg syndrome    • Seizures (HCC)        Past Surgical History:   Procedure Laterality Date   • APPENDECTOMY     • CHOLECYSTECTOMY     • HIP FRACTURE SURGERY Right    • TONSILLECTOMY     • TUBAL LIGATION         Social History     Tobacco Use   Smoking Status Never   Smokeless Tobacco Never   Tobacco Comments    not a smoker       Family History   Problem Relation Age of Onset   • Brain cancer Mother    • Seizures Father    • Colon cancer Maternal Grandfather    • No Known Problems Son    • No Known Problems Son    • Breast cancer Maternal Aunt 30        Allergies   Allergen Reactions   • Pineapple - Food Allergy Anaphylaxis   • Statins Other (See Comments)     Cramping from head to toe    • Bupropion Other (See Comments)   • Byetta 10 Mcg Pen [Exenatide]    • Ceftriaxone Other (See Comments)   • Marijuana [Dronabinol] GI Intolerance   • Metformin Diarrhea     Other reaction(s): Unknown   • Omeprazole Hives   • Ondansetron      Other reaction(s): Unknown   • Coconut Oil - Food Allergy Rash   • Liraglutide Rash and Other (See Comments)     Generalized rash   • Penicillins Rash         Current Outpatient Medications:   •  acetaminophen (TYLENOL) 325 mg tablet, Take 650 mg by mouth every 6 (six) hours as needed for mild pain (pain score 1-3)  , Disp: , Rfl:   •  aspirin (ECOTRIN LOW STRENGTH) 81 mg EC tablet, Take 81 mg by mouth daily, Disp: , Rfl:   •  Cholecalciferol 25 MCG (1000 UT) capsule, Take 1,000 Units by mouth daily  , Disp: , Rfl:   •  Empagliflozin 25 MG TABS, Take 25 mg by mouth every morning, Disp: , Rfl:   •  Ergocalciferol (VITAMIN D2 PO), Take 50,000 Units/day by mouth 2 (two) times a week, Disp: , Rfl:   •  ezetimibe (ZETIA) 10 mg tablet, Take 1 tablet (10 mg total) by mouth daily, Disp: 30 tablet, Rfl: 0  •  FLUoxetine (PROzac) 40 MG capsule, Take 1 capsule (40 mg total) by mouth daily Do not start before March 17, 2023 , Disp: , Rfl: 0  •  gabapentin (NEURONTIN) 100 mg capsule, Take 1 capsule (100 mg total) by mouth 2 (two) times a day Take 100 mg in the morning and 100 mg mid afternoon, Disp: 42 capsule, Rfl: 0  •  gabapentin (NEURONTIN) 300 mg capsule, Take 1 capsule (300 mg total) by mouth daily at bedtime, Disp: 21 capsule, Rfl: 0  •  hydrophilic ointment, Apply topically as needed for dry skin, Disp: , Rfl:   •  insulin lispro (HumaLOG) 100 units/mL injection, Inject 1-5 Units under the skin 3 (three) times a day before meals, Disp: , Rfl: 0  •  levothyroxine 50 mcg tablet, Take 1 5 tablets (75 mcg total) by mouth daily, Disp: 45 tablet, Rfl: 0  •  melatonin 3 mg, Take 3 tablets (9 mg total) by mouth daily at bedtime, Disp: , Rfl: 0  •  nystatin (MYCOSTATIN) powder, Apply topically 2 (two) times a day, Disp: 15 g, Rfl: 0  •  omeprazole (PriLOSEC) 20 mg delayed release capsule, Take 20 mg by mouth daily, Disp: , Rfl:     Updated list was reviewed in [facility] of facility  Vitals:    03/30/23 1344   BP: 149/81   Pulse: 85   Resp: 18   Temp: 98 °F (36 7 °C)   SpO2: 98%       Physical Exam  HENT:      Head: Normocephalic and atraumatic  Eyes:      General: No scleral icterus  Right eye: No discharge  Left eye: No discharge  Cardiovascular:      Rate and Rhythm: Normal rate and regular rhythm  Heart sounds: Murmur heard  Pulmonary:      Breath sounds: No wheezing, rhonchi or rales  Abdominal:      General: There is no distension  Palpations: Abdomen is soft  Tenderness: There is no abdominal tenderness  Musculoskeletal:      Right lower leg: No edema  Left lower leg: No edema  Comments: Mild edema and swelling of B/L UE noted   Skin:     Coloration: Skin is not jaundiced  Findings: Bruising (Scattered eccymosis involving b/l UE) present  Neurological:      Cranial Nerves: No cranial nerve deficit        Motor: Weakness (generalized) present        Comments: Oriented in year, could not remember the month correctly         Diagnostic Data:    Labs from today reviewed as above  WBC nl  Plt low at 93  HGB 10 2  BUN 42, Creatinine 1 23, Na 152     Care coordinated with pt's son over the phone    This note was electronically signed by Dr Marcelino Kessler

## 2023-03-30 NOTE — ASSESSMENT & PLAN NOTE
Left buttock  · Increasing wound size, partial-thickness, with no obvious sign of infection  · Local wound care with Triad -continue  · Offload, on air mattress  · Increase protein intake, continue protein supplement  · Follow-up next week

## 2023-03-30 NOTE — ASSESSMENT & PLAN NOTE
Sacrum  · Increase in wound size, full-thickness, with no obvious sign of infection  · Local wound care with Triad -continue  · Offload, on air mattress  · Increase protein intake, continue protein supplement  · Follow-up next week

## 2023-03-30 NOTE — ASSESSMENT & PLAN NOTE
Right buttock  · Increasing wound size, partial-thickness, with no obvious sign of infection  · Local wound care with Triad -continue  · Offload, on air mattress  · Increase protein intake, continue protein supplement  · Follow-up next week

## 2023-03-31 ENCOUNTER — TELEPHONE (OUTPATIENT)
Dept: PSYCHIATRY | Facility: CLINIC | Age: 68
End: 2023-03-31

## 2023-04-02 ENCOUNTER — TELEPHONE (OUTPATIENT)
Dept: OTHER | Facility: OTHER | Age: 68
End: 2023-04-02

## 2023-04-02 PROBLEM — E87.1 HYPONATREMIA: Status: ACTIVE | Noted: 2023-04-02

## 2023-04-02 PROBLEM — R53.83 LETHARGY: Status: ACTIVE | Noted: 2023-04-02

## 2023-04-02 PROBLEM — M62.89: Status: ACTIVE | Noted: 2023-04-02

## 2023-04-09 PROBLEM — N39.0 SEPSIS SECONDARY TO UTI (HCC): Status: ACTIVE | Noted: 2023-03-11

## 2023-04-24 ENCOUNTER — TELEPHONE (OUTPATIENT)
Dept: PULMONOLOGY | Facility: CLINIC | Age: 68
End: 2023-04-24

## 2023-04-24 NOTE — TELEPHONE ENCOUNTER
I called and left Lynne Romo a message that a Split Study was ordered and if she needed assistance in scheduling, she can call me back  Otherwise, I left her Central Scheduling's number of [phone]  I called and left her a message because Dr Emile Ellison wanted me to make sure patient gets scheduled

## 2023-04-25 ENCOUNTER — NURSING HOME VISIT (OUTPATIENT)
Dept: GERIATRICS | Facility: OTHER | Age: 68
End: 2023-04-25

## 2023-04-25 DIAGNOSIS — N39.0 URINARY TRACT INFECTION WITHOUT HEMATURIA, SITE UNSPECIFIED: Primary | ICD-10-CM

## 2023-04-25 RX ORDER — MIRTAZAPINE 15 MG/1
15 TABLET, FILM COATED ORAL
COMMUNITY

## 2023-04-26 ENCOUNTER — NURSING HOME VISIT (OUTPATIENT)
Dept: WOUND CARE | Facility: HOSPITAL | Age: 68
End: 2023-04-26

## 2023-04-26 DIAGNOSIS — L89.153 PRESSURE INJURY OF SACRAL REGION, STAGE 3 (HCC): ICD-10-CM

## 2023-04-26 DIAGNOSIS — L89.312 PRESSURE INJURY OF RIGHT BUTTOCK, STAGE 2 (HCC): Primary | ICD-10-CM

## 2023-04-26 DIAGNOSIS — B36.9 FUNGAL DERMATITIS: ICD-10-CM

## 2023-04-26 DIAGNOSIS — L89.323 PRESSURE INJURY OF LEFT BUTTOCK, STAGE 3 (HCC): ICD-10-CM

## 2023-04-26 NOTE — ASSESSMENT & PLAN NOTE
The rashes on the groin and the breasts are all healed  Continue nystatin powder until the 14 days was completed

## 2023-04-26 NOTE — ASSESSMENT & PLAN NOTE
The wound on the left buttock was previously diagnosed as pressure ulcer unstageable    The wound improved, increased epithelial   Continue Triad paste  Continue to offload, use air mattress  Follow-up next week

## 2023-04-26 NOTE — ASSESSMENT & PLAN NOTE
Right buttock  Patient had 2 open area on the right buttock, partial-thickness, with no obvious sign of infection  Local wound care with Triad paste  Continue to offload  Provide timely continence care  Follow-up next week

## 2023-04-26 NOTE — PROGRESS NOTES
[address] [facility]NAG[facility]   [facility]       Patient ID: Roxanne Rubalcava is a 75 y o  female Date of Birth 1955     Location of Service: [address] [place] [address]    Performed wound round with: Wound team     Chief Complaint : Sacrum, groin, breast, buttocks,    Wound Instructions:  Wound: Buttocks  Discontinue previous wound order  Cleanse the wound bed with NSS   Apply non-sting skin prep to periwound area  Apply Triad paste to wound bed  Frequency : Twice a day and prn for soiling    Continue nystatin powder to groin and breast until 14 days  Offload all wounds  Turn and reposition frequently, maximum of every two hours  Instruct / Assist with weight shifting every 15 - 20 minutes when in chair  Increase protein intake  Monitor for any sign of infection or worsening, inform PCP or patient's primary physician in your facility  Allergies  Pineapple - food allergy, Statins, Bupropion, Byetta 10 mcg pen [exenatide], Ceftriaxone, Marijuana [dronabinol], Metformin, Omeprazole, Ondansetron, Coconut oil - food allergy, Liraglutide, and Penicillins      Assessment & Plan:  1  Pressure injury of right buttock, stage 2 (HCC)  Assessment & Plan:  Right buttock  Patient had 2 open area on the right buttock, partial-thickness, with no obvious sign of infection  Local wound care with Triad paste  Continue to offload  Provide timely continence care  Follow-up next week      2  Pressure injury of left buttock, stage 3 (HCC)  Assessment & Plan: The wound on the left buttock was previously diagnosed as pressure ulcer unstageable  The wound improved, increased epithelial   Continue Triad paste  Continue to offload, use air mattress  Follow-up next week      3  Fungal dermatitis  Assessment & Plan:  The rashes on the groin and the breasts are all healed  Continue nystatin powder until the 14 days was completed  4  Pressure injury of sacral region, stage 3 [facility])  Assessment & Plan:   The wound of the sacrum was healed             Subjective:   March 22, 2023  This is a new consult for wound on the right buttocks, sacrum, left buttocks, back, and right knee  Patient was referred by Senior care team   Patient have a complex medical history including but not limited to UTI, acute kidney injury, CHF, and GERD  As per medical record review, patient was recently admitted at [facility] from March 11 to March 16, 2023 for sepsis  Patient was transferred on discharge to [facility] rehab for short-term rehab  Wound history: As per medical record review, patient had wound on the sacrum that was first assessed on March 11, 2023 the wound on back was first assessed on March 13, 2023  The wound on the right lower leg was first assessed on March 13, 2023  The etiology of all mention wounds are pressure injury  Received patient in bed, seems comfortable  Total dependent with positioning in bed  Currently on air mattress  Patient have a poor appetite  Patient is incontinent of both bowel and bladder  Patient currently on protein supplement, Prosource and Magic cup  March 29, 2023  Follow-up for wound on the right buttock, sacrum, left buttock, back, and right knee  Received patient in bed, seems comfortable  Denies pain  Facility did not report any significant issues related to the wound  April 26, 2023  Follow-up for wound on the buttocks, groin, and breast   As per report, patient was recently admitted at acute care and returned in [facility] for short-term rehab  Received patient in bed, seems comfortable  Denies pain  Patient is a max assist with positioning in bed  She is incontinent of both bowel and bladder  Review of Systems   Constitutional: Negative  Respiratory: Negative  Cardiovascular: Negative  Skin: Positive for rash and wound  Objective:    Physical Exam  Constitutional:       Appearance: She is obese     Cardiovascular:      Rate and Rhythm: Normal rate  Pulmonary:      Effort: Pulmonary effort is normal    Genitourinary:     Comments: Incontinent  Musculoskeletal:      Comments: Total dependent with positioning in bed   Skin:     Findings: Lesion and rash present  Comments: Sacrum: Wound is healed  Groin/breast: Right chest is healed    Left buttock: Wound size is 0 6 x 0 5 x 0 1 cm ,  100% epithelial, small amount of serous drainage, periwound is normal, no obvious sign of infection, denies pain    Right buttock: Wound size is 1 x 1 1 x 0 1 cm ,  100% epithelial, small amount of serous drainage, periwound is normal, no obvious sign of infection, denies pain    Right buttock: Wound size is 0 6 x 1 x 0 2 cm ,  100% epithelial, no drainage, no obvious sign of infection, denies pain   Neurological:      Mental Status: She is alert  Procedures           Patient's care was coordinated with nursing facility staff  Recent vitals, labs and updated medications were reviewed on EMR or chart system of facility  Past Medical, surgical, social, medication and allergy history and patient's previous records were reviewed and updated as appropriate: Most up-to date information is available in the facility EMR where the patient is currently admitted      Patient Active Problem List   Diagnosis   • Seizures ([facility] )   • Restless leg syndrome   • Hypertension   • Hyperlipidemia   • Type 2 diabetes mellitus ([facility])   • GERD (gastroesophageal reflux disease)   • Epigastric abdominal pain   • Goals of care, counseling/discussion   • Severe episode of recurrent major depressive disorder, without psychotic features ([facility] )   • Gambling disorder, episodic, moderate   • Suspected COVID-19 virus infection   • Major depressive disorder   • Acquired hypothyroidism   • Diarrhea   • Asymptomatic bacteriuria   • Chronic fatigue and malaise   • Calf tenderness   • Morbid obesity due to excess calories ([facility])   • Onychomycosis of toenail   • Orthostatic hypotension   • Primary osteoarthritis of one hip, right   • Dyspnea on exertion   • Chronic heart failure with preserved ejection fraction (HCC)   • Chronic respiratory failure (HCC)   • Coronary artery disease involving native coronary artery of native heart without angina pectoris   • Syncope   • Elevated d-dimer   • Weakness   • Abnormal urine   • Diabetes mellitus type 2 in obese (HCC)   • Fungal dermatitis   • Chest pressure   • Left lower quadrant abdominal pain   • REGINO (acute kidney injury) ([facility] )   • Nausea   • UTI (urinary tract infection)   • Insomnia   • Sepsis secondary to UTI [facility])   • Acute encephalopathy   • Respiratory insufficiency   • Hypernatremia   • Pressure injury of right buttock, stage 2 (HCC)   • Stage II pressure ulcer of left buttock (HCC)   • Pressure injury of sacral region, stage 3 (HCC)   • Pressure injury of back, stage 2 (HCC)   • Pressure injury of right knee, stage 2 (HCC)   • Ambulatory dysfunction   • Urinary retention   • Hemorrhage of muscle   • Lethargy   • Pressure injury of left buttock, stage 3 (HCC)     Past Medical History:   Diagnosis Date   • CHF (congestive heart failure) (HCC)    • Diabetes mellitus (HCC)    • Disease of thyroid gland    • Hyperlipidemia    • Hypertension    • Hypothyroidism (acquired)    • Restless leg syndrome    • Seizures ([facility] )      Past Surgical History:   Procedure Laterality Date   • APPENDECTOMY     • CHOLECYSTECTOMY     • HIP FRACTURE SURGERY Right    • IR PICC PLACEMENT SINGLE LUMEN  4/4/2023   • TONSILLECTOMY     • TUBAL LIGATION       Social History     Socioeconomic History   • Marital status:       Spouse name: None   • Number of children: None   • Years of education: None   • Highest education level: None   Occupational History   • None   Tobacco Use   • Smoking status: Never   • Smokeless tobacco: Never   • Tobacco comments:     not a smoker   Vaping Use   • Vaping Use: Never used   Substance and Sexual Activity   • Alcohol use: Yes     Comment: occasional   • Drug use: Never   • Sexual activity: Not Currently   Other Topics Concern   • None   Social History Narrative   • None     Social Determinants of Health     Financial Resource Strain: Not on file   Food Insecurity: No Food Insecurity   • Worried About Running Out of Food in the Last Year: Never true   • Ran Out of Food in the Last Year: Never true   Transportation Needs: No Transportation Needs   • Lack of Transportation (Medical): No   • Lack of Transportation (Non-Medical): No   Physical Activity: Not on file   Stress: Not on file   Social Connections: Not on file   Intimate Partner Violence: Not on file   Housing Stability: Unknown   • Unable to Pay for Housing in the Last Year: No   • Number of Places Lived in the Last Year: Not on file   • Unstable Housing in the Last Year: No        Current Outpatient Medications:   •  acetaminophen (TYLENOL) 325 mg tablet, Take 650 mg by mouth every 6 (six) hours as needed for mild pain (pain score 1-3)  , Disp: , Rfl:   •  aspirin (ECOTRIN LOW STRENGTH) 81 mg EC tablet, Take 81 mg by mouth daily, Disp: , Rfl:   •  Cholecalciferol 25 MCG (1000 UT) capsule, Take 1,000 Units by mouth daily  , Disp: , Rfl:   •  Ergocalciferol (VITAMIN D2 PO), Take 50,000 Units/day by mouth 2 (two) times a week, Disp: , Rfl:   •  ezetimibe (ZETIA) 10 mg tablet, Take 1 tablet (10 mg total) by mouth daily, Disp: 30 tablet, Rfl: 0  •  FLUoxetine (PROzac) 40 MG capsule, Take 1 capsule (40 mg total) by mouth daily Do not start before March 17, 2023 , Disp: , Rfl: 0  •  hydrophilic ointment, Apply topically as needed for dry skin, Disp: , Rfl:   •  insulin lispro (HumaLOG) 100 units/mL injection, Inject 1-5 Units under the skin 3 (three) times a day before meals, Disp: , Rfl: 0  •  levothyroxine 88 mcg tablet, Take 1 tablet (88 mcg total) by mouth daily in the early morning Do not start before April 8, 2023 , Disp: 30 tablet, Rfl: 0  •  melatonin 3 mg, Take 3 tablets (9 mg total) by mouth "daily at bedtime, Disp: , Rfl: 0  •  mirtazapine (REMERON) 15 mg tablet, Take 15 mg by mouth daily at bedtime, Disp: , Rfl:   •  nystatin (MYCOSTATIN) powder, Apply topically 2 (two) times a day, Disp: 15 g, Rfl: 0  •  omeprazole (PriLOSEC) 20 mg delayed release capsule, Take 20 mg by mouth daily, Disp: , Rfl:   Family History   Problem Relation Age of Onset   • Brain cancer Mother    • Seizures Father    • Colon cancer Maternal Grandfather    • No Known Problems Son    • No Known Problems Son    • Breast cancer Maternal Aunt 30              Coordination of Care: Wound team aware of the treatment plan  Facility nurse will provide wound treatment and monitor the wound for any changes  Patient / Staff education : Patient / Staff was given education on sign of infection and pressure ulcer prevention  Patient/ Staff verbalized understanding     Follow up :  Next week    Voice-recognition software may have been used in the preparation of this document  Occasional wrong word or \"sound-alike\" substitutions may have occurred due to the inherent limitations of voice recognition software  Interpretation should be guided by context        SAMIA Lou  "

## 2023-04-28 VITALS
SYSTOLIC BLOOD PRESSURE: 137 MMHG | OXYGEN SATURATION: 99 % | DIASTOLIC BLOOD PRESSURE: 92 MMHG | HEART RATE: 95 BPM | WEIGHT: 200 LBS | TEMPERATURE: 97.4 F | BODY MASS INDEX: 35.43 KG/M2

## 2023-04-29 NOTE — PROGRESS NOTES
[facility]  [address] [place] [address], [address] [place] [address]  Progress note    NAME: Eleno Schreiber  AGE: 75 y o  SEX: female [id number]      Patient Location     [facility] rehab    Patient’s care was coordinated with nursing facility staff  Recent vitals, labs and updated medications [facility]  Past Medical, surgical, social, medication and allergy history and patient’s previous records reviewed  Assessment/Plan:    UTI (urinary tract infection)  Patient was recently for ESBL UTI  Currently remains afebrile  Monitor off antibiotics        Hemorrhage of muscle:  Imaging studies upon admission showed small acute to subacute hemorrhage in the left obturator internus musculature  She was noted to have drop in hemoglobin from 15-7 7  Repeat CT revealed stable findings  Iron panel was consistent with anemia of chronic disease  Folate was low at 1 5 for which patient was started on oral folate supplements  Vitamin B12 was within normal limits  Follow-up repeat CBC    Hypernatremia; Patient has had intermittent hypenatremia due to poor oral intake  She was treated with hypotonic IV fluids  Patient remains at risk for recurrent hypernatremia due to decrease po intake  Repeat BMP encourage p o  fluids    Hypothyroidism:  Continue levothyroxine  Dose was recently increased to 88 mcg daily  Repeat TSH in 6 weeks    Weight loss:   Patient had weight loss of around 70 pounds today last 6 months and 12 pounds during the last   P o  intake remains poor  Patient was recently started on Remeron  She is being followed by dietitian  Encourage protein supplements  Per records patient declined PEG tube at the hospital      Goal of care discussion:  Goal of care discussion has been  carried out with patient and son intermittently  Patient declined the option of PEG tube   Continue current level of care for now    Will readdress goals of care depending upon patient's progress in the next few weeks    Diabetes mellitus type 2:  Recent hemoglobin A1c was well controlled at 5 2  Patient was taken off of scheduled insulin  GERD:  Continue Omeprazole    HLD:  Pt  remains on ezetimibe     Depression:  Continue Fluoxetine  Patient was recently started on Remeron to help with appetite    Reason for visit     Follow-up UTI, hyponatremia, decreased p o  intake, depression, sepsis, anemia, left obturator internus muscle hematoma    HPI     Patient is being seen for a follow-up visit  Care coordinated with nursing staff  Patient remains fatigued  P o  intake is on the lower side  Patient had weight loss of around 12 pounds in 1 month and significant weight loss of 70 pounds during the last 6 months  She is being followed by dietitian  Patient was recently started on Mirtazapine       Past Medical History:   Diagnosis Date   • CHF (congestive heart failure) ([facility])    • Diabetes mellitus ([facility] [address] )    • Disease of thyroid gland    • Hyperlipidemia    • Hypertension    • Hypothyroidism (acquired)    • Restless leg syndrome    • Seizures (HCC)        Past Surgical History:   Procedure Laterality Date   • APPENDECTOMY     • CHOLECYSTECTOMY     • HIP FRACTURE SURGERY Right    • IR PICC PLACEMENT SINGLE LUMEN  4/4/2023   • TONSILLECTOMY     • TUBAL LIGATION         Social History     Tobacco Use   Smoking Status Never   Smokeless Tobacco Never   Tobacco Comments    not a smoker          Family History   Problem Relation Age of Onset   • Brain cancer Mother    • Seizures Father    • Colon cancer Maternal Grandfather    • No Known Problems Son    • No Known Problems Son    • Breast cancer Maternal Aunt 30        Allergies   Allergen Reactions   • Pineapple - Food Allergy Anaphylaxis   • Statins Other (See Comments)     Cramping from head to toe    • Bupropion Other (See Comments)   • Byetta 10 Mcg Pen [Exenatide]    • Ceftriaxone Other (See Comments)   • Marijuana [Dronabinol] GI Intolerance   • Metformin Diarrhea     Other reaction(s): Unknown   • Omeprazole Hives   • Ondansetron      Other reaction(s): Unknown   • Coconut Oil - Food Allergy Rash   • Liraglutide Rash and Other (See Comments)     Generalized rash   • Penicillins Rash          Current Outpatient Medications:   •  mirtazapine (REMERON) 15 mg tablet, Take 15 mg by mouth daily at bedtime, Disp: , Rfl:   •  acetaminophen (TYLENOL) 325 mg tablet, Take 650 mg by mouth every 6 (six) hours as needed for mild pain (pain score 1-3)  , Disp: , Rfl:   •  aspirin (ECOTRIN LOW STRENGTH) 81 mg EC tablet, Take 81 mg by mouth daily, Disp: , Rfl:   •  Cholecalciferol 25 MCG (1000 UT) capsule, Take 1,000 Units by mouth daily  , Disp: , Rfl:   •  Ergocalciferol (VITAMIN D2 PO), Take 50,000 Units/day by mouth 2 (two) times a week, Disp: , Rfl:   •  ezetimibe (ZETIA) 10 mg tablet, Take 1 tablet (10 mg total) by mouth daily, Disp: 30 tablet, Rfl: 0  •  FLUoxetine (PROzac) 40 MG capsule, Take 1 capsule (40 mg total) by mouth daily Do not start before March 17, 2023 , Disp: , Rfl: 0  •  hydrophilic ointment, Apply topically as needed for dry skin, Disp: , Rfl:   •  insulin lispro (HumaLOG) 100 units/mL injection, Inject 1-5 Units under the skin 3 (three) times a day before meals, Disp: , Rfl: 0  •  levothyroxine 88 mcg tablet, Take 1 tablet (88 mcg total) by mouth daily in the early morning Do not start before April 8, 2023 , Disp: 30 tablet, Rfl: 0  •  melatonin 3 mg, Take 3 tablets (9 mg total) by mouth daily at bedtime, Disp: , Rfl: 0  •  nystatin (MYCOSTATIN) powder, Apply topically 2 (two) times a day, Disp: 15 g, Rfl: 0  •  omeprazole (PriLOSEC) 20 mg delayed release capsule, Take 20 mg by mouth daily, Disp: , Rfl:     Updated list was reviewed in [facility] of facility       Vitals:    04/24/23 2218   BP: 137/92   Pulse: 95   Temp: (!) 97 4 °F (36 3 °C)   SpO2: 99%       Vital signs were reviewed in point click care    Review of Systems   Constitutional: Positive for fatigue  Negative for chills and fever  Respiratory: Negative for cough, shortness of breath and wheezing  Cardiovascular: Negative for chest pain and leg swelling  Gastrointestinal: Negative for abdominal pain, diarrhea and vomiting  Genitourinary: Negative for hematuria  Musculoskeletal: Positive for gait problem  Negative for arthralgias and back pain  Skin: Negative for pallor  Neurological: Positive for weakness (Generalized)  Negative for syncope  Psychiatric/Behavioral: Positive for confusion  Negative for agitation and behavioral problems  Physical Exam  Constitutional:       General: She is not in acute distress  Comments: Fatigued   HENT:      Head: Normocephalic and atraumatic  Cardiovascular:      Rate and Rhythm: Normal rate and regular rhythm  Pulmonary:      Breath sounds: No wheezing, rhonchi or rales  Abdominal:      General: There is no distension  Palpations: Abdomen is soft  Tenderness: There is no abdominal tenderness  There is no guarding  Musculoskeletal:      Cervical back: Neck supple  Right lower leg: No edema  Left lower leg: No edema  Skin:     Coloration: Skin is not jaundiced  Findings: Bruising (Ecchymosis involving LUE) present  Neurological:      General: No focal deficit present  Mental Status: Mental status is at baseline  Cranial Nerves: No cranial nerve deficit  Motor: Weakness (more pronounced in B/L LE) present        Comments:      Psychiatric:         Mood and Affect: Mood normal          Behavior: Behavior normal          Diagnostic Data         Lab Results   Component Value Date    WBC 6 95 04/14/2023    HGB 7 8 (L) 04/14/2023    HCT 26 1 (L) 04/14/2023    MCV 97 04/14/2023     04/14/2023     Lab Results   Component Value Date    SODIUM 138 04/14/2023    K 3 6 04/14/2023     04/14/2023    CO2 28 04/14/2023    BUN 13 04/14/2023 "   CREATININE 0 94 04/14/2023    GLUC 101 04/14/2023    CALCIUM 7 6 (L) 04/14/2023        Code Status:      DNR           Portions of the record may have been created with voice recognition software  Occasional wrong word or \"sound a like\" substitutions may have occurred due to the inherent limitations of voice recognition software  Read the chart carefully and recognize, using context, where substitutions have occurred      This note was electronically signed by Dr Jose Cruz Kearney   "

## 2023-05-03 ENCOUNTER — NURSING HOME VISIT (OUTPATIENT)
Dept: WOUND CARE | Facility: HOSPITAL | Age: 68
End: 2023-05-03

## 2023-05-03 DIAGNOSIS — S51.811A SKIN TEAR OF RIGHT FOREARM WITHOUT COMPLICATION, INITIAL ENCOUNTER: ICD-10-CM

## 2023-05-03 DIAGNOSIS — L89.312 PRESSURE INJURY OF RIGHT BUTTOCK, STAGE 2 (HCC): Primary | ICD-10-CM

## 2023-05-03 DIAGNOSIS — L89.323 PRESSURE INJURY OF LEFT BUTTOCK, STAGE 3 (HCC): ICD-10-CM

## 2023-05-04 PROBLEM — S51.811A SKIN TEAR OF RIGHT FOREARM WITHOUT COMPLICATION: Status: ACTIVE | Noted: 2023-05-04

## 2023-05-04 NOTE — PROGRESS NOTES
[address] [facility]NA[facility]   [facility]       Patient ID: Ev Eli is a 75 y o  female Date of Birth 1955     Location of Service: [address] [place] [address]    Performed wound round with: Wound team     Chief Complaint : Sacrum, groin, breast, buttocks,    Wound Instructions:  Wound: Buttocks  Discontinue previous wound order  Cleanse the wound bed with NSS   Apply non-sting skin prep to periwound area  Apply Triad paste to wound bed  Frequency : Twice a day and prn for soili     Wound: Right forearm  Cleanse with normal saline solution or wound cleanser  Apply Skin-Prep to periwound area  Apply bordered foam to wound bed  3 times a week and as needed for soiling    Offload all wounds  Turn and reposition frequently, maximum of every two hours  Instruct / Assist with weight shifting every 15 - 20 minutes when in chair  Increase protein intake  Monitor for any sign of infection or worsening, inform PCP or patient's primary physician in your facility  Allergies  Pineapple - food allergy, Statins, Bupropion, Byetta 10 mcg pen [exenatide], Ceftriaxone, Marijuana [dronabinol], Metformin, Omeprazole, Ondansetron, Coconut oil - food allergy, Liraglutide, and Penicillins      Assessment & Plan:  1  Pressure injury of right buttock, stage 2 ([address] )  Assessment & Plan: The wound on the right buttocks improved, decreasing wound size  Continue Triad paste  Continue to offload  Follow-up next week      2  Pressure injury of left buttock, stage 3 ([facility])  Assessment & Plan: The wound on the left buttock improved  Increased epithelial, with no obvious sign of infection  Continue Triad paste  Continue to offload  Follow-up next week      3   Skin tear of right forearm without complication, initial encounter  Assessment & Plan:  Right forearm  Onset is May 1, 2023   2 wounds on the right forearm, partial-thickness, with no obvious sign of infection  Local wound care with bordered foam  Follow-up next week             Subjective:   March 22, 2023  This is a new consult for wound on the right buttocks, sacrum, left buttocks, back, and right knee  Patient was referred by Senior care team   Patient have a complex medical history including but not limited to UTI, acute kidney injury, CHF, and GERD  As per medical record review, patient was recently admitted at [facility] from March 11 to March 16, 2023 for sepsis  Patient was transferred on discharge to [facility] rehab for short-term rehab  Wound history: As per medical record review, patient had wound on the sacrum that was first assessed on March 11, 2023 the wound on back was first assessed on March 13, 2023  The wound on the right lower leg was first assessed on March 13, 2023  The etiology of all mention wounds are pressure injury  Received patient in bed, seems comfortable  Total dependent with positioning in bed  Currently on air mattress  Patient have a poor appetite  Patient is incontinent of both bowel and bladder  Patient currently on protein supplement, Prosource and Magic cup  March 29, 2023  Follow-up for wound on the right buttock, sacrum, left buttock, back, and right knee  Received patient in bed, seems comfortable  Denies pain  Facility did not report any significant issues related to the wound  April 26, 2023  Follow-up for wound on the buttocks, groin, and breast   As per report, patient was recently admitted at acute care and returned in [facility] for short-term rehab  Received patient in bed, seems comfortable  Denies pain  Patient is a max assist with positioning in bed  She is incontinent of both bowel and bladder  May 3, 2023  Follow-up for wounds on the left buttock, right buttock, and new consult for wound on the right forearm  The wound on the right forearm is a skin tear, unknown etiology  Received patient in bed, seems comfortable  Denies pain    No significant issues reported related to the wound  Review of Systems   Constitutional: Negative  Respiratory: Negative  Cardiovascular: Negative  Skin: Positive for rash and wound  Objective:    Physical Exam  Constitutional:       Appearance: She is obese  Cardiovascular:      Rate and Rhythm: Normal rate  Pulmonary:      Effort: Pulmonary effort is normal    Genitourinary:     Comments: Incontinent  Musculoskeletal:      Comments: Total dependent with positioning in bed   Skin:     Findings: Lesion present  Comments: Left buttock: Wound size is 0 5 x 0 5 x 0 1 cm ,  100% epithelial, small amount of blood, normal periwound, no obvious sign of infection, denies pain    Right buttock superior: Wound size is 0 8 x 0 6 x 0 1 cm ,  100% granulation, small amount of blood, normal periwound, no obvious sign of infection, denies pain    Right buttocks inferior: Wound size is 0 5 x 1 x 0 1 cm ,  100% granulation, small amount of blood, normal periwound area, no obvious sign of infection, denies pain    Right forearm superior: Wound size is 1 5 x 1 5 x 0 1 cm ,  100% epithelial, small amount of serous drainage, periwound is ecchymotic, with no obvious sign of infection    Right forearm inferior: Wound size is 1 x 1 x 0 1 cm ,  100% epithelial, small amount of serous drainage, periwound is ecchymotic, with no obvious sign of infection, denies pain   Neurological:      Mental Status: She is alert  Procedures           Patient's care was coordinated with nursing facility staff  Recent vitals, labs and updated medications were reviewed on EMR or chart system of facility  Past Medical, surgical, social, medication and allergy history and patient's previous records were reviewed and updated as appropriate: Most up-to date information is available in the facility EMR where the patient is currently admitted      Patient Active Problem List   Diagnosis    Seizures ([facility] [address] )    Restless leg syndrome    Hypertension    Hyperlipidemia    Type 2 diabetes mellitus (HCC)    GERD (gastroesophageal reflux disease)    Epigastric abdominal pain    Goals of care, counseling/discussion    Severe episode of recurrent major depressive disorder, without psychotic features ([facility] )    Gambling disorder, episodic, moderate    Suspected COVID-19 virus infection    Major depressive disorder    Acquired hypothyroidism    Diarrhea    Asymptomatic bacteriuria    Chronic fatigue and malaise    Calf tenderness    Morbid obesity due to excess calories ([facility])    Onychomycosis of toenail    Orthostatic hypotension    Primary osteoarthritis of one hip, right    Dyspnea on exertion    Chronic heart failure with preserved ejection fraction (HCC)    Chronic respiratory failure (HCC)    Coronary artery disease involving native coronary artery of native heart without angina pectoris    Syncope    Elevated d-dimer    Weakness    Abnormal urine    Diabetes mellitus type 2 in obese (HCC)    Fungal dermatitis    Chest pressure    Left lower quadrant abdominal pain    [facility]    Nausea    UTI (urinary tract infection)    Insomnia    Sepsis secondary to UTI (HCC)    Acute encephalopathy    Respiratory insufficiency    Hypernatremia    Pressure injury of right buttock, stage 2 (HCC)    Stage II pressure ulcer of left buttock (HCC)    Pressure injury of sacral region, stage 3 (HCC)    Pressure injury of back, stage 2 (HCC)    Pressure injury of right knee, stage 2 (HCC)    Ambulatory dysfunction    Urinary retention    Hemorrhage of muscle    Lethargy    Pressure injury of left buttock, stage 3 (HCC)    Skin tear of right forearm without complication     Past Medical History:   Diagnosis Date    CHF (congestive heart failure) (HCC)     Diabetes mellitus (HCC)     Disease of thyroid gland     Hyperlipidemia     Hypertension     Hypothyroidism (acquired)     Restless leg syndrome     Seizures [facility])      Past Surgical History:   Procedure Laterality Date    APPENDECTOMY      CHOLECYSTECTOMY      HIP FRACTURE SURGERY Right     IR PICC PLACEMENT SINGLE LUMEN  4/4/2023    TONSILLECTOMY      TUBAL LIGATION       Social History     Socioeconomic History    Marital status:      Spouse name: None    Number of children: None    Years of education: None    Highest education level: None   Occupational History    None   Tobacco Use    Smoking status: Never    Smokeless tobacco: Never    Tobacco comments:     not a smoker   Vaping Use    Vaping Use: Never used   Substance and Sexual Activity    Alcohol use: Yes     Comment: occasional    Drug use: Never    Sexual activity: Not Currently   Other Topics Concern    None   Social History Narrative    None     Social Determinants of Health     Financial Resource Strain: Not on file   Food Insecurity: No Food Insecurity    Worried About Running Out of Food in the Last Year: Never true    Shankar of Food in the Last Year: Never true   Transportation Needs: No Transportation Needs    Lack of Transportation (Medical): No    Lack of Transportation (Non-Medical): No   Physical Activity: Not on file   Stress: Not on file   Social Connections: Not on file   Intimate Partner Violence: Not on file   Housing Stability: Unknown    Unable to Pay for Housing in the Last Year: No    Number of Places Lived in the Last Year: Not on file    Unstable Housing in the Last Year: No        Current Outpatient Medications:     acetaminophen (TYLENOL) 325 mg tablet, Take 650 mg by mouth every 6 (six) hours as needed for mild pain (pain score 1-3)  , Disp: , Rfl:     aspirin (ECOTRIN LOW STRENGTH) 81 mg EC tablet, Take 81 mg by mouth daily, Disp: , Rfl:     Cholecalciferol 25 MCG (1000 UT) capsule, Take 1,000 Units by mouth daily  , Disp: , Rfl:     Ergocalciferol (VITAMIN D2 PO), Take 50,000 Units/day by mouth 2 (two) times a week, Disp: , Rfl:     ezetimibe "(ZETIA) 10 mg tablet, Take 1 tablet (10 mg total) by mouth daily, Disp: 30 tablet, Rfl: 0    FLUoxetine (PROzac) 40 MG capsule, Take 1 capsule (40 mg total) by mouth daily Do not start before March 17, 2023 , Disp: , Rfl: 0    hydrophilic ointment, Apply topically as needed for dry skin, Disp: , Rfl:     insulin lispro (HumaLOG) 100 units/mL injection, Inject 1-5 Units under the skin 3 (three) times a day before meals, Disp: , Rfl: 0    levothyroxine 88 mcg tablet, Take 1 tablet (88 mcg total) by mouth daily in the early morning Do not start before April 8, 2023 , Disp: 30 tablet, Rfl: 0    melatonin 3 mg, Take 3 tablets (9 mg total) by mouth daily at bedtime, Disp: , Rfl: 0    mirtazapine (REMERON) 15 mg tablet, Take 15 mg by mouth daily at bedtime, Disp: , Rfl:     nystatin (MYCOSTATIN) powder, Apply topically 2 (two) times a day, Disp: 15 g, Rfl: 0    omeprazole (PriLOSEC) 20 mg delayed release capsule, Take 20 mg by mouth daily, Disp: , Rfl:   Family History   Problem Relation Age of Onset    Brain cancer Mother     Seizures Father     Colon cancer Maternal Grandfather     No Known Problems Son     No Known Problems Son     Breast cancer Maternal Aunt 30              Coordination of Care: Wound team aware of the treatment plan  Facility nurse will provide wound treatment and monitor the wound for any changes  Patient / Staff education : Patient / Staff was given education on sign of infection and pressure ulcer prevention  Patient/ Staff verbalized understanding     Follow up :  Next week    Voice-recognition software may have been used in the preparation of this document  Occasional wrong word or \"sound-alike\" substitutions may have occurred due to the inherent limitations of voice recognition software  Interpretation should be guided by SAMIA Diaz  "

## 2023-05-04 NOTE — ASSESSMENT & PLAN NOTE
Right forearm  Onset is May 1, 2023   2 wounds on the right forearm, partial-thickness, with no obvious sign of infection  Local wound care with bordered foam  Follow-up next week

## 2023-05-04 NOTE — ASSESSMENT & PLAN NOTE
The wound on the right buttocks improved, decreasing wound size  Continue Triad paste  Continue to offload  Follow-up next week

## 2023-05-04 NOTE — ASSESSMENT & PLAN NOTE
The wound on the left buttock improved  Increased epithelial, with no obvious sign of infection  Continue Triad paste  Continue to offload  Follow-up next week

## 2023-05-07 ENCOUNTER — TELEPHONE (OUTPATIENT)
Dept: OTHER | Facility: OTHER | Age: 68
End: 2023-05-07

## 2023-05-07 NOTE — TELEPHONE ENCOUNTER
Monse Cerna called stating pt is having an intense headache to where she is crying and screaming  They have nothing to give her  On call notified via TC

## 2023-05-10 ENCOUNTER — NURSING HOME VISIT (OUTPATIENT)
Dept: WOUND CARE | Facility: HOSPITAL | Age: 68
End: 2023-05-10

## 2023-05-10 DIAGNOSIS — S51.811A SKIN TEAR OF RIGHT FOREARM WITHOUT COMPLICATION, INITIAL ENCOUNTER: ICD-10-CM

## 2023-05-10 DIAGNOSIS — L89.312 PRESSURE INJURY OF RIGHT BUTTOCK, STAGE 2 (HCC): Primary | ICD-10-CM

## 2023-05-10 DIAGNOSIS — L89.323 PRESSURE INJURY OF LEFT BUTTOCK, STAGE 3 (HCC): ICD-10-CM

## 2023-05-10 NOTE — ASSESSMENT & PLAN NOTE
The wound on the left buttock improved, partial-thickness, with no obvious sign of infection  Continue Triad paste  Continue air mattress  Continue to offload  Follow-up next week

## 2023-05-10 NOTE — PROGRESS NOTES
[address] MANAGEMENT   AND [facility]PER[facility]ENTER       Patient ID: Gerardo Preston is a 75 y o  female Date of Birth 1955     Location of Service: [address] [place] [address]    Performed wound round with: Wound team     Chief Complaint : Sacrum, groin, breast, buttocks,    Wound Instructions:  Wound: Buttocks  Discontinue previous wound order  Cleanse the wound bed with NSS   Apply non-sting skin prep to periwound area  Apply Triad paste to wound bed  Frequency : Twice a day and prn for soili       Offload all wounds  Turn and reposition frequently, maximum of every two hours  Instruct / Assist with weight shifting every 15 - 20 minutes when in chair  Increase protein intake  Monitor for any sign of infection or worsening, inform PCP or patient's primary physician in your facility  Allergies  Pineapple - food allergy, Statins, Bupropion, Byetta 10 mcg pen [exenatide], Ceftriaxone, Marijuana [dronabinol], Metformin, Omeprazole, Ondansetron, Coconut oil - food allergy, Liraglutide, and Penicillins      Assessment & Plan:  1  Pressure injury of right buttock, stage 2 ([address] )  Assessment & Plan: The wound on the right buttock is improving, partial-thickness, with no obvious sign of infection  Continue Triad paste  Continue to offload  Follow-up next week      2  Pressure injury of left buttock, stage 3 ([facility])  Assessment & Plan: The wound on the left buttock improved, partial-thickness, with no obvious sign of infection  Continue Triad paste  Continue air mattress  Continue to offload  Follow-up next week      3  Skin tear of right forearm without complication, initial encounter  Assessment & Plan: The wound on the right forearm is healed             Subjective:   March 22, 2023  This is a new consult for wound on the right buttocks, sacrum, left buttocks, back, and right knee    Patient was referred by Senior care team   Patient have a complex medical history including but not limited to UTI, acute kidney injury, CHF, and GERD  As per medical record review, patient was recently admitted at [facility] from March 11 to March 16, 2023 for sepsis  Patient was transferred on discharge to [facility] rehab for short-term rehab  Wound history: As per medical record review, patient had wound on the sacrum that was first assessed on March 11, 2023 the wound on back was first assessed on March 13, 2023  The wound on the right lower leg was first assessed on March 13, 2023  The etiology of all mention wounds are pressure injury  Received patient in bed, seems comfortable  Total dependent with positioning in bed  Currently on air mattress  Patient have a poor appetite  Patient is incontinent of both bowel and bladder  Patient currently on protein supplement, Prosource and Magic cup  March 29, 2023  Follow-up for wound on the right buttock, sacrum, left buttock, back, and right knee  Received patient in bed, seems comfortable  Denies pain  Facility did not report any significant issues related to the wound  April 26, 2023  Follow-up for wound on the buttocks, groin, and breast   As per report, patient was recently admitted at acute care and returned in [facility] for short-term rehab  Received patient in bed, seems comfortable  Denies pain  Patient is a max assist with positioning in bed  She is incontinent of both bowel and bladder  May 3, 2023  Follow-up for wounds on the left buttock, right buttock, and new consult for wound on the right forearm  The wound on the right forearm is a skin tear, unknown etiology  Received patient in bed, seems comfortable  Denies pain  No significant issues reported related to the wound  May 10, 2023  Follow-up for wound on the left buttock, right buttock, right forearm  Received patient in bed, seems comfortable  Denies pain  Patient is max assist with transfer  Review of Systems   Constitutional: Negative      Respiratory: Negative  Cardiovascular: Negative  Skin: Positive for rash and wound  Objective:    Physical Exam  Constitutional:       Appearance: She is obese  Cardiovascular:      Rate and Rhythm: Normal rate  Pulmonary:      Effort: Pulmonary effort is normal    Genitourinary:     Comments: Incontinent  Musculoskeletal:      Comments: Total dependent with positioning in bed   Skin:     Findings: Lesion present  Comments: Left buttock: Wound size 6 5 x 0 5 x 0 1 cm ,  100% epithelial, no drainage, periwound is normal, no obvious sign of infection, denies pain    Right buttock superior: Wound size is 0 5 x 0 6 x 0 1 cm ,  100% epithelial, no drainage, no obvious sign of infection, periwound is normal, with no obvious sign of infection    Right buttock inferior: Wound size is 2 5 x 0 3 x 0 1 cm ,  100% epithelial, no drainage, periwound is normal, with no obvious sign of infection    The wound on the right forearm is healed   Neurological:      Mental Status: She is alert  Procedures           Patient's care was coordinated with nursing facility staff  Recent vitals, labs and updated medications were reviewed on EMR or chart system of facility  Past Medical, surgical, social, medication and allergy history and patient's previous records were reviewed and updated as appropriate: Most up-to date information is available in the facility EMR where the patient is currently admitted      Patient Active Problem List   Diagnosis   • Seizures ([facility] )   • Restless leg syndrome   • Hypertension   • Hyperlipidemia   • Type 2 diabetes mellitus (HCC)   • GERD (gastroesophageal reflux disease)   • Epigastric abdominal pain   • Goals of care, counseling/discussion   • Severe episode of recurrent major depressive disorder, without psychotic features ([facility] )   • Gambling disorder, episodic, moderate   • Suspected COVID-19 virus infection   • Major depressive disorder   • Acquired hypothyroidism   • Diarrhea   • Asymptomatic bacteriuria   • Chronic fatigue and malaise   • Calf tenderness   • Morbid obesity due to excess calories ([facility])   • Onychomycosis of toenail   • Orthostatic hypotension   • Primary osteoarthritis of one hip, right   • Dyspnea on exertion   • Chronic heart failure with preserved ejection fraction (HCC)   • Chronic respiratory failure (HCC)   • Coronary artery disease involving native coronary artery of native heart without angina pectoris   • Syncope   • Elevated d-dimer   • Weakness   • Abnormal urine   • Diabetes mellitus type 2 in obese ([facility])   • Fungal dermatitis   • Chest pressure   • Left lower quadrant abdominal pain   • REGINO (acute kidney injury) ([place] [id number] )   • Nausea   • UTI (urinary tract infection)   • Insomnia   • Sepsis secondary to UTI [facility])   • Acute encephalopathy   • Respiratory insufficiency   • Hypernatremia   • Pressure injury of right buttock, stage 2 ([facility])   • Stage II pressure ulcer of left buttock ([facility])   • Pressure injury of sacral region, stage 3 (HCC)   • Pressure injury of back, stage 2 (HCC)   • Pressure injury of right knee, stage 2 ([facility])   • Ambulatory dysfunction   • Urinary retention   • Hemorrhage of muscle   • Lethargy   • Pressure injury of left buttock, stage 3 (HCC)   • Skin tear of right forearm without complication     Past Medical History:   Diagnosis Date   • CHF (congestive heart failure) ([facility])    • Diabetes mellitus ([facility])    • Disease of thyroid gland    • Hyperlipidemia    • Hypertension    • Hypothyroidism (acquired)    • Restless leg syndrome    • Seizures ([place] [address] )      Past Surgical History:   Procedure Laterality Date   • APPENDECTOMY     • CHOLECYSTECTOMY     • HIP FRACTURE SURGERY Right    • IR PICC PLACEMENT SINGLE LUMEN  4/4/2023   • TONSILLECTOMY     • TUBAL LIGATION       Social History     Socioeconomic History   • Marital status:       Spouse name: None   • Number of children: None   • Years of education: None   • Highest education level: None Occupational History   • None   Tobacco Use   • Smoking status: Never   • Smokeless tobacco: Never   • Tobacco comments:     not a smoker   Vaping Use   • Vaping Use: Never used   Substance and Sexual Activity   • Alcohol use: Yes     Comment: occasional   • Drug use: Never   • Sexual activity: Not Currently   Other Topics Concern   • None   Social History Narrative   • None     Social Determinants of Health     Financial Resource Strain: Not on file   Food Insecurity: No Food Insecurity   • Worried About Running Out of Food in the Last Year: Never true   • Ran Out of Food in the Last Year: Never true   Transportation Needs: No Transportation Needs   • Lack of Transportation (Medical): No   • Lack of Transportation (Non-Medical): No   Physical Activity: Not on file   Stress: Not on file   Social Connections: Not on file   Intimate Partner Violence: Not on file   Housing Stability: Unknown   • Unable to Pay for Housing in the Last Year: No   • Number of Places Lived in the Last Year: Not on file   • Unstable Housing in the Last Year: No        Current Outpatient Medications:   •  acetaminophen (TYLENOL) 325 mg tablet, Take 650 mg by mouth every 6 (six) hours as needed for mild pain (pain score 1-3)  , Disp: , Rfl:   •  aspirin (ECOTRIN LOW STRENGTH) 81 mg EC tablet, Take 81 mg by mouth daily, Disp: , Rfl:   •  Cholecalciferol 25 MCG (1000 UT) capsule, Take 1,000 Units by mouth daily  , Disp: , Rfl:   •  Ergocalciferol (VITAMIN D2 PO), Take 50,000 Units/day by mouth 2 (two) times a week, Disp: , Rfl:   •  ezetimibe (ZETIA) 10 mg tablet, Take 1 tablet (10 mg total) by mouth daily, Disp: 30 tablet, Rfl: 0  •  FLUoxetine (PROzac) 40 MG capsule, Take 1 capsule (40 mg total) by mouth daily Do not start before March 17, 2023 , Disp: , Rfl: 0  •  hydrophilic ointment, Apply topically as needed for dry skin, Disp: , Rfl:   •  insulin lispro (HumaLOG) 100 units/mL injection, Inject 1-5 Units under the skin 3 (three) times a "day before meals, Disp: , Rfl: 0  •  levothyroxine 88 mcg tablet, Take 1 tablet (88 mcg total) by mouth daily in the early morning Do not start before April 8, 2023 , Disp: 30 tablet, Rfl: 0  •  melatonin 3 mg, Take 3 tablets (9 mg total) by mouth daily at bedtime, Disp: , Rfl: 0  •  mirtazapine (REMERON) 15 mg tablet, Take 15 mg by mouth daily at bedtime, Disp: , Rfl:   •  nystatin (MYCOSTATIN) powder, Apply topically 2 (two) times a day, Disp: 15 g, Rfl: 0  •  omeprazole (PriLOSEC) 20 mg delayed release capsule, Take 20 mg by mouth daily, Disp: , Rfl:   Family History   Problem Relation Age of Onset   • Brain cancer Mother    • Seizures Father    • Colon cancer Maternal Grandfather    • No Known Problems Son    • No Known Problems Son    • Breast cancer Maternal Aunt 30              Coordination of Care: Wound team aware of the treatment plan  Facility nurse will provide wound treatment and monitor the wound for any changes  Patient / Staff education : Patient / Staff was given education on sign of infection and pressure ulcer prevention  Patient/ Staff verbalized understanding     Follow up :  Next week    Voice-recognition software may have been used in the preparation of this document  Occasional wrong word or \"sound-alike\" substitutions may have occurred due to the inherent limitations of voice recognition software  Interpretation should be guided by context        SAMIA Dowell  "

## 2023-05-10 NOTE — ASSESSMENT & PLAN NOTE
The wound on the right buttock is improving, partial-thickness, with no obvious sign of infection  Continue Triad paste  Continue to offload  Follow-up next week

## 2023-05-14 PROBLEM — N39.0 UTI (URINARY TRACT INFECTION): Status: RESOLVED | Noted: 2023-03-11 | Resolved: 2023-05-14

## 2023-05-16 ENCOUNTER — NURSING HOME VISIT (OUTPATIENT)
Dept: GERIATRICS | Facility: OTHER | Age: 68
End: 2023-05-16

## 2023-05-16 VITALS
BODY MASS INDEX: 35.14 KG/M2 | TEMPERATURE: 97.5 F | RESPIRATION RATE: 18 BRPM | OXYGEN SATURATION: 95 % | SYSTOLIC BLOOD PRESSURE: 121 MMHG | HEART RATE: 80 BPM | WEIGHT: 198.4 LBS | DIASTOLIC BLOOD PRESSURE: 69 MMHG

## 2023-05-16 DIAGNOSIS — F32.9 MAJOR DEPRESSIVE DISORDER, REMISSION STATUS UNSPECIFIED, UNSPECIFIED WHETHER RECURRENT: Primary | ICD-10-CM

## 2023-05-16 NOTE — PROGRESS NOTES
[address]  [address]   [address] [facility] [address] [place], [place], [address]     Progress Note  Code SNF 31    Patient Location     [address] [place] [address] rehab    Reason for visit     Depression, UTI,Hypernatremia,Hypothyroidism, Weight loss, DM2    Patient’s care was coordinated with nursing facility staff  Recent vitals, labs and updated medications were reviewed on [facility]  Problem List Items Addressed This Visit        Other    Major depressive disorder - Primary     Patient remains on Prozac and Remeron  Remains depressed  Follow-up with psych service  Consider increasing Remeron to 30 mg            Distended bladder:  Pt appears to have distended bladder on exam, has history of urinary retention  D  The nurse check bladder scan, insert arceo if PVR is greater than 350 cc  Failure to thrive:  P o  intake remains poor  Patient has lost 11 pounds during the last month   Continue to follow-up with dietitian  Patient refuses supplemental feeds  Comfort/hospice care would be a reasonable option, to be discussed with the son  Attempted to call could not reach  Left a message  GERD:  Stable on omeprazole      Hypenatremia:  Patient has history of recurrent hypenatremia due to free water deficit and poor oral intake  Last sodium was 147  Encourage hydration  Will discuss goals of care with patient's son before subjecting her to further labs and IV fluids    Hypipidemia:  Patient remains on a Eztemibe    Hypothyroidism:  Lab Results   Component Value Date    [id number] 1 558 04/09/2023     Wt loss:  Pt has lost 11 lbs during the last month and 72# during the last 6 months  Wt loss likely multifactorial due to poor oral intake, cognitive decline  Pt is being followed by dietitian however refuses supplements per records    Goal of care discussion:  Patient had a significant decline during the last few months    Po intake remains poor with significant weight loss  Patient remains depressed  She was previously started on Remeron without much improvement  Patient has been treated for hypenatremia and UTIs  Remains at risk for recurrent hospitalization due to above/ dehydration  Per records patient has refused any alternative means of feeding  Comfort/hospice care would be a reasonable option  Attempted to call son to discuss above  Left a message to call me back      HPI       Patient is being seen for a follow-up visit today  Her general condition remains about the same  P o  intake is poor and eating 0 to 25% of the meals  Patient did not eat breakfast  Patient has several wounds involving buttock groin and sacrum being followed by wound care team   Recent follow-up notes were reviewed  No signs of active infection  Patient continues to lose weight  She had weight loss of around 11 pounds in 1 month and 72 pounds in 6 months  She is being followed by dietitian  Per records patient is not excepting supplements as food alternatives      Review of Systems   Constitutional: Positive for appetite change (Poor oral intake) and unexpected weight change (Weight loss of 11 pounds during the last month and 72 pounds during the last 6 months)  Negative for chills and fever  Respiratory: Positive for shortness of breath (occasionally)  Negative for cough and wheezing  Cardiovascular: Negative for chest pain and leg swelling  Gastrointestinal: Negative for abdominal distention, abdominal pain and vomiting  Genitourinary: Negative for flank pain and hematuria  History of urinary retention   Musculoskeletal: Positive for gait problem  Psychiatric/Behavioral: Positive for confusion and dysphoric mood         Past Medical History:   Diagnosis Date   • CHF (congestive heart failure) ([facility])    • Diabetes mellitus ([facility] )    • Disease of thyroid gland    • Hyperlipidemia    • Hypertension    • Hypothyroidism (acquired)    • Restless leg syndrome    • Seizures ([facility] [address] )        Past Surgical History:   Procedure Laterality Date   • APPENDECTOMY     • CHOLECYSTECTOMY     • HIP FRACTURE SURGERY Right    • IR PICC PLACEMENT SINGLE LUMEN  4/4/2023   • TONSILLECTOMY     • TUBAL LIGATION         Social History     Tobacco Use   Smoking Status Never   Smokeless Tobacco Never   Tobacco Comments    not a smoker       Family History   Problem Relation Age of Onset   • Brain cancer Mother    • Seizures Father    • Colon cancer Maternal Grandfather    • No Known Problems Son    • No Known Problems Son    • Breast cancer Maternal Aunt 30        Allergies   Allergen Reactions   • Pineapple - Food Allergy Anaphylaxis   • Statins Other (See Comments)     Cramping from head to toe    • Bupropion Other (See Comments)   • Byetta 10 Mcg Pen [Exenatide]    • Ceftriaxone Other (See Comments)   • Marijuana [Dronabinol] GI Intolerance   • Metformin Diarrhea     Other reaction(s): Unknown   • Omeprazole Hives   • Ondansetron      Other reaction(s): Unknown   • Coconut Oil - Food Allergy Rash   • Liraglutide Rash and Other (See Comments)     Generalized rash   • Penicillins Rash         Current Outpatient Medications:   •  acetaminophen (TYLENOL) 325 mg tablet, Take 650 mg by mouth every 6 (six) hours as needed for mild pain (pain score 1-3)  , Disp: , Rfl:   •  aspirin (ECOTRIN LOW STRENGTH) 81 mg EC tablet, Take 81 mg by mouth daily, Disp: , Rfl:   •  Cholecalciferol 25 MCG (1000 UT) capsule, Take 1,000 Units by mouth daily  , Disp: , Rfl:   •  Ergocalciferol (VITAMIN D2 PO), Take 50,000 Units/day by mouth 2 (two) times a week, Disp: , Rfl:   •  ezetimibe (ZETIA) 10 mg tablet, Take 1 tablet (10 mg total) by mouth daily, Disp: 30 tablet, Rfl: 0  •  FLUoxetine (PROzac) 40 MG capsule, Take 1 capsule (40 mg total) by mouth daily Do not start before March 17, 2023 , Disp: , Rfl: 0  •  hydrophilic ointment, Apply topically as needed for dry skin, Disp: , Rfl:   •  insulin lispro (HumaLOG) 100 units/mL injection, Inject 1-5 Units under the skin 3 (three) times a day before meals, Disp: , Rfl: 0  •  levothyroxine 88 mcg tablet, Take 1 tablet (88 mcg total) by mouth daily in the early morning Do not start before April 8, 2023 , Disp: 30 tablet, Rfl: 0  •  melatonin 3 mg, Take 3 tablets (9 mg total) by mouth daily at bedtime, Disp: , Rfl: 0  •  mirtazapine (REMERON) 15 mg tablet, Take 15 mg by mouth daily at bedtime, Disp: , Rfl:   •  nystatin (MYCOSTATIN) powder, Apply topically 2 (two) times a day, Disp: 15 g, Rfl: 0  •  omeprazole (PriLOSEC) 20 mg delayed release capsule, Take 20 mg by mouth daily, Disp: , Rfl:     Updated list was reviewed in Levine, Susan. \Hospital Has a New Name and Outlook.\"" system of facility  Vitals:    05/16/23 0959   BP: 121/69   Pulse: 80   Resp: 18   Temp: 97 5 °F (36 4 °C)   SpO2: 95%       Physical Exam  Constitutional:       Appearance: She is ill-appearing  HENT:      Head: Atraumatic  Cardiovascular:      Rate and Rhythm: Normal rate and regular rhythm  Comments: Distal pulses palpable  Pulmonary:      Breath sounds: No wheezing, rhonchi or rales  Abdominal:      General: There is no distension  Palpations: Abdomen is soft  Tenderness: There is no abdominal tenderness  There is no guarding  Genitourinary:     Comments: Fullness involving suprapubic area  Bladder appears to be distended  Musculoskeletal:      Cervical back: Neck supple  Right lower leg: No edema  Left lower leg: No edema  Skin:     Findings: Bruising (Ecchymosis involving RUE) present  Comments: Scabs involving all 5 right toes and 2 left toes   Neurological:      Cranial Nerves: No cranial nerve deficit  Motor: Weakness (generalized) present  Comments: Oriented in year, does not remember the month   Psychiatric:      Comments: Dysmorphic mood         Diagnostic Data:    Recent labs were reviewed    Labs done on 5/8/2023 revealed hemoglobin of 8 8, sodium 147, BUN 39  Wbc 8 7, creatinine "1 4    Additional Notes: Attempted to call the son to discuss goals of care and possible transition into comfort/hospice care  Awaiting callback    Portions of the record may have been created with voice recognition software  Occasional wrong word or \"sound a like\" substitutions may have occurred due to the inherent limitations of voice recognition software  Read the chart carefully and recognize, using context, where substitutions have occurred      This note was electronically signed by Dr Ct Genao   "

## 2023-05-16 NOTE — ASSESSMENT & PLAN NOTE
Patient remains on Prozac and Remeron  Remains depressed  Follow-up with psych service    Consider increasing Remeron to 30 mg

## 2023-05-17 ENCOUNTER — NURSING HOME VISIT (OUTPATIENT)
Dept: WOUND CARE | Facility: HOSPITAL | Age: 68
End: 2023-05-17

## 2023-05-17 DIAGNOSIS — L89.323 PRESSURE INJURY OF LEFT BUTTOCK, STAGE 3 (HCC): ICD-10-CM

## 2023-05-17 DIAGNOSIS — L89.312 PRESSURE INJURY OF RIGHT BUTTOCK, STAGE 2 (HCC): Primary | ICD-10-CM

## 2023-05-17 NOTE — ASSESSMENT & PLAN NOTE
Left buttock wound improved, partial-thickness, with decreased wound size  Continue local wound care with Triad paste  Follow-up next week

## 2023-05-17 NOTE — PROGRESS NOTES
[address] MANAGEMENT   AND HYPERBARIC MEDICINE CENTER       Patient ID: Nathen Oliveira is a 75 y o  female Date of Birth 1955     Location of Service: [address] [place] [address]    Performed wound round with: Wound team     Chief Complaint : buttocks,    Wound Instructions:  Wound: Buttocks  Discontinue previous wound order  Cleanse the wound bed with NSS   Apply non-sting skin prep to periwound area  Apply Triad paste to wound bed  Frequency : Twice a day and prn for soili       Offload all wounds  Turn and reposition frequently, Increase protein intake  Monitor for any sign of infection or worsening, inform PCP or patient's primary physician in your facility  Allergies  Pineapple - food allergy, Statins, Bupropion, Byetta 10 mcg pen [exenatide], Ceftriaxone, Marijuana [dronabinol], Metformin, Omeprazole, Ondansetron, Coconut oil - food allergy, Liraglutide, and Penicillins      Assessment & Plan:  1  Pressure injury of right buttock, stage 2 ([address] )  Assessment & Plan: The wound on the right buttock improved  The wound on the inferior is healed  Continue local wound care with Triad paste  Continue to offload  Follow-up next week      2  Pressure injury of left buttock, stage 3 ([facility])  Assessment & Plan:  Left buttock wound improved, partial-thickness, with decreased wound size  Continue local wound care with Triad paste  Follow-up next week             Subjective:   March 22, 2023  This is a new consult for wound on the right buttocks, sacrum, left buttocks, back, and right knee  Patient was referred by Senior care team   Patient have a complex medical history including but not limited to UTI, acute kidney injury, CHF, and GERD  As per medical record review, patient was recently admitted at [facility] from March 11 to March 16, 2023 for sepsis  Patient was transferred on discharge to [facility] for short-term rehab      Wound history: As per medical record review, patient had wound on the sacrum that was first assessed on March 11, 2023 the wound on back was first assessed on March 13, 2023  The wound on the right lower leg was first assessed on March 13, 2023  The etiology of all mention wounds are pressure injury  Received patient in bed, seems comfortable  Total dependent with positioning in bed  Currently on air mattress  Patient have a poor appetite  Patient is incontinent of both bowel and bladder  Patient currently on protein supplement, Prosource and Magic cup  March 29, 2023  Follow-up for wound on the right buttock, sacrum, left buttock, back, and right knee  Received patient in bed, seems comfortable  Denies pain  Facility did not report any significant issues related to the wound  April 26, 2023  Follow-up for wound on the buttocks, groin, and breast   As per report, patient was recently admitted at acute [facility] and returned in [facility] for short-term rehab  Received patient in bed, seems comfortable  Denies pain  Patient is a max assist with positioning in bed  She is incontinent of both bowel and bladder  May 3, 2023  Follow-up for wounds on the left buttock, right buttock, and new consult for wound on the right forearm  The wound on the right forearm is a skin tear, unknown etiology  Received patient in bed, seems comfortable  Denies pain  No significant issues reported related to the wound  May 10, 2023  Follow-up for wound on the left buttock, right buttock, right forearm  Received patient in bed, seems comfortable  Denies pain  Patient is max assist with transfer  May 17, 2023  Follow-up for wound on the left buttock, right buttock  Received patient in bed, seems comfortable  Denies pain  No significant issues related to the wound  Review of Systems   Constitutional: Negative  Respiratory: Negative  Cardiovascular: Negative  Skin: Positive for rash and wound         Objective:    Physical Exam  Constitutional:       Appearance: She is obese  Cardiovascular:      Rate and Rhythm: Normal rate  Pulmonary:      Effort: Pulmonary effort is normal    Genitourinary:     Comments: Incontinent  Musculoskeletal:      Comments: Total dependent with positioning in bed   Skin:     Findings: Lesion present  Comments: Left buttock: Wound size 6 3 x 0 3 x 0 1 cm ,  100% epithelial, no drainage, normal periwound area, with no obvious sign of infection    Right buttock superior: Wound size   5 x 0 5 x 0 1 cm ,  100% epithelial, no drainage, periwound is normal, with no obvious sign of infection    Right buttock inferior: Wound is healed   Neurological:      Mental Status: She is alert  Procedures           Patient's care was coordinated with nursing facility staff  Recent vitals, labs and updated medications were reviewed on EMR or chart system of facility  Past Medical, surgical, social, medication and allergy history and patient's previous records were reviewed and updated as appropriate: Most up-to date information is available in the facility EMR where the patient is currently admitted      Patient Active Problem List   Diagnosis   • Seizures ([facility] )   • Restless leg syndrome   • Hypertension   • Hyperlipidemia   • Type 2 diabetes mellitus ([facility])   • GERD (gastroesophageal reflux disease)   • Epigastric abdominal pain   • Goals of care, counseling/discussion   • Severe episode of recurrent major depressive disorder, without psychotic features ([facility] )   • Gambling disorder, episodic, moderate   • Suspected COVID-19 virus infection   • Major depressive disorder   • Acquired hypothyroidism   • Diarrhea   • Asymptomatic bacteriuria   • Chronic fatigue and malaise   • Calf tenderness   • Morbid obesity due to excess calories ([facility])   • Onychomycosis of toenail   • Orthostatic hypotension   • Primary osteoarthritis of one hip, right   • Dyspnea on exertion   • Chronic heart failure with preserved ejection fraction [facility])   • Chronic respiratory failure ([facility])   • Coronary artery disease involving native coronary artery of native heart without angina pectoris   • Syncope   • Elevated d-dimer   • Weakness   • Abnormal urine   • Diabetes mellitus type 2 in obese ([facility])   • Fungal dermatitis   • Chest pressure   • Left lower quadrant abdominal pain   • REGINO (acute kidney injury) ([facility])   • Nausea   • Insomnia   • Sepsis secondary to UTI [facility])   • Acute encephalopathy   • Respiratory insufficiency   • Hypernatremia   • Pressure injury of right buttock, stage 2 ([facility])   • Stage II pressure ulcer of left buttock (H[facility])   • Pressure injury of sacral region, stage 3 (H[facility])   • Pressure injury of back, stage 2 ([facility])   • Pressure injury of right knee, stage 2 ([facility])   • Ambulatory dysfunction   • Urinary retention   • Hemorrhage of muscle   • Lethargy   • Pressure injury of left buttock, stage 3 (H[facility])   • Skin tear of right forearm without complication     Past Medical History:   Diagnosis Date   • CHF (congestive heart failure) ([facility])    • Diabetes mellitus ([facility])    • Disease of thyroid gland    • Hyperlipidemia    • Hypertension    • Hypothyroidism (acquired)    • Restless leg syndrome    • Seizures ([facility] )      Past Surgical History:   Procedure Laterality Date   • APPENDECTOMY     • CHOLECYSTECTOMY     • HIP FRACTURE SURGERY Right    • IR PICC PLACEMENT SINGLE LUMEN  4/4/2023   • TONSILLECTOMY     • TUBAL LIGATION       Social History     Socioeconomic History   • Marital status:       Spouse name: None   • Number of children: None   • Years of education: None   • Highest education level: None   Occupational History   • None   Tobacco Use   • Smoking status: Never   • Smokeless tobacco: Never   • Tobacco comments:     not a smoker   Vaping Use   • Vaping Use: Never used   Substance and Sexual Activity   • Alcohol use: Yes     Comment: occasional   • Drug use: Never   • Sexual activity: Not Currently   Other Topics Concern   • None   Social History Narrative   • None     Social Determinants of Health     Financial Resource Strain: Not on file   Food Insecurity: No Food Insecurity   • Worried About Running Out of Food in the Last Year: Never true   • Ran Out of Food in the Last Year: Never true   Transportation Needs: No Transportation Needs   • Lack of Transportation (Medical): No   • Lack of Transportation (Non-Medical): No   Physical Activity: Not on file   Stress: Not on file   Social Connections: Not on file   Intimate Partner Violence: Not on file   Housing Stability: Unknown   • Unable to Pay for Housing in the Last Year: No   • Number of Places Lived in the Last Year: Not on file   • Unstable Housing in the Last Year: No        Current Outpatient Medications:   •  acetaminophen (TYLENOL) 325 mg tablet, Take 650 mg by mouth every 6 (six) hours as needed for mild pain (pain score 1-3)  , Disp: , Rfl:   •  aspirin (ECOTRIN LOW STRENGTH) 81 mg EC tablet, Take 81 mg by mouth daily, Disp: , Rfl:   •  Cholecalciferol 25 MCG (1000 UT) capsule, Take 1,000 Units by mouth daily  , Disp: , Rfl:   •  Ergocalciferol (VITAMIN D2 PO), Take 50,000 Units/day by mouth 2 (two) times a week, Disp: , Rfl:   •  ezetimibe (ZETIA) 10 mg tablet, Take 1 tablet (10 mg total) by mouth daily, Disp: 30 tablet, Rfl: 0  •  FLUoxetine (PROzac) 40 MG capsule, Take 1 capsule (40 mg total) by mouth daily Do not start before March 17, 2023 , Disp: , Rfl: 0  •  hydrophilic ointment, Apply topically as needed for dry skin, Disp: , Rfl:   •  insulin lispro (HumaLOG) 100 units/mL injection, Inject 1-5 Units under the skin 3 (three) times a day before meals, Disp: , Rfl: 0  •  levothyroxine 88 mcg tablet, Take 1 tablet (88 mcg total) by mouth daily in the early morning Do not start before April 8, 2023 , Disp: 30 tablet, Rfl: 0  •  melatonin 3 mg, Take 3 tablets (9 mg total) by mouth daily at bedtime, Disp: , Rfl: 0  •  mirtazapine (REMERON) 15 mg tablet, Take 15 mg by mouth "daily at bedtime, Disp: , Rfl:   •  nystatin (MYCOSTATIN) powder, Apply topically 2 (two) times a day, Disp: 15 g, Rfl: 0  •  omeprazole (PriLOSEC) 20 mg delayed release capsule, Take 20 mg by mouth daily, Disp: , Rfl:   Family History   Problem Relation Age of Onset   • Brain cancer Mother    • Seizures Father    • Colon cancer Maternal Grandfather    • No Known Problems Son    • No Known Problems Son    • Breast cancer Maternal Aunt 30              Coordination of Care: Wound team aware of the treatment plan  Facility nurse will provide wound treatment and monitor the wound for any changes  Patient / Staff education : Patient / Staff was given education on sign of infection and pressure ulcer prevention  Patient/ Staff verbalized understanding     Follow up :  Next week    Voice-recognition software may have been used in the preparation of this document  Occasional wrong word or \"sound-alike\" substitutions may have occurred due to the inherent limitations of voice recognition software  Interpretation should be guided by context        Thersia Bence, CRNP  "

## 2023-05-17 NOTE — ASSESSMENT & PLAN NOTE
The wound on the right buttock improved  The wound on the inferior is healed      Continue local wound care with Triad paste  Continue to offload  Follow-up next week

## 2023-05-18 ENCOUNTER — NURSING HOME VISIT (OUTPATIENT)
Dept: GERIATRICS | Facility: OTHER | Age: 68
End: 2023-05-18

## 2023-05-18 VITALS
DIASTOLIC BLOOD PRESSURE: 70 MMHG | RESPIRATION RATE: 18 BRPM | WEIGHT: 198.4 LBS | OXYGEN SATURATION: 96 % | BODY MASS INDEX: 35.14 KG/M2 | TEMPERATURE: 97.4 F | HEART RATE: 76 BPM | SYSTOLIC BLOOD PRESSURE: 124 MMHG

## 2023-05-18 DIAGNOSIS — E03.9 ACQUIRED HYPOTHYROIDISM: Primary | ICD-10-CM

## 2023-05-18 NOTE — PROGRESS NOTES
[facility]  [address]   [address] [facility]  [address]     Progress Note  Code [facility]    Patient Location     [address] [place] [address] rehab    Reason for visit     Urinary retention, poor oral intake, failure to thrive, weight loss, depression, diabetes mellitus type 2    Patient’s care was coordinated with nursing facility staff  Recent vitals, labs and updated medications were reviewed on [facility] system of facility  Problem List Items Addressed This Visit        Endocrine    Acquired hypothyroidism - Primary     Continue levothyroxine 88 mcg daily  Recent TSH was within normal limits  Lab Results   Component Value Date    [id number] 1 558 04/09/2023               Depression:  Historyof severe depression in the past   Currently remains on Prozac and Remeron  Pt was discharged from the hospital on 4/14 on Prozac 40 mg daily  She was later started on Remeron   Patient has been seen by psychiatry service on several occasions  She was taken off of trazodone previously due to increased somnolence confusion and encephalopathy  Increase Remeron to 30 mg daily  Re consult Psy service     Urinary retention:  History of urinary retention  Recent bladder scan revealed PVR of over 900 cc  Kay was reinserted  Continue local Kay care  Diabetes mellitus type 2:  Patient's blood sugars are running on the lower side with readings mostly under 150 due to poor oral intake  she was taken off of insulin and oral hypoglycemic agents due to above  Lab Results   Component Value Date    HGBA1C 5 2 04/14/2023     HGBA1c was noted to be 5 2 on 4/14/23 down  from 11 8 on 9/19/22    Failure to thrive:  Patient has lost around 70 pounds during the last 8 months and 11 pounds during the last month alone  Weight loss is suspected to be multifactorial mostly due to poor oral intake  Continue to encourage p o  intake  Assist patient with meals   F U with dietitian    Hypernatremia:  History of recurrent hypenatremia due to poor oral intake and free water deficit  Recent sodium was 147  Encourage hydration  Repeat BMP in AM     Goal of care discussion:   Goal of care discussion was carried out with patient's son over the phone  Patient's condition was discussed at length with him including overall decline, poor oral intake and significant weight loss over the last few months  Pt remains at risk for recurrent hypenatremia UTIs, dehydration and hospitalizations due to above  Mirtazapine was added on Prozac to improve appetite in the setting of underlying depression however pt's general condition remains the same  Option of comfort versus aggressive care was discussed  Son became verbally aggressive over the phone, accused facility and staff of not taking care of the patient properly  Seems to feel patient was doing well on [place] medications  Per records patient has been seen by psych service on multiple occasions  Meds were adjusted previously for increased confusion encephalopathy and somnolence  Patient eventually hung up the phone on me, ended the conversation by saying we were going to hear from his  for malpractice  Chronic diastlic heart failure:   Clinically euvolemic  Patient was previously taken off of diuretics due to poor oral intake, dehydration, hypenatremia and orthostatic hypotension    Buttock wounds:  Pt has bilateral buttock wounds, being followed by wound care team   No signs of active infection per wound care notes    HPI       Patient is being seen for a follow-up visit today  Her general condition remains the same  P o  intake is poor  She has lost significant weight since October/22  Care coordinated with nursing staff  Patient usually refuses breakfast   Meal consumption during lunch and dinnertime is also poor  She was noted to have evidence of urinary retention on recent encounter    Bladder scan revealed PVR of over 900 cc  Kay catheter was reinserted  Blood sugars remain on the lower side usually under 150  Pt appears to be depressed, not motivated  Does not wish to pursue any alternative means of feeding  Review of Systems   Constitutional: Positive for appetite change (Poor oral intake) and unexpected weight change (Weight loss of around 72 pounds since October/22)  Respiratory: Negative for shortness of breath, wheezing and stridor  Cardiovascular: Negative for chest pain and leg swelling  Gastrointestinal: Negative for abdominal distention, abdominal pain and vomiting  Musculoskeletal: Positive for gait problem  Neurological: Positive for weakness  Negative for seizures and syncope  Psychiatric/Behavioral: Positive for confusion (at times)  Negative for agitation         Past Medical History:   Diagnosis Date   • CHF (congestive heart failure) ([facility]    • Diabetes mellitus ([facility] )    • Disease of thyroid gland    • Hyperlipidemia    • Hypertension    • Hypothyroidism (acquired)    • Restless leg syndrome    • Seizures (HCC)        Past Surgical History:   Procedure Laterality Date   • APPENDECTOMY     • CHOLECYSTECTOMY     • HIP FRACTURE SURGERY Right    • IR PICC PLACEMENT SINGLE LUMEN  4/4/2023   • TONSILLECTOMY     • TUBAL LIGATION         Social History     Tobacco Use   Smoking Status Never   Smokeless Tobacco Never   Tobacco Comments    not a smoker       Family History   Problem Relation Age of Onset   • Brain cancer Mother    • Seizures Father    • Colon cancer Maternal Grandfather    • No Known Problems Son    • No Known Problems Son    • Breast cancer Maternal Aunt 30        Allergies   Allergen Reactions   • Pineapple - Food Allergy Anaphylaxis   • Statins Other (See Comments)     Cramping from head to toe    • Bupropion Other (See Comments)   • Byetta 10 Mcg Pen [Exenatide]    • Ceftriaxone Other (See Comments)   • Marijuana [Dronabinol] GI Intolerance   • Metformin Diarrhea     Other reaction(s): Unknown   • Omeprazole Hives   • Ondansetron      Other reaction(s): Unknown   • Coconut Oil - Food Allergy Rash   • Liraglutide Rash and Other (See Comments)     Generalized rash   • Penicillins Rash         Current Outpatient Medications:   •  acetaminophen (TYLENOL) 325 mg tablet, Take 650 mg by mouth every 6 (six) hours as needed for mild pain (pain score 1-3)  , Disp: , Rfl:   •  aspirin (ECOTRIN LOW STRENGTH) 81 mg EC tablet, Take 81 mg by mouth daily, Disp: , Rfl:   •  Cholecalciferol 25 MCG (1000 UT) capsule, Take 1,000 Units by mouth daily  , Disp: , Rfl:   •  Ergocalciferol (VITAMIN D2 PO), Take 50,000 Units/day by mouth 2 (two) times a week, Disp: , Rfl:   •  ezetimibe (ZETIA) 10 mg tablet, Take 1 tablet (10 mg total) by mouth daily, Disp: 30 tablet, Rfl: 0  •  FLUoxetine (PROzac) 40 MG capsule, Take 1 capsule (40 mg total) by mouth daily Do not start before March 17, 2023 , Disp: , Rfl: 0  •  hydrophilic ointment, Apply topically as needed for dry skin, Disp: , Rfl:   •  insulin lispro (HumaLOG) 100 units/mL injection, Inject 1-5 Units under the skin 3 (three) times a day before meals, Disp: , Rfl: 0  •  levothyroxine 88 mcg tablet, Take 1 tablet (88 mcg total) by mouth daily in the early morning Do not start before April 8, 2023 , Disp: 30 tablet, Rfl: 0  •  melatonin 3 mg, Take 3 tablets (9 mg total) by mouth daily at bedtime, Disp: , Rfl: 0  •  mirtazapine (REMERON) 15 mg tablet, Take 30 mg by mouth daily at bedtime, Disp: , Rfl:   •  nystatin (MYCOSTATIN) powder, Apply topically 2 (two) times a day, Disp: 15 g, Rfl: 0  •  omeprazole (PriLOSEC) 20 mg delayed release capsule, Take 20 mg by mouth daily, Disp: , Rfl:     Updated list was reviewed in [facility] of facility  Vitals:    05/18/23 1316   BP: 124/70   Pulse: 76   Resp: 18   Temp: (!) 97 4 °F (36 3 °C)   SpO2: 96%       Physical Exam  Constitutional:       General: She is not in acute distress    HENT:      Head: "Normocephalic and atraumatic  Eyes:      General: No scleral icterus  Cardiovascular:      Rate and Rhythm: Normal rate and regular rhythm  Pulmonary:      Breath sounds: No wheezing, rhonchi or rales  Abdominal:      General: There is no distension  Palpations: Abdomen is soft  Tenderness: There is no abdominal tenderness  There is no guarding  Genitourinary:     Comments: Kay catheter in place  Musculoskeletal:      Cervical back: Neck supple  Right lower leg: No edema  Left lower leg: No edema  Skin:     Coloration: Skin is not jaundiced  Findings: Bruising (Ecchymosis noted over the right upper extremity) present  Neurological:      Cranial Nerves: No cranial nerve deficit  Motor: Weakness (Generalized) present  Diagnostic Data:    CBC BMP on 5/8/2023 revealed WBC count of 6 4, hemoglobin 8 8, platelet count 354  BUN 39, creatinine 1 4, blood glucose 61, sodium 147, potassium 3 6    Additional Notes:   Care coordinated at length with patient's son, nursing staff  and DON  Reconsult Dr Rei Mosquera regarding depression follow-up  Repeat CBC BMP in AM   Son wishes to continue current level of care for now  Total time spent on this patient's evaluation was in excess of 45 minutes  Portions of the record may have been created with voice recognition software  Occasional wrong word or \"sound a like\" substitutions may have occurred due to the inherent limitations of voice recognition software  Read the chart carefully and recognize, using context, where substitutions have occurred      This note was electronically signed by Dr Shakira Houser   "

## 2023-05-18 NOTE — ASSESSMENT & PLAN NOTE
Continue levothyroxine 88 mcg daily    Recent TSH was within normal limits  Lab Results   Component Value Date    [id number] 1 558 04/09/2023

## 2023-05-19 ENCOUNTER — NURSING HOME VISIT (OUTPATIENT)
Dept: GERIATRICS | Facility: OTHER | Age: 68
End: 2023-05-19

## 2023-05-19 ENCOUNTER — HOSPITAL ENCOUNTER (INPATIENT)
Facility: HOSPITAL | Age: 68
LOS: 7 days | Discharge: NURSING, REHAB OR LONG-TERM CARE | End: 2023-05-26
Attending: EMERGENCY MEDICINE | Admitting: INTERNAL MEDICINE

## 2023-05-19 ENCOUNTER — APPOINTMENT (EMERGENCY)
Dept: CT IMAGING | Facility: HOSPITAL | Age: 68
End: 2023-05-19

## 2023-05-19 VITALS
WEIGHT: 198.4 LBS | SYSTOLIC BLOOD PRESSURE: 138 MMHG | RESPIRATION RATE: 18 BRPM | HEART RATE: 88 BPM | TEMPERATURE: 97.5 F | DIASTOLIC BLOOD PRESSURE: 83 MMHG | BODY MASS INDEX: 35.14 KG/M2 | OXYGEN SATURATION: 96 %

## 2023-05-19 DIAGNOSIS — F32.9 MAJOR DEPRESSIVE DISORDER, REMISSION STATUS UNSPECIFIED, UNSPECIFIED WHETHER RECURRENT: ICD-10-CM

## 2023-05-19 DIAGNOSIS — N93.9 VAGINAL BLEEDING: ICD-10-CM

## 2023-05-19 DIAGNOSIS — R41.89 COGNITIVE DECLINE: ICD-10-CM

## 2023-05-19 DIAGNOSIS — D64.9 ANEMIA: ICD-10-CM

## 2023-05-19 DIAGNOSIS — R62.7 FAILURE TO THRIVE IN ADULT: ICD-10-CM

## 2023-05-19 DIAGNOSIS — R45.851 SUICIDAL IDEATIONS: ICD-10-CM

## 2023-05-19 DIAGNOSIS — E87.0 HYPERNATREMIA: Primary | ICD-10-CM

## 2023-05-19 DIAGNOSIS — L89.159 SACRAL DECUBITUS ULCER: ICD-10-CM

## 2023-05-19 DIAGNOSIS — E03.9 HYPOTHYROIDISM: ICD-10-CM

## 2023-05-19 DIAGNOSIS — E44.0 MODERATE PROTEIN-CALORIE MALNUTRITION (HCC): ICD-10-CM

## 2023-05-19 DIAGNOSIS — R52 PAIN: ICD-10-CM

## 2023-05-19 DIAGNOSIS — G93.40 ENCEPHALOPATHY: Primary | ICD-10-CM

## 2023-05-19 DIAGNOSIS — N39.0 UTI (URINARY TRACT INFECTION): ICD-10-CM

## 2023-05-19 DIAGNOSIS — R53.83 LETHARGY: ICD-10-CM

## 2023-05-19 LAB
ALBUMIN SERPL BCP-MCNC: 2.6 G/DL (ref 3.5–5)
ALP SERPL-CCNC: 142 U/L (ref 34–104)
ALT SERPL W P-5'-P-CCNC: 17 U/L (ref 7–52)
ANION GAP SERPL CALCULATED.3IONS-SCNC: 8 MMOL/L (ref 4–13)
APTT PPP: 27 SECONDS (ref 23–37)
AST SERPL W P-5'-P-CCNC: 23 U/L (ref 13–39)
BACTERIA UR QL AUTO: ABNORMAL /HPF
BASOPHILS # BLD AUTO: 0.04 THOUSANDS/ÂΜL (ref 0–0.1)
BASOPHILS NFR BLD AUTO: 0 % (ref 0–1)
BILIRUB SERPL-MCNC: 0.32 MG/DL (ref 0.2–1)
BILIRUB UR QL STRIP: NEGATIVE
BUN SERPL-MCNC: 31 MG/DL (ref 5–25)
CALCIUM ALBUM COR SERPL-MCNC: 9.7 MG/DL (ref 8.3–10.1)
CALCIUM SERPL-MCNC: 8.6 MG/DL (ref 8.4–10.2)
CAOX CRY URNS QL MICRO: ABNORMAL /HPF
CHLORIDE SERPL-SCNC: 105 MMOL/L (ref 96–108)
CLARITY UR: ABNORMAL
CO2 SERPL-SCNC: 27 MMOL/L (ref 21–32)
COLOR UR: YELLOW
CREAT SERPL-MCNC: 1.2 MG/DL (ref 0.6–1.3)
EOSINOPHIL # BLD AUTO: 0.39 THOUSAND/ÂΜL (ref 0–0.61)
EOSINOPHIL NFR BLD AUTO: 4 % (ref 0–6)
ERYTHROCYTE [DISTWIDTH] IN BLOOD BY AUTOMATED COUNT: 17.1 % (ref 11.6–15.1)
GFR SERPL CREATININE-BSD FRML MDRD: 46 ML/MIN/1.73SQ M
GLUCOSE SERPL-MCNC: 113 MG/DL (ref 65–140)
GLUCOSE SERPL-MCNC: 66 MG/DL (ref 65–140)
GLUCOSE SERPL-MCNC: 80 MG/DL (ref 65–140)
GLUCOSE SERPL-MCNC: 84 MG/DL (ref 65–140)
GLUCOSE UR STRIP-MCNC: NEGATIVE MG/DL
HCT VFR BLD AUTO: 34.5 % (ref 34.8–46.1)
HGB BLD-MCNC: 10.4 G/DL (ref 11.5–15.4)
HGB UR QL STRIP.AUTO: ABNORMAL
HYALINE CASTS #/AREA URNS LPF: ABNORMAL /LPF
IMM GRANULOCYTES # BLD AUTO: 0.18 THOUSAND/UL (ref 0–0.2)
IMM GRANULOCYTES NFR BLD AUTO: 2 % (ref 0–2)
INR PPP: 1.03 (ref 0.84–1.19)
KETONES UR STRIP-MCNC: NEGATIVE MG/DL
LACTATE SERPL-SCNC: 1 MMOL/L (ref 0.5–2)
LEUKOCYTE ESTERASE UR QL STRIP: ABNORMAL
LYMPHOCYTES # BLD AUTO: 1.46 THOUSANDS/ÂΜL (ref 0.6–4.47)
LYMPHOCYTES NFR BLD AUTO: 16 % (ref 14–44)
MCH RBC QN AUTO: 29.2 PG (ref 26.8–34.3)
MCHC RBC AUTO-ENTMCNC: 30.1 G/DL (ref 31.4–37.4)
MCV RBC AUTO: 97 FL (ref 82–98)
MONOCYTES # BLD AUTO: 0.62 THOUSAND/ÂΜL (ref 0.17–1.22)
MONOCYTES NFR BLD AUTO: 7 % (ref 4–12)
MUCOUS THREADS UR QL AUTO: ABNORMAL
NEUTROPHILS # BLD AUTO: 6.67 THOUSANDS/ÂΜL (ref 1.85–7.62)
NEUTS SEG NFR BLD AUTO: 71 % (ref 43–75)
NITRITE UR QL STRIP: POSITIVE
NON-SQ EPI CELLS URNS QL MICRO: ABNORMAL /HPF
NRBC BLD AUTO-RTO: 0 /100 WBCS
PH UR STRIP.AUTO: 5.5 [PH]
PLATELET # BLD AUTO: 369 THOUSANDS/UL (ref 149–390)
PMV BLD AUTO: 10.8 FL (ref 8.9–12.7)
POTASSIUM SERPL-SCNC: 3.4 MMOL/L (ref 3.5–5.3)
PROCALCITONIN SERPL-MCNC: 0.16 NG/ML
PROT SERPL-MCNC: 6.2 G/DL (ref 6.4–8.4)
PROT UR STRIP-MCNC: ABNORMAL MG/DL
PROTHROMBIN TIME: 13.7 SECONDS (ref 11.6–14.5)
RBC # BLD AUTO: 3.56 MILLION/UL (ref 3.81–5.12)
RBC #/AREA URNS AUTO: ABNORMAL /HPF
SODIUM SERPL-SCNC: 140 MMOL/L (ref 135–147)
SP GR UR STRIP.AUTO: 1.02 (ref 1–1.03)
UROBILINOGEN UR STRIP-ACNC: <2 MG/DL
WBC # BLD AUTO: 9.36 THOUSAND/UL (ref 4.31–10.16)
WBC #/AREA URNS AUTO: ABNORMAL /HPF
WBC CLUMPS # UR AUTO: PRESENT /UL

## 2023-05-19 RX ORDER — LANOLIN ALCOHOL/MO/W.PET/CERES
9 CREAM (GRAM) TOPICAL
Status: DISCONTINUED | OUTPATIENT
Start: 2023-05-19 | End: 2023-05-26 | Stop reason: HOSPADM

## 2023-05-19 RX ORDER — EZETIMIBE 10 MG/1
10 TABLET ORAL DAILY
Status: DISCONTINUED | OUTPATIENT
Start: 2023-05-20 | End: 2023-05-26 | Stop reason: HOSPADM

## 2023-05-19 RX ORDER — FENTANYL CITRATE 50 UG/ML
50 INJECTION, SOLUTION INTRAMUSCULAR; INTRAVENOUS ONCE
Status: COMPLETED | OUTPATIENT
Start: 2023-05-19 | End: 2023-05-19

## 2023-05-19 RX ORDER — HYDROMORPHONE HCL/PF 1 MG/ML
0.5 SYRINGE (ML) INJECTION EVERY 4 HOURS PRN
Status: DISCONTINUED | OUTPATIENT
Start: 2023-05-19 | End: 2023-05-26 | Stop reason: HOSPADM

## 2023-05-19 RX ORDER — OXYCODONE HYDROCHLORIDE 5 MG/1
5 TABLET ORAL EVERY 6 HOURS PRN
Status: DISCONTINUED | OUTPATIENT
Start: 2023-05-19 | End: 2023-05-20

## 2023-05-19 RX ORDER — NYSTATIN 100000 [USP'U]/G
POWDER TOPICAL 2 TIMES DAILY
Status: DISCONTINUED | OUTPATIENT
Start: 2023-05-19 | End: 2023-05-26 | Stop reason: HOSPADM

## 2023-05-19 RX ORDER — FLUOXETINE HYDROCHLORIDE 20 MG/1
40 CAPSULE ORAL DAILY
Status: DISCONTINUED | OUTPATIENT
Start: 2023-05-20 | End: 2023-05-26 | Stop reason: HOSPADM

## 2023-05-19 RX ORDER — MELATONIN
1000 DAILY
Status: DISCONTINUED | OUTPATIENT
Start: 2023-05-20 | End: 2023-05-26 | Stop reason: HOSPADM

## 2023-05-19 RX ORDER — MIRTAZAPINE 15 MG/1
30 TABLET, FILM COATED ORAL
Status: DISCONTINUED | OUTPATIENT
Start: 2023-05-19 | End: 2023-05-26 | Stop reason: HOSPADM

## 2023-05-19 RX ORDER — ACETAMINOPHEN 325 MG/1
650 TABLET ORAL EVERY 6 HOURS PRN
Status: DISCONTINUED | OUTPATIENT
Start: 2023-05-19 | End: 2023-05-22

## 2023-05-19 RX ORDER — FENTANYL CITRATE 50 UG/ML
25 INJECTION, SOLUTION INTRAMUSCULAR; INTRAVENOUS ONCE
Status: COMPLETED | OUTPATIENT
Start: 2023-05-19 | End: 2023-05-19

## 2023-05-19 RX ORDER — PANTOPRAZOLE SODIUM 40 MG/1
40 TABLET, DELAYED RELEASE ORAL
Status: DISCONTINUED | OUTPATIENT
Start: 2023-05-20 | End: 2023-05-26 | Stop reason: HOSPADM

## 2023-05-19 RX ORDER — LEVOTHYROXINE SODIUM 88 UG/1
88 TABLET ORAL
Status: DISCONTINUED | OUTPATIENT
Start: 2023-05-20 | End: 2023-05-25

## 2023-05-19 RX ORDER — INSULIN LISPRO 100 [IU]/ML
1-6 INJECTION, SOLUTION INTRAVENOUS; SUBCUTANEOUS
Status: DISCONTINUED | OUTPATIENT
Start: 2023-05-20 | End: 2023-05-22

## 2023-05-19 RX ORDER — HEPARIN SODIUM 5000 [USP'U]/ML
5000 INJECTION, SOLUTION INTRAVENOUS; SUBCUTANEOUS EVERY 8 HOURS SCHEDULED
Status: DISCONTINUED | OUTPATIENT
Start: 2023-05-19 | End: 2023-05-26 | Stop reason: HOSPADM

## 2023-05-19 RX ORDER — INSULIN LISPRO 100 [IU]/ML
1-5 INJECTION, SOLUTION INTRAVENOUS; SUBCUTANEOUS
Status: DISCONTINUED | OUTPATIENT
Start: 2023-05-19 | End: 2023-05-22

## 2023-05-19 RX ADMIN — OXYCODONE HYDROCHLORIDE 5 MG: 5 TABLET ORAL at 22:06

## 2023-05-19 RX ADMIN — FENTANYL CITRATE 50 MCG: 50 INJECTION INTRAMUSCULAR; INTRAVENOUS at 17:02

## 2023-05-19 RX ADMIN — SODIUM CHLORIDE 1000 ML: 0.9 INJECTION, SOLUTION INTRAVENOUS at 16:42

## 2023-05-19 RX ADMIN — ERTAPENEM SODIUM 1000 MG: 1 INJECTION, POWDER, LYOPHILIZED, FOR SOLUTION INTRAMUSCULAR; INTRAVENOUS at 18:44

## 2023-05-19 RX ADMIN — MELATONIN 9 MG: 3 TAB ORAL at 22:06

## 2023-05-19 RX ADMIN — IOHEXOL 100 ML: 350 INJECTION, SOLUTION INTRAVENOUS at 17:14

## 2023-05-19 RX ADMIN — FENTANYL CITRATE 50 MCG: 50 INJECTION INTRAMUSCULAR; INTRAVENOUS at 18:52

## 2023-05-19 RX ADMIN — HEPARIN SODIUM 5000 UNITS: 5000 INJECTION INTRAVENOUS; SUBCUTANEOUS at 22:06

## 2023-05-19 RX ADMIN — FENTANYL CITRATE 25 MCG: 50 INJECTION INTRAMUSCULAR; INTRAVENOUS at 16:36

## 2023-05-19 RX ADMIN — HYDROMORPHONE HYDROCHLORIDE 0.5 MG: 1 INJECTION, SOLUTION INTRAMUSCULAR; INTRAVENOUS; SUBCUTANEOUS at 23:28

## 2023-05-19 RX ADMIN — MIRTAZAPINE 30 MG: 15 TABLET, FILM COATED ORAL at 22:06

## 2023-05-19 NOTE — PROGRESS NOTES
[facility]  [address]  48     Progress Note  Code [facility] 32    Patient Location     [address] [place] [address] rehab    Reason for visit     Generalized pain, poor intake, hypenatremia, weakness  +  Urinary retention, failure to thrive, weight loss, depression, diabetes mellitus type 2    Patient’s care was coordinated with nursing facility staff  Recent vitals, labs and updated medications were reviewed on [facility] system of facility  Problem List Items Addressed This Visit        Other    Hypernatremia - Primary     History of recurrent hypenatremia due to poor oral intake and free water deficit  Recent sodium was 147  Repeat labs were requested today, pending  Patient is being sent to the hospital per son's request for further evaluation and treatment            Depression:  Historyof severe depression in the past   Currently remains on Prozac and Remeron  Pt was discharged from the hospital on 4/14 on Prozac 40 mg daily  She was later started on Remeron   Patient has been seen by psychiatry service on several occasions  She was taken off of trazodone previously due to increased somnolence confusion and encephalopathy  Patient remains depressed  Remeron was increased to 30 mg yesterday    Re consult Psy service  Urinary retention:  History of urinary retention  Recent bladder scan revealed PVR of over 900 cc  Kay was reinserted  Continue local Kay care  RUE weakness:  Patient has been bedbound since last few months  P o  intake has been poor  She has lost over 70 pounds since Oct/23 She is dependent for almost all ADLs  Appears to have generalized weakness however weakness is more pronounced in right upper extremity  Son is requesting neurology evaluation  Patient is being sent to the hospital for above and possible hypenatremia and dehydration in the setting of decreased p o  intake    CT head and March/23 was unremarkable    Diabetes mellitus type 2:  Patient's blood sugars are running on the lower side with readings mostly under 150 due to poor oral intake  she was taken off of insulin and oral hypoglycemic agents due to above  Lab Results   Component Value Date    HGBA1C 5 2 04/14/2023     HGBA1c was noted to be 5 2 on 4/14/23 down  from 11 8 on 9/19/22    Failure to thrive:  Patient has lost around 70 pounds during the last 8 months and 11 pounds during the last month alone  Weight loss is suspected to be multifactorial mostly due to poor oral intake  Continue to encourage p o  intake  Assist patient with meals  F U with dietitian  Recent TSH was within normal limits      Goal of care discussion:   Goal of care discussion was carried out with patient's son in person today  Patient has had overall decline, poor oral intake and significant weight loss over the last few months, remains at risk for recurrent hypernatremia, UTIs, dehydration and hospitalizations due to above  Mirtazapine was added on Prozac to improve appetite in the setting of underlying depression  Dose was recently increased to 30 mg daily  Palliative/hospice care discussions have been carried out with the son before however at this time son wishes to pursue aggressive care, wants her sent to the hospital and get evaluated by the neurologist     Chronic diastlic heart failure:   Clinically euvolemic  Patient was previously taken off of diuretics due to poor oral intake, dehydration, hypenatremia and orthostatic hypotension    Buttock wounds:  Pt has bilateral buttock wounds, being followed by wound care team   No signs of active infection per wound care notes    HPI       Patient is being seen for generalized pain  Per nurse patient's son came to visit this morning  Patient has been crying and complaining of generalized pain since then  Patient is unable to pinpoint the location of pain  Appears to be confused tearful    Pt has had significant decline over the last few months   P o  intake remains poor with weight loss of over 70 pounds during the last 8 months  Remains depressed, has been followed by psychiatry service  Currently remains on Prozac and mirtazapine  Patient additionally has had recurrent hypenatremia, urinary retention and UTIs  She was hospitalized in March with ESBL UTI and hyponatremia  Patient was seen yesterday  Labs were requested however pending at this time  Son is requesting patient to be sent out for further evaluation    Review of Systems   Constitutional: Positive for appetite change (Poor oral intake) and unexpected weight change (Weight loss of around 72 pounds since October/22)  Respiratory: Negative for shortness of breath, wheezing and stridor  Cardiovascular: Negative for chest pain and leg swelling  Gastrointestinal: Negative for abdominal distention, abdominal pain and vomiting  Musculoskeletal: Positive for arthralgias, back pain and gait problem  Patient complains of generalized pain   Neurological: Positive for weakness  Negative for seizures and syncope  Psychiatric/Behavioral: Positive for confusion and dysphoric mood  Negative for agitation         Past Medical History:   Diagnosis Date   • CHF (congestive heart failure) ([facility])    • Diabetes mellitus ([facility] [address] )    • Disease of thyroid gland    • Hyperlipidemia    • Hypertension    • Hypothyroidism (acquired)    • Restless leg syndrome    • Seizures (HCC)        Past Surgical History:   Procedure Laterality Date   • APPENDECTOMY     • CHOLECYSTECTOMY     • HIP FRACTURE SURGERY Right    • IR PICC PLACEMENT SINGLE LUMEN  4/4/2023   • TONSILLECTOMY     • TUBAL LIGATION         Social History     Tobacco Use   Smoking Status Never   Smokeless Tobacco Never   Tobacco Comments    not a smoker       Family History   Problem Relation Age of Onset   • Brain cancer Mother    • Seizures Father    • Colon cancer Maternal Grandfather    • No Known Problems Son    • No Known Problems Son    • Breast cancer Maternal Aunt 30        Allergies   Allergen Reactions   • Pineapple - Food Allergy Anaphylaxis   • Statins Other (See Comments)     Cramping from head to toe    • Bupropion Other (See Comments)   • Byetta 10 Mcg Pen [Exenatide]    • Ceftriaxone Other (See Comments)   • Marijuana [Dronabinol] GI Intolerance   • Metformin Diarrhea     Other reaction(s): Unknown   • Omeprazole Hives   • Ondansetron      Other reaction(s): Unknown   • Coconut Oil - Food Allergy Rash   • Liraglutide Rash and Other (See Comments)     Generalized rash   • Penicillins Rash         Current Outpatient Medications:   •  acetaminophen (TYLENOL) 325 mg tablet, Take 650 mg by mouth every 6 (six) hours as needed for mild pain (pain score 1-3)  , Disp: , Rfl:   •  aspirin (ECOTRIN LOW STRENGTH) 81 mg EC tablet, Take 81 mg by mouth daily, Disp: , Rfl:   •  Cholecalciferol 25 MCG (1000 UT) capsule, Take 1,000 Units by mouth daily  , Disp: , Rfl:   •  Ergocalciferol (VITAMIN D2 PO), Take 50,000 Units/day by mouth 2 (two) times a week, Disp: , Rfl:   •  ezetimibe (ZETIA) 10 mg tablet, Take 1 tablet (10 mg total) by mouth daily, Disp: 30 tablet, Rfl: 0  •  FLUoxetine (PROzac) 40 MG capsule, Take 1 capsule (40 mg total) by mouth daily Do not start before March 17, 2023 , Disp: , Rfl: 0  •  hydrophilic ointment, Apply topically as needed for dry skin, Disp: , Rfl:   •  insulin lispro (HumaLOG) 100 units/mL injection, Inject 1-5 Units under the skin 3 (three) times a day before meals, Disp: , Rfl: 0  •  levothyroxine 88 mcg tablet, Take 1 tablet (88 mcg total) by mouth daily in the early morning Do not start before April 8, 2023 , Disp: 30 tablet, Rfl: 0  •  melatonin 3 mg, Take 3 tablets (9 mg total) by mouth daily at bedtime, Disp: , Rfl: 0  •  mirtazapine (REMERON) 15 mg tablet, Take 30 mg by mouth daily at bedtime, Disp: , Rfl:   •  nystatin (MYCOSTATIN) powder, Apply topically 2 (two) times a "day, Disp: 15 g, Rfl: 0  •  omeprazole (PriLOSEC) 20 mg delayed release capsule, Take 20 mg by mouth daily, Disp: , Rfl:     Updated list was reviewed in [facility] of facility  Vitals:    05/19/23 1405   BP: 138/83   Pulse: 88   Resp: 18   Temp: 97 5 °F (36 4 °C)   SpO2: 96%       Physical Exam  Constitutional:       General: She is not in acute distress  HENT:      Head: Normocephalic and atraumatic  Eyes:      General: No scleral icterus  Cardiovascular:      Rate and Rhythm: Normal rate and regular rhythm  Pulmonary:      Breath sounds: No wheezing, rhonchi or rales  Abdominal:      General: There is no distension  Palpations: Abdomen is soft  Tenderness: There is no abdominal tenderness  There is no guarding  Genitourinary:     Comments: Kay catheter in place  Musculoskeletal:      Cervical back: Neck supple  Right lower leg: No edema  Left lower leg: No edema  Skin:     Coloration: Skin is not jaundiced  Findings: Bruising (Ecchymosis noted over the right upper extremity) present  Neurological:      Cranial Nerves: No cranial nerve deficit  Motor: Weakness (Generalized, appears to be more pronounced in right upper extremity) present  Diagnostic Data:    CBC BMP on 5/8/2023 revealed WBC count of 6 4, hemoglobin 8 8, platelet count 539  BUN 39, creatinine 1 4, blood glucose 61, sodium 147, potassium 3 6    Additional Notes:   Patient is being sent to the hospital per son's request for evaluation of right upper extremity/generalized weakness, suspected hypenatremia and dehydration in the setting of poor oral intake  Rule out UTI with history of recent urinary retention  Total time spent on this patient's evaluation was in excess of 45 minutes  Portions of the record may have been created with voice recognition software    Occasional wrong word or \"sound a like\" substitutions may have occurred due to the inherent limitations of voice " recognition software  Read the chart carefully and recognize, using context, where substitutions have occurred      This note was electronically signed by Dr Aria Sanchez

## 2023-05-19 NOTE — ASSESSMENT & PLAN NOTE
History of recurrent hypenatremia due to poor oral intake and free water deficit  Recent sodium was 147  Repeat labs were requested today, pending    Patient is being sent to the hospital per son's request for further evaluation and treatment

## 2023-05-19 NOTE — ED PROVIDER NOTES
"History  Chief Complaint   Patient presents with   • Altered Mental Status     Pt arrives by EMS for AMS, per son  Staff reports to EMS that pt has been this way and that she is acting at baseline  Pt moaning and appears to be uncomfortable  Pt states she has generalized pain  Pt confused at this time  Patient is a 68-year-old female with a history of hypertension, CHF, diabetes, cognitive disorder who presents with change in mental status  Patient's son states that he was with her 2 days ago and she was interactive and chatting over [employer]  However, today she was moaning and seemed to be in pain  She was also less interactive and confused at times  She states that she is in pain \"all over\", but states that it is worse in her abdomen  Son states that he was told she has dementia, but this is a change from 2 days ago  She has been hospitalized deviously for sepsis secondary to UTI  She has a chronic Kay catheter  History provided by:  Patient  Altered Mental Status  Presenting symptoms: behavior changes    Most recent episode: Today  Episode history:  Continuous  Timing:  Constant  Chronicity:  New  Context: dementia    Associated symptoms: abdominal pain    Associated symptoms: no difficulty breathing, no fever, no headaches, no light-headedness, no nausea, no palpitations, no rash, no seizures and no vomiting        Prior to Admission Medications   Prescriptions Last Dose Informant Patient Reported? Taking? Cholecalciferol 25 MCG (1000 UT) capsule   Yes No   Sig: Take 1,000 Units by mouth daily  Ergocalciferol (VITAMIN D2 PO)   Yes No   Sig: Take 50,000 Units/day by mouth 2 (two) times a week   FLUoxetine (PROzac) 40 MG capsule   No No   Sig: Take 1 capsule (40 mg total) by mouth daily Do not start before March 17, 2023  acetaminophen (TYLENOL) 325 mg tablet   Yes No   Sig: Take 650 mg by mouth every 6 (six) hours as needed for mild pain (pain score 1-3)     aspirin (ECOTRIN LOW " STRENGTH) 81 mg EC tablet   Yes No   Sig: Take 81 mg by mouth daily   ezetimibe (ZETIA) 10 mg tablet   No No   Sig: Take 1 tablet (10 mg total) by mouth daily   hydrophilic ointment   Yes No   Sig: Apply topically as needed for dry skin   insulin lispro (HumaLOG) 100 units/mL injection   No No   Sig: Inject 1-5 Units under the skin 3 (three) times a day before meals   levothyroxine 88 mcg tablet   No No   Sig: Take 1 tablet (88 mcg total) by mouth daily in the early morning Do not start before April 8, 2023  melatonin 3 mg   No No   Sig: Take 3 tablets (9 mg total) by mouth daily at bedtime   mirtazapine (REMERON) 15 mg tablet   Yes No   Sig: Take 30 mg by mouth daily at bedtime   nystatin (MYCOSTATIN) powder   No No   Sig: Apply topically 2 (two) times a day   omeprazole (PriLOSEC) 20 mg delayed release capsule   Yes No   Sig: Take 20 mg by mouth daily      Facility-Administered Medications: None       Past Medical History:   Diagnosis Date   • CHF (congestive heart failure) ([facility] [address] )    • Diabetes mellitus ([facility] [address] )    • Disease of thyroid gland    • Hyperlipidemia    • Hypertension    • Hypothyroidism (acquired)    • Obesity, Class I, BMI 30 0-34 9 (see actual BMI)    • Restless leg syndrome    • Seizures (HCC)        Past Surgical History:   Procedure Laterality Date   • APPENDECTOMY     • CHOLECYSTECTOMY     • HIP FRACTURE SURGERY Right    • IR PICC PLACEMENT SINGLE LUMEN  4/4/2023   • TONSILLECTOMY     • TUBAL LIGATION         Family History   Problem Relation Age of Onset   • Brain cancer Mother    • Seizures Father    • Colon cancer Maternal Grandfather    • No Known Problems Son    • No Known Problems Son    • Breast cancer Maternal Aunt 30     I have reviewed and agree with the history as documented      E-Cigarette/Vaping   • E-Cigarette Use Never User      E-Cigarette/Vaping Substances   • Nicotine No    • THC No    • CBD No    • Flavoring No    • Other No    • Unknown No      Social History     Tobacco Use   • Smoking status: Never   • Smokeless tobacco: Never   • Tobacco comments:     not a smoker   Vaping Use   • Vaping Use: Never used   Substance Use Topics   • Alcohol use: Yes     Comment: occasional   • Drug use: Never       Review of Systems   Constitutional: Negative for chills, diaphoresis and fever  HENT: Negative for nosebleeds, sore throat and trouble swallowing  Eyes: Negative for photophobia, pain and visual disturbance  Respiratory: Negative for cough, chest tightness and shortness of breath  Cardiovascular: Negative for chest pain, palpitations and leg swelling  Gastrointestinal: Positive for abdominal pain  Negative for constipation, diarrhea, nausea and vomiting  Endocrine: Negative for polydipsia and polyuria  Genitourinary: Negative for difficulty urinating, dysuria, hematuria, pelvic pain, vaginal bleeding and vaginal discharge  Musculoskeletal: Positive for back pain  Negative for neck pain and neck stiffness  Skin: Negative for pallor and rash  Neurological: Negative for dizziness, seizures, light-headedness and headaches  All other systems reviewed and are negative  Physical Exam  Physical Exam  Vitals and nursing note reviewed  Constitutional:       General: She is not in acute distress  Appearance: She is well-developed  HENT:      Head: Normocephalic and atraumatic  Eyes:      Pupils: Pupils are equal, round, and reactive to light  Cardiovascular:      Rate and Rhythm: Normal rate and regular rhythm  Pulses: Normal pulses  Heart sounds: Normal heart sounds  Pulmonary:      Effort: Pulmonary effort is normal  No respiratory distress  Breath sounds: Normal breath sounds  Abdominal:      General: There is no distension  Palpations: Abdomen is soft  Abdomen is not rigid  Tenderness: There is no abdominal tenderness  There is no guarding or rebound  Musculoskeletal:         General: No tenderness  Normal range of motion  Cervical back: Normal range of motion and neck supple  Lymphadenopathy:      Cervical: No cervical adenopathy  Skin:     General: Skin is warm and dry  Capillary Refill: Capillary refill takes less than 2 seconds  Neurological:      Mental Status: She is alert and oriented to person, place, and time  Cranial Nerves: No cranial nerve deficit  Sensory: No sensory deficit  Motor: Motor function is intact        Comments: Cries out in patient throughout history and physical but will answer questions when directed         Vital Signs  ED Triage Vitals   Temperature Pulse Respirations Blood Pressure SpO2   05/19/23 1443 05/19/23 1443 05/19/23 1443 05/19/23 1443 05/19/23 1443   98 7 °F (37 1 °C) 91 16 114/81 100 %      Temp Source Heart Rate Source Patient Position - Orthostatic VS BP Location FiO2 (%)   05/19/23 1443 05/19/23 1443 05/19/23 1443 05/19/23 1443 --   Axillary Monitor Lying Right arm       Pain Score       05/19/23 1636       10 - Worst Possible Pain           Vitals:    05/20/23 0735 05/20/23 1517 05/20/23 2053 05/21/23 0833   BP: 102/63 105/64 106/82 108/59   Pulse: 84 95 87 101   Patient Position - Orthostatic VS:             Visual Acuity  Visual Acuity    Flowsheet Row Most Recent Value   L Pupil Size (mm) 3   R Pupil Size (mm) 3   L Pupil Shape Round   R Pupil Shape Round          ED Medications  Medications   HYDROmorphone (DILAUDID) injection 0 5 mg (0 5 mg Intravenous Given 5/21/23 0947)   aspirin (ECOTRIN LOW STRENGTH) EC tablet 81 mg (81 mg Oral Given 5/21/23 0839)   ezetimibe (ZETIA) tablet 10 mg (10 mg Oral Given 5/21/23 0839)   FLUoxetine (PROzac) capsule 40 mg (40 mg Oral Given 5/21/23 0839)   levothyroxine tablet 88 mcg (88 mcg Oral Given 5/21/23 0435)   melatonin tablet 9 mg (9 mg Oral Given 5/20/23 2126)   mirtazapine (REMERON) tablet 30 mg (30 mg Oral Given 5/20/23 2126)   nystatin (MYCOSTATIN) powder ( Topical Given 5/21/23 0840)   pantoprazole (PROTONIX) EC tablet 40 mg (40 mg Oral Given 5/21/23 0435)   cholecalciferol (VITAMIN D3) tablet 1,000 Units (1,000 Units Oral Given 5/21/23 0839)   acetaminophen (TYLENOL) tablet 650 mg (has no administration in time range)   heparin (porcine) subcutaneous injection 5,000 Units (5,000 Units Subcutaneous Given 5/21/23 0435)   insulin lispro (HumaLOG) 100 units/mL subcutaneous injection 1-6 Units (0 Units Subcutaneous Hold 5/21/23 0839)   insulin lispro (HumaLOG) 100 units/mL subcutaneous injection 1-5 Units (1 Units Subcutaneous Not Given 5/20/23 2123)   oxyCODONE (ROXICODONE) split tablet 2 5 mg (has no administration in time range)   oxyCODONE (ROXICODONE) IR tablet 5 mg (5 mg Oral Given 5/21/23 0839)   fentanyl citrate (PF) 100 MCG/2ML 25 mcg (25 mcg Intravenous Given 5/19/23 1636)   sodium chloride 0 9 % bolus 1,000 mL (0 mL Intravenous Stopped 5/19/23 1742)   fentanyl citrate (PF) 100 MCG/2ML 50 mcg (50 mcg Intravenous Given 5/19/23 1702)   iohexol (OMNIPAQUE) 350 MG/ML injection (SINGLE-DOSE) 100 mL (100 mL Intravenous Given 5/19/23 1714)   ertapenem (INVanz) 1,000 mg in sodium chloride 0 9 % 50 mL IVPB (0 mg Intravenous Stopped 5/19/23 1914)   fentanyl citrate (PF) 100 MCG/2ML 50 mcg (50 mcg Intravenous Given 5/19/23 1852)   dextrose 50 % IV solution **ADS Override Pull** (25 mL  Given 5/20/23 0630)   potassium chloride (K-DUR,KLOR-CON) CR tablet 40 mEq (40 mEq Oral Given 5/20/23 1716)       Diagnostic Studies  Results Reviewed     Procedure Component Value Units Date/Time    Blood culture #1 [[id number]] Collected: 05/19/23 1511    Lab Status: Preliminary result Specimen: Blood from Arm, Right Updated: 05/20/23 2001     Blood Culture No Growth at 24 hrs  Blood culture #2 [[id number]] Collected: 05/19/23 1449    Lab Status: Preliminary result Specimen: Blood from Arm, Left Updated: 05/20/23 2001     Blood Culture No Growth at 24 hrs      Urine culture [[id number]]  (Abnormal) Collected: 05/19/23 1449    Lab Status: Preliminary result Specimen: Urine, Indwelling Kay Catheter Updated: 05/20/23 1536     Urine Culture 70,000-79,000 cfu/ml Escherichia coli    Urine Microscopic [[id number]]  (Abnormal) Collected: 05/19/23 1449    Lab Status: Final result Specimen: Urine, Indwelling Kay Catheter Updated: 05/19/23 1616     RBC, UA 10-20 /hpf      WBC, UA Innumerable /hpf      Epithelial Cells Occasional /hpf      Bacteria, UA Moderate /hpf      MUCUS THREADS Occasional     Hyaline Casts, UA 5-10 /lpf      Ca Oxalate Katelyn, UA Occasional /hpf      WBC Clumps Present    Procalcitonin [[id number]]  (Normal) Collected: 05/19/23 1449    Lab Status: Final result Specimen: Blood from Arm, Left Updated: 05/19/23 1530     Procalcitonin 0 16 ng/ml     Comprehensive metabolic panel [[id number]]  (Abnormal) Collected: 05/19/23 1449    Lab Status: Final result Specimen: Blood from Arm, Left Updated: 05/19/23 1519     Sodium 140 mmol/L      Potassium 3 4 mmol/L      Chloride 105 mmol/L      CO2 27 mmol/L      ANION GAP 8 mmol/L      BUN 31 mg/dL      Creatinine 1 20 mg/dL      Glucose 80 mg/dL      Calcium 8 6 mg/dL      Corrected Calcium 9 7 mg/dL      AST 23 U/L      ALT 17 U/L      Alkaline Phosphatase 142 U/L      Total Protein 6 2 g/dL      Albumin 2 6 g/dL      Total Bilirubin 0 32 mg/dL      eGFR 46 ml/min/1 73sq m     Narrative:      [place] guidelines for Chronic Kidney Disease (CKD):   •  Stage 1 with normal or high GFR (GFR > 90 mL/min/1 73 square meters)  •  Stage 2 Mild CKD (GFR = 60-89 mL/min/1 73 square meters)  •  Stage 3A Moderate CKD (GFR = 45-59 mL/min/1 73 square meters)  •  Stage 3B Moderate CKD (GFR = 30-44 mL/min/1 73 square meters)  •  Stage 4 Severe CKD (GFR = 15-29 mL/min/1 73 square meters)  •  Stage 5 End Stage CKD (GFR <15 mL/min/1 73 square meters)  Note: GFR calculation is accurate only with a steady state creatinine    Lactic acid, plasma (w/reflex if result > 2 0) [[id number]]  (Normal) Collected: 05/19/23 1449    Lab Status: Final result Specimen: Blood from Arm, Left Updated: 05/19/23 1518     LACTIC ACID 1 0 mmol/L     Narrative:      Result may be elevated if tourniquet was used during collection      APTT [[id number]]  (Normal) Collected: 05/19/23 1449    Lab Status: Final result Specimen: Blood from Arm, Left Updated: 05/19/23 1516     PTT 27 seconds     Protime-INR [[id number]]  (Normal) Collected: 05/19/23 1449    Lab Status: Final result Specimen: Blood from Arm, Left Updated: 05/19/23 1516     Protime 13 7 seconds      INR 1 03    CBC and differential [[id number]]  (Abnormal) Collected: 05/19/23 1449    Lab Status: Final result Specimen: Blood from Arm, Left Updated: 05/19/23 1502     WBC 9 36 Thousand/uL      RBC 3 56 Million/uL      Hemoglobin 10 4 g/dL      Hematocrit 34 5 %      MCV 97 fL      MCH 29 2 pg      MCHC 30 1 g/dL      RDW 17 1 %      MPV 10 8 fL      Platelets 381 Thousands/uL      nRBC 0 /100 WBCs      Neutrophils Relative 71 %      Immat GRANS % 2 %      Lymphocytes Relative 16 %      Monocytes Relative 7 %      Eosinophils Relative 4 %      Basophils Relative 0 %      Neutrophils Absolute 6 67 Thousands/µL      Immature Grans Absolute 0 18 Thousand/uL      Lymphocytes Absolute 1 46 Thousands/µL      Monocytes Absolute 0 62 Thousand/µL      Eosinophils Absolute 0 39 Thousand/µL      Basophils Absolute 0 04 Thousands/µL     UA w Reflex to Microscopic w Reflex to Culture [[id number]]  (Abnormal) Collected: 05/19/23 1449    Lab Status: Final result Specimen: Urine, Indwelling Kay Catheter Updated: 05/19/23 1502     Color, UA Yellow     Clarity, UA Turbid     Specific [place], UA 1 019     pH, UA 5 5     Leukocytes, UA Large     Nitrite, UA Positive     Protein, UA 70 (1+) mg/dl      Glucose, UA Negative mg/dl      Ketones, UA Negative mg/dl      Urobilinogen, UA <2 0 mg/dl      Bilirubin, UA Negative     Occult Blood, UA Moderate                 CT abdomen pelvis with contrast   Final "Result by Zadie Goldberg, MD (05/19 7203)      No acute abdominal or pelvic pathology  Urinary bladder collapsed around Kay catheter, limiting evaluation  No evidence of pyelonephritis or perinephric abscess  No bowel obstruction  Evaluation for bowel inflammation somewhat limited by underdistention and lack of oral contrast, however no convincing inflammatory changes  Please note mild inflammation may not be apparent  Additional findings as above  Workstation performed: [id number]         CT head without contrast   Final Result by Christine Goldman MD (05/19 6318)      No acute intracranial abnormality  Workstation performed: [id number]                    Procedures  Procedures         ED Course  ED Course as of 05/21/23 1005   Fri May 19, 2023   1606 Hemoglobin(!): 10 4  Baseline hemoglobin between 7 and 8  Patient appears hemoconcentrated  SBIRT 20yo+    Flowsheet Row Most Recent Value   Initial Alcohol Screen: US AUDIT-C     1  How often do you have a drink containing alcohol? 0 Filed at: 05/19/2023 1647   2  How many drinks containing alcohol do you have on a typical day you are drinking? 0 Filed at: 05/19/2023 1647   3a  Male UNDER 65: How often do you have five or more drinks on one occasion? 0 Filed at: 05/19/2023 1647   3b  FEMALE Any Age, or MALE 65+: How often do you have 4 or more drinks on one occassion? 0 Filed at: 05/19/2023 1647   Audit-C Score 0 Filed at: 05/19/2023 1647   CANDY: How many times in the past year have you    Used an illegal drug or used a prescription medication for non-medical reasons? Never Filed at: 05/19/2023 1647                    Medical Decision Making  Patient presents with change in behavior  Son states that patient  has been moaning, crying and seems confused compared to when he saw her 2 days ago  Patient complains of pain \"all over, but is unable to be more specific    She does have " "multiple sacral decubitus ulcers which may be causing pain  CT abdomen/pelvis does not reveal acute pathology  Patient was possible cystitis  Patient has a history of ESBL and was previously on IV ertapenem  Given positive urinalysis and mental status changes,  Will treat with antibiotics  Will hospitalize for further work-up and treatment  Portions of the above record have been created with voice recognition software   Occasional wrong word or \"sound alike\" substitutions may have occurred due to the inherent limitations of voice recognition software   Read the chart carefully and recognize, using context, where substitutions may have occurred  Encephalopathy:     Details: May be secondary to infectious etiology, specifically UTI  CT head negative  No other evidence of infectious or metabolic etiologies  Will treat with IV antibiotics and continue observation in the hospital   Sacral decubitus ulcer:     Details: No evidence of infection  However this may be the cause of her pain  UTI (urinary tract infection):     Details: History of ESBL  Patient was previously treated with IV ertapenem  We will treat with antibiotics pending the culture  Amount and/or Complexity of Data Reviewed  Labs: ordered  Decision-making details documented in ED Course  Radiology: ordered and independent interpretation performed  ECG/medicine tests: ordered and independent interpretation performed  Risk  Prescription drug management  Decision regarding hospitalization            Disposition  Final diagnoses:   Encephalopathy   UTI (urinary tract infection)   Sacral decubitus ulcer     Time reflects when diagnosis was documented in both MDM as applicable and the Disposition within this note     Time User Action Codes Description Comment    5/19/2023  7:07 PM Chantilly Lines Add [G93 40] Encephalopathy     5/19/2023  7:07 PM Abi CRAWFORD Add [N39 0] UTI (urinary tract infection)     5/19/2023  7:07 PM " Arvil Lefort Add [Q34 046] Sacral decubitus ulcer     5/19/2023  9:17 PM Aaron Layman Add [R14 238] Suicidal ideations     5/19/2023 10:38 PM Aaron Layman Add [R62 7] Failure to thrive in adult     5/20/2023  4:14 PM Krista Brittany Add [N93 9] Vaginal bleeding     5/20/2023  4:14 PM Mehran Boer, [address]  [E44 0] Moderate protein-calorie malnutrition [facility])       ED Disposition     ED Disposition   Admit    Condition   Stable    Date/Time   Fri May 19, 2023  7:07 PM    Comment   Case was discussed with JOSH and the patient's admission status was agreed to be Admission Status: inpatient status to the service of Dr Abena Mora   Follow-up Information    None         Current Discharge Medication List      CONTINUE these medications which have NOT CHANGED    Details   acetaminophen (TYLENOL) 325 mg tablet Take 650 mg by mouth every 6 (six) hours as needed for mild pain (pain score 1-3)  aspirin (ECOTRIN LOW STRENGTH) 81 mg EC tablet Take 81 mg by mouth daily      Cholecalciferol 25 MCG (1000 UT) capsule Take 1,000 Units by mouth daily  Ergocalciferol (VITAMIN D2 PO) Take 50,000 Units/day by mouth 2 (two) times a week      ezetimibe (ZETIA) 10 mg tablet Take 1 tablet (10 mg total) by mouth daily  Qty: 30 tablet, Refills: 0    Associated Diagnoses: Mixed hyperlipidemia      FLUoxetine (PROzac) 40 MG capsule Take 1 capsule (40 mg total) by mouth daily Do not start before March 17, 2023  Refills: 0    Associated Diagnoses: Depression      hydrophilic ointment Apply topically as needed for dry skin      insulin lispro (HumaLOG) 100 units/mL injection Inject 1-5 Units under the skin 3 (three) times a day before meals  Refills: 0    Associated Diagnoses: Diabetes mellitus (HCC)      levothyroxine 88 mcg tablet Take 1 tablet (88 mcg total) by mouth daily in the early morning Do not start before April 8, 2023    Qty: 30 tablet, Refills: 0    Associated Diagnoses: Hypothyroidism      melatonin 3 mg Take 3 tablets (9 mg total) by mouth daily at bedtime  Refills: 0    Associated Diagnoses: Severe episode of recurrent major depressive disorder, without psychotic features (HCC)      mirtazapine (REMERON) 15 mg tablet Take 30 mg by mouth daily at bedtime      nystatin (MYCOSTATIN) powder Apply topically 2 (two) times a day  Qty: 15 g, Refills: 0    Associated Diagnoses: Morbid obesity due to excess calories (HCC)      omeprazole (PriLOSEC) 20 mg delayed release capsule Take 20 mg by mouth daily             No discharge procedures on file      PDMP Review       Value Time User    PDMP Reviewed  Yes 4/4/2023 11:36 AM Be Jesus 52DO          ED Provider  Electronically Signed by           Vincenzo Abbott DO  05/21/23 9073

## 2023-05-19 NOTE — LETTER
FAX      To:       Company:  Phone:       Fax:        Email:        From:   Corrie Coello  Phone:           Fax:    Email:      ________________________________________________    Patient has transport confirmed for 3:50pm  AVS attached  Nurse to call report  Thanks! NOTICE:  This communication, including attachments, may contain information that is confidential and protected by the -client or other privilege(s)

## 2023-05-20 PROBLEM — G93.40 ACUTE ENCEPHALOPATHY: Status: RESOLVED | Noted: 2023-03-11 | Resolved: 2023-05-20

## 2023-05-20 PROBLEM — E44.0 MODERATE PROTEIN-CALORIE MALNUTRITION (HCC): Status: ACTIVE | Noted: 2023-05-20

## 2023-05-20 PROBLEM — N93.9 VAGINAL BLEEDING: Status: ACTIVE | Noted: 2023-05-20

## 2023-05-20 LAB
ANION GAP SERPL CALCULATED.3IONS-SCNC: 5 MMOL/L (ref 4–13)
ATRIAL RATE: 89 BPM
BASOPHILS # BLD AUTO: 0.03 THOUSANDS/ÂΜL (ref 0–0.1)
BASOPHILS NFR BLD AUTO: 0 % (ref 0–1)
BUN SERPL-MCNC: 27 MG/DL (ref 5–25)
CALCIUM SERPL-MCNC: 8 MG/DL (ref 8.4–10.2)
CHLORIDE SERPL-SCNC: 109 MMOL/L (ref 96–108)
CO2 SERPL-SCNC: 28 MMOL/L (ref 21–32)
CREAT SERPL-MCNC: 1.22 MG/DL (ref 0.6–1.3)
EOSINOPHIL # BLD AUTO: 0.3 THOUSAND/ÂΜL (ref 0–0.61)
EOSINOPHIL NFR BLD AUTO: 4 % (ref 0–6)
ERYTHROCYTE [DISTWIDTH] IN BLOOD BY AUTOMATED COUNT: 17.4 % (ref 11.6–15.1)
GFR SERPL CREATININE-BSD FRML MDRD: 45 ML/MIN/1.73SQ M
GLUCOSE SERPL-MCNC: 116 MG/DL (ref 65–140)
GLUCOSE SERPL-MCNC: 78 MG/DL (ref 65–140)
GLUCOSE SERPL-MCNC: 81 MG/DL (ref 65–140)
GLUCOSE SERPL-MCNC: 83 MG/DL (ref 65–140)
GLUCOSE SERPL-MCNC: 89 MG/DL (ref 65–140)
GLUCOSE SERPL-MCNC: 99 MG/DL (ref 65–140)
HCT VFR BLD AUTO: 29.9 % (ref 34.8–46.1)
HGB BLD-MCNC: 8.7 G/DL (ref 11.5–15.4)
IMM GRANULOCYTES # BLD AUTO: 0.16 THOUSAND/UL (ref 0–0.2)
IMM GRANULOCYTES NFR BLD AUTO: 2 % (ref 0–2)
LYMPHOCYTES # BLD AUTO: 1.14 THOUSANDS/ÂΜL (ref 0.6–4.47)
LYMPHOCYTES NFR BLD AUTO: 13 % (ref 14–44)
MCH RBC QN AUTO: 29 PG (ref 26.8–34.3)
MCHC RBC AUTO-ENTMCNC: 29.1 G/DL (ref 31.4–37.4)
MCV RBC AUTO: 100 FL (ref 82–98)
MONOCYTES # BLD AUTO: 0.73 THOUSAND/ÂΜL (ref 0.17–1.22)
MONOCYTES NFR BLD AUTO: 9 % (ref 4–12)
NEUTROPHILS # BLD AUTO: 6.22 THOUSANDS/ÂΜL (ref 1.85–7.62)
NEUTS SEG NFR BLD AUTO: 72 % (ref 43–75)
NRBC BLD AUTO-RTO: 0 /100 WBCS
P AXIS: 80 DEGREES
PLATELET # BLD AUTO: 309 THOUSANDS/UL (ref 149–390)
PMV BLD AUTO: 10.9 FL (ref 8.9–12.7)
POTASSIUM SERPL-SCNC: 3.3 MMOL/L (ref 3.5–5.3)
PR INTERVAL: 114 MS
PROCALCITONIN SERPL-MCNC: 0.17 NG/ML
QRS AXIS: -44 DEGREES
QRSD INTERVAL: 122 MS
QT INTERVAL: 396 MS
QTC INTERVAL: 481 MS
RBC # BLD AUTO: 3 MILLION/UL (ref 3.81–5.12)
SODIUM SERPL-SCNC: 142 MMOL/L (ref 135–147)
T WAVE AXIS: 68 DEGREES
VENTRICULAR RATE: 89 BPM
WBC # BLD AUTO: 8.58 THOUSAND/UL (ref 4.31–10.16)

## 2023-05-20 RX ORDER — DEXTROSE MONOHYDRATE 25 G/50ML
INJECTION, SOLUTION INTRAVENOUS
Status: COMPLETED
Start: 2023-05-20 | End: 2023-05-20

## 2023-05-20 RX ORDER — OXYCODONE HYDROCHLORIDE 5 MG/1
5 TABLET ORAL EVERY 4 HOURS PRN
Status: DISCONTINUED | OUTPATIENT
Start: 2023-05-20 | End: 2023-05-26 | Stop reason: HOSPADM

## 2023-05-20 RX ORDER — POTASSIUM CHLORIDE 20 MEQ/1
40 TABLET, EXTENDED RELEASE ORAL 2 TIMES DAILY
Status: COMPLETED | OUTPATIENT
Start: 2023-05-20 | End: 2023-05-20

## 2023-05-20 RX ADMIN — HYDROMORPHONE HYDROCHLORIDE 0.5 MG: 1 INJECTION, SOLUTION INTRAMUSCULAR; INTRAVENOUS; SUBCUTANEOUS at 04:11

## 2023-05-20 RX ADMIN — LEVOTHYROXINE SODIUM 88 MCG: 88 TABLET ORAL at 05:29

## 2023-05-20 RX ADMIN — MIRTAZAPINE 30 MG: 15 TABLET, FILM COATED ORAL at 21:26

## 2023-05-20 RX ADMIN — DEXTROSE MONOHYDRATE 25 ML: 25 INJECTION, SOLUTION INTRAVENOUS at 06:30

## 2023-05-20 RX ADMIN — FLUOXETINE 40 MG: 20 CAPSULE ORAL at 08:47

## 2023-05-20 RX ADMIN — NYSTATIN 1 APPLICATION.: 100000 POWDER TOPICAL at 17:20

## 2023-05-20 RX ADMIN — HEPARIN SODIUM 5000 UNITS: 5000 INJECTION INTRAVENOUS; SUBCUTANEOUS at 13:38

## 2023-05-20 RX ADMIN — HEPARIN SODIUM 5000 UNITS: 5000 INJECTION INTRAVENOUS; SUBCUTANEOUS at 05:29

## 2023-05-20 RX ADMIN — EZETIMIBE 10 MG: 10 TABLET ORAL at 08:47

## 2023-05-20 RX ADMIN — POTASSIUM CHLORIDE 40 MEQ: 1500 TABLET, EXTENDED RELEASE ORAL at 08:46

## 2023-05-20 RX ADMIN — MELATONIN 9 MG: 3 TAB ORAL at 21:26

## 2023-05-20 RX ADMIN — ASPIRIN 81 MG: 81 TABLET, COATED ORAL at 08:47

## 2023-05-20 RX ADMIN — NYSTATIN: 100000 POWDER TOPICAL at 08:47

## 2023-05-20 RX ADMIN — OXYCODONE HYDROCHLORIDE 5 MG: 5 TABLET ORAL at 02:50

## 2023-05-20 RX ADMIN — Medication 1000 UNITS: at 08:46

## 2023-05-20 RX ADMIN — HYDROMORPHONE HYDROCHLORIDE 0.5 MG: 1 INJECTION, SOLUTION INTRAMUSCULAR; INTRAVENOUS; SUBCUTANEOUS at 15:26

## 2023-05-20 RX ADMIN — HEPARIN SODIUM 5000 UNITS: 5000 INJECTION INTRAVENOUS; SUBCUTANEOUS at 21:26

## 2023-05-20 RX ADMIN — OXYCODONE HYDROCHLORIDE 5 MG: 5 TABLET ORAL at 12:17

## 2023-05-20 RX ADMIN — POTASSIUM CHLORIDE 40 MEQ: 1500 TABLET, EXTENDED RELEASE ORAL at 17:16

## 2023-05-20 NOTE — ASSESSMENT & PLAN NOTE
"· Presentation: Patient presents from [email or web address] due to intermittent confusion and pain for the past 2 days  Of note, patient has a chronic indwelling catheter  · CT A/P: \"No acute abdominal or pelvic pathology  Urinary bladder collapsed around Kay catheter, limiting evaluation  No evidence of pyelonephritis or perinephric abscess  No bowel obstruction  \"  · UA: Large leuks, positive nitrite, protein 70(1+), moderate occult blood  · Urine micro: RBCs 10-20, innumerable WBCs, moderate bacteria, occasional mucus threads, hyaline casts 5-10, occasional Ca Oxalate Katelyn  · Urine culture pending, prior urine culture grew Escherichia coli ESBL    · ID consulted  · Discontinued ertapenem, likely colonization  ·   "

## 2023-05-20 NOTE — CONSULTS
Consultation - Infectious Disease   Germaine Harrell 75 y o  female MRN: [id number]  Unit/Bed#: S -01 Encounter: [id number]      IMPRESSION & RECOMMENDATIONS:   Impression/Recommendations:  1  Abnormal urinalysis  In the setting of chronic indwelling Kay catheter  UA showed moderate bacteria which likely reflects chronic bladder colonization  Patient recently completed 7-day course of ertapenem  She is without fevers or other clear signs of sepsis  Negative CT abdomen  Would lean toward observing off any more antibiotic given high risk of developing further resistance and potential antibiotic side effects     -Discontinue ertapenem and observe closely off any more antibiotics  -Follow temperatures and hemodynamics  -Follow CBC    2  Intermittent confusion, with suicidal ideations  Low suspicion for acute infection as etiology in the absence of fevers or other signs of sepsis  Negative CT abdomen/pelvis and negative head CT  No meningismus  Mentation currently appears to be at baseline     -Observe closely off any more antibiotics, as above  -Follow mentation  -Serial neuro exams    3  Chronic urinary retention, with indwelling Kay catheter  4   Recent ESBL E  coli UTI  Completed 7-day course of ertapenem during hospitalization in April 5   DM2  Well-controlled  Glycemic control per primary service  Antibiotics:  Ertapenem 2    I discussed above plan with Dr Michelle Rizzo from [facility] service  I personally reviewed prior hospitalization records  Thank you for this consultation  We will follow along with you          HISTORY OF PRESENT ILLNESS:  Reason for Consult: Bacteriuria    HPI: Germaine Harrell is a 75 y o  female with DM2, severe obesity, restless leg syndrome, seizure disorder, major depression, CHF, CAD, chronic urinary retention with indwelling Kay catheter, recent hospitalization at [facility] from 4/2 to 4/14 secondary to sepsis presumably due to ESBL E  coli UTI status post 7-day course of ertapenem  Patient presented to ER on 5/19 from [facility] due to intermittent confusion, poor oral intake with failure to thrive, generalized pain including abdominal pain  Patient was reportedly threatening to harm herself  Patient with no fevers, leukocytosis or other clear signs of sepsis on presentation  Abdominal CT with no acute infectious findings  Urinalysis was abnormal so patient was admitted and started on empiric ertapenem for possible UTI  No suprapubic pain currently is having left lower quadrant pain which was somewhat alleviated with bowel movement this morning  No nausea or vomiting  No fevers  No headaches, neck pain or stiffness  REVIEW OF SYSTEMS:  A complete system-based review of systems is otherwise negative      PAST MEDICAL HISTORY:  Past Medical History:   Diagnosis Date   • CHF (congestive heart failure) ([facility] [address] )    • [facility]    • Disease of thyroid gland    • Hyperlipidemia    • Hypertension    • Hypothyroidism (acquired)    • Restless leg syndrome    • Seizures ([facility] )      Past Surgical History:   Procedure Laterality Date   • APPENDECTOMY     • CHOLECYSTECTOMY     • HIP FRACTURE SURGERY Right    • IR PICC PLACEMENT SINGLE LUMEN  4/4/2023   • TONSILLECTOMY     • TUBAL LIGATION         FAMILY HISTORY:  Non-contributory    SOCIAL HISTORY:  Social History     Substance and Sexual Activity   Alcohol Use Yes    Comment: occasional     Social History     Substance and Sexual Activity   Drug Use Never     Social History     Tobacco Use   Smoking Status Never   Smokeless Tobacco Never   Tobacco Comments    not a smoker       ALLERGIES:  Allergies   Allergen Reactions   • Pineapple - Food Allergy Anaphylaxis   • Statins Other (See Comments)     Cramping from head to toe    • Bupropion Other (See Comments)   • Byetta 10 Mcg Pen [Exenatide]    • Ceftriaxone Other (See Comments)   • Marijuana [Dronabinol] GI Intolerance   • Metformin Diarrhea Other reaction(s): Unknown   • Omeprazole Hives   • Ondansetron      Other reaction(s): Unknown   • Coconut Oil - Food Allergy Rash   • Liraglutide Rash and Other (See Comments)     Generalized rash   • Penicillins Rash       MEDICATIONS:  All current active medications have been reviewed  PHYSICAL EXAM:  Vitals:  Temp:  [97 4 °F (36 3 °C)-98 7 °F (37 1 °C)] 97 4 °F (36 3 °C)  HR:  [84-97] 84  Resp:  [16-18] 18  BP: (102-138)/(55-83) 102/63  SpO2:  [96 %-100 %] 100 %  Temp (24hrs), Av 9 °F (36 6 °C), Min:97 4 °F (36 3 °C), Max:98 7 °F (37 1 °C)  Current: Temperature: (!) 97 4 °F (36 3 °C)     Physical Exam:  General: Debilitated, awake and alert, nontoxic  Eyes:  Conjunctive clear with no hemorrhages or effusions  Oropharynx:  No ulcers, no lesions  Neck:  Supple, trachea midline  Lungs:  Clear to auscultation bilaterally, no accessory muscle use  Cardiac:  Regular rate and rhythm, no murmurs  Abdomen:  Soft, mild left lower quadrant tenderness, no rebound or guarding  Chronic indwelling catheter with clear yellow urine in bag  Extremities:  No peripheral cyanosis, clubbing, or edema  Skin:  No rashes, no ulcers  Neurological:  Moves all four extremities spontaneously, no meningismus    LABS, IMAGING, & OTHER STUDIES:  Lab Results:  I have personally reviewed pertinent labs  Results from last 7 days   Lab Units 23  0422 23  1449   POTASSIUM mmol/L 3 3* 3 4*   CHLORIDE mmol/L 109* 105   CO2 mmol/L 28 27   BUN mg/dL 27* 31*   CREATININE mg/dL 1 22 1 20   EGFR ml/min/1 73sq m 45 46   CALCIUM mg/dL 8 0* 8 6   AST U/L  --  23   ALT U/L  --  17   ALK PHOS U/L  --  142*     Results from last 7 days   Lab Units 23  0422 23  1449   WBC Thousand/uL 8 58 9 36   HEMOGLOBIN g/dL 8 7* 10 4*   PLATELETS Thousands/uL 309 369     Results from last 7 days   Lab Units 23  1511 23  1449   BLOOD CULTURE  Received in Microbiology Lab  Culture in Progress  Received in Microbiology Lab   Culture in Progress  Imaging Studies:   I have personally reviewed pertinent imaging study reports and images in PACS  CT head shows no acute intracranial abnormality  CT abdomen/pelvis shows no acute intra-abdominal pathology  No evidence of pyelonephritis or perinephric abscess  No bowel obstruction  EKG, Pathology, and Other Studies:   I have personally reviewed pertinent reports

## 2023-05-20 NOTE — PLAN OF CARE
Problem: Potential for Falls  Goal: Patient will remain free of falls  Description: INTERVENTIONS:  - Educate patient/family on patient safety including physical limitations  - Instruct patient to call for assistance with activity   - Consult OT/PT to assist with strengthening/mobility   - Keep Call bell within reach  - Keep bed low and locked with side rails adjusted as appropriate  - Keep care items and personal belongings within reach  - Initiate and maintain comfort rounds  - Make Fall Risk Sign visible to staff  - Offer Toileting every  Hours, in advance of need  - Initiate/Maintain alarm  - Obtain necessary fall risk management equipment:   - Apply yellow socks and bracelet for high fall risk patients  - Consider moving patient to room near nurses station  Outcome: Progressing     Problem: MOBILITY - ADULT  Goal: Maintain or return to baseline ADL function  Description: INTERVENTIONS:  -  Assess patient's ability to carry out ADLs; assess patient's baseline for ADL function and identify physical deficits which impact ability to perform ADLs (bathing, care of mouth/teeth, toileting, grooming, dressing, etc )  - Assess/evaluate cause of self-care deficits   - Assess range of motion  - Assess patient's mobility; develop plan if impaired  - Assess patient's need for assistive devices and provide as appropriate  - Encourage maximum independence but intervene and supervise when necessary  - Involve family in performance of ADLs  - Assess for home care needs following discharge   - Consider OT consult to assist with ADL evaluation and planning for discharge  - Provide patient education as appropriate  Outcome: Progressing  Goal: Maintains/Returns to pre admission functional level  Description: INTERVENTIONS:  - Perform BMAT or MOVE assessment daily    - Set and communicate daily mobility goal to care team and patient/family/caregiver     - Collaborate with rehabilitation services on mobility goals if consulted  - Perform Range of Motion  times a day  - Reposition patient every  hours  - Dangle patient  times a day  - Stand patient  times a day  - Ambulate patient times a day  - Out of bed to chair  times a day   - Out of bed for meals  times a day  - Out of bed for toileting  - Record patient progress and toleration of activity level   Outcome: Progressing     Problem: NEUROSENSORY - ADULT  Goal: Achieves maximal functionality and self care  Description: INTERVENTIONS  - Monitor swallowing and airway patency with patient fatigue and changes in neurological status  - Encourage and assist patient to increase activity and self care     - Encourage visually impaired, hearing impaired and aphasic patients to use assistive/communication devices  Outcome: Progressing     Problem: RESPIRATORY - ADULT  Goal: Achieves optimal ventilation and oxygenation  Description: INTERVENTIONS:  - Assess for changes in respiratory status  - Assess for changes in mentation and behavior  - Position to facilitate oxygenation and minimize respiratory effort  - Oxygen administered by appropriate delivery if ordered  - Initiate smoking cessation education as indicated  - Encourage broncho-pulmonary hygiene including cough, deep breathe, Incentive Spirometry  - Assess the need for suctioning and aspirate as needed  - Assess and instruct to report SOB or any respiratory difficulty  - Respiratory Therapy support as indicated  Outcome: Progressing     Problem: GASTROINTESTINAL - ADULT  Goal: Maintains adequate nutritional intake  Description: INTERVENTIONS:  - Monitor percentage of each meal consumed  - Identify factors contributing to decreased intake, treat as appropriate  - Assist with meals as needed  - Monitor I&O, weight, and lab values if indicated  - Obtain nutrition services referral as needed  Outcome: Progressing     Problem: GENITOURINARY - ADULT  Goal: Absence of urinary retention  Description: INTERVENTIONS:  - Assess patient’s ability to void and empty bladder  - Monitor I/O  - Bladder scan as needed  - Discuss with physician/AP medications to alleviate retention as needed  - Discuss catheterization for long term situations as appropriate  Outcome: Progressing  Goal: Urinary catheter remains patent  Description: INTERVENTIONS:  - Assess patency of urinary catheter  - If patient has a chronic arceo, consider changing catheter if non-functioning  - Follow guidelines for intermittent irrigation of non-functioning urinary catheter  Outcome: Progressing     Problem: METABOLIC, FLUID AND ELECTROLYTES - ADULT  Goal: Electrolytes maintained within normal limits  Description: INTERVENTIONS:  - Monitor labs and assess patient for signs and symptoms of electrolyte imbalances  - Administer electrolyte replacement as ordered  - Monitor response to electrolyte replacements, including repeat lab results as appropriate  - Instruct patient on fluid and nutrition as appropriate  Outcome: Progressing  Goal: Glucose maintained within target range  Description: INTERVENTIONS:  - Monitor Blood Glucose as ordered  - Assess for signs and symptoms of hyperglycemia and hypoglycemia  - Administer ordered medications to maintain glucose within target range  - Assess nutritional intake and initiate nutrition service referral as needed  Outcome: Progressing     Problem: SKIN/TISSUE INTEGRITY - ADULT  Goal: Incision(s), wounds(s) or drain site(s) healing without S/S of infection  Description: INTERVENTIONS  - Assess and document dressing, incision, wound bed, drain sites and surrounding tissue  - Provide patient and family education  - Perform skin care/dressing changes every   Outcome: Progressing  Goal: Pressure injury heals and does not worsen  Description: Interventions:  - Implement low air loss mattress or specialty surface (Criteria met)  - Apply silicone foam dressing  - Instruct/assist with weight shifting every  minutes when in chair   - Limit chair time to  hour intervals  - Use special pressure reducing interventions such as  when in chair   - Apply fecal or urinary incontinence containment device   - Perform passive or active ROM every   - Turn and reposition patient & offload bony prominences every  hours   - Utilize friction reducing device or surface for transfers   - Consider consults to  interdisciplinary teams such as   - Use incontinent care products after each incontinent episode such as   - Consider nutrition services referral as needed  Outcome: Progressing     Problem: PAIN - ADULT  Goal: Verbalizes/displays adequate comfort level or baseline comfort level  Description: Interventions:  - Encourage patient to monitor pain and request assistance  - Assess pain using appropriate pain scale  - Administer analgesics based on type and severity of pain and evaluate response  - Implement non-pharmacological measures as appropriate and evaluate response  - Consider cultural and social influences on pain and pain management  - Notify physician/advanced practitioner if interventions unsuccessful or patient reports new pain  Outcome: Progressing     Problem: SAFETY ADULT  Goal: Patient will remain free of falls  Description: INTERVENTIONS:  - Educate patient/family on patient safety including physical limitations  - Instruct patient to call for assistance with activity   - Consult OT/PT to assist with strengthening/mobility   - Keep Call bell within reach  - Keep bed low and locked with side rails adjusted as appropriate  - Keep care items and personal belongings within reach  - Initiate and maintain comfort rounds  - Make Fall Risk Sign visible to staff  - Offer Toileting every  Hours, in advance of need  - Initiate/Maintain alarm  - Obtain necessary fall risk management equipment:   - Apply yellow socks and bracelet for high fall risk patients  - Consider moving patient to room near nurses station  Outcome: Progressing  Goal: Maintain or return to baseline ADL function  Description: INTERVENTIONS:  -  Assess patient's ability to carry out ADLs; assess patient's baseline for ADL function and identify physical deficits which impact ability to perform ADLs (bathing, care of mouth/teeth, toileting, grooming, dressing, etc )  - Assess/evaluate cause of self-care deficits   - Assess range of motion  - Assess patient's mobility; develop plan if impaired  - Assess patient's need for assistive devices and provide as appropriate  - Encourage maximum independence but intervene and supervise when necessary  - Involve family in performance of ADLs  - Assess for home care needs following discharge   - Consider OT consult to assist with ADL evaluation and planning for discharge  - Provide patient education as appropriate  Outcome: Progressing  Goal: Maintains/Returns to pre admission functional level  Description: INTERVENTIONS:  - Perform BMAT or MOVE assessment daily    - Set and communicate daily mobility goal to care team and patient/family/caregiver  - Collaborate with rehabilitation services on mobility goals if consulted  - Perform Range of Motion  times a day  - Reposition patient every  hours    - Dangle patient  times a day  - Stand patient  times a day  - Ambulate patient  times a day  - Out of bed to chair  times a day   - Out of bed for meals  times a day  - Out of bed for toileting  - Record patient progress and toleration of activity level   Outcome: Progressing     Problem: DISCHARGE PLANNING  Goal: Discharge to home or other facility with appropriate resources  Description: INTERVENTIONS:  - Identify barriers to discharge w/patient and caregiver  - Arrange for needed discharge resources and transportation as appropriate  - Identify discharge learning needs (meds, wound care, etc )  - Arrange for interpretive services to assist at discharge as needed  - Refer to Case Management Department for coordinating discharge planning if the patient needs post-hospital services based on physician/advanced practitioner order or complex needs related to functional status, cognitive ability, or social support system  Outcome: Progressing

## 2023-05-20 NOTE — PROGRESS NOTES
"[facility]  Progress Note  Name: Oneil Ybarra  MRN: [id number]  Unit/Bed#: S -01 I Date of Admission: 5/19/2023   Date of Service: 5/20/2023 I Hospital Day: 1    Assessment/Plan   * UTI (urinary tract infection)  Assessment & Plan  · Presentation: Patient presents from [facility] due to intermittent confusion and pain for the past 2 days  Of note, patient has a chronic indwelling catheter  · CT A/P: \"No acute abdominal or pelvic pathology  Urinary bladder collapsed around Kay catheter, limiting evaluation  No evidence of pyelonephritis or perinephric abscess  No bowel obstruction  \"  · UA: Large leuks, positive nitrite, protein 70(1+), moderate occult blood  · Urine micro: RBCs 10-20, innumerable WBCs, moderate bacteria, occasional mucus threads, hyaline casts 5-10, occasional Ca Oxalate Katelyn  · Urine culture pending, prior urine culture grew Escherichia coli ESBL  · ID consulted  · Discontinued ertapenem, likely colonization  ·     Vaginal bleeding  Assessment & Plan  Noted to have significant vaginal bleeding today per nursing staff  Will consult OB/GYN  Patient is a postmenopausal 71-year-old female  Check hemoglobin tomorrow a m  Moderate protein-calorie malnutrition ([address] )  Assessment & Plan  Malnutrition Findings:   Adult Malnutrition type: Chronic illness  Adult Degree of Malnutrition: Malnutrition of moderate degree  Malnutrition Characteristics: Muscle loss, Weight loss                  360 Statement: Moderate malnutrition in the context of chronic illness r/t inadequate energy intake with confusion as evidenced by wt decrease of 93#/32 7% x 11 mo, moderate temporal wasting  Will treat with nutrition therapy, PO diet, supplements  BMI Findings: Body mass index is 33 78 kg/m²  Suicidal ideations  Assessment & Plan  · Patient reports \"I need something for the pain, if not I am going to just take care of it myself  \" Provider then asked if the " "patient had thoughts of hurting herself  Patient replied \"Lately, yes  \" When asked about a plan to harm herself, patient's son at bedside interjected and stated that several days ago the patient mentioned using gun to harm herself if she had one  Patient's son states the patient has been very depressed of recent which has prompted her antidepressants to be adjusted  · Virtual observation  · Psychiatry consult  Diabetes mellitus type 2 in obese [facility])  Assessment & Plan  Lab Results   Component Value Date    HGBA1C 5 2 04/14/2023       Recent Labs     05/19/23  2200 05/20/23  0628 05/20/23  0733 05/20/23  1156   POCGLU 113 78 99 89       Blood Sugar Average: Last 72 hrs:  (P) 38 [id number]   · Well controlled  · Patient is not on any antihyperglycemic medications  · Accu-checks and SSI  · Hypoglycemia protocol  Chronic heart failure with preserved ejection fraction [facility])  Assessment & Plan  Wt Readings from Last 3 Encounters:   05/20/23 86 5 kg (190 lb 11 2 oz)   05/19/23 90 kg (198 lb 6 4 oz)   05/18/23 90 kg (198 lb 6 4 oz)   · Echo from February 2022: LVEF 67%  · Patient is not currently on any diuretics  Acquired hypothyroidism  Assessment & Plan  · Continue levothyroxine  Failure to thrive in adult  Assessment & Plan  · Poor oral intake, decreased mobility  · PT/OT/Speech evaluations  · Patient's son is interested in palliative care as patient has displayed significant deconditioning over the past several months  · Palliative care consult  GERD (gastroesophageal reflux disease)  Assessment & Plan  · Continue PPI  Hyperlipidemia  Assessment & Plan  · Continue Zetia  Hypertension  Assessment & Plan  Blood pressure is reviewed and acceptable  Last recorded pressure: 113/67  Patient is not currently on any antihypertensives at this time  Monitor blood pressure      Acute encephalopathy-resolved as of 5/20/2023  Assessment & Plan  · Patient's son reports intermittent confusion " exhibited by the patient over the past 2 days  · Currently, alert and oriented x 3   · Suspect 2/2 UTI  · Neuro checks q4h  · See further detailed plan above  · Patient appears back to baseline         VTE Pharmacologic Prophylaxis: VTE Score: 6 High Risk (Score >/= 5) - Pharmacological DVT Prophylaxis Ordered: heparin  Sequential Compression Devices Ordered  Patient Centered Rounds: I performed bedside rounds with nursing staff today  Discussions with Specialists or Other Care Team Provider: None    Education and Discussions with Family / Patient: Updated  (daughter) at bedside  Current Length of Stay: 1 day(s)  Current Patient Status: Inpatient   Discharge Plan: Anticipate discharge tomorrow to home  Code Status: Level 3 - DNAR and DNI    Subjective:     A 54-year-old female with past medical history of CHF last echo 2022 LVEF 01%, grade 2 diastolic dysfunction, type 2 diabetes not on antihyperglycemic medication, hypothyroidism, hypertension, hyperlipidemia, GERD  She has a chronic Kay in and per [facility] notes she recently had her Kay changed few days prior  Recent admission due to sepsis secondary to ESBL on 2020, Patient finished IV ertapenem for 7 days  She has had issues with failure to thrive on chart review and they do not want PEG tube placement  Patient is brought in for intermittent confusion over the last 2 days, appears to be back to baseline today    Patient is doing well today, still says she has pain in the lower part of her abdomen  However she did deny to ID that she has any dysuria    Nursing staff was concerned due to vaginal bleeding did consult OB/GYN for this     Objective:     Vitals:   Temp (24hrs), Av 5 °F (36 4 °C), Min:97 1 °F (36 2 °C), Max:98 1 °F (36 7 °C)    Temp:  [97 1 °F (36 2 °C)-98 1 °F (36 7 °C)] 97 1 °F (36 2 °C)  HR:  [84-97] 95  Resp:  [16-18] 16  BP: (102-128)/(55-69) 105/64  SpO2:  [99 %-100 %] 100 %  Body mass index is 33 78 kg/m²  Input and Output Summary (last 24 hours): Intake/Output Summary (Last 24 hours) at 5/20/2023 1621  Last data filed at 5/20/2023 0402  Gross per 24 hour   Intake 1050 ml   Output 500 ml   Net 550 ml       Physical Exam:   Physical Exam  Vitals and nursing note reviewed  Constitutional:       General: She is not in acute distress  Appearance: She is well-developed  She is ill-appearing (Appears deconditioned)  HENT:      Head: Normocephalic and atraumatic  Nose: Nose normal       Mouth/Throat:      Mouth: Mucous membranes are moist    Eyes:      Conjunctiva/sclera: Conjunctivae normal    Cardiovascular:      Rate and Rhythm: Normal rate and regular rhythm  Heart sounds: No murmur heard  Pulmonary:      Effort: Pulmonary effort is normal  No respiratory distress  Breath sounds: Normal breath sounds  Abdominal:      Palpations: Abdomen is soft  Tenderness: There is abdominal tenderness (Suprapubic area)  Musculoskeletal:      Cervical back: Neck supple  Right lower leg: No edema  Left lower leg: No edema  Skin:     General: Skin is warm and dry  Capillary Refill: Capillary refill takes less than 2 seconds  Neurological:      General: No focal deficit present  Mental Status: She is alert and oriented to person, place, and time     Psychiatric:         Mood and Affect: Mood normal           Additional Data:     Labs:  Results from last 7 days   Lab Units 05/20/23  0422   WBC Thousand/uL 8 58   HEMOGLOBIN g/dL 8 7*   HEMATOCRIT % 29 9*   PLATELETS Thousands/uL 309   NEUTROS PCT % 72   LYMPHS PCT % 13*   MONOS PCT % 9   EOS PCT % 4     Results from last 7 days   Lab Units 05/20/23  0422 05/19/23  1449   SODIUM mmol/L 142 140   POTASSIUM mmol/L 3 3* 3 4*   CHLORIDE mmol/L 109* 105   CO2 mmol/L 28 27   BUN mg/dL 27* 31*   CREATININE mg/dL 1 22 1 20   ANION GAP mmol/L 5 8   CALCIUM mg/dL 8 0* 8 6   ALBUMIN g/dL  --  2 6*   TOTAL BILIRUBIN mg/dL  --  0 32   ALK PHOS U/L  --  142*   ALT U/L  --  17   AST U/L  --  23   GLUCOSE RANDOM mg/dL 81 80     Results from last 7 days   Lab Units 05/19/23  1449   INR  1 03     Results from last 7 days   Lab Units 05/20/23  1156 05/20/23  0733 05/20/23  0628 05/19/23  2200 05/19/23  2108 05/19/23  2034   POC GLUCOSE mg/dl 89 99 78 113 84 66         Results from last 7 days   Lab Units 05/20/23  0422 05/19/23  1449   LACTIC ACID mmol/L  --  1 0   PROCALCITONIN ng/ml 0 17 0 16       Lines/Drains:  Invasive Devices     Peripheral Intravenous Line  Duration           Peripheral IV 05/19/23 Distal;Left;Upper;Ventral (anterior) Arm 1 day    Peripheral IV 05/19/23 Right Antecubital 1 day          Drain  Duration           Urethral Catheter 52 days              Urinary Catheter:  Goal for removal: N/A - Chronic Kay               Imaging: No pertinent imaging reviewed  Recent Cultures (last 7 days):   Results from last 7 days   Lab Units 05/19/23  1511 05/19/23  1449   BLOOD CULTURE  Received in Microbiology Lab  Culture in Progress  Received in Microbiology Lab  Culture in Progress     URINE CULTURE   --  70,000-79,000 cfu/ml Escherichia coli*       Last 24 Hours Medication List:   Current Facility-Administered Medications   Medication Dose Route Frequency Provider Last Rate   • acetaminophen  650 mg Oral Q6H PRN SAMIA Harding     • aspirin  81 mg Oral Daily SAMIA Harding     • cholecalciferol  1,000 Units Oral Daily SAMIA Harding     • ezetimibe  10 mg Oral Daily SAMIA Harding     • FLUoxetine  40 mg Oral Daily SAMIA Harding     • heparin (porcine)  5,000 Units Subcutaneous Q8H 528 SAMIA Maciel     • HYDROmorphone  0 5 mg Intravenous Q4H PRN SAMIA Harding     • insulin lispro  1-5 Units Subcutaneous HS SAMIA Ruggiero     • insulin lispro  1-6 Units Subcutaneous TID AC SAMIA Ruggiero     • levothyroxine  88 mcg Oral Early Morning Grecia SAMIA Acosta     • melatonin  9 mg Oral HS SAMIA Rivera     • mirtazapine  30 mg Oral HS SAMIA Rivera     • nystatin   Topical BID SAMIA Rivera     • oxyCODONE  5 mg Oral Q4H PRN SAMIA Rivera     • oxyCODONE  2 5 mg Oral Q4H PRN SAMIA Rivera     • pantoprazole  40 mg Oral Early Morning SAMIA Rivera     • potassium chloride  40 mEq Oral BID Ina Ramos MD          Today, Patient Was Seen By: Ina Ramos MD    **Please Note: This note may have been constructed using a voice recognition system  **

## 2023-05-20 NOTE — ASSESSMENT & PLAN NOTE
· Patient's son reports intermittent confusion exhibited by the patient over the past 2 days  · Currently, alert and oriented x 3   · Suspect 2/2 UTI  · Neuro checks q4h  · See further detailed plan above    · Patient appears back to baseline

## 2023-05-20 NOTE — ASSESSMENT & PLAN NOTE
Lab Results   Component Value Date    HGBA1C 5 2 04/14/2023       Recent Labs     05/19/23 2034 05/19/23 2108 05/19/23 2200   POCGLU 66 84 113       Blood Sugar Average: Last 72 hrs:  (P) 14 [id number]   · Well controlled  · Patient is not on any antihyperglycemic medications  · Accu-checks and SSI  · Hypoglycemia protocol

## 2023-05-20 NOTE — ASSESSMENT & PLAN NOTE
Malnutrition Findings:   Adult Malnutrition type: Chronic illness  Adult Degree of Malnutrition: Malnutrition of moderate degree  Malnutrition Characteristics: Muscle loss, Weight loss                  360 Statement: Moderate malnutrition in the context of chronic illness r/t inadequate energy intake with confusion as evidenced by wt decrease of 93#/32 7% x 11 mo, moderate temporal wasting  Will treat with nutrition therapy, PO diet, supplements  BMI Findings: Body mass index is 33 78 kg/m²

## 2023-05-20 NOTE — ASSESSMENT & PLAN NOTE
· Poor oral intake, decreased mobility  · PT/OT/Speech evaluations  · Patient's son is interested in palliative care as patient has displayed significant deconditioning over the past several months  · Palliative care consult

## 2023-05-20 NOTE — ASSESSMENT & PLAN NOTE
Wt Readings from Last 3 Encounters:   05/20/23 86 5 kg (190 lb 11 2 oz)   05/19/23 90 kg (198 lb 6 4 oz)   05/18/23 90 kg (198 lb 6 4 oz)   · Echo from February 2022: LVEF 67%  · Patient is not currently on any diuretics

## 2023-05-20 NOTE — SPEECH THERAPY NOTE
Speech Language/Pathology    Speech/Language Pathology Progress Note    Patient Name: Germaine Harrell  VVTID'Y Date: 5/20/2023     Problem List  Principal Problem:    UTI (urinary tract infection)  Active Problems:    Hypertension    Hyperlipidemia    GERD (gastroesophageal reflux disease)    Failure to thrive in adult    Acquired hypothyroidism    Chronic heart failure with preserved ejection fraction (HCC)    Diabetes mellitus type 2 in obese ([address] )    Acute encephalopathy    Suicidal ideations       Past Medical History  Past Medical History:   Diagnosis Date   • CHF (congestive heart failure) (HCC)    • Diabetes mellitus ([address] )    • Disease of thyroid gland    • Hyperlipidemia    • Hypertension    • Hypothyroidism (acquired)    • Restless leg syndrome    • Seizures ([address] )         Past Surgical History  Past Surgical History:   Procedure Laterality Date   • APPENDECTOMY     • CHOLECYSTECTOMY     • HIP FRACTURE SURGERY Right    • IR PICC PLACEMENT SINGLE LUMEN  4/4/2023   • TONSILLECTOMY     • TUBAL LIGATION           Speech-Language Pathology Bedside Swallow Evaluation        Patient Name: Germaine Harrell    FATAJ'X Date: 5/20/2023     Problem List  Principal Problem:    UTI (urinary tract infection)  Active Problems:    Hypertension    Hyperlipidemia    GERD (gastroesophageal reflux disease)    Failure to thrive in adult    Acquired hypothyroidism    Chronic heart failure with preserved ejection fraction (HCC)    Diabetes mellitus type 2 in obese ([address] )    Acute encephalopathy    Suicidal ideations         Past Medical History  Past Medical History:   Diagnosis Date   • CHF (congestive heart failure) (HCC)    • Diabetes mellitus ([address] )    • Disease of thyroid gland    • Hyperlipidemia    • Hypertension    • Hypothyroidism (acquired)    • Restless leg syndrome    • Seizures ([address] )        Past Surgical History  Past Surgical History:   Procedure Laterality Date   • APPENDECTOMY     • CHOLECYSTECTOMY     • HIP FRACTURE SURGERY "Right    • IR PICC PLACEMENT SINGLE LUMEN  4/4/2023   • TONSILLECTOMY     • TUBAL LIGATION         Summary    Assessment was limited to pureed and thin liquids; pt took only a few bites/sips of lunch, due to poor appetite  Offered [facility] soft solids but she declined  With pureed and thin liquids, oral phase of the swallow appears WNL  Suspect at least mild pharyngeal dysphagia, with one episode (first sip only) of aspiration with large, consecutive sips  There were no further s/s of aspiration with smaller sips, nor with pureed  Most recent CXR was clear, cough is strong  At this time current diet appears appropriate  ST to follow up for dysphagia tx  Recommendations:   Diet: puree/level 1 diet and thin liquids (small sips)  Meds: crushed with puree (pt preference)  Frequent Oral care: 2x/day  Full feed  Aspiration precautions and compensatory swallowing strategies: upright posture, only feed when fully alert, slow rate of feeding and small bites/sips  Other Recommendations/ considerations: ST to see for dysphagia tx, 3-5x per week  Current Medical Status  Copied from admission/physician notes:  Idris Hinton is a 75 y o  female with a PMH of CHF, DM2, hypothyroidism, HLD, HTN, chronic indwelling catheter who presents  from [place] due to intermittent confusion and pain for the past 2 days  Of note, patient has a chronic indwelling catheter  Patient reports \"I need something for the pain, if not I am going to just take care of it myself  \" Provider then asked if the patient had thoughts of hurting herself  Patient replied \"Lately, yes  \" When asked about a plan to harm herself, patient's son at bedside interjected and stated that several days ago the patient mentioned using gun to harm herself if she had one  Patient's son states the patient has been very depressed of recent which has prompted her antidepressants to be adjusted  Upon evaluation in the ED, patient was noted to have UTI   " Given patient's history of resistant organisms patient will be admitted for IV Ertapenem and ID consult  Order received and chart reviewed  Discussed with RN  Pt was seen by ST white at [facility] in April 2023; at that time a pureed diet and thin liquids were recommended  Pt states that she is on a pureed diet at SNF, but was unsure about her liquids being thin vs thick  She does admit to coughing with thin liquids sometimes  Past medical history:   Please see H&P for details    Special Studies:  CXR 4/12/23-  IMPRESSION:  1  Trace right pleural effusion  Lungs otherwise appear clear  Please note evaluation of the left lung is limited by patient rotation  CT head-  No acute intracranial abnormality     CT abdomen-  No acute abdominal or pelvic pathology  Urinary bladder collapsed around Kay catheter, limiting evaluation  No evidence of pyelonephritis or perinephric abscess  No bowel obstruction  Evaluation for bowel inflammation somewhat limited by underdistention and lack of oral contrast, however no convincing inflammatory changes  Please note mild inflammation may not be apparent  Social/Education/Vocational Hx:  Pt lives in SNF/ECF    Swallow Information   Current Risks for Dysphagia & Aspiration: known history of dysphagia and appears deconditioned  Current Symptoms/Concerns: FTT in adult  Current Diet: puree/level 1 diet and thin liquids   Baseline Diet: puree/level 1 diet and assume thin, unable to locate in chart or in paperwork    Baseline Assessment   Behavior/Cognition: alert and c/o pain, appears tearful at times (nursing notified of pain in bottom)  Speech/Language Status: able to participate in basic conversation and able to follow commands  Patient Positioning: upright in bed and leaning somewhat to L side, refused to be repositioned due to pain     Swallow Mechanism Exam   Overall weak, slow movements  ? Mild L facial/labial weakness, however pt leaning to L   Pt is edentulous  Pt with NC  Speech is [facility]  Vocal quality was clear  Consistencies Assessed and Performance   Consistencies Administered: thin liquids and puree, meds crushed in puree    Oral Stage: Adequate bolus retrieval and draw from straw  Prompt bolus manipulation and transfer  Adequate lip seal  No pocketing or residue  Pharyngeal Stage: Hyolaryngeal elevation observed and palpated  Swallows appeared prompt  With the first sip of thin water via straw, pt took several large consecutive sips, followed by immediate strong coughing  There were no further s/s of aspiration with the water (pt was cued to take smaller sips) or with the pureed food  Esophageal Concerns: belching and hx of GERD      Results Reviewed with: patient and RN   Dysphagia Goals: 1  Pt will tolerate pureed diet and thin liquids with prompt oropharyngeal swallows and no overt s/s of aspiration  2  Pt will tolerate LRD without s/s of aspiration     Discharge recommendation: SNF    Speech Therapy Prognosis   Prognosis: fair    Prognosis Considerations: age, medical status, prior medical history, cognitive status and therapeutic potential

## 2023-05-20 NOTE — ASSESSMENT & PLAN NOTE
Lab Results   Component Value Date    HGBA1C 5 2 04/14/2023       Recent Labs     05/19/23  2200 05/20/23  0628 05/20/23  0733 05/20/23  1156   POCGLU 113 78 99 89       Blood Sugar Average: Last 72 hrs:  (P) 07 [id number]   · Well controlled  · Patient is not on any antihyperglycemic medications  · Accu-checks and SSI  · Hypoglycemia protocol

## 2023-05-20 NOTE — ASSESSMENT & PLAN NOTE
Blood pressure is reviewed and acceptable  Last recorded pressure: 113/67  Patient is not currently on any antihypertensives at this time  Monitor blood pressure

## 2023-05-20 NOTE — ASSESSMENT & PLAN NOTE
· Patient's son reports intermittent confusion exhibited by the patient over the past 2 days  · Currently, alert and oriented x 3   · Suspect 2/2 UTI  · Neuro checks q4h  · See further detailed plan above

## 2023-05-20 NOTE — ASSESSMENT & PLAN NOTE
"· Patient reports \"I need something for the pain, if not I am going to just take care of it myself  \" Provider then asked if the patient had thoughts of hurting herself  Patient replied \"Lately, yes  \" When asked about a plan to harm herself, patient's son at bedside interjected and stated that several days ago the patient mentioned using gun to harm herself if she had one  Patient's son states the patient has been very depressed of recent which has prompted her antidepressants to be adjusted  · Virtual observation  · Psychiatry consult    "

## 2023-05-20 NOTE — MALNUTRITION/BMI
This medical record reflects one or more clinical indicators suggestive of malnutrition  Malnutrition Findings:   Adult Malnutrition type: Chronic illness  Adult Degree of Malnutrition: Malnutrition of moderate degree  Malnutrition Characteristics: Muscle loss, Weight loss    360 Statement: Moderate malnutrition in the context of chronic illness r/t inadequate energy intake with confusion as evidenced by wt decrease of 93#/32 7% x 11 mo, moderate temporal wasting  Will treat with nutrition therapy, PO diet, supplements  BMI Findings: Body mass index is 33 78 kg/m²  See Nutrition note dated 05/20/23 for additional details  Completed nutrition assessment is viewable in the nutrition documentation

## 2023-05-20 NOTE — H&P
"[facility]  H&P  Name: Roxanne Muñiz 75 y o  female I MRN: [id number]  Unit/Bed#: S -01 I Date of Admission: 5/19/2023   Date of Service: 5/19/2023 I Hospital Day: 0      Assessment/Plan   * UTI (urinary tract infection)  Assessment & Plan  · Presentation: Patient presents from [place] due to intermittent confusion and pain for the past 2 days  Of note, patient has a chronic indwelling catheter  · CT A/P: \"No acute abdominal or pelvic pathology  Urinary bladder collapsed around Kay catheter, limiting evaluation  No evidence of pyelonephritis or perinephric abscess  No bowel obstruction  \"  · UA: Large leuks, positive nitrite, protein 70(1+), moderate occult blood  · Urine micro: RBCs 10-20, innumerable WBCs, moderate bacteria, occasional mucus threads, hyaline casts 5-10, occasional Ca Oxalate Katelyn  · Urine culture pending, prior urine culture grew Escherichia coli ESBL  · I&Os  · Kay care  · Continue IV Ertapenem  · ID consult  Acute encephalopathy  Assessment & Plan  · Patient's son reports intermittent confusion exhibited by the patient over the past 2 days  · Currently, alert and oriented x 3   · Suspect 2/2 UTI  · Neuro checks q4h  · See further detailed plan above  Suicidal ideations  Assessment & Plan  · Patient reports \"I need something for the pain, if not I am going to just take care of it myself  \" Provider then asked if the patient had thoughts of hurting herself  Patient replied \"Lately, yes  \" When asked about a plan to harm herself, patient's son at bedside interjected and stated that several days ago the patient mentioned using gun to harm herself if she had one  Patient's son states the patient has been very depressed of recent which has prompted her antidepressants to be adjusted  · Virtual observation  · Psychiatry consult      Diabetes mellitus type 2 in obese [facility])  Assessment & Plan  Lab Results   Component Value Date    HGBA1C 5 2 " 04/14/2023       Recent Labs     05/19/23 2034 05/19/23 2108 05/19/23 2200   POCGLU 66 84 113       Blood Sugar Average: Last 72 hrs:  (P) 07 [id number]   · Well controlled  · Patient is not on any antihyperglycemic medications  · Accu-checks and SSI  · Hypoglycemia protocol  Failure to thrive in adult  Assessment & Plan  · Poor oral intake, decreased mobility  · PT/OT/Speech evaluations  · Patient's son is interested in palliative care as patient has displayed significant deconditioning over the past several months  · Palliative care consult  Chronic heart failure with preserved ejection fraction [facility])  Assessment & Plan  Wt Readings from Last 3 Encounters:   05/19/23 90 kg (198 lb 6 4 oz)   05/18/23 90 kg (198 lb 6 4 oz)   05/16/23 90 kg (198 lb 6 4 oz)   · Echo from February 2022: LVEF 67%  · Patient is not currently on any diuretics  Acquired hypothyroidism  Assessment & Plan  · Continue levothyroxine  Hypertension  Assessment & Plan  Blood pressure is reviewed and acceptable  Last recorded pressure: 113/67  Patient is not currently on any antihypertensives at this time  Monitor blood pressure  Hyperlipidemia  Assessment & Plan  · Continue Zetia  GERD (gastroesophageal reflux disease)  Assessment & Plan  · Continue PPI  VTE Pharmacologic Prophylaxis: VTE Score: 6 High Risk (Score >/= 5) - Pharmacological DVT Prophylaxis Ordered: heparin  Sequential Compression Devices Ordered  Code Status: Level 3 - DNAR and DNI per patient's son  Discussion with family: Updated  (son) at bedside  Anticipated Length of Stay: Patient will be admitted on an inpatient basis with an anticipated length of stay of greater than 2 midnights secondary to IV antibiotics for UTI      Total Time Spent on Date of Encounter in care of patient: 75 minutes This time was spent on one or more of the following: performing physical exam; counseling and coordination of care; obtaining or "reviewing history; documenting in the medical record; reviewing/ordering tests, medications or procedures; communicating with other healthcare professionals and discussing with patient's family/caregivers  Chief Complaint: Confusion, generalized pain    History of Present Illness:  Mali Bell is a 75 y o  female with a PMH of CHF, DM2, hypothyroidism, HLD, HTN, chronic indwelling catheter who presents  from [place] due to intermittent confusion and pain for the past 2 days  Of note, patient has a chronic indwelling catheter  Patient reports \"I need something for the pain, if not I am going to just take care of it myself  \" Provider then asked if the patient had thoughts of hurting herself  Patient replied \"Lately, yes  \" When asked about a plan to harm herself, patient's son at bedside interjected and stated that several days ago the patient mentioned using gun to harm herself if she had one  Patient's son states the patient has been very depressed of recent which has prompted her antidepressants to be adjusted  Upon evaluation in the ED, patient was noted to have UTI  Given patient's history of resistant organisms patient will be admitted for IV Ertapenem and ID consult  Review of Systems:  Review of Systems   Constitutional: Positive for chills  Negative for fever  Respiratory: Negative for shortness of breath  Cardiovascular: Negative for chest pain  Gastrointestinal: Positive for nausea  Negative for abdominal pain and vomiting  Genitourinary: Negative for dysuria and frequency  Musculoskeletal: Positive for arthralgias  Psychiatric/Behavioral: Positive for confusion  All other systems reviewed and are negative        Past Medical and Surgical History:   Past Medical History:   Diagnosis Date   • CHF (congestive heart failure) ([facility] )    • Diabetes mellitus ([facility] )    • Disease of thyroid gland    • Hyperlipidemia    • Hypertension    • Hypothyroidism (acquired)    • Restless leg " syndrome    • Seizures (HCC)        Past Surgical History:   Procedure Laterality Date   • APPENDECTOMY     • CHOLECYSTECTOMY     • HIP FRACTURE SURGERY Right    • IR PICC PLACEMENT SINGLE LUMEN  4/4/2023   • TONSILLECTOMY     • TUBAL LIGATION         Meds/Allergies:  Prior to Admission medications    Medication Sig Start Date End Date Taking? Authorizing Provider   acetaminophen (TYLENOL) 325 mg tablet Take 650 mg by mouth every 6 (six) hours as needed for mild pain (pain score 1-3)  Historical Provider, MD   aspirin (ECOTRIN LOW STRENGTH) 81 mg EC tablet Take 81 mg by mouth daily    Historical Provider, MD   Cholecalciferol 25 MCG (1000 UT) capsule Take 1,000 Units by mouth daily  Historical Provider, MD   Ergocalciferol (VITAMIN D2 PO) Take 50,000 Units/day by mouth 2 (two) times a week    Historical Provider, MD   ezetimibe (ZETIA) 10 mg tablet Take 1 tablet (10 mg total) by mouth daily 9/22/22   SAMIA Hoover   FLUoxetine (PROzac) 40 MG capsule Take 1 capsule (40 mg total) by mouth daily Do not start before March 17, 2023  3/17/23   SAMIA Phelps   hydrophilic ointment Apply topically as needed for dry skin    Historical Provider, MD   insulin lispro (HumaLOG) 100 units/mL injection Inject 1-5 Units under the skin 3 (three) times a day before meals 9/30/22   SAMIA Hoover   levothyroxine 88 mcg tablet Take 1 tablet (88 mcg total) by mouth daily in the early morning Do not start before April 8, 2023 4/8/23 5/8/23  Mau Cifuentes MD   melatonin 3 mg Take 3 tablets (9 mg total) by mouth daily at bedtime 9/30/22   SAMIA Hoover   mirtazapine (REMERON) 15 mg tablet Take 30 mg by mouth daily at bedtime    Historical Provider, MD   nystatin (MYCOSTATIN) powder Apply topically 2 (two) times a day 2/19/22   Denver Noble, MD   omeprazole (PriLOSEC) 20 mg delayed release capsule Take 20 mg by mouth daily    Historical Provider, MD JUNIOR have reviewed home medications using recent Epic encounter  Allergies: Allergies   Allergen Reactions   • Pineapple - Food Allergy Anaphylaxis   • Statins Other (See Comments)     Cramping from head to toe    • Bupropion Other (See Comments)   • Byetta 10 Mcg Pen [Exenatide]    • Ceftriaxone Other (See Comments)   • Marijuana [Dronabinol] GI Intolerance   • Metformin Diarrhea     Other reaction(s): Unknown   • Omeprazole Hives   • Ondansetron      Other reaction(s): Unknown   • Coconut Oil - Food Allergy Rash   • Liraglutide Rash and Other (See Comments)     Generalized rash   • Penicillins Rash       Social History:  Marital Status:    Occupation: Unknown  Patient Pre-hospital Living Situation: Skilled Nursing Facility: [facility]  Patient Pre-hospital Level of Mobility: non-ambulatory/bed bound  Patient Pre-hospital Diet Restrictions: Pureed/Thin per son  Substance Use History:   Social History     Substance and Sexual Activity   Alcohol Use Yes    Comment: occasional     Social History     Tobacco Use   Smoking Status Never   Smokeless Tobacco Never   Tobacco Comments    not a smoker     Social History     Substance and Sexual Activity   Drug Use Never       Family History:  Family History   Problem Relation Age of Onset   • Brain cancer Mother    • Seizures Father    • Colon cancer Maternal Grandfather    • No Known Problems Son    • No Known Problems Son    • Breast cancer Maternal Aunt 30       Physical Exam:     Vitals:   Blood Pressure: 113/67 (05/19/23 2018)  Pulse: 94 (05/19/23 2018)  Temperature: 98 1 °F (36 7 °C) (05/19/23 2018)  Temp Source: Axillary (05/19/23 1443)  Respirations: 18 (05/19/23 2018)  SpO2: 99 % (05/19/23 2018)    Physical Exam  Vitals and nursing note reviewed  Constitutional:       General: She is not in acute distress  Appearance: She is obese  She is ill-appearing (chronically)  HENT:      Head: Normocephalic  Nose: Nose normal       Mouth/Throat:      Pharynx: Oropharynx is clear     Eyes: General: No scleral icterus  Conjunctiva/sclera: Conjunctivae normal    Cardiovascular:      Rate and Rhythm: Normal rate and regular rhythm  Pulses: Normal pulses  Heart sounds: Normal heart sounds  No murmur heard  Pulmonary:      Effort: Pulmonary effort is normal  No respiratory distress  Breath sounds: Normal breath sounds  No wheezing, rhonchi or rales  Chest:      Chest wall: No tenderness  Abdominal:      General: Bowel sounds are normal  There is no distension  Palpations: Abdomen is soft  Tenderness: There is no abdominal tenderness  Musculoskeletal:         General: Normal range of motion  Cervical back: Normal range of motion  Skin:     General: Skin is warm and dry  Capillary Refill: Capillary refill takes 2 to 3 seconds  Neurological:      General: No focal deficit present  Mental Status: She is alert and oriented to person, place, and time            Additional Data:     Lab Results:  Results from last 7 days   Lab Units 05/19/23  1449   WBC Thousand/uL 9 36   HEMOGLOBIN g/dL 10 4*   HEMATOCRIT % 34 5*   PLATELETS Thousands/uL 369   NEUTROS PCT % 71   LYMPHS PCT % 16   MONOS PCT % 7   EOS PCT % 4     Results from last 7 days   Lab Units 05/19/23  1449   SODIUM mmol/L 140   POTASSIUM mmol/L 3 4*   CHLORIDE mmol/L 105   CO2 mmol/L 27   BUN mg/dL 31*   CREATININE mg/dL 1 20   ANION GAP mmol/L 8   CALCIUM mg/dL 8 6   ALBUMIN g/dL 2 6*   TOTAL BILIRUBIN mg/dL 0 32   ALK PHOS U/L 142*   ALT U/L 17   AST U/L 23   GLUCOSE RANDOM mg/dL 80     Results from last 7 days   Lab Units 05/19/23  1449   INR  1 03     Results from last 7 days   Lab Units 05/19/23  2200 05/19/23  2108 05/19/23  2034   POC GLUCOSE mg/dl 113 84 66         Results from last 7 days   Lab Units 05/19/23  1449   LACTIC ACID mmol/L 1 0   PROCALCITONIN ng/ml 0 16       Lines/Drains:  Invasive Devices     Peripheral Intravenous Line  Duration           Peripheral IV 05/19/23 Distal;Left;Upper;Ventral (anterior) Arm <1 day    Peripheral IV 05/19/23 Right Antecubital <1 day          Drain  Duration           Urethral Catheter 51 days              Urinary Catheter:  Goal for removal: N/A - Chronic Kay             Imaging: Reviewed radiology reports from this admission including: abdominal/pelvic CT and CT head  CT abdomen pelvis with contrast   Final Result by Maribell Davis MD (05/19 2629)      No acute abdominal or pelvic pathology  Urinary bladder collapsed around Kay catheter, limiting evaluation  No evidence of pyelonephritis or perinephric abscess  No bowel obstruction  Evaluation for bowel inflammation somewhat limited by underdistention and lack of oral contrast, however no convincing inflammatory changes  Please note mild inflammation may not be apparent  Additional findings as above  Workstation performed: [id number]         CT head without contrast   Final Result by Sharlyne Denver, MD (05/19 6405)      No acute intracranial abnormality  Workstation performed: [id number]             EKG and Other Studies Reviewed on Admission:   · EKG: NSR  HR 89     ** Please Note: This note has been constructed using a voice recognition system   **

## 2023-05-20 NOTE — ASSESSMENT & PLAN NOTE
"· Presentation: Patient presents from [place] due to intermittent confusion and pain for the past 2 days  Of note, patient has a chronic indwelling catheter  · CT A/P: \"No acute abdominal or pelvic pathology  Urinary bladder collapsed around Kay catheter, limiting evaluation  No evidence of pyelonephritis or perinephric abscess  No bowel obstruction  \"  · UA: Large leuks, positive nitrite, protein 70(1+), moderate occult blood  · Urine micro: RBCs 10-20, innumerable WBCs, moderate bacteria, occasional mucus threads, hyaline casts 5-10, occasional Ca Oxalate Katelyn  · Urine culture pending, prior urine culture grew Escherichia coli ESBL  · I&Os  · Kay care  · Continue IV Ertapenem  · ID consult    "

## 2023-05-20 NOTE — ASSESSMENT & PLAN NOTE
Noted to have significant vaginal bleeding today per nursing staff  Will consult OB/GYN  Patient is a postmenopausal 44-year-old female  Check hemoglobin tomorrow a m

## 2023-05-20 NOTE — ASSESSMENT & PLAN NOTE
Wt Readings from Last 3 Encounters:   05/19/23 90 kg (198 lb 6 4 oz)   05/18/23 90 kg (198 lb 6 4 oz)   05/16/23 90 kg (198 lb 6 4 oz)   · Echo from February 2022: LVEF 67%  · Patient is not currently on any diuretics

## 2023-05-21 ENCOUNTER — APPOINTMENT (INPATIENT)
Dept: ULTRASOUND IMAGING | Facility: HOSPITAL | Age: 68
End: 2023-05-21

## 2023-05-21 PROBLEM — N14.11 CONTRAST-INDUCED NEPHROPATHY: Status: ACTIVE | Noted: 2023-05-21

## 2023-05-21 PROBLEM — N39.0 UTI (URINARY TRACT INFECTION): Status: RESOLVED | Noted: 2023-03-11 | Resolved: 2023-05-21

## 2023-05-21 PROBLEM — T50.8X5A CONTRAST-INDUCED NEPHROPATHY: Status: ACTIVE | Noted: 2023-05-21

## 2023-05-21 LAB
AMORPH URATE CRY URNS QL MICRO: ABNORMAL
ANION GAP SERPL CALCULATED.3IONS-SCNC: 7 MMOL/L (ref 4–13)
ANION GAP SERPL CALCULATED.3IONS-SCNC: 8 MMOL/L (ref 4–13)
BACTERIA UR QL AUTO: ABNORMAL /HPF
BILIRUB UR QL STRIP: NEGATIVE
BUN SERPL-MCNC: 30 MG/DL (ref 5–25)
BUN SERPL-MCNC: 30 MG/DL (ref 5–25)
CALCIUM SERPL-MCNC: 8.4 MG/DL (ref 8.4–10.2)
CALCIUM SERPL-MCNC: 8.7 MG/DL (ref 8.4–10.2)
CHLORIDE SERPL-SCNC: 109 MMOL/L (ref 96–108)
CHLORIDE SERPL-SCNC: 110 MMOL/L (ref 96–108)
CLARITY UR: ABNORMAL
CO2 SERPL-SCNC: 25 MMOL/L (ref 21–32)
CO2 SERPL-SCNC: 26 MMOL/L (ref 21–32)
COLOR UR: YELLOW
CREAT SERPL-MCNC: 1.41 MG/DL (ref 0.6–1.3)
CREAT SERPL-MCNC: 1.64 MG/DL (ref 0.6–1.3)
ERYTHROCYTE [DISTWIDTH] IN BLOOD BY AUTOMATED COUNT: 17.8 % (ref 11.6–15.1)
GFR SERPL CREATININE-BSD FRML MDRD: 31 ML/MIN/1.73SQ M
GFR SERPL CREATININE-BSD FRML MDRD: 38 ML/MIN/1.73SQ M
GLUCOSE SERPL-MCNC: 105 MG/DL (ref 65–140)
GLUCOSE SERPL-MCNC: 106 MG/DL (ref 65–140)
GLUCOSE SERPL-MCNC: 131 MG/DL (ref 65–140)
GLUCOSE SERPL-MCNC: 96 MG/DL (ref 65–140)
GLUCOSE SERPL-MCNC: 97 MG/DL (ref 65–140)
GLUCOSE SERPL-MCNC: 99 MG/DL (ref 65–140)
GLUCOSE UR STRIP-MCNC: NEGATIVE MG/DL
HCT VFR BLD AUTO: 34.8 % (ref 34.8–46.1)
HGB BLD-MCNC: 10.3 G/DL (ref 11.5–15.4)
HGB UR QL STRIP.AUTO: ABNORMAL
KETONES UR STRIP-MCNC: NEGATIVE MG/DL
LEUKOCYTE ESTERASE UR QL STRIP: ABNORMAL
MCH RBC QN AUTO: 29.7 PG (ref 26.8–34.3)
MCHC RBC AUTO-ENTMCNC: 29.6 G/DL (ref 31.4–37.4)
MCV RBC AUTO: 100 FL (ref 82–98)
MUCOUS THREADS UR QL AUTO: ABNORMAL
NITRITE UR QL STRIP: POSITIVE
NON-SQ EPI CELLS URNS QL MICRO: ABNORMAL /HPF
PH UR STRIP.AUTO: 5.5 [PH]
PLATELET # BLD AUTO: 381 THOUSANDS/UL (ref 149–390)
PMV BLD AUTO: 11.4 FL (ref 8.9–12.7)
POTASSIUM SERPL-SCNC: 5 MMOL/L (ref 3.5–5.3)
POTASSIUM SERPL-SCNC: 5.2 MMOL/L (ref 3.5–5.3)
PROT UR STRIP-MCNC: ABNORMAL MG/DL
RBC # BLD AUTO: 3.47 MILLION/UL (ref 3.81–5.12)
RBC #/AREA URNS AUTO: ABNORMAL /HPF
SODIUM SERPL-SCNC: 142 MMOL/L (ref 135–147)
SODIUM SERPL-SCNC: 143 MMOL/L (ref 135–147)
SP GR UR STRIP.AUTO: >=1.05 (ref 1–1.03)
UROBILINOGEN UR STRIP-ACNC: 2 MG/DL
WBC # BLD AUTO: 12.13 THOUSAND/UL (ref 4.31–10.16)
WBC #/AREA URNS AUTO: ABNORMAL /HPF
WBC CLUMPS # UR AUTO: PRESENT /UL

## 2023-05-21 RX ORDER — SODIUM CHLORIDE, SODIUM GLUCONATE, SODIUM ACETATE, POTASSIUM CHLORIDE, MAGNESIUM CHLORIDE, SODIUM PHOSPHATE, DIBASIC, AND POTASSIUM PHOSPHATE .53; .5; .37; .037; .03; .012; .00082 G/100ML; G/100ML; G/100ML; G/100ML; G/100ML; G/100ML; G/100ML
125 INJECTION, SOLUTION INTRAVENOUS CONTINUOUS
Status: DISPENSED | OUTPATIENT
Start: 2023-05-21 | End: 2023-05-21

## 2023-05-21 RX ORDER — SODIUM CHLORIDE, SODIUM GLUCONATE, SODIUM ACETATE, POTASSIUM CHLORIDE, MAGNESIUM CHLORIDE, SODIUM PHOSPHATE, DIBASIC, AND POTASSIUM PHOSPHATE .53; .5; .37; .037; .03; .012; .00082 G/100ML; G/100ML; G/100ML; G/100ML; G/100ML; G/100ML; G/100ML
125 INJECTION, SOLUTION INTRAVENOUS CONTINUOUS
Status: DISCONTINUED | OUTPATIENT
Start: 2023-05-21 | End: 2023-05-25

## 2023-05-21 RX ORDER — SODIUM CHLORIDE, SODIUM GLUCONATE, SODIUM ACETATE, POTASSIUM CHLORIDE, MAGNESIUM CHLORIDE, SODIUM PHOSPHATE, DIBASIC, AND POTASSIUM PHOSPHATE .53; .5; .37; .037; .03; .012; .00082 G/100ML; G/100ML; G/100ML; G/100ML; G/100ML; G/100ML; G/100ML
125 INJECTION, SOLUTION INTRAVENOUS CONTINUOUS
Status: DISCONTINUED | OUTPATIENT
Start: 2023-05-21 | End: 2023-05-21

## 2023-05-21 RX ADMIN — SODIUM CHLORIDE, SODIUM GLUCONATE, SODIUM ACETATE, POTASSIUM CHLORIDE AND MAGNESIUM CHLORIDE 125 ML/HR: 526; 502; 368; 37; 30 INJECTION, SOLUTION INTRAVENOUS at 23:19

## 2023-05-21 RX ADMIN — NYSTATIN: 100000 POWDER TOPICAL at 08:40

## 2023-05-21 RX ADMIN — HYDROMORPHONE HYDROCHLORIDE 0.5 MG: 1 INJECTION, SOLUTION INTRAMUSCULAR; INTRAVENOUS; SUBCUTANEOUS at 09:47

## 2023-05-21 RX ADMIN — FLUOXETINE 40 MG: 20 CAPSULE ORAL at 08:39

## 2023-05-21 RX ADMIN — HEPARIN SODIUM 5000 UNITS: 5000 INJECTION INTRAVENOUS; SUBCUTANEOUS at 21:54

## 2023-05-21 RX ADMIN — HEPARIN SODIUM 5000 UNITS: 5000 INJECTION INTRAVENOUS; SUBCUTANEOUS at 04:35

## 2023-05-21 RX ADMIN — OXYCODONE HYDROCHLORIDE 5 MG: 5 TABLET ORAL at 14:13

## 2023-05-21 RX ADMIN — ASPIRIN 81 MG: 81 TABLET, COATED ORAL at 08:39

## 2023-05-21 RX ADMIN — EZETIMIBE 10 MG: 10 TABLET ORAL at 08:39

## 2023-05-21 RX ADMIN — HEPARIN SODIUM 5000 UNITS: 5000 INJECTION INTRAVENOUS; SUBCUTANEOUS at 14:13

## 2023-05-21 RX ADMIN — PANTOPRAZOLE SODIUM 40 MG: 40 TABLET, DELAYED RELEASE ORAL at 04:35

## 2023-05-21 RX ADMIN — LEVOTHYROXINE SODIUM 88 MCG: 88 TABLET ORAL at 04:35

## 2023-05-21 RX ADMIN — OXYCODONE HYDROCHLORIDE 5 MG: 5 TABLET ORAL at 04:36

## 2023-05-21 RX ADMIN — Medication 1000 UNITS: at 08:39

## 2023-05-21 RX ADMIN — SODIUM CHLORIDE, SODIUM GLUCONATE, SODIUM ACETATE, POTASSIUM CHLORIDE, MAGNESIUM CHLORIDE, SODIUM PHOSPHATE, DIBASIC, AND POTASSIUM PHOSPHATE 125 ML/HR: .53; .5; .37; .037; .03; .012; .00082 INJECTION, SOLUTION INTRAVENOUS at 11:12

## 2023-05-21 RX ADMIN — OXYCODONE HYDROCHLORIDE 5 MG: 5 TABLET ORAL at 08:39

## 2023-05-21 NOTE — ASSESSMENT & PLAN NOTE
Uncertain if bleeding is per vagina or rectum per nursing  Patient is not able to provide me with HPI or consent to a pelvic exam or vaginal ultrasound at this time  We will get an abdominal ultrasound of the pelvis to evaluate her endometrial stripe at this time   If her mental status improves or family consents, we may attempt an endometrial biopsy at a later time and arrange for follow up outpatient

## 2023-05-21 NOTE — UTILIZATION REVIEW
"Initial Clinical Review    Admission: Date/Time/Statement:   Admission Orders (From admission, onward)     Ordered        05/19/23 [address]  Once                      Orders Placed This Encounter   Procedures   • INPATIENT ADMISSION     Standing Status:   Standing     Number of Occurrences:   1     Order Specific Question:   Level of Care     Answer:   Med Surg [16]     Order Specific Question:   Estimated length of stay     Answer:   More than 2 Midnights     Order Specific Question:   Certification     Answer:   I certify that inpatient services are medically necessary for this patient for a duration of greater than two midnights  See H&P and MD Progress Notes for additional information about the patient's course of treatment  ED Arrival Information     Expected   -    Arrival   5/19/2023 14:33    Acuity   Urgent            Means of arrival   Ambulance    Escorted by   [facility]    Service   Hospitalist    Admission type   Emergency            Arrival complaint   EMS           Chief Complaint   Patient presents with   • Altered Mental Status     Pt arrives by EMS for AMS, per son  Staff reports to EMS that pt has been this way and that she is acting at baseline  Pt moaning and appears to be uncomfortable  Pt states she has generalized pain  Pt confused at this time  Initial Presentation: 75 y o  female PMH wheelchair bound, 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,  DM2, chronic HF, HTN, to ED by EMS presents w confusion & Gen pain;   facility reports for 2 day intermittent confusion & pain       EXAM  Requesting pain relief, reports if she if not I will take care of it myself\"    Provider clarified asking if thoughts of identical hurting herself w response of yes lately , states pain generally all over vaibhav in ABD; labs reveal UTI , + sacral decub    Inpatient admission due to UTI, acute encephalopathy, suicidal ideation, FTT in adult  IVF, IV Ertapenem, Kay care, I/O, consult ID; " Neuro checks Q4, Virtual OBS Psych consult SSI, Palliative care consult monitor BP, volume, PPI  Date: 5/20  Day 2:   ID  Abnormal US in setting of chronic Kay; currently wo fever or sign of Sepsis; neg CT; monitor off antibx, follow temps, HD, CBC, follow mentation, neuro checks  Attending MD  Very sleepy on exam, woke briefly, complained of being tired & weak ; suspect colonization w chronic Kay ; antibx DCd  Cont w pain in lower part of ABD, Nursing staff concerned w vaginal bleeding consult OB GYN  DATE  5/21/2023 DAY 3  OB GYN  Appears confused; no blood on perineal pad on inspection  concern for postmenopausal vaginal bleeding  Recommend US of pelvis via ABD approach to eval endometrial strip; if mental status improves or family consent may attempt Endometrial BX later OP; eval vaginal vs rectal source  Monitor HGB  Provider   Upon entering the room, patient states that she would like 1 of 2 things, either to be unconscious, or to die, if she is unable to tolerate her pain  Complains of pain in the buttock and abdomen  States the pain in the buttock has been improved with current pain regimen, however, abdominal pain remains 10 out of 10 in severity described as stabbing and constant  She reports the pain is predominantly in the left lower quadrant  States that she has had the symptoms for the last several days    Reports decreased appetite since early May and months of loose or diarrheal bowel movements treated with antidiarrheal   Reports 1 loose bowel movement since presenting to the hospital   ED Triage Vitals   Temperature Pulse Respirations Blood Pressure SpO2   05/19/23 1443 05/19/23 1443 05/19/23 1443 05/19/23 1443 05/19/23 1443   98 7 °F (37 1 °C) 91 16 114/81 100 %      Temp Source Heart Rate Source Patient Position - Orthostatic VS BP Location FiO2 (%)   05/19/23 1443 05/19/23 1443 05/19/23 1443 05/19/23 1443 --   Axillary Monitor Lying Right arm       Pain Score       05/19/23 1636       10 - Worst Possible Pain          Wt Readings from Last 1 Encounters:   05/20/23 86 5 kg (190 lb 11 2 oz)     Additional Vital Signs:   Trauma Secondary Assessment - [place] Coma Scale    Date and Time Eye Opening Best Verbal Response Best Motor Response Divide Coma Scale Score User   05/21/23 0400 4 5 6 15        Date/Time Temp Pulse Resp BP MAP (mmHg) SpO2 Calculated FIO2 (%) - Nasal Cannula Nasal Cannula O2 Flow Rate (L/min) O2 Device Patient Position - Orthostatic VS   05/21/23 0930 -- -- -- -- -- -- 36 4 L/min Nasal cannula --   05/21/23 08:33:22 98 6 °F (37 °C) 101 -- 108/59 75 100 % -- -- -- --   05/20/23 20:53:59 97 2 °F (36 2 °C) Abnormal  87 18 106/82 90 100 % -- -- -- --   05/20/23 15:17:04 97 1 °F (36 2 °C) Abnormal  95 16 105/64 78 100 % -- -- -- --   05/20/23 07:35:56 97 4 °F (36 3 °C) Abnormal  84 18 102/63 76 100 % -- -- -- --   05/20/23 0500 -- -- -- -- -- -- 36 4 L/min Nasal cannula --   05/20/23 02:52:39 -- 96 -- 102/69 80 100 % -- -- -- --   05/20/23 0100 -- -- -- -- -- -- -- -- None (Room air) --   05/19/23 2100 -- -- -- -- -- -- 28 2 L/min Nasal cannula --   05/19/23 20:18:45 98 1 °F (36 7 °C) 94 18 113/67 82 99 % -- -- -- --   05/19/23 1900 -- 97 16 102/55 74 100 % 28 2 L/min Nasal cannula Lying   05/19/23 1800 -- 93 16 113/58 83 100 % 28 2 L/min Nasal cannula Lying   05/19/23 1730 -- 92 16 128/58 87 99 % 28 2 L/min Nasal cannula Lying   05/19/23 1443 98 7 °F (37 1 °C) 91 16 114/81 -- 100 % 28 2 L/min Nasal cannula Lying     Weights (last 14 days)    Date/Time Weight Weight Method   05/20/23 0415 86 5 kg (190 lb 11 2 oz) Bed scale    Weight Method: pt wheelchair bound, unable to stand  at 05/20/23 0415       Pertinent Labs/Diagnostic Test Results:   CT abdomen pelvis with contrast   Final Result by Eliel Cote MD (05/19 1744)      No acute abdominal or pelvic pathology  Urinary bladder collapsed around Kay catheter, limiting evaluation   No evidence of pyelonephritis or perinephric abscess  No bowel obstruction  Evaluation for bowel inflammation somewhat limited by underdistention and lack of oral contrast, however no convincing inflammatory changes  Please note mild inflammation may not be apparent  Additional findings as above  CT head without contrast   Final Result by Patti Leal MD (05/19 1729)      No acute intracranial abnormality           Results from last 7 days   Lab Units 05/21/23  0449 05/20/23  0422 05/19/23  1449   WBC Thousand/uL 12 13* 8 58 9 36   HEMOGLOBIN g/dL 10 3* 8 7* 10 4*   HEMATOCRIT % 34 8 29 9* 34 5*   PLATELETS Thousands/uL 381 309 369   NEUTROS ABS Thousands/µL  --  6 22 6 67         Results from last 7 days   Lab Units 05/21/23  0449 05/20/23  0422 05/19/23  1449   SODIUM mmol/L 143 142 140   POTASSIUM mmol/L 5 0 3 3* 3 4*   CHLORIDE mmol/L 110* 109* 105   CO2 mmol/L 25 28 27   ANION GAP mmol/L 8 5 8   BUN mg/dL 30* 27* 31*   CREATININE mg/dL 1 41* 1 22 1 20   EGFR ml/min/1 73sq m 38 45 46   CALCIUM mg/dL 8 7 8 0* 8 6     Results from last 7 days   Lab Units 05/19/23  1449   AST U/L 23   ALT U/L 17   ALK PHOS U/L 142*   TOTAL PROTEIN g/dL 6 2*   ALBUMIN g/dL 2 6*   TOTAL BILIRUBIN mg/dL 0 32     Results from last 7 days   Lab Units 05/21/23  1117 05/21/23  0838 05/20/23  2051 05/20/23  1629 05/20/23  1156 05/20/23  0733 05/20/23  0628 05/19/23  2200 05/19/23  2108 05/19/23  2034   POC GLUCOSE mg/dl 131 105 116 83 89 99 78 113 84 66     Results from last 7 days   Lab Units 05/21/23  0449 05/20/23  0422 05/19/23  1449   GLUCOSE RANDOM mg/dL 106 81 80             No results found for: BETA-HYDROXYBUTYRATE                           Results from last 7 days   Lab Units 05/19/23  1449   PROTIME seconds 13 7   INR  1 03   PTT seconds 27         Results from last 7 days   Lab Units 05/20/23  0422 05/19/23  1449   PROCALCITONIN ng/ml 0 17 0 16     Results from last 7 days   Lab Units 05/19/23  1449   LACTIC ACID mmol/L 1 0 Results from last 7 days   Lab Units 05/19/23  1449   CLARITY UA  Turbid   COLOR UA  Yellow   SPEC GRAV UA  1 019   PH UA  5 5   GLUCOSE UA mg/dl Negative   KETONES UA mg/dl Negative   BLOOD UA  Moderate*   PROTEIN UA mg/dl 70 (1+)*   NITRITE UA  Positive*   BILIRUBIN UA  Negative   UROBILINOGEN UA (BE) mg/dl <2 0   LEUKOCYTES UA  Large*   WBC UA /hpf Innumerable*   RBC UA /hpf 10-20*   BACTERIA UA /hpf Moderate*   EPITHELIAL CELLS WET PREP /hpf Occasional   MUCUS THREADS  Occasional*                                 Results from last 7 days   Lab Units 05/19/23  1511 05/19/23  1449   BLOOD CULTURE  No Growth at 24 hrs  No Growth at 24 hrs     URINE CULTURE   --  70,000-79,000 cfu/ml Escherichia coli ESBL*  20,000-29,000 cfu/ml Lactobacillus species*  <10,000 cfu/ml Enterococcus species*           5/19 ecg=  Normal sinus rhythm  Left axis deviation  Right bundle branch block  Abnormal ECG    ED Treatment:   Medication Administration from 05/19/2023 1433 to 05/19/2023 1950       Date/Time Order Dose Route Action     05/19/2023 1636 EDT fentanyl citrate (PF) 100 MCG/2ML 25 mcg 25 mcg Intravenous Given     05/19/2023 1642 EDT sodium chloride 0 9 % bolus 1,000 mL 1,000 mL Intravenous New Bag     05/19/2023 1702 EDT fentanyl citrate (PF) 100 MCG/2ML 50 mcg 50 mcg Intravenous Given     05/19/2023 1844 EDT ertapenem (INVanz) 1,000 mg in sodium chloride 0 9 % 50 mL IVPB 1,000 mg Intravenous New Bag     05/19/2023 1852 EDT fentanyl citrate (PF) 100 MCG/2ML 50 mcg 50 mcg Intravenous Given        Past Medical History:   Diagnosis Date   • CHF (congestive heart failure) (HCC)    • Diabetes mellitus ([facility] )    • Disease of thyroid gland    • Hyperlipidemia    • Hypertension    • Hypothyroidism (acquired)    • Obesity, Class I, BMI 30 0-34 9 (see actual BMI)    • Restless leg syndrome    • Seizures ([facility] )      Present on Admission:  • Chronic heart failure with preserved ejection fraction ([facility] )  • Diabetes mellitus type 2 in obese (HCC)  • (Resolved) Acute encephalopathy      Admitting Diagnosis: UTI (urinary tract infection) [N39 0]  Encephalopathy [G93 40]  Sacral decubitus ulcer [L89 159]  AMS (altered mental status) [R41 82]  Age/Sex: 75 y o  female  Admission Orders:  Neuro checks  Up OOB  Daily wt  I/O  SCD    Scheduled Medications:  aspirin, 81 mg, Oral, Daily  cholecalciferol, 1,000 Units, Oral, Daily  ezetimibe, 10 mg, Oral, Daily  FLUoxetine, 40 mg, Oral, Daily  heparin (porcine), 5,000 Units, Subcutaneous, Q8H JOSE  insulin lispro, 1-5 Units, Subcutaneous, HS  insulin lispro, 1-6 Units, Subcutaneous, TID AC  levothyroxine, 88 mcg, Oral, Early Morning  melatonin, 9 mg, Oral, HS  mirtazapine, 30 mg, Oral, HS  nystatin, , Topical, BID  pantoprazole, 40 mg, Oral, Early Morning    Continuous IV Infusions:  multi-electrolyte, 125 mL/hr, Intravenous, Continuous      PRN Meds:  acetaminophen, 650 mg, Oral, Q6H PRN  HYDROmorphone, 0 5 mg, Intravenous, Q4H PRN  oxyCODONE, 5 mg, Oral, Q4H PRN  oxyCODONE, 2 5 mg, Oral, Q4H PRN        IP CONSULT TO PSYCHIATRY  IP CONSULT TO INFECTIOUS DISEASES  IP CONSULT TO PALLIATIVE CARE  IP CONSULT TO NUTRITION SERVICES  IP CONSULT TO OB GYN    Network Utilization Review Department  ATTENTION: Please call with any questions or concerns to [phone] and carefully listen to the prompts so that you are directed to the right person  All voicemails are confidential   Vaibhavl Folds all requests for admission clinical reviews, approved or denied determinations and any other requests to dedicated fax number below belonging to the campus where the patient is receiving treatment   List of dedicated fax numbers for the Facilities:  [address] DENIALS (Administrative/Medical Necessity) [phone]   [address] [place] [address] (Maternity/NICU/Pediatrics) Nilam Barajas 172 [phone]   [facility] [phone]   [address] [address] Medical [place] [address] [place] [address] Yvon [id number] [place] [address] 28 [address] [facility] 310 [facility] 134 815 [facility] [phone]

## 2023-05-21 NOTE — ASSESSMENT & PLAN NOTE
"· Presentation: Patient presents from [facility] due to intermittent confusion and pain for the past 2 days  Of note, patient has a chronic indwelling catheter  · CT A/P: \"No acute abdominal or pelvic pathology  Urinary bladder collapsed around Kay catheter, limiting evaluation  No evidence of pyelonephritis or perinephric abscess  No bowel obstruction  \"  · UA: Large leuks, positive nitrite, protein 70(1+), moderate occult blood  · Urine micro: RBCs 10-20, innumerable WBCs, moderate bacteria, occasional mucus threads, hyaline casts 5-10, occasional Ca Oxalate Katelyn  · Urine culture pending, prior urine culture grew Escherichia coli ESBL    · ID consulted  · Discontinued ertapenem, likely colonization  "

## 2023-05-21 NOTE — PROGRESS NOTES
Patient given 5mg oxycodone at 1413  At 1530, patient noted to be more sleepy and difficult to awaken  Provider notified, VS BP 98/50, O2 98% on 2L NC, RR 20, HR 95, and temperature 99 9  Provider to beside at [phone] to assess patient  No concerns at this time but to notify provider if patient becomes apneic or develops other symptoms  At [phone] patient responded and was able to answer questions appropriately and follow commands  Temp 98 5, HR 99, RR 14, /69 and O2 99%

## 2023-05-21 NOTE — ASSESSMENT & PLAN NOTE
Baseline Cr appears to be approx 1 0, 1 2 on admission 5/19, 1 22 5/20, increased to 1 41 5/21     Received IV contrast for imaging on 5/19  Suspect REGINO due to contrast induced nephropathy  Poor PO intake    Plasmalyte @125 ml/h x8 hours  Check UA  Monitor clinically for signs of CHF exacerbation/volume overload  Strict I/Os and daily weights  Repeat BMP 1900 before deciding further IVF

## 2023-05-21 NOTE — CONSULTS
Consultation - Obstetrics & Gynecology  Jasiel Easley 75 y o  female MRN: [id number]  Unit/Bed#: S -01 Encounter: [id number]      Assessment/Plan   66yo who presented from living facility with altered mental status attributed to UTI with concern for postmenopausal vaginal bleeding  Vaginal bleeding  Assessment & Plan  Uncertain if bleeding is per vagina or rectum per nursing  Patient is not able to provide me with HPI or consent to a pelvic exam or vaginal ultrasound at this time  CT on admission does not show an enlarged uterus however this is not the preferred modality to evaluate for endometrial pathology  We recommend an ultrasound of the pelvis via abdominal approach to evaluate her endometrial stripe at this time  If her mental status improves or family consents, we may attempt an endometrial biopsy at a later time and arrange for follow up outpatient  We also recommend closer evaluation to ensure that her bleeding is not solely per rectum    Remainder of care per primary team     Counseling / Coordination of Care  Total floor / unit time spent today 10 minutes  minutes  Greater than 50% of total time was spent with the patient  Inpatient consult to Obstetrics / Gynecology  Consult performed by: Janette Rhoades MD  Consult ordered by: Isamar Castillo MD        History of Present Illness   Physician Requesting Consult: Autumn Dominguez MD  Reason for Consult / Principal Problem: vaginal bleeding? HPI: Jasiel Easley is a 75 y o  who presented from living facility with altered mental status  Patient has chronic indwelling arceo for unclear reasons and AMS was attributed to UTI  Per nurse at bedside, patient also has rectal bleeding and they think some light vaginal bleeding  On my exam today, she appears confused and is oriented only to person  She does not answer my questions   I cannot perform a full HPI and there is no family at bedside to help provide a history, consent to a pelvic exam or provide insight into her baseline mental function      Review of Systems  Unable to assess    Historical Information   Past Medical History:   Diagnosis Date   • CHF (congestive heart failure) ([facility] )    • Diabetes mellitus ([facility] )    • Disease of thyroid gland    • Hyperlipidemia    • Hypertension    • Hypothyroidism (acquired)    • Obesity, Class I, BMI 30 0-34 9 (see actual BMI)    • Restless leg syndrome    • Seizures (HCC)      Past Surgical History:   Procedure Laterality Date   • APPENDECTOMY     • CHOLECYSTECTOMY     • HIP FRACTURE SURGERY Right    • IR PICC PLACEMENT SINGLE LUMEN  2023   • TONSILLECTOMY     • TUBAL LIGATION       OB History    Para Term  AB Living   2 2 2         SAB IAB Ectopic Multiple Live Births                  # Outcome Date GA Lbr Krishna/2nd Weight Sex Delivery Anes PTL Lv   2 Term            1 Term              Family History   Problem Relation Age of Onset   • Brain cancer Mother    • Seizures Father    • Colon cancer Maternal Grandfather    • No Known Problems Son    • No Known Problems Son    • Breast cancer Maternal Aunt 30     Social History   Social History     Substance and Sexual Activity   Alcohol Use Yes    Comment: occasional     Social History     Substance and Sexual Activity   Drug Use Never     Social History     Tobacco Use   Smoking Status Never   Smokeless Tobacco Never   Tobacco Comments    not a smoker       Meds/Allergies   Current Facility-Administered Medications   Medication Dose Route Frequency   • acetaminophen (TYLENOL) tablet 650 mg  650 mg Oral Q6H PRN   • aspirin (ECOTRIN LOW STRENGTH) EC tablet 81 mg  81 mg Oral Daily   • cholecalciferol (VITAMIN D3) tablet 1,000 Units  1,000 Units Oral Daily   • ezetimibe (ZETIA) tablet 10 mg  10 mg Oral Daily   • FLUoxetine (PROzac) capsule 40 mg  40 mg Oral Daily   • heparin (porcine) subcutaneous injection 5,000 Units  5,000 Units Subcutaneous Q8H [address]   • HYDROmorphone (DILAUDID) injection 0 5 mg  0 5 mg Intravenous Q4H PRN   • insulin lispro (HumaLOG) 100 units/mL subcutaneous injection 1-5 Units  1-5 Units Subcutaneous HS   • insulin lispro (HumaLOG) 100 units/mL subcutaneous injection 1-6 Units  1-6 Units Subcutaneous TID AC   • levothyroxine tablet 88 mcg  88 mcg Oral Early Morning   • melatonin tablet 9 mg  9 mg Oral HS   • mirtazapine (REMERON) tablet 30 mg  30 mg Oral HS   • multi-electrolyte (PLASMALYTE-A/ISOLYTE-S PH 7 4) IV solution  125 mL/hr Intravenous Continuous   • nystatin (MYCOSTATIN) powder   Topical BID   • oxyCODONE (ROXICODONE) IR tablet 5 mg  5 mg Oral Q4H PRN   • oxyCODONE (ROXICODONE) split tablet 2 5 mg  2 5 mg Oral Q4H PRN   • pantoprazole (PROTONIX) EC tablet 40 mg  40 mg Oral Early Morning       Allergies   Allergen Reactions   • Pineapple - Food Allergy Anaphylaxis   • Statins Other (See Comments)     Cramping from head to toe    • Bupropion Other (See Comments)   • Byetta 10 Mcg Pen [Exenatide]    • Ceftriaxone Other (See Comments)   • Marijuana [Dronabinol] GI Intolerance   • Metformin Diarrhea     Other reaction(s): Unknown   • Omeprazole Hives   • Ondansetron      Other reaction(s): Unknown   • Coconut Oil - Food Allergy Rash   • Liraglutide Rash and Other (See Comments)     Generalized rash   • Penicillins Rash       Objective   Vitals: Blood pressure 108/59, pulse 101, temperature 98 6 °F (37 °C), resp  rate 18, weight 86 5 kg (190 lb 11 2 oz), SpO2 100 %  Body mass index is 33 78 kg/m²  Intake/Output Summary (Last 24 hours) at 5/21/2023 1130  Last data filed at 5/21/2023 7590  Gross per 24 hour   Intake 240 ml   Output 550 ml   Net -310 ml       Invasive Devices     Peripheral Intravenous Line  Duration           Peripheral IV 05/19/23 Distal;Left;Upper;Ventral (anterior) Arm 1 day    Peripheral IV 05/19/23 Right Antecubital 1 day          Drain  Duration           Urethral Catheter 53 days                Physical Exam  Abdominal:      General: There is no distension  Palpations: Abdomen is soft  Tenderness: There is no abdominal tenderness  There is no guarding  Genitourinary:     Comments: No blood on perineal pad on inspection  Psychiatric:      Comments: Appears confused  Expresses suicidal thoughts         Lab Results:   Recent Results (from the past 24 hour(s))   Fingerstick Glucose (POCT)    Collection Time: 05/20/23 11:56 AM   Result Value Ref Range    POC Glucose 89 65 - 140 mg/dl   Fingerstick Glucose (POCT)    Collection Time: 05/20/23  4:29 PM   Result Value Ref Range    POC Glucose 83 65 - 140 mg/dl   Fingerstick Glucose (POCT)    Collection Time: 05/20/23  8:51 PM   Result Value Ref Range    POC Glucose 116 65 - 140 mg/dl   CBC    Collection Time: 05/21/23  4:49 AM   Result Value Ref Range    WBC 12 13 (H) 4 31 - 10 16 Thousand/uL    RBC 3 47 (L) 3 81 - 5 12 Million/uL    Hemoglobin 10 3 (L) 11 5 - 15 4 g/dL    Hematocrit 34 8 34 8 - 46 1 %     (H) 82 - 98 fL    MCH 29 7 26 8 - 34 3 pg    MCHC 29 6 (L) 31 4 - 37 4 g/dL    RDW 17 8 (H) 11 6 - 15 1 %    Platelets 394 494 - 245 Thousands/uL    MPV 11 4 8 9 - 12 7 fL   Basic metabolic panel    Collection Time: 05/21/23  4:49 AM   Result Value Ref Range    Sodium 143 135 - 147 mmol/L    Potassium 5 0 3 5 - 5 3 mmol/L    Chloride 110 (H) 96 - 108 mmol/L    CO2 25 21 - 32 mmol/L    ANION GAP 8 4 - 13 mmol/L    BUN 30 (H) 5 - 25 mg/dL    Creatinine 1 41 (H) 0 60 - 1 30 mg/dL    Glucose 106 65 - 140 mg/dL    Calcium 8 7 8 4 - 10 2 mg/dL    eGFR 38 ml/min/1 73sq m   Fingerstick Glucose (POCT)    Collection Time: 05/21/23  8:38 AM   Result Value Ref Range    POC Glucose 105 65 - 140 mg/dl   Fingerstick Glucose (POCT)    Collection Time: 05/21/23 11:17 AM   Result Value Ref Range    POC Glucose 131 65 - 140 mg/dl       Imaging:  CT AB/Pelvis with Contrast    PELVIS  REPRODUCTIVE ORGANS: Unremarkable CT appearance of the uterus          Solange Kaye MD  PGY-III, OB/GYN  5/21/2023, 11:30 AM

## 2023-05-21 NOTE — ASSESSMENT & PLAN NOTE
Wt Readings from Last 3 Encounters:   05/20/23 86 5 kg (190 lb 11 2 oz)   05/19/23 90 kg (198 lb 6 4 oz)   05/18/23 90 kg (198 lb 6 4 oz)   · Echo from February 2022: LVEF 67%  · Patient is not currently on any diuretics    · Moderate diastolic dysfunction (grade II) and right-sided pressures estimated 51 mm Hg on prior echo in 2022  · Monitor volume status closely w I/Os, daily weights, clinically, given IVF for REGINO d/t contrast-induced nephropathy

## 2023-05-21 NOTE — ASSESSMENT & PLAN NOTE
Noted to have significant vaginal bleeding 5/20 per nursing staff  Will consult OB/GYN    Patient is a postmenopausal 68-year-old female  Monitor hemoglobin, stable this admission

## 2023-05-21 NOTE — ASSESSMENT & PLAN NOTE
"· Patient reports \"I need something for the pain, if not I am going to just take care of it myself  \" Provider then asked if the patient had thoughts of hurting herself  Patient replied \"Lately, yes  \" When asked about a plan to harm herself, patient's son at bedside interjected and stated that several days ago the patient mentioned using gun to harm herself if she had one  Patient's son states the patient has been very depressed recently, which has prompted her antidepressants to be adjusted  · Virtual observation  · Psychiatry consult    "

## 2023-05-21 NOTE — PROGRESS NOTES
Progress Note - Infectious Disease   Alida Councilman 75 y o  female MRN: [id number]  Unit/Bed#: S -01 Encounter: [id number]            IMPRESSION & RECOMMENDATIONS:   Impression/Recommendations:  1  Abnormal urinalysis  In the setting of chronic indwelling Kay catheter  UA showed moderate bacteria which likely reflects chronic bladder colonization  Patient recently completed 7-day course of ertapenem  She is without fevers or other clear signs of sepsis  Negative CT abdomen  Would lean toward observing off any more antibiotic given high risk of developing further resistance and potential antibiotic side effects  Remains stable off antibiotics  Negative blood cultures      -Continue to observe off any more antibiotics  -Follow temperatures and hemodynamics  -Follow CBC     2  Intermittent confusion, with suicidal ideations  Low suspicion for acute infection as etiology in the absence of fevers or other signs of sepsis  Negative CT abdomen/pelvis and negative head CT  No meningismus  Mentation currently appears to be at baseline      -Continue to observe off antibiotic  -Follow mentation  -Serial neuro exams     3  Chronic urinary retention, with indwelling Kay catheter      4   Recent ESBL E  coli UTI  Completed 7-day course of ertapenem during hospitalization in   DM2  Well-controlled  Glycemic control per primary service      Antibiotics:  Off antibiotic D2         Subjective:  No reported acute overnight events  No fevers      Objective:  Vitals:  Temp:  [97 1 °F (36 2 °C)-98 6 °F (37 °C)] 98 6 °F (37 °C)  HR:  [] 101  Resp:  [16-18] 18  BP: (105-108)/(59-82) 108/59  SpO2:  [100 %] 100 %  Temp (24hrs), Av 6 °F (36 4 °C), Min:97 1 °F (36 2 °C), Max:98 6 °F (37 °C)  Current: Temperature: 98 6 °F (37 °C)    Physical Exam:   General:  No acute distress, elderly and debilitated, nontoxic  HEENT: Atraumatic normocephalic  Neck: Trachea midline  Psychiatric:  Awake and alert  Pulmonary:  Normal respiratory excursion without accessory muscle use  Abdomen:  Soft, nontender  Extremities:  No edema  Skin:  No rashes or visible draining wound  Neuro: Moves all extremities spontaneously, no meningismus    Lab Results:  I have personally reviewed pertinent labs  Results from last 7 days   Lab Units 05/21/23  0449 05/20/23  0422 05/19/23  1449   POTASSIUM mmol/L 5 0 3 3* 3 4*   CHLORIDE mmol/L 110* 109* 105   CO2 mmol/L 25 28 27   BUN mg/dL 30* 27* 31*   CREATININE mg/dL 1 41* 1 22 1 20   EGFR ml/min/1 73sq m 38 45 46   CALCIUM mg/dL 8 7 8 0* 8 6   AST U/L  --   --  23   ALT U/L  --   --  17   ALK PHOS U/L  --   --  142*     Results from last 7 days   Lab Units 05/21/23  0449 05/20/23  0422 05/19/23  1449   WBC Thousand/uL 12 13* 8 58 9 36   HEMOGLOBIN g/dL 10 3* 8 7* 10 4*   PLATELETS Thousands/uL 381 309 369     Results from last 7 days   Lab Units 05/19/23  1511 05/19/23  1449   BLOOD CULTURE  No Growth at 24 hrs  No Growth at 24 hrs  URINE CULTURE   --  70,000-79,000 cfu/ml Escherichia coli ESBL*  20,000-29,000 cfu/ml Lactobacillus species*  <10,000 cfu/ml Enterococcus species*       Imaging Studies:   I have personally reviewed pertinent imaging study reports and images in PACS  EKG, Pathology, and Other Studies:   I have personally reviewed pertinent reports

## 2023-05-21 NOTE — ASSESSMENT & PLAN NOTE
Lab Results   Component Value Date    HGBA1C 5 2 04/14/2023       Recent Labs     05/20/23  1629 05/20/23 2051 05/21/23  0838 05/21/23  1117   POCGLU 83 116 105 131       Blood Sugar Average: Last 72 hrs:  (P) 96 4   · Well controlled  · Patient is not on any antihyperglycemic medications  · Accu-checks and SSI  · Hypoglycemia protocol

## 2023-05-21 NOTE — PROGRESS NOTES
"[facility]  Progress Note  Name: Ayse Rosado  MRN: [id number]  Unit/Bed#: S -01 I Date of Admission: 5/19/2023   Date of Service: 5/21/2023 I Hospital Day: 2    Assessment/Plan   * Suicidal ideations  Assessment & Plan  · Patient reports \"I need something for the pain, if not I am going to just take care of it myself  \" Provider then asked if the patient had thoughts of hurting herself  Patient replied \"Lately, yes  \" When asked about a plan to harm herself, patient's son at bedside interjected and stated that several days ago the patient mentioned using gun to harm herself if she had one  Patient's son states the patient has been very depressed recently, which has prompted her antidepressants to be adjusted  · Virtual observation  · Psychiatry consult  Vaginal bleeding  Assessment & Plan  Noted to have significant vaginal bleeding 5/20 per nursing staff  Will consult OB/GYN  Patient is a postmenopausal 65-year-old female  Monitor hemoglobin, stable this admission    Contrast-induced nephropathy  Assessment & Plan  Baseline Cr appears to be approx 1 0, 1 2 on admission 5/19, 1 22 5/20, increased to 1 41 5/21  Received IV contrast for imaging on 5/19  Suspect REGINO due to contrast induced nephropathy  Poor PO intake    Plasmalyte @125 ml/h x8 hours  Check UA  Monitor clinically for signs of CHF exacerbation/volume overload  Strict I/Os and daily weights  Repeat BMP 1900 before deciding further IVF    Failure to thrive in adult  Assessment & Plan  · Poor oral intake, decreased mobility  · PT/OT/Speech evaluations  · Patient's son is interested in palliative care as patient has displayed significant deconditioning over the past several months  · Palliative care consult      Chronic heart failure with preserved ejection fraction [facility])  Assessment & Plan  Wt Readings from Last 3 Encounters:   05/20/23 86 5 kg (190 lb 11 2 oz)   05/19/23 90 kg (198 lb 6 4 oz)   05/18/23 90 kg " "(198 lb 6 4 oz)   · Echo from February 2022: LVEF 67%  · Patient is not currently on any diuretics  · Moderate diastolic dysfunction (grade II) and right-sided pressures estimated 51 mm Hg on prior echo in 2022  · Monitor volume status closely w I/Os, daily weights, clinically, given IVF for REGINO d/t contrast-induced nephropathy    Moderate protein-calorie malnutrition (HCC)  Assessment & Plan  Malnutrition Findings:   Adult Malnutrition type: Chronic illness  Adult Degree of Malnutrition: Malnutrition of moderate degree  Malnutrition Characteristics: Muscle loss, Weight loss                  360 Statement: Moderate malnutrition in the context of chronic illness r/t inadequate energy intake with confusion as evidenced by wt decrease of 93#/32 7% x 11 mo, moderate temporal wasting  Will treat with nutrition therapy, PO diet, supplements  BMI Findings: Body mass index is 33 78 kg/m²  Diabetes mellitus type 2 in obese [facility])  Assessment & Plan  Lab Results   Component Value Date    HGBA1C 5 2 04/14/2023       Recent Labs     05/20/23  1629 05/20/23  2051 05/21/23  0838 05/21/23  1117   POCGLU 83 116 105 131       Blood Sugar Average: Last 72 hrs:  (P) 96 4   · Well controlled  · Patient is not on any antihyperglycemic medications  · Accu-checks and SSI  · Hypoglycemia protocol  Hypertension  Assessment & Plan  Blood pressure is reviewed and acceptable  Last recorded pressure: 113/67  Patient is not currently on any antihypertensives at this time  Monitor blood pressure  UTI (urinary tract infection)-resolved as of 5/21/2023  Assessment & Plan  · Presentation: [facility] presents from [facility] due to intermittent confusion and pain for the past 2 days  Of note, patient has a chronic indwelling catheter  · CT A/P: \"No acute abdominal or pelvic pathology  Urinary bladder collapsed around Kay catheter, limiting evaluation  No evidence of pyelonephritis or perinephric abscess   No bowel " "obstruction  \"  · UA: Large leuks, positive nitrite, protein 70(1+), moderate occult blood  · Urine micro: RBCs 10-20, innumerable WBCs, moderate bacteria, occasional mucus threads, hyaline casts 5-10, occasional Ca Oxalate Katelyn  · Urine culture pending, prior urine culture grew Escherichia coli ESBL  · ID consulted  · Discontinued ertapenem, likely colonization         VTE Pharmacologic Prophylaxis: VTE Score: 6 High Risk (Score >/= 5) - Pharmacological DVT Prophylaxis Ordered: heparin  Sequential Compression Devices Ordered  Patient Centered Rounds: I performed bedside rounds with nursing staff today  Discussions with Specialists or Other Care Team Provider: OBGYN    Education and Discussions with Family / Patient: Will call contact to update  Current Length of Stay: 2 day(s)  Current Patient Status: Inpatient   Discharge Plan: Anticipate discharge in 24-48 hrs to location to be determined pending psych eval    Code Status: Level 3 - DNAR and DNI    Subjective: This morning, Ms Stern is seen lying in bed, awake, alert, engaged with exam, in moderate distress  Upon entering the room, patient states that she would like 1 of 2 things, either to be unconscious, or to die, if she is unable to tolerate her pain  Complains of pain in the buttock and abdomen  States the pain in the buttock has been improved with current pain regimen, however, abdominal pain remains 10 out of 10 in severity described as stabbing and constant  She reports the pain is predominantly in the left lower quadrant  States that she has had the symptoms for the last several days    Reports decreased appetite since early May and months of loose or diarrheal bowel movements treated with antidiarrheal   Reports 1 loose bowel movement since presenting to the hospital     Objective:     Vitals:   Temp (24hrs), Av 6 °F (36 4 °C), Min:97 1 °F (36 2 °C), Max:98 6 °F (37 °C)    Temp:  [97 1 °F (36 2 °C)-98 6 °F (37 °C)] 98 6 °F (37 " °C)  HR:  [] 101  Resp:  [16-18] 18  BP: (105-108)/(59-82) 108/59  SpO2:  [100 %] 100 %  Body mass index is 33 78 kg/m²  Input and Output Summary (last 24 hours): Intake/Output Summary (Last 24 hours) at 5/21/2023 1152  Last data filed at 5/21/2023 1139  Gross per 24 hour   Intake 240 ml   Output 700 ml   Net -460 ml       Physical Exam:   Physical Exam  Vitals and nursing note reviewed  Constitutional:       General: She is in acute distress  Appearance: She is obese  She is not ill-appearing, toxic-appearing or diaphoretic  HENT:      Head: Normocephalic and atraumatic  Eyes:      General: No scleral icterus  Right eye: No discharge  Left eye: No discharge  Conjunctiva/sclera: Conjunctivae normal    Cardiovascular:      Rate and Rhythm: Regular rhythm  Tachycardia present  Heart sounds: Murmur (II/VI systolic) heard  No friction rub  No gallop  Pulmonary:      Effort: Pulmonary effort is normal  No respiratory distress  Breath sounds: Normal breath sounds  No stridor  No wheezing, rhonchi or rales  Chest:      Chest wall: No tenderness  Abdominal:      General: Bowel sounds are normal  There is no distension  Palpations: Abdomen is soft  Tenderness: There is abdominal tenderness (mild tender to palpation in LLQ  Minimal tender to palpation in LUQ )  There is no guarding or rebound  Musculoskeletal:         General: No swelling, tenderness, deformity or signs of injury  Right lower leg: No edema  Left lower leg: No edema  Skin:     General: Skin is warm and dry  Neurological:      Mental Status: She is alert          Additional Data:     Labs:  Results from last 7 days   Lab Units 05/21/23  0449 05/20/23  0422   WBC Thousand/uL 12 13* 8 58   HEMOGLOBIN g/dL 10 3* 8 7*   HEMATOCRIT % 34 8 29 9*   PLATELETS Thousands/uL 381 309   NEUTROS PCT %  --  72   LYMPHS PCT %  --  13*   MONOS PCT %  --  9   EOS PCT %  --  4     Results from last 7 days   Lab Units 05/21/23  0449 05/20/23  0422 05/19/23  1449   SODIUM mmol/L 143   < > 140   POTASSIUM mmol/L 5 0   < > 3 4*   CHLORIDE mmol/L 110*   < > 105   CO2 mmol/L 25   < > 27   BUN mg/dL 30*   < > 31*   CREATININE mg/dL 1 41*   < > 1 20   ANION GAP mmol/L 8   < > 8   CALCIUM mg/dL 8 7   < > 8 6   ALBUMIN g/dL  --   --  2 6*   TOTAL BILIRUBIN mg/dL  --   --  0 32   ALK PHOS U/L  --   --  142*   ALT U/L  --   --  17   AST U/L  --   --  23   GLUCOSE RANDOM mg/dL 106   < > 80    < > = values in this interval not displayed  Results from last 7 days   Lab Units 05/19/23  1449   INR  1 03     Results from last 7 days   Lab Units 05/21/23  1117 05/21/23  0838 05/20/23  2051 05/20/23  1629 05/20/23  1156 05/20/23  0733 05/20/23  0628 05/19/23  2200 05/19/23  2108 05/19/23  2034   POC GLUCOSE mg/dl 131 105 116 83 89 99 78 113 84 66         Results from last 7 days   Lab Units 05/20/23  0422 05/19/23  1449   LACTIC ACID mmol/L  --  1 0   PROCALCITONIN ng/ml 0 17 0 16       Lines/Drains:  Invasive Devices     Peripheral Intravenous Line  Duration           Peripheral IV 05/19/23 Distal;Left;Upper;Ventral (anterior) Arm 1 day    Peripheral IV 05/19/23 Right Antecubital 1 day          Drain  Duration           Urethral Catheter 53 days              Urinary Catheter:  Goal for removal: N/A - Chronic Kay               Imaging: Reviewed radiology reports from this admission including: abdominal/pelvic CT and CT head    Recent Cultures (last 7 days):   Results from last 7 days   Lab Units 05/19/23  1511 05/19/23  1449   BLOOD CULTURE  No Growth at 24 hrs  No Growth at 24 hrs     URINE CULTURE   --  70,000-79,000 cfu/ml Escherichia coli ESBL*  20,000-29,000 cfu/ml Lactobacillus species*  <10,000 cfu/ml Enterococcus species*       Last 24 Hours Medication List:   Current Facility-Administered Medications   Medication Dose Route Frequency Provider Last Rate   • acetaminophen  650 mg Oral Q6H PRN Cecy Acharya SAMIA Acosta     • aspirin  81 mg Oral Daily SAMIA Virgen     • cholecalciferol  1,000 Units Oral Daily SAMIA Virgen     • ezetimibe  10 mg Oral Daily SAMIA Virgen     • FLUoxetine  40 mg Oral Daily SAMIA Virgen     • heparin (porcine)  5,000 Units Subcutaneous Q8H [facility] SAMIA Ruggiero     • HYDROmorphone  0 5 mg Intravenous Q4H PRN SAMIA Virgen     • insulin lispro  1-5 Units Subcutaneous HS SAMIA Ruggiero     • insulin lispro  1-6 Units Subcutaneous TID AC SAMIA Ruggiero     • levothyroxine  88 mcg Oral Early Morning SAMIA Virgen     • melatonin  9 mg Oral HS SAMIA Virgen     • mirtazapine  30 mg Oral HS SAMIA Virgen     • multi-electrolyte  125 mL/hr Intravenous Continuous Francine Bazzi  mL/hr (05/21/23 1112)   • nystatin   Topical BID SAMIA Virgen     • oxyCODONE  5 mg Oral Q4H PRN SAMIA Virgen     • oxyCODONE  2 5 mg Oral Q4H PRN SAMIA Virgen     • pantoprazole  40 mg Oral Early Morning SAMIA Virgen          Today, Patient Was Seen By: Francine Bazzi MD    **Please Note: This note may have been constructed using a voice recognition system  **

## 2023-05-22 PROBLEM — E66.9 DIABETES MELLITUS TYPE 2 IN OBESE (HCC): Status: RESOLVED | Noted: 2022-10-01 | Resolved: 2023-05-22

## 2023-05-22 PROBLEM — E11.69 DIABETES MELLITUS TYPE 2 IN OBESE (HCC): Status: RESOLVED | Noted: 2022-10-01 | Resolved: 2023-05-22

## 2023-05-22 PROBLEM — N17.9 AKI (ACUTE KIDNEY INJURY) (HCC): Status: ACTIVE | Noted: 2023-05-21

## 2023-05-22 LAB
ANION GAP SERPL CALCULATED.3IONS-SCNC: 7 MMOL/L (ref 4–13)
BACTERIA UR CULT: ABNORMAL
BASOPHILS # BLD AUTO: 0.03 THOUSANDS/ÂΜL (ref 0–0.1)
BASOPHILS NFR BLD AUTO: 0 % (ref 0–1)
BUN SERPL-MCNC: 32 MG/DL (ref 5–25)
CALCIUM SERPL-MCNC: 8.1 MG/DL (ref 8.4–10.2)
CHLORIDE SERPL-SCNC: 111 MMOL/L (ref 96–108)
CO2 SERPL-SCNC: 24 MMOL/L (ref 21–32)
CREAT SERPL-MCNC: 1.63 MG/DL (ref 0.6–1.3)
EOSINOPHIL # BLD AUTO: 0.05 THOUSAND/ÂΜL (ref 0–0.61)
EOSINOPHIL NFR BLD AUTO: 1 % (ref 0–6)
ERYTHROCYTE [DISTWIDTH] IN BLOOD BY AUTOMATED COUNT: 18.1 % (ref 11.6–15.1)
GFR SERPL CREATININE-BSD FRML MDRD: 32 ML/MIN/1.73SQ M
GLUCOSE SERPL-MCNC: 100 MG/DL (ref 65–140)
GLUCOSE SERPL-MCNC: 81 MG/DL (ref 65–140)
GLUCOSE SERPL-MCNC: 81 MG/DL (ref 65–140)
GLUCOSE SERPL-MCNC: 94 MG/DL (ref 65–140)
HCT VFR BLD AUTO: 27.3 % (ref 34.8–46.1)
HGB BLD-MCNC: 8.1 G/DL (ref 11.5–15.4)
IMM GRANULOCYTES # BLD AUTO: 0.15 THOUSAND/UL (ref 0–0.2)
IMM GRANULOCYTES NFR BLD AUTO: 2 % (ref 0–2)
LYMPHOCYTES # BLD AUTO: 0.85 THOUSANDS/ÂΜL (ref 0.6–4.47)
LYMPHOCYTES NFR BLD AUTO: 8 % (ref 14–44)
MCH RBC QN AUTO: 29.9 PG (ref 26.8–34.3)
MCHC RBC AUTO-ENTMCNC: 29.7 G/DL (ref 31.4–37.4)
MCV RBC AUTO: 101 FL (ref 82–98)
MONOCYTES # BLD AUTO: 0.66 THOUSAND/ÂΜL (ref 0.17–1.22)
MONOCYTES NFR BLD AUTO: 7 % (ref 4–12)
NEUTROPHILS # BLD AUTO: 8.35 THOUSANDS/ÂΜL (ref 1.85–7.62)
NEUTS SEG NFR BLD AUTO: 82 % (ref 43–75)
NRBC BLD AUTO-RTO: 0 /100 WBCS
PLATELET # BLD AUTO: 279 THOUSANDS/UL (ref 149–390)
PMV BLD AUTO: 11.1 FL (ref 8.9–12.7)
POTASSIUM SERPL-SCNC: 5 MMOL/L (ref 3.5–5.3)
RBC # BLD AUTO: 2.71 MILLION/UL (ref 3.81–5.12)
SODIUM SERPL-SCNC: 142 MMOL/L (ref 135–147)
WBC # BLD AUTO: 10.09 THOUSAND/UL (ref 4.31–10.16)

## 2023-05-22 RX ORDER — ARIPIPRAZOLE 2 MG/1
2 TABLET ORAL DAILY
Status: DISCONTINUED | OUTPATIENT
Start: 2023-05-22 | End: 2023-05-26 | Stop reason: HOSPADM

## 2023-05-22 RX ORDER — GABAPENTIN 300 MG/1
300 CAPSULE ORAL 3 TIMES DAILY
Status: DISCONTINUED | OUTPATIENT
Start: 2023-05-22 | End: 2023-05-23

## 2023-05-22 RX ORDER — LIDOCAINE 50 MG/G
1 PATCH TOPICAL DAILY
Status: DISCONTINUED | OUTPATIENT
Start: 2023-05-22 | End: 2023-05-26 | Stop reason: HOSPADM

## 2023-05-22 RX ORDER — ARIPIPRAZOLE 2 MG/1
2 TABLET ORAL DAILY
Status: DISCONTINUED | OUTPATIENT
Start: 2023-05-22 | End: 2023-05-22

## 2023-05-22 RX ORDER — GABAPENTIN 300 MG/1
300 CAPSULE ORAL
Status: DISCONTINUED | OUTPATIENT
Start: 2023-05-22 | End: 2023-05-22

## 2023-05-22 RX ORDER — ACETAMINOPHEN 325 MG/1
975 TABLET ORAL EVERY 8 HOURS SCHEDULED
Status: DISCONTINUED | OUTPATIENT
Start: 2023-05-22 | End: 2023-05-26 | Stop reason: HOSPADM

## 2023-05-22 RX ADMIN — MELATONIN 9 MG: 3 TAB ORAL at 22:12

## 2023-05-22 RX ADMIN — MIRTAZAPINE 30 MG: 15 TABLET, FILM COATED ORAL at 22:12

## 2023-05-22 RX ADMIN — GABAPENTIN 300 MG: 300 CAPSULE ORAL at 22:11

## 2023-05-22 RX ADMIN — ACETAMINOPHEN 650 MG: 325 TABLET ORAL at 02:37

## 2023-05-22 RX ADMIN — Medication 1000 UNITS: at 10:21

## 2023-05-22 RX ADMIN — HEPARIN SODIUM 5000 UNITS: 5000 INJECTION INTRAVENOUS; SUBCUTANEOUS at 22:12

## 2023-05-22 RX ADMIN — NYSTATIN: 100000 POWDER TOPICAL at 10:23

## 2023-05-22 RX ADMIN — FLUOXETINE 40 MG: 20 CAPSULE ORAL at 10:22

## 2023-05-22 RX ADMIN — EZETIMIBE 10 MG: 10 TABLET ORAL at 10:25

## 2023-05-22 RX ADMIN — MELATONIN 9 MG: 3 TAB ORAL at 02:38

## 2023-05-22 RX ADMIN — ASPIRIN 81 MG: 81 TABLET, COATED ORAL at 10:21

## 2023-05-22 RX ADMIN — SODIUM CHLORIDE, SODIUM GLUCONATE, SODIUM ACETATE, POTASSIUM CHLORIDE AND MAGNESIUM CHLORIDE 125 ML/HR: 526; 502; 368; 37; 30 INJECTION, SOLUTION INTRAVENOUS at 18:06

## 2023-05-22 RX ADMIN — NYSTATIN: 100000 POWDER TOPICAL at 18:27

## 2023-05-22 RX ADMIN — GABAPENTIN 300 MG: 300 CAPSULE ORAL at 18:26

## 2023-05-22 RX ADMIN — ARIPIPRAZOLE 2 MG: 2 TABLET ORAL at 18:26

## 2023-05-22 RX ADMIN — ACETAMINOPHEN 975 MG: 325 TABLET ORAL at 22:11

## 2023-05-22 RX ADMIN — HEPARIN SODIUM 5000 UNITS: 5000 INJECTION INTRAVENOUS; SUBCUTANEOUS at 06:28

## 2023-05-22 RX ADMIN — PANTOPRAZOLE SODIUM 40 MG: 40 TABLET, DELAYED RELEASE ORAL at 06:35

## 2023-05-22 RX ADMIN — HEPARIN SODIUM 5000 UNITS: 5000 INJECTION INTRAVENOUS; SUBCUTANEOUS at 14:00

## 2023-05-22 RX ADMIN — LEVOTHYROXINE SODIUM 88 MCG: 88 TABLET ORAL at 06:35

## 2023-05-22 RX ADMIN — GABAPENTIN 300 MG: 300 CAPSULE ORAL at 14:00

## 2023-05-22 RX ADMIN — ACETAMINOPHEN 975 MG: 325 TABLET ORAL at 11:43

## 2023-05-22 NOTE — PLAN OF CARE
Problem: Potential for Falls  Goal: Patient will remain free of falls  Description: INTERVENTIONS:  - Educate patient/family on patient safety including physical limitations  - Instruct patient to call for assistance with activity   - Consult OT/PT to assist with strengthening/mobility   - Keep Call bell within reach  - Keep bed low and locked with side rails adjusted as appropriate  - Keep care items and personal belongings within reach  - Initiate and maintain comfort rounds  - Make Fall Risk Sign visible to staff  - Offer Toileting every Hours, in advance of need  - Initiate/Maintain alarm  - Obtain necessary fall risk management equipment:   - Apply yellow socks and bracelet for high fall risk patients  - Consider moving patient to room near nurses station  5/22/2023 0150 by Laly Reinoso RN  Outcome: Progressing  5/22/2023 0150 by Laly Reinoso RN  Outcome: Progressing     Problem: MOBILITY - ADULT  Goal: Maintain or return to baseline ADL function  Description: INTERVENTIONS:  -  Assess patient's ability to carry out ADLs; assess patient's baseline for ADL function and identify physical deficits which impact ability to perform ADLs (bathing, care of mouth/teeth, toileting, grooming, dressing, etc )  - Assess/evaluate cause of self-care deficits   - Assess range of motion  - Assess patient's mobility; develop plan if impaired  - Assess patient's need for assistive devices and provide as appropriate  - Encourage maximum independence but intervene and supervise when necessary  - Involve family in performance of ADLs  - Assess for home care needs following discharge   - Consider OT consult to assist with ADL evaluation and planning for discharge  - Provide patient education as appropriate  5/22/2023 0150 by Laly Reinoso RN  Outcome: Progressing  5/22/2023 0150 by Laly Reinoso RN  Outcome: Progressing  Goal: Maintains/Returns to pre admission functional level  Description: INTERVENTIONS:  - Perform BMAT or MOVE assessment daily    - Set and communicate daily mobility goal to care team and patient/family/caregiver  - Collaborate with rehabilitation services on mobility goals if consulted  - Perform Range of Motion  times a day  - Reposition patient every  hours  - Dangle patient  times a day  - Stand patient  times a day  - Ambulate patient times a day  - Out of bed to chair times a day   - Out of bed for meals times a day  - Out of bed for toileting  - Record patient progress and toleration of activity level   5/22/2023 0150 by Francine Murphy RN  Outcome: Progressing  5/22/2023 0150 by Francine Murphy RN  Outcome: Progressing     Problem: MOBILITY - ADULT  Goal: Maintains/Returns to pre admission functional level  Description: INTERVENTIONS:  - Perform BMAT or MOVE assessment daily    - Set and communicate daily mobility goal to care team and patient/family/caregiver  - Collaborate with rehabilitation services on mobility goals if consulted  - Perform Range of Motion  times a day  - Reposition patient every hours  - Dangle patient times a day  - Stand patient  times a day  - Ambulate patient  times a day  - Out of bed to chair  times a day   - Out of bed for meals  times a day  - Out of bed for toileting  - Record patient progress and toleration of activity level   5/22/2023 0150 by Francine Murphy RN  Outcome: Progressing  5/22/2023 0150 by Fracnine Murphy RN  Outcome: Progressing     Problem: NEUROSENSORY - ADULT  Goal: Achieves maximal functionality and self care  Description: INTERVENTIONS  - Monitor swallowing and airway patency with patient fatigue and changes in neurological status  - Encourage and assist patient to increase activity and self care     - Encourage visually impaired, hearing impaired and aphasic patients to use assistive/communication devices  5/22/2023 0150 by Francine Murphy RN  Outcome: Progressing  5/22/2023 0150 by Francine Murphy RN  Outcome: Progressing     Problem: GENITOURINARY - ADULT  Goal: Absence of urinary retention  Description: INTERVENTIONS:  - Assess patient’s ability to void and empty bladder  - Monitor I/O  - Bladder scan as needed  - Discuss with physician/AP medications to alleviate retention as needed  - Discuss catheterization for long term situations as appropriate  5/22/2023 0150 by Dorita Murcia RN  Outcome: Progressing  5/22/2023 0150 by Dorita Murcia RN  Outcome: Progressing  Goal: Urinary catheter remains patent  Description: INTERVENTIONS:  - Assess patency of urinary catheter  - If patient has a chronic arceo, consider changing catheter if non-functioning  - Follow guidelines for intermittent irrigation of non-functioning urinary catheter  5/22/2023 0150 by Dorita Murcia RN  Outcome: Progressing  5/22/2023 0150 by Dorita Murcia RN  Outcome: Progressing     Problem: GASTROINTESTINAL - ADULT  Goal: Maintains adequate nutritional intake  Description: INTERVENTIONS:  - Monitor percentage of each meal consumed  - Identify factors contributing to decreased intake, treat as appropriate  - Assist with meals as needed  - Monitor I&O, weight, and lab values if indicated  - Obtain nutrition services referral as needed  5/22/2023 0150 by Dorita Murcia RN  Outcome: Progressing  5/22/2023 0150 by Dorita Murcia RN  Outcome: Progressing

## 2023-05-22 NOTE — ASSESSMENT & PLAN NOTE
Malnutrition Findings:   Adult Malnutrition type: Chronic illness  Adult Degree of Malnutrition: Malnutrition of moderate degree  Malnutrition Characteristics: Muscle loss, Weight loss                  360 Statement: Moderate malnutrition in the context of chronic illness r/t inadequate energy intake with confusion as evidenced by wt decrease of 93#/32 7% x 11 mo, moderate temporal wasting  Will treat with nutrition therapy, PO diet, supplements  BMI Findings: Body mass index is 34 72 kg/m²

## 2023-05-22 NOTE — SPEECH THERAPY NOTE
Speech Language/Pathology    Speech/Language Pathology Progress Note    Patient Name: Han Maher  [facility]'I Date: 5/22/2023       Subjective:  Pt seen for dysphagia tx follow up  Pt on dysphagia 1 puree w/ thin liquids, however baseline diet per transfer records is dysphagia 1 puree w/ nectar thick liquids  Objective:  Pt c/o pain on sacrum  Pt did not want to be repositioned  Pt requesting a drink, took sips of NTL by straw- coughing episode noted x1 w/ successive sips of NTL  No overt s/s aspiration noted w/ single straw sips of NTL  No difficulty w/ cup sips, but pt stated she prefers straws  Pt ate a few bites of pureed bananas and yogurt  Assessment:  Pt's baseline diet is puree w/ Nectar thick liquids   appears at risk for aspiration w/ successive sips of NTL by straw  Plan/Recommendations:  Change diet to dysphagia 1 puree w/ nectar thick liquids  rec single straw sips   aspiration precautions  meds crushed in puree   will follow up x 1-2 as needed         Meme Spivey CCC-SLP  Speech Pathologist  Available via  tiger text

## 2023-05-22 NOTE — ASSESSMENT & PLAN NOTE
· Poor oral intake, decreased mobility  · PT/OT/Speech evaluations    · Goals of care conversation with Palliative Care cannot be had while patient remains suicidal

## 2023-05-22 NOTE — PROGRESS NOTES
Progress Note - Infectious Disease   Caretha Farhana 75 y o  female MRN: [id number]  Unit/Bed#: S -01 Encounter: [id number]      Impression/Plan:  1   Abnormal urinalysis   In the setting of chronic indwelling Kay catheter   UA showed moderate bacteria which likely reflects chronic bladder colonization   Patient recently completed 7-day course of ertapenem   She is without fevers or other clear signs of sepsis   Negative CT abdomen   Would lean toward observing off any more antibiotic given high risk of developing further resistance and potential antibiotic side effects  Remains stable off antibiotics  Negative blood cultures      -Continue to observe off any more antibiotics  -Follow temperatures and hemodynamics  -Follow CBC  -Supportive care/discharge as per primary     2   Intermittent confusion, with suicidal ideations   Low suspicion for acute infection as etiology in the absence of fevers or other signs of sepsis   Negative CT abdomen/pelvis and negative head CT   No meningismus   Mentation currently appears to be at baseline      -Continue to observe off antibiotic  -Follow mentation  -Serial neuro exams     3   Chronic urinary retention, with indwelling Kay catheter      4   Recent ESBL E  coli UTI   Completed 7-day course of ertapenem during hospitalization in April 5   DM2   Well-controlled   Glycemic control per primary service  Above plan discussed in detail with the patient and with primary service    Given current stability, ID consult service will sign off at this time  Please call if questions      Antibiotics:  Off antibiotic D3     24 Hour events:  Yesterday and overnight notes reviewed and no acute events noted    Subjective:  Patient seen at bedside and she will open her eyes and answer a few questions  Denied having any nausea, vomiting, chest pain  Denied having abdominal pain when I was palpating her abdomen    Denied pain when urine was passing through her catheter  Objective:  Vitals:  Temp:  [97 4 °F (36 3 °C)-99 4 °F (37 4 °C)] 97 4 °F (36 3 °C)  HR:  [] 83  Resp:  [18] 18  BP: (102-132)/(48-80) 102/48  SpO2:  [99 %-100 %] 100 %  Temp (24hrs), Av 5 °F (36 9 °C), Min:97 4 °F (36 3 °C), Max:99 4 °F (37 4 °C)  Current: Temperature: (!) 97 4 °F (36 3 °C)    Physical Exam:   General Appearance:  Alert, interactive, nontoxic, no acute distress  Chronically ill-appearing and elderly  Throat: Oropharynx moist without lesions  Lungs:   Clear to auscultation bilaterally; no wheezes, rhonchi or rales; respirations unlabored on room air   Heart:  RRR; no murmur, rub or gallop noted   Abdomen:   Soft, non-tender, non-distended, positive bowel sounds  No suprapubic discomfort  Kay catheter draining clear urine with some sediment  Extremities: No clubbing, cyanosis or edema   Skin: No new rashes or lesions  No new draining wounds noted  Labs, Imaging, & Other studies:   All pertinent labs and imaging studies in PACS were personally reviewed as below  Results from last 7 days   Lab Units 23  0501 23  0449 23  0422   WBC Thousand/uL 10 09 12 13* 8 58   HEMOGLOBIN g/dL 8 1* 10 3* 8 7*   PLATELETS Thousands/uL 279 381 309     Results from last 7 days   Lab Units 23  0501 23  0422 23  1449   POTASSIUM mmol/L 5 0   < > 3 4*   CHLORIDE mmol/L 111*   < > 105   CO2 mmol/L 24   < > 27   BUN mg/dL 32*   < > 31*   CREATININE mg/dL 1 63*   < > 1 20   EGFR ml/min/1 73sq m 32   < > 46   CALCIUM mg/dL 8 1*   < > 8 6   AST U/L  --   --  23   ALT U/L  --   --  17   ALK PHOS U/L  --   --  142*    < > = values in this interval not displayed  Results from last 7 days   Lab Units 23  1511 23  1449   BLOOD CULTURE  No Growth at 48 hrs  No Growth at 48 hrs     URINE CULTURE   --  70,000-79,000 cfu/ml Escherichia coli ESBL*  20,000-29,000 cfu/ml Lactobacillus species*  <10,000 cfu/ml Enterococcus species*       Lab interpretation/comments: No leukocytosis  Creatinine 1 6  Imaging interpretation/comments: Transabdominal ultrasound reviewed and the Kay catheter is in place decompressing the bladder  Her uterus appeared grossly unremarkable but was a limited study  CT imaging from earlier reviewed of the abdomen which again shows a collapsed urinary bladder with no evidence of Raffi or upper tract pathology  No convincing colon inflammation  Culture data: Blood cultures negative at 48 hours    Urine cultures polymicrobial

## 2023-05-22 NOTE — DISCHARGE INSTR - OTHER ORDERS
Plan:   Skin care plans:    1-Calazime to sacrum, buttocks 3x/day and as needed  2-Cleanse skin fold to right breast with wound cleanser or soap and water & pat dry well  If wound to right breast skin fold is open, apply Mepilex Border Ag+ dressing and change every other day & as needed for soilage/dislodgement  If wound is healed upon discharge, may discontinue dressing  3-Cleanse wounds to bilateral toes and left lateral foot with wound cleanser or soap and water & pat dry  Paint wounds with betadine swab daily  4-Hydraguard barrier cream or equivalent to bilateral heel 2x/day and as needed  5-Prevalon boots to be worn to bilateral heels at all times in bed  6-Pressure redistribution cushion when out of bed  7-Turn/reposition every 2 hours in bed for pressure re-distribution on skin  8-Moisturize skin daily with skin nourishing cream  9-Follow up at [facility] - call Central Scheduling to make appointment: [phone]

## 2023-05-22 NOTE — DISCHARGE INSTR - DIET
Puree w/ NTL  Medications crushed in puree  Recommend ST at next level of care as pt's goal is to return to thin liquids  Consider VBS as an OP to assess swallow function following dysphagia therapy at discretion of treating SLP

## 2023-05-22 NOTE — PLAN OF CARE
Problem: Potential for Falls  Goal: Patient will remain free of falls  Description: INTERVENTIONS:  - Educate patient/family on patient safety including physical limitations  - Instruct patient to call for assistance with activity   - Consult OT/PT to assist with strengthening/mobility   - Keep Call bell within reach  - Keep bed low and locked with side rails adjusted as appropriate  - Keep care items and personal belongings within reach  - Initiate and maintain comfort rounds  - Make Fall Risk Sign visible to staff  - Initiate/Maintain bed alarm  - Obtain necessary fall risk management equipment  - Apply yellow socks and bracelet for high fall risk patients  - Consider moving patient to room near nurses station  Outcome: Progressing     Problem: MOBILITY - ADULT  Goal: Maintain or return to baseline ADL function  Description: INTERVENTIONS:  -  Assess patient's ability to carry out ADLs; assess patient's baseline for ADL function and identify physical deficits which impact ability to perform ADLs (bathing, care of mouth/teeth, toileting, grooming, dressing, etc )  - Assess/evaluate cause of self-care deficits   - Assess range of motion  - Assess patient's mobility; develop plan if impaired  - Assess patient's need for assistive devices and provide as appropriate  - Encourage maximum independence but intervene and supervise when necessary  - Involve family in performance of ADLs  - Assess for home care needs following discharge   - Consider OT consult to assist with ADL evaluation and planning for discharge  - Provide patient education as appropriate  Outcome: Progressing  Goal: Maintains/Returns to pre admission functional level  Description: INTERVENTIONS:  - Perform BMAT or MOVE assessment daily    - Set and communicate daily mobility goal to care team and patient/family/caregiver  - Collaborate with rehabilitation services on mobility goals if consulted  - Reposition patient every 2 hours    - Record patient progress and toleration of activity level   Outcome: Progressing     Problem: NEUROSENSORY - ADULT  Goal: Achieves maximal functionality and self care  Description: INTERVENTIONS  - Monitor swallowing and airway patency with patient fatigue and changes in neurological status  - Encourage and assist patient to increase activity and self care     - Encourage visually impaired, hearing impaired and aphasic patients to use assistive/communication devices  Outcome: Progressing     Problem: RESPIRATORY - ADULT  Goal: Achieves optimal ventilation and oxygenation  Description: INTERVENTIONS:  - Assess for changes in respiratory status  - Assess for changes in mentation and behavior  - Position to facilitate oxygenation and minimize respiratory effort  - Oxygen administered by appropriate delivery if ordered  - Initiate smoking cessation education as indicated  - Encourage broncho-pulmonary hygiene including cough, deep breathe, Incentive Spirometry  - Assess the need for suctioning and aspirate as needed  - Assess and instruct to report SOB or any respiratory difficulty  - Respiratory Therapy support as indicated  Outcome: Progressing     Problem: GASTROINTESTINAL - ADULT  Goal: Maintains adequate nutritional intake  Description: INTERVENTIONS:  - Monitor percentage of each meal consumed  - Identify factors contributing to decreased intake, treat as appropriate  - Assist with meals as needed  - Monitor I&O, weight, and lab values if indicated  - Obtain nutrition services referral as needed  Outcome: Progressing     Problem: GENITOURINARY - ADULT  Goal: Absence of urinary retention  Description: INTERVENTIONS:  - Assess patient’s ability to void and empty bladder  - Monitor I/O  - Bladder scan as needed  - Discuss with physician/AP medications to alleviate retention as needed  - Discuss catheterization for long term situations as appropriate  Outcome: Progressing  Goal: Urinary catheter remains patent  Description: INTERVENTIONS:  - Assess patency of urinary catheter  - If patient has a chronic arceo, consider changing catheter if non-functioning  - Follow guidelines for intermittent irrigation of non-functioning urinary catheter  Outcome: Progressing     Problem: METABOLIC, FLUID AND ELECTROLYTES - ADULT  Goal: Electrolytes maintained within normal limits  Description: INTERVENTIONS:  - Monitor labs and assess patient for signs and symptoms of electrolyte imbalances  - Administer electrolyte replacement as ordered  - Monitor response to electrolyte replacements, including repeat lab results as appropriate  - Instruct patient on fluid and nutrition as appropriate  Outcome: Progressing  Goal: Glucose maintained within target range  Description: INTERVENTIONS:  - Monitor Blood Glucose as ordered  - Assess for signs and symptoms of hyperglycemia and hypoglycemia  - Administer ordered medications to maintain glucose within target range  - Assess nutritional intake and initiate nutrition service referral as needed  Outcome: Progressing     Problem: SKIN/TISSUE INTEGRITY - ADULT  Goal: Incision(s), wounds(s) or drain site(s) healing without S/S of infection  Description: INTERVENTIONS  - Assess and document dressing, incision, wound bed, drain sites and surrounding tissue  - Provide patient and family education  - Perform skin care/dressing changes every day  Outcome: Progressing  Goal: Pressure injury heals and does not worsen  Description: Interventions:  - Implement low air loss mattress or specialty surface (Criteria met)  - Apply silicone foam dressing  - Instruct/assist with weight shifting every 30 minutes when in chair   - Utilize friction reducing device or surface for transfers   - Consider consults to  interdisciplinary teams   - Use incontinent care products after each incontinent episode   - Consider nutrition services referral as needed  Outcome: Progressing     Problem: PAIN - ADULT  Goal: Verbalizes/displays adequate comfort level or baseline comfort level  Description: Interventions:  - Encourage patient to monitor pain and request assistance  - Assess pain using appropriate pain scale  - Administer analgesics based on type and severity of pain and evaluate response  - Implement non-pharmacological measures as appropriate and evaluate response  - Consider cultural and social influences on pain and pain management  - Notify physician/advanced practitioner if interventions unsuccessful or patient reports new pain  Outcome: Progressing     Problem: Nutrition/Hydration-ADULT  Goal: Nutrient/Hydration intake appropriate for improving, restoring or maintaining nutritional needs  Description: Monitor and assess patient's nutrition/hydration status for malnutrition  Collaborate with interdisciplinary team and initiate plan and interventions as ordered  Monitor patient's weight and dietary intake as ordered or per policy  Utilize nutrition screening tool and intervene as necessary  Determine patient's food preferences and provide high-protein, high-caloric foods as appropriate       INTERVENTIONS:  - Monitor oral intake, urinary output, labs, and treatment plans  - Assess nutrition and hydration status and recommend course of action  - Evaluate amount of meals eaten  - Assist patient with eating if necessary   - Allow adequate time for meals  - Recommend/ encourage appropriate diets, oral nutritional supplements, and vitamin/mineral supplements  - Order, calculate, and assess calorie counts as needed  - Recommend, monitor, and adjust tube feedings and TPN/PPN based on assessed needs  - Assess need for intravenous fluids  - Provide specific nutrition/hydration education as appropriate  - Include patient/family/caregiver in decisions related to nutrition  Outcome: Progressing     Problem: Prexisting or High Potential for Compromised Skin Integrity  Goal: Skin integrity is maintained or improved  Description: INTERVENTIONS:  - Identify patients at risk for skin breakdown  - Assess and monitor skin integrity  - Assess and monitor nutrition and hydration status  - Monitor labs   - Assess for incontinence   - Turn and reposition patient  - Assist with mobility/ambulation  - Relieve pressure over bony prominences  - Avoid friction and shearing  - Provide appropriate hygiene as needed including keeping skin clean and dry  - Evaluate need for skin moisturizer/barrier cream  - Collaborate with interdisciplinary team   - Patient/family teaching  - Consider wound care consult   Outcome: Progressing

## 2023-05-22 NOTE — ASSESSMENT & PLAN NOTE
"· At admission, patient reported \"I need something for the pain, if not I am going to just take care of it myself  \" Provider then asked if the patient had thoughts of hurting herself  Patient replied \"Lately, yes  \" When asked about a plan to harm herself, patient's son at bedside interjected and stated that several days ago the patient mentioned using gun to harm herself if she had one  Patient's son states the patient has been very depressed recently, which has prompted her antidepressants to be adjusted  · Virtual observation    · Psychiatry on board, appreciate recommendations  "

## 2023-05-22 NOTE — OCCUPATIONAL THERAPY NOTE
Occupational Therapy Screen     Patient Name: Papo Mabry  JMUFR'A Date: 5/22/2023  Problem List  Principal Problem:    Suicidal ideations  Active Problems:    Failure to thrive in adult    Major depressive disorder    Chronic heart failure with preserved ejection fraction (HCC)    Diabetes mellitus type 2 in obese (HCC)    Moderate protein-calorie malnutrition ([facility] [address] )    Vaginal bleeding    Contrast-induced nephropathy    Past Medical History  Past Medical History:   Diagnosis Date    CHF (congestive heart failure) (HCC)     Diabetes mellitus ([facility] [address] )     Disease of thyroid gland     Hyperlipidemia     Hypertension     Hypothyroidism (acquired)     Obesity, Class I, BMI 30 0-34 9 (see actual BMI)     Restless leg syndrome     Seizures (HCC)      Past Surgical History  Past Surgical History:   Procedure Laterality Date    APPENDECTOMY      CHOLECYSTECTOMY      HIP FRACTURE SURGERY Right     IR PICC PLACEMENT SINGLE LUMEN  4/4/2023    TONSILLECTOMY      TUBAL LIGATION           05/22/23 1331   OT Last Visit   OT Visit Date 05/22/23   Note Type   Note type Screen   Additional Comments OT orders received and chart review performed  Spoke with CM Luann Hammond and PT Iglesia Jackson  Per chart review, pt is a long term care resident at [facility], is dependent in all aspects of self care and functional mobility, does not receive therapy services  Will screen from OT caseload at this time as pt is at functional baseline and has no acute OT needs       RAKEL Stanley, OTR/L  [place] License [id number]  [address] [facility] [id number]

## 2023-05-22 NOTE — CASE MANAGEMENT
Case Management Progress Note    Patient name Glo Leyden Location S /S -01 MRN [id number]  : 1955 Date 2023       LOS (days): 3  Geometric Mean LOS (GMLOS) (days): 2 60  Days to GMLOS:-0 3        OBJECTIVE:        Current admission status: Inpatient  Preferred Pharmacy:   [address], [address]  [address] [address]  Phone: [phone] Fax: [phone]    Primary Care Provider: Leif Woody MD    Primary Insurance: Anette Oh [facility] RE[facility]  Secondary Insurance: [address] [place] [address]    PROGRESS NOTE:    Patient admitted from [facility] where she is a long-term care resident  Spoke with Lisa August who confirmed she's a bed hold  Patient currently on a virtual 1:1 due to suicidal ideations; psych consulted and following  SLIM reports patient likely will not be medically stable for d/c until /Thursday- psych to re-assess at that time, but will likely require 201 for in-patient psych placement

## 2023-05-22 NOTE — ASSESSMENT & PLAN NOTE
Lab Results   Component Value Date    HGBA1C 5 2 04/14/2023       Recent Labs     05/21/23  2048 05/22/23  0800 05/22/23  1122 05/22/23  1601   POCGLU 96 81 100 81       Blood Sugar Average: Last 72 hrs:  (P) 19 [id number]   · HgbA1c below diabetic range  · Has not required SSI this admission  · Discontinue SSI and fingersticks

## 2023-05-22 NOTE — PROGRESS NOTES
"[facility]  Progress Note  Name: Vida Villalpando  MRN: [id number]  Unit/Bed#: S -01 I Date of Admission: 5/19/2023   Date of Service: 5/22/2023 I Hospital Day: 3    Assessment/Plan   * Suicidal ideations  Assessment & Plan  · At admission, patient reported \"I need something for the pain, if not I am going to just take care of it myself  \" Provider then asked if the patient had thoughts of hurting herself  Patient replied \"Lately, yes  \" When asked about a plan to harm herself, patient's son at bedside interjected and stated that several days ago the patient mentioned using gun to harm herself if she had one  Patient's son states the patient has been very depressed recently, which has prompted her antidepressants to be adjusted  · Virtual observation  · Psychiatry on board, appreciate recommendations    Vaginal bleeding  Assessment & Plan  Noted to have significant bleeding 5/20 per nursing staff, with vaginal source suspected  Patient is a postmenopausal 60-year-old female    OB/GYN on board, appreciate recommendations  -Recommended transvaginal US, however pt was unable to consent   -Transabdominal US of pelvis obtained 5/21/23: Significantly limited study due to transabdominal only approach, patient body habitus and Kay catheter decompressing the urinary bladder  Grossly normal uterus with endometrium measuring approximately 2 mm, noting limited visualization  Ovaries not seen    -Monitor hemoglobin on AM CBC    REGINO (acute kidney injury) ([facility] [address] )  Assessment & Plan  Baseline Cr appears to be approx 1 0, 1 2 on admission 5/19, 1 22 5/20, increased to 1 41 5/21, 1 63 on 5/22  Received IV contrast for imaging on 5/19  Suspect REGINO due to contrast induced nephropathy vs pre-renal/hypovolemia  Poor PO intake, dry mucus membranes  Oliguria over 24h period 5/21-22: approx 0 2 ml/kg/h    Plasmalyte @125 ml/h  Monitor clinically for signs of CHF exacerbation/volume overload  Strict I/Os and " daily weights    Failure to thrive in adult  Assessment & Plan  · Poor oral intake, decreased mobility  · PT/OT/Speech evaluations  · Goals of care conversation with Palliative Care cannot be had while patient remains suicidal    Chronic heart failure with preserved ejection fraction [facility])  Assessment & Plan  Wt Readings from Last 3 Encounters:   05/22/23 88 9 kg (195 lb 15 8 oz)   05/19/23 90 kg (198 lb 6 4 oz)   05/18/23 90 kg (198 lb 6 4 oz)   · Echo from February 2022: LVEF 67%  · Patient is not currently on any diuretics  · Moderate diastolic dysfunction (grade II) and right-sided pressures estimated 51 mm Hg on prior echo in 2022  · Monitor volume status closely w I/Os, daily weights, clinically, given IVF for REGINO    Moderate protein-calorie malnutrition (HCC)  Assessment & Plan  Malnutrition Findings:   Adult Malnutrition type: Chronic illness  Adult Degree of Malnutrition: Malnutrition of moderate degree  Malnutrition Characteristics: Muscle loss, Weight loss                  360 Statement: Moderate malnutrition in the context of chronic illness r/t inadequate energy intake with confusion as evidenced by wt decrease of 93#/32 7% x 11 mo, moderate temporal wasting  Will treat with nutrition therapy, PO diet, supplements  BMI Findings: Body mass index is 34 72 kg/m²  Diabetes mellitus type 2 in obese (HCC)-resolved as of 5/22/2023  Assessment & Plan  Lab Results   Component Value Date    HGBA1C 5 2 04/14/2023       Recent Labs     05/21/23  2048 05/22/23  0800 05/22/23  1122 05/22/23  1601   POCGLU 96 81 100 81       Blood Sugar Average: Last 72 hrs:  (P) 81 [id number]   · HgbA1c below diabetic range  · Has not required SSI this admission  · Discontinue SSI and fingersticks         VTE Pharmacologic Prophylaxis: VTE Score: 6 High Risk (Score >/= 5) - Pharmacological DVT Prophylaxis Ordered: heparin  Sequential Compression Devices Ordered      Patient Centered Rounds: I performed "bedside rounds with nursing staff today  Discussions with Specialists or Other Care Team Provider: Infectious Disease    Education and Discussions with Family / Patient: Will call contact to update  Current Length of Stay: 3 day(s)  Current Patient Status: Inpatient   Discharge Plan: Anticipate discharge in 48-72 hrs to inpatient psych  Code Status: Level 3 - DNAR and DNI    Subjective: This morning, Ms Stern is seen lying up in bed, awake, alert, engaged with exam, in moderate distress related to ongoing pain, which she localizes to [place]" the abdomen  C/o pain that is 5-6 out of 10 in severity, stabbing  Wants to be put to sleep to relieve the pain  Denies pain elsewhere at this time  Denies SOB  Objective:     Vitals:   Temp (24hrs), Av 5 °F (36 9 °C), Min:97 4 °F (36 3 °C), Max:99 4 °F (37 4 °C)    Temp:  [97 4 °F (36 3 °C)-99 4 °F (37 4 °C)] 97 4 °F (36 3 °C)  HR:  [] 83  Resp:  [14-18] 18  BP: (102-132)/(48-80) 102/48  SpO2:  [99 %-100 %] 100 %  Body mass index is 34 72 kg/m²  Input and Output Summary (last 24 hours): Intake/Output Summary (Last 24 hours) at 2023 1718  Last data filed at 2023 0415  Gross per 24 hour   Intake 370 ml   Output 145 ml   Net 225 ml       Physical Exam:   Physical Exam  Vitals and nursing note reviewed  Constitutional:       General: She is in acute distress  Appearance: She is obese  She is not toxic-appearing or diaphoretic  HENT:      Head: Normocephalic and atraumatic  Eyes:      General: No scleral icterus  Right eye: No discharge  Left eye: No discharge  Conjunctiva/sclera: Conjunctivae normal    Cardiovascular:      Rate and Rhythm: Normal rate and regular rhythm  Pulses: Normal pulses  Heart sounds: Murmur (III/VI heard throughout) heard  No friction rub  No gallop  Pulmonary:      Effort: Pulmonary effort is normal  No respiratory distress  Breath sounds: Normal breath sounds   " No stridor  No wheezing, rhonchi or rales  Comments: Clear breath sounds bilaterally as auscultated in the anterior chest  Chest:      Chest wall: No tenderness  Abdominal:      General: Bowel sounds are normal  There is no distension  Palpations: Abdomen is soft  Tenderness: There is no abdominal tenderness (pt experiencing pain that does not appear to be made worse with palpation)  There is no guarding or rebound  Musculoskeletal:      Right lower leg: No edema  Left lower leg: No edema  Skin:     General: Skin is warm and dry  Neurological:      Mental Status: She is alert  Additional Data:     Labs:  Results from last 7 days   Lab Units 05/22/23  0501   WBC Thousand/uL 10 09   HEMOGLOBIN g/dL 8 1*   HEMATOCRIT % 27 3*   PLATELETS Thousands/uL 279   NEUTROS PCT % 82*   LYMPHS PCT % 8*   MONOS PCT % 7   EOS PCT % 1     Results from last 7 days   Lab Units 05/22/23  0501 05/20/23  0422 05/19/23  1449   SODIUM mmol/L 142   < > 140   POTASSIUM mmol/L 5 0   < > 3 4*   CHLORIDE mmol/L 111*   < > 105   CO2 mmol/L 24   < > 27   BUN mg/dL 32*   < > 31*   CREATININE mg/dL 1 63*   < > 1 20   ANION GAP mmol/L 7   < > 8   CALCIUM mg/dL 8 1*   < > 8 6   ALBUMIN g/dL  --   --  2 6*   TOTAL BILIRUBIN mg/dL  --   --  0 32   ALK PHOS U/L  --   --  142*   ALT U/L  --   --  17   AST U/L  --   --  23   GLUCOSE RANDOM mg/dL 94   < > 80    < > = values in this interval not displayed       Results from last 7 days   Lab Units 05/19/23  1449   INR  1 03     Results from last 7 days   Lab Units 05/22/23  1601 05/22/23  1122 05/22/23  0800 05/21/23  2048 05/21/23  1626 05/21/23  1117 05/21/23  0838 05/20/23  2051 05/20/23  1629 05/20/23  1156 05/20/23  0733 05/20/23  0628   POC GLUCOSE mg/dl 81 100 81 96 99 131 105 116 83 89 99 78         Results from last 7 days   Lab Units 05/20/23  0422 05/19/23  1449   LACTIC ACID mmol/L  --  1 0   PROCALCITONIN ng/ml 0 17 0 16       Lines/Drains:  Invasive Devices Peripheral Intravenous Line  Duration           Peripheral IV 05/19/23 Distal;Left;Upper;Ventral (anterior) Arm 3 days    Peripheral IV 05/19/23 Right Antecubital 3 days          Drain  Duration           Urethral Catheter 54 days              Urinary Catheter:  Goal for removal: N/A - Chronic Kay               Imaging: Reviewed radiology reports from this admission including: ultrasound(s) pelvis (transabdominal)    Recent Cultures (last 7 days):   Results from last 7 days   Lab Units 05/19/23  1511 05/19/23  1449   BLOOD CULTURE  No Growth at 48 hrs  No Growth at 48 hrs     URINE CULTURE   --  70,000-79,000 cfu/ml Escherichia coli ESBL*  20,000-29,000 cfu/ml Lactobacillus species*  <10,000 cfu/ml Enterococcus species*       Last 24 Hours Medication List:   Current Facility-Administered Medications   Medication Dose Route Frequency Provider Last Rate   • acetaminophen  975 mg Oral [facility] Reba Mayo MD     • ARIPiprazole  2 mg Oral Daily Jerzy Davidson MD     • aspirin  81 mg Oral Daily SAMIA Oh     • cholecalciferol  1,000 Units Oral Daily SAMIA Oh     • ezetimibe  10 mg Oral Daily SAMIA Oh     • FLUoxetine  40 mg Oral Daily SAMIA Oh     • gabapentin  300 mg Oral TID Jerzy Davidson MD     • heparin (porcine)  5,000 Units Subcutaneous [facility] SAMIA Oh     • HYDROmorphone  0 5 mg Intravenous Q4H PRN SAMIA Oh     • levothyroxine  88 mcg Oral Early Morning SAMIA Oh     • lidocaine  1 patch Topical Daily Reba Mayo MD     • melatonin  9 mg Oral HS SAMIA Oh     • mirtazapine  30 mg Oral HS SAMIA Oh     • multi-electrolyte  125 mL/hr Intravenous Continuous Beatris Mcgrath,  mL/hr (05/21/23 3087)   • nystatin   Topical BID SAMIA Oh     • oxyCODONE  5 mg Oral Q4H PRN SAMIA Oh     • oxyCODONE  2 5 mg Oral Q4H PRN [employer] SAMIA Acosta     • pantoprazole  40 mg Oral Early Morning SAMIA [place]          Today, Patient Was Seen By: Omer Lindquist MD    **Please Note: This note may have been constructed using a voice recognition system  **

## 2023-05-22 NOTE — ASSESSMENT & PLAN NOTE
Baseline Cr appears to be approx 1 0, 1 2 on admission 5/19, 1 22 5/20, increased to 1 41 5/21, 1 63 on 5/22  Received IV contrast for imaging on 5/19  Suspect REGINO due to contrast induced nephropathy vs pre-renal/hypovolemia  Poor PO intake, dry mucus membranes  Oliguria over 24h period 5/21-22: approx 0 2 ml/kg/h    Plasmalyte @125 ml/h  Monitor clinically for signs of CHF exacerbation/volume overload  Strict I/Os and daily weights

## 2023-05-22 NOTE — ASSESSMENT & PLAN NOTE
Wt Readings from Last 3 Encounters:   05/22/23 88 9 kg (195 lb 15 8 oz)   05/19/23 90 kg (198 lb 6 4 oz)   05/18/23 90 kg (198 lb 6 4 oz)   · Echo from February 2022: LVEF 67%  · Patient is not currently on any diuretics    · Moderate diastolic dysfunction (grade II) and right-sided pressures estimated 51 mm Hg on prior echo in 2022  · Monitor volume status closely w I/Os, daily weights, clinically, given IVF for REGINO

## 2023-05-22 NOTE — ASSESSMENT & PLAN NOTE
Noted to have significant bleeding 5/20 per nursing staff, with vaginal source suspected  Patient is a postmenopausal 24-year-old female    OB/GYN on board, appreciate recommendations  -Recommended transvaginal US, however pt was unable to consent   -Transabdominal US of pelvis obtained 5/21/23: Significantly limited study due to transabdominal only approach, patient body habitus and Kay catheter decompressing the urinary bladder  Grossly normal uterus with endometrium measuring approximately 2 mm, noting limited visualization  Ovaries not seen    -Monitor hemoglobin on AM CBC

## 2023-05-22 NOTE — UTILIZATION REVIEW
NOTIFICATION OF INPATIENT ADMISSION   AUTHORIZATION REQUEST   SERVICING FACILITY:   [address] [facility], [address] [place] [address]  Tax ID: [id number]  NPI: [id number]   ATTENDING PROVIDER:  Attending Name and NPI#: Jey Sanders [[id number]]  Address: [address] [place] [address] [place]  [place], [address] [place] [address]  Phone: [phone]     ADMISSION INFORMATION:  Place of Service: Inpatient [facility]  [address]  Place of Service Code: 21  Inpatient Admission Date/Time: 5/19/23  7:08 PM  Discharge Date/Time: No discharge date for patient encounter  Admitting Diagnosis Code/Description:  UTI (urinary tract infection) [N39 0]  Encephalopathy [G93 40]  Sacral decubitus ulcer [L89 159]  AMS (altered mental status) [R41 82]     UTILIZATION REVIEW CONTACT:  Matthieu Galvin Utilization   Network Utilization Review Department  Phone: [phone]  Fax: [phone]  Email: [email or web address]  Contact for approvals/pending authorizations, clinical reviews, and discharge  PHYSICIAN ADVISORY SERVICES:  Medical Necessity Denial & [facility] Review  Phone: [phone]  Fax: [phone]  Email: [email or web address]

## 2023-05-22 NOTE — PLAN OF CARE
Change diet to dysphagia 1 puree w/ nectar thick liquids (baseline diet)  Rec single straw sips  Meds in applesauce

## 2023-05-22 NOTE — PHYSICAL THERAPY NOTE
Physical Therapy Screen    Patient Name: Omar Tay  PJNHD'D Date: 5/22/2023  Problem List  Principal Problem:    Suicidal ideations  Active Problems:    Failure to thrive in adult    Major depressive disorder    Chronic heart failure with preserved ejection fraction (HCC)    Diabetes mellitus type 2 in obese ([facility] [address] )    Moderate protein-calorie malnutrition ([facility] [address] )    Vaginal bleeding    Contrast-induced nephropathy        05/22/23 1251   PT Last Visit   PT Visit Date 05/22/23   Note Type   Note type Screen   Additional Comments PT consult received and chart reviewed  Spoke with REBECCA Saxena  Per chart review, pt is a long term care resident at [facility], is dependent in all aspects of self care and functional mobility, does not receive PT services  Will screen from PT caseload at this time as not skilled PT services indicated         Sheryl Fajardo, PT, DPT   Available via [employer]  NPI # [id number]  [place] License - [id number]  6/43/1148
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
## 2023-05-22 NOTE — WOUND OSTOMY CARE
Consult Note - Wound   Carson Novoa 75 y o  female MRN: [id number]  Unit/Bed#: S -01 Encounter: [id number]        History and Present Illness:  Patient is 76 yo female admitted to [address] [place] [address] with suicidal ideation and UTI  Patient has history of FTT, depression, DM2, CHF, and vaginal bleeding  Patient is incontinent of bowel and has indwelling arceo catheter  Patient is dependent for all care  Specialty bed ordered for patient as she cannot move on her own at this time and has wound  Assessment Findings:     1  POA stage III left buttock- previously a DTI now evolved to stage III with small, partial thickness hypergranulation, no drainage present  Due to incontinence recommending calazime  B/L heels intact, preventative Allevyn foams in place at this time  No induration, fluctuance, odor, warmth/temperature differences, redness, or purulence noted to the above noted wounds and skin areas assessed  New dressings applied per orders listed below  Patient tolerated well- no s/s of non-verbal pain or discomfort observed during the encounter  Bedside nurse aware of plan of care  See flow sheets for more detailed assessment findings  Wound care will continue to follow  Skin care plans:  1-Hydraguard to bilateral heels BID and PRN  2-Elevate heels to offload pressure  3-Ehob cushion in chair when out of bed  4-Moisturize skin daily with skin nourishing cream   5-Turn/reposition q2h or when medically stable for pressure re-distribution on skin      6-Calazime paste to sacrum/buttocks BID and PRN   7-P-500 specialty bed     Wounds:  Wound 05/20/23 Pressure Injury Buttocks Left (Active)   Wound Image   05/22/23 1338   Wound Description Hypergranulation 05/22/23 1338   Pressure Injury Stage 3 05/22/23 1338   Anjali-wound Assessment Scar Tissue 05/22/23 1338   Wound Length (cm) 0 5 cm 05/22/23 1338   Wound Width (cm) 0 5 cm 05/22/23 1338   Wound Depth (cm) 0 1 cm 05/22/23 1338   Wound Surface Area (cm^2) 0 25 cm^2 05/22/23 1338   Wound Volume (cm^3) 0 025 cm^3 05/22/23 1338   Calculated Wound Volume (cm^3) 0 03 cm^3 05/22/23 1338   Tunneling 0 cm 05/22/23 1338   Tunneling in depth located at 0 05/22/23 1338   Undermining 0 05/22/23 1338   Undermining is depth extending from 0 05/22/23 1338   Wound Site Closure TINA 05/22/23 1338   Drainage Amount None 05/22/23 1338   Non-staged Wound Description Partial thickness 05/22/23 1338   Treatments Cleansed 05/22/23 1338   Dressing Moisture barrier 05/22/23 1338   Wound packed? No 05/22/23 1338   Packing- # removed 0 05/22/23 1338   Packing- # inserted 0 05/22/23 3301   Dressing Changed New 05/22/23 1338   Patient Tolerance Tolerated well 05/22/23 1338   Dressing Status Clean; Intact;Dry 05/22/23   Deniz Lambert, RN, [facility]

## 2023-05-23 ENCOUNTER — APPOINTMENT (INPATIENT)
Dept: ULTRASOUND IMAGING | Facility: HOSPITAL | Age: 68
End: 2023-05-23

## 2023-05-23 PROBLEM — R34 ANURIA: Status: RESOLVED | Noted: 2023-05-23 | Resolved: 2023-05-23

## 2023-05-23 PROBLEM — R34 ANURIA: Status: ACTIVE | Noted: 2023-05-23

## 2023-05-23 LAB
ALBUMIN SERPL BCP-MCNC: 1.8 G/DL (ref 3.5–5)
ALP SERPL-CCNC: 103 U/L (ref 34–104)
ALT SERPL W P-5'-P-CCNC: 12 U/L (ref 7–52)
ANION GAP SERPL CALCULATED.3IONS-SCNC: 4 MMOL/L (ref 4–13)
ANION GAP SERPL CALCULATED.3IONS-SCNC: 5 MMOL/L (ref 4–13)
AST SERPL W P-5'-P-CCNC: 29 U/L (ref 13–39)
BASE EXCESS BLDA CALC-SCNC: 2 MMOL/L (ref -2–3)
BILIRUB SERPL-MCNC: 0.27 MG/DL (ref 0.2–1)
BUN SERPL-MCNC: 28 MG/DL (ref 5–25)
BUN SERPL-MCNC: 31 MG/DL (ref 5–25)
CA-I BLD-SCNC: 1.26 MMOL/L (ref 1.12–1.32)
CALCIUM ALBUM COR SERPL-MCNC: 9.7 MG/DL (ref 8.3–10.1)
CALCIUM SERPL-MCNC: 7.7 MG/DL (ref 8.4–10.2)
CALCIUM SERPL-MCNC: 7.9 MG/DL (ref 8.4–10.2)
CHLORIDE SERPL-SCNC: 111 MMOL/L (ref 96–108)
CHLORIDE SERPL-SCNC: 111 MMOL/L (ref 96–108)
CO2 SERPL-SCNC: 26 MMOL/L (ref 21–32)
CO2 SERPL-SCNC: 28 MMOL/L (ref 21–32)
CREAT SERPL-MCNC: 1.53 MG/DL (ref 0.6–1.3)
CREAT SERPL-MCNC: 1.58 MG/DL (ref 0.6–1.3)
DS:DELIVERY SYSTEM: ABNORMAL
ERYTHROCYTE [DISTWIDTH] IN BLOOD BY AUTOMATED COUNT: 17.7 % (ref 11.6–15.1)
ERYTHROCYTE [DISTWIDTH] IN BLOOD BY AUTOMATED COUNT: 18.1 % (ref 11.6–15.1)
GFR SERPL CREATININE-BSD FRML MDRD: 33 ML/MIN/1.73SQ M
GFR SERPL CREATININE-BSD FRML MDRD: 34 ML/MIN/1.73SQ M
GLUCOSE SERPL-MCNC: 59 MG/DL (ref 65–140)
GLUCOSE SERPL-MCNC: 66 MG/DL (ref 65–140)
GLUCOSE SERPL-MCNC: 66 MG/DL (ref 65–140)
HCO3 BLDA-SCNC: 27.7 MMOL/L (ref 22–28)
HCT VFR BLD AUTO: 26.4 % (ref 34.8–46.1)
HCT VFR BLD AUTO: 27.5 % (ref 34.8–46.1)
HCT VFR BLD CALC: 22 % (ref 34.8–46.1)
HGB BLD-MCNC: 7.5 G/DL (ref 11.5–15.4)
HGB BLD-MCNC: 7.7 G/DL (ref 11.5–15.4)
HGB BLDA-MCNC: 7.5 G/DL (ref 11.5–15.4)
MCH RBC QN AUTO: 28.6 PG (ref 26.8–34.3)
MCH RBC QN AUTO: 29.2 PG (ref 26.8–34.3)
MCHC RBC AUTO-ENTMCNC: 28 G/DL (ref 31.4–37.4)
MCHC RBC AUTO-ENTMCNC: 28.4 G/DL (ref 31.4–37.4)
MCV RBC AUTO: 102 FL (ref 82–98)
MCV RBC AUTO: 103 FL (ref 82–98)
PCO2 BLD: 29 MMOL/L (ref 21–32)
PCO2 BLD: 48.6 MM HG (ref 36–44)
PH BLD: 7.36 [PH] (ref 7.35–7.45)
PLATELET # BLD AUTO: 220 THOUSANDS/UL (ref 149–390)
PLATELET # BLD AUTO: 234 THOUSANDS/UL (ref 149–390)
PMV BLD AUTO: 10.8 FL (ref 8.9–12.7)
PMV BLD AUTO: 11.1 FL (ref 8.9–12.7)
PO2 BLD: 124 MM HG (ref 75–129)
POTASSIUM BLD-SCNC: 4 MMOL/L (ref 3.5–5.3)
POTASSIUM SERPL-SCNC: 4.4 MMOL/L (ref 3.5–5.3)
POTASSIUM SERPL-SCNC: 4.4 MMOL/L (ref 3.5–5.3)
PROT SERPL-MCNC: 4.5 G/DL (ref 6.4–8.4)
RBC # BLD AUTO: 2.57 MILLION/UL (ref 3.81–5.12)
RBC # BLD AUTO: 2.69 MILLION/UL (ref 3.81–5.12)
RESPIRATORY RATE: 2
SAO2 % BLD FROM PO2: 99 % (ref 60–85)
SODIUM BLD-SCNC: 143 MMOL/L (ref 136–145)
SODIUM SERPL-SCNC: 142 MMOL/L (ref 135–147)
SODIUM SERPL-SCNC: 143 MMOL/L (ref 135–147)
SPECIMEN SOURCE: ABNORMAL
WBC # BLD AUTO: 5.83 THOUSAND/UL (ref 4.31–10.16)
WBC # BLD AUTO: 7.61 THOUSAND/UL (ref 4.31–10.16)

## 2023-05-23 RX ADMIN — NYSTATIN: 100000 POWDER TOPICAL at 10:25

## 2023-05-23 RX ADMIN — HEPARIN SODIUM 5000 UNITS: 5000 INJECTION INTRAVENOUS; SUBCUTANEOUS at 06:49

## 2023-05-23 RX ADMIN — NYSTATIN: 100000 POWDER TOPICAL at 17:21

## 2023-05-23 RX ADMIN — HEPARIN SODIUM 5000 UNITS: 5000 INJECTION INTRAVENOUS; SUBCUTANEOUS at 16:02

## 2023-05-23 RX ADMIN — HEPARIN SODIUM 5000 UNITS: 5000 INJECTION INTRAVENOUS; SUBCUTANEOUS at 22:06

## 2023-05-23 RX ADMIN — SODIUM CHLORIDE, SODIUM GLUCONATE, SODIUM ACETATE, POTASSIUM CHLORIDE AND MAGNESIUM CHLORIDE 125 ML/HR: 526; 502; 368; 37; 30 INJECTION, SOLUTION INTRAVENOUS at 16:06

## 2023-05-23 NOTE — ASSESSMENT & PLAN NOTE
"· At admission, patient reported \"I need something for the pain, if not I am going to just take care of it myself  \" Provider then asked if the patient had thoughts of hurting herself  Patient replied \"Lately, yes  \" When asked about a plan to harm herself, patient's son at bedside interjected and stated that several days ago the patient mentioned using gun to harm herself if she had one  Patient's son states the patient has been very depressed recently, which has prompted her antidepressants to be adjusted  · Virtual observation  · Psychiatry on board, appreciate recommendations  · Added gabapentin, Abilify starting 5/22  Will hold gabapentin today and monitor mental status    · Anticipate inpatient psych once medically clear for DC, pending progress  "

## 2023-05-23 NOTE — ASSESSMENT & PLAN NOTE
Wt Readings from Last 3 Encounters:   05/23/23 90 9 kg (200 lb 6 4 oz)   05/19/23 90 kg (198 lb 6 4 oz)   05/18/23 90 kg (198 lb 6 4 oz)   · Echo from February 2022: LVEF 67%  · Patient is not currently on any diuretics    · Moderate diastolic dysfunction (grade II) and right-sided pressures estimated 51 mm Hg on prior echo in 2022  · Monitor volume status closely w I/Os, daily weights, clinically, given IVF for REGINO

## 2023-05-23 NOTE — PROGRESS NOTES
"[facility]  Progress Note  Name: Aspen Marks  MRN: [id number]  Unit/Bed#: S -01 I Date of Admission: 5/19/2023   Date of Service: 5/23/2023 I Hospital Day: 4    Assessment/Plan   * Lethargy  Assessment & Plan  Pt with episodes of intermittent lethargy earlier this admission that could be attributed to administration of sedating agents, however exhibiting significant lethargy throughout the day today  Has not received recent opiates, and this degree of sedation would not be expected from current dose of gabapentin or abilify  Lab orders:  ABG  Ammonia  TSH  CMP  CBC    Anuria-resolved as of 5/23/2023  Assessment & Plan  Evidence of REGINO starting 5/21  Prerenal (poor PO intake, dry mucus membranes) vs contrast induced nephropathy (CT w IV con on 5/19)  Borderline oliguria vs low normal UOP on 5/21  Oliguria 5/22  Anuria 5/23    Chronic arceo in place    US KUB  Continue IVF    REGINO (acute kidney injury) [facility])  Assessment & Plan  Baseline Cr appears to be approx 1 0, 1 2 on admission 5/19, 1 22 5/20, increased to 1 41 5/21, 1 63 on 5/22  Received IV contrast for imaging on 5/19  Suspect REGINO due to contrast induced nephropathy vs pre-renal/hypovolemia  Poor PO intake, dry mucus membranes  Oliguria over 24h period 5/21-22: approx 0 2 ml/kg/h    US KUB negative for hydronephrosis    Plasmalyte @125 ml/h  Monitor clinically for signs of CHF exacerbation/volume overload  Strict I/Os and daily weights    Suicidal ideations  Assessment & Plan  · At admission, patient reported \"I need something for the pain, if not I am going to just take care of it myself  \" Provider then asked if the patient had thoughts of hurting herself  Patient replied \"Lately, yes  \" When asked about a plan to harm herself, patient's son at bedside interjected and stated that several days ago the patient mentioned using gun to harm herself if she had one   Patient's son states the patient has been very depressed " recently, which has prompted her antidepressants to be adjusted  · Virtual observation  · Psychiatry on board, appreciate recommendations  · Added gabapentin, Abilify starting 5/22  Will hold gabapentin today and monitor mental status  · Anticipate inpatient psych once medically clear for DC, pending progress    Failure to thrive in adult  Assessment & Plan  · Poor oral intake, decreased mobility  · PT/OT/Speech evaluations  · Goals of care conversation with Palliative Care cannot be had while patient remains suicidal    Chronic heart failure with preserved ejection fraction [facility])  Assessment & Plan  Wt Readings from Last 3 Encounters:   05/23/23 90 9 kg (200 lb 6 4 oz)   05/19/23 90 kg (198 lb 6 4 oz)   05/18/23 90 kg (198 lb 6 4 oz)   · Echo from February 2022: LVEF 67%  · Patient is not currently on any diuretics  · Moderate diastolic dysfunction (grade II) and right-sided pressures estimated 51 mm Hg on prior echo in 2022  · Monitor volume status closely w I/Os, daily weights, clinically, given IVF for REGINO    Vaginal bleeding  Assessment & Plan  Noted to have significant bleeding 5/20 per nursing staff, with vaginal source suspected  Patient is a postmenopausal 68-year-old female    Transabdominal US of pelvis obtained 5/21/23: Significantly limited study due to transabdominal only approach, patient body habitus and Kay catheter decompressing the urinary bladder  Grossly normal uterus with endometrium measuring approximately 2 mm, noting limited visualization  Ovaries not seen  -Transabdominal pelvic ultrasound is limited study, but appears to show thin endometrial strip, which is reassuring  In the absence of further evidence/episodes of vaginal bleeding, will not pursue endometrial biopsy with OBGYN at this time    -Monitor hemoglobin on AM CBC    Moderate protein-calorie malnutrition ([address] )  Assessment & Plan  Malnutrition Findings:   Adult Malnutrition type: Chronic illness  Adult Degree of Malnutrition: Malnutrition of moderate degree  Malnutrition Characteristics: Muscle loss, Weight loss                  360 Statement: Moderate malnutrition in the context of chronic illness r/t inadequate energy intake with confusion as evidenced by wt decrease of 93#/32 7% x 11 mo, moderate temporal wasting  Will treat with nutrition therapy, PO diet, supplements  BMI Findings: Body mass index is 35 5 kg/m²  VTE Pharmacologic Prophylaxis: VTE Score: 6 High Risk (Score >/= 5) - Pharmacological DVT Prophylaxis Ordered: heparin  Sequential Compression Devices Ordered  Patient Centered Rounds: I performed bedside rounds with nursing staff today  Discussions with Specialists or Other Care Team Provider: Respiratory for ABG, Vascular access team for PICC placement    Education and Discussions with Family / Patient: Attempted to update  (son) via phone  Left voicemail  Current Length of Stay: 4 day(s)  Current Patient Status: Inpatient   Discharge Plan: Anticipate discharge in 48-72 hrs to inpatient psych  Code Status: Level 3 - DNAR and DNI    Subjective: This morning, Ms Stern is seen asleep in bed, difficult to wake, opens eyes, purposeful gaze, but remains lethargic and falls back to sleep quickly without continued stimulation  Objective:     Vitals:   Temp (24hrs), Av 6 °F (36 4 °C), Min:97 5 °F (36 4 °C), Max:97 7 °F (36 5 °C)    Temp:  [97 5 °F (36 4 °C)-97 7 °F (36 5 °C)] 97 5 °F (36 4 °C)  HR:  [78-80] 78  Resp:  [18] 18  BP: (100-110)/(50-64) 110/50  SpO2:  [100 %] 100 %  Body mass index is 35 5 kg/m²  Input and Output Summary (last 24 hours): Intake/Output Summary (Last 24 hours) at 2023 1550  Last data filed at 2023 1000  Gross per 24 hour   Intake 2120 ml   Output 650 ml   Net 1470 ml       Physical Exam:   Physical Exam  Vitals and nursing note reviewed  Constitutional:       General: She is not in acute distress  Appearance: She is obese  She is ill-appearing  She is not toxic-appearing or diaphoretic  Comments: Lethargic   HENT:      Head: Normocephalic and atraumatic  Eyes:      General: No scleral icterus  Right eye: No discharge  Left eye: No discharge  Conjunctiva/sclera: Conjunctivae normal    Cardiovascular:      Rate and Rhythm: Normal rate and regular rhythm  Pulses: Normal pulses  Heart sounds: Murmur (III/VI heard throughout) heard  No friction rub  No gallop  Pulmonary:      Effort: Pulmonary effort is normal  No respiratory distress  Breath sounds: Normal breath sounds  No stridor  No wheezing, rhonchi or rales  Comments: Clear breath sounds bilaterally as auscultated in the anterior chest  Chest:      Chest wall: No tenderness  Abdominal:      General: Bowel sounds are normal  There is no distension  Palpations: Abdomen is soft  Tenderness: There is no abdominal tenderness  There is no guarding or rebound  Musculoskeletal:      Right lower leg: No edema  Left lower leg: No edema  Skin:     General: Skin is warm and dry  Neurological:      Mental Status: She is alert  Comments: lethargic      Additional Data:     Labs:  Results from last 7 days   Lab Units 05/23/23  1514 05/23/23  1513 05/23/23  0533 05/22/23  0501   WBC Thousand/uL  --  7 61   < > 10 09   HEMOGLOBIN g/dL  --  7 7*   < > 8 1*   I STAT HEMOGLOBIN g/dl 7 5*  --   --   --    HEMATOCRIT %  --  27 5*   < > 27 3*   HEMATOCRIT, ISTAT % 22*  --   --   --    PLATELETS Thousands/uL  --  234   < > 279   NEUTROS PCT %  --   --   --  82*   LYMPHS PCT %  --   --   --  8*   MONOS PCT %  --   --   --  7   EOS PCT %  --   --   --  1    < > = values in this interval not displayed       Results from last 7 days   Lab Units 05/23/23  1514 05/23/23  0533 05/20/23  0422 05/19/23  1449   SODIUM mmol/L  --  142   < > 140   POTASSIUM mmol/L  --  4 4   < > 3 4*   CHLORIDE mmol/L  --  111*   < > 105   CO2 mmol/L  --  26   < > 27   CO2, I-STAT mmol/L 29  --   --   --    BUN mg/dL  --  31*   < > 31*   CREATININE mg/dL  --  1 58*   < > 1 20   ANION GAP mmol/L  --  5   < > 8   CALCIUM mg/dL  --  7 7*   < > 8 6   ALBUMIN g/dL  --   --   --  2 6*   TOTAL BILIRUBIN mg/dL  --   --   --  0 32   ALK PHOS U/L  --   --   --  142*   ALT U/L  --   --   --  17   AST U/L  --   --   --  23   GLUCOSE RANDOM mg/dL  --  66   < > 80    < > = values in this interval not displayed  Results from last 7 days   Lab Units 05/19/23  1449   INR  1 03     Results from last 7 days   Lab Units 05/22/23  1601 05/22/23  1122 05/22/23  0800 05/21/23  2048 05/21/23  1626 05/21/23  1117 05/21/23  0838 05/20/23  2051 05/20/23  1629 05/20/23  1156 05/20/23  0733 05/20/23  0628   POC GLUCOSE mg/dl 81 100 81 96 99 131 105 116 83 89 99 78         Results from last 7 days   Lab Units 05/20/23  0422 05/19/23  1449   LACTIC ACID mmol/L  --  1 0   PROCALCITONIN ng/ml 0 17 0 16       Lines/Drains:  Invasive Devices     Peripheral Intravenous Line  Duration           Peripheral IV 05/19/23 Distal;Left;Upper;Ventral (anterior) Arm 4 days    Peripheral IV 05/19/23 Right Antecubital 4 days    Peripheral IV 05/23/23 Right;Ventral (anterior) Forearm <1 day          Drain  Duration           Urethral Catheter 55 days              Urinary Catheter:  Goal for removal: N/A - Chronic Kay               Imaging: Reviewed radiology reports from this admission including: ultrasound(s) KUB    Recent Cultures (last 7 days):   Results from last 7 days   Lab Units 05/19/23  1511 05/19/23  1449   BLOOD CULTURE  No Growth at 72 hrs  No Growth at 72 hrs     URINE CULTURE   --  70,000-79,000 cfu/ml Escherichia coli ESBL*  20,000-29,000 cfu/ml Lactobacillus species*  <10,000 cfu/ml Enterococcus species*       Last 24 Hours Medication List:   Current Facility-Administered Medications   Medication Dose Route Frequency Provider Last Rate   • acetaminophen  975 mg Oral Q8H [address] Diane Montoya MD     • ARIPiprazole  2 mg Oral Daily Rommel Romano MD     • aspirin  81 mg Oral Daily Donnalee Bliss, CRNP     • cholecalciferol  1,000 Units Oral Daily Donnalee Bliss, CRNP     • ezetimibe  10 mg Oral Daily Donnalee Bliss, CRNP     • FLUoxetine  40 mg Oral Daily Donnalee Bliss, CRNP     • heparin (porcine)  5,000 Units Subcutaneous Q8H [address], CRNP     • HYDROmorphone  0 5 mg Intravenous Q4H PRN Donnalee Bliss, CRNP     • levothyroxine  88 mcg Oral Early Morning Donnalee Bliss, CRNP     • lidocaine  1 patch Topical Daily Diane Montoya MD     • melatonin  9 mg Oral HS Donnalee Bliss, CRNP     • mirtazapine  30 mg Oral HS Donnalee Bliss, CRNP     • multi-electrolyte  125 mL/hr Intravenous Continuous Beatris YONATHAN Bartrussell,  mL/hr (05/22/23 1806)   • nystatin   Topical BID Donnalee Bliss, CRNP     • oxyCODONE  5 mg Oral Q4H PRN Donnalee Bliss, CRNP     • oxyCODONE  2 5 mg Oral Q4H PRN Donnalee Bliss, CRNP     • pantoprazole  40 mg Oral Early Morning Donnalee Bliss, CRNP          Today, Patient Was Seen By: Diane Montoya MD    **Please Note: This note may have been constructed using a voice recognition system  **

## 2023-05-23 NOTE — ASSESSMENT & PLAN NOTE
Baseline Cr appears to be approx 1 0, 1 2 on admission 5/19, 1 22 5/20, increased to 1 41 5/21, 1 63 on 5/22, 1 58 on 5/23  Received IV contrast for imaging on 5/19  Suspect REGINO due to contrast induced nephropathy vs pre-renal/hypovolemia  Poor PO intake, dry mucus membranes  Oliguria over 24h period 5/21-22: approx 0 2 ml/kg/h  UOP appears not to have been recorded for 5/22, however UOP 5/23 appears adequate based on chart review and volume of urine presently in Kay bag (approx 1 ml/kg/h)    US KUB negative for hydronephrosis    Plasmalyte @125 ml/h  Monitor clinically for signs of CHF exacerbation/volume overload  Strict I/Os and daily weights

## 2023-05-23 NOTE — ASSESSMENT & PLAN NOTE
Malnutrition Findings:   Adult Malnutrition type: Chronic illness  Adult Degree of Malnutrition: Malnutrition of moderate degree  Malnutrition Characteristics: Muscle loss, Weight loss                  360 Statement: Moderate malnutrition in the context of chronic illness r/t inadequate energy intake with confusion as evidenced by wt decrease of 93#/32 7% x 11 mo, moderate temporal wasting  Will treat with nutrition therapy, PO diet, supplements  BMI Findings: Body mass index is 35 5 kg/m²

## 2023-05-23 NOTE — ASSESSMENT & PLAN NOTE
Evidence of REGINO starting 5/21     Prerenal (poor PO intake, dry mucus membranes) vs contrast induced nephropathy (CT w IV con on 5/19)  Borderline oliguria vs low normal UOP on 5/21  Oliguria 5/22  Anuria 5/23    Chronic arceo in place    US KUB  Continue IVF

## 2023-05-23 NOTE — ASSESSMENT & PLAN NOTE
Pt with episodes of intermittent lethargy earlier this admission that could be attributed to administration of sedating agents, however exhibiting significant lethargy throughout the day today  Has not received recent opiates, and this degree of sedation would not be expected from current dose of gabapentin or abilify      Lab orders:  ABG  Ammonia  TSH  CMP  CBC

## 2023-05-23 NOTE — ASSESSMENT & PLAN NOTE
Noted to have significant bleeding 5/20 per nursing staff, with vaginal source suspected  Patient is a postmenopausal 67-year-old female    Transabdominal US of pelvis obtained 5/21/23: Significantly limited study due to transabdominal only approach, patient body habitus and Kay catheter decompressing the urinary bladder  Grossly normal uterus with endometrium measuring approximately 2 mm, noting limited visualization  Ovaries not seen  -Transabdominal pelvic ultrasound is limited study, but appears to show thin endometrial strip, which is reassuring  In the absence of further evidence/episodes of vaginal bleeding, will not pursue endometrial biopsy with OBGYN at this time    -Monitor hemoglobin on AM CBC

## 2023-05-24 ENCOUNTER — APPOINTMENT (INPATIENT)
Dept: RADIOLOGY | Facility: HOSPITAL | Age: 68
End: 2023-05-24

## 2023-05-24 PROBLEM — N93.9 VAGINAL BLEEDING: Status: RESOLVED | Noted: 2023-05-20 | Resolved: 2023-05-24

## 2023-05-24 LAB
AMMONIA PLAS-SCNC: 22 UMOL/L (ref 18–72)
ANION GAP SERPL CALCULATED.3IONS-SCNC: 4 MMOL/L (ref 4–13)
BACTERIA BLD CULT: NORMAL
BACTERIA BLD CULT: NORMAL
BUN SERPL-MCNC: 26 MG/DL (ref 5–25)
CALCIUM SERPL-MCNC: 7.9 MG/DL (ref 8.4–10.2)
CHLORIDE SERPL-SCNC: 109 MMOL/L (ref 96–108)
CO2 SERPL-SCNC: 28 MMOL/L (ref 21–32)
CREAT SERPL-MCNC: 1.37 MG/DL (ref 0.6–1.3)
CREAT UR-MCNC: 65.1 MG/DL
ERYTHROCYTE [DISTWIDTH] IN BLOOD BY AUTOMATED COUNT: 17.5 % (ref 11.6–15.1)
GFR SERPL CREATININE-BSD FRML MDRD: 39 ML/MIN/1.73SQ M
GLUCOSE SERPL-MCNC: 104 MG/DL (ref 65–140)
GLUCOSE SERPL-MCNC: 77 MG/DL (ref 65–140)
HCT VFR BLD AUTO: 32 % (ref 34.8–46.1)
HEMOCCULT STL QL: NEGATIVE
HEMOCCULT STL QL: NORMAL
HEMOCCULT STL QL: NORMAL
HGB BLD-MCNC: 9.2 G/DL (ref 11.5–15.4)
MCH RBC QN AUTO: 29.1 PG (ref 26.8–34.3)
MCHC RBC AUTO-ENTMCNC: 28.8 G/DL (ref 31.4–37.4)
MCV RBC AUTO: 101 FL (ref 82–98)
PLATELET # BLD AUTO: 255 THOUSANDS/UL (ref 149–390)
PMV BLD AUTO: 10.5 FL (ref 8.9–12.7)
POTASSIUM SERPL-SCNC: 4.3 MMOL/L (ref 3.5–5.3)
PROT UR-MCNC: 47 MG/DL
PROT/CREAT UR: 0.72 MG/G{CREAT} (ref 0–0.1)
RBC # BLD AUTO: 3.16 MILLION/UL (ref 3.81–5.12)
SODIUM SERPL-SCNC: 141 MMOL/L (ref 135–147)
T4 FREE SERPL-MCNC: 1.48 NG/DL (ref 0.61–1.12)
TSH SERPL DL<=0.05 MIU/L-ACNC: 0.36 UIU/ML (ref 0.45–4.5)
WBC # BLD AUTO: 10.5 THOUSAND/UL (ref 4.31–10.16)

## 2023-05-24 RX ORDER — ALBUMIN (HUMAN) 12.5 G/50ML
25 SOLUTION INTRAVENOUS ONCE
Status: COMPLETED | OUTPATIENT
Start: 2023-05-24 | End: 2023-05-24

## 2023-05-24 RX ADMIN — ACETAMINOPHEN 975 MG: 325 TABLET ORAL at 14:03

## 2023-05-24 RX ADMIN — LIDOCAINE 5% 1 PATCH: 700 PATCH TOPICAL at 08:15

## 2023-05-24 RX ADMIN — NYSTATIN 1 APPLICATION.: 100000 POWDER TOPICAL at 17:21

## 2023-05-24 RX ADMIN — LEVOTHYROXINE SODIUM 88 MCG: 88 TABLET ORAL at 05:49

## 2023-05-24 RX ADMIN — MELATONIN 9 MG: 3 TAB ORAL at 21:49

## 2023-05-24 RX ADMIN — ACETAMINOPHEN 975 MG: 325 TABLET ORAL at 05:49

## 2023-05-24 RX ADMIN — HEPARIN SODIUM 5000 UNITS: 5000 INJECTION INTRAVENOUS; SUBCUTANEOUS at 21:49

## 2023-05-24 RX ADMIN — SODIUM CHLORIDE, SODIUM GLUCONATE, SODIUM ACETATE, POTASSIUM CHLORIDE AND MAGNESIUM CHLORIDE 125 ML/HR: 526; 502; 368; 37; 30 INJECTION, SOLUTION INTRAVENOUS at 20:43

## 2023-05-24 RX ADMIN — NYSTATIN: 100000 POWDER TOPICAL at 09:16

## 2023-05-24 RX ADMIN — ARIPIPRAZOLE 2 MG: 2 TABLET ORAL at 08:15

## 2023-05-24 RX ADMIN — ACETAMINOPHEN 975 MG: 325 TABLET ORAL at 21:49

## 2023-05-24 RX ADMIN — MIRTAZAPINE 30 MG: 15 TABLET, FILM COATED ORAL at 21:49

## 2023-05-24 RX ADMIN — EZETIMIBE 10 MG: 10 TABLET ORAL at 08:14

## 2023-05-24 RX ADMIN — ASPIRIN 81 MG: 81 TABLET, COATED ORAL at 08:15

## 2023-05-24 RX ADMIN — HEPARIN SODIUM 5000 UNITS: 5000 INJECTION INTRAVENOUS; SUBCUTANEOUS at 14:03

## 2023-05-24 RX ADMIN — HEPARIN SODIUM 5000 UNITS: 5000 INJECTION INTRAVENOUS; SUBCUTANEOUS at 05:49

## 2023-05-24 RX ADMIN — FLUOXETINE 40 MG: 20 CAPSULE ORAL at 08:15

## 2023-05-24 RX ADMIN — Medication 1000 UNITS: at 08:14

## 2023-05-24 RX ADMIN — ALBUMIN (HUMAN) 25 G: 0.25 INJECTION, SOLUTION INTRAVENOUS at 08:26

## 2023-05-24 RX ADMIN — PANTOPRAZOLE SODIUM 40 MG: 40 TABLET, DELAYED RELEASE ORAL at 05:49

## 2023-05-24 NOTE — ASSESSMENT & PLAN NOTE
"· At admission, patient reported \"I need something for the pain, if not I am going to just take care of it myself  \" Provider then asked if the patient had thoughts of hurting herself  Patient replied \"Lately, yes  \" When asked about a plan to harm herself, patient's son at bedside interjected and stated that several days ago the patient mentioned using gun to harm herself if she had one  Patient's son states the patient has been very depressed recently, which has prompted her antidepressants to be adjusted  · Virtual observation  · Psychiatry on board, appreciate recommendations  · Added gabapentin, Abilify starting 5/22  Held gabapentin 5/23 d/t lethargy   More alert 5/24, with waxing mental status concerning for delirium  · Anticipate inpatient psych once medically clear for DC, pending progress  "

## 2023-05-24 NOTE — PROGRESS NOTES
Progress Note - Behavioral Health   Alida Councilman 75 y o  female MRN: [id number]  Unit/Bed#: S -01 Encounter: [id number]    Assessment/Plan   Principal Problem:    REGINO (acute kidney injury) ([facility] )  Active Problems:    Failure to thrive in adult    Major depressive disorder    Chronic heart failure with preserved ejection fraction (HCC)    Lethargy    Suicidal ideations    Moderate protein-calorie malnutrition ([facility] 75 )      Recommended Treatment: At this time, based on comprehensive psychiatric evaluation, inpatient psychiatric hospitalization will likely be medically necessary for this patient  At this time, the patient is not medically cleared and on psychiatric interviews she demonstrates fluctuating cognition concerning for possible delirium  The patient will require reassessment by psychiatry once she is medically cleared and will likely require voluntary if not involuntary hospitalization  Continue virtual 1:1 for safety and support    No medication changes at this time  Continue Abilify 2 mg daily for augmentation of antidepressant  Continue gabapentin 300 mg 3 times daily for anxiety and for pain  Continue Prozac 40 mg daily for symptoms of depression and anxiety  Continue Remeron 30 mg at bedtime for symptoms of depression  Continue melatonin 9 mg at bedtime for sleep    Safety checks and vitals per unit protocol  Case discussed with treatment team   Risks, benefits and possible side effects of Medications: Risks, benefits, and possible side effects of medications have been explained to the patient, who verbalizes understanding    ------------------------------------------------------------    Subjective:      This is a follow-up progress note for the patient Alida Councilman, a 69-year-old female with a past psychiatric history of recurrent major depressive disorder as well as episodic gambling disorder [facility] [place] [address]  [facility] due to intermittent confusion and pain that "began 2 days prior to emergency department presentation  At this time the patient continues to require ongoing inpatient medical care and is not medically cleared at this time  Psychiatry was originally consulted due to suicidal ideations and initial consultation was performed on 5/22/2023  At the time of consult the patient was started on Abilify 2 mg daily for augmentation of antidepressant and her dose of gabapentin was increased to 300 mg 3 times daily for anxiety and pain  Please see initial consult note for further details  Today, Mark Melgoza was seen and examined  Today, Mark Melgoza is noted to be brighter in affect than previous interview and appears to be in less pain (she is no longer moaning in pain)  Her speech remains whispered and at times scant, however she is more talkative overall than previous interview  At the time of interview today, Mark Melgoza demonstrates fluctuating mental status and has periods of time where she is confused and disoriented  Upon interview with attending psychiatrist at around 10:00 Jovanna Mcnair believed that it was lunchtime and was in the process of eating [place] when pancakes were in front of her  At the time of interview she knew that she was in a hospital and that the year was 2023, but did not know the date  Today, despite appearing brighter and less depressed in affect, Mark Melgoza reports feeling \"worse\"  She continues to report feelings of depression due to ongoing symptoms of pain  She makes contradictory statements, originally stating that her head hurts, then saying her stomach pain went away, then saying that she has stomach pain  Mark Melgoza reports that she has been sleeping better at night in the hospital and slept consistently last night  She reports her appetite has been improving and she has been trying to eat more today  On interview today she denies suicidal ideation as well as thoughts of hurting herself   She denies homicidal ideation as well as thoughts of hurting " "others  She reports that she continues to experience hallucinations and last night she experienced the visual hallucination of her son in the room  She denies experiencing auditory hallucinations in the last day  At the time of interview, Ranjit Joseph continues to desire inpatient psychiatric care for her symptoms of depression  Progress Toward Goals: slow improvement    Psychiatric Review of Systems:  Behavior over the last 24 hours: improved  Sleep: improving  Appetite: improving  Medication side effects: none verbalized  ROS: Continues to report ongoing severe pain that she has difficulty localizing    Vital signs in last 24 hours:  Temp:  [97 °F (36 1 °C)-97 4 °F (36 3 °C)] 97 °F (36 1 °C)  HR:  [72-77] 73  Resp:  [18] 18  BP: ()/(38-50) 96/50    Mental Status Exam:  Appearance:  Patient is a 79-year-old overtly  female with short gray hair  Alert, appears older than stated age, appears unwell, good eye contact, wearing a hospital gown, marginal grooming and hygiene   Behavior:  calm and cooperative   Motor: no abnormal movements   Speech:  Whispered, more talkative than previous   Mood:  \"worse\"   Affect:  Constricted, appears brighter than previous interview   Thought Process:  Linear, goal directed, illogical at times   Thought Content: no verbalized delusions or overt paranoia, less negative thoughts   Perceptual disturbances: Does not appear to be responding to internal stimuli, continues to experience hallucinations and last night she experienced the visual hallucination of her son in the room  She denies experiencing auditory hallucinations in the last day      Risk Potential: No active suicidal ideation, No active homicidal ideation   Cognition: oriented to self and situation, decreased attention span and concentration, cognition appears to be fluctuating at this time   Insight:  Limited   Judgment: Limited     Current Medications:  Current Facility-Administered Medications   Medication " Dose Route Frequency Provider Last Rate   • acetaminophen  975 mg Oral [facility] Paramjit Raya MD     • ARIPiprazole  2 mg Oral Daily Lambert Faulkner MD     • aspirin  81 mg Oral Daily Alirezabert Lis, CRNP     • cholecalciferol  1,000 Units Oral Daily Philbert Lis, CRNP     • ezetimibe  10 mg Oral Daily Philbert Lis, CRNP     • FLUoxetine  40 mg Oral Daily Philbert Lis, CRNP     • heparin (porcine)  5,000 Units Subcutaneous Q[facility]H [facility], CR[facility]     • HYDROmorphone  0 5 mg Intravenous Q4H PRN Philbert Lis, CRNP     • levothyroxine  88 mcg Oral Early Morning Philbert Lis, CRNP     • lidocaine  1 patch Topical Daily Paramjit Raya MD     • melatonin  9 mg Oral HS Alirezabert Lis, CRNP     • mirtazapine  30 mg Oral HS Philbert Lis, CRNP     • multi-electrolyte  125 mL/hr Intravenous Continuous Beatris Mcgrath,  mL/hr (05/23/23 1606)   • nystatin   Topical BID Alirezabert Lis, CRNP     • oxyCODONE  5 mg Oral Q4H PRN Philbert Lis, CRNP     • oxyCODONE  2 5 mg Oral Q4H PRN Philbert Lis, CRNP     • pantoprazole  40 mg Oral Early Morning Alirezabert Lis, CRNP         Behavioral Health Medications: all current active meds have been reviewed  Changes as in plan section above  Laboratory results:  I have personally reviewed all pertinent laboratory/tests results    Recent Results (from the past 48 hour(s))   Fingerstick Glucose (POCT)    Collection Time: 05/22/23  4:01 PM   Result Value Ref Range    POC Glucose 81 65 - 140 mg/dl   CBC    Collection Time: 05/23/23  5:33 AM   Result Value Ref Range    WBC 5 83 4 31 - 10 16 Thousand/uL    RBC 2 57 (L) 3 81 - 5 12 Million/uL    Hemoglobin 7 5 (L) 11 5 - 15 4 g/dL    Hematocrit 26 4 (L) 34 8 - 46 1 %     (H) 82 - 98 fL    MCH 29 2 26 8 - 34 3 pg    MCHC 28 4 (L) 31 4 - 37 4 g/dL    RDW 18 1 (H) 11 6 - 15 1 %    Platelets 358 033 - 520 Thousands/uL    MPV 11 1 8 9 - 12 7 fL   Basic metabolic panel Collection Time: 05/23/23  5:33 AM   Result Value Ref Range    Sodium 142 135 - 147 mmol/L    Potassium 4 4 3 5 - 5 3 mmol/L    Chloride 111 (H) 96 - 108 mmol/L    CO2 26 21 - 32 mmol/L    ANION GAP 5 4 - 13 mmol/L    BUN 31 (H) 5 - 25 mg/dL    Creatinine 1 58 (H) 0 60 - 1 30 mg/dL    Glucose 66 65 - 140 mg/dL    Calcium 7 7 (L) 8 4 - 10 2 mg/dL    eGFR 33 ml/min/1 73sq m   Comprehensive metabolic panel    Collection Time: 05/23/23  3:13 PM   Result Value Ref Range    Sodium 143 135 - 147 mmol/L    Potassium 4 4 3 5 - 5 3 mmol/L    Chloride 111 (H) 96 - 108 mmol/L    CO2 28 21 - 32 mmol/L    ANION GAP 4 4 - 13 mmol/L    BUN 28 (H) 5 - 25 mg/dL    Creatinine 1 53 (H) 0 60 - 1 30 mg/dL    Glucose 59 (L) 65 - 140 mg/dL    Calcium 7 9 (L) 8 4 - 10 2 mg/dL    Corrected Calcium 9 7 8 3 - 10 1 mg/dL    AST 29 13 - 39 U/L    ALT 12 7 - 52 U/L    Alkaline Phosphatase 103 34 - 104 U/L    Total Protein 4 5 (L) 6 4 - 8 4 g/dL    Albumin 1 8 (L) 3 5 - 5 0 g/dL    Total Bilirubin 0 27 0 20 - 1 00 mg/dL    eGFR 34 ml/min/1 73sq m   CBC and Platelet    Collection Time: 05/23/23  3:13 PM   Result Value Ref Range    WBC 7 61 4 31 - 10 16 Thousand/uL    RBC 2 69 (L) 3 81 - 5 12 Million/uL    Hemoglobin 7 7 (L) 11 5 - 15 4 g/dL    Hematocrit 27 5 (L) 34 8 - 46 1 %     (H) 82 - 98 fL    MCH 28 6 26 8 - 34 3 pg    MCHC 28 0 (L) 31 4 - 37 4 g/dL    RDW 17 7 (H) 11 6 - 15 1 %    Platelets 275 288 - 596 Thousands/uL    MPV 10 8 8 9 - 12 7 fL   POCT Blood Gas (CG8+)    Collection Time: 05/23/23  3:14 PM   Result Value Ref Range    pH, Art i-STAT 7 364 7 350 - 7 450    pCO2, Art i-STAT 48 6 (H) 36 0 - 44 0 mm HG    pO2, ART i-STAT 124 0 75 0 - 129 0 mm HG    BE, i-STAT 2 -2 - 3 mmol/L    HCO3, Art i-STAT 27 7 22 0 - 28 0 mmol/L    CO2, i-STAT 29 21 - 32 mmol/L    O2 Sat, i-STAT 99 (H) 60 - 85 %    SODIUM, I-STAT 143 136 - 145 mmol/l    Potassium, i-STAT 4 0 3 5 - 5 3 mmol/L    Calcium, Ionized i-STAT 1 26 1 12 - 1 32 mmol/L    Hct, i-STAT 22 (L) 34 8 - 46 1 %    Hgb, i-STAT 7 5 (L) 11 5 - 15 4 g/dl    Glucose, i-STAT 66 65 - 140 mg/dl    Specimen Type ARTERIAL     Delivery System Nasal Cannula     Respiratory Rate 2    CBC    Collection Time: 05/24/23  6:17 AM   Result Value Ref Range    WBC 10 50 (H) 4 31 - 10 16 Thousand/uL    RBC 3 16 (L) 3 81 - 5 12 Million/uL    Hemoglobin 9 2 (L) 11 5 - 15 4 g/dL    Hematocrit 32 0 (L) 34 8 - 46 1 %     (H) 82 - 98 fL    MCH 29 1 26 8 - 34 3 pg    MCHC 28 8 (L) 31 4 - 37 4 g/dL    RDW 17 5 (H) 11 6 - 15 1 %    Platelets 607 715 - 195 Thousands/uL    MPV 10 5 8 9 - 12 7 fL   Basic metabolic panel    Collection Time: 05/24/23  6:17 AM   Result Value Ref Range    Sodium 141 135 - 147 mmol/L    Potassium 4 3 3 5 - 5 3 mmol/L    Chloride 109 (H) 96 - 108 mmol/L    CO2 28 21 - 32 mmol/L    ANION GAP 4 4 - 13 mmol/L    BUN 26 (H) 5 - 25 mg/dL    Creatinine 1 37 (H) 0 60 - 1 30 mg/dL    Glucose 77 65 - 140 mg/dL    Calcium 7 9 (L) 8 4 - 10 2 mg/dL    eGFR 39 ml/min/1 73sq m   Fingerstick Glucose (POCT)    Collection Time: 05/24/23  8:07 AM   Result Value Ref Range    POC Glucose 104 65 - 140 mg/dl   Ammonia    Collection Time: 05/24/23 11:34 AM   Result Value Ref Range    Ammonia 22 18 - 72 umol/L   TSH, 3rd generation with Free T4 reflex    Collection Time: 05/24/23 11:34 AM   Result Value Ref Range    TSH 3RD GENERATON 0 359 (L) 0 450 - 4 500 uIU/mL   Protein / creatinine ratio, urine    Collection Time: 05/24/23 12:15 PM   Result Value Ref Range    Creatinine, Ur 65 1 mg/dL    Protein Urine Random 47 mg/dL    Prot/Creat Ratio, Ur 0 72 (H) 0 00 - 0 10        Lambert Faulkner MD

## 2023-05-24 NOTE — PLAN OF CARE
Problem: Potential for Falls  Goal: Patient will remain free of falls  Description: INTERVENTIONS:  - Educate patient/family on patient safety including physical limitations  - Instruct patient to call for assistance with activity   - Consult OT/PT to assist with strengthening/mobility   - Keep Call bell within reach  - Keep bed low and locked with side rails adjusted as appropriate  - Keep care items and personal belongings within reach  - Initiate and maintain comfort rounds  - Make Fall Risk Sign visible to staff  - Offer Toileting every  Hours, in advance of need  - Initiate/Maintain alarm  - Obtain necessary fall risk management equipment:   - Apply yellow socks and bracelet for high fall risk patients  - Consider moving patient to room near nurses station  Outcome: Progressing     Problem: MOBILITY - ADULT  Goal: Maintain or return to baseline ADL function  Description: INTERVENTIONS:  -  Assess patient's ability to carry out ADLs; assess patient's baseline for ADL function and identify physical deficits which impact ability to perform ADLs (bathing, care of mouth/teeth, toileting, grooming, dressing, etc )  - Assess/evaluate cause of self-care deficits   - Assess range of motion  - Assess patient's mobility; develop plan if impaired  - Assess patient's need for assistive devices and provide as appropriate  - Encourage maximum independence but intervene and supervise when necessary  - Involve family in performance of ADLs  - Assess for home care needs following discharge   - Consider OT consult to assist with ADL evaluation and planning for discharge  - Provide patient education as appropriate  Outcome: Progressing  Goal: Maintains/Returns to pre admission functional level  Description: INTERVENTIONS:  - Perform BMAT or MOVE assessment daily    - Set and communicate daily mobility goal to care team and patient/family/caregiver     - Collaborate with rehabilitation services on mobility goals if consulted  - Perform Range of Motion  times a day  - Reposition patient every hours  - Dangle patient  times a day  - Stand patient  times a day  - Ambulate patient  times a day  - Out of bed to chair  times a day   - Out of bed for me times a day  - Out of bed for toileting  - Record patient progress and toleration of activity level   Outcome: Progressing     Problem: NEUROSENSORY - ADULT  Goal: Achieves maximal functionality and self care  Description: INTERVENTIONS  - Monitor swallowing and airway patency with patient fatigue and changes in neurological status  - Encourage and assist patient to increase activity and self care     - Encourage visually impaired, hearing impaired and aphasic patients to use assistive/communication devices  Outcome: Progressing     Problem: RESPIRATORY - ADULT  Goal: Achieves optimal ventilation and oxygenation  Description: INTERVENTIONS:  - Assess for changes in respiratory status  - Assess for changes in mentation and behavior  - Position to facilitate oxygenation and minimize respiratory effort  - Oxygen administered by appropriate delivery if ordered  - Initiate smoking cessation education as indicated  - Encourage broncho-pulmonary hygiene including cough, deep breathe, Incentive Spirometry  - Assess the need for suctioning and aspirate as needed  - Assess and instruct to report SOB or any respiratory difficulty  - Respiratory Therapy support as indicated  Outcome: Progressing     Problem: GASTROINTESTINAL - ADULT  Goal: Maintains adequate nutritional intake  Description: INTERVENTIONS:  - Monitor percentage of each meal consumed  - Identify factors contributing to decreased intake, treat as appropriate  - Assist with meals as needed  - Monitor I&O, weight, and lab values if indicated  - Obtain nutrition services referral as needed  Outcome: Progressing

## 2023-05-24 NOTE — ASSESSMENT & PLAN NOTE
Pt with episodes of intermittent lethargy earlier this admission that could be attributed to administration of sedating agents, however exhibiting significant lethargy throughout the day 5/23/23, not having received opiates      Possible medication SE after starting gabapentin and Abilify  Perfecto held 5/23 and pt is alert and engaged on 5/24    Lab orders:  ABG - minimal hypercapnia, not high enough to explain lethargy  Ammonia - normal  TSH - pending  CMP - minimal hypoglycemia, insufficient to explain lethargy  CBC - anemia, Hgb>7

## 2023-05-24 NOTE — PROGRESS NOTES
"[facility]  Progress Note  Name: Oscar Treviño  MRN: [id number]  Unit/Bed#: S -01 I Date of Admission: 5/19/2023   Date of Service: 5/24/2023 I Hospital Day: 5    Assessment/Plan   Lethargy  Assessment & Plan  Pt with episodes of intermittent lethargy earlier this admission that could be attributed to administration of sedating agents, however exhibiting significant lethargy throughout the day 5/23/23, not having received opiates  Possible medication SE after starting gabapentin and Abilify  Perfecto held 5/23 and pt is alert and engaged on 5/24    Lab orders:  ABG - minimal hypercapnia, not high enough to explain lethargy  Ammonia - normal  TSH - pending  CMP - minimal hypoglycemia, insufficient to explain lethargy  CBC - anemia, Hgb>7    Suicidal ideations  Assessment & Plan  · At admission, patient reported \"I need something for the pain, if not I am going to just take care of it myself  \" Provider then asked if the patient had thoughts of hurting herself  Patient replied \"Lately, yes  \" When asked about a plan to harm herself, patient's son at bedside interjected and stated that several days ago the patient mentioned using gun to harm herself if she had one  Patient's son states the patient has been very depressed recently, which has prompted her antidepressants to be adjusted  · Virtual observation  · Psychiatry on board, appreciate recommendations  · Added gabapentin, Abilify starting 5/22  Held gabapentin 5/23 d/t lethargy  More alert 5/24, with waxing mental status concerning for delirium  · Anticipate inpatient psych once medically clear for DC, pending progress    Failure to thrive in adult  Assessment & Plan  · Poor oral intake, decreased mobility  · PT/OT/Speech evaluations    · Goals of care conversation with Palliative Care cannot be had while patient remains suicidal    Chronic heart failure with preserved ejection fraction (HCC)  Assessment & Plan  Wt Readings from " Last 3 Encounters:   05/23/23 90 9 kg (200 lb 6 4 oz)   05/19/23 90 kg (198 lb 6 4 oz)   05/18/23 90 kg (198 lb 6 4 oz)   · Echo from February 2022: LVEF 67%  · Patient is not currently on any diuretics  · Moderate diastolic dysfunction (grade II) and right-sided pressures estimated 51 mm Hg on prior echo in 2022  · Monitor volume status closely w I/Os, daily weights, clinically, given IVF for REGINO  · Check CXR 5/24 after appreciating scant bibasilar rales on exam    Moderate protein-calorie malnutrition ([address] )  Assessment & Plan  Malnutrition Findings:   Adult Malnutrition type: Chronic illness  Adult Degree of Malnutrition: Malnutrition of moderate degree  Malnutrition Characteristics: Muscle loss, Weight loss                  360 Statement: Moderate malnutrition in the context of chronic illness r/t inadequate energy intake with confusion as evidenced by wt decrease of 93#/32 7% x 11 mo, moderate temporal wasting  Will treat with nutrition therapy, PO diet, supplements  BMI Findings: Body mass index is 35 5 kg/m²  VTE Pharmacologic Prophylaxis: VTE Score: 6 High Risk (Score >/= 5) - Pharmacological DVT Prophylaxis Ordered: heparin  Sequential Compression Devices Ordered  Patient Centered Rounds: I performed bedside rounds with nursing staff today  Discussions with Specialists or Other Care Team Provider: Psychiatry    Education and Discussions with Family / Patient: Will call pt's son to update  Current Length of Stay: 5 day(s)  Current Patient Status: Inpatient   Discharge Plan: Anticipate discharge in 24-48 hrs to inpatient psych  Code Status: Level 3 - DNAR and DNI    Subjective:   Paged this morning for low blood pressures  When seen at bedside, pt is alert, c/o lightheadedness, fatigue, feeling of generalized weakness  Notably, denies abd pain; endorses only minimal pain in the b/l LEs  No other complaints at this time   On exam, pt found to have manual BP 96/50  Objective:     Vitals:   Temp (24hrs), Av 2 °F (36 2 °C), Min:97 °F (36 1 °C), Max:97 4 °F (36 3 °C)    Temp:  [97 °F (36 1 °C)-97 4 °F (36 3 °C)] 97 °F (36 1 °C)  HR:  [72-77] 73  Resp:  [18] 18  BP: ()/(38-50) 96/50  SpO2:  [100 %] 100 %  Body mass index is 35 5 kg/m²  Input and Output Summary (last 24 hours):   No intake or output data in the 24 hours ending 23 1223    Physical Exam:   Physical Exam  Vitals and nursing note reviewed  Constitutional:       General: She is not in acute distress  Appearance: She is obese  She is ill-appearing  She is not toxic-appearing or diaphoretic  Comments: Lethargic   HENT:      Head: Normocephalic and atraumatic  Mouth/Throat:      Mouth: Mucous membranes are moist       Comments: Mucus membranes not as dry as prior exam  Eyes:      General: No scleral icterus  Right eye: No discharge  Left eye: No discharge  Conjunctiva/sclera: Conjunctivae normal    Cardiovascular:      Rate and Rhythm: Normal rate and regular rhythm  Pulses: Normal pulses  Heart sounds: Murmur (III/VI heard throughout) heard  No friction rub  No gallop  Comments: Manual BP 96/50  Pulmonary:      Effort: Pulmonary effort is normal  No respiratory distress  Breath sounds: No stridor  Rales (scant bibasilar inspiratory rales) present  No wheezing or rhonchi  Chest:      Chest wall: No tenderness  Abdominal:      General: Bowel sounds are normal  There is no distension  Palpations: Abdomen is soft  Tenderness: There is no abdominal tenderness  There is no guarding or rebound  Musculoskeletal:         General: Swelling (2+ pitting dorsum of b/l hands) present  Right lower leg: No edema  Left lower leg: No edema  Skin:     General: Skin is warm and dry  Neurological:      Mental Status: She is alert  She is disoriented        Comments: lethargic        Additional Data:     Labs:  Results from last 7 days   Lab Units 05/24/23  0617 05/23/23  0533 05/22/23  0501   EOS PCT %  --   --  1   HEMATOCRIT % 32 0*   < > 27 3*   HEMATOCRIT, ISTAT   --    < >  --    HEMOGLOBIN g/dL 9 2*   < > 8 1*   I STAT HEMOGLOBIN   --    < >  --    LYMPHS PCT %  --   --  8*   MONOS PCT %  --   --  7   NEUTROS PCT %  --   --  82*   PLATELETS Thousands/uL 255   < > 279   WBC Thousand/uL 10 50*   < > 10 09    < > = values in this interval not displayed  Results from last 7 days   Lab Units 05/24/23  0617 05/23/23  1514 05/23/23  1513   ANION GAP mmol/L 4  --  4   ALBUMIN g/dL  --   --  1 8*   ALK PHOS U/L  --   --  103   ALT U/L  --   --  12   AST U/L  --   --  29   BUN mg/dL 26*  --  28*   CALCIUM mg/dL 7 9*  --  7 9*   CHLORIDE mmol/L 109*  --  111*   CO2 mmol/L 28  --  28   CO2, I-STAT   --    < >  --    CREATININE mg/dL 1 37*  --  1 53*   GLUCOSE RANDOM mg/dL 77  --  59*   POTASSIUM mmol/L 4 3  --  4 4   SODIUM mmol/L 141  --  143   TOTAL BILIRUBIN mg/dL  --   --  0 27    < > = values in this interval not displayed       Results from last 7 days   Lab Units 05/19/23  1449   INR  1 03     Results from last 7 days   Lab Units 05/24/23  0807 05/22/23  1601 05/22/23  1122 05/22/23  0800 05/21/23  2048 05/21/23  1626 05/21/23  1117 05/21/23  0838 05/20/23  2051 05/20/23  1629 05/20/23  1156 05/20/23  0733   POC GLUCOSE mg/dl 104 81 100 81 96 99 131 105 116 83 89 99         Results from last 7 days   Lab Units 05/20/23  0422 05/19/23  1449   LACTIC ACID mmol/L  --  1 0   PROCALCITONIN ng/ml 0 17 0 16       Lines/Drains:  Invasive Devices     Peripheral Intravenous Line  Duration           Peripheral IV 05/19/23 Distal;Left;Upper;Ventral (anterior) Arm 4 days    Peripheral IV 05/19/23 Right Antecubital 4 days    Peripheral IV 05/23/23 Right;Ventral (anterior) Forearm <1 day          Drain  Duration           Urethral Catheter 56 days              Urinary Catheter:  Goal for removal: N/A - Chronic Kay               Imaging: No pertinent imaging reviewed  Recent Cultures (last 7 days):   Results from last 7 days   Lab Units 05/19/23  1511 05/19/23  1449   BLOOD CULTURE  No Growth After 4 Days  No Growth After 4 Days  URINE CULTURE   --  70,000-79,000 cfu/ml Escherichia coli ESBL*  20,000-29,000 cfu/ml Lactobacillus species*  <10,000 cfu/ml Enterococcus species*       Last 24 Hours Medication List:   Current Facility-Administered Medications   Medication Dose Route Frequency Provider Last Rate   • acetaminophen  975 mg Oral [facility] Keith Wing MD     • ARIPiprazole  2 mg Oral Daily Renzo Poole MD     • aspirin  81 mg Oral Daily Ethelyn SAMIA Jo     • cholecalciferol  1,000 Units Oral Daily Ethelyn SAMIA Jo     • ezetimibe  10 mg Oral Daily Ethelyn VLADISLAV JoNP     • FLUoxetine  40 mg Oral Daily Ethelyn Sandro CRNP     • heparin (porcine)  5,000 Units Subcutaneous Q8H [address], CRNP     • HYDROmorphone  0 5 mg Intravenous Q4H PRN Ethelyn SAMIA Jo     • levothyroxine  88 mcg Oral Early Morning Ethelyn VLADISLAV JoNP     • lidocaine  1 patch Topical Daily Keith Wing MD     • melatonin  9 mg Oral HS Ethelyn VLADISLAV JoNP     • mirtazapine  30 mg Oral HS Ethelyn VLADISLAV JoNP     • multi-electrolyte  125 mL/hr Intravenous Continuous Beatirs Mcgrath,  mL/hr (05/23/23 1606)   • nystatin   Topical BID Ethelyn VLADISLAV JoNP     • oxyCODONE  5 mg Oral Q4H PRN Ethelyn SAMIA Jo     • oxyCODONE  2 5 mg Oral Q4H PRN Ethelyn Sandro, CRNP     • pantoprazole  40 mg Oral Early Morning Ethelyn SAMIA Jo          Today, Patient Was Seen By: Keith Wing MD    **Please Note: This note may have been constructed using a voice recognition system  **

## 2023-05-24 NOTE — ASSESSMENT & PLAN NOTE
Wt Readings from Last 3 Encounters:   05/23/23 90 9 kg (200 lb 6 4 oz)   05/19/23 90 kg (198 lb 6 4 oz)   05/18/23 90 kg (198 lb 6 4 oz)   · Echo from February 2022: LVEF 67%  · Patient is not currently on any diuretics    · Moderate diastolic dysfunction (grade II) and right-sided pressures estimated 51 mm Hg on prior echo in 2022  · Monitor volume status closely w I/Os, daily weights, clinically, given IVF for REGINO  · Check CXR 5/24 after appreciating scant bibasilar rales on exam

## 2023-05-25 PROBLEM — R45.851 SUICIDAL IDEATIONS: Status: RESOLVED | Noted: 2023-05-19 | Resolved: 2023-05-25

## 2023-05-25 PROBLEM — N17.9 AKI (ACUTE KIDNEY INJURY) (HCC): Status: RESOLVED | Noted: 2023-05-21 | Resolved: 2023-05-25

## 2023-05-25 PROBLEM — E05.90 HYPERTHYROIDISM: Status: ACTIVE | Noted: 2023-05-25

## 2023-05-25 PROBLEM — R53.83 LETHARGY: Status: RESOLVED | Noted: 2023-04-02 | Resolved: 2023-05-25

## 2023-05-25 LAB
ANION GAP SERPL CALCULATED.3IONS-SCNC: 8 MMOL/L (ref 4–13)
BUN SERPL-MCNC: 21 MG/DL (ref 5–25)
CALCIUM SERPL-MCNC: 7.7 MG/DL (ref 8.4–10.2)
CHLORIDE SERPL-SCNC: 107 MMOL/L (ref 96–108)
CO2 SERPL-SCNC: 27 MMOL/L (ref 21–32)
CREAT SERPL-MCNC: 1.11 MG/DL (ref 0.6–1.3)
ERYTHROCYTE [DISTWIDTH] IN BLOOD BY AUTOMATED COUNT: 17.7 % (ref 11.6–15.1)
FERRITIN SERPL-MCNC: 716 NG/ML (ref 11–307)
FOLATE SERPL-MCNC: 2 NG/ML
GFR SERPL CREATININE-BSD FRML MDRD: 51 ML/MIN/1.73SQ M
GLUCOSE SERPL-MCNC: 108 MG/DL (ref 65–140)
HCT VFR BLD AUTO: 26.7 % (ref 34.8–46.1)
HGB BLD-MCNC: 7.9 G/DL (ref 11.5–15.4)
IRON SATN MFR SERPL: 49 % (ref 15–50)
IRON SERPL-MCNC: 40 UG/DL (ref 50–170)
MCH RBC QN AUTO: 30.3 PG (ref 26.8–34.3)
MCHC RBC AUTO-ENTMCNC: 29.6 G/DL (ref 31.4–37.4)
MCV RBC AUTO: 102 FL (ref 82–98)
PLATELET # BLD AUTO: 233 THOUSANDS/UL (ref 149–390)
POTASSIUM SERPL-SCNC: 3.7 MMOL/L (ref 3.5–5.3)
RBC # BLD AUTO: 2.61 MILLION/UL (ref 3.81–5.12)
SODIUM SERPL-SCNC: 142 MMOL/L (ref 135–147)
TIBC SERPL-MCNC: 82 UG/DL (ref 250–450)
VIT B12 SERPL-MCNC: 1192 PG/ML (ref 180–914)
WBC # BLD AUTO: 8.76 THOUSAND/UL (ref 4.31–10.16)

## 2023-05-25 RX ORDER — LEVOTHYROXINE SODIUM 0.07 MG/1
75 TABLET ORAL
Status: DISCONTINUED | OUTPATIENT
Start: 2023-05-26 | End: 2023-05-26 | Stop reason: HOSPADM

## 2023-05-25 RX ADMIN — HEPARIN SODIUM 5000 UNITS: 5000 INJECTION INTRAVENOUS; SUBCUTANEOUS at 06:13

## 2023-05-25 RX ADMIN — HEPARIN SODIUM 5000 UNITS: 5000 INJECTION INTRAVENOUS; SUBCUTANEOUS at 21:29

## 2023-05-25 RX ADMIN — Medication 1000 UNITS: at 09:22

## 2023-05-25 RX ADMIN — ACETAMINOPHEN 975 MG: 325 TABLET ORAL at 21:29

## 2023-05-25 RX ADMIN — MIRTAZAPINE 30 MG: 15 TABLET, FILM COATED ORAL at 21:29

## 2023-05-25 RX ADMIN — ACETAMINOPHEN 975 MG: 325 TABLET ORAL at 13:49

## 2023-05-25 RX ADMIN — EZETIMIBE 10 MG: 10 TABLET ORAL at 09:22

## 2023-05-25 RX ADMIN — MELATONIN 9 MG: 3 TAB ORAL at 21:29

## 2023-05-25 RX ADMIN — PANTOPRAZOLE SODIUM 40 MG: 40 TABLET, DELAYED RELEASE ORAL at 06:13

## 2023-05-25 RX ADMIN — NYSTATIN: 100000 POWDER TOPICAL at 17:42

## 2023-05-25 RX ADMIN — HEPARIN SODIUM 5000 UNITS: 5000 INJECTION INTRAVENOUS; SUBCUTANEOUS at 13:50

## 2023-05-25 RX ADMIN — NYSTATIN: 100000 POWDER TOPICAL at 09:23

## 2023-05-25 RX ADMIN — ARIPIPRAZOLE 2 MG: 2 TABLET ORAL at 09:23

## 2023-05-25 RX ADMIN — ACETAMINOPHEN 975 MG: 325 TABLET ORAL at 06:13

## 2023-05-25 RX ADMIN — LEVOTHYROXINE SODIUM 88 MCG: 88 TABLET ORAL at 06:13

## 2023-05-25 RX ADMIN — LIDOCAINE 5% 1 PATCH: 700 PATCH TOPICAL at 09:23

## 2023-05-25 RX ADMIN — ASPIRIN 81 MG: 81 TABLET, COATED ORAL at 09:22

## 2023-05-25 RX ADMIN — FLUOXETINE 40 MG: 20 CAPSULE ORAL at 09:22

## 2023-05-25 NOTE — ASSESSMENT & PLAN NOTE
Malnutrition Findings:   Adult Malnutrition type: Chronic illness  Adult Degree of Malnutrition: Malnutrition of moderate degree  Malnutrition Characteristics: Muscle loss, Weight loss                  360 Statement: Moderate malnutrition in the context of chronic illness r/t inadequate energy intake with confusion as evidenced by wt decrease of 93#/32 7% x 11 mo, moderate temporal wasting  Will treat with nutrition therapy, PO diet, supplements  BMI Findings: Body mass index is 36 46 kg/m²       Encourage improved PO intake now that pt is more alert

## 2023-05-25 NOTE — ASSESSMENT & PLAN NOTE
Wt Readings from Last 3 Encounters:   05/25/23 93 4 kg (205 lb 12 8 oz)   05/19/23 90 kg (198 lb 6 4 oz)   05/18/23 90 kg (198 lb 6 4 oz)   · Echo from February 2022: LVEF 67%  · Patient is not currently on any diuretics  · Moderate diastolic dysfunction (grade II) and right-sided pressures estimated 51 mm Hg on prior echo in 2022  · Monitor volume status closely w I/Os, daily weights, clinically, given IVF for REGINO  · B/l rales on exam, however CXR does not appear to show pulmonary edema on my interpretation  Suspect rales d/t atelectasis in the setting of bed-bound status     · F/u Radiology report for CXR  ·

## 2023-05-25 NOTE — ASSESSMENT & PLAN NOTE
Baseline Cr appears to be approx 1 0, 1 2 on admission 5/19, 1 22 5/20, increased to 1 41 5/21, 1 63 on 5/22, 1 58 on 5/23  Received IV contrast for imaging on 5/19  Suspect REGINO due to contrast induced nephropathy vs pre-renal/hypovolemia  Poor PO intake, dry mucus membranes  Oliguria over 24h period 5/21-22: approx 0 2 ml/kg/h  UOP appears not to have been recorded for 5/22, however UOP 5/23 appears adequate based on chart review and volume of urine presently in Kay bag (approx 1 ml/kg/h)    US KUB negative for hydronephrosis    Discontinue Plasmalyte, as REGINO has resolved, pt is more alert to take PO, and has peripheral edema in the BUEs on exam  Strict I/Os and daily weights  Monitor adequacy of PO intake  Monitor Cr on AM BMP

## 2023-05-25 NOTE — ASSESSMENT & PLAN NOTE
· Poor oral intake, decreased mobility  Pt predominantly bed-bound @ facility x months, uses wheelchair when leaving bed  · Pt may benefit from goals of care conversation with Palliative Care service at assisted living facility  No indication for Palliative Care consult this admission  · Per chart review, pt has declined since at least Oct 2022  No evidence of acute pathology during this admission and pt is medically stable to return to facility

## 2023-05-25 NOTE — ASSESSMENT & PLAN NOTE
"· At admission, patient reported \"I need something for the pain, if not I am going to just take care of it myself  \" Provider then asked if the patient had thoughts of hurting herself  Patient replied \"Lately, yes  \" When asked about a plan to harm herself, patient's son at bedside interjected and stated that several days ago the patient mentioned using gun to harm herself if she had one  Patient's son states the patient has been very depressed recently, which has prompted her antidepressants to be adjusted      · Psychiatry on board, appreciate recommendations  · Pt has improved this admission, SI resolved, cleared by psychiatry and will not require admission to inpatient psych  · Discontinue virtual 1:1  "

## 2023-05-25 NOTE — ASSESSMENT & PLAN NOTE
TSH high on prior admission, levothyroxine increased from 75 to 88 mcg  TSH low this admission, free T4 high   Decrease levothyroxine dose to 75 mcg  Will need o/p f/u w PCP to monitor TSH

## 2023-05-25 NOTE — CASE MANAGEMENT
Case Management Assessment & Discharge Planning Note    Patient name Evan Ozuna  Location S /S -01 MRN [id number]  : 1955 Date 2023       Current Admission Date: 2023  Current Admission Diagnosis:REGINO (acute kidney injury) [facility])   Patient Active Problem List    Diagnosis Date Noted   • REGINO (acute kidney injury) ([facility] [address] ) 2023   • Moderate protein-calorie malnutrition ([address] ) 2023   • Suicidal ideations 2023   • Skin tear of right forearm without complication    • Pressure injury of left buttock, stage 3 ([address] ) 2023   • Hemorrhage of muscle 2023   • Lethargy 2023   • Urinary retention 2023   • Pressure injury of right buttock, stage 2 ([address] ) 2023   • Stage II pressure ulcer of left buttock ([address] ) 2023   • Pressure injury of sacral region, stage 3 ([address] ) 2023   • Pressure injury of back, stage 2 ([address] ) 2023   • Pressure injury of right knee, stage 2 ([address] ) 2023   • Ambulatory dysfunction 2023   • Hypernatremia 2023   • Insomnia 2023   • Sepsis secondary to UTI ([address] ) 2023   • Respiratory insufficiency 2023   • Nausea 2023   • Left lower quadrant abdominal pain 2022   • Chest pressure 2022   • Fungal dermatitis 10/05/2022   • Abnormal urine 2022   • Weakness 2022   • Syncope 2022   • Elevated d-dimer 2022   • Coronary artery disease involving native coronary artery of native heart without angina pectoris 2022   • Chronic heart failure with preserved ejection fraction ([address] ) 2022   • Chronic respiratory failure ([address] ) 2022   • Dyspnea on exertion 2022   • Primary osteoarthritis of one hip, right 12/10/2021   • Orthostatic hypotension 10/24/2021   • Morbid obesity due to excess calories ([facility] [address] ) 2021   • Onychomycosis of toenail 2021   • Calf tenderness 2021   • Asymptomatic bacteriuria 2021   • Chronic fatigue and malaise 03/12/2021   • Diarrhea 03/08/2021   • Acquired hypothyroidism 03/03/2021   • Suspected COVID-19 virus infection 12/04/2020   • Major depressive disorder 12/04/2020   • Gambling disorder, episodic, moderate 07/05/2018   • Severe episode of recurrent major depressive disorder, without psychotic features ([facility] ) 07/04/2018   • Failure to thrive in adult    • GERD (gastroesophageal reflux disease) 07/10/2016   • Epigastric abdominal pain 07/10/2016   • Seizures ([facility] )    • Restless leg syndrome    • Hypertension    • Hyperlipidemia    • Type 2 diabetes mellitus ([facility] )       LOS (days): 6  Geometric Mean LOS (GMLOS) (days): 2 60  Days to GMLOS:-3 2     OBJECTIVE:    Risk of Unplanned Readmission Score: 31 7         Current admission status: Inpatient       Preferred Pharmacy:   [address] [facility] [address], [address] - [address] [facility]  [address] [facility] [address] [address]  Phone: [phone] Fax: [phone]    Primary Care Provider: Leif Woody MD    Primary Insurance: Anette Oh [facility] REP  Secondary Insurance: [address]    ASSESSMENT:  [address] [place] [address] [address], [facility] Representative - Son   Primary Phone: [phone] (Mobile)  Home Phone: [phone]                              Patient Information  Admitted from[de-identified] Facility [facility])  Mental Status: Alert  During Assessment patient was accompanied by: Son  Assessment information provided by[de-identified] Patient, Son  Caregiver's Name[de-identified] facility staff, [facility] of Residence: [address] do you live in?: [place] entry access options   Select all that apply : No steps to enter home  Type of Current Residence: Facility ([facility])  In the last 12 months, was there a time when you were not able to pay the mortgage or rent on time?: No  In the last 12 months, how many places have you lived?: 2  In the last 12 months, was there a time when you did not have a steady place to sleep or slept in a shelter (including now)?: No  Living Arrangements: Other (Comment) ([facility] - SNF)    Activities of Daily Living Prior to Admission  Functional Status: Total dependent  Completes ADLs independently?: No  Level of ADL dependence: Total Dependent  Ambulates independently?: No  Level of ambulatory dependence:  Total Dependent (bed bound at baseline; needs help with feeding)         Patient Information Continued  Does patient have prescription coverage?: Yes  Within the past 12 months, you worried that your food would run out before you got the money to buy more : Never true  Within the past 12 months, the food you bought just didn't last and you didn't have money to get more : Never true  Food insecurity resource given?: N/A  Does patient receive dialysis treatments?: No  Does patient have a history of Mental Health Diagnosis?: Yes         Means of Transportation  In the past 12 months, has lack of transportation kept you from medical appointments or from getting medications?: No  In the past 12 months, has lack of transportation kept you from meetings, work, or from getting things needed for daily living?: No  Was application for public transport provided?: N/A        DISCHARGE DETAILS:    Discharge planning discussed with[de-identified] patient and son at bedside  [place] of Choice: Yes (re: facility return)  Comments - Freedom of Choice: son confirmed that patient is a long-term resident at [facility], but inquiring about possible transfer to [address] [place] [address] contacted family/caregiver?: Yes (son, at bedside)  Were Treatment Team discharge recommendations reviewed with patient/caregiver?: Yes  Did patient/caregiver verbalize understanding of patient care needs?: N/A- going to facility  Were patient/caregiver advised of the risks associated with not following Treatment Team discharge recommendations?: Yes    Contacts  Patient Contacts: Mauricio Covert  Relationship to Patient[de-identified] "Family  Contact Method: In Person  Reason/Outcome: Continuity of Care, Emergency Contact, Referral, Discharge [address]         Is the patient interested in Jarret Rey at discharge?: No    DME Referral Provided  Referral made for DME?: No    Other Referral/Resources/Interventions Provided:  Interventions: Facility Return, SNF  Referral Comments: Patient admitted due to REGINO  TT received from psych reporting that patient no longer in need of in-patient psych and they are clearing her for return to her skilled nursing facility  Per SLIM, virtual 1:1 to be d/c'd today, and anticipate d/c for tomorrow, 5/26  Met with patient and son, Kavitha Perrin, at bedside to review above  Son confirmed that patient has been a resident at [facility] since September 2022, and states she has \"no where else to go\" at discharge  Reports that he's been inquiring about transfer to [address] [place] [address], as they have a friend who works there, but he hasn't been able to arrange anything prior to hospitalization  Son aware that this writer to reach out to [facility] to see if they are able to accept patient, otherwise, plan is to return to [facility] and continue to pursue facility transfer from facility, as son also states [facility]'s [facility] will qualify for SN[facility] placement at a [place] facility  TT sent to [address] [facility] to see if they have availability at [facility]; to be in touch  Confirmed that 1:1 needs to be off for 24 hrs and aware of tentative d/c for tomorrow, 5/26      Would you like to participate in our [address] service program?  : No - Declined    Treatment Team Recommendation: SNF  Discharge Destination Plan[de-identified] SNF  Transport at Discharge : [facility] Ambulance                                                             "

## 2023-05-25 NOTE — ASSESSMENT & PLAN NOTE
Pt with episodes of intermittent lethargy earlier this admission that could be attributed to administration of sedating agents, however exhibiting significant lethargy throughout the day 5/23/23, not having received opiates      Possible medication SE after starting gabapentin and Abilify  Perfecto held 5/23 and pt has been more alert, oriented, and interactive 5/24 and 5/25    Lab orders:  ABG - minimal hypercapnia, not high enough to explain lethargy  Ammonia - normal  TSH - low  Free T4 - high  CMP - minimal hypoglycemia, insufficient to explain lethargy  CBC - anemia, Hgb>7

## 2023-05-25 NOTE — PROGRESS NOTES
Progress Note - Behavioral Health   Omar Tay 75 y o  female MRN: [id number]  Unit/Bed#: S -01 Encounter: [id number]    Assessment/Plan   Principal Problem:    REGINO (acute kidney injury) ([address] )  Active Problems:    Failure to thrive in adult    Major depressive disorder    Chronic heart failure with preserved ejection fraction (HCC)    Lethargy    Suicidal ideations    Moderate protein-calorie malnutrition ([address] )      Recommended Treatment: At this time, based on comprehensive psychiatric evaluation, the patient has shown marked improvement in both mood and affect as her hospital stay has progressed in the setting of ongoing medical management  At this time the patient denies suicidal ideations, plan or intent to harm herself or others  The patient desires to return to [facility] and her son at bedside is also in agreement with this plan  Thus, provided there are no new alterations in the patient's mental status following this psychiatric assessment, the patient is psychiatrically cleared to be discharged home  In the event that the patient demonstrates any concerning behaviors between now and the time of discharge, please consult psychiatry for further guidance      Continue Abilify 2 mg daily for augmentation of antidepressant  Continue gabapentin 300 mg 3 times daily for anxiety and for pain  Continue Prozac 40 mg daily for symptoms of depression and anxiety  Continue Remeron 30 mg at bedtime for symptoms of depression  Continue melatonin 9 mg at bedtime for sleep    Patient no longer requires one-to-one monitoring from a psychiatric standpoint  Safety checks and vitals per unit protocol  Case discussed with treatment team   Risks, benefits and possible side effects of Medications: Risks, benefits, and possible side effects of medications have been explained to the patient, who verbalizes understanding    ------------------------------------------------------------    This is a progress follow-up "note for the patient Howard Bernal  , a 59-year-old female with a past psychiatric history of recurrent major depressive disorder as well as episodic gambling disorder who presented to the [place] [address]  5/19/2023 due to intermittent confusion and pain that began 2 days prior to emergency department presentation  At this time the patient continues to require ongoing inpatient medical care and is not medically clear at this time  Psychiatry was originally consulted due to suicidal ideations and initial consultation was performed and 5/22/2023  At the time of the initial consult the patient was started on Abilify 2 mg daily for augmentation of antidepressant and her dose of gabapentin was increased to 300 mg 3 times daily for anxiety and pain  Please see initial consult note for further details  Subjective:     Howard Bernal was seen today for follow up for ongoing psychiatric care  She is currently being seen in the hospital for treatment of recurrent major depressive disorder  Today, Jarret Isaura is seen sitting in bed comfortably in no acute distress  Her son Rafy Yonug is at bedside today  Today, Jarret Delarosa continues to make steady improvements in her mentation and she is alert and oriented to person, place, and time  Jarret Delarosa reports that she is feeling \"pretty good\"  She reports that her mood has significantly improved as her hospital stay has progressed  She denies suicidal thoughts (stating \"not at all\") and no longer feels that she needs inpatient psychiatric care  She denies feeling depressed  Today, Jarret Delarosa states that she is looking forward to spending time with her son and looking forward to lunch  She voices no complaints or concerns  Today, when asked about hallucinations, Jarret Delarosa reports that this morning she saw her younger son lie down next to her in the bed  She realizes that this could not have been possible  She denies experiencing auditory hallucinations      Treatment options were " "reviewed with both the son and the patient, who feel comfortable with her returning to [facility] upon medical clearance and following with Dr Buel Epley for ongoing psychiatric care  Progress Toward Goals: steady improvement    Psychiatric Review of Systems:  Behavior over the last 24 hours: improved  Sleep: improving  Appetite: improving  Medication side effects: none verbalized  ROS: Complete review of systems is negative except as noted above  Vital signs in last 24 hours:  Temp:  [97 4 °F (36 3 °C)-97 7 °F (36 5 °C)] 97 4 °F (36 3 °C)  HR:  [72-78] 78  Resp:  [18] 18  BP: ()/(50-59) 99/57    Mental Status Exam:  Appearance:  Patient is a 69-year-old overtly  female with short gray hair    Alert, appears older than stated age, good eye contact, wearing a hospital gown, fair grooming and hygiene   Behavior:  calm and cooperative   Motor: no abnormal movements   Speech:  spontaneous, normal rate, soft and coherent   Mood:  \"pretty good\"   Affect:  Less constricted, brighter than previous   Thought Process:  Organized, logical, goal-directed   Thought Content: no verbalized delusions or overt paranoia   Perceptual disturbances: denies current hallucinations and does not appear to be responding to internal stimuli at this time, reports experiencing visual hallucinations of her younger son this morning   Risk Potential: No active suicidal ideation, No active homicidal ideation   Cognition: oriented to person, place, time, and situation, appears to be of average intelligence, age-appropriate attention span and concentration and cognition not formally tested   Insight:  Limited   Judgment: Improving     Current Medications:  Current Facility-Administered Medications   Medication Dose Route Frequency Provider Last Rate   • acetaminophen  975 mg Oral [facility] Pedro Molina MD     • ARIPiprazole  2 mg Oral Daily Mary Kevin MD     • aspirin  81 mg Oral Daily SAMIA Jorge   " • cholecalciferol  1,000 Units Oral Daily SAMIA Moya     • ezetimibe  10 mg Oral Daily SAMIA Moya     • FLUoxetine  40 mg Oral Daily SAMIA Moya     • heparin (porcine)  5,000 Units Subcutaneous Q8H [address] [place] SAMIA Acosta     • HYDROmorphone  0 5 mg Intravenous Q4H PRN SAMIA Moya     • levothyroxine  88 mcg Oral Early Morning SAMIA Moya     • lidocaine  1 patch Topical Daily Jermaine South MD     • melatonin  9 mg Oral HS SAMIA Moya     • mirtazapine  30 mg Oral HS SAMIA oMya     • nystatin   Topical BID SAMIA Moya     • oxyCODONE  5 mg Oral Q4H PRN SAMIA Moya     • oxyCODONE  2 5 mg Oral Q4H PRN SAMIA Moya     • pantoprazole  40 mg Oral Early Morning SAMIA Moya         Behavioral Health Medications: all current active meds have been reviewed  Changes as in plan section above  Laboratory results:  I have personally reviewed all pertinent laboratory/tests results    Recent Results (from the past 48 hour(s))   Comprehensive metabolic panel    Collection Time: 05/23/23  3:13 PM   Result Value Ref Range    Sodium 143 135 - 147 mmol/L    Potassium 4 4 3 5 - 5 3 mmol/L    Chloride 111 (H) 96 - 108 mmol/L    CO2 28 21 - 32 mmol/L    ANION GAP 4 4 - 13 mmol/L    BUN 28 (H) 5 - 25 mg/dL    Creatinine 1 53 (H) 0 60 - 1 30 mg/dL    Glucose 59 (L) 65 - 140 mg/dL    Calcium 7 9 (L) 8 4 - 10 2 mg/dL    Corrected Calcium 9 7 8 3 - 10 1 mg/dL    AST 29 13 - 39 U/L    ALT 12 7 - 52 U/L    Alkaline Phosphatase 103 34 - 104 U/L    Total Protein 4 5 (L) 6 4 - 8 4 g/dL    Albumin 1 8 (L) 3 5 - 5 0 g/dL    Total Bilirubin 0 27 0 20 - 1 00 mg/dL    eGFR 34 ml/min/1 73sq m   CBC and Platelet    Collection Time: 05/23/23  3:13 PM   Result Value Ref Range    WBC 7 61 4 31 - 10 16 Thousand/uL    RBC 2 69 (L) 3 81 - 5 12 Million/uL    Hemoglobin 7 7 (L) 11 5 - 15 4 g/dL    Hematocrit 27 5 (L) 34 8 - 46 1 %  (H) 82 - 98 fL    MCH 28 6 26 8 - 34 3 pg    MCHC 28 0 (L) 31 4 - 37 4 g/dL    RDW 17 7 (H) 11 6 - 15 1 %    Platelets 199 283 - 425 Thousands/uL    MPV 10 8 8 9 - 12 7 fL   POCT Blood Gas (CG8+)    Collection Time: 05/23/23  3:14 PM   Result Value Ref Range    pH, Art i-STAT 7 364 7 350 - 7 450    pCO2, Art i-STAT 48 6 (H) 36 0 - 44 0 mm HG    pO2, ART i-STAT 124 0 75 0 - 129 0 mm HG    BE, i-STAT 2 -2 - 3 mmol/L    HCO3, Art i-STAT 27 7 22 0 - 28 0 mmol/L    CO2, i-STAT 29 21 - 32 mmol/L    O2 Sat, i-STAT 99 (H) 60 - 85 %    SODIUM, I-STAT 143 136 - 145 mmol/l    Potassium, i-STAT 4 0 3 5 - 5 3 mmol/L    Calcium, Ionized i-STAT 1 26 1 12 - 1 32 mmol/L    Hct, i-STAT 22 (L) 34 8 - 46 1 %    Hgb, i-STAT 7 5 (L) 11 5 - 15 4 g/dl    Glucose, i-STAT 66 65 - 140 mg/dl    Specimen Type ARTERIAL     Delivery System Nasal Cannula     Respiratory Rate 2    CBC    Collection Time: 05/24/23  6:17 AM   Result Value Ref Range    WBC 10 50 (H) 4 31 - 10 16 Thousand/uL    RBC 3 16 (L) 3 81 - 5 12 Million/uL    Hemoglobin 9 2 (L) 11 5 - 15 4 g/dL    Hematocrit 32 0 (L) 34 8 - 46 1 %     (H) 82 - 98 fL    MCH 29 1 26 8 - 34 3 pg    MCHC 28 8 (L) 31 4 - 37 4 g/dL    RDW 17 5 (H) 11 6 - 15 1 %    Platelets 698 318 - 818 Thousands/uL    MPV 10 5 8 9 - 12 7 fL   Basic metabolic panel    Collection Time: 05/24/23  6:17 AM   Result Value Ref Range    Sodium 141 135 - 147 mmol/L    Potassium 4 3 3 5 - 5 3 mmol/L    Chloride 109 (H) 96 - 108 mmol/L    CO2 28 21 - 32 mmol/L    ANION GAP 4 4 - 13 mmol/L    BUN 26 (H) 5 - 25 mg/dL    Creatinine 1 37 (H) 0 60 - 1 30 mg/dL    Glucose 77 65 - 140 mg/dL    Calcium 7 9 (L) 8 4 - 10 2 mg/dL    eGFR 39 ml/min/1 73sq m   Fingerstick Glucose (POCT)    Collection Time: 05/24/23  8:07 AM   Result Value Ref Range    POC Glucose 104 65 - 140 mg/dl   Ammonia    Collection Time: 05/24/23 11:34 AM   Result Value Ref Range    Ammonia 22 18 - 72 umol/L   TSH, 3rd generation with Free T4 reflex    Collection Time: 05/24/23 11:34 AM   Result Value Ref Range    TSH 3RD GENERATON 0 359 (L) 0 450 - 4 500 uIU/mL   T4, free    Collection Time: 05/24/23 11:34 AM   Result Value Ref Range    Free T4 1 48 (H) 0 61 - 1 12 ng/dL   Protein / creatinine ratio, urine    Collection Time: 05/24/23 12:15 PM   Result Value Ref Range    Creatinine, Ur 65 1 mg/dL    Protein Urine Random 47 mg/dL    Prot/Creat Ratio, Ur 0 72 (H) 0 00 - 0 10   Occult blood 1-3, stool    Collection Time: 05/24/23  9:13 PM   Result Value Ref Range    Fecal Occult Blood Diagnostic Negative Negative    Fecal Occult Blood Diagnostic 2 Test not performed Negative    Fecal Occult Blood Diagnostic 3 Test not performed Negative   Basic metabolic panel    Collection Time: 05/25/23  9:41 AM   Result Value Ref Range    Sodium 142 135 - 147 mmol/L    Potassium 3 7 3 5 - 5 3 mmol/L    Chloride 107 96 - 108 mmol/L    CO2 27 21 - 32 mmol/L    ANION GAP 8 4 - 13 mmol/L    BUN 21 5 - 25 mg/dL    Creatinine 1 11 0 60 - 1 30 mg/dL    Glucose 108 65 - 140 mg/dL    Calcium 7 7 (L) 8 4 - 10 2 mg/dL    eGFR 51 ml/min/1 73sq m   CBC    Collection Time: 05/25/23  9:41 AM   Result Value Ref Range    WBC 8 76 4 31 - 10 16 Thousand/uL    RBC 2 61 (L) 3 81 - 5 12 Million/uL    Hemoglobin 7 9 (L) 11 5 - 15 4 g/dL    Hematocrit 26 7 (L) 34 8 - 46 1 %     (H) 82 - 98 fL    MCH 30 3 26 8 - 34 3 pg    MCHC 29 6 (L) 31 4 - 37 4 g/dL    RDW 17 7 (H) 11 6 - 15 1 %    Platelets 574 184 - 560 Thousands/uL        Mary Kevin MD

## 2023-05-26 VITALS
SYSTOLIC BLOOD PRESSURE: 92 MMHG | WEIGHT: 209.44 LBS | TEMPERATURE: 98.2 F | RESPIRATION RATE: 18 BRPM | HEART RATE: 96 BPM | BODY MASS INDEX: 37.1 KG/M2 | DIASTOLIC BLOOD PRESSURE: 50 MMHG | OXYGEN SATURATION: 98 %

## 2023-05-26 PROBLEM — R41.89 COGNITIVE DECLINE: Status: ACTIVE | Noted: 2023-05-26

## 2023-05-26 PROBLEM — R52 PAIN: Status: ACTIVE | Noted: 2023-05-26

## 2023-05-26 PROBLEM — R41.89 COGNITIVE DECLINE: Status: RESOLVED | Noted: 2023-05-26 | Resolved: 2023-05-26

## 2023-05-26 PROBLEM — R52 PAIN: Status: RESOLVED | Noted: 2023-05-26 | Resolved: 2023-05-26

## 2023-05-26 LAB
ANION GAP SERPL CALCULATED.3IONS-SCNC: 7 MMOL/L (ref 4–13)
BUN SERPL-MCNC: 21 MG/DL (ref 5–25)
CALCIUM SERPL-MCNC: 7.8 MG/DL (ref 8.4–10.2)
CHLORIDE SERPL-SCNC: 110 MMOL/L (ref 96–108)
CO2 SERPL-SCNC: 24 MMOL/L (ref 21–32)
CREAT SERPL-MCNC: 1.09 MG/DL (ref 0.6–1.3)
ERYTHROCYTE [DISTWIDTH] IN BLOOD BY AUTOMATED COUNT: 17.3 % (ref 11.6–15.1)
GFR SERPL CREATININE-BSD FRML MDRD: 52 ML/MIN/1.73SQ M
GLUCOSE SERPL-MCNC: 86 MG/DL (ref 65–140)
HCT VFR BLD AUTO: 28.9 % (ref 34.8–46.1)
HGB BLD-MCNC: 8.3 G/DL (ref 11.5–15.4)
MCH RBC QN AUTO: 29.3 PG (ref 26.8–34.3)
MCHC RBC AUTO-ENTMCNC: 28.7 G/DL (ref 31.4–37.4)
MCV RBC AUTO: 102 FL (ref 82–98)
PLATELET # BLD AUTO: 214 THOUSANDS/UL (ref 149–390)
PMV BLD AUTO: 10.7 FL (ref 8.9–12.7)
POTASSIUM SERPL-SCNC: 4.2 MMOL/L (ref 3.5–5.3)
RBC # BLD AUTO: 2.83 MILLION/UL (ref 3.81–5.12)
SODIUM SERPL-SCNC: 141 MMOL/L (ref 135–147)
WBC # BLD AUTO: 7.26 THOUSAND/UL (ref 4.31–10.16)

## 2023-05-26 RX ORDER — ARIPIPRAZOLE 2 MG/1
2 TABLET ORAL DAILY
Qty: 30 TABLET | Refills: 0 | Status: SHIPPED | OUTPATIENT
Start: 2023-05-27 | End: 2023-06-26

## 2023-05-26 RX ORDER — FERROUS SULFATE 325(65) MG
325 TABLET ORAL EVERY OTHER DAY
Qty: 15 TABLET | Refills: 0 | Status: SHIPPED | OUTPATIENT
Start: 2023-05-26 | End: 2023-06-25

## 2023-05-26 RX ORDER — ACETAMINOPHEN 325 MG/1
975 TABLET ORAL EVERY 8 HOURS SCHEDULED
Refills: 0
Start: 2023-05-26

## 2023-05-26 RX ORDER — LEVOTHYROXINE SODIUM 0.07 MG/1
75 TABLET ORAL
Qty: 30 TABLET | Refills: 0 | Status: SHIPPED | OUTPATIENT
Start: 2023-05-26 | End: 2023-06-25

## 2023-05-26 RX ORDER — LANOLIN ALCOHOL/MO/W.PET/CERES
400 CREAM (GRAM) TOPICAL DAILY
Qty: 30 TABLET | Refills: 0 | Status: SHIPPED | OUTPATIENT
Start: 2023-05-26 | End: 2023-06-25

## 2023-05-26 RX ORDER — LIDOCAINE 50 MG/G
1 PATCH TOPICAL DAILY
Qty: 30 PATCH | Refills: 0 | Status: SHIPPED | OUTPATIENT
Start: 2023-05-27 | End: 2023-06-26

## 2023-05-26 RX ADMIN — HEPARIN SODIUM 5000 UNITS: 5000 INJECTION INTRAVENOUS; SUBCUTANEOUS at 05:01

## 2023-05-26 RX ADMIN — FLUOXETINE 40 MG: 20 CAPSULE ORAL at 09:35

## 2023-05-26 RX ADMIN — PANTOPRAZOLE SODIUM 40 MG: 40 TABLET, DELAYED RELEASE ORAL at 05:04

## 2023-05-26 RX ADMIN — Medication 1000 UNITS: at 09:35

## 2023-05-26 RX ADMIN — NYSTATIN: 100000 POWDER TOPICAL at 09:46

## 2023-05-26 RX ADMIN — LEVOTHYROXINE SODIUM 75 MCG: 75 TABLET ORAL at 05:04

## 2023-05-26 RX ADMIN — ASPIRIN 81 MG: 81 TABLET, COATED ORAL at 09:35

## 2023-05-26 RX ADMIN — ACETAMINOPHEN 975 MG: 325 TABLET ORAL at 05:04

## 2023-05-26 RX ADMIN — ARIPIPRAZOLE 2 MG: 2 TABLET ORAL at 09:46

## 2023-05-26 RX ADMIN — EZETIMIBE 10 MG: 10 TABLET ORAL at 09:35

## 2023-05-26 RX ADMIN — LIDOCAINE 5% 1 PATCH: 700 PATCH TOPICAL at 09:35

## 2023-05-26 NOTE — ASSESSMENT & PLAN NOTE
Wt Readings from Last 3 Encounters:   05/26/23 95 kg (209 lb 7 oz)   05/19/23 90 kg (198 lb 6 4 oz)   05/18/23 90 kg (198 lb 6 4 oz)     · Echo from February 2022: LVEF 67%  · Patient is not currently on any diuretics  · Moderate diastolic dysfunction (grade II) and right-sided pressures estimated 51 mm Hg on prior echo in 2022  · Monitor volume status closely w I/Os, daily weights, clinically, given IVF for REGINO  · B/l rales on exam  CXR shows mild linear opacities mostly in the lower lungs, likely areas of atelectasis or scarring    · F/u with PCP outpatient

## 2023-05-26 NOTE — CASE MANAGEMENT
Rainer Gardner 50 received request for transport authorization from: [facility]  Type of transport: [facility]  Date of transport: 5/26/23    End Location: [place] #: No Authorization Required  CM notified: Antonio Valle

## 2023-05-26 NOTE — ASSESSMENT & PLAN NOTE
Malnutrition Findings:   Adult Malnutrition type: Chronic illness  Adult Degree of Malnutrition: Malnutrition of moderate degree  Malnutrition Characteristics: Muscle loss, Weight loss                  360 Statement: Moderate malnutrition in the context of chronic illness r/t inadequate energy intake with confusion as evidenced by wt decrease of 93#/32 7% x 11 mo, moderate temporal wasting  Will treat with nutrition therapy, PO diet, supplements  BMI Findings: Body mass index is 37 1 kg/m²       Encourage improved PO intake now that pt is more alert

## 2023-05-26 NOTE — CASE MANAGEMENT
Case Management Discharge Planning Note    Patient name Page Judd  Location S /S -01 MRN [id number]  : 1955 Date 2023       Current Admission Date: 2023  Current Admission Diagnosis:Failure to thrive in adult   Patient Active Problem List    Diagnosis Date Noted   • Hyperthyroidism 2023   • Moderate protein-calorie malnutrition ([address] ) 2023   • Skin tear of right forearm without complication    • Pressure injury of left buttock, stage 3 ([address] ) 2023   • Hemorrhage of muscle 2023   • Urinary retention 2023   • Pressure injury of right buttock, stage 2 ([address] ) 2023   • Stage II pressure ulcer of left buttock ([address] ) 2023   • Pressure injury of sacral region, stage 3 ([address] ) 2023   • Pressure injury of back, stage 2 ([address] ) 2023   • Pressure injury of right knee, stage 2 ([address] ) 2023   • Ambulatory dysfunction 2023   • Hypernatremia 2023   • Insomnia 2023   • Sepsis secondary to UTI ([address] ) 2023   • Respiratory insufficiency 2023   • Nausea 2023   • Left lower quadrant abdominal pain 2022   • Chest pressure 2022   • Fungal dermatitis 10/05/2022   • Abnormal urine 2022   • Weakness 2022   • Syncope 2022   • Elevated d-dimer 2022   • Coronary artery disease involving native coronary artery of native heart without angina pectoris 2022   • Chronic heart failure with preserved ejection fraction ([address] ) 2022   • Chronic respiratory failure ([address] ) 2022   • Dyspnea on exertion 2022   • Primary osteoarthritis of one hip, right 12/10/2021   • Orthostatic hypotension 10/24/2021   • Morbid obesity due to excess calories ([address] ) 2021   • Onychomycosis of toenail 2021   • Calf tenderness 2021   • Asymptomatic bacteriuria 2021   • Chronic fatigue and malaise 2021   • Diarrhea 2021   • Acquired hypothyroidism 2021 • Suspected COVID-19 virus infection 12/04/2020   • Major depressive disorder 12/04/2020   • Gambling disorder, episodic, moderate 07/05/2018   • Severe episode of recurrent major depressive disorder, without psychotic features ([facility] ) 07/04/2018   • Failure to thrive in adult    • GERD (gastroesophageal reflux disease) 07/10/2016   • Epigastric abdominal pain 07/10/2016   • Seizures ([facility] )    • Restless leg syndrome    • Hypertension    • Hyperlipidemia    • Type 2 diabetes mellitus (HCC)       LOS (days): 7  Geometric Mean LOS (GMLOS) (days): 2 60  Days to GMLOS:-4 1     OBJECTIVE:  Risk of Unplanned Readmission Score: 32 08         Current admission status: Inpatient   Preferred Pharmacy:   [facility] [place] [address]  Phone: [phone] Fax: [phone]    Primary Care Provider: Franky Zhou MD    Primary Insurance: [facility]  Secondary Insurance: [address] [place] [address]    DISCHARGE DETAILS:    Discharge planning discussed with[de-identified] patient at bedside; call made to son, Arlen Marcelino ([phone]) and  left  Freedom of Choice: Yes (re: facility return)  Comments - [place] of Choice: [facility] unable to offer a bed at this time, plan is to return to [address] [place] [address] contacted family/caregiver?: Yes (VM left for son)  Were Treatment Team discharge recommendations reviewed with patient/caregiver?: Yes  Did patient/caregiver verbalize understanding of patient care needs?: N/A- going to facility  Were patient/caregiver advised of the risks associated with not following Treatment Team discharge recommendations?: Yes    Contacts  Patient Contacts: Arlen Marcelino  Relationship to Patient[de-identified] Family  Contact Method: Phone  Phone Number: [phone] - VM left  Reason/Outcome: Continuity of Care, Emergency Contact, Referral, Discharge Venancio Sanz         Is the patient interested in [facility] at discharge?: No    DME Referral Provided  Referral made for DME?: No    Other Referral/Resources/Interventions Provided:  Interventions: SNF, Facility Return  Referral Comments: Per MDs, patient clear for d/c today  TT with Nicolette at [facility] who reports they do not have a bed at [address], so plan at this time is for patient to return to [facility]  Confirmed ability to accept patient back today, and reports they will continue to work with son to arrange transport to [place] as patient/family preference  Call made to patient's son, Ross Madrid, and  left relaying above  Met with patient at bedside and she confirmed plan to return to [facility] today  IMM reviewed and copy provided for her records  Transport requested via [employer] for 3:00pm; awaiting confirmation of pick-up time  Would you like to participate in our [address] service program?  : No - Declined    Treatment Team Recommendation: SNF  Discharge Destination Plan[de-identified] SNF  Transport at Discharge : [facility]  Dispatcher Contacted: Yes  Number/Name of Dispatcher: [employer]     ETA of Transport (Date): 05/26/23  ETA of Transport (Time): 1500 (requested; awaiting confirmation)              IMM Given (Date):: 05/26/23  IMM reviewed with patient, patient agrees with discharge determination

## 2023-05-26 NOTE — DISCHARGE SUMMARY
[facility]  Discharge- Germaine Harrell 1955, 75 y o  female MRN: [id number]  Unit/Bed#: S -01 Encounter: [id number]  Primary Care Provider: Surinder Valladares MD   Date and time admitted to hospital: 5/19/2023  2:34 PM    * Failure to thrive in adult  Assessment & Plan  · Poor oral intake, decreased mobility  Pt predominantly bed-bound @ facility x months, uses wheelchair when leaving bed  · Pt may benefit from goals of care conversation with Palliative Care service at assisted living facility  If unavailable, have provided ambulatory referral to Palliative Care  No indication for Palliative Care consult this admission  · Per chart review, pt has had clinical decline since at least Oct 2022  No evidence of acute pathology during this admission and pt is medically stable to return to facility  Chronic heart failure with preserved ejection fraction [facility])  Assessment & Plan  Wt Readings from Last 3 Encounters:   05/26/23 95 kg (209 lb 7 oz)   05/19/23 90 kg (198 lb 6 4 oz)   05/18/23 90 kg (198 lb 6 4 oz)     · Echo from February 2022: LVEF 67%  · Patient is not currently on any diuretics  · Moderate diastolic dysfunction (grade II) and right-sided pressures estimated 51 mm Hg on prior echo in 2022  · Monitor volume status closely w I/Os, daily weights, clinically, given IVF for REGINO  · B/l rales on exam  CXR shows mild linear opacities mostly in the lower lungs, likely areas of atelectasis or scarring  · F/u with PCP outpatient    Hyperthyroidism  Assessment & Plan  · TSH high on prior admission, levothyroxine increased from 75 to 88 mcg  · TSH low this admission, free T4 high   Decrease levothyroxine dose to 75 mcg  · Will need o/p f/u w PCP to monitor TSH    Moderate protein-calorie malnutrition ([address] )  Assessment & Plan  Malnutrition Findings:   Adult Malnutrition type: Chronic illness  Adult Degree of Malnutrition: Malnutrition of moderate degree  Malnutrition Characteristics: Muscle loss, Weight loss                  360 Statement: Moderate malnutrition in the context of chronic illness r/t inadequate energy intake with confusion as evidenced by wt decrease of 93#/32 7% x 11 mo, moderate temporal wasting  Will treat with nutrition therapy, PO diet, supplements  BMI Findings: Body mass index is 37 1 kg/m²  Encourage improved PO intake now that pt is more alert    Pressure injury of left buttock, stage 3 (HCC)  Assessment & Plan  · POA, continue wound care    Medical Problems     Resolved Problems  Date Reviewed: 5/20/2023          Resolved    Diabetes mellitus type 2 in obese [facility]) 5/22/2023     Resolved by  Reba Mayo MD    UTI (urinary tract infection) 5/21/2023     Resolved by  Reba Mayo MD    Acute encephalopathy 5/20/2023     Resolved by  Carlene Garrett MD    Lethargy 5/25/2023     Resolved by  Reba Mayo MD    Suicidal ideations 5/25/2023     Resolved by  Reba Mayo MD    Vaginal bleeding 5/24/2023     Resolved by  Reba Mayo MD    REGINO (acute kidney injury) ([facility] [address] ) 5/25/2023     Resolved by  Reba Mayo MD    Anuria 5/23/2023     Resolved by  Reba Mayo MD    Cognitive decline 5/26/2023     Resolved by  Jose Gentile MD    Pain 5/26/2023     Resolved by  Jose Gentile MD        Discharging Resident: Reba Mayo MD  Discharging Attending: Daisy Fajardo MD  PCP: Eligio Hines MD  Admission Date:   Admission Orders (From admission, onward)     Ordered        05/19/23 [facility] 24  Once                      Discharge Date: 05/26/23    Consultations During Hospital Stay:  · Infectious disease  · OB/GYN  · Psychiatry  · Wound care    Procedures Performed:   · Wound care    Significant Findings / Test Results:   XR chest portable 5/26/2023: Mild linear opacities mostly in the lower lungs, likely areas of atelectasis or scarring  US kidney and bladder 5/23/2023: No hydronephrosis    [address] [facility] "pelvis transabdominal only 5/22/2023: Significantly limited study due to transabdominal only approach, patient body habitus and Kay catheter decompressing the urinary bladder  Grossly normal uterus with endometrium measuring approximately 2 mm, noting limited visualization  Ovaries not seen  CT abdomen pelvis with contrast 5/19/2023: No acute abdominal or pelvic pathology  Urinary bladder collapsed around Kay catheter, limiting evaluation  No evidence of pyelonephritis or perinephric abscess  No bowel obstruction  Evaluation for bowel inflammation somewhat limited by underdistention and lack of oral contrast, however no convincing inflammatory changes  Please note mild inflammation may not be apparent  CT head without contrast 5/19/2023: No acute intracranial abnormality  TSH depressed  Free T4 elevated  Anemia  Low folate  Low iron  BCx x2 No Growth after 5 days  UCx 5/19/23: more suggestive of colonization than active infection   70,000-79,000 cfu/ml Escherichia coli ESBL Abnormal         20,000-29,000 cfu/ml Lactobacillus species Abnormal        <10,000 cfu/ml Enterococcus species Abnormal          Incidental Findings:   · None     Test Results Pending at Discharge (will require follow up): · None     Outpatient Tests Requested:  · Neurocognitive/neuropsych evaluation    Complications:  None    Reason for Admission: Altered mental status with evidence of SI  Hospital Course:   Page Judd is a 75 y o  female patient who originally presented to the hospital on 5/19/2023 due to intermittent confusion and pain for the past 2 days prior to presentation  Of note, patient also reported thoughts of self-harm at that time stating \"I need something for the pain, if not I am going to just take care of it myself  \"  When asked about thoughts of self injury patient replied \"lately, yes  \"  Psychiatry consulted    Abnormal UA also noted on admission work-up showing moderate bacteria likely reflecting " colonization in setting of chronic indwelling Kay catheter  Patient had recently completed 7-day course of ertapenem for UTI  ID consulted in setting of history ESBL E  Coli  Patient monitored off antibiotics throughout duration of hospital course  Following psychiatry evaluation, patient was initiated on Abilify and continued on Prozac and Remeron  Gabapentin initially started to address chronic pain, but subsequently discontinued in setting of lethargy  Patient also initiated on iron supplementation with ferrous sulfate and folic acid supplementation in setting of mild anemia which was continued for discharge  Provided lidocaine patch for local pain control  Ultimately,  psychiatry determined no significant risk of self-harm and cleared for discharge to prior facility on new psych regimen on 5/25/23 without need for further inpatient psychiatric admission  Placed ambulatory referral to geriatrics, neuropsychiatry and palliative care for outpatient follow up  Please see above list of diagnoses and related plan for additional information  Condition at Discharge: stable    Discharge Day Visit / Exam:   Subjective:  Overnight, nursing reports pt confused and upset by confusion  When seen this morning, pt is lying in bed awake, alert, in no acute distress  She reports having bad dreams overnight with confusion as to her location, thinking that she was in [place]  This morning, she is oriented to self and to year, guesses that she is in [facility] living facility rather than [facility], but knows that she is in [address] [facility]  She reports headache 8 out of 10 in severity and abdominal pain 3 out of 10 in severity at this time  Remainder of brief review of systems is negative    Vitals: Blood Pressure: 92/50 (05/26/23 0755)  Pulse: 96 (05/26/23 0755)  Temperature: 98 2 °F (36 8 °C) (05/26/23 0755)  Temp Source: Oral (05/25/23 2032)  Respirations: 18 (05/25/23 2254)  Weight - Scale: 95 kg (209 "lb 7 oz) (05/26/23 0500)  SpO2: 98 % (05/26/23 0755)  Exam:   Physical Exam  Vitals and nursing note reviewed  Constitutional:       General: She is not in acute distress  Appearance: She is obese  She is ill-appearing (chronic)  She is not toxic-appearing or diaphoretic  HENT:      Head: Normocephalic and atraumatic  Mouth/Throat:      Mouth: Mucous membranes are moist       Comments: Mucus membranes not as dry as prior exam  Eyes:      General: No scleral icterus  Right eye: No discharge  Left eye: No discharge  Conjunctiva/sclera: Conjunctivae normal    Cardiovascular:      Rate and Rhythm: Normal rate and regular rhythm  Pulses: Normal pulses  Heart sounds: Murmur (III/VI heard throughout) heard  No friction rub  No gallop  Pulmonary:      Effort: Pulmonary effort is normal  No respiratory distress  Breath sounds: No stridor  Rales (scant bibasilar inspiratory rales) present  No wheezing or rhonchi  Chest:      Chest wall: No tenderness  Abdominal:      General: Bowel sounds are normal  There is no distension  Palpations: Abdomen is soft  Tenderness: There is no abdominal tenderness  There is no guarding or rebound  Musculoskeletal:         General: Swelling (b/l UEs with trace pitting dorsal hands, improved from prior exam) present  Right lower leg: No edema  Left lower leg: No edema  Skin:     General: Skin is warm and dry  Neurological:      Mental Status: She is alert  She is disoriented  Comments: Oriented to self, and year, but not to month (\"April\") or location (\"[facility]\")   Psychiatric:         Mood and Affect: Mood normal          Behavior: Behavior normal         Discussion with Family: Attempted to update  (son) via phone  Left voicemail  Discharge instructions/Information to patient and family:   See after visit summary for information provided to patient and family        Provisions for " Follow-Up Care:  See after visit summary for information related to follow-up care and any pertinent home health orders  Disposition:   Assisted Living Facility at [facility]    Planned Readmission: None    Discharge Medications:  See after visit summary for reconciled discharge medications provided to patient and/or family        **Please Note: This note may have been constructed using a voice recognition system**

## 2023-05-26 NOTE — DISCHARGE INSTR - AVS FIRST PAGE
Dear Omar Tay,     It was our pleasure to care for you here at [facility]  It is our hope that we were always able to exceed the expected standards for your care during your stay  You were hospitalized due to failure to thrive  You were cared for on the [facility] 3rd floor by Real Lennox, MD under the service of Alyssa Garrett MD with the [facility] who covers for your primary care physician (PCP), Aubrey Mcdonald MD, while you were hospitalized  If you have any questions or concerns related to this hospitalization, you may contact us at [phone]  For follow up as well as any medication refills, we recommend that you follow up with your primary care physician  A registered nurse will reach out to you by phone within a few days after your discharge to answer any additional questions that you may have after going home  However, at this time we provide for you here, the most important instructions / recommendations at discharge:     Notable Medication Adjustments -   Please start taking aripiprazole (Abilify) 1 tablet (2 mg) daily  Please start taking ferrous sulfate (iron) 1 tablet (325 mg) once every other day with meals  Please start taking folic acid (Folvite) 1 tablet (400 mcg) daily  Please decrease your dose of levothyroxine to 75 mcg daily from 88 mcg daily  We recommend changing administration of Tylenol from as needed to scheduled for improved pain control  We recommend addition of lidocaine patch daily to analgesic regimen for improved pain control  Testing Required after Discharge -   None  Important follow up information -   Please schedule a follow-up with your primary care physician within 1 week of discharge from the hospital  You have been referred for outpatient neuropsychiatric evaluation  Please schedule an appointment at your earliest convenience and availability    You have been referred for outpatient evaluation by geriatric medicine  Please schedule an appointment at your earliest convenience and availability  You have been referred for outpatient evaluation by palliative care  Please schedule an appointment at your earliest convenience and availability  Other Instructions -   None  Please review this entire after visit summary as additional general instructions including medication list, appointments, activity, diet, any pertinent wound care, and other additional recommendations from your care team that may be provided for you        Sincerely,     Melvin Ho MD

## 2023-05-26 NOTE — SPEECH THERAPY NOTE
Speech Language/Pathology    Patient Name: Adonay Hernandez    DOIVL'Q Date: 5/26/2023     Problem List  Principal Problem:    Failure to thrive in adult  Active Problems:    Major depressive disorder    Chronic heart failure with preserved ejection fraction (HCC)    Pressure injury of left buttock, stage 3 (HCC)    Moderate protein-calorie malnutrition ([facility] )    Hyperthyroidism         Past Medical History  Past Medical History:   Diagnosis Date   • CHF (congestive heart failure) (HCC)    • Diabetes mellitus ([facility] )    • Disease of thyroid gland    • Hyperlipidemia    • Hypertension    • Hypothyroidism (acquired)    • Obesity, Class I, BMI 30 0-34 9 (see actual BMI)    • Restless leg syndrome    • Seizures ([facility] [address] )        Past Surgical History  Past Surgical History:   Procedure Laterality Date   • APPENDECTOMY     • CHOLECYSTECTOMY     • HIP FRACTURE SURGERY Right    • IR PICC PLACEMENT SINGLE LUMEN  4/4/2023   • TONSILLECTOMY     • TUBAL LIGATION           Subjective:  Pt awake in bed  Pt responded to SLP's greeting with 'hello'  No complaints given  Objective: With encouragement, pt agreeable to re-positioning, pt semi upright in bed  Pt consumed x10 small ice chips with cues to utilize hard swallow; no overt s/s of aspiration in 8/10 trials  Pt consumed x5 small clinician controlled sips with delayed cough in 1/5 trials  Assessment:  Pt with mild s/s of aspiration given thin liquids; see objective section for data  Plan/Recommendations:  Continue puree w/ NTL  Medications crushed in puree  Recommend ST at next level of care as pt's goal is to return to thin liquids  Consider VBS as an OP to assess swallow function following dysphagia therapy as discretion of treating SLP

## 2023-05-26 NOTE — ASSESSMENT & PLAN NOTE
· Poor oral intake, decreased mobility  Pt predominantly bed-bound @ facility x months, uses wheelchair when leaving bed  · Pt may benefit from goals of care conversation with Palliative Care service at assisted living facility  If unavailable, have provided ambulatory referral to Palliative Care  No indication for Palliative Care consult this admission  · Per chart review, pt has had clinical decline since at least Oct 2022  No evidence of acute pathology during this admission and pt is medically stable to return to facility

## 2023-05-26 NOTE — ASSESSMENT & PLAN NOTE
· TSH high on prior admission, levothyroxine increased from 75 to 88 mcg  · TSH low this admission, free T4 high   Decrease levothyroxine dose to 75 mcg  · Will need o/p f/u w PCP to monitor TSH

## 2023-05-26 NOTE — WOUND OSTOMY CARE
Progress Note - Wound   Evan Hannon 75 y o  female MRN: [id number]  Unit/Bed#: S -01 Encounter: [id number]      Assessment: This is a follow up visit for this 75year old female patient admitted on 5/19/23 with failure to thrive, urinary tract infection and acute encephalopathy  She has a history of DM 2, obesity, HTN and hypothyroid  She was awake and alert but oriented to person and place only  She is dependent upon nursing staff for all of her care and was repositioned with assist of 2 persons  She has a arceo catheter in place and was given incontinence care during wound care visit for fecal incontinence  Assessment Findings:  1-Stage 3 pressure injury to left buttock present on admission - orders in place for wound/skin care and for prevention  2-Full thickness wounds to bilateral toes and left lateral foot with dry brown eschar - orders in place for wound care  Wounds do not appear to be infected at this time  3-Partial thickness skin damage to right breast skin fold due to moisture - orders in place for wound care  Wound does not appear to have fungal component at this time  Plan:   Skin care plans:  1-Calazime to sacrum, buttocks TID and PRN  2-Cleanse skin fold to right breast with NSS & pat dry  Apply Mepilex Border Ag+ dressing and change every other day & prn soilage/dislodgement  Do not apply antifungal powder to this area  3-Cleanse wounds to bilateral toes and left lateral foot with NSS & pat dry  Paint wounds with betadine swab daily  4-Hydraguard to bilateral heel BID and PRN  5-Prevalon boots to be worn to bilateral heels at all times in bed  6-Ehob pressure redistribution cushion when out of bed  7-Turn/reposition every 2 hours or when medically stable for pressure re-distribution on skin  8-Moisturize skin daily with skin nourishing cream     Right breast fold with partial thickness skin loss due to moisture - does not appear to be fungal at this time       Toes 1-5 right foot wounds with dry brown eschar  Toes 2-4 left foot wounds with dry brown eschar  Left lateral foot wound with dry brown eschar  Wound 05/20/23 Pressure Injury Buttocks Left (Active)   Wound Image   05/26/23 0902   Wound Description Beefy Red;Hypergranulation;pink;granulation;yellow;slough 05/26/23 0902   Pressure Injury Stage Stage 3 POA 05/26/23 0902   Anjali-wound Assessment Intact; Hyperpigmented 05/26/23 0902   Wound Length (cm) 0 5 cm 05/26/23 0902   Wound Width (cm) 0 5 cm 05/26/23 0902   Wound Depth (cm) 0 1 cm 05/26/23 0902   Wound Surface Area (cm^2) 0 25 cm^2 05/26/23 0902   Wound Volume (cm^3) 0 025 cm^3 05/26/23 0902   Calculated Wound Volume (cm^3) 0 03 cm^3 05/26/23 0902   Change in Wound Size % 0 05/26/23 0902   Drainage Amount None 05/26/23 0902   Treatments Cleansed 05/26/23 0902   Dressing Moisture barrier 05/26/23 0902   Wound packed? No 05/26/23 0902   Dressing Changed New 05/26/23 0902   Dressing Status Intact 05/26/23 0902     Discussed assessment findings, and plan of care/recommendations with [facility] LPN  Wound care will follow along with patient throughout admission, please call or tiger text with questions and concerns  Recommendations written as orders    Mk DARNELL, RN, Montse Stevens

## 2023-05-26 NOTE — CASE MANAGEMENT
Case Management Discharge Planning Note    Patient name Etienne Busby  Location S /S -01 MRN [id number]  : 1955 Date 2023       Current Admission Date: 2023  Current Admission Diagnosis:Failure to thrive in adult   Patient Active Problem List    Diagnosis Date Noted   • Hyperthyroidism 2023   • Moderate protein-calorie malnutrition ([address] ) 2023   • Skin tear of right forearm without complication    • Pressure injury of left buttock, stage 3 ([address] ) 2023   • Hemorrhage of muscle 2023   • Urinary retention 2023   • Pressure injury of right buttock, stage 2 ([address] ) 2023   • Stage II pressure ulcer of left buttock ([facility] ) 2023   • Pressure injury of sacral region, stage 3 ([address] ) 2023   • Pressure injury of back, stage 2 ([address] ) 2023   • Pressure injury of right knee, stage 2 ([address] ) 2023   • Ambulatory dysfunction 2023   • Hypernatremia 2023   • Insomnia 2023   • Sepsis secondary to UTI ([address] ) 2023   • Respiratory insufficiency 2023   • Nausea 2023   • Left lower quadrant abdominal pain 2022   • Chest pressure 2022   • Fungal dermatitis 10/05/2022   • Abnormal urine 2022   • Weakness 2022   • Syncope 2022   • Elevated d-dimer 2022   • Coronary artery disease involving native coronary artery of native heart without angina pectoris 2022   • Chronic heart failure with preserved ejection fraction ([address] ) 2022   • Chronic respiratory failure ([facility] ) 2022   • Dyspnea on exertion 2022   • Primary osteoarthritis of one hip, right 12/10/2021   • Orthostatic hypotension 10/24/2021   • Morbid obesity due to excess calories ([address] ) 2021   • Onychomycosis of toenail 2021   • Calf tenderness 2021   • Asymptomatic bacteriuria 2021   • Chronic fatigue and malaise 2021   • Diarrhea 2021   • Acquired hypothyroidism 2021 • Suspected COVID-19 virus infection 12/04/2020   • Major depressive disorder 12/04/2020   • Gambling disorder, episodic, moderate 07/05/2018   • Severe episode of recurrent major depressive disorder, without psychotic features ([facility] ) 07/04/2018   • Failure to thrive in adult    • GERD (gastroesophageal reflux disease) 07/10/2016   • Epigastric abdominal pain 07/10/2016   • Seizures ([facility] )    • Restless leg syndrome    • Hypertension    • Hyperlipidemia    • Type 2 diabetes mellitus (HCC)       LOS (days): 7  Geometric Mean LOS (GMLOS) (days): 2 60  Days to GMLOS:-4 2     OBJECTIVE:  Risk of Unplanned Readmission Score: 32 08         Current admission status: Inpatient   Preferred Pharmacy:   [facility] [address] [place] [address]  Phone: [phone] Fax: [phone]    Primary Care Provider: Luke Araya MD    Primary Insurance: [facility]  Secondary [facility]: [facility] [address]:    Discharge planning discussed with[de-identified] patient at bedside  [place] of Choice: Yes (re: facility return)  Comments - Freedom of Choice: Neisha Zafar  CM contacted family/caregiver?: Yes  Were Treatment Team discharge recommendations reviewed with patient/caregiver?: Yes  Did patient/caregiver verbalize understanding of patient care needs?: N/A- going to facility  Were patient/caregiver advised of the risks associated with not following Treatment Team discharge recommendations?: Yes         [address]         Is the patient interested in [facility] at discharge?: No    DME Referral Provided  Referral made for DME?: No    Other Referral/Resources/Interventions Provided:  Interventions: SNF  Referral Comments: Transport confirmed with [facility] BLS for 3:50pm  CM discharge support contacted to inquire about insurance auth for ambulance  Patient, RN, MD and facility aware of pick-up time   AVS faxed to [facility] and attached to referral in [facility]      Would you like to participate in our [address] service program?  : No - Declined    Treatment Team Recommendation: SNF  Discharge Destination Plan[de-identified] SNF  [facility] at Discharge [facility]  Dispatcher Contacted: Yes  Number/Name of Dispatcher: Bony Shah by [employer] and Unit #): [place]  ETA of Transport (Date): 05/26/23  ETA of Transport (Time): 0519 (confirmed)              IMM Given (Date):: 05/26/23  IMM Given to[de-identified] Patient          Accepting Facility Name, [address] : [facility]  Receiving Facility/Agency Phone Number: [phone]  Facility/Agency Fax Number: [phone]

## 2023-05-27 ENCOUNTER — TELEPHONE (OUTPATIENT)
Dept: OTHER | Facility: OTHER | Age: 68
End: 2023-05-27

## 2023-05-30 ENCOUNTER — NURSING HOME VISIT (OUTPATIENT)
Dept: GERIATRICS | Facility: OTHER | Age: 68
End: 2023-05-30

## 2023-05-30 VITALS
SYSTOLIC BLOOD PRESSURE: 118 MMHG | HEART RATE: 74 BPM | RESPIRATION RATE: 18 BRPM | TEMPERATURE: 97.8 F | DIASTOLIC BLOOD PRESSURE: 78 MMHG | WEIGHT: 209.6 LBS | BODY MASS INDEX: 37.13 KG/M2

## 2023-05-30 DIAGNOSIS — R62.7 FAILURE TO THRIVE IN ADULT: Primary | ICD-10-CM

## 2023-05-30 NOTE — UTILIZATION REVIEW
NOTIFICATION OF ADMISSION DISCHARGE   This is a Notification of Discharge from [facility]  Please be advised that this patient has been discharge from our facility  Below you will find the admission and discharge date and time including the patient’s disposition  UTILIZATION REVIEW CONTACT:  Hossein Ryan MA  Utilization   Network Utilization Review Department  Phone: [phone] x carefully listen to the prompts  All voicemails are confidential   Email: [email or web address]     ADMISSION INFORMATION  PRESENTATION DATE: 5/19/2023  2:34 PM  OBERVATION ADMISSION DATE:   INPATIENT ADMISSION DATE: 5/19/23  7:08 PM   DISCHARGE DATE: 5/26/2023  5:24 PM   DISPOSITION:Discharged/Transferred to Long Term Care/Personal Care Home/Assisted Living    IMPORTANT INFORMATION:  Send all requests for admission clinical reviews, approved or denied determinations and any other requests to dedicated fax number below belonging to the campus where the patient is receiving treatment   List of dedicated fax numbers:  [address] DENIALS (Administrative/Medical Necessity) [phone]   [address] (Maternity/NICU/Pediatrics) [phone]   [facility] [phone]   [place] [address] [phone]   [address] [phone]   220 [facility] [place][note 2] [phone]   90 [facility] [phone]   [address] [place] [address] [phone]   [facility]  [phone]   [facility] [facility] [phone] 412 [facility] [address] [facility] [phone]

## 2023-05-30 NOTE — PROGRESS NOTES
[facility]  [address] [place] [address]  [id number]    Nursing Home Admission    NAME: Trev Ramirez  AGE: 75 y o  SEX: female [id number]      Patient Location     [facility] rehab    Patient’s care was coordinated with nursing facility staff  Recent vitals, labs and updated medications were reviewed on [facility] system of facility  Past Medical, surgical, social, medication and allergy history and patient’s previous records reviewed  Assessment/Plan:    Failure to thrive in adult  Patient has had a significant decline during the last few months with decreased p o  intake  She is bedbound dependent for almost all ADLs  Goals of care discussion has been carried out with the son on several occasions  Patient is DNR  Follow-up with palliative care team  Encourage p o  intake      Chronic heart failure with preserved ejection fraction :  Echo from February 2022 revealed EF of 67%  She was additionally noted to have moderate diastolic dysfunction and right-sided pressures estimated at 51 mmHg on prior echo in 2022  Currently not on any diuretics  Clinically does not appear to be in overt CHF, She is noted to have increased swelling involving bilateral upper extremities but no significant edema of lower extremities  SaO2 is stable  We will continue to monitor     Hyporthyroidism:  TSH done during recent hospitalization was noted to be on the lower side  Levothyroxine dose was reduced to 75 mcg daily  Follow-up repeat TSH in 6 weeks    Moderate protein-calorie malnutrition :  Malnutrition Findings:   Adult Malnutrition type: Chronic illness  Adult Degree of Malnutrition: Malnutrition of moderate degree  Malnutrition Characteristics: Muscle loss, Weight loss       360 Statement: Moderate malnutrition in the context of chronic illness r/t inadequate energy intake with confusion as evidenced by wt decrease of 93#/32 7% x 11 mo, moderate temporal wasting       BMI Findings: Body mass index is 37 1 kg/m²  Encourage po intake  F U with dietitian  Add protein supplements as needed    Pressure injury of left buttock, stage 3 :  Continue local wound care    Depression:  Patient expressed suicidal ideation during recent hospitalization  She was evaluated by psych service  Abilify was added to medication regimen  Patient additionally remains on fluoxetine and mirtazapine  Monitor response on above    Diabetes mellitus type II:   Blood sugars remain well controlled  Patient was taken off of scheduled insulin in the past due to borderline low blood sugars  Hyperlipidemia:  Continue ezetimibe    Vitamin D deficiency:  Continue vitamin D supplements    Anemia:  Hemoglobin is stable  Patient remains on iron supplements    Edema bilateral upper extremities:  Patient is noted to have increased edema involving bilateral lower extremities more pronounced in the left upper extremity  Recent Doppler studies have been negative  Encourage arm elevation    Chief Complaint     Recent hospitalization for failure to thrive, generalized weakness    HPI       Patient is a 75 y o  female with past medical history significant for CHF, diabetes mellitus type 2, hyperlipidemia, hypothyroidism, obesity, restless leg syndrome and seizure disorder  Patient was hospitalized on 5/19/2023 with lethargy confusion and generalized pain  She was noted to have abnormal UA and started on antibiotics  Patient remained asymptomatic, had Kay catheter  placed recently for recurrent urinary retention  She was seen in consultation by ID service and felt to have asymptomatic bacteriuria  Antibiotics were discontinued  During the hospital stay patient expressed suicidal ideation  She was seen by psychiatry service  Abilify was added to her medication regimen  Mirtazapine and fluoxetine were continued  Patient was initially noted to be lethargic, received IV fluids for short time    Mentation subsequently improved  She was later discharged to the skilled rehab where she is being seen for posthospital admission  At the time of my evaluation patient is doing okay  Of note patient has had a progressive decline over the last few months  P o  intake has been down  She has lost significant weight during the last few months however per nurse p o  intake has improved since readmission to the facility         Past Medical History:   Diagnosis Date   • CHF (congestive heart failure) (HCC)    • Diabetes mellitus ([facility] [address] )    • Disease of thyroid gland    • Hyperlipidemia    • Hypertension    • Hypothyroidism (acquired)    • Obesity, Class I, BMI 30 0-34 9 (see actual BMI)    • Restless leg syndrome    • Seizures (HCC)        Past Surgical History:   Procedure Laterality Date   • APPENDECTOMY     • CHOLECYSTECTOMY     • HIP FRACTURE SURGERY Right    • IR PICC PLACEMENT SINGLE LUMEN  4/4/2023   • TONSILLECTOMY     • TUBAL LIGATION         Social History     Tobacco Use   Smoking Status Never   Smokeless Tobacco Never   Tobacco Comments    not a smoker          Family History   Problem Relation Age of Onset   • Brain cancer Mother    • Seizures Father    • Colon cancer Maternal Grandfather    • No Known Problems Son    • No Known Problems Son    • Breast cancer Maternal Aunt 30        Allergies   Allergen Reactions   • Pineapple - Food Allergy Anaphylaxis   • Statins Other (See Comments)     Cramping from head to toe    • Bupropion Other (See Comments)   • Byetta 10 Mcg Pen [Exenatide]    • Ceftriaxone Other (See Comments)   • Marijuana [Dronabinol] GI Intolerance   • Metformin Diarrhea     Other reaction(s): Unknown   • Omeprazole Hives   • Ondansetron      Other reaction(s): Unknown   • Coconut Oil - Food Allergy Rash   • Liraglutide Rash and Other (See Comments)     Generalized rash   • Penicillins Rash          Current Outpatient Medications:   •  acetaminophen (TYLENOL) 325 mg tablet, Take 3 tablets (975 mg total) by mouth every 8 (eight) hours, Disp: , Rfl: 0  •  ARIPiprazole (ABILIFY) 2 mg tablet, Take 1 tablet (2 mg total) by mouth daily Do not start before May 27, 2023 , Disp: 30 tablet, Rfl: 0  •  aspirin (ECOTRIN LOW STRENGTH) 81 mg EC tablet, Take 81 mg by mouth daily, Disp: , Rfl:   •  Cholecalciferol 25 MCG (1000 UT) capsule, Take 1,000 Units by mouth daily  , Disp: , Rfl:   •  Ergocalciferol (VITAMIN D2 PO), Take 50,000 Units/day by mouth 2 (two) times a week, Disp: , Rfl:   •  ezetimibe (ZETIA) 10 mg tablet, Take 1 tablet (10 mg total) by mouth daily, Disp: 30 tablet, Rfl: 0  •  ferrous sulfate 325 (65 Fe) mg tablet, Take 1 tablet (325 mg total) by mouth every other day, Disp: 15 tablet, Rfl: 0  •  FLUoxetine (PROzac) 40 MG capsule, Take 1 capsule (40 mg total) by mouth daily Do not start before March 17, 2023 , Disp: , Rfl: 0  •  folic acid (FOLVITE) 546 mcg tablet, Take 1 tablet (400 mcg total) by mouth daily, Disp: 30 tablet, Rfl: 0  •  hydrophilic ointment, Apply topically as needed for dry skin, Disp: , Rfl:   •  insulin lispro (HumaLOG) 100 units/mL injection, Inject 1-5 Units under the skin 3 (three) times a day before meals, Disp: , Rfl: 0  •  levothyroxine 75 mcg tablet, Take 1 tablet (75 mcg total) by mouth daily in the early morning, Disp: 30 tablet, Rfl: 0  •  lidocaine (LIDODERM) 5 %, Apply 1 patch topically over 12 hours daily Remove & Discard patch within 12 hours or as directed by MD Do not start before May 27, 2023 , Disp: 30 patch, Rfl: 0  •  melatonin 3 mg, Take 3 tablets (9 mg total) by mouth daily at bedtime, Disp: , Rfl: 0  •  mirtazapine (REMERON) 15 mg tablet, Take 30 mg by mouth daily at bedtime, Disp: , Rfl:   •  nystatin (MYCOSTATIN) powder, Apply topically 2 (two) times a day, Disp: 15 g, Rfl: 0  •  omeprazole (PriLOSEC) 20 mg delayed release capsule, Take 20 mg by mouth daily, Disp: , Rfl:     Updated list was reviewed in [facility] system of facility       Vitals:    05/30/23 1355   BP: 118/78   Pulse: 74   Resp: 18   Temp: 97 8 °F (36 6 °C)       Vital signs were reviewed in [facility] care    Review of Systems   Constitutional: Negative for chills and fever  Eyes: Positive for photophobia  Respiratory: Negative for cough, shortness of breath, wheezing and stridor  Cardiovascular: Negative for chest pain  Gastrointestinal: Negative for abdominal distention, abdominal pain and vomiting  Genitourinary: Negative for flank pain and hematuria  History of recurrent urinary retention, Kay catheter in place   Musculoskeletal: Positive for gait problem  Negative for arthralgias  Neurological: Positive for weakness  Negative for seizures and syncope  Psychiatric/Behavioral: Positive for confusion (at times)  Negative for agitation and behavioral problems  Physical Exam  Constitutional:       General: She is not in acute distress  HENT:      Head: Normocephalic and atraumatic  Nose: No rhinorrhea  Eyes:      General: No scleral icterus  Right eye: No discharge  Left eye: No discharge  Cardiovascular:      Rate and Rhythm: Normal rate and regular rhythm  Pulmonary:      Breath sounds: No wheezing, rhonchi or rales  Abdominal:      General: There is no distension  Palpations: Abdomen is soft  Tenderness: There is no abdominal tenderness  There is no guarding  Genitourinary:     Comments: Kay catheter in place  Musculoskeletal:         General: Swelling (involving b/l UE) present  Cervical back: Neck supple  Right lower leg: No edema  Left lower leg: No edema  Skin:     Coloration: Skin is not jaundiced  Findings: Bruising (Ecchymosis invoving b/l UE, more pronounced in right upper extremity) present  Comments: Small scabs involving dorsal aspect of all right-sided toes   Neurological:      General: No focal deficit present  Mental Status: Mental status is at baseline  Cranial Nerves:  No "cranial nerve deficit  Motor: Weakness (Patient has chronic weakness of bilateral lower extremities) present  Psychiatric:         Mood and Affect: Mood normal          Behavior: Behavior normal          Diagnostic Data       Recent labs and imaging studies were reviewed  Lab Results   Component Value Date    HCT 28 9 (L) 05/26/2023    HGB 8 3 (L) 05/26/2023     (H) 05/26/2023     05/26/2023    WBC 7 26 05/26/2023      Code Status:      DNR           Portions of the record may have been created with voice recognition software  Occasional wrong word or \"sound a like\" substitutions may have occurred due to the inherent limitations of voice recognition software  Read the chart carefully and recognize, using context, where substitutions have occurred      This note was electronically signed by Dr Diane Amaro   "

## 2023-05-31 ENCOUNTER — NURSING HOME VISIT (OUTPATIENT)
Dept: WOUND CARE | Facility: HOSPITAL | Age: 68
End: 2023-05-31

## 2023-05-31 DIAGNOSIS — L89.312 PRESSURE INJURY OF RIGHT BUTTOCK, STAGE 2 (HCC): ICD-10-CM

## 2023-05-31 DIAGNOSIS — L89.323 PRESSURE INJURY OF LEFT BUTTOCK, STAGE 3 (HCC): Primary | ICD-10-CM

## 2023-05-31 NOTE — ASSESSMENT & PLAN NOTE
Patient has had a significant decline during the last few months with decreased p o  intake  She is bedbound dependent for almost all ADLs  Goals of care discussion has been carried out with the son on several occasions  Patient is DNR    Follow-up with palliative care team  Encourage p o  intake

## 2023-05-31 NOTE — ASSESSMENT & PLAN NOTE
Left buttock  Improved wound, increased epithelial  Local wound care with Triad paste  On indwelling catheter and air mattress  Increase protein intake  Follow-up next week

## 2023-05-31 NOTE — ASSESSMENT & PLAN NOTE
The wound on the right buttock improved, increased epithelial  Local wound care with Triad paste twice a day  On air mattress and indwelling catheter  Follow-up next week

## 2023-05-31 NOTE — PROGRESS NOTES
[address] MANAGEMENT   AND HYPERBARIC MEDICINE CENTER       Patient ID: Jasiel Easley is a 75 y o  female Date of Birth 1955     Location of Service: [address] [place] [address]    Performed wound round with: Wound team     Chief Complaint : buttocks,    Wound Instructions:  Wound: Buttocks  Discontinue previous wound order  Cleanse the wound bed with NSS   Apply non-sting skin prep to periwound area  Apply Triad paste to wound bed  Frequency : Twice a day and prn for soiling       Offload all wounds  Turn and reposition frequently, Increase protein intake  Monitor for any sign of infection or worsening, inform PCP or patient's primary physician in your facility  Allergies  Pineapple - food allergy, Statins, Bupropion, Byetta 10 mcg pen [exenatide], Ceftriaxone, Marijuana [dronabinol], Metformin, Omeprazole, Ondansetron, Coconut oil - food allergy, Liraglutide, and Penicillins      Assessment & Plan:  1  Pressure injury of left buttock, stage 3 (HCC)  Assessment & Plan:  Left buttock  Improved wound, increased epithelial  Local wound care with Triad paste  On indwelling catheter and air mattress  Increase protein intake  Follow-up next week      2  Pressure injury of right buttock, stage 2 ([address] )  Assessment & Plan: The wound on the right buttock improved, increased epithelial  Local wound care with Triad paste twice a day  On air mattress and indwelling catheter  Follow-up next week             Subjective:   March 22, 2023  This is a new consult for wound on the right buttocks, sacrum, left buttocks, back, and right knee  Patient was referred by Senior care team   Patient have a complex medical history including but not limited to UTI, acute kidney injury, CHF, and GERD  As per medical record review, patient was recently admitted at [facility] from March 11 to March 16, 2023 for sepsis  Patient was transferred on discharge to [facility] rehab for short-term rehab      Wound history: As per medical record review, patient had wound on the sacrum that was first assessed on March 11, 2023 the wound on back was first assessed on March 13, 2023  The wound on the right lower leg was first assessed on March 13, 2023  The etiology of all mention wounds are pressure injury  Received patient in bed, seems comfortable  Total dependent with positioning in bed  Currently on air mattress  Patient have a poor appetite  Patient is incontinent of both bowel and bladder  Patient currently on protein supplement, Prosource and Magic cup  March 29, 2023  Follow-up for wound on the right buttock, sacrum, left buttock, back, and right knee  Received patient in bed, seems comfortable  Denies pain  Facility did not report any significant issues related to the wound  April 26, 2023  Follow-up for wound on the buttocks, groin, and breast   As per report, patient was recently admitted at [facility] and returned in [facility] for short-term rehab  Received patient in bed, seems comfortable  Denies pain  Patient is a max assist with positioning in bed  She is incontinent of both bowel and bladder  May 3, 2023  Follow-up for wounds on the left buttock, right buttock, and new consult for wound on the right forearm  The wound on the right forearm is a skin tear, unknown etiology  Received patient in bed, seems comfortable  Denies pain  No significant issues reported related to the wound  May 10, 2023  Follow-up for wound on the left buttock, right buttock, right forearm  Received patient in bed, seems comfortable  Denies pain  Patient is max assist with transfer  May 17, 2023  Follow-up for wound on the left buttock, right buttock  Received patient in bed, seems comfortable  Denies pain  No significant issues related to the wound  May 31, 2023  Follow-up for wound on the buttocks  Received patient in bed, seems comfortable    No significant issues related to the wound       Review of Systems   Constitutional: Negative  Respiratory: Negative  Cardiovascular: Negative  Skin: Positive for rash and wound  Objective:    Physical Exam  Constitutional:       Appearance: She is obese  Cardiovascular:      Rate and Rhythm: Normal rate  Pulmonary:      Effort: Pulmonary effort is normal    Genitourinary:     Comments: Incontinent  Musculoskeletal:      Comments: Total dependent with positioning in bed   Skin:     Findings: Lesion present  Comments: Left buttock: Wound size   5 x 0 5 x 0 1 cm ,  100% epithelial, no drainage, normal periwound area, with no obvious sign of infection    Right buttock: Wound size is 0 3 x 0 3 x 0 1 cm ,  100% epithelial, no drainage, normal periwound area, with no obvious sign of infection   Neurological:      Mental Status: She is alert  Procedures           Patient's care was coordinated with nursing facility staff  Recent vitals, labs and updated medications were reviewed on EMR or chart system of facility  Past Medical, surgical, social, medication and allergy history and patient's previous records were reviewed and updated as appropriate: Most up-to date information is available in the facility EMR where the patient is currently admitted      Patient Active Problem List   Diagnosis   • Seizures ([facility] )   • Restless leg syndrome   • Hypertension   • Hyperlipidemia   • Type 2 diabetes mellitus (HCC)   • GERD (gastroesophageal reflux disease)   • Epigastric abdominal pain   • Failure to thrive in adult   • Severe episode of recurrent major depressive disorder, without psychotic features ([facility] )   • Gambling disorder, episodic, moderate   • Suspected COVID-19 virus infection   • Major depressive disorder   • Acquired hypothyroidism   • Diarrhea   • Asymptomatic bacteriuria   • Chronic fatigue and malaise   • Calf tenderness   • Morbid obesity due to excess calories ([facility] )   • Onychomycosis of toenail   • Orthostatic hypotension   • Primary osteoarthritis of one hip, right   • Dyspnea on exertion   • Chronic heart failure with preserved ejection fraction (HCC)   • Chronic respiratory failure (HCC)   • Coronary artery disease involving native coronary artery of native heart without angina pectoris   • Syncope   • Elevated d-dimer   • Weakness   • Abnormal urine   • Fungal dermatitis   • Chest pressure   • Left lower quadrant abdominal pain   • Nausea   • Insomnia   • Sepsis secondary to UTI [facility])   • Respiratory insufficiency   • Hypernatremia   • Pressure injury of right buttock, stage 2 (HCC)   • Stage II pressure ulcer of left buttock (HCC)   • Pressure injury of sacral region, stage 3 (HCC)   • Pressure injury of back, stage 2 (HCC)   • Pressure injury of right knee, stage 2 (HCC)   • Ambulatory dysfunction   • Urinary retention   • Hemorrhage of muscle   • Pressure injury of left buttock, stage 3 (HCC)   • Skin tear of right forearm without complication   • Moderate protein-calorie malnutrition (HCC)   • Hyperthyroidism     Past Medical History:   Diagnosis Date   • CHF (congestive heart failure) ([facility])    • Diabetes mellitus ([facility] )    • Disease of thyroid gland    • Hyperlipidemia    • Hypertension    • Hypothyroidism (acquired)    • Obesity, Class I, BMI 30 0-34 9 (see actual BMI)    • Restless leg syndrome    • Seizures ([address] )      Past Surgical History:   Procedure Laterality Date   • APPENDECTOMY     • CHOLECYSTECTOMY     • HIP FRACTURE SURGERY Right    • IR PICC PLACEMENT SINGLE LUMEN  4/4/2023   • TONSILLECTOMY     • TUBAL LIGATION       Social History     Socioeconomic History   • Marital status:       Spouse name: None   • Number of children: None   • Years of education: None   • Highest education level: None   Occupational History   • None   Tobacco Use   • Smoking status: Never   • Smokeless tobacco: Never   • Tobacco comments:     not a smoker   Vaping Use   • Vaping Use: Never used   Substance and Sexual Activity   • Alcohol use: Yes     Comment: occasional   • Drug use: Never   • Sexual activity: Not Currently   Other Topics Concern   • None   Social History Narrative   • None     Social Determinants of Health     Financial Resource Strain: Not on file   Food Insecurity: No Food Insecurity (5/25/2023)    Hunger Vital Sign    • Worried About Running Out of Food in the Last Year: Never true    • Ran Out of Food in the Last Year: Never true   Transportation Needs: No Transportation Needs (5/25/2023)    PRAPARE - Transportation    • Lack of Transportation (Medical): No    • Lack of Transportation (Non-Medical): No   Physical Activity: Not on file   Stress: Not on file   Social Connections: Not on file   Intimate Partner Violence: Not on file   Housing Stability: Low Risk  (5/25/2023)    Housing Stability Vital Sign    • Unable to Pay for Housing in the Last Year: No    • Number of Places Lived in the Last Year: 2    • Unstable Housing in the Last Year: No        Current Outpatient Medications:   •  acetaminophen (TYLENOL) 325 mg tablet, Take 3 tablets (975 mg total) by mouth every 8 (eight) hours, Disp: , Rfl: 0  •  ARIPiprazole (ABILIFY) 2 mg tablet, Take 1 tablet (2 mg total) by mouth daily Do not start before May 27, 2023 , Disp: 30 tablet, Rfl: 0  •  aspirin (ECOTRIN LOW STRENGTH) 81 mg EC tablet, Take 81 mg by mouth daily, Disp: , Rfl:   •  Cholecalciferol 25 MCG (1000 UT) capsule, Take 1,000 Units by mouth daily  , Disp: , Rfl:   •  Ergocalciferol (VITAMIN D2 PO), Take 50,000 Units/day by mouth 2 (two) times a week, Disp: , Rfl:   •  ezetimibe (ZETIA) 10 mg tablet, Take 1 tablet (10 mg total) by mouth daily, Disp: 30 tablet, Rfl: 0  •  ferrous sulfate 325 (65 Fe) mg tablet, Take 1 tablet (325 mg total) by mouth every other day, Disp: 15 tablet, Rfl: 0  •  FLUoxetine (PROzac) 40 MG capsule, Take 1 capsule (40 mg total) by mouth daily Do not start before March 17, 2023 , Disp: , Rfl: 0  •  folic acid (FOLVITE) 003 "mcg tablet, Take 1 tablet (400 mcg total) by mouth daily, Disp: 30 tablet, Rfl: 0  •  hydrophilic ointment, Apply topically as needed for dry skin, Disp: , Rfl:   •  insulin lispro (HumaLOG) 100 units/mL injection, Inject 1-5 Units under the skin 3 (three) times a day before meals, Disp: , Rfl: 0  •  levothyroxine 75 mcg tablet, Take 1 tablet (75 mcg total) by mouth daily in the early morning, Disp: 30 tablet, Rfl: 0  •  lidocaine (LIDODERM) 5 %, Apply 1 patch topically over 12 hours daily Remove & Discard patch within 12 hours or as directed by MD Do not start before May 27, 2023 , Disp: 30 patch, Rfl: 0  •  melatonin 3 mg, Take 3 tablets (9 mg total) by mouth daily at bedtime, Disp: , Rfl: 0  •  mirtazapine (REMERON) 15 mg tablet, Take 30 mg by mouth daily at bedtime, Disp: , Rfl:   •  nystatin (MYCOSTATIN) powder, Apply topically 2 (two) times a day, Disp: 15 g, Rfl: 0  •  omeprazole (PriLOSEC) 20 mg delayed release capsule, Take 20 mg by mouth daily, Disp: , Rfl:   Family History   Problem Relation Age of Onset   • Brain cancer Mother    • Seizures Father    • Colon cancer Maternal Grandfather    • No Known Problems Son    • No Known Problems Son    • Breast cancer Maternal Aunt 30              Coordination of Care: Wound team aware of the treatment plan  Facility nurse will provide wound treatment and monitor the wound for any changes  Patient / Staff education : Patient / Staff was given education on sign of infection and pressure ulcer prevention  Patient/ Staff verbalized understanding     Follow up :  Next week    Voice-recognition software may have been used in the preparation of this document  Occasional wrong word or \"sound-alike\" substitutions may have occurred due to the inherent limitations of voice recognition software  Interpretation should be guided by context        SAMIA Sousa  "

## 2023-06-05 ENCOUNTER — NURSING HOME VISIT (OUTPATIENT)
Dept: GERIATRICS | Facility: OTHER | Age: 68
End: 2023-06-05
Payer: COMMERCIAL

## 2023-06-05 VITALS
TEMPERATURE: 97.6 F | HEART RATE: 71 BPM | RESPIRATION RATE: 18 BRPM | WEIGHT: 207 LBS | OXYGEN SATURATION: 98 % | BODY MASS INDEX: 36.67 KG/M2 | SYSTOLIC BLOOD PRESSURE: 117 MMHG | DIASTOLIC BLOOD PRESSURE: 63 MMHG

## 2023-06-05 DIAGNOSIS — R26.2 AMBULATORY DYSFUNCTION: ICD-10-CM

## 2023-06-05 DIAGNOSIS — E11.65 TYPE 2 DIABETES MELLITUS WITH HYPERGLYCEMIA, WITH LONG-TERM CURRENT USE OF INSULIN (HCC): ICD-10-CM

## 2023-06-05 DIAGNOSIS — F32.9 MAJOR DEPRESSIVE DISORDER, REMISSION STATUS UNSPECIFIED, UNSPECIFIED WHETHER RECURRENT: ICD-10-CM

## 2023-06-05 DIAGNOSIS — R33.9 URINARY RETENTION: ICD-10-CM

## 2023-06-05 DIAGNOSIS — R62.7 FAILURE TO THRIVE IN ADULT: Primary | ICD-10-CM

## 2023-06-05 DIAGNOSIS — Z79.4 TYPE 2 DIABETES MELLITUS WITH HYPERGLYCEMIA, WITH LONG-TERM CURRENT USE OF INSULIN (HCC): ICD-10-CM

## 2023-06-05 PROCEDURE — 99309 SBSQ NF CARE MODERATE MDM 30: CPT

## 2023-06-05 NOTE — ASSESSMENT & PLAN NOTE
· Patient with recurrent urinary retention  · Kay catheter intact draining cyu  · Patient denies urinary symptoms  · Outpatient follow-up with urology

## 2023-06-05 NOTE — ASSESSMENT & PLAN NOTE
· Patient bedbound  · Significant decline in the last few months  · Decreased p o  intake  · On exam patient reports improvement with appetite  · Continue with palliative care   · assist patient with ADLs/IADLs as needed  · Continue 24/7 supportive care per LTC  · Encourage OOB for meals and participation in group activities

## 2023-06-05 NOTE — PROGRESS NOTES
[facility]  [address]  [phone]  [facility]  Code 32 LTC        NAME: Kyle Ortega  AGE: 75 y o  SEX: female CODE STATUS: DNR    DATE OF ENCOUNTER: 6/5/23    Assessment and Plan     1  Failure to thrive in adult  Assessment & Plan:  · Patient bedbound  · Significant decline in the last few months  · Decreased p o  intake  · On exam patient reports improvement with appetite  · Continue with palliative care   · assist patient with ADLs/IADLs as needed  · Continue 24/7 supportive care per LTC  · Encourage OOB for meals and participation in group activities      2  Urinary retention  Assessment & Plan:  · Patient with recurrent urinary retention  · Kay catheter intact draining cyu  · Patient denies urinary symptoms  · Outpatient follow-up with urology      3  Type 2 diabetes mellitus with hyperglycemia, with long-term current use of insulin (HCC)  Assessment & Plan:    Lab Results   Component Value Date    HGBA1C 5 2 04/14/2023   ·   · Continue Accu-Cheks  · Continue Humalog sliding scale  · Encourage diabetic diet  · Avoid hypoglycemia      4  Ambulatory dysfunction  Assessment & Plan:  Multifactorial in the setting of multiple chronic conditions  Continue PT/OT as needed  Fall/safety precautions  Ensure adequate nutrition/hydration   Assist patient with ADLs/IADLs as needed  Maintain 24/7 supportive care per LTC      5  Major depressive disorder, remission status unspecified, unspecified whether recurrent  Assessment & Plan:  · On exam patient is awake and alert  · Mood appears stable on exam  · Continue fluoxetine, mirtazapine, Abilify  · Outpatient follow-up with psychiatry as needed         All medications and routine orders were reviewed and updated as needed  Chief Complaint     LTC/post hospitalization follow up  Patient's care was coordinated with nursing facility staff   Recent vitals, labs, and updated medications were review on [facility] system in facility  Past Medical and Surgical History      Past Medical History:   Diagnosis Date   • CHF (congestive heart failure) ([facility] )    • Diabetes mellitus ([facility] )    • Disease of thyroid gland    • Hyperlipidemia    • Hypertension    • Hypothyroidism (acquired)    • Obesity, Class I, BMI 30 0-34 9 (see actual BMI)    • Restless leg syndrome    • Seizures (HCC)      Past Surgical History:   Procedure Laterality Date   • APPENDECTOMY     • CHOLECYSTECTOMY     • HIP FRACTURE SURGERY Right    • IR PICC PLACEMENT SINGLE LUMEN  4/4/2023   • TONSILLECTOMY     • TUBAL LIGATION       Allergies   Allergen Reactions   • Pineapple - Food Allergy Anaphylaxis   • Statins Other (See Comments)     Cramping from head to toe    • Bupropion Other (See Comments)   • Byetta 10 Mcg Pen [Exenatide]    • Ceftriaxone Other (See Comments)   • Marijuana [Dronabinol] GI Intolerance   • Metformin Diarrhea     Other reaction(s): Unknown   • Omeprazole Hives   • Ondansetron      Other reaction(s): Unknown   • Coconut Oil - Food Allergy Rash   • Liraglutide Rash and Other (See Comments)     Generalized rash   • Penicillins Rash          History of Present Illness     HPI  Nilam Wei is a 75year old female, she is a STR patient of [facility] since 3/16/23  Patient currently resides in LTC and has returned from recent hospitalization  Past Medical Hx including but not limited to GERD, diabetes, CHF, CAD, seizure disorder, depression  She was seen in collaboration with nursing for medical mgmt and LTC follow up  Hospital Course  Patient was admitted to the hospital 5/19/23 for lethargy and confusion  She was noted to have abnormal UA and was started on antibiotics, she remained asymptomatic  Kay catheter placed for recurrent urinary retention  Antibiotics were discontinued as ID felt due to asymptomatic bacteriuria  During hospital stay patient expressed suicidal ideation and was seen by psychiatry who added Abilify   Patient was discharged back to [facility]  Ventura Gregory was seen and examined at bedside today  On exam patient is resting in bed and does not appear to be in distress  She is AAOx3 and says she is feeling better  She says that she has been eating more and has been more awake during the day  She does c/o some dizziness with position change, advised to change positions slowly and to sit up for meals, will monitor  She denies CP/SOB/N/V/D  Denies lightheadedness, headaches, vision changes  Per documentation patient is consuming 2-3 meals, 50-75 %  Last documented bowel movement was 6/5/23  No Nursing concerns at this time  The patient's allergies, past medical, surgical, social and family history were reviewed and unchanged  Review of Systems     Review of Systems   Constitutional: Positive for activity change and appetite change  Negative for chills and fever  HENT: Negative  Negative for congestion  Eyes: Negative  Respiratory: Negative  Negative for cough, shortness of breath and wheezing  Cardiovascular: Negative  Negative for chest pain, palpitations and leg swelling  Gastrointestinal: Negative  Negative for abdominal distention, abdominal pain, constipation, diarrhea, nausea and vomiting  Endocrine: Negative  Genitourinary: Negative for difficulty urinating, dysuria and flank pain  Musculoskeletal: Positive for gait problem  Skin: Negative  Allergic/Immunologic: Negative  Neurological: Positive for dizziness and weakness  Negative for light-headedness and headaches  Hematological: Negative  Psychiatric/Behavioral: Negative  Negative for confusion and sleep disturbance  Objective     Vitals:   Vitals:    06/05/23 1856   BP: 117/63   Pulse: 71   Resp: 18   Temp: 97 6 °F (36 4 °C)   SpO2: 98%         Physical Exam  Vitals and nursing note reviewed  Constitutional:       General: She is not in acute distress  Appearance: Normal appearance     HENT:      Head: Normocephalic and atraumatic  Nose: No congestion  Mouth/Throat:      Mouth: Mucous membranes are moist    Eyes:      Conjunctiva/sclera: Conjunctivae normal    Cardiovascular:      Rate and Rhythm: Normal rate and regular rhythm  Heart sounds: Normal heart sounds  Pulmonary:      Effort: Pulmonary effort is normal  No respiratory distress  Breath sounds: Normal breath sounds  No wheezing  Abdominal:      General: Bowel sounds are normal  There is no distension  Palpations: Abdomen is soft  Tenderness: There is no abdominal tenderness  Musculoskeletal:      Right lower leg: No edema  Left lower leg: No edema  Skin:     General: Skin is warm  Neurological:      Mental Status: She is alert  Mental status is at baseline  Motor: Weakness present  Gait: Gait abnormal    Psychiatric:         Mood and Affect: Mood normal          Behavior: Behavior normal          Pertinent Laboratory/Diagnostic Studies:   Reviewed in facility chart-stable      Current Medications   Medications reviewed and updated see facility [facility] for details  Current Outpatient Medications:   •  acetaminophen (TYLENOL) 325 mg tablet, Take 3 tablets (975 mg total) by mouth every 8 (eight) hours, Disp: , Rfl: 0  •  ARIPiprazole (ABILIFY) 2 mg tablet, Take 1 tablet (2 mg total) by mouth daily Do not start before May 27, 2023 , Disp: 30 tablet, Rfl: 0  •  aspirin (ECOTRIN LOW STRENGTH) 81 mg EC tablet, Take 81 mg by mouth daily, Disp: , Rfl:   •  Cholecalciferol 25 MCG (1000 UT) capsule, Take 1,000 Units by mouth daily  , Disp: , Rfl:   •  Ergocalciferol (VITAMIN D2 PO), Take 50,000 Units/day by mouth 2 (two) times a week, Disp: , Rfl:   •  ezetimibe (ZETIA) 10 mg tablet, Take 1 tablet (10 mg total) by mouth daily, Disp: 30 tablet, Rfl: 0  •  ferrous sulfate 325 (65 Fe) mg tablet, Take 1 tablet (325 mg total) by mouth every other day, Disp: 15 tablet, Rfl: 0  •  FLUoxetine (PROzac) 40 MG capsule, "Take 1 capsule (40 mg total) by mouth daily Do not start before March 17, 2023 , Disp: , Rfl: 0  •  folic acid (FOLVITE) 936 mcg tablet, Take 1 tablet (400 mcg total) by mouth daily, Disp: 30 tablet, Rfl: 0  •  hydrophilic ointment, Apply topically as needed for dry skin, Disp: , Rfl:   •  insulin lispro (HumaLOG) 100 units/mL injection, Inject 1-5 Units under the skin 3 (three) times a day before meals, Disp: , Rfl: 0  •  levothyroxine 75 mcg tablet, Take 1 tablet (75 mcg total) by mouth daily in the early morning, Disp: 30 tablet, Rfl: 0  •  lidocaine (LIDODERM) 5 %, Apply 1 patch topically over 12 hours daily Remove & Discard patch within 12 hours or as directed by MD Do not start before May 27, 2023 , Disp: 30 patch, Rfl: 0  •  melatonin 3 mg, Take 3 tablets (9 mg total) by mouth daily at bedtime, Disp: , Rfl: 0  •  mirtazapine (REMERON) 15 mg tablet, Take 30 mg by mouth daily at bedtime, Disp: , Rfl:   •  nystatin (MYCOSTATIN) powder, Apply topically 2 (two) times a day, Disp: 15 g, Rfl: 0  •  omeprazole (PriLOSEC) 20 mg delayed release capsule, Take 20 mg by mouth daily, Disp: , Rfl:        Please note:  Voice-recognition software may have been used in the preparation of this document  Occasional wrong word or \"sound-alike\" substitutions may have occurred due to the inherent limitations of voice recognition software  Interpretation should be guided by context           [address] [place] [address]  6/5/2023  7:31 PM    "

## 2023-06-05 NOTE — ASSESSMENT & PLAN NOTE
Lab Results   Component Value Date    HGBA1C 5 2 04/14/2023   ·   · Continue Accu-Cheks  · Continue Humalog sliding scale  · Encourage diabetic diet  · Avoid hypoglycemia

## 2023-06-05 NOTE — ASSESSMENT & PLAN NOTE
Multifactorial in the setting of multiple chronic conditions  Continue PT/OT as needed  Fall/safety precautions  Ensure adequate nutrition/hydration   Assist patient with ADLs/IADLs as needed  Maintain 24/7 supportive care per LTC

## 2023-06-05 NOTE — ASSESSMENT & PLAN NOTE
· On exam patient is awake and alert  · Mood appears stable on exam  · Continue fluoxetine, mirtazapine, Abilify  · Outpatient follow-up with psychiatry as needed

## 2023-06-07 ENCOUNTER — OFFICE VISIT (OUTPATIENT)
Dept: CARDIOLOGY CLINIC | Facility: SKILLED NURSING FACILITY | Age: 68
End: 2023-06-07
Payer: COMMERCIAL

## 2023-06-07 DIAGNOSIS — I10 PRIMARY HYPERTENSION: ICD-10-CM

## 2023-06-07 DIAGNOSIS — E78.2 MIXED HYPERLIPIDEMIA: ICD-10-CM

## 2023-06-07 DIAGNOSIS — I50.32 CHRONIC HEART FAILURE WITH PRESERVED EJECTION FRACTION (HCC): Primary | Chronic | ICD-10-CM

## 2023-06-07 PROCEDURE — 99305 1ST NF CARE MODERATE MDM 35: CPT | Performed by: INTERNAL MEDICINE

## 2023-06-07 NOTE — PROGRESS NOTES
[facility] Consultation - Cardiology     Pooja Thorne 75 y o  female MRN: [id number]        Assessment:  1  Chronic heart failure with preserved ejection fraction ([address] )        2  Primary hypertension        3  Mixed hyperlipidemia          Acute on chronic diastolic heart failure  Plan:  Has not required diuretics historically  Give Lasix 20 mg x 3 days with 20 mEq potassium on those days  Check BMP next week  We will follow-up next week to see how effective this diuresis is and to see if we can go back to using as needed diuretics otherwise may need to continue longer  Not requiring any medications currently for hypertension  On aspirin and Zetia  She has been intolerant of statins before  Has history of aortic stenosis which was mild to moderate on echo in February 2022  Currently seems debilitated and not a good candidate for any invasive procedures therefore will defer repeat echo at this time  History of Present Illness   Reason for Consult / Principal Problem: Heart failure  HPI: Pooja Thorne is a 75y o  year old female who was admitted to [place] after recent hospitalization at [facility] for confusion, generalized pain, lethargy, failure to thrive  From a cardiac standpoint, she has a history of diastolic heart failure, nonobstructive CAD  She has historically not required diuretics  Currently, she has been retaining a little bit more fluid and has been having some edema of the upper and lower extremities  There has been an overall decline in her health in the last few months  She was not eating as well but tells me today that she is actually been eating a little bit more the last few days  She had urinary retention but this has been pretty stable  Several falls but these seem mechanical and she is mostly bedbound        Review of Systems:  Positive for weakness, appetite changes, difficulty walking, dizziness, weakness, edema, shortness of breath  Otherwise, 12 point review of systems negative  Historical Information   Past Medical History:   Diagnosis Date   • CHF (congestive heart failure) ([facility] [address] )    • Diabetes mellitus ([facility] [address] )    • Disease of thyroid gland    • Hyperlipidemia    • Hypertension    • Hypothyroidism (acquired)    • Obesity, Class I, BMI 30 0-34 9 (see actual BMI)    • Restless leg syndrome    • Seizures (HCC)      Past Surgical History:   Procedure Laterality Date   • APPENDECTOMY     • CHOLECYSTECTOMY     • HIP FRACTURE SURGERY Right    • IR PICC PLACEMENT SINGLE LUMEN  4/4/2023   • TONSILLECTOMY     • TUBAL LIGATION       Social History     Substance and Sexual Activity   Alcohol Use Yes    Comment: occasional     Social History     Substance and Sexual Activity   Drug Use Never     Social History     Tobacco Use   Smoking Status Never   Smokeless Tobacco Never   Tobacco Comments    not a smoker     Family History: non-contributory    Meds/Allergies   Medications reviewed in [facility]  Pertinent cardiac medications include:   Aspirin 81 mg daily, Zetia 10 mg daily,    Allergies   Allergen Reactions   • Pineapple - Food Allergy Anaphylaxis   • Statins Other (See Comments)     Cramping from head to toe    • Bupropion Other (See Comments)   • Byetta 10 Mcg Pen [Exenatide]    • Ceftriaxone Other (See Comments)   • Marijuana [Dronabinol] GI Intolerance   • Metformin Diarrhea     Other reaction(s): Unknown   • Omeprazole Hives   • Ondansetron      Other reaction(s): Unknown   • Coconut Oil - Food Allergy Rash   • Liraglutide Rash and Other (See Comments)     Generalized rash   • Penicillins Rash       Objective   Vitals:  Reviewed in [facility] and at the bedside with RN    Physical Exam:  GENCathy Llanes appears chronically ill, seated in bed     HEENT: pupils equal, round, and reactive to light; extraocular muscles intact  NECK: elevated JVP  HEART: regular rhythm, normal S1 and S2, no murmurs, clicks, gallops or rubs   LUNGS: diminished sounds at bilateral bases  Bibasilar rales  ABDOMEN: distended nontender  EXTREMITIES: 1+ UE edema  Mild LE edema  Legs in soft braces  Imaging: I have personally reviewed pertinent reports  XR chest portable    Result Date: 5/26/2023  Narrative: CHEST INDICATION:   concern for chf exacerbation/volume overload in pt w hx of diastolic dysfunction receiving IVF  COMPARISON: Chest radiograph from 4/12/2023  EXAM PERFORMED/VIEWS:  XR CHEST PORTABLE FINDINGS: Mild linear opacities mostly in the lower lung likely areas of atelectasis or scarring  No pleural effusions  No evidence of pneumothorax  Cardiac silhouette not accurately accessed on this projection  Impression: Mild linear opacities mostly in the lower lungs, likely areas of atelectasis or scarring  Workstation performed: [id number]     US kidney and bladder    Result Date: 5/23/2023  Narrative: RENAL ULTRASOUND INDICATION:   anuria  COMPARISON: CT abdomen pelvis 5/18/2023  TECHNIQUE:   Ultrasound of the retroperitoneum was performed with a curvilinear transducer utilizing volumetric sweeps and still imaging techniques  FINDINGS: KIDNEYS: Symmetric and normal size  Right kidney:  10 0 x 7 5 x 5 7 cm  Volume 224 6 mL Left kidney:  9 2 x 5 6 x 5 3 cm  Volume 142 1 mL Right kidney Normal echogenicity and contour  No mass is identified  No hydronephrosis  No shadowing calculi  No perinephric fluid collections  Left kidney Normal echogenicity and contour  No mass is identified  No hydronephrosis  No shadowing calculi  No perinephric fluid collections  URETERS: Nonvisualized  BLADDER: A arceo catheter is in place, decompressing the bladder and limiting its evaluation  Impression: No hydronephrosis  Workstation performed: [id number]     US pelvis transabdominal only    Result Date: 5/22/2023  Narrative: PELVIC ULTRASOUND, COMPLETE INDICATION:  The patient is 75years old  concern for vaginal bleeding, please evaluate endometrial stripe   COMPARISON: None TECHNIQUE:   Transabdominal pelvic ultrasound was performed in sagittal and transverse planes with a curvilinear transducer  Patient unable to consent to transvaginal imaging  Imaging included volumetric sweeps as well as traditional still imaging technique  FINDINGS: Limited study due to patient body habitus and Kay catheter decompressing the urinary bladder  UTERUS: The uterus is anteverted in position, measuring 5 6 x 3 9 x 2 7 cm  The uterus has a grossly normal contour and echotexture  ENDOMETRIUM: The endometrial echo complex has an AP caliber of approximately 2 mm, noting limited evaluation  Its appearance is grossly within normal limits for age and cycle  OVARIES/ADNEXA: Ovaries not seen  No suspicious adnexal abnormality  OTHER: No free fluid or loculated fluid collections  Impression: Significantly limited study due to transabdominal only approach, patient body habitus and Kay catheter decompressing the urinary bladder  Grossly normal uterus with endometrium measuring approximately 2 mm, noting limited visualization  Ovaries not seen  Workstation performed: [id number]     CT abdomen pelvis with contrast    Result Date: 5/19/2023  Narrative: CT ABDOMEN AND PELVIS WITH IV CONTRAST INDICATION:   Abdominal pain, acute, nonlocalized  COMPARISON: CT of the chest, abdomen and pelvis from 4/9/2023 and several CTs of the abdomen and pelvis, the most recent from 4/6/2023  TECHNIQUE:  CT examination of the abdomen and pelvis was performed  Multiplanar 2D reformatted images were created from the source data  This examination, like all CT scans performed in the [facility], was performed utilizing techniques to minimize radiation dose exposure, including the use of iterative reconstruction and automated exposure control  Radiation dose length product (DLP) for this visit:  399 mGy-cm IV Contrast:  100 mL of iohexol (OMNIPAQUE) Enteric Contrast:  Enteric contrast was not administered   FINDINGS: Evaluation is limited by streak artifact from patient's arms, which could not be placed overhead  ABDOMEN LOWER CHEST: Patchy opacities at the lung bases, overall similar to prior studies  No pleural effusions  LIVER/BILIARY TREE:  Normal liver morphology  There is an area of decreased attenuation in the liver parenchyma adjacent to the falciform ligament, given the location likely focal fat deposition  No focal hepatic lesions  No biliary ductal dilation  GALLBLADDER:  The patient is status post cholecystectomy  SPLEEN: Unremarkable  PANCREAS: Diffuse fatty atrophy of the pancreas  No evidence of acute pancreatitis  Normal caliber main pancreatic duct  ADRENAL GLANDS:  Unremarkable left adrenal gland  There is a stable 2 4 x 2 0 cm right adrenal gland nodule  It cannot be accurately assessed on this exam, however on prior noncontrast CT it measured less than 10 Hounsfield units in attenuation, compatible with an adrenal adenoma  KIDNEYS/URETERS:  Symmetric renal enhancement  No intrarenal or ureteral calculi  No hydronephrosis  No focal renal lesions  STOMACH AND BOWEL:  Evaluation of the gastrointestinal tract is somewhat limited by underdistention and lack of oral contrast  No bowel obstruction or convincing inflammation  Scattered colonic diverticuli, however no evidence of diverticulitis  APPENDIX: The appendix is not visualized, however there are no secondary findings of appendicitis  ABDOMINOPELVIC CAVITY:  No ascites or pneumoperitoneum  No fluid collections to suggest an abscess  LYMPH NODES: No abdominal or pelvic lymphadenopathy  VESSELS: Normal caliber aorta  Patent major branch vessels  PELVIS REPRODUCTIVE ORGANS: Unremarkable CT appearance of the uterus  URINARY BLADDER: The urinary bladder is collapsed around a Kay catheter, limiting evaluation  ABDOMINAL WALL/INGUINAL REGIONS: Tiny fat-containing umbilical hernia  OSSEOUS STRUCTURES:  No focal aggressive osseous lesions   Degenerative changes of the spine  A screw is seen in the right posterior acetabulum  There is laxity and fatty atrophy of the abdominal wall musculature  There is also fatty atrophy of the gluteus and visualized proximal thigh muscles  The previously seen left obturator internus muscle hematoma has nearly completely resolved  Impression: No acute abdominal or pelvic pathology  Urinary bladder collapsed around Kay catheter, limiting evaluation  No evidence of pyelonephritis or perinephric abscess  No bowel obstruction  Evaluation for bowel inflammation somewhat limited by underdistention and lack of oral contrast, however no convincing inflammatory changes  Please note mild inflammation may not be apparent  Additional findings as above  Workstation performed: [id number]     CT head without contrast    Result Date: 5/19/2023  Narrative: CT BRAIN - WITHOUT CONTRAST INDICATION:   Delirium AMS  COMPARISON: CT head 3/13/2023 TECHNIQUE:  CT examination of the brain was performed  Multiplanar 2D reformatted images were created from the source data  Radiation dose length product (DLP) for this visit:  965 mGy-cm   This examination, like all CT scans performed in the [facility], was performed utilizing techniques to minimize radiation dose exposure, including the use of iterative reconstruction and automated exposure control  IMAGE QUALITY:  Diagnostic  FINDINGS: PARENCHYMA: Decreased attenuation is noted in periventricular and subcortical white matter demonstrating an appearance that is statistically most likely to represent moderate microangiopathic change  No CT signs of acute infarction  No intracranial mass, mass effect or midline shift  No acute parenchymal hemorrhage  VENTRICLES AND EXTRA-AXIAL SPACES:  Normal for the patient's age  VISUALIZED ORBITS: Normal visualized orbits  PARANASAL SINUSES: Normal visualized paranasal sinuses   CALVARIUM AND EXTRACRANIAL SOFT TISSUES:  Normal      Impression: No acute intracranial abnormality  Workstation performed: [id number]     Echo 2/1/22:  Left Ventricle: Left ventricular cavity size is normal  Wall thickness is mildly increased  Systolic function is normal  Although no diagnostic wall motion abnormality was identified this cannot be entirely excluded on the basis of this study  Diastolic function is moderately abnormal, consistent with grade II (pseudonormal) relaxation  Left atrial filling pressure is elevated  There is concentric remodeling  •  Right Ventricle: Right ventricular cavity size is mildly dilated  Systolic function is normal   •  Left Atrium: The atrium is mildly dilated  •  Right Atrium: The atrium is mildly dilated  •  Aortic Valve: The aortic valve is probably trileaflet  The leaflets are moderately thickened  The leaflets are moderately calcified  There is mild to moderate stenosis  GABRIELA 1 2 cm2/MG 12 mmHg/DI 0 38  The aortic valve velocity is increased due to stenosis  •  Mitral Valve: There is moderate annular calcification  •  Tricuspid Valve: There is mild regurgitation  The right ventricular systolic pressure is moderately elevated  The estimated right ventricular systolic pressure is 53 31 mmHg

## 2023-06-08 PROBLEM — N39.0 SEPSIS SECONDARY TO UTI (HCC): Status: RESOLVED | Noted: 2023-03-11 | Resolved: 2023-06-08

## 2023-06-08 PROBLEM — A41.9 SEPSIS SECONDARY TO UTI (HCC): Status: RESOLVED | Noted: 2023-03-11 | Resolved: 2023-06-08

## 2023-06-09 ENCOUNTER — NURSING HOME VISIT (OUTPATIENT)
Dept: GERIATRICS | Facility: OTHER | Age: 68
End: 2023-06-09
Payer: COMMERCIAL

## 2023-06-09 VITALS
SYSTOLIC BLOOD PRESSURE: 124 MMHG | RESPIRATION RATE: 18 BRPM | TEMPERATURE: 97.7 F | HEART RATE: 74 BPM | OXYGEN SATURATION: 97 % | DIASTOLIC BLOOD PRESSURE: 70 MMHG

## 2023-06-09 DIAGNOSIS — Z79.4 TYPE 2 DIABETES MELLITUS WITH HYPERGLYCEMIA, WITH LONG-TERM CURRENT USE OF INSULIN (HCC): ICD-10-CM

## 2023-06-09 DIAGNOSIS — F32.9 MAJOR DEPRESSIVE DISORDER, REMISSION STATUS UNSPECIFIED, UNSPECIFIED WHETHER RECURRENT: ICD-10-CM

## 2023-06-09 DIAGNOSIS — E11.65 TYPE 2 DIABETES MELLITUS WITH HYPERGLYCEMIA, WITH LONG-TERM CURRENT USE OF INSULIN (HCC): ICD-10-CM

## 2023-06-09 DIAGNOSIS — E03.9 ACQUIRED HYPOTHYROIDISM: ICD-10-CM

## 2023-06-09 DIAGNOSIS — R26.2 AMBULATORY DYSFUNCTION: ICD-10-CM

## 2023-06-09 DIAGNOSIS — I50.32 CHRONIC HEART FAILURE WITH PRESERVED EJECTION FRACTION (HCC): Primary | Chronic | ICD-10-CM

## 2023-06-09 PROCEDURE — 99309 SBSQ NF CARE MODERATE MDM 30: CPT

## 2023-06-09 NOTE — PROGRESS NOTES
[facility]  [address]  [phone]  [facility]  Code [facility] LTC        NAME: Florinda Cox  AGE: 75 y o  SEX: female CODE STATUS: DNR    DATE OF ENCOUNTER: 6/9/23    Assessment and Plan     1  Chronic heart failure with preserved ejection fraction [facility])  Assessment & Plan:  Wt Readings from Last 3 Encounters:   06/05/23 93 9 kg (207 lb)   05/30/23 95 1 kg (209 lb 9 6 oz)   05/26/23 95 kg (209 lb 7 oz)   · Last echo Feb/2022 EF 67%  · Patient noted to have edema in upper and lower extremities  · Patient was seen on cardiology rounds this week  · Started on lasix 20 mg daily x 3 days and potassium 20 meq x 3 day  · Repeat BMP 6/13/23  · Edema improved today, LSC 97% on RA  · Will monitor            2  Type 2 diabetes mellitus with hyperglycemia, with long-term current use of insulin ([facility])  Assessment & Plan:    Lab Results   Component Value Date    HGBA1C 5 2 04/14/2023   ·   · Continue Accu-Cheks  · Continue Humalog sliding scale  · Encourage diabetic diet  · Avoid hypoglycemia      3  Ambulatory dysfunction  Assessment & Plan:  Multifactorial in the setting of multiple chronic conditions  Continue PT/OT as needed  Fall/safety precautions  Ensure adequate nutrition/hydration   Assist patient with ADLs/IADLs as needed  Maintain 24/7 supportive care per LTC      4  Acquired hypothyroidism  Assessment & Plan:  · Continue levothyroxine 75 mcg daily      5  Major depressive disorder, remission status unspecified, unspecified whether recurrent  Assessment & Plan:  · On exam patient is awake and alert  · Mood appears stable on exam  · Continue fluoxetine, mirtazapine, Abilify  · Outpatient follow-up with psychiatry as needed         All medications and routine orders were reviewed and updated as needed  Chief Complaint     LTC acute visit for edema  Patient's care was coordinated with nursing facility staff   Recent vitals, labs, and updated medications were review on [facility] system in facility  Past Medical and Surgical History      Past Medical History:   Diagnosis Date   • CHF (congestive heart failure) ([facility] [address] )    • Diabetes mellitus ([facility] [address] )    • Disease of thyroid gland    • Hyperlipidemia    • Hypertension    • Hypothyroidism (acquired)    • Obesity, Class I, BMI 30 0-34 9 (see actual BMI)    • Restless leg syndrome    • Seizures (HCC)      Past Surgical History:   Procedure Laterality Date   • APPENDECTOMY     • CHOLECYSTECTOMY     • HIP FRACTURE SURGERY Right    • IR PICC PLACEMENT SINGLE LUMEN  4/4/2023   • TONSILLECTOMY     • TUBAL LIGATION       Allergies   Allergen Reactions   • Pineapple - Food Allergy Anaphylaxis   • Statins Other (See Comments)     Cramping from head to toe    • Bupropion Other (See Comments)   • Byetta 10 Mcg Pen [Exenatide]    • Ceftriaxone Other (See Comments)   • Marijuana [Dronabinol] GI Intolerance   • Metformin Diarrhea     Other reaction(s): Unknown   • Omeprazole Hives   • Ondansetron      Other reaction(s): Unknown   • Coconut Oil - Food Allergy Rash   • Liraglutide Rash and Other (See Comments)     Generalized rash   • Penicillins Rash          History of Present Illness     HPI  Yamilka Cheney is a 75year old female, she is a STR patient of [address] [place] [address] since 3/16/23  Patient currently resides in LTC and has returned from recent hospitalization  Past Medical Hx including but not limited to GERD, diabetes, CHF, CAD, seizure disorder, depression  She was seen in collaboration with nursing for medical mgmt and LTC acute visit for edema  Hospital Course  Patient was admitted to the hospital 5/19/23 for lethargy and confusion  She was noted to have abnormal UA and was started on antibiotics, she remained asymptomatic  Kay catheter placed for recurrent urinary retention  Antibiotics were discontinued as ID felt due to asymptomatic bacteriuria   During hospital stay patient expressed suicidal ideation and was seen by psychiatry who added Abilify  [facility] and [facility] at [facility] today  On exam patient is resting in bed and does not appear to be in distress  She is AAOx3 and says she is feeling better  Patient was started on Lasix 20 mg x3 days and potassium 20 meq x 3 days for upper/lower extremity edema  Edema appears improved today  LSC, 97% on RA  Will continue to monitor  She denies CP/SOB/N/V/D  Denies lightheadedness, headaches, vision changes  Per documentation patient is consuming 2-3 meals, 50-75 %  Last documented bowel movement was 6/9/23  No Nursing concerns at this time  The patient's allergies, past medical, surgical, social and family history were reviewed and unchanged  Review of Systems     Review of Systems   Constitutional: Positive for activity change and appetite change  Negative for chills and fever  HENT: Negative  Negative for congestion  Eyes: Negative  Respiratory: Negative  Negative for cough, shortness of breath and wheezing  Cardiovascular: Negative  Negative for chest pain, palpitations and leg swelling  Gastrointestinal: Negative  Negative for abdominal distention, abdominal pain, constipation, diarrhea, nausea and vomiting  Endocrine: Negative  Genitourinary: Negative for difficulty urinating, dysuria and flank pain  Musculoskeletal: Positive for gait problem  Skin: Negative  Allergic/Immunologic: Negative  Neurological: Positive for dizziness and weakness  Negative for light-headedness and headaches  Hematological: Negative  Psychiatric/Behavioral: Negative  Negative for confusion and sleep disturbance  Objective     Vitals:   Vitals:    06/09/23 1050   BP: 124/70   Pulse: 74   Resp: 18   Temp: 97 7 °F (36 5 °C)   SpO2: 97%         Physical Exam  Vitals and nursing note reviewed  Constitutional:       General: She is not in acute distress  Appearance: Normal appearance     HENT:      Head: Normocephalic and atraumatic  Nose: No congestion  Mouth/Throat:      Mouth: Mucous membranes are moist    Eyes:      Conjunctiva/sclera: Conjunctivae normal    Cardiovascular:      Rate and Rhythm: Normal rate and regular rhythm  Heart sounds: Normal heart sounds  Pulmonary:      Effort: Pulmonary effort is normal  No respiratory distress  Breath sounds: Normal breath sounds  No wheezing  Abdominal:      General: Bowel sounds are normal  There is no distension  Palpations: Abdomen is soft  Tenderness: There is no abdominal tenderness  Musculoskeletal:      Right lower leg: No edema  Left lower leg: No edema  Comments: Swelling in b/l hands/arm   Skin:     General: Skin is warm  Neurological:      Mental Status: She is alert and oriented to person, place, and time  Mental status is at baseline  Motor: Weakness present  Gait: Gait abnormal    Psychiatric:         Mood and Affect: Mood normal          Behavior: Behavior normal          Pertinent Laboratory/Diagnostic Studies:   Reviewed in facility chart-stable      Current Medications   Medications reviewed and updated see facility [facility] for details  Current Outpatient Medications:   •  acetaminophen (TYLENOL) 325 mg tablet, Take 3 tablets (975 mg total) by mouth every 8 (eight) hours, Disp: , Rfl: 0  •  ARIPiprazole (ABILIFY) 2 mg tablet, Take 1 tablet (2 mg total) by mouth daily Do not start before May 27, 2023 , Disp: 30 tablet, Rfl: 0  •  aspirin (ECOTRIN LOW STRENGTH) 81 mg EC tablet, Take 81 mg by mouth daily, Disp: , Rfl:   •  Cholecalciferol 25 MCG (1000 UT) capsule, Take 1,000 Units by mouth daily  , Disp: , Rfl:   •  Ergocalciferol (VITAMIN D2 PO), Take 50,000 Units/day by mouth 2 (two) times a week, Disp: , Rfl:   •  ezetimibe (ZETIA) 10 mg tablet, Take 1 tablet (10 mg total) by mouth daily, Disp: 30 tablet, Rfl: 0  •  ferrous sulfate 325 (65 Fe) mg tablet, Take 1 tablet (325 mg total) by mouth every other day, "Disp: 15 tablet, Rfl: 0  •  FLUoxetine (PROzac) 40 MG capsule, Take 1 capsule (40 mg total) by mouth daily Do not start before March 17, 2023 , Disp: , Rfl: 0  •  folic acid (FOLVITE) 380 mcg tablet, Take 1 tablet (400 mcg total) by mouth daily, Disp: 30 tablet, Rfl: 0  •  hydrophilic ointment, Apply topically as needed for dry skin, Disp: , Rfl:   •  insulin lispro (HumaLOG) 100 units/mL injection, Inject 1-5 Units under the skin 3 (three) times a day before meals, Disp: , Rfl: 0  •  levothyroxine 75 mcg tablet, Take 1 tablet (75 mcg total) by mouth daily in the early morning, Disp: 30 tablet, Rfl: 0  •  lidocaine (LIDODERM) 5 %, Apply 1 patch topically over 12 hours daily Remove & Discard patch within 12 hours or as directed by MD Do not start before May 27, 2023 , Disp: 30 patch, Rfl: 0  •  melatonin 3 mg, Take 3 tablets (9 mg total) by mouth daily at bedtime, Disp: , Rfl: 0  •  mirtazapine (REMERON) 15 mg tablet, Take 30 mg by mouth daily at bedtime, Disp: , Rfl:   •  nystatin (MYCOSTATIN) powder, Apply topically 2 (two) times a day, Disp: 15 g, Rfl: 0  •  omeprazole (PriLOSEC) 20 mg delayed release capsule, Take 20 mg by mouth daily, Disp: , Rfl:        Please note:  Voice-recognition software may have been used in the preparation of this document  Occasional wrong word or \"sound-alike\" substitutions may have occurred due to the inherent limitations of voice recognition software  Interpretation should be guided by context           [address] [place], [place] [address]SAMIA  6/9/2023  5:11 PM    "

## 2023-06-14 ENCOUNTER — NURSING HOME VISIT (OUTPATIENT)
Dept: WOUND CARE | Facility: HOSPITAL | Age: 68
End: 2023-06-14

## 2023-06-14 ENCOUNTER — NURSING HOME VISIT (OUTPATIENT)
Dept: GERIATRICS | Facility: OTHER | Age: 68
End: 2023-06-14
Payer: COMMERCIAL

## 2023-06-14 ENCOUNTER — OFFICE VISIT (OUTPATIENT)
Dept: CARDIOLOGY CLINIC | Facility: SKILLED NURSING FACILITY | Age: 68
End: 2023-06-14
Payer: COMMERCIAL

## 2023-06-14 VITALS
SYSTOLIC BLOOD PRESSURE: 102 MMHG | HEART RATE: 76 BPM | DIASTOLIC BLOOD PRESSURE: 74 MMHG | RESPIRATION RATE: 18 BRPM | OXYGEN SATURATION: 98 % | TEMPERATURE: 97.8 F

## 2023-06-14 DIAGNOSIS — I35.0 NON-RHEUMATIC AORTIC STENOSIS: ICD-10-CM

## 2023-06-14 DIAGNOSIS — I95.1 ORTHOSTATIC HYPOTENSION: ICD-10-CM

## 2023-06-14 DIAGNOSIS — I50.32 CHRONIC HEART FAILURE WITH PRESERVED EJECTION FRACTION (HCC): Primary | Chronic | ICD-10-CM

## 2023-06-14 DIAGNOSIS — R62.7 FAILURE TO THRIVE IN ADULT: ICD-10-CM

## 2023-06-14 DIAGNOSIS — L89.323 PRESSURE INJURY OF LEFT BUTTOCK, STAGE 3 (HCC): Primary | ICD-10-CM

## 2023-06-14 DIAGNOSIS — E11.65 TYPE 2 DIABETES MELLITUS WITH HYPERGLYCEMIA, WITH LONG-TERM CURRENT USE OF INSULIN (HCC): ICD-10-CM

## 2023-06-14 DIAGNOSIS — R26.2 AMBULATORY DYSFUNCTION: ICD-10-CM

## 2023-06-14 DIAGNOSIS — I50.33 ACUTE ON CHRONIC HEART FAILURE WITH PRESERVED EJECTION FRACTION (HCC): Primary | ICD-10-CM

## 2023-06-14 DIAGNOSIS — E03.9 ACQUIRED HYPOTHYROIDISM: ICD-10-CM

## 2023-06-14 DIAGNOSIS — R33.9 URINARY RETENTION: ICD-10-CM

## 2023-06-14 DIAGNOSIS — Z79.4 TYPE 2 DIABETES MELLITUS WITH HYPERGLYCEMIA, WITH LONG-TERM CURRENT USE OF INSULIN (HCC): ICD-10-CM

## 2023-06-14 DIAGNOSIS — L89.312 PRESSURE INJURY OF RIGHT BUTTOCK, STAGE 2 (HCC): ICD-10-CM

## 2023-06-14 PROCEDURE — 99309 SBSQ NF CARE MODERATE MDM 30: CPT

## 2023-06-14 PROCEDURE — 99309 SBSQ NF CARE MODERATE MDM 30: CPT | Performed by: INTERNAL MEDICINE

## 2023-06-14 NOTE — PROGRESS NOTES
[facility]  [address]  [phone]  Alex Meza  Code 32 LTC  Acute visit      NAME: Eleanor Brown  AGE: 75 y o  SEX: female CODE STATUS: DNR    DATE OF ENCOUNTER: 6/9/23    Assessment and Plan     1  Chronic heart failure with preserved ejection fraction [facility])  Assessment & Plan:  Wt Readings from Last 3 Encounters:   06/05/23 93 9 kg (207 lb)   05/30/23 95 1 kg (209 lb 9 6 oz)   05/26/23 95 kg (209 lb 7 oz)   · Last echo Feb/2022 EF 67%  · Patient noted to have edema in upper and lower extremities  · Was given 3 days of lasix/potassium  · Cardiology following at LT  · [facility] 98% on RA  · Monitor BMP            2  Type 2 diabetes mellitus with hyperglycemia, with long-term current use of insulin ([facility])  Assessment & Plan:    Lab Results   Component Value Date    HGBA1C 5 2 04/14/2023   ·   · Continue Accu-Cheks  · Continue Humalog sliding scale  · Encourage diabetic diet  · Avoid hypoglycemia      3  Acquired hypothyroidism  Assessment & Plan:  · Continue levothyroxine 75 mcg daily      4  Urinary retention  Assessment & Plan:  · Patient with recurrent urinary retention  · Kay catheter intact draining cyu  · Patient denies urinary symptoms  · Outpatient follow-up with urology      5  Ambulatory dysfunction  Assessment & Plan:  Multifactorial in the setting of multiple chronic conditions  Continue PT/OT as needed  Fall/safety precautions  Ensure adequate nutrition/hydration   Assist patient with ADLs/IADLs as needed  Maintain 24/7 supportive care per LTC      6  Failure to thrive in adult  Assessment & Plan:  · Patient bedbound  · Significant decline in the last few months  · Decreased p o  intake  · Continue with palliative care   · assist patient with ADLs/IADLs as needed  · Continue 24/7 supportive care per LTC  · Encourage OOB for meals and participation in group activities         All medications and routine orders were reviewed and updated as needed      Chief Complaint     LTC acute visit for edema  Patient's care was coordinated with nursing facility staff  Recent vitals, labs, and updated medications were review on [facility] system in facility  Past Medical and Surgical History      Past Medical History:   Diagnosis Date   • CHF (congestive heart failure) ([facility] [address] )    • Diabetes mellitus ([facility] [address] )    • Disease of thyroid gland    • Hyperlipidemia    • Hypertension    • Hypothyroidism (acquired)    • Obesity, Class I, BMI 30 0-34 9 (see actual BMI)    • Restless leg syndrome    • Seizures (HCC)      Past Surgical History:   Procedure Laterality Date   • APPENDECTOMY     • CHOLECYSTECTOMY     • HIP FRACTURE SURGERY Right    • IR PICC PLACEMENT SINGLE LUMEN  4/4/2023   • TONSILLECTOMY     • TUBAL LIGATION       Allergies   Allergen Reactions   • Pineapple - Food Allergy Anaphylaxis   • Statins Other (See Comments)     Cramping from head to toe    • Bupropion Other (See Comments)   • Byetta 10 Mcg Pen [Exenatide]    • Ceftriaxone Other (See Comments)   • Marijuana [Dronabinol] GI Intolerance   • Metformin Diarrhea     Other reaction(s): Unknown   • Omeprazole Hives   • Ondansetron      Other reaction(s): Unknown   • Coconut Oil - Food Allergy Rash   • Liraglutide Rash and Other (See Comments)     Generalized rash   • Penicillins Rash          History of Present Illness     HPI  Davy Marie is a 75year old female, she is a STR patient of [facility] since 3/16/23  Patient currently resides in LTC and has returned from recent hospitalization  Past Medical Hx including but not limited to GERD, diabetes, CHF, CAD, seizure disorder, depression  She was seen in collaboration with nursing for medical mgmt and LTC acute visit for edema  Hospital Course  Patient was admitted to the hospital 5/19/23 for lethargy and confusion  She was noted to have abnormal UA and was started on antibiotics, she remained asymptomatic    Kay catheter placed for recurrent urinary retention  Antibiotics were discontinued as ID felt due to asymptomatic bacteriuria  During hospital stay patient expressed suicidal ideation and was seen by psychiatry who added Abilify  Patient was discharged back to [facility] was seen and examined at bedside today  On exam patient is resting in bed and appears comfortable  She is awake and alert and says she is feeling ok today  Patient received lasix/potassium replacement for upper/lower extremity edema last week  Edema appears slightly improved  LSC, 98% on RA  Cardiology following at [facility]  She denies CP/SOB/N/V/D  Denies lightheadedness, headaches, vision changes  Per documentation patient is consuming 2-3 meals, 25-50 %  Last documented bowel movement was 6/14/23  No Nursing concerns at this time  The patient's allergies, past medical, surgical, social and family history were reviewed and unchanged  Review of Systems     Review of Systems   Constitutional: Positive for activity change and appetite change  Negative for chills and fever  HENT: Negative  Negative for congestion  Eyes: Negative  Respiratory: Negative  Negative for cough, shortness of breath and wheezing  Cardiovascular: Negative  Negative for chest pain, palpitations and leg swelling  Gastrointestinal: Negative  Negative for abdominal distention, abdominal pain, constipation, diarrhea, nausea and vomiting  Endocrine: Negative  Genitourinary: Negative for difficulty urinating, dysuria and flank pain  Musculoskeletal: Positive for gait problem  Allergic/Immunologic: Negative  Neurological: Positive for weakness  Negative for light-headedness and headaches  Hematological: Negative  Psychiatric/Behavioral: Negative  Negative for sleep disturbance  Objective     Vitals:   Vitals:    06/14/23 1008   BP: 102/74   Pulse: 76   Resp: 18   Temp: 97 8 °F (36 6 °C)   SpO2: 98%         Physical Exam  Vitals and nursing note reviewed  Constitutional:       General: She is not in acute distress  Appearance: Normal appearance  HENT:      Head: Normocephalic and atraumatic  Nose: No congestion  Mouth/Throat:      Mouth: Mucous membranes are moist    Eyes:      Conjunctiva/sclera: Conjunctivae normal    Cardiovascular:      Rate and Rhythm: Normal rate and regular rhythm  Heart sounds: Normal heart sounds  Pulmonary:      Effort: Pulmonary effort is normal  No respiratory distress  Breath sounds: Normal breath sounds  No wheezing  Abdominal:      General: Bowel sounds are normal  There is no distension  Palpations: Abdomen is soft  Tenderness: There is no abdominal tenderness  Musculoskeletal:      Comments: Swelling in b/l hands/arm   Skin:     General: Skin is warm  Neurological:      Mental Status: She is alert and oriented to person, place, and time  Mental status is at baseline  Motor: Weakness present  Gait: Gait abnormal    Psychiatric:         Mood and Affect: Mood normal          Behavior: Behavior normal          Pertinent Laboratory/Diagnostic Studies:   Reviewed in facility chart-stable      Current Medications   Medications reviewed and updated see facility [facility] for details  Current Outpatient Medications:   •  acetaminophen (TYLENOL) 325 mg tablet, Take 3 tablets (975 mg total) by mouth every 8 (eight) hours, Disp: , Rfl: 0  •  ARIPiprazole (ABILIFY) 2 mg tablet, Take 1 tablet (2 mg total) by mouth daily Do not start before May 27, 2023 , Disp: 30 tablet, Rfl: 0  •  aspirin (ECOTRIN LOW STRENGTH) 81 mg EC tablet, Take 81 mg by mouth daily, Disp: , Rfl:   •  Cholecalciferol 25 MCG (1000 UT) capsule, Take 1,000 Units by mouth daily  , Disp: , Rfl:   •  Ergocalciferol (VITAMIN D2 PO), Take 50,000 Units/day by mouth 2 (two) times a week, Disp: , Rfl:   •  ezetimibe (ZETIA) 10 mg tablet, Take 1 tablet (10 mg total) by mouth daily, Disp: 30 tablet, Rfl: 0  •  ferrous sulfate 325 (65 "Fe) mg tablet, Take 1 tablet (325 mg total) by mouth every other day, Disp: 15 tablet, Rfl: 0  •  FLUoxetine (PROzac) 40 MG capsule, Take 1 capsule (40 mg total) by mouth daily Do not start before March 17, 2023 , Disp: , Rfl: 0  •  folic acid (FOLVITE) 116 mcg tablet, Take 1 tablet (400 mcg total) by mouth daily, Disp: 30 tablet, Rfl: 0  •  hydrophilic ointment, Apply topically as needed for dry skin, Disp: , Rfl:   •  insulin lispro (HumaLOG) 100 units/mL injection, Inject 1-5 Units under the skin 3 (three) times a day before meals, Disp: , Rfl: 0  •  levothyroxine 75 mcg tablet, Take 1 tablet (75 mcg total) by mouth daily in the early morning, Disp: 30 tablet, Rfl: 0  •  lidocaine (LIDODERM) 5 %, Apply 1 patch topically over 12 hours daily Remove & Discard patch within 12 hours or as directed by MD Do not start before May 27, 2023 , Disp: 30 patch, Rfl: 0  •  melatonin 3 mg, Take 3 tablets (9 mg total) by mouth daily at bedtime, Disp: , Rfl: 0  •  mirtazapine (REMERON) 15 mg tablet, Take 30 mg by mouth daily at bedtime, Disp: , Rfl:   •  nystatin (MYCOSTATIN) powder, Apply topically 2 (two) times a day, Disp: 15 g, Rfl: 0  •  omeprazole (PriLOSEC) 20 mg delayed release capsule, Take 20 mg by mouth daily, Disp: , Rfl:        Please note:  Voice-recognition software may have been used in the preparation of this document  Occasional wrong word or \"sound-alike\" substitutions may have occurred due to the inherent limitations of voice recognition software  Interpretation should be guided by context           [address] [place] [address] Avenue, CRNP  6/14/2023  10:24 AM    "

## 2023-06-14 NOTE — ASSESSMENT & PLAN NOTE
· Patient bedbound  · Significant decline in the last few months  · Decreased p o  intake  · Continue with palliative care   · assist patient with ADLs/IADLs as needed  · Continue 24/7 supportive care per LTC  · Encourage OOB for meals and participation in group activities

## 2023-06-14 NOTE — PROGRESS NOTES
[address] Progress Note - Cardiology   Lashay Quiroz 75 y o  female MRN: [id number]    Assessment:  1  Acute on chronic heart failure with preserved ejection fraction ([address] )        2  Orthostatic hypotension        3  Non-rheumatic aortic stenosis          79-year-old female with recent exacerbation of diastolic heart failure  Historically, she had not required diuretics  She otherwise has hypertension and dyslipidemia  She was given a short course of oral diuretics with some improvement in her volume status  Plan:    Diastolic heart failure: Would monitor her volume status and use as needed diuretics at this point  She has some chronic kidney disease, her creatinine is 1 6  She seemed to respond pretty well to 20 mg of Lasix    Orthostatic hypotension: Currently vital signs are stable  Not on any antihypertensives  Dyslipidemia: Continue Zetia  Nonobstructive coronary artery disease: On aspirin and lipid-lowering therapy  Currently without any angina  Aortic stenosis: Mild to moderate on last echocardiogram February 2022  Likely repeat next year  Long-term resident of the facility  She can be seen in follow-up in approximately 2 months  Subjective/Objective     Subjective:     Patient seen in follow-up today  Seen last week  She was having some swelling of the extremities  She was given a course of oral Lasix for 3 days along with potassium supplementation  She generally is without any complaints today  Edema still present but a little bit better than last week  BMP was done and her creatinine is 1 6, slightly higher than when last checked  She remains overall weak, but per the nursing staff she was more interactive than she usually is over the weekend and is generally doing well  Objective:    Vitals:   Reviewed in [facility] and at the bedside with RN      Physical Exam:   General appearance: alert and in no acute distress  Head: Normocephalic, without obvious abnormality, atraumatic  Neck: no carotid bruit, no JVD and supple, symmetrical, trachea midline  Lungs: clear to auscultation bilaterally  Normal air entry  Normal effort  Heart: S1, S2 normal 2/6 HINA  Abdomen: soft, non-tender; bowel sounds normal; no masses,  no organomegaly  Extremities: skin tears  RUE and LLE edema 1+  Pulses: warm  Skin: ecchymoses  Neurologic: Grossly normal  Alert and oriented  Medications:  Reviewed in [facility]  Pertinent cardiac medications include:   Aspirin, 81 mg daily  Zetia 10 mg daily  Echo 2/2022:  Echo 2/1/22:  Left Ventricle: Left ventricular cavity size is normal  Wall thickness is mildly increased  Systolic function is normal  Although no diagnostic wall motion abnormality was identified this cannot be entirely excluded on the basis of this study  Diastolic function is moderately abnormal, consistent with grade II (pseudonormal) relaxation   Left atrial filling pressure is elevated  There is concentric remodeling  •  Right Ventricle: Right ventricular cavity size is mildly dilated  Systolic function is normal   •  Left Atrium: The atrium is mildly dilated  •  Right Atrium: The atrium is mildly dilated  •  Aortic Valve: The aortic valve is probably trileaflet  The leaflets are moderately thickened  The leaflets are moderately calcified  There is mild to moderate stenosis  GABRIELA 1 2 cm2/MG 12 mmHg/DI 0 38  The aortic valve velocity is increased due to stenosis  •  Mitral Valve: There is moderate annular calcification  •  Tricuspid Valve: There is mild regurgitation  The right ventricular systolic pressure is moderately elevated  The estimated right ventricular systolic pressure is 14 00 mmHg

## 2023-06-14 NOTE — ASSESSMENT & PLAN NOTE
Wt Readings from Last 3 Encounters:   06/05/23 93 9 kg (207 lb)   05/30/23 95 1 kg (209 lb 9 6 oz)   05/26/23 95 kg (209 lb 7 oz)   · Last echo Feb/2022 EF 67%  · Patient noted to have edema in upper and lower extremities  · Was given 3 days of lasix/potassium  · Cardiology following at LTC  · [facility] 98% on RA  · Monitor BMP

## 2023-06-15 NOTE — ASSESSMENT & PLAN NOTE
The wound on the right buttock improved  Partial-thickness, with no obvious sign of infection  Continue local wound care with Triad  Continue air mattress and heel boots when in bed  Follow-up next week

## 2023-06-15 NOTE — ASSESSMENT & PLAN NOTE
The wound on the left buttock improved, increased epithelial  Continue Triad paste  Continue to offload

## 2023-06-15 NOTE — PROGRESS NOTES
[address] MANAGEMENT   AND [facility]PER[facility]IC [facility]INE CENTER       Patient ID: Gus Olson is a 75 y o  female Date of Birth 1955     Location of Service: [address] [place] [address]    Performed wound round with: Wound team     Chief Complaint : buttocks,    Wound Instructions:  Wound: Buttocks  Discontinue previous wound order  Cleanse the wound bed with NSS   Apply non-sting skin prep to periwound area  Apply Triad paste to wound bed  Frequency : Twice a day and prn for soiling       Offload all wounds  Turn and reposition frequently, Increase protein intake  Monitor for any sign of infection or worsening, inform PCP or patient's primary physician in your facility  Allergies  Pineapple - food allergy, Statins, Bupropion, Byetta 10 mcg pen [exenatide], Ceftriaxone, Marijuana [dronabinol], Metformin, Omeprazole, Ondansetron, Coconut oil - food allergy, Liraglutide, and Penicillins      Assessment & Plan:  1  Pressure injury of left buttock, stage 3 ([facility])  Assessment & Plan: The wound on the left buttock improved, increased epithelial  Continue Triad paste  Continue to offload      2  Pressure injury of right buttock, stage 2 ([address] )  Assessment & Plan: The wound on the right buttock improved  Partial-thickness, with no obvious sign of infection  Continue local wound care with Triad  Continue air mattress and heel boots when in bed  Follow-up next week             Subjective:   March 22, 2023  This is a new consult for wound on the right buttocks, sacrum, left buttocks, back, and right knee  Patient was referred by Senior care team   Patient have a complex medical history including but not limited to UTI, acute kidney injury, CHF, and GERD  As per medical record review, patient was recently admitted at [facility] from March 11 to March 16, 2023 for sepsis  Patient was transferred on discharge to [facility] rehab for short-term rehab      Wound history: As per medical record review, patient had wound on the sacrum that was first assessed on March 11, 2023 the wound on back was first assessed on March 13, 2023  The wound on the right lower leg was first assessed on March 13, 2023  The etiology of all mention wounds are pressure injury  Received patient in bed, seems comfortable  Total dependent with positioning in bed  Currently on air mattress  Patient have a poor appetite  Patient is incontinent of both bowel and bladder  Patient currently on protein supplement, Prosource and Magic cup  March 29, 2023  Follow-up for wound on the right buttock, sacrum, left buttock, back, and right knee  Received patient in bed, seems comfortable  Denies pain  Facility did not report any significant issues related to the wound  April 26, 2023  Follow-up for wound on the buttocks, groin, and breast   As per report, patient was recently admitted at acute [facility] and returned in [facility] for short-term rehab  Received patient in bed, seems comfortable  Denies pain  Patient is a max assist with positioning in bed  She is incontinent of both bowel and bladder  May 3, 2023  Follow-up for wounds on the left buttock, right buttock, and new consult for wound on the right forearm  The wound on the right forearm is a skin tear, unknown etiology  Received patient in bed, seems comfortable  Denies pain  No significant issues reported related to the wound  May 10, 2023  Follow-up for wound on the left buttock, right buttock, right forearm  Received patient in bed, seems comfortable  Denies pain  Patient is max assist with transfer  May 17, 2023  Follow-up for wound on the left buttock, right buttock  Received patient in bed, seems comfortable  Denies pain  No significant issues related to the wound  May 31, 2023  Follow-up for wound on the buttocks  Received patient in bed, seems comfortable  No significant issues related to the wound  Felicity 15, 2023    Follow-up for wound on the buttocks  Received patient in bed, seems comfortable  Denies pain  No significant issues related to the wound  Review of Systems   Constitutional: Negative  Respiratory: Negative  Cardiovascular: Negative  Skin: Positive for rash and wound  Objective:    Physical Exam  Constitutional:       Appearance: She is obese  Cardiovascular:      Rate and Rhythm: Normal rate  Pulmonary:      Effort: Pulmonary effort is normal    Genitourinary:     Comments: Incontinent  Musculoskeletal:      Comments: Total dependent with positioning in bed   Skin:     Findings: Lesion present  Comments: Left buttock: Wound size is 0 6 x 0 6 x 0 1 cm ,  100% epithelial, no drainage, normal periwound area, with no obvious sign of infection    Right buttock: Wound size is 0 5 x 0 8 x 0 1 cm ,  100% epithelial, no drainage, normal periwound area, with no obvious sign of infection   Neurological:      Mental Status: She is alert  Procedures           Patient's care was coordinated with nursing facility staff  Recent vitals, labs and updated medications were reviewed on EMR or chart system of facility  Past Medical, surgical, social, medication and allergy history and patient's previous records were reviewed and updated as appropriate: Most up-to date information is available in the facility EMR where the patient is currently admitted      Patient Active Problem List   Diagnosis   • Seizures ([facility] )   • Restless leg syndrome   • Primary hypertension   • Hyperlipidemia   • Type 2 diabetes mellitus (HCC)   • GERD (gastroesophageal reflux disease)   • Epigastric abdominal pain   • Failure to thrive in adult   • Severe episode of recurrent major depressive disorder, without psychotic features ([facility] )   • Gambling disorder, episodic, moderate   • Suspected COVID-19 virus infection   • Major depressive disorder   • Acquired hypothyroidism   • Diarrhea   • Asymptomatic bacteriuria   • Chronic fatigue and malaise   • Calf tenderness   • Morbid obesity due to excess calories (HCC)   • Onychomycosis of toenail   • Orthostatic hypotension   • Primary osteoarthritis of one hip, right   • Dyspnea on exertion   • Chronic heart failure with preserved ejection fraction (HCC)   • Chronic respiratory failure (HCC)   • Coronary artery disease involving native coronary artery of native heart without angina pectoris   • Syncope   • Elevated d-dimer   • Weakness   • Abnormal urine   • Fungal dermatitis   • Chest pressure   • Left lower quadrant abdominal pain   • Nausea   • Insomnia   • Respiratory insufficiency   • Hypernatremia   • Pressure injury of right buttock, stage 2 (HCC)   • Stage II pressure ulcer of left buttock (HCC)   • Pressure injury of sacral region, stage 3 (HCC)   • Pressure injury of back, stage 2 (HCC)   • Pressure injury of right knee, stage 2 (HCC)   • Ambulatory dysfunction   • Urinary retention   • Hemorrhage of muscle   • Pressure injury of left buttock, stage 3 (HCC)   • Skin tear of right forearm without complication   • Moderate protein-calorie malnutrition (HCC)   • Hyperthyroidism     Past Medical History:   Diagnosis Date   • CHF (congestive heart failure) ([facility])    • Diabetes mellitus ([address] )    • Disease of thyroid gland    • Hyperlipidemia    • Hypertension    • Hypothyroidism (acquired)    • Obesity, Class I, BMI 30 0-34 9 (see actual BMI)    • Restless leg syndrome    • Seizures ([address] )      Past Surgical History:   Procedure Laterality Date   • APPENDECTOMY     • CHOLECYSTECTOMY     • HIP FRACTURE SURGERY Right    • IR PICC PLACEMENT SINGLE LUMEN  4/4/2023   • TONSILLECTOMY     • TUBAL LIGATION       Social History     Socioeconomic History   • Marital status:       Spouse name: None   • Number of children: None   • Years of education: None   • Highest education level: None   Occupational History   • None   Tobacco Use   • Smoking status: Never   • Smokeless tobacco: Never   • Tobacco comments:     not a smoker   Vaping Use   • Vaping Use: Never used   Substance and Sexual Activity   • Alcohol use: Yes     Comment: occasional   • Drug use: Never   • Sexual activity: Not Currently   Other Topics Concern   • None   Social History Narrative   • None     Social Determinants of Health     Financial Resource Strain: Not on file   Food Insecurity: No Food Insecurity (5/25/2023)    Hunger Vital Sign    • Worried About Running Out of Food in the Last Year: Never true    • Ran Out of Food in the Last Year: Never true   Transportation Needs: No Transportation Needs (5/25/2023)    PRAPARE - Transportation    • Lack of Transportation (Medical): No    • Lack of Transportation (Non-Medical): No   Physical Activity: Not on file   Stress: Not on file   Social Connections: Not on file   Intimate Partner Violence: Not on file   Housing Stability: Low Risk  (5/25/2023)    Housing Stability Vital Sign    • Unable to Pay for Housing in the Last Year: No    • Number of Places Lived in the Last Year: 2    • Unstable Housing in the Last Year: No        Current Outpatient Medications:   •  acetaminophen (TYLENOL) 325 mg tablet, Take 3 tablets (975 mg total) by mouth every 8 (eight) hours, Disp: , Rfl: 0  •  ARIPiprazole (ABILIFY) 2 mg tablet, Take 1 tablet (2 mg total) by mouth daily Do not start before May 27, 2023 , Disp: 30 tablet, Rfl: 0  •  aspirin (ECOTRIN LOW STRENGTH) 81 mg EC tablet, Take 81 mg by mouth daily, Disp: , Rfl:   •  Cholecalciferol 25 MCG (1000 UT) capsule, Take 1,000 Units by mouth daily  , Disp: , Rfl:   •  Ergocalciferol (VITAMIN D2 PO), Take 50,000 Units/day by mouth 2 (two) times a week, Disp: , Rfl:   •  ezetimibe (ZETIA) 10 mg tablet, Take 1 tablet (10 mg total) by mouth daily, Disp: 30 tablet, Rfl: 0  •  ferrous sulfate 325 (65 Fe) mg tablet, Take 1 tablet (325 mg total) by mouth every other day, Disp: 15 tablet, Rfl: 0  •  FLUoxetine (PROzac) 40 MG capsule, Take 1 capsule (40 mg total) by "mouth daily Do not start before March 17, 2023 , Disp: , Rfl: 0  •  folic acid (FOLVITE) 672 mcg tablet, Take 1 tablet (400 mcg total) by mouth daily, Disp: 30 tablet, Rfl: 0  •  hydrophilic ointment, Apply topically as needed for dry skin, Disp: , Rfl:   •  insulin lispro (HumaLOG) 100 units/mL injection, Inject 1-5 Units under the skin 3 (three) times a day before meals, Disp: , Rfl: 0  •  levothyroxine 75 mcg tablet, Take 1 tablet (75 mcg total) by mouth daily in the early morning, Disp: 30 tablet, Rfl: 0  •  lidocaine (LIDODERM) 5 %, Apply 1 patch topically over 12 hours daily Remove & Discard patch within 12 hours or as directed by MD Do not start before May 27, 2023 , Disp: 30 patch, Rfl: 0  •  melatonin 3 mg, Take 3 tablets (9 mg total) by mouth daily at bedtime, Disp: , Rfl: 0  •  mirtazapine (REMERON) 15 mg tablet, Take 30 mg by mouth daily at bedtime, Disp: , Rfl:   •  nystatin (MYCOSTATIN) powder, Apply topically 2 (two) times a day, Disp: 15 g, Rfl: 0  •  omeprazole (PriLOSEC) 20 mg delayed release capsule, Take 20 mg by mouth daily, Disp: , Rfl:   Family History   Problem Relation Age of Onset   • Brain cancer Mother    • Seizures Father    • Colon cancer Maternal Grandfather    • No Known Problems Son    • No Known Problems Son    • Breast cancer Maternal Aunt 30              Coordination of Care: Wound team aware of the treatment plan  Facility nurse will provide wound treatment and monitor the wound for any changes  Patient / Staff education : Patient / Staff was given education on sign of infection and pressure ulcer prevention  Patient/ Staff verbalized understanding     Follow up :  Next week    Voice-recognition software may have been used in the preparation of this document  Occasional wrong word or \"sound-alike\" substitutions may have occurred due to the inherent limitations of voice recognition software  Interpretation should be guided by context        SAMIA Meyers  "

## 2023-06-16 PROBLEM — D72.825 BANDEMIA: Status: ACTIVE | Noted: 2023-06-16

## 2023-06-16 PROBLEM — R65.21 SEPTIC SHOCK (HCC): Status: ACTIVE | Noted: 2023-03-11

## 2023-06-16 PROBLEM — J18.9 PNEUMONIA: Status: ACTIVE | Noted: 2023-06-16

## 2023-06-16 NOTE — SEPSIS NOTE
"  Sepsis Note   Link Hillary 75 y o  female MRN: [id number]  Unit/Bed#: ED-07 Encounter: [id number]       Initial Sepsis Screening     Row Name 06/16/23 1627                Is the patient's history suggestive of a new or worsening infection? --        Suspected source of infection pneumonia;urinary tract infection  -MR        Indicate SIRS criteria Hyperthemia > 38 3C (100 9F) OR Hypothermia <36C (96 8F); Tachypnea > 20 resp per min  -MR        Are two or more of the above signs & symptoms of infection both present and new to the patient? Yes (Proceed)  -MR        Assess for evidence of organ dysfunction: Are any of the below criteria present within 6 hours of suspected infection and SIRS criteria that are NOT considered to be chronic conditions? Lactate > 2  0;Creatinine > 2 0 AND > 0 5 above baseline  -MR        Date of presentation of severe sepsis 06/16/23  -MR        Time of presentation of severe sepsis 1610  -MR        Sepsis Note: Click \"NEXT\" below (NOT \"close\") to generate sepsis note based on above information  YES (proceed by clicking \"NEXT\")  -MR              User Key  (r) = Recorded By, (t) = Taken By, (c) = Cosigned By    [address] Name Provider Type    MR Guanaco Heard MD Resident                    There is no height or weight on file to calculate BMI    Wt Readings from Last 1 Encounters:   06/05/23 93 9 kg (207 lb)        Ideal body weight: 52 4 kg (115 lb 8 3 oz)  Adjusted ideal body weight: 69 kg (152 lb 1 8 oz)  "

## 2023-06-16 NOTE — SEPSIS NOTE
"  Sepsis Note   Jenni Mcgrath 75 y o  female MRN: [id number]  Unit/Bed#: ED-07 Encounter: [id number]       Initial Sepsis Screening     Row Name 06/16/23 1627                Is the patient's history suggestive of a new or worsening infection? --        Suspected source of infection pneumonia;urinary tract infection  -MR        Indicate SIRS criteria Hyperthemia > 38 3C (100 9F) OR Hypothermia <36C (96 8F); Tachypnea > 20 resp per min  -MR        Are two or more of the above signs & symptoms of infection both present and new to the patient? Yes (Proceed)  -MR        Assess for evidence of organ dysfunction: Are any of the below criteria present within 6 hours of suspected infection and SIRS criteria that are NOT considered to be chronic conditions? Lactate > 2  0;Creatinine > 2 0 AND > 0 5 above baseline  -MR        Date of presentation of severe sepsis 06/16/23  -MR        Time of presentation of severe sepsis 1610  -MR        Sepsis Note: Click \"NEXT\" below (NOT \"close\") to generate sepsis note based on above information  YES (proceed by clicking \"NEXT\")  -MR              User Key  (r) = Recorded By, (t) = Taken By, (c) = Cosigned By    [address] Name Provider Type    MR Lili Chappell MD Resident                Default Flowsheet Data (last 720 hours)     Sepsis Reassess     Row Name 06/16/23 1803                   Repeat Volume Status and Tissue Perfusion Assessment Performed    Date of Reassessment: 06/16/23  -        Time of Reassessment: 1803  -MR        Sepsis Reassessment Note: Click \"NEXT\" below (NOT \"close\") to generate sepsis reassessment note   YES (proceed by clicking \"NEXT\")  pt started on abx, blood preassure dropped to 89/51 but fluids were stopped after approximately 500 mL due to developing upper extremity edema and concern for worsening symptoms  -MR        Repeat Volume Status and Tissue Perfusion Assessment Performed --              User Key  (r) = Recorded By, (t) = Taken By, (c) = Cosigned By    " [address] Name Provider Type     [place] MD Amelie Resident                There is no height or weight on file to calculate BMI    Wt Readings from Last 1 Encounters:   06/05/23 93 9 kg (207 lb)        Ideal body weight: 52 4 kg (115 lb 8 3 oz)  Adjusted ideal body weight: 69 kg (152 lb 1 8 oz)

## 2023-06-16 NOTE — ED NOTES
Pt noted with weeping from bilateral forearms and edema to bilateral hands; provider aware, will d/c fluids        [facility] Eugenia MartinDepartment [facility]  06/16/23 1649

## 2023-06-16 NOTE — ED ATTENDING ATTESTATION
6/16/2023  I, Roni Essex, MD, saw and evaluated the patient  I have discussed the patient with the resident/non-physician practitioner and agree with the resident's/non-physician practitioner's findings, Plan of Care, and MDM as documented in the resident's/non-physician practitioner's note, except where noted  All available labs and Radiology studies were reviewed  I was present for key portions of any procedure(s) performed by the resident/non-physician practitioner and I was immediately available to provide assistance  At this point I agree with the current assessment done in the Emergency Department  I have conducted an independent evaluation of this patient a history and physical is as follows: Severe hypothermia, altered mental status, tachycardia, SIRS plus multiple possible source of infection  Broad antibiotics, initial no hypotension however during rewarming process patient became more hypotensive  Additional IV fluids given at this time, IV Levophed given after extensive discussion by resident physician with patient's family who states that she would not want intubation or chest compressions but would accept any and all procedures and medications up to that point      Results Reviewed     Procedure Component Value Units Date/Time    UA w Reflex to Microscopic w Reflex to Culture [[id number]]  (Abnormal) Collected: 06/17/23 0256    Lab Status: Final result Specimen: Urine, Indwelling Kay Catheter Updated: 06/17/23 0318     Color, UA Yellow     Clarity, UA Extra Turbid     Specific [place], UA 1 026     pH, UA 5 0     Leukocytes, UA Large     Nitrite, UA Negative     Protein,  (2+) mg/dl      Glucose, UA Negative mg/dl      Ketones, UA Negative mg/dl      Urobilinogen, UA <2 0 mg/dl      Bilirubin, UA Negative     Occult Blood, UA Large    HS Troponin I 4hr [[id number]]  (Normal) Collected: 06/16/23 2206    Lab Status: Final result Specimen: Blood from Arm, Right Updated: 06/16/23 2236     hs TnI 4hr 26 ng/L      Delta 4hr hsTnI 9 ng/L     Basic metabolic panel [[id number]]  (Abnormal) Collected: 06/16/23 2206    Lab Status: Final result Specimen: Blood from Arm, Right Updated: 06/16/23 2232     Sodium 142 mmol/L      Potassium 4 8 mmol/L      Chloride 112 mmol/L      CO2 20 mmol/L      ANION GAP 10 mmol/L      BUN 25 mg/dL      Creatinine 1 82 mg/dL      Glucose 115 mg/dL      Calcium 7 5 mg/dL      eGFR 28 ml/min/1 73sq m     Narrative:      [facility] guidelines for Chronic Kidney Disease (CKD):   •  Stage 1 with normal or high GFR (GFR > 90 mL/min/1 73 square meters)  •  Stage 2 Mild CKD (GFR = 60-89 mL/min/1 73 square meters)  •  Stage 3A Moderate CKD (GFR = 45-59 mL/min/1 73 square meters)  •  Stage 3B Moderate CKD (GFR = 30-44 mL/min/1 73 square meters)  •  Stage 4 Severe CKD (GFR = 15-29 mL/min/1 73 square meters)  •  Stage 5 End Stage CKD (GFR <15 mL/min/1 73 square meters)  Note: GFR calculation is accurate only with a steady state creatinine    Blood culture #1 [[id number]] Collected: 06/16/23 1522    Lab Status: Preliminary result Specimen: Blood from Arm, Left Updated: 06/16/23 2101     Blood Culture Received in Microbiology Lab  Culture in Progress  Blood culture #2 [[id number]] Collected: 06/16/23 1522    Lab Status: Preliminary result Specimen: Blood from Arm, Right Updated: 06/16/23 2101     Blood Culture Received in Microbiology Lab  Culture in Progress  HS Troponin I 2hr [[id number]]  (Normal) Collected: 06/16/23 1749    Lab Status: Final result Specimen: Blood from Arm, Right Updated: 06/16/23 1821     hs TnI 2hr 19 ng/L      Delta 2hr hsTnI 2 ng/L     Lactic acid 2 Hours [[id number]]  (Abnormal) Collected: 06/16/23 1749    Lab Status: Final result Specimen: Blood from Arm, Right Updated: 06/16/23 1816     LACTIC ACID 3 3 mmol/L     Narrative:      Result may be elevated if tourniquet was used during collection      Lactic acid [[id number]] (Abnormal) Collected: 06/16/23 1522    Lab Status: Final result Specimen: Blood from Arm, Right Updated: 06/16/23 1609     LACTIC ACID 3 4 mmol/L     Narrative:      Result may be elevated if tourniquet was used during collection      CBC and differential [[id number]]  (Abnormal) Collected: 06/16/23 1522    Lab Status: Final result Specimen: Blood from Arm, Right Updated: 06/16/23 1604     WBC 5 11 Thousand/uL      RBC 3 50 Million/uL      Hemoglobin 10 4 g/dL      Hematocrit 33 8 %      MCV 97 fL      MCH 29 7 pg      MCHC 30 8 g/dL      RDW 20 6 %      MPV 11 1 fL      Platelets 013 Thousands/uL     Manual Differential(PHLEBS Do Not Order) [[id number]]  (Abnormal) Collected: 06/16/23 1522    Lab Status: Final result Specimen: Blood from Arm, Right Updated: 06/16/23 1604     Segmented % 78 %      Bands % 11 %      Lymphocytes % 10 %      Monocytes % 1 %      Eosinophils, % 0 %      Basophils % 0 %      Absolute Neutrophils 4 55 Thousand/uL      Lymphocytes Absolute 0 51 Thousand/uL      Monocytes Absolute 0 05 Thousand/uL      Eosinophils Absolute 0 00 Thousand/uL      Basophils Absolute 0 00 Thousand/uL      Total Counted --     Anisocytosis Present     Hypochromia Present     Platelet Estimate Decreased     Large Platelet Present    Procalcitonin [[id number]]  (Abnormal) Collected: 06/16/23 1522    Lab Status: Final result Specimen: Blood from Arm, Right Updated: 06/16/23 1603     Procalcitonin 19 35 ng/ml     HS Troponin 0hr (reflex protocol) [[id number]]  (Normal) Collected: 06/16/23 1522    Lab Status: Final result Specimen: Blood from Arm, Right Updated: 06/16/23 1601     hs TnI 0hr 17 ng/L     Protime-INR [[id number]]  (Abnormal) Collected: 06/16/23 1522    Lab Status: Final result Specimen: Blood from Arm, Right Updated: 06/16/23 1556     Protime 16 5 seconds      INR 1 31    APTT [[id number]]  (Abnormal) Collected: 06/16/23 1522    Lab Status: Final result Specimen: Blood from Arm, Right Updated: 06/16/23 1556 PTT 41 seconds     Comprehensive metabolic panel [[id number]]  (Abnormal) Collected: 06/16/23 1522    Lab Status: Final result Specimen: Blood from Arm, Right Updated: 06/16/23 1556     Sodium 142 mmol/L      Potassium 4 0 mmol/L      Chloride 111 mmol/L      CO2 20 mmol/L      ANION GAP 11 mmol/L      BUN 27 mg/dL      Creatinine 2 01 mg/dL      Glucose 153 mg/dL      Calcium 8 0 mg/dL      Corrected Calcium 9 5 mg/dL      AST 37 U/L      ALT 30 U/L      Alkaline Phosphatase 315 U/L      Total Protein 5 3 g/dL      Albumin 2 1 g/dL      Total Bilirubin 0 56 mg/dL      eGFR 24 ml/min/1 73sq m     Narrative:      [facility] guidelines for Chronic Kidney Disease (CKD):   •  Stage 1 with normal or high GFR (GFR > 90 mL/min/1 73 square meters)  •  Stage 2 Mild CKD (GFR = 60-89 mL/min/1 73 square meters)  •  Stage 3A Moderate CKD (GFR = 45-59 mL/min/1 73 square meters)  •  Stage 3B Moderate CKD (GFR = 30-44 mL/min/1 73 square meters)  •  Stage 4 Severe CKD (GFR = 15-29 mL/min/1 73 square meters)  •  Stage 5 End Stage CKD (GFR <15 mL/min/1 73 square meters)  Note: GFR calculation is accurate only with a steady state creatinine    Lipase [[id number]]  (Normal) Collected: 06/16/23 1522    Lab Status: Final result Specimen: Blood from Arm, Right Updated: 06/16/23 1556     Lipase 12 u/L     Magnesium [[id number]]  (Abnormal) Collected: 06/16/23 1522    Lab Status: Final result Specimen: Blood from Arm, Right Updated: 06/16/23 1556     Magnesium 1 7 mg/dL     Fingerstick Glucose (POCT) [[id number]]  (Abnormal) Collected: 06/16/23 1440    Lab Status: Final result Updated: 06/16/23 1501     POC Glucose 174 mg/dl         XR chest portable ICU   Final Result by Ignacio Hylton MD (06/17 1425)      Left sided central venous catheter tip is in the right atrium  Bilateral perihilar and infrahilar infiltrates  No consolidation  The study was marked in EPIC for significant notification  Workstation performed: [id number]         CT head without contrast   Final Result by Uri Cheung MD (06/16 1755)      No acute intracranial abnormality  Workstation performed: [id number]         CT chest abdomen pelvis wo contrast   Final Result by Uri Cheung MD (06/16 1806)      1  Patchy airspace opacification in the right upper and right lower lobe, most compatible with pneumonia  2  Trace right effusion   3  Severe hepatic steatosis   4  Sigmoid diverticulosis without evidence of bowel obstruction or inflammatory change                  Workstation performed: [id number]         XR chest portable   Final Result by Moisés Lott MD (06/16 1428)      Mild bilateral atelectasis/scar but no definite pneumonia  Workstation performed: [id number]               ED Course  ED Course as of 06/17/23 1553   Fri Jun 16, 2023 1801 The 30ml/kg fluid bolus was not given to the patient despite hypotension and/or significantly elevated lactate of = 4 and/or presence of septic shock due to: Concern for fluid/volume overload  The patient will be administered 500 ml of crystalloid fluid instead  Orders for this have been placed in Epic  The patient may receive additional colloid or crystalloid fluids thereafter based on clinical condition  Clemetine Lombard, MD     1342 Patient reassessed, with rewarming, patient's blood pressure is dropping, likely secondary to vasodilation  Has some lower extremity as well as upper extremity edema, saturating 100% on room air  Unable to obtain manual blood pressure, BP by Doppler 55 systolic, additional IV fluids ordered, risks of persistent hypotension outweigh risks of development of pulmonary overload at this time  We will start peripheral Levophed after discussion with family           Critical Care Time  CriticalCare Time    Date/Time: 6/17/2023 3:52 PM    Performed by: Clemetine Lombard, MD  Authorized by: Clemetine Lombard, MD Critical care provider statement:     Critical care time (minutes):  60    Critical care time was exclusive of:  Separately billable procedures and treating other patients and teaching time    Critical care was necessary to treat or prevent imminent or life-threatening deterioration of the following conditions:  Sepsis    Critical care was time spent personally by me on the following activities:  Examination of patient, evaluation of patient's response to treatment, ordering and review of laboratory studies, ordering and review of radiographic studies and re-evaluation of patient's condition    I assumed direction of critical care for this patient from another provider in my specialty: no

## 2023-06-16 NOTE — ED PROVIDER NOTES
History  Chief Complaint   Patient presents with   • Altered Mental Status     Pt from [facility], facility called EMS for AMS, unknown baseline  On arrival pupils 4mm non reactive  69-year-old female brought in by EMS due to altered mental status from [facility]  Pt was seen by EMS to be laying in bed with difficulty breathing and eyes open, unreactive like light and minimally responsive to questioning and noted to have diffuse bruising to the abdomen  Prior to Admission Medications   Prescriptions Last Dose Informant Patient Reported? Taking? ARIPiprazole (ABILIFY) 2 mg tablet   No No   Sig: Take 1 tablet (2 mg total) by mouth daily Do not start before May 27, 2023  Cholecalciferol 25 MCG (1000 UT) capsule  Self Yes No   Sig: Take 1,000 Units by mouth daily  Ergocalciferol (VITAMIN D2 PO)  Self Yes No   Sig: Take 50,000 Units/day by mouth 2 (two) times a week   FLUoxetine (PROzac) 40 MG capsule   No No   Sig: Take 1 capsule (40 mg total) by mouth daily Do not start before March 17, 2023     acetaminophen (TYLENOL) 325 mg tablet   No No   Sig: Take 3 tablets (975 mg total) by mouth every 8 (eight) hours   aspirin (ECOTRIN LOW STRENGTH) 81 mg EC tablet  Self Yes No   Sig: Take 81 mg by mouth daily   ezetimibe (ZETIA) 10 mg tablet   No No   Sig: Take 1 tablet (10 mg total) by mouth daily   ferrous sulfate 325 (65 Fe) mg tablet   No No   Sig: Take 1 tablet (325 mg total) by mouth every other day   folic acid (FOLVITE) 105 mcg tablet   No No   Sig: Take 1 tablet (400 mcg total) by mouth daily   hydrophilic ointment  Self Yes No   Sig: Apply topically as needed for dry skin   insulin lispro (HumaLOG) 100 units/mL injection   No No   Sig: Inject 1-5 Units under the skin 3 (three) times a day before meals   levothyroxine 75 mcg tablet   No No   Sig: Take 1 tablet (75 mcg total) by mouth daily in the early morning   lidocaine (LIDODERM) 5 %   No No   Sig: Apply 1 patch topically over 12 hours daily Remove & Discard patch within 12 hours or as directed by MD Do not start before May 27, 2023  melatonin 3 mg   No No   Sig: Take 3 tablets (9 mg total) by mouth daily at bedtime   mirtazapine (REMERON) 15 mg tablet   Yes No   Sig: Take 30 mg by mouth daily at bedtime   nystatin (MYCOSTATIN) powder  Self No No   Sig: Apply topically 2 (two) times a day   omeprazole (PriLOSEC) 20 mg delayed release capsule  Self Yes No   Sig: Take 20 mg by mouth daily      Facility-Administered Medications: None       Past Medical History:   Diagnosis Date   • CHF (congestive heart failure) (HCC)    • Diabetes mellitus ([facility] [address] )    • Disease of thyroid gland    • Hyperlipidemia    • Hypertension    • Hypothyroidism (acquired)    • Obesity, Class I, BMI 30 0-34 9 (see actual BMI)    • Restless leg syndrome    • Seizures (HCC)        Past Surgical History:   Procedure Laterality Date   • APPENDECTOMY     • CHOLECYSTECTOMY     • HIP FRACTURE SURGERY Right    • IR PICC PLACEMENT SINGLE LUMEN  4/4/2023   • TONSILLECTOMY     • TUBAL LIGATION         Family History   Problem Relation Age of Onset   • Brain cancer Mother    • Seizures Father    • Colon cancer Maternal Grandfather    • No Known Problems Son    • No Known Problems Son    • Breast cancer Maternal Aunt 30     I have reviewed and agree with the history as documented      E-Cigarette/Vaping   • E-Cigarette Use Never User      E-Cigarette/Vaping Substances   • Nicotine No    • THC No    • CBD No    • Flavoring No    • Other No    • Unknown No      Social History     Tobacco Use   • Smoking status: Never   • Smokeless tobacco: Never   • Tobacco comments:     not a smoker   Vaping Use   • Vaping Use: Never used   Substance Use Topics   • Alcohol use: Yes     Comment: occasional   • Drug use: Never        Review of Systems   Unable to perform ROS: Acuity of condition       Physical Exam  ED Triage Vitals   Temperature Pulse Respirations Blood Pressure SpO2   06/16/23 1459 06/16/23 1441 06/16/23 1441 06/16/23 1441 06/16/23 1441   (!) 88 °F (31 1 °C) 67 20 118/61 100 %      Temp Source Heart Rate Source Patient Position - Orthostatic VS BP Location FiO2 (%)   06/16/23 1459 06/16/23 1441 -- 06/16/23 1441 --   Rectal Monitor  Right arm       Pain Score       06/18/23 1932       Med Not Given for Pain - for MAR use only             Orthostatic Vital Signs  Vitals:    06/18/23 1445 06/18/23 1500 06/18/23 1515 06/19/23 0052   BP:   135/63    Pulse: 78 78 78 88       Physical Exam  Vitals and nursing note reviewed  Constitutional:       General: She is not in acute distress  Appearance: She is well-developed  She is obese  She is ill-appearing  HENT:      Head: Normocephalic and atraumatic  Nose: Nose normal  No congestion  Mouth/Throat:      Pharynx: Oropharynx is clear  Eyes:      General: No scleral icterus  Extraocular Movements: Extraocular movements intact  Conjunctiva/sclera: Conjunctivae normal       Pupils: Pupils are equal       Right eye: Pupil is not reactive  Left eye: Pupil is not reactive  Comments: Pupils 4 mm fixed and unreactive bilaterally   Cardiovascular:      Rate and Rhythm: Normal rate and regular rhythm  Pulses: Normal pulses  Heart sounds: Normal heart sounds  No murmur heard  Pulmonary:      Effort: Pulmonary effort is normal  No respiratory distress  Breath sounds: Normal breath sounds  Chest:      Chest wall: No tenderness  Abdominal:      General: Abdomen is flat  Bowel sounds are normal       Palpations: Abdomen is soft  Tenderness: There is no right CVA tenderness or left CVA tenderness  Comments: Diffuse abdominal bruising present   Musculoskeletal:         General: No deformity or signs of injury  Normal range of motion  Cervical back: Normal range of motion and neck supple  No rigidity or tenderness  Skin:     General: Skin is warm and dry  Findings: No bruising or lesion        Comments: Bilateral 1 cm sacral ulcers, no surrounding erythema or purulent discharge   Neurological:      General: No focal deficit present           ED Medications  Medications   haloperidol lactate (HALDOL) injection 2 mg (has no administration in time range)   glycopyrrolate (ROBINUL) injection 0 2 mg (0 2 mg Intravenous Given 6/18/23 1711)   bisacodyl (DULCOLAX) rectal suppository 10 mg (has no administration in time range)   HYDROmorphone (DILAUDID) injection 0 5 mg (0 5 mg Intravenous Given 6/19/23 0454)   LORazepam (ATIVAN) injection 2 mg (2 mg Intravenous Given 6/19/23 0454)   ertapenem (INVanz) 1,000 mg in sodium chloride 0 9 % 50 mL IVPB (0 mg Intravenous Stopped 6/16/23 1646)   multi-electrolyte (ISOLYTE-S PH 7 4 equivalent) IV solution 1,000 mL (0 mL Intravenous Stopped 6/16/23 1747)   magnesium sulfate 2 g/50 mL IVPB (premix) 2 g (2 g Intravenous New Bag 6/16/23 1756)   multi-electrolyte (ISOLYTE-S PH 7 4 equivalent) IV solution 1,000 mL (0 mL Intravenous Stopped 6/16/23 2200)   multi-electrolyte (ISOLYTE-S PH 7 4) bolus 500 mL (0 mL Intravenous Stopped 6/17/23 0000)   vancomycin (VANCOCIN) 1,750 mg in sodium chloride 0 9 % 500 mL IVPB (0 mg Intravenous Stopped 6/17/23 0000)   multi-electrolyte (ISOLYTE-S PH 7 4) bolus 1,000 mL (0 mL Intravenous Stopped 6/17/23 0100)   albumin human (FLEXBUMIN) 5 % injection 12 5 g (0 g Intravenous Stopped 6/17/23 0100)   multi-electrolyte (ISOLYTE-S PH 7 4) bolus 500 mL (0 mL Intravenous Stopped 6/17/23 0536)   albumin human (FLEXBUMIN) 5 % injection 12 5 g (0 g Intravenous Stopped 6/17/23 0500)   dextrose 50 % IV solution 12 5 g (12 5 g Intravenous Given 6/17/23 0655)   calcium gluconate 2 g in sodium chloride 0 9% 100 mL (premix) (0 g Intravenous Stopped 6/17/23 1321)   dextrose 50 % IV solution **ADS Override Pull** (50 mL  Given 6/17/23 2565)   vancomycin (VANCOCIN) IVPB (premix in dextrose) 1,000 mg 200 mL (0 mg Intravenous Stopped 6/17/23 5824)   multi-electrolyte (ISOLYTE-S PH 7 4) bolus 1,000 mL (0 mL Intravenous Stopped 6/17/23 1219)   albumin human (FLEXBUMIN) 5 % injection 12 5 g (0 g Intravenous Stopped 6/17/23 2235)   dextrose 50 % IV solution **ADS Override Pull** (25 mL  Given 6/17/23 8897)   calcium chloride 10 % injection 1 g (1 g Intravenous Given 6/18/23 1015)       Diagnostic Studies  Results Reviewed     Procedure Component Value Units Date/Time    Blood culture #1 [[id number]] Collected: 06/16/23 1522    Lab Status: Preliminary result Specimen: Blood from Arm, Left Updated: 06/18/23 2001     Blood Culture No Growth at 48 hrs  Blood culture #2 [[id number]] Collected: 06/16/23 1522    Lab Status: Preliminary result Specimen: Blood from Arm, Right Updated: 06/18/23 2001     Blood Culture No Growth at 48 hrs      UA w Reflex to Microscopic w Reflex to Culture [[id number]]  (Abnormal) Collected: 06/17/23 0256    Lab Status: Final result Specimen: Urine, Indwelling Kay Catheter Updated: 06/17/23 0318     Color, UA Yellow     Clarity, UA Extra Turbid     Specific [place], UA 1 026     pH, UA 5 0     Leukocytes, UA Large     Nitrite, UA Negative     Protein,  (2+) mg/dl      Glucose, UA Negative mg/dl      Ketones, UA Negative mg/dl      Urobilinogen, UA <2 0 mg/dl      Bilirubin, UA Negative     Occult Blood, UA Large    HS Troponin I 4hr [[id number]]  (Normal) Collected: 06/16/23 2206    Lab Status: Final result Specimen: Blood from Arm, Right Updated: 06/16/23 2236     hs TnI 4hr 26 ng/L      Delta 4hr hsTnI 9 ng/L     Basic metabolic panel [[id number]]  (Abnormal) Collected: 06/16/23 2206    Lab Status: Final result Specimen: Blood from Arm, Right Updated: 06/16/23 2232     Sodium 142 mmol/L      Potassium 4 8 mmol/L      Chloride 112 mmol/L      CO2 20 mmol/L      ANION GAP 10 mmol/L      BUN 25 mg/dL      Creatinine 1 82 mg/dL      Glucose 115 mg/dL      Calcium 7 5 mg/dL      eGFR 28 ml/min/1 73sq m     Narrative:      [place] guidelines for Chronic Kidney Disease (CKD):   •  Stage 1 with normal or high GFR (GFR > 90 mL/min/1 73 square meters)  •  Stage 2 Mild CKD (GFR = 60-89 mL/min/1 73 square meters)  •  Stage 3A Moderate CKD (GFR = 45-59 mL/min/1 73 square meters)  •  Stage 3B Moderate CKD (GFR = 30-44 mL/min/1 73 square meters)  •  Stage 4 Severe CKD (GFR = 15-29 mL/min/1 73 square meters)  •  Stage 5 End Stage CKD (GFR <15 mL/min/1 73 square meters)  Note: GFR calculation is accurate only with a steady state creatinine    HS Troponin I 2hr [[id number]]  (Normal) Collected: 06/16/23 1749    Lab Status: Final result Specimen: Blood from Arm, Right Updated: 06/16/23 1821     hs TnI 2hr 19 ng/L      Delta 2hr hsTnI 2 ng/L     Lactic acid 2 Hours [[id number]]  (Abnormal) Collected: 06/16/23 1749    Lab Status: Final result Specimen: Blood from Arm, Right Updated: 06/16/23 1816     LACTIC ACID 3 3 mmol/L     Narrative:      Result may be elevated if tourniquet was used during collection  Lactic acid [[id number]]  (Abnormal) Collected: 06/16/23 1522    Lab Status: Final result Specimen: Blood from Arm, Right Updated: 06/16/23 1609     LACTIC ACID 3 4 mmol/L     Narrative:      Result may be elevated if tourniquet was used during collection      CBC and differential [[id number]]  (Abnormal) Collected: 06/16/23 1522    Lab Status: Final result Specimen: Blood from Arm, Right Updated: 06/16/23 1604     WBC 5 11 Thousand/uL      RBC 3 50 Million/uL      Hemoglobin 10 4 g/dL      Hematocrit 33 8 %      MCV 97 fL      MCH 29 7 pg      MCHC 30 8 g/dL      RDW 20 6 %      MPV 11 1 fL      Platelets 099 Thousands/uL     Manual Differential(PHLEBS Do Not Order) [[id number]]  (Abnormal) Collected: 06/16/23 1522    Lab Status: Final result Specimen: Blood from Arm, Right Updated: 06/16/23 1604     Segmented % 78 %      Bands % 11 %      Lymphocytes % 10 %      Monocytes % 1 %      Eosinophils, % 0 %      Basophils % 0 %      Absolute Neutrophils 4 55 Thousand/uL Lymphocytes Absolute 0 51 Thousand/uL      Monocytes Absolute 0 05 Thousand/uL      Eosinophils Absolute 0 00 Thousand/uL      Basophils Absolute 0 00 Thousand/uL      Total Counted --     Anisocytosis Present     Hypochromia Present     Platelet Estimate Decreased     Large Platelet Present    Procalcitonin [[id number]]  (Abnormal) Collected: 06/16/23 1522    Lab Status: Final result Specimen: Blood from Arm, Right Updated: 06/16/23 1603     Procalcitonin 19 35 ng/ml     HS Troponin 0hr (reflex protocol) [[id number]]  (Normal) Collected: 06/16/23 1522    Lab Status: Final result Specimen: Blood from Arm, Right Updated: 06/16/23 1601     hs TnI 0hr 17 ng/L     Protime-INR [[id number]]  (Abnormal) Collected: 06/16/23 1522    Lab Status: Final result Specimen: Blood from Arm, Right Updated: 06/16/23 1556     Protime 16 5 seconds      INR 1 31    APTT [[id number]]  (Abnormal) Collected: 06/16/23 1522    Lab Status: Final result Specimen: Blood from Arm, Right Updated: 06/16/23 1556     PTT 41 seconds     Comprehensive metabolic panel [[id number]]  (Abnormal) Collected: 06/16/23 1522    Lab Status: Final result Specimen: Blood from Arm, Right Updated: 06/16/23 1556     Sodium 142 mmol/L      Potassium 4 0 mmol/L      Chloride 111 mmol/L      CO2 20 mmol/L      ANION GAP 11 mmol/L      BUN 27 mg/dL      Creatinine 2 01 mg/dL      Glucose 153 mg/dL      Calcium 8 0 mg/dL      Corrected Calcium 9 5 mg/dL      AST 37 U/L      ALT 30 U/L      Alkaline Phosphatase 315 U/L      Total Protein 5 3 g/dL      Albumin 2 1 g/dL      Total Bilirubin 0 56 mg/dL      eGFR 24 ml/min/1 73sq m     Narrative:      [place] guidelines for Chronic Kidney Disease (CKD):   •  Stage 1 with normal or high GFR (GFR > 90 mL/min/1 73 square meters)  •  Stage 2 Mild CKD (GFR = 60-89 mL/min/1 73 square meters)  •  Stage 3A Moderate CKD (GFR = 45-59 mL/min/1 73 square meters)  •  Stage 3B Moderate CKD (GFR = 30-44 mL/min/1 73 square meters)  •  Stage 4 Severe CKD (GFR = 15-29 mL/min/1 73 square meters)  •  Stage 5 End Stage CKD (GFR <15 mL/min/1 73 square meters)  Note: GFR calculation is accurate only with a steady state creatinine    Lipase [[id number]]  (Normal) Collected: 06/16/23 1522    Lab Status: Final result Specimen: Blood from Arm, Right Updated: 06/16/23 1556     Lipase 12 u/L     Magnesium [[id number]]  (Abnormal) Collected: 06/16/23 1522    Lab Status: Final result Specimen: Blood from Arm, Right Updated: 06/16/23 1556     Magnesium 1 7 mg/dL     Fingerstick Glucose (POCT) [[id number]]  (Abnormal) Collected: 06/16/23 1440    Lab Status: Final result Updated: 06/16/23 1501     POC Glucose 174 mg/dl                  XR chest portable ICU   Final Result by Xenia Tran MD (06/17 1425)      Left sided central venous catheter tip is in the right atrium  Bilateral perihilar and infrahilar infiltrates  No consolidation  The study was marked in EPIC for significant notification  Workstation performed: [id number]         CT head without contrast   Final Result by Demario Mesa MD (06/16 1995)      No acute intracranial abnormality  Workstation performed: [id number]         CT chest abdomen pelvis wo contrast   Final Result by Demario Mesa MD (06/16 2498)      1  Patchy airspace opacification in the right upper and right lower lobe, most compatible with pneumonia  2  Trace right effusion   3  Severe hepatic steatosis   4  Sigmoid diverticulosis without evidence of bowel obstruction or inflammatory change                  Workstation performed: [id number]         XR chest portable   Final Result by Verner Cranker, MD (06/16 8459)      Mild bilateral atelectasis/scar but no definite pneumonia                 Workstation performed: [id number]               Procedures  ECG 12 Lead Documentation Only    Date/Time: 6/19/2023 10:35 AM    Performed by: Shilo White MD  Authorized by: Dahlia Kamara MD    Patient location:  ED  Interpretation:     Interpretation: abnormal    Rate:     ECG rate:  66    ECG rate assessment: normal    Rhythm:     Rhythm: sinus rhythm    Conduction:     Conduction: abnormal      Abnormal conduction: complete RBBB    ST segments:     ST segments:  Normal          ED Course  ED Course as of 06/19/23 1039   Fri Jun 16, 2023   130 26-year-old female presenting via EMS due to altered mental status  Patient hypothermic on arrival and minimally responsive to questioning or stimuli  Differential is broad, concern for infection/sepsis, abdominal hemorrhage, intracranial abnormality, ACS, electrolyte abnormality   1507 Patient papers show DNR/DNI but section 2 for treatment and evaluation was not filled out  Son William Washington was contacted and states that evaluation with labs and treatment with fluids and antibiotics are okay but patient is DNR and DNI    1609 Magnesium(!): 1 7   1610 hs TnI 0hr: 17   1610 LACTIC ACID(!!): 3 4   1610 Severe sepsis alert called  Will start antibiotics and IVF  1616 Vitals reviewed, hypothermic 88F     1618 Creatinine(!): 2 01  No contrast for CT AP, will do dry   1636 XR chest portable     Mild bilateral atelectasis/scar but no definite pneumonia  1758 On reassessment fluids stopped after about 500 mL due to bilateral edema of the upper extremities  1759 CT head without contrast  No acute intracranial abnormality   1808 CT chest abdomen pelvis wo contrast  1  Patchy airspace opacification in the right upper and right lower lobe, most compatible with pneumonia  2  Trace right effusion  3  Severe hepatic steatosis  4  Sigmoid diverticulosis without evidence of bowel obstruction or inflammatory change   1846 On reassessment, blood pressure difficult to obtain, via ultrasound systolic pressure noted to be around 55    Son William Washington contacted and states that he is okay with her getting pressors and central line, son states that he is okay "with any interventions other than chest compressions and a breathing tube  At this time Levophed ordered for pressure support and fluids restarted due to risk of hypertension outweighing risk of fluid overload  1925 On reassessment after short period of Levophed, patient blood pressure checked and is 136/63, Levophed paused and continuing fluid administration  Critical care contacted and patient accepted to their service for admission  Patient temperature remains around 92 °F but is otherwise stable at this time saturating at 100% on room air  HEART Risk Score    Flowsheet Row Most Recent Value   Heart Score Risk Calculator    History 1 Filed at: 06/19/2023 1038   ECG 1 Filed at: 06/19/2023 1038   Age 2 Filed at: 06/19/2023 1038   Risk Factors 2 Filed at: 06/19/2023 1038   Troponin 1 Filed at: 06/19/2023 1038   HEART Score 7 Filed at: 06/19/2023 1038                   Initial Sepsis Screening     Row Name 06/16/23 1627                Is the patient's history suggestive of a new or worsening infection? --        Suspected source of infection pneumonia;urinary tract infection  -MR        Indicate SIRS criteria Hyperthemia > 38 3C (100 9F) OR Hypothermia <36C (96 8F); Tachypnea > 20 resp per min  -MR        Are two or more of the above signs & symptoms of infection both present and new to the patient? Yes (Proceed)  -MR        Assess for evidence of organ dysfunction: Are any of the below criteria present within 6 hours of suspected infection and SIRS criteria that are NOT considered to be chronic conditions? Lactate > 2  0;Creatinine > 2 0 AND > 0 5 above baseline  -MR        Date of presentation of severe sepsis 06/16/23  -MR        Time of presentation of severe sepsis 1610  -MR        Sepsis Note: Click \"NEXT\" below (NOT \"close\") to generate sepsis note based on above information   YES (proceed by clicking \"NEXT\")  -MR              User Key  (r) = Recorded By, (t) = Taken By, (c) = Cosigned By    " "[address] Name Provider Type    MR Linden Santo MD Resident              Default Flowsheet Data (last 720 hours)     Sepsis Reassess     [address] Name 06/16/23 1803                   Repeat Volume Status and Tissue Perfusion Assessment Performed    Date of Reassessment: 06/16/23  -MR        Time of Reassessment: 1803  -MR        Sepsis Reassessment Note: Click \"NEXT\" below (NOT \"close\") to generate sepsis reassessment note  YES (proceed by clicking \"NEXT\")  pt started on abx, blood preassure dropped to 89/51 but fluids were stopped after approximately 500 mL due to developing upper extremity edema and concern for worsening symptoms  -MR        Repeat Volume Status and Tissue Perfusion Assessment Performed --              User Key  (r) = Recorded By, (t) = Taken By, (c) = Cosigned By    Initials Name Provider Type     Linden Santo MD Resident                        MDM      Disposition  Final diagnoses:   Septic shock ([address] )   Elevated procalcitonin   Lactic acidosis   Pneumonia   Bandemia     Time reflects when diagnosis was documented in both MDM as applicable and the Disposition within this note     Time User Action Codes Description Comment    6/16/2023  7:14 PM Alverna Naval [A41 9,  R65 21] Septic shock ([address] )     6/16/2023  7:14 PM Kaylah Seen Add [R79 89] Elevated procalcitonin     6/16/2023  7:14 PM Kaylah Seen Add [E87 20] Lactic acidosis     6/16/2023  7:45 PM Annye Jonnie Add [J18 9] Pneumonia     6/17/2023  5:34 AM Annye Jonnie Add Jaydy Bernheim Bandemia       ED Disposition     ED Disposition   Admit    Condition   Stable    Date/Time   Fri Jun 16, 2023  7:14 PM    Comment   Case was discussed with ICU and the patient's admission status was agreed to be Admission Status: inpatient status to the service of Dr Antonio Stallworth              Follow-up Information    None         Current Discharge Medication List      CONTINUE these medications which have NOT CHANGED    Details   acetaminophen (TYLENOL) 325 " mg tablet Take 3 tablets (975 mg total) by mouth every 8 (eight) hours  Refills: 0    Associated Diagnoses: Pain      ARIPiprazole (ABILIFY) 2 mg tablet Take 1 tablet (2 mg total) by mouth daily Do not start before May 27, 2023  Qty: 30 tablet, Refills: 0    Associated Diagnoses: Suicidal ideations; Major depressive disorder, remission status unspecified, unspecified whether recurrent      aspirin (ECOTRIN LOW STRENGTH) 81 mg EC tablet Take 81 mg by mouth daily      Cholecalciferol 25 MCG (1000 UT) capsule Take 1,000 Units by mouth daily  Ergocalciferol (VITAMIN D2 PO) Take 50,000 Units/day by mouth 2 (two) times a week      ezetimibe (ZETIA) 10 mg tablet Take 1 tablet (10 mg total) by mouth daily  Qty: 30 tablet, Refills: 0    Associated Diagnoses: Mixed hyperlipidemia      ferrous sulfate 325 (65 Fe) mg tablet Take 1 tablet (325 mg total) by mouth every other day  Qty: 15 tablet, Refills: 0    Associated Diagnoses: Cognitive decline      FLUoxetine (PROzac) 40 MG capsule Take 1 capsule (40 mg total) by mouth daily Do not start before March 17, 2023  Refills: 0    Associated Diagnoses: Depression      folic acid (FOLVITE) 914 mcg tablet Take 1 tablet (400 mcg total) by mouth daily  Qty: 30 tablet, Refills: 0    Associated Diagnoses: Cognitive decline      hydrophilic ointment Apply topically as needed for dry skin      insulin lispro (HumaLOG) 100 units/mL injection Inject 1-5 Units under the skin 3 (three) times a day before meals  Refills: 0    Associated Diagnoses: Diabetes mellitus (HCC)      levothyroxine 75 mcg tablet Take 1 tablet (75 mcg total) by mouth daily in the early morning  Qty: 30 tablet, Refills: 0    Associated Diagnoses: Hypothyroidism      lidocaine (LIDODERM) 5 % Apply 1 patch topically over 12 hours daily Remove & Discard patch within 12 hours or as directed by MD Do not start before May 27, 2023    Qty: 30 patch, Refills: 0    Associated Diagnoses: Pain      melatonin 3 mg Take 3 tablets (9 mg total) by mouth daily at bedtime  Refills: 0    Associated Diagnoses: Severe episode of recurrent major depressive disorder, without psychotic features (HCC)      mirtazapine (REMERON) 15 mg tablet Take 30 mg by mouth daily at bedtime      nystatin (MYCOSTATIN) powder Apply topically 2 (two) times a day  Qty: 15 g, Refills: 0    Associated Diagnoses: Morbid obesity due to excess calories (HCC)      omeprazole (PriLOSEC) 20 mg delayed release capsule Take 20 mg by mouth daily           No discharge procedures on file  PDMP Review       Value Time User    PDMP Reviewed  Yes 4/4/2023 11:36 AM Nigel Ga DO           ED Provider  Attending physically available and evaluated Marii Chapin  VERNON managed the patient along with the ED Attending      Electronically Signed by         Dolores Acevedo MD  06/19/23 9276

## 2023-06-17 NOTE — UTILIZATION REVIEW
Initial Clinical Review    Admission: Date/Time/Statement:   Admission Orders (From admission, onward)     Ordered        06/16/23 1914  INPATIENT ADMISSION  Once                      Orders Placed This Encounter   Procedures   • INPATIENT ADMISSION     Standing Status:   Standing     Number of Occurrences:   1     Order Specific Question:   Level of Care     Answer:   Critical Care [15]     Order Specific Question:   Estimated length of stay     Answer:   More than 2 Midnights     Order Specific Question:   Certification     Answer:   I certify that inpatient services are medically necessary for this patient for a duration of greater than two midnights  See H&P and MD Progress Notes for additional information about the patient's course of treatment  ED Arrival Information     Expected   -    Arrival   6/16/2023 14:35    Acuity   Emergent            Means of arrival   Ambulance    Escorted by   [facility]    Service   Critical Care/ICU    Admission type   Emergency            Arrival complaint   ems           Chief Complaint   Patient presents with   • Altered Mental Status     Pt from [facility], [facility] called EMS for AMS, unknown baseline  On arrival pupils 4mm non reactive  Initial Presentation: 75 y o  female with hx DM,HTN,CHF hypothyroidism, severe depression , history of ESBL UTI requiring ertapenem  who presents to ED 6/16 from [facility] facility with AMS  On exam, pt hypotensive, GCS 6, , pt hypothermic  Normal breath sounds   Samuel Ko Labs - creat  2 01 from baseline 1-1 2, elevated procal 19 35, elevated LA  Low calcium   Abnormal UA   CT head shows nothing acute,  CXR and CT chest shows PNA  Samuel Ko Pt started on Levophed drip, given IVF 3L and IV abx in ED  Pt admitted as inpatient to critical care /ICU with septic shock, PNA, REGINO,acute toxic metabolic encephalopathy   PLan - IV abx, IVF with caution  , Levophed drip to keep MAP >65  F/U cx  Trend procal  Supplemental O2, wean as able, keep sats >88 %   Kay cath  NPO  Monitor glucose   Date: 6/17   Day 2:    L IJ and L radial lines placed  Difficulty assessing adequate accurate blood pressures, unable to assess arterial access despite multiple attempts  Pt not answering questions, altered  Consider repeating dry head CT if no improvement    No longer hypothermic  Procal down to 15 32 today  Pt remains on IV abx-transition to Meropenem and vanco , continues on Levophed drip  Pt tachypneic with sat 99 %  Diminished UOP  Kay in place  Continue IVF Isolyte x1 L  F/U cx    Remains NPO  Hypoglycemic this am,  well controlled now after D 50 given  Calcium low, repleted  Date: 6/18 Day 3: Has surpassed a 2nd midnight with active treatments and services  Pt remains in ICU now on Levophed and vasopressin drips   Continue broad spectrum antibiotics with meropenum and vancomycin   F/U cx   Pt oliguric, concern for ATN given hypotension   Receiving IVF  Trend labs and manage electrolytes PRN   Consider placing NGT for enteral access to prevent withdrawal from psych meds   Pt uncooperative  GCS 6  Multiple areas of skin tears in various stages, weepy   BLE edema         ED Triage Vitals   Temperature Pulse Respirations Blood Pressure SpO2   06/16/23 1459 06/16/23 1441 06/16/23 1441 06/16/23 1441 06/16/23 1441   (!) 88 °F (31 1 °C) 67 20 118/61 100 % on 2 L O2nc      Temp Source Heart Rate Source Patient Position - Orthostatic VS BP Location FiO2 (%)   06/16/23 1459 06/16/23 1441 -- 06/16/23 1441 --   Rectal Monitor  Right arm       Pain Score       --                 Wt Readings from Last 1 Encounters:   06/17/23 93 kg (205 lb 0 4 oz)     Additional Vital Signs:   Date/Time Temp Pulse Resp BP MAP (mmHg) Arterial Line BP SpO2 Calculated FIO2 (%) - Nasal Cannula Nasal Cannula O2 Flow Rate (L/min) O2 Device   06/17/23 1700 -- 90 36 Abnormal  90/54 66 -- 94 % -- -- --   06/17/23 1645 -- 88 37 Abnormal  88/54 Abnormal  66 -- 96 % -- -- --   06/17/23 1630 -- 87 35 Abnormal  90/54 67 -- 97 % -- -- --   06/17/23 1615 -- 88 43 Abnormal  92/50 64 Abnormal  -- 97 % -- -- --   06/17/23 1600 -- 87 -- 94/51 67 -- 99 % -- -- --   06/17/23 1545 -- 88 -- 91/51 65 -- 98 % -- -- --   06/17/23 1530 -- 89 -- 96/52 71 -- 97 % -- -- --   06/17/23 1515 -- 90 25 Abnormal  108/61 77 -- 98 % -- -- --   06/17/23 1500 -- 86 20 111/56 79 -- 98 % -- -- --   06/17/23 1430 -- 86 22 96/54 71 -- 96 % -- -- --   06/17/23 1415 -- 86 23 Abnormal  91/50 65 -- 96 % -- -- --   06/17/23 1400 -- 87 27 Abnormal  90/55 70 -- 96 % -- -- --   06/17/23 1345 -- 87 23 Abnormal  86/50 Abnormal  62 Abnormal  -- 94 % -- -- --   06/17/23 1330 -- 87 24 Abnormal  87/52 Abnormal  67 -- 96 % -- -- --   06/17/23 1315 -- 89 29 Abnormal  97/53 73 -- 95 % -- -- --   06/17/23 1300 -- 89 26 Abnormal  98/57 75 -- 95 % -- -- --   06/17/23 1245 -- 88 31 Abnormal  103/53 74 -- 96 % -- -- --   06/17/23 1230 -- 90 45 Abnormal  107/55 75 -- 90 % -- -- --   06/17/23 1215 -- 88 41 Abnormal  108/60 78 -- 92 % -- -- --   06/17/23 1200 -- 86 40 Abnormal  115/58 78 -- 96 % -- -- --   06/17/23 1145 -- 88 41 Abnormal  108/49 Abnormal  71 -- 98 % -- -- --   06/17/23 1130 -- 86 42 Abnormal  108/53 77 -- 100 % -- -- --   06/17/23 1115 -- 87 38 Abnormal  98/51 71 -- 99 % -- -- --   06/17/23 1100 -- 85 50 Abnormal  94/51 70 -- 97 % -- -- --   06/17/23 1045 -- 85 38 Abnormal  101/50 71 -- 96 % -- -- --   06/17/23 1030 -- 87 40 Abnormal  98/52 68 -- 96 % -- -- --   06/17/23 1015 -- 87 36 Abnormal  88/40 Abnormal  58 Abnormal  -- 97 % -- -- --   06/17/23 1000 97 5 °F (36 4 °C) 88 43 Abnormal  96/53 63 Abnormal  -- 98 % -- -- --   06/17/23 0945 97 7 °F (36 5 °C) 89 43 Abnormal  109/58 76 -- 99 % -- -- --   06/17/23 0930 97 7 °F (36 5 °C) 88 39 Abnormal  98/57 74 -- 98 % -- -- --   06/17/23 0915 97 7 °F (36 5 °C) 88 43 Abnormal  100/54 73 -- 99 % -- -- --   06/17/23 0900 97 7 °F (36 5 °C) 87 33 Abnormal  100/57 71 -- 99 % -- -- --   06/17/23 0845 97 9 °F (36 6 °C) 87 25 Abnormal  100/56 75 -- 99 % -- -- --   06/17/23 0715 98 1 °F (36 7 °C) 89 20 99/59 72 -- 97 % -- -- --   06/17/23 0700 98 2 °F (36 8 °C) 91 30 Abnormal  146/90 108 -- 97 % -- -- --   06/17/23 0625 98 2 °F (36 8 °C) 91 31 Abnormal  108/68 83 -- 96 % -- -- --   06/17/23 0545 98 2 °F (36 8 °C) 91 36 Abnormal  145/100 114 -- 99 % -- -- --   06/17/23 0520 98 1 °F (36 7 °C) 91 24 Abnormal  115/91 100 -- 96 % -- -- --   06/17/23 0440 97 7 °F (36 5 °C) 92 39 Abnormal  80/60 Abnormal  66 -- 99 % -- -- --   06/17/23 0400 97 3 °F (36 3 °C) Abnormal  90 36 Abnormal  51/26 Abnormal  34 Abnormal  -- 96 % -- -- --   06/17/23 0305 96 8 °F (36 °C) Abnormal  94 38 Abnormal  104/74 83 -2/-2 97 % -- -- --   06/17/23 0304 97 2 °F (36 2 °C) Abnormal  90 24 Abnormal  -- -- -- 97 % -- -- --   06/17/23 0300 96 8 °F (36 °C) Abnormal  92 24 Abnormal  115/68 85 -2/-2 99 % -- -- --   06/17/23 0205 96 6 °F (35 9 °C) Abnormal  95 35 Abnormal  152/73 105 -- 99 % -- -- --   06/17/23 0130 95 9 °F (35 5 °C) Abnormal  91 31 Abnormal  131/59 85 -- 98 % -- -- --   06/17/23 0015 95 4 °F (35 2 °C) Abnormal  89 41 Abnormal  148/76 97 -- 99 % -- -- --   06/17/23 0006 95 2 °F (35 1 °C) Abnormal  88 30 Abnormal  142/90 112 -- 94 % -- -- --   06/16/23 2350 94 8 °F (34 9 °C) Abnormal  88 33 Abnormal  142/90 112 -- 100 % -- -- --   06/16/23 2330 94 5 °F (34 7 °C) Abnormal  85 30 Abnormal  147/80 103 -- 97 % -- -- --   06/16/23 2320 94 3 °F (34 6 °C) Abnormal  86 30 Abnormal  144/87 102 -- 99 % -- -- --   06/16/23 2300 93 9 °F (34 4 °C) Abnormal  86 40 Abnormal  149/100 106 -- 98 % -- -- --   06/16/23 1930 92 5 °F (33 6 °C) Abnormal  79 -- 143/65 89 -- -- -- -- --   06/16/23 1924 92 3 °F (33 5 °C) Abnormal  77 -- 148/68 98 -- -- -- -- --   06/16/23 1923 92 3 °F (33 5 °C) Abnormal  76 -- 136/63 85 -- -- -- -- --   06/16/23 1907 91 9 °F (33 3 °C) Abnormal  76 -- -- -- -- 100 % -- -- --   06/16/23 1758 90 5 °F (32 5 °C) Abnormal  73 -- -- -- -- 99 % 28 2 L/min Nasal cannula   06/16/23 1739 90 1 °F (32 3 °C) Abnormal  70 -- 93/62 69 -- 100 % 28 2 L/min Nasal cannula   06/16/23 1731 90 1 °F (32 3 °C) Abnormal  69 -- 89/51 Abnormal  56 Abnormal  -- 100 % 28 2 L/min Nasal cannula   06/16/23 1716 90 °F (32 2 °C) Abnormal  70 17 101/52 71 -- 100 % 28 2 L/min Nasal cannula   06/16/23 1714 90 °F (32 2 °C) Abnormal  69 17 131/66 83 -- 100 % -- -- --   06/16/23 1707 90 °F (32 2 °C) Abnormal  69 -- -- -- -- 100 % 28 2 L/min Nasal cannula   06/16/23 1621 89 1 °F (31 7 °C) Abnormal  71 16 142/80 94 -- 100 % -- -- --   06/16/23 1614 88 9 °F (31 6 °C) Abnormal  69 -- 90/48 Abnormal  64 Abnormal  -- 100 % -- -- --   06/16/23 1530 -- -- -- -- -- -- -- -- -- Nasal cannula    O2 Device: 6L at 06/16/23 1530     Date and Time R Pupil Size (mm) L Pupil Size (mm) R Pupil Reaction L Pupil Reaction   06/17/23 1600 5 5 Nonreactive Nonreactive   06/17/23 1200 5 5 Nonreactive Nonreactive   06/17/23 0800 5 5 Nonreactive Nonreactive   06/17/23 0400 5 5 Nonreactive Nonreactive   06/16/23 2200 5 5 Nonreactive Nonreactive   06/16/23 1530 4 4 Nonreactive Nonreactive   Trauma Secondary Assessment - Circulation    Date and Time R Radial Pulse L Radial Pulse R Pedal Pulse L Pedal Pulse   06/17/23 1600 +1 +1 +1 +1   06/17/23 1200 +1 +1 +1 +1   06/17/23 0800 +1 +1 +1 +1   06/17/23 0400 +1 +1 +2 +2   06/16/23 2200 +1 +1 +2 +2     Trauma Secondary Assessment - La Coma Scale    Date and Time Eye Opening Best Verbal Response Best Motor Response La Coma Scale Score   06/17/23 1600 4 1 3 8   06/17/23 1200 4 1 3 8   06/17/23 0800 4 1 3 8   06/17/23 0400 4 1 3 8   06/16/23 2200 4 1 3 8   06/16/23 1530 4 1 3 8   06/16/23 1443 3 1 4 8       Pertinent Labs/Diagnostic Test Results:     XR chest portable ICU   Final Result by Meet Kay MD (06/17 1625)      Left sided central venous catheter tip is in the right atrium  Bilateral perihilar and infrahilar infiltrates  No consolidation  The study was marked in EPIC for significant notification  Workstation performed: [id number]         CT head without contrast   Final Result by Roselia Cox MD (06/16 1755)      No acute intracranial abnormality  Workstation performed: [id number]         CT chest abdomen pelvis wo contrast   Final Result by Roselia Cox MD (06/16 3646)      1  Patchy airspace opacification in the right upper and right lower lobe, most compatible with pneumonia  2  Trace right effusion   3  Severe hepatic steatosis   4  Sigmoid diverticulosis without evidence of bowel obstruction or inflammatory change                  Workstation performed: [id number]         XR chest portable   Final Result by Eyad Trejo MD (06/16 6409)      Mild bilateral atelectasis/scar but no definite pneumonia                 Workstation performed: [id number]               Results from last 7 days   Lab Units 06/17/23  0304 06/16/23  1522   WBC Thousand/uL 4 43 5 11   HEMOGLOBIN g/dL 9 6* 10 4*   HEMATOCRIT % 30 5* 33 8*   PLATELETS Thousands/uL 111* 105*   BANDS PCT % 2 11*         Results from last 7 days   Lab Units 06/17/23  1446 06/17/23  0304 06/16/23  2206 06/16/23  1522   SODIUM mmol/L 143 144 142 142   POTASSIUM mmol/L 4 2 4 3 4 8 4 0   CHLORIDE mmol/L 110* 111* 112* 111*   CO2 mmol/L 19* 20* 20* 20*   ANION GAP mmol/L 14* 13 10 11   BUN mg/dL 25 25 25 27*   CREATININE mg/dL 2 02* 1 98* 1 82* 2 01*   EGFR ml/min/1 73sq m 24 25 28 24   CALCIUM mg/dL 7 0* 7 2* 7 5* 8 0*   CALCIUM, IONIZED mmol/L  --  1 00*  --   --    MAGNESIUM mg/dL 2 0 2 0  --  1 7*   PHOSPHORUS mg/dL 3 9 4 3*  --   --      Results from last 7 days   Lab Units 06/16/23  1522   AST U/L 37   ALT U/L 30   ALK PHOS U/L 315*   TOTAL PROTEIN g/dL 5 3*   ALBUMIN g/dL 2 1*   TOTAL BILIRUBIN mg/dL 0 56     Results from last 7 days   Lab Units 06/17/23  1227 06/17/23  1035 06/17/23  1032 06/17/23  0932 06/17/23  4076 06/17/23  0826 06/17/23  9460 "06/16/23 2208 06/16/23  1440   POC GLUCOSE mg/dl 107 116 321* 62* 61* <20* 45* 101 174*     Results from last 7 days   Lab Units 06/17/23  1446 06/17/23  0304 06/16/23  2206 06/16/23  1522   GLUCOSE RANDOM mg/dL 120 89 115 153*             No results found for: \"BETA-HYDROXYBUTYRATE\"       Results from last 7 days   Lab Units 06/17/23  0253   PH KYMBERLY  7 336   PCO2 KYMBERLY mm Hg 36 3*   PO2 KYMBERLY mm Hg 38 1   HCO3 KYMBERLY mmol/L 19 0*   BASE EXC KYMBERLY mmol/L -6 2   O2 CONTENT KYMBERLY ml/dL 10 3   O2 HGB, VENOUS % 70 0             Results from last 7 days   Lab Units 06/16/23  2206 06/16/23  1749 06/16/23  1522   HS TNI 0HR ng/L  --   --  17   HS TNI 2HR ng/L  --  19  --    HSTNI D2 ng/L  --  2  --    HS TNI 4HR ng/L 26  --   --    HSTNI D4 ng/L 9  --   --          Results from last 7 days   Lab Units 06/16/23  1522   PROTIME seconds 16 5*   INR  1 31*   PTT seconds 41*         Results from last 7 days   Lab Units 06/17/23  0304 06/16/23  1522   PROCALCITONIN ng/ml 15 32* 19 35*     Results from last 7 days   Lab Units 06/17/23  1356 06/17/23  0940 06/17/23  0252 06/16/23  1749 06/16/23  1522   LACTIC ACID mmol/L 4 7* 4 0* 4 3* 3 3* 3 4*               Results from last 7 days   Lab Units 06/16/23  1522   LIPASE u/L 12           Results from last 7 days   Lab Units 06/17/23  0256   CLARITY UA  Extra Turbid   COLOR UA  Yellow   SPEC GRAV UA  1 026   PH UA  5 0   GLUCOSE UA mg/dl Negative   KETONES UA mg/dl Negative   BLOOD UA  Large*   PROTEIN UA mg/dl 100 (2+)*   NITRITE UA  Negative   BILIRUBIN UA  Negative   UROBILINOGEN UA (BE) mg/dl <2 0   LEUKOCYTES UA  Large*   WBC UA /hpf Innumerable*   RBC UA /hpf Innumerable*   BACTERIA UA /hpf Moderate*   EPITHELIAL CELLS WET PREP /hpf Moderate*   MUCUS THREADS  Occasional*                                 Results from last 7 days   Lab Units 06/16/23  1522   BLOOD CULTURE  Received in Microbiology Lab  Culture in Progress  Received in Microbiology Lab  Culture in Progress             " Results from last 7 days   Lab Units 06/17/23  0304   VANCOMYCIN RM ug/mL 22 2*       ED Treatment:   Medication Administration from 06/16/2023 1435 to 06/16/2023 2031       Date/Time Order Dose Route Action     06/16/2023 1646 EDT ertapenem (INVanz) 1,000 mg in sodium chloride 0 9 % 50 mL IVPB 0 mg Intravenous Stopped     06/16/2023 1615 EDT ertapenem (INVanz) 1,000 mg in sodium chloride 0 9 % 50 mL IVPB 1,000 mg Intravenous New Bag     06/16/2023 1747 EDT multi-electrolyte (ISOLYTE-S PH 7 4 equivalent) IV solution 1,000 mL 0 mL Intravenous Stopped     06/16/2023 1707 EDT multi-electrolyte (ISOLYTE-S PH 7 4 equivalent) IV solution 1,000 mL 1,000 mL Intravenous New Bag     06/16/2023 1756 EDT magnesium sulfate 2 g/50 mL IVPB (premix) 2 g 2 g Intravenous New Bag     06/16/2023 1925 EDT NOREPINEPHRINE 4 MG  ML NSS (CMPD ORDER) infusion 0 mcg/min Intravenous Stopped     06/16/2023 1906 EDT NOREPINEPHRINE 4 MG  ML NSS (CMPD ORDER) infusion 5 mcg/min Intravenous New Bag     06/16/2023 1904 EDT multi-electrolyte (ISOLYTE-S PH 7 4 equivalent) IV solution 1,000 mL 1,000 mL Intravenous New Bag        Past Medical History:   Diagnosis Date   • CHF (congestive heart failure) ([facility] )    • Diabetes mellitus ([facility] )    • Disease of thyroid gland    • Hyperlipidemia    • Hypertension    • Hypothyroidism (acquired)    • Obesity, Class I, BMI 30 0-34 9 (see actual BMI)    • Restless leg syndrome    • Seizures ([facility] )      Present on Admission:  • Pneumonia  • Chronic heart failure with preserved ejection fraction (HCC)  • Type 2 diabetes mellitus (HCC)  • Major depressive disorder  • GERD (gastroesophageal reflux disease)  • Hyperlipidemia  • Primary hypertension  • Restless leg syndrome  • Hypothyroidism      Admitting Diagnosis: Lactic acidosis [E87 20]  Pneumonia [J18 9]  Septic shock (HCC) [A41 9, R65 21]  Elevated procalcitonin [R79 89]  AMS (altered mental status) [R41 82]  Age/Sex: 75 y o  female  Admission Orders:  Scheduled Medications:  ARIPiprazole, 2 mg, Oral, Daily  aspirin, 81 mg, Oral, Daily  chlorhexidine, 15 mL, Mouth/Throat, Q12H JOSE  ezetimibe, 10 mg, Oral, Daily  FLUoxetine, 40 mg, Oral, Daily  heparin (porcine), 5,000 Units, Subcutaneous, Q8H JOSE  insulin lispro, 1-6 Units, Subcutaneous, Q6H JOSE  levothyroxine, 75 mcg, Oral, Early Morning  meropenem, 1,000 mg, Intravenous, Q12H  mirtazapine, 30 mg, Oral, HS  pantoprazole, 20 mg, Oral, Early Morning    vancomycin (VANCOCIN) IVPB (premix in dextrose) 1,000 mg 200 mL  Dose: 15 mg/kg  Weight Dosing Info: 68 6 kg (Adjusted)  Freq: Once Route: IV  Last Dose: Stopped (06/17/23 1328)  Start: 06/17/23 1000 End: 06/17/23 1328    [START ON 6/18/2023] vancomycin, 15 mg/kg (Adjusted), Intravenous, Daily PRN  multi-electrolyte (ISOLYTE-S PH 7 4) bolus 500 mL  Dose: 500 mL  Freq: Once Route: IV  Last Dose: Stopped (06/17/23 0000)  Start: 06/16/23 2045 End: 06/17/23 0000  multi-electrolyte (ISOLYTE-S PH 7 4) bolus 1,000 mL  Dose: 1,000 mL  Freq: Once Route: IV  Last Dose: Stopped (06/17/23 0100)  Start: 06/16/23 2300 End: 06/17/23 0100  multi-electrolyte (ISOLYTE-S PH 7 4) bolus 500 mL  Dose: 500 mL  Freq: Once Route: IV  Last Dose: Stopped (06/17/23 0536)  Start: 06/17/23 0415 End: 06/17/23 0536  multi-electrolyte (ISOLYTE-S PH 7 4) bolus 1,000 mL  Dose: 1,000 mL  Freq: Once Route: IV  Last Dose: Stopped (06/17/23 1219)  Start: 06/17/23 1145 End: 06/17/23 1219  dextrose 50 % IV solution 12 5 g  Dose: 12 5 g  Freq: Once Route: IV  Start: 06/17/23 0700 End: 06/17/23 0655  calcium gluconate 2 g in sodium chloride 0 9% 100 mL (premix)  Dose: 2 g  Freq: Once Route: IV  Last Dose: Stopped (06/17/23 1321)  Start: 06/17/23 0930 End: 06/17/23 1321  albumin human (FLEXBUMIN) 5 % injection 12 5 g  Dose: 12 5 g  Freq:  Once Route: IV x1 6/17 @ 0017, x1 6/17 @ 0425        Continuous IV Infusions:  multi-electrolyte, 75 mL/hr, Intravenous, Continuous  norepinephrine, 1-30 mcg/min, Intravenous, Titrated      PRN Meds:  acetaminophen, 650 mg, Oral, Q6H PRN    dextrose 50 % IV solution **ADS Override Pull**  Start: 06/17/23 0934 End: 06/17/23 0947 x1 6/17 @0947        NPO   cardiopulm monitoring   neuro checks   poct glucose         IP CONSULT TO CASE MANAGEMENT  IP CONSULT TO PHARMACY  IP CONSULT TO PICC TEAM    Network Utilization Review Department  ATTENTION: Please call with any questions or concerns to [phone] and carefully listen to the prompts so that you are directed to the right person  All voicemails are confidential   [facility] Members all requests for admission clinical reviews, approved or denied determinations and any other requests to dedicated fax number below belonging to the campus where the patient is receiving treatment   List of dedicated fax numbers for the Facilities:  [address] DENIALS (Administrative/Medical Necessity) [phone]   [address] (Maternity/NICU/Pediatrics) [phone]   [address] Corrine Duque [phone]   [facility] [phone]   [address] [address] [address] [address] Corine Russell 28 [phone]   [address] [place] [facility] [place] [address] [phone]

## 2023-06-17 NOTE — ASSESSMENT & PLAN NOTE
Wt Readings from Last 3 Encounters:   06/05/23 93 9 kg (207 lb)   05/30/23 95 1 kg (209 lb 9 6 oz)   05/26/23 95 kg (209 lb 7 oz)     · Daily weights  · Caution with fluids  · Consider repeat echo

## 2023-06-17 NOTE — PROGRESS NOTES
[facility]  [address] [place] [address], [address] [place] [address], [address] [place] [address]  [phone]    NAME: Lilia Wing  AGE: 75 y o  SEX: female    Progress Note  LT 28    Location: Count includes the [facility]   POS: 32 (LTC)    Assessment/Plan:    Hypotension  Nursing reporting manual bp of 96/71, pt lethargic not responding to verbal or touch stimuli  Son updated on status and agreeable to sent out for evaluation  Not on any antihypertensive     Type 2 diabetes mellitus ([facility] [address] )    Lab Results   Component Value Date    HGBA1C 5 2 04/14/2023   ·   BS in the high side at 217 in the AM and 201 for lunch despite pt has not had  nothing to eat due to le  Continue Accu-Cheks  Continue Humalog sliding scale  Encourage diabetic diet  Avoid hypoglycemia    Lethargic  On exam not responding to verbal/touch stimuli  Eyes open however glazing to ceiling  With low bp   Respiratory status stable at 94% RA  No sign of respiratory present     Failure to thrive in adult  Bed bound  With decrease po intake  Significant decline in the last few months  assist patient with ADLs/IADLs as needed  Continue supportive care per LTC    Chief complaint / Reason for visit: Acute Visit    History of Present Illness:belly bluish/purple   Patient is a 26-year-old female seen in  For acute visit  Nursing reporting patient hypertensive, withdrawn, lethargic, and hypothermic  Baseline pt is pleasantly confused  Goes through intermittent episodes of withdrawal and lethargic  BP of 96/71, hr 50, temp 93 7, and blood sugars in the quentin side despite pt has not eaten  Pt with eye open, does not respond to touch  Area in abdomen noted with large purple/bluish color localized  With no output in arceo bag  Discussed with nursing staff regarding findings and per staff this is a change in status  Spoke with son and ok to sent patient out for evaluation  Requested pt to be sent to [address] [place] [address] due to acuity   Patient has been going in/out of hospital for acute and chronic medical conditions  She has been declining overall with no sign of improvement  Her prognosis remains guarded due to co-morbidities and failure to thrive  GOC discussion have taken place however son is not ready  Will revisit discussion when patient comes back if not revisit during hospitalization  Review of Systems:  Per history of present illness, all other systems reviewed and negative  Other than those noted in HPI    HISTORY:  Medical Hx: Reviewed, unchanged  Family Hx: Reviewed, unchanged  Soc Hx: Reviewed,  unchanged    ALLERGY: Reviewed, unchanged  Allergies   Allergen Reactions   • Pineapple - Food Allergy Anaphylaxis   • Statins Other (See Comments)     Cramping from head to toe    • Bupropion Other (See Comments)   • Byetta 10 Mcg Pen [Exenatide]    • Ceftriaxone Other (See Comments)   • Marijuana [Dronabinol] GI Intolerance   • Metformin Diarrhea     Other reaction(s): Unknown   • Omeprazole Hives   • Ondansetron      Other reaction(s): Unknown   • Coconut Oil - Food Allergy Rash   • Liraglutide Rash and Other (See Comments)     Generalized rash   • Penicillins Rash      PHYSICAL EXAM:  Vital Signs: Blood pressure 96/71  Heart rate 60  Respiration 14  Temperature 93 7  O2 sat 94% on room air  Weight 206 5 pounds    General: Ill appearing, lethargic withdrawn   Head: Atraumatic  Normocephalic  Eye Exam: anicteric sclera, no discharge, PERRLA, No injection  Oral Exam: Dry mucous membranes, no buccaloropharyngeal erythema, palatine tonsils WNL  Neck Exam: no anterior cervical lymphadenopathy noted, neck supple  Cardiovascular: regular rate, regular rhythm, no murmurs, rubs, or gallops  Pulmonary: Breath sounds diminished no wheeze, no rhonchi, no rales  No chest tenderness  Abdominal: soft, non-tender, nondistended, bowel sounds audible x 4 quadrants  Purpleish/bluish pattern throughout to abdomen  : Non distended bladder    Chronic Kay catheter with no output  Extremities and skin: +1 edema noted, no "rashes  Scattered bruises noted on upper/lower extremities  Neurological: Obtunded, pleasantly confused at baseline  Withdrawn,     Laboratory results / Imaging reviewed: Hard copy/ies in medical chart:    6- creatinine 1 6, glucose 121, BUN 23, sodium 1 4 potassium 4 1, calcium 8 3, GFR 32    Current Medications: All medications reviewed and updated in Nursing Home Chart    I have spent >45 minutes with patient today in which greater than 50% of this time was spent in counseling/coordination of care regarding Diagnostic results, Prognosis, Risks and benefits of tx options, Instructions for management, Patient and family education, Importance of tx compliance, Risk factor reductions, Impressions, Counseling / Coordination of care, Documenting in the medical record, Reviewing / ordering tests, medicine, procedures  , Obtaining or reviewing history   and Communicating with other healthcare professionals   Please note:  Voice-recognition software may have been used in the preparation of this document  Occasional wrong word or \"sound-alike\" substitutions may have occurred due to the inherent limitations of voice recognition software  Interpretation should be guided by context      SAMIA Livingston  6/16/2023  "

## 2023-06-17 NOTE — PROCEDURES
Arterial Line Insertion    Date/Time: 6/17/2023 5:35 PM    Performed by: Marysue Dandy  Authorized by: Marysue Dandy    Patient location:  ICU  Other Assisting Provider: No    Consent:     Consent obtained:  Verbal    Consent given by:  Patient  Universal protocol:     Procedure explained and questions answered to patient or proxy's satisfaction: yes      Relevant documents present and verified: yes      Test results available and properly labeled: yes      Radiology Images displayed and confirmed  If images not available, report reviewed: no      Required blood products, implants, devices, and special equipment available: no      Site/side marked: yes      Immediately prior to procedure a time out was called: yes      Patient identity confirmed:  Hospital-assigned identification number and arm band  Indications:     Indications: hemodynamic monitoring and multiple ABGs    Pre-procedure details:     Skin preparation:  Chlorhexidine    Preparation: Patient was prepped and draped in sterile fashion    Anesthesia (see MAR for exact dosages): Anesthesia method:  None  Procedure details:     Location / Tip of Catheter:  Radial    Laterality:  Left    Needle gauge:  18 G    Placement technique:  Seldinger and ultrasound guided    Ultrasound image availability:  Not saved    Sterile ultrasound techniques: Sterile gel and sterile probe covers were used      Number of attempts:  1    Successful placement: yes      Transducer: waveform confirmed    Post-procedure details:     Post-procedure:  Sutured    CMS:  Unchanged and unable to assess    Patient tolerance of procedure:   Tolerated well, no immediate complications

## 2023-06-17 NOTE — ASSESSMENT & PLAN NOTE
· Pt with PNA on CT chest and history of ESBL UTI requiring ertapenem  · Will cover with Invanz and vancomycin pending culture data - blood and urine pending   · Received 3L fluid resuscitation total, currently getting Isolyte at 100mL/hr  · Continue levophed for MAP >65, wean off as tolerated

## 2023-06-17 NOTE — PROCEDURES
Central Line Insertion    Date/Time: 6/17/2023 11:55 AM    Performed by: Eve Leblanc MD  Authorized by: Eve Leblanc MD    Patient location:  ICU and bedside  Consent:     Consent obtained:  Emergent situation and verbal (With SonSachi)    Consent given by:  Guardian    Risks discussed:  Arterial puncture, incorrect placement, nerve damage, pneumothorax, infection and bleeding    Alternatives discussed:  Delayed treatment, alternative treatment, observation and no treatment  Universal protocol:     Patient identity confirmed:  Arm band and hospital-assigned identification number  Pre-procedure details:     Hand hygiene: Hand hygiene performed prior to insertion      Sterile barrier technique: All elements of maximal sterile technique followed      Skin preparation:  Betadine    Skin preparation agent: Skin preparation agent completely dried prior to procedure    Indications:     Central line indications: medications requiring central line, hemodynamic monitoring and no peripheral vascular access      Site selection rationale:  Preserve the right IJ incase patient requires dialysis/HD line  Anesthesia (see MAR for exact dosages):      Anesthesia method:  Local infiltration    Local anesthetic:  Lidocaine 1% w/o epi  Procedure details:     Location:  Left internal jugular    Vessel type: vein      Laterality:  Left    Approach: percutaneous technique used      Patient position:  Trendelenburg    Catheter type:  Triple lumen 20cm    Catheter size:  7 Fr    Landmarks identified: yes      Ultrasound guidance: yes      Ultrasound image availability:  Images available in PACS and still images obtained    Sterile ultrasound techniques: Sterile gel and sterile probe covers were used      Manometry confirmation: yes      Number of attempts:  2    Successful placement: yes      Vessel of catheter tip end:  Right atria  Post-procedure details:     Post-procedure:  Dressing applied and line sutured    Assessment:  Blood return through all ports, no pneumothorax on x-ray, placement verified by x-ray and free fluid flow    Post-procedure complications: none      Patient tolerance of procedure: Tolerated well, no immediate complications    Observer: Yes      Observer name:  Royal Morris  Comments:      Line was appreciated to be deep on assessment of the chest x-ray  Line was retracted underneath sterile procedure approximately 4 cm in length  Rebandaged and resecured to the area under sterile technique

## 2023-06-17 NOTE — PROGRESS NOTES
Johanna Smith is a 75 y o  female who is currently ordered Vancomycin IV with management by the Pharmacy Consult service  Relevant clinical data and objective / subjective history reviewed  Vancomycin Assessment:  Indication and Goal AUC/Trough: Urinary tract infection (goal -600, trough >10); Bacteremia (goal -600, trough >10)  Clinical Status:  critical care  Micro:   pending  Renal Function:  SCr: 1 89 mg/dL  CrCl: 29 1 mL/min  Renal replacement: Not on dialysis  Days of Therapy: 1  Current Dose: 1750mg (25mg/kg) loading dose given  Vancomycin Plan:  New Dosinmg (15mg/kg) pulse-dosed prn for vanco level<15  Estimated AUC: n/a for pulse dosing  Estimated Trough: n/a for pulse dosing  Next Level: random  @ 0600  Renal Function Monitoring: Daily BMP and [place] will continue to follow closely for s/sx of nephrotoxicity, infusion reactions and appropriateness of therapy  BMP and CBC will be ordered per protocol  We will continue to follow the patient’s culture results and clinical progress daily      Tejinder Vicenet, Pharmacist

## 2023-06-17 NOTE — NURSING NOTE
PICC line placement attempted R cephalic, unsuccessful  Wire would kink and not advance  PIV access attempted x 2 unsuccessful  Primary RN aware

## 2023-06-17 NOTE — H&P
[facility]  H&P  Name: Michael Avila 75 y o  female I MRN: [id number]  Unit/Bed#: ICU 15 I Date of Admission: 6/16/2023   Date of Service: 6/17/2023 I Hospital Day: 1      Assessment/Plan   * Septic shock (HCC)  Assessment & Plan  · Pt with PNA on CT chest and history of ESBL UTI requiring ertapenem  · Will cover with Invanz and vancomycin pending culture data - blood and urine pending   · Received 3L fluid resuscitation total, currently getting Isolyte at 100mL/hr  · Continue levophed for MAP >65, wean off as tolerated    Pneumonia  Assessment & Plan  · Continue broad spectrum antibiotics pending culture data  · Repeat Xray in AM     Chronic heart failure with preserved ejection fraction (HCC)  Assessment & Plan  Wt Readings from Last 3 Encounters:   06/05/23 93 9 kg (207 lb)   05/30/23 95 1 kg (209 lb 9 6 oz)   05/26/23 95 kg (209 lb 7 oz)     · Daily weights  · Caution with fluids  · Consider repeat echo         Hypothyroidism  Assessment & Plan  · Continue PTA levothyroxine    Major depressive disorder  Assessment & Plan  · Continue PTA meds with Prozac, Abilify, and Remeron    GERD (gastroesophageal reflux disease)  Assessment & Plan  · Continue PTA PPI    Type 2 diabetes mellitus ([facility] [address] )  Assessment & Plan  Lab Results   Component Value Date    HGBA1C 5 2 04/14/2023       Recent Labs     06/16/23  1440 06/16/23  2208   POCGLU 174* 101       Blood Sugar Average: Last 72 hrs:  (P) 137 5     · Accuchecks and ISS q6 hours while NPO     Hyperlipidemia  Assessment & Plan  · Continue PTA Zetia     Primary hypertension  Assessment & Plan  · Holding all PTA antihypertensives given shock state, restart as appropriate    Restless leg syndrome  Assessment & Plan  · Continue PTA Remeron         History of Present Illness      HPI: Michael Avila is a 75 y o  with a PMH significant for depression, DM2, and hypothyroidism who was brought in from [facility] for altered mental status   Pt was found to be in septic shock requiring levophed  Admit to ICU  History obtained from chart review  Review of Systems   Unable to perform ROS: Mental status change          Historical Information   Past Medical History:  No date: CHF (congestive heart failure) ([facility])  No date: Diabetes mellitus ([facility] )  No date: Disease of thyroid gland  No date: Hyperlipidemia  No date: Hypertension  No date: Hypothyroidism (acquired)  No date: Obesity, Class I, BMI 30 0-34 9 (see actual BMI)  No date: Restless leg syndrome  No date: Seizures ([facility] ) Past Surgical History:  No date: APPENDECTOMY  No date: CHOLECYSTECTOMY  No date: HIP FRACTURE SURGERY; Right  4/4/2023: IR PICC PLACEMENT SINGLE LUMEN  No date: TONSILLECTOMY  No date: TUBAL LIGATION   Current Outpatient Medications   Medication Instructions   • acetaminophen (TYLENOL) 975 mg, Oral, Every 8 hours scheduled   • ARIPiprazole (ABILIFY) 2 mg, Oral, Daily   • aspirin (ECOTRIN LOW STRENGTH) 81 mg, Oral, Daily   • Cholecalciferol 25 MCG (1000 UT) capsule Take 1,000 Units by mouth daily     • Ergocalciferol (VITAMIN D2 PO) 50,000 Units/day, Oral, 2 times weekly   • ezetimibe (ZETIA) 10 mg, Oral, Daily   • ferrous sulfate 325 mg, Oral, Every other day   • FLUoxetine (PROZAC) 40 mg, Oral, Daily   • folic acid (FOLVITE) 059 mcg, Oral, Daily   • hydrophilic ointment Topical, As needed   • insulin lispro (HUMALOG) 1-5 Units, Subcutaneous, 3 times daily before meals   • levothyroxine 75 mcg, Oral, Daily (early morning)   • lidocaine (LIDODERM) 5 % 1 patch, Topical, Daily, Remove & Discard patch within 12 hours or as directed by MD   • melatonin 9 mg, Oral, Daily at bedtime   • mirtazapine (REMERON) 30 mg, Oral, Daily at bedtime   • nystatin (MYCOSTATIN) powder Topical, 2 times daily   • omeprazole (PRILOSEC) 20 mg, Oral, Daily    Allergies   Allergen Reactions   • Pineapple - Food Allergy Anaphylaxis   • Statins Other (See Comments)     Cramping from head to toe    • Bupropion Other (See Comments)   • Byetta 10 Mcg Pen [Exenatide]    • Ceftriaxone Other (See Comments)   • Marijuana [Dronabinol] GI Intolerance   • Metformin Diarrhea     Other reaction(s): Unknown   • Omeprazole Hives   • Ondansetron      Other reaction(s): Unknown   • Coconut Oil - Food Allergy Rash   • Liraglutide Rash and Other (See Comments)     Generalized rash   • Penicillins Rash      Social History     Tobacco Use   • Smoking status: Never   • Smokeless tobacco: Never   • Tobacco comments:     not a smoker   Vaping Use   • Vaping Use: Never used   Substance Use Topics   • Alcohol use: Yes     Comment: occasional   • Drug use: Never    Family History   Problem Relation Age of Onset   • Brain cancer Mother    • Seizures Father    • Colon cancer Maternal Grandfather    • No Known Problems Son    • No Known Problems Son    • Breast cancer Maternal Aunt 30          Objective                            Vitals I/O      Most Recent Min/Max in 24hrs   Temp 98 1 °F (36 7 °C) Temp  Min: 88 °F (31 1 °C)  Max: 98 1 °F (36 7 °C)   Pulse 91 Pulse  Min: 67  Max: 92   Resp (!) 24 Resp  Min: 16  Max: 39   /91 BP  Min: 51/26  Max: 148/68   O2 Sat 96 % SpO2  Min: 94 %  Max: 100 %      Intake/Output Summary (Last 24 hours) at 6/17/2023 0557  Last data filed at 6/17/2023 0101  Gross per 24 hour   Intake 2818 45 ml   Output --   Net 2818 45 ml         Diet NPO; Sips with meds      Physical exam    Physical Exam  Vitals and nursing note reviewed  Constitutional:       General: She is not in acute distress  Appearance: She is ill-appearing  HENT:      Head: Normocephalic and atraumatic  Mouth/Throat:      Mouth: Mucous membranes are moist       Pharynx: Oropharynx is clear  Eyes:      Conjunctiva/sclera: Conjunctivae normal       Pupils: Pupils are equal, round, and reactive to light  Cardiovascular:      Rate and Rhythm: Normal rate and regular rhythm     Pulmonary:      Effort: Pulmonary effort is normal       Breath sounds: Normal breath sounds  Abdominal:      General: There is no distension  Palpations: Abdomen is soft  Tenderness: There is no abdominal tenderness  Musculoskeletal:      Cervical back: Neck supple  Skin:     General: Skin is warm and dry  Neurological:      GCS: GCS eye subscore is 4  GCS verbal subscore is 1  GCS motor subscore is 1  Psychiatric:         Behavior: Behavior is cooperative  Diagnostic Studies    EKG: Telemetry reviewed  Imaging:  I have personally reviewed pertinent reports     and I have personally reviewed pertinent films in PACS     Medications:  Scheduled PRN   ARIPiprazole, 2 mg, Daily  aspirin, 81 mg, Daily  chlorhexidine, 15 mL, Q12H JOSE  ertapenem, 1,000 mg, Q24H  ezetimibe, 10 mg, Daily  FLUoxetine, 40 mg, Daily  heparin (porcine), 5,000 Units, Q8H JOSE  insulin lispro, 1-6 Units, Q6H JOSE  levothyroxine, 75 mcg, Early Morning  mirtazapine, 30 mg, HS  pantoprazole, 20 mg, Early Morning      acetaminophen, 650 mg, Q6H PRN  vancomycin, 15 mg/kg (Adjusted), Daily PRN       Continuous    multi-electrolyte, 100 mL/hr, Last Rate: 100 mL/hr (06/17/23 0544)  norepinephrine, 1-30 mcg/min, Last Rate: 4 mcg/min (06/17/23 0552)         Labs:    CBC    Recent Labs     06/16/23  1522 06/17/23  0304   WBC 5 11 4 43   HGB 10 4* 9 6*   HCT 33 8* 30 5*   * 111*   BANDSPCT 11* 2     BMP    Recent Labs     06/16/23  2206 06/17/23  0304   SODIUM 142 144   K 4 8 4 3   * 111*   CO2 20* 20*   AGAP 10 13   BUN 25 25   CREATININE 1 82* 1 98*   CALCIUM 7 5* 7 2*       Coags    Recent Labs     06/16/23  1522   INR 1 31*   PTT 41*        Additional Electrolytes  Recent Labs     06/16/23  1522 06/17/23  0304   MG 1 7* 2 0   PHOS  --  4 3*   CAIONIZED  --  1 00*          Blood Gas    No recent results  Recent Labs     06/17/23  0253   PHVEN 7 336   XDY9DIL 36 3*   PO2VEN 38 1   YPM1VFO 19 0*   BEVEN -6 2    LFTs  Recent Labs     06/16/23  1522   ALT 30   AST 37   ALKPHOS 315* ALB 2 1*   TBILI 0 56       Infectious  Recent Labs     06/16/23  1522 06/17/23  0304   PROCALCITONI 19 35* 15 32*     Glucose  Recent Labs     06/16/23  1522 06/16/23  2206 06/17/23  0304   GLUC 153* 115 89        Critical Care Time Delivered: Upon my evaluation, this patient had a high probability of imminent or life-threatening deterioration due to septic shock, which required my direct attention, intervention, and personal management  I have personally provided 120 minutes of critical care time, exclusive of procedures, teaching, family meetings, and any prior time recorded by providers other than myself        Anticipated Length of Stay is > 2 midnights  Sheria Kanner, PA-C

## 2023-06-17 NOTE — ASSESSMENT & PLAN NOTE
Lab Results   Component Value Date    HGBA1C 5 2 04/14/2023       Recent Labs     06/16/23  1440 06/16/23  2208   POCGLU 174* 101       Blood Sugar Average: Last 72 hrs:  (P) 137 5     · Accuchecks and ISS q6 hours while NPO

## 2023-06-18 PROBLEM — D72.825 BANDEMIA: Status: RESOLVED | Noted: 2023-06-16 | Resolved: 2023-06-18

## 2023-06-18 PROBLEM — G92.8 TOXIC METABOLIC ENCEPHALOPATHY: Status: ACTIVE | Noted: 2023-06-18

## 2023-06-18 NOTE — ASSESSMENT & PLAN NOTE
Wt Readings from Last 3 Encounters:   06/17/23 93 kg (205 lb 0 4 oz)   06/05/23 93 9 kg (207 lb)   05/30/23 95 1 kg (209 lb 9 6 oz)     · Daily weights  · Consider repeat echo

## 2023-06-18 NOTE — PROGRESS NOTES
[facility]  [facility]  Name: Higinio Fink  MRN: [id number]  Unit/Bed#: ICU 12 I Date of Admission: 6/16/2023   Date of Service: 6/18/2023 I Hospital Day: 2    Assessment/Plan   * Septic shock (HCC)  Assessment & Plan  · Etiology likely 2/2 to PNA as evidenced by CT chest, vs history of ESBL UTI requiring ertapenem  · Continue broad spectrum antibiotics with meropenum and vancomycin pending culture data - blood and urine cultures pending   · Continue maintenance fluids at 75mL/hr  · Continue levophed and vasopressin for MAP >65, wean off as tolerated    Pneumonia  Assessment & Plan  · Continue broad spectrum antibiotics pending culture data  · Continue supplemental oxygen as needed    Chronic heart failure with preserved ejection fraction (HCC)  Assessment & Plan  Wt Readings from Last 3 Encounters:   06/17/23 93 kg (205 lb 0 4 oz)   06/05/23 93 9 kg (207 lb)   05/30/23 95 1 kg (209 lb 9 6 oz)     · Daily weights  · Consider repeat echo         Toxic metabolic encephalopathy  Assessment & Plan  · Likely 2/2 to sepsis  · Head CT negative  · Consider placing NGT for enteral access to prevent withdrawal from psych meds     REGINO (acute kidney injury) ([facility] [address] )  Assessment & Plan  · Pt oliguric, concern for ATN given hypotension   · Continue maintenance fluids with Isolyte at 75mL/hr   · Trend labs and manage electrolytes PRN    Hypothyroidism  Assessment & Plan  · Currently holding PTA levothyroxine while NPO, restart as appropriate     Major depressive disorder  Assessment & Plan  · Currently holding PTA meds, Prozac, and Abilify while NPO  · Consider placing NGT for enteral access to prevent withdrawal from psych meds     GERD (gastroesophageal reflux disease)  Assessment & Plan  · Continue PTA PPI, give as IV while NPO    Type 2 diabetes mellitus [facility])  Assessment & Plan  Lab Results   Component Value Date    HGBA1C 5 2 04/14/2023       Recent Labs     06/17/23  2348 06/17/23  0692 06/18/23  0017 06/18/23  0131   POCGLU 50* 66 82 78       Blood Sugar Average: Last 72 hrs:  (P) 037 [id number]     · Accuchecks and ISS q6 hours while NPO     Hyperlipidemia  Assessment & Plan  · Holding PTA Zetia while NPO, restart as appropriate    Primary hypertension  Assessment & Plan  · Holding all PTA antihypertensives given shock state, restart as appropriate    Restless leg syndrome  Assessment & Plan  · Holding PTA Remeron while NPO, restart as appropriate      ICU Core Measures     A: Assess, Prevent, and Manage Pain · Has pain been assessed? NA  · Need for changes to pain regimen? NA   B: Both SAT/SAT  · N/A   C: Choice of Sedation · RASS Goal: 0 Alert and Calm or N/A patient not on sedation  · Need for changes to sedation or analgesia regimen? NA   D: Delirium · CAM-ICU: Positive   E: Early Mobility  · Plan for early mobility? NA   F: Family Engagement · Plan for family engagement today? Yes     Antibiotic Review: Awaiting culture results  Review of Invasive Devices: Kay Plan: Continue for accurate I/O monitoring for 48 hours  Central access plan: Medications requiring central line  [place] Plan: Keep arterial line for hemodynamic monitoring    Prophylaxis:  VTE VTE covered by:  heparin (porcine), Subcutaneous, 5,000 Units at 06/18/23 0509       Stress Ulcer  covered bypantoprazole (PROTONIX) injection 40 mg [[id number]]     Subjective   HPI/24hr events: Mrs Stern was examined bedside this AM, with no acute events overnight  She remains oliguric  Vasopressin was added in attempt to limit levophed       Review of Systems   Unable to perform ROS: Mental status change        Objective                            Vitals I/O      Most Recent Min/Max in 24hrs   Temp 97 5 °F (36 4 °C) Temp  Min: 97 5 °F (36 4 °C)  Max: 98 2 °F (36 8 °C)   Pulse 79 Pulse  Min: 79  Max: 93   Resp (!) 27 Resp  Min: 17  Max: 50   /70 BP  Min: 86/50  Max: 157/73   O2 Sat 97 % SpO2  Min: 90 %  Max: 100 % Intake/Output Summary (Last 24 hours) at 6/18/2023 0511  Last data filed at 6/17/2023 1821  Gross per 24 hour   Intake 3412 79 ml   Output 67 ml   Net 3345 79 ml         Diet NPO; Sips with meds     Invasive Monitoring Physical exam   Arterial Line  Tiffany /50  Arterial Line BP  Min: 88/41  Max: 150/61   MAP 73 mmHg  Arterial Line MAP (mmHg)  Min: 56 mmHg  Max: 91 mmHg    Physical Exam  Vitals and nursing note reviewed  Constitutional:       General: She is not in acute distress  Appearance: She is obese  She is ill-appearing  HENT:      Head: Normocephalic and atraumatic  Mouth/Throat:      Mouth: Mucous membranes are moist       Pharynx: Oropharynx is clear  Eyes:      Conjunctiva/sclera: Conjunctivae normal       Pupils: Pupils are equal, round, and reactive to light  Cardiovascular:      Rate and Rhythm: Normal rate and regular rhythm  Heart sounds: Normal heart sounds  Pulmonary:      Effort: Pulmonary effort is normal       Breath sounds: Normal breath sounds  Abdominal:      General: There is no distension  Palpations: Abdomen is soft  Tenderness: There is no abdominal tenderness  Musculoskeletal:         General: Swelling present  No deformity  Cervical back: Neck supple  Right lower leg: Edema present  Left lower leg: Edema present  Skin:     General: Skin is warm and dry  Comments: Multiple areas of skin tears in various stages, weepy   Neurological:      GCS: GCS eye subscore is 4  GCS verbal subscore is 1  GCS motor subscore is 1  Psychiatric:         Behavior: Behavior is uncooperative  Diagnostic Studies    EKG: Telemetry reviewed  Imaging:  I have personally reviewed pertinent reports     and I have personally reviewed pertinent films in PACS     Medications:  Scheduled PRN   ARIPiprazole, 2 mg, Daily  chlorhexidine, 15 mL, Q12H [address]  FLUoxetine, 40 mg, Daily  heparin (porcine), 5,000 Units, Q8H [address]  insulin lispro, 1-6 Units, Q6H [address]  levothyroxine, 75 mcg, Early Morning  meropenem, 1,000 mg, Q12H  mirtazapine, 30 mg, HS  pantoprazole, 40 mg, Q24H JOSE      acetaminophen, 650 mg, Q6H PRN  vancomycin, 15 mg/kg (Adjusted), Daily PRN       Continuous    multi-electrolyte, 75 mL/hr, Last Rate: 75 mL/hr (06/18/23 0046)  norepinephrine, 1-30 mcg/min, Last Rate: 6 mcg/min (06/18/23 0430)  vasopressin (PITRESSIN) 20 Units in sodium chloride 0 9 % 100 mL infusion, 0 04 Units/min, Last Rate: 0 04 Units/min (06/17/23 2154)         Labs:    CBC    Recent Labs     06/16/23  1522 06/17/23  0304 06/18/23  0347   WBC 5 11 4 43 10 58*   HGB 10 4* 9 6* 8 0*   HCT 33 8* 30 5* 25 8*   * 111* 94*   BANDSPCT 11* 2  --      BMP    Recent Labs     06/17/23 2157 06/18/23  0347   SODIUM 141 143   K 4 3 4 3   * 110*   CO2 16* 17*   AGAP 16* 16*   BUN 26* 25   CREATININE 2 17* 2 16*   CALCIUM 7 2* 7 2*       Coags    Recent Labs     06/16/23  1522   INR 1 31*   PTT 41*        Additional Electrolytes  Recent Labs     06/17/23 2157 06/18/23  0347   MG 2 1 2 1   PHOS 3 8 4 0   CAIONIZED 0 98* 1 00*          Blood Gas    No recent results  Recent Labs     06/17/23 2157   PHVEN 7 320   BJG7LXN 34 4*   PO2VEN 37 6   ZAQ0MYI 17 3*   BEVEN -7 9    LFTs  Recent Labs     06/16/23  1522   ALT 30   AST 37   ALKPHOS 315*   ALB 2 1*   TBILI 0 56       Infectious  Recent Labs     06/16/23  1522 06/17/23  0304   PROCALCITONI 19 35* 15 32*     Glucose  Recent Labs     06/17/23  0304 06/17/23  1446 06/17/23  2157 06/18/23  0347   GLUC 89 120 73 85          Critical Care Time Delivered: Upon my evaluation, this patient had a high probability of imminent or life-threatening deterioration due to septic shock, which required my direct attention, intervention, and personal management  I have personally provided 30 minutes of critical care time, exclusive of procedures, teaching, family meetings, and any prior time recorded by providers other than myself  Ricky Stewart PA-C

## 2023-06-18 NOTE — PLAN OF CARE
Problem: Nutrition/Hydration-ADULT  Goal: Nutrient/Hydration intake appropriate for improving, restoring or maintaining nutritional needs  Description: Monitor and assess patient's nutrition/hydration status for malnutrition  Collaborate with interdisciplinary team and initiate plan and interventions as ordered  Monitor patient's weight and dietary intake as ordered or per policy  Utilize nutrition screening tool and intervene as necessary  Determine patient's food preferences and provide high-protein, high-caloric foods as appropriate       INTERVENTIONS:  - Monitor oral intake, urinary output, labs, and treatment plans  - Assess nutrition and hydration status and recommend course of action  - Evaluate amount of meals eaten  - Assist patient with eating if necessary   - Allow adequate time for meals  - Recommend/ encourage appropriate diets, oral nutritional supplements, and vitamin/mineral supplements  - Order, calculate, and assess calorie counts as needed  - Recommend, monitor, and adjust tube feedings and TPN/PPN based on assessed needs  - Assess need for intravenous fluids  - Provide specific nutrition/hydration education as appropriate  - Include patient/family/caregiver in decisions related to nutrition  Outcome: Progressing     Problem: MOBILITY - ADULT  Goal: Maintain or return to baseline ADL function  Description: INTERVENTIONS:  -  Assess patient's ability to carry out ADLs; assess patient's baseline for ADL function and identify physical deficits which impact ability to perform ADLs (bathing, care of mouth/teeth, toileting, grooming, dressing, etc )  - Assess/evaluate cause of self-care deficits   - Assess range of motion  - Assess patient's mobility; develop plan if impaired  - Assess patient's need for assistive devices and provide as appropriate  - Encourage maximum independence but intervene and supervise when necessary  - Involve family in performance of ADLs  - Assess for home care needs following discharge   - Consider OT consult to assist with ADL evaluation and planning for discharge  - Provide patient education as appropriate  Outcome: Progressing  Goal: Maintains/Returns to pre admission functional level  Description: INTERVENTIONS:  - Perform BMAT or MOVE assessment daily    - Set and communicate daily mobility goal to care team and patient/family/caregiver  - Collaborate with rehabilitation services on mobility goals if consulted  - Perform Range of Motion  times a day  - Reposition patient every  hours    - Dangle patient  times a day  - Stand patient  times a day  - Ambulate patient  times a day  - Out of bed to chair  times a day   - Out of bed for meals  times a day  - Out of bed for toileting  - Record patient progress and toleration of activity level   Outcome: Progressing     Problem: PAIN - ADULT  Goal: Verbalizes/displays adequate comfort level or baseline comfort level  Description: Interventions:  - Encourage patient to monitor pain and request assistance  - Assess pain using appropriate pain scale  - Administer analgesics based on type and severity of pain and evaluate response  - Implement non-pharmacological measures as appropriate and evaluate response  - Consider cultural and social influences on pain and pain management  - Notify physician/advanced practitioner if interventions unsuccessful or patient reports new pain  Outcome: Progressing     Problem: INFECTION - ADULT  Goal: Absence or prevention of progression during hospitalization  Description: INTERVENTIONS:  - Assess and monitor for signs and symptoms of infection  - Monitor lab/diagnostic results  - Monitor all insertion sites, i e  indwelling lines, tubes, and drains  - Monitor endotracheal if appropriate and nasal secretions for changes in amount and color  - [place] appropriate cooling/warming therapies per order  - Administer medications as ordered  - Instruct and encourage patient and family to use good hand hygiene technique  - Identify and instruct in appropriate isolation precautions for identified infection/condition  Outcome: Progressing  Goal: Absence of fever/infection during neutropenic period  Description: INTERVENTIONS:  - Monitor WBC    Outcome: Progressing     Problem: SAFETY ADULT  Goal: Maintain or return to baseline ADL function  Description: INTERVENTIONS:  -  Assess patient's ability to carry out ADLs; assess patient's baseline for ADL function and identify physical deficits which impact ability to perform ADLs (bathing, care of mouth/teeth, toileting, grooming, dressing, etc )  - Assess/evaluate cause of self-care deficits   - Assess range of motion  - Assess patient's mobility; develop plan if impaired  - Assess patient's need for assistive devices and provide as appropriate  - Encourage maximum independence but intervene and supervise when necessary  - Involve family in performance of ADLs  - Assess for home care needs following discharge   - Consider OT consult to assist with ADL evaluation and planning for discharge  - Provide patient education as appropriate  Outcome: Progressing  Goal: Maintains/Returns to pre admission functional level  Description: INTERVENTIONS:  - Perform BMAT or MOVE assessment daily    - Set and communicate daily mobility goal to care team and patient/family/caregiver  - Collaborate with rehabilitation services on mobility goals if consulted  - Perform Range of Motion  times a day  - Reposition patient every  hours    - Dangle patient  times a day  - Stand patient  times a day  - Ambulate patient  times a day  - Out of bed to chair  times a day   - Out of bed for meals  times a day  - Out of bed for toileting  - Record patient progress and toleration of activity level   Outcome: Progressing  Goal: Patient will remain free of falls  Description: INTERVENTIONS:  - Educate patient/family on patient safety including physical limitations  - Instruct patient to call for assistance with activity   - Consult OT/PT to assist with strengthening/mobility   - Keep Call bell within reach  - Keep bed low and locked with side rails adjusted as appropriate  - Keep care items and personal belongings within reach  - Initiate and maintain comfort rounds  - Make Fall Risk Sign visible to staff  - Offer Toileting every  Hours, in advance of need  - Initiate/Maintain alarm  - Obtain necessary fall risk management equipment  - Apply yellow socks and bracelet for high fall risk patients  - Consider moving patient to room near nurses station  Outcome: Progressing     Problem: DISCHARGE PLANNING  Goal: Discharge to home or other facility with appropriate resources  Description: INTERVENTIONS:  - Identify barriers to discharge w/patient and caregiver  - Arrange for needed discharge resources and transportation as appropriate  - Identify discharge learning needs (meds, wound care, etc )  - Arrange for interpretive services to assist at discharge as needed  - Refer to Case Management Department for coordinating discharge planning if the patient needs post-hospital services based on physician/advanced practitioner order or complex needs related to functional status, cognitive ability, or social support system  Outcome: Progressing     Problem: Knowledge Deficit  Goal: Patient/family/caregiver demonstrates understanding of disease process, treatment plan, medications, and discharge instructions  Description: Complete learning assessment and assess knowledge base    Interventions:  - Provide teaching at level of understanding  - Provide teaching via preferred learning methods  Outcome: Progressing

## 2023-06-18 NOTE — ASSESSMENT & PLAN NOTE
· Etiology likely 2/2 to PNA as evidenced by CT chest, vs history of ESBL UTI requiring ertapenem  · Continue broad spectrum antibiotics with meropenum and vancomycin pending culture data - blood and urine cultures pending   · Continue maintenance fluids at 75mL/hr  · Continue levophed and vasopressin for MAP >65, wean off as tolerated

## 2023-06-18 NOTE — PROGRESS NOTES
Doris Mathis is a 75 y o  female who is currently ordered Vancomycin IV with management by the Pharmacy Consult service  Relevant clinical data and objective / subjective history reviewed  Vancomycin Assessment:  Indication and Goal AUC/Trough: Urinary tract infection (goal -600, trough >10); Bacteremia (goal -600, trough >10)  Clinical Status:  critically ill  Micro:    MRSA nares pending  Renal Function:  SCr:  2 16 mg/dL  CrCl: 27 mL/min  Renal replacement: Not on dialysis  Days of Therapy: 2  Current Dose: 1750mg (25mg/kg) loading dose given  Vancomycin Plan:  New Dosinmg (15mg/kg) pulse-dosed prn for vanco level<15  Vancomycin level will be ~10 around , 1000mg IV 1x is ordered for then  Next Level:  random with AM labs  Renal Function Monitoring: Daily BMP and [place] will continue to follow closely for s/sx of nephrotoxicity, infusion reactions and appropriateness of therapy  BMP and CBC will be ordered per protocol  We will continue to follow the patient’s culture results and clinical progress daily      Spring Aparicio, Pharmacist

## 2023-06-18 NOTE — ASSESSMENT & PLAN NOTE
· Likely 2/2 to sepsis  · Head CT negative  · Consider placing NGT for enteral access to prevent withdrawal from psych meds

## 2023-06-18 NOTE — ASSESSMENT & PLAN NOTE
Lab Results   Component Value Date    HGBA1C 5 2 04/14/2023       Recent Labs     06/17/23  2348 06/17/23  2350 06/18/23  0017 06/18/23  0131   POCGLU 50* 66 82 78       Blood Sugar Average: Last 72 hrs:  (P) 137 [id number]     · Accuchecks and ISS q6 hours while NPO Family

## 2023-06-18 NOTE — ASSESSMENT & PLAN NOTE
· Pt oliguric, concern for ATN given hypotension   · Continue maintenance fluids with Isolyte at 75mL/hr   · Trend labs and manage electrolytes PRN

## 2023-06-18 NOTE — ASSESSMENT & PLAN NOTE
· Continue broad spectrum antibiotics pending culture data  · Continue supplemental oxygen as needed

## 2023-06-18 NOTE — ACP (ADVANCE CARE PLANNING)
Critical Care Advanced Care Planning Note  Mindy Ramirez 75 y o  female MRN: [id number]  Unit/Bed#: ICU 12 Encounter: [id number]    Mindy Ramirez is a 75 y o  female requiring critical care evaluation and advanced care planning  The patient has chronic comorbidities, including but not limited to severe depression, CKD, hypothyroidism, DM@, GERD, which is now further complicated by the following acute conditions: Septic shock 2/2  Due to the severity of the patient's acute condition and/or the extent of chronic conditions, additional conversations pertaining to advanced care planning were required  Today's discussion, which was held in a face-to-face meeting, included Patient's son: Gila Glez , and it was established that all stake holders understood the rationale for the advanced care planning  The patient was unable to participate in the discussion due to TME  Summary of Discussion:  I reviewed [address] [facility] unfortunate presentation with severe septic shock requiring vasopressors and fluid resuscitation which unfortunately has progressed  We reviewed Karin's very poor mentation secondary to encephalopathy which is also not improved despite adequate perfusion  We discussed inability to wean vasopressors significantly and given lack of improvement and persistent bacteremia we do not anticipate quick recovery and informed Katherine Adler that his monitor is a very high risk of aspiration and suffering respiratory compromise  Upon hearing the patient's clinical course, the son decided to transition to comfort focused care so that the patient could pass surrounded by family  Comfort measures and medications ordered, continue IV vasopressors until family is at bedside  Total time spent, (15) minutes (0400 to 0415)        CODE STATUS: COMFORT CARE - Level 4  POA:    POLST:        SIGNATURE: Sharmaine Sheehan  DATE: June 18, 2023  TIME: 3:29 PM

## 2023-06-18 NOTE — ASSESSMENT & PLAN NOTE
· Currently holding PTA meds, Prozac, and Abilify while NPO  · Consider placing NGT for enteral access to prevent withdrawal from psych meds

## 2023-06-19 PROBLEM — I95.9 HYPOTENSION: Status: ACTIVE | Noted: 2023-01-01

## 2023-06-19 PROBLEM — R53.83 LETHARGIC: Status: ACTIVE | Noted: 2023-01-01

## 2023-06-19 NOTE — PLAN OF CARE
Problem: Nutrition/Hydration-ADULT  Goal: Nutrient/Hydration intake appropriate for improving, restoring or maintaining nutritional needs  Description: Monitor and assess patient's nutrition/hydration status for malnutrition  Collaborate with interdisciplinary team and initiate plan and interventions as ordered  Monitor patient's weight and dietary intake as ordered or per policy  Utilize nutrition screening tool and intervene as necessary  Determine patient's food preferences and provide high-protein, high-caloric foods as appropriate       INTERVENTIONS:  - Monitor oral intake, urinary output, labs, and treatment plans  - Assess nutrition and hydration status and recommend course of action  - Evaluate amount of meals eaten  - Assist patient with eating if necessary   - Allow adequate time for meals  - Recommend/ encourage appropriate diets, oral nutritional supplements, and vitamin/mineral supplements  - Order, calculate, and assess calorie counts as needed  - Recommend, monitor, and adjust tube feedings and TPN/PPN based on assessed needs  - Assess need for intravenous fluids  - Provide specific nutrition/hydration education as appropriate  - Include patient/family/caregiver in decisions related to nutrition  Outcome: Progressing     Problem: MOBILITY - ADULT  Goal: Maintain or return to baseline ADL function  Description: INTERVENTIONS:  -  Assess patient's ability to carry out ADLs; assess patient's baseline for ADL function and identify physical deficits which impact ability to perform ADLs (bathing, care of mouth/teeth, toileting, grooming, dressing, etc )  - Assess/evaluate cause of self-care deficits   - Assess range of motion  - Assess patient's mobility; develop plan if impaired  - Assess patient's need for assistive devices and provide as appropriate  - Encourage maximum independence but intervene and supervise when necessary  - Involve family in performance of ADLs  - Assess for home care needs following discharge   - Consider OT consult to assist with ADL evaluation and planning for discharge  - Provide patient education as appropriate  Outcome: Progressing  Goal: Maintains/Returns to pre admission functional level  Description: INTERVENTIONS:  - Perform BMAT or MOVE assessment daily    - Set and communicate daily mobility goal to care team and patient/family/caregiver     - Collaborate with rehabilitation services on mobility goals if consulted  - Out of bed for toileting  - Record patient progress and toleration of activity level   Outcome: Progressing     Problem: PAIN - ADULT  Goal: Verbalizes/displays adequate comfort level or baseline comfort level  Description: Interventions:  - Encourage patient to monitor pain and request assistance  - Assess pain using appropriate pain scale  - Administer analgesics based on type and severity of pain and evaluate response  - Implement non-pharmacological measures as appropriate and evaluate response  - Consider cultural and social influences on pain and pain management  - Notify physician/advanced practitioner if interventions unsuccessful or patient reports new pain  Outcome: Progressing     Problem: INFECTION - ADULT  Goal: Absence or prevention of progression during hospitalization  Description: INTERVENTIONS:  - Assess and monitor for signs and symptoms of infection  - Monitor lab/diagnostic results  - Monitor all insertion sites, i e  indwelling lines, tubes, and drains  - Monitor endotracheal if appropriate and nasal secretions for changes in amount and color  - [place] appropriate cooling/warming therapies per order  - Administer medications as ordered  - Instruct and encourage patient and family to use good hand hygiene technique  - Identify and instruct in appropriate isolation precautions for identified infection/condition  Outcome: Progressing  Goal: Absence of fever/infection during neutropenic period  Description: INTERVENTIONS:  - Monitor WBC    Outcome: Progressing     Problem: SAFETY ADULT  Goal: Maintain or return to baseline ADL function  Description: INTERVENTIONS:  -  Assess patient's ability to carry out ADLs; assess patient's baseline for ADL function and identify physical deficits which impact ability to perform ADLs (bathing, care of mouth/teeth, toileting, grooming, dressing, etc )  - Assess/evaluate cause of self-care deficits   - Assess range of motion  - Assess patient's mobility; develop plan if impaired  - Assess patient's need for assistive devices and provide as appropriate  - Encourage maximum independence but intervene and supervise when necessary  - Involve family in performance of ADLs  - Assess for home care needs following discharge   - Consider OT consult to assist with ADL evaluation and planning for discharge  - Provide patient education as appropriate  Outcome: Progressing  Goal: Maintains/Returns to pre admission functional level  Description: INTERVENTIONS:  - Perform BMAT or MOVE assessment daily    - Set and communicate daily mobility goal to care team and patient/family/caregiver     - Collaborate with rehabilitation services on mobility goals if consulted  - Out of bed for toileting  - Record patient progress and toleration of activity level   Outcome: Progressing  Goal: Patient will remain free of falls  Description: INTERVENTIONS:  - Educate patient/family on patient safety including physical limitations  - Instruct patient to call for assistance with activity   - Consult OT/PT to assist with strengthening/mobility   - Keep Call bell within reach  - Keep bed low and locked with side rails adjusted as appropriate  - Keep care items and personal belongings within reach  - Initiate and maintain comfort rounds  - Make Fall Risk Sign visible to staff  - Apply yellow socks and bracelet for high fall risk patients  - Consider moving patient to room near nurses station  Outcome: Progressing     Problem: DISCHARGE PLANNING  Goal: Discharge to home or other facility with appropriate resources  Description: INTERVENTIONS:  - Identify barriers to discharge w/patient and caregiver  - Arrange for needed discharge resources and transportation as appropriate  - Identify discharge learning needs (meds, wound care, etc )  - Arrange for interpretive services to assist at discharge as needed  - Refer to Case Management Department for coordinating discharge planning if the patient needs post-hospital services based on physician/advanced practitioner order or complex needs related to functional status, cognitive ability, or social support system  Outcome: Progressing     Problem: Knowledge Deficit  Goal: Patient/family/caregiver demonstrates understanding of disease process, treatment plan, medications, and discharge instructions  Description: Complete learning assessment and assess knowledge base    Interventions:  - Provide teaching at level of understanding  - Provide teaching via preferred learning methods  Outcome: Progressing     Problem: Prexisting or High Potential for Compromised Skin Integrity  Goal: Skin integrity is maintained or improved  Description: INTERVENTIONS:  - Identify patients at risk for skin breakdown  - Assess and monitor skin integrity  - Assess and monitor nutrition and hydration status  - Monitor labs   - Assess for incontinence   - Turn and reposition patient  - Assist with mobility/ambulation  - Relieve pressure over bony prominences  - Avoid friction and shearing  - Provide appropriate hygiene as needed including keeping skin clean and dry  - Evaluate need for skin moisturizer/barrier cream  - Collaborate with interdisciplinary team   - Patient/family teaching  - Consider wound care consult   Outcome: Progressing

## 2023-06-19 NOTE — PROGRESS NOTES
[facility]  Progress Note  Name: Jovanny Galvan  MRN: [id number]  Unit/Bed#: ICU 12 I Date of Admission: 6/16/2023   Date of Service: 6/19/2023 I Hospital Day: 3    Assessment/Plan   * Septic shock ([address] )  Assessment & Plan  · Etiology likely 2/2 to PNA as evidenced by CT chest, vs history of ESBL UTI requiring ertapenem  · Despite aggressive resuscitation with IVF, albumin, broad spectrum antibiotics with meropenum and vancomycin, and vasopressor support, Ms Stern continued with severe toxic metabolic encephalopathy and remained in a shock state  Family has elected to transition her to comfort measures only   · Continue PRN dilaudid, haldol, lorazepam, and glycopyrrolate as needed for comfort      HPI/24hr events: Pt transitioned to comfort measures only  Family present at the bedside  Pt comfortable  Review of Systems   Unable to perform ROS: Acuity of condition         Objective                            Vitals I/O      Most Recent Min/Max in 24hrs   Temp 98 2 °F (36 8 °C) Temp  Min: 98 2 °F (36 8 °C)  Max: 98 2 °F (36 8 °C)   Pulse 88 Pulse  Min: 71  Max: 88   Resp (!) 7 Resp  Min: 7  Max: 30   /63 BP  Min: 125/79  Max: 162/72   O2 Sat 99 % SpO2  Min: 93 %  Max: 99 %      Intake/Output Summary (Last 24 hours) at 6/19/2023 0510  Last data filed at 6/18/2023 1932  Gross per 24 hour   Intake 1688 19 ml   Output 163 ml   Net 1525 19 ml         Diet Regular; Pleasure Feed     Invasive Monitoring Physical exam    Physical Exam  Vitals and nursing note reviewed  Constitutional:       General: She is not in acute distress  HENT:      Head: Normocephalic and atraumatic  Mouth/Throat:      Mouth: Mucous membranes are dry  Pharynx: Oropharynx is clear  Eyes:      Conjunctiva/sclera: Conjunctivae normal       Pupils: Pupils are equal, round, and reactive to light  Cardiovascular:      Rate and Rhythm: Normal rate and regular rhythm     Pulmonary:      Effort: Pulmonary effort is normal    Abdominal:      General: There is no distension  Palpations: Abdomen is soft  Tenderness: There is no abdominal tenderness  Musculoskeletal:      Cervical back: Neck supple  Skin:     General: Skin is warm and dry  Diagnostic Studies    EKG: Telemetry reviewed  Imaging:  I have personally reviewed pertinent reports     and I have personally reviewed pertinent films in PACS     Medications:  Scheduled PRN      bisacodyl, 10 mg, Daily PRN  glycopyrrolate, 0 2 mg, Q4H PRN  haloperidol lactate, 2 mg, Q2H PRN  HYDROmorphone, 0 5 mg, Q15 Min PRN  LORazepam, 2 mg, Q30 Min PRN       Continuous          Labs:    CBC    Recent Labs     06/18/23  0347   WBC 10 58*   HGB 8 0*   HCT 25 8*   PLT 94*     BMP    Recent Labs     06/17/23 2157 06/18/23  0347   SODIUM 141 143   K 4 3 4 3   * 110*   CO2 16* 17*   AGAP 16* 16*   BUN 26* 25   CREATININE 2 17* 2 16*   CALCIUM 7 2* 7 2*       Coags    No recent results     Additional Electrolytes  Recent Labs     06/17/23 2157 06/18/23 0347   MG 2 1 2 1   PHOS 3 8 4 0   CAIONIZED 0 98* 1 00*          Blood Gas    Recent Labs     06/18/23  0622   PHART 7 379   BAM3DTQ 29 0*   PO2ART 86 0   YZV9JGN 16 7*   BEART -7 4   SOURCE Line, Arterial     Recent Labs     06/17/23 2157 06/18/23  0622   PHVEN 7 320  --    IUK4GAB 34 4*  --    PO2VEN 37 6  --    ZSJ6ECM 17 3*  --    BEVEN -7 9  --    SOURCE  --  Line, Arterial    LFTs  Recent Labs     06/18/23  0347   ALT 24   AST 35   ALKPHOS 274*   ALB 2 0*   TBILI 1 09*       Infectious  No recent results  Glucose  Recent Labs     06/17/23  1446 06/17/23 2157 06/18/23  0347   GLUC 120 73 [facility]

## 2023-06-19 NOTE — UTILIZATION REVIEW
NOTIFICATION OF INPATIENT ADMISSION   AUTHORIZATION REQUEST   SERVICING FACILITY:   [address] [place]  [place], [address] [place] [address]  Tax ID: [id number]  NPI: [id number]   ATTENDING PROVIDER:  Attending Name and NPI#: Margaretann Hammans, [place] [[id number]]  Address: [address] [place] [address] [place]  [place], [address] [place] [address]  Phone: [phone]     ADMISSION INFORMATION:  Place of Service[facility]patient [facility]  [address]  Place of Service Code: 21  Inpatient Admission Date/Time: 6/16/23  7:14 PM  Discharge Date/Time: No discharge date for patient encounter  Admitting Diagnosis Code/Description:  Lactic acidosis [E87 20]  Pneumonia [J18 9]  Septic shock ([address] ) [A41 9, R65 21]  Elevated procalcitonin [R79 89]  AMS (altered mental status) [R41 82]     UTILIZATION REVIEW CONTACT:  Mira Joseph Utilization   Network Utilization Review Department  Phone: [phone]  Fax: [phone]  Email: [email or web address]  Contact for approvals/pending authorizations, clinical reviews, and discharge  PHYSICIAN ADVISORY SERVICES:  Medical Necessity Denial & [place] Review  Phone: [phone]  Fax: [phone]  Email: [email or web address]

## 2023-06-19 NOTE — CASE MANAGEMENT
Case Management Progress Note    Patient name George Harrington  Location ICU 12/ICU 12 MRN [id number]  : 1955 Date 2023       LOS (days): 3  Geometric Mean LOS (GMLOS) (days):   Days to [facility]:        OBJECTIVE:        Current admission status: Inpatient  Preferred Pharmacy:   [facility] [address] SANDEE [address] [place] [facility], [place] - [address] [address]  Phone: [phone] Fax: [phone]    Primary Care Provider: Deepa Carreon MD    Primary Insurance: [id number]  Secondary Insurance: [facility] HOSPITAL REP    PROGRESS NOTE:  CM made a referral to [facility] for an evaluation of the Pt for the inpatient setting  CM will continue to follow

## 2023-06-19 NOTE — CASE MANAGEMENT
Case Management Discharge Planning Note    Patient name Rakesh Carsonswick  [facility] ICU 12/ICU 12 MRN [id number]  : 1955 Date 2023       Current Admission Date: 2023  Current Admission Diagnosis:Septic shock [facility])   Patient Active Problem List    Diagnosis Date Noted   • Toxic metabolic encephalopathy    • Pneumonia 2023   • Hyperthyroidism 2023   • REGINO (acute kidney injury) ([address] ) 2023   • Moderate protein-calorie malnutrition ([address] ) 2023   • Skin tear of right forearm without complication 41/15/1166   • Pressure injury of left buttock, stage 3 ([address] ) 2023   • Hemorrhage of muscle 2023   • Urinary retention 2023   • Pressure injury of right buttock, stage 2 ([address] ) 2023   • Stage II pressure ulcer of left buttock ([address] ) 2023   • Pressure injury of sacral region, stage 3 ([address] ) 2023   • Pressure injury of back, stage 2 ([address] ) 2023   • Pressure injury of right knee, stage 2 ([address] ) 2023   • Ambulatory dysfunction 2023   • Hypernatremia 2023   • Insomnia 2023   • Septic shock ([address] ) 2023   • Respiratory insufficiency 2023   • Nausea 2023   • Left lower quadrant abdominal pain 2022   • Chest pressure 2022   • Fungal dermatitis 10/05/2022   • Abnormal urine 2022   • Weakness 2022   • Syncope 2022   • Elevated d-dimer 2022   • Coronary artery disease involving native coronary artery of native heart without angina pectoris 2022   • Chronic heart failure with preserved ejection fraction ([address] ) 2022   • Chronic respiratory failure ([address] ) 2022   • Dyspnea on exertion 2022   • Primary osteoarthritis of one hip, right 12/10/2021   • Orthostatic hypotension 10/24/2021   • Morbid obesity due to excess calories ([facility] ) 2021   • Onychomycosis of toenail 2021   • Calf tenderness 2021   • Asymptomatic bacteriuria 2021   • Chronic fatigue and malaise 03/12/2021   • Diarrhea 03/08/2021   • Hypothyroidism 03/03/2021   • Suspected COVID-19 virus infection 12/04/2020   • Major depressive disorder 12/04/2020   • Gambling disorder, episodic, moderate 07/05/2018   • Severe episode of recurrent major depressive disorder, without psychotic features ([facility] ) 07/04/2018   • Failure to thrive in adult    • GERD (gastroesophageal reflux disease) 07/10/2016   • Epigastric abdominal pain 07/10/2016   • Seizures ([facility] )    • Restless leg syndrome    • Primary hypertension    • Hyperlipidemia    • Type 2 diabetes mellitus ([facility] )       LOS (days): 3  Geometric Mean LOS (GMLOS) (days):   Days to GMLOS:     OBJECTIVE:  Risk of Unplanned Readmission Score: 37 7         Current admission status: Inpatient   Preferred Pharmacy:   [facility] [address], [place] - [address]  [address] [place] [address]  Phone: [phone] Fax: [phone]    Primary Care Provider: Cornelia Piña MD    Primary Insurance: [id number] [facility]  Secondary Insurance: [facility] HOSPITAL REP    DISCHARGE DETAILS:    Discharge planning discussed with[de-identified] [place]-son     Comments - [place] of Choice: CM met with Dean Lanes to discuss hospice but he adamantly reported not wantig the Pt moved at this time  CM made the Pt's RN [facility] aware  CM contacted family/caregiver?: Yes  Were Treatment Team discharge recommendations reviewed with patient/caregiver?: Yes  Did patient/caregiver verbalize understanding of patient care needs?: Yes  Were patient/caregiver advised of the risks associated with not following Treatment Team discharge recommendations?: Yes    Contacts  Patient Contacts: Dean Lanes  Relationship to Patient[de-identified] Family  Contact Method:  In Person  Reason/Outcome: Discharge Planning      Other Referral/Resources/Interventions Provided:  Interventions: Hospice  Referral Comments: Pt's son Dean Lanes reported not wanting the Pt moved at this time       Treatment Team Recommendation: Hospice  Discharge Destination Plan[de-identified] Hospice  Transport at Discharge : BLS Ambulance

## 2023-06-19 NOTE — CASE MANAGEMENT
Case Management Assessment & Discharge Planning Note    Patient name Brianda Andrews  Location ICU 12/ICU 12 MRN [id number]  : 1955 Date 2023       Current Admission Date: 2023  Current Admission Diagnosis:Septic shock [facility])   Patient Active Problem List    Diagnosis Date Noted   • Toxic metabolic encephalopathy    • Pneumonia 2023   • Hyperthyroidism 2023   • REGINO (acute kidney injury) ([address] ) 2023   • Moderate protein-calorie malnutrition ([address] ) 2023   • Skin tear of right forearm without complication    • Pressure injury of left buttock, stage 3 ([address] ) 2023   • Hemorrhage of muscle 2023   • Urinary retention 2023   • Pressure injury of right buttock, stage 2 ([address] ) 2023   • Stage II pressure ulcer of left buttock ([address] ) 2023   • Pressure injury of sacral region, stage 3 ([address] ) 2023   • Pressure injury of back, stage 2 ([address] ) 2023   • Pressure injury of right knee, stage 2 ([address] ) 2023   • Ambulatory dysfunction 2023   • Hypernatremia 2023   • Insomnia 2023   • Septic shock ([address] ) 2023   • Respiratory insufficiency 2023   • Nausea 2023   • Left lower quadrant abdominal pain 2022   • Chest pressure 2022   • Fungal dermatitis 10/05/2022   • Abnormal urine 2022   • Weakness 2022   • Syncope 2022   • Elevated d-dimer 2022   • Coronary artery disease involving native coronary artery of native heart without angina pectoris 2022   • Chronic heart failure with preserved ejection fraction ([address] ) 2022   • Chronic respiratory failure ([address] ) 2022   • Dyspnea on exertion 2022   • Primary osteoarthritis of one hip, right 12/10/2021   • Orthostatic hypotension 10/24/2021   • Morbid obesity due to excess calories ([address] ) 2021   • Onychomycosis of toenail 2021   • Calf tenderness 2021   • Asymptomatic bacteriuria 2021 • Chronic fatigue and malaise 03/12/2021   • Diarrhea 03/08/2021   • Hypothyroidism 03/03/2021   • Suspected COVID-19 virus infection 12/04/2020   • Major depressive disorder 12/04/2020   • Gambling disorder, episodic, moderate 07/05/2018   • Severe episode of recurrent major depressive disorder, without psychotic features ([facility] ) 07/04/2018   • Failure to thrive in adult    • GERD (gastroesophageal reflux disease) 07/10/2016   • Epigastric abdominal pain 07/10/2016   • Seizures ([facility] )    • Restless leg syndrome    • Primary hypertension    • Hyperlipidemia    • Type 2 diabetes mellitus ([facility] )       LOS (days): 3  Geometric Mean LOS (GMLOS) (days):   Days to GMLOS:     OBJECTIVE:  PATIENT READMITTED TO HOSPITAL  Risk of Unplanned Readmission Score: 37 92         Current admission status: Inpatient       Preferred Pharmacy:   [facility] - [address] [facility]  [address] [place] [address]  Phone: [phone] Fax: [phone]    Primary Care Provider: Hiwot Calixto MD    Primary Insurance: [id number] [facility]  Secondary Insurance: [facility] REP    ASSESSMENT:  [address] [place] [address] [address], [address]  [facility] - Son   Primary Phone: [phone] (Mobile)  Home Phone: [phone]                         Readmission Root Cause  30 Day Readmission: Yes  Who directed you to return to the hospital?: Other (comment) (Deedee Garcia)  Did you understand whom to contact if you had questions or problems?: Yes  Did you get your prescriptions before you left the hospital?: Yes  Were you able to get your prescriptions filled when you left the hospital?: Yes  Did you take your medications as prescribed?: Yes  Were you able to get to your follow-up appointments?: Yes  During previous admission, was a post-acute recommendation made?: No  Patient was readmitted due to: Septic shock  Action Plan: CM-Pt to [facility] where she is LTC resident    Patient Information  Admitted from[de-identified] Facility  Mental Status: Confused  During Assessment patient was accompanied by: Not accompanied during assessment  Assessment information provided by[de-identified] Other - please comment [facility] staff)  Primary Caregiver: Other (Comment)  Caregiver's Name[de-identified] Amy Dee Relationship to Patient[de-identified] Other (Specify)  Caregiver's Telephone Number[de-identified] [phone]  Support Systems: [facility] of Residence: [address] [place] [address] do you live in?: [place] entry access options  Select all that apply : No steps to enter home  Type of Current Residence: Facility  Upon entering residence, is there a bedroom on the main floor (no further steps)?: Yes  Upon entering residence, is there a bathroom on the main floor (no further steps)?: Yes  In the last 12 months, was there a time when you were not able to pay the mortgage or rent on time?: No  In the last 12 months, how many places have you lived?: 2  In the last 12 months, was there a time when you did not have a steady place to sleep or slept in a shelter (including now)?: No  Homeless/housing insecurity resource given?: N/A  Living Arrangements: Other (Comment)  Is patient a ?: No    Activities of Daily Living Prior to Admission  Functional Status: Total dependent  Completes ADLs independently?: No  Level of ADL dependence: Total Dependent  Ambulates independently?: No  Level of ambulatory dependence:  Total Dependent  Does patient use assisted devices?: No  Does patient currently own DME?: No  Does patient have a history of Outpatient Therapy (PT/OT)?: No  Does the patient have a history of Short-Term Rehab?: Yes  Does patient have a history of HHC?: Yes  Does patient currently have [facility]?: No         Patient Information Continued  Income Source: SSI/SSD  Does patient have prescription coverage?: Yes  Within the past 12 months, you worried that your food would run out before you got the money to buy more : Never true  Within the past 12 months, the food you bought just didn't last and you didn't have money to get more : Never true  Food insecurity resource given?: N/A  Does patient receive dialysis treatments?: No  Does patient have a history of substance abuse?: No  Does patient have a history of Mental Health Diagnosis?: Yes    PHQ 2/9 Screening   Reviewed PHQ 2/9 Depression Screening Score?: No    Means of Transportation  Means of Transport to Appts[de-identified] Other (Comment)  In the past 12 months, has lack of transportation kept you from medical appointments or from getting medications?: No  In the past 12 months, has lack of transportation kept you from meetings, work, or from getting things needed for daily living?: No  Was application for public transport provided?: N/A        DISCHARGE DETAILS:    Discharge planning discussed with[de-identified] [facility] staff  Freedom of Choice: Yes       [address]         Is the patient interested in [facility] at discharge?: No    DME Referral Provided  Referral made for DME?: No    Other Referral/Resources/Interventions Provided:  Interventions: SNF  Referral Comments: Referral made [facility] for verfication of the Pt's current bed status at the facility           Treatment Team Recommendation: SNF  Discharge Destination Plan[de-identified] SNF  Transport at Discharge : [facility]LS Ambulance

## 2023-06-19 NOTE — ASSESSMENT & PLAN NOTE
· Etiology likely 2/2 to PNA as evidenced by CT chest, vs history of ESBL UTI requiring ertapenem  · Despite aggressive resuscitation with IVF, albumin, broad spectrum antibiotics with meropenum and vancomycin, and vasopressor support, Ms Stern continued with severe toxic metabolic encephalopathy and remained in a shock state   Family has elected to transition her to comfort measures only   · Continue PRN dilaudid, haldol, lorazepam, and glycopyrrolate as needed for comfort

## 2023-06-20 PROBLEM — Z51.5 COMFORT MEASURES ONLY STATUS: Status: ACTIVE | Noted: 2023-01-01

## 2023-06-20 NOTE — ASSESSMENT & PLAN NOTE
Lab Results   Component Value Date    HGBA1C 5 2 04/14/2023   ·   BS in the high side at 217 in the AM and 201 for lunch despite pt has not had  nothing to eat due to le  Continue Accu-Cheks  Continue Humalog sliding scale  Encourage diabetic diet  Avoid hypoglycemia

## 2023-06-20 NOTE — CASE MANAGEMENT
"   Case Management Progress Note    Patient name Rakesh Carsonswick  Location ICU 12/ICU 12 MRN [id number]  : 1955 Date 2023       LOS (days): 4  Geometric Mean LOS (GMLOS) (days):   Days to GMLOS:        OBJECTIVE:        Current admission status: Inpatient  Preferred Pharmacy:   [address] [facility] [address] [address]  Phone: [phone] Fax: [phone]    Primary Care Provider: Devonte Conrad MD    Primary Insurance: [id number] [facility]  Secondary Insurance: [facility] REP    PROGRESS NOTE:  CM met with the Pt's son Gildardo Nation to reinitiate the discussion of hospice but he remains adamant, he doesn't want the Pt moved, stating \"she doesn't have long for this world, why are you trying to push her out\"?             "

## 2023-06-20 NOTE — ASSESSMENT & PLAN NOTE
On exam not responding to verbal/touch stimuli  Eyes open however glazing to ceiling  With low bp   Respiratory status stable at 94% RA   No sign of respiratory present

## 2023-06-20 NOTE — ASSESSMENT & PLAN NOTE
[facility] ID PROGRESS NOTE    Gabi Peña Patient Status:  Inpatient    1929 MRN [id number]   Location [facility] 5SW/SE Attending Rodney Hunter MD   Hosp Day # 4 PCP Ezequiel Glover MD     Subjective:  Awake, wife at the bedside. On 2L O2. · Patient presented to the ED with altered mental status from [address] [place] [address]  · With concerns for septic shock on admission; presented with significant hypotension; requiring ICU admission and pressors  · Etiology, UTI; Pneumonia as evidenced on CT Chest  · Blood cultures negative, however, urine culture positive klebsiella; e coli; enterococcus faecalis  · Patient failed to improve; and patient transitioned to comfort care as of 6/18  · Patient pronounced at 1411 pm on 6/20/23  and that he has gotten progressively more weak over the last two weeks. Patient has had decreased appetite and has been less responsive than normal. Patient is incontinent, no chronic indwelling amezcua, and on a pureed diet at the NH.  Per family, he has bee

## 2023-06-20 NOTE — DISCHARGE SUMMARY
[facility]  Discharge- Angel Neighbor 1955, 75 y o  female MRN: [id number]  Unit/Bed#: ICU 12 Encounter: [id number]  Primary Care Provider: Marcia Delgadillo MD   Date and time admitted to hospital: 6/16/2023  2:36 PM    * Septic shock [facility])  Assessment & Plan  · Patient presented to the ED with altered mental status from [address] [place] [address]  · With concerns for septic shock on admission; presented with significant hypotension; requiring ICU admission and pressors  · Etiology, UTI; Pneumonia as evidenced on CT Chest  · Blood cultures negative, however, urine culture positive klebsiella; e coli; enterococcus faecalis  · Patient failed to improve; and patient transitioned to comfort care as of 6/18  · Patient pronounced at 1411 pm on 6/20/23  Medical Problems     Resolved Problems  Date Reviewed: 6/20/2023          Resolved    Bandemia 6/18/2023     Resolved by  Maycol Flood PA-C        Discharging Physician / Practitioner: SAMIA Jackson  PCP: Marcia Delgadillo MD  Admission Date:   Admission Orders (From admission, onward)     Ordered        06/16/23 1914  INPATIENT ADMISSION  Once                      Discharge Date: 06/20/23    Consultations During Hospital Stay:  IP CONSULT TO HOSPICE    Procedures Performed:   · None    Significant Findings / Test Results:   · Chest x-ray (6/16/23): Mild bilateral atelectasis/scar but no definite pneumonia  · CT Head (6/16/23): No acute intracranial abnormality  · CT Chest/Abd/Pelvis (6/16/23): 1  Patchy airspace opacification in the right upper and right lower lobe, most compatible with pneumonia  Trace right effusion 3  Severe hepatic steatosis 4  Sigmoid diverticulosis without evidence of bowel obstruction or inflammatory change  · Chest x-ray (6/17/2):  Left sided central venous catheter tip is in the right atrium  Bilateral perihilar and infrahilar infiltrates  No consolidation    · US Bedside procedure (23)    Incidental Findings:   · None    Test Results Pending at Discharge (will require follow up): · None     Outpatient Tests Requested:  · None    Complications: None    Reason for Admission: Altered mental status, septic shock    Hospital Course:   Tereza Simons is a 75 y o  female patient with past medical history of depression, diabetes mellitus 2 and hypothyroidism who originally presented to the hospital on 2023 due to altered mental status from [facility]  Patient was noted to be hypotensive on admission, requiring pressors and was admitted to the ICU  Patient with history of having a UTI in the past that was drug-resistant and was initiated on vancomycin as well as meropenem  Patient had chest x-ray which was initially was negative for any signs of pneumonia however CT scan of the chest was completed with concerns of bilateral pulmonary opacification  Patient was maintained on antibiotics and during her hospitalization failed to improve  Blood cultures were obtained and those were negative however urine culture was positive for multiple conditions  On  goals of care conversation was held with patient's son as patient was nonimprovement and that they were unable to wean vasopressors  Family opted to transition patient to comfort care  Maintained on comfort care until time of death at [phone] when she was pronounced  Please see above list of diagnoses and related plan for additional information  Condition at Discharge:     Discharge Day Visit / Exam:   * Please refer to separate progress note for these details *    Discussion with Family: Updated  (son) at bedside  Discharge instructions/Information to patient and family:   See after visit summary for information provided to patient and family  Provisions for Follow-Up Care:  See after visit summary for information related to follow-up care and any pertinent home health orders         Disposition: Other: Ana Maria    Planned Readmission: No     Discharge Statement:  I spent 35 minutes discharging the patient  This time was spent on the day of discharge  I had direct contact with the patient on the day of discharge  Greater than 50% of the total time was spent examining patient, answering all patient questions, arranging and discussing plan of care with patient as well as directly providing post-discharge instructions  Additional time then spent on discharge activities  Discharge Medications:  See after visit summary for reconciled discharge medications provided to patient and/or family        **Please Note: This note may have been constructed using a voice recognition system**

## 2023-06-20 NOTE — ASSESSMENT & PLAN NOTE
Bed bound  With decrease po intake  Significant decline in the last few months  assist patient with ADLs/IADLs as needed  Continue supportive care per LTC

## 2023-06-20 NOTE — DEATH NOTE
INPATIENT DEATH NOTE  Lilia Wing 75 y o  female MRN: [id number]  Unit/Bed#: ICU 12 Encounter: [id number]         Patient's Information  Pronounced by: Bob Raines  Did the patient's death occur in the ED?: No  Did the patient's death occur in the OR?: No  Did the patient's death occur less than 10 days post-op?: No  Did the patient's death occur within 24 hours of admission?: No  Was code status DNR at the time of death?: Yes    PHYSICAL EXAM:  Unresponsive to noxious stimuli, Spontaneous respirations absent, Breath sounds absent, Carotid pulse absent, Heart sounds absent, Pupillary light reflex absent and Corneal blink reflex absent    Medical Examiner notification criteria:  NONE APPLICABLE   Medical Examiner's office notified?:  No, does not meet ME notification criteria   Medical Examiner accepted case?:  No  Name of Medical Examiner: n/a    Family Notification  Was the family notified?: Yes  Date Notified: 23  Time Notified:   Notified by: Tamanna Mathews  Name of Family Notified of Death: Von Thakur Person Relationship to Patient: Son  Family Notification Route:  At bedside  Was the family told to contact a  home?: Yes    Autopsy Options:  Post-mortem examination declined by next of kin    Primary Service Attending Physician notified?:  yes - Attending:  Joanne Sainz*    Physician/Resident responsible for completing Discharge Summary:  Jeferson Stephenson

## 2023-06-20 NOTE — ASSESSMENT & PLAN NOTE
Nursing reporting manual bp of 96/71, pt lethargic not responding to verbal or touch stimuli   Son updated on status and agreeable to sent out for evaluation  Not on any antihypertensive

## 2023-06-20 NOTE — PLAN OF CARE
Problem: Nutrition/Hydration-ADULT  Goal: Nutrient/Hydration intake appropriate for improving, restoring or maintaining nutritional needs  Description: Monitor and assess patient's nutrition/hydration status for malnutrition  Collaborate with interdisciplinary team and initiate plan and interventions as ordered  Monitor patient's weight and dietary intake as ordered or per policy  Utilize nutrition screening tool and intervene as necessary  Determine patient's food preferences and provide high-protein, high-caloric foods as appropriate       INTERVENTIONS:  - Monitor oral intake, urinary output, labs, and treatment plans  - Assess nutrition and hydration status and recommend course of action  - Evaluate amount of meals eaten  - Assist patient with eating if necessary   - Allow adequate time for meals  - Recommend/ encourage appropriate diets, oral nutritional supplements, and vitamin/mineral supplements  - Order, calculate, and assess calorie counts as needed  - Recommend, monitor, and adjust tube feedings and TPN/PPN based on assessed needs  - Assess need for intravenous fluids  - Provide specific nutrition/hydration education as appropriate  - Include patient/family/caregiver in decisions related to nutrition  Outcome: Progressing     Problem: MOBILITY - ADULT  Goal: Maintain or return to baseline ADL function  Description: INTERVENTIONS:  -  Assess patient's ability to carry out ADLs; assess patient's baseline for ADL function and identify physical deficits which impact ability to perform ADLs (bathing, care of mouth/teeth, toileting, grooming, dressing, etc )  - Assess/evaluate cause of self-care deficits   - Assess range of motion  - Assess patient's mobility; develop plan if impaired  - Assess patient's need for assistive devices and provide as appropriate  - Encourage maximum independence but intervene and supervise when necessary  - Involve family in performance of ADLs  - Assess for home care needs following discharge   - Consider OT consult to assist with ADL evaluation and planning for discharge  - Provide patient education as appropriate  Outcome: Progressing  Goal: Maintains/Returns to pre admission functional level  Description: INTERVENTIONS:  - Perform BMAT or MOVE assessment daily    - Set and communicate daily mobility goal to care team and patient/family/caregiver  - Collaborate with rehabilitation services on mobility goals if consulted  - Perform Range of Motion  times a day  - Reposition patient every  hours    - Dangle patient  times a day  - Stand patient  times a day  - Ambulate patient  times a day  - Out of bed to chair  times a day   - Out of bed for meals  times a day  - Out of bed for toileting  - Record patient progress and toleration of activity level   Outcome: Progressing     Problem: PAIN - ADULT  Goal: Verbalizes/displays adequate comfort level or baseline comfort level  Description: Interventions:  - Encourage patient to monitor pain and request assistance  - Assess pain using appropriate pain scale  - Administer analgesics based on type and severity of pain and evaluate response  - Implement non-pharmacological measures as appropriate and evaluate response  - Consider cultural and social influences on pain and pain management  - Notify physician/advanced practitioner if interventions unsuccessful or patient reports new pain  Outcome: Progressing     Problem: INFECTION - ADULT  Goal: Absence or prevention of progression during hospitalization  Description: INTERVENTIONS:  - Assess and monitor for signs and symptoms of infection  - Monitor lab/diagnostic results  - Monitor all insertion sites, i e  indwelling lines, tubes, and drains  - Monitor endotracheal if appropriate and nasal secretions for changes in amount and color  - [place] appropriate cooling/warming therapies per order  - Administer medications as ordered  - Instruct and encourage patient and family to use good hand hygiene technique  - Identify and instruct in appropriate isolation precautions for identified infection/condition  Outcome: Progressing  Goal: Absence of fever/infection during neutropenic period  Description: INTERVENTIONS:  - Monitor WBC    Outcome: Progressing     Problem: SAFETY ADULT  Goal: Maintain or return to baseline ADL function  Description: INTERVENTIONS:  -  Assess patient's ability to carry out ADLs; assess patient's baseline for ADL function and identify physical deficits which impact ability to perform ADLs (bathing, care of mouth/teeth, toileting, grooming, dressing, etc )  - Assess/evaluate cause of self-care deficits   - Assess range of motion  - Assess patient's mobility; develop plan if impaired  - Assess patient's need for assistive devices and provide as appropriate  - Encourage maximum independence but intervene and supervise when necessary  - Involve family in performance of ADLs  - Assess for home care needs following discharge   - Consider OT consult to assist with ADL evaluation and planning for discharge  - Provide patient education as appropriate  Outcome: Progressing  Goal: Maintains/Returns to pre admission functional level  Description: INTERVENTIONS:  - Perform BMAT or MOVE assessment daily    - Set and communicate daily mobility goal to care team and patient/family/caregiver  - Collaborate with rehabilitation services on mobility goals if consulted  - Perform Range of Motion  times a day  - Reposition patient every  hours    - Dangle patient  times a day  - Stand patient  times a day  - Ambulate patient  times a day  - Out of bed to chair  times a day   - Out of bed for meals  times a day  - Out of bed for toileting  - Record patient progress and toleration of activity level   Outcome: Progressing  Goal: Patient will remain free of falls  Description: INTERVENTIONS:  - Educate patient/family on patient safety including physical limitations  - Instruct patient to call for assistance with activity   - Consult OT/PT to assist with strengthening/mobility   - Keep Call bell within reach  - Keep bed low and locked with side rails adjusted as appropriate  - Keep care items and personal belongings within reach  - Initiate and maintain comfort rounds  - Make Fall Risk Sign visible to staff  - Offer Toileting every  Hours, in advance of need  - Initiate/Maintain alarm  - Obtain necessary fall risk management equipment:   - Apply yellow socks and bracelet for high fall risk patients  - Consider moving patient to room near nurses station  Outcome: Progressing     Problem: DISCHARGE PLANNING  Goal: Discharge to home or other facility with appropriate resources  Description: INTERVENTIONS:  - Identify barriers to discharge w/patient and caregiver  - Arrange for needed discharge resources and transportation as appropriate  - Identify discharge learning needs (meds, wound care, etc )  - Arrange for interpretive services to assist at discharge as needed  - Refer to Case Management Department for coordinating discharge planning if the patient needs post-hospital services based on physician/advanced practitioner order or complex needs related to functional status, cognitive ability, or social support system  Outcome: Progressing     Problem: Knowledge Deficit  Goal: Patient/family/caregiver demonstrates understanding of disease process, treatment plan, medications, and discharge instructions  Description: Complete learning assessment and assess knowledge base    Interventions:  - Provide teaching at level of understanding  - Provide teaching via preferred learning methods  Outcome: Progressing     Problem: Prexisting or High Potential for Compromised Skin Integrity  Goal: Skin integrity is maintained or improved  Description: INTERVENTIONS:  - Identify patients at risk for skin breakdown  - Assess and monitor skin integrity  - Assess and monitor nutrition and hydration status  - Monitor labs   - Assess for incontinence   - Turn and reposition patient  - Assist with mobility/ambulation  - Relieve pressure over bony prominences  - Avoid friction and shearing  - Provide appropriate hygiene as needed including keeping skin clean and dry  - Evaluate need for skin moisturizer/barrier cream  - Collaborate with interdisciplinary team   - Patient/family teaching  - Consider wound care consult   Outcome: Progressing

## 2023-06-20 NOTE — ASSESSMENT & PLAN NOTE
· Patient presented to the ED with altered mental status from [address] [place] [address]    · With concerns for septic shock on admission; presented with significant hypotension; requiring ICU admission and pressors  · Blood cultures negative, however, urine culture positive klebsiella; e coli; enterococcus faecalis  · Patient transitioned to comfort care as of 6/18  · Son doesn't want her moved at this time  · Case management for dispo planning

## 2023-06-20 NOTE — PROGRESS NOTES
[facility]  Progress Note  Name: Belgica Ybarra  MRN: [id number]  Unit/Bed#: ICU 12 I Date of Admission: 6/16/2023   Date of Service: 6/20/2023 I Hospital Day: 4    Assessment/Plan   Comfort measures only status  Assessment & Plan  · Transitioned to comfort care on 6/18  · Hospice consult  · Case management for dispo planning    * Septic shock [facility])  Assessment & Plan  · Patient presented to the ED with altered mental status from [address] [place] [address]  · With concerns for septic shock on admission; presented with significant hypotension; requiring ICU admission and pressors  · Blood cultures negative, however, urine culture positive klebsiella; e coli; enterococcus faecalis  · Patient transitioned to comfort care as of 6/18  · Son doesn't want her moved at this time  · Case management for dispo planning         VTE Pharmacologic Prophylaxis: VTE Score: 6 High Risk (Score >/= 5) - Pharmacological DVT Prophylaxis Contraindicated  Sequential Compression Devices Ordered  Patient Centered Rounds: I performed bedside rounds with nursing staff today  Discussions with Specialists or Other Care Team Provider:     Education and Discussions with Family / Patient: Updated  (son) at bedside  Total Time Spent on Date of Encounter in care of patient: 25 minutes This time was spent on one or more of the following: performing physical exam; counseling and coordination of care; obtaining or reviewing history; documenting in the medical record; reviewing/ordering tests, medications or procedures; communicating with other healthcare professionals and discussing with patient's family/caregivers  Current Length of Stay: 4 day(s)  Current Patient Status: Inpatient   Certification Statement: The patient will continue to require additional inpatient hospital stay due to comfort care status    Discharge Plan: tbd    Code Status: Level 4 - Comfort Care    Subjective:   No events noted  Patient resting comfortably in bed  Objective:     Vitals:   No data recorded  HR:  [60] 60  Resp:  [16] 16  SpO2:  [100 %] 100 %  Body mass index is 36 32 kg/m²  Input and Output Summary (last 24 hours):   No intake or output data in the 24 hours ending 06/20/23 1035    Physical Exam:   Physical Exam  Vitals and nursing note reviewed  Constitutional:       General: She is not in acute distress  Appearance: She is not ill-appearing  Skin:     General: Skin is warm  Neurological:      Mental Status: Mental status is at baseline  She is lethargic  Additional Data:     Labs:  Results from last 7 days   Lab Units 06/18/23  0347 06/17/23  0304   WBC Thousand/uL 10 58* 4 43   HEMOGLOBIN g/dL 8 0* 9 6*   HEMATOCRIT % 25 8* 30 5*   PLATELETS Thousands/uL 94* 111*   BANDS PCT %  --  2   LYMPHO PCT %  --  6*   MONO PCT %  --  1*   EOS PCT %  --  0     Results from last 7 days   Lab Units 06/18/23  0347   SODIUM mmol/L 143   POTASSIUM mmol/L 4 3   CHLORIDE mmol/L 110*   CO2 mmol/L 17*   BUN mg/dL 25   CREATININE mg/dL 2 16*   ANION GAP mmol/L 16*   CALCIUM mg/dL 7 2*   ALBUMIN g/dL 2 0*   TOTAL BILIRUBIN mg/dL 1 09*   ALK PHOS U/L 274*   ALT U/L 24   AST U/L 35   GLUCOSE RANDOM mg/dL 85     Results from last 7 days   Lab Units 06/16/23  1522   INR  1 31*     Results from last 7 days   Lab Units 06/18/23  1232 06/18/23  0908 06/18/23  0818 06/18/23  0509 06/18/23  0131 06/18/23  0017 06/17/23  2350 06/17/23  2348 06/17/23  1821 06/17/23  1227 06/17/23  1035 06/17/23  1032   POC GLUCOSE mg/dl 84 69 61* 74 78 82 66 50* 91 107 116 321*         Results from last 7 days   Lab Units 06/18/23  0046 06/17/23  2157 06/17/23  1356 06/17/23  0940 06/17/23  0304 06/16/23  1749 06/16/23  1522   LACTIC ACID mmol/L 5 1* 5 1* 4 7* 4 0*  --    < > 3 4*   PROCALCITONIN ng/ml  --   --   --   --  15 32*  --  19 35*    < > = values in this interval not displayed         Lines/Drains:  Invasive Devices     Central Venous Catheter Line  Duration           CVC Central Lines 06/17/23 Triple 20cm 2 days          Peripheral Intravenous Line  Duration           Peripheral IV 06/16/23 Right Antecubital 3 days          Arterial Line  Duration           Arterial Line 06/17/23 Radial 2 days          Drain  Duration           Urethral Catheter 3 days              Urinary Catheter:  Goal for removal: End of life         Central Line:  Goal for removal: End of life management             Imaging: No pertinent imaging reviewed  Recent Cultures (last 7 days):   Results from last 7 days   Lab Units 06/17/23  0256 06/16/23  1522   BLOOD CULTURE   --  No Growth at 72 hrs  No Growth at 72 hrs  URINE CULTURE  >100,000 cfu/ml Klebsiella pneumoniae*  10,000-19,000 cfu/ml Escherichia coli ESBL*  >100,000 cfu/ml Enterococcus faecalis*  10,000-19,000 cfu/ml Enterococcus avium*  Proteus mirabilis*  --        Last 24 Hours Medication List:   Current Facility-Administered Medications   Medication Dose Route Frequency Provider Last Rate   • bisacodyl  10 mg Rectal Daily PRN [place]     • glycopyrrolate  0 2 mg Intravenous Q4H PRN [place]     • haloperidol lactate  2 mg Intravenous Q2H PRN [place]     • HYDROmorphone  0 5 mg Intravenous Q15 Min PRN [place]     • LORazepam  2 mg Intravenous Q30 Min PRN [place]          Today, Patient Was Seen By: SAMIA Moon    **Please Note: This note may have been constructed using a voice recognition system  **

## 2023-06-21 LAB
BACTERIA BLD CULT: NORMAL
BACTERIA BLD CULT: NORMAL
BACTERIA UR CULT: ABNORMAL

## 2023-06-21 NOTE — UTILIZATION REVIEW
NOTIFICATION OF ADMISSION DISCHARGE   This is a Notification of Discharge from [facility]  Please be advised that this patient has been discharge from our facility  Below you will find the admission and discharge date and time including the patient’s disposition  UTILIZATION REVIEW CONTACT:  Jefe Hernández  Utilization   Network Utilization Review Department  Phone: [phone] x carefully listen to the prompts  All voicemails are confidential   Email: [email or web address]     ADMISSION INFORMATION  PRESENTATION DATE: 2023  2:36 PM  OBERVATION ADMISSION DATE:   INPATIENT ADMISSION DATE: 23  7:14 PM   DISCHARGE DATE: 2023  5:03 PM   DISPOSITION:    IMPORTANT INFORMATION:  Send all requests for admission clinical reviews, approved or denied determinations and any other requests to dedicated fax number below belonging to the campus where the patient is receiving treatment   List of dedicated fax numbers:  [address] DENIALS (Administrative/Medical Necessity) [phone]   [address] (Maternity/NICU/Pediatrics) [phone]   [facility] [phone]   [facility] 87 [phone]   [address] [phone]   [address] [facility] [phone]   [address] [facility] [phone]   [facility] 119 [phone]   [facility]  [phone]   [facility] [phone]   [facility] [address] [facility] [phone]

## 2024-05-21 ENCOUNTER — TELEPHONE (OUTPATIENT)
Dept: SURGERY | Facility: CLINIC | Age: 69
End: 2024-05-21

## 2025-05-03 NOTE — QUICK NOTE
Left VM for pt's son Payam Mei regarding clinical course and plan of care 
Received TT from patient's nurse that patient has had 20 cc of urine output with arceo in place, was bladder scanned and had 0 cc of urine  Patient previously received IVF today, plasmalyte 125ml/hr x8h, but repeat Cr has increased to 1 6 at this time, up from 1 2 on admission       Lab Results   Component Value Date    CREATININE 1 64 (H) 05/21/2023    CREATININE 1 41 (H) 05/21/2023    CREATININE 1 22 05/20/2023     - Urinary retention protocol ordered  - Restarted plasmalyte 125mL/hr
Spoke with pt's son on the phone regarding clinical course and plan of care  All questions and concerns addressed at this time 
Negative